# Patient Record
Sex: MALE | Race: OTHER | HISPANIC OR LATINO
[De-identification: names, ages, dates, MRNs, and addresses within clinical notes are randomized per-mention and may not be internally consistent; named-entity substitution may affect disease eponyms.]

---

## 2017-01-12 ENCOUNTER — APPOINTMENT (OUTPATIENT)
Dept: ENDOCRINOLOGY | Facility: CLINIC | Age: 72
End: 2017-01-12

## 2017-03-06 ENCOUNTER — APPOINTMENT (OUTPATIENT)
Dept: ENDOCRINOLOGY | Facility: CLINIC | Age: 72
End: 2017-03-06

## 2017-03-10 ENCOUNTER — RX RENEWAL (OUTPATIENT)
Age: 72
End: 2017-03-10

## 2017-06-29 ENCOUNTER — OTHER (OUTPATIENT)
Age: 72
End: 2017-06-29

## 2017-07-06 ENCOUNTER — APPOINTMENT (OUTPATIENT)
Dept: ENDOCRINOLOGY | Facility: CLINIC | Age: 72
End: 2017-07-06

## 2017-07-06 VITALS
DIASTOLIC BLOOD PRESSURE: 73 MMHG | SYSTOLIC BLOOD PRESSURE: 127 MMHG | WEIGHT: 175.25 LBS | BODY MASS INDEX: 27.18 KG/M2 | HEIGHT: 67.5 IN | HEART RATE: 87 BPM

## 2017-07-06 DIAGNOSIS — Z13.820 ENCOUNTER FOR SCREENING FOR OSTEOPOROSIS: ICD-10-CM

## 2017-07-10 ENCOUNTER — RX RENEWAL (OUTPATIENT)
Age: 72
End: 2017-07-10

## 2017-08-01 ENCOUNTER — FORM ENCOUNTER (OUTPATIENT)
Age: 72
End: 2017-08-01

## 2017-08-02 ENCOUNTER — OUTPATIENT (OUTPATIENT)
Dept: OUTPATIENT SERVICES | Facility: HOSPITAL | Age: 72
LOS: 1 days | End: 2017-08-02
Payer: MEDICARE

## 2017-08-02 ENCOUNTER — APPOINTMENT (OUTPATIENT)
Dept: CARDIOTHORACIC SURGERY | Facility: CLINIC | Age: 72
End: 2017-08-02
Payer: MEDICARE

## 2017-08-02 VITALS
HEIGHT: 68 IN | TEMPERATURE: 97.2 F | OXYGEN SATURATION: 95 % | BODY MASS INDEX: 26.52 KG/M2 | RESPIRATION RATE: 18 BRPM | SYSTOLIC BLOOD PRESSURE: 135 MMHG | WEIGHT: 175 LBS | DIASTOLIC BLOOD PRESSURE: 82 MMHG | HEART RATE: 70 BPM

## 2017-08-02 DIAGNOSIS — Z82.71 FAMILY HISTORY OF POLYCYSTIC KIDNEY: ICD-10-CM

## 2017-08-02 DIAGNOSIS — Z87.39 PERSONAL HISTORY OF OTHER DISEASES OF THE MUSCULOSKELETAL SYSTEM AND CONNECTIVE TISSUE: ICD-10-CM

## 2017-08-02 DIAGNOSIS — I71.2 THORACIC AORTIC ANEURYSM, WITHOUT RUPTURE: ICD-10-CM

## 2017-08-02 DIAGNOSIS — Z87.09 PERSONAL HISTORY OF OTHER DISEASES OF THE RESPIRATORY SYSTEM: ICD-10-CM

## 2017-08-02 DIAGNOSIS — Z86.79 PERSONAL HISTORY OF OTHER DISEASES OF THE CIRCULATORY SYSTEM: ICD-10-CM

## 2017-08-02 DIAGNOSIS — Z83.3 FAMILY HISTORY OF DIABETES MELLITUS: ICD-10-CM

## 2017-08-02 PROCEDURE — 93306 TTE W/DOPPLER COMPLETE: CPT

## 2017-08-02 PROCEDURE — 99205 OFFICE O/P NEW HI 60 MIN: CPT

## 2017-08-02 PROCEDURE — 93306 TTE W/DOPPLER COMPLETE: CPT | Mod: 26

## 2017-08-03 PROBLEM — Z83.3 FAMILY HISTORY OF DIABETES MELLITUS: Status: ACTIVE | Noted: 2017-08-03

## 2017-08-03 PROBLEM — Z86.79 HISTORY OF HYPERTENSION: Status: RESOLVED | Noted: 2017-08-03 | Resolved: 2017-08-03

## 2017-08-03 PROBLEM — Z87.09 HISTORY OF CHRONIC BRONCHITIS: Status: RESOLVED | Noted: 2017-08-03 | Resolved: 2017-08-03

## 2017-08-03 PROBLEM — Z82.71 FAMILY HISTORY OF POLYCYSTIC KIDNEY DISEASE: Status: ACTIVE | Noted: 2017-08-03

## 2017-08-03 PROBLEM — Z87.39 HISTORY OF OSTEOARTHRITIS: Status: RESOLVED | Noted: 2017-08-03 | Resolved: 2017-08-03

## 2017-08-03 PROBLEM — Z87.39 HISTORY OF GOUT: Status: RESOLVED | Noted: 2017-08-03 | Resolved: 2017-08-03

## 2017-08-07 ENCOUNTER — FORM ENCOUNTER (OUTPATIENT)
Age: 72
End: 2017-08-07

## 2017-08-08 ENCOUNTER — OUTPATIENT (OUTPATIENT)
Dept: OUTPATIENT SERVICES | Facility: HOSPITAL | Age: 72
LOS: 1 days | End: 2017-08-08
Payer: MEDICARE

## 2017-08-08 PROCEDURE — 77080 DXA BONE DENSITY AXIAL: CPT

## 2017-08-08 PROCEDURE — 77081 DXA BONE DENSITY APPENDICULR: CPT | Mod: 26

## 2017-08-08 PROCEDURE — 77080 DXA BONE DENSITY AXIAL: CPT | Mod: 26,59

## 2017-08-08 PROCEDURE — 77081 DXA BONE DENSITY APPENDICULR: CPT

## 2017-08-09 ENCOUNTER — RX RENEWAL (OUTPATIENT)
Age: 72
End: 2017-08-09

## 2017-08-10 ENCOUNTER — RX RENEWAL (OUTPATIENT)
Age: 72
End: 2017-08-10

## 2017-10-04 LAB
24R-OH-CALCIDIOL SERPL-MCNC: 31.6 PG/ML
25(OH)D3 SERPL-MCNC: 42.2 NG/ML
HBA1C MFR BLD HPLC: 7.2 %
THYROPEROXIDASE AB SERPL IA-ACNC: 32.6 IU/ML

## 2017-10-12 ENCOUNTER — RX RENEWAL (OUTPATIENT)
Age: 72
End: 2017-10-12

## 2017-10-13 LAB
ALBUMIN SERPL ELPH-MCNC: 4.3 G/DL
ALP BLD-CCNC: 45 U/L
ALP BONE SERPL-MCNC: 5.8 MCG/L
ALT SERPL-CCNC: 13 U/L
AST SERPL-CCNC: 14 U/L
BILIRUB SERPL-MCNC: 0.7 MG/DL
BUN SERPL-MCNC: 29 MG/DL
CALCIUM SERPL-MCNC: 10.5 MG/DL
CALCIUM SERPL-MCNC: 10.5 MG/DL
CHLORIDE SERPL-SCNC: 101 MMOL/L
CHOLEST SERPL-MCNC: 113 MG/DL
CHOLEST/HDLC SERPL: 2.8 RATIO
CO2 SERPL-SCNC: 18 MMOL/L
CREAT SERPL-MCNC: 1.31 MG/DL
FOLATE SERPL-MCNC: >20 NG/ML
GLUCOSE SERPL-MCNC: 127 MG/DL
HDLC SERPL-MCNC: 41 MG/DL
LDLC SERPL CALC-MCNC: 50 MG/DL
MAGNESIUM SERPL-MCNC: 1.4 MG/DL
PARATHYROID HORMONE INTACT: 45 PG/ML
PHOSPHATE SERPL-MCNC: 3.2 MG/DL
POTASSIUM SERPL-SCNC: 4.9 MMOL/L
PROT SERPL-MCNC: 7.2 G/DL
SODIUM SERPL-SCNC: 147 MMOL/L
T3 SERPL-MCNC: 73 NG/DL
T4 FREE SERPL-MCNC: 1.6 NG/DL
TRIGL SERPL-MCNC: 111 MG/DL
TSH SERPL-ACNC: 2.73 UIU/ML
VIT B12 SERPL-MCNC: 471 PG/ML

## 2017-10-19 ENCOUNTER — APPOINTMENT (OUTPATIENT)
Dept: ENDOCRINOLOGY | Facility: CLINIC | Age: 72
End: 2017-10-19
Payer: MEDICARE

## 2017-10-19 VITALS
HEART RATE: 134 BPM | HEIGHT: 68 IN | WEIGHT: 179 LBS | SYSTOLIC BLOOD PRESSURE: 135 MMHG | DIASTOLIC BLOOD PRESSURE: 78 MMHG | BODY MASS INDEX: 27.13 KG/M2

## 2017-10-19 PROCEDURE — 99214 OFFICE O/P EST MOD 30 MIN: CPT

## 2017-11-17 ENCOUNTER — APPOINTMENT (OUTPATIENT)
Dept: ENDOCRINOLOGY | Facility: CLINIC | Age: 72
End: 2017-11-17
Payer: MEDICARE

## 2017-11-17 VITALS
HEIGHT: 69 IN | DIASTOLIC BLOOD PRESSURE: 88 MMHG | BODY MASS INDEX: 26.25 KG/M2 | WEIGHT: 177.25 LBS | SYSTOLIC BLOOD PRESSURE: 136 MMHG | HEART RATE: 132 BPM

## 2017-11-17 PROCEDURE — 90662 IIV NO PRSV INCREASED AG IM: CPT

## 2017-11-17 PROCEDURE — 99215 OFFICE O/P EST HI 40 MIN: CPT | Mod: 25

## 2017-11-17 PROCEDURE — G0008: CPT

## 2017-11-20 ENCOUNTER — MED ADMIN CHARGE (OUTPATIENT)
Age: 72
End: 2017-11-20

## 2018-01-31 ENCOUNTER — OUTPATIENT (OUTPATIENT)
Dept: OUTPATIENT SERVICES | Facility: HOSPITAL | Age: 73
LOS: 1 days | End: 2018-01-31
Payer: MEDICARE

## 2018-01-31 ENCOUNTER — INPATIENT (INPATIENT)
Facility: HOSPITAL | Age: 73
LOS: 5 days | Discharge: HOME | DRG: 273 | End: 2018-02-06
Attending: INTERNAL MEDICINE | Admitting: INTERNAL MEDICINE
Payer: MEDICARE

## 2018-01-31 ENCOUNTER — APPOINTMENT (OUTPATIENT)
Dept: INFUSION THERAPY | Facility: HOSPITAL | Age: 73
End: 2018-01-31

## 2018-01-31 VITALS
WEIGHT: 177.91 LBS | DIASTOLIC BLOOD PRESSURE: 77 MMHG | SYSTOLIC BLOOD PRESSURE: 135 MMHG | RESPIRATION RATE: 18 BRPM | TEMPERATURE: 98 F | OXYGEN SATURATION: 97 % | HEIGHT: 70 IN | HEART RATE: 126 BPM

## 2018-01-31 DIAGNOSIS — I48.91 UNSPECIFIED ATRIAL FIBRILLATION: ICD-10-CM

## 2018-01-31 DIAGNOSIS — I10 ESSENTIAL (PRIMARY) HYPERTENSION: ICD-10-CM

## 2018-01-31 DIAGNOSIS — E78.5 HYPERLIPIDEMIA, UNSPECIFIED: ICD-10-CM

## 2018-01-31 DIAGNOSIS — I82.409 ACUTE EMBOLISM AND THROMBOSIS OF UNSPECIFIED DEEP VEINS OF UNSPECIFIED LOWER EXTREMITY: ICD-10-CM

## 2018-01-31 DIAGNOSIS — E11.9 TYPE 2 DIABETES MELLITUS WITHOUT COMPLICATIONS: ICD-10-CM

## 2018-01-31 DIAGNOSIS — M81.0 AGE-RELATED OSTEOPOROSIS WITHOUT CURRENT PATHOLOGICAL FRACTURE: ICD-10-CM

## 2018-01-31 DIAGNOSIS — Z98.890 OTHER SPECIFIED POSTPROCEDURAL STATES: Chronic | ICD-10-CM

## 2018-01-31 DIAGNOSIS — Z94.0 KIDNEY TRANSPLANT STATUS: ICD-10-CM

## 2018-01-31 DIAGNOSIS — R63.8 OTHER SYMPTOMS AND SIGNS CONCERNING FOOD AND FLUID INTAKE: ICD-10-CM

## 2018-01-31 DIAGNOSIS — Z29.9 ENCOUNTER FOR PROPHYLACTIC MEASURES, UNSPECIFIED: ICD-10-CM

## 2018-01-31 LAB
ALBUMIN SERPL ELPH-MCNC: 4.3 G/DL — SIGNIFICANT CHANGE UP (ref 3.3–5)
ALP SERPL-CCNC: 35 U/L — LOW (ref 40–120)
ALT FLD-CCNC: 16 U/L — SIGNIFICANT CHANGE UP (ref 10–45)
ANION GAP SERPL CALC-SCNC: 16 MMOL/L — SIGNIFICANT CHANGE UP (ref 5–17)
APTT BLD: 131.6 SEC — CRITICAL HIGH (ref 27.5–37.4)
APTT BLD: 29.3 SEC — SIGNIFICANT CHANGE UP (ref 27.5–37.4)
AST SERPL-CCNC: 17 U/L — SIGNIFICANT CHANGE UP (ref 10–40)
BASOPHILS NFR BLD AUTO: 0.2 % — SIGNIFICANT CHANGE UP (ref 0–2)
BILIRUB SERPL-MCNC: 0.8 MG/DL — SIGNIFICANT CHANGE UP (ref 0.2–1.2)
BUN SERPL-MCNC: 25 MG/DL — HIGH (ref 7–23)
CALCIUM SERPL-MCNC: 9.8 MG/DL — SIGNIFICANT CHANGE UP (ref 8.4–10.5)
CHLORIDE SERPL-SCNC: 98 MMOL/L — SIGNIFICANT CHANGE UP (ref 96–108)
CK MB CFR SERPL CALC: 1.5 NG/ML — SIGNIFICANT CHANGE UP (ref 0–6.7)
CK MB CFR SERPL CALC: 1.9 NG/ML — SIGNIFICANT CHANGE UP (ref 0–6.7)
CK SERPL-CCNC: 48 U/L — SIGNIFICANT CHANGE UP (ref 30–200)
CK SERPL-CCNC: 53 U/L — SIGNIFICANT CHANGE UP (ref 30–200)
CO2 SERPL-SCNC: 24 MMOL/L — SIGNIFICANT CHANGE UP (ref 22–31)
CREAT SERPL-MCNC: 1 MG/DL — SIGNIFICANT CHANGE UP (ref 0.5–1.3)
EOSINOPHIL NFR BLD AUTO: 0.9 % — SIGNIFICANT CHANGE UP (ref 0–6)
GLUCOSE BLDC GLUCOMTR-MCNC: 189 MG/DL — HIGH (ref 70–99)
GLUCOSE BLDC GLUCOMTR-MCNC: 258 MG/DL — HIGH (ref 70–99)
GLUCOSE SERPL-MCNC: 266 MG/DL — HIGH (ref 70–99)
HCT VFR BLD CALC: 39.3 % — SIGNIFICANT CHANGE UP (ref 39–50)
HCT VFR BLD CALC: 41.9 % — SIGNIFICANT CHANGE UP (ref 39–50)
HGB BLD-MCNC: 13.3 G/DL — SIGNIFICANT CHANGE UP (ref 13–17)
HGB BLD-MCNC: 14 G/DL — SIGNIFICANT CHANGE UP (ref 13–17)
INR BLD: 1.06 — SIGNIFICANT CHANGE UP (ref 0.88–1.16)
LYMPHOCYTES # BLD AUTO: 15.3 % — SIGNIFICANT CHANGE UP (ref 13–44)
MAGNESIUM SERPL-MCNC: 1.4 MG/DL — LOW (ref 1.6–2.6)
MCHC RBC-ENTMCNC: 30.1 PG — SIGNIFICANT CHANGE UP (ref 27–34)
MCHC RBC-ENTMCNC: 30.3 PG — SIGNIFICANT CHANGE UP (ref 27–34)
MCHC RBC-ENTMCNC: 33.4 G/DL — SIGNIFICANT CHANGE UP (ref 32–36)
MCHC RBC-ENTMCNC: 33.8 G/DL — SIGNIFICANT CHANGE UP (ref 32–36)
MCV RBC AUTO: 89.5 FL — SIGNIFICANT CHANGE UP (ref 80–100)
MCV RBC AUTO: 90.1 FL — SIGNIFICANT CHANGE UP (ref 80–100)
MONOCYTES NFR BLD AUTO: 3.5 % — SIGNIFICANT CHANGE UP (ref 2–14)
NEUTROPHILS NFR BLD AUTO: 80.1 % — HIGH (ref 43–77)
PLATELET # BLD AUTO: 197 K/UL — SIGNIFICANT CHANGE UP (ref 150–400)
PLATELET # BLD AUTO: 217 K/UL — SIGNIFICANT CHANGE UP (ref 150–400)
POTASSIUM SERPL-MCNC: 4.2 MMOL/L — SIGNIFICANT CHANGE UP (ref 3.5–5.3)
POTASSIUM SERPL-SCNC: 4.2 MMOL/L — SIGNIFICANT CHANGE UP (ref 3.5–5.3)
PROT SERPL-MCNC: 7.7 G/DL — SIGNIFICANT CHANGE UP (ref 6–8.3)
PROTHROM AB SERPL-ACNC: 11.8 SEC — SIGNIFICANT CHANGE UP (ref 9.8–12.7)
RBC # BLD: 4.39 M/UL — SIGNIFICANT CHANGE UP (ref 4.2–5.8)
RBC # BLD: 4.65 M/UL — SIGNIFICANT CHANGE UP (ref 4.2–5.8)
RBC # FLD: 15.2 % — SIGNIFICANT CHANGE UP (ref 10.3–16.9)
RBC # FLD: 15.7 % — SIGNIFICANT CHANGE UP (ref 10.3–16.9)
SODIUM SERPL-SCNC: 138 MMOL/L — SIGNIFICANT CHANGE UP (ref 135–145)
TROPONIN T SERPL-MCNC: <0.01 NG/ML — SIGNIFICANT CHANGE UP (ref 0–0.01)
TROPONIN T SERPL-MCNC: <0.01 NG/ML — SIGNIFICANT CHANGE UP (ref 0–0.01)
TSH SERPL-MCNC: 0.11 UIU/ML — LOW (ref 0.35–4.94)
WBC # BLD: 10 K/UL — SIGNIFICANT CHANGE UP (ref 3.8–10.5)
WBC # BLD: 8.5 K/UL — SIGNIFICANT CHANGE UP (ref 3.8–10.5)
WBC # FLD AUTO: 10 K/UL — SIGNIFICANT CHANGE UP (ref 3.8–10.5)
WBC # FLD AUTO: 8.5 K/UL — SIGNIFICANT CHANGE UP (ref 3.8–10.5)

## 2018-01-31 PROCEDURE — 71045 X-RAY EXAM CHEST 1 VIEW: CPT | Mod: 26

## 2018-01-31 PROCEDURE — 99285 EMERGENCY DEPT VISIT HI MDM: CPT | Mod: 25

## 2018-01-31 PROCEDURE — 93010 ELECTROCARDIOGRAM REPORT: CPT

## 2018-01-31 RX ORDER — CARVEDILOL PHOSPHATE 80 MG/1
12.5 CAPSULE, EXTENDED RELEASE ORAL EVERY 12 HOURS
Qty: 0 | Refills: 0 | Status: DISCONTINUED | OUTPATIENT
Start: 2018-01-31 | End: 2018-01-31

## 2018-01-31 RX ORDER — ACETAMINOPHEN 500 MG
650 TABLET ORAL ONCE
Qty: 0 | Refills: 0 | Status: DISCONTINUED | OUTPATIENT
Start: 2018-01-31 | End: 2018-02-15

## 2018-01-31 RX ORDER — DEXTROSE 50 % IN WATER 50 %
25 SYRINGE (ML) INTRAVENOUS ONCE
Qty: 0 | Refills: 0 | Status: DISCONTINUED | OUTPATIENT
Start: 2018-01-31 | End: 2018-02-06

## 2018-01-31 RX ORDER — SODIUM CHLORIDE 9 MG/ML
1000 INJECTION, SOLUTION INTRAVENOUS
Qty: 0 | Refills: 0 | Status: DISCONTINUED | OUTPATIENT
Start: 2018-01-31 | End: 2018-02-06

## 2018-01-31 RX ORDER — TACROLIMUS 5 MG/1
1 CAPSULE ORAL AT BEDTIME
Qty: 0 | Refills: 0 | Status: DISCONTINUED | OUTPATIENT
Start: 2018-01-31 | End: 2018-02-06

## 2018-01-31 RX ORDER — ATORVASTATIN CALCIUM 80 MG/1
20 TABLET, FILM COATED ORAL AT BEDTIME
Qty: 0 | Refills: 0 | Status: DISCONTINUED | OUTPATIENT
Start: 2018-01-31 | End: 2018-02-06

## 2018-01-31 RX ORDER — DEXTROSE 50 % IN WATER 50 %
12.5 SYRINGE (ML) INTRAVENOUS ONCE
Qty: 0 | Refills: 0 | Status: DISCONTINUED | OUTPATIENT
Start: 2018-01-31 | End: 2018-02-06

## 2018-01-31 RX ORDER — SODIUM CHLORIDE 9 MG/ML
500 INJECTION INTRAMUSCULAR; INTRAVENOUS; SUBCUTANEOUS ONCE
Qty: 0 | Refills: 0 | Status: DISCONTINUED | OUTPATIENT
Start: 2018-01-31 | End: 2018-02-15

## 2018-01-31 RX ORDER — DEXTROSE 50 % IN WATER 50 %
1 SYRINGE (ML) INTRAVENOUS ONCE
Qty: 0 | Refills: 0 | Status: DISCONTINUED | OUTPATIENT
Start: 2018-01-31 | End: 2018-02-06

## 2018-01-31 RX ORDER — MAGNESIUM SULFATE 500 MG/ML
2 VIAL (ML) INJECTION ONCE
Qty: 0 | Refills: 0 | Status: COMPLETED | OUTPATIENT
Start: 2018-01-31 | End: 2018-01-31

## 2018-01-31 RX ORDER — CARVEDILOL PHOSPHATE 80 MG/1
12.5 CAPSULE, EXTENDED RELEASE ORAL EVERY 12 HOURS
Qty: 0 | Refills: 0 | Status: DISCONTINUED | OUTPATIENT
Start: 2018-01-31 | End: 2018-02-04

## 2018-01-31 RX ORDER — INSULIN LISPRO 100/ML
VIAL (ML) SUBCUTANEOUS
Qty: 0 | Refills: 0 | Status: DISCONTINUED | OUTPATIENT
Start: 2018-01-31 | End: 2018-02-06

## 2018-01-31 RX ORDER — HEPARIN SODIUM 5000 [USP'U]/ML
INJECTION INTRAVENOUS; SUBCUTANEOUS
Qty: 25000 | Refills: 0 | Status: DISCONTINUED | OUTPATIENT
Start: 2018-01-31 | End: 2018-01-31

## 2018-01-31 RX ORDER — GLUCAGON INJECTION, SOLUTION 0.5 MG/.1ML
1 INJECTION, SOLUTION SUBCUTANEOUS ONCE
Qty: 0 | Refills: 0 | Status: DISCONTINUED | OUTPATIENT
Start: 2018-01-31 | End: 2018-02-06

## 2018-01-31 RX ORDER — TACROLIMUS 5 MG/1
2 CAPSULE ORAL DAILY
Qty: 0 | Refills: 0 | Status: DISCONTINUED | OUTPATIENT
Start: 2018-02-01 | End: 2018-02-06

## 2018-01-31 RX ORDER — HEPARIN SODIUM 5000 [USP'U]/ML
6500 INJECTION INTRAVENOUS; SUBCUTANEOUS ONCE
Qty: 0 | Refills: 0 | Status: COMPLETED | OUTPATIENT
Start: 2018-01-31 | End: 2018-01-31

## 2018-01-31 RX ORDER — HEPARIN SODIUM 5000 [USP'U]/ML
1200 INJECTION INTRAVENOUS; SUBCUTANEOUS
Qty: 25000 | Refills: 0 | Status: DISCONTINUED | OUTPATIENT
Start: 2018-01-31 | End: 2018-02-01

## 2018-01-31 RX ORDER — ZOLEDRONIC ACID 5 MG/100ML
5 INJECTION, SOLUTION INTRAVENOUS ONCE
Qty: 0 | Refills: 0 | Status: DISCONTINUED | OUTPATIENT
Start: 2018-01-31 | End: 2018-02-15

## 2018-01-31 RX ADMIN — Medication 2: at 22:02

## 2018-01-31 RX ADMIN — CARVEDILOL PHOSPHATE 12.5 MILLIGRAM(S): 80 CAPSULE, EXTENDED RELEASE ORAL at 19:41

## 2018-01-31 RX ADMIN — Medication 50 GRAM(S): at 17:24

## 2018-01-31 RX ADMIN — ATORVASTATIN CALCIUM 20 MILLIGRAM(S): 80 TABLET, FILM COATED ORAL at 22:02

## 2018-01-31 RX ADMIN — HEPARIN SODIUM 6500 UNIT(S): 5000 INJECTION INTRAVENOUS; SUBCUTANEOUS at 14:09

## 2018-01-31 RX ADMIN — HEPARIN SODIUM 1500 UNIT(S)/HR: 5000 INJECTION INTRAVENOUS; SUBCUTANEOUS at 14:09

## 2018-01-31 RX ADMIN — Medication 6: at 16:14

## 2018-01-31 NOTE — H&P ADULT - PROBLEM SELECTOR PLAN 4
start on home Atorvastatin 20mg Patient takes Metformin, Januvia, and Glimepiride at home for DM2.  Hold home medications and will start MISS as inpatient  - f/u [facility]1C

## 2018-01-31 NOTE — H&P ADULT - NSHPSOCIALHISTORY_GEN_ALL_CORE
Lives at home with wife in an elevator building.  Never smoker, social alcohol use, no illicit drug use. Retired

## 2018-01-31 NOTE — H&P ADULT - NSHPPHYSICALEXAM_GEN_ALL_CORE
General: WDWN male lying in bed in NAD, pleasant and interactive  HEENT: AT/NC, PEERLA, MMM, no conjunctival pallor, anicteric sclera  Neck; Supple without JVD/LAD  Cardiac: S1/S2, irregularly irregular and tachy, no M/R/G  Respiratory: CTAB, no W/R/R  Abdomen: Soft, NT/ND, obese, +BS  Extremities: WWP, no edema, clubbing or cyanosis. Old fistula present left forearm  Vascular: 2+ radial and DP/PT pulses  Neuro: AOx3, moves all extremities and follows commands

## 2018-01-31 NOTE — PROGRESS NOTE ADULT - SUBJECTIVE AND OBJECTIVE BOX
Pt is admitted for new onset of atrial fibrillation , discovered during  IV RX for Osteoporosis     Pt has no symptoms       PAST MEDICAL & SURGICAL HISTORY:  DVT (deep venous thrombosis)  Kidney transplant recipient  Prostate cancer  HTN (hypertension)  HLD (hyperlipidemia)  DM2 (diabetes mellitus, type 2)  Polycystic kidney disease  H/O radical prostatectomy    Home Medications:  amLODIPine 10 mg oral tablet: 1 tab(s) orally once a day (31 Jan 2018 16:44)  aspirin 81 mg oral tablet: 1 tab(s) orally once a day (31 Jan 2018 16:44)  Coreg 12.5 mg oral tablet: 1 tab(s) orally 2 times a day (31 Jan 2018 16:44)  glimepiride 2 mg oral tablet: 2 tab(s) orally once a day (in the morning) (31 Jan 2018 16:44)  glimepiride 2 mg oral tablet: 1 tab(s) orally once a day (in the evening) (31 Jan 2018 16:44)  Januvia 100 mg oral tablet: 1 tab(s) orally once a day (31 Jan 2018 16:44)  Lipitor 20 mg oral tablet: 1 tab(s) orally once a day (31 Jan 2018 16:44)  lisinopril 10 mg oral tablet: 1 tab(s) orally once a day (31 Jan 2018 16:44)  metFORMIN 500 mg oral tablet, extended release: 1 tab(s) orally once a day (31 Jan 2018 16:44)  tacrolimus 1 mg oral capsule: 2 cap(s) orally once a day (in the morning) (31 Jan 2018 16:44)  tacrolimus 1 mg oral capsule: 1 cap(s) orally once a day (in the evening) (31 Jan 2018 16:44)  Vitamin D3 1000 intl units oral capsule: 1 cap(s) orally 2 times a day (31 Jan 2018 16:44)    MEDICATIONS  (STANDING):  atorvastatin 20 milliGRAM(s) Oral at bedtime  dextrose 5%. 1000 milliLiter(s) (50 mL/Hr) IV Continuous <Continuous>  dextrose 50% Injectable 12.5 Gram(s) IV Push once  dextrose 50% Injectable 25 Gram(s) IV Push once  dextrose 50% Injectable 25 Gram(s) IV Push once  heparin  Infusion.  Unit(s)/Hr (15 mL/Hr) IV Continuous <Continuous>  insulin lispro (HumaLOG) corrective regimen sliding scale   SubCutaneous Before meals and at bedtime  tacrolimus 1 milliGRAM(s) Oral at bedtime    MEDICATIONS  (PRN):  dextrose Gel 1 Dose(s) Oral once PRN Blood Glucose LESS THAN 70 milliGRAM(s)/deciliter  glucagon  Injectable 1 milliGRAM(s) IntraMuscular once PRN Glucose LESS THAN 70 milligrams/deciliter      Lung s clear  CV s1 s2 Prev EKG earlier  Atrial tachycardia  varriable AV Block  Lateral infarct     Abd soft  Ext stable                          14.0   10.0  )-----------( 197      ( 31 Jan 2018 13:12 )             41.9   01-31    138  |  98  |  25<H>  ----------------------------<  266<H>  4.2   |  24  |  1.00    Ca    9.8      31 Jan 2018 13:12  Mg     1.4     01-31    TPro  7.7  /  Alb  4.3  /  TBili  0.8  /  DBili  x   /  AST  17  /  ALT  16  /  AlkPhos  35<L>  01-31    CARDIAC MARKERS ( 31 Jan 2018 13:12 )  x     / <0.01 ng/mL / 53 U/L / x     / 1.9 ng/mL        L Donna

## 2018-01-31 NOTE — H&P ADULT - PROBLEM SELECTOR PLAN 3
start on home Lisinopril 10mg, Coreg 12.5mg BID, and Amlodipine 10mg Currently normotensive, and patient reports taking all of his home medications today (Lisinopril 10mg, Amlodipine 10mg, Carvedilol 12.5mg BID).  Hold home BP medications while adding Diltiazem for rate control at this time.  Can consider adding Lisinopril if needed for BP control. Currently normotensive, and patient reports taking all of his home medications today (Lisinopril 10mg, Amlodipine 10mg, Carvedilol 12.5mg BID).    - start home Carvedilol 12.5mg BID for rate control  - hold other home BP meds at this time in case Diltiazem is required overnight for additional rate control

## 2018-01-31 NOTE — H&P ADULT - PROBLEM SELECTOR PLAN 5
Patient with history of DVT in 2006 in the setting of a pelvic fracture when he was immobilized for several weeks. He was on Warfarin for a period of time after his DVT and has never had clotting issues since.  On exam today, his legs are similar in width and he does not have any calf tenderness  - c/w heparin gtt for AC in Afrib start on home Atorvastatin 20mg

## 2018-01-31 NOTE — ED ADULT NURSE NOTE - CHIEF COMPLAINT QUOTE
[facility] SOLOMON Schwarz in infusion center reports patient has osteoporosis, was scheduled to receive IV "Reclast 50CC's." SOLOMON Schwarz reports patients HR before infusion was 132bpm. Patient received 500mL's of 0.9%NaCl and 50ml's of Reclast IV via 24G placed in right by infusion center prior to arrival. Patient is asymptomatic, patient denies any CP, palpations, fever/chills, N/V/D, SOB, leg swelling.

## 2018-01-31 NOTE — H&P ADULT - HISTORY OF PRESENT ILLNESS
Neto Wooten is a 72 M with PMHx of polycystic kidney disease s/p kidney transplant (2007 at [facility], on [place]/Prednisone), HTN, HLD, DM2, Prostate cancer s/p radical prostatectomy 2015, DVT 2006 (in setting of pelvic fracture), and osteoporosis presenting to [facility] after being found with  when measured at outpatient infusion center where he was receiving an infusion of Reclast.  He was asymptomatic for his tachycardia, and was sent to the ED after completing his transfusion, where he was found to be in AFib with RVR.  He denies HA, changes in vision/hearing, dysphagia, CP, SOB, N/V/D/C, dysuria, myalgias, fevers or chills.  He recalls intermittent periods of palpitations over the past several years that he had attributed to stress, and which were always self limited.      In the ED his VS were Tmax 97.9, , /77, RR 18, SpO2 97% RA. EKG showed atrial fibrillation.  He was given Diltiazem 10mg IV x2, Diltiazem 30mg PO, and was started on Heparin gtt at 15cc/hr with a 6500U bolus.  Baseline PTT 29.3 Neto Wooten is a 72 M with PMHx of polycystic kidney disease s/p kidney transplant (2007 at [facility], on [place]/Prednisone), HTN, HLD, DM2, Prostate cancer s/p radical prostatectomy 2015, DVT 2006 (in setting of pelvic fracture), and osteoporosis presenting to [facility] after being found with  when measured at outpatient infusion center where he was receiving an infusion of Reclast.  He was asymptomatic for his tachycardia, and was sent to the ED after completing his transfusion, where he was found to be in AFib with RVR.  He denies HA, changes in vision/hearing, dysphagia, CP, SOB, N/V/D/C, dysuria, myalgias, fevers or chills.  He recalls intermittent periods of palpitations over the past several years that he had attributed to stress, and which were always self limited.      In the ED his VS were Tmax 97.9, , /77, RR 18, SpO2 97% RA. EKG showed atrial fibrillation.  He was given Diltiazem 10mg IV x2, Diltiazem 30mg PO, and was started on Heparin gtt at 15cc/hr with a 6500U bolus.  Baseline PTT 29.3.  Magnesium 1.4, Anjel <0.01.  He was admitted to [facility] for further management

## 2018-01-31 NOTE — ED PROVIDER NOTE - PMH
DM2 (diabetes mellitus, type 2)    DVT (deep venous thrombosis)    HLD (hyperlipidemia)    HTN (hypertension)    Kidney transplant recipient    Polycystic kidney disease    Prostate cancer

## 2018-01-31 NOTE — ED PROVIDER NOTE - OBJECTIVE STATEMENT
tachycardia  patient when for outpt reclas infusion for osteoporosis, pt was completely assymptomatic, found to have tachy and given NS for presumed dehydration prior to infusion, after infusion still tachy and still assymtomatic, sent to ER and found to have rapid afib, pt denies CP and SOB, no lightheadness. no ho of afib

## 2018-01-31 NOTE — ED PROVIDER NOTE - MEDICAL DECISION MAKING DETAILS
Incidentally found and assymptomatic rapid afib, rate improved w diltiazem, heparin initiated, short 8 beat run of tach, discussed w Dr. Thompson, holding on antidysrthmic as per Dr. Thompson who is coming to see patient, discussed case w Dr. Rios.

## 2018-01-31 NOTE — ED ADULT NURSE REASSESSMENT NOTE - NS ED NURSE REASSESS COMMENT FT1
Endorsed care from SOLOMON ADEN at this time, pt resting comfortably bed 7 monitor. Noted sinus tachycardia. Heparin continues to infuse as per order. Denies cp, sob, n/v. Will continue to monitor. Endorsed care from SOLOMON ADEN at this time, pt resting comfortably bed 7 monitor. Heparin continues to infuse as per order. Denies cp, sob, n/v. Will continue to monitor.

## 2018-01-31 NOTE — ED ADULT NURSE NOTE - OBJECTIVE STATEMENT
pt received in room 7 A & O x 3 pt was at infusion center at 3rd floor was receiving Reclast for Osteoporosis treatement, pt was tachycardic before receiving and during treatment , pt denies any symptoms , no chest pain , no SOB , no palpitations , no dizziness, pt on CM< sinus rhythm noted , MD at bedside run of V-tach noted   , IV was accessed labs sent will continue to monitor

## 2018-01-31 NOTE — ED ADULT TRIAGE NOTE - CHIEF COMPLAINT QUOTE
[facility] SOLOMON Schwarz in infusion center reports patient has osteoporosis, was scheduled to receive IV "Reclast 50CC's." SOLOMON Schwarz reports patients HR before infusion was 132bpm. Patient received 500mL's of 0.9%NaCl and 50ml's of Reclast IV via 24G placed in right by infusion center prior to arrival. Patient is asymptomatic, patient denies any CP, palpations, fever/chills, N/V/D, SOB, leg swelling.

## 2018-01-31 NOTE — H&P ADULT - ASSESSMENT
72 M with PMHx of polycystic kidney disease s/p kidney transplant (2007 at [facility], on [place]/Prednisone), HTN, HLD, DM2, Prostate cancer s/p radical prostatectomy 2015, DVT 2006 (in setting of pelvic fracture), and osteoporosis presenting to [facility] with atrial fibrillation with RVR, asymptomatic

## 2018-01-31 NOTE — H&P ADULT - PROBLEM SELECTOR PLAN 6
F: PO  E: Replete PRN  N: Regular Patient with history of DVT in 2006 in the setting of a pelvic fracture when he was immobilized for several weeks. He was on Warfarin for a period of time after his DVT and has never had clotting issues since.  On exam today, his legs are similar in width and he does not have any calf tenderness  - c/w heparin gtt for AC in Afrib

## 2018-01-31 NOTE — H&P ADULT - NSHPLABSRESULTS_GEN_ALL_CORE
14.0   10.0  )-----------( 197      ( 31 Jan 2018 13:12 )             41.9   01-31    138  |  98  |  25<H>  ----------------------------<  266<H>  4.2   |  24  |  1.00    Ca    9.8      31 Jan 2018 13:12  Mg     1.4     01-31    TPro  7.7  /  Alb  4.3  /  TBili  0.8  /  DBili  x   /  AST  17  /  ALT  16  /  AlkPhos  35<L>  01-31    CARDIAC MARKERS ( 31 Jan 2018 13:12 )  x     / <0.01 ng/mL / 53 U/L / x     / 1.9 ng/mL

## 2018-02-01 LAB
ANION GAP SERPL CALC-SCNC: 11 MMOL/L — SIGNIFICANT CHANGE UP (ref 5–17)
APTT BLD: 63.1 SEC — HIGH (ref 27.5–37.4)
APTT BLD: 71.8 SEC — HIGH (ref 27.5–37.4)
APTT BLD: 74.2 SEC — HIGH (ref 27.5–37.4)
BUN SERPL-MCNC: 19 MG/DL — SIGNIFICANT CHANGE UP (ref 7–23)
CALCIUM SERPL-MCNC: 9.4 MG/DL — SIGNIFICANT CHANGE UP (ref 8.4–10.5)
CHLORIDE SERPL-SCNC: 103 MMOL/L — SIGNIFICANT CHANGE UP (ref 96–108)
CK MB CFR SERPL CALC: 1.3 NG/ML — SIGNIFICANT CHANGE UP (ref 0–6.7)
CK SERPL-CCNC: 40 U/L — SIGNIFICANT CHANGE UP (ref 30–200)
CO2 SERPL-SCNC: 28 MMOL/L — SIGNIFICANT CHANGE UP (ref 22–31)
CREAT SERPL-MCNC: 0.99 MG/DL — SIGNIFICANT CHANGE UP (ref 0.5–1.3)
GLUCOSE BLDC GLUCOMTR-MCNC: 118 MG/DL — HIGH (ref 70–99)
GLUCOSE BLDC GLUCOMTR-MCNC: 132 MG/DL — HIGH (ref 70–99)
GLUCOSE BLDC GLUCOMTR-MCNC: 178 MG/DL — HIGH (ref 70–99)
GLUCOSE BLDC GLUCOMTR-MCNC: 249 MG/DL — HIGH (ref 70–99)
GLUCOSE SERPL-MCNC: 149 MG/DL — HIGH (ref 70–99)
HCT VFR BLD CALC: 38.3 % — LOW (ref 39–50)
HGB BLD-MCNC: 13 G/DL — SIGNIFICANT CHANGE UP (ref 13–17)
INR BLD: 1.11 — SIGNIFICANT CHANGE UP (ref 0.88–1.16)
MAGNESIUM SERPL-MCNC: 1.6 MG/DL — SIGNIFICANT CHANGE UP (ref 1.6–2.6)
MCHC RBC-ENTMCNC: 30.5 PG — SIGNIFICANT CHANGE UP (ref 27–34)
MCHC RBC-ENTMCNC: 33.9 G/DL — SIGNIFICANT CHANGE UP (ref 32–36)
MCV RBC AUTO: 89.9 FL — SIGNIFICANT CHANGE UP (ref 80–100)
PLATELET # BLD AUTO: 186 K/UL — SIGNIFICANT CHANGE UP (ref 150–400)
POTASSIUM SERPL-MCNC: 4 MMOL/L — SIGNIFICANT CHANGE UP (ref 3.5–5.3)
POTASSIUM SERPL-SCNC: 4 MMOL/L — SIGNIFICANT CHANGE UP (ref 3.5–5.3)
PROTHROM AB SERPL-ACNC: 12.3 SEC — SIGNIFICANT CHANGE UP (ref 9.8–12.7)
RBC # BLD: 4.26 M/UL — SIGNIFICANT CHANGE UP (ref 4.2–5.8)
RBC # FLD: 15.4 % — SIGNIFICANT CHANGE UP (ref 10.3–16.9)
SODIUM SERPL-SCNC: 142 MMOL/L — SIGNIFICANT CHANGE UP (ref 135–145)
T3 SERPL-MCNC: 72 NG/DL — LOW (ref 80–200)
T4 AB SER-ACNC: 6.32 UG/DL — SIGNIFICANT CHANGE UP (ref 3.17–11.72)
TACROLIMUS SERPL-MCNC: 6 NG/ML — SIGNIFICANT CHANGE UP
TROPONIN T SERPL-MCNC: <0.01 NG/ML — SIGNIFICANT CHANGE UP (ref 0–0.01)
WBC # BLD: 9.2 K/UL — SIGNIFICANT CHANGE UP (ref 3.8–10.5)
WBC # FLD AUTO: 9.2 K/UL — SIGNIFICANT CHANGE UP (ref 3.8–10.5)

## 2018-02-01 PROCEDURE — 93306 TTE W/DOPPLER COMPLETE: CPT | Mod: 26

## 2018-02-01 PROCEDURE — 99223 1ST HOSP IP/OBS HIGH 75: CPT

## 2018-02-01 PROCEDURE — 93010 ELECTROCARDIOGRAM REPORT: CPT

## 2018-02-01 RX ORDER — ACETAMINOPHEN 500 MG
650 TABLET ORAL ONCE
Qty: 0 | Refills: 0 | Status: COMPLETED | OUTPATIENT
Start: 2018-02-01 | End: 2018-02-01

## 2018-02-01 RX ORDER — MAGNESIUM SULFATE 500 MG/ML
2 VIAL (ML) INJECTION ONCE
Qty: 0 | Refills: 0 | Status: COMPLETED | OUTPATIENT
Start: 2018-02-01 | End: 2018-02-01

## 2018-02-01 RX ORDER — APIXABAN 2.5 MG/1
5 TABLET, FILM COATED ORAL EVERY 12 HOURS
Qty: 0 | Refills: 0 | Status: DISCONTINUED | OUTPATIENT
Start: 2018-02-01 | End: 2018-02-02

## 2018-02-01 RX ORDER — HEPARIN SODIUM 5000 [USP'U]/ML
1200 INJECTION INTRAVENOUS; SUBCUTANEOUS
Qty: 25000 | Refills: 0 | Status: DISCONTINUED | OUTPATIENT
Start: 2018-02-01 | End: 2018-02-01

## 2018-02-01 RX ADMIN — TACROLIMUS 2 MILLIGRAM(S): 5 CAPSULE ORAL at 16:54

## 2018-02-01 RX ADMIN — TACROLIMUS 1 MILLIGRAM(S): 5 CAPSULE ORAL at 01:03

## 2018-02-01 RX ADMIN — ATORVASTATIN CALCIUM 20 MILLIGRAM(S): 80 TABLET, FILM COATED ORAL at 21:42

## 2018-02-01 RX ADMIN — Medication 650 MILLIGRAM(S): at 11:41

## 2018-02-01 RX ADMIN — Medication 4: at 11:31

## 2018-02-01 RX ADMIN — Medication 50 GRAM(S): at 11:41

## 2018-02-01 RX ADMIN — Medication 2: at 16:28

## 2018-02-01 RX ADMIN — HEPARIN SODIUM 12 UNIT(S)/HR: 5000 INJECTION INTRAVENOUS; SUBCUTANEOUS at 11:31

## 2018-02-01 RX ADMIN — TACROLIMUS 1 MILLIGRAM(S): 5 CAPSULE ORAL at 21:42

## 2018-02-01 RX ADMIN — APIXABAN 5 MILLIGRAM(S): 2.5 TABLET, FILM COATED ORAL at 17:28

## 2018-02-01 RX ADMIN — CARVEDILOL PHOSPHATE 12.5 MILLIGRAM(S): 80 CAPSULE, EXTENDED RELEASE ORAL at 06:50

## 2018-02-01 RX ADMIN — HEPARIN SODIUM 12 UNIT(S)/HR: 5000 INJECTION INTRAVENOUS; SUBCUTANEOUS at 09:16

## 2018-02-01 RX ADMIN — Medication 100 GRAM(S): at 08:51

## 2018-02-01 RX ADMIN — Medication 650 MILLIGRAM(S): at 12:20

## 2018-02-01 RX ADMIN — CARVEDILOL PHOSPHATE 12.5 MILLIGRAM(S): 80 CAPSULE, EXTENDED RELEASE ORAL at 17:28

## 2018-02-01 NOTE — CONSULT NOTE ADULT - SUBJECTIVE AND OBJECTIVE BOX
CHIEF COMPLAINT: elevated HR during outpt infusion    HISTORY OF PRESENT ILLNESS: 73 y/o M with PMHx of polycystic kidney disease s/p kidney transplant (2007 at [facility], on [place]/Prednisone), HTN, HLD, DM2, Prostate cancer s/p radical prostatectomy 2015, DVT 2006 (in setting of pelvic fracture), and osteoporosis presenting to [facility] after being found with  when measured at outpatient infusion center where he was receiving an infusion of Reclast.  Sent to ED for further evaluation, EKG significant for Atrial flutter with variable AV block, VR ~ 120-130 bpm.  Started on Diltiazem PO for rate control, heparin gtt for anticoagulation.  EP called for arrhythmia management.    He endorses GAFFNEY over the last 1-2 months, especially with inclines.  States his exertion is also significantly limited by arthritic LE pain.  No awareness of rapid/irregular heart action, SOB at rest, dizziness, presyncope/syncope.     PAST MEDICAL & SURGICAL HISTORY:  DVT (deep venous thrombosis)  Kidney transplant recipient  Prostate cancer  HTN (hypertension)  HLD (hyperlipidemia)  DM2 (diabetes mellitus, type 2)  Polycystic kidney disease  H/O radical prostatectomy        PERTINENT DIAGNOSTIC TESTING:    < from: Echocardiogram (02.01.18 @ 13:35) >  EXAM:  ECHOCARDIOGRAM (CARDIOL)                          PROCEDURE DATE:  02/01/2018                  INTERPRETATION:  Patient Height: 177.8 cm  Patient Weight: 81.6 kg  Heart Rate: 102 bpm  BSA: 2.0 m^2  Interpretation Summary  There is mildconcentric left ventricular hypertrophy.The left   ventricular wall   motion is normal.The left ventricular ejection fraction is estimated to   be   55-60%The left atrial size is normal.Right atrial size is normal.The   right   ventricle is normal insize and function.Structurally normal aortic   valve.No   aortic regurgitation noted.Structurally normal mitral valve.There is   trace to   mild mitral regurgitation.Structurally normal tricuspid valve.There is   mild   tricuspid regurgitation.Structurally normal pulmonic valve.Moderate   aortic   root dilatation.The aortic root measures 4.5 cm at sinuses (normal less   than 4   cm for men, less than 3.6 cm for women).  The aortic root measures 2.3   cm/m2   at sinuses (normal less than 2.1 cm/m2for men, less than 2.2 cm/m2 for   women).  There is no pericardial effusion.    < end of copied text >        Allergies    Naprosyn (Unknown)    Intolerances    MEDICATIONS:  carvedilol 12.5 milliGRAM(s) Oral every 12 hours  diltiazem    Tablet 30 milliGRAM(s) Oral every 6 hours  atorvastatin 20 milliGRAM(s) Oral at bedtime  dextrose 50% Injectable 12.5 Gram(s) IV Push once  dextrose 50% Injectable 25 Gram(s) IV Push once  dextrose 50% Injectable 25 Gram(s) IV Push once  dextrose Gel 1 Dose(s) Oral once PRN  glucagon  Injectable 1 milliGRAM(s) IntraMuscular once PRN  insulin lispro (HumaLOG) corrective regimen sliding scale   SubCutaneous Before meals and at bedtime  predniSONE   Tablet 5 milliGRAM(s) Oral daily  dextrose 5%. 1000 milliLiter(s) IV Continuous <Continuous>  heparin  Infusion 1200 Unit(s)/Hr IV Continuous <Continuous>  tacrolimus 1 milliGRAM(s) Oral at bedtime  tacrolimus 2 milliGRAM(s) Oral daily      FAMILY HISTORY:  No pertinent family history in first degree relatives      SOCIAL HISTORY:    [x ] Non-smoker          REVIEW OF SYSTEMS:    [x ] All others negative	      PHYSICAL EXAM:  T(C): 37.3 (02-01-18 @ 14:05), Max: 37.3 (02-01-18 @ 14:05)  HR: 94 (02-01-18 @ 13:56) (94 - 124)  BP: 114/88 (02-01-18 @ 13:56) (111/86 - 140/103)  RR: 16 (02-01-18 @ 13:56) (16 - 18)  SpO2: 97% (02-01-18 @ 13:56) (94% - 98%)  Wt(kg): --  I&O's Summary    31 Jan 2018 07:01  -  01 Feb 2018 07:00  --------------------------------------------------------  IN: 232 mL / OUT: 1725 mL / NET: -1493 mL    01 Feb 2018 07:01  -  01 Feb 2018 15:52  --------------------------------------------------------  IN: 352 mL / OUT: 650 mL / NET: -298 mL        TELEMETRY: 	  Atrial flutter @  bpm  ECG: Likely typical atrial flutter with variable AV block    A&Ox3 no FD  CVS s1s2 irreg irreg  Pulm CTA b/l  ABD soft + bs  Ext no edema, dp intact + LUE AVF    	  LABS:	 	    CARDIAC MARKERS:                                  13.0   9.2   )-----------( 186      ( 01 Feb 2018 07:35 )             38.3     02-01    142  |  103  |  19  ----------------------------<  149<H>  4.0   |  28  |  0.99    Ca    9.4      01 Feb 2018 07:35  Mg     1.6     02-01    TPro  7.7  /  Alb  4.3  /  TBili  0.8  /  DBili  x   /  AST  17  /  ALT  16  /  AlkPhos  35<L>  01-31    proBNP:   Lipid Profile:   HgA1c:   TSH:     ASSESSMENT/PLAN: 	73 y/o M [employer] with newly diagnosed atrial flutter with RVR  - Likely typical atrial flutter on EKG  - Continue rate control with Diltiazem as you are  - Discussed natural history of AF with patient, options for management include no intervention, medical therapy, KIMMIE & DCCV vs. catheter based ablation  - Given appearance of typical atrial flutter on EKG and >98% success rate of RFA, would proceed with KIMMIE/RFA of atrial flutter on 2/2/18.  Please keep NPO after MN.    - May transition from Heparin gtt to NOAC- would consider Pradaxa given patient's refusal of blood products and availability of reversal agent   - D/W patient in detail

## 2018-02-01 NOTE — PROGRESS NOTE ADULT - PROBLEM SELECTOR PLAN 1
as measured incidentally at outpatient infusion center where he was being treated for osteoporosis.  The patient is asymptomatic at this time, with no CP, palpitations, or SOB.  He recalls intermittent palpitations occurring over the years that he had ascribed to stress or anxiety of normal life. He has had an EKG in the past but does not recall the results. Also had an echocardiogram several years ago when being evaluated for thoracic aortic aneurysm, which demonstrated what based on his description sounds like LVH.  Nuclear stress test performed in 2014 normal as per Dr. Rios  - s/p Cardizem 10mg IV x2 and Cardizem 30mg PO in ED with improvement in his HR to 90s-100s  - continue on heparin gtt @ 12cc/hr with plans to transfer to NOAC  - continue with home Coreg 12.5mg BID for rate control  - continue with Diltiazem 30mg q6h PO for rate control  - EP consult to evaluate for ablation or cardioversion either this admission or as an outpatient  - Echo in AM  - EKG this AM demonstrates AFlutter with variable block

## 2018-02-01 NOTE — PROGRESS NOTE ADULT - SUBJECTIVE AND OBJECTIVE BOX
MEDICAL HISTORY:      Consulted by the cardiology team for kidney transplant management.     Noted to have AFIB at [facility] during an annual "Reclast infusion. Sent to the ER and AFIB confirmed. Patient was asymptomatic and has no h/o cardiac arrhythmia.     ESRD (Polycystic kidney disease)  HD [id number]  Kidney transplant 2007 ([facility])  CKD GFR 60's-70's  Cardiomyopathy, LVH w normal LVEF  HTN  Gout  HLD  DM-2 since KTP ([place])  Aneurysm ascending aorta ([place])  Osteoporosis ([place])  Prostate cancer s/p prostatectomy   Protestant     Negative chemical stress   Echocardiogram LVH with nomal systolic function  Baseline Scr 2017 0.9-1.1 mg/dl.     Meds at home: vit D3 2000 Iu daily, Prograf 2 mcg AM, 1 mcg PM (9 AM, 9PM), Prednisone 5 mg/d, amlodipine 10 mg/d, lisinopril 10 mg/d, carvedilol 12.5 mg bid, lipitor 10 mg/d, januvia 50 mg/d, metformin 1500 mg/d at dinner, glimepiride 4 mg/d with breakfast.     INTERVAL HISTORY:    SUMMARY COMMENTS:           ---------------------------------------------------------------------------------------------------------------------------------------------------------------------------    SUBJECTIVE & OBJECTIVE DATA DOCUMENTATION:     REVIEW OF SYSTEMS SCREENED ORGAN SYSTEMS:  Constitutional: fever, chills, anorexia or fatigue  Pulmonary: difficulty breathing, wheezing, cough  Cardiovascular: exertional dyspnea, chest pain, palpitations, dizziness or leg swelling  Gastrointestinal: abdominal or epigastric pain, nausea, vomiting or hematemesis, diarrhea, melena or bright red blood per rectum.  Genitourinary: dysuria, urgency, frequency, flank pain, difficulty voiding  Skin: No pruritis, rashes or lesions  Neurology: memory loss, dysarthria, extremity weakness, neuropathic pain, headache, visual changes  Dialysis access if present : pain, bleeding, swelling     ROS POSITIVE FINDINGS: asymptomatic    PAST MEDICAL & SURGICAL HISTORY:  DVT (deep venous thrombosis)  Kidney transplant recipient  Prostate cancer  HTN (hypertension)  HLD (hyperlipidemia)  DM2 (diabetes mellitus, type 2)  Polycystic kidney disease  H/O radical prostatectomy      PHYSICAL EXAM:  T(C): 36.7 (18 @ 05:20), Max: 36.9 (18 @ 21:28)  HR: 107 (18 @ 09:14)  BP: 134/88 (18 @ 09:14) (111/86 - 146/91)  RR: 16 (18 @ 09:14)  SpO2: 95% (18 @ 09:14)  Wt(kg): --  I&O's Summary    2018 07:  -  2018 07:00  --------------------------------------------------------  IN: 232 mL / OUT: 1725 mL / NET: -1493 mL    2018 07:  -  2018 10:25  --------------------------------------------------------  IN: 254 mL / OUT: 0 mL / NET: 254 mL      Weight 82.5 ( @ 23:49)    APPEARANCE: in bed, NAD  HEAD & NECK: normocephalic, no stiff neck, no masses, oral mucosa moist, no scleral icteris  RESPIRATORY: no accessory muscle use, no labored breathing, no dullness, no rales, crackles, no rhonchi, no wheeze  CARDIOVASCULAR: no JVD, IRRR, S1S2, + gallop,  no murmur, no rub.  ABDOMEN: soft, no tenderness, no masses, no hepatomegally, no splenomegally  : no bladder distension  LYMPHATIC: no lymphadenopathy (neck, axilla, groin)  EXTREMITIES & MUSCULOSKELETAL: no cyanosis, no clubbing, no tenderness, strength  L=R  EDEMA:   PULSES: upper extremity pulses present, lower extremity pulses present   SKIN: no rash, no stasis, no induration  NEUROLOGIC & PSYCHIATRIC:  alert, interactive, oriented to time and place, responds to stimuli, no asterixis  DIALYSIS ACCESS: LFA AVF ligated  KIDNEY TRANSPLANT: RLQ non-tender      MEDICATIONS  (STANDING):  atorvastatin 20 milliGRAM(s) Oral at bedtime  carvedilol 12.5 milliGRAM(s) Oral every 12 hours  dextrose 5%. 1000 milliLiter(s) (50 mL/Hr) IV Continuous <Continuous>  dextrose 50% Injectable 12.5 Gram(s) IV Push once  dextrose 50% Injectable 25 Gram(s) IV Push once  dextrose 50% Injectable 25 Gram(s) IV Push once  diltiazem    Tablet 30 milliGRAM(s) Oral every 6 hours  heparin  Infusion 1200 Unit(s)/Hr (12 mL/Hr) IV Continuous <Continuous>  insulin lispro (HumaLOG) corrective regimen sliding scale   SubCutaneous Before meals and at bedtime  magnesium sulfate  IVPB 2 Gram(s) IV Intermittent once  predniSONE   Tablet 5 milliGRAM(s) Oral daily  tacrolimus 1 milliGRAM(s) Oral at bedtime  tacrolimus 2 milliGRAM(s) Oral daily    MEDICATIONS  (PRN):  dextrose Gel 1 Dose(s) Oral once PRN Blood Glucose LESS THAN 70 milliGRAM(s)/deciliter  glucagon  Injectable 1 milliGRAM(s) IntraMuscular once PRN Glucose LESS THAN 70 milligrams/deciliter      DATA:  142    |  103    |  19     ----------------------------<  149<H>  Ca:9.4   (2018 07:35)  4.0     |  28     |  0.99       eGFR if Non : 77  eGFR if : 89    TPro  7.7    /  Alb  4.3    /  TBili  0.8    /  DBili  x      /  AST  17     /  ALT  16     /  AlkPhos  35<L>  2018 13:12    SCr 0.99 [2018 07:35]  SCr 1.00 [2018 13:12]                          13.0   9.2   )-----------( 186      ( 2018 07:35 )             38.3<L>    Phos:-- M.6 mg/dL PTH:-- Uric acid:-- Serum Osm:--  Ferritin:-- Iron:-- TIBC:-- Tsat:--  B12:-- TSH:-- (2018 07:35) MEDICAL HISTORY:      Consulted by the cardiology team for kidney transplant management.     Noted to have AFIB at [facility] during an annual "Reclast infusion. Ventricular rate was in 130's. Sent to the ER and AFIB confirmed. Patient was asymptomatic and has no h/o cardiac arrhythmia.     ESRD (Polycystic kidney disease)  HD [id number]  Kidney transplant  ([facility])  CKD GFR 60's-70's  Cardiomyopathy, LVH w normal LVEF  HTN  Gout  HLD  DM-2 since KTP ([place])  Aneurysm ascending aorta ([place])  Osteoporosis ([place])  Prostate cancer s/p prostatectomy   Zoroastrian     Negative chemical stress   Echocardiogram LVH with nomal systolic function  Baseline Scr 2017 0.9-1.1 mg/dl.     Meds at home: vit D3 2000 Iu daily, Prograf 2 mcg AM, 1 mcg PM (9 AM, 9PM), Prednisone 5 mg/d, amlodipine 10 mg/d, lisinopril 10 mg/d, carvedilol 12.5 mg bid, lipitor 10 mg/d, januvia 50 mg/d, metformin 1500 mg/d at dinner, glimepiride 4 mg/d with breakfast.     INTERVAL HISTORY:    's. 's.       SUMMARY COMMENTS:           ---------------------------------------------------------------------------------------------------------------------------------------------------------------------------    SUBJECTIVE & OBJECTIVE DATA DOCUMENTATION:     REVIEW OF SYSTEMS SCREENED ORGAN SYSTEMS:  Constitutional: fever, chills, anorexia or fatigue  Pulmonary: difficulty breathing, wheezing, cough  Cardiovascular: exertional dyspnea, chest pain, palpitations, dizziness or leg swelling  Gastrointestinal: abdominal or epigastric pain, nausea, vomiting or hematemesis, diarrhea, melena or bright red blood per rectum.  Genitourinary: dysuria, urgency, frequency, flank pain, difficulty voiding  Skin: No pruritis, rashes or lesions  Neurology: memory loss, dysarthria, extremity weakness, neuropathic pain, headache, visual changes  Dialysis access if present : pain, bleeding, swelling     ROS POSITIVE FINDINGS: asymptomatic    PAST MEDICAL & SURGICAL HISTORY:  DVT (deep venous thrombosis)  Kidney transplant recipient  Prostate cancer  HTN (hypertension)  HLD (hyperlipidemia)  DM2 (diabetes mellitus, type 2)  Polycystic kidney disease  H/O radical prostatectomy      PHYSICAL EXAM:  T(C): 36.7 (18 @ 05:20), Max: 36.9 (18 @ 21:28)  HR: 107 (18 @ 09:14)  BP: 134/88 (18 @ 09:14) (111/86 - 146/91)  RR: 16 (18 @ 09:14)  SpO2: 95% (18 @ 09:14)  Wt(kg): --  I&O's Summary    2018 07:  -  2018 07:00  --------------------------------------------------------  IN: 232 mL / OUT: 1725 mL / NET: -1493 mL    2018 07:  -  2018 10:25  --------------------------------------------------------  IN: 254 mL / OUT: 0 mL / NET: 254 mL      Weight 82.5 ( @ 23:49)    APPEARANCE: in bed, NAD  HEAD & NECK: normocephalic, no stiff neck, no masses, oral mucosa moist, no scleral icteris  RESPIRATORY: no accessory muscle use, no labored breathing, no dullness, no rales, crackles, no rhonchi, no wheeze  CARDIOVASCULAR: no JVD, IRRR, S1S2, + gallop,  no murmur, no rub.  ABDOMEN: soft, no tenderness, no masses, no hepatomegally, no splenomegally  : no bladder distension  LYMPHATIC: no lymphadenopathy (neck, axilla, groin)  EXTREMITIES & MUSCULOSKELETAL: no cyanosis, no clubbing, no tenderness, strength  L=R  EDEMA:   PULSES: upper extremity pulses present, lower extremity pulses present   SKIN: no rash, no stasis, no induration  NEUROLOGIC & PSYCHIATRIC:  alert, interactive, oriented to time and place, responds to stimuli, no asterixis  DIALYSIS ACCESS: LFA AVF ligated  KIDNEY TRANSPLANT: RLQ non-tender      MEDICATIONS  (STANDING):  atorvastatin 20 milliGRAM(s) Oral at bedtime  carvedilol 12.5 milliGRAM(s) Oral every 12 hours  dextrose 5%. 1000 milliLiter(s) (50 mL/Hr) IV Continuous <Continuous>  dextrose 50% Injectable 12.5 Gram(s) IV Push once  dextrose 50% Injectable 25 Gram(s) IV Push once  dextrose 50% Injectable 25 Gram(s) IV Push once  diltiazem    Tablet 30 milliGRAM(s) Oral every 6 hours  heparin  Infusion 1200 Unit(s)/Hr (12 mL/Hr) IV Continuous <Continuous>  insulin lispro (HumaLOG) corrective regimen sliding scale   SubCutaneous Before meals and at bedtime  magnesium sulfate  IVPB 2 Gram(s) IV Intermittent once  predniSONE   Tablet 5 milliGRAM(s) Oral daily  tacrolimus 1 milliGRAM(s) Oral at bedtime  tacrolimus 2 milliGRAM(s) Oral daily    MEDICATIONS  (PRN):  dextrose Gel 1 Dose(s) Oral once PRN Blood Glucose LESS THAN 70 milliGRAM(s)/deciliter  glucagon  Injectable 1 milliGRAM(s) IntraMuscular once PRN Glucose LESS THAN 70 milligrams/deciliter      DATA:  142    |  103    |  19     ----------------------------<  149<H>  Ca:9.4   (2018 07:35)  4.0     |  28     |  0.99       eGFR if Non : 77  eGFR if : 89    TPro  7.7    /  Alb  4.3    /  TBili  0.8    /  DBili  x      /  AST  17     /  ALT  16     /  AlkPhos  35<L>  2018 13:12    SCr 0.99 [2018 07:35]  SCr 1.00 [2018 13:12]                          13.0   9.2   )-----------( 186      ( 2018 07:35 )             38.3<L>    Phos:-- M.6 mg/dL PTH:-- Uric acid:-- Serum Osm:--  Ferritin:-- Iron:-- TIBC:-- Tsat:--  B12:-- TSH:-- (2018 07:35) MEDICAL HISTORY:      Consulted by the cardiology team for kidney transplant management.     Noted to have AFIB at [facility] during an annual "Reclast infusion. Ventricular rate was in 130's. Sent to the ER and AFIB confirmed. Patient was asymptomatic and has no h/o cardiac arrhythmia.     ESRD (Polycystic kidney disease)  HD [id number]  Kidney transplant  ([facility])  CKD GFR 60's-70's  Cardiomyopathy, LVH w normal LVEF  HTN  Gout  HLD  DM-2 since KTP ([place])  Aneurysm ascending aorta ([place])  Osteoporosis ([place])  Prostate cancer s/p prostatectomy   Sikhism     Negative chemical stress   Echocardiogram LVH with nomal systolic function  Baseline Scr 2017 0.9-1.1 mg/dl.     Meds at home: vit D3 2000 Iu daily, Prograf 2 mcg AM, 1 mcg PM (9 AM, 9PM), Prednisone 5 mg/d, amlodipine 10 mg/d, lisinopril 10 mg/d, carvedilol 12.5 mg bid, lipitor 10 mg/d, januvia 50 mg/d, metformin 1500 mg/d at dinner, glimepiride 4 mg/d with breakfast.     INTERVAL HISTORY:    's. 's.   Heparinized  Scr 0.99 mg/dl      SUMMARY COMMENTS:     AFIB is being worked up. It is not clear how long patient has been in AFIB.           ---------------------------------------------------------------------------------------------------------------------------------------------------------------------------    SUBJECTIVE & OBJECTIVE DATA DOCUMENTATION:     REVIEW OF SYSTEMS SCREENED ORGAN SYSTEMS:  Constitutional: fever, chills, anorexia or fatigue  Pulmonary: difficulty breathing, wheezing, cough  Cardiovascular: exertional dyspnea, chest pain, palpitations, dizziness or leg swelling  Gastrointestinal: abdominal or epigastric pain, nausea, vomiting or hematemesis, diarrhea, melena or bright red blood per rectum.  Genitourinary: dysuria, urgency, frequency, flank pain, difficulty voiding  Skin: No pruritis, rashes or lesions  Neurology: memory loss, dysarthria, extremity weakness, neuropathic pain, headache, visual changes  Dialysis access if present : pain, bleeding, swelling     ROS POSITIVE FINDINGS: asymptomatic    PAST MEDICAL & SURGICAL HISTORY:  DVT (deep venous thrombosis)  Kidney transplant recipient  Prostate cancer  HTN (hypertension)  HLD (hyperlipidemia)  DM2 (diabetes mellitus, type 2)  Polycystic kidney disease  H/O radical prostatectomy      PHYSICAL EXAM:  T(C): 36.7 (18 @ 05:20), Max: 36.9 (18 @ 21:28)  HR: 107 (18 @ 09:14)  BP: 134/88 (18 @ 09:14) (111/86 - 146/91)  RR: 16 (18 @ 09:14)  SpO2: 95% (18 @ 09:14)  Wt(kg): --  I&O's Summary    2018 07:  -  2018 07:00  --------------------------------------------------------  IN: 232 mL / OUT: 1725 mL / NET: -1493 mL    2018 07:  -  2018 10:25  --------------------------------------------------------  IN: 254 mL / OUT: 0 mL / NET: 254 mL      Weight 82.5 ( @ 23:49)    APPEARANCE: in bed, NAD  HEAD & NECK: normocephalic, no stiff neck, no masses, oral mucosa moist, no scleral icteris  RESPIRATORY: no accessory muscle use, no labored breathing, no dullness, no rales, crackles, no rhonchi, no wheeze  CARDIOVASCULAR: no JVD, IRRR, S1S2, + gallop,  no murmur, no rub.  ABDOMEN: soft, no tenderness, no masses, no hepatomegally, no splenomegally  : no bladder distension  LYMPHATIC: no lymphadenopathy (neck, axilla, groin)  EXTREMITIES & MUSCULOSKELETAL: no cyanosis, no clubbing, no tenderness, strength  L=R  EDEMA:   PULSES: upper extremity pulses present, lower extremity pulses present   SKIN: no rash, no stasis, no induration  NEUROLOGIC & PSYCHIATRIC:  alert, interactive, oriented to time and place, responds to stimuli, no asterixis  DIALYSIS ACCESS: LFA AVF ligated  KIDNEY TRANSPLANT: RLQ non-tender      MEDICATIONS  (STANDING):  atorvastatin 20 milliGRAM(s) Oral at bedtime  carvedilol 12.5 milliGRAM(s) Oral every 12 hours  dextrose 5%. 1000 milliLiter(s) (50 mL/Hr) IV Continuous <Continuous>  dextrose 50% Injectable 12.5 Gram(s) IV Push once  dextrose 50% Injectable 25 Gram(s) IV Push once  dextrose 50% Injectable 25 Gram(s) IV Push once  diltiazem    Tablet 30 milliGRAM(s) Oral every 6 hours  heparin  Infusion 1200 Unit(s)/Hr (12 mL/Hr) IV Continuous <Continuous>  insulin lispro (HumaLOG) corrective regimen sliding scale   SubCutaneous Before meals and at bedtime  magnesium sulfate  IVPB 2 Gram(s) IV Intermittent once  predniSONE   Tablet 5 milliGRAM(s) Oral daily  tacrolimus 1 milliGRAM(s) Oral at bedtime  tacrolimus 2 milliGRAM(s) Oral daily    MEDICATIONS  (PRN):  dextrose Gel 1 Dose(s) Oral once PRN Blood Glucose LESS THAN 70 milliGRAM(s)/deciliter  glucagon  Injectable 1 milliGRAM(s) IntraMuscular once PRN Glucose LESS THAN 70 milligrams/deciliter      DATA:  142    |  103    |  19     ----------------------------<  149<H>  Ca:9.4   (2018 07:35)  4.0     |  28     |  0.99       eGFR if Non : 77  eGFR if : 89    TPro  7.7    /  Alb  4.3    /  TBili  0.8    /  DBili  x      /  AST  17     /  ALT  16     /  AlkPhos  35<L>  2018 13:12    SCr 0.99 [2018 07:35]  SCr 1.00 [2018 13:12]                          13.0   9.2   )-----------( 186      ( 2018 07:35 )             38.3<L>    Phos:-- M.6 mg/dL PTH:-- Uric acid:-- Serum Osm:--  Ferritin:-- Iron:-- TIBC:-- Tsat:--  B12:-- TSH:-- (2018 07:35)

## 2018-02-01 NOTE — PROGRESS NOTE ADULT - SUBJECTIVE AND OBJECTIVE BOX
INTERVAL HPI/OVERNIGHT EVENTS: Cardizem 30mg q6h started overnight for HR 130s. Heparin gtt adjusted for PTT o/n. EKG this AM with Afluter w/ variable block    SUBJECTIVE: Patient seen and examined at bedside.  He remains asymptomatic with no CP, SOB, or palpitations    OBJECTIVE:    VITAL SIGNS:  ICU Vital Signs Last 24 Hrs  T(C): 36.7 (01 Feb 2018 05:20), Max: 36.9 (31 Jan 2018 21:28)  T(F): 98.1 (01 Feb 2018 05:20), Max: 98.4 (31 Jan 2018 21:28)  HR: 104 (01 Feb 2018 05:11) (97 - 126)  BP: 111/86 (01 Feb 2018 05:11) (111/86 - 146/91)  BP(mean): 93 (01 Feb 2018 05:11) (93 - 113)  ABP: --  ABP(mean): --  RR: 18 (31 Jan 2018 21:28) (16 - 19)  SpO2: 94% (01 Feb 2018 05:11) (94% - 98%)        01-31 @ 07:01  -  02-01 @ 07:00  --------------------------------------------------------  IN: 232 mL / OUT: 1725 mL / NET: -1493 mL      CAPILLARY BLOOD GLUCOSE      POCT Blood Glucose.: 118 mg/dL (01 Feb 2018 06:21)      PHYSICAL EXAM:    General: WDWN male lying in bed in NAD, pleasant and interactive  HEENT: AT/NC, PEERLA, MMM, no conjunctival pallor, anicteric sclera  Neck; Supple without JVD/LAD  Cardiac: S1/S2, irregularly irregular and tachy, no M/R/G  Respiratory: CTAB, no W/R/R  Abdomen: Soft, NT/ND, obese, +BS  Extremities: WWP, no edema, clubbing or cyanosis. Old fistula present left forearm  Vascular: 2+ radial and DP/PT pulses  Neuro: AOx3, moves all extremities and follows commands    MEDICATIONS:  MEDICATIONS  (STANDING):  atorvastatin 20 milliGRAM(s) Oral at bedtime  carvedilol 12.5 milliGRAM(s) Oral every 12 hours  dextrose 5%. 1000 milliLiter(s) (50 mL/Hr) IV Continuous <Continuous>  dextrose 50% Injectable 12.5 Gram(s) IV Push once  dextrose 50% Injectable 25 Gram(s) IV Push once  dextrose 50% Injectable 25 Gram(s) IV Push once  diltiazem    Tablet 30 milliGRAM(s) Oral every 6 hours  heparin  Infusion. 1200 Unit(s)/Hr (12 mL/Hr) IV Continuous <Continuous>  insulin lispro (HumaLOG) corrective regimen sliding scale   SubCutaneous Before meals and at bedtime  magnesium sulfate  IVPB 2 Gram(s) IV Intermittent once  predniSONE   Tablet 5 milliGRAM(s) Oral daily  tacrolimus 1 milliGRAM(s) Oral at bedtime  tacrolimus 2 milliGRAM(s) Oral daily    MEDICATIONS  (PRN):  dextrose Gel 1 Dose(s) Oral once PRN Blood Glucose LESS THAN 70 milliGRAM(s)/deciliter  glucagon  Injectable 1 milliGRAM(s) IntraMuscular once PRN Glucose LESS THAN 70 milligrams/deciliter      ALLERGIES:  Allergies    Naprosyn (Unknown)    Intolerances        LABS:                        13.0   9.2   )-----------( 186      ( 01 Feb 2018 07:35 )             38.3     02-01    142  |  103  |  19  ----------------------------<  149<H>  4.0   |  28  |  0.99    Ca    9.4      01 Feb 2018 07:35  Mg     1.6     02-01    TPro  7.7  /  Alb  4.3  /  TBili  0.8  /  DBili  x   /  AST  17  /  ALT  16  /  AlkPhos  35<L>  01-31    PT/INR - ( 31 Jan 2018 13:12 )   PT: 11.8 sec;   INR: 1.06          PTT - ( 01 Feb 2018 07:35 )  PTT:74.2 sec      RADIOLOGY & ADDITIONAL TESTS: Reviewed.

## 2018-02-01 NOTE — PROGRESS NOTE ADULT - PROBLEM SELECTOR PLAN 4
Patient takes Metformin, Januvia, and Glimepiride at home for DM2.  Hold home medications and will start MISS as inpatient  - f/u HA1C

## 2018-02-01 NOTE — PROGRESS NOTE ADULT - PROBLEM SELECTOR PLAN 6
Patient with history of DVT in 2006 in the setting of a pelvic fracture when he was immobilized for several weeks. He was on Warfarin for a period of time after his DVT and has never had clotting issues since.  On exam today, his legs are similar in width and he does not have any calf tenderness  - c/w heparin gtt for AC in Afrib

## 2018-02-01 NOTE — PROGRESS NOTE ADULT - PROBLEM SELECTOR PLAN 2
Patient with history of polycystic kidney disease, on dialysis from [id number], when he received a kidney transplant placed on the right side at Counts include 234 beds at the [facility].  He follows as an outpatient with Dr. Rios, and takes Tacrolimus and Prednisone.  - c/w Tacrolimus 2mg in AM and 1mg in evening  - c/w Prednisone 5mg in AM  - f/u [place] level  - Dr. Rios notified, appreciate reccs  - Baseline SCr ~1.0, currently at baseline

## 2018-02-01 NOTE — PROGRESS NOTE ADULT - ASSESSMENT
72 M with PMHx of polycystic kidney disease s/p kidney transplant (2007 at [facility], on [place]/Prednisone), HTN, HLD, DM2, Prostate cancer s/p radical prostatectomy 2015, DVT 2006 (in setting of pelvic fracture), and osteoporosis presenting to [facility] with atrial fibrillation with RVR, asymptomatic

## 2018-02-01 NOTE — PROGRESS NOTE ADULT - SUBJECTIVE AND OBJECTIVE BOX
Pt is still in A  Fib  with rate control    PAST MEDICAL & SURGICAL HISTORY:  DVT (deep venous thrombosis)  Kidney transplant recipient  Prostate cancer  HTN (hypertension)  HLD (hyperlipidemia)  DM2 (diabetes mellitus, type 2)  Polycystic kidney disease  H/O radical prostatectomy    MEDICATIONS  (STANDING):  atorvastatin 20 milliGRAM(s) Oral at bedtime  carvedilol 12.5 milliGRAM(s) Oral every 12 hours  dextrose 5%. 1000 milliLiter(s) (50 mL/Hr) IV Continuous <Continuous>  dextrose 50% Injectable 12.5 Gram(s) IV Push once  dextrose 50% Injectable 25 Gram(s) IV Push once  dextrose 50% Injectable 25 Gram(s) IV Push once  diltiazem    Tablet 30 milliGRAM(s) Oral every 6 hours  heparin  Infusion 1200 Unit(s)/Hr (12 mL/Hr) IV Continuous <Continuous>  insulin lispro (HumaLOG) corrective regimen sliding scale   SubCutaneous Before meals and at bedtime  magnesium sulfate  IVPB 2 Gram(s) IV Intermittent once  predniSONE   Tablet 5 milliGRAM(s) Oral daily  tacrolimus 1 milliGRAM(s) Oral at bedtime  tacrolimus 2 milliGRAM(s) Oral daily    MEDICATIONS  (PRN):  dextrose Gel 1 Dose(s) Oral once PRN Blood Glucose LESS THAN 70 milliGRAM(s)/deciliter  glucagon  Injectable 1 milliGRAM(s) IntraMuscular once PRN Glucose LESS THAN 70 milligrams/deciliter    ICU Vital Signs Last 24 Hrs  T(C): 36.7 (01 Feb 2018 05:20), Max: 36.9 (31 Jan 2018 21:28)  T(F): 98.1 (01 Feb 2018 05:20), Max: 98.4 (31 Jan 2018 21:28)  HR: 107 (01 Feb 2018 09:14) (97 - 126)  BP: 134/88 (01 Feb 2018 09:14) (111/86 - 146/91)  BP(mean): 93 (01 Feb 2018 05:11) (93 - 113)  ABP: --  ABP(mean): --  RR: 16 (01 Feb 2018 09:14) (16 - 19)  SpO2: 95% (01 Feb 2018 09:14) (94% - 98%)      lungs decreased breath sounds at bases  CV s 1s 2  Abd soft  ext stable                          13.0   9.2   )-----------( 186      ( 01 Feb 2018 07:35 )             38.3   02-01    142  |  103  |  19  ----------------------------<  149<H>  4.0   |  28  |  0.99    Ca    9.4      01 Feb 2018 07:35  Mg     1.6     02-01    TPro  7.7  /  Alb  4.3  /  TBili  0.8  /  DBili  x   /  AST  17  /  ALT  16  /  AlkPhos  35<L>  01-31  CARDIAC MARKERS ( 01 Feb 2018 07:35 )  x     / <0.01 ng/mL / 40 U/L / x     / 1.3 ng/mL  CARDIAC MARKERS ( 31 Jan 2018 22:55 )  x     / <0.01 ng/mL / 48 U/L / x     / 1.5 ng/mL  CARDIAC MARKERS ( 31 Jan 2018 13:12 )  x     / <0.01 ng/mL / 53 U/L / x     / 1.9 ng/mL    EKG A Flutter   variable AV block   ST T changes 2/1/18

## 2018-02-02 ENCOUNTER — TRANSCRIPTION ENCOUNTER (OUTPATIENT)
Age: 73
End: 2018-02-02

## 2018-02-02 LAB
ANION GAP SERPL CALC-SCNC: 14 MMOL/L — SIGNIFICANT CHANGE UP (ref 5–17)
BUN SERPL-MCNC: 14 MG/DL — SIGNIFICANT CHANGE UP (ref 7–23)
CALCIUM SERPL-MCNC: 8.8 MG/DL — SIGNIFICANT CHANGE UP (ref 8.4–10.5)
CHLORIDE SERPL-SCNC: 102 MMOL/L — SIGNIFICANT CHANGE UP (ref 96–108)
CO2 SERPL-SCNC: 24 MMOL/L — SIGNIFICANT CHANGE UP (ref 22–31)
CREAT SERPL-MCNC: 1.07 MG/DL — SIGNIFICANT CHANGE UP (ref 0.5–1.3)
GLUCOSE BLDC GLUCOMTR-MCNC: 152 MG/DL — HIGH (ref 70–99)
GLUCOSE BLDC GLUCOMTR-MCNC: 169 MG/DL — HIGH (ref 70–99)
GLUCOSE BLDC GLUCOMTR-MCNC: 191 MG/DL — HIGH (ref 70–99)
GLUCOSE SERPL-MCNC: 179 MG/DL — HIGH (ref 70–99)
HCT VFR BLD CALC: 39.3 % — SIGNIFICANT CHANGE UP (ref 39–50)
HGB BLD-MCNC: 12.9 G/DL — LOW (ref 13–17)
MAGNESIUM SERPL-MCNC: 2 MG/DL — SIGNIFICANT CHANGE UP (ref 1.6–2.6)
MCHC RBC-ENTMCNC: 29.4 PG — SIGNIFICANT CHANGE UP (ref 27–34)
MCHC RBC-ENTMCNC: 32.8 G/DL — SIGNIFICANT CHANGE UP (ref 32–36)
MCV RBC AUTO: 89.5 FL — SIGNIFICANT CHANGE UP (ref 80–100)
PLATELET # BLD AUTO: 185 K/UL — SIGNIFICANT CHANGE UP (ref 150–400)
POTASSIUM SERPL-MCNC: 3.6 MMOL/L — SIGNIFICANT CHANGE UP (ref 3.5–5.3)
POTASSIUM SERPL-SCNC: 3.6 MMOL/L — SIGNIFICANT CHANGE UP (ref 3.5–5.3)
RBC # BLD: 4.39 M/UL — SIGNIFICANT CHANGE UP (ref 4.2–5.8)
RBC # FLD: 15.6 % — SIGNIFICANT CHANGE UP (ref 10.3–16.9)
SODIUM SERPL-SCNC: 140 MMOL/L — SIGNIFICANT CHANGE UP (ref 135–145)
WBC # BLD: 8.2 K/UL — SIGNIFICANT CHANGE UP (ref 3.8–10.5)
WBC # FLD AUTO: 8.2 K/UL — SIGNIFICANT CHANGE UP (ref 3.8–10.5)

## 2018-02-02 PROCEDURE — 93653 COMPRE EP EVAL TX SVT: CPT

## 2018-02-02 PROCEDURE — 93306 TTE W/DOPPLER COMPLETE: CPT | Mod: 26

## 2018-02-02 PROCEDURE — 93613 INTRACARDIAC EPHYS 3D MAPG: CPT

## 2018-02-02 PROCEDURE — 93621 COMP EP EVL L PAC&REC C SINS: CPT | Mod: 26

## 2018-02-02 RX ORDER — APIXABAN 2.5 MG/1
5 TABLET, FILM COATED ORAL EVERY 12 HOURS
Qty: 0 | Refills: 0 | Status: DISCONTINUED | OUTPATIENT
Start: 2018-02-02 | End: 2018-02-06

## 2018-02-02 RX ORDER — POTASSIUM CHLORIDE 20 MEQ
40 PACKET (EA) ORAL ONCE
Qty: 0 | Refills: 0 | Status: COMPLETED | OUTPATIENT
Start: 2018-02-02 | End: 2018-02-02

## 2018-02-02 RX ADMIN — Medication 2: at 22:09

## 2018-02-02 RX ADMIN — Medication 5 MILLIGRAM(S): at 06:05

## 2018-02-02 RX ADMIN — Medication 2: at 06:15

## 2018-02-02 RX ADMIN — APIXABAN 5 MILLIGRAM(S): 2.5 TABLET, FILM COATED ORAL at 06:04

## 2018-02-02 RX ADMIN — Medication 2: at 16:59

## 2018-02-02 RX ADMIN — TACROLIMUS 2 MILLIGRAM(S): 5 CAPSULE ORAL at 12:15

## 2018-02-02 RX ADMIN — APIXABAN 5 MILLIGRAM(S): 2.5 TABLET, FILM COATED ORAL at 22:10

## 2018-02-02 RX ADMIN — ATORVASTATIN CALCIUM 20 MILLIGRAM(S): 80 TABLET, FILM COATED ORAL at 22:10

## 2018-02-02 RX ADMIN — CARVEDILOL PHOSPHATE 12.5 MILLIGRAM(S): 80 CAPSULE, EXTENDED RELEASE ORAL at 06:04

## 2018-02-02 RX ADMIN — TACROLIMUS 1 MILLIGRAM(S): 5 CAPSULE ORAL at 22:10

## 2018-02-02 RX ADMIN — Medication 2: at 12:13

## 2018-02-02 RX ADMIN — CARVEDILOL PHOSPHATE 12.5 MILLIGRAM(S): 80 CAPSULE, EXTENDED RELEASE ORAL at 18:11

## 2018-02-02 RX ADMIN — Medication 40 MILLIEQUIVALENT(S): at 07:26

## 2018-02-02 NOTE — DISCHARGE NOTE ADULT - MEDICATION SUMMARY - MEDICATIONS TO STOP TAKING
I will STOP taking the medications listed below when I get home from the hospital:    amLODIPine 10 mg oral tablet  -- 1 tab(s) by mouth once a day    lisinopril 10 mg oral tablet  -- 1 tab(s) by mouth once a day    aspirin 81 mg oral tablet  -- 1 tab(s) by mouth once a day

## 2018-02-02 NOTE — PROGRESS NOTE ADULT - SUBJECTIVE AND OBJECTIVE BOX
Pt is scheduled for Cardiac Ablation for A Fib    PAST MEDICAL & SURGICAL HISTORY:  DVT (deep venous thrombosis)  Kidney transplant recipient  Prostate cancer  HTN (hypertension)  HLD (hyperlipidemia)  DM2 (diabetes mellitus, type 2)  Polycystic kidney disease  H/O radical prostatectomy    MEDICATIONS  (STANDING):  apixaban 5 milliGRAM(s) Oral every 12 hours  atorvastatin 20 milliGRAM(s) Oral at bedtime  carvedilol 12.5 milliGRAM(s) Oral every 12 hours  dextrose 5%. 1000 milliLiter(s) (50 mL/Hr) IV Continuous <Continuous>  dextrose 50% Injectable 12.5 Gram(s) IV Push once  dextrose 50% Injectable 25 Gram(s) IV Push once  dextrose 50% Injectable 25 Gram(s) IV Push once  diltiazem    Tablet 30 milliGRAM(s) Oral every 6 hours  insulin lispro (HumaLOG) corrective regimen sliding scale   SubCutaneous Before meals and at bedtime  predniSONE   Tablet 5 milliGRAM(s) Oral daily  tacrolimus 1 milliGRAM(s) Oral at bedtime  tacrolimus 2 milliGRAM(s) Oral daily    MEDICATIONS  (PRN):  dextrose Gel 1 Dose(s) Oral once PRN Blood Glucose LESS THAN 70 milliGRAM(s)/deciliter  glucagon  Injectable 1 milliGRAM(s) IntraMuscular once PRN Glucose LESS THAN 70 milligrams/deciliter    ICU Vital Signs Last 24 Hrs  T(C): 36.6 (02 Feb 2018 05:22), Max: 37.3 (01 Feb 2018 14:05)  T(F): 97.9 (02 Feb 2018 05:22), Max: 99.2 (01 Feb 2018 14:05)  HR: 96 (02 Feb 2018 09:06) (82 - 116)  BP: 112/69 (02 Feb 2018 09:06) (93/76 - 134/88)  BP(mean): 79 (02 Feb 2018 09:06) (79 - 100)  ABP: --  ABP(mean): --  RR: 14 (02 Feb 2018 09:06) (14 - 16)  SpO2: 98% (02 Feb 2018 09:06) (95% - 98%)      Lungs clear  CXR   COPD   previous lung nodules not seen    for Chest CT  CV s1 s2   Abd soft  Ext stable    EKG A Flutter  Echo     Normal EF 55 %   Normal RV   no significant valve disease

## 2018-02-02 NOTE — PROGRESS NOTE ADULT - ASSESSMENT
72 M with PMHx of polycystic kidney disease s/p kidney transplant (2007 at [facility], on Tacro/Prednisone), HTN, HLD, DM2, Prostate cancer s/p radical prostatectomy 2015, DVT 2006 (in setting of pelvic fracture), and osteoporosis presenting to [facility] with atrial fibrillation with RVR, asymptomatic

## 2018-02-02 NOTE — DISCHARGE NOTE ADULT - PROVIDER TOKENS
TOKEN:'[id number]',TOKEN:'[id number]' TOKEN:'[id number]:'[id number]:'[id number]'

## 2018-02-02 NOTE — DISCHARGE NOTE ADULT - ADDITIONAL INSTRUCTIONS
Dr. Burton (Electrophysiology) - 3/8/18@ 2:15 PM Dr. Burton (Electrophysiology) - 3/8/18@ 2:15 PM  Dr. Rios (Nephrology and PMD) - His office will contact you to make an appointment.  If you do not hear from them by Tuesday following your discharge, please contact his office yourself to make an appointment Dr. Burton (Electrophysiology) - 3/8/18@ 2:15 PM  Dr. Rios (Nephrology and PMD) - 2/14/18 @ 1:30PM  Dr. Thompson (Cardiology) - 2/20/18 @ 2:30PM

## 2018-02-02 NOTE — DISCHARGE NOTE ADULT - CARE PROVIDER_API CALL
Sergio Burton), Cardiac Electrophysiology; Cardiovascular Disease; Internal Medicine  [address]  [address] [place] [address]  Phone: [phone]  Fax: [phone]    Bryson Rios), Internal Medicine; Nephrology  [address]  [address]  [place] [address]  Phone: [phone]  Fax: [phone] Sergio Burton), Cardiac Electrophysiology; Cardiovascular Disease; Internal Medicine  [address]  [address]nd [place] [address]  Phone: [phone]  Fax: [phone]    Bryson Rios), Internal Medicine; Nephrology  [address]  [address]  [place] [address]  Phone: [phone]  Fax: [phone]    Shaji Thompson), Medicine  [address] [place] [address]  Phone: [phone]  Fax: [phone]

## 2018-02-02 NOTE — PROGRESS NOTE ADULT - PROBLEM SELECTOR PLAN 2
Patient with history of polycystic kidney disease, on dialysis from [id number], when he received a kidney transplant placed on the right side at [facility].  He follows as an outpatient with Dr. Rios, and takes Tacrolimus and Prednisone.  - c/w Tacrolimus 2mg in AM and 1mg in evening  - c/w Prednisone 5mg in AM  - Tacro level wnl  - Dr. Rios notified, appreciate reccs  - Baseline SCr ~1.0, currently at baseline

## 2018-02-02 NOTE — PROGRESS NOTE ADULT - SUBJECTIVE AND OBJECTIVE BOX
INTERVAL HPI/OVERNIGHT EVENTS: BARI, NPO at midnight for ablation today    SUBJECTIVE: Patient seen and examined at bedside.  He feels well, ROS negative, prepared for ablation today with hopeful discharge on Saturday    OBJECTIVE:    VITAL SIGNS:  ICU Vital Signs Last 24 Hrs  T(C): 36.6 (02 Feb 2018 05:22), Max: 37.3 (01 Feb 2018 14:05)  T(F): 97.9 (02 Feb 2018 05:22), Max: 99.2 (01 Feb 2018 14:05)  HR: 104 (02 Feb 2018 05:08) (82 - 116)  BP: 108/66 (02 Feb 2018 05:08) (93/76 - 134/88)  BP(mean): 80 (02 Feb 2018 05:08) (80 - 100)  ABP: --  ABP(mean): --  RR: 16 (01 Feb 2018 23:55) (16 - 16)  SpO2: 98% (01 Feb 2018 23:55) (95% - 98%)        02-01 @ 07:01  -  02-02 @ 07:00  --------------------------------------------------------  IN: 568 mL / OUT: 1675 mL / NET: -1107 mL      CAPILLARY BLOOD GLUCOSE      POCT Blood Glucose.: 169 mg/dL (02 Feb 2018 06:07)      PHYSICAL EXAM:    General: WDWN male lying in bed in NAD, pleasant and interactive  HEENT: AT/NC, PEERLA, MMM, no conjunctival pallor, anicteric sclera  Neck; Supple without JVD/LAD  Cardiac: S1/S2, irregularly irregular and tachy, no M/R/G  Respiratory: CTAB, no W/R/R  Abdomen: Soft, NT/ND, +BS  Extremities: WWP, no edema, clubbing or cyanosis. Old fistula present left forearm  Vascular: 2+ radial and DP/PT pulses  Neuro: AOx3, moves all extremities and follows commands, ambulatory without difficulties    MEDICATIONS:  MEDICATIONS  (STANDING):  apixaban 5 milliGRAM(s) Oral every 12 hours  atorvastatin 20 milliGRAM(s) Oral at bedtime  carvedilol 12.5 milliGRAM(s) Oral every 12 hours  dextrose 5%. 1000 milliLiter(s) (50 mL/Hr) IV Continuous <Continuous>  dextrose 50% Injectable 12.5 Gram(s) IV Push once  dextrose 50% Injectable 25 Gram(s) IV Push once  dextrose 50% Injectable 25 Gram(s) IV Push once  diltiazem    Tablet 30 milliGRAM(s) Oral every 6 hours  insulin lispro (HumaLOG) corrective regimen sliding scale   SubCutaneous Before meals and at bedtime  predniSONE   Tablet 5 milliGRAM(s) Oral daily  tacrolimus 1 milliGRAM(s) Oral at bedtime  tacrolimus 2 milliGRAM(s) Oral daily    MEDICATIONS  (PRN):  dextrose Gel 1 Dose(s) Oral once PRN Blood Glucose LESS THAN 70 milliGRAM(s)/deciliter  glucagon  Injectable 1 milliGRAM(s) IntraMuscular once PRN Glucose LESS THAN 70 milligrams/deciliter      ALLERGIES:  Allergies    Naprosyn (Unknown)    Intolerances        LABS:                        12.9   8.2   )-----------( 185      ( 02 Feb 2018 06:33 )             39.3     02-02    140  |  102  |  14  ----------------------------<  179<H>  3.6   |  24  |  1.07    Ca    8.8      02 Feb 2018 06:33  Mg     2.0     02-02    TPro  7.7  /  Alb  4.3  /  TBili  0.8  /  DBili  x   /  AST  17  /  ALT  16  /  AlkPhos  35<L>  01-31    PT/INR - ( 01 Feb 2018 15:57 )   PT: 12.3 sec;   INR: 1.11          PTT - ( 01 Feb 2018 15:57 )  PTT:63.1 sec      RADIOLOGY & ADDITIONAL TESTS: Reviewed.

## 2018-02-02 NOTE — DISCHARGE NOTE ADULT - PATIENT PORTAL LINK FT
“You can access the Follow[employer] Patient Portal, offered by [facility], by registering with the following website: [email or web address]”

## 2018-02-02 NOTE — DISCHARGE NOTE ADULT - CARE PLAN
Principal Discharge DX:	Atrial fibrillation  Goal:	.  Assessment and plan of treatment:	You came to the hospital with an irregular and rapid heart rate known as atrial fibrillation/atrial flutter.  Your heart rate was initially managed with rate controlling medications, and you then received an ablation with electrophysiology to address the source of your irregular heart rhythm.  You have been prescribed Eliquis, and anticoagulation medication.  Please continue to take Eliquis twice a day as prescribed, and follow-up with your cardiologist.  Please continue to take Carvedilol twice a day as prescribed, which will both treat your blood pressure and your heart rate.  Please take all of your other home medications. Principal Discharge DX:	Atrial fibrillation  Goal:	.  Assessment and plan of treatment:	You came to the hospital with an irregular and rapid heart rate known as atrial fibrillation/atrial flutter.  Your heart rate was initially managed with rate controlling medications, and you then received an ablation with electrophysiology to address the source of your irregular heart rhythm.  You have been prescribed Eliquis, and anticoagulation medication.  Please continue to take Eliquis twice a day as prescribed, and follow-up with your cardiologist.  Please continue to take Carvedilol twice a day as prescribed, which will both treat your blood pressure and your heart rate.  Please take all of your other home medications.  Secondary Diagnosis:	Kidney transplant recipient  Assessment and plan of treatment:	Please continue to take your home Tacrolimus and Prednisone, and follow-up with Dr. Rios as an outpatient.  Secondary Diagnosis:	HTN (hypertension)  Assessment and plan of treatment:	Your blood pressure was managed with your home Carvedilol while you were in inpatient, and you did not require your home Lisinopril or Amlodipine.  Please continue to take your Carvedilol after discharge, please do not take your home Lisinopril or Amlodipine, and please follow-up with your primary care doctor.

## 2018-02-02 NOTE — DISCHARGE NOTE ADULT - HOSPITAL COURSE
Neto Wooten is a 71 yo M with PMHx of polycystic kidney disease s/p kidney transplant (2007 at [facility], on [place]/Prednisone), HTN, HLD, DM2, Prostate cancer s/p radical prostatectomy 2015, DVT 2006 (in setting of pelvic fracture), and osteoporosis presenting to [facility] with atrial fibrillation with RVR.  His heart rate was controlled with Diltiazem and his home Coreg, and he was started on heparin gtt for anticoagulation, which was later transitioned to Eliquis.  He received an ablation with electrophysiology to address the source of his irregular heart rhythm, which was successful.  He was discharged home on Eliquis for anticoagulation, and Carvedilol for rate control, as well as his other home medications.  He was instructed to follow-up with Dr. Burton from Electrophysiology, as well as his primary care doctor. Neto Wooten is a 73 yo M with PMHx of polycystic kidney disease s/p kidney transplant (2007 at [facility], on [place]/Prednisone), HTN, HLD, DM2, Prostate cancer s/p radical prostatectomy 2015, DVT 2006 (in setting of pelvic fracture), and osteoporosis presenting to [facility] with atrial fibrillation with RVR.  His heart rate was controlled with Diltiazem and his home Coreg, and he was started on heparin gtt for anticoagulation, which was later transitioned to Eliquis.  He received an ablation with electrophysiology to address the source of his irregular heart rhythm, which was successful.  He developed pulmonary vascular congestion and was diuresed effectively with Lasix, and his saturations improved.  He was discharged home on Eliquis for anticoagulation, and Carvedilol for rate control, as well as his other home medications.  He was instructed to follow-up with Dr. Burton from Electrophysiology, as well as his primary care doctor. Neto Wooten is a 73 yo M with PMHx of polycystic kidney disease s/p kidney transplant (2007 at [facility], on [place]/Prednisone), HTN, HLD, DM2, Prostate cancer s/p radical prostatectomy 2015, DVT 2006 (in setting of pelvic fracture), and osteoporosis presenting to [facility] with atrial fibrillation with RVR.  His heart rate was controlled with Diltiazem and his home Coreg, and he was started on heparin gtt for anticoagulation, which was later transitioned to Eliquis.  He received an ablation with electrophysiology to address the source of his irregular heart rhythm, which was successful.  He developed pulmonary vascular congestion and was diuresed effectively with Lasix, and his saturations improved.  He also developed a fever with elevated white blood cell count, and CXR showed likely evolving pneumonia.  He was treated with Vancomycin and Zosyn and his clinical status improved.  He was transitioned to Levaquin, and was discharged home with instructions to continue taking Levaquin through   He was discharged home on Eliquis for anticoagulation, and Carvedilol for rate control, as well as his other home medications.  He was instructed to follow-up with Dr. Burton from Electrophysiology, as well as his primary care doctor. Neto Wooten is a 71 yo M with PMHx of polycystic kidney disease s/p kidney transplant (2007 at [facility], on [place]/Prednisone), HTN, HLD, DM2, Prostate cancer s/p radical prostatectomy 2015, DVT 2006 (in setting of pelvic fracture), and osteoporosis presenting to [facility] with atrial fibrillation with RVR.  His heart rate was controlled with Diltiazem and his home Coreg, and he was started on heparin gtt for anticoagulation, which was later transitioned to Eliquis.  He received an ablation with electrophysiology to address the source of his irregular heart rhythm, which was successful.  He developed pulmonary vascular congestion and was diuresed effectively with Lasix, and his saturations improved.  He also developed a fever with elevated white blood cell count, and CXR showed likely evolving pneumonia.  He was treated with Vancomycin and Zosyn and his clinical status improved.  He was transitioned to Levaquin, and was discharged home with instructions to continue taking Levaquin through 2/17/18 to complete at 14 day course.  He was discharged home on Eliquis for anticoagulation, and Carvedilol for rate control, as well as his other home medications.  He was instructed to follow-up with Dr. Burton from Electrophysiology, as well as his primary care doctor. Nteo Wooten is a 73 yo M with PMHx of polycystic kidney disease s/p kidney transplant (2007 at [facility], on [place]/Prednisone), HTN, HLD, DM2, Prostate cancer s/p radical prostatectomy 2015, DVT 2006 (in setting of pelvic fracture), and osteoporosis presenting to [facility] with atrial fibrillation with RVR.  His heart rate was controlled with Diltiazem and his home Coreg, and he was started on heparin gtt for anticoagulation, which was later transitioned to Eliquis.  He received an ablation with electrophysiology to address the source of his irregular heart rhythm, which was successful.  He developed pulmonary vascular congestion and was diuresed effectively with Lasix, and his saturations improved.  He also developed a fever with elevated white blood cell count, and CXR showed likely evolving pneumonia.  He was treated with Vancomycin and Zosyn and his clinical status improved.  He was transitioned to Levaquin, and was discharged home with instructions to continue taking Levaquin through 2/11/18 to complete the antibiotic course.  He was discharged home on Eliquis for anticoagulation, and Carvedilol for rate control, as well as his other home medications.  He was instructed to follow-up with Dr. Burton from Electrophysiology, as well as his primary care doctor.

## 2018-02-02 NOTE — PROGRESS NOTE ADULT - PROBLEM SELECTOR PLAN 3
Currently normotensive, and patient reports taking all of his home medications today (Lisinopril 10mg, Amlodipine 10mg, Carvedilol 12.5mg BID).    - c/w Carvedilol 12.5mg BID for rate control  - continue with Diltiazem 30mg q6h for rate control  - add Lisinopril if need for additional BP support, currently normotensive

## 2018-02-02 NOTE — PROGRESS NOTE ADULT - PROBLEM SELECTOR PLAN 6
Patient with history of DVT in 2006 in the setting of a pelvic fracture when he was immobilized for several weeks. He was on Warfarin for a period of time after his DVT and has never had clotting issues since.  On exam today, his legs are similar in width and he does not have any calf tenderness  - c/w Eliquis 2.5mg BID

## 2018-02-02 NOTE — DISCHARGE NOTE ADULT - PLAN OF CARE
. You came to the hospital with an irregular and rapid heart rate known as atrial fibrillation/atrial flutter.  Your heart rate was initially managed with rate controlling medications, and you then received an ablation with electrophysiology to address the source of your irregular heart rhythm.  You have been prescribed Eliquis, and anticoagulation medication.  Please continue to take Eliquis twice a day as prescribed, and follow-up with your cardiologist.  Please continue to take Carvedilol twice a day as prescribed, which will both treat your blood pressure and your heart rate.  Please take all of your other home medications. Please continue to take your home Tacrolimus and Prednisone, and follow-up with Dr. Rios as an outpatient. Your blood pressure was managed with your home Carvedilol while you were in inpatient, and you did not require your home Lisinopril or Amlodipine.  Please continue to take your Carvedilol after discharge, please do not take your home Lisinopril or Amlodipine, and please follow-up with your primary care doctor.

## 2018-02-02 NOTE — PROGRESS NOTE ADULT - PROBLEM SELECTOR PLAN 1
as measured incidentally at outpatient infusion center where he was being treated for osteoporosis.  The patient is asymptomatic at this time, with no CP, palpitations, or SOB.  He recalls intermittent palpitations occurring over the years that he had ascribed to stress or anxiety of normal life. He has had an EKG in the past but does not recall the results. Also had an echocardiogram several years ago when being evaluated for thoracic aortic aneurysm, which demonstrated what based on his description sounds like LVH.  Nuclear stress test performed in 2014 normal as per Dr. Rios  - s/p Cardizem 10mg IV x2 and Cardizem 30mg PO in ED with improvement in his HR to 90s-100s  - c/w Eliquis 2.5mg BID  - c/w Coreg 12.5mg BID for rate control  - c/w Diltiazem 30mg q6h PO for rate control  - EP plans for Aflutter ablation today  - Echo with LVH and LVEF 55%, mild aortic root dilation 4.5cm

## 2018-02-02 NOTE — DISCHARGE NOTE ADULT - MEDICATION SUMMARY - MEDICATIONS TO TAKE
I will START or STAY ON the medications listed below when I get home from the hospital:    predniSONE 5 mg oral tablet  -- 1 tab(s) by mouth once a day  -- Indication: For Kidney transplant recipient    apixaban 5 mg oral tablet  -- 1 tab(s) by mouth every 12 hours  -- Indication: For Atrial fibrillation    Januvia 100 mg oral tablet  -- 1 tab(s) by mouth once a day  -- Indication: For DM2 (diabetes mellitus, type 2)    glimepiride 2 mg oral tablet  -- 2 tab(s) by mouth once a day (in the morning)  -- Indication: For DM2 (diabetes mellitus, type 2)    glimepiride 2 mg oral tablet  -- 1 tab(s) by mouth once a day (in the evening)  -- Indication: For DM2 (diabetes mellitus, type 2)    metFORMIN 500 mg oral tablet, extended release  -- 1 tab(s) by mouth once a day  -- Indication: For DM2 (diabetes mellitus, type 2)    Lipitor 20 mg oral tablet  -- 1 tab(s) by mouth once a day  -- Indication: For HLD (hyperlipidemia)    Coreg 12.5 mg oral tablet  -- 1 tab(s) by mouth 2 times a day  -- Indication: For Atrial fibrillation    tacrolimus 1 mg oral capsule  -- 2 cap(s) by mouth once a day (in the morning)  -- Indication: For Kidney transplant recipient    tacrolimus 1 mg oral capsule  -- 1 cap(s) by mouth once a day (in the evening)  -- Indication: For Kidney transplant recipient    levoFLOXacin 250 mg oral tablet  -- 1 tab(s) by mouth every 24 hours  -- Indication: For Pneumonia    Vitamin D3 1000 intl units oral capsule  -- 1 cap(s) by mouth 2 times a day  -- Indication: For Vitamin Supplementation

## 2018-02-02 NOTE — DISCHARGE NOTE ADULT - CARE PROVIDERS DIRECT ADDRESSES
[email or web address]

## 2018-02-03 LAB
ANION GAP SERPL CALC-SCNC: 13 MMOL/L — SIGNIFICANT CHANGE UP (ref 5–17)
ANISOCYTOSIS BLD QL: SLIGHT — SIGNIFICANT CHANGE UP
APTT BLD: 30 SEC — SIGNIFICANT CHANGE UP (ref 27.5–37.4)
BASOPHILS NFR BLD AUTO: 0.1 % — SIGNIFICANT CHANGE UP (ref 0–2)
BUN SERPL-MCNC: 14 MG/DL — SIGNIFICANT CHANGE UP (ref 7–23)
BURR CELLS BLD QL SMEAR: SIGNIFICANT CHANGE UP
CALCIUM SERPL-MCNC: 8.8 MG/DL — SIGNIFICANT CHANGE UP (ref 8.4–10.5)
CHLORIDE SERPL-SCNC: 102 MMOL/L — SIGNIFICANT CHANGE UP (ref 96–108)
CO2 SERPL-SCNC: 23 MMOL/L — SIGNIFICANT CHANGE UP (ref 22–31)
CREAT SERPL-MCNC: 1.12 MG/DL — SIGNIFICANT CHANGE UP (ref 0.5–1.3)
ELLIPTOCYTES BLD QL SMEAR: SLIGHT — SIGNIFICANT CHANGE UP
EOSINOPHIL NFR BLD AUTO: 0.1 % — SIGNIFICANT CHANGE UP (ref 0–6)
GLUCOSE BLDC GLUCOMTR-MCNC: 178 MG/DL — HIGH (ref 70–99)
GLUCOSE BLDC GLUCOMTR-MCNC: 189 MG/DL — HIGH (ref 70–99)
GLUCOSE BLDC GLUCOMTR-MCNC: 230 MG/DL — HIGH (ref 70–99)
GLUCOSE BLDC GLUCOMTR-MCNC: 330 MG/DL — HIGH (ref 70–99)
GLUCOSE SERPL-MCNC: 220 MG/DL — HIGH (ref 70–99)
HCT VFR BLD CALC: 39.8 % — SIGNIFICANT CHANGE UP (ref 39–50)
HGB BLD-MCNC: 13 G/DL — SIGNIFICANT CHANGE UP (ref 13–17)
INR BLD: 1.45 — HIGH (ref 0.88–1.16)
LYMPHOCYTES # BLD AUTO: 4 % — LOW (ref 13–44)
LYMPHOCYTES # BLD AUTO: 8 % — LOW (ref 13–44)
MAGNESIUM SERPL-MCNC: 1.8 MG/DL — SIGNIFICANT CHANGE UP (ref 1.6–2.6)
MANUAL DIF COMMENT BLD-IMP: SIGNIFICANT CHANGE UP
MANUAL SMEAR VERIFICATION: SIGNIFICANT CHANGE UP
MCHC RBC-ENTMCNC: 29.6 PG — SIGNIFICANT CHANGE UP (ref 27–34)
MCHC RBC-ENTMCNC: 32.7 G/DL — SIGNIFICANT CHANGE UP (ref 32–36)
MCV RBC AUTO: 90.7 FL — SIGNIFICANT CHANGE UP (ref 80–100)
MICROCYTES BLD QL: SLIGHT — SIGNIFICANT CHANGE UP
MONOCYTES NFR BLD AUTO: 4 % — SIGNIFICANT CHANGE UP (ref 2–14)
MONOCYTES NFR BLD AUTO: 5.3 % — SIGNIFICANT CHANGE UP (ref 2–14)
NEUTROPHILS NFR BLD AUTO: 70 % — SIGNIFICANT CHANGE UP (ref 43–77)
NEUTROPHILS NFR BLD AUTO: 86.5 % — HIGH (ref 43–77)
NEUTS BAND # BLD: 22 % — HIGH
OVALOCYTES BLD QL SMEAR: SLIGHT — SIGNIFICANT CHANGE UP
PLAT MORPH BLD: (no result)
PLATELET # BLD AUTO: 183 K/UL — SIGNIFICANT CHANGE UP (ref 150–400)
POTASSIUM SERPL-MCNC: 4.7 MMOL/L — SIGNIFICANT CHANGE UP (ref 3.5–5.3)
POTASSIUM SERPL-SCNC: 4.7 MMOL/L — SIGNIFICANT CHANGE UP (ref 3.5–5.3)
PROTHROM AB SERPL-ACNC: 16.2 SEC — HIGH (ref 9.8–12.7)
RBC # BLD: 4.39 M/UL — SIGNIFICANT CHANGE UP (ref 4.2–5.8)
RBC # FLD: 15.6 % — SIGNIFICANT CHANGE UP (ref 10.3–16.9)
RBC BLD AUTO: (no result)
SODIUM SERPL-SCNC: 138 MMOL/L — SIGNIFICANT CHANGE UP (ref 135–145)
WBC # BLD: 13.6 K/UL — HIGH (ref 3.8–10.5)
WBC # FLD AUTO: 13.6 K/UL — HIGH (ref 3.8–10.5)

## 2018-02-03 PROCEDURE — 93010 ELECTROCARDIOGRAM REPORT: CPT

## 2018-02-03 PROCEDURE — 71045 X-RAY EXAM CHEST 1 VIEW: CPT | Mod: 26,76

## 2018-02-03 RX ORDER — FUROSEMIDE 40 MG
20 TABLET ORAL ONCE
Qty: 0 | Refills: 0 | Status: COMPLETED | OUTPATIENT
Start: 2018-02-03 | End: 2018-02-03

## 2018-02-03 RX ORDER — MAGNESIUM SULFATE 500 MG/ML
2 VIAL (ML) INJECTION ONCE
Qty: 0 | Refills: 0 | Status: COMPLETED | OUTPATIENT
Start: 2018-02-03 | End: 2018-02-03

## 2018-02-03 RX ORDER — POLYETHYLENE GLYCOL 3350 17 G/17G
17 POWDER, FOR SOLUTION ORAL DAILY
Qty: 0 | Refills: 0 | Status: DISCONTINUED | OUTPATIENT
Start: 2018-02-03 | End: 2018-02-04

## 2018-02-03 RX ORDER — SENNA PLUS 8.6 MG/1
2 TABLET ORAL AT BEDTIME
Qty: 0 | Refills: 0 | Status: DISCONTINUED | OUTPATIENT
Start: 2018-02-03 | End: 2018-02-04

## 2018-02-03 RX ORDER — VANCOMYCIN HCL 1 G
1000 VIAL (EA) INTRAVENOUS EVERY 12 HOURS
Qty: 0 | Refills: 0 | Status: DISCONTINUED | OUTPATIENT
Start: 2018-02-03 | End: 2018-02-05

## 2018-02-03 RX ORDER — ACETAMINOPHEN 500 MG
650 TABLET ORAL EVERY 6 HOURS
Qty: 0 | Refills: 0 | Status: DISCONTINUED | OUTPATIENT
Start: 2018-02-03 | End: 2018-02-06

## 2018-02-03 RX ORDER — PIPERACILLIN AND TAZOBACTAM 4; .5 G/20ML; G/20ML
4.5 INJECTION, POWDER, LYOPHILIZED, FOR SOLUTION INTRAVENOUS EVERY 6 HOURS
Qty: 0 | Refills: 0 | Status: DISCONTINUED | OUTPATIENT
Start: 2018-02-03 | End: 2018-02-05

## 2018-02-03 RX ADMIN — Medication 4: at 16:31

## 2018-02-03 RX ADMIN — CARVEDILOL PHOSPHATE 12.5 MILLIGRAM(S): 80 CAPSULE, EXTENDED RELEASE ORAL at 17:34

## 2018-02-03 RX ADMIN — CARVEDILOL PHOSPHATE 12.5 MILLIGRAM(S): 80 CAPSULE, EXTENDED RELEASE ORAL at 05:49

## 2018-02-03 RX ADMIN — TACROLIMUS 1 MILLIGRAM(S): 5 CAPSULE ORAL at 21:42

## 2018-02-03 RX ADMIN — Medication 8: at 11:44

## 2018-02-03 RX ADMIN — Medication 5 MILLIGRAM(S): at 05:50

## 2018-02-03 RX ADMIN — APIXABAN 5 MILLIGRAM(S): 2.5 TABLET, FILM COATED ORAL at 09:22

## 2018-02-03 RX ADMIN — TACROLIMUS 2 MILLIGRAM(S): 5 CAPSULE ORAL at 11:44

## 2018-02-03 RX ADMIN — POLYETHYLENE GLYCOL 3350 17 GRAM(S): 17 POWDER, FOR SOLUTION ORAL at 23:16

## 2018-02-03 RX ADMIN — Medication 20 MILLIGRAM(S): at 04:00

## 2018-02-03 RX ADMIN — Medication 50 GRAM(S): at 09:55

## 2018-02-03 RX ADMIN — APIXABAN 5 MILLIGRAM(S): 2.5 TABLET, FILM COATED ORAL at 21:42

## 2018-02-03 RX ADMIN — Medication 2: at 21:42

## 2018-02-03 RX ADMIN — Medication 2: at 07:42

## 2018-02-03 RX ADMIN — Medication 20 MILLIGRAM(S): at 14:03

## 2018-02-03 RX ADMIN — SENNA PLUS 2 TABLET(S): 8.6 TABLET ORAL at 23:16

## 2018-02-03 RX ADMIN — ATORVASTATIN CALCIUM 20 MILLIGRAM(S): 80 TABLET, FILM COATED ORAL at 21:42

## 2018-02-03 NOTE — PROGRESS NOTE ADULT - PROBLEM SELECTOR PLAN 3
Currently normotensive, and patient reports taking all of his home medications today (Lisinopril 10mg, Amlodipine 10mg, Carvedilol 12.5mg BID).    - c/w Carvedilol 12.5mg BID for rate control  - add Lisinopril if need for additional BP support, currently normotensive

## 2018-02-03 NOTE — PROGRESS NOTE ADULT - PROBLEM SELECTOR PLAN 2
Patient with history of polycystic kidney disease, on dialysis from [id number], when he received a kidney transplant placed on the right side at [facility].  He follows as an outpatient with Dr. Rios, and takes Tacrolimus and Prednisone.  - c/w Tacrolimus 2mg in AM and 1mg in evening  - c/w Prednisone 5mg in AM  - Tacro level wnl  - Dr. Rios notified, appreciate reccs  - Baseline SCr ~1.0, currently at baseline

## 2018-02-03 NOTE — PROGRESS NOTE ADULT - SUBJECTIVE AND OBJECTIVE BOX
MEDICAL HISTORY:      72 year old  male, noted to have AFIB at [facility] during an annual "Reclast infusion. Ventricular rate was in 130's. Sent to the ER and AFIB confirmed. Patient was asymptomatic and has no h/o cardiac arrhythmia.     ESRD (Polycystic kidney disease)  HD [id number]  Kidney transplant  ([facility])  CKD GFR 60's-70's  Cardiomyopathy, LVH w normal LVEF  HTN  Gout  HLD  DM-2 since KTP ([place])  Aneurysm ascending aorta ([place])  Osteoporosis ([place])  Prostate cancer s/p prostatectomy   Caodaism     Negative chemical stress   Echocardiogram LVH with normal systolic function  18 Echocardiogram ([facility]) LVH with nl wall motion, LVEF 55-60, nl LA, aortic root dilation  Baseline Scr  0.9-1.1 mg/dl.     Meds at home: vit D3 2000 Iu daily, Prograf 2 mcg AM, 1 mcg PM (9 AM, 9PM), Prednisone 5 mg/d, amlodipine 10 mg/d, lisinopril 10 mg/d, carvedilol 12.5 mg bid, lipitor 10 mg/d, januvia 50 mg/d, metformin 1500 mg/d at dinner, glimepiride 4 mg/d with breakfast    INTERVAL HISTORY:    KIMMIE followed by IR ablation procedure, cardiac rhythm converted to SR  Anticoagulated (Eliquis)  Portable CXR early this AM showed left perihilar infiltrate (s)  Cr 1.07, 1.22 mg/dl  WBC 13,600.   Afebrile.     SUMMARY COMMENTS:     Now in SR and anticoagulated.     Of concern is the new left hilar infiltrate and high WBC. The cardiac team has attributed this to post ablation CHF and is prescribing furosemide. I am concerned that this could be an aspiration pneumonia post KIMMIE, etc. in an immunosuppressed patient. I spoke with the cardiac team resident - advised that a PA and Lateral CXR be taken and, if there is any fever or the WBC does not return to normal, of if the infiltrate does not resolve post diuretics,  that appropriate antibiotics be administered for a presumptive aspiration pneumonia. Also advised considering ID or pulmonary consultation.     Kidney transplant function is stable and at the usual baseline. The is no indication to change transplant medications.     Discussed with the cardiac team.         ---------------------------------------------------------------------------------------------------------------------------------------------------------------------------    SUBJECTIVE & OBJECTIVE DATA DOCUMENTATION:     REVIEW OF SYSTEMS SCREENED ORGAN SYSTEMS:  Constitutional: fever, chills, anorexia or fatigue  Pulmonary: difficulty breathing, wheezing, cough  Cardiovascular: exertional dyspnea, chest pain, palpitations, dizziness or leg swelling  Gastrointestinal: abdominal or epigastric pain, nausea, vomiting or hematemesis, diarrhea, melena or bright red blood per rectum.  Genitourinary: dysuria, urgency, frequency, flank pain, difficulty voiding  Skin: No pruritis, rashes or lesions  Neurology: memory loss, dysarthria, extremity weakness, neuropathic pain, headache, visual changes  Dialysis access if present : pain, bleeding, swelling     ROS POSITIVE FINDINGS: a bit SOB last night    PAST MEDICAL & SURGICAL HISTORY:  DVT (deep venous thrombosis)  Kidney transplant recipient  Prostate cancer  HTN (hypertension)  HLD (hyperlipidemia)  DM2 (diabetes mellitus, type 2)  Polycystic kidney disease  H/O radical prostatectomy      PHYSICAL EXAM:  T(C): 37.2 (18 @ 05:30), Max: 37.2 (18 @ 05:30)  HR: 92 (18 @ 11:45)  BP: 114/73 (18 @ 11:45) (114/73 - 149/86)  RR: 18 (18 @ 11:45)  SpO2: 95% (18 @ 11:45)  Wt(kg): --  I&O's Summary    2018 07:01  -  2018 07:00  --------------------------------------------------------  IN: 0 mL / OUT: 900 mL / NET: -900 mL    2018 07:01  -  2018 13:00  --------------------------------------------------------  IN: 300 mL / OUT: 0 mL / NET: 300 mL      Weight 82.5 ( @ 23:49)    APPEARANCE: in bed, NAD  HEAD & NECK: normocephalic, no stiff neck, no masses, oral mucosa moist, no scleral icteris  RESPIRATORY: no accessory muscle use, no labored breathing, no dullness, no wheeze, a few scattered crackles on the mid left lung.  CARDIOVASCULAR: no JVD, RRR, S1S2, + gallop,  no murmur, no rub.  ABDOMEN: soft, no tenderness, no masses, no hepatomegally, no splenomegally  : no bladder distension  LYMPHATIC: no lymphadenopathy (neck, axilla, groin)  EXTREMITIES & MUSCULOSKELETAL: no cyanosis, no clubbing, no tenderness, strength  L=R  EDEMA: none  PULSES: upper extremity pulses present, lower extremity pulses present   SKIN: no rash, no stasis, no induration  NEUROLOGIC & PSYCHIATRIC:  alert, interactive, oriented to time and place, responds to stimuli, no asterixis  DIALYSIS ACCESS: LFA AVF ligated  KIDNEY TRANSPLANT: RLQ non-tender    MEDICATIONS  (STANDING):  apixaban 5 milliGRAM(s) Oral every 12 hours  atorvastatin 20 milliGRAM(s) Oral at bedtime  carvedilol 12.5 milliGRAM(s) Oral every 12 hours  dextrose 5%. 1000 milliLiter(s) (50 mL/Hr) IV Continuous <Continuous>  dextrose 50% Injectable 12.5 Gram(s) IV Push once  dextrose 50% Injectable 25 Gram(s) IV Push once  dextrose 50% Injectable 25 Gram(s) IV Push once  furosemide   Injectable 20 milliGRAM(s) IV Push once  insulin lispro (HumaLOG) corrective regimen sliding scale   SubCutaneous Before meals and at bedtime  predniSONE   Tablet 5 milliGRAM(s) Oral daily  tacrolimus 1 milliGRAM(s) Oral at bedtime  tacrolimus 2 milliGRAM(s) Oral daily    MEDICATIONS  (PRN):  dextrose Gel 1 Dose(s) Oral once PRN Blood Glucose LESS THAN 70 milliGRAM(s)/deciliter  glucagon  Injectable 1 milliGRAM(s) IntraMuscular once PRN Glucose LESS THAN 70 milligrams/deciliter      DATA:  138    |  102    |  14     ----------------------------<  220<H>  Ca:8.8   (2018 07:58)  4.7     |  23     |  1.12       eGFR if Non : 66  eGFR if : 77    TPro  7.7    /  Alb  4.3    /  TBili  0.8    /  DBili  x      /  AST  17     /  ALT  16     /  AlkPhos  35<L>  2018 13:12    SCr 1.12 [2018 07:58]  SCr 1.07 [2018 06:33]  SCr 0.99 [2018 07:35]  SCr 1.00 [2018 13:12]                          13.0   13.6<H> )-----------( 183      ( 2018 07:58 )             39.8     Phos:-- M.8 mg/dL PTH:-- Uric acid:-- Serum Osm:--  Ferritin:-- Iron:-- TIBC:-- Tsat:--  B12:-- TSH:-- (2018 07:58)

## 2018-02-03 NOTE — PROGRESS NOTE ADULT - PROBLEM SELECTOR PLAN 1
as measured incidentally at outpatient infusion center where he was being treated for osteoporosis.  The patient is asymptomatic at this time, with no CP, palpitations, or SOB.  He recalls intermittent palpitations occurring over the years that he had ascribed to stress or anxiety of normal life. He has had an EKG in the past but does not recall the results. Also had an echocardiogram several years ago when being evaluated for thoracic aortic aneurysm, which demonstrated what based on his description sounds like LVH.  Nuclear stress test performed in 2014 normal as per Dr. Rios  - s/p Cardizem 10mg IV x2 and Cardizem 30mg PO in ED with improvement in his HR to 90s-100s  - c/w Eliquis 2.5mg BID  - c/w Coreg 12.5mg BID for rate control  - d/c Diltiazem after albation  - s/p ablation  - Echo with LVH and LVEF 55%, mild aortic root dilation 4.5cm  - EKG overnight shows sinus rhythm, with prolonged QT. Will avoid QT prolonging medications if possible. Tacrolimus is a QT prolonging medication but is necessary in this patient  - CXR overnight with new left basilar congestion. Patient reports no aspiration event. S/p IV Lasix 20mg (Lasix naive) with appropriate diuresis. Still with crackles on AM lung exam, with saturations in mid 90s sleeping on NC. Will obtain CXR midday, c/w NC, add incentive spirometry, and give additional lasix PRN  as measured incidentally at outpatient infusion center where he was being treated for osteoporosis.  The patient is asymptomatic at this time, with no CP, palpitations, or SOB.  He recalls intermittent palpitations occurring over the years that he had ascribed to stress or anxiety of normal life. He has had an EKG in the past but does not recall the results. Also had an echocardiogram several years ago when being evaluated for thoracic aortic aneurysm, which demonstrated what based on his description sounds like LVH.  Nuclear stress test performed in 2014 normal as per Dr. Rios  - s/p Cardizem 10mg IV x2 and Cardizem 30mg PO in ED with improvement in his HR to 90s-100s  - c/w Eliquis 2.5mg BID  - c/w Coreg 12.5mg BID for rate control  - d/c Diltiazem after albation  - s/p ablation  - Echo with LVH and LVEF 55%, mild aortic root dilation 4.5cm  - EKG overnight shows sinus rhythm, with prolonged QT. Will avoid QT prolonging medications if possible. Tacrolimus is a QT prolonging medication but is necessary in this patient  - CXR overnight with new left basilar congestion. Patient reports no aspiration event. S/p IV Lasix 20mg (Lasix naive) with appropriate diuresis. Still with crackles on AM lung exam, with saturations in mid 90s sleeping on NC. WBC elevated 8.2->13.6, patient afebrile and non-toxic. Likely reactive at this point. Plan for Lasix 20mg IVP this afternoon.  as measured incidentally at outpatient infusion center where he was being treated for osteoporosis.  The patient is asymptomatic at this time, with no CP, palpitations, or SOB.  He recalls intermittent palpitations occurring over the years that he had ascribed to stress or anxiety of normal life. He has had an EKG in the past but does not recall the results. Also had an echocardiogram several years ago when being evaluated for thoracic aortic aneurysm, which demonstrated what based on his description sounds like LVH.  Nuclear stress test performed in 2014 normal as per Dr. Rios  - s/p Cardizem 10mg IV x2 and Cardizem 30mg PO in ED with improvement in his HR to 90s-100s  - c/w Eliquis 2.5mg BID  - c/w Coreg 12.5mg BID for rate control. Can increase dose if necessary  - d/c Diltiazem after albation  - s/p ablation  - Echo with LVH and LVEF 55%, mild aortic root dilation 4.5cm  - EKG overnight shows sinus rhythm, with prolonged QT. Will avoid QT prolonging medications if possible. Tacrolimus is a QT prolonging medication but is necessary in this patient  - CXR overnight with new left basilar congestion. Patient reports no aspiration event. S/p IV Lasix 20mg (Lasix naive) with appropriate diuresis. Still with crackles on AM lung exam, with saturations in mid 90s sleeping on NC. WBC elevated 8.2->13.6, patient afebrile and non-toxic. Likely reactive at this point per Dr. Thompson. Plan for Lasix 20mg IVP this afternoon with CXR tomorrow to evaluate for resolution  - strict I/O, AM UOP not recorded, instructed patient to use urinal going forward

## 2018-02-03 NOTE — PROGRESS NOTE ADULT - SUBJECTIVE AND OBJECTIVE BOX
Pt is s/p successful cardiac ablation from A Fib/flutter to NSR       PAST MEDICAL & SURGICAL HISTORY:  DVT (deep venous thrombosis)  Kidney transplant recipient  Prostate cancer  HTN (hypertension)  HLD (hyperlipidemia)  DM2 (diabetes mellitus, type 2)  Polycystic kidney disease  H/O radical prostatectomy    MEDICATIONS  (STANDING):  apixaban 5 milliGRAM(s) Oral every 12 hours  atorvastatin 20 milliGRAM(s) Oral at bedtime  carvedilol 12.5 milliGRAM(s) Oral every 12 hours  dextrose 5%. 1000 milliLiter(s) (50 mL/Hr) IV Continuous <Continuous>  dextrose 50% Injectable 12.5 Gram(s) IV Push once  dextrose 50% Injectable 25 Gram(s) IV Push once  dextrose 50% Injectable 25 Gram(s) IV Push once  insulin lispro (HumaLOG) corrective regimen sliding scale   SubCutaneous Before meals and at bedtime  predniSONE   Tablet 5 milliGRAM(s) Oral daily  tacrolimus 1 milliGRAM(s) Oral at bedtime  tacrolimus 2 milliGRAM(s) Oral daily    MEDICATIONS  (PRN):  dextrose Gel 1 Dose(s) Oral once PRN Blood Glucose LESS THAN 70 milliGRAM(s)/deciliter  glucagon  Injectable 1 milliGRAM(s) IntraMuscular once PRN Glucose LESS THAN 70 milligrams/deciliter    ICU Vital Signs Last 24 Hrs  T(C): 37.2 (03 Feb 2018 05:30), Max: 37.2 (03 Feb 2018 05:30)  T(F): 99 (03 Feb 2018 05:30), Max: 99 (03 Feb 2018 05:30)  HR: 96 (03 Feb 2018 09:10) (88 - 100)  BP: 119/74 (03 Feb 2018 09:10) (113/81 - 149/86)    Lungs rales and congestion at bases  CXR Left perihilar infiltrates     echo NL EF 50-55 %  mild MR    s/p KIMMIE   mild to Mod MR   NO atrial clot was noted   severe atherosclerosis of Aorta   CV Reg now     Abd soft  Ext stable                         13.0   13.6  )-----------( 183      ( 03 Feb 2018 07:58 )             39.8   02-03    138  |  102  |  14  ----------------------------<  220<H>  4.7   |  23  |  1.12    Ca    8.8      03 Feb 2018 07:58  Mg     1.8     02-03      BP(mean): 90 (03 Feb 2018 09:10) (90 - 111)  ABP: --  ABP(mean): --  RR: 18 (03 Feb 2018 09:10) (14 - 18)  SpO2: 95% (03 Feb 2018 09:10) (92% - 99%)

## 2018-02-03 NOTE — PROGRESS NOTE ADULT - ASSESSMENT
72 M with PMHx of polycystic kidney disease s/p kidney transplant (2007 at [facility], on [place]/Prednisone), HTN, HLD, DM2, Prostate cancer s/p radical prostatectomy 2015, DVT 2006 (in setting of pelvic fracture), and osteoporosis presenting to [facility] with atrial fibrillation with RVR, asymptomatic

## 2018-02-03 NOTE — PROGRESS NOTE ADULT - SUBJECTIVE AND OBJECTIVE BOX
INTERVAL HPI/OVERNIGHT EVENTS: Asymptomatic desaturation to 90% on RA, CXR obtained showing new left sided pulmonary congestion. Lasix 20mg IVP given, UOP 500cc afterwards. SpO2 improved to mid 90s while sleeping on NC    SUBJECTIVE: Patient seen and examined at bedside.  No SOB, CP, palpitations. Sleeping comfortably with NC on. No reported episodes of choking, coughing, or aspiration.    OBJECTIVE:    VITAL SIGNS:  ICU Vital Signs Last 24 Hrs  T(C): 37.2 (03 Feb 2018 05:30), Max: 37.2 (03 Feb 2018 05:30)  T(F): 99 (03 Feb 2018 05:30), Max: 99 (03 Feb 2018 05:30)  HR: 100 (03 Feb 2018 04:58) (88 - 100)  BP: 149/86 (03 Feb 2018 04:58) (112/69 - 149/86)  BP(mean): 110 (03 Feb 2018 04:58) (79 - 111)  ABP: --  ABP(mean): --  RR: 16 (03 Feb 2018 04:58) (14 - 17)  SpO2: 92% (03 Feb 2018 04:58) (92% - 99%)        02-02 @ 07:01  -  02-03 @ 07:00  --------------------------------------------------------  IN: 0 mL / OUT: 900 mL / NET: -900 mL      CAPILLARY BLOOD GLUCOSE      POCT Blood Glucose.: 178 mg/dL (03 Feb 2018 06:16)      PHYSICAL EXAM:    General: WDWN male lying in bed in NAD, pleasant and interactive with NC on  HEENT: AT/NC, PEERLA, MMM, no conjunctival pallor, anicteric sclera  Neck; Supple without JVD/LAD  Cardiac: S1/S2, RRR, no M/R/G  Respiratory: Crackles at left lung base, good air movement bilaterally, no wheezes or rales  Abdomen: Soft, NT/ND, +BS  Extremities: WWP, no edema, clubbing or cyanosis. Old fistula present left forearm  Vascular: 2+ radial and DP/PT pulses  Neuro: AOx3, moves all extremities and follows commands, ambulatory without difficulties    MEDICATIONS:  MEDICATIONS  (STANDING):  apixaban 5 milliGRAM(s) Oral every 12 hours  atorvastatin 20 milliGRAM(s) Oral at bedtime  carvedilol 12.5 milliGRAM(s) Oral every 12 hours  dextrose 5%. 1000 milliLiter(s) (50 mL/Hr) IV Continuous <Continuous>  dextrose 50% Injectable 12.5 Gram(s) IV Push once  dextrose 50% Injectable 25 Gram(s) IV Push once  dextrose 50% Injectable 25 Gram(s) IV Push once  insulin lispro (HumaLOG) corrective regimen sliding scale   SubCutaneous Before meals and at bedtime  predniSONE   Tablet 5 milliGRAM(s) Oral daily  tacrolimus 1 milliGRAM(s) Oral at bedtime  tacrolimus 2 milliGRAM(s) Oral daily    MEDICATIONS  (PRN):  dextrose Gel 1 Dose(s) Oral once PRN Blood Glucose LESS THAN 70 milliGRAM(s)/deciliter  glucagon  Injectable 1 milliGRAM(s) IntraMuscular once PRN Glucose LESS THAN 70 milligrams/deciliter      ALLERGIES:  Allergies    Naprosyn (Unknown)    Intolerances        LABS:                        12.9   8.2   )-----------( 185      ( 02 Feb 2018 06:33 )             39.3     02-02    140  |  102  |  14  ----------------------------<  179<H>  3.6   |  24  |  1.07    Ca    8.8      02 Feb 2018 06:33  Mg     2.0     02-02      PT/INR - ( 01 Feb 2018 15:57 )   PT: 12.3 sec;   INR: 1.11          PTT - ( 01 Feb 2018 15:57 )  PTT:63.1 sec      RADIOLOGY & ADDITIONAL TESTS: Reviewed. INTERVAL HPI/OVERNIGHT EVENTS: Asymptomatic desaturation to 90% on RA, CXR obtained showing new left sided pulmonary congestion. Lasix 20mg IVP given, UOP 500cc afterwards. SpO2 improved to mid 90s while sleeping on NC    SUBJECTIVE: Patient seen and examined at bedside.  No SOB, CP, palpitations. Sleeping comfortably with NC on. He notes that he woke up coughing once in the middle of the night, feels much improved this AM    OBJECTIVE:    VITAL SIGNS:  ICU Vital Signs Last 24 Hrs  T(C): 37.2 (03 Feb 2018 05:30), Max: 37.2 (03 Feb 2018 05:30)  T(F): 99 (03 Feb 2018 05:30), Max: 99 (03 Feb 2018 05:30)  HR: 100 (03 Feb 2018 04:58) (88 - 100)  BP: 149/86 (03 Feb 2018 04:58) (112/69 - 149/86)  BP(mean): 110 (03 Feb 2018 04:58) (79 - 111)  ABP: --  ABP(mean): --  RR: 16 (03 Feb 2018 04:58) (14 - 17)  SpO2: 92% (03 Feb 2018 04:58) (92% - 99%)        02-02 @ 07:01  -  02-03 @ 07:00  --------------------------------------------------------  IN: 0 mL / OUT: 900 mL / NET: -900 mL      CAPILLARY BLOOD GLUCOSE      POCT Blood Glucose.: 178 mg/dL (03 Feb 2018 06:16)      PHYSICAL EXAM:    General: WDWN male lying in bed in NAD, pleasant and interactive with NC on  HEENT: AT/NC, PEERLA, MMM, no conjunctival pallor, anicteric sclera  Neck; Supple without JVD/LAD  Cardiac: S1/S2, RRR, no M/R/G  Respiratory: Crackles at left lung base, good air movement bilaterally, no wheezes or rales  Abdomen: Soft, NT/ND, +BS  Extremities: WWP, no edema, clubbing or cyanosis. Old fistula present left forearm  Vascular: 2+ radial and DP/PT pulses  Neuro: AOx3, moves all extremities and follows commands, ambulatory without difficulties    MEDICATIONS:  MEDICATIONS  (STANDING):  apixaban 5 milliGRAM(s) Oral every 12 hours  atorvastatin 20 milliGRAM(s) Oral at bedtime  carvedilol 12.5 milliGRAM(s) Oral every 12 hours  dextrose 5%. 1000 milliLiter(s) (50 mL/Hr) IV Continuous <Continuous>  dextrose 50% Injectable 12.5 Gram(s) IV Push once  dextrose 50% Injectable 25 Gram(s) IV Push once  dextrose 50% Injectable 25 Gram(s) IV Push once  insulin lispro (HumaLOG) corrective regimen sliding scale   SubCutaneous Before meals and at bedtime  predniSONE   Tablet 5 milliGRAM(s) Oral daily  tacrolimus 1 milliGRAM(s) Oral at bedtime  tacrolimus 2 milliGRAM(s) Oral daily    MEDICATIONS  (PRN):  dextrose Gel 1 Dose(s) Oral once PRN Blood Glucose LESS THAN 70 milliGRAM(s)/deciliter  glucagon  Injectable 1 milliGRAM(s) IntraMuscular once PRN Glucose LESS THAN 70 milligrams/deciliter      ALLERGIES:  Allergies    Naprosyn (Unknown)    Intolerances        LABS:                        12.9   8.2   )-----------( 185      ( 02 Feb 2018 06:33 )             39.3     02-02    140  |  102  |  14  ----------------------------<  179<H>  3.6   |  24  |  1.07    Ca    8.8      02 Feb 2018 06:33  Mg     2.0     02-02      PT/INR - ( 01 Feb 2018 15:57 )   PT: 12.3 sec;   INR: 1.11          PTT - ( 01 Feb 2018 15:57 )  PTT:63.1 sec      RADIOLOGY & ADDITIONAL TESTS: Reviewed. INTERVAL HPI/OVERNIGHT EVENTS: Asymptomatic desaturation to 90% on RA, CXR obtained showing new left sided pulmonary congestion. Lasix 20mg IVP given, UOP 500cc afterwards. SpO2 improved to mid 90s while sleeping on NC    SUBJECTIVE: Patient seen and examined at bedside.  No SOB, CP, palpitations. Sleeping comfortably with NC on. No choking or coughing or aspiration episodes overnight    OBJECTIVE:    VITAL SIGNS:  ICU Vital Signs Last 24 Hrs  T(C): 37.2 (03 Feb 2018 05:30), Max: 37.2 (03 Feb 2018 05:30)  T(F): 99 (03 Feb 2018 05:30), Max: 99 (03 Feb 2018 05:30)  HR: 100 (03 Feb 2018 04:58) (88 - 100)  BP: 149/86 (03 Feb 2018 04:58) (112/69 - 149/86)  BP(mean): 110 (03 Feb 2018 04:58) (79 - 111)  ABP: --  ABP(mean): --  RR: 16 (03 Feb 2018 04:58) (14 - 17)  SpO2: 92% (03 Feb 2018 04:58) (92% - 99%)        02-02 @ 07:01  -  02-03 @ 07:00  --------------------------------------------------------  IN: 0 mL / OUT: 900 mL / NET: -900 mL      CAPILLARY BLOOD GLUCOSE      POCT Blood Glucose.: 178 mg/dL (03 Feb 2018 06:16)      PHYSICAL EXAM:    General: WDWN male lying in bed in NAD, pleasant and interactive with NC on  HEENT: AT/NC, PEERLA, MMM, no conjunctival pallor, anicteric sclera  Neck; Supple without JVD/LAD  Cardiac: S1/S2, RRR, no M/R/G  Respiratory: Crackles at left lung base, good air movement bilaterally, no wheezes or rales  Abdomen: Soft, NT/ND, +BS  Extremities: WWP, no edema, clubbing or cyanosis. Old fistula present left forearm  Vascular: 2+ radial and DP/PT pulses  Neuro: AOx3, moves all extremities and follows commands, ambulatory without difficulties    MEDICATIONS:  MEDICATIONS  (STANDING):  apixaban 5 milliGRAM(s) Oral every 12 hours  atorvastatin 20 milliGRAM(s) Oral at bedtime  carvedilol 12.5 milliGRAM(s) Oral every 12 hours  dextrose 5%. 1000 milliLiter(s) (50 mL/Hr) IV Continuous <Continuous>  dextrose 50% Injectable 12.5 Gram(s) IV Push once  dextrose 50% Injectable 25 Gram(s) IV Push once  dextrose 50% Injectable 25 Gram(s) IV Push once  insulin lispro (HumaLOG) corrective regimen sliding scale   SubCutaneous Before meals and at bedtime  predniSONE   Tablet 5 milliGRAM(s) Oral daily  tacrolimus 1 milliGRAM(s) Oral at bedtime  tacrolimus 2 milliGRAM(s) Oral daily    MEDICATIONS  (PRN):  dextrose Gel 1 Dose(s) Oral once PRN Blood Glucose LESS THAN 70 milliGRAM(s)/deciliter  glucagon  Injectable 1 milliGRAM(s) IntraMuscular once PRN Glucose LESS THAN 70 milligrams/deciliter      ALLERGIES:  Allergies    Naprosyn (Unknown)    Intolerances        LABS:                        12.9   8.2   )-----------( 185      ( 02 Feb 2018 06:33 )             39.3     02-02    140  |  102  |  14  ----------------------------<  179<H>  3.6   |  24  |  1.07    Ca    8.8      02 Feb 2018 06:33  Mg     2.0     02-02      PT/INR - ( 01 Feb 2018 15:57 )   PT: 12.3 sec;   INR: 1.11          PTT - ( 01 Feb 2018 15:57 )  PTT:63.1 sec      RADIOLOGY & ADDITIONAL TESTS: Reviewed.

## 2018-02-04 DIAGNOSIS — I50.30 UNSPECIFIED DIASTOLIC (CONGESTIVE) HEART FAILURE: ICD-10-CM

## 2018-02-04 DIAGNOSIS — D64.9 ANEMIA, UNSPECIFIED: ICD-10-CM

## 2018-02-04 DIAGNOSIS — A41.9 SEPSIS, UNSPECIFIED ORGANISM: ICD-10-CM

## 2018-02-04 DIAGNOSIS — N17.0 ACUTE KIDNEY FAILURE WITH TUBULAR NECROSIS: ICD-10-CM

## 2018-02-04 DIAGNOSIS — I48.92 UNSPECIFIED ATRIAL FLUTTER: ICD-10-CM

## 2018-02-04 LAB
ANION GAP SERPL CALC-SCNC: 17 MMOL/L — SIGNIFICANT CHANGE UP (ref 5–17)
ANION GAP SERPL CALC-SCNC: 17 MMOL/L — SIGNIFICANT CHANGE UP (ref 5–17)
APPEARANCE UR: CLEAR — SIGNIFICANT CHANGE UP
BILIRUB UR-MCNC: NEGATIVE — SIGNIFICANT CHANGE UP
BLD GP AB SCN SERPL QL: NEGATIVE — SIGNIFICANT CHANGE UP
BUN SERPL-MCNC: 17 MG/DL — SIGNIFICANT CHANGE UP (ref 7–23)
BUN SERPL-MCNC: 17 MG/DL — SIGNIFICANT CHANGE UP (ref 7–23)
CALCIUM SERPL-MCNC: 8.7 MG/DL — SIGNIFICANT CHANGE UP (ref 8.4–10.5)
CALCIUM SERPL-MCNC: 8.7 MG/DL — SIGNIFICANT CHANGE UP (ref 8.4–10.5)
CHLORIDE SERPL-SCNC: 99 MMOL/L — SIGNIFICANT CHANGE UP (ref 96–108)
CHLORIDE SERPL-SCNC: 99 MMOL/L — SIGNIFICANT CHANGE UP (ref 96–108)
CO2 SERPL-SCNC: 20 MMOL/L — LOW (ref 22–31)
CO2 SERPL-SCNC: 20 MMOL/L — LOW (ref 22–31)
COLOR SPEC: YELLOW — SIGNIFICANT CHANGE UP
CREAT ?TM UR-MCNC: 169 MG/DL — SIGNIFICANT CHANGE UP
CREAT SERPL-MCNC: 1.15 MG/DL — SIGNIFICANT CHANGE UP (ref 0.5–1.3)
CREAT SERPL-MCNC: 1.29 MG/DL — SIGNIFICANT CHANGE UP (ref 0.5–1.3)
DIFF PNL FLD: NEGATIVE — SIGNIFICANT CHANGE UP
GLUCOSE BLDC GLUCOMTR-MCNC: 168 MG/DL — HIGH (ref 70–99)
GLUCOSE BLDC GLUCOMTR-MCNC: 179 MG/DL — HIGH (ref 70–99)
GLUCOSE BLDC GLUCOMTR-MCNC: 191 MG/DL — HIGH (ref 70–99)
GLUCOSE BLDC GLUCOMTR-MCNC: 376 MG/DL — HIGH (ref 70–99)
GLUCOSE BLDC GLUCOMTR-MCNC: 406 MG/DL — HIGH (ref 70–99)
GLUCOSE SERPL-MCNC: 197 MG/DL — HIGH (ref 70–99)
GLUCOSE SERPL-MCNC: 225 MG/DL — HIGH (ref 70–99)
GLUCOSE UR QL: NEGATIVE — SIGNIFICANT CHANGE UP
HAPTOGLOB SERPL-MCNC: 328 MG/DL — HIGH (ref 34–200)
HBA1C BLD-MCNC: 7.6 % — HIGH (ref 4–5.6)
HCT VFR BLD CALC: 35.7 % — LOW (ref 39–50)
HCT VFR BLD CALC: 35.8 % — LOW (ref 39–50)
HGB BLD-MCNC: 11.7 G/DL — LOW (ref 13–17)
HGB BLD-MCNC: 12.1 G/DL — LOW (ref 13–17)
KETONES UR-MCNC: (no result) MG/DL
LACTATE SERPL-SCNC: 1 MMOL/L — SIGNIFICANT CHANGE UP (ref 0.5–2)
LDH SERPL L TO P-CCNC: 273 U/L — HIGH (ref 50–242)
LEUKOCYTE ESTERASE UR-ACNC: NEGATIVE — SIGNIFICANT CHANGE UP
MAGNESIUM SERPL-MCNC: 2 MG/DL — SIGNIFICANT CHANGE UP (ref 1.6–2.6)
MAGNESIUM SERPL-MCNC: 2 MG/DL — SIGNIFICANT CHANGE UP (ref 1.6–2.6)
MCHC RBC-ENTMCNC: 29.6 PG — SIGNIFICANT CHANGE UP (ref 27–34)
MCHC RBC-ENTMCNC: 30.2 PG — SIGNIFICANT CHANGE UP (ref 27–34)
MCHC RBC-ENTMCNC: 32.8 G/DL — SIGNIFICANT CHANGE UP (ref 32–36)
MCHC RBC-ENTMCNC: 33.8 G/DL — SIGNIFICANT CHANGE UP (ref 32–36)
MCV RBC AUTO: 89.3 FL — SIGNIFICANT CHANGE UP (ref 80–100)
MCV RBC AUTO: 90.4 FL — SIGNIFICANT CHANGE UP (ref 80–100)
NITRITE UR-MCNC: NEGATIVE — SIGNIFICANT CHANGE UP
OB PNL STL: NEGATIVE — SIGNIFICANT CHANGE UP
PH UR: 6 — SIGNIFICANT CHANGE UP (ref 5–8)
PHOSPHATE SERPL-MCNC: 1.8 MG/DL — LOW (ref 2.5–4.5)
PLATELET # BLD AUTO: 170 K/UL — SIGNIFICANT CHANGE UP (ref 150–400)
PLATELET # BLD AUTO: 175 K/UL — SIGNIFICANT CHANGE UP (ref 150–400)
POTASSIUM SERPL-MCNC: 3.7 MMOL/L — SIGNIFICANT CHANGE UP (ref 3.5–5.3)
POTASSIUM SERPL-MCNC: 4.4 MMOL/L — SIGNIFICANT CHANGE UP (ref 3.5–5.3)
POTASSIUM SERPL-SCNC: 3.7 MMOL/L — SIGNIFICANT CHANGE UP (ref 3.5–5.3)
POTASSIUM SERPL-SCNC: 4.4 MMOL/L — SIGNIFICANT CHANGE UP (ref 3.5–5.3)
PROT UR-MCNC: 100 MG/DL
RAPID RVP RESULT: SIGNIFICANT CHANGE UP
RBC # BLD: 3.95 M/UL — LOW (ref 4.2–5.8)
RBC # BLD: 4.01 M/UL — LOW (ref 4.2–5.8)
RBC # FLD: 15.5 % — SIGNIFICANT CHANGE UP (ref 10.3–16.9)
RBC # FLD: 15.7 % — SIGNIFICANT CHANGE UP (ref 10.3–16.9)
RH IG SCN BLD-IMP: POSITIVE — SIGNIFICANT CHANGE UP
SODIUM SERPL-SCNC: 136 MMOL/L — SIGNIFICANT CHANGE UP (ref 135–145)
SODIUM SERPL-SCNC: 136 MMOL/L — SIGNIFICANT CHANGE UP (ref 135–145)
SODIUM UR-SCNC: 53 MMOL/L — SIGNIFICANT CHANGE UP
SP GR SPEC: 1.02 — SIGNIFICANT CHANGE UP (ref 1–1.03)
UROBILINOGEN FLD QL: 2 E.U./DL
UUN UR-MCNC: 1021 MG/DL — SIGNIFICANT CHANGE UP
WBC # BLD: 10.9 K/UL — HIGH (ref 3.8–10.5)
WBC # BLD: 11.1 K/UL — HIGH (ref 3.8–10.5)
WBC # FLD AUTO: 10.9 K/UL — HIGH (ref 3.8–10.5)
WBC # FLD AUTO: 11.1 K/UL — HIGH (ref 3.8–10.5)

## 2018-02-04 PROCEDURE — 71045 X-RAY EXAM CHEST 1 VIEW: CPT | Mod: 26

## 2018-02-04 PROCEDURE — 93010 ELECTROCARDIOGRAM REPORT: CPT

## 2018-02-04 RX ORDER — POTASSIUM CHLORIDE 20 MEQ
10 PACKET (EA) ORAL
Qty: 0 | Refills: 0 | Status: COMPLETED | OUTPATIENT
Start: 2018-02-04 | End: 2018-02-04

## 2018-02-04 RX ORDER — INSULIN LISPRO 100/ML
2 VIAL (ML) SUBCUTANEOUS
Qty: 0 | Refills: 0 | Status: DISCONTINUED | OUTPATIENT
Start: 2018-02-04 | End: 2018-02-05

## 2018-02-04 RX ORDER — POTASSIUM CHLORIDE 20 MEQ
10 PACKET (EA) ORAL
Qty: 0 | Refills: 0 | Status: DISCONTINUED | OUTPATIENT
Start: 2018-02-04 | End: 2018-02-04

## 2018-02-04 RX ORDER — POTASSIUM PHOSPHATE, MONOBASIC POTASSIUM PHOSPHATE, DIBASIC 236; 224 MG/ML; MG/ML
30 INJECTION, SOLUTION INTRAVENOUS ONCE
Qty: 0 | Refills: 0 | Status: COMPLETED | OUTPATIENT
Start: 2018-02-04 | End: 2018-02-04

## 2018-02-04 RX ORDER — POTASSIUM CHLORIDE 20 MEQ
40 PACKET (EA) ORAL ONCE
Qty: 0 | Refills: 0 | Status: DISCONTINUED | OUTPATIENT
Start: 2018-02-04 | End: 2018-02-04

## 2018-02-04 RX ORDER — CARVEDILOL PHOSPHATE 80 MG/1
6.25 CAPSULE, EXTENDED RELEASE ORAL EVERY 12 HOURS
Qty: 0 | Refills: 0 | Status: DISCONTINUED | OUTPATIENT
Start: 2018-02-04 | End: 2018-02-06

## 2018-02-04 RX ORDER — POLYETHYLENE GLYCOL 3350 17 G/17G
17 POWDER, FOR SOLUTION ORAL
Qty: 0 | Refills: 0 | Status: DISCONTINUED | OUTPATIENT
Start: 2018-02-04 | End: 2018-02-06

## 2018-02-04 RX ORDER — DOCUSATE SODIUM 100 MG
100 CAPSULE ORAL
Qty: 0 | Refills: 0 | Status: DISCONTINUED | OUTPATIENT
Start: 2018-02-04 | End: 2018-02-06

## 2018-02-04 RX ORDER — SENNA PLUS 8.6 MG/1
2 TABLET ORAL AT BEDTIME
Qty: 0 | Refills: 0 | Status: DISCONTINUED | OUTPATIENT
Start: 2018-02-04 | End: 2018-02-06

## 2018-02-04 RX ORDER — INSULIN GLARGINE 100 [IU]/ML
7 INJECTION, SOLUTION SUBCUTANEOUS AT BEDTIME
Qty: 0 | Refills: 0 | Status: DISCONTINUED | OUTPATIENT
Start: 2018-02-04 | End: 2018-02-05

## 2018-02-04 RX ADMIN — Medication 250 MILLIGRAM(S): at 03:05

## 2018-02-04 RX ADMIN — INSULIN GLARGINE 7 UNIT(S): 100 INJECTION, SOLUTION SUBCUTANEOUS at 22:00

## 2018-02-04 RX ADMIN — APIXABAN 5 MILLIGRAM(S): 2.5 TABLET, FILM COATED ORAL at 09:41

## 2018-02-04 RX ADMIN — APIXABAN 5 MILLIGRAM(S): 2.5 TABLET, FILM COATED ORAL at 22:00

## 2018-02-04 RX ADMIN — POLYETHYLENE GLYCOL 3350 17 GRAM(S): 17 POWDER, FOR SOLUTION ORAL at 09:41

## 2018-02-04 RX ADMIN — Medication 100 MILLIEQUIVALENT(S): at 02:04

## 2018-02-04 RX ADMIN — PIPERACILLIN AND TAZOBACTAM 200 GRAM(S): 4; .5 INJECTION, POWDER, LYOPHILIZED, FOR SOLUTION INTRAVENOUS at 17:39

## 2018-02-04 RX ADMIN — Medication 2: at 07:30

## 2018-02-04 RX ADMIN — Medication 2: at 16:32

## 2018-02-04 RX ADMIN — PIPERACILLIN AND TAZOBACTAM 200 GRAM(S): 4; .5 INJECTION, POWDER, LYOPHILIZED, FOR SOLUTION INTRAVENOUS at 07:14

## 2018-02-04 RX ADMIN — PIPERACILLIN AND TAZOBACTAM 200 GRAM(S): 4; .5 INJECTION, POWDER, LYOPHILIZED, FOR SOLUTION INTRAVENOUS at 02:05

## 2018-02-04 RX ADMIN — CARVEDILOL PHOSPHATE 6.25 MILLIGRAM(S): 80 CAPSULE, EXTENDED RELEASE ORAL at 17:50

## 2018-02-04 RX ADMIN — Medication 2: at 22:01

## 2018-02-04 RX ADMIN — Medication 2 UNIT(S): at 11:52

## 2018-02-04 RX ADMIN — CARVEDILOL PHOSPHATE 12.5 MILLIGRAM(S): 80 CAPSULE, EXTENDED RELEASE ORAL at 07:14

## 2018-02-04 RX ADMIN — Medication 250 MILLIGRAM(S): at 15:34

## 2018-02-04 RX ADMIN — TACROLIMUS 1 MILLIGRAM(S): 5 CAPSULE ORAL at 22:00

## 2018-02-04 RX ADMIN — Medication 5 MILLIGRAM(S): at 07:14

## 2018-02-04 RX ADMIN — Medication 10: at 11:51

## 2018-02-04 RX ADMIN — Medication 2 UNIT(S): at 16:32

## 2018-02-04 RX ADMIN — POTASSIUM PHOSPHATE, MONOBASIC POTASSIUM PHOSPHATE, DIBASIC 85 MILLIMOLE(S): 236; 224 INJECTION, SOLUTION INTRAVENOUS at 06:23

## 2018-02-04 RX ADMIN — PIPERACILLIN AND TAZOBACTAM 200 GRAM(S): 4; .5 INJECTION, POWDER, LYOPHILIZED, FOR SOLUTION INTRAVENOUS at 23:14

## 2018-02-04 RX ADMIN — Medication 100 MILLIEQUIVALENT(S): at 04:24

## 2018-02-04 RX ADMIN — PIPERACILLIN AND TAZOBACTAM 200 GRAM(S): 4; .5 INJECTION, POWDER, LYOPHILIZED, FOR SOLUTION INTRAVENOUS at 11:44

## 2018-02-04 RX ADMIN — TACROLIMUS 2 MILLIGRAM(S): 5 CAPSULE ORAL at 11:51

## 2018-02-04 RX ADMIN — ATORVASTATIN CALCIUM 20 MILLIGRAM(S): 80 TABLET, FILM COATED ORAL at 22:02

## 2018-02-04 NOTE — PROGRESS NOTE ADULT - SUBJECTIVE AND OBJECTIVE BOX
Pt is stable   but developed fever overnight   No current dyspnea or chest  pain   Remains in RSR post  cardiac ablation    PAST MEDICAL & SURGICAL HISTORY:  DVT (deep venous thrombosis)  Kidney transplant recipient  Prostate cancer  HTN (hypertension)  HLD (hyperlipidemia)  DM2 (diabetes mellitus, type 2)  Polycystic kidney disease  H/O radical prostatectomy    MEDICATIONS  (STANDING):  apixaban 5 milliGRAM(s) Oral every 12 hours  atorvastatin 20 milliGRAM(s) Oral at bedtime  carvedilol 12.5 milliGRAM(s) Oral every 12 hours  dextrose 5%. 1000 milliLiter(s) (50 mL/Hr) IV Continuous <Continuous>  dextrose 50% Injectable 12.5 Gram(s) IV Push once  dextrose 50% Injectable 25 Gram(s) IV Push once  dextrose 50% Injectable 25 Gram(s) IV Push once  docusate sodium 100 milliGRAM(s) Oral two times a day  insulin glargine Injectable (LANTUS) 7 Unit(s) SubCutaneous at bedtime  insulin lispro (HumaLOG) corrective regimen sliding scale   SubCutaneous Before meals and at bedtime  insulin lispro Injectable (HumaLOG) 2 Unit(s) SubCutaneous three times a day before meals  piperacillin/tazobactam IVPB. 4.5 Gram(s) IV Intermittent every 6 hours  polyethylene glycol 3350 17 Gram(s) Oral two times a day  predniSONE   Tablet 5 milliGRAM(s) Oral daily  senna 2 Tablet(s) Oral at bedtime  tacrolimus 1 milliGRAM(s) Oral at bedtime  tacrolimus 2 milliGRAM(s) Oral daily  vancomycin  IVPB 1000 milliGRAM(s) IV Intermittent every 12 hours    MEDICATIONS  (PRN):  acetaminophen   Tablet 650 milliGRAM(s) Oral every 6 hours PRN For Temp greater than 38 C (100.4 F)  dextrose Gel 1 Dose(s) Oral once PRN Blood Glucose LESS THAN 70 milliGRAM(s)/deciliter  glucagon  Injectable 1 milliGRAM(s) IntraMuscular once PRN Glucose LESS THAN 70 milligrams/deciliter    ICU Vital Signs Last 24 Hrs  T(C): 37.2 (04 Feb 2018 10:11), Max: 39.2 (03 Feb 2018 22:47)  T(F): 98.9 (04 Feb 2018 10:11), Max: 102.5 (03 Feb 2018 22:47)  HR: 82 (04 Feb 2018 08:35) (78 - 98)  BP: 84/62 (04 Feb 2018 08:35) (84/62 - 142/84)  BP(mean): 78 (04 Feb 2018 08:35) (78 - 104)  ABP: --  ABP(mean): --  RR: 20 (04 Feb 2018 08:35) (14 - 22)  SpO2: 95% (04 Feb 2018 08:35) (90% - 97%)      Lung s decreased breath sounds at bases  CXR L perihilar infiltrate  CV s1 s2  Abd soft  Ext stable                          11.7   11.1  )-----------( 175      ( 04 Feb 2018 07:37 )             35.7   02-04    136  |  99  |  17  ----------------------------<  225<H>  4.4   |  20<L>  |  1.29    Ca    8.7      04 Feb 2018 07:37  Phos  1.8     02-04  Mg     2.0     02-04    Rapid RVP Result: NotDete: The [employer] RVP Rapid uses polymerase chain reaction (PCR) and melt  curve analysis to screen for adenovirus; coronavirus HKU1, NL63, 229E,  OC43; human metapneumovirus (hMPV); human enterovirus/rhinovirus  (Entero/RV); influenza A; influenza A/H1;influenza A/H3; influenza  A/H1-2009; influenza B; parainfluenza viruses 1, 2, 3, 4; respiratory  syncytial virus; Bordetella pertussis; Mycoplasma pneumoniae; and  Chlamydophila pneumoniae. (02.04.18 @ 00:33)    Echo  Nl EF 55-60%

## 2018-02-04 NOTE — PROGRESS NOTE ADULT - PROBLEM SELECTOR PLAN 1
Overnight, pt found to be febrile to 102.5 rectally. All other VS wnl. lactate nml  - pt meets SIRS criteria for WBC 13K, 22% bands, and T 102.5 with most likely source PNA  - CXR showing left perihilar consolidation and in setting of immunocompromise was started on vanc/zosyn  - pt currently not complaining of URI symptoms including productive cough, sore throat or rhinorrhea  - RVP neg  - f/u blood cx  - UA neg  - c/w vanc 1000mg BID (renally dosed); vanc trough 2/5 at 1pm  - c/w zosyn 4.5mg q6  - will consider de-escalating abx if fever downtrends

## 2018-02-04 NOTE — CHART NOTE - NSCHARTNOTEFT_GEN_A_CORE
PGY-2 EVENT NOTE:    Called by nurse as patient was febrile to 102 rectal. Patient complaining of fever, chills, no nausea/vomiting, chest pain, shortness of breath similar to previous, denies runny nose, sore throat, ear pain, diarrhea, but has been constipated since Wednesday, denies dysuria but complains of decreased UOP. On exam, patient comfortable speaking in full sentences on 2L NC, with tachypnea to low 20s, some abdominal breathing. +JVD to mandible, lungs w/ bibasilar crackles to 1/3 up lung LLL with egophony, no LE edema. CXR showing b/l pulmonary edema similar to earlier this morning.    Sepsis 2/2 likely HAP. Given immunocompromised on tacrolimus and prednisone, will dose for Vanc/Zosyn. Full fever workup sent. New O2 requirement since this am, unclear if 2/2 HAP or congestion.  - f/u RVP  - f/u blood cultures  - lactate negative  - added on bands, 22% this am; repeat CBC with slightly decreased lines appearing diluted- still with leukocytosis to 10.9  - continue Vanc/Zosyn  - Tylenol and ice packs    Pulmonary edema of unclear etiology as pt does not have a hx of CHF. Likely component of diastolic dysfunction 2/2 sepsis 2/2 HAP.   - Bipap to improve congestion  - Pt w/ DAYO uptrending from admission 0.99-> 1.17: ordered for urine lytes  - Will consider additional Lasix pending repeat labs in setting of DAYO PGY-2 EVENT NOTE:    Called by nurse as patient was febrile to 102 rectal. Patient complaining of fever, chills, no nausea/vomiting, chest pain, shortness of breath similar to previous, denies runny nose, sore throat, ear pain, diarrhea, but has been constipated since Wednesday, denies dysuria but complains of decreased UOP. On exam, patient comfortable speaking in full sentences on 2L NC, with tachypnea to low 20s, some abdominal breathing. +JVD to mandible, lungs w/ bibasilar crackles to 1/3 up lung LLL with egophony, no LE edema. Left upper arm with small amount of redness from previous IV site, mildly tender, no gross purulence. CXR showing b/l pulmonary edema similar to earlier this morning.    Sepsis 2/2 likely HAP. Given immunocompromised on tacrolimus and prednisone, will dose for Vanc/Zosyn. Also with mild erythema of previous IV site- phlebitis?. Full fever workup sent. New O2 requirement since this am, unclear if 2/2 HAP or congestion.  - f/u RVP  - f/u blood cultures  - lactate negative  - added on bands, 22% this am; repeat CBC with slightly decreased lines appearing diluted- still with leukocytosis to 10.9  - continue Vanc/Zosyn  - Tylenol and ice packs    Pulmonary edema of unclear etiology as pt does not have a hx of CHF. Likely component of diastolic dysfunction 2/2 sepsis 2/2 HAP.   - Bipap to improve congestion  - Pt w/ DAYO uptrending from admission 0.99-> 1.17: ordered for urine lytes  - Will consider additional Lasix pending repeat labs in setting of DAYO

## 2018-02-04 NOTE — PROGRESS NOTE ADULT - PROBLEM SELECTOR PLAN 3
UA positive for granular casts in setting of uptrending Cr with FeUrea 45.8% suggestive of intrinsic renal dz  - continue to hold lasix  - continue to trend BUN/Cr and avoid nephrotoxic medications  - renally dose all medications

## 2018-02-04 NOTE — PROGRESS NOTE ADULT - PROBLEM SELECTOR PLAN 5
Patient with history of polycystic kidney disease, on dialysis from [id number], when he received a kidney transplant placed on the right side at [facility].  He follows as an outpatient with Dr. Rios, and takes Tacrolimus and Prednisone.  - c/w Tacrolimus 2mg in AM and 1mg in evening  - c/w Prednisone 5mg in AM  - Tacro level wnl  - Dr. Rios notified, appreciate reccs  - Baseline SCr ~1.0, currently uptrending Patient takes Metformin, Januvia, and Glimepiride at home for DM2.  Hold home medications  - f/u HA1C  - required 18 units coverage lispro over 24 hrs  - will start 7 units lantus qhs and 2 units lispro TID w/ meals   - will need to adjust insulin depending on required coverage daily

## 2018-02-04 NOTE — PROGRESS NOTE ADULT - PROBLEM SELECTOR PLAN 2
Pt admitted for a.flutter w/ RVR, now s/p a.flutter ablation on 2/2  - c/w Eliquis 2.5mg BID  - c/w Coreg 12.5mg BID for rate control; BP's are low-normal, so will not increase  - d/c Diltiazem after ablation  - Echo with LVH and LVEF 55%, mild aortic root dilation 4.5cm  - EKG today significant for Qtc 495ms. Will avoid QT prolonging medications and replete mag aggressively. Tacrolimus is a QT prolonging medication   - strict I/O, AM UOP not recorded, instructed patient to use urinal going forward

## 2018-02-04 NOTE — PROGRESS NOTE ADULT - SUBJECTIVE AND OBJECTIVE BOX
CHIEF COMPLAINT:  Patient is a 72y old  Male who presents with a chief complaint of Atrial fibrillation with RVR (2018 18:51)      HPI:  JORGE WU is a 72y Male with a Hx of     PAST MEDICAL & SURGICAL HISTORY:  DVT (deep venous thrombosis)  Kidney transplant recipient  Prostate cancer  HTN (hypertension)  HLD (hyperlipidemia)  DM2 (diabetes mellitus, type 2)  Polycystic kidney disease  H/O radical prostatectomy    FAMILY HISTORY:  No pertinent family history in first degree relatives    SOCIAL HISTORY:    REVIEW OF SYSTEMS:  Constitutional: no malaise/fatigue/fevers  HEENT: no headache/sore throat/rhinorrhea  Respiratory: no cough/SOB/chest pain  Cardiac: no palpitations/chest pain  Vascular: no leg swelling  Gastrointestinal: no nausea/vomiting/diarrhea/constipation  Genitourinary: no dysuria/nocturia/polyuria  MSK: no joint or muscle pain  Skin: no rashes  Neuro: no headaches/focal weakness/numbness  Psych: no depression or anxiety    Vital Signs Last 24 Hrs  T(C): 37.1 (2018 04:55), Max: 39.2 (2018 22:47)  T(F): 98.7 (2018 04:55), Max: 102.5 (2018 22:47)  HR: 82 (2018 08:35) (78 - 98)  BP: 84/62 (2018 08:35) (84/62 - 142/84)  BP(mean): 78 (2018 08:35) (78 - 104)  RR: 20 (2018 08:35) (14 - 22)  SpO2: 95% (2018 08:35) (90% - 97%)    Height (cm): 177.8 ( @ 23:49)  Weight (kg): 82.5 ( @ 23:49)  BMI (kg/m2): 26.1 ( @ 23:49)    CAPILLARY BLOOD GLUCOSE      POCT Blood Glucose.: 179 mg/dL (2018 06:27)  POCT Blood Glucose.: 189 mg/dL (2018 21:29)  POCT Blood Glucose.: 230 mg/dL (2018 16:21)  POCT Blood Glucose.: 330 mg/dL (2018 11:07)      PHYSICAL EXAM:  Gen:       Well-appearing, NAD  Skin:       Warm, dry, no rash  HEENT:  MMM, oropharynx clear, nares clear, no septal deviation  Neck:     supple, no JVD  Cardiac: RRR, S1/S2 present, no murmur/gallop/rub  Pulm:     CTABL, no wheezes/crackles  Abd:       S/NT/ND, normoactive BS  Lymph:  no anterior/posterior/supraclavicular nodes appreciated  Vasc:      WWP, 2+ DP and radial pulses, no pedal edema  Ext:         Normal ROM, atraumatic  Neuro:   AAOx3, CN II-XII grossly intact, no focal deficits    MEDICATIONS  (STANDING):  apixaban 5 milliGRAM(s) Oral every 12 hours  atorvastatin 20 milliGRAM(s) Oral at bedtime  carvedilol 12.5 milliGRAM(s) Oral every 12 hours  dextrose 5%. 1000 milliLiter(s) (50 mL/Hr) IV Continuous <Continuous>  dextrose 50% Injectable 12.5 Gram(s) IV Push once  dextrose 50% Injectable 25 Gram(s) IV Push once  dextrose 50% Injectable 25 Gram(s) IV Push once  docusate sodium 100 milliGRAM(s) Oral two times a day  insulin glargine Injectable (LANTUS) 7 Unit(s) SubCutaneous at bedtime  insulin lispro (HumaLOG) corrective regimen sliding scale   SubCutaneous Before meals and at bedtime  insulin lispro Injectable (HumaLOG) 2 Unit(s) SubCutaneous three times a day before meals  piperacillin/tazobactam IVPB. 4.5 Gram(s) IV Intermittent every 6 hours  polyethylene glycol 3350 17 Gram(s) Oral two times a day  predniSONE   Tablet 5 milliGRAM(s) Oral daily  senna 2 Tablet(s) Oral at bedtime  tacrolimus 1 milliGRAM(s) Oral at bedtime  tacrolimus 2 milliGRAM(s) Oral daily  vancomycin  IVPB 1000 milliGRAM(s) IV Intermittent every 12 hours    MEDICATIONS  (PRN):  acetaminophen   Tablet 650 milliGRAM(s) Oral every 6 hours PRN For Temp greater than 38 C (100.4 F)  dextrose Gel 1 Dose(s) Oral once PRN Blood Glucose LESS THAN 70 milliGRAM(s)/deciliter  glucagon  Injectable 1 milliGRAM(s) IntraMuscular once PRN Glucose LESS THAN 70 milligrams/deciliter      Allergies    Naprosyn (Unknown)    Intolerances      LABS:                        11.7   11.1  )-----------( 175      ( 2018 07:37 )             35.7    Mean Cell Volume: 90.4 fL ( @ 07:37)        136  |  99  |  17  ----------------------------<  225<H>  4.4   |  20<L>  |  1.29    Ca    8.7      2018 07:37  Phos  1.8       Mg     2.0           PT/INR - ( 2018 07:58 )   PT: 16.2 sec;   INR: 1.45          PTT - ( 2018 07:58 )  PTT:30.0 sec  Urinalysis Basic - ( 2018 07:12 )    Color: Yellow / Appearance: Clear / S.025 / pH: x  Gluc: x / Ketone: Trace mg/dL  / Bili: Negative / Urobili: 2.0 E.U./dL   Blood: x / Protein: 100 mg/dL / Nitrite: NEGATIVE   Leuk Esterase: NEGATIVE / RBC: < 5 /HPF / WBC < 5 /HPF   Sq Epi: x / Non Sq Epi: 0-5 /HPF / Bacteria: Present /HPF        MICROBIOLOGY:      RADIOLOGY: INTERVAL EVENTS: Pt with new fever to 102.5 rectally. Fever work-up initiated with UA neg, blood cx pending and CXR showing Left perihilar consolidation, started on vanc and zosyn for broad-spec abx coverage in setting of immunocompromised pt    SUBJECTIVE: Pt feels well this morning and denies fever, chills, productive cough, myalgias or malaise. He states that he did feel hot last night and was unable to sleep well because a bipap was placed on him overnight. Had BM this AM (previously no BM since tues). Denies CP, SOB    REVIEW OF SYSTEMS:  Constitutional: no malaise/fatigue/fevers  HEENT: no headache/sore throat/rhinorrhea  Respiratory: no cough/SOB/chest pain  Cardiac: no palpitations/chest pain  Vascular: no leg swelling  Gastrointestinal: no nausea/vomiting/diarrhea/constipation  Genitourinary: no dysuria/nocturia/polyuria  MSK: no joint or muscle pain  Skin: no rashes  Neuro: no headaches/focal weakness/numbness  Psych: no depression or anxiety    Vital Signs Last 24 Hrs  T(C): 37.1 (2018 04:55), Max: 39.2 (2018 22:47)  T(F): 98.7 (2018 04:55), Max: 102.5 (2018 22:47)  HR: 82 (2018 08:35) (78 - 98)  BP: 84/62 (2018 08:35) (84/62 - 142/84)  BP(mean): 78 (2018 08:35) (78 - 104)  RR: 20 (2018 08:35) (14 - 22)  SpO2: 95% (2018 08:35) (90% - 97%)    Height (cm): 177.8 ( @ 23:49)  Weight (kg): 82.5 ( @ 23:49)  BMI (kg/m2): 26.1 ( @ 23:49)    CAPILLARY BLOOD GLUCOSE      POCT Blood Glucose.: 179 mg/dL (2018 06:27)  POCT Blood Glucose.: 189 mg/dL (2018 21:29)  POCT Blood Glucose.: 230 mg/dL (2018 16:21)  POCT Blood Glucose.: 330 mg/dL (2018 11:07)      PHYSICAL EXAM:  Gen:       Well-appearing, NAD  Skin:       Warm, dry, no rash  HEENT:  MMM, oropharynx clear  Neck:     supple, no JVD  Cardiac:  RRR, S1/S2 present, no murmur/gallop/rub  Pulm:     CTABL, no wheezes/crackles  Abd:       S/NT/ND, normoactive BS  Lymph:  no anterior/posterior/supraclavicular nodes appreciated  Vasc:      WWP, 2+ DP and radial pulses, no pedal edema  Ext:         Normal ROM, atraumatic  Neuro:   AAOx3, CN II-XII grossly intact, no focal deficits    MEDICATIONS  (STANDING):  apixaban 5 milliGRAM(s) Oral every 12 hours  atorvastatin 20 milliGRAM(s) Oral at bedtime  carvedilol 12.5 milliGRAM(s) Oral every 12 hours  dextrose 5%. 1000 milliLiter(s) (50 mL/Hr) IV Continuous <Continuous>  dextrose 50% Injectable 12.5 Gram(s) IV Push once  dextrose 50% Injectable 25 Gram(s) IV Push once  dextrose 50% Injectable 25 Gram(s) IV Push once  docusate sodium 100 milliGRAM(s) Oral two times a day  insulin glargine Injectable (LANTUS) 7 Unit(s) SubCutaneous at bedtime  insulin lispro (HumaLOG) corrective regimen sliding scale   SubCutaneous Before meals and at bedtime  insulin lispro Injectable (HumaLOG) 2 Unit(s) SubCutaneous three times a day before meals  piperacillin/tazobactam IVPB. 4.5 Gram(s) IV Intermittent every 6 hours  polyethylene glycol 3350 17 Gram(s) Oral two times a day  predniSONE   Tablet 5 milliGRAM(s) Oral daily  senna 2 Tablet(s) Oral at bedtime  tacrolimus 1 milliGRAM(s) Oral at bedtime  tacrolimus 2 milliGRAM(s) Oral daily  vancomycin  IVPB 1000 milliGRAM(s) IV Intermittent every 12 hours    MEDICATIONS  (PRN):  acetaminophen   Tablet 650 milliGRAM(s) Oral every 6 hours PRN For Temp greater than 38 C (100.4 F)  dextrose Gel 1 Dose(s) Oral once PRN Blood Glucose LESS THAN 70 milliGRAM(s)/deciliter  glucagon  Injectable 1 milliGRAM(s) IntraMuscular once PRN Glucose LESS THAN 70 milligrams/deciliter      Allergies    Naprosyn (Unknown)    Intolerances      LABS:                        11.7   11.1  )-----------( 175      ( 2018 07:37 )             35.7    Mean Cell Volume: 90.4 fL ( @ 07:37)    -    136  |  99  |  17  ----------------------------<  225<H>  4.4   |  20<L>  |  1.29    Ca    8.7      2018 07:37  Phos  1.8       Mg     2.0     -      PT/INR - ( 2018 07:58 )   PT: 16.2 sec;   INR: 1.45          PTT - ( 2018 07:58 )  PTT:30.0 sec  Urinalysis Basic - ( 2018 07:12 )    Color: Yellow / Appearance: Clear / S.025 / pH: x  Gluc: x / Ketone: Trace mg/dL  / Bili: Negative / Urobili: 2.0 E.U./dL   Blood: x / Protein: 100 mg/dL / Nitrite: NEGATIVE   Leuk Esterase: NEGATIVE / RBC: < 5 /HPF / WBC < 5 /HPF   Sq Epi: x / Non Sq Epi: 0-5 /HPF / Bacteria: Present /HPF        MICROBIOLOGY:      RADIOLOGY:

## 2018-02-04 NOTE — PROGRESS NOTE ADULT - PROBLEM SELECTOR PLAN 6
BP's normotensive  - c/w Carvedilol 12.5mg BID for rate control Patient with history of polycystic kidney disease, on dialysis from [id number], when he received a kidney transplant placed on the right side at [facility].  He follows as an outpatient with Dr. Rios, and takes Tacrolimus and Prednisone.  - c/w Tacrolimus 2mg in AM and 1mg in evening  - c/w Prednisone 5mg in AM  - Tacro level wnl  - Dr. Rios notified, appreciate reccs  - Baseline SCr ~1.0, currently uptrending ECHO showing EF 55-60% with mild concentric LVH  - c/w coreg 12.5mg BID  - not ACEI at this time as pt with ATN   - holding additional lasix today given ATN

## 2018-02-04 NOTE — PROGRESS NOTE ADULT - PROBLEM SELECTOR PLAN 9
F: PO  E: Replete PRN  N: DASh/TLC H/O DVT in 2006 in the setting of a pelvic fracture when he was immobilized for several weeks. He was on Warfarin for a period of time after his DVT and has never had clotting issues since.    - c/w Eliquis 2.5mg BID for a flutter

## 2018-02-04 NOTE — PROGRESS NOTE ADULT - PROBLEM SELECTOR PLAN 7
c/w home Atorvastatin 20mg BP's normotensive  - c/w Carvedilol 12.5mg BID for rate control Patient with history of polycystic kidney disease, on dialysis from [id number], when he received a kidney transplant placed on the right side at [facility].  He follows as an outpatient with Dr. Rios, and takes Tacrolimus and Prednisone.  - c/w Tacrolimus 2mg in AM and 1mg in evening  - c/w Prednisone 5mg in AM  - Tacro level wnl  - Dr. Rios notified, appreciate reccs  - Baseline SCr ~1.0, currently uptrending

## 2018-02-04 NOTE — PROGRESS NOTE ADULT - PROBLEM SELECTOR PLAN 10
DVT PPx: Eliquis 2.5mg BID    Dispo: 5 [facility]   FULL CODE F: PO  E: Replete PRN  N: DASh/TLC    # Need for prophylactic measure  -DVT PPx: Eliquis 2.5mg BID  -Dispo: 5 [facility]   - FULL code

## 2018-02-04 NOTE — PROGRESS NOTE ADULT - PROBLEM SELECTOR PLAN 4
Patient takes Metformin, Januvia, and Glimepiride at home for DM2.  Hold home medications  - f/u HA1C  - required 18 units coverage lispro over 24 hrs  - will start 7 units lantus qhs and 2 units lispro TID w/ meals   - will need to adjust insulin depending on required coverage daily H/H this morning downtrending to 11.7/35.7 from 13/39.8 yesterday  - no s/sx of bleeding and pressures stable with no lightheadedness, SOB or dizziness  - had BM this morning that was brown  - sent for FOBT  - will check iron studies, folate, b12  - pt is a J[employer]'s Witness   - will trend CBC

## 2018-02-04 NOTE — PROGRESS NOTE ADULT - ASSESSMENT
72 M w/PMHx of polycystic kidney disease s/p kidney transplant (2007 at [facility], on Tacro/Prednisone), HTN, HLD, DM2, Prostate cancer s/p radical prostatectomy 2015, DVT 2006 (in setting of pelvic fracture), and osteoporosis presented for new-onset a.flutter, now s/p a.flutter ablation. 72 M w/PMHx of polycystic kidney disease s/p kidney transplant (2007 at [facility], on Tacro/Prednisone), HTN, HLD, DM2, Prostate cancer s/p radical prostatectomy 2015, DVT 2006 (in setting of pelvic fracture), and osteoporosis presented for new-onset a.flutter, now s/p a.flutter ablation, found to have sepsis 2/2 left perihilar PNA.

## 2018-02-04 NOTE — PROGRESS NOTE ADULT - PROBLEM SELECTOR PLAN 8
H/O DVT in 2006 in the setting of a pelvic fracture when he was immobilized for several weeks. He was on Warfarin for a period of time after his DVT and has never had clotting issues since.    - c/w Eliquis 2.5mg BID for a flutter BP's normotensive  - c/w Carvedilol 12.5mg BID for rate control

## 2018-02-05 LAB
ANION GAP SERPL CALC-SCNC: 12 MMOL/L — SIGNIFICANT CHANGE UP (ref 5–17)
BASOPHILS NFR BLD AUTO: 0.2 % — SIGNIFICANT CHANGE UP (ref 0–2)
BLD GP AB SCN SERPL QL: NEGATIVE — SIGNIFICANT CHANGE UP
BUN SERPL-MCNC: 23 MG/DL — SIGNIFICANT CHANGE UP (ref 7–23)
CALCIUM SERPL-MCNC: 8.2 MG/DL — LOW (ref 8.4–10.5)
CHLORIDE SERPL-SCNC: 103 MMOL/L — SIGNIFICANT CHANGE UP (ref 96–108)
CO2 SERPL-SCNC: 21 MMOL/L — LOW (ref 22–31)
CORTIS AM PEAK SERPL-MCNC: 11.4 UG/DL — SIGNIFICANT CHANGE UP (ref 3.9–37.5)
CREAT ?TM UR-MCNC: 90 MG/DL — SIGNIFICANT CHANGE UP
CREAT SERPL-MCNC: 1.67 MG/DL — HIGH (ref 0.5–1.3)
EOSINOPHIL NFR BLD AUTO: 1.8 % — SIGNIFICANT CHANGE UP (ref 0–6)
FERRITIN SERPL-MCNC: 290.8 NG/ML — SIGNIFICANT CHANGE UP (ref 30–400)
FOLATE SERPL-MCNC: >20 NG/ML — SIGNIFICANT CHANGE UP (ref 4.8–24.2)
GLUCOSE BLDC GLUCOMTR-MCNC: 136 MG/DL — HIGH (ref 70–99)
GLUCOSE BLDC GLUCOMTR-MCNC: 141 MG/DL — HIGH (ref 70–99)
GLUCOSE BLDC GLUCOMTR-MCNC: 232 MG/DL — HIGH (ref 70–99)
GLUCOSE BLDC GLUCOMTR-MCNC: 267 MG/DL — HIGH (ref 70–99)
GLUCOSE SERPL-MCNC: 159 MG/DL — HIGH (ref 70–99)
HCT VFR BLD CALC: 32.2 % — LOW (ref 39–50)
HGB BLD-MCNC: 10.6 G/DL — LOW (ref 13–17)
INR BLD: 1.51 — HIGH (ref 0.88–1.16)
IRON SATN MFR SERPL: 18 % — SIGNIFICANT CHANGE UP (ref 16–55)
IRON SATN MFR SERPL: 31 UG/DL — LOW (ref 45–165)
LYMPHOCYTES # BLD AUTO: 13.2 % — SIGNIFICANT CHANGE UP (ref 13–44)
MAGNESIUM SERPL-MCNC: 2.2 MG/DL — SIGNIFICANT CHANGE UP (ref 1.6–2.6)
MCHC RBC-ENTMCNC: 29.8 PG — SIGNIFICANT CHANGE UP (ref 27–34)
MCHC RBC-ENTMCNC: 32.9 G/DL — SIGNIFICANT CHANGE UP (ref 32–36)
MCV RBC AUTO: 90.4 FL — SIGNIFICANT CHANGE UP (ref 80–100)
MONOCYTES NFR BLD AUTO: 7.1 % — SIGNIFICANT CHANGE UP (ref 2–14)
NEUTROPHILS NFR BLD AUTO: 77.7 % — HIGH (ref 43–77)
OSMOLALITY UR: 472 MOSMOL/KG — SIGNIFICANT CHANGE UP (ref 100–650)
PLATELET # BLD AUTO: 160 K/UL — SIGNIFICANT CHANGE UP (ref 150–400)
POTASSIUM SERPL-MCNC: 4 MMOL/L — SIGNIFICANT CHANGE UP (ref 3.5–5.3)
POTASSIUM SERPL-SCNC: 4 MMOL/L — SIGNIFICANT CHANGE UP (ref 3.5–5.3)
PROTHROM AB SERPL-ACNC: 16.9 SEC — HIGH (ref 9.8–12.7)
RBC # BLD: 3.56 M/UL — LOW (ref 4.2–5.8)
RBC # FLD: 15.5 % — SIGNIFICANT CHANGE UP (ref 10.3–16.9)
RH IG SCN BLD-IMP: POSITIVE — SIGNIFICANT CHANGE UP
SODIUM SERPL-SCNC: 136 MMOL/L — SIGNIFICANT CHANGE UP (ref 135–145)
SODIUM UR-SCNC: 53 MMOL/L — SIGNIFICANT CHANGE UP
TIBC SERPL-MCNC: 174 UG/DL — LOW (ref 220–430)
TRANSFERRIN SERPL-MCNC: 136 MG/DL — LOW (ref 200–360)
UIBC SERPL-MCNC: 143 UG/DL — SIGNIFICANT CHANGE UP (ref 110–370)
VIT B12 SERPL-MCNC: 506 PG/ML — SIGNIFICANT CHANGE UP (ref 232–1245)
WBC # BLD: 8.3 K/UL — SIGNIFICANT CHANGE UP (ref 3.8–10.5)
WBC # FLD AUTO: 8.3 K/UL — SIGNIFICANT CHANGE UP (ref 3.8–10.5)

## 2018-02-05 PROCEDURE — 71045 X-RAY EXAM CHEST 1 VIEW: CPT | Mod: 26

## 2018-02-05 PROCEDURE — 93010 ELECTROCARDIOGRAM REPORT: CPT

## 2018-02-05 RX ORDER — INSULIN GLARGINE 100 [IU]/ML
14 INJECTION, SOLUTION SUBCUTANEOUS AT BEDTIME
Qty: 0 | Refills: 0 | Status: DISCONTINUED | OUTPATIENT
Start: 2018-02-05 | End: 2018-02-06

## 2018-02-05 RX ORDER — ACETAMINOPHEN 500 MG
650 TABLET ORAL EVERY 6 HOURS
Qty: 0 | Refills: 0 | Status: DISCONTINUED | OUTPATIENT
Start: 2018-02-05 | End: 2018-02-06

## 2018-02-05 RX ORDER — INSULIN LISPRO 100/ML
3 VIAL (ML) SUBCUTANEOUS
Qty: 0 | Refills: 0 | Status: DISCONTINUED | OUTPATIENT
Start: 2018-02-05 | End: 2018-02-06

## 2018-02-05 RX ORDER — PIPERACILLIN AND TAZOBACTAM 4; .5 G/20ML; G/20ML
2.25 INJECTION, POWDER, LYOPHILIZED, FOR SOLUTION INTRAVENOUS EVERY 6 HOURS
Qty: 0 | Refills: 0 | Status: DISCONTINUED | OUTPATIENT
Start: 2018-02-05 | End: 2018-02-06

## 2018-02-05 RX ADMIN — SENNA PLUS 2 TABLET(S): 8.6 TABLET ORAL at 21:20

## 2018-02-05 RX ADMIN — APIXABAN 5 MILLIGRAM(S): 2.5 TABLET, FILM COATED ORAL at 21:20

## 2018-02-05 RX ADMIN — Medication 2 UNIT(S): at 17:01

## 2018-02-05 RX ADMIN — Medication 2 UNIT(S): at 06:44

## 2018-02-05 RX ADMIN — PIPERACILLIN AND TAZOBACTAM 200 GRAM(S): 4; .5 INJECTION, POWDER, LYOPHILIZED, FOR SOLUTION INTRAVENOUS at 18:00

## 2018-02-05 RX ADMIN — PIPERACILLIN AND TAZOBACTAM 200 GRAM(S): 4; .5 INJECTION, POWDER, LYOPHILIZED, FOR SOLUTION INTRAVENOUS at 12:00

## 2018-02-05 RX ADMIN — Medication 650 MILLIGRAM(S): at 14:30

## 2018-02-05 RX ADMIN — ATORVASTATIN CALCIUM 20 MILLIGRAM(S): 80 TABLET, FILM COATED ORAL at 21:20

## 2018-02-05 RX ADMIN — INSULIN GLARGINE 14 UNIT(S): 100 INJECTION, SOLUTION SUBCUTANEOUS at 22:16

## 2018-02-05 RX ADMIN — PIPERACILLIN AND TAZOBACTAM 200 GRAM(S): 4; .5 INJECTION, POWDER, LYOPHILIZED, FOR SOLUTION INTRAVENOUS at 06:08

## 2018-02-05 RX ADMIN — Medication 6: at 11:36

## 2018-02-05 RX ADMIN — CARVEDILOL PHOSPHATE 6.25 MILLIGRAM(S): 80 CAPSULE, EXTENDED RELEASE ORAL at 17:17

## 2018-02-05 RX ADMIN — CARVEDILOL PHOSPHATE 6.25 MILLIGRAM(S): 80 CAPSULE, EXTENDED RELEASE ORAL at 06:07

## 2018-02-05 RX ADMIN — TACROLIMUS 1 MILLIGRAM(S): 5 CAPSULE ORAL at 21:20

## 2018-02-05 RX ADMIN — Medication 650 MILLIGRAM(S): at 13:35

## 2018-02-05 RX ADMIN — Medication 4: at 17:01

## 2018-02-05 RX ADMIN — Medication 5 MILLIGRAM(S): at 06:07

## 2018-02-05 RX ADMIN — Medication 250 MILLIGRAM(S): at 06:07

## 2018-02-05 RX ADMIN — TACROLIMUS 2 MILLIGRAM(S): 5 CAPSULE ORAL at 11:24

## 2018-02-05 RX ADMIN — Medication 2 UNIT(S): at 11:36

## 2018-02-05 RX ADMIN — APIXABAN 5 MILLIGRAM(S): 2.5 TABLET, FILM COATED ORAL at 08:36

## 2018-02-05 NOTE — DIETITIAN INITIAL EVALUATION ADULT. - PROBLEM SELECTOR PLAN 2
Patient with history of polycystic kidney disease, on dialysis from 5425-5151, when he received a kidney transplant placed on the right side at [facility].  He follows as an outpatient with Dr. Rios, and takes Tacrolimus and Prednisone.  - c/w Tacrolimus 2mg in AM and 1mg in evening  - c/w Prednisone 5mg in AM  - f/u Tacro level  - Dr. Rios notified, appreciate reccs  - Baseline SCr ~1.0

## 2018-02-05 NOTE — PROGRESS NOTE ADULT - PROBLEM SELECTOR PLAN 7
Patient with history of polycystic kidney disease, on dialysis from [id number], when [facility]  He follows as an outpatient with Dr. Rios, and takes Tacrolimus and Prednisone.  - c/w Tacrolimus 2mg in AM and 1mg in evening  - c/w Prednisone 5mg in AM  - Tacro level wnl  - Dr. Rios notified, appreciate reccs  - Baseline SCr ~1.0, currently uptrending

## 2018-02-05 NOTE — PROGRESS NOTE ADULT - ASSESSMENT
72 M w/PMHx of polycystic kidney disease s/p kidney transplant (2007 at [facility], on Tacro/Prednisone), HTN, HLD, DM2, Prostate cancer s/p radical prostatectomy 2015, DVT 2006 (in setting of pelvic fracture), and osteoporosis presented for new-onset a.flutter, now s/p a.flutter ablation, found to have sepsis 2/2 left perihilar PNA.

## 2018-02-05 NOTE — DIETITIAN INITIAL EVALUATION ADULT. - PROBLEM SELECTOR PLAN 1
as measured incidentally at outpatient infusion center where he was being treated for osteoporosis.  The patient is asymptomatic at this time, with no CP, palpitations, or SOB.  He recalls intermittent palpitations occurring over the years that he had ascribed to stress or anxiety of normal life. He has had an EKG in the past but does not recall the results. Also had an echocardiogram several years ago when being evaluated for thoracic aortic aneurysm, which demonstrated what based on his description sounds like LVH.  Nuclear stress test performed in 2014 normal as per Dr. Rios  - s/p Cardizem 10mg IV x2 and Cardizem 30mg PO in ED with improvement in his HR to 90s-100s  - s/p Heparin bolus 6500U and placed on Heparin gtt at 15cc/hr with baseline PTT 29.3  - continue on heparin gtt with plans to transfer to [facility]  - continue with home Coreg 12.5mg BID for rate control  - can add Diltiazem 30mg q6h PRN for rate control overnight  - EP consult to evaluate for ablation or cardioversion either this admission or as an outpatient  - Echo in AM

## 2018-02-05 NOTE — PROGRESS NOTE ADULT - PROBLEM SELECTOR PLAN 4
H/H this morning downtrending to 11.7/35.7 from 13/39.8 yesterday  - no s/sx of bleeding and pressures stable with no lightheadedness, SOB or dizziness  - had BM this morning that was brown  - sent for FOBT  - will check iron studies, folate, b12  - pt is a [employer]'s Witness   - will trend CBC H/H this morning downtrending to 11.7/35.7 from 13/39.8 yesterday  - no s/sx of bleeding and pressures stable with no lightheadedness, SOB or dizziness  - had BM yesterday that was brown  - FOBT negative  - iron studies not consistent with iron deficiency, B12 and folate pending  - pt is a Jevohavh's Witness   - will trend CBC

## 2018-02-05 NOTE — PROGRESS NOTE ADULT - ASSESSMENT
S/P A FIB/ Flutter  s.p Ablation to NSR  Pneumonia   Mild CHF   prn diuresis     cont antibiotics   Fluid balance     ATN   Renal follow up       KOBI Thompson MD

## 2018-02-05 NOTE — PROGRESS NOTE ADULT - PROBLEM SELECTOR PLAN 2
Pt admitted for a.flutter w/ RVR, now s/p a.flutter ablation on 2/2  - c/w Eliquis 2.5mg BID  - c/w Coreg 12.5mg BID for rate control; BP's are low-normal, so will not increase  - d/c Diltiazem after ablation  - Echo with LVH and LVEF 55%, mild aortic root dilation 4.5cm  - EKG today significant for Qtc 495ms. Will avoid QT prolonging medications and replete mag aggressively. Tacrolimus is a QT prolonging medication   - strict I/O, AM UOP not recorded, instructed patient to use urinal going forward Pt admitted for a.flutter w/ RVR, now s/p a.flutter ablation on 2/2  - c/w Eliquis 2.5mg BID  - c/w Coreg 6.25mg BID for rate control; dose decreased yesterday for low BPs  - d/c Diltiazem after ablation  - Echo with LVH and LVEF 55%, mild aortic root dilation 4.5cm  - EKG post ablation significant for Qtc 495ms. Will avoid QT prolonging medications and replete mag aggressively. Tacrolimus is a QT prolonging medication   - serial EKGs  - strict I/O, AM UOP not recorded, instructed patient to use urinal going forward

## 2018-02-05 NOTE — PROGRESS NOTE ADULT - PROBLEM SELECTOR PLAN 9
H/O DVT in 2006 in the setting of a pelvic fracture when he was immobilized for several weeks. He was on Warfarin for a period of time after his DVT and has never had clotting issues since.    - c/w Eliquis 2.5mg BID for a flutter

## 2018-02-05 NOTE — CONSULT NOTE ADULT - ASSESSMENT
Full note to follow    Cause of fever appears to be pneumonia  Possibly due to inadvertent aspiration  patient immunocompromised due to immunonosuppressants for renal transplant  DAYO    RECOMMEND  D/C Vancomycin  Check Vancomycin level tomorrow AM  Decrease Pip-Tazo dose to 2.25 gm IV q 6 hours Cause of fever appears to be pneumonia  At risk for aspiration during KIMMIE and/or ablation  Patient immunocompromised due to immunonosuppressive therapy for renal transplant  DAYO    RECOMMEND  D/C Vancomycin  Check Vancomycin level tomorrow AM  Decrease Pip-Tazo dose to 2.25 gm IV q 6 hours, as indicated earlier

## 2018-02-05 NOTE — DIETITIAN INITIAL EVALUATION ADULT. - PROBLEM SELECTOR PLAN 3
Currently normotensive, and patient reports taking all of his home medications today (Lisinopril 10mg, Amlodipine 10mg, Carvedilol 12.5mg BID).    - start home Carvedilol 12.5mg BID for rate control  - hold other home BP meds at this time in case Diltiazem is required overnight for additional rate control

## 2018-02-05 NOTE — PROGRESS NOTE ADULT - PROBLEM SELECTOR PLAN 5
Patient takes Metformin, Januvia, and Glimepiride at home for DM2.  Hold home medications  - f/u HA1C  - required 18 units coverage lispro over 24 hrs  - will start 7 units lantus qhs and 2 units lispro TID w/ meals   - will need to adjust insulin depending on required coverage daily

## 2018-02-05 NOTE — PROGRESS NOTE ADULT - PROBLEM SELECTOR PLAN 6
ECHO showing EF 55-60% with mild concentric LVH  - c/w coreg 12.5mg BID  - not ACEI at this time as pt with ATN   - holding additional lasix today given ATN ECHO showing EF 55-60% with mild concentric LVH  - c/w coreg 6.25mg BID  - not ACEI at this time as pt with ATN   - holding additional lasix today given ATN

## 2018-02-05 NOTE — PROGRESS NOTE ADULT - SUBJECTIVE AND OBJECTIVE BOX
INTERVAL HPI/OVERNIGHT EVENTS:    SUBJECTIVE: Patient seen and examined at bedside.    OBJECTIVE:    VITAL SIGNS:  ICU Vital Signs Last 24 Hrs  T(C): 36.9 (2018 08:57), Max: 37.2 (2018 18:02)  T(F): 98.5 (2018 08:57), Max: 98.9 (2018 18:02)  HR: 74 (2018 08:23) (68 - 84)  BP: 111/73 (2018 08:23) (93/64 - 114/75)  BP(mean): 87 (2018 08:23) (72 - 100)  ABP: --  ABP(mean): --  RR: 16 (2018 08:23) (14 - 18)  SpO2: 93% (2018 08:23) (93% - 97%)         @ 07:01  -  -05 @ 07:00  --------------------------------------------------------  IN: 1300 mL / OUT: 600 mL / NET: 700 mL     @ 07:01  -  02-05 @ 10:30  --------------------------------------------------------  IN: 180 mL / OUT: 400 mL / NET: -220 mL      CAPILLARY BLOOD GLUCOSE      POCT Blood Glucose.: 136 mg/dL (2018 06:19)      PHYSICAL EXAM:    General: NAD  HEENT: NC/AT; PERRL, clear conjunctiva  Neck: supple  Respiratory: CTA b/l  Cardiovascular: +S1/S2; RRR  Abdomen: soft, NT/ND; +BS x4  Extremities: WWP, 2+ peripheral pulses b/l; no LE edema  Skin: normal color and turgor; no rash  Neurological:     MEDICATIONS:  MEDICATIONS  (STANDING):  apixaban 5 milliGRAM(s) Oral every 12 hours  atorvastatin 20 milliGRAM(s) Oral at bedtime  carvedilol 6.25 milliGRAM(s) Oral every 12 hours  dextrose 5%. 1000 milliLiter(s) (50 mL/Hr) IV Continuous <Continuous>  dextrose 50% Injectable 12.5 Gram(s) IV Push once  dextrose 50% Injectable 25 Gram(s) IV Push once  dextrose 50% Injectable 25 Gram(s) IV Push once  docusate sodium 100 milliGRAM(s) Oral two times a day  insulin glargine Injectable (LANTUS) 7 Unit(s) SubCutaneous at bedtime  insulin lispro (HumaLOG) corrective regimen sliding scale   SubCutaneous Before meals and at bedtime  insulin lispro Injectable (HumaLOG) 2 Unit(s) SubCutaneous three times a day before meals  piperacillin/tazobactam IVPB. 4.5 Gram(s) IV Intermittent every 6 hours  polyethylene glycol 3350 17 Gram(s) Oral two times a day  predniSONE   Tablet 5 milliGRAM(s) Oral daily  senna 2 Tablet(s) Oral at bedtime  tacrolimus 1 milliGRAM(s) Oral at bedtime  tacrolimus 2 milliGRAM(s) Oral daily  vancomycin  IVPB 1000 milliGRAM(s) IV Intermittent every 12 hours    MEDICATIONS  (PRN):  acetaminophen   Tablet 650 milliGRAM(s) Oral every 6 hours PRN For Temp greater than 38 C (100.4 F)  dextrose Gel 1 Dose(s) Oral once PRN Blood Glucose LESS THAN 70 milliGRAM(s)/deciliter  glucagon  Injectable 1 milliGRAM(s) IntraMuscular once PRN Glucose LESS THAN 70 milligrams/deciliter      ALLERGIES:  Allergies    Naprosyn (Unknown)    Intolerances        LABS:                        10.6   8.3   )-----------( 160      ( 2018 06:14 )             32.2     02-05    136  |  103  |  23  ----------------------------<  159<H>  4.0   |  21<L>  |  1.67<H>    Ca    8.2<L>      2018 06:14  Phos  1.8     02-04  Mg     2.2     02-05      PT/INR - ( 2018 06:14 )   PT: 16.9 sec;   INR: 1.51            Urinalysis Basic - ( 2018 07:12 )    Color: Yellow / Appearance: Clear / S.025 / pH: x  Gluc: x / Ketone: Trace mg/dL  / Bili: Negative / Urobili: 2.0 E.U./dL   Blood: x / Protein: 100 mg/dL / Nitrite: NEGATIVE   Leuk Esterase: NEGATIVE / RBC: < 5 /HPF / WBC < 5 /HPF   Sq Epi: x / Non Sq Epi: 0-5 /HPF / Bacteria: Present /HPF        RADIOLOGY & ADDITIONAL TESTS: Reviewed. INTERVAL HPI/OVERNIGHT EVENTS: BARI    SUBJECTIVE: Patient seen and examined at bedside.    OBJECTIVE:    VITAL SIGNS:  ICU Vital Signs Last 24 Hrs  T(C): 36.9 (2018 08:57), Max: 37.2 (2018 18:02)  T(F): 98.5 (2018 08:57), Max: 98.9 (2018 18:02)  HR: 74 (2018 08:23) (68 - 84)  BP: 111/73 (2018 08:23) (93/64 - 114/75)  BP(mean): 87 (2018 08:23) (72 - 100)  ABP: --  ABP(mean): --  RR: 16 (2018 08:23) (14 - 18)  SpO2: 93% (2018 08:23) (93% - 97%)         @ 07:01  -  05 @ 07:00  --------------------------------------------------------  IN: 1300 mL / OUT: 600 mL / NET: 700 mL     @ 07:01  -  -05 @ 10:30  --------------------------------------------------------  IN: 180 mL / OUT: 400 mL / NET: -220 mL      CAPILLARY BLOOD GLUCOSE      POCT Blood Glucose.: 136 mg/dL (2018 06:19)      PHYSICAL EXAM:    General: NAD  HEENT: NC/AT; PERRL, clear conjunctiva  Neck: supple  Respiratory: CTA b/l  Cardiovascular: +S1/S2; RRR  Abdomen: soft, NT/ND; +BS x4  Extremities: WWP, 2+ peripheral pulses b/l; no LE edema  Skin: normal color and turgor; no rash  Neurological:     MEDICATIONS:  MEDICATIONS  (STANDING):  apixaban 5 milliGRAM(s) Oral every 12 hours  atorvastatin 20 milliGRAM(s) Oral at bedtime  carvedilol 6.25 milliGRAM(s) Oral every 12 hours  dextrose 5%. 1000 milliLiter(s) (50 mL/Hr) IV Continuous <Continuous>  dextrose 50% Injectable 12.5 Gram(s) IV Push once  dextrose 50% Injectable 25 Gram(s) IV Push once  dextrose 50% Injectable 25 Gram(s) IV Push once  docusate sodium 100 milliGRAM(s) Oral two times a day  insulin glargine Injectable (LANTUS) 7 Unit(s) SubCutaneous at bedtime  insulin lispro (HumaLOG) corrective regimen sliding scale   SubCutaneous Before meals and at bedtime  insulin lispro Injectable (HumaLOG) 2 Unit(s) SubCutaneous three times a day before meals  piperacillin/tazobactam IVPB. 4.5 Gram(s) IV Intermittent every 6 hours  polyethylene glycol 3350 17 Gram(s) Oral two times a day  predniSONE   Tablet 5 milliGRAM(s) Oral daily  senna 2 Tablet(s) Oral at bedtime  tacrolimus 1 milliGRAM(s) Oral at bedtime  tacrolimus 2 milliGRAM(s) Oral daily  vancomycin  IVPB 1000 milliGRAM(s) IV Intermittent every 12 hours    MEDICATIONS  (PRN):  acetaminophen   Tablet 650 milliGRAM(s) Oral every 6 hours PRN For Temp greater than 38 C (100.4 F)  dextrose Gel 1 Dose(s) Oral once PRN Blood Glucose LESS THAN 70 milliGRAM(s)/deciliter  glucagon  Injectable 1 milliGRAM(s) IntraMuscular once PRN Glucose LESS THAN 70 milligrams/deciliter      ALLERGIES:  Allergies    Naprosyn (Unknown)    Intolerances        LABS:                        10.6   8.3   )-----------( 160      ( 2018 06:14 )             32.2     02-05    136  |  103  |  23  ----------------------------<  159<H>  4.0   |  21<L>  |  1.67<H>    Ca    8.2<L>      2018 06:14  Phos  1.8     02-04  Mg     2.2     02-05      PT/INR - ( 2018 06:14 )   PT: 16.9 sec;   INR: 1.51            Urinalysis Basic - ( 2018 07:12 )    Color: Yellow / Appearance: Clear / S.025 / pH: x  Gluc: x / Ketone: Trace mg/dL  / Bili: Negative / Urobili: 2.0 E.U./dL   Blood: x / Protein: 100 mg/dL / Nitrite: NEGATIVE   Leuk Esterase: NEGATIVE / RBC: < 5 /HPF / WBC < 5 /HPF   Sq Epi: x / Non Sq Epi: 0-5 /HPF / Bacteria: Present /HPF        RADIOLOGY & ADDITIONAL TESTS: Reviewed. INTERVAL HPI/OVERNIGHT EVENTS: BARI    SUBJECTIVE: Patient seen and examined at bedside. He feels well this AM, with no CP, SOB, palpitations, fever, chills, or cough.      OBJECTIVE:    VITAL SIGNS:  ICU Vital Signs Last 24 Hrs  T(C): 36.9 (2018 08:57), Max: 37.2 (2018 18:02)  T(F): 98.5 (2018 08:57), Max: 98.9 (2018 18:02)  HR: 74 (2018 08:23) (68 - 84)  BP: 111/73 (2018 08:23) (93/64 - 114/75)  BP(mean): 87 (2018 08:23) (72 - 100)  ABP: --  ABP(mean): --  RR: 16 (2018 08:23) (14 - 18)  SpO2: 93% (2018 08:23) (93% - 97%)         @ 07: @ 07:00  --------------------------------------------------------  IN: 1300 mL / OUT: 600 mL / NET: 700 mL     @ 07:05 @ 10:30  --------------------------------------------------------  IN: 180 mL / OUT: 400 mL / NET: -220 mL      CAPILLARY BLOOD GLUCOSE      POCT Blood Glucose.: 136 mg/dL (2018 06:19)      PHYSICAL EXAM:    General: WDWN male lying in bed in NAD, pleasant and interactive with NC on  HEENT: AT/NC, PEERLA, MMM, no conjunctival pallor, anicteric sclera  Neck; Supple without JVD/LAD  Cardiac: S1/S2, RRR, no M/R/G  Respiratory: Crackles at left lung base, good air movement bilaterally, no wheezes or rales  Abdomen: Soft, NT/ND, +BS  Extremities: WWP, no edema, clubbing or cyanosis. Old fistula present left forearm  Vascular: 2+ radial and DP/PT pulses  Neuro: AOx3, moves all extremities and follows commands, ambulatory without difficulties    MEDICATIONS:  MEDICATIONS  (STANDING):  apixaban 5 milliGRAM(s) Oral every 12 hours  atorvastatin 20 milliGRAM(s) Oral at bedtime  carvedilol 6.25 milliGRAM(s) Oral every 12 hours  dextrose 5%. 1000 milliLiter(s) (50 mL/Hr) IV Continuous <Continuous>  dextrose 50% Injectable 12.5 Gram(s) IV Push once  dextrose 50% Injectable 25 Gram(s) IV Push once  dextrose 50% Injectable 25 Gram(s) IV Push once  docusate sodium 100 milliGRAM(s) Oral two times a day  insulin glargine Injectable (LANTUS) 7 Unit(s) SubCutaneous at bedtime  insulin lispro (HumaLOG) corrective regimen sliding scale   SubCutaneous Before meals and at bedtime  insulin lispro Injectable (HumaLOG) 2 Unit(s) SubCutaneous three times a day before meals  piperacillin/tazobactam IVPB. 4.5 Gram(s) IV Intermittent every 6 hours  polyethylene glycol 3350 17 Gram(s) Oral two times a day  predniSONE   Tablet 5 milliGRAM(s) Oral daily  senna 2 Tablet(s) Oral at bedtime  tacrolimus 1 milliGRAM(s) Oral at bedtime  tacrolimus 2 milliGRAM(s) Oral daily  vancomycin  IVPB 1000 milliGRAM(s) IV Intermittent every 12 hours    MEDICATIONS  (PRN):  acetaminophen   Tablet 650 milliGRAM(s) Oral every 6 hours PRN For Temp greater than 38 C (100.4 F)  dextrose Gel 1 Dose(s) Oral once PRN Blood Glucose LESS THAN 70 milliGRAM(s)/deciliter  glucagon  Injectable 1 milliGRAM(s) IntraMuscular once PRN Glucose LESS THAN 70 milligrams/deciliter      ALLERGIES:  Allergies    Naprosyn (Unknown)    Intolerances        LABS:                        10.6   8.3   )-----------( 160      ( 2018 06:14 )             32.2     02-05    136  |  103  |  23  ----------------------------<  159<H>  4.0   |  21<L>  |  1.67<H>    Ca    8.2<L>      2018 06:14  Phos  1.8     02-04  Mg     2.2     02-05      PT/INR - ( 2018 06:14 )   PT: 16.9 sec;   INR: 1.51            Urinalysis Basic - ( 2018 07:12 )    Color: Yellow / Appearance: Clear / S.025 / pH: x  Gluc: x / Ketone: Trace mg/dL  / Bili: Negative / Urobili: 2.0 E.U./dL   Blood: x / Protein: 100 mg/dL / Nitrite: NEGATIVE   Leuk Esterase: NEGATIVE / RBC: < 5 /HPF / WBC < 5 /HPF   Sq Epi: x / Non Sq Epi: 0-5 /HPF / Bacteria: Present /HPF        RADIOLOGY & ADDITIONAL TESTS: Reviewed.

## 2018-02-05 NOTE — PROGRESS NOTE ADULT - SUBJECTIVE AND OBJECTIVE BOX
Pt looks better this am     PAST MEDICAL & SURGICAL HISTORY:  DVT (deep venous thrombosis)  Kidney transplant recipient  Prostate cancer  HTN (hypertension)  HLD (hyperlipidemia)  DM2 (diabetes mellitus, type 2)  Polycystic kidney disease  H/O radical prostatectomy    MEDICATIONS  (STANDING):  apixaban 5 milliGRAM(s) Oral every 12 hours  atorvastatin 20 milliGRAM(s) Oral at bedtime  carvedilol 6.25 milliGRAM(s) Oral every 12 hours  dextrose 5%. 1000 milliLiter(s) (50 mL/Hr) IV Continuous <Continuous>  dextrose 50% Injectable 12.5 Gram(s) IV Push once  dextrose 50% Injectable 25 Gram(s) IV Push once  dextrose 50% Injectable 25 Gram(s) IV Push once  docusate sodium 100 milliGRAM(s) Oral two times a day  insulin glargine Injectable (LANTUS) 7 Unit(s) SubCutaneous at bedtime  insulin lispro (HumaLOG) corrective regimen sliding scale   SubCutaneous Before meals and at bedtime  insulin lispro Injectable (HumaLOG) 2 Unit(s) SubCutaneous three times a day before meals  piperacillin/tazobactam IVPB. 4.5 Gram(s) IV Intermittent every 6 hours  polyethylene glycol 3350 17 Gram(s) Oral two times a day  predniSONE   Tablet 5 milliGRAM(s) Oral daily  senna 2 Tablet(s) Oral at bedtime  tacrolimus 1 milliGRAM(s) Oral at bedtime  tacrolimus 2 milliGRAM(s) Oral daily  vancomycin  IVPB 1000 milliGRAM(s) IV Intermittent every 12 hours    MEDICATIONS  (PRN):  acetaminophen   Tablet 650 milliGRAM(s) Oral every 6 hours PRN For Temp greater than 38 C (100.4 F)  dextrose Gel 1 Dose(s) Oral once PRN Blood Glucose LESS THAN 70 milliGRAM(s)/deciliter  glucagon  Injectable 1 milliGRAM(s) IntraMuscular once PRN Glucose LESS THAN 70 milligrams/deciliter    ICU Vital Signs Last 24 Hrs  T(C): 36.2 (05 Feb 2018 05:18), Max: 37.2 (04 Feb 2018 10:11)  T(F): 97.1 (05 Feb 2018 05:18), Max: 98.9 (04 Feb 2018 10:11)  HR: 74 (05 Feb 2018 08:23) (68 - 92)  BP: 111/73 (05 Feb 2018 08:23) (93/64 - 118/70)  BP(mean): 87 (05 Feb 2018 08:23) (72 - 100)  ABP: --  ABP(mean): --  RR: 16 (05 Feb 2018 08:23) (14 - 20)  SpO2: 93% (05 Feb 2018 08:23) (93% - 97%)    CXR   congestive change but improvement fo left perihilar infiltrates as compared to 2/3/18    Lungs decreased breath sounds at bases  CV Reg   s/p ablation for A fib to NSR  abd soft  Ext stable                          10.6   8.3   )-----------( 160      ( 05 Feb 2018 06:14 )             32.2   02-05    136  |  103  |  23  ----------------------------<  159<H>  4.0   |  21<L>  |  1.67<H>    Ca    8.2<L>      05 Feb 2018 06:14  Phos  1.8     02-04  Mg     2.2     02-05    rising Creatinine note c/w ATN

## 2018-02-05 NOTE — PROGRESS NOTE ADULT - SUBJECTIVE AND OBJECTIVE BOX
MEDICAL HISTORY:      72 year old  male, noted to have AFIB at [facility] during an annual "Reclast infusion. Ventricular rate was in 130's. Sent to the ER and AFIB confirmed. Patient was asymptomatic and has no h/o cardiac arrhythmia.     Hospital course: After KIMMIE, and EP ablation procedure converted rhythm to RSR.  EP procedure (s) appear to have been complicated by aspiratin pneumonia.     ESRD (Polycystic kidney disease)  HD 2590-6508  Kidney transplant  ([facility])  CKD GFR 60's-70's  Cardiomyopathy, LVH w normal LVEF  HTN  Gout  HLD  DM-2 since KTP ([facility])  Aneurysm ascending aorta ([facility])  Osteoporosis ([facility])  Prostate cancer s/p prostatectomy   [facility]     Negative chemical stress   Echocardiogram LVH with normal systolic function  18 Echocardiogram ([facility]) LVH with nl wall motion, LVEF 55-60, nl LA, aortic root dilation  Baseline Scr  0.9-1.1 mg/dl.     Meds at home: vit D3 2000 Iu daily, Prograf 2 mcg AM, 1 mcg PM (9 AM, 9PM), Prednisone 5 mg/d, amlodipine 10 mg/d, lisinopril 10 mg/d, carvedilol 12.5 mg bid, lipitor 10 mg/d, januvia 50 mg/d, metformin 1500 mg/d at dinner, glimepiride 4 mg/d with breakfast    INTERVAL HISTORY:    Over the weekend, temperature reached 102+F and glucose violet to 400+. IV antibiotics were started - vanco/pip    Scr 1.12, 1.15, 1.29, 1.67  wbc 11.0, 8.0  Weight 79.1, 80.6, 77.9    's, HR 70's RRR    SUMMARY COMMENTS:     Apparent aspiration pneumonia in this immunosuppressed kidney transplant patient. The infection. DM control worsened in this setting. All we have are portable chest x-rays and as previously disscussed a PA/LAT assessment would give better information. More importantly, specialist input is needed - both pulmonary and ID because of the complexity of infection in the setting of immunosuppression. The consultants should be chosen so that the the patient can follow up with them after discharge. I seem him every 6 weeks or so for post transplant management and both of the recommended consultants have offices near [facility] which will faciliate the patient's follow up care. Advise te following consultants: , ID: Dr. Ayala,  Pulmonary: Dr. Toledo    DAYO with Scr 1.67: Initially,cCardiology deemed the patient in CHF and there was a period of IV furosemide diuresis. DM was also uncontrolled for a period of time and may have exacerbated renal fluid loss. Weight decreased several kg.  Suspect that the reduction in GFR is hemodynamic based. Hold diuretics (already done), obtain a better CXR for further volume assessment and permit a diabetic, but otherwise normal diet.  Will check UA; I anticipate a benign sediment.     Discussed with cardiac team.     ---------------------------------------------------------------------------------------------------------------------------------------------------------------------------    SUBJECTIVE & OBJECTIVE DATA DOCUMENTATION:     REVIEW OF SYSTEMS SCREENED ORGAN SYSTEMS:  Constitutional: fever, chills, anorexia or fatigue  Pulmonary: difficulty breathing, wheezing, cough  Cardiovascular: exertional dyspnea, chest pain, palpitations, dizziness or leg swelling  Gastrointestinal: abdominal or epigastric pain, nausea, vomiting or hematemesis, diarrhea, melena or bright red blood per rectum.  Genitourinary: dysuria, urgency, frequency, flank pain, difficulty voiding  Skin: No pruritis, rashes or lesions  Neurology: memory loss, dysarthria, extremity weakness, neuropathic pain, headache, visual changes  Dialysis access if present : pain, bleeding, swelling     ROS POSITIVE FINDINGS: no further fever or chills    PAST MEDICAL & SURGICAL HISTORY:  DVT (deep venous thrombosis)  Kidney transplant recipient  Prostate cancer  HTN (hypertension)  HLD (hyperlipidemia)  DM2 (diabetes mellitus, type 2)  Polycystic kidney disease  H/O radical prostatectomy      PHYSICAL EXAM:  T(C): 36.9 (18 @ 08:57), Max: 37.2 (18 @ 18:02)  HR: 74 (18 @ 08:23)  BP: 111/73 (18 @ 08:23) (93/64 - 114/75)  RR: 16 (18 @ 08:23)  SpO2: 93% (18 @ 08:23)  Wt(kg): --  I&O's Summary    2018 07:  -  2018 07:00  --------------------------------------------------------  IN: 1300 mL / OUT: 600 mL / NET: 700 mL    2018 07:  -  2018 11:10  --------------------------------------------------------  IN: 180 mL / OUT: 400 mL / NET: -220 mL      Weight 82.5 ( @ 23:49)    APPEARANCE: in bed, NAD  HEAD & NECK: normocephalic, no stiff neck, no masses, oral mucosa moist, no scleral icteris  RESPIRATORY: no accessory muscle use, no labored breathing, no dullness, no wheeze,  scattered crackles both bases  CARDIOVASCULAR: no JVD, RRR, S1S2, + gallop,  no murmur, no rub.  ABDOMEN: soft, no tenderness, no masses, no hepatomegally, no splenomegally  : no bladder distension  LYMPHATIC: no lymphadenopathy (neck, axilla, groin)  EXTREMITIES & MUSCULOSKELETAL: no cyanosis, no clubbing, no tenderness, strength  L=R  EDEMA: none  PULSES: upper extremity pulses present, lower extremity pulses present   SKIN: no rash, no stasis, no induration  NEUROLOGIC & PSYCHIATRIC:  alert, interactive, oriented to time and place, responds to stimuli, no asterixis  DIALYSIS ACCESS: LFA AVF ligated  KIDNEY TRANSPLANT: RLQ non-tender    MEDICATIONS  (STANDING):  apixaban 5 milliGRAM(s) Oral every 12 hours  atorvastatin 20 milliGRAM(s) Oral at bedtime  carvedilol 6.25 milliGRAM(s) Oral every 12 hours  dextrose 5%. 1000 milliLiter(s) (50 mL/Hr) IV Continuous <Continuous>  dextrose 50% Injectable 12.5 Gram(s) IV Push once  dextrose 50% Injectable 25 Gram(s) IV Push once  dextrose 50% Injectable 25 Gram(s) IV Push once  docusate sodium 100 milliGRAM(s) Oral two times a day  insulin glargine Injectable (LANTUS) 7 Unit(s) SubCutaneous at bedtime  insulin lispro (HumaLOG) corrective regimen sliding scale   SubCutaneous Before meals and at bedtime  insulin lispro Injectable (HumaLOG) 2 Unit(s) SubCutaneous three times a day before meals  piperacillin/tazobactam IVPB. 2.25 Gram(s) IV Intermittent every 6 hours  polyethylene glycol 3350 17 Gram(s) Oral two times a day  predniSONE   Tablet 5 milliGRAM(s) Oral daily  senna 2 Tablet(s) Oral at bedtime  tacrolimus 1 milliGRAM(s) Oral at bedtime  tacrolimus 2 milliGRAM(s) Oral daily    MEDICATIONS  (PRN):  acetaminophen   Tablet 650 milliGRAM(s) Oral every 6 hours PRN For Temp greater than 38 C (100.4 F)  dextrose Gel 1 Dose(s) Oral once PRN Blood Glucose LESS THAN 70 milliGRAM(s)/deciliter  glucagon  Injectable 1 milliGRAM(s) IntraMuscular once PRN Glucose LESS THAN 70 milligrams/deciliter      DATA:  136    |  103    |  23     ----------------------------<  159<H>  Ca:8.2<L> (2018 06:14)  4.0     |  21<L>  |  1.67<H>      eGFR if Non : 41 <L>  eGFR if : 47 <L>        SCr 1.67 [2018 06:14]  SCr 1.29 [2018 07:37]  SCr 1.15 [2018 00:33]  SCr 1.12 [2018 07:58]  SCr 1.07 [2018 06:33]                          10.6<L>  8.3   )-----------( 160      ( 2018 06:14 )             32.2<L>    Phos:-- M.2 mg/dL PTH:-- Uric acid:-- Serum Osm:--  Ferritin:290.8 ng/mL Iron:31 ug/dL<L> TIBC:174 ug/dL<L> Tsat:18 %  B12:-- TSH:-- (2018 06:14)    Urinalysis Basic - ( 2018 07:12 )  Color: Yellow / Appearance: Clear / S.025 / pH: x  Gluc: x / Ketone: Trace mg/dL<!!>  / Bili: Negative / Urobili: 2.0 E.U./dL<!!>   Blood: x / Protein: 100 mg/dL<!!> / Nitrite: NEGATIVE   Leuk Esterase: NEGATIVE / RBC: < 5 /HPF / WBC < 5 /HPF   Sq Epi: x / Non Sq Epi: 0-5 /HPF / Bacteria: Present /HPF<!!>      UProt:-- UCr:-- P/C Ratio:-- 24 hour Prot:-- UVol:-- CrCl:--  Judi:-- UOsm:472 mosmol/kg UVol:-- UCl:-- [place]:-- (2018 08:44)

## 2018-02-05 NOTE — DIETITIAN INITIAL EVALUATION ADULT. - NS AS NUTRI INTERV ED CONTENT
Purpose of the nutrition education/review of CHO sources and portion sources/Recommended modifications/Nutrition relationship to health/disease

## 2018-02-05 NOTE — PROGRESS NOTE ADULT - PROBLEM SELECTOR PLAN 3
UA positive for granular casts in setting of uptrending Cr with FeUrea 45.8% suggestive of intrinsic renal dz  - continue to hold lasix  - continue to trend BUN/Cr and avoid nephrotoxic medications  - renally dose all medications UA positive for granular casts in setting of uptrending Cr with FeUrea 45.8% suggestive of intrinsic renal dz and ATN  - continue to hold lasix  - continue to trend BUN/Cr and avoid nephrotoxic medications  - renally dose all medications

## 2018-02-05 NOTE — CONSULT NOTE ADULT - SUBJECTIVE AND OBJECTIVE BOX
HPI:  Neto Wooten is a 72 M with PMHx of polycystic kidney disease s/p kidney transplant ( at [facility], on [place]/Prednisone), HTN, HLD, DM2, Prostate cancer s/p radical prostatectomy , DVT  (in setting of pelvic fracture), and osteoporosis presenting to [facility] after being found with  when measured at outpatient infusion center where he was receiving an infusion of Reclast.  He was asymptomatic for his tachycardia, and was sent to the ED after completing his transfusion, where he was found to be in AFib with RVR.  He denies HA, changes in vision/hearing, dysphagia, CP, SOB, N/V/D/C, dysuria, myalgias, fevers or chills.  He recalls intermittent periods of palpitations over the past several years that he had attributed to stress, and which were always self limited.      In the ED his VS were Tmax 97.9, , /77, RR 18, SpO2 97% RA. EKG showed atrial fibrillation.  He was given Diltiazem 10mg IV x2, Diltiazem 30mg PO, and was started on Heparin gtt at 15cc/hr with a 6500U bolus.  Baseline PTT 29.3.  Magnesium 1.4, Anjel <0.01.  He was admitted to [facility] for further management (2018 16:26)      PAST MEDICAL & SURGICAL HISTORY:  DVT (deep venous thrombosis)  Kidney transplant recipient  Prostate cancer  HTN (hypertension)  HLD (hyperlipidemia)  DM2 (diabetes mellitus, type 2)  Polycystic kidney disease  H/O radical prostatectomy    	  MEDICATIONS  (STANDING):  apixaban 5 milliGRAM(s) Oral every 12 hours  atorvastatin 20 milliGRAM(s) Oral at bedtime  carvedilol 6.25 milliGRAM(s) Oral every 12 hours  dextrose 5%. 1000 milliLiter(s) (50 mL/Hr) IV Continuous <Continuous>  dextrose 50% Injectable 12.5 Gram(s) IV Push once  dextrose 50% Injectable 25 Gram(s) IV Push once  dextrose 50% Injectable 25 Gram(s) IV Push once  docusate sodium 100 milliGRAM(s) Oral two times a day  insulin glargine Injectable (LANTUS) 7 Unit(s) SubCutaneous at bedtime  insulin lispro (HumaLOG) corrective regimen sliding scale   SubCutaneous Before meals and at bedtime  insulin lispro Injectable (HumaLOG) 2 Unit(s) SubCutaneous three times a day before meals  piperacillin/tazobactam IVPB. 2.25 Gram(s) IV Intermittent every 6 hours  polyethylene glycol 3350 17 Gram(s) Oral two times a day  predniSONE   Tablet 5 milliGRAM(s) Oral daily  senna 2 Tablet(s) Oral at bedtime  tacrolimus 1 milliGRAM(s) Oral at bedtime  tacrolimus 2 milliGRAM(s) Oral daily    MEDICATIONS  (PRN):  acetaminophen   Tablet 650 milliGRAM(s) Oral every 6 hours PRN For Temp greater than 38 C (100.4 F)  dextrose Gel 1 Dose(s) Oral once PRN Blood Glucose LESS THAN 70 milliGRAM(s)/deciliter  glucagon  Injectable 1 milliGRAM(s) IntraMuscular once PRN Glucose LESS THAN 70 milligrams/deciliter      Allergies    Naprosyn (Unknown)    Intolerances        SOCIAL HISTORY:    FAMILY HISTORY:  No pertinent family history in first degree relatives      Vital Signs Last 24 Hrs  T(C): 36.9 (2018 08:57), Max: 37.2 (2018 18:02)  T(F): 98.5 (2018 08:57), Max: 98.9 (2018 18:02)  HR: 74 (2018 08:23) (68 - 84)  BP: 111/73 (2018 08:23) (93/64 - 114/75)  BP(mean): 87 (2018 08:23) (72 - 100)  RR: 16 (2018 08:23) (14 - 18)  SpO2: 93% (2018 08:23) (93% - 97%)        LABS:                        10.6   8.3   )-----------( 160      ( 2018 06:14 )             32.2     02-05    136  |  103  |  23  ----------------------------<  159<H>  4.0   |  21<L>  |  1.67<H>    Ca    8.2<L>      2018 06:14  Phos  1.8     02-04  Mg     2.2     02-05      PT/INR - ( 2018 06:14 )   PT: 16.9 sec;   INR: 1.51            Urinalysis Basic - ( 2018 07:12 )    Color: Yellow / Appearance: Clear / S.025 / pH: x  Gluc: x / Ketone: Trace mg/dL  / Bili: Negative / Urobili: 2.0 E.U./dL   Blood: x / Protein: 100 mg/dL / Nitrite: NEGATIVE   Leuk Esterase: NEGATIVE / RBC: < 5 /HPF / WBC < 5 /HPF   Sq Epi: x / Non Sq Epi: 0-5 /HPF / Bacteria: Present /HPF        RADIOLOGY & ADDITIONAL STUDIES: HPI:  Neto Wooten is a 72 M with PMHx of polycystic kidney disease s/p kidney transplant ( at [facility], on [place]/Prednisone), HTN, HLD, DM2, Prostate cancer s/p radical prostatectomy , DVT  (in setting of pelvic fracture), and osteoporosis presenting to [facility] after being found with  when measured at outpatient infusion center where he was receiving an infusion of Reclast.  He was asymptomatic for his tachycardia, and was sent to the ED after completing his transfusion, where he was found to be in AFib with RVR.  He denies HA, changes in vision/hearing, dysphagia, CP, SOB, N/V/D/C, dysuria, myalgias, fevers or chills.  He recalls intermittent periods of palpitations over the past several years that he had attributed to stress, and which were always self limited.      In the ED his VS were Tmax 97.9, , /77, RR 18, SpO2 97% RA. EKG showed atrial fibrillation.  He was given Diltiazem 10mg IV x2, Diltiazem 30mg PO, and was started on Heparin gtt at 15cc/hr with a 6500U bolus.  Baseline PTT 29.3.  Magnesium 1.4, Anjel <0.01.  He was admitted to [facility] for further management (2018 16:26)      Interim hx  S/P KIMMIE and ablation of atrial flutter  Fever over the weekend  Improved with empiric abx: Vanco and Pip-Tazo  Still with SOB  No chills or body aches  No diarrhea    Otherwise ROS negative      PAST MEDICAL & SURGICAL HISTORY:  DVT (deep venous thrombosis)  Kidney transplant recipient  Prostate cancer  HTN (hypertension)  HLD (hyperlipidemia)  DM2 (diabetes mellitus, type 2)  Polycystic kidney disease  H/O radical prostatectomy    	  MEDICATIONS  (STANDING):  apixaban 5 milliGRAM(s) Oral every 12 hours  atorvastatin 20 milliGRAM(s) Oral at bedtime  carvedilol 6.25 milliGRAM(s) Oral every 12 hours  dextrose 5%. 1000 milliLiter(s) (50 mL/Hr) IV Continuous <Continuous>  dextrose 50% Injectable 12.5 Gram(s) IV Push once  dextrose 50% Injectable 25 Gram(s) IV Push once  dextrose 50% Injectable 25 Gram(s) IV Push once  docusate sodium 100 milliGRAM(s) Oral two times a day  insulin glargine Injectable (LANTUS) 7 Unit(s) SubCutaneous at bedtime  insulin lispro (HumaLOG) corrective regimen sliding scale   SubCutaneous Before meals and at bedtime  insulin lispro Injectable (HumaLOG) 2 Unit(s) SubCutaneous three times a day before meals  piperacillin/tazobactam IVPB. 2.25 Gram(s) IV Intermittent every 6 hours  polyethylene glycol 3350 17 Gram(s) Oral two times a day  predniSONE   Tablet 5 milliGRAM(s) Oral daily  senna 2 Tablet(s) Oral at bedtime  tacrolimus 1 milliGRAM(s) Oral at bedtime  tacrolimus 2 milliGRAM(s) Oral daily    MEDICATIONS  (PRN):  acetaminophen   Tablet 650 milliGRAM(s) Oral every 6 hours PRN For Temp greater than 38 C (100.4 F)  dextrose Gel 1 Dose(s) Oral once PRN Blood Glucose LESS THAN 70 milliGRAM(s)/deciliter  glucagon  Injectable 1 milliGRAM(s) IntraMuscular once PRN Glucose LESS THAN 70 milligrams/deciliter      Allergies    Naprosyn (Unknown)    SOCIAL HISTORY:  Lives at home  No substance use    FAMILY HISTORY:  No pertinent family history in first degree relatives    EXAM  Vital Signs Last 24 Hrs  T(C): 36.9 (2018 08:57), Max: 37.2 (2018 18:02)  T(F): 98.5 (2018 08:57), Max: 98.9 (2018 18:02)  HR: 74 (2018 08:23) (68 - 84)  BP: 111/73 (2018 08:23) (93/64 - 114/75)  BP(mean): 87 (2018 08:23) (72 - 100)  RR: 16 (2018 08:23) (14 - 18)  SpO2: 93% (2018 08:23) (93% - 97%)  On O2 via NC  Some dyspnea observed while conversing  Awake and alert   Neck supple  No cervical adenopathy  RRR  Chest clear  Abd soft ND NT  LE no edema  No buckley cath  No phlebitis    LABS:                        10.6   8.3   )-----------( 160      ( 2018 06:14 )             32.2     02-05    136  |  103  |  23  ----------------------------<  159<H>  4.0   |  21<L>  |  1.67<H>    Ca    8.2<L>      2018 06:14  Phos  1.8       Mg     2.2           PT/INR - ( 2018 06:14 )   PT: 16.9 sec;   INR: 1.51            Urinalysis Basic - ( 2018 07:12 )    Color: Yellow / Appearance: Clear / S.025 / pH: x  Gluc: x / Ketone: Trace mg/dL  / Bili: Negative / Urobili: 2.0 E.U./dL   Blood: x / Protein: 100 mg/dL / Nitrite: NEGATIVE   Leuk Esterase: NEGATIVE / RBC: < 5 /HPF / WBC < 5 /HPF   Sq Epi: x / Non Sq Epi: 0-5 /HPF / Bacteria: Present /HPF    Rapid Respiratory Viral Panel (18 @ 00:33)    Rapid RVP Result: NotDete: The [employer] RVP Rapid uses polymerase chain reaction (PCR) and melt  curve analysis to screen for adenovirus; coronavirus HKU1, NL63, 229E,  OC43; human metapneumovirus (hMPV); human enterovirus/rhinovirus  (Entero/RV); influenza A; influenza A/H1;influenza A/H3; influenza  A/H1-2009; influenza B; parainfluenza viruses 1, 2, 3, 4; respiratory  syncytial virus; Bordetella pertussis; Mycoplasma pneumoniae; and  Chlamydophila pneumoniae.      Culture - Blood (18 @ 01:25)    Specimen Source: .Blood Blood    Culture Results:   No growth at 1 day.    Culture - Blood (18 @ 01:25)    Specimen Source: .Blood Blood    Culture Results:   No growth at 1 day.        RADIOLOGY & ADDITIONAL STUDIES:    Xray Chest 1 View- PORTABLE-Routine (18 @ 07:05) >    EXAM:  XR CHEST PORTABLE ROUTINE 1V                          PROCEDURE DATE:  2018         INTERPRETATION:  Clinical History: Congestion.    Portable examination chest demonstrates cardiomegaly. Congestive change.   Improvement left perihilar infiltrate.    Impression: Cardiomegaly. Congestive change. Improvement left perihilar   infiltrates in comparison to prior examination of the chest 2/3/2018

## 2018-02-05 NOTE — PROGRESS NOTE ADULT - PROBLEM SELECTOR PLAN 10
F: PO  E: Replete PRN  N: DASh/TLC    # Need for prophylactic measure  -DVT PPx: Eliquis 2.5mg BID  -Dispo: [facility]   - FULL code

## 2018-02-06 VITALS
HEART RATE: 72 BPM | OXYGEN SATURATION: 97 % | SYSTOLIC BLOOD PRESSURE: 152 MMHG | RESPIRATION RATE: 16 BRPM | DIASTOLIC BLOOD PRESSURE: 90 MMHG

## 2018-02-06 LAB
ANION GAP SERPL CALC-SCNC: 13 MMOL/L — SIGNIFICANT CHANGE UP (ref 5–17)
BUN SERPL-MCNC: 19 MG/DL — SIGNIFICANT CHANGE UP (ref 7–23)
CALCIUM SERPL-MCNC: 9 MG/DL — SIGNIFICANT CHANGE UP (ref 8.4–10.5)
CHLORIDE SERPL-SCNC: 106 MMOL/L — SIGNIFICANT CHANGE UP (ref 96–108)
CMV DNA CSF QL NAA+PROBE: SIGNIFICANT CHANGE UP
CMV IGG FLD QL: >10 U/ML — HIGH
CMV IGG SERPL-IMP: POSITIVE
CMV IGM FLD-ACNC: 32.7 AU/ML — HIGH
CMV IGM SERPL QL: (no result)
CO2 SERPL-SCNC: 19 MMOL/L — LOW (ref 22–31)
CREAT SERPL-MCNC: 1.63 MG/DL — HIGH (ref 0.5–1.3)
GLUCOSE BLDC GLUCOMTR-MCNC: 106 MG/DL — HIGH (ref 70–99)
GLUCOSE BLDC GLUCOMTR-MCNC: 156 MG/DL — HIGH (ref 70–99)
GLUCOSE BLDC GLUCOMTR-MCNC: 225 MG/DL — HIGH (ref 70–99)
GLUCOSE SERPL-MCNC: 162 MG/DL — HIGH (ref 70–99)
HCT VFR BLD CALC: 32.7 % — LOW (ref 39–50)
HGB BLD-MCNC: 10.9 G/DL — LOW (ref 13–17)
MAGNESIUM SERPL-MCNC: 2.1 MG/DL — SIGNIFICANT CHANGE UP (ref 1.6–2.6)
MCHC RBC-ENTMCNC: 29.9 PG — SIGNIFICANT CHANGE UP (ref 27–34)
MCHC RBC-ENTMCNC: 33.3 G/DL — SIGNIFICANT CHANGE UP (ref 32–36)
MCV RBC AUTO: 89.6 FL — SIGNIFICANT CHANGE UP (ref 80–100)
PLATELET # BLD AUTO: 196 K/UL — SIGNIFICANT CHANGE UP (ref 150–400)
POTASSIUM SERPL-MCNC: 3.8 MMOL/L — SIGNIFICANT CHANGE UP (ref 3.5–5.3)
POTASSIUM SERPL-SCNC: 3.8 MMOL/L — SIGNIFICANT CHANGE UP (ref 3.5–5.3)
RBC # BLD: 3.65 M/UL — LOW (ref 4.2–5.8)
RBC # FLD: 15.5 % — SIGNIFICANT CHANGE UP (ref 10.3–16.9)
SODIUM SERPL-SCNC: 138 MMOL/L — SIGNIFICANT CHANGE UP (ref 135–145)
TACROLIMUS SERPL-MCNC: 2 NG/ML — SIGNIFICANT CHANGE UP
URATE SERPL-MCNC: 3.5 — SIGNIFICANT CHANGE UP
VANCOMYCIN FLD-MCNC: 13 UG/ML — SIGNIFICANT CHANGE UP
WBC # BLD: 7.2 K/UL — SIGNIFICANT CHANGE UP (ref 3.8–10.5)
WBC # FLD AUTO: 7.2 K/UL — SIGNIFICANT CHANGE UP (ref 3.8–10.5)

## 2018-02-06 PROCEDURE — 71045 X-RAY EXAM CHEST 1 VIEW: CPT | Mod: 26

## 2018-02-06 RX ORDER — LISINOPRIL 2.5 MG/1
1 TABLET ORAL
Qty: 0 | Refills: 0 | COMMUNITY

## 2018-02-06 RX ORDER — VANCOMYCIN HCL 1 G
1000 VIAL (EA) INTRAVENOUS ONCE
Qty: 0 | Refills: 0 | Status: COMPLETED | OUTPATIENT
Start: 2018-02-06 | End: 2018-02-06

## 2018-02-06 RX ORDER — AMLODIPINE BESYLATE 2.5 MG/1
1 TABLET ORAL
Qty: 0 | Refills: 0 | COMMUNITY

## 2018-02-06 RX ORDER — APIXABAN 2.5 MG/1
1 TABLET, FILM COATED ORAL
Qty: 60 | Refills: 0
Start: 2018-02-06 | End: 2018-03-07

## 2018-02-06 RX ORDER — ASPIRIN/CALCIUM CARB/MAGNESIUM 324 MG
1 TABLET ORAL
Qty: 0 | Refills: 0 | COMMUNITY

## 2018-02-06 RX ADMIN — TACROLIMUS 2 MILLIGRAM(S): 5 CAPSULE ORAL at 11:42

## 2018-02-06 RX ADMIN — Medication 5 MILLIGRAM(S): at 06:34

## 2018-02-06 RX ADMIN — Medication 3 UNIT(S): at 07:31

## 2018-02-06 RX ADMIN — Medication 4: at 11:33

## 2018-02-06 RX ADMIN — PIPERACILLIN AND TAZOBACTAM 200 GRAM(S): 4; .5 INJECTION, POWDER, LYOPHILIZED, FOR SOLUTION INTRAVENOUS at 11:24

## 2018-02-06 RX ADMIN — CARVEDILOL PHOSPHATE 6.25 MILLIGRAM(S): 80 CAPSULE, EXTENDED RELEASE ORAL at 16:51

## 2018-02-06 RX ADMIN — Medication 650 MILLIGRAM(S): at 07:39

## 2018-02-06 RX ADMIN — Medication 2: at 07:36

## 2018-02-06 RX ADMIN — PIPERACILLIN AND TAZOBACTAM 200 GRAM(S): 4; .5 INJECTION, POWDER, LYOPHILIZED, FOR SOLUTION INTRAVENOUS at 00:01

## 2018-02-06 RX ADMIN — APIXABAN 5 MILLIGRAM(S): 2.5 TABLET, FILM COATED ORAL at 08:50

## 2018-02-06 RX ADMIN — Medication 3 UNIT(S): at 11:33

## 2018-02-06 RX ADMIN — PIPERACILLIN AND TAZOBACTAM 200 GRAM(S): 4; .5 INJECTION, POWDER, LYOPHILIZED, FOR SOLUTION INTRAVENOUS at 06:34

## 2018-02-06 RX ADMIN — Medication 250 MILLIGRAM(S): at 08:50

## 2018-02-06 RX ADMIN — CARVEDILOL PHOSPHATE 6.25 MILLIGRAM(S): 80 CAPSULE, EXTENDED RELEASE ORAL at 06:34

## 2018-02-06 RX ADMIN — Medication 650 MILLIGRAM(S): at 08:30

## 2018-02-06 NOTE — PROGRESS NOTE ADULT - PROBLEM SELECTOR PLAN 3
UA positive for granular casts in setting of uptrending Cr with FeUrea 45.8% suggestive of intrinsic renal dz and ATN  - continue to hold lasix  - continue to trend BUN/Cr and avoid nephrotoxic medications  - renally dose all medications

## 2018-02-06 NOTE — PROGRESS NOTE ADULT - PROBLEM SELECTOR PROBLEM 2
Atrial flutter
Atrial flutter
Kidney transplant recipient
Atrial flutter
Kidney transplant recipient
Kidney transplant recipient

## 2018-02-06 NOTE — PROGRESS NOTE ADULT - PROBLEM SELECTOR PLAN 1
On Saturday night, pt found to be febrile to 102.5 rectally. All other VS wnl. lactate nml  - pt meets SIRS criteria for WBC 13K, 22% bands, and T 102.5 with most likely source PNA  - CXR showing left perihilar consolidation and in setting of immunocompromise was started on vanc/zosyn  - pt currently not complaining of URI symptoms including productive cough, sore throat or rhinorrhea  - ID consulted (Dr. Yoon), appreciate reccs  - RVP neg  - f/u blood cx, NGTD  - UA neg  - CMV antibodies indicate equivalency  - c/w vanc 1000mg, dosed by level. Has received 3 doses (2/4 - ).  Will check level this AM, then dose accordingly  - c/w zosyn 2.25mg q6h  - WBC has correct to wnl and patient has been afebrile since starting abx  - obtain upright CXR PA/Lateral today  - will consider de-escalating abx if fever downtrends On Saturday night, pt found to be febrile to 102.5 rectally. All other VS wnl. lactate nml  - pt meets SIRS criteria for WBC 13K, 22% bands, and T 102.5 with most likely source PNA  - CXR showing left perihilar consolidation and in setting of immunocompromise was started on vanc/zosyn  - pt currently not complaining of URI symptoms including productive cough, sore throat or rhinorrhea  - ID consulted (Dr. Yoon), appreciate reccs  - RVP neg  - f/u blood cx, NGTD  - UA neg  - CMV antibodies indicate equivalency  - c/w vanc 1000mg, dosed by level. Has received 3 doses (2/4 - ).  Will check level this AM, then dose accordingly  - c/w zosyn 2.25mg q6h  - WBC has correct to wnl and patient has been afebrile since starting abx  - obtain upright CXR AP/PA today  - will consider de-escalating abx if fever downtrends

## 2018-02-06 NOTE — PROGRESS NOTE ADULT - PROVIDER SPECIALTY LIST ADULT
Cardiology
Nephrology
Cardiology

## 2018-02-06 NOTE — PROGRESS NOTE ADULT - PROBLEM SELECTOR PLAN 2
Pt admitted for a.flutter w/ RVR, now s/p a.flutter ablation on 2/2  - c/w Eliquis 2.5mg BID  - c/w Coreg 6.25mg BID for rate control; dose decreased yesterday for low BPs  - d/c Diltiazem after ablation  - Echo with LVH and LVEF 55%, mild aortic root dilation 4.5cm  - EKG post ablation significant for Qtc 495ms. Will avoid QT prolonging medications and replete mag aggressively. Tacrolimus is a QT prolonging medication   - serial EKGs  - strict I/O, AM UOP not recorded, instructed patient to use urinal going forward

## 2018-02-06 NOTE — PROGRESS NOTE ADULT - SUBJECTIVE AND OBJECTIVE BOX
INTERVAL HPI/OVERNIGHT EVENTS:    SUBJECTIVE: Patient seen and examined at bedside.    OBJECTIVE:    VITAL SIGNS:  ICU Vital Signs Last 24 Hrs  T(C): 36.8 (06 Feb 2018 05:56), Max: 36.9 (05 Feb 2018 08:57)  T(F): 98.3 (06 Feb 2018 05:56), Max: 98.5 (05 Feb 2018 08:57)  HR: 72 (06 Feb 2018 06:23) (68 - 87)  BP: 126/80 (06 Feb 2018 06:23) (111/65 - 139/79)  BP(mean): 94 (06 Feb 2018 06:23) (81 - 117)  ABP: --  ABP(mean): --  RR: 16 (06 Feb 2018 06:23) (16 - 18)  SpO2: 97% (06 Feb 2018 06:23) (93% - 98%)        02-05 @ 07:01  -  02-06 @ 07:00  --------------------------------------------------------  IN: 1080 mL / OUT: 2740 mL / NET: -1660 mL      CAPILLARY BLOOD GLUCOSE      POCT Blood Glucose.: 156 mg/dL (06 Feb 2018 06:26)      PHYSICAL EXAM:    General: NAD  HEENT: NC/AT; PERRL, clear conjunctiva  Neck: supple  Respiratory: CTA b/l  Cardiovascular: +S1/S2; RRR  Abdomen: soft, NT/ND; +BS x4  Extremities: WWP, 2+ peripheral pulses b/l; no LE edema  Skin: normal color and turgor; no rash  Neurological:     MEDICATIONS:  MEDICATIONS  (STANDING):  apixaban 5 milliGRAM(s) Oral every 12 hours  atorvastatin 20 milliGRAM(s) Oral at bedtime  carvedilol 6.25 milliGRAM(s) Oral every 12 hours  dextrose 5%. 1000 milliLiter(s) (50 mL/Hr) IV Continuous <Continuous>  dextrose 50% Injectable 12.5 Gram(s) IV Push once  dextrose 50% Injectable 25 Gram(s) IV Push once  dextrose 50% Injectable 25 Gram(s) IV Push once  docusate sodium 100 milliGRAM(s) Oral two times a day  insulin glargine Injectable (LANTUS) 14 Unit(s) SubCutaneous at bedtime  insulin lispro (HumaLOG) corrective regimen sliding scale   SubCutaneous Before meals and at bedtime  insulin lispro Injectable (HumaLOG) 3 Unit(s) SubCutaneous three times a day before meals  piperacillin/tazobactam IVPB. 2.25 Gram(s) IV Intermittent every 6 hours  polyethylene glycol 3350 17 Gram(s) Oral two times a day  predniSONE   Tablet 5 milliGRAM(s) Oral daily  senna 2 Tablet(s) Oral at bedtime  tacrolimus 1 milliGRAM(s) Oral at bedtime  tacrolimus 2 milliGRAM(s) Oral daily    MEDICATIONS  (PRN):  acetaminophen   Tablet 650 milliGRAM(s) Oral every 6 hours PRN For Temp greater than 38 C (100.4 F)  acetaminophen   Tablet. 650 milliGRAM(s) Oral every 6 hours PRN Moderate Pain (4 - 6)  dextrose Gel 1 Dose(s) Oral once PRN Blood Glucose LESS THAN 70 milliGRAM(s)/deciliter  glucagon  Injectable 1 milliGRAM(s) IntraMuscular once PRN Glucose LESS THAN 70 milligrams/deciliter      ALLERGIES:  Allergies    Naprosyn (Unknown)    Intolerances        LABS:                        10.6   8.3   )-----------( 160      ( 05 Feb 2018 06:14 )             32.2     02-05    136  |  103  |  23  ----------------------------<  159<H>  4.0   |  21<L>  |  1.67<H>    Ca    8.2<L>      05 Feb 2018 06:14  Mg     2.2     02-05      PT/INR - ( 05 Feb 2018 06:14 )   PT: 16.9 sec;   INR: 1.51                RADIOLOGY & ADDITIONAL TESTS: Reviewed. HOSPITAL COURSE:  Neto Wooten is a 73 yo M with PMHx of polycystic kidney disease s/p kidney transplant (2007 at [facility], on [place]/Prednisone), HTN, HLD, DM2, Prostate cancer s/p radical prostatectomy 2015, DVT 2006 (in setting of pelvic fracture), and osteoporosis presenting to [facility] with atrial fibrillation with RVR.  His heart rate was controlled with Diltiazem and his home Coreg, and he was started on heparin gtt for anticoagulation, which was later transitioned to Eliquis.  He received an ablation with electrophysiology to address the source of his irregular heart rhythm, which was successful.  He developed pulmonary vascular congestion and was diuresed effectively with Lasix, and his saturations improved.  He then developed sepsis with fever to 102.5 associated with chills, along with WBC 13K, 22% bands.  CXR showed possible evolving consolidation, and he was started on Vanc/Zosyn for suspected aspiration PNA vs CAP.  He has been afebrile since starting abx, and his WBC normalized.  He has been in NSR since his ablation, and continued on his home Coreg.  Once medically stable for his sepsis, he will be discharged home with no PT needs.    INTERVAL HPI/OVERNIGHT EVENTS: BARI    SUBJECTIVE: Patient seen and examined at bedside.  He feels well this AM and is without complaints. No fever, chills, cough. No SOB, CP, palpitations.     OBJECTIVE:    VITAL SIGNS:  ICU Vital Signs Last 24 Hrs  T(C): 36.8 (06 Feb 2018 05:56), Max: 36.9 (05 Feb 2018 08:57)  T(F): 98.3 (06 Feb 2018 05:56), Max: 98.5 (05 Feb 2018 08:57)  HR: 72 (06 Feb 2018 06:23) (68 - 87)  BP: 126/80 (06 Feb 2018 06:23) (111/65 - 139/79)  BP(mean): 94 (06 Feb 2018 06:23) (81 - 117)  ABP: --  ABP(mean): --  RR: 16 (06 Feb 2018 06:23) (16 - 18)  SpO2: 97% (06 Feb 2018 06:23) (93% - 98%)        02-05 @ 07:01  -  02-06 @ 07:00  --------------------------------------------------------  IN: 1080 mL / OUT: 2740 mL / NET: -1660 mL      CAPILLARY BLOOD GLUCOSE      POCT Blood Glucose.: 156 mg/dL (06 Feb 2018 06:26)      PHYSICAL EXAM:    General: NAD  HEENT: NC/AT; PERRL, clear conjunctiva  Neck: supple  Respiratory: CTA b/l  Cardiovascular: +S1/S2; RRR  Abdomen: soft, NT/ND; +BS x4  Extremities: WWP, 2+ peripheral pulses b/l; no LE edema  Skin: normal color and turgor; no rash  Neurological:     MEDICATIONS:  MEDICATIONS  (STANDING):  apixaban 5 milliGRAM(s) Oral every 12 hours  atorvastatin 20 milliGRAM(s) Oral at bedtime  carvedilol 6.25 milliGRAM(s) Oral every 12 hours  dextrose 5%. 1000 milliLiter(s) (50 mL/Hr) IV Continuous <Continuous>  dextrose 50% Injectable 12.5 Gram(s) IV Push once  dextrose 50% Injectable 25 Gram(s) IV Push once  dextrose 50% Injectable 25 Gram(s) IV Push once  docusate sodium 100 milliGRAM(s) Oral two times a day  insulin glargine Injectable (LANTUS) 14 Unit(s) SubCutaneous at bedtime  insulin lispro (HumaLOG) corrective regimen sliding scale   SubCutaneous Before meals and at bedtime  insulin lispro Injectable (HumaLOG) 3 Unit(s) SubCutaneous three times a day before meals  piperacillin/tazobactam IVPB. 2.25 Gram(s) IV Intermittent every 6 hours  polyethylene glycol 3350 17 Gram(s) Oral two times a day  predniSONE   Tablet 5 milliGRAM(s) Oral daily  senna 2 Tablet(s) Oral at bedtime  tacrolimus 1 milliGRAM(s) Oral at bedtime  tacrolimus 2 milliGRAM(s) Oral daily    MEDICATIONS  (PRN):  acetaminophen   Tablet 650 milliGRAM(s) Oral every 6 hours PRN For Temp greater than 38 C (100.4 F)  acetaminophen   Tablet. 650 milliGRAM(s) Oral every 6 hours PRN Moderate Pain (4 - 6)  dextrose Gel 1 Dose(s) Oral once PRN Blood Glucose LESS THAN 70 milliGRAM(s)/deciliter  glucagon  Injectable 1 milliGRAM(s) IntraMuscular once PRN Glucose LESS THAN 70 milligrams/deciliter      ALLERGIES:  Allergies    Naprosyn (Unknown)    Intolerances        LABS:                        10.6   8.3   )-----------( 160      ( 05 Feb 2018 06:14 )             32.2     02-05    136  |  103  |  23  ----------------------------<  159<H>  4.0   |  21<L>  |  1.67<H>    Ca    8.2<L>      05 Feb 2018 06:14  Mg     2.2     02-05      PT/INR - ( 05 Feb 2018 06:14 )   PT: 16.9 sec;   INR: 1.51                RADIOLOGY & ADDITIONAL TESTS: Reviewed.

## 2018-02-06 NOTE — PROGRESS NOTE ADULT - ASSESSMENT
72 M w/PMHx of polycystic kidney disease s/p kidney transplant (2007 at [facility], on [place]/Prednisone), HTN, HLD, DM2, Prostate cancer s/p radical prostatectomy 2015, DVT 2006 (in setting of pelvic fracture), and osteoporosis presented for new-onset a.flutter, now s/p a.flutter ablation, found to have sepsis 2/2 left perihilar PNA.

## 2018-02-06 NOTE — PROGRESS NOTE ADULT - SUBJECTIVE AND OBJECTIVE BOX
Pt feeling better pos t Cardiac ablation and   CHF exacerbation/pulmonary infiltrate    CXR   cardiomegaly   near complete resolution of patchy consolidation  in R lower lobe     small R parapneumonic effusion    PAST MEDICAL & SURGICAL HISTORY:  DVT (deep venous thrombosis)  Kidney transplant recipient  Prostate cancer  HTN (hypertension)  HLD (hyperlipidemia)  DM2 (diabetes mellitus, type 2)  Polycystic kidney disease  H/O radical prostatectomy    MEDICATIONS  (STANDING):  apixaban 5 milliGRAM(s) Oral every 12 hours  atorvastatin 20 milliGRAM(s) Oral at bedtime  carvedilol 6.25 milliGRAM(s) Oral every 12 hours  dextrose 5%. 1000 milliLiter(s) (50 mL/Hr) IV Continuous <Continuous>  dextrose 50% Injectable 12.5 Gram(s) IV Push once  dextrose 50% Injectable 25 Gram(s) IV Push once  dextrose 50% Injectable 25 Gram(s) IV Push once  docusate sodium 100 milliGRAM(s) Oral two times a day  insulin glargine Injectable (LANTUS) 14 Unit(s) SubCutaneous at bedtime  insulin lispro (HumaLOG) corrective regimen sliding scale   SubCutaneous Before meals and at bedtime  insulin lispro Injectable (HumaLOG) 3 Unit(s) SubCutaneous three times a day before meals  piperacillin/tazobactam IVPB. 2.25 Gram(s) IV Intermittent every 6 hours  polyethylene glycol 3350 17 Gram(s) Oral two times a day  predniSONE   Tablet 5 milliGRAM(s) Oral daily  senna 2 Tablet(s) Oral at bedtime  tacrolimus 1 milliGRAM(s) Oral at bedtime    ICU Vital Signs Last 24 Hrs  T(C): 36.8 (06 Feb 2018 05:56), Max: 36.9 (05 Feb 2018 18:29)  T(F): 98.3 (06 Feb 2018 05:56), Max: 98.5 (05 Feb 2018 18:29)  HR: 71 (06 Feb 2018 08:58) (68 - 87)  BP: 128/81 (06 Feb 2018 08:58) (111/65 - 139/79)  BP(mean): 97 (06 Feb 2018 08:58) (81 - 117)  ABP: --  ABP(mean): --  RR: 16 (06 Feb 2018 08:58) (16 - 18)  SpO2: 97% (06 Feb 2018 08:58) (94% - 98%)      lungs decreased breath sounds at bases   clearer elsewhere  CV s1 s2  Abd soft  Ext stable      tacrolimus 2 milliGRAM(s) Oral daily    MEDICATIONS  (PRN):  acetaminophen   Tablet 650 milliGRAM(s) Oral every 6 hours PRN For Temp greater than 38 C (100.4 F)  acetaminophen   Tablet. 650 milliGRAM(s) Oral every 6 hours PRN Moderate Pain (4 - 6)  dextrose Gel 1 Dose(s) Oral once PRN Blood Glucose LESS THAN 70 milliGRAM(s)/deciliter  glucagon  Injectable 1 milliGRAM(s) IntraMuscular once PRN Glucose LESS THAN 70 milligrams/deciliter      02-06    138  |  106  |  19  ----------------------------<  162<H>  3.8   |  19<L>  |  1.63<H>    Ca    9.0      06 Feb 2018 07:29  Mg     2.1     02-06    Creatinine  relatively unchanged at 1.63     1                      10.9   7.2   )-----------( 196      ( 06 Feb 2018 07:29 )             32.7

## 2018-02-06 NOTE — PROGRESS NOTE ADULT - PROBLEM SELECTOR PLAN 6
ECHO showing EF 55-60% with mild concentric LVH  - can increase coreg back to home 12.5mg BID now that BPs have improved  - not ACEI at this time as pt with ATN   - holding additional lasix today given ATN

## 2018-02-06 NOTE — PROGRESS NOTE ADULT - PROBLEM SELECTOR PLAN 4
H/H this morning downtrending to 11.7/35.7 from 13/39.8 yesterday  - no s/sx of bleeding and pressures stable with no lightheadedness, SOB or dizziness  - had BM yesterday that was brown  - FOBT negative  - iron studies not consistent with iron deficiency, B12 and folate pending  - pt is a [employer]   - will trend CBC

## 2018-02-06 NOTE — PROGRESS NOTE ADULT - PROBLEM SELECTOR PROBLEM 3
ATN (acute tubular necrosis)
ATN (acute tubular necrosis)
HTN (hypertension)
ATN (acute tubular necrosis)
HTN (hypertension)
HTN (hypertension)

## 2018-02-06 NOTE — PROGRESS NOTE ADULT - PROBLEM SELECTOR PLAN 7
Patient with history of polycystic kidney disease, on dialysis from 2077-6826, when he received a kidney transplant placed on the right side at [facility].  He follows as an outpatient with Dr. Rios, and takes Tacrolimus and Prednisone.  - c/w Tacrolimus 2mg in AM and 1mg in evening  - c/w Prednisone 5mg in AM  - Tacro level wnl, obtaining repeat Tacro level prior to 11AM dose this morning  - Dr. Rios notified, appreciate reccs  - Baseline SCr ~1.0, currently uptrending

## 2018-02-06 NOTE — PROGRESS NOTE ADULT - ASSESSMENT
CHF  Pulmonary infiltrate   improved  Renal insufficiency   unchanged    cont current antibiotic   fluid balance and off Lasix

## 2018-02-06 NOTE — PROGRESS NOTE ADULT - PROBLEM SELECTOR PLAN 5
Patient takes Metformin, Januvia, and Glimepiride at home for DM2.  Hold home medications  - f/u HA1C  - required 29 units coverage lispro over 24 hrs yesterday  - c/w 14 units lantus qhs and 3 units lispro TID w/ meals   - will need to adjust insulin depending on required coverage daily

## 2018-02-09 LAB
CULTURE RESULTS: SIGNIFICANT CHANGE UP
CULTURE RESULTS: SIGNIFICANT CHANGE UP
SPECIMEN SOURCE: SIGNIFICANT CHANGE UP
SPECIMEN SOURCE: SIGNIFICANT CHANGE UP

## 2018-02-12 DIAGNOSIS — I50.31 ACUTE DIASTOLIC (CONGESTIVE) HEART FAILURE: ICD-10-CM

## 2018-02-12 DIAGNOSIS — Z86.718 PERSONAL HISTORY OF OTHER VENOUS THROMBOSIS AND EMBOLISM: ICD-10-CM

## 2018-02-12 DIAGNOSIS — Z85.46 PERSONAL HISTORY OF MALIGNANT NEOPLASM OF PROSTATE: ICD-10-CM

## 2018-02-12 DIAGNOSIS — E11.9 TYPE 2 DIABETES MELLITUS WITHOUT COMPLICATIONS: ICD-10-CM

## 2018-02-12 DIAGNOSIS — M81.0 AGE-RELATED OSTEOPOROSIS WITHOUT CURRENT PATHOLOGICAL FRACTURE: ICD-10-CM

## 2018-02-12 DIAGNOSIS — J18.9 PNEUMONIA, UNSPECIFIED ORGANISM: ICD-10-CM

## 2018-02-12 DIAGNOSIS — A41.9 SEPSIS, UNSPECIFIED ORGANISM: ICD-10-CM

## 2018-02-12 DIAGNOSIS — I48.91 UNSPECIFIED ATRIAL FIBRILLATION: ICD-10-CM

## 2018-02-12 DIAGNOSIS — I11.0 HYPERTENSIVE HEART DISEASE WITH HEART FAILURE: ICD-10-CM

## 2018-02-12 DIAGNOSIS — N17.0 ACUTE KIDNEY FAILURE WITH TUBULAR NECROSIS: ICD-10-CM

## 2018-02-12 DIAGNOSIS — I48.92 UNSPECIFIED ATRIAL FLUTTER: ICD-10-CM

## 2018-02-12 DIAGNOSIS — Z94.0 KIDNEY TRANSPLANT STATUS: ICD-10-CM

## 2018-02-13 PROCEDURE — 82553 CREATINE MB FRACTION: CPT

## 2018-02-13 PROCEDURE — 96374 THER/PROPH/DIAG INJ IV PUSH: CPT

## 2018-02-13 PROCEDURE — 84466 ASSAY OF TRANSFERRIN: CPT

## 2018-02-13 PROCEDURE — 86901 BLOOD TYPING SEROLOGIC RH(D): CPT

## 2018-02-13 PROCEDURE — 84100 ASSAY OF PHOSPHORUS: CPT

## 2018-02-13 PROCEDURE — 96361 HYDRATE IV INFUSION ADD-ON: CPT

## 2018-02-13 PROCEDURE — 83615 LACTATE (LD) (LDH) ENZYME: CPT

## 2018-02-13 PROCEDURE — C1769: CPT

## 2018-02-13 PROCEDURE — 36415 COLL VENOUS BLD VENIPUNCTURE: CPT

## 2018-02-13 PROCEDURE — 80053 COMPREHEN METABOLIC PANEL: CPT

## 2018-02-13 PROCEDURE — 86644 CMV ANTIBODY: CPT

## 2018-02-13 PROCEDURE — 82533 TOTAL CORTISOL: CPT

## 2018-02-13 PROCEDURE — 84484 ASSAY OF TROPONIN QUANT: CPT

## 2018-02-13 PROCEDURE — 80048 BASIC METABOLIC PNL TOTAL CA: CPT

## 2018-02-13 PROCEDURE — 87486 CHLMYD PNEUM DNA AMP PROBE: CPT

## 2018-02-13 PROCEDURE — C8925: CPT

## 2018-02-13 PROCEDURE — 96365 THER/PROPH/DIAG IV INF INIT: CPT

## 2018-02-13 PROCEDURE — 86645 CMV ANTIBODY IGM: CPT

## 2018-02-13 PROCEDURE — 87040 BLOOD CULTURE FOR BACTERIA: CPT

## 2018-02-13 PROCEDURE — 87581 M.PNEUMON DNA AMP PROBE: CPT

## 2018-02-13 PROCEDURE — 86900 BLOOD TYPING SEROLOGIC ABO: CPT

## 2018-02-13 PROCEDURE — 84443 ASSAY THYROID STIM HORMONE: CPT

## 2018-02-13 PROCEDURE — C1893: CPT

## 2018-02-13 PROCEDURE — 86850 RBC ANTIBODY SCREEN: CPT

## 2018-02-13 PROCEDURE — 84540 ASSAY OF URINE/UREA-N: CPT

## 2018-02-13 PROCEDURE — 96376 TX/PRO/DX INJ SAME DRUG ADON: CPT

## 2018-02-13 PROCEDURE — 85730 THROMBOPLASTIN TIME PARTIAL: CPT

## 2018-02-13 PROCEDURE — 83010 ASSAY OF HAPTOGLOBIN QUANT: CPT

## 2018-02-13 PROCEDURE — 84436 ASSAY OF TOTAL THYROXINE: CPT

## 2018-02-13 PROCEDURE — 84300 ASSAY OF URINE SODIUM: CPT

## 2018-02-13 PROCEDURE — C1731: CPT

## 2018-02-13 PROCEDURE — 81001 URINALYSIS AUTO W/SCOPE: CPT

## 2018-02-13 PROCEDURE — 83935 ASSAY OF URINE OSMOLALITY: CPT

## 2018-02-13 PROCEDURE — C1733: CPT

## 2018-02-13 PROCEDURE — 84480 ASSAY TRIIODOTHYRONINE (T3): CPT

## 2018-02-13 PROCEDURE — 93306 TTE W/DOPPLER COMPLETE: CPT

## 2018-02-13 PROCEDURE — 82570 ASSAY OF URINE CREATININE: CPT

## 2018-02-13 PROCEDURE — 96375 TX/PRO/DX INJ NEW DRUG ADDON: CPT

## 2018-02-13 PROCEDURE — 93005 ELECTROCARDIOGRAM TRACING: CPT

## 2018-02-13 PROCEDURE — 83605 ASSAY OF LACTIC ACID: CPT

## 2018-02-13 PROCEDURE — 82550 ASSAY OF CK (CPK): CPT

## 2018-02-13 PROCEDURE — 82746 ASSAY OF FOLIC ACID SERUM: CPT

## 2018-02-13 PROCEDURE — 83735 ASSAY OF MAGNESIUM: CPT

## 2018-02-13 PROCEDURE — 87633 RESP VIRUS 12-25 TARGETS: CPT

## 2018-02-13 PROCEDURE — 87798 DETECT AGENT NOS DNA AMP: CPT

## 2018-02-13 PROCEDURE — 82962 GLUCOSE BLOOD TEST: CPT

## 2018-02-13 PROCEDURE — 85027 COMPLETE CBC AUTOMATED: CPT

## 2018-02-13 PROCEDURE — 85025 COMPLETE CBC W/AUTO DIFF WBC: CPT

## 2018-02-13 PROCEDURE — 71045 X-RAY EXAM CHEST 1 VIEW: CPT

## 2018-02-13 PROCEDURE — 83550 IRON BINDING TEST: CPT

## 2018-02-13 PROCEDURE — 82607 VITAMIN B-12: CPT

## 2018-02-13 PROCEDURE — 94660 CPAP INITIATION&MGMT: CPT

## 2018-02-13 PROCEDURE — 82728 ASSAY OF FERRITIN: CPT

## 2018-02-13 PROCEDURE — 80197 ASSAY OF TACROLIMUS: CPT

## 2018-02-13 PROCEDURE — C1894: CPT

## 2018-02-13 PROCEDURE — 99285 EMERGENCY DEPT VISIT HI MDM: CPT | Mod: 25

## 2018-02-13 PROCEDURE — 83036 HEMOGLOBIN GLYCOSYLATED A1C: CPT

## 2018-02-13 PROCEDURE — 85610 PROTHROMBIN TIME: CPT

## 2018-02-21 ENCOUNTER — APPOINTMENT (OUTPATIENT)
Dept: ENDOCRINOLOGY | Facility: CLINIC | Age: 73
End: 2018-02-21

## 2018-02-26 ENCOUNTER — APPOINTMENT (OUTPATIENT)
Dept: HEART AND VASCULAR | Facility: CLINIC | Age: 73
End: 2018-02-26
Payer: MEDICARE

## 2018-02-26 VITALS
WEIGHT: 173.99 LBS | DIASTOLIC BLOOD PRESSURE: 102 MMHG | HEIGHT: 67 IN | SYSTOLIC BLOOD PRESSURE: 160 MMHG | TEMPERATURE: 97.9 F | OXYGEN SATURATION: 98 % | HEART RATE: 86 BPM | BODY MASS INDEX: 27.31 KG/M2

## 2018-02-26 PROCEDURE — 99204 OFFICE O/P NEW MOD 45 MIN: CPT | Mod: 25

## 2018-02-26 PROCEDURE — 93000 ELECTROCARDIOGRAM COMPLETE: CPT

## 2018-03-29 ENCOUNTER — APPOINTMENT (OUTPATIENT)
Dept: HEART AND VASCULAR | Facility: CLINIC | Age: 73
End: 2018-03-29
Payer: MEDICARE

## 2018-03-29 VITALS
DIASTOLIC BLOOD PRESSURE: 66 MMHG | BODY MASS INDEX: 27.47 KG/M2 | WEIGHT: 175 LBS | HEIGHT: 67 IN | SYSTOLIC BLOOD PRESSURE: 134 MMHG | HEART RATE: 78 BPM

## 2018-03-29 PROCEDURE — 93000 ELECTROCARDIOGRAM COMPLETE: CPT

## 2018-03-29 PROCEDURE — 99215 OFFICE O/P EST HI 40 MIN: CPT | Mod: 25

## 2018-04-09 ENCOUNTER — APPOINTMENT (OUTPATIENT)
Dept: HEART AND VASCULAR | Facility: CLINIC | Age: 73
End: 2018-04-09
Payer: MEDICARE

## 2018-04-09 VITALS — HEIGHT: 67 IN | BODY MASS INDEX: 27.47 KG/M2 | WEIGHT: 175 LBS

## 2018-04-09 PROCEDURE — A9500: CPT

## 2018-04-09 PROCEDURE — 78452 HT MUSCLE IMAGE SPECT MULT: CPT

## 2018-04-09 PROCEDURE — 93015 CV STRESS TEST SUPVJ I&R: CPT

## 2018-04-11 ENCOUNTER — TRANSCRIPTION ENCOUNTER (OUTPATIENT)
Age: 73
End: 2018-04-11

## 2018-04-19 ENCOUNTER — APPOINTMENT (OUTPATIENT)
Dept: ENDOCRINOLOGY | Facility: CLINIC | Age: 73
End: 2018-04-19
Payer: MEDICARE

## 2018-04-19 VITALS
HEART RATE: 71 BPM | DIASTOLIC BLOOD PRESSURE: 80 MMHG | HEIGHT: 67 IN | BODY MASS INDEX: 27.94 KG/M2 | WEIGHT: 178 LBS | SYSTOLIC BLOOD PRESSURE: 130 MMHG

## 2018-04-19 PROCEDURE — 99214 OFFICE O/P EST MOD 30 MIN: CPT | Mod: 25

## 2018-04-19 PROCEDURE — 82947 ASSAY GLUCOSE BLOOD QUANT: CPT | Mod: QW

## 2018-04-19 RX ORDER — OMEGA-3/DHA/EPA/FISH OIL 300-1000MG
CAPSULE ORAL
Refills: 0 | Status: ACTIVE | COMMUNITY

## 2018-05-04 LAB
ANION GAP SERPL CALC-SCNC: 15 MMOL/L
BUN SERPL-MCNC: 19 MG/DL
CALCIUM SERPL-MCNC: 10 MG/DL
CHLORIDE SERPL-SCNC: 105 MMOL/L
CHOLEST SERPL-MCNC: 134 MG/DL
CHOLEST/HDLC SERPL: 3.2 RATIO
CO2 SERPL-SCNC: 26 MMOL/L
CREAT SERPL-MCNC: 1.04 MG/DL
GLUCOSE SERPL-MCNC: 147 MG/DL
HDLC SERPL-MCNC: 42 MG/DL
LDLC SERPL CALC-MCNC: 66 MG/DL
MAGNESIUM SERPL-MCNC: 1.6 MG/DL
POTASSIUM SERPL-SCNC: 4 MMOL/L
SODIUM SERPL-SCNC: 146 MMOL/L
TRIGL SERPL-MCNC: 130 MG/DL
TSH SERPL-ACNC: 1.06 UIU/ML

## 2018-06-13 ENCOUNTER — INPATIENT (INPATIENT)
Facility: HOSPITAL | Age: 73
LOS: 3 days | Discharge: HOME HEALTH | DRG: 871 | End: 2018-06-17
Attending: STUDENT IN AN ORGANIZED HEALTH CARE EDUCATION/TRAINING PROGRAM | Admitting: STUDENT IN AN ORGANIZED HEALTH CARE EDUCATION/TRAINING PROGRAM
Payer: MEDICARE

## 2018-06-13 VITALS
DIASTOLIC BLOOD PRESSURE: 67 MMHG | WEIGHT: 175.05 LBS | HEART RATE: 103 BPM | HEIGHT: 69 IN | SYSTOLIC BLOOD PRESSURE: 106 MMHG | TEMPERATURE: 99 F | RESPIRATION RATE: 20 BRPM | OXYGEN SATURATION: 94 %

## 2018-06-13 DIAGNOSIS — J96.00 ACUTE RESPIRATORY FAILURE, UNSPECIFIED WHETHER WITH HYPOXIA OR HYPERCAPNIA: ICD-10-CM

## 2018-06-13 DIAGNOSIS — E78.5 HYPERLIPIDEMIA, UNSPECIFIED: ICD-10-CM

## 2018-06-13 DIAGNOSIS — I10 ESSENTIAL (PRIMARY) HYPERTENSION: ICD-10-CM

## 2018-06-13 DIAGNOSIS — N39.0 URINARY TRACT INFECTION, SITE NOT SPECIFIED: ICD-10-CM

## 2018-06-13 DIAGNOSIS — Z29.9 ENCOUNTER FOR PROPHYLACTIC MEASURES, UNSPECIFIED: ICD-10-CM

## 2018-06-13 DIAGNOSIS — A41.9 SEPSIS, UNSPECIFIED ORGANISM: ICD-10-CM

## 2018-06-13 DIAGNOSIS — Z94.0 KIDNEY TRANSPLANT STATUS: ICD-10-CM

## 2018-06-13 DIAGNOSIS — Z98.890 OTHER SPECIFIED POSTPROCEDURAL STATES: Chronic | ICD-10-CM

## 2018-06-13 DIAGNOSIS — E11.9 TYPE 2 DIABETES MELLITUS WITHOUT COMPLICATIONS: ICD-10-CM

## 2018-06-13 LAB
ALBUMIN SERPL ELPH-MCNC: 3.5 G/DL — SIGNIFICANT CHANGE UP (ref 3.3–5)
ALP SERPL-CCNC: 58 U/L — SIGNIFICANT CHANGE UP (ref 40–120)
ALT FLD-CCNC: 15 U/L — SIGNIFICANT CHANGE UP (ref 10–45)
ANION GAP SERPL CALC-SCNC: 15 MMOL/L — SIGNIFICANT CHANGE UP (ref 5–17)
APPEARANCE UR: CLEAR — SIGNIFICANT CHANGE UP
APTT BLD: 33.7 SEC — SIGNIFICANT CHANGE UP (ref 27.5–37.4)
AST SERPL-CCNC: 20 U/L — SIGNIFICANT CHANGE UP (ref 10–40)
BACTERIA # UR AUTO: PRESENT /HPF
BASE EXCESS BLDV CALC-SCNC: -4.6 MMOL/L — SIGNIFICANT CHANGE UP
BILIRUB SERPL-MCNC: 1.4 MG/DL — HIGH (ref 0.2–1.2)
BILIRUB UR-MCNC: NEGATIVE — SIGNIFICANT CHANGE UP
BUN SERPL-MCNC: 41 MG/DL — HIGH (ref 7–23)
CALCIUM SERPL-MCNC: 10.1 MG/DL — SIGNIFICANT CHANGE UP (ref 8.4–10.5)
CHLORIDE SERPL-SCNC: 99 MMOL/L — SIGNIFICANT CHANGE UP (ref 96–108)
CK MB CFR SERPL CALC: <1 NG/ML — SIGNIFICANT CHANGE UP (ref 0–6.7)
CO2 SERPL-SCNC: 23 MMOL/L — SIGNIFICANT CHANGE UP (ref 22–31)
COLOR SPEC: YELLOW — SIGNIFICANT CHANGE UP
CREAT SERPL-MCNC: 1.76 MG/DL — HIGH (ref 0.5–1.3)
DIFF PNL FLD: ABNORMAL
EPI CELLS # UR: SIGNIFICANT CHANGE UP /HPF (ref 0–5)
GAS PNL BLDV: SIGNIFICANT CHANGE UP
GLUCOSE SERPL-MCNC: 195 MG/DL — HIGH (ref 70–99)
GLUCOSE UR QL: NEGATIVE — SIGNIFICANT CHANGE UP
HCO3 BLDV-SCNC: 21 MMOL/L — SIGNIFICANT CHANGE UP (ref 20–27)
HCT VFR BLD CALC: 35.3 % — LOW (ref 39–50)
HGB BLD-MCNC: 11.7 G/DL — LOW (ref 13–17)
INR BLD: 1.48 — HIGH (ref 0.88–1.16)
KETONES UR-MCNC: NEGATIVE — SIGNIFICANT CHANGE UP
LACTATE SERPL-SCNC: 2.3 MMOL/L — HIGH (ref 0.5–2)
LACTATE SERPL-SCNC: 2.4 MMOL/L — HIGH (ref 0.5–2)
LEUKOCYTE ESTERASE UR-ACNC: ABNORMAL
LYMPHOCYTES # BLD AUTO: 6 % — LOW (ref 13–44)
MCHC RBC-ENTMCNC: 29.2 PG — SIGNIFICANT CHANGE UP (ref 27–34)
MCHC RBC-ENTMCNC: 33.1 G/DL — SIGNIFICANT CHANGE UP (ref 32–36)
MCV RBC AUTO: 88 FL — SIGNIFICANT CHANGE UP (ref 80–100)
MONOCYTES NFR BLD AUTO: 2 % — SIGNIFICANT CHANGE UP (ref 2–14)
NEUTROPHILS NFR BLD AUTO: 78 % — HIGH (ref 43–77)
NITRITE UR-MCNC: POSITIVE
PCO2 BLDV: 39 MMHG — LOW (ref 41–51)
PH BLDV: 7.34 — SIGNIFICANT CHANGE UP (ref 7.32–7.43)
PH UR: 5.5 — SIGNIFICANT CHANGE UP (ref 5–8)
PLATELET # BLD AUTO: 130 K/UL — LOW (ref 150–400)
PO2 BLDV: 30 MMHG — SIGNIFICANT CHANGE UP
POTASSIUM SERPL-MCNC: 4 MMOL/L — SIGNIFICANT CHANGE UP (ref 3.5–5.3)
POTASSIUM SERPL-SCNC: 4 MMOL/L — SIGNIFICANT CHANGE UP (ref 3.5–5.3)
PROT SERPL-MCNC: 7.1 G/DL — SIGNIFICANT CHANGE UP (ref 6–8.3)
PROT UR-MCNC: 100 MG/DL
PROTHROM AB SERPL-ACNC: 16.6 SEC — HIGH (ref 9.8–12.7)
RBC # BLD: 4.01 M/UL — LOW (ref 4.2–5.8)
RBC # FLD: 15.2 % — SIGNIFICANT CHANGE UP (ref 10.3–16.9)
RBC CASTS # UR COMP ASSIST: > 10 /HPF
SAO2 % BLDV: 45 % — SIGNIFICANT CHANGE UP
SODIUM SERPL-SCNC: 137 MMOL/L — SIGNIFICANT CHANGE UP (ref 135–145)
SP GR SPEC: 1.01 — SIGNIFICANT CHANGE UP (ref 1–1.03)
TROPONIN T SERPL-MCNC: 0.03 NG/ML — HIGH (ref 0–0.01)
UROBILINOGEN FLD QL: 0.2 E.U./DL — SIGNIFICANT CHANGE UP
WBC # BLD: 11.2 K/UL — HIGH (ref 3.8–10.5)
WBC # FLD AUTO: 11.2 K/UL — HIGH (ref 3.8–10.5)
WBC UR QL: ABNORMAL /HPF

## 2018-06-13 PROCEDURE — 71045 X-RAY EXAM CHEST 1 VIEW: CPT | Mod: 26,76

## 2018-06-13 PROCEDURE — 99222 1ST HOSP IP/OBS MODERATE 55: CPT | Mod: GC

## 2018-06-13 PROCEDURE — 93010 ELECTROCARDIOGRAM REPORT: CPT

## 2018-06-13 PROCEDURE — 99291 CRITICAL CARE FIRST HOUR: CPT

## 2018-06-13 RX ORDER — DEXTROSE 50 % IN WATER 50 %
25 SYRINGE (ML) INTRAVENOUS ONCE
Qty: 0 | Refills: 0 | Status: DISCONTINUED | OUTPATIENT
Start: 2018-06-13 | End: 2018-06-17

## 2018-06-13 RX ORDER — TACROLIMUS 5 MG/1
1 CAPSULE ORAL
Qty: 0 | Refills: 0 | Status: DISCONTINUED | OUTPATIENT
Start: 2018-06-14 | End: 2018-06-17

## 2018-06-13 RX ORDER — AZITHROMYCIN 500 MG/1
500 TABLET, FILM COATED ORAL ONCE
Qty: 0 | Refills: 0 | Status: COMPLETED | OUTPATIENT
Start: 2018-06-13 | End: 2018-06-13

## 2018-06-13 RX ORDER — DEXTROSE 50 % IN WATER 50 %
12.5 SYRINGE (ML) INTRAVENOUS ONCE
Qty: 0 | Refills: 0 | Status: DISCONTINUED | OUTPATIENT
Start: 2018-06-13 | End: 2018-06-17

## 2018-06-13 RX ORDER — SODIUM CHLORIDE 9 MG/ML
500 INJECTION INTRAMUSCULAR; INTRAVENOUS; SUBCUTANEOUS ONCE
Qty: 0 | Refills: 0 | Status: COMPLETED | OUTPATIENT
Start: 2018-06-13 | End: 2018-06-13

## 2018-06-13 RX ORDER — INSULIN LISPRO 100/ML
VIAL (ML) SUBCUTANEOUS
Qty: 0 | Refills: 0 | Status: DISCONTINUED | OUTPATIENT
Start: 2018-06-13 | End: 2018-06-17

## 2018-06-13 RX ORDER — CEFTRIAXONE 500 MG/1
1000 INJECTION, POWDER, FOR SOLUTION INTRAMUSCULAR; INTRAVENOUS EVERY 24 HOURS
Qty: 0 | Refills: 0 | Status: DISCONTINUED | OUTPATIENT
Start: 2018-06-14 | End: 2018-06-14

## 2018-06-13 RX ORDER — ACETAMINOPHEN 500 MG
975 TABLET ORAL ONCE
Qty: 0 | Refills: 0 | Status: COMPLETED | OUTPATIENT
Start: 2018-06-13 | End: 2018-06-13

## 2018-06-13 RX ORDER — CHOLECALCIFEROL (VITAMIN D3) 125 MCG
1000 CAPSULE ORAL
Qty: 0 | Refills: 0 | Status: DISCONTINUED | OUTPATIENT
Start: 2018-06-14 | End: 2018-06-17

## 2018-06-13 RX ORDER — APIXABAN 2.5 MG/1
5 TABLET, FILM COATED ORAL EVERY 12 HOURS
Qty: 0 | Refills: 0 | Status: DISCONTINUED | OUTPATIENT
Start: 2018-06-14 | End: 2018-06-17

## 2018-06-13 RX ORDER — GLUCAGON INJECTION, SOLUTION 0.5 MG/.1ML
1 INJECTION, SOLUTION SUBCUTANEOUS ONCE
Qty: 0 | Refills: 0 | Status: DISCONTINUED | OUTPATIENT
Start: 2018-06-13 | End: 2018-06-17

## 2018-06-13 RX ORDER — CEFTRIAXONE 500 MG/1
1 INJECTION, POWDER, FOR SOLUTION INTRAMUSCULAR; INTRAVENOUS ONCE
Qty: 0 | Refills: 0 | Status: COMPLETED | OUTPATIENT
Start: 2018-06-13 | End: 2018-06-13

## 2018-06-13 RX ORDER — TACROLIMUS 5 MG/1
2 CAPSULE ORAL
Qty: 0 | Refills: 0 | Status: DISCONTINUED | OUTPATIENT
Start: 2018-06-14 | End: 2018-06-17

## 2018-06-13 RX ORDER — SODIUM CHLORIDE 9 MG/ML
1000 INJECTION INTRAMUSCULAR; INTRAVENOUS; SUBCUTANEOUS ONCE
Qty: 0 | Refills: 0 | Status: COMPLETED | OUTPATIENT
Start: 2018-06-13 | End: 2018-06-13

## 2018-06-13 RX ORDER — SODIUM CHLORIDE 9 MG/ML
1000 INJECTION, SOLUTION INTRAVENOUS
Qty: 0 | Refills: 0 | Status: DISCONTINUED | OUTPATIENT
Start: 2018-06-13 | End: 2018-06-17

## 2018-06-13 RX ORDER — ATORVASTATIN CALCIUM 80 MG/1
20 TABLET, FILM COATED ORAL AT BEDTIME
Qty: 0 | Refills: 0 | Status: DISCONTINUED | OUTPATIENT
Start: 2018-06-14 | End: 2018-06-17

## 2018-06-13 RX ORDER — DEXTROSE 50 % IN WATER 50 %
15 SYRINGE (ML) INTRAVENOUS ONCE
Qty: 0 | Refills: 0 | Status: DISCONTINUED | OUTPATIENT
Start: 2018-06-13 | End: 2018-06-17

## 2018-06-13 RX ADMIN — SODIUM CHLORIDE 500 MILLILITER(S): 9 INJECTION INTRAMUSCULAR; INTRAVENOUS; SUBCUTANEOUS at 19:21

## 2018-06-13 RX ADMIN — SODIUM CHLORIDE 1000 MILLILITER(S): 9 INJECTION INTRAMUSCULAR; INTRAVENOUS; SUBCUTANEOUS at 17:59

## 2018-06-13 RX ADMIN — Medication 975 MILLIGRAM(S): at 20:30

## 2018-06-13 RX ADMIN — AZITHROMYCIN 255 MILLIGRAM(S): 500 TABLET, FILM COATED ORAL at 17:59

## 2018-06-13 RX ADMIN — CEFTRIAXONE 100 GRAM(S): 500 INJECTION, POWDER, FOR SOLUTION INTRAMUSCULAR; INTRAVENOUS at 17:59

## 2018-06-13 RX ADMIN — SODIUM CHLORIDE 1000 MILLILITER(S): 9 INJECTION INTRAMUSCULAR; INTRAVENOUS; SUBCUTANEOUS at 19:14

## 2018-06-13 NOTE — H&P ADULT - NSHPLABSRESULTS_GEN_ALL_CORE
.  LABS:                         11.7   11.2  )-----------( 130      ( 2018 16:56 )             35.3         137  |  99  |  41<H>  ----------------------------<  195<H>  4.0   |  23  |  1.76<H>    Ca    10.1      2018 16:56    TPro  7.1  /  Alb  3.5  /  TBili  1.4<H>  /  DBili  x   /  AST  20  /  ALT  15  /  AlkPhos  58  13    PT/INR - ( 2018 16:56 )   PT: 16.6 sec;   INR: 1.48          PTT - ( 2018 16:56 )  PTT:33.7 sec  Urinalysis Basic - ( 2018 19:22 )    Color: Yellow / Appearance: Clear / S.010 / pH: x  Gluc: x / Ketone: NEGATIVE  / Bili: Negative / Urobili: 0.2 E.U./dL   Blood: x / Protein: 100 mg/dL / Nitrite: POSITIVE   Leuk Esterase: Moderate / RBC: > 10 /HPF / WBC Many /HPF   Sq Epi: x / Non Sq Epi: 0-5 /HPF / Bacteria: Present /HPF      CARDIAC MARKERS ( 2018 16:56 )  x     / 0.03 ng/mL / 45 U/L / x     / <1.0 ng/mL        Lactate, Blood: 2.4 mmoL/L ( @ 20:03)  Lactate, Blood: 2.3 mmoL/L ( @ 17:23)      RADIOLOGY, EKG & ADDITIONAL TESTS: Reviewed.

## 2018-06-13 NOTE — ED ADULT TRIAGE NOTE - OTHER COMPLAINTS
pt c.o L sided cp with increased sob since Sunday. hx ablation. denies recent cough/fever. ekg in progress.

## 2018-06-13 NOTE — H&P ADULT - ASSESSMENT
72 year old M with PMH PCKD, s/p renal transplant in 2007 at [facility] (on tacrolimus and prednisone), HTN, HLD, DM2, prostate cancer (s/p radical prostatectomy 2015), DVT (2006, d/t pelvic fracture), osteoporosis presents with severe sepsis due to urinary tract infection, complicated by acute hypoxic respiratory failure secondary to fluid overload requiring BiPAP

## 2018-06-13 NOTE — CONSULT NOTE ADULT - ATTENDING COMMENTS
UTI, acute pulmonary edema with acute resp insufficiency related to IVF. possibly his previous mitral regurgitation may have worsened since last evaluated. plan to admit to medical stepdown to monitor resp status and UO given CKD. wean bipap as tolerated, may require further diuresis. treat with abx for UTI. UTI, acute pulmonary edema with acute resp insufficiency related to IVF. possibly his previous mitral regurgitation may have worsened since last evaluated. plan to admit to medical stepdown to monitor resp status and UO given CKD. wean bipap as tolerated, may require further diuresis. treat with abx for UTI.  TTE in am to eval

## 2018-06-13 NOTE — H&P ADULT - NSHPREVIEWOFSYSTEMS_GEN_ALL_CORE
REVIEW OF SYSTEMS:    CONSTITUTIONAL: Chills as per HPI, + fever  EYES/ENT: No visual changes;  No throat pain   NECK: No pain or stiffness  RESPIRATORY: No cough, wheezing, hemoptysis; Shortness of breath as per HPI  CARDIOVASCULAR: Chest pain as per HPI  GASTROINTESTINAL: 1 episode nausea with vomiting (6/11). No abdominal or epigastric pain. No diarrhea or constipation. No melena or hematochezia.  GENITOURINARY: Urinary hesitance and malodor, denies hematuria  NEUROLOGICAL: No numbness or weakness  SKIN: No itching, burning, rashes, or lesions   All other review of systems is negative unless indicated above.

## 2018-06-13 NOTE — ED PROVIDER NOTE - OBJECTIVE STATEMENT
73 y/o m with PMH of DM, HLD, HTN, s/p kidney tx  12 years prior presents to ED with fever, weakness, cp, shortness of breath x several days.  Also complains of urinary hesitancy and malodor.  Denies nausea, vomiting, diarrhea.

## 2018-06-13 NOTE — H&P ADULT - PROBLEM SELECTOR PLAN 3
- UA positive for UTI  - Trend fever curve, WBC  - Continue with ceftriaxone 1g qd, D1 6/13  - F/u urine culture

## 2018-06-13 NOTE — H&P ADULT - PROBLEM SELECTOR PLAN 1
- Patient s/p 2.5L fluid in ED, experienced acute hypoxic respiratory failure  - F/u echo to assess cardiac function  - Continue BiPAP and monitor respiratory status closely, wean as tolerated  - S/p Lasix 80mg IV, additional Lasix as necessary   - F/u AM CXR  - Strict I/O - Patient s/p 2.5L fluid in ED, experienced acute hypoxic respiratory failure  - F/u echo to assess cardiac function. Obtain collateral information as patient reports previous echo normal with Dr. Gandara several months ago  - Continue BiPAP and monitor respiratory status closely, wean as tolerated from 100% oxygen  - S/p Lasix 80mg IV, additional Lasix as necessary   - F/u AM CXR  - Strict I/O

## 2018-06-13 NOTE — H&P ADULT - PROBLEM SELECTOR PLAN 2
- Patient febrile to 103F in ED. Meets sepsis criteria and source UTI. Lactate 2.3  - CXR without clear source of sepsis  - Trend fever curve, WBC  - C/w ceftriaxone 1g qd D1 6/13  - F/u blood cultures, urine culture  - Trend lactate, caution with fluids in setting of recent overload causing above hypoxic respiratory failure - Patient febrile to 103F in ED. Meets sepsis criteria and source UTI. Lactate 2.3  - CXR without clear source of sepsis  - Trend fever curve, WBC  - C/w ceftriaxone 1g qd D1 6/13  - F/u blood cultures, urine culture  - Trend lactate, caution with fluids in setting of recent overload causing above hypoxic respiratory failure  - Continue with telemetry monitoring

## 2018-06-13 NOTE — H&P ADULT - PROBLEM SELECTOR PLAN 8
Ppx: SCD, Eliquis  No IVF  Monitor K, replete Mg<2  Diet NPO in setting of BiPAP requirement    Code Full Ppx: SCD, Eliquis  No IVF  Monitor K, replete Mg<2  Diet NPO in setting of BiPAP requirement    Code Full  Transition of care: Dr. Rios PCP [phone]

## 2018-06-13 NOTE — H&P ADULT - HISTORY OF PRESENT ILLNESS
72 year old M with PMH PCKD, s/p renal transplant in 2007 at [facility] (on tacrolimus and prednisone), HTN, HLD, DM2, prostate cancer (s/p radical prostatectomy 2015), DVT (2006, d/t pelvic fracture), osteoporosis presents with three days of chest pain, shortness of breath, rigors, urinary hesitancy and malodor.     In ED, T99.4 (Tmax 103.0), P 103, 106/67,  R20, sat 94%. Was placed on BiPAP d/t hypoxic respiratory failure with RR 34 after 2.5L fluids given. CBC notable for WBC 11.2, 14% band PMN, INR 1.48, lactate 2.3, troponin 0.03. VBG with pH 7.34, CO2 39. UA with positive nitrite, leukocyte esterase, WBCs, >10 RBCs. Patient received ceftriaxone and azithromycin, 80mg IV Lasix. 72 year old M, Anglican, with PMH PCKD, s/p renal transplant in 2007 at [facility] (on tacrolimus and prednisone), HTN, HLD, DM2, prostate cancer (s/p radical prostatectomy 2015), DVT (2006, d/t pelvic fracture), osteoporosis presents with three days of chest pain, shortness of breath, rigors, urinary hesitancy and malodor.     In ED, T99.4 (Tmax 103.0), P 103, 106/67,  R20, sat 94%. Was placed on BiPAP d/t hypoxic respiratory failure with RR 34 after 2.5L fluids given. CBC notable for WBC 11.2, 14% band PMN, INR 1.48, lactate 2.3, troponin 0.03. VBG with pH 7.34, CO2 39. UA with positive nitrite, leukocyte esterase, WBCs, >10 RBCs. Patient received ceftriaxone and azithromycin, 80mg IV Lasix.

## 2018-06-13 NOTE — ED PROVIDER NOTE - MEDICAL DECISION MAKING DETAILS
pt meeting sepsis criteria in ED - febrile, tachy, source seems to be UTI - pt received 1.5 L of fluid out of 2.5 L (30 cc/kg) as pt showed no signs of  CHF and pt became acutely short of breath requiring bipap, lasix IV and iv nitro and sublingual - cxr showed increasing congestion - bedside echo with EF at baseline approx 55-60%% - 7 [facility] agrees on admission. pt meeting sepsis criteria in ED - febrile, tachy, source seems to be UTI - pt received 1.5 L of fluid out of 2.5 L (30 cc/kg) as pt showed no signs of  CHF and pt became acutely short of breath requiring bipap, lasix IV and iv nitro and sublingual - cxr showed increasing congestion - bedside echo with EF at baseline approx 55-60%% - 7 [facility] agrees on admission for sepsis with fluid overload, buckley placed to monitor I&O

## 2018-06-13 NOTE — CONSULT NOTE ADULT - SUBJECTIVE AND OBJECTIVE BOX
ICU Consult Medicine Resident Note  72 M with PMHx of polycystic kidney disease s/p kidney transplant (2007 at [facility], on Tacro/Prednisone), HTN, HLD, DM2, Prostate cancer s/p radical prostatectomy , DVT  (in setting of pelvic fracture), and osteoporosis presenting to the ED with c/o Chest pain, SOB, 3 day h/o fevers with rigors with urinary hesitancy with foul smelling urine with some suprapubic pain.    In the ED Urine was found to be +ve for a UTI with a Fever of 103 lactate of 2.3 with 14% bands. Pt was given 2.5lts of fluids with Ceftriaxone and Azithromycin. After this the pt was in  ever more SOB, found to be in pulmonary congestion on repeat CXR. Pt was given 80mg of lasix and placed on Bipap.   Pt since has improved breathing.         Past Medical History:  Past Surgical History:  Medications:  Allergies:  Social History:  Family History:    Physical Examination:   Vital Signs Last 24 Hrs  T(C): 39.4 (2018 17:08), Max: 39.4 (2018 17:08)  T(F): 103 (2018 17:08), Max: 103 (2018 17:08)  HR: 87 (2018 21:31) (87 - 105)  BP: 114/59 (2018 21:31) (106/67 - 136/71)  BP(mean): --  RR: 20 (2018 21:31) (20 - 34)  SpO2: 99% (2018 21:31) (94% - 100%)  I&O's Detail    CAPILLARY BLOOD GLUCOSE          Constitutional: WDWN resting comfortably in bed; NAD  Head: NC/AT  Eyes: PERRLA, EOMI, clear conjunctiva  ENT: unable to access as pt in on Bipap  Neck: supple; no JVD or thyromegaly  Respiratory: Good air entry b/l, bi basilar crackles no wheezes or other sounds heard.  Cardiac: +S1/S2; RRR; Possible MR  Gastrointestinal: obese, soft, Suprapubic tenderness; no rebound or guarding; +BS, no hepatosplenomegaly  Extremities: WWP, no clubbing or cyanosis; pitting edema 2+ at ankles  Vascular: 2+ radial, DP/PT pulses B/L  Lymphatic: no submandibular or cervical LAD  Neurologic: AAOx3; answers questions appropriately, follows commands, moves all extremities, CNII-XII grossly intact; no focal deficits, motor 5/5 in UE and LE, Reflexes 2+ in UE and LE b/l      Labs/Imaging:         CARDIAC MARKERS ( 2018 16:56 )  x     / 0.03 ng/mL / 45 U/L / x     / <1.0 ng/mL      CBC Full  -  ( 2018 16:56 )  WBC Count : 11.2 K/uL  Hemoglobin : 11.7 g/dL  Hematocrit : 35.3 %  Platelet Count - Automated : 130 K/uL  Mean Cell Volume : 88.0 fL  Mean Cell Hemoglobin : 29.2 pg  Mean Cell Hemoglobin Concentration : 33.1 g/dL  Auto Neutrophil # : x  Auto Lymphocyte # : x  Auto Monocyte # : x  Auto Eosinophil # : x  Auto Basophil # : x  Auto Neutrophil % : 78.0 %  Auto Lymphocyte % : 6.0 %  Auto Monocyte % : 2.0 %  Auto Eosinophil % : x  Auto Basophil % : x    -    137  |  99  |  41<H>  ----------------------------<  195<H>  4.0   |  23  |  1.76<H>    Ca    10.1      2018 16:56    TPro  7.1  /  Alb  3.5  /  TBili  1.4<H>  /  DBili  x   /  AST  20  /  ALT  15  /  AlkPhos  58  06-13    LIVER FUNCTIONS - ( 2018 16:56 )  Alb: 3.5 g/dL / Pro: 7.1 g/dL / ALK PHOS: 58 U/L / ALT: 15 U/L / AST: 20 U/L / GGT: x           PT/INR - ( 2018 16:56 )   PT: 16.6 sec;   INR: 1.48          PTT - ( 2018 16:56 )  PTT:33.7 sec  Urinalysis Basic - ( 2018 19:22 )    Color: Yellow / Appearance: Clear / S.010 / pH: x  Gluc: x / Ketone: NEGATIVE  / Bili: Negative / Urobili: 0.2 E.U./dL   Blood: x / Protein: 100 mg/dL / Nitrite: POSITIVE   Leuk Esterase: Moderate / RBC: > 10 /HPF / WBC Many /HPF   Sq Epi: x / Non Sq Epi: 0-5 /HPF / Bacteria: Present /HPF ICU Consult Medicine Resident Note  72 M with PMHx of polycystic kidney disease s/p kidney transplant (2007 at [facility], on Tacro/Prednisone), HTN, HLD, DM2, Prostate cancer s/p radical prostatectomy , DVT  (in setting of pelvic fracture), and osteoporosis presenting to the ED with c/o Chest pain, SOB, 3 day h/o fevers with rigors with urinary hesitancy with foul smelling urine with some suprapubic pain. Pt endorses GAFFNEY, decreased ET since Feb ( 9blocks to 2 blocks), Orthopnea sleeps on 2 pillows, denies PND.   Denies diaphoresis, denies radiating chest pain, cough, rhinorrhea, blurring of  vision, N/V/D, constipation, numbness or tingling or dizziness.    In the ED Urine was found to be +ve for a UTI with a Fever of 103 lactate of 2.3 with 14% bands. Pt was given 2.5lts of fluids with Ceftriaxone and Azithromycin. After this the pt was in  ever more SOB, found to be in pulmonary congestion on repeat CXR. Pt was given 80mg of lasix and placed on Bipap.   Pt since has improved breathing.         Past Medical History:  Past Surgical History:  Medications:  Allergies:  Social History:  Family History:    Physical Examination:   Vital Signs Last 24 Hrs  T(C): 39.4 (2018 17:08), Max: 39.4 (2018 17:08)  T(F): 103 (2018 17:08), Max: 103 (2018 17:08)  HR: 87 (2018 21:31) (87 - 105)  BP: 114/59 (2018 21:31) (106/67 - 136/71)  BP(mean): --  RR: 20 (2018 21:31) (20 - 34)  SpO2: 99% (2018 21:31) (94% - 100%)  I&O's Detail    CAPILLARY BLOOD GLUCOSE          Constitutional: WDWN resting comfortably in bed; NAD  Head: NC/AT  Eyes: PERRLA, EOMI, clear conjunctiva  ENT: unable to access as pt in on Bipap  Neck: supple; no JVD or thyromegaly  Respiratory: Good air entry b/l, bi basilar crackles no wheezes or other sounds heard.  Cardiac: +S1/S2; RRR; Possible MR  Gastrointestinal: obese, soft, Suprapubic tenderness; no rebound or guarding; +BS, no hepatosplenomegaly  Extremities: WWP, no clubbing or cyanosis; pitting edema 2+ at ankles  Vascular: 2+ radial, DP/PT pulses B/L  Lymphatic: no submandibular or cervical LAD  Neurologic: AAOx3; answers questions appropriately, follows commands, moves all extremities, CNII-XII grossly intact; no focal deficits, motor 5/5 in UE and LE, Reflexes 2+ in UE and LE b/l      Labs/Imaging:         CARDIAC MARKERS ( 2018 16:56 )  x     / 0.03 ng/mL / 45 U/L / x     / <1.0 ng/mL      CBC Full  -  ( 2018 16:56 )  WBC Count : 11.2 K/uL  Hemoglobin : 11.7 g/dL  Hematocrit : 35.3 %  Platelet Count - Automated : 130 K/uL  Mean Cell Volume : 88.0 fL  Mean Cell Hemoglobin : 29.2 pg  Mean Cell Hemoglobin Concentration : 33.1 g/dL  Auto Neutrophil # : x  Auto Lymphocyte # : x  Auto Monocyte # : x  Auto Eosinophil # : x  Auto Basophil # : x  Auto Neutrophil % : 78.0 %  Auto Lymphocyte % : 6.0 %  Auto Monocyte % : 2.0 %  Auto Eosinophil % : x  Auto Basophil % : x    06-13    137  |  99  |  41<H>  ----------------------------<  195<H>  4.0   |  23  |  1.76<H>    Ca    10.1      2018 16:56    TPro  7.1  /  Alb  3.5  /  TBili  1.4<H>  /  DBili  x   /  AST  20  /  ALT  15  /  AlkPhos  58  06-13    LIVER FUNCTIONS - ( 2018 16:56 )  Alb: 3.5 g/dL / Pro: 7.1 g/dL / ALK PHOS: 58 U/L / ALT: 15 U/L / AST: 20 U/L / GGT: x           PT/INR - ( 2018 16:56 )   PT: 16.6 sec;   INR: 1.48          PTT - ( 2018 16:56 )  PTT:33.7 sec  Urinalysis Basic - ( 2018 19:22 )    Color: Yellow / Appearance: Clear / S.010 / pH: x  Gluc: x / Ketone: NEGATIVE  / Bili: Negative / Urobili: 0.2 E.U./dL   Blood: x / Protein: 100 mg/dL / Nitrite: POSITIVE   Leuk Esterase: Moderate / RBC: > 10 /HPF / WBC Many /HPF   Sq Epi: x / Non Sq Epi: 0-5 /HPF / Bacteria: Present /HPF ICU Consult Medicine Resident Note  72 M with PMHx of polycystic kidney disease s/p kidney transplant ( at [facility], on Tacro/Prednisone), HTN, HLD, DM2, Prostate cancer s/p radical prostatectomy , DVT  (in setting of pelvic fracture), and osteoporosis with admission in Feb for PNA presenting to the ED with c/o Chest pain, SOB, 3 day h/o fevers with rigors with urinary hesitancy with foul smelling urine with some suprapubic pain. Pt endorses GAFFNEY, decreased ET since Feb ( 9blocks to 2 blocks), Orthopnea sleeps on 2 pillows, denies PND.   Denies diaphoresis, denies radiating chest pain, cough, rhinorrhea, blurring of  vision, N/V/D, constipation, numbness or tingling or dizziness.    In the ED Urine was found to be +ve for a UTI with a Fever of 103 lactate of 2.3 with 14% bands. Pt was given 2.5lts of fluids with Ceftriaxone and Azithromycin. After this the pt was in  ever more SOB, found to be in pulmonary congestion on repeat CXR. Pt was given 80mg of lasix and placed on Bipap.   Pt since has improved breathing.         Past Medical History:  Past Surgical History:  Medications:  Allergies:  Social History:  Family History:    Physical Examination:   Vital Signs Last 24 Hrs  T(C): 39.4 (2018 17:08), Max: 39.4 (2018 17:08)  T(F): 103 (2018 17:08), Max: 103 (2018 17:08)  HR: 87 (2018 21:31) (87 - 105)  BP: 114/59 (2018 21:31) (106/67 - 136/71)  BP(mean): --  RR: 20 (2018 21:31) (20 - 34)  SpO2: 99% (2018 21:31) (94% - 100%)  I&O's Detail    CAPILLARY BLOOD GLUCOSE          Constitutional: WDWN resting comfortably in bed; NAD  Head: NC/AT  Eyes: PERRLA, EOMI, clear conjunctiva  ENT: unable to access as pt in on Bipap  Neck: supple; no JVD or thyromegaly  Respiratory: Good air entry b/l, bi basilar crackles no wheezes or other sounds heard.  Cardiac: +S1/S2; RRR; Possible MR  Gastrointestinal: obese, soft, Suprapubic tenderness; no rebound or guarding; +BS, no hepatosplenomegaly  Extremities: WWP, no clubbing or cyanosis; pitting edema 2+ at ankles  Vascular: 2+ radial, DP/PT pulses B/L  Lymphatic: no submandibular or cervical LAD  Neurologic: AAOx3; answers questions appropriately, follows commands, moves all extremities, CNII-XII grossly intact; no focal deficits, motor 5/5 in UE and LE, Reflexes 2+ in UE and LE b/l      Labs/Imaging:         CARDIAC MARKERS ( 2018 16:56 )  x     / 0.03 ng/mL / 45 U/L / x     / <1.0 ng/mL      CBC Full  -  ( 2018 16:56 )  WBC Count : 11.2 K/uL  Hemoglobin : 11.7 g/dL  Hematocrit : 35.3 %  Platelet Count - Automated : 130 K/uL  Mean Cell Volume : 88.0 fL  Mean Cell Hemoglobin : 29.2 pg  Mean Cell Hemoglobin Concentration : 33.1 g/dL  Auto Neutrophil # : x  Auto Lymphocyte # : x  Auto Monocyte # : x  Auto Eosinophil # : x  Auto Basophil # : x  Auto Neutrophil % : 78.0 %  Auto Lymphocyte % : 6.0 %  Auto Monocyte % : 2.0 %  Auto Eosinophil % : x  Auto Basophil % : x    06-13    137  |  99  |  41<H>  ----------------------------<  195<H>  4.0   |  23  |  1.76<H>    Ca    10.1      2018 16:56    TPro  7.1  /  Alb  3.5  /  TBili  1.4<H>  /  DBili  x   /  AST  20  /  ALT  15  /  AlkPhos  58  06-13    LIVER FUNCTIONS - ( 2018 16:56 )  Alb: 3.5 g/dL / Pro: 7.1 g/dL / ALK PHOS: 58 U/L / ALT: 15 U/L / AST: 20 U/L / GGT: x           PT/INR - ( 2018 16:56 )   PT: 16.6 sec;   INR: 1.48          PTT - ( 2018 16:56 )  PTT:33.7 sec  Urinalysis Basic - ( 2018 19:22 )    Color: Yellow / Appearance: Clear / S.010 / pH: x  Gluc: x / Ketone: NEGATIVE  / Bili: Negative / Urobili: 0.2 E.U./dL   Blood: x / Protein: 100 mg/dL / Nitrite: POSITIVE   Leuk Esterase: Moderate / RBC: > 10 /HPF / WBC Many /HPF   Sq Epi: x / Non Sq Epi: 0-5 /HPF / Bacteria: Present /HPF ICU Consult Medicine Resident Note  72 M with PMHx of polycystic kidney disease s/p kidney transplant ( at [facility], on Tacro/Prednisone), HTN, HLD, DM2, Prostate cancer s/p radical prostatectomy , DVT  (in setting of pelvic fracture), and osteoporosis with admission in Feb for PNA presenting to the ED with c/o Chest pain, SOB, 3 day h/o fevers with rigors with urinary hesitancy with foul smelling urine with some suprapubic pain. Pt endorses decreased ET since Feb ( 9blocks to 2 blocks), Orthopnea sleeps on 2 pillows, denies PND.   Denies diaphoresis, denies radiating chest pain, cough, rhinorrhea, blurring of  vision, N/V/D, constipation, numbness or tingling or dizziness.    In the ED Urine was found to be +ve for a UTI with a Fever of 103 lactate of 2.3 with 14% bands. Pt was given 1.5lts of fluids with Ceftriaxone and Azithromycin. After this the pt was in  ever more SOB, found to be in pulmonary congestion on repeat CXR. Pt was given 80mg of lasix and placed on Bipap.   Pt since has improved breathing.         Past Medical History:  Past Surgical History:  Medications:  Allergies:  Social History:  Family History:    Physical Examination:   Vital Signs Last 24 Hrs  T(C): 39.4 (2018 17:08), Max: 39.4 (2018 17:08)  T(F): 103 (2018 17:08), Max: 103 (2018 17:08)  HR: 87 (2018 21:31) (87 - 105)  BP: 114/59 (2018 21:31) (106/67 - 136/71)  BP(mean): --  RR: 20 (2018 21:31) (20 - 34)  SpO2: 99% (2018 21:31) (94% - 100%)  I&O's Detail    CAPILLARY BLOOD GLUCOSE          Constitutional: WDWN resting comfortably in bed; NAD  Head: NC/AT  Eyes: PERRLA, EOMI, clear conjunctiva  ENT: unable to access as pt in on Bipap  Neck: supple; no JVD or thyromegaly  Respiratory: Good air entry b/l, bi basilar crackles no wheezes or other sounds heard.  Cardiac: +S1/S2; RRR; Possible MR  Gastrointestinal: obese, soft, Suprapubic tenderness; no rebound or guarding; +BS, no hepatosplenomegaly  Extremities: WWP, no clubbing or cyanosis; pitting edema 2+ at ankles  Vascular: 2+ radial, DP/PT pulses B/L  Lymphatic: no submandibular or cervical LAD  Neurologic: AAOx3; answers questions appropriately, follows commands, moves all extremities, CNII-XII grossly intact; no focal deficits, motor 5/5 in UE and LE, Reflexes 2+ in UE and LE b/l      Labs/Imaging:         CARDIAC MARKERS ( 2018 16:56 )  x     / 0.03 ng/mL / 45 U/L / x     / <1.0 ng/mL      CBC Full  -  ( 2018 16:56 )  WBC Count : 11.2 K/uL  Hemoglobin : 11.7 g/dL  Hematocrit : 35.3 %  Platelet Count - Automated : 130 K/uL  Mean Cell Volume : 88.0 fL  Mean Cell Hemoglobin : 29.2 pg  Mean Cell Hemoglobin Concentration : 33.1 g/dL  Auto Neutrophil # : x  Auto Lymphocyte # : x  Auto Monocyte # : x  Auto Eosinophil # : x  Auto Basophil # : x  Auto Neutrophil % : 78.0 %  Auto Lymphocyte % : 6.0 %  Auto Monocyte % : 2.0 %  Auto Eosinophil % : x  Auto Basophil % : x    06-13    137  |  99  |  41<H>  ----------------------------<  195<H>  4.0   |  23  |  1.76<H>    Ca    10.1      2018 16:56    TPro  7.1  /  Alb  3.5  /  TBili  1.4<H>  /  DBili  x   /  AST  20  /  ALT  15  /  AlkPhos  58  06-13    LIVER FUNCTIONS - ( 2018 16:56 )  Alb: 3.5 g/dL / Pro: 7.1 g/dL / ALK PHOS: 58 U/L / ALT: 15 U/L / AST: 20 U/L / GGT: x           PT/INR - ( 2018 16:56 )   PT: 16.6 sec;   INR: 1.48          PTT - ( 2018 16:56 )  PTT:33.7 sec  Urinalysis Basic - ( 2018 19:22 )    Color: Yellow / Appearance: Clear / S.010 / pH: x  Gluc: x / Ketone: NEGATIVE  / Bili: Negative / Urobili: 0.2 E.U./dL   Blood: x / Protein: 100 mg/dL / Nitrite: POSITIVE   Leuk Esterase: Moderate / RBC: > 10 /HPF / WBC Many /HPF   Sq Epi: x / Non Sq Epi: 0-5 /HPF / Bacteria: Present /HPF

## 2018-06-13 NOTE — ED PROVIDER NOTE - CRITICAL CARE PROVIDED
direct patient care (not related to procedure)/additional history taking/consultation with other physicians/interpretation of diagnostic studies/consult w/ pt's family directly relating to pts condition/telephone consultation with the patient's family/documentation/conducted a detailed discussion of DNR status

## 2018-06-13 NOTE — H&P ADULT - PROBLEM SELECTOR PLAN 4
- Normotensive at present  - Hold home antihypertensive Coreg 12.5mg PO BID in setting of severe sepsis  - Monitor BP - Normotensive at present  - Hold home antihypertensive Coreg 12.5mg PO BID in setting of severe sepsis, low threshold to resume upon improvement of sepsis or development of tachycardia  - Monitor BP - Normotensive at present  - Hold home antihypertensives amlodipine 5mg qam and Coreg 12.5mg PO BID in setting of severe sepsis  - low threshold to resume Coreg upon improvement of sepsis or development of tachycardia  - Monitor BP

## 2018-06-13 NOTE — H&P ADULT - NSHPPHYSICALEXAM_GEN_ALL_CORE
.  VITAL SIGNS:  T(C): 36.4 (06-14-18 @ 00:20), Max: 39.4 (06-13-18 @ 17:08)  T(F): 97.5 (06-14-18 @ 00:20), Max: 103 (06-13-18 @ 17:08)  HR: 85 (06-14-18 @ 00:20) (84 - 105)  BP: 118/60 (06-14-18 @ 00:20) (106/67 - 136/71)  BP(mean): 80 (06-14-18 @ 00:20) (80 - 80)  RR: 25 (06-14-18 @ 00:20) (20 - 34)  SpO2: 98% (06-14-18 @ 00:20) (94% - 100%)  Wt(kg): --    PHYSICAL EXAM:    Constitutional: WDWN M resting comfortably in bed; NAD  Head: NC/AT, bipap mask in place  Eyes: EOMI, anicteric sclera  ENT: no nasal discharge, MMM  Neck: supple; no JVD appreciated  Respiratory: CTA B/L; no W/R/R, no increased work of breathing  Cardiac: +S1/S2; regular rate, irregular rhythm (bigeminy on monitor)  Gastrointestinal: soft, NT/ND; no rebound or guarding; +BS  Extremities: LUE with prior site of dialysis fistula (no longer in use) near wrist/forearm, no cyanosis; no peripheral edema  Musculoskeletal: NROM x4; no joint swelling, tenderness or erythema  Vascular: 2+ radial pulses B/L  Dermatologic: skin warm, dry and intact  Neurologic: Alert and oriented, no focal deficits appreciated  Psychiatric: affect and characteristics of appearance, verbalizations, behaviors are appropriate .  VITAL SIGNS:  T(C): 36.4 (06-14-18 @ 00:20), Max: 39.4 (06-13-18 @ 17:08)  T(F): 97.5 (06-14-18 @ 00:20), Max: 103 (06-13-18 @ 17:08)  HR: 85 (06-14-18 @ 00:20) (84 - 105)  BP: 118/60 (06-14-18 @ 00:20) (106/67 - 136/71)  BP(mean): 80 (06-14-18 @ 00:20) (80 - 80)  RR: 25 (06-14-18 @ 00:20) (20 - 34)  SpO2: 98% (06-14-18 @ 00:20) (94% - 100%)  Wt(kg): --    PHYSICAL EXAM:    Constitutional: WDWN M resting comfortably in bed; NAD  Head: NC/AT, bipap mask in place  Eyes: EOMI, anicteric sclera  ENT: no nasal discharge, MMM  Neck: supple; no JVD appreciated  Respiratory: CTA B/L; no W/R/R, no increased work of breathing  Cardiac: +S1/S2; regular rate, irregular rhythm (bigeminy on monitor)  Gastrointestinal: soft, NT/ND; no rebound or guarding; +BS  Extremities: LUE with prior site of dialysis fistula (no longer in use) near wrist/forearm, no cyanosis; 1+ LE edema b/l  Musculoskeletal: NROM x4  Vascular: 2+ radial pulses B/L  Dermatologic: skin warm, dry and intact  Neurologic: Alert and oriented, no focal deficits appreciated  Psychiatric: affect and characteristics of appearance, verbalizations, behaviors are appropriate

## 2018-06-13 NOTE — ED ADULT NURSE NOTE - OBJECTIVE STATEMENT
71 y/o male c/o left sided chest pain that began 3 days ago associated w/ SOB and mild generalized headache. Denies fever, cough, chills, abdominal pain, nausea, vomiting, back pain, leg swelling, and any other associated symptoms.

## 2018-06-14 ENCOUNTER — APPOINTMENT (OUTPATIENT)
Dept: HEART AND VASCULAR | Facility: CLINIC | Age: 73
End: 2018-06-14

## 2018-06-14 LAB
ALBUMIN SERPL ELPH-MCNC: 3.1 G/DL — LOW (ref 3.3–5)
ALP SERPL-CCNC: 61 U/L — SIGNIFICANT CHANGE UP (ref 40–120)
ALT FLD-CCNC: 14 U/L — SIGNIFICANT CHANGE UP (ref 10–45)
ANION GAP SERPL CALC-SCNC: 16 MMOL/L — SIGNIFICANT CHANGE UP (ref 5–17)
AST SERPL-CCNC: 19 U/L — SIGNIFICANT CHANGE UP (ref 10–40)
BILIRUB SERPL-MCNC: 1.1 MG/DL — SIGNIFICANT CHANGE UP (ref 0.2–1.2)
BUN SERPL-MCNC: 32 MG/DL — HIGH (ref 7–23)
CALCIUM SERPL-MCNC: 9.6 MG/DL — SIGNIFICANT CHANGE UP (ref 8.4–10.5)
CHLORIDE SERPL-SCNC: 104 MMOL/L — SIGNIFICANT CHANGE UP (ref 96–108)
CO2 SERPL-SCNC: 23 MMOL/L — SIGNIFICANT CHANGE UP (ref 22–31)
CREAT ?TM UR-MCNC: 36 MG/DL — SIGNIFICANT CHANGE UP
CREAT SERPL-MCNC: 1.68 MG/DL — HIGH (ref 0.5–1.3)
E COLI DNA BLD POS QL NAA+NON-PROBE: SIGNIFICANT CHANGE UP
FERRITIN SERPL-MCNC: 208 NG/ML — SIGNIFICANT CHANGE UP (ref 30–400)
GLUCOSE BLDC GLUCOMTR-MCNC: 167 MG/DL — HIGH (ref 70–99)
GLUCOSE BLDC GLUCOMTR-MCNC: 208 MG/DL — HIGH (ref 70–99)
GLUCOSE BLDC GLUCOMTR-MCNC: 211 MG/DL — HIGH (ref 70–99)
GLUCOSE SERPL-MCNC: 167 MG/DL — HIGH (ref 70–99)
GRAM STN FLD: SIGNIFICANT CHANGE UP
GRAM STN FLD: SIGNIFICANT CHANGE UP
HBA1C BLD-MCNC: 7.3 % — HIGH (ref 4–5.6)
HCT VFR BLD CALC: 34.8 % — LOW (ref 39–50)
HGB BLD-MCNC: 11.3 G/DL — LOW (ref 13–17)
IRON SATN MFR SERPL: 10 UG/DL — LOW (ref 45–165)
IRON SATN MFR SERPL: 6 % — LOW (ref 16–55)
LACTATE SERPL-SCNC: 1.6 MMOL/L — SIGNIFICANT CHANGE UP (ref 0.5–2)
MAGNESIUM SERPL-MCNC: 1.7 MG/DL — SIGNIFICANT CHANGE UP (ref 1.6–2.6)
MCHC RBC-ENTMCNC: 28.9 PG — SIGNIFICANT CHANGE UP (ref 27–34)
MCHC RBC-ENTMCNC: 32.5 G/DL — SIGNIFICANT CHANGE UP (ref 32–36)
MCV RBC AUTO: 89 FL — SIGNIFICANT CHANGE UP (ref 80–100)
METHOD TYPE: SIGNIFICANT CHANGE UP
PLATELET # BLD AUTO: 127 K/UL — LOW (ref 150–400)
POTASSIUM SERPL-MCNC: 3.6 MMOL/L — SIGNIFICANT CHANGE UP (ref 3.5–5.3)
POTASSIUM SERPL-SCNC: 3.6 MMOL/L — SIGNIFICANT CHANGE UP (ref 3.5–5.3)
PROT SERPL-MCNC: 6.6 G/DL — SIGNIFICANT CHANGE UP (ref 6–8.3)
RBC # BLD: 3.91 M/UL — LOW (ref 4.2–5.8)
RBC # FLD: 15.7 % — SIGNIFICANT CHANGE UP (ref 10.3–16.9)
SODIUM SERPL-SCNC: 143 MMOL/L — SIGNIFICANT CHANGE UP (ref 135–145)
SODIUM UR-SCNC: 89 MMOL/L — SIGNIFICANT CHANGE UP
TIBC SERPL-MCNC: 169 UG/DL — LOW (ref 220–430)
TRANSFERRIN SERPL-MCNC: 124 MG/DL — LOW (ref 200–360)
TROPONIN T SERPL-MCNC: 0.02 NG/ML — HIGH (ref 0–0.01)
TROPONIN T SERPL-MCNC: 0.03 NG/ML — HIGH (ref 0–0.01)
UIBC SERPL-MCNC: 159 UG/DL — SIGNIFICANT CHANGE UP (ref 110–370)
UUN UR-MCNC: 355 MG/DL — SIGNIFICANT CHANGE UP
WBC # BLD: 12.8 K/UL — HIGH (ref 3.8–10.5)
WBC # FLD AUTO: 12.8 K/UL — HIGH (ref 3.8–10.5)

## 2018-06-14 PROCEDURE — 93306 TTE W/DOPPLER COMPLETE: CPT | Mod: 26

## 2018-06-14 PROCEDURE — 93975 VASCULAR STUDY: CPT | Mod: 26

## 2018-06-14 PROCEDURE — 99233 SBSQ HOSP IP/OBS HIGH 50: CPT | Mod: GC

## 2018-06-14 PROCEDURE — 71045 X-RAY EXAM CHEST 1 VIEW: CPT | Mod: 26

## 2018-06-14 RX ORDER — ACETAMINOPHEN 500 MG
650 TABLET ORAL EVERY 6 HOURS
Qty: 0 | Refills: 0 | Status: DISCONTINUED | OUTPATIENT
Start: 2018-06-14 | End: 2018-06-17

## 2018-06-14 RX ORDER — PIPERACILLIN AND TAZOBACTAM 4; .5 G/20ML; G/20ML
3.38 INJECTION, POWDER, LYOPHILIZED, FOR SOLUTION INTRAVENOUS EVERY 6 HOURS
Qty: 0 | Refills: 0 | Status: DISCONTINUED | OUTPATIENT
Start: 2018-06-14 | End: 2018-06-15

## 2018-06-14 RX ORDER — FUROSEMIDE 40 MG
20 TABLET ORAL ONCE
Qty: 0 | Refills: 0 | Status: COMPLETED | OUTPATIENT
Start: 2018-06-14 | End: 2018-06-14

## 2018-06-14 RX ADMIN — APIXABAN 5 MILLIGRAM(S): 2.5 TABLET, FILM COATED ORAL at 22:00

## 2018-06-14 RX ADMIN — TACROLIMUS 1 MILLIGRAM(S): 5 CAPSULE ORAL at 18:03

## 2018-06-14 RX ADMIN — APIXABAN 5 MILLIGRAM(S): 2.5 TABLET, FILM COATED ORAL at 09:16

## 2018-06-14 RX ADMIN — TACROLIMUS 2 MILLIGRAM(S): 5 CAPSULE ORAL at 09:17

## 2018-06-14 RX ADMIN — Medication 4: at 22:17

## 2018-06-14 RX ADMIN — Medication 1000 UNIT(S): at 18:02

## 2018-06-14 RX ADMIN — ATORVASTATIN CALCIUM 20 MILLIGRAM(S): 80 TABLET, FILM COATED ORAL at 22:00

## 2018-06-14 RX ADMIN — Medication 650 MILLIGRAM(S): at 09:42

## 2018-06-14 RX ADMIN — PIPERACILLIN AND TAZOBACTAM 200 GRAM(S): 4; .5 INJECTION, POWDER, LYOPHILIZED, FOR SOLUTION INTRAVENOUS at 23:11

## 2018-06-14 RX ADMIN — Medication 4: at 17:32

## 2018-06-14 RX ADMIN — Medication 2: at 08:21

## 2018-06-14 RX ADMIN — Medication 650 MILLIGRAM(S): at 10:30

## 2018-06-14 RX ADMIN — Medication 5 MILLIGRAM(S): at 09:15

## 2018-06-14 RX ADMIN — PIPERACILLIN AND TAZOBACTAM 200 GRAM(S): 4; .5 INJECTION, POWDER, LYOPHILIZED, FOR SOLUTION INTRAVENOUS at 17:36

## 2018-06-14 RX ADMIN — PIPERACILLIN AND TAZOBACTAM 200 GRAM(S): 4; .5 INJECTION, POWDER, LYOPHILIZED, FOR SOLUTION INTRAVENOUS at 10:37

## 2018-06-14 RX ADMIN — Medication 20 MILLIGRAM(S): at 09:42

## 2018-06-14 NOTE — PROGRESS NOTE ADULT - PROBLEM SELECTOR PLAN 2
Patient febrile to 103F in ED. Meeting sepsis criteria with urinary source. Lactate cleared s/p IVF.  - CXR without clear source of sepsis  - Trend fever curve, WBC  - C/w ceftriaxone 1g qd, Day 2 as of 6/14  - F/u blood cultures, urine culture  - Continue with telemetry monitoring

## 2018-06-14 NOTE — PROGRESS NOTE ADULT - ASSESSMENT
72 year old M with PMH PCKD, s/p renal transplant in 2007 at [facility] (on tacrolimus and prednisone), HTN, HLD, DM2, prostate cancer (s/p radical prostatectomy 2015), DVT (2006, d/t pelvic fracture), osteoporosis presents with urosepsis, complicated by acute hypoxic respiratory failure secondary to fluid overload requiring BiPAP, admitted to [facility] for further medical management. 72 year old M with PMH PCKD, s/p renal transplant in 2007 at [facility] (on tacrolimus and prednisone), HTN, HLD, DM2, prostate cancer (s/p radical prostatectomy 2015), DVT (2006, d/t pelvic fracture), osteoporosis presents with urosepsis with gram negative bacteremia, complicated by acute hypoxic respiratory failure secondary to fluid overload.

## 2018-06-14 NOTE — PROGRESS NOTE ADULT - PROBLEM SELECTOR PLAN 3
UA grossly positive on admission, patient endorsed urinary retention and malodorous urine x3 days leading up to admission.  - Continue to trend fever curve, WBC  - Continue with ceftriaxone 1g qd, Day 2 as of 6/14  - F/u urine culture UA grossly positive on admission, patient endorsed urinary retention and malodorous urine x3 days leading up to admission.   - Continue to trend fever curve, WBC  - Ucx + for GNR, pending speciation, f/u  - Broadened abx from Ceftriaxone to Zosyn for pseudomonal coverage today in light of immunosuppressed state (hx of renal transplant)

## 2018-06-14 NOTE — PHYSICAL THERAPY INITIAL EVALUATION ADULT - GENERAL OBSERVATIONS, REHAB EVAL
Patient received semi-supine, No apparent distress, +buckley, +tele, +IV, +4L of O2 ( nasal cannula).

## 2018-06-14 NOTE — PROGRESS NOTE ADULT - PROBLEM SELECTOR PLAN 4
History of HTN, on Amlodipine, Coreg at home, Currently normotensive   - Continue to hold home antihypertensives in setting of severe sepsis, add on PRN  - Continue to closely monitor BPs

## 2018-06-14 NOTE — PROGRESS NOTE ADULT - PROBLEM SELECTOR PLAN 8
F: No IVF indicated  E: Replete electrolytes PRN, Goal: K>4, Mg>2  N: NPO while on BiPAP    Ppx: SCD, Eliquis  Code Full  Dispo: [facility]  Transition of care: Dr. Rios PCP [phone] F: No IVF indicated  E: Replete electrolytes PRN, Goal: K>4, Mg>2  N: transitioned from BiPAP to NFNC, tolerating well    Ppx: SCD, Eliquis  Code Full  Dispo: [facility]  Transition of care: Dr. Rios PCP [phone]

## 2018-06-14 NOTE — PROGRESS NOTE ADULT - PROBLEM SELECTOR PLAN 1
Patient s/p 2.5L fluid in ED for resuscitation in the setting of severe sepsis, experienced acute hypoxic respiratory failure with CXR demonstrating pulmonary edema. s/p Lasix 80 mg IVP x1, placed on BiPAP with improvement in respiratory status.  - No known history of CHF, f/u echo to assess cardiac function. Obtain collateral information as patient reports previous echo normal with Dr. Gandara several months ago  - Continue BiPAP and monitor respiratory status closely, wean as tolerated from 100% oxygen  - S/p Lasix 80mg IV, additional Lasix as necessary   - F/u AM CXR  - Strict I/O Patient s/p 2.5L fluid in ED for resuscitation in the setting of severe sepsis, experienced acute hypoxic respiratory failure with CXR demonstrating pulmonary edema. s/p Lasix 80 mg IVP x1, placed on BiPAP with improvement in respiratory status.  - No known history of CHF, f/u echo to assess cardiac function. Obtain collateral information as patient reports previous echo normal with Dr. Gandara several months ago  - Continue BiPAP and monitor respiratory status closely, wean as tolerated   - S/p Lasix 80mg IV, Lasix PRN  - F/u AM CXR  - UOP -900 cc ON. Maintain strict I/Os Improving. Patient s/p 2.5L fluid in ED for resuscitation in the setting of severe sepsis, experienced acute hypoxic respiratory failure with CXR demonstrating pulmonary edema. s/p Lasix 80 mg IVP x1, placed on BiPAP with improvement in respiratory status, now transitioned to HFNC, saturating well.   - No known history of CHF, f/u echo to assess cardiac function. Obtain collateral information as patient reports previous echo normal with Dr. Gandara/ Dr Dowd several months ago  - Transition from BiPAP to HFNC today, wean off as tolerated  - S/p Lasix 80mg IV in ED, still clinically overloaded on exam with bibasilar rales and  notable vascular congestion on AM CXR, will give an additional Lasix 20 mg IVP x1.  - UOP -900 cc ON. Maintain strict I/Os

## 2018-06-14 NOTE — PHYSICAL THERAPY INITIAL EVALUATION ADULT - PERTINENT HX OF CURRENT PROBLEM, REHAB EVAL
72 year old M, Cheondoism, with PMH PCKD, s/p renal transplant in 2007 at [facility] (on tacrolimus and prednisone), HTN, HLD, DM2, prostate cancer (s/p radical prostatectomy 2015), DVT (2006, d/t pelvic fracture), osteoporosis presents with three days of chest pain, shortness of breath, rigors, urinary hesitancy and malodor.

## 2018-06-14 NOTE — PROGRESS NOTE ADULT - PROBLEM SELECTOR PLAN 6
Patient with known history of DM, on Glimeperide, Metformin and Januvia at home, unknown last HbA1c  - Hold home diabetic medications  - ISS, no basal/bolus regimen indicated at this time, adjust regimen accordingly  -Monitor FS q6h while NPO on BiPAP  - f/u HbA1c Patient with known history of DM, on Glimeperide, Metformin and Januvia at home, unknown last HbA1c  - Hold home diabetic medications  - ISS, no basal/bolus regimen indicated at this time, adjust regimen accordingly  - Monitor FS with meals and bedtime  - f/u HbA1c

## 2018-06-14 NOTE — PHYSICAL THERAPY INITIAL EVALUATION ADULT - ADDITIONAL COMMENTS
Patient lives in elevator apartment, no steps to enter. Patient denies home health assistance or history of falls.

## 2018-06-14 NOTE — PROGRESS NOTE ADULT - PROBLEM SELECTOR PLAN 7
History of kidney transplant (2007) for history of PCKD, on tacrolimus and Prednisone at home  -C/w home medications tacrolimus and prednisone History of kidney transplant (2007) for history of PCKD, on tacrolimus and Prednisone at home  -C/w home medications tacrolimus and prednisone  -f/u tacrolimus level

## 2018-06-14 NOTE — PHYSICAL THERAPY INITIAL EVALUATION ADULT - ACTIVE RANGE OF MOTION EXAMINATION, REHAB EVAL
bilateral upper extremity Active ROM was WFL (within functional limits)/bilateral  lower extremity Active ROM was WFL (within functional limits)/except R hip flexion (limited secondary to fused hip)/deficits as listed below

## 2018-06-14 NOTE — PROGRESS NOTE ADULT - SUBJECTIVE AND OBJECTIVE BOX
OVERNIGHT EVENTS: BiPAP ON. Admitted to [facility].    SUBJECTIVE / INTERVAL HPI: Patient seen and examined at bedside. Patient endorses feeling overall significantly better than last night when he presented to [facility]. Patient no longer endorsing chest pain (previously described as localized to the L chest wall, non-radiating, pressure like in quality) however he endorses this may have been likely 2/2 anxiety in light of his presentation at the time. Patient no longer complaining of shortness of breath, feels much improved on BiPAP. Otherwise, denies any fevers, chills, NS, cough, sore throat, cough, nasuea/vomiting. Remainder of ROS negative.     VITAL SIGNS:  Vital Signs Last 24 Hrs  T(C): 36.8 (2018 05:26), Max: 39.4 (2018 17:08)  T(F): 98.3 (2018 05:26), Max: 103 (2018 17:08)  HR: 83 (2018 05:38) (80 - 105)  BP: 126/70 (2018 05:17) (106/67 - 136/71)  BP(mean): 91 (2018 05:17) (80 - 91)  RR: 20 (2018 05:38) (15 - 34)  SpO2: 100% (2018 05:38) (94% - 100%)    PHYSICAL EXAM:    General: WDWN  HEENT: NC/AT; PERRL, anicteric sclera; MMM  Neck: supple  Cardiovascular: +S1/S2; RRR  Respiratory: CTA B/L; no W/R/R  Gastrointestinal: soft, NT/ND; +BSx4  Extremities: WWP; no edema, clubbing or cyanosis  Vascular: 2+ radial, DP/PT pulses B/L  Neurological: AAOx3; no focal deficits    MEDICATIONS:  MEDICATIONS  (STANDING):  apixaban 5 milliGRAM(s) Oral every 12 hours  atorvastatin 20 milliGRAM(s) Oral at bedtime  cefTRIAXone Injectable. 1000 milliGRAM(s) IV Push every 24 hours  cholecalciferol 1000 Unit(s) Oral two times a day  dextrose 5%. 1000 milliLiter(s) (50 mL/Hr) IV Continuous <Continuous>  dextrose 50% Injectable 12.5 Gram(s) IV Push once  dextrose 50% Injectable 25 Gram(s) IV Push once  dextrose 50% Injectable 25 Gram(s) IV Push once  insulin lispro (HumaLOG) corrective regimen sliding scale   SubCutaneous Before meals and at bedtime  predniSONE   Tablet 5 milliGRAM(s) Oral daily  tacrolimus 1 milliGRAM(s) Oral <User Schedule>  tacrolimus 2 milliGRAM(s) Oral <User Schedule>    MEDICATIONS  (PRN):  dextrose 40% Gel 15 Gram(s) Oral once PRN Blood Glucose LESS THAN 70 milliGRAM(s)/deciliter  glucagon  Injectable 1 milliGRAM(s) IntraMuscular once PRN Glucose LESS THAN 70 milligrams/deciliter      ALLERGIES:  Allergies    Naprosyn (Unknown)    Intolerances        LABS:                        11.7   11.2  )-----------( 130      ( 2018 16:56 )             35.3     06-13    137  |  99  |  41<H>  ----------------------------<  195<H>  4.0   |  23  |  1.76<H>    Ca    10.1      2018 16:56    TPro  7.1  /  Alb  3.5  /  TBili  1.4<H>  /  DBili  x   /  AST  20  /  ALT  15  /  AlkPhos  58  06-13    PT/INR - ( 2018 16:56 )   PT: 16.6 sec;   INR: 1.48          PTT - ( 2018 16:56 )  PTT:33.7 sec  Urinalysis Basic - ( 2018 19:22 )    Color: Yellow / Appearance: Clear / S.010 / pH: x  Gluc: x / Ketone: NEGATIVE  / Bili: Negative / Urobili: 0.2 E.U./dL   Blood: x / Protein: 100 mg/dL / Nitrite: POSITIVE   Leuk Esterase: Moderate / RBC: > 10 /HPF / WBC Many /HPF   Sq Epi: x / Non Sq Epi: 0-5 /HPF / Bacteria: Present /HPF      CAPILLARY BLOOD GLUCOSE          RADIOLOGY & ADDITIONAL TESTS: Reviewed.    ASSESSMENT:    PLAN: OVERNIGHT EVENTS: BiPAP ON. Admitted to [facility].    SUBJECTIVE / INTERVAL HPI: Patient seen and examined at bedside. Patient endorses feeling overall significantly better than last night when he presented to [facility]. Patient no longer endorsing chest pain (previously described as localized to the L chest wall, non-radiating, pressure like in quality) however he endorses this may have been likely 2/2 anxiety in light of his presentation at the time. Patient no longer complaining of shortness of breath, feels much improved on BiPAP. Otherwise, denies any fevers, chills, NS, cough, sore throat, cough, nasuea/vomiting. Remainder of ROS negative.     VITAL SIGNS:  Vital Signs Last 24 Hrs  T(C): 36.8 (2018 05:26), Max: 39.4 (2018 17:08)  T(F): 98.3 (2018 05:26), Max: 103 (2018 17:08)  HR: 83 (2018 05:38) (80 - 105)  BP: 126/70 (2018 05:17) (106/67 - 136/71)  BP(mean): 91 (2018 05:17) (80 - 91)  RR: 20 (2018 05:38) (15 - 34)  SpO2: 100% (2018 05:38) (94% - 100%)    PHYSICAL EXAM:    General: WDWN  male, NAD, on BiPAP, pleasant  HEENT: NC/AT; PERRL, anicteric sclera, unable to assess oropharynx while on BiPAP  Neck: supple  Lymph: no cervical or supraclavicular LAD  Cardiovascular: +S1/S2; RRR, no m/r/g appreciated on auscultation  Respiratory: CTA B/L; no W/R/R, non-labored breathing, speaking in full sentences, on BiPAP  Gastrointestinal: soft, NT/ND; +BSx4  Extremities: WWP; no edema, clubbing or cyanosis, L forearm with AV fistula (+ bruit)  Vascular: 2+ radial, DP/PT pulses B/L  Neurological: AAOx3; no focal deficits, intact sensation throughout    MEDICATIONS:  MEDICATIONS  (STANDING):  apixaban 5 milliGRAM(s) Oral every 12 hours  atorvastatin 20 milliGRAM(s) Oral at bedtime  cefTRIAXone Injectable. 1000 milliGRAM(s) IV Push every 24 hours  cholecalciferol 1000 Unit(s) Oral two times a day  dextrose 5%. 1000 milliLiter(s) (50 mL/Hr) IV Continuous <Continuous>  dextrose 50% Injectable 12.5 Gram(s) IV Push once  dextrose 50% Injectable 25 Gram(s) IV Push once  dextrose 50% Injectable 25 Gram(s) IV Push once  insulin lispro (HumaLOG) corrective regimen sliding scale   SubCutaneous Before meals and at bedtime  predniSONE   Tablet 5 milliGRAM(s) Oral daily  tacrolimus 1 milliGRAM(s) Oral <User Schedule>  tacrolimus 2 milliGRAM(s) Oral <User Schedule>    MEDICATIONS  (PRN):  dextrose 40% Gel 15 Gram(s) Oral once PRN Blood Glucose LESS THAN 70 milliGRAM(s)/deciliter  glucagon  Injectable 1 milliGRAM(s) IntraMuscular once PRN Glucose LESS THAN 70 milligrams/deciliter      ALLERGIES:  Allergies    Naprosyn (Unknown)    Intolerances        LABS:                        11.7   11.2  )-----------( 130      ( 2018 16:56 )             35.3     06-13    137  |  99  |  41<H>  ----------------------------<  195<H>  4.0   |  23  |  1.76<H>    Ca    10.1      2018 16:56    TPro  7.1  /  Alb  3.5  /  TBili  1.4<H>  /  DBili  x   /  AST  20  /  ALT  15  /  AlkPhos  58  06-13    PT/INR - ( 2018 16:56 )   PT: 16.6 sec;   INR: 1.48          PTT - ( 2018 16:56 )  PTT:33.7 sec  Urinalysis Basic - ( 2018 19:22 )    Color: Yellow / Appearance: Clear / S.010 / pH: x  Gluc: x / Ketone: NEGATIVE  / Bili: Negative / Urobili: 0.2 E.U./dL   Blood: x / Protein: 100 mg/dL / Nitrite: POSITIVE   Leuk Esterase: Moderate / RBC: > 10 /HPF / WBC Many /HPF   Sq Epi: x / Non Sq Epi: 0-5 /HPF / Bacteria: Present /HPF      CAPILLARY BLOOD GLUCOSE          RADIOLOGY & ADDITIONAL TESTS: Reviewed.    ASSESSMENT:    PLAN: OVERNIGHT EVENTS: BiPAP ON. Admitted to [facility].    SUBJECTIVE / INTERVAL HPI: Patient seen and examined at bedside. Patient endorses feeling overall significantly better than last night when he presented to [facility]. Patient no longer endorsing chest pain (previously described as localized to the L chest wall, non-radiating, pressure like in quality) however he endorses this may have been likely 2/2 anxiety in light of his presentation at the time. Patient no longer complaining of shortness of breath, feels much improved on BiPAP. Otherwise, denies any fevers, chills, NS, cough, sore throat, cough, nasuea/vomiting. Remainder of ROS negative.     VITAL SIGNS:  Vital Signs Last 24 Hrs  T(C): 36.8 (2018 05:26), Max: 39.4 (2018 17:08)  T(F): 98.3 (2018 05:26), Max: 103 (2018 17:08)  HR: 83 (2018 05:38) (80 - 105)  BP: 126/70 (2018 05:17) (106/67 - 136/71)  BP(mean): 91 (2018 05:17) (80 - 91)  RR: 20 (2018 05:38) (15 - 34)  SpO2: 100% (2018 05:38) (94% - 100%)    PHYSICAL EXAM:    General: WDWN  male, NAD, on BiPAP, pleasant  HEENT: NC/AT; PERRL, anicteric sclera, unable to assess oropharynx while on BiPAP  Neck: supple  Lymph: no cervical or supraclavicular LAD  Cardiovascular: +S1/S2; RRR, no m/r/g appreciated on auscultation  Respiratory: no rhonchi or wheezing on exam, + bibasilar rales, remainder CTA b/l; non-labored breathing, speaking in full sentences, on BiPAP  Gastrointestinal: soft, NT/ND; +BSx4  Extremities: WWP; no edema, clubbing or cyanosis, L forearm with AV fistula (+ bruit)  Vascular: 2+ radial, DP/PT pulses B/L  Neurological: AAOx3; no focal deficits, intact sensation throughout    MEDICATIONS:  MEDICATIONS  (STANDING):  apixaban 5 milliGRAM(s) Oral every 12 hours  atorvastatin 20 milliGRAM(s) Oral at bedtime  cefTRIAXone Injectable. 1000 milliGRAM(s) IV Push every 24 hours  cholecalciferol 1000 Unit(s) Oral two times a day  dextrose 5%. 1000 milliLiter(s) (50 mL/Hr) IV Continuous <Continuous>  dextrose 50% Injectable 12.5 Gram(s) IV Push once  dextrose 50% Injectable 25 Gram(s) IV Push once  dextrose 50% Injectable 25 Gram(s) IV Push once  insulin lispro (HumaLOG) corrective regimen sliding scale   SubCutaneous Before meals and at bedtime  predniSONE   Tablet 5 milliGRAM(s) Oral daily  tacrolimus 1 milliGRAM(s) Oral <User Schedule>  tacrolimus 2 milliGRAM(s) Oral <User Schedule>    MEDICATIONS  (PRN):  dextrose 40% Gel 15 Gram(s) Oral once PRN Blood Glucose LESS THAN 70 milliGRAM(s)/deciliter  glucagon  Injectable 1 milliGRAM(s) IntraMuscular once PRN Glucose LESS THAN 70 milligrams/deciliter      ALLERGIES:  Allergies    Naprosyn (Unknown)    Intolerances        LABS:                        11.7   11.2  )-----------( 130      ( 2018 16:56 )             35.3     06-13    137  |  99  |  41<H>  ----------------------------<  195<H>  4.0   |  23  |  1.76<H>    Ca    10.1      2018 16:56    TPro  7.1  /  Alb  3.5  /  TBili  1.4<H>  /  DBili  x   /  AST  20  /  ALT  15  /  AlkPhos  58  06-13    PT/INR - ( 2018 16:56 )   PT: 16.6 sec;   INR: 1.48          PTT - ( 2018 16:56 )  PTT:33.7 sec  Urinalysis Basic - ( 2018 19:22 )    Color: Yellow / Appearance: Clear / S.010 / pH: x  Gluc: x / Ketone: NEGATIVE  / Bili: Negative / Urobili: 0.2 E.U./dL   Blood: x / Protein: 100 mg/dL / Nitrite: POSITIVE   Leuk Esterase: Moderate / RBC: > 10 /HPF / WBC Many /HPF   Sq Epi: x / Non Sq Epi: 0-5 /HPF / Bacteria: Present /HPF      CAPILLARY BLOOD GLUCOSE          RADIOLOGY & ADDITIONAL TESTS: Reviewed.    ASSESSMENT:    PLAN:

## 2018-06-15 DIAGNOSIS — I48.91 UNSPECIFIED ATRIAL FIBRILLATION: ICD-10-CM

## 2018-06-15 DIAGNOSIS — R78.81 BACTEREMIA: ICD-10-CM

## 2018-06-15 LAB
-  AMOXICILLIN/CLAVULANIC ACID: SIGNIFICANT CHANGE UP
-  AMPICILLIN/SULBACTAM: SIGNIFICANT CHANGE UP
-  AMPICILLIN: SIGNIFICANT CHANGE UP
-  CEFAZOLIN: SIGNIFICANT CHANGE UP
-  CEFTRIAXONE: SIGNIFICANT CHANGE UP
-  CIPROFLOXACIN: SIGNIFICANT CHANGE UP
-  GENTAMICIN: SIGNIFICANT CHANGE UP
-  NITROFURANTOIN: SIGNIFICANT CHANGE UP
-  PIPERACILLIN/TAZOBACTAM: SIGNIFICANT CHANGE UP
-  TOBRAMYCIN: SIGNIFICANT CHANGE UP
-  TRIMETHOPRIM/SULFAMETHOXAZOLE: SIGNIFICANT CHANGE UP
ANION GAP SERPL CALC-SCNC: 14 MMOL/L — SIGNIFICANT CHANGE UP (ref 5–17)
BUN SERPL-MCNC: 30 MG/DL — HIGH (ref 7–23)
CALCIUM SERPL-MCNC: 9 MG/DL — SIGNIFICANT CHANGE UP (ref 8.4–10.5)
CHLORIDE SERPL-SCNC: 104 MMOL/L — SIGNIFICANT CHANGE UP (ref 96–108)
CO2 SERPL-SCNC: 24 MMOL/L — SIGNIFICANT CHANGE UP (ref 22–31)
CREAT SERPL-MCNC: 1.75 MG/DL — HIGH (ref 0.5–1.3)
CULTURE RESULTS: SIGNIFICANT CHANGE UP
GLUCOSE BLDC GLUCOMTR-MCNC: 151 MG/DL — HIGH (ref 70–99)
GLUCOSE BLDC GLUCOMTR-MCNC: 155 MG/DL — HIGH (ref 70–99)
GLUCOSE BLDC GLUCOMTR-MCNC: 197 MG/DL — HIGH (ref 70–99)
GLUCOSE BLDC GLUCOMTR-MCNC: 201 MG/DL — HIGH (ref 70–99)
GLUCOSE BLDC GLUCOMTR-MCNC: 217 MG/DL — HIGH (ref 70–99)
GLUCOSE SERPL-MCNC: 166 MG/DL — HIGH (ref 70–99)
HCT VFR BLD CALC: 31.5 % — LOW (ref 39–50)
HGB BLD-MCNC: 10.5 G/DL — LOW (ref 13–17)
LG PLATELETS BLD QL AUTO: PRESENT — SIGNIFICANT CHANGE UP
LYMPHOCYTES # BLD AUTO: 10 % — LOW (ref 13–44)
MAGNESIUM SERPL-MCNC: 1.8 MG/DL — SIGNIFICANT CHANGE UP (ref 1.6–2.6)
MANUAL DIF COMMENT BLD-IMP: SIGNIFICANT CHANGE UP
MANUAL SMEAR VERIFICATION: SIGNIFICANT CHANGE UP
MCHC RBC-ENTMCNC: 29.4 PG — SIGNIFICANT CHANGE UP (ref 27–34)
MCHC RBC-ENTMCNC: 33.3 G/DL — SIGNIFICANT CHANGE UP (ref 32–36)
MCV RBC AUTO: 88.2 FL — SIGNIFICANT CHANGE UP (ref 80–100)
METHOD TYPE: SIGNIFICANT CHANGE UP
MONOCYTES NFR BLD AUTO: 2 % — SIGNIFICANT CHANGE UP (ref 2–14)
NEUTROPHILS NFR BLD AUTO: 74 % — SIGNIFICANT CHANGE UP (ref 43–77)
NEUTS BAND # BLD: 14 % — HIGH
ORGANISM # SPEC MICROSCOPIC CNT: SIGNIFICANT CHANGE UP
ORGANISM # SPEC MICROSCOPIC CNT: SIGNIFICANT CHANGE UP
PLAT MORPH BLD: ABNORMAL
PLATELET # BLD AUTO: 128 K/UL — LOW (ref 150–400)
POTASSIUM SERPL-MCNC: 3.5 MMOL/L — SIGNIFICANT CHANGE UP (ref 3.5–5.3)
POTASSIUM SERPL-SCNC: 3.5 MMOL/L — SIGNIFICANT CHANGE UP (ref 3.5–5.3)
RBC # BLD: 3.57 M/UL — LOW (ref 4.2–5.8)
RBC # FLD: 15.5 % — SIGNIFICANT CHANGE UP (ref 10.3–16.9)
RBC BLD AUTO: NORMAL — SIGNIFICANT CHANGE UP
SODIUM SERPL-SCNC: 142 MMOL/L — SIGNIFICANT CHANGE UP (ref 135–145)
SPECIMEN SOURCE: SIGNIFICANT CHANGE UP
TOXIC GRANULES BLD QL SMEAR: SLIGHT — SIGNIFICANT CHANGE UP
WBC # BLD: 10.3 K/UL — SIGNIFICANT CHANGE UP (ref 3.8–10.5)
WBC # FLD AUTO: 10.3 K/UL — SIGNIFICANT CHANGE UP (ref 3.8–10.5)

## 2018-06-15 PROCEDURE — 99233 SBSQ HOSP IP/OBS HIGH 50: CPT | Mod: GC

## 2018-06-15 RX ORDER — CEFTRIAXONE 500 MG/1
2 INJECTION, POWDER, FOR SOLUTION INTRAMUSCULAR; INTRAVENOUS EVERY 24 HOURS
Qty: 0 | Refills: 0 | Status: DISCONTINUED | OUTPATIENT
Start: 2018-06-15 | End: 2018-06-17

## 2018-06-15 RX ORDER — CARVEDILOL PHOSPHATE 80 MG/1
12.5 CAPSULE, EXTENDED RELEASE ORAL EVERY 12 HOURS
Qty: 0 | Refills: 0 | Status: DISCONTINUED | OUTPATIENT
Start: 2018-06-15 | End: 2018-06-17

## 2018-06-15 RX ORDER — ACETAMINOPHEN 500 MG
650 TABLET ORAL EVERY 6 HOURS
Qty: 0 | Refills: 0 | Status: DISCONTINUED | OUTPATIENT
Start: 2018-06-15 | End: 2018-06-17

## 2018-06-15 RX ORDER — POTASSIUM CHLORIDE 20 MEQ
40 PACKET (EA) ORAL ONCE
Qty: 0 | Refills: 0 | Status: COMPLETED | OUTPATIENT
Start: 2018-06-15 | End: 2018-06-15

## 2018-06-15 RX ORDER — MAGNESIUM SULFATE 500 MG/ML
1 VIAL (ML) INJECTION ONCE
Qty: 0 | Refills: 0 | Status: COMPLETED | OUTPATIENT
Start: 2018-06-15 | End: 2018-06-15

## 2018-06-15 RX ADMIN — Medication 2: at 22:34

## 2018-06-15 RX ADMIN — Medication 650 MILLIGRAM(S): at 23:38

## 2018-06-15 RX ADMIN — Medication 40 MILLIEQUIVALENT(S): at 08:34

## 2018-06-15 RX ADMIN — CARVEDILOL PHOSPHATE 12.5 MILLIGRAM(S): 80 CAPSULE, EXTENDED RELEASE ORAL at 14:10

## 2018-06-15 RX ADMIN — TACROLIMUS 1 MILLIGRAM(S): 5 CAPSULE ORAL at 18:34

## 2018-06-15 RX ADMIN — PIPERACILLIN AND TAZOBACTAM 200 GRAM(S): 4; .5 INJECTION, POWDER, LYOPHILIZED, FOR SOLUTION INTRAVENOUS at 05:03

## 2018-06-15 RX ADMIN — APIXABAN 5 MILLIGRAM(S): 2.5 TABLET, FILM COATED ORAL at 23:36

## 2018-06-15 RX ADMIN — TACROLIMUS 2 MILLIGRAM(S): 5 CAPSULE ORAL at 06:03

## 2018-06-15 RX ADMIN — CEFTRIAXONE 100 GRAM(S): 500 INJECTION, POWDER, FOR SOLUTION INTRAMUSCULAR; INTRAVENOUS at 16:32

## 2018-06-15 RX ADMIN — Medication 100 GRAM(S): at 08:00

## 2018-06-15 RX ADMIN — Medication 4: at 16:32

## 2018-06-15 RX ADMIN — Medication 4: at 11:47

## 2018-06-15 RX ADMIN — APIXABAN 5 MILLIGRAM(S): 2.5 TABLET, FILM COATED ORAL at 11:48

## 2018-06-15 RX ADMIN — Medication 5 MILLIGRAM(S): at 06:03

## 2018-06-15 RX ADMIN — PIPERACILLIN AND TAZOBACTAM 200 GRAM(S): 4; .5 INJECTION, POWDER, LYOPHILIZED, FOR SOLUTION INTRAVENOUS at 11:48

## 2018-06-15 RX ADMIN — Medication 2: at 07:59

## 2018-06-15 RX ADMIN — ATORVASTATIN CALCIUM 20 MILLIGRAM(S): 80 TABLET, FILM COATED ORAL at 22:34

## 2018-06-15 RX ADMIN — CARVEDILOL PHOSPHATE 12.5 MILLIGRAM(S): 80 CAPSULE, EXTENDED RELEASE ORAL at 18:34

## 2018-06-15 RX ADMIN — Medication 1000 UNIT(S): at 06:03

## 2018-06-15 RX ADMIN — Medication 1000 UNIT(S): at 18:34

## 2018-06-15 RX ADMIN — Medication 650 MILLIGRAM(S): at 01:37

## 2018-06-15 NOTE — CONSULT NOTE ADULT - ASSESSMENT
72 year old M with PMH PCKD, s/p renal transplant in 2007 at [facility] (on tacrolimus and prednisone), HTN, HLD, DM2, prostate cancer (s/p radical prostatectomy 2015), DVT (2006, d/t pelvic fracture), osteoporosis presents with urosepsis with gram negative bacteremia, complicated by acute hypoxic respiratory failure secondary to fluid overload.    Problem/Plan - 1:  ·  Problem: Respiratory failure, acute.  Plan: Improving. Patient s/p 2.5L fluid in ED for resuscitation in the setting of severe sepsis, experienced acute hypoxic respiratory failure with CXR demonstrating pulmonary edema. s/p Lasix 80 mg IVP x1, placed on BiPAP with improvement in respiratory status, transitioned to HFNC, now NC, saturating well.   - ECHO revealing mild concentric LVH, no WMA, EF 61%.   - Currently on NC, saturating well, wean off as tolerated  - still overloaded on exam with bibasilar rales appreciated  - UOP -1.15L ON. Maintain strict I/Os.     Problem/Plan - 2:  ·  Problem: Severe sepsis.  Plan: Patient febrile to 103F in ED. Meeting sepsis criteria with urinary source. Lactate cleared s/p IVF. Fever curve and leukocytosis downtrending.  - C/w Zosyn q6h, Day 3 as of 6/15 of abxs  - Bcx + for E.coli, f/u sensitivities  - Ucx positive for 50,000 colonies of GNR, f/u speciation and sensitivities.     Problem/Plan - 3:  ·  Problem: Urinary tract infection with fever.  Plan: UA grossly positive on admission, patient endorsed urinary retention and malodorous urine x3 days leading up to admission.   - Continue to trend fever curve, WBC  - Ucx + for GNR, pending speciation, f/u  - c/w Zosyn (Day 3 of abxs, as of 6/15/18).
73 yo M with h/o as above presents with c/o SOB, chest pain and fevers and chills for 3days with foul smelling urine and urinary hesitancy found to have a UTI. Pt was resuscitated with fluids and went into Pulmonary edema acutely. Pt was given lasix and placed on Bipap with improvement in breathing status.     **Acute Pulmonary Edema 2/2 Fluid resuscitation  Pt had Echo in Feb which showed EF of 50% with mild to mod MR.   Since then ET has decreased, Orthopnea +ve.  Pt will need repeat ECHO.     **Sepsis 2/2 UTI  UA grossly +ve for Nitrites and LE. Also seen was blood and many RBC. Possible 2/2 Stone(infected)  Get US Kidneys and bladder.   Follow Blood and Urine cultures.   C/w Ceftriaxone IV.   Follow lactate. Be cautious with fluids.     **Troponin Leak.  Pt with c/o CP/heaviness with mild trop leak.  Trend trops and EKG.

## 2018-06-15 NOTE — PROGRESS NOTE ADULT - PROBLEM SELECTOR PLAN 4
UA grossly positive on admission, patient endorsed urinary retention and malodorous urine x3 days leading up to admission.   - Continue to trend fever curve, WBC  - Ucx growing 50,000 colonies of E.coli, sensitive to Ceftriaxone  - Given Ucx sensitivities, will switch from Zosyn  to Ceftriaxone today, Day 3 as of 6/15 of abxs  - discontinue buckley today, plan for TOV today  - Bladder scan to assess PVR, r/o obstruction History of afib, on eliquis  - c/w home eliquis  - c/w home coreg

## 2018-06-15 NOTE — PROGRESS NOTE ADULT - PROBLEM SELECTOR PLAN 5
History of HTN, on Amlodipine, Coreg at home, Currently normotensive   - Continue to hold home antihypertensives in setting of severe sepsis, add on PRN  - Continue to closely monitor BPs UA grossly positive on admission, patient endorsed urinary retention and malodorous urine x3 days leading up to admission.   - Continue to trend fever curve, WBC  - Ucx growing 50,000 colonies of E.coli, sensitive to Ceftriaxone  - Given Ucx sensitivities, will switch from Zosyn  to Ceftriaxone today, Day 3 as of 6/15 of abxs  - discontinue buckley today, plan for TOV today  - Bladder scan to assess PVR, r/o obstruction History of afib, on eliquis  - c/w home eliquis  - c/w home coreg

## 2018-06-15 NOTE — PATIENT PROFILE ADULT. - ABILITY TO HEAR (WITH HEARING AID OR HEARING APPLIANCE IF NORMALLY USED):
right sided hearing diminished/Mildly to Moderately Impaired: difficulty hearing in some environments or speaker may need to increase volume or speak distinctly

## 2018-06-15 NOTE — PROGRESS NOTE ADULT - PROBLEM SELECTOR PLAN 8
History of kidney transplant (2007) for history of PCKD, on tacrolimus and Prednisone at home. Tacrolimus levels followed outpatient with Dr Rios.   -C/w home medications tacrolimus and prednisone Patient with known history of DM, on Glimeperide, Metformin and Januvia at home, unknown last HbA1c, HbA1c 7.3% on admission  - Hold home diabetic medications  - ISS, no basal/bolus regimen indicated at this time, adjust regimen accordingly  - Monitor FS with meals and bedtime monitor renal function; hx of kidney transplant c/w tacrolimus and prednisone

## 2018-06-15 NOTE — PROGRESS NOTE ADULT - PROBLEM SELECTOR PLAN 4
History of HTN, on Amlodipine, Coreg at home, Currently normotensive   - Continue to hold home antihypertensives in setting of severe sepsis, add on PRN  - Continue to closely monitor BPs UA grossly positive on admission, patient endorsed urinary retention and malodorous urine x3 days leading up to admission.   - Continue to trend fever curve, WBC  - Ucx + for GNR, pending speciation, f/u  - c/w Zosyn (Day 3 of abxs, as of 6/15/18) UA grossly positive on admission, patient endorsed urinary retention and malodorous urine x3 days leading up to admission.   - Continue to trend fever curve, WBC  - Ucx + for GNR, pending speciation, f/u  - c/w Zosyn (Day 3 of abxs, as of 6/15/18)  - discontinue buckley today, plan for TOV today  - Bladder scan to assess PVR, r/o obstruction UA grossly positive on admission, patient endorsed urinary retention and malodorous urine x3 days leading up to admission.   - Continue to trend fever curve, WBC  - Ucx growing 50,000 colonies of E.coli, sensitive to Ceftriaxone  - Given Ucx sensitivities, will switch from Zosyn  to Ceftriaxone today, Day 3 as of 6/15 of abxs  - discontinue buckley today, plan for TOV today  - Bladder scan to assess PVR, r/o obstruction

## 2018-06-15 NOTE — PROGRESS NOTE ADULT - PROBLEM SELECTOR PLAN 5
- C/w home lipitor 20mg qd History of HTN, on Amlodipine, Coreg at home, Currently normotensive   - Continue to hold home antihypertensives in setting of severe sepsis, add on PRN  - Continue to closely monitor BPs

## 2018-06-15 NOTE — PROGRESS NOTE ADULT - ASSESSMENT
72 year old M with PMH PCKD, s/p renal transplant in 2007 at [facility] (on tacrolimus and prednisone), HTN, HLD, DM2, prostate cancer (s/p radical prostatectomy 2015), DVT (2006, d/t pelvic fracture), osteoporosis presents with urosepsis with E.coli bacteremia, c/b acute hypoxic respiratory failure secondary to fluid overload.

## 2018-06-15 NOTE — PROGRESS NOTE ADULT - PROBLEM SELECTOR PLAN 9
F: No IVF indicated  E: Replete electrolytes PRN, Goal: K>4, Mg>2  N: Renal/DASH/TLC/CC diet    Ppx: SCD, Eliquis  Code Full  [id number]  Transition of care: Dr. Rios PCP [phone]

## 2018-06-15 NOTE — PROGRESS NOTE ADULT - ASSESSMENT
72 year old M with PMH PCKD, s/p renal transplant in 2007 at [facility] (on tacrolimus and prednisone), HTN, HLD, DM2, prostate cancer (s/p radical prostatectomy 2015), DVT (2006, d/t pelvic fracture), osteoporosis presents with urosepsis with gram negative bacteremia, complicated by acute hypoxic respiratory failure secondary to fluid overload. 72 year old M with PMH PCKD, s/p renal transplant in 2007 at [facility] (on tacrolimus and prednisone), HTN, HLD, DM2, prostate cancer (s/p radical prostatectomy 2015), DVT (2006, d/t pelvic fracture), osteoporosis presents with urosepsis with E.coli bacteremia, c/b acute hypoxic respiratory failure secondary to fluid overload.

## 2018-06-15 NOTE — PROGRESS NOTE ADULT - PROBLEM SELECTOR PLAN 1
Improving. Patient s/p 2.5L fluid in ED for resuscitation in the setting of severe sepsis, experienced acute hypoxic respiratory failure with CXR demonstrating pulmonary edema. s/p Lasix 80 mg IVP x1, placed on BiPAP with improvement in respiratory status, transitioned to HFNC, now NC, saturating well.   - ECHO revealing mild concentric LVH, no WMA, EF 61%.   - Currently on NC, saturating well, wean off as tolerated  - still overloaded on exam with bibasilar rales appreciated however improved  - UOP -1.15L ON. Maintain strict I/Os Improving. Patient s/p 2.5L fluid in ED for resuscitation in the setting of severe sepsis, experienced acute hypoxic respiratory failure with CXR demonstrating pulmonary edema. s/p Lasix 80 mg IVP x1, placed on BiPAP with improvement in respiratory status, transitioned to HFNC, now NC, saturating well.   - ECHO revealing mild concentric LVH, no WMA, EF 61%.   - Currently on NC, saturating well, wean off as tolerated  - f/u AM CXR, consider more lasix If indicated  -  Maintain strict I/Os, buckley D/Nikko

## 2018-06-15 NOTE — PROGRESS NOTE ADULT - SUBJECTIVE AND OBJECTIVE BOX
OVERNIGHT EVENTS: Patient saturating well without NC, slept on RA. Follow up culture (1 set) performed for clearance of E. coli from Blood. Patient spiked fever of 101.4F, gave Tylenol as patient was just cultured.     SUBJECTIVE / INTERVAL HPI: Patient seen and examined at bedside. Patient notes that his buckley was leaking overnight. Patient also notes that he had a fever overnight. Otherwise, patient denies any chills, NS, chest pain, shortness of breath, cough, nausea/vomiting. Remainder of ROS negative.     VITAL SIGNS:  Vital Signs Last 24 Hrs  T(C): 37.4 (15 To 2018 05:46), Max: 38.7 (15 To 2018 01:47)  T(F): 99.4 (15 To 2018 05:46), Max: 101.6 (15 To 2018 01:47)  HR: 63 (15 To 2018 06:09) (63 - 94)  BP: 143/82 (15 To 2018 06:04) (140/80 - 160/90)  BP(mean): 106 (15 To 2018 06:04) (104 - 117)  RR: 23 (15 To 2018 06:09) (14 - 28)  SpO2: 97% (15 To 2018 06:09) (94% - 99%)    PHYSICAL EXAM:    General: WDWN  HEENT: NC/AT; PERRL, anicteric sclera; MMM  Neck: supple  Cardiovascular: +S1/S2; RRR  Respiratory: CTA B/L; no W/R/R  Gastrointestinal: soft, NT/ND; +BSx4  Extremities: WWP; no edema, clubbing or cyanosis  Vascular: 2+ radial, DP/PT pulses B/L  Neurological: AAOx3; no focal deficits    MEDICATIONS:  MEDICATIONS  (STANDING):  apixaban 5 milliGRAM(s) Oral every 12 hours  atorvastatin 20 milliGRAM(s) Oral at bedtime  cholecalciferol 1000 Unit(s) Oral two times a day  dextrose 5%. 1000 milliLiter(s) (50 mL/Hr) IV Continuous <Continuous>  dextrose 50% Injectable 12.5 Gram(s) IV Push once  dextrose 50% Injectable 25 Gram(s) IV Push once  dextrose 50% Injectable 25 Gram(s) IV Push once  insulin lispro (HumaLOG) corrective regimen sliding scale   SubCutaneous Before meals and at bedtime  piperacillin/tazobactam IVPB. 3.375 Gram(s) IV Intermittent every 6 hours  predniSONE   Tablet 5 milliGRAM(s) Oral daily  tacrolimus 1 milliGRAM(s) Oral <User Schedule>  tacrolimus 2 milliGRAM(s) Oral <User Schedule>    MEDICATIONS  (PRN):  acetaminophen   Tablet 650 milliGRAM(s) Oral every 6 hours PRN For Temp greater than 38 C (100.4 F)  acetaminophen   Tablet. 650 milliGRAM(s) Oral every 6 hours PRN Moderate Pain (4 - 6)  dextrose 40% Gel 15 Gram(s) Oral once PRN Blood Glucose LESS THAN 70 milliGRAM(s)/deciliter  glucagon  Injectable 1 milliGRAM(s) IntraMuscular once PRN Glucose LESS THAN 70 milligrams/deciliter      ALLERGIES:  Allergies    Naprosyn (Unknown)    Intolerances        LABS:                        11.3   12.8  )-----------( 127      ( 2018 11:09 )             34.8     06-14    143  |  104  |  32<H>  ----------------------------<  167<H>  3.6   |  23  |  1.68<H>    Ca    9.6      2018 11:09  Mg     1.7     -14    TPro  6.6  /  Alb  3.1<L>  /  TBili  1.1  /  DBili  x   /  AST  19  /  ALT  14  /  AlkPhos  61  06-14    PT/INR - ( 2018 16:56 )   PT: 16.6 sec;   INR: 1.48          PTT - ( 2018 16:56 )  PTT:33.7 sec  Urinalysis Basic - ( 2018 19:22 )    Color: Yellow / Appearance: Clear / S.010 / pH: x  Gluc: x / Ketone: NEGATIVE  / Bili: Negative / Urobili: 0.2 E.U./dL   Blood: x / Protein: 100 mg/dL / Nitrite: POSITIVE   Leuk Esterase: Moderate / RBC: > 10 /HPF / WBC Many /HPF   Sq Epi: x / Non Sq Epi: 0-5 /HPF / Bacteria: Present /HPF      CAPILLARY BLOOD GLUCOSE      POCT Blood Glucose.: 211 mg/dL (2018 22:05)      RADIOLOGY & ADDITIONAL TESTS: Reviewed.    ASSESSMENT:    PLAN: OVERNIGHT EVENTS: Patient saturating well without NC, slept on RA, however placed back on NC overnight. Follow up culture (1 set) performed for clearance of E. coli from Blood. Patient spiked fever of 101.6F, given Tylenol/ice packs as patient was just cultured.     SUBJECTIVE / INTERVAL HPI: Patient seen and examined at bedside. Patient notes that his buckley was leaking overnight. Patient also notes that he had a fever overnight. Otherwise, patient denies any chills, NS, chest pain, shortness of breath, cough, nausea/vomiting. Remainder of ROS negative.     VITAL SIGNS:  Vital Signs Last 24 Hrs  T(C): 37.4 (15 To 2018 05:46), Max: 38.7 (15 To 2018 01:47)  T(F): 99.4 (15 To 2018 05:46), Max: 101.6 (15 To 2018 01:47)  HR: 63 (15 To 2018 06:09) (63 - 94)  BP: 143/82 (15 To 2018 06:04) (140/80 - 160/90)  BP(mean): 106 (15 To 2018 06:04) (104 - 117)  RR: 23 (15 To 2018 06:09) (14 - 28)  SpO2: 97% (15 To 2018 06:09) (94% - 99%)    PHYSICAL EXAM:    General: WDWN  male, NAD, on NC  HEENT: NC/AT; PERRL, anicteric sclera, MMM, no oropharyngeal erythema or exudates  Neck: supple  Lymph: no cervical or supraclavicular LAD  Cardiovascular: +S1/S2; RRR, no m/r/g appreciated on auscultation  Respiratory: no rhonchi or wheezing on exam, + bibasilar rales, remainder CTA b/l; non-labored breathing, speaking in full sentences  : buckley catheter in place, draining yellow urine, suprapubic tenderness  Gastrointestinal: soft, NT/ND; +BSx4  Extremities: WWP; no clubbing or cyanosis, L forearm with AV fistula (+ bruit); trace LE edema b/l  Vascular: 2+ radial, DP/PT pulses B/L  Neurological: AAOx3; no focal deficits, intact sensation throughout    MEDICATIONS:  MEDICATIONS  (STANDING):  apixaban 5 milliGRAM(s) Oral every 12 hours  atorvastatin 20 milliGRAM(s) Oral at bedtime  cholecalciferol 1000 Unit(s) Oral two times a day  dextrose 5%. 1000 milliLiter(s) (50 mL/Hr) IV Continuous <Continuous>  dextrose 50% Injectable 12.5 Gram(s) IV Push once  dextrose 50% Injectable 25 Gram(s) IV Push once  dextrose 50% Injectable 25 Gram(s) IV Push once  insulin lispro (HumaLOG) corrective regimen sliding scale   SubCutaneous Before meals and at bedtime  piperacillin/tazobactam IVPB. 3.375 Gram(s) IV Intermittent every 6 hours  predniSONE   Tablet 5 milliGRAM(s) Oral daily  tacrolimus 1 milliGRAM(s) Oral <User Schedule>  tacrolimus 2 milliGRAM(s) Oral <User Schedule>    MEDICATIONS  (PRN):  acetaminophen   Tablet 650 milliGRAM(s) Oral every 6 hours PRN For Temp greater than 38 C (100.4 F)  acetaminophen   Tablet. 650 milliGRAM(s) Oral every 6 hours PRN Moderate Pain (4 - 6)  dextrose 40% Gel 15 Gram(s) Oral once PRN Blood Glucose LESS THAN 70 milliGRAM(s)/deciliter  glucagon  Injectable 1 milliGRAM(s) IntraMuscular once PRN Glucose LESS THAN 70 milligrams/deciliter      ALLERGIES:  Allergies    Naprosyn (Unknown)    Intolerances        LABS:                        11.3   12.8  )-----------( 127      ( 2018 11:09 )             34.8         143  |  104  |  32<H>  ----------------------------<  167<H>  3.6   |  23  |  1.68<H>    Ca    9.6      2018 11:09  Mg     1.7         TPro  6.6  /  Alb  3.1<L>  /  TBili  1.1  /  DBili  x   /  AST  19  /  ALT  14  /  AlkPhos  61  14    PT/INR - ( 2018 16:56 )   PT: 16.6 sec;   INR: 1.48          PTT - ( 2018 16:56 )  PTT:33.7 sec  Urinalysis Basic - ( 2018 19:22 )    Color: Yellow / Appearance: Clear / S.010 / pH: x  Gluc: x / Ketone: NEGATIVE  / Bili: Negative / Urobili: 0.2 E.U./dL   Blood: x / Protein: 100 mg/dL / Nitrite: POSITIVE   Leuk Esterase: Moderate / RBC: > 10 /HPF / WBC Many /HPF   Sq Epi: x / Non Sq Epi: 0-5 /HPF / Bacteria: Present /HPF      CAPILLARY BLOOD GLUCOSE      POCT Blood Glucose.: 211 mg/dL (2018 22:05)      RADIOLOGY & ADDITIONAL TESTS: Reviewed.    ASSESSMENT:    PLAN: OVERNIGHT EVENTS: Patient saturating well without NC, slept on RA, however placed back on NC overnight. Follow up culture (1 set) performed for clearance of E. coli from Blood. Patient spiked fever of 101.6F, given Tylenol/ice packs as patient was just cultured.     SUBJECTIVE / INTERVAL HPI: Patient seen and examined at bedside. Patient notes that his buckley was leaking overnight. Patient also notes that he had a fever overnight. Otherwise, patient denies any chills, NS, chest pain, shortness of breath, cough, nausea/vomiting. Remainder of ROS negative.     VITAL SIGNS:  Vital Signs Last 24 Hrs  T(C): 37.4 (15 To 2018 05:46), Max: 38.7 (15 To 2018 01:47)  T(F): 99.4 (15 To 2018 05:46), Max: 101.6 (15 To 2018 01:47)  HR: 63 (15 To 2018 06:09) (63 - 94)  BP: 143/82 (15 To 2018 06:04) (140/80 - 160/90)  BP(mean): 106 (15 To 2018 06:04) (104 - 117)  RR: 23 (15 To 2018 06:09) (14 - 28)  SpO2: 97% (15 To 2018 06:09) (94% - 99%)    PHYSICAL EXAM:    General: WDWN  male, NAD, on NC  HEENT: NC/AT; PERRL, anicteric sclera, MMM, no oropharyngeal erythema or exudates  Neck: supple  Lymph: no cervical or supraclavicular LAD  Cardiovascular: +S1/S2; irregular rhythm, no m/r/g appreciated on auscultation  Respiratory: no rhonchi or wheezing on exam, + bibasilar rales, remainder CTA b/l; non-labored breathing, speaking in full sentences  : buckley catheter in place, draining yellow urine, suprapubic tenderness  Gastrointestinal: soft, NT/ND; +BSx4  Extremities: WWP; no clubbing or cyanosis, L forearm with AV fistula (+ bruit); trace LE edema b/l  Vascular: 2+ radial, DP/PT pulses B/L  Neurological: AAOx3; no focal deficits, intact sensation throughout    MEDICATIONS:  MEDICATIONS  (STANDING):  apixaban 5 milliGRAM(s) Oral every 12 hours  atorvastatin 20 milliGRAM(s) Oral at bedtime  cholecalciferol 1000 Unit(s) Oral two times a day  dextrose 5%. 1000 milliLiter(s) (50 mL/Hr) IV Continuous <Continuous>  dextrose 50% Injectable 12.5 Gram(s) IV Push once  dextrose 50% Injectable 25 Gram(s) IV Push once  dextrose 50% Injectable 25 Gram(s) IV Push once  insulin lispro (HumaLOG) corrective regimen sliding scale   SubCutaneous Before meals and at bedtime  piperacillin/tazobactam IVPB. 3.375 Gram(s) IV Intermittent every 6 hours  predniSONE   Tablet 5 milliGRAM(s) Oral daily  tacrolimus 1 milliGRAM(s) Oral <User Schedule>  tacrolimus 2 milliGRAM(s) Oral <User Schedule>    MEDICATIONS  (PRN):  acetaminophen   Tablet 650 milliGRAM(s) Oral every 6 hours PRN For Temp greater than 38 C (100.4 F)  acetaminophen   Tablet. 650 milliGRAM(s) Oral every 6 hours PRN Moderate Pain (4 - 6)  dextrose 40% Gel 15 Gram(s) Oral once PRN Blood Glucose LESS THAN 70 milliGRAM(s)/deciliter  glucagon  Injectable 1 milliGRAM(s) IntraMuscular once PRN Glucose LESS THAN 70 milligrams/deciliter      ALLERGIES:  Allergies    Naprosyn (Unknown)    Intolerances        LABS:                        11.3   12.8  )-----------( 127      ( 2018 11:09 )             34.8         143  |  104  |  32<H>  ----------------------------<  167<H>  3.6   |  23  |  1.68<H>    Ca    9.6      2018 11:09  Mg     1.7         TPro  6.6  /  Alb  3.1<L>  /  TBili  1.1  /  DBili  x   /  AST  19  /  ALT  14  /  AlkPhos  61  14    PT/INR - ( 2018 16:56 )   PT: 16.6 sec;   INR: 1.48          PTT - ( 2018 16:56 )  PTT:33.7 sec  Urinalysis Basic - ( 2018 19:22 )    Color: Yellow / Appearance: Clear / S.010 / pH: x  Gluc: x / Ketone: NEGATIVE  / Bili: Negative / Urobili: 0.2 E.U./dL   Blood: x / Protein: 100 mg/dL / Nitrite: POSITIVE   Leuk Esterase: Moderate / RBC: > 10 /HPF / WBC Many /HPF   Sq Epi: x / Non Sq Epi: 0-5 /HPF / Bacteria: Present /HPF      CAPILLARY BLOOD GLUCOSE      POCT Blood Glucose.: 211 mg/dL (2018 22:05)      RADIOLOGY & ADDITIONAL TESTS: Reviewed.    ASSESSMENT:    PLAN: PGY1 Transfer Note ([facility])  Hospital Course: 72 year old M, Jain, with PMH PCKD, s/p renal transplant in  at [facility] (on tacrolimus and prednisone), HTN, HLD, DM2, prostate cancer (s/p radical prostatectomy ), DVT (, d/t pelvic fracture), hx of Afib (s/p Ablation ), osteoporosis presented to [facility] with three day history of non-exertional chest pain, shortness of breath, rigors, urinary hesitancy and malodor. In ED, T99.4 (Tmax 103.0), P 103, 106/67,  R20, sat 94%. Labs notable for leukocytosis with bandemia and an elevated lactate which cleared s/p 2.5 L IVF.  Patient subsequently went into acute respiratory failure, CXR demonstrating pulmonary vascular congestion, given Lasix 80 mg IVP x1, was placed on BiPAP and admitted to [facility] for close monitoring. Respiratory status improved, transitioned from HFNC, currently on NC and saturating well.  Admission labs also notable for tropinemia (0.03-peaked) with associated ST depressions in leads V3-V5, unchanged on repeat EKG. Cardiologist (Dr Dowd) aware, recommending repeat ECHO to r/o CHF given notable pulmonary edema. ECHO demonstrated no wall motion abnormalities, preserved EF 61% and no HD significant valvulopathies. UA on admission grossly positive as well with Ucx positive for GNRs (pending speciation) and Bcx positive for E.coli (last surveillance Cx drawn 6/15, pending). Patient received ceftriaxone and azithromycin in the ED x1, transitioned to Ceftriaxone, and eventually Zosyn for pseudomonal coverage in light of immunosuppressed state (on Tacrolimus and Prednisone at home, s/p renal transplant). Sensitivities for Bcx pending. Patient otherwise, clinically improving with downtrending fever curve, HD stable and ready for stepdown to the [facility] for further medical management.    OVERNIGHT EVENTS: Patient saturating well without NC, slept on RA, however placed back on NC overnight. Follow up culture (1 set) performed for clearance of E. coli from Blood. Patient spiked fever of 101.6F, given Tylenol/ice packs as patient was just cultured.     SUBJECTIVE / INTERVAL HPI: Patient seen and examined at bedside. Patient notes that his buckley was leaking overnight. Patient also notes that he had a fever overnight. Otherwise, patient denies any chills, NS, chest pain, shortness of breath, cough, nausea/vomiting. Remainder of ROS negative.     VITAL SIGNS:  Vital Signs Last 24 Hrs  T(C): 37.4 (15 To 2018 05:46), Max: 38.7 (15 To 2018 01:47)  T(F): 99.4 (15 To 2018 05:46), Max: 101.6 (15 To 2018 01:47)  HR: 63 (15 To 2018 06:09) (63 - 94)  BP: 143/82 (15 To 2018 06:04) (140/80 - 160/90)  BP(mean): 106 (15 To 2018 06:04) (104 - 117)  RR: 23 (15 To 2018 06:09) (14 - 28)  SpO2: 97% (15 To 2018 06:09) (94% - 99%)    PHYSICAL EXAM:    General: WDWN  male, NAD, on NC  HEENT: NC/AT; PERRL, anicteric sclera, MMM, no oropharyngeal erythema or exudates  Neck: supple  Lymph: no cervical or supraclavicular LAD  Cardiovascular: +S1/S2; irregular rhythm, no m/r/g appreciated on auscultation  Respiratory: no rhonchi or wheezing on exam, + bibasilar rales, remainder CTA b/l; non-labored breathing, speaking in full sentences  : buclkey catheter in place, draining yellow urine, suprapubic tenderness  Gastrointestinal: soft, NT/ND; +BSx4  Extremities: WWP; no clubbing or cyanosis, L forearm with AV fistula (+ bruit); trace LE edema b/l  Vascular: 2+ radial, DP/PT pulses B/L  Neurological: AAOx3; no focal deficits, intact sensation throughout    MEDICATIONS:  MEDICATIONS  (STANDING):  apixaban 5 milliGRAM(s) Oral every 12 hours  atorvastatin 20 milliGRAM(s) Oral at bedtime  cholecalciferol 1000 Unit(s) Oral two times a day  dextrose 5%. 1000 milliLiter(s) (50 mL/Hr) IV Continuous <Continuous>  dextrose 50% Injectable 12.5 Gram(s) IV Push once  dextrose 50% Injectable 25 Gram(s) IV Push once  dextrose 50% Injectable 25 Gram(s) IV Push once  insulin lispro (HumaLOG) corrective regimen sliding scale   SubCutaneous Before meals and at bedtime  piperacillin/tazobactam IVPB. 3.375 Gram(s) IV Intermittent every 6 hours  predniSONE   Tablet 5 milliGRAM(s) Oral daily  tacrolimus 1 milliGRAM(s) Oral <User Schedule>  tacrolimus 2 milliGRAM(s) Oral <User Schedule>    MEDICATIONS  (PRN):  acetaminophen   Tablet 650 milliGRAM(s) Oral every 6 hours PRN For Temp greater than 38 C (100.4 F)  acetaminophen   Tablet. 650 milliGRAM(s) Oral every 6 hours PRN Moderate Pain (4 - 6)  dextrose 40% Gel 15 Gram(s) Oral once PRN Blood Glucose LESS THAN 70 milliGRAM(s)/deciliter  glucagon  Injectable 1 milliGRAM(s) IntraMuscular once PRN Glucose LESS THAN 70 milligrams/deciliter      ALLERGIES:  Allergies    Naprosyn (Unknown)    Intolerances        LABS:                        11.3   12.8  )-----------( 127      ( 2018 11:09 )             34.8     -14    143  |  104  |  32<H>  ----------------------------<  167<H>  3.6   |  23  |  1.68<H>    Ca    9.6      2018 11:09  Mg     1.7         TPro  6.6  /  Alb  3.1<L>  /  TBili  1.1  /  DBili  x   /  AST  19  /  ALT  14  /  AlkPhos  61  14    PT/INR - ( 2018 16:56 )   PT: 16.6 sec;   INR: 1.48          PTT - ( 2018 16:56 )  PTT:33.7 sec  Urinalysis Basic - ( 2018 19:22 )    Color: Yellow / Appearance: Clear / S.010 / pH: x  Gluc: x / Ketone: NEGATIVE  / Bili: Negative / Urobili: 0.2 E.U./dL   Blood: x / Protein: 100 mg/dL / Nitrite: POSITIVE   Leuk Esterase: Moderate / RBC: > 10 /HPF / WBC Many /HPF   Sq Epi: x / Non Sq Epi: 0-5 /HPF / Bacteria: Present /HPF      CAPILLARY BLOOD GLUCOSE      POCT Blood Glucose.: 211 mg/dL (2018 22:05)      RADIOLOGY & ADDITIONAL TESTS: Reviewed.    ASSESSMENT:    PLAN: PGY1 Transfer Note ([facility] -->[facility])  Hospital Course: 72 year old M, Yarsanism, with PMH PCKD, s/p renal transplant in  at [facility] (on tacrolimus and prednisone), HTN, HLD, DM2, prostate cancer (s/p radical prostatectomy ), DVT (, d/t pelvic fracture), hx of Afib (s/p Ablation ), osteoporosis presented to [facility] with three day history of non-exertional chest pain, shortness of breath, rigors, urinary hesitancy and malodor. In ED, T99.4 (Tmax 103.0), P 103, 106/67,  R20, sat 94%. Labs notable for leukocytosis with bandemia and an elevated lactate which cleared s/p 2.5 L IVF.  Patient subsequently went into acute respiratory failure, CXR demonstrating pulmonary vascular congestion, given Lasix 80 mg IVP x1, was placed on BiPAP and admitted to [facility] for close monitoring. Respiratory status improved, transitioned from HFNC, currently on NC and saturating well.  Admission labs also notable for tropinemia (0.03-peaked) with associated ST depressions in leads V3-V5, unchanged on repeat EKG. Cardiologist (Dr Dowd) aware, recommending repeat ECHO to r/o CHF given notable pulmonary edema. ECHO demonstrated no wall motion abnormalities, preserved EF 61% and no HD significant valvulopathies. UA on admission grossly positive as well with Ucx positive for GNRs (pending speciation) and Bcx positive for E.coli (last surveillance Cx drawn , NGTD). Patient received ceftriaxone and azithromycin in the ED x1, transitioned to Ceftriaxone, and eventually Zosyn for pseudomonal coverage in light of immunosuppressed state (on Tacrolimus and Prednisone at home, s/p renal transplant). Sensitivities for Bcx pending. Patient otherwise, clinically improving with downtrending fever curve, HD stable and ready for stepdown to the [facility] for further medical management.    OVERNIGHT EVENTS: Patient saturating well without NC, slept on RA, however placed back on NC overnight. Follow up culture (1 set) performed for clearance of E. coli from Blood. Patient spiked fever of 101.6F, given Tylenol/ice packs as patient was just cultured.     SUBJECTIVE / INTERVAL HPI: Patient seen and examined at bedside. Patient notes that his buckley was leaking overnight. Patient also notes that he had a fever overnight. Otherwise, patient denies any chills, NS, chest pain, shortness of breath, cough, nausea/vomiting. Remainder of ROS negative.     VITAL SIGNS:  Vital Signs Last 24 Hrs  T(C): 37.4 (15 To 2018 05:46), Max: 38.7 (15 To 2018 01:47)  T(F): 99.4 (15 To 2018 05:46), Max: 101.6 (15 To 2018 01:47)  HR: 63 (15 To 2018 06:09) (63 - 94)  BP: 143/82 (15 To 2018 06:04) (140/80 - 160/90)  BP(mean): 106 (15 To 2018 06:04) (104 - 117)  RR: 23 (15 To 2018 06:09) (14 - 28)  SpO2: 97% (15 To 2018 06:09) (94% - 99%)    PHYSICAL EXAM:    General: WDWN  male, NAD, on NC  HEENT: NC/AT; PERRL, anicteric sclera, MMM, no oropharyngeal erythema or exudates  Neck: supple  Lymph: no cervical or supraclavicular LAD  Cardiovascular: +S1/S2; irregular rhythm, no m/r/g appreciated on auscultation  Respiratory: no rhonchi or wheezing on exam, + bibasilar rales, remainder CTA b/l; non-labored breathing, speaking in full sentences  : buckley catheter in place, draining yellow urine, suprapubic tenderness  Gastrointestinal: soft, NT/ND; +BSx4  Extremities: WWP; no clubbing or cyanosis, L forearm with AV fistula (+ bruit); trace LE edema b/l  Vascular: 2+ radial, DP/PT pulses B/L  Neurological: AAOx3; no focal deficits, intact sensation throughout    MEDICATIONS:  MEDICATIONS  (STANDING):  apixaban 5 milliGRAM(s) Oral every 12 hours  atorvastatin 20 milliGRAM(s) Oral at bedtime  cholecalciferol 1000 Unit(s) Oral two times a day  dextrose 5%. 1000 milliLiter(s) (50 mL/Hr) IV Continuous <Continuous>  dextrose 50% Injectable 12.5 Gram(s) IV Push once  dextrose 50% Injectable 25 Gram(s) IV Push once  dextrose 50% Injectable 25 Gram(s) IV Push once  insulin lispro (HumaLOG) corrective regimen sliding scale   SubCutaneous Before meals and at bedtime  piperacillin/tazobactam IVPB. 3.375 Gram(s) IV Intermittent every 6 hours  predniSONE   Tablet 5 milliGRAM(s) Oral daily  tacrolimus 1 milliGRAM(s) Oral <User Schedule>  tacrolimus 2 milliGRAM(s) Oral <User Schedule>    MEDICATIONS  (PRN):  acetaminophen   Tablet 650 milliGRAM(s) Oral every 6 hours PRN For Temp greater than 38 C (100.4 F)  acetaminophen   Tablet. 650 milliGRAM(s) Oral every 6 hours PRN Moderate Pain (4 - 6)  dextrose 40% Gel 15 Gram(s) Oral once PRN Blood Glucose LESS THAN 70 milliGRAM(s)/deciliter  glucagon  Injectable 1 milliGRAM(s) IntraMuscular once PRN Glucose LESS THAN 70 milligrams/deciliter      ALLERGIES:  Allergies    Naprosyn (Unknown)    Intolerances        LABS:                        11.3   12.8  )-----------( 127      ( 2018 11:09 )             34.8     06-14    143  |  104  |  32<H>  ----------------------------<  167<H>  3.6   |  23  |  1.68<H>    Ca    9.6      2018 11:09  Mg     1.7         TPro  6.6  /  Alb  3.1<L>  /  TBili  1.1  /  DBili  x   /  AST  19  /  ALT  14  /  AlkPhos  61  -14    PT/INR - ( 2018 16:56 )   PT: 16.6 sec;   INR: 1.48          PTT - ( 2018 16:56 )  PTT:33.7 sec  Urinalysis Basic - ( 2018 19:22 )    Color: Yellow / Appearance: Clear / S.010 / pH: x  Gluc: x / Ketone: NEGATIVE  / Bili: Negative / Urobili: 0.2 E.U./dL   Blood: x / Protein: 100 mg/dL / Nitrite: POSITIVE   Leuk Esterase: Moderate / RBC: > 10 /HPF / WBC Many /HPF   Sq Epi: x / Non Sq Epi: 0-5 /HPF / Bacteria: Present /HPF      CAPILLARY BLOOD GLUCOSE      POCT Blood Glucose.: 211 mg/dL (2018 22:05)      RADIOLOGY & ADDITIONAL TESTS: Reviewed.    ASSESSMENT:    PLAN: PGY1 Transfer Note ([facility] -->[facility])  Hospital Course: 72 year old M, Mormonism, with PMH PCKD, s/p renal transplant in  at [facility] (on tacrolimus and prednisone), HTN, HLD, DM2, prostate cancer (s/p radical prostatectomy ), DVT (, d/t pelvic fracture), hx of Afib (s/p Ablation ), osteoporosis presented to [facility] with three day history of non-exertional chest pain, shortness of breath, rigors, urinary hesitancy and malodor. In ED, T99.4 (Tmax 103.0), P 103, 106/67,  R20, sat 94%. Labs notable for leukocytosis with bandemia and an elevated lactate which cleared s/p 2.5 L IVF.  Patient subsequently went into acute respiratory failure, CXR demonstrating pulmonary vascular congestion, given Lasix 80 mg IVP x1, was placed on BiPAP and admitted to [facility] for close monitoring. Respiratory status improved, transitioned from HFNC, currently on NC and saturating well.  Admission labs also notable for tropinemia (0.03-peaked) with associated ST depressions in leads V3-V5, unchanged on repeat EKG. Cardiologist (Dr Dowd) aware, recommending repeat ECHO to r/o CHF given notable pulmonary edema. ECHO demonstrated no wall motion abnormalities, preserved EF 61% and no HD significant valvulopathies. UA on admission grossly positive as well with Ucx positive for GNRs (pending speciation) and Bcx positive for E.coli (last surveillance Cx drawn , NGTD). Patient received ceftriaxone and azithromycin in the ED x1, transitioned to Ceftriaxone, and eventually Zosyn for pseudomonal coverage in light of immunosuppressed state (on Tacrolimus and Prednisone at home, s/p renal transplant). Sensitivities for Bcx pending however given Ucx sensitivities, antibiotics de-escalated to Ceftriaxone on 6/15. Patient otherwise, clinically improving with downtrending fever curve, HD stable and ready for stepdown to the [facility] for further medical management.    OVERNIGHT EVENTS: Patient saturating well without NC, slept on RA, however placed back on NC overnight. Follow up culture (1 set) performed for clearance of E. coli from Blood. Patient spiked fever of 101.6F, given Tylenol/ice packs as patient was just cultured.     SUBJECTIVE / INTERVAL HPI: Patient seen and examined at bedside. Patient notes that his buckley was leaking overnight. Patient also notes that he had a fever overnight. Otherwise, patient denies any chills, NS, chest pain, shortness of breath, cough, nausea/vomiting. Remainder of ROS negative.     VITAL SIGNS:  Vital Signs Last 24 Hrs  T(C): 37.4 (15 To 2018 05:46), Max: 38.7 (15 To 2018 01:47)  T(F): 99.4 (15 To 2018 05:46), Max: 101.6 (15 To 2018 01:47)  HR: 63 (15 To 2018 06:09) (63 - 94)  BP: 143/82 (15 To 2018 06:04) (140/80 - 160/90)  BP(mean): 106 (15 To 2018 06:04) (104 - 117)  RR: 23 (15 To 2018 06:09) (14 - 28)  SpO2: 97% (15 To 2018 06:09) (94% - 99%)    PHYSICAL EXAM:    General: WDWN  male, NAD, on NC  HEENT: NC/AT; PERRL, anicteric sclera, MMM, no oropharyngeal erythema or exudates  Neck: supple  Lymph: no cervical or supraclavicular LAD  Cardiovascular: +S1/S2; irregular rhythm, no m/r/g appreciated on auscultation  Respiratory: no rhonchi or wheezing on exam, + bibasilar rales, remainder CTA b/l; non-labored breathing, speaking in full sentences  : buckley catheter in place, draining yellow urine, suprapubic tenderness  Gastrointestinal: soft, NT/ND; +BSx4  Extremities: WWP; no clubbing or cyanosis, L forearm with AV fistula (+ bruit); trace LE edema b/l  Vascular: 2+ radial, DP/PT pulses B/L  Neurological: AAOx3; no focal deficits, intact sensation throughout    MEDICATIONS:  MEDICATIONS  (STANDING):  apixaban 5 milliGRAM(s) Oral every 12 hours  atorvastatin 20 milliGRAM(s) Oral at bedtime  cholecalciferol 1000 Unit(s) Oral two times a day  dextrose 5%. 1000 milliLiter(s) (50 mL/Hr) IV Continuous <Continuous>  dextrose 50% Injectable 12.5 Gram(s) IV Push once  dextrose 50% Injectable 25 Gram(s) IV Push once  dextrose 50% Injectable 25 Gram(s) IV Push once  insulin lispro (HumaLOG) corrective regimen sliding scale   SubCutaneous Before meals and at bedtime  piperacillin/tazobactam IVPB. 3.375 Gram(s) IV Intermittent every 6 hours  predniSONE   Tablet 5 milliGRAM(s) Oral daily  tacrolimus 1 milliGRAM(s) Oral <User Schedule>  tacrolimus 2 milliGRAM(s) Oral <User Schedule>    MEDICATIONS  (PRN):  acetaminophen   Tablet 650 milliGRAM(s) Oral every 6 hours PRN For Temp greater than 38 C (100.4 F)  acetaminophen   Tablet. 650 milliGRAM(s) Oral every 6 hours PRN Moderate Pain (4 - 6)  dextrose 40% Gel 15 Gram(s) Oral once PRN Blood Glucose LESS THAN 70 milliGRAM(s)/deciliter  glucagon  Injectable 1 milliGRAM(s) IntraMuscular once PRN Glucose LESS THAN 70 milligrams/deciliter      ALLERGIES:  Allergies    Naprosyn (Unknown)    Intolerances        LABS:                        11.3   12.8  )-----------( 127      ( 2018 11:09 )             34.8     06-14    143  |  104  |  32<H>  ----------------------------<  167<H>  3.6   |  23  |  1.68<H>    Ca    9.6      2018 11:09  Mg     1.7     -    TPro  6.6  /  Alb  3.1<L>  /  TBili  1.1  /  DBili  x   /  AST  19  /  ALT  14  /  AlkPhos  61  06-14    PT/INR - ( 2018 16:56 )   PT: 16.6 sec;   INR: 1.48          PTT - ( 2018 16:56 )  PTT:33.7 sec  Urinalysis Basic - ( 2018 19:22 )    Color: Yellow / Appearance: Clear / S.010 / pH: x  Gluc: x / Ketone: NEGATIVE  / Bili: Negative / Urobili: 0.2 E.U./dL   Blood: x / Protein: 100 mg/dL / Nitrite: POSITIVE   Leuk Esterase: Moderate / RBC: > 10 /HPF / WBC Many /HPF   Sq Epi: x / Non Sq Epi: 0-5 /HPF / Bacteria: Present /HPF      CAPILLARY BLOOD GLUCOSE      POCT Blood Glucose.: 211 mg/dL (2018 22:05)      RADIOLOGY & ADDITIONAL TESTS: Reviewed.    ASSESSMENT:    PLAN:

## 2018-06-15 NOTE — PROGRESS NOTE ADULT - PROBLEM SELECTOR PLAN 9
F: No IVF indicated  E: Replete electrolytes PRN, Goal: K>4, Mg>2  N: Renal/DASH/TLC/CC diet    Ppx: SCD, Eliquis  Code Full  Dispo: [facility]   Transition of care: Dr. Rios PCP [phone] History of kidney transplant (2007) for history of PCKD, on tacrolimus and Prednisone at home. Tacrolimus levels followed outpatient with Dr Rios.   -C/w home medications tacrolimus and prednisone

## 2018-06-15 NOTE — PROGRESS NOTE ADULT - PROBLEM SELECTOR PLAN 2
Resolving, patient febrile to 103F in ED. Meeting sepsis criteria with urinary source. Lactate cleared s/p IVF. Fever curve and leukocytosis downtrending.  - Given Ucx sensitivities, will switch from Zosyn  to Ceftriaxone today, Day 3 as of 6/15 of abxs  - Bcx + for E.coli, f/u sensitivities  - Ucx growing 50,000 colonies of E.coli, sensitive to Ceftriaxone

## 2018-06-15 NOTE — PROGRESS NOTE ADULT - PROBLEM SELECTOR PLAN 7
History of kidney transplant (2007) for history of PCKD, on tacrolimus and Prednisone at home. Tacrolimus levels followed outpatient with Dr Rios.   -C/w home medications tacrolimus and prednisone Patient with known history of DM, on Glimeperide, Metformin and Januvia at home, unknown last HbA1c, HbA1c 7.3% on admission  - Hold home diabetic medications  - ISS, no basal/bolus regimen indicated at this time, adjust regimen accordingly  - Monitor FS with meals and bedtime

## 2018-06-15 NOTE — PROGRESS NOTE ADULT - PROBLEM SELECTOR PROBLEM 8
Kidney transplant recipient DM2 (diabetes mellitus, type 2) Chronic kidney disease, unspecified CKD stage

## 2018-06-15 NOTE — PROGRESS NOTE ADULT - PROBLEM SELECTOR PLAN 1
Improving. Patient s/p 2.5L fluid in ED for resuscitation in the setting of severe sepsis, experienced acute hypoxic respiratory failure with CXR demonstrating pulmonary edema. s/p Lasix 80 mg IVP x1, placed on BiPAP with improvement in respiratory status, transitioned to HFNC, now NC, saturating well.   - ECHO revealing mild concentric LVH, no WMA, EF 61%.   - Currently on NC, saturating well, wean off as tolerated  - still overloaded on exam with bibasilar rales appreciated  - UOP -1.15L ON. Maintain strict I/Os Improving. Patient s/p 2.5L fluid in ED for resuscitation in the setting of severe sepsis, experienced acute hypoxic respiratory failure with CXR demonstrating pulmonary edema. s/p Lasix 80 mg IVP x1, placed on BiPAP with improvement in respiratory status, transitioned to HFNC, now NC, saturating well.   - ECHO revealing mild concentric LVH, no WMA, EF 61%.   - Currently on NC, saturating well, wean off as tolerated  - still overloaded on exam with bibasilar rales appreciated however improved  - UOP -1.15L ON. Maintain strict I/Os

## 2018-06-15 NOTE — PROGRESS NOTE ADULT - SUBJECTIVE AND OBJECTIVE BOX
MEDICAL HISTORY:      [age] year old man with with a well-functioning kidney transplant. Admitted xxx with urosepsis. Gram negative identified in the urine and blood.  Noted to have AFIB for the first time in 2018.     72 year old  male, noted to have AFIB at [facility] during an annual "Reclast infusion. Ventricular rate was in 130's. Sent to the ER and AFIB confirmed. Patient was asymptomatic and has no h/o cardiac arrhythmia.     ESRD (Polycystic kidney disease)  HD 6398-8825  Kidney transplant  ([facility])  CKD GFR 60's-70's  Cardiomyopathy, LVH w normal LVEF  HTN  Gout  HLD  DM-2 since KTP (Barrera)  Aneurysm ascending aorta ([facility])  Osteoporosis (Barrera)  Prostate cancer s/p prostatectomy   Muslim     Negative chemical stress   Echocardiogram LVH with normal systolic function  18 Echocardiogram ([facility]) LVH with nl wall motion, LVEF 55-60, nl LA, aortic root dilation  Baseline Scr  0.9-1.1 mg/dl.     Meds at home: vit D3 2000 Iu daily, Prograf 2 mcg AM, 1 mcg PM (9 AM, 9PM), Prednisone 5 mg/d, amlodipine 10 mg/d, lisinopril 10 mg/d, carvedilol 12.5 mg bid, lipitor 10 mg/d, januvia 50 mg/d, metformin 1500 mg/d at dinner, glimepiride 4 mg/d with breakfast.     2018. Echocardiogram LVH with normal LVEF and normal LA, Ao root dilated.   2018. Echo this admission  LVH, LVH w normal wall motion, LAE, Ao root dilated 4.1 cm      INTERVAL HISTORY:    SUMMARY COMMENTS:           ---------------------------------------------------------------------------------------------------------------------------------------------------------------------------    SUBJECTIVE & OBJECTIVE DATA DOCUMENTATION:     REVIEW OF SYSTEMS SCREENED ORGAN SYSTEMS:  Constitutional: fever, chills, anorexia or fatigue  Pulmonary: difficulty breathing, wheezing, cough  Cardiovascular: exertional dyspnea, chest pain, palpitations, dizziness or leg swelling  Gastrointestinal: abdominal or epigastric pain, nausea, vomiting or hematemesis, diarrhea, melena or bright red blood per rectum.  Genitourinary: dysuria, urgency, frequency, flank pain, difficulty voiding  Skin: No pruritis, rashes or lesions  Neurology: memory loss, dysarthria, extremity weakness, neuropathic pain, headache, visual changes  Dialysis access if present : pain, bleeding, swelling     ROS POSITIVE FINDINGS:     PAST MEDICAL & SURGICAL HISTORY:  DVT (deep venous thrombosis)  Kidney transplant recipient  Prostate cancer  HTN (hypertension)  HLD (hyperlipidemia)  DM2 (diabetes mellitus, type 2)  Polycystic kidney disease  H/O radical prostatectomy      PHYSICAL EXAM:  T(C): 37.4 (06-15-18 @ 05:46), Max: 38.7 (06-15-18 @ 01:47)  HR: 63 (06-15-18 @ 06:09)  BP: 143/82 (06-15-18 @ 06:04) (140/80 - 160/90)  RR: 23 (06-15-18 @ 06:09)  SpO2: 97% (06-15-18 @ 06:09)  Wt(kg): --  I&O's Summary    2018 07:01  -  15 To 2018 07:00  --------------------------------------------------------  IN: 340 mL / OUT: 2090 mL / NET: -1750 mL      Weight 79.4 ( @ 16:00)    APPEARANCE: in bed, NAD  HEAD & NECK: normocephalic, no stiff neck, no masses, oral mucosa moist, no scleral icteris  RESPIRATORY: no accessory muscle use, no labored breathing, no dullness, no rales, crackles, no rhonchi, no wheeze  CARDIOVASCULAR: no JVD, IRRR, S1S2, + gallop,  no murmur, no rub.  ABDOMEN: soft, no tenderness, no masses, no hepatomegally, no splenomegally  : no bladder distension  LYMPHATIC: no lymphadenopathy (neck, axilla, groin)  EXTREMITIES & MUSCULOSKELETAL: no cyanosis, no clubbing, no tenderness, strength  L=R  EDEMA:   PULSES: upper extremity pulses present, lower extremity pulses present   SKIN: no rash, no stasis, no induration  NEUROLOGIC & PSYCHIATRIC:  alert, interactive, oriented to time and place, responds to stimuli, no asterixis  DIALYSIS ACCESS: LFA AVF ligated  KIDNEY TRANSPLANT: RLQ non-tender                MEDICATIONS  (STANDING):  apixaban 5 milliGRAM(s) Oral every 12 hours  atorvastatin 20 milliGRAM(s) Oral at bedtime  cholecalciferol 1000 Unit(s) Oral two times a day  dextrose 5%. 1000 milliLiter(s) (50 mL/Hr) IV Continuous <Continuous>  dextrose 50% Injectable 12.5 Gram(s) IV Push once  dextrose 50% Injectable 25 Gram(s) IV Push once  dextrose 50% Injectable 25 Gram(s) IV Push once  insulin lispro (HumaLOG) corrective regimen sliding scale   SubCutaneous Before meals and at bedtime  piperacillin/tazobactam IVPB. 3.375 Gram(s) IV Intermittent every 6 hours  potassium chloride   Powder 40 milliEquivalent(s) Oral once  predniSONE   Tablet 5 milliGRAM(s) Oral daily  tacrolimus 1 milliGRAM(s) Oral <User Schedule>  tacrolimus 2 milliGRAM(s) Oral <User Schedule>    MEDICATIONS  (PRN):  acetaminophen   Tablet 650 milliGRAM(s) Oral every 6 hours PRN For Temp greater than 38 C (100.4 F)  acetaminophen   Tablet. 650 milliGRAM(s) Oral every 6 hours PRN Moderate Pain (4 - 6)  dextrose 40% Gel 15 Gram(s) Oral once PRN Blood Glucose LESS THAN 70 milliGRAM(s)/deciliter  glucagon  Injectable 1 milliGRAM(s) IntraMuscular once PRN Glucose LESS THAN 70 milligrams/deciliter      DATA:  142    |  104    |  30<H>  ----------------------------<  166<H>  Ca:9.0   (15 To 2018 06:57)  3.5     |  24     |  1.75<H>      eGFR if Non : 39 <L>  eGFR if : 45 <L>    TPro  6.6    /  Alb  3.1<L>  /  TBili  1.1    /  DBili  x      /  AST  19     /  ALT  14     /  AlkPhos  61     2018 11:09    SCr 1.75 [15 To 2018 06:57]  SCr 1.68 [2018 11:09]  SCr 1.76 [2018 16:56]                          10.5<L>  10.3  )-----------( 128<L>    ( 15 To 2018 06:56 )             31.5<L>    Phos:-- M.8 mg/dL PTH:-- Uric acid:-- Serum Osm:--  Ferritin:-- Iron:-- TIBC:-- Tsat:--  B12:-- TSH:-- (15 To 2018 06:57)    Urinalysis Basic - ( 2018 19:22 )  Color: Yellow / Appearance: Clear / S.010 / pH: x  Gluc: x / Ketone: NEGATIVE  / Bili: Negative / Urobili: 0.2 E.U./dL   Blood: x / Protein: 100 mg/dL<!> / Nitrite: POSITIVE<!>   Leuk Esterase: Moderate<!> / RBC: > 10 /HPF<!> / WBC Many /HPF<!>   Sq Epi: x / Non Sq Epi: 0-5 /HPF / Bacteria: Present /HPF<!>      UProt:-- UCr:36 mg/dL P/C Ratio:-- 24 hour Prot:-- UVol:-- CrCl:--  Judi:89 mmol/L UOsm:-- UVol:-- UCl:-- UK:-- (2018 09:42) MEDICAL HISTORY:      73 year old  man with with a well-functioning kidney transplant. Admitted 18 with urosepsis. Gram negative identified in the urine and blood.  Incidentally noted to have asymptomatic AFIB (Os[place]do).    ESRD (Polycystic kidney disease)  HD [id number]  Kidney transplant  ( [facility])  CKD GFR 60's-70's  Cardiomyopathy, LVH w normal LVEF  AFIB  HTN  Gout  HLD  DM-2 since KTP (Barrera)  Ao root dilation ([place])  Osteoporosis (Barrera)  Prostate cancer s/p prostatectomy   [employer]utheran     Negative chemical stress   Echocardiogram LVH with normal systolic function  18 Echocardiogram ([facility]) LVH with nl wall motion, LVEF 55-60, nl LA, aortic root dilation  Baseline Scr  0.9-1.1 mg/dl.   2018. Echo this admission  LVH, LVH w normal wall motion, LAE, Ao root dilated 4.1 cm    Meds at home: vit D3 2000 Iu daily, Eliquis, Prograf 2 mcg AM, 1 mcg PM (9 AM, 9PM), Prednisone 5 mg/d, amlodipine 10 mg/d, lisinopril 10 mg/d, carvedilol 12.5 mg bid, lipitor 10 mg/d, januvia 50 mg/d, metformin 1500 mg/d at dinner, glimepiride 4 mg/d with breakfast.       INTERVAL HISTORY: WBC 13K, 10.5K  Hgb 12.8, 10.3 g/dl.   Cr 1.68-1.75 mg/dl.   Gram negative urine and blood.   CXR vascular fullness, otherwise clear.  Sonogram: native kidneys PKD, TP kidney intact, no obstruction.   VSS 's, HR AFIB 80's.     SUMMARY COMMENTS:     Currently being Rx with IV antibiotics for gram negative urine and blood infection. Tmax was 101F last night. No chills.      There is no flank tenderness to suggest an infected native kidney cyst (PKD). The kdiney transplant (RLQ) is also not tender. Nothing unexpected was noted with respect to the native kidneys or the transplant kidney. However, GFR is a bit lower than baseline. Typical Scr is ~1.3 mg/dl, here Scr ~1.7 mg/dl but the patient is making urine and hemodynamics are stable. A Tucker catheter placed yesterday is being removed today. Transplant kidney function has been stable for years and this small increase in Scr in the setting of gram negative urosepsis is not necessarily of an major significance. Advise continuation of the patient's usual regimen: tacrilimus     AFIB has been present since 2018 and the patient has been taking Eliquis and carvedilol for HTN and blood pressure control. Amlodipine is also on board for BP control.     The Ao root dilation is not new and has been followed by Dr. Gandara.     There is a h/o prostate cancer s/p prostatectomy, but this is not an active problem.     Discussed with the medical team.               ---------------------------------------------------------------------------------------------------------------------------------------------------------------------------    SUBJECTIVE & OBJECTIVE DATA DOCUMENTATION:     REVIEW OF SYSTEMS SCREENED ORGAN SYSTEMS:  Constitutional: fever, chills, anorexia or fatigue  Pulmonary: difficulty breathing, wheezing, cough  Cardiovascular: exertional dyspnea, chest pain, palpitations, dizziness or leg swelling  Gastrointestinal: abdominal or epigastric pain, nausea, vomiting or hematemesis, diarrhea, melena or bright red blood per rectum.  Genitourinary: dysuria, urgency, frequency, flank pain, difficulty voiding  Skin: No pruritis, rashes or lesions  Neurology: memory loss, dysarthria, extremity weakness, neuropathic pain, headache, visual changes  Dialysis access if present : pain, bleeding, swelling     ROS POSITIVE FINDINGS: s/p fever (no chills), currently negative ROS screen.     PAST MEDICAL & SURGICAL HISTORY:  DVT (deep venous thrombosis)  Kidney transplant recipient  Prostate cancer  HTN (hypertension)  HLD (hyperlipidemia)  DM2 (diabetes mellitus, type 2)  Polycystic kidney disease  H/O radical prostatectomy      PHYSICAL EXAM:  T(C): 37.4 (06-15-18 @ 05:46), Max: 38.7 (06-15-18 @ 01:47)  HR: 63 (06-15-18 @ 06:09)  BP: 143/82 (06-15-18 @ 06:04) (140/80 - 160/90)  RR: 23 (06-15-18 @ 06:09)  SpO2: 97% (06-15-18 @ 06:09)  Wt(kg): --  I&O's Summary    2018 07:01  -  15 To 2018 07:00  --------------------------------------------------------  IN: 340 mL / OUT: 2090 mL / NET: -1750 mL      Weight 79.4 ( @ 16:00)    APPEARANCE: in bed, NAD  HEAD & NECK: normocephalic, no stiff neck, no masses, oral mucosa moist, no scleral icteris  RESPIRATORY: no accessory muscle use, no labored breathing, no dullness, no rales, crackles, no rhonchi, no wheeze  CARDIOVASCULAR: no JVD, IRRR, S1S2, + gallop,  no murmur, no rub.  ABDOMEN: soft, no tenderness, no masses, no hepatomegally, no splenomegally  : no bladder distension, no flank tenderness (PKD)  LYMPHATIC: no lymphadenopathy (neck, axilla, groin)  EXTREMITIES & MUSCULOSKELETAL: no cyanosis, no clubbing, no tenderness, strength  L=R  EDEMA: none  PULSES: upper extremity pulses present, lower extremity pulses present   SKIN: no rash, no stasis, no induration  NEUROLOGIC & PSYCHIATRIC:  alert, interactive, oriented to time and place, responds to stimuli, no asterixis  DIALYSIS ACCESS: LFA AVF ligated, no signs of infection.   KIDNEY TRANSPLANT: RLQ non-tender      MEDICATIONS  (STANDING):  apixaban 5 milliGRAM(s) Oral every 12 hours  atorvastatin 20 milliGRAM(s) Oral at bedtime  cholecalciferol 1000 Unit(s) Oral two times a day  dextrose 5%. 1000 milliLiter(s) (50 mL/Hr) IV Continuous <Continuous>  dextrose 50% Injectable 12.5 Gram(s) IV Push once  dextrose 50% Injectable 25 Gram(s) IV Push once  dextrose 50% Injectable 25 Gram(s) IV Push once  insulin lispro (HumaLOG) corrective regimen sliding scale   SubCutaneous Before meals and at bedtime  piperacillin/tazobactam IVPB. 3.375 Gram(s) IV Intermittent every 6 hours  potassium chloride   Powder 40 milliEquivalent(s) Oral once  predniSONE   Tablet 5 milliGRAM(s) Oral daily  tacrolimus 1 milliGRAM(s) Oral <User Schedule>  tacrolimus 2 milliGRAM(s) Oral <User Schedule>    MEDICATIONS  (PRN):  acetaminophen   Tablet 650 milliGRAM(s) Oral every 6 hours PRN For Temp greater than 38 C (100.4 F)  acetaminophen   Tablet. 650 milliGRAM(s) Oral every 6 hours PRN Moderate Pain (4 - 6)  dextrose 40% Gel 15 Gram(s) Oral once PRN Blood Glucose LESS THAN 70 milliGRAM(s)/deciliter  glucagon  Injectable 1 milliGRAM(s) IntraMuscular once PRN Glucose LESS THAN 70 milligrams/deciliter      DATA:  142    |  104    |  30<H>  ----------------------------<  166<H>  Ca:9.0   (15 Ot 2018 06:57)  3.5     |  24     |  1.75<H>      eGFR if Non : 39 <L>  eGFR if : 45 <L>    TPro  6.6    /  Alb  3.1<L>  /  TBili  1.1    /  DBili  x      /  AST  19     /  ALT  14     /  AlkPhos  61     2018 11:09    SCr 1.75 [15 To 2018 06:57]  SCr 1.68 [2018 11:09]  SCr 1.76 [2018 16:56]                          10.5<L>  10.3  )-----------( 128<L>    ( 15 To 2018 06:56 )             31.5<L>    Phos:-- M.8 mg/dL PTH:-- Uric acid:-- Serum Osm:--  Ferritin:-- Iron:-- TIBC:-- Tsat:--  B12:-- TSH:-- (15 To 2018 06:57)    Urinalysis Basic - ( 2018 19:22 )  Color: Yellow / Appearance: Clear / S.010 / pH: x  Gluc: x / Ketone: NEGATIVE  / Bili: Negative / Urobili: 0.2 E.U./dL   Blood: x / Protein: 100 mg/dL<!> / Nitrite: POSITIVE<!>   Leuk Esterase: Moderate<!> / RBC: > 10 /HPF<!> / WBC Many /HPF<!>   Sq Epi: x / Non Sq Epi: 0-5 /HPF / Bacteria: Present /HPF<!>      UProt:-- UCr:36 mg/dL P/C Ratio:-- 24 hour Prot:-- UVol:-- CrCl:--  Judi:89 mmol/L UOsm:-- UVol:-- UCl:-- UK:-- (2018 09:42) MEDICAL HISTORY:      73 year old  man with with a well-functioning kidney transplant. Admitted late 18 with urosepsis. Gram negative identified in the urine and blood.  Incidentally noted to have asymptomatic AFIB (Osvaldo).    ESRD (Polycystic kidney disease)  HD 2530-1013  Kidney transplant  ([facility])  CKD GFR 60's-70's  Cardiomyopathy, LVH w normal LVEF  AFIB  HTN  Gout  HLD  DM-2 since KTP (Barrera)  Ao root dilation ([place])  Osteoporosis (Barrera)  Prostate cancer s/p prostatectomy   Catholic     Negative chemical stress   Echocardiogram LVH with normal systolic function  18 Echocardiogram ([facility]) LVH with nl wall motion, LVEF 55-60, nl LA, aortic root dilation  Baseline Scr  0.9-1.1 mg/dl.   2018. Echo this admission  LVH, LVH w normal wall motion, LAE, Ao root dilated 4.1 cm    Meds at home: vit D3 2000 Iu daily, Eliquis, Prograf 2 mcg AM, 1 mcg PM (9 AM, 9PM), Prednisone 5 mg/d, amlodipine 10 mg/d, lisinopril 10 mg/d, carvedilol 12.5 mg bid, lipitor 10 mg/d, januvia 50 mg/d, metformin 1500 mg/d at dinner, glimepiride 4 mg/d with breakfast.       INTERVAL HISTORY: WBC 13K, 10.5K  Hgb 12.8, 10.3 g/dl.   Cr 1.68-1.75 mg/dl.   Gram negative urine and blood.   CXR vascular fullness, otherwise clear.  Sonogram: native kidneys PKD, TP kidney intact, no obstruction.   VSS 's, HR AFIB 80's.     SUMMARY COMMENTS:     Currently being Rx with IV antibiotics for gram negative urine and blood infection. Tmax was 101F last night. No chills.      There is no flank tenderness to suggest an infected native kidney cyst (PKD). The kdiney transplant (RLQ) is also not tender. Nothing unexpected was noted with respect to the native kidneys or the transplant kidney. However, GFR is a bit lower than baseline. Typical Scr is ~1.3 mg/dl, here Scr ~1.7 mg/dl but the patient is making urine and hemodynamics are stable. A Tucker catheter placed yesterday is being removed today. Transplant kidney function has been stable for years and this small increase in Scr in the setting of gram negative urosepsis is not necessarily of an major significance.     Advise continuation of the patient's usual regimen: tacrilimus and prednisone. Tacrilimus levels have to be carefully timed to 30 minutes before the scheduled dose. Suggest checking the level Monday AM provided a Q12h regimen of tacrilimus has been precisely maintained.     AFIB has been present since 2018 and the patient has been taking Eliquis and carvedilol for HTN and blood pressure control. Amlodipine is also on board for BP control.     The Ao root dilation is not new and has been followed by Dr. Gandara.     There is a h/o prostate cancer s/p prostatectomy, but this is not an active problem.     Discussed with the medical team.               ---------------------------------------------------------------------------------------------------------------------------------------------------------------------------    SUBJECTIVE & OBJECTIVE DATA DOCUMENTATION:     REVIEW OF SYSTEMS SCREENED ORGAN SYSTEMS:  Constitutional: fever, chills, anorexia or fatigue  Pulmonary: difficulty breathing, wheezing, cough  Cardiovascular: exertional dyspnea, chest pain, palpitations, dizziness or leg swelling  Gastrointestinal: abdominal or epigastric pain, nausea, vomiting or hematemesis, diarrhea, melena or bright red blood per rectum.  Genitourinary: dysuria, urgency, frequency, flank pain, difficulty voiding  Skin: No pruritis, rashes or lesions  Neurology: memory loss, dysarthria, extremity weakness, neuropathic pain, headache, visual changes  Dialysis access if present : pain, bleeding, swelling     ROS POSITIVE FINDINGS: s/p fever (no chills), currently negative ROS screen.     PAST MEDICAL & SURGICAL HISTORY:  DVT (deep venous thrombosis)  Kidney transplant recipient  Prostate cancer  HTN (hypertension)  HLD (hyperlipidemia)  DM2 (diabetes mellitus, type 2)  Polycystic kidney disease  H/O radical prostatectomy      PHYSICAL EXAM:  T(C): 37.4 (06-15-18 @ 05:46), Max: 38.7 (06-15-18 @ 01:47)  HR: 63 (06-15-18 @ 06:09)  BP: 143/82 (06-15-18 @ 06:04) (140/80 - 160/90)  RR: 23 (06-15-18 @ 06:09)  SpO2: 97% (06-15-18 @ 06:09)  Wt(kg): --  I&O's Summary    2018 07:01  -  15 To 2018 07:00  --------------------------------------------------------  IN: 340 mL / OUT: 2090 mL / NET: -1750 mL      Weight 79.4 ( @ 16:00)    APPEARANCE: in bed, NAD  HEAD & NECK: normocephalic, no stiff neck, no masses, oral mucosa moist, no scleral icteris  RESPIRATORY: no accessory muscle use, no labored breathing, no dullness, no rales, crackles, no rhonchi, no wheeze  CARDIOVASCULAR: no JVD, IRRR, S1S2, + gallop,  no murmur, no rub.  ABDOMEN: soft, no tenderness, no masses, no hepatomegally, no splenomegally  : no bladder distension, no flank tenderness (PKD)  LYMPHATIC: no lymphadenopathy (neck, axilla, groin)  EXTREMITIES & MUSCULOSKELETAL: no cyanosis, no clubbing, no tenderness, strength  L=R  EDEMA: none  PULSES: upper extremity pulses present, lower extremity pulses present   SKIN: no rash, no stasis, no induration  NEUROLOGIC & PSYCHIATRIC:  alert, interactive, oriented to time and place, responds to stimuli, no asterixis  DIALYSIS ACCESS: LFA AVF ligated, no signs of infection.   KIDNEY TRANSPLANT: RLQ non-tender      MEDICATIONS  (STANDING):  apixaban 5 milliGRAM(s) Oral every 12 hours  atorvastatin 20 milliGRAM(s) Oral at bedtime  cholecalciferol 1000 Unit(s) Oral two times a day  dextrose 5%. 1000 milliLiter(s) (50 mL/Hr) IV Continuous <Continuous>  dextrose 50% Injectable 12.5 Gram(s) IV Push once  dextrose 50% Injectable 25 Gram(s) IV Push once  dextrose 50% Injectable 25 Gram(s) IV Push once  insulin lispro (HumaLOG) corrective regimen sliding scale   SubCutaneous Before meals and at bedtime  piperacillin/tazobactam IVPB. 3.375 Gram(s) IV Intermittent every 6 hours  potassium chloride   Powder 40 milliEquivalent(s) Oral once  predniSONE   Tablet 5 milliGRAM(s) Oral daily  tacrolimus 1 milliGRAM(s) Oral <User Schedule>  tacrolimus 2 milliGRAM(s) Oral <User Schedule>    MEDICATIONS  (PRN):  acetaminophen   Tablet 650 milliGRAM(s) Oral every 6 hours PRN For Temp greater than 38 C (100.4 F)  acetaminophen   Tablet. 650 milliGRAM(s) Oral every 6 hours PRN Moderate Pain (4 - 6)  dextrose 40% Gel 15 Gram(s) Oral once PRN Blood Glucose LESS THAN 70 milliGRAM(s)/deciliter  glucagon  Injectable 1 milliGRAM(s) IntraMuscular once PRN Glucose LESS THAN 70 milligrams/deciliter      DATA:  142    |  104    |  30<H>  ----------------------------<  166<H>  Ca:9.0   (15 To 2018 06:57)  3.5     |  24     |  1.75<H>      eGFR if Non : 39 <L>  eGFR if : 45 <L>    TPro  6.6    /  Alb  3.1<L>  /  TBili  1.1    /  DBili  x      /  AST  19     /  ALT  14     /  AlkPhos  61     2018 11:09    SCr 1.75 [15 To 2018 06:57]  SCr 1.68 [2018 11:09]  SCr 1.76 [2018 16:56]                          10.5<L>  10.3  )-----------( 128<L>    ( 15 To 2018 06:56 )             31.5<L>    Phos:-- M.8 mg/dL PTH:-- Uric acid:-- Serum Osm:--  Ferritin:-- Iron:-- TIBC:-- Tsat:--  B12:-- TSH:-- (15 To 2018 06:57)    Urinalysis Basic - ( 2018 19:22 )  Color: Yellow / Appearance: Clear / S.010 / pH: x  Gluc: x / Ketone: NEGATIVE  / Bili: Negative / Urobili: 0.2 E.U./dL   Blood: x / Protein: 100 mg/dL<!> / Nitrite: POSITIVE<!>   Leuk Esterase: Moderate<!> / RBC: > 10 /HPF<!> / WBC Many /HPF<!>   Sq Epi: x / Non Sq Epi: 0-5 /HPF / Bacteria: Present /HPF<!>      UProt:-- UCr:36 mg/dL P/C Ratio:-- 24 hour Prot:-- UVol:-- CrCl:--  Judi:89 mmol/L UOsm:-- UVol:-- UCl:-- UK:-- (2018 09:42) MEDICAL HISTORY:      Consulted by the medical team (Dr. Stroud) for kidney transplant management.     73 year old  man with with a well-functioning kidney transplant. Admitted late 18 with urosepsis. Gram negative identified in the urine and blood.  Incidentally noted to have asymptomatic AFIB (Osvaldo).    ESRD (Polycystic kidney disease)  HD 2138-5049  Kidney transplant  ([facility])  CKD GFR 60's-70's  Cardiomyopathy, LVH w normal LVEF  AFIB  HTN  Gout  HLD  DM-2 since KTP (Barrera)  Ao root dilation ([place])  Osteoporosis (Barrera)  Prostate cancer s/p prostatectomy   [employer]ism     Negative chemical stress   Echocardiogram LVH with normal systolic function  18 Echocardiogram ([facility]) LVH with nl wall motion, LVEF 55-60, nl LA, aortic root dilation  Baseline Scr  0.9-1.1 mg/dl.   2018. Echo this admission  LVH, LVH w normal wall motion, LAE, Ao root dilated 4.1 cm    Meds at home: vit D3 2000 Iu daily, Eliquis, Prograf 2 mcg AM, 1 mcg PM (9 AM, 9PM), Prednisone 5 mg/d, amlodipine 10 mg/d, lisinopril 10 mg/d, carvedilol 12.5 mg bid, lipitor 10 mg/d, januvia 50 mg/d, metformin 1500 mg/d at dinner, glimepiride 4 mg/d with breakfast.       INTERVAL HISTORY: WBC 13K, 10.5K  Hgb 12.8, 10.3 g/dl.   Cr 1.68-1.75 mg/dl.   Gram negative urine and blood.   CXR vascular fullness, otherwise clear.  Sonogram: native kidneys PKD, TP kidney intact, no obstruction.   VSS 's, HR AFIB 80's.     SUMMARY COMMENTS:     Currently being Rx with IV antibiotics for gram negative urine and blood infection. Tmax was 101F last night. No chills.      There is no flank tenderness to suggest an infected native kidney cyst (PKD). The kdiney transplant (RLQ) is also not tender. Nothing unexpected was noted with respect to the native kidneys or the transplant kidney. However, GFR is a bit lower than baseline. Typical Scr is ~1.3 mg/dl, here Scr ~1.7 mg/dl but the patient is making urine and hemodynamics are stable. A Tucker catheter placed yesterday is being removed today. Transplant kidney function has been stable for years and this small increase in Scr in the setting of gram negative urosepsis is not necessarily of an major significance.     Advise continuation of the patient's usual regimen: tacrilimus and prednisone. Tacrilimus levels have to be carefully timed to 30 minutes before the scheduled dose. Suggest checking the level  provided a Q12h regimen of tacrilimus has been precisely maintained.     AFIB has been present since 2018 and the patient has been taking Eliquis and carvedilol for HTN and blood pressure control. Amlodipine is also on board for BP control.     The Ao root dilation is not new and has been followed by Dr. Gandara.     There is a h/o prostate cancer s/p prostatectomy, but this is not an active problem.     Discussed with the medical team.               ---------------------------------------------------------------------------------------------------------------------------------------------------------------------------    SUBJECTIVE & OBJECTIVE DATA DOCUMENTATION:     REVIEW OF SYSTEMS SCREENED ORGAN SYSTEMS:  Constitutional: fever, chills, anorexia or fatigue  Pulmonary: difficulty breathing, wheezing, cough  Cardiovascular: exertional dyspnea, chest pain, palpitations, dizziness or leg swelling  Gastrointestinal: abdominal or epigastric pain, nausea, vomiting or hematemesis, diarrhea, melena or bright red blood per rectum.  Genitourinary: dysuria, urgency, frequency, flank pain, difficulty voiding  Skin: No pruritis, rashes or lesions  Neurology: memory loss, dysarthria, extremity weakness, neuropathic pain, headache, visual changes  Dialysis access if present : pain, bleeding, swelling     ROS POSITIVE FINDINGS: s/p fever (no chills), currently negative ROS screen.     PAST MEDICAL & SURGICAL HISTORY:  DVT (deep venous thrombosis)  Kidney transplant recipient  Prostate cancer  HTN (hypertension)  HLD (hyperlipidemia)  DM2 (diabetes mellitus, type 2)  Polycystic kidney disease  H/O radical prostatectomy      PHYSICAL EXAM:  T(C): 37.4 (06-15-18 @ 05:46), Max: 38.7 (06-15-18 @ 01:47)  HR: 63 (06-15-18 @ 06:09)  BP: 143/82 (06-15-18 @ 06:04) (140/80 - 160/90)  RR: 23 (06-15-18 @ 06:09)  SpO2: 97% (06-15-18 @ 06:09)  Wt(kg): --  I&O's Summary    2018 07:01  -  15 To 2018 07:00  --------------------------------------------------------  IN: 340 mL / OUT: 2090 mL / NET: -1750 mL      Weight 79.4 ( @ 16:00)    APPEARANCE: in bed, NAD  HEAD & NECK: normocephalic, no stiff neck, no masses, oral mucosa moist, no scleral icteris  RESPIRATORY: no accessory muscle use, no labored breathing, no dullness, no rales, crackles, no rhonchi, no wheeze  CARDIOVASCULAR: no JVD, IRRR, S1S2, + gallop,  no murmur, no rub.  ABDOMEN: soft, no tenderness, no masses, no hepatomegally, no splenomegally  : no bladder distension, no flank tenderness (PKD)  LYMPHATIC: no lymphadenopathy (neck, axilla, groin)  EXTREMITIES & MUSCULOSKELETAL: no cyanosis, no clubbing, no tenderness, strength  L=R  EDEMA: none  PULSES: upper extremity pulses present, lower extremity pulses present   SKIN: no rash, no stasis, no induration  NEUROLOGIC & PSYCHIATRIC:  alert, interactive, oriented to time and place, responds to stimuli, no asterixis  DIALYSIS ACCESS: LFA AVF ligated, no signs of infection.   KIDNEY TRANSPLANT: RLQ non-tender      MEDICATIONS  (STANDING):  apixaban 5 milliGRAM(s) Oral every 12 hours  atorvastatin 20 milliGRAM(s) Oral at bedtime  cholecalciferol 1000 Unit(s) Oral two times a day  dextrose 5%. 1000 milliLiter(s) (50 mL/Hr) IV Continuous <Continuous>  dextrose 50% Injectable 12.5 Gram(s) IV Push once  dextrose 50% Injectable 25 Gram(s) IV Push once  dextrose 50% Injectable 25 Gram(s) IV Push once  insulin lispro (HumaLOG) corrective regimen sliding scale   SubCutaneous Before meals and at bedtime  piperacillin/tazobactam IVPB. 3.375 Gram(s) IV Intermittent every 6 hours  potassium chloride   Powder 40 milliEquivalent(s) Oral once  predniSONE   Tablet 5 milliGRAM(s) Oral daily  tacrolimus 1 milliGRAM(s) Oral <User Schedule>  tacrolimus 2 milliGRAM(s) Oral <User Schedule>    MEDICATIONS  (PRN):  acetaminophen   Tablet 650 milliGRAM(s) Oral every 6 hours PRN For Temp greater than 38 C (100.4 F)  acetaminophen   Tablet. 650 milliGRAM(s) Oral every 6 hours PRN Moderate Pain (4 - 6)  dextrose 40% Gel 15 Gram(s) Oral once PRN Blood Glucose LESS THAN 70 milliGRAM(s)/deciliter  glucagon  Injectable 1 milliGRAM(s) IntraMuscular once PRN Glucose LESS THAN 70 milligrams/deciliter      DATA:  142    |  104    |  30<H>  ----------------------------<  166<H>  Ca:9.0   (15 To 2018 06:57)  3.5     |  24     |  1.75<H>      eGFR if Non : 39 <L>  eGFR if : 45 <L>    TPro  6.6    /  Alb  3.1<L>  /  TBili  1.1    /  DBili  x      /  AST  19     /  ALT  14     /  AlkPhos  61     2018 11:09    SCr 1.75 [15 To 2018 06:57]  SCr 1.68 [2018 11:09]  SCr 1.76 [2018 16:56]                          10.5<L>  10.3  )-----------( 128<L>    ( 15 To 2018 06:56 )             31.5<L>    Phos:-- M.8 mg/dL PTH:-- Uric acid:-- Serum Osm:--  Ferritin:-- Iron:-- TIBC:-- Tsat:--  B12:-- TSH:-- (15 To 2018 06:57)    Urinalysis Basic - ( 2018 19:22 )  Color: Yellow / Appearance: Clear / S.010 / pH: x  Gluc: x / Ketone: NEGATIVE  / Bili: Negative / Urobili: 0.2 E.U./dL   Blood: x / Protein: 100 mg/dL<!> / Nitrite: POSITIVE<!>   Leuk Esterase: Moderate<!> / RBC: > 10 /HPF<!> / WBC Many /HPF<!>   Sq Epi: x / Non Sq Epi: 0-5 /HPF / Bacteria: Present /HPF<!>      UProt:-- UCr:36 mg/dL P/C Ratio:-- 24 hour Prot:-- UVol:-- CrCl:--  Judi:89 mmol/L UOsm:-- UVol:-- UCl:-- UK:-- (2018 09:42) MEDICAL HISTORY:      Consulted by the medical team (Dr. Stroud) for kidney transplant management.     73 year old  man with with a well-functioning kidney transplant. Admitted late 18 with urosepsis. Gram negative identified in the urine and blood.  Incidentally noted to have asymptomatic AFIB (Osvaldo) in 2018.     ESRD (Polycystic kidney disease)  HD [id number]  Kidney transplant  ([facility])  CKD GFR 60's-70's  Cardiomyopathy, LVH w normal LVEF  AFIB  HTN  Gout  HLD  DM-2 since KTP (Barrera)  Ao root dilation ([facility])  Osteoporosis (Barrera)  Prostate cancer s/p prostatectomy   Denominational     Negative chemical stress   Echocardiogram LVH with normal systolic function  18 Echocardiogram ([facility]) LVH with nl wall motion, LVEF 55-60, nl LA, aortic root dilation  Baseline Scr  0.9-1.1 mg/dl.   2018. Echo this admission  LVH, LVH w normal wall motion, LAE, Ao root dilated 4.1 cm    Meds at home: vit D3 2000 Iu daily, Eliquis, Prograf 2 mcg AM, 1 mcg PM (9 AM, 9PM), Prednisone 5 mg/d, amlodipine 10 mg/d, lisinopril 10 mg/d, carvedilol 12.5 mg bid, lipitor 10 mg/d, januvia 50 mg/d, metformin 1500 mg/d at dinner, glimepiride 4 mg/d with breakfast.       INTERVAL HISTORY: WBC 13K, 10.5K  Hgb 12.8, 10.3 g/dl.   Cr 1.68-1.75 mg/dl.   Gram negative urine and blood.   CXR vascular fullness, otherwise clear.  Sonogram: native kidneys PKD, TP kidney intact, no obstruction.   VSS 's, HR AFIB 80's.     SUMMARY COMMENTS:     Currently being Rx with IV antibiotics for gram negative urine and blood infection. Tmax was 101F last night. No chills.      There is no flank tenderness to suggest an infected native kidney cyst (PKD). The kdiney transplant (RLQ) is also not tender. Nothing unexpected was noted with respect to the native kidneys or the transplant kidney. However, GFR is a bit lower than baseline. Typical Scr is ~1.3 mg/dl, here Scr ~1.7 mg/dl but the patient is making urine and hemodynamics are stable. A Tucker catheter placed yesterday is being removed today. Transplant kidney function has been stable for years and this small increase in Scr in the setting of gram negative urosepsis is not necessarily of an major significance.     Advise continuation of the patient's usual regimen: tacrilimus and prednisone. Tacrilimus levels have to be carefully timed to 30 minutes before the scheduled dose. Suggest checking the level  provided a Q12h regimen of tacrilimus has been precisely maintained.     AFIB has been present since 2018 and the patient has been taking Eliquis and carvedilol for HTN and blood pressure control. Amlodipine is also on board for BP control.     The Ao root dilation is not new and has been followed by Dr. Gandara.     There is a h/o prostate cancer s/p prostatectomy, but this is not an active problem.     Discussed with the medical team.               ---------------------------------------------------------------------------------------------------------------------------------------------------------------------------    SUBJECTIVE & OBJECTIVE DATA DOCUMENTATION:     REVIEW OF SYSTEMS SCREENED ORGAN SYSTEMS:  Constitutional: fever, chills, anorexia or fatigue  Pulmonary: difficulty breathing, wheezing, cough  Cardiovascular: exertional dyspnea, chest pain, palpitations, dizziness or leg swelling  Gastrointestinal: abdominal or epigastric pain, nausea, vomiting or hematemesis, diarrhea, melena or bright red blood per rectum.  Genitourinary: dysuria, urgency, frequency, flank pain, difficulty voiding  Skin: No pruritis, rashes or lesions  Neurology: memory loss, dysarthria, extremity weakness, neuropathic pain, headache, visual changes  Dialysis access if present : pain, bleeding, swelling     ROS POSITIVE FINDINGS: s/p fever (no chills), currently negative ROS screen.     PAST MEDICAL & SURGICAL HISTORY:  DVT (deep venous thrombosis)  Kidney transplant recipient  Prostate cancer  HTN (hypertension)  HLD (hyperlipidemia)  DM2 (diabetes mellitus, type 2)  Polycystic kidney disease  H/O radical prostatectomy      PHYSICAL EXAM:  T(C): 37.4 (06-15-18 @ 05:46), Max: 38.7 (06-15-18 @ 01:47)  HR: 63 (06-15-18 @ 06:09)  BP: 143/82 (06-15-18 @ 06:04) (140/80 - 160/90)  RR: 23 (06-15-18 @ 06:09)  SpO2: 97% (06-15-18 @ 06:09)  Wt(kg): --  I&O's Summary    2018 07:01  -  15 To 2018 07:00  --------------------------------------------------------  IN: 340 mL / OUT: 2090 mL / NET: -1750 mL      Weight 79.4 ( @ 16:00)    APPEARANCE: in bed, NAD  HEAD & NECK: normocephalic, no stiff neck, no masses, oral mucosa moist, no scleral icteris  RESPIRATORY: no accessory muscle use, no labored breathing, no dullness, no rales, crackles, no rhonchi, no wheeze  CARDIOVASCULAR: no JVD, IRRR, S1S2, + gallop,  no murmur, no rub.  ABDOMEN: soft, no tenderness, no masses, no hepatomegally, no splenomegally  : no bladder distension, no flank tenderness (PKD)  LYMPHATIC: no lymphadenopathy (neck, axilla, groin)  EXTREMITIES & MUSCULOSKELETAL: no cyanosis, no clubbing, no tenderness, strength  L=R  EDEMA: none  PULSES: upper extremity pulses present, lower extremity pulses present   SKIN: no rash, no stasis, no induration  NEUROLOGIC & PSYCHIATRIC:  alert, interactive, oriented to time and place, responds to stimuli, no asterixis  DIALYSIS ACCESS: LFA AVF ligated, no signs of infection.   KIDNEY TRANSPLANT: RLQ non-tender      MEDICATIONS  (STANDING):  apixaban 5 milliGRAM(s) Oral every 12 hours  atorvastatin 20 milliGRAM(s) Oral at bedtime  cholecalciferol 1000 Unit(s) Oral two times a day  dextrose 5%. 1000 milliLiter(s) (50 mL/Hr) IV Continuous <Continuous>  dextrose 50% Injectable 12.5 Gram(s) IV Push once  dextrose 50% Injectable 25 Gram(s) IV Push once  dextrose 50% Injectable 25 Gram(s) IV Push once  insulin lispro (HumaLOG) corrective regimen sliding scale   SubCutaneous Before meals and at bedtime  piperacillin/tazobactam IVPB. 3.375 Gram(s) IV Intermittent every 6 hours  potassium chloride   Powder 40 milliEquivalent(s) Oral once  predniSONE   Tablet 5 milliGRAM(s) Oral daily  tacrolimus 1 milliGRAM(s) Oral <User Schedule>  tacrolimus 2 milliGRAM(s) Oral <User Schedule>    MEDICATIONS  (PRN):  acetaminophen   Tablet 650 milliGRAM(s) Oral every 6 hours PRN For Temp greater than 38 C (100.4 F)  acetaminophen   Tablet. 650 milliGRAM(s) Oral every 6 hours PRN Moderate Pain (4 - 6)  dextrose 40% Gel 15 Gram(s) Oral once PRN Blood Glucose LESS THAN 70 milliGRAM(s)/deciliter  glucagon  Injectable 1 milliGRAM(s) IntraMuscular once PRN Glucose LESS THAN 70 milligrams/deciliter      DATA:  142    |  104    |  30<H>  ----------------------------<  166<H>  Ca:9.0   (15 Ot 2018 06:57)  3.5     |  24     |  1.75<H>      eGFR if Non : 39 <L>  eGFR if : 45 <L>    TPro  6.6    /  Alb  3.1<L>  /  TBili  1.1    /  DBili  x      /  AST  19     /  ALT  14     /  AlkPhos  61     2018 11:09    SCr 1.75 [15 To 2018 06:57]  SCr 1.68 [2018 11:09]  SCr 1.76 [2018 16:56]                          10.5<L>  10.3  )-----------( 128<L>    ( 15 To 2018 06:56 )             31.5<L>    Phos:-- M.8 mg/dL PTH:-- Uric acid:-- Serum Osm:--  Ferritin:-- Iron:-- TIBC:-- Tsat:--  B12:-- TSH:-- (15 To 2018 06:57)    Urinalysis Basic - ( 2018 19:22 )  Color: Yellow / Appearance: Clear / S.010 / pH: x  Gluc: x / Ketone: NEGATIVE  / Bili: Negative / Urobili: 0.2 E.U./dL   Blood: x / Protein: 100 mg/dL<!> / Nitrite: POSITIVE<!>   Leuk Esterase: Moderate<!> / RBC: > 10 /HPF<!> / WBC Many /HPF<!>   Sq Epi: x / Non Sq Epi: 0-5 /HPF / Bacteria: Present /HPF<!>      UProt:-- UCr:36 mg/dL P/C Ratio:-- 24 hour Prot:-- UVol:-- CrCl:--  Judi:89 mmol/L UOsm:-- UVol:-- UCl:-- UK:-- (2018 09:42)

## 2018-06-15 NOTE — PROGRESS NOTE ADULT - PROBLEM SELECTOR PLAN 3
UA grossly positive on admission, patient endorsed urinary retention and malodorous urine x3 days leading up to admission.   - Continue to trend fever curve, WBC  - Ucx + for GNR, pending speciation, f/u  - c/w Zosyn (Day 3 of abxs, as of 6/15/18) As per plan above for Problem #2

## 2018-06-15 NOTE — PROGRESS NOTE ADULT - PROBLEM SELECTOR PLAN 8
F: No IVF indicated  E: Replete electrolytes PRN, Goal: K>4, Mg>2  N: Renal/DASH/TLC/CC diet    Ppx: SCD, Eliquis  Code Full  Dispo: [facility]  Transition of care: Dr. Rios PCP [phone] History of kidney transplant (2007) for history of PCKD, on tacrolimus and Prednisone at home. Tacrolimus levels followed outpatient with Dr Rios.   -C/w home medications tacrolimus and prednisone

## 2018-06-15 NOTE — PROGRESS NOTE ADULT - PROBLEM SELECTOR PLAN 6
Patient with known history of DM, on Glimeperide, Metformin and Januvia at home, unknown last HbA1c, HbA1c 7.3% on admission  - Hold home diabetic medications  - ISS, no basal/bolus regimen indicated at this time, adjust regimen accordingly  - Monitor FS with meals and bedtime - C/w home lipitor 20mg qd

## 2018-06-15 NOTE — CONSULT NOTE ADULT - SUBJECTIVE AND OBJECTIVE BOX
Patient is a 72y old  Male who presents with a chief complaint of Severe sepsis due to UTI (2018 22:58)       HPI:  72 year old M, Hindu, with PMH PCKD, s/p renal transplant in  at [facility] (on tacrolimus and prednisone), HTN, HLD, DM2, prostate cancer (s/p radical prostatectomy ), DVT (, d/t pelvic fracture), osteoporosis presents with three days of chest pain, shortness of breath, rigors, urinary hesitancy and malodor.     In ED, T99.4 (Tmax 103.0), P 103, 106/67,  R20, sat 94%. Was placed on BiPAP d/t hypoxic respiratory failure with RR 34 after 2.5L fluids given. CBC notable for WBC 11.2, 14% band PMN, INR 1.48, lactate 2.3, troponin 0.03. VBG with pH 7.34, CO2 39. UA with positive nitrite, leukocyte esterase, WBCs, >10 RBCs. Patient received ceftriaxone and azithromycin, 80mg IV Lasix. (2018 22:58)      PAST MEDICAL & SURGICAL HISTORY:  DVT (deep venous thrombosis)  Kidney transplant recipient  Prostate cancer  HTN (hypertension)  HLD (hyperlipidemia)  DM2 (diabetes mellitus, type 2)  Polycystic kidney disease  H/O radical prostatectomy      MEDICATIONS  (STANDING):  apixaban 5 milliGRAM(s) Oral every 12 hours  atorvastatin 20 milliGRAM(s) Oral at bedtime  cholecalciferol 1000 Unit(s) Oral two times a day  dextrose 5%. 1000 milliLiter(s) (50 mL/Hr) IV Continuous <Continuous>  dextrose 50% Injectable 12.5 Gram(s) IV Push once  dextrose 50% Injectable 25 Gram(s) IV Push once  dextrose 50% Injectable 25 Gram(s) IV Push once  insulin lispro (HumaLOG) corrective regimen sliding scale   SubCutaneous Before meals and at bedtime  piperacillin/tazobactam IVPB. 3.375 Gram(s) IV Intermittent every 6 hours  predniSONE   Tablet 5 milliGRAM(s) Oral daily  tacrolimus 1 milliGRAM(s) Oral <User Schedule>  tacrolimus 2 milliGRAM(s) Oral <User Schedule>    MEDICATIONS  (PRN):  acetaminophen   Tablet 650 milliGRAM(s) Oral every 6 hours PRN For Temp greater than 38 C (100.4 F)  acetaminophen   Tablet. 650 milliGRAM(s) Oral every 6 hours PRN Moderate Pain (4 - 6)  dextrose 40% Gel 15 Gram(s) Oral once PRN Blood Glucose LESS THAN 70 milliGRAM(s)/deciliter  glucagon  Injectable 1 milliGRAM(s) IntraMuscular once PRN Glucose LESS THAN 70 milligrams/deciliter      Social History:    Functional Level Prior to Admission:    FAMILY HISTORY:  No pertinent family history in first degree relatives      CBC Full  -  ( 15 To 2018 06:56 )  WBC Count : 10.3 K/uL  Hemoglobin : 10.5 g/dL  Hematocrit : 31.5 %  Platelet Count - Automated : 128 K/uL  Mean Cell Volume : 88.2 fL  Mean Cell Hemoglobin : 29.4 pg  Mean Cell Hemoglobin Concentration : 33.3 g/dL  Auto Neutrophil # : x  Auto Lymphocyte # : x  Auto Monocyte # : x  Auto Eosinophil # : x  Auto Basophil # : x  Auto Neutrophil % : x  Auto Lymphocyte % : x  Auto Monocyte % : x  Auto Eosinophil % : x  Auto Basophil % : x      06-15    142  |  104  |  30<H>  ----------------------------<  166<H>  3.5   |  24  |  1.75<H>    Ca    9.0      15 To 2018 06:57  Mg     1.8     06-15    TPro  6.6  /  Alb  3.1<L>  /  TBili  1.1  /  DBili  x   /  AST  19  /  ALT  14  /  AlkPhos  61  06-14      Urinalysis Basic - ( 2018 19:22 )    Color: Yellow / Appearance: Clear / S.010 / pH: x  Gluc: x / Ketone: NEGATIVE  / Bili: Negative / Urobili: 0.2 E.U./dL   Blood: x / Protein: 100 mg/dL / Nitrite: POSITIVE   Leuk Esterase: Moderate / RBC: > 10 /HPF / WBC Many /HPF   Sq Epi: x / Non Sq Epi: 0-5 /HPF / Bacteria: Present /HPF          Radiology:      < from: Xray Chest 1 View- PORTABLE-Urgent (18 @ 20:12) >  EXAM:  XR CHEST PORTABLE URGENT 1V                          PROCEDURE DATE:  2018          INTERPRETATION:  HISTORY: Chest pain, dyspnea    Portable AP view of the chest is compared to prior radiograph of   2018.    IMPRESSION: There has been a slight further increase in bilateral   pulmonary vascular and interstitial prominence, without other significant   interval change.                Vital Signs Last 24 Hrs  T(C): 37.4 (15 To 2018 05:46), Max: 38.7 (15 To 2018 01:47)  T(F): 99.4 (15 To 2018 05:46), Max: 101.6 (15 To 2018 01:47)  HR: 63 (15 To 2018 06:09) (63 - 94)  BP: 143/82 (15 To 2018 06:04) (140/80 - 160/90)  BP(mean): 106 (15 To 2018 06:04) (104 - 117)  RR: 23 (15 To 2018 06:09) (14 - 28)  SpO2: 97% (15 To 2018 06:09) (94% - 97%)    REVIEW OF SYSTEMS:    CONSTITUTIONAL:  fatigue  EYES: No eye pain, visual disturbances, or discharge  ENMT:  No difficulty hearing, tinnitus, vertigo; No sinus or throat pain  NECK: No pain or stiffness  BREASTS: No pain, masses, or nipple discharge  RESPIRATORY: No cough, wheezing, chills or hemoptysis; No shortness of breath  CARDIOVASCULAR: No chest pain, palpitations, dizziness, or leg swelling  GASTROINTESTINAL: No abdominal or epigastric pain. No nausea, vomiting, or hematemesis; No diarrhea or constipation. No melena or hematochezia.  GENITOURINARY: No dysuria, frequency, hematuria, or incontinence  NEUROLOGICAL: No headaches, memory loss, loss of strength, numbness, or tremors  SKIN: No itching, burning, rashes, or lesions   LYMPH NODES: No enlarged glands  ENDOCRINE: No heat or cold intolerance; No hair loss  MUSCULOSKELETAL: No joint pain or swelling; No muscle, back, or extremity pain  PSYCHIATRIC: No depression, anxiety, mood swings, or difficulty sleeping  HEME/LYMPH: No easy bruising, or bleeding gums  ALLERGY AND IMMUNOLOGIC: No hives or eczema  VASCULAR: no swelling, erythema    Physical Exam: WDWN  gentleman lying in semi Bhatia's position, c/o feeling tired but better    Head: normocephalic, atraumatic    Eyes: PERRLA, EOMI, no nystagmus, sclera anicteric    ENT: nasal discharge, uvula midline, no oropharyngeal erythema/exudate    Neck: supple, negative JVD, negative carotid bruits, no thyromegaly    Chest: CTA bilaterally, neg wheeze, rhonchi, rales, crackles, egophany    Cardiovascular: irregular rate and rhythm, neg murmurs/rubs/gallops    Abdomen: soft, non distended, non tender, negative rebound/guarding, normal bowel sounds, neg hepatosplenomegaly    Extremities: WWP, 1 + LE edema, negative calf tenderness to palpation, negative Miesha's sign, LUE AVF    :     Neurologic Exam:    Alert and oriented to person, place, date/year, speech fluent w/o dysarthria, repetition intact, comprehension intact,     Cranial Nerves:     II:                       pupils equal, round and reactive to light, visual fields intact   III/ IV/VI:             extraocular movements intact, neg nystagmus, ptosis  V:                       facial sensation intact, V1-3 normal  VII:                     face symmetric, no droop, normal eye closure and smile  VIII:                    hearing intact to finger rub bilaterally  IX/ X:                 soft palate rise symmetrical  XI:                      head turning, shoulder shrug normal  XII:                     tongue midline    Motor Exam:    Upper Extremities:        Right:    5/5 /intrinsics                                                 5/5 deltoid                                                 5/5 biceps                                                 5/5 triceps                                                 5/5 wrist extensors-flexors                                                 negative pronator drift                                        Left:      5/5 /intrinsics                                                 5/5 deltoid                                                 5/5 biceps                                                 5/5 triceps                                                 5/5 wrist extensors/flexors                                                 negative pronator drift      Lower Extremities:         Right      5/5 hip flexors/adductors/abductors                                                   5/5 quadriceps/hamstrings                                                   5/5 dorsiflexors/plantar flexors/invertors-evertors                                       Left:       5/5 hip flexors/adductors/abductors                                                  5/5 quadriceps/hamstrings                                                  5/5 dorsiflexors/plantar flexors/invertors-evertors    Sensory:              intact to LT/PP in all UE/LE dermatomes    DTR:                   = biceps/     triceps/     brachioradialis                            = patella/   medial hamstring/    ankle                            neg clonus                            neg Babinski                            neg Hoffmans    Finger to Nose:  wnl    Heel to Shin:       wnl    Rapid Alternating movements:   wnl    Joint Position Sense:  intact    Romberg:  not tested    Tandem Walking:  not tested    Gait:  not tested        PM&R Impression:    1) deconditioned  2) no focal weakness      Recommendations:    1) Physical therapy focusing on therapeutic exercises, bed mobility/transfer out of bed evaluation, progressive ambulation with assistive devices.    2) Anticipated Disposition Plan/Recommendations: d/c home with no post d/c rehab needs

## 2018-06-15 NOTE — PROGRESS NOTE ADULT - SUBJECTIVE AND OBJECTIVE BOX
INTERN TRANSFER ACCEPT NOTE [facility] -> 7 [facility]    HOSPITAL COURSE   72 year old M, Baptism, with PMH PCKD, s/p renal transplant in  at [facility] (on tacrolimus and prednisone), HTN, HLD, DM2, prostate cancer (s/p radical prostatectomy ), DVT (, d/t pelvic fracture), hx of Afib (s/p Ablation ), osteoporosis presented to [facility] with three day history of non-exertional chest pain, shortness of breath, rigors, urinary hesitancy and malodor. In ED, T99.4 (Tmax 103.0), P 103, 106/67,  R20, sat 94%. Labs notable for leukocytosis with bandemia and an elevated lactate which cleared s/p 2.5 L IVF.  Patient subsequently went into acute respiratory failure, CXR demonstrating pulmonary vascular congestion, given Lasix 80 mg IVP x1, was placed on BiPAP and admitted to [facility] for close monitoring. Respiratory status improved, transitioned from HFNC, currently on NC and saturating well.  Admission labs also notable for tropinemia (0.03-peaked) with associated ST depressions in leads V3-V5, unchanged on repeat EKG. Cardiologist (Dr Dowd) aware, recommending repeat ECHO to r/o CHF given notable pulmonary edema. ECHO demonstrated no wall motion abnormalities, preserved EF 61% and no HD significant valvulopathies. UA on admission grossly positive as well with Ucx positive for GNRs (pending speciation) and Bcx positive for E.coli (last surveillance Cx drawn , NGTD). Patient received ceftriaxone and azithromycin in the ED x1, transitioned to Ceftriaxone, and eventually Zosyn for pseudomonal coverage in light of immunosuppressed state (on Tacrolimus and Prednisone at home, s/p renal transplant). Sensitivities for Bcx pending however given Ucx sensitivities, antibiotics de-escalated to Ceftriaxone on 6/15. Patient otherwise, clinically improving with downtrending fever curve, HD stable and ready for stepdown to the F for further medical management.    O/N Events:  Subjective:    VITALS  Vital Signs Last 24 Hrs  T(C): 37.2 (15 To 2018 16:42), Max: 38.7 (15 To 2018 01:47)  T(F): 98.9 (15 To 2018 16:42), Max: 101.6 (15 To 2018 01:47)  HR: 90 (15 To 2018 16:20) (63 - 94)  BP: 144/71 (15 To 2018 08:28) (140/80 - 148/85)  BP(mean): 102 (15 To 2018 08:28) (102 - 111)  RR: 18 (15 To 2018 16:20) (14 - 23)  SpO2: 94% (15 To 2018 16:20) (94% - 97%)    I&O's Summary    2018 07:01  -  15 Ot 2018 07:00  --------------------------------------------------------  IN: 340 mL / OUT: 2090 mL / NET: -1750 mL    15 To 2018 07:01  -  15 To 2018 18:35  --------------------------------------------------------  IN: 0 mL / OUT: 245 mL / NET: -245 mL        CAPILLARY BLOOD GLUCOSE      POCT Blood Glucose.: 201 mg/dL (15 To 2018 16:20)  POCT Blood Glucose.: 217 mg/dL (15 To 2018 11:21)  POCT Blood Glucose.: 151 mg/dL (15 To 2018 07:53)  POCT Blood Glucose.: 155 mg/dL (15 To 2018 06:55)  POCT Blood Glucose.: 211 mg/dL (2018 22:05)      PHYSICAL EXAM  General: A&Ox 3; NAD  Head: NC/AT;   Eyes: PERRL; EOMI; anicteric sclera  Neck: Supple; no JVD  Respiratory: CTA B/L; no wheezes/crackles/rales auscultated w/ good air movement  Cardiovascular: Regular rhythm/rate; S1/S2; no gallops or murmurs auscultated  Gastrointestinal: Soft; NTND w/out rebound tenderness or guarding; bowel sounds normal  Extremities: WWP; no edema or cyanosis; radial/pedal pulses palpable  Neurological:  CNII-XII grossly intact; no obvious focal deficits    MEDICATIONS  (STANDING):  apixaban 5 milliGRAM(s) Oral every 12 hours  atorvastatin 20 milliGRAM(s) Oral at bedtime  carvedilol 12.5 milliGRAM(s) Oral every 12 hours  cefTRIAXone   IVPB 2 Gram(s) IV Intermittent every 24 hours  cholecalciferol 1000 Unit(s) Oral two times a day  dextrose 5%. 1000 milliLiter(s) (50 mL/Hr) IV Continuous <Continuous>  dextrose 50% Injectable 12.5 Gram(s) IV Push once  dextrose 50% Injectable 25 Gram(s) IV Push once  dextrose 50% Injectable 25 Gram(s) IV Push once  insulin lispro (HumaLOG) corrective regimen sliding scale   SubCutaneous Before meals and at bedtime  predniSONE   Tablet 5 milliGRAM(s) Oral daily  tacrolimus 1 milliGRAM(s) Oral <User Schedule>  tacrolimus 2 milliGRAM(s) Oral <User Schedule>    MEDICATIONS  (PRN):  acetaminophen   Tablet 650 milliGRAM(s) Oral every 6 hours PRN For Temp greater than 38 C (100.4 F)  acetaminophen   Tablet. 650 milliGRAM(s) Oral every 6 hours PRN Moderate Pain (4 - 6)  dextrose 40% Gel 15 Gram(s) Oral once PRN Blood Glucose LESS THAN 70 milliGRAM(s)/deciliter  glucagon  Injectable 1 milliGRAM(s) IntraMuscular once PRN Glucose LESS THAN 70 milligrams/deciliter      LABS                        10.5   10.3  )-----------( 128      ( 15 To 2018 06:56 )             31.5     -15    142  |  104  |  30<H>  ----------------------------<  166<H>  3.5   |  24  |  1.75<H>    Ca    9.0      15 To 2018 06:57  Mg     1.8     -15    TPro  6.6  /  Alb  3.1<L>  /  TBili  1.1  /  DBili  x   /  AST  19  /  ALT  14  /  AlkPhos  61  06-14    LIVER FUNCTIONS - ( 2018 11:09 )  Alb: 3.1 g/dL / Pro: 6.6 g/dL / ALK PHOS: 61 U/L / ALT: 14 U/L / AST: 19 U/L / GGT: x             Urinalysis Basic - ( 2018 19:22 )    Color: Yellow / Appearance: Clear / S.010 / pH: x  Gluc: x / Ketone: NEGATIVE  / Bili: Negative / Urobili: 0.2 E.U./dL   Blood: x / Protein: 100 mg/dL / Nitrite: POSITIVE   Leuk Esterase: Moderate / RBC: > 10 /HPF / WBC Many /HPF   Sq Epi: x / Non Sq Epi: 0-5 /HPF / Bacteria: Present /HPF      CARDIAC MARKERS ( 2018 11:09 )  x     / 0.02 ng/mL / x     / x     / x      CARDIAC MARKERS ( 2018 02:48 )  x     / 0.03 ng/mL / x     / x     / x            IMAGING/EKG/ETC  EKG:   Xray:  CT: INTERN TRANSFER ACCEPT NOTE 7 [facility] -> 7 [facility]    HOSPITAL COURSE   72 year old M, Muslim, with PMH PCKD, s/p renal transplant in  at [facility] (on tacrolimus and prednisone), HTN, HLD, DM2, prostate cancer (s/p radical prostatectomy ), DVT (, d/t pelvic fracture), hx of Afib (s/p Ablation ), osteoporosis presented to [facility] with three day history of non-exertional chest pain, shortness of breath, rigors, urinary hesitancy and malodor. In ED, T99.4 (Tmax 103.0), P 103, 106/67,  R20, sat 94%. Labs notable for leukocytosis with bandemia and an elevated lactate which cleared s/p 2.5 L IVF.  Patient subsequently went into acute respiratory failure, CXR demonstrating pulmonary vascular congestion, given Lasix 80 mg IVP x1, was placed on BiPAP and admitted to [facility] for close monitoring. Respiratory status improved, transitioned from HFNC, currently on NC and saturating well.  Admission labs also notable for tropinemia (0.03-peaked) with associated ST depressions in leads V3-V5, unchanged on repeat EKG. Cardiologist (Dr Dowd) aware, recommending repeat ECHO to r/o CHF given notable pulmonary edema. ECHO demonstrated no wall motion abnormalities, preserved EF 61% and no HD significant valvulopathies. UA on admission grossly positive as well with Ucx positive for GNRs (pending speciation) and Bcx positive for E.coli (last surveillance Cx drawn , NGTD). Patient received ceftriaxone and azithromycin in the ED x1, transitioned to Ceftriaxone, and eventually Zosyn for pseudomonal coverage in light of immunosuppressed state (on Tacrolimus and Prednisone at home, s/p renal transplant). Sensitivities for Bcx pending however given Ucx sensitivities, antibiotics de-escalated to Ceftriaxone on 6/15. Patient otherwise, clinically improving with downtrending fever curve, HD stable and ready for stepdown to the [facility] for further medical management.    Subjective: Patient seen and examined at bedside. Reports significant improvement in SOB. Just returned from ambulating to bathroom and stated felt well. Felt SOB when ambulating with PT on NC earlier in the day.     VITALS  Vital Signs Last 24 Hrs  T(C): 37.2 (15 To 2018 16:42), Max: 38.7 (15 To 2018 01:47)  T(F): 98.9 (15 To 2018 16:42), Max: 101.6 (15 To 2018 01:47)  HR: 90 (15 To 2018 16:20) (63 - 94)  BP: 144/71 (15 To 2018 08:28) (140/80 - 148/85)  BP(mean): 102 (15 To 2018 08:28) (102 - 111)  RR: 18 (15 To 2018 16:20) (14 - 23)  SpO2: 94% (15 To 2018 16:20) (94% - 97%)    I&O's Summary    2018 07:01  -  15 To 2018 07:00  --------------------------------------------------------  IN: 340 mL / OUT: 2090 mL / NET: -1750 mL    15 To 2018 07:01  -  15 To 2018 18:35  --------------------------------------------------------  IN: 0 mL / OUT: 245 mL / NET: -245 mL        CAPILLARY BLOOD GLUCOSE      POCT Blood Glucose.: 201 mg/dL (15 To 2018 16:20)  POCT Blood Glucose.: 217 mg/dL (15 To 2018 11:21)  POCT Blood Glucose.: 151 mg/dL (15 To 2018 07:53)  POCT Blood Glucose.: 155 mg/dL (15 To 2018 06:55)  POCT Blood Glucose.: 211 mg/dL (2018 22:05)      PHYSICAL EXAM  General: A&Ox 3; NAD  Head: NC/AT;   Eyes: PERRL; EOMI; anicteric sclera  Neck: Supple; no JVD  Respiratory: CTA B/L; no wheezes/crackles/rales auscultated w/ good air movement  Cardiovascular: Regular rhythm/rate; S1/S2; no gallops or murmurs auscultated  Gastrointestinal: Soft; NTND w/out rebound tenderness or guarding; bowel sounds normal  Extremities: WWP; no edema or cyanosis; radial/pedal pulses palpable  Neurological:  CNII-XII grossly intact; no obvious focal deficits    MEDICATIONS  (STANDING):  apixaban 5 milliGRAM(s) Oral every 12 hours  atorvastatin 20 milliGRAM(s) Oral at bedtime  carvedilol 12.5 milliGRAM(s) Oral every 12 hours  cefTRIAXone   IVPB 2 Gram(s) IV Intermittent every 24 hours  cholecalciferol 1000 Unit(s) Oral two times a day  dextrose 5%. 1000 milliLiter(s) (50 mL/Hr) IV Continuous <Continuous>  dextrose 50% Injectable 12.5 Gram(s) IV Push once  dextrose 50% Injectable 25 Gram(s) IV Push once  dextrose 50% Injectable 25 Gram(s) IV Push once  insulin lispro (HumaLOG) corrective regimen sliding scale   SubCutaneous Before meals and at bedtime  predniSONE   Tablet 5 milliGRAM(s) Oral daily  tacrolimus 1 milliGRAM(s) Oral <User Schedule>  tacrolimus 2 milliGRAM(s) Oral <User Schedule>    MEDICATIONS  (PRN):  acetaminophen   Tablet 650 milliGRAM(s) Oral every 6 hours PRN For Temp greater than 38 C (100.4 F)  acetaminophen   Tablet. 650 milliGRAM(s) Oral every 6 hours PRN Moderate Pain (4 - 6)  dextrose 40% Gel 15 Gram(s) Oral once PRN Blood Glucose LESS THAN 70 milliGRAM(s)/deciliter  glucagon  Injectable 1 milliGRAM(s) IntraMuscular once PRN Glucose LESS THAN 70 milligrams/deciliter      LABS                        10.5   10.3  )-----------( 128      ( 15 To 2018 06:56 )             31.5     06-15    142  |  104  |  30<H>  ----------------------------<  166<H>  3.5   |  24  |  1.75<H>    Ca    9.0      15 To 2018 06:57  Mg     1.8     -15    TPro  6.6  /  Alb  3.1<L>  /  TBili  1.1  /  DBili  x   /  AST  19  /  ALT  14  /  AlkPhos  61  06-14    LIVER FUNCTIONS - ( 2018 11:09 )  Alb: 3.1 g/dL / Pro: 6.6 g/dL / ALK PHOS: 61 U/L / ALT: 14 U/L / AST: 19 U/L / GGT: x             Urinalysis Basic - ( 2018 19:22 )    Color: Yellow / Appearance: Clear / S.010 / pH: x  Gluc: x / Ketone: NEGATIVE  / Bili: Negative / Urobili: 0.2 E.U./dL   Blood: x / Protein: 100 mg/dL / Nitrite: POSITIVE   Leuk Esterase: Moderate / RBC: > 10 /HPF / WBC Many /HPF   Sq Epi: x / Non Sq Epi: 0-5 /HPF / Bacteria: Present /HPF      CARDIAC MARKERS ( 2018 11:09 )  x     / 0.02 ng/mL / x     / x     / x      CARDIAC MARKERS ( 2018 02:48 )  x     / 0.03 ng/mL / x     / x     / x            IMAGING/EKG/ETC: reviewed

## 2018-06-16 DIAGNOSIS — N18.9 CHRONIC KIDNEY DISEASE, UNSPECIFIED: ICD-10-CM

## 2018-06-16 DIAGNOSIS — N17.9 ACUTE KIDNEY FAILURE, UNSPECIFIED: ICD-10-CM

## 2018-06-16 LAB
-  AMIKACIN: SIGNIFICANT CHANGE UP
-  AMOXICILLIN/CLAVULANIC ACID: SIGNIFICANT CHANGE UP
-  AMPICILLIN/SULBACTAM: SIGNIFICANT CHANGE UP
-  AMPICILLIN/SULBACTAM: SIGNIFICANT CHANGE UP
-  AMPICILLIN: SIGNIFICANT CHANGE UP
-  AMPICILLIN: SIGNIFICANT CHANGE UP
-  AZTREONAM: SIGNIFICANT CHANGE UP
-  CEFAZOLIN: SIGNIFICANT CHANGE UP
-  CEFAZOLIN: SIGNIFICANT CHANGE UP
-  CEFEPIME: SIGNIFICANT CHANGE UP
-  CEFOTAXIME: SIGNIFICANT CHANGE UP
-  CEFOXITIN: SIGNIFICANT CHANGE UP
-  CEFTAZIDIME: SIGNIFICANT CHANGE UP
-  CEFTRIAXONE: SIGNIFICANT CHANGE UP
-  CEFTRIAXONE: SIGNIFICANT CHANGE UP
-  CEFUROXIME: SIGNIFICANT CHANGE UP
-  CIPROFLOXACIN: SIGNIFICANT CHANGE UP
-  CIPROFLOXACIN: SIGNIFICANT CHANGE UP
-  ERTAPENEM: SIGNIFICANT CHANGE UP
-  GENTAMICIN: SIGNIFICANT CHANGE UP
-  GENTAMICIN: SIGNIFICANT CHANGE UP
-  LEVOFLOXACIN: SIGNIFICANT CHANGE UP
-  MEROPENEM: SIGNIFICANT CHANGE UP
-  MOXIFLOXACIN(AEROBIC): SIGNIFICANT CHANGE UP
-  PIPERACILLIN/TAZOBACTAM: SIGNIFICANT CHANGE UP
-  PIPERACILLIN/TAZOBACTAM: SIGNIFICANT CHANGE UP
-  TETRACYCLINE: SIGNIFICANT CHANGE UP
-  TIGECYCLINE: SIGNIFICANT CHANGE UP
-  TOBRAMYCIN: SIGNIFICANT CHANGE UP
-  TOBRAMYCIN: SIGNIFICANT CHANGE UP
-  TRIMETHOPRIM/SULFAMETHOXAZOLE: SIGNIFICANT CHANGE UP
-  TRIMETHOPRIM/SULFAMETHOXAZOLE: SIGNIFICANT CHANGE UP
ANION GAP SERPL CALC-SCNC: 11 MMOL/L — SIGNIFICANT CHANGE UP (ref 5–17)
BUN SERPL-MCNC: 35 MG/DL — HIGH (ref 7–23)
CALCIUM SERPL-MCNC: 9.1 MG/DL — SIGNIFICANT CHANGE UP (ref 8.4–10.5)
CHLORIDE SERPL-SCNC: 107 MMOL/L — SIGNIFICANT CHANGE UP (ref 96–108)
CO2 SERPL-SCNC: 25 MMOL/L — SIGNIFICANT CHANGE UP (ref 22–31)
CREAT SERPL-MCNC: 1.85 MG/DL — HIGH (ref 0.5–1.3)
CULTURE RESULTS: SIGNIFICANT CHANGE UP
CULTURE RESULTS: SIGNIFICANT CHANGE UP
GLUCOSE BLDC GLUCOMTR-MCNC: 159 MG/DL — HIGH (ref 70–99)
GLUCOSE BLDC GLUCOMTR-MCNC: 168 MG/DL — HIGH (ref 70–99)
GLUCOSE BLDC GLUCOMTR-MCNC: 233 MG/DL — HIGH (ref 70–99)
GLUCOSE BLDC GLUCOMTR-MCNC: 298 MG/DL — HIGH (ref 70–99)
GLUCOSE SERPL-MCNC: 149 MG/DL — HIGH (ref 70–99)
HCT VFR BLD CALC: 30.2 % — LOW (ref 39–50)
HGB BLD-MCNC: 9.6 G/DL — LOW (ref 13–17)
MAGNESIUM SERPL-MCNC: 2 MG/DL — SIGNIFICANT CHANGE UP (ref 1.6–2.6)
MCHC RBC-ENTMCNC: 28.5 PG — SIGNIFICANT CHANGE UP (ref 27–34)
MCHC RBC-ENTMCNC: 31.8 G/DL — LOW (ref 32–36)
MCV RBC AUTO: 89.6 FL — SIGNIFICANT CHANGE UP (ref 80–100)
METHOD TYPE: SIGNIFICANT CHANGE UP
METHOD TYPE: SIGNIFICANT CHANGE UP
ORGANISM # SPEC MICROSCOPIC CNT: SIGNIFICANT CHANGE UP
PLATELET # BLD AUTO: 141 K/UL — LOW (ref 150–400)
POTASSIUM SERPL-MCNC: 4.6 MMOL/L — SIGNIFICANT CHANGE UP (ref 3.5–5.3)
POTASSIUM SERPL-SCNC: 4.6 MMOL/L — SIGNIFICANT CHANGE UP (ref 3.5–5.3)
RBC # BLD: 3.37 M/UL — LOW (ref 4.2–5.8)
RBC # FLD: 15.5 % — SIGNIFICANT CHANGE UP (ref 10.3–16.9)
SODIUM SERPL-SCNC: 143 MMOL/L — SIGNIFICANT CHANGE UP (ref 135–145)
SPECIMEN SOURCE: SIGNIFICANT CHANGE UP
SPECIMEN SOURCE: SIGNIFICANT CHANGE UP
WBC # BLD: 8.2 K/UL — SIGNIFICANT CHANGE UP (ref 3.8–10.5)
WBC # FLD AUTO: 8.2 K/UL — SIGNIFICANT CHANGE UP (ref 3.8–10.5)

## 2018-06-16 PROCEDURE — 99233 SBSQ HOSP IP/OBS HIGH 50: CPT | Mod: GC

## 2018-06-16 PROCEDURE — 71045 X-RAY EXAM CHEST 1 VIEW: CPT | Mod: 26

## 2018-06-16 RX ORDER — AMLODIPINE BESYLATE 2.5 MG/1
5 TABLET ORAL DAILY
Qty: 0 | Refills: 0 | Status: DISCONTINUED | OUTPATIENT
Start: 2018-06-16 | End: 2018-06-17

## 2018-06-16 RX ADMIN — APIXABAN 5 MILLIGRAM(S): 2.5 TABLET, FILM COATED ORAL at 12:38

## 2018-06-16 RX ADMIN — Medication 2: at 09:26

## 2018-06-16 RX ADMIN — Medication 6: at 12:38

## 2018-06-16 RX ADMIN — Medication 4: at 17:53

## 2018-06-16 RX ADMIN — Medication 650 MILLIGRAM(S): at 00:38

## 2018-06-16 RX ADMIN — Medication 1000 UNIT(S): at 06:57

## 2018-06-16 RX ADMIN — AMLODIPINE BESYLATE 5 MILLIGRAM(S): 2.5 TABLET ORAL at 12:38

## 2018-06-16 RX ADMIN — CARVEDILOL PHOSPHATE 12.5 MILLIGRAM(S): 80 CAPSULE, EXTENDED RELEASE ORAL at 06:57

## 2018-06-16 RX ADMIN — ATORVASTATIN CALCIUM 20 MILLIGRAM(S): 80 TABLET, FILM COATED ORAL at 21:43

## 2018-06-16 RX ADMIN — TACROLIMUS 2 MILLIGRAM(S): 5 CAPSULE ORAL at 06:57

## 2018-06-16 RX ADMIN — Medication 650 MILLIGRAM(S): at 23:58

## 2018-06-16 RX ADMIN — Medication 1000 UNIT(S): at 18:34

## 2018-06-16 RX ADMIN — CEFTRIAXONE 100 GRAM(S): 500 INJECTION, POWDER, FOR SOLUTION INTRAMUSCULAR; INTRAVENOUS at 18:34

## 2018-06-16 RX ADMIN — APIXABAN 5 MILLIGRAM(S): 2.5 TABLET, FILM COATED ORAL at 22:03

## 2018-06-16 RX ADMIN — Medication 2: at 21:44

## 2018-06-16 RX ADMIN — TACROLIMUS 1 MILLIGRAM(S): 5 CAPSULE ORAL at 18:34

## 2018-06-16 RX ADMIN — Medication 5 MILLIGRAM(S): at 06:57

## 2018-06-16 RX ADMIN — CARVEDILOL PHOSPHATE 12.5 MILLIGRAM(S): 80 CAPSULE, EXTENDED RELEASE ORAL at 18:34

## 2018-06-16 NOTE — PROGRESS NOTE ADULT - PROBLEM SELECTOR PLAN 9
History of kidney transplant (2007) for history of PCKD, on tacrolimus and Prednisone at home. Tacrolimus levels followed outpatient with Dr Rios.   -C/w home medications tacrolimus and prednisone

## 2018-06-16 NOTE — PROGRESS NOTE ADULT - ATTENDING COMMENTS
Patient was seen and examined by me at bedside. I agree with resident's note, subjective, objective physical exam, assessment and plan with following modifications/additions.     Await sensitives from culture data.  Likely DC tomorrow as long as remains stable
Pt with gram neg rods in blood. Abx changed to zosyn. On NC post diuresis. Card to see and Echo reviewed. continue renal plans including Tacrilimus level per Dr. Rios.
Comfortable on NC. Temps down this AM and nontoxic. In neg fluid balance. continue Zosyn pending sens of Ecoli. Repeat BC pending.
patient seen and examined    reviewed vs, labs, available radiological reports/ studies, ekg     agree w/ PE findings as above w/ additions/ exceptions/ pertinent findings:  on supplemental o2 via nc, NAD, awake, alert; MMM, no JVD, s1s2, lungs CTA b/l, abdomen nt/nd, no lower ext edema / tenderness b/l     1. respiratory failure: resolved, on NC, wean off NC, follow up AM CXR  2. severe sepsis/ UTI/ bacteremia: on ceftriaxone for +E.Coli; improving sxs; monitor surveillance ctxs; TOV s/p buckley removal         rest of plan as above

## 2018-06-16 NOTE — PROGRESS NOTE ADULT - PROBLEM SELECTOR PLAN 6
History of HTN, on Amlodipine, Coreg at home, Currently normotensive   - Restarted on coreg   - Add on norvasc today     #HLD  - C/w home lipitor 20mg qd

## 2018-06-16 NOTE — PROGRESS NOTE ADULT - SUBJECTIVE AND OBJECTIVE BOX
MEDICAL HISTORY:      73 year old  man with  a well-functioning kidney transplant, baseline Scr ~1.3 mg/dl. Admitted late 18 with urosepsis. E. Coli identified in the urine and blood.  Incidentally noted to have asymptomatic AFIB (Osvaldo) in 2018.     ESRD (Polycystic kidney disease)  HD 2516-7354  Kidney transplant  ([facility])  CKD GFR 60's-70's  Cardiomyopathy, LVH w normal LVEF  AFIB  HTN  Gout  HLD  DM-2 since KTP (Barrera)  Ao root dilation (Juliocesar)  Osteoporosis (Barrera)  Prostate cancer s/p prostatectomy   [employer]     Negative chemical stress   Echocardiogram LVH with normal systolic function  18 Echocardiogram ([facility]) LVH with nl wall motion, LVEF 55-60, nl LA, aortic root dilation  Baseline Scr 2017 0.9-1.1 mg/dl.   2018. Echo this admission  LVH, LVH w normal wall motion, LAE, Ao root dilated 4.1 cm                                      INTERVAL HISTORY:    SUMMARY COMMENTS:           ---------------------------------------------------------------------------------------------------------------------------------------------------------------------------    SUBJECTIVE & OBJECTIVE DATA DOCUMENTATION:     REVIEW OF SYSTEMS SCREENED ORGAN SYSTEMS:  Constitutional: fever, chills, anorexia or fatigue  Pulmonary: difficulty breathing, wheezing, cough  Cardiovascular: exertional dyspnea, chest pain, palpitations, dizziness or leg swelling  Gastrointestinal: abdominal or epigastric pain, nausea, vomiting or hematemesis, diarrhea, melena or bright red blood per rectum.  Genitourinary: dysuria, urgency, frequency, flank pain, difficulty voiding  Skin: No pruritis, rashes or lesions  Neurology: memory loss, dysarthria, extremity weakness, neuropathic pain, headache, visual changes  Dialysis access if present : pain, bleeding, swelling     ROS POSITIVE FINDINGS:     PAST MEDICAL & SURGICAL HISTORY:  DVT (deep venous thrombosis)  Kidney transplant recipient  Prostate cancer  HTN (hypertension)  HLD (hyperlipidemia)  DM2 (diabetes mellitus, type 2)  Polycystic kidney disease  H/O radical prostatectomy      PHYSICAL EXAM:  T(C): 37.7 (18 @ 09:30), Max: 37.7 (18 @ 09:30)  HR: 89 (18 @ 09:30)  BP: 161/76 (18 @ 09:30) (120/65 - 161/76)  RR: 17 (18 @ 09:30)  SpO2: 95% (18 @ 09:30)  Wt(kg): --  I&O's Summary    15 To 2018 07:01  -  2018 07:00  --------------------------------------------------------  IN: 0 mL / OUT: 810 mL / NET: -810 mL      Weight 79.4 ( @ 16:00)    APPEARANCE: in bed, NAD  HEAD & NECK: normocephalic, no stiff neck, no masses, oral mucosa moist, no scleral icteris  RESPIRATORY: no accessory muscle use, no labored breathing, no dullness, no rales, crackles, no rhonchi, no wheeze  CARDIOVASCULAR: no JVD, IRRR, S1S2, + gallop,  no murmur, no rub.  ABDOMEN: soft, no tenderness, no masses, no hepatomegally, no splenomegally  : no bladder distension, no flank tenderness (PKD)  LYMPHATIC: no lymphadenopathy (neck, axilla, groin)  EXTREMITIES & MUSCULOSKELETAL: no cyanosis, no clubbing, no tenderness, strength  L=R  EDEMA: none  PULSES: upper extremity pulses present, lower extremity pulses present   SKIN: no rash, no stasis, no induration  NEUROLOGIC & PSYCHIATRIC:  alert, interactive, oriented to time and place, responds to stimuli, no asterixis  DIALYSIS ACCESS: LFA AVF ligated, no signs of infection.   KIDNEY TRANSPLANT: RLQ non-tender        MEDICATIONS  (STANDING):  amLODIPine   Tablet 5 milliGRAM(s) Oral daily  apixaban 5 milliGRAM(s) Oral every 12 hours  atorvastatin 20 milliGRAM(s) Oral at bedtime  carvedilol 12.5 milliGRAM(s) Oral every 12 hours  cefTRIAXone   IVPB 2 Gram(s) IV Intermittent every 24 hours  cholecalciferol 1000 Unit(s) Oral two times a day  dextrose 5%. 1000 milliLiter(s) (50 mL/Hr) IV Continuous <Continuous>  dextrose 50% Injectable 12.5 Gram(s) IV Push once  dextrose 50% Injectable 25 Gram(s) IV Push once  dextrose 50% Injectable 25 Gram(s) IV Push once  insulin lispro (HumaLOG) corrective regimen sliding scale   SubCutaneous Before meals and at bedtime  predniSONE   Tablet 5 milliGRAM(s) Oral daily  tacrolimus 1 milliGRAM(s) Oral <User Schedule>  tacrolimus 2 milliGRAM(s) Oral <User Schedule>    MEDICATIONS  (PRN):  acetaminophen   Tablet 650 milliGRAM(s) Oral every 6 hours PRN For Temp greater than 38 C (100.4 F)  acetaminophen   Tablet. 650 milliGRAM(s) Oral every 6 hours PRN Moderate Pain (4 - 6)  dextrose 40% Gel 15 Gram(s) Oral once PRN Blood Glucose LESS THAN 70 milliGRAM(s)/deciliter  glucagon  Injectable 1 milliGRAM(s) IntraMuscular once PRN Glucose LESS THAN 70 milligrams/deciliter      DATA:  143    |  107    |  35<H>  ----------------------------<  149<H>  Ca:9.1   (2018 06:38)  4.6     |  25     |  1.85<H>      eGFR if Non : 36 <L>  eGFR if : 42 <L>    TPro  6.6    /  Alb  3.1<L>  /  TBili  1.1    /  DBili  x      /  AST  19     /  ALT  14     /  AlkPhos  61     2018 11:09    SCr 1.85 [2018 06:38]  SCr 1.75 [15 To 2018 06:57]  SCr 1.68 [2018 11:09]  SCr 1.76 [2018 16:56]                          9.6<L>  8.2   )-----------( 141<L>    ( 2018 06:38 )             30.2<L>    Phos:-- M.0 mg/dL PTH:-- Uric acid:-- Serum Osm:--  Ferritin:-- Iron:-- TIBC:-- Tsat:--  B12:-- TSH:-- (2018 06:38)    Urinalysis Basic - ( 2018 19:22 )  Color: Yellow / Appearance: Clear / S.010 / pH: x  Gluc: x / Ketone: NEGATIVE  / Bili: Negative / Urobili: 0.2 E.U./dL   Blood: x / Protein: 100 mg/dL<!> / Nitrite: POSITIVE<!>   Leuk Esterase: Moderate<!> / RBC: > 10 /HPF<!> / WBC Many /HPF<!>   Sq Epi: x / Non Sq Epi: 0-5 /HPF / Bacteria: Present /HPF<!>      UProt:-- UCr:36 mg/dL P/C Ratio:-- 24 hour Prot:-- UVol:-- CrCl:--  Judi:89 mmol/L UOsm:-- UVol:-- UCl:-- UK:-- (2018 09:42) MEDICAL HISTORY:      73 year old  man with  a well-functioning kidney transplant, baseline Scr ~1.3 mg/dl. Admitted late 18 with urosepsis. E. Coli identified in the urine and blood.  Incidentally noted to have asymptomatic AFIB (Osvaldo) in 2018.     ESRD (Polycystic kidney disease)  HD [id number]  Kidney transplant  ([facility])  CKD GFR 60's-70's  Cardiomyopathy, LVH w normal LVEF  AFIB  HTN  Gout  HLD  DM-2 since KTP (Barrera)  Ao root dilation (Juliocesar)  Osteoporosis (Barrera)  Prostate cancer s/p prostatectomy   Islam     Negative chemical stress   Echocardiogram LVH with normal systolic function  18 Echocardiogram ([facility]) LVH with nl wall motion, LVEF 55-60, nl LA, aortic root dilation  Baseline Scr  0.9-1.1 mg/dl.   2018. Echo this admission  LVH, LVH w normal wall motion, LAE, Ao root dilated 4.1 c    INTERVAL HISTORY:    Tansferred from monitored bed to regional bed.   Gram negative blood and urine identified as E.coli, R ampicillin and bactrim, otherwise pan sensitive  Now on IV ceftriaxone  Follow up blood cultures pending  WEB 8K  Scr 1.75, 1.85    SUMMARY COMMENTS:     Patient is up and about, and feels much better. Tmax is 99.9 with IV antibiotics.  s/p KTP w baseline Scr 1.3 mg/dl. Patient was seen 2 weeks ago with this baseline Scr. The current hospitalization is notable for DAYO with Scr ~1.8 mg/dl in the setting of gram negative sepsis. Current hemodyanmics are stable with 's, HR 80's, AFIB.   No change in the TP regimen is needed.     Check tacrolimus Monday AM. Careful timing is needed. To be drawn 30 min prior to scheduled AM dose. Target is ~4.5 and this has been the patient's baseline.     Advise a urology consult since the bladder was presumably the source of the sepsis. s/o TURP but the patient has not been seeing any urologist for years. Key question is to determine if the bladder empties well, etc. Residual urine could be a problem for ultimate treatments of the UTI and could lead to recurrence of sepsis.     Patient has PKD and a ligated AVF, both potential seeding sites for gram negative bacteremia. And, of course, patient is immunosuppressed as part of his KTP Rx.     Follow up blood cultures pending. Please clarify with ID how long IV antibiotics should be given in light of the above issues, potential seedsing sites and immunosuppression.     Discussed with medical team.             ---------------------------------------------------------------------------------------------------------------------------------------------------------------------------    SUBJECTIVE & OBJECTIVE DATA DOCUMENTATION:     REVIEW OF SYSTEMS SCREENED ORGAN SYSTEMS:  Constitutional: fever, chills, anorexia or fatigue  Pulmonary: difficulty breathing, wheezing, cough  Cardiovascular: exertional dyspnea, chest pain, palpitations, dizziness or leg swelling  Gastrointestinal: abdominal or epigastric pain, nausea, vomiting or hematemesis, diarrhea, melena or bright red blood per rectum.  Genitourinary: dysuria, urgency, frequency, flank pain, difficulty voiding  Skin: No pruritis, rashes or lesions  Neurology: memory loss, dysarthria, extremity weakness, neuropathic pain, headache, visual changes  Dialysis access if present : pain, bleeding, swelling     ROS POSITIVE FINDINGS: currently negative    PAST MEDICAL & SURGICAL HISTORY:  DVT (deep venous thrombosis)  Kidney transplant recipient  Prostate cancer  HTN (hypertension)  HLD (hyperlipidemia)  DM2 (diabetes mellitus, type 2)  Polycystic kidney disease  H/O radical prostatectomy        PHYSICAL EXAM:  T(C): 37.7 (18 @ 09:30), Max: 37.7 (18 @ 09:30)  HR: 89 (18 @ 09:30)  BP: 161/76 (18 @ 09:30) (120/65 - 161/76)  RR: 17 (18 @ 09:30)  SpO2: 95% (18 @ 09:30)  Wt(kg): --  I&O's Summary    15 To 2018 07:01  -  2018 07:00  --------------------------------------------------------  IN: 0 mL / OUT: 810 mL / NET: -810 mL      Weight 79.4 ( @ 16:00)    APPEARANCE: in bed, NAD (but up an about to bathroom, etc).   HEAD & NECK: normocephalic, no stiff neck, no masses, oral mucosa moist, no scleral icteris  RESPIRATORY: no accessory muscle use, no labored breathing, no dullness, no rales, crackles, no rhonchi, no wheeze  CARDIOVASCULAR: no JVD, IRRR, S1S2, + gallop,  no murmur, no rub.  ABDOMEN: soft, no tenderness, no masses, no hepatomegally, no splenomegally  : no bladder distension, no flank tenderness (PKD)  LYMPHATIC: no lymphadenopathy (neck, axilla, groin)  EXTREMITIES & MUSCULOSKELETAL: no cyanosis, no clubbing, no tenderness, strength  L=R  EDEMA: none  PULSES: upper extremity pulses present, lower extremity pulses present   SKIN: no rash, no stasis, no induration  NEUROLOGIC & PSYCHIATRIC:  alert, interactive, oriented to time and place, responds to stimuli, no asterixis  DIALYSIS ACCESS: LFA AVF ligated, no signs of infection.   KIDNEY TRANSPLANT: RLQ non-tender        MEDICATIONS  (STANDING):  amLODIPine   Tablet 5 milliGRAM(s) Oral daily  apixaban 5 milliGRAM(s) Oral every 12 hours  atorvastatin 20 milliGRAM(s) Oral at bedtime  carvedilol 12.5 milliGRAM(s) Oral every 12 hours  cefTRIAXone   IVPB 2 Gram(s) IV Intermittent every 24 hours  cholecalciferol 1000 Unit(s) Oral two times a day  dextrose 5%. 1000 milliLiter(s) (50 mL/Hr) IV Continuous <Continuous>  dextrose 50% Injectable 12.5 Gram(s) IV Push once  dextrose 50% Injectable 25 Gram(s) IV Push once  dextrose 50% Injectable 25 Gram(s) IV Push once  insulin lispro (HumaLOG) corrective regimen sliding scale   SubCutaneous Before meals and at bedtime  predniSONE   Tablet 5 milliGRAM(s) Oral daily  tacrolimus 1 milliGRAM(s) Oral <User Schedule>  tacrolimus 2 milliGRAM(s) Oral <User Schedule>    MEDICATIONS  (PRN):  acetaminophen   Tablet 650 milliGRAM(s) Oral every 6 hours PRN For Temp greater than 38 C (100.4 F)  acetaminophen   Tablet. 650 milliGRAM(s) Oral every 6 hours PRN Moderate Pain (4 - 6)  dextrose 40% Gel 15 Gram(s) Oral once PRN Blood Glucose LESS THAN 70 milliGRAM(s)/deciliter  glucagon  Injectable 1 milliGRAM(s) IntraMuscular once PRN Glucose LESS THAN 70 milligrams/deciliter      DATA:  143    |  107    |  35<H>  ----------------------------<  149<H>  Ca:9.1   (2018 06:38)  4.6     |  25     |  1.85<H>      eGFR if Non : 36 <L>  eGFR if : 42 <L>    TPro  6.6    /  Alb  3.1<L>  /  TBili  1.1    /  DBili  x      /  AST  19     /  ALT  14     /  AlkPhos  61     2018 11:09    SCr 1.85 [2018 06:38]  SCr 1.75 [15 To 2018 06:57]  SCr 1.68 [2018 11:09]  SCr 1.76 [2018 16:56]                          9.6<L>  8.2   )-----------( 141<L>    ( 2018 06:38 )             30.2<L>    Phos:-- M.0 mg/dL PTH:-- Uric acid:-- Serum Osm:--  Ferritin:-- Iron:-- TIBC:-- Tsat:--  B12:-- TSH:-- (2018 06:38)    Urinalysis Basic - ( 2018 19:22 )  Color: Yellow / Appearance: Clear / S.010 / pH: x  Gluc: x / Ketone: NEGATIVE  / Bili: Negative / Urobili: 0.2 E.U./dL   Blood: x / Protein: 100 mg/dL<!> / Nitrite: POSITIVE<!>   Leuk Esterase: Moderate<!> / RBC: > 10 /HPF<!> / WBC Many /HPF<!>   Sq Epi: x / Non Sq Epi: 0-5 /HPF / Bacteria: Present /HPF<!>      UProt:-- UCr:36 mg/dL P/C Ratio:-- 24 hour Prot:-- UVol:-- CrCl:--  Judi:89 mmol/L UOsm:-- UVol:-- UCl:-- UK:-- (2018 09:42)

## 2018-06-16 NOTE — PROGRESS NOTE ADULT - SUBJECTIVE AND OBJECTIVE BOX
OVERNIGHT EVENTS: BARI     SUBJECTIVE / INTERVAL HPI: Patient seen and examined at bedside. He reports he is breathing well and has not been having shortness of breath. He denies pain/discomfort. All other ROS negative.     VITAL SIGNS:  Vital Signs Last 24 Hrs  T(C): 37.7 (16 Jun 2018 09:30), Max: 37.7 (16 Jun 2018 09:30)  T(F): 99.8 (16 Jun 2018 09:30), Max: 99.8 (16 Jun 2018 09:30)  HR: 89 (16 Jun 2018 09:30) (67 - 90)  BP: 161/76 (16 Jun 2018 09:30) (120/65 - 161/76)  BP(mean): --  RR: 17 (16 Jun 2018 09:30) (17 - 20)  SpO2: 95% (16 Jun 2018 09:30) (94% - 99%)    PHYSICAL EXAM:    General: WDWN male resting in bed   HEENT: NC/AT; PERRL, anicteric sclera; MMM  Neck: supple  Cardiovascular: +S1/S2; regular rate - irregular rhythm  Respiratory: CTA B/L; right basilar crackles  Gastrointestinal: soft, NT/ND; +BSx4  Extremities: WWP; no edema, clubbing or cyanosis  Vascular: 2+ radial, DP/PT pulses B/L  Neurological: AAOx3; no focal deficits    MEDICATIONS:  MEDICATIONS  (STANDING):  apixaban 5 milliGRAM(s) Oral every 12 hours  atorvastatin 20 milliGRAM(s) Oral at bedtime  carvedilol 12.5 milliGRAM(s) Oral every 12 hours  cefTRIAXone   IVPB 2 Gram(s) IV Intermittent every 24 hours  cholecalciferol 1000 Unit(s) Oral two times a day  dextrose 5%. 1000 milliLiter(s) (50 mL/Hr) IV Continuous <Continuous>  dextrose 50% Injectable 12.5 Gram(s) IV Push once  dextrose 50% Injectable 25 Gram(s) IV Push once  dextrose 50% Injectable 25 Gram(s) IV Push once  insulin lispro (HumaLOG) corrective regimen sliding scale   SubCutaneous Before meals and at bedtime  predniSONE   Tablet 5 milliGRAM(s) Oral daily  tacrolimus 1 milliGRAM(s) Oral <User Schedule>  tacrolimus 2 milliGRAM(s) Oral <User Schedule>    MEDICATIONS  (PRN):  acetaminophen   Tablet 650 milliGRAM(s) Oral every 6 hours PRN For Temp greater than 38 C (100.4 F)  acetaminophen   Tablet. 650 milliGRAM(s) Oral every 6 hours PRN Moderate Pain (4 - 6)  dextrose 40% Gel 15 Gram(s) Oral once PRN Blood Glucose LESS THAN 70 milliGRAM(s)/deciliter  glucagon  Injectable 1 milliGRAM(s) IntraMuscular once PRN Glucose LESS THAN 70 milligrams/deciliter      ALLERGIES:  Allergies    Naprosyn (Unknown)    Intolerances        LABS:                        9.6    8.2   )-----------( 141      ( 16 Jun 2018 06:38 )             30.2     06-16    143  |  107  |  35<H>  ----------------------------<  149<H>  4.6   |  25  |  1.85<H>    Ca    9.1      16 Jun 2018 06:38  Mg     2.0     06-16    TPro  6.6  /  Alb  3.1<L>  /  TBili  1.1  /  DBili  x   /  AST  19  /  ALT  14  /  AlkPhos  61  06-14        CAPILLARY BLOOD GLUCOSE      POCT Blood Glucose.: 159 mg/dL (16 Jun 2018 08:53)      RADIOLOGY & ADDITIONAL TESTS: Reviewed.    ASSESSMENT:    PLAN:

## 2018-06-16 NOTE — PROGRESS NOTE ADULT - PROBLEM SELECTOR PLAN 10
F: No IVF indicated  E: Replete electrolytes PRN, Goal: K>4, Mg>2  N: Renal/DASH/TLC/CC diet    Ppx: SCD, Eliquis  Code Full  Dispo: RMF   Transition of care: Dr. Rios PCP [phone]
F: No IVF indicated  E: Replete electrolytes PRN, Goal: K>4, Mg>2  N: Renal/DASH/TLC/CC diet    Ppx: SCD, Eliquis  Code Full  Dispo: RMF   Transition of care: Dr. Rios PCP [phone]

## 2018-06-16 NOTE — PROGRESS NOTE ADULT - ASSESSMENT
72 year old M with PMH PCKD, s/p renal transplant in 2007 at [facility] (on tacrolimus and prednisone), HTN, HLD, DM2, prostate cancer (s/p radical prostatectomy 2015), DVT (2006, d/t pelvic fracture), osteoporosis presents with urosepsis with E.coli bacteremia, c/b acute hypoxic respiratory failure secondary to fluid overload.

## 2018-06-16 NOTE — PROGRESS NOTE ADULT - PROBLEM SELECTOR PLAN 8
Patient with known history of DM, on Glimeperide, Metformin and Januvia at home, unknown last HbA1c, HbA1c 7.3% on admission  - Hold home diabetic medications  - ISS, no basal/bolus regimen indicated at this time, adjust regimen accordingly  - Monitor FS with meals and bedtime

## 2018-06-16 NOTE — PROGRESS NOTE ADULT - PROBLEM SELECTOR PLAN 1
Improving. Patient s/p 2.5L fluid in ED for resuscitation in the setting of severe sepsis, experienced acute hypoxic respiratory failure with CXR demonstrating pulmonary edema. s/p Lasix 80 mg IVP x1, placed on BiPAP with improvement in respiratory status, transitioned to HFNC, now NC, saturating well.   - ECHO revealing mild concentric LVH, no WMA, EF 61%.   - Currently on NC, saturating well, wean off as tolerated  - f/u AM CXR, consider more lasix If indicated  -  Maintain strict I/Os, buckley D/Nikko

## 2018-06-17 ENCOUNTER — TRANSCRIPTION ENCOUNTER (OUTPATIENT)
Age: 73
End: 2018-06-17

## 2018-06-17 VITALS — WEIGHT: 175.05 LBS

## 2018-06-17 LAB
ANION GAP SERPL CALC-SCNC: 15 MMOL/L — SIGNIFICANT CHANGE UP (ref 5–17)
BUN SERPL-MCNC: 32 MG/DL — HIGH (ref 7–23)
CALCIUM SERPL-MCNC: 9.3 MG/DL — SIGNIFICANT CHANGE UP (ref 8.4–10.5)
CHLORIDE SERPL-SCNC: 103 MMOL/L — SIGNIFICANT CHANGE UP (ref 96–108)
CO2 SERPL-SCNC: 21 MMOL/L — LOW (ref 22–31)
CREAT SERPL-MCNC: 1.6 MG/DL — HIGH (ref 0.5–1.3)
GLUCOSE BLDC GLUCOMTR-MCNC: 205 MG/DL — HIGH (ref 70–99)
GLUCOSE BLDC GLUCOMTR-MCNC: 311 MG/DL — HIGH (ref 70–99)
GLUCOSE SERPL-MCNC: 169 MG/DL — HIGH (ref 70–99)
HCT VFR BLD CALC: 30.2 % — LOW (ref 39–50)
HGB BLD-MCNC: 9.7 G/DL — LOW (ref 13–17)
MAGNESIUM SERPL-MCNC: 1.9 MG/DL — SIGNIFICANT CHANGE UP (ref 1.6–2.6)
MCHC RBC-ENTMCNC: 28.2 PG — SIGNIFICANT CHANGE UP (ref 27–34)
MCHC RBC-ENTMCNC: 32.1 G/DL — SIGNIFICANT CHANGE UP (ref 32–36)
MCV RBC AUTO: 87.8 FL — SIGNIFICANT CHANGE UP (ref 80–100)
PLATELET # BLD AUTO: 189 K/UL — SIGNIFICANT CHANGE UP (ref 150–400)
POTASSIUM SERPL-MCNC: 3.8 MMOL/L — SIGNIFICANT CHANGE UP (ref 3.5–5.3)
POTASSIUM SERPL-SCNC: 3.8 MMOL/L — SIGNIFICANT CHANGE UP (ref 3.5–5.3)
RBC # BLD: 3.44 M/UL — LOW (ref 4.2–5.8)
RBC # FLD: 15.7 % — SIGNIFICANT CHANGE UP (ref 10.3–16.9)
SODIUM SERPL-SCNC: 139 MMOL/L — SIGNIFICANT CHANGE UP (ref 135–145)
WBC # BLD: 7.8 K/UL — SIGNIFICANT CHANGE UP (ref 3.8–10.5)
WBC # FLD AUTO: 7.8 K/UL — SIGNIFICANT CHANGE UP (ref 3.8–10.5)

## 2018-06-17 PROCEDURE — 99291 CRITICAL CARE FIRST HOUR: CPT | Mod: 25

## 2018-06-17 PROCEDURE — 82553 CREATINE MB FRACTION: CPT

## 2018-06-17 PROCEDURE — 93005 ELECTROCARDIOGRAM TRACING: CPT | Mod: XU

## 2018-06-17 PROCEDURE — 81001 URINALYSIS AUTO W/SCOPE: CPT

## 2018-06-17 PROCEDURE — 36415 COLL VENOUS BLD VENIPUNCTURE: CPT

## 2018-06-17 PROCEDURE — 93306 TTE W/DOPPLER COMPLETE: CPT

## 2018-06-17 PROCEDURE — 87150 DNA/RNA AMPLIFIED PROBE: CPT

## 2018-06-17 PROCEDURE — 51702 INSERT TEMP BLADDER CATH: CPT

## 2018-06-17 PROCEDURE — 80048 BASIC METABOLIC PNL TOTAL CA: CPT

## 2018-06-17 PROCEDURE — 82570 ASSAY OF URINE CREATININE: CPT

## 2018-06-17 PROCEDURE — 84300 ASSAY OF URINE SODIUM: CPT

## 2018-06-17 PROCEDURE — 83735 ASSAY OF MAGNESIUM: CPT

## 2018-06-17 PROCEDURE — 87086 URINE CULTURE/COLONY COUNT: CPT

## 2018-06-17 PROCEDURE — 87186 SC STD MICRODIL/AGAR DIL: CPT

## 2018-06-17 PROCEDURE — 83605 ASSAY OF LACTIC ACID: CPT

## 2018-06-17 PROCEDURE — 84540 ASSAY OF URINE/UREA-N: CPT

## 2018-06-17 PROCEDURE — 84466 ASSAY OF TRANSFERRIN: CPT

## 2018-06-17 PROCEDURE — 83036 HEMOGLOBIN GLYCOSYLATED A1C: CPT

## 2018-06-17 PROCEDURE — 82803 BLOOD GASES ANY COMBINATION: CPT

## 2018-06-17 PROCEDURE — 71045 X-RAY EXAM CHEST 1 VIEW: CPT

## 2018-06-17 PROCEDURE — 93975 VASCULAR STUDY: CPT

## 2018-06-17 PROCEDURE — 96374 THER/PROPH/DIAG INJ IV PUSH: CPT | Mod: XU

## 2018-06-17 PROCEDURE — 85025 COMPLETE CBC W/AUTO DIFF WBC: CPT

## 2018-06-17 PROCEDURE — 97161 PT EVAL LOW COMPLEX 20 MIN: CPT

## 2018-06-17 PROCEDURE — 82550 ASSAY OF CK (CPK): CPT

## 2018-06-17 PROCEDURE — 96375 TX/PRO/DX INJ NEW DRUG ADDON: CPT | Mod: XU

## 2018-06-17 PROCEDURE — 84484 ASSAY OF TROPONIN QUANT: CPT

## 2018-06-17 PROCEDURE — 94660 CPAP INITIATION&MGMT: CPT

## 2018-06-17 PROCEDURE — 85027 COMPLETE CBC AUTOMATED: CPT

## 2018-06-17 PROCEDURE — 85610 PROTHROMBIN TIME: CPT

## 2018-06-17 PROCEDURE — 82728 ASSAY OF FERRITIN: CPT

## 2018-06-17 PROCEDURE — 83550 IRON BINDING TEST: CPT

## 2018-06-17 PROCEDURE — 80053 COMPREHEN METABOLIC PANEL: CPT

## 2018-06-17 PROCEDURE — 82962 GLUCOSE BLOOD TEST: CPT

## 2018-06-17 PROCEDURE — 85730 THROMBOPLASTIN TIME PARTIAL: CPT

## 2018-06-17 PROCEDURE — 99239 HOSP IP/OBS DSCHRG MGMT >30: CPT

## 2018-06-17 PROCEDURE — 87040 BLOOD CULTURE FOR BACTERIA: CPT

## 2018-06-17 RX ORDER — CIPROFLOXACIN LACTATE 400MG/40ML
250 VIAL (ML) INTRAVENOUS
Qty: 0 | Refills: 0 | Status: DISCONTINUED | OUTPATIENT
Start: 2018-06-17 | End: 2018-06-17

## 2018-06-17 RX ORDER — CIPROFLOXACIN LACTATE 400MG/40ML
1 VIAL (ML) INTRAVENOUS
Qty: 20 | Refills: 0
Start: 2018-06-17 | End: 2018-06-26

## 2018-06-17 RX ORDER — ACETAMINOPHEN 500 MG
2 TABLET ORAL
Qty: 0 | Refills: 0 | DISCHARGE
Start: 2018-06-17

## 2018-06-17 RX ADMIN — Medication 650 MILLIGRAM(S): at 00:58

## 2018-06-17 RX ADMIN — Medication 8: at 12:33

## 2018-06-17 RX ADMIN — APIXABAN 5 MILLIGRAM(S): 2.5 TABLET, FILM COATED ORAL at 12:07

## 2018-06-17 RX ADMIN — CARVEDILOL PHOSPHATE 12.5 MILLIGRAM(S): 80 CAPSULE, EXTENDED RELEASE ORAL at 05:35

## 2018-06-17 RX ADMIN — Medication 250 MILLIGRAM(S): at 15:46

## 2018-06-17 RX ADMIN — TACROLIMUS 2 MILLIGRAM(S): 5 CAPSULE ORAL at 05:35

## 2018-06-17 RX ADMIN — Medication 1000 UNIT(S): at 05:35

## 2018-06-17 RX ADMIN — Medication 4: at 09:00

## 2018-06-17 RX ADMIN — AMLODIPINE BESYLATE 5 MILLIGRAM(S): 2.5 TABLET ORAL at 05:35

## 2018-06-17 RX ADMIN — Medication 250 MILLIGRAM(S): at 12:33

## 2018-06-17 RX ADMIN — Medication 5 MILLIGRAM(S): at 05:35

## 2018-06-17 NOTE — DISCHARGE NOTE ADULT - CARE PROVIDER_API CALL
Bryson Rios), Internal Medicine; Nephrology  [address] [place] [address]  Phone: [phone]  Fax: [phone] Bryson Rios), Internal Medicine; Nephrology  [address]  [place] [address]  Phone: [phone]  Fax: [phone]    [facility],   [address] [place] [address]  Phone: [phone]  Fax: (

## 2018-06-17 NOTE — DISCHARGE NOTE ADULT - PROVIDER TOKENS
TOKEN:'[id number]' TOKEN:'[id number]',FREE:[LAST:[[facility]],PHONE:[phone]],FAX:[(   )    -],ADDRESS:[[address] [place] [address], [address] [place] [address]]]

## 2018-06-17 NOTE — DISCHARGE NOTE ADULT - MEDICATION SUMMARY - MEDICATIONS TO STOP TAKING
I will STOP taking the medications listed below when I get home from the hospital:    levoFLOXacin 250 mg oral tablet  -- 1 tab(s) by mouth every 24 hours

## 2018-06-17 NOTE — PROGRESS NOTE ADULT - SUBJECTIVE AND OBJECTIVE BOX
MEDICAL HISTORY:      73 year old  man with with a well-functioning kidney transplant. Admitted late 18 with urosepsis. Gram negative identified in the urine and blood.  Incidentally noted to have asymptomatic AFIB (Osvaldo) in 2018.     ESRD (Polycystic kidney disease)  HD [id number]  Kidney transplant  ([facility])  CKD GFR 60's-70's  Cardiomyopathy, LVH w normal LVEF  AFIB  HTN  Gout  HLD  DM-2 since KTP (Barrera)  Ao root dilation (Juliocesar)  Osteoporosis (Barrera)  Prostate cancer s/p prostatectomy   Orthodoxy     Negative chemical stress   Echocardiogram LVH with normal systolic function  18 Echocardiogram ([facility]) LVH with nl wall motion, LVEF 55-60, nl LA, aortic root dilation  Baseline Scr  0.9-1.1 mg/dl.   2018. Echo this admission  LVH, LVH w normal wall motion, LAE, Ao root dilated 4.1 c      INTERVAL HISTORY:    Most recent blood culture 2 days no growth.  Scr 1.75, 1.83, 1.60    SUMMARY COMMENTS:     Appears to have been switch to oral Rx (cipro)  I discussed and strongly recommended both urology and ID consultation to answer the following questions:  Given that the patient is immunosuppressed, how long should the IV antibiotics be continued?  This is relevant because of the immunosuppression and the ]ligated] AVF in the LUE, a potential site for seeding.   Current follow up blood culture is only no growth x 2 days, but not yetfinalized no growth  Given that the gram negative [id number] originated from the bladder and given that there is a kidney transplant with a short ureteral connection to the blader, is there urodynamics problem that would pre-dispose to recurrent UTI and urosepsis?    The DAYO associated with the urosepsis appears to be resolving with Scr moving back toward baseline. Hemodynamics remain stable.     Finally, the patient is severely iron deficient and is on full anticoagulation. Typically this prompts a GI evaluation. Advise as a minimum and out patient appointment for a GI evaluation.     The concerns i paragraph one were discussed, in detail, yesterday and again today with the medical team residents and my strong recommendations were emphasized. Today's resident affirmed she will be discussing these issues with the attending of record before discharge is finalized.     Please contact me at any time, if needed.              ---------------------------------------------------------------------------------------------------------------------------------------------------------------------------    SUBJECTIVE & OBJECTIVE DATA DOCUMENTATION:     REVIEW OF SYSTEMS SCREENED ORGAN SYSTEMS:  Constitutional: fever, chills, anorexia or fatigue  Pulmonary: difficulty breathing, wheezing, cough  Cardiovascular: exertional dyspnea, chest pain, palpitations, dizziness or leg swelling  Gastrointestinal: abdominal or epigastric pain, nausea, vomiting or hematemesis, diarrhea, melena or bright red blood per rectum.  Genitourinary: dysuria, urgency, frequency, flank pain, difficulty voiding  Skin: No pruritis, rashes or lesions  Neurology: memory loss, dysarthria, extremity weakness, neuropathic pain, headache, visual changes  Dialysis access if present : pain, bleeding, swelling     ROS POSITIVE FINDINGS: none    PAST MEDICAL & SURGICAL HISTORY:  DVT (deep venous thrombosis)  Kidney transplant recipient  Prostate cancer  HTN (hypertension)  HLD (hyperlipidemia)  DM2 (diabetes mellitus, type 2)  Polycystic kidney disease  H/O radical prostatectomy      PHYSICAL EXAM:  T(C): 37.1 (18 @ 09:25), Max: 37.1 (18 @ 09:25)  HR: 69 (18 @ 09:25)  BP: 144/77 (18 @ 09:25) (144/77 - 161/72)  RR: 17 (18 @ 09:25)  SpO2: 96% (18 @ 09:25)  Wt(kg): --  I&O's Summary    Weight 79.4 ( @ 16:00)    APPEARANCE: in besdise chair, NAD  HEAD & NECK: normocephalic, no stiff neck, no masses, oral mucosa moist, no scleral icteris  RESPIRATORY: no accessory muscle use, no labored breathing, no dullness, no rales, crackles, no rhonchi, no wheeze  CARDIOVASCULAR: no JVD, IRRR, S1S2, + gallop,  no murmur, no rub.  ABDOMEN: soft, no tenderness, no masses, no hepatomegally, no splenomegally  : no bladder distension, no flank tenderness (PKD)  LYMPHATIC: no lymphadenopathy (neck, axilla, groin)  EXTREMITIES & MUSCULOSKELETAL: no cyanosis, no clubbing, no tenderness, strength  L=R  EDEMA: none  PULSES: upper extremity pulses present, lower extremity pulses present   SKIN: no rash, no stasis, no induration  NEUROLOGIC & PSYCHIATRIC:  alert, interactive, oriented to time and place, responds to stimuli, no asterixis  DIALYSIS ACCESS: LFA AVF ligated, no signs of infection.   KIDNEY TRANSPLANT: RLQ non-tender    MEDICATIONS  (STANDING):  amLODIPine   Tablet 5 milliGRAM(s) Oral daily  apixaban 5 milliGRAM(s) Oral every 12 hours  atorvastatin 20 milliGRAM(s) Oral at bedtime  carvedilol 12.5 milliGRAM(s) Oral every 12 hours  cholecalciferol 1000 Unit(s) Oral two times a day  ciprofloxacin     Tablet 250 milliGRAM(s) Oral two times a day  dextrose 5%. 1000 milliLiter(s) (50 mL/Hr) IV Continuous <Continuous>  dextrose 50% Injectable 12.5 Gram(s) IV Push once  dextrose 50% Injectable 25 Gram(s) IV Push once  dextrose 50% Injectable 25 Gram(s) IV Push once  insulin lispro (HumaLOG) corrective regimen sliding scale   SubCutaneous Before meals and at bedtime  predniSONE   Tablet 5 milliGRAM(s) Oral daily  tacrolimus 1 milliGRAM(s) Oral <User Schedule>  tacrolimus 2 milliGRAM(s) Oral <User Schedule>    MEDICATIONS  (PRN):  acetaminophen   Tablet 650 milliGRAM(s) Oral every 6 hours PRN For Temp greater than 38 C (100.4 F)  acetaminophen   Tablet. 650 milliGRAM(s) Oral every 6 hours PRN Moderate Pain (4 - 6)  dextrose 40% Gel 15 Gram(s) Oral once PRN Blood Glucose LESS THAN 70 milliGRAM(s)/deciliter  glucagon  Injectable 1 milliGRAM(s) IntraMuscular once PRN Glucose LESS THAN 70 milligrams/deciliter      DATA:  139    |  103    |  32<H>  ----------------------------<  169<H>  Ca:9.3   (2018 07:17)  3.8     |  21<L>  |  1.60<H>      eGFR if Non : 43 <L>  eGFR if : 50 <L>        SCr 1.60 [2018 07:17]  SCr 1.85 [2018 06:38]  SCr 1.75 [15 To 2018 06:57]  SCr 1.68 [2018 11:09]  SCr 1.76 [2018 16:56]                          9.7<L>  7.8   )-----------( 189      ( 2018 07:17 )             30.2<L>    Phos:-- M.9 mg/dL PTH:-- Uric acid:-- Serum Osm:--  Ferritin:-- Iron:-- TIBC:-- Tsat:--  B12:-- TSH:-- (2018 07:17)    Urinalysis Basic - ( 2018 19:22 )  Color: Yellow / Appearance: Clear / S.010 / pH: x  Gluc: x / Ketone: NEGATIVE  / Bili: Negative / Urobili: 0.2 E.U./dL   Blood: x / Protein: 100 mg/dL<!> / Nitrite: POSITIVE<!>   Leuk Esterase: Moderate<!> / RBC: > 10 /HPF<!> / WBC Many /HPF<!>   Sq Epi: x / Non Sq Epi: 0-5 /HPF / Bacteria: Present /HPF<!>      UProt:-- UCr:36 mg/dL P/C Ratio:-- 24 hour Prot:-- UVol:-- CrCl:--  Judi:89 mmol/L UOsm:-- UVol:-- UCl:-- UK:-- (2018 09:42)

## 2018-06-17 NOTE — DISCHARGE NOTE ADULT - HOSPITAL COURSE
72M, Taoism, with PMH CKD, s/p renal transplant in 2007 at [facility] (on tacrolimus and prednisone), HTN, HLD, DM2, prostate ca (s/p radical prostatectomy 2015), DVT (2006, d/t pelvic fracture), hx of Afib (s/p Ablation 2018), osteoporosis admitted for severe sepsis 2/2 E. coli UTI, c/b E. Coli bacteremia, lactic acidosis. Pt treated   Pt initially admitted to [facility] for hypoxic respiratory failure 2/2 flash pulmonary edema in setting of IVF resuscitation. Pt was diuresed with IV Lasix and required Bipap, which was de-escalated to HFNC, NC and then room air. Admission labs also notable for tropinemia (0.03-peaked) with associated ST depressions in leads V3-V5, unchanged on repeat EKG. Pt seen by Cardiologist (Dr Dowd) and had ECHO which had no wall motion abnormalities, preserved EF 61% and no HD significant valvulopathies. UA on admission grossly positive as well with Ucx positive for GNRs (pending speciation) and Bcx positive for E.coli (last surveillance Cx drawn 6/14, NGTD). Patient received ceftriaxone and azithromycin in the ED x1, transitioned to Ceftriaxone, and eventually Zosyn for pseudomonal coverage in light of immunosuppressed state (on Tacrolimus and Prednisone at home, s/p renal transplant). Sensitivities for Bcx pending however given Ucx sensitivities, antibiotics de-escalated to Ceftriaxone on 6/15. On 6/17, pt stable for discharge, has been afebrile for 24 hours and cultures are still negative. Pt go complete antibiotic treatment with Ciprofloxacin 250mg BID (renally dosed) x 10 more days for total 2 week course.   He is to followup with Dr. Rios outpatient. 72M, Hinduism, with PMH CKD, s/p renal transplant in 2007 at [facility] (on tacrolimus and prednisone), HTN, HLD, DM2, prostate ca (s/p radical prostatectomy 2015), DVT (2006, d/t pelvic fracture), hx of Afib (s/p Ablation 2018), osteoporosis admitted for severe sepsis 2/2 E. coli UTI, c/b E. Coli bacteremia, lactic acidosis. Pt treated with Ceftriaxone--> Zosyn for pseudomonal coverage in light of immunosuppressed state (on Tacrolimus and Prednisone at home, s/p renal transplant). Blood cx and UCx grew E. Coli and antibiotics de-escalated to Ceftriaxone on 6/15. On 6/17, pt stable for discharge, has been afebrile for 24 hours and cultures are still negative. Pt go complete antibiotic treatment with Ciprofloxacin 250mg BID (renally dosed) x 10 more days for total 2 week course.   Hospital course c/b hypoxic respiratory failure 2/2 flash pulmonary edema in setting of IVF resuscitation for which pt was on [facility]. Pt was diuresed with IV Lasix and required Bipap, which was de-escalated to HFNC, NC and then room air. Admission labs also notable for tropinemia (0.03-peaked) with associated ST depressions in leads V3-V5, unchanged on repeat EKG. Pt seen by Cardiologist (Dr Dowd) and had ECHO which had no wall motion abnormalities, preserved EF 61% and no HD significant valvulopathies. UA on admission grossly positive as well with Ucx positive for GNRs (pending speciation) and Bcx positive for E.coli (last surveillance Cx drawn 6/14, NGTD).   He is to followup with Dr. Rios outpatient. 72M, Mormon, with PMH CKD, s/p renal transplant in 2007 at [facility] (on tacrolimus and prednisone), HTN, HLD, DM2, prostate ca (s/p radical prostatectomy 2015), DVT (2006, d/t pelvic fracture), hx of Afib (s/p Ablation 2018), osteoporosis admitted for severe sepsis 2/2 E. coli UTI, c/b E. Coli bacteremia, lactic acidosis. Pt treated with Ceftriaxone--> Zosyn for pseudomonal coverage in light of immunosuppressed state (on Tacrolimus and Prednisone at home, s/p renal transplant). Blood cx and UCx grew E. Coli and antibiotics de-escalated to Ceftriaxone on 6/15. On 6/17, pt stable for discharge, has been afebrile for 24 hours and cultures are still negative. Pt to complete antibiotic treatment with Ciprofloxacin 250mg BID (renally dosed) x 10 more days for total 2 week course.   Hospital course c/b hypoxic respiratory failure 2/2 flash pulmonary edema in setting of IVF resuscitation for which pt was on [facility]. Pt was diuresed with IV Lasix and required Bipap, which was de-escalated to HFNC, NC and then room air. Admission labs also notable for tropinemia (0.03-peaked) with associated ST depressions in leads V3-V5, unchanged on repeat EKG. Pt seen by Cardiologist (Dr Dowd) and had ECHO which had no wall motion abnormalities, preserved EF 61% and no HD significant valvulopathies.  He is to followup with Dr. Rios outpatient. 72M, Taoist, with PMH CKD, s/p renal transplant in 2007 at [facility] (on tacrolimus and prednisone), HTN, HLD, DM2, prostate ca (s/p radical prostatectomy 2015), DVT (2006, d/t pelvic fracture), hx of Afib (s/p Ablation 2018), osteoporosis admitted for severe sepsis 2/2 E. coli UTI, c/b E. Coli bacteremia, lactic acidosis, DAYO. Pt treated with Ceftriaxone--> Zosyn for pseudomonal coverage in light of immunosuppressed state (on Tacrolimus and Prednisone at home, s/p renal transplant). Blood cx and UCx grew E. Coli and antibiotics de-escalated to Ceftriaxone on 6/15. On 6/17, pt stable for discharge, has been afebrile for 24 hours and cultures are still negative. Pt to complete antibiotic treatment with Ciprofloxacin 250mg BID (renally dosed) x 10 more days for total 2 week course. Renal function improved.   Hospital course c/b hypoxic respiratory failure 2/2 flash pulmonary edema in setting of IVF resuscitation for which pt was on [facility]. Pt was diuresed with IV Lasix and required Bipap, which was de-escalated to HFNC, NC and then room air. Admission labs also notable for tropinemia (0.03-peaked) with associated ST depressions in leads V3-V5, unchanged on repeat EKG. Pt seen by Cardiologist (Dr Dowd) and had ECHO which had no wall motion abnormalities, preserved EF 61% and no HD significant valvulopathies.  He is to followup with [facility].

## 2018-06-17 NOTE — DISCHARGE NOTE ADULT - PATIENT PORTAL LINK FT
You can access the [facility] Patient Portal, offered by [facility], by registering with the following website: [email or web address]

## 2018-06-17 NOTE — DISCHARGE NOTE ADULT - PLAN OF CARE
Resolution of symptoms You were admitted for a urinary tract infection that was severe and led to bacteria in your blood stream. You were treated with IV antibiotics during your hospitalization. Once your blood cultures were cleared for more than 2 days, we transitioned you to oral antibiotics, Ciprofloxacin TWO times a day. It is important you finish the entire prescribed course, so you do not have recurrence of the infection.   You must followup with your PMD within 1 week. Plan as above. Continue home meds. You kidneys may have been slightly injured because of the infection and your creatinine went up. On the day of discharge your creatinine was 1.6. Please followup with your PMD to repeat blood work within 1 week, to make sure your kidneys are ok. You were admitted for a urinary tract infection that was severe and it led to E. Coli bacteria in your blood stream. You were treated with IV antibiotics during your hospitalization. Once your blood cultures were clear for more than 2 days, we transitioned you to oral antibiotics, Ciprofloxacin TWO times a day. It is important you finish the entire prescribed course, so you do not have recurrence of the infection.   You must followup with your PMD within 1 week.

## 2018-06-17 NOTE — DISCHARGE NOTE ADULT - ABILITY TO HEAR (WITH HEARING AID OR HEARING APPLIANCE IF NORMALLY USED):
Mildly to Moderately Impaired: difficulty hearing in some environments or speaker may need to increase volume or speak distinctly/right sided hearing diminished

## 2018-06-17 NOTE — DISCHARGE NOTE ADULT - CARE PLAN
Principal Discharge DX:	E. coli bacteremia  Goal:	Resolution of symptoms  Assessment and plan of treatment:	You were admitted for a urinary tract infection that was severe and led to bacteria in your blood stream. You were treated with IV antibiotics during your hospitalization. Once your blood cultures were cleared for more than 2 days, we transitioned you to oral antibiotics, Ciprofloxacin TWO times a day. It is important you finish the entire prescribed course, so you do not have recurrence of the infection.   You must followup with your PMD within 1 week.  Secondary Diagnosis:	Severe sepsis  Assessment and plan of treatment:	Plan as above.  Secondary Diagnosis:	Urinary tract infection with fever  Assessment and plan of treatment:	Plan as above.  Secondary Diagnosis:	Kidney transplant recipient  Assessment and plan of treatment:	Continue home meds.  Secondary Diagnosis:	DM2 (diabetes mellitus, type 2)  Assessment and plan of treatment:	Continue home meds.  Secondary Diagnosis:	Acute kidney injury superimposed on CKD  Assessment and plan of treatment:	You kidneys may have been slightly injured because of the infection and your creatinine went up. On the day of discharge your creatinine was 1.6. Please followup with your PMD to repeat blood work within 1 week, to make sure your kidneys are ok. Principal Discharge DX:	E. coli bacteremia  Goal:	Resolution of symptoms  Assessment and plan of treatment:	You were admitted for a urinary tract infection that was severe and it led to E. Coli bacteria in your blood stream. You were treated with IV antibiotics during your hospitalization. Once your blood cultures were clear for more than 2 days, we transitioned you to oral antibiotics, Ciprofloxacin TWO times a day. It is important you finish the entire prescribed course, so you do not have recurrence of the infection.   You must followup with your PMD within 1 week.  Secondary Diagnosis:	Severe sepsis  Assessment and plan of treatment:	Plan as above.  Secondary Diagnosis:	Urinary tract infection with fever  Assessment and plan of treatment:	Plan as above.  Secondary Diagnosis:	Kidney transplant recipient  Assessment and plan of treatment:	Continue home meds.  Secondary Diagnosis:	DM2 (diabetes mellitus, type 2)  Assessment and plan of treatment:	Continue home meds.  Secondary Diagnosis:	Acute kidney injury superimposed on CKD  Assessment and plan of treatment:	You kidneys may have been slightly injured because of the infection and your creatinine went up. On the day of discharge your creatinine was 1.6. Please followup with your PMD to repeat blood work within 1 week, to make sure your kidneys are ok.

## 2018-06-17 NOTE — DISCHARGE NOTE ADULT - MEDICATION SUMMARY - MEDICATIONS TO TAKE
I will START or STAY ON the medications listed below when I get home from the hospital:    predniSONE 5 mg oral tablet  -- 1 tab(s) by mouth once a day  -- Indication: For Kidney transplant recipient    Actamin 325 mg oral tablet  -- 2 tab(s) by mouth every 6 hours, As needed, For Temp greater than 38 C (100.4 F)  -- Indication: For As needed    apixaban 5 mg oral tablet  -- 1 tab(s) by mouth every 12 hours  -- Indication: For Atrial fibrillation, unspecified type    Januvia 100 mg oral tablet  -- 1 tab(s) by mouth once a day  -- Indication: For DM2 (diabetes mellitus, type 2)    glimepiride 2 mg oral tablet  -- 2 tab(s) by mouth once a day (in the morning)  -- Indication: For DM2 (diabetes mellitus, type 2)    glimepiride 2 mg oral tablet  -- 1 tab(s) by mouth once a day (in the evening)  -- Indication: For DM2 (diabetes mellitus, type 2)    metFORMIN 500 mg oral tablet, extended release  -- 1 tab(s) by mouth once a day  -- Indication: For DM2 (diabetes mellitus, type 2)    Lipitor 20 mg oral tablet  -- 1 tab(s) by mouth once a day  -- Indication: For HLD (hyperlipidemia)    Coreg 12.5 mg oral tablet  -- 1 tab(s) by mouth 2 times a day  -- Indication: For HTN (hypertension)    amLODIPine 5 mg oral tablet  -- 1 tab(s) by mouth once a day  -- Indication: For HTN (hypertension)    tacrolimus 1 mg oral capsule  -- 2 cap(s) by mouth once a day (in the morning)  -- Indication: For Kidney transplant recipient    tacrolimus 1 mg oral capsule  -- 1 cap(s) by mouth once a day (in the evening)  -- Indication: For Kidney transplant recipient    ciprofloxacin 250 mg oral tablet  -- 1 tab(s) by mouth 2 times a day  -- Indication: For E. coli bacteremia    Vitamin D3 1000 intl units oral capsule  -- 1 cap(s) by mouth 2 times a day  -- Indication: For Prophylactic measure

## 2018-06-17 NOTE — DISCHARGE NOTE ADULT - SECONDARY DIAGNOSIS.
Severe sepsis Urinary tract infection with fever Kidney transplant recipient DM2 (diabetes mellitus, type 2) Acute kidney injury superimposed on CKD

## 2018-06-17 NOTE — DISCHARGE NOTE ADULT - ADDITIONAL INSTRUCTIONS
Please followup with Dr. Rios in 1 week. Please followup at [facility] within 1 week. The staff will contact you for an appointment.

## 2018-06-19 LAB
CULTURE RESULTS: SIGNIFICANT CHANGE UP
SPECIMEN SOURCE: SIGNIFICANT CHANGE UP

## 2018-06-21 DIAGNOSIS — I42.9 CARDIOMYOPATHY, UNSPECIFIED: ICD-10-CM

## 2018-06-21 DIAGNOSIS — I12.9 HYPERTENSIVE CHRONIC KIDNEY DISEASE WITH STAGE 1 THROUGH STAGE 4 CHRONIC KIDNEY DISEASE, OR UNSPECIFIED CHRONIC KIDNEY DISEASE: ICD-10-CM

## 2018-06-21 DIAGNOSIS — Q61.3 POLYCYSTIC KIDNEY, UNSPECIFIED: ICD-10-CM

## 2018-06-21 DIAGNOSIS — J81.0 ACUTE PULMONARY EDEMA: ICD-10-CM

## 2018-06-21 DIAGNOSIS — R65.20 SEVERE SEPSIS WITHOUT SEPTIC SHOCK: ICD-10-CM

## 2018-06-21 DIAGNOSIS — N39.0 URINARY TRACT INFECTION, SITE NOT SPECIFIED: ICD-10-CM

## 2018-06-21 DIAGNOSIS — N18.9 CHRONIC KIDNEY DISEASE, UNSPECIFIED: ICD-10-CM

## 2018-06-21 DIAGNOSIS — A41.50 GRAM-NEGATIVE SEPSIS, UNSPECIFIED: ICD-10-CM

## 2018-06-21 DIAGNOSIS — Z85.46 PERSONAL HISTORY OF MALIGNANT NEOPLASM OF PROSTATE: ICD-10-CM

## 2018-06-21 DIAGNOSIS — A41.9 SEPSIS, UNSPECIFIED ORGANISM: ICD-10-CM

## 2018-06-21 DIAGNOSIS — E11.9 TYPE 2 DIABETES MELLITUS WITHOUT COMPLICATIONS: ICD-10-CM

## 2018-06-21 DIAGNOSIS — Z79.4 LONG TERM (CURRENT) USE OF INSULIN: ICD-10-CM

## 2018-06-21 DIAGNOSIS — I34.0 NONRHEUMATIC MITRAL (VALVE) INSUFFICIENCY: ICD-10-CM

## 2018-06-21 DIAGNOSIS — N17.9 ACUTE KIDNEY FAILURE, UNSPECIFIED: ICD-10-CM

## 2018-06-21 DIAGNOSIS — Z94.0 KIDNEY TRANSPLANT STATUS: ICD-10-CM

## 2018-06-21 DIAGNOSIS — M81.0 AGE-RELATED OSTEOPOROSIS WITHOUT CURRENT PATHOLOGICAL FRACTURE: ICD-10-CM

## 2018-06-21 DIAGNOSIS — I48.91 UNSPECIFIED ATRIAL FIBRILLATION: ICD-10-CM

## 2018-06-21 DIAGNOSIS — E78.5 HYPERLIPIDEMIA, UNSPECIFIED: ICD-10-CM

## 2018-06-21 DIAGNOSIS — E87.2 ACIDOSIS: ICD-10-CM

## 2018-06-21 DIAGNOSIS — J96.01 ACUTE RESPIRATORY FAILURE WITH HYPOXIA: ICD-10-CM

## 2018-06-25 ENCOUNTER — APPOINTMENT (OUTPATIENT)
Dept: INTERNAL MEDICINE | Facility: CLINIC | Age: 73
End: 2018-06-25
Payer: MEDICARE

## 2018-06-25 VITALS
HEIGHT: 67 IN | BODY MASS INDEX: 27.47 KG/M2 | WEIGHT: 175 LBS | SYSTOLIC BLOOD PRESSURE: 144 MMHG | HEART RATE: 81 BPM | OXYGEN SATURATION: 97 % | DIASTOLIC BLOOD PRESSURE: 81 MMHG | TEMPERATURE: 98.3 F

## 2018-06-25 DIAGNOSIS — N17.9 ACUTE KIDNEY FAILURE, UNSPECIFIED: ICD-10-CM

## 2018-06-25 DIAGNOSIS — Z87.891 PERSONAL HISTORY OF NICOTINE DEPENDENCE: ICD-10-CM

## 2018-06-25 PROCEDURE — 99495 TRANSJ CARE MGMT MOD F2F 14D: CPT | Mod: 25,GC

## 2018-06-25 PROCEDURE — 36415 COLL VENOUS BLD VENIPUNCTURE: CPT

## 2018-06-25 RX ORDER — TACROLIMUS 1 MG/1
1 CAPSULE ORAL
Refills: 0 | Status: ACTIVE | COMMUNITY

## 2018-06-26 LAB
ANION GAP SERPL CALC-SCNC: 18 MMOL/L
BUN SERPL-MCNC: 28 MG/DL
CALCIUM SERPL-MCNC: 10 MG/DL
CHLORIDE SERPL-SCNC: 102 MMOL/L
CO2 SERPL-SCNC: 23 MMOL/L
CREAT SERPL-MCNC: 1.39 MG/DL
GLUCOSE SERPL-MCNC: 248 MG/DL
HCV AB SER QL: NONREACTIVE
HCV S/CO RATIO: 0.31 S/CO
MAGNESIUM SERPL-MCNC: 1.6 MG/DL
PHOSPHATE SERPL-MCNC: 3.1 MG/DL
POTASSIUM SERPL-SCNC: 4.5 MMOL/L
SODIUM SERPL-SCNC: 143 MMOL/L

## 2018-07-10 ENCOUNTER — RX RENEWAL (OUTPATIENT)
Age: 73
End: 2018-07-10

## 2018-07-19 ENCOUNTER — APPOINTMENT (OUTPATIENT)
Dept: UROLOGY | Facility: CLINIC | Age: 73
End: 2018-07-19
Payer: MEDICARE

## 2018-07-19 VITALS — DIASTOLIC BLOOD PRESSURE: 71 MMHG | HEART RATE: 63 BPM | TEMPERATURE: 97.7 F | SYSTOLIC BLOOD PRESSURE: 146 MMHG

## 2018-07-19 DIAGNOSIS — R78.81 BACTEREMIA: ICD-10-CM

## 2018-07-19 DIAGNOSIS — N30.90 CYSTITIS, UNSPECIFIED W/OUT HEMATURIA: ICD-10-CM

## 2018-07-19 PROBLEM — E78.5 HYPERLIPIDEMIA, UNSPECIFIED: Chronic | Status: ACTIVE | Noted: 2018-01-31

## 2018-07-19 PROBLEM — Z94.0 KIDNEY TRANSPLANT STATUS: Chronic | Status: ACTIVE | Noted: 2018-01-31

## 2018-07-19 PROBLEM — Q61.3 POLYCYSTIC KIDNEY, UNSPECIFIED: Chronic | Status: ACTIVE | Noted: 2018-01-31

## 2018-07-19 PROBLEM — I10 ESSENTIAL (PRIMARY) HYPERTENSION: Chronic | Status: ACTIVE | Noted: 2018-01-31

## 2018-07-19 PROBLEM — C61 MALIGNANT NEOPLASM OF PROSTATE: Chronic | Status: ACTIVE | Noted: 2018-01-31

## 2018-07-19 PROBLEM — I82.409 ACUTE EMBOLISM AND THROMBOSIS OF UNSPECIFIED DEEP VEINS OF UNSPECIFIED LOWER EXTREMITY: Chronic | Status: ACTIVE | Noted: 2018-01-31

## 2018-07-19 PROBLEM — E11.9 TYPE 2 DIABETES MELLITUS WITHOUT COMPLICATIONS: Chronic | Status: ACTIVE | Noted: 2018-01-31

## 2018-07-19 PROCEDURE — 99204 OFFICE O/P NEW MOD 45 MIN: CPT

## 2018-07-20 LAB
APPEARANCE: CLEAR
BACTERIA: NEGATIVE
BILIRUBIN URINE: NEGATIVE
BLOOD URINE: NEGATIVE
COLOR: YELLOW
GLUCOSE QUALITATIVE U: NEGATIVE MG/DL
KETONES URINE: NEGATIVE
LEUKOCYTE ESTERASE URINE: NEGATIVE
MICROSCOPIC-UA: NORMAL
NITRITE URINE: NEGATIVE
PH URINE: 6.5
PROTEIN URINE: ABNORMAL MG/DL
RED BLOOD CELLS URINE: 0 /HPF
SPECIFIC GRAVITY URINE: 1.02
SQUAMOUS EPITHELIAL CELLS: 0 /HPF
UROBILINOGEN URINE: NEGATIVE MG/DL
WHITE BLOOD CELLS URINE: 0 /HPF

## 2018-07-23 LAB — BACTERIA UR CULT: NORMAL

## 2018-07-31 ENCOUNTER — FORM ENCOUNTER (OUTPATIENT)
Age: 73
End: 2018-07-31

## 2018-08-01 ENCOUNTER — APPOINTMENT (OUTPATIENT)
Dept: CT IMAGING | Facility: HOSPITAL | Age: 73
End: 2018-08-01
Payer: MEDICARE

## 2018-08-01 ENCOUNTER — OUTPATIENT (OUTPATIENT)
Dept: OUTPATIENT SERVICES | Facility: HOSPITAL | Age: 73
LOS: 1 days | End: 2018-08-01
Payer: MEDICARE

## 2018-08-01 DIAGNOSIS — Z98.890 OTHER SPECIFIED POSTPROCEDURAL STATES: Chronic | ICD-10-CM

## 2018-08-01 PROCEDURE — 74176 CT ABD & PELVIS W/O CONTRAST: CPT | Mod: 26

## 2018-08-01 PROCEDURE — 74176 CT ABD & PELVIS W/O CONTRAST: CPT

## 2018-08-16 ENCOUNTER — APPOINTMENT (OUTPATIENT)
Dept: ENDOCRINOLOGY | Facility: CLINIC | Age: 73
End: 2018-08-16
Payer: MEDICARE

## 2018-08-16 VITALS
HEART RATE: 75 BPM | SYSTOLIC BLOOD PRESSURE: 147 MMHG | HEIGHT: 67 IN | DIASTOLIC BLOOD PRESSURE: 82 MMHG | WEIGHT: 175 LBS | BODY MASS INDEX: 27.47 KG/M2

## 2018-08-16 LAB
GLUCOSE BLDC GLUCOMTR-MCNC: 264
HBA1C MFR BLD HPLC: 8.1

## 2018-08-16 PROCEDURE — 99214 OFFICE O/P EST MOD 30 MIN: CPT | Mod: 25

## 2018-08-16 PROCEDURE — 82962 GLUCOSE BLOOD TEST: CPT

## 2018-08-16 PROCEDURE — 83036 HEMOGLOBIN GLYCOSYLATED A1C: CPT | Mod: QW

## 2018-08-16 RX ORDER — DULAGLUTIDE 0.75 MG/.5ML
0.75 INJECTION, SOLUTION SUBCUTANEOUS
Qty: 1 | Refills: 11 | Status: DISCONTINUED | COMMUNITY
Start: 2018-08-16 | End: 2018-08-16

## 2018-08-23 ENCOUNTER — APPOINTMENT (OUTPATIENT)
Dept: ENDOCRINOLOGY | Facility: CLINIC | Age: 73
End: 2018-08-23
Payer: MEDICARE

## 2018-08-23 PROCEDURE — 99213 OFFICE O/P EST LOW 20 MIN: CPT

## 2018-09-01 NOTE — PATIENT PROFILE ADULT. - PROVIDER NOTIFICATION
pt. a&ox3, appears in NAD at this time, states pain is tolerable at this time.  antibiotics started. awaits Surgical consult. will continue to monitor Yes

## 2018-09-20 ENCOUNTER — APPOINTMENT (OUTPATIENT)
Dept: ENDOCRINOLOGY | Facility: CLINIC | Age: 73
End: 2018-09-20
Payer: MEDICARE

## 2018-09-20 VITALS
BODY MASS INDEX: 25.58 KG/M2 | HEART RATE: 82 BPM | DIASTOLIC BLOOD PRESSURE: 64 MMHG | WEIGHT: 163 LBS | HEIGHT: 67 IN | SYSTOLIC BLOOD PRESSURE: 130 MMHG

## 2018-09-20 PROCEDURE — G0008: CPT

## 2018-09-20 PROCEDURE — 99214 OFFICE O/P EST MOD 30 MIN: CPT | Mod: 25

## 2018-09-20 PROCEDURE — 90662 IIV NO PRSV INCREASED AG IM: CPT

## 2018-09-20 RX ORDER — SITAGLIPTIN 100 MG/1
100 TABLET, FILM COATED ORAL DAILY
Qty: 90 | Refills: 1 | Status: DISCONTINUED | COMMUNITY
Start: 2017-11-17 | End: 2018-09-20

## 2018-09-27 ENCOUNTER — RX CHANGE (OUTPATIENT)
Age: 73
End: 2018-09-27

## 2018-09-27 RX ORDER — SEMAGLUTIDE 1.34 MG/ML
2 INJECTION, SOLUTION SUBCUTANEOUS
Qty: 5 | Refills: 1 | Status: DISCONTINUED | COMMUNITY
Start: 2018-08-16 | End: 2018-09-27

## 2018-09-28 ENCOUNTER — APPOINTMENT (OUTPATIENT)
Dept: UROLOGY | Facility: CLINIC | Age: 73
End: 2018-09-28
Payer: MEDICARE

## 2018-09-28 VITALS — HEART RATE: 86 BPM | SYSTOLIC BLOOD PRESSURE: 156 MMHG | TEMPERATURE: 97.4 F | DIASTOLIC BLOOD PRESSURE: 87 MMHG

## 2018-09-28 PROCEDURE — 99213 OFFICE O/P EST LOW 20 MIN: CPT

## 2018-10-05 ENCOUNTER — RX RENEWAL (OUTPATIENT)
Age: 73
End: 2018-10-05

## 2018-10-24 ENCOUNTER — APPOINTMENT (OUTPATIENT)
Dept: ENDOCRINOLOGY | Facility: CLINIC | Age: 73
End: 2018-10-24
Payer: MEDICARE

## 2018-10-24 VITALS
WEIGHT: 160 LBS | DIASTOLIC BLOOD PRESSURE: 79 MMHG | HEIGHT: 67 IN | SYSTOLIC BLOOD PRESSURE: 143 MMHG | BODY MASS INDEX: 25.11 KG/M2 | HEART RATE: 74 BPM

## 2018-10-24 LAB — GLUCOSE BLDC GLUCOMTR-MCNC: 367

## 2018-10-24 PROCEDURE — 99214 OFFICE O/P EST MOD 30 MIN: CPT | Mod: 25

## 2018-10-24 PROCEDURE — 82962 GLUCOSE BLOOD TEST: CPT

## 2018-10-24 RX ORDER — INSULIN GLARGINE 100 [IU]/ML
100 INJECTION, SOLUTION SUBCUTANEOUS
Qty: 3 | Refills: 3 | Status: DISCONTINUED | COMMUNITY
Start: 2018-10-24 | End: 2018-10-24

## 2019-01-23 ENCOUNTER — APPOINTMENT (OUTPATIENT)
Dept: ENDOCRINOLOGY | Facility: CLINIC | Age: 74
End: 2019-01-23
Payer: MEDICARE

## 2019-01-23 VITALS
DIASTOLIC BLOOD PRESSURE: 70 MMHG | SYSTOLIC BLOOD PRESSURE: 136 MMHG | HEIGHT: 67 IN | WEIGHT: 175 LBS | BODY MASS INDEX: 27.47 KG/M2 | HEART RATE: 75 BPM

## 2019-01-23 LAB
GLUCOSE BLDC GLUCOMTR-MCNC: 105
HBA1C MFR BLD HPLC: 7.7

## 2019-01-23 PROCEDURE — 82962 GLUCOSE BLOOD TEST: CPT

## 2019-01-23 PROCEDURE — 83036 HEMOGLOBIN GLYCOSYLATED A1C: CPT | Mod: QW

## 2019-01-23 PROCEDURE — 99214 OFFICE O/P EST MOD 30 MIN: CPT | Mod: 25

## 2019-01-24 LAB
25(OH)D3 SERPL-MCNC: 40.6 NG/ML
ALBUMIN SERPL ELPH-MCNC: 4.2 G/DL
ALP BLD-CCNC: 43 U/L
ALT SERPL-CCNC: 13 U/L
ANION GAP SERPL CALC-SCNC: 13 MMOL/L
AST SERPL-CCNC: 13 U/L
BASOPHILS # BLD AUTO: 0.02 K/UL
BASOPHILS NFR BLD AUTO: 0.2 %
BILIRUB SERPL-MCNC: 0.6 MG/DL
BUN SERPL-MCNC: 21 MG/DL
CALCIUM SERPL-MCNC: 10.2 MG/DL
CALCIUM SERPL-MCNC: 10.2 MG/DL
CHLORIDE SERPL-SCNC: 103 MMOL/L
CHOLEST SERPL-MCNC: 122 MG/DL
CHOLEST/HDLC SERPL: 2.5 RATIO
CO2 SERPL-SCNC: 28 MMOL/L
CREAT SERPL-MCNC: 1.11 MG/DL
EOSINOPHIL # BLD AUTO: 0.26 K/UL
EOSINOPHIL NFR BLD AUTO: 3 %
GLUCOSE SERPL-MCNC: 158 MG/DL
HCT VFR BLD CALC: 37.8 %
HDLC SERPL-MCNC: 49 MG/DL
HGB BLD-MCNC: 12 G/DL
IMM GRANULOCYTES NFR BLD AUTO: 0.1 %
LDLC SERPL CALC-MCNC: 50 MG/DL
LYMPHOCYTES # BLD AUTO: 1.84 K/UL
LYMPHOCYTES NFR BLD AUTO: 21.4 %
MAN DIFF?: NORMAL
MCHC RBC-ENTMCNC: 28.2 PG
MCHC RBC-ENTMCNC: 31.7 GM/DL
MCV RBC AUTO: 88.9 FL
MONOCYTES # BLD AUTO: 0.74 K/UL
MONOCYTES NFR BLD AUTO: 8.6 %
NEUTROPHILS # BLD AUTO: 5.74 K/UL
NEUTROPHILS NFR BLD AUTO: 66.7 %
PARATHYROID HORMONE INTACT: 51 PG/ML
PHOSPHATE SERPL-MCNC: 3.4 MG/DL
PLATELET # BLD AUTO: 255 K/UL
POTASSIUM SERPL-SCNC: 4.9 MMOL/L
PROT SERPL-MCNC: 6.7 G/DL
RBC # BLD: 4.25 M/UL
RBC # FLD: 16.6 %
SODIUM SERPL-SCNC: 144 MMOL/L
TRIGL SERPL-MCNC: 116 MG/DL
TSH SERPL-ACNC: 3.51 UIU/ML
VIT B12 SERPL-MCNC: 415 PG/ML
WBC # FLD AUTO: 8.61 K/UL

## 2019-01-25 LAB — ALP BONE SERPL-MCNC: 5.2 MCG/L

## 2019-01-25 NOTE — DATA REVIEWED
[FreeTextEntry1] : Laboratories (February 6, 2018) reviewed and significant for:\par BUN/creatinine 19/1.63 mg/dL (eGFR 42 mL/min)\par \par Laboratories (January 31, 2018) reviewed and significant for:\par TSH 0.108 mIU/mL\par \par Laboratories (October 4, 2017) reviewed and significant for:\par Calcium 10.5 mg/dL\par BUN/creatinine 29/1.31 mg/dL (eGFR 55 cc/min)\par Hg A1c 7.2%\par TSH 2.73 mIU/mL\par 25-hydroxyvitamin D 42.2 ng/mL\par LDL 50 mg/dL

## 2019-01-25 NOTE — RESULTS/DATA
[L1 - L4] : L1 - L4 [BMD ___ g/cm2] : BMD: [unfilled] g/cm2 [T-Score ___] : T-score: [unfilled] [FreeTextEntry2] : August 8, 2017 [de-identified] : 1.3 radius value; total radius T-score -2.9

## 2019-01-25 NOTE — HISTORY OF PRESENT ILLNESS
[FreeTextEntry1] : Mr. Wooten is a 73 year-old man with a history of multiple medical problems including polycystic kidney disease status post renal transplant in 2007, type 2 diabetes mellitus, osteopenia,atrial flutter presenting for follow-up. I saw him for an initial visit in November 2017 and last in October 2018; he is a former patient of Dr. Barrera.\par \par Type 2 diabetes mellitus. Point-of-care Hg A1c 7.7% and blood glucose 105 mg/dL today. Complicated by mild neuropathy.\par He had borderline diabetes prior to renal transplant, subsequently type 2 diabetes mellitus.\par Prior to August 2018 he was taking metformin 500/1500 mg daily, glimepiride 4 mg/2 mg daily, sitagliptin 100 mg daily.\par In August 2018 we adjusted his regimen to metformin 500/1500 mg daily and started semaglutide 0.25 mg weekly; we discontinued glimepiride and sitagliptin. We then increased semaglutide to 0.50 mg weekly without good glycemic effect. See telephone notes for interim history.\par In September 2018 we changed semaglutide to Trulicity 0.75 mg weekly, increased to 1.5 mg weekly.\par In October 2018 we started Levemir 20 units daily and restarted glimepiride 4 mg daily.\par He is checking blood sugars twice daily as below\par He is on aspirin and apixaban\par He is on a blood pressure regimen\par He is on a statin for cholesterol\par Last ophthalmology appointment: Patient to reschedule\par Last podiatry appointment: December 2018\par Last dental appointment: around December 2018\par He is up-to-date with influenza and pneumonia vaccines\par \par Osteopenia with elevated fracture risk\par Osteopenia diagnosed in 2017 by bone density with femoral neck T-score -2.4\par FRAX: His 10-year predicted fracture risk was 21% for major osteoporotic fracture and 11% for hip fracture (risk factors of parental history of hip fracture, rheumatoid arthritis as a proxy for type 2 diabetes mellitus)\par Fracture history: Pelvis and right rib fractures in 2006 when bus shelter fell on him; history of right hip fusion at age 6 in the setting of infection, he believes tuberculosis.\par Family history: Father with history of hip fracture in his 80s\par Treatment: Zoledronic acid 5 mg IV January 31, 2018\par \par Falls: No\par Height loss: 1/2 inch height loss\par Kidney stones: No\par Dairy intake: 0-1 serving daily (cheese 3-4 times per week)\par Calcium supplements: 500 mg daily\par Multivitamin: Centrum Silver with 210 mg calcium and 1000 intl units vitamin D \par Vitamin D supplements: 2000 intl units daily\par \par Osteoporosis risk factors include: Postmenopausal status,  race, prior fracture, falls, height loss, small thin bones, tobacco use, excessive alcohol, anorexia, family history, vitamin D deficiency, corticosteroid use, seizure medications, malabsorption, hyperparathyroidism, hyperthyroidism.\par NEGATIVE EXCEPT: Renal transplant, prior fracture (although traumatic), parental history of hip fracture\par \par Interim History \par He feels much better than previous. Last visit we started Levemir 20 units daily. He has been off glimepiride 4 mg daily for the past two weeks due to lapsed prescription.\par We adjusted his regimen as above. Fasting blood sugars 70-100s mg/dL. Postprandial blood sugars 160-200s mg/dL.\par He has gained back the weight he lost. No chest pain, shortness of breath, polyuria/polydipsia, lower extremity numbness/tingling.\par Medical and surgical history, medications, allergies, social and family history reviewed and updated as needed.

## 2019-01-25 NOTE — PHYSICAL EXAM
[Alert] : alert [No Acute Distress] : no acute distress [Healthy Appearance] : healthy appearance [Normal Sclera/Conjunctiva] : normal sclera/conjunctiva [No Neck Mass] : no neck mass was observed [Supple] : the neck was supple [No LAD] : no lymphadenopathy [Thyroid Not Enlarged] : the thyroid was not enlarged [Well Healed Scar] : well healed scar [Normal Rate and Effort] : normal respiratory rhythm and effort [Clear to Auscultation] : lungs were clear to auscultation bilaterally [Normal Rate] : heart rate was normal  [Normal S1, S2] : normal S1 and S2 [Regular Rhythm] : with a regular rhythm [No Stigmata of Cushings Syndrome] : no stigmata of cushings syndrome [Normal Gait] : normal gait [Normal Insight/Judgement] : insight and judgment were intact [Kyphosis] : no kyphosis present

## 2019-01-25 NOTE — ASSESSMENT
[FreeTextEntry1] : Type 2 diabetes mellitus. Point-of-care Hg A1c 7.7% and blood glucose 105 mg/dL today. Complicated by mild neuropathy. His basal insulin dose is too high, and he requires additional prandial coverage. The SGLT-2 inhibitor class may be a good option for him due to renal protection, pending input from his nephrologist. We discussed the risks and benefits of the SGLT-2 inhibitor class, including but not limited to urinary tract infection, fungal infection, Mary's gangrene. \par Check complete blood count, comprehensive metabolic panel, lipid panel, TSH, vitamin B12, 25-hydroxyvitamin D\par Decrease Levemir to 15 units at night\par Continue metformin 500/1500 mg daily for now; will need to decrease if renal function continues to decline\par Continue Trulicity 1.5 mg weekly\par Consider either starting an SGLT-2 inhibitor or restarting glimepiride at 6 mg daily\par Continue to check blood sugars twice daily\par He is on aspirin \par He is on a blood pressure regimen \par He is on a statin for cholesterol\par Last podiatry appointment: December 2018\par Last dental appointment: around December 2018\par He is up-to-date with influenza and pneumonia vaccines\par \par Osteopenia with elevated fracture risk. He has a history of prior traumatic fractures. His 10-year predicted fracture risk prior to therapy was 21% for major osteoporotic fracture and 11% for hip fracture (risk factors of parental history of hip fracture, rheumatoid arthritis as a proxy for type 2 diabetes mellitus), above the treatment thresholds. He received zoledronic acid 5 mg IV January 31, 2018. He is aware of the potential benefits and risks of pharmacologic osteoporosis therapy at length, including but not limited to osteonecrosis of the jaw and atypical fracture. \par Consider second dose of zoledronic acid 5 mg IV now if renal function remains stable\par Calcium 1000 mg daily from diet and supplements (to be taken in divided doses as no more than 500-600 mg can be absorbed at one time); continue current regimen\par Continue current vitamin D regimen\par Diet, exercise and fall prevention discussed\par \par I reviewed the DXA performed on August 8, 2017 with the patient today.\par I reviewed the laboratories performed in 2018 with the patient today. \par \par CC:\par Dr. Bryson Rios, Fax [phone]

## 2019-01-25 NOTE — ADDENDUM
[FreeTextEntry1] : Recent test results as below; discussed with Mr. Wooten. Mild anemia and encouraged discussion with primary care doctor. Renal function normal. Vitamin D replete. Other test results unremarkable. Dr. Rios is away until February. We will restart glimepiride at 6 mg daily for now. 1/24/19

## 2019-01-29 ENCOUNTER — APPOINTMENT (OUTPATIENT)
Dept: INFUSION THERAPY | Facility: HOSPITAL | Age: 74
End: 2019-01-29

## 2019-01-29 ENCOUNTER — OUTPATIENT (OUTPATIENT)
Dept: OUTPATIENT SERVICES | Facility: HOSPITAL | Age: 74
LOS: 1 days | End: 2019-01-29
Payer: MEDICARE

## 2019-01-29 VITALS
HEART RATE: 18 BPM | RESPIRATION RATE: 18 BRPM | TEMPERATURE: 98 F | WEIGHT: 173.06 LBS | DIASTOLIC BLOOD PRESSURE: 80 MMHG | OXYGEN SATURATION: 100 % | HEIGHT: 68 IN | SYSTOLIC BLOOD PRESSURE: 145 MMHG

## 2019-01-29 DIAGNOSIS — M81.0 AGE-RELATED OSTEOPOROSIS WITHOUT CURRENT PATHOLOGICAL FRACTURE: ICD-10-CM

## 2019-01-29 DIAGNOSIS — Z98.890 OTHER SPECIFIED POSTPROCEDURAL STATES: Chronic | ICD-10-CM

## 2019-01-29 PROCEDURE — 96361 HYDRATE IV INFUSION ADD-ON: CPT

## 2019-01-29 PROCEDURE — 96365 THER/PROPH/DIAG IV INF INIT: CPT

## 2019-01-29 RX ORDER — SODIUM CHLORIDE 9 MG/ML
250 INJECTION INTRAMUSCULAR; INTRAVENOUS; SUBCUTANEOUS ONCE
Qty: 0 | Refills: 0 | Status: COMPLETED | OUTPATIENT
Start: 2019-01-29 | End: 2019-01-29

## 2019-01-29 RX ORDER — ZOLEDRONIC ACID 5 MG/100ML
5 INJECTION, SOLUTION INTRAVENOUS ONCE
Qty: 0 | Refills: 0 | Status: COMPLETED | OUTPATIENT
Start: 2019-01-29 | End: 2019-01-29

## 2019-01-29 RX ADMIN — SODIUM CHLORIDE 500 MILLILITER(S): 9 INJECTION INTRAMUSCULAR; INTRAVENOUS; SUBCUTANEOUS at 15:23

## 2019-01-29 RX ADMIN — ZOLEDRONIC ACID 200 MILLIGRAM(S): 5 INJECTION, SOLUTION INTRAVENOUS at 15:02

## 2019-03-25 ENCOUNTER — TRANSCRIPTION ENCOUNTER (OUTPATIENT)
Age: 74
End: 2019-03-25

## 2019-03-28 ENCOUNTER — NON-APPOINTMENT (OUTPATIENT)
Age: 74
End: 2019-03-28

## 2019-03-28 ENCOUNTER — APPOINTMENT (OUTPATIENT)
Dept: HEART AND VASCULAR | Facility: CLINIC | Age: 74
End: 2019-03-28
Payer: MEDICARE

## 2019-03-28 VITALS
HEART RATE: 79 BPM | SYSTOLIC BLOOD PRESSURE: 133 MMHG | WEIGHT: 175 LBS | HEIGHT: 67 IN | DIASTOLIC BLOOD PRESSURE: 68 MMHG | BODY MASS INDEX: 27.47 KG/M2

## 2019-03-28 PROCEDURE — 93000 ELECTROCARDIOGRAM COMPLETE: CPT

## 2019-03-28 PROCEDURE — 99214 OFFICE O/P EST MOD 30 MIN: CPT | Mod: 25

## 2019-03-28 NOTE — PHYSICAL EXAM
[General Appearance - Well Developed] : well developed [Normal Appearance] : normal appearance [Well Groomed] : well groomed [General Appearance - Well Nourished] : well nourished [No Deformities] : no deformities [General Appearance - In No Acute Distress] : no acute distress [Normal Conjunctiva] : the conjunctiva exhibited no abnormalities [Normal Oral Mucosa] : normal oral mucosa [Normal Jugular Venous V Waves Present] : normal jugular venous V waves present [Respiration, Rhythm And Depth] : normal respiratory rhythm and effort [Exaggerated Use Of Accessory Muscles For Inspiration] : no accessory muscle use [Auscultation Breath Sounds / Voice Sounds] : lungs were clear to auscultation bilaterally [Heart Rate And Rhythm] : heart rate and rhythm were normal [Heart Sounds] : normal S1 and S2 [Edema] : no peripheral edema present [Abnormal Walk] : normal gait [Gait - Sufficient For Exercise Testing] : the gait was sufficient for exercise testing [Cyanosis, Localized] : no localized cyanosis [Skin Color & Pigmentation] : normal skin color and pigmentation [] : no rash [Oriented To Time, Place, And Person] : oriented to person, place, and time [Affect] : the affect was normal [Mood] : the mood was normal [No Anxiety] : not feeling anxious

## 2019-03-28 NOTE — REASON FOR VISIT
[Follow-Up - From Hospitalization] : follow-up of a recent hospitalization for [FreeTextEntry1] : aflutter

## 2019-03-28 NOTE — HISTORY OF PRESENT ILLNESS
[FreeTextEntry1] : Mr. Wooten is a pleasant 74 y/o man who presents to Dr. Burton's arrhythmia clinic on 3/28/19 for follow up. He has a PMH of polycystic kidney disease s/p kidney transplant in 2007, HTN, HLD, DM2, Prostate cancer s/p radical prostatectomy 2015, DVT 2006 (in setting of pelvic fracture), and atrial flutter s/p ablation in 1/2018.  He is in normal sinus rhythm today and has no symptoms concerning for recurrent arrhythmia.  He was relatively asymptomatic while in atrial flutter with a rapid ventricular rate.  We discussed that 30-50% of people with atrial flutter develop atrial fibrillation.  I have therefore recommended he continues Eliquis.  We spoke about monitoring options including routine EKGs, event monitors and a loop recorder implant.  \par \par We discussed wearing an event monitor and instructed him to call when ready but he never did. He continues with his eliquis and hasn't had any bleeding issues. \par

## 2019-03-28 NOTE — DISCUSSION/SUMMARY
[FreeTextEntry1] : Mr. Wooten is a pleasant 74 y/o man who presents to Dr. Burton's arrhythmia clinic on 3/28/19 for follow up. He has a PMH of polycystic kidney disease s/p kidney transplant in 2007, HTN, HLD, DM2, Prostate cancer s/p radical prostatectomy 2015, DVT 2006 (in setting of pelvic \par fracture), and atrial flutter s/p ablation in 1/2018.  He is in normal sinus rhythm today and has no symptoms concerning for recurrent arrhythmia.  He was relatively asymptomatic while in atrial flutter with a rapid ventricular rate.  We discussed that 30-50% of people with atrial flutter develop atrial fibrillation.  I have therefore recommended he continues Eliquis.  We spoke about monitoring options including routine EKGs, event monitors and a loop recorder implant.  \par \par Given that he hasn't had any long term monitoring and that he had mild symptoms when he was in AFL, it stands to reason that he may not be symptoms if he has AF.  We discussed the risks, benefits, and expectations of continued eliquis use as well as the alternatives and Mr. Wooten has decided to continue.\par \par We will see him back in 6 months for routine follow up.\par Thank you for the opportunity to contribute to the care of Mr. Wooten,\par \par Gael Calvillo MD\par EP Fellow - [id number]

## 2019-03-28 NOTE — CARDIOLOGY SUMMARY
[___] : [unfilled] [LVEF ___%] : LVEF [unfilled]% [None] : normal LV function [Mild] : mild mitral regurgitation [Normal] : normal

## 2019-04-24 ENCOUNTER — APPOINTMENT (OUTPATIENT)
Dept: ENDOCRINOLOGY | Facility: CLINIC | Age: 74
End: 2019-04-24
Payer: MEDICARE

## 2019-04-24 VITALS
SYSTOLIC BLOOD PRESSURE: 125 MMHG | DIASTOLIC BLOOD PRESSURE: 62 MMHG | BODY MASS INDEX: 27.57 KG/M2 | WEIGHT: 176 LBS | HEART RATE: 80 BPM

## 2019-04-24 LAB
GLUCOSE BLDC GLUCOMTR-MCNC: 141
HBA1C MFR BLD HPLC: 7.6

## 2019-04-24 PROCEDURE — 99214 OFFICE O/P EST MOD 30 MIN: CPT | Mod: 25

## 2019-04-24 PROCEDURE — 83036 HEMOGLOBIN GLYCOSYLATED A1C: CPT | Mod: QW

## 2019-04-24 PROCEDURE — 82962 GLUCOSE BLOOD TEST: CPT

## 2019-04-24 RX ORDER — GLIMEPIRIDE 2 MG/1
2 TABLET ORAL
Qty: 90 | Refills: 1 | Status: DISCONTINUED | COMMUNITY
Start: 2019-01-24 | End: 2019-04-24

## 2019-04-24 NOTE — ASSESSMENT
[FreeTextEntry1] : Type 2 diabetes mellitus. Point-of-care HbA1c 7.6% and blood glucose 141 mg/dL today; HbA1c 7.7% in January 2019. Complicated by mild neuropathy. I am concerned for hypoglycemia and we will stop his sulfonylurea. The SGLT-2 inhibitor class may be a good option for him due to renal protection, pending input from his nephrologist. For now, we will start meglitinide therapy with lunch and dinner; patient states has very small breakfast. We discussed the risks and benefits of the SGLT-2 inhibitor class, including but not limited to urinary tract infection, fungal infection, Mary's gangrene. \par Referral to nutrition\par Continue Levemir to 15 units at night\par Continue Metformin 500/1500 mg daily for now; will need to decrease if renal function continues to decline\par Continue Trulicity 1.5 mg weekly\par Stop Glimepiride and start Repaglinide 1 mg with lunch and dinner\par Continue to check blood sugars twice daily\par He is on aspirin \par He is on a blood pressure regimen; blood pressure around goal\par He is on a statin for cholesterol; last lipid panel around goal\par Last ophthalmology appointment: Patient to reschedule\par Last podiatry appointment: March 2019; scheduled in May 2019\par Last dental appointment: around December 2018\par He is up-to-date with influenza and pneumonia vaccines\par \par Osteopenia with elevated fracture risk. He has a history of prior traumatic fractures. His 10-year predicted fracture risk prior to therapy was 21% for major osteoporotic fracture and 11% for hip fracture (risk factors of parental history of hip fracture, rheumatoid arthritis as a proxy for type 2 diabetes mellitus), above the treatment thresholds. He received zoledronic acid 5 mg IV in January 2018 and January 2019. He is aware of the potential benefits and risks of pharmacologic osteoporosis therapy at length, including but not limited to osteonecrosis of the jaw and atypical fracture. \par Received second dose of zoledronic acid 5 mg IV in January 2019\par Calcium 1000 mg daily from diet and supplements (to be taken in divided doses as no more than 500-600 mg can be absorbed at one time); continue current regimen\par Continue current vitamin D regimen\par Diet, exercise and fall prevention discussed\par \par Return to see me in 3 months. Patient advised to call earlier with significant hypo- or hyperglycemia.

## 2019-04-24 NOTE — PHYSICAL EXAM
[Alert] : alert [Healthy Appearance] : healthy appearance [No Acute Distress] : no acute distress [No Neck Mass] : no neck mass was observed [Normal Sclera/Conjunctiva] : normal sclera/conjunctiva [Supple] : the neck was supple [No LAD] : no lymphadenopathy [Well Healed Scar] : well healed scar [Thyroid Not Enlarged] : the thyroid was not enlarged [Normal Rate and Effort] : normal respiratory rhythm and effort [Clear to Auscultation] : lungs were clear to auscultation bilaterally [Normal Rate] : heart rate was normal  [Normal S1, S2] : normal S1 and S2 [Regular Rhythm] : with a regular rhythm [No Stigmata of Cushings Syndrome] : no stigmata of cushings syndrome [Normal Insight/Judgement] : insight and judgment were intact [Kyphosis] : no kyphosis present [de-identified] : narrow-based with cane [de-identified] : no moon facies, no supraclavicular fat pads [Acanthosis Nigricans] : no acanthosis nigricans

## 2019-04-24 NOTE — RESULTS/DATA
[L1 - L4] : L1 - L4 [T-Score ___] : T-score: [unfilled] [BMD ___ g/cm2] : BMD: [unfilled] g/cm2 [FreeTextEntry2] : August 8, 2017 [de-identified] : 1.3 radius value; total radius T-score -2.9

## 2019-04-24 NOTE — HISTORY OF PRESENT ILLNESS
[FreeTextEntry1] : Mr. Wooten is a 73 year-old man with a history of multiple medical problems including polycystic kidney disease status post renal transplant in 2007, type 2 diabetes mellitus, osteopenia,atrial flutter presenting for follow-up. I saw him for an initial visit in November 2017 and last in January 2019; he is a former patient of Dr. Barrera.\par \par Type 2 diabetes mellitus. Point-of-care HbA1c 7.6% and blood glucose 141 mg/dL today; HbA1c 7.7% in January 2019. Complicated by mild neuropathy.\par He had borderline diabetes prior to renal transplant, subsequently type 2 diabetes mellitus.\par Prior to August 2018 he was taking metformin 500/1500 mg daily, glimepiride 4 mg/2 mg daily, sitagliptin 100 mg daily.\par In August 2018 we adjusted his regimen to metformin 500/1500 mg daily and started semaglutide 0.25 mg weekly; we discontinued glimepiride and sitagliptin. We then increased semaglutide to 0.50 mg weekly without good glycemic effect. See telephone notes for interim history.\par In September 2018 we changed semaglutide to Trulicity 0.75 mg weekly, increased to 1.5 mg weekly.\par In October 2018 we started Levemir 20 units daily and restarted glimepiride 4 mg daily.\par In January 2019 we decreased Levemir to 15 units and increased glimepiride to 6 mg daily.\par He is checking blood sugars twice daily as below\par He is on aspirin and apixaban\par He is on a blood pressure regimen; status post renal transplant and followed by nephrology\par He is on a statin for cholesterol\par Last ophthalmology appointment: Patient to reschedule\par Last podiatry appointment: March 2019; scheduled in May 2019\par Last dental appointment: around December 2018\par He is up-to-date with influenza and pneumonia vaccines\par \par Osteopenia with elevated fracture risk\par Osteopenia diagnosed in 2017 by bone density with femoral neck T-score -2.4\par FRAX: His 10-year predicted fracture risk was 21% for major osteoporotic fracture and 11% for hip fracture (risk factors of parental history of hip fracture, rheumatoid arthritis as a proxy for type 2 diabetes mellitus)\par Fracture history: Pelvis and right rib fractures in 2006 when bus shelter fell on him; history of right hip fusion at age 6 in the setting of infection, he believes tuberculosis.\par Family history: Father with history of hip fracture in his 80s\par Treatment: Zoledronic acid 5 mg IV in January 2018, January 2019\par \par Falls: No\par Height loss: 1/2 inch height loss\par Kidney stones: No\par Dairy intake: 0-1 serving daily (cheese 3-4 times per week)\par Calcium supplements: 500 mg daily\par Multivitamin: Centrum Silver with 210 mg calcium and 1000 intl units vitamin D \par Vitamin D supplements: 2000 intl units daily\par \par Osteoporosis risk factors include: Postmenopausal status,  race, prior fracture, falls, height loss, small thin bones, tobacco use, excessive alcohol, anorexia, family history, vitamin D deficiency, corticosteroid use, seizure medications, malabsorption, hyperparathyroidism, hyperthyroidism.\par NEGATIVE EXCEPT: Renal transplant, prior fracture (although traumatic), parental history of hip fracture\par \par Interim History \par Last visit we adjusted his diabetes regimen as above. Fasting blood sugars 70-110s mg/dL; this morning 54 mg/dL. Postprandial blood sugars 160-200s mg/dL.\par He received a second dose of zoledronic acid 5 mg IV in January 2019.\par His previous nephrologist left for another position.\par He has gained back the weight he lost. Mild lower extremity numbness/tingling; states not painful. No chest pain, shortness of breath, polyuria/polydipsia. He and his wife will be going to [place] for a wedding in May.\par Medical and surgical history, medications, allergies, social and family history reviewed and updated as needed.

## 2019-05-28 ENCOUNTER — INBOUND DOCUMENT (OUTPATIENT)
Age: 74
End: 2019-05-28

## 2019-06-04 NOTE — PROGRESS NOTE ADULT - PROBLEM SELECTOR PLAN 4
Left message for pt to call back.    UA grossly positive on admission, patient endorsed urinary retention and malodorous urine x3 days leading up to admission.   - Continue to trend fever curve, WBC  - Ucx growing 50,000 colonies of E.coli, sensitive to Ceftriaxone  - Given Ucx sensitivities, will switch from Zosyn  to Ceftriaxone today, Day 3 as of 6/15 of abxs  - discontinue buckley today, plan for TOV today  - Bladder scan to assess PVR, r/o obstruction

## 2019-07-24 ENCOUNTER — APPOINTMENT (OUTPATIENT)
Dept: ENDOCRINOLOGY | Facility: CLINIC | Age: 74
End: 2019-07-24
Payer: MEDICARE

## 2019-07-24 VITALS
WEIGHT: 177 LBS | SYSTOLIC BLOOD PRESSURE: 145 MMHG | BODY MASS INDEX: 27.72 KG/M2 | HEART RATE: 79 BPM | DIASTOLIC BLOOD PRESSURE: 74 MMHG

## 2019-07-24 LAB
GLUCOSE BLDC GLUCOMTR-MCNC: 94
HBA1C MFR BLD HPLC: 7.8

## 2019-07-24 PROCEDURE — 83036 HEMOGLOBIN GLYCOSYLATED A1C: CPT | Mod: QW

## 2019-07-24 PROCEDURE — 97802 MEDICAL NUTRITION INDIV IN: CPT

## 2019-07-24 PROCEDURE — 99214 OFFICE O/P EST MOD 30 MIN: CPT | Mod: 25

## 2019-07-24 PROCEDURE — 82962 GLUCOSE BLOOD TEST: CPT

## 2019-07-24 NOTE — HISTORY OF PRESENT ILLNESS
[FreeTextEntry1] : Mr. Wooten is a 73 year-old man with a history of multiple medical problems including polycystic kidney disease status post renal transplant in 2007, type 2 diabetes mellitus, osteopenia,atrial flutter presenting for follow-up of his endocrines issues. I saw him for an initial visit in November 2017 and last in April 2019; he is a former patient of Dr. Barrera.\par \par Type 2 diabetes mellitus. Point-of-care HbA1c 7.8% and blood glucose 94 mg/dL today; HbA1c 7.6% in April 2019 and 7.7% in January 2019. Complicated by mild neuropathy.\par He had borderline diabetes prior to renal transplant, subsequently type 2 diabetes mellitus.\par Prior to August 2018 he was taking metformin 500/1500 mg daily, glimepiride 4 mg/2 mg daily, sitagliptin 100 mg daily.\par In August 2018 we adjusted his regimen to metformin 500/1500 mg daily and started semaglutide 0.25 mg weekly; we discontinued glimepiride and sitagliptin. We then increased semaglutide to 0.50 mg weekly without good glycemic effect. \par In September 2018 we changed semaglutide to Trulicity 0.75 mg weekly, increased to 1.5 mg weekly.\par In October 2018 we started Levemir 20 units daily and restarted glimepiride 4 mg daily.\par In January 2019 we decreased Levemir to 15 units and increased glimepiride to 6 mg daily.\par In April 2019 we discontinued glimepiride and started repaglinide 1 mg with meals.\par He is checking blood sugars twice daily as below\par He is on aspirin and apixaban\par He is on a blood pressure regimen\par He is on a statin for cholesterol\par Nephropathy screening: Status post renal transplant and followed by nephrology\par Last ophthalmology appointment: Over a year; patient to reschedule with new provider\par Last podiatry appointment: May 2019; scheduled in July 2019\par Last dental appointment: July 2019\par He is up-to-date with influenza and pneumonia vaccines\par \par Osteopenia with elevated fracture risk\par Osteopenia diagnosed in 2017 by bone density significant for femoral neck T-score -2.4; his 10-year predicted fracture risk was 21% for major osteoporotic fracture and 11% for hip fracture (risk factors of parental history of hip fracture, rheumatoid arthritis as a proxy for type 2 diabetes mellitus)\par Fracture history: Pelvis and right rib fractures in 2006 when bus shelter fell on him; history of right hip fusion at age 6 in the setting of infection, he believes tuberculosis.\par Family history: Father with history of hip fracture in his 80s\par Treatment: Zoledronic acid 5 mg IV in January 2018, January 2019\par \par Falls: No\par Height loss: 1/2 inch height loss\par Kidney stones: No\par Dairy intake: 0-1 serving daily (cheese 3-4 times per week)\par Calcium supplements: 500 mg daily\par Multivitamin: Centrum Silver with 210 mg calcium and 1000 intl units vitamin D \par Vitamin D supplements: 2000 intl units daily\par \par Osteoporosis risk factors include: Postmenopausal status,  race, prior fracture, falls, height loss, small thin bones, tobacco use, excessive alcohol, anorexia, family history, vitamin D deficiency, corticosteroid use, seizure medications, malabsorption, hyperparathyroidism, hyperthyroidism.\par NEGATIVE EXCEPT: Renal transplant, prior fracture (although traumatic), parental history of hip fracture\par \par Interim History \par Last visit we adjusted his diabetes regimen as above; we discontinued glimepiride due to hypoglycemia and started repaglinide. Fasting blood sugars 90-130s mg/dL; drop in overnight blood sugars  mg/dL. Postprandial blood sugars 160-200s mg/dL.\par He is scheduled to see nutrition today. His diet is still rich in carbohydrates. He rarely has breakfast. \par Weight overall stable from previous. Mild lower extremity numbness/tingling; states not painful. No chest pain, shortness of breath, polyuria/polydipsia. He and his wife went to [place] for a wedding in May.\par Medical and surgical history, medications, allergies, social and family history reviewed and updated as needed.

## 2019-07-24 NOTE — PHYSICAL EXAM
[Alert] : alert [No Acute Distress] : no acute distress [Normal Sclera/Conjunctiva] : normal sclera/conjunctiva [Healthy Appearance] : healthy appearance [No Neck Mass] : no neck mass was observed [Supple] : the neck was supple [Thyroid Not Enlarged] : the thyroid was not enlarged [No LAD] : no lymphadenopathy [Well Healed Scar] : well healed scar [Normal Rate and Effort] : normal respiratory rhythm and effort [Clear to Auscultation] : lungs were clear to auscultation bilaterally [Normal Rate] : heart rate was normal  [Normal S1, S2] : normal S1 and S2 [Regular Rhythm] : with a regular rhythm [No Stigmata of Cushings Syndrome] : no stigmata of cushings syndrome [Normal Insight/Judgement] : insight and judgment were intact [de-identified] : no moon facies, no supraclavicular fat pads [Acanthosis Nigricans] : no acanthosis nigricans [Kyphosis] : no kyphosis present [de-identified] : narrow-based with cane

## 2019-07-24 NOTE — ASSESSMENT
[FreeTextEntry1] : Type 2 diabetes mellitus. Point-of-care HbA1c 7.8% and blood glucose 94 mg/dL today; HbA1c 7.6% in April 2019 and 7.7% in January 2019. Complicated by mild neuropathy. He is having significant drops in blood sugar overnight and his basal insulin dose is too high. He requires additional prandial coverage. \par Decrease Levemir to 12 units at night\par Continue Metformin 500/1500 mg daily for now; will need to decrease if renal function continues to decline\par Continue Trulicity 1.5 mg weekly\par Increase Repaglinide to 2 mg with lunch and dinner/1 mg if smaller meal\par Continue to check blood sugars twice daily\par He is on aspirin \par He is on a blood pressure regimen; blood pressure around goal\par He is on a statin for cholesterol; last lipid panel around goal\par Nephropathy screening: Status post renal transplant and followed by nephrology\par Last ophthalmology appointment: Over a year; patient to reschedule with new provider\par Last podiatry appointment: May 2019; scheduled in July 2019\par Last dental appointment: July 2019\par He is up-to-date with influenza and pneumonia vaccines\par \par Osteopenia with elevated fracture risk. He has a history of prior traumatic fractures. His 10-year predicted fracture risk prior to therapy was 21% for major osteoporotic fracture and 11% for hip fracture (risk factors of parental history of hip fracture, rheumatoid arthritis as a proxy for type 2 diabetes mellitus), above the treatment thresholds. He received zoledronic acid 5 mg IV in January 2018 and January 2019. We discussed the potential benefits and risks of pharmacologic osteoporosis therapy at length, including but not limited to osteonecrosis of the jaw and atypical fracture. \par Received second dose of zoledronic acid 5 mg IV in January 2019\par Calcium 1000 mg daily from diet and supplements (to be taken in divided doses as no more than 500-600 mg can be absorbed at one time); continue current regimen\par Continue current vitamin D regimen\par Diet, exercise and fall prevention discussed\par \par Return to see me in 3 months. Patient advised to call earlier with significant hypo- or hyperglycemia.\par \par CC:\par Dr. Sulaiman Modi, Fax [phone]

## 2019-07-24 NOTE — RESULTS/DATA
[L1 - L4] : L1 - L4 [BMD ___ g/cm2] : BMD: [unfilled] g/cm2 [T-Score ___] : T-score: [unfilled] [FreeTextEntry2] : August 8, 2017 [de-identified] : 1.3 radius value; total radius T-score -2.9

## 2019-10-23 ENCOUNTER — RESULT CHARGE (OUTPATIENT)
Age: 74
End: 2019-10-23

## 2019-10-23 ENCOUNTER — APPOINTMENT (OUTPATIENT)
Dept: ENDOCRINOLOGY | Facility: CLINIC | Age: 74
End: 2019-10-23
Payer: MEDICARE

## 2019-10-23 VITALS
SYSTOLIC BLOOD PRESSURE: 135 MMHG | BODY MASS INDEX: 27.57 KG/M2 | HEART RATE: 80 BPM | WEIGHT: 176 LBS | DIASTOLIC BLOOD PRESSURE: 68 MMHG

## 2019-10-23 LAB
GLUCOSE BLDC GLUCOMTR-MCNC: 186
HBA1C MFR BLD HPLC: 7.1

## 2019-10-23 PROCEDURE — 82947 ASSAY GLUCOSE BLOOD QUANT: CPT | Mod: QW

## 2019-10-23 PROCEDURE — 97803 MED NUTRITION INDIV SUBSEQ: CPT

## 2019-10-23 PROCEDURE — 83036 HEMOGLOBIN GLYCOSYLATED A1C: CPT | Mod: QW

## 2019-10-23 PROCEDURE — 99214 OFFICE O/P EST MOD 30 MIN: CPT | Mod: 25

## 2019-10-23 NOTE — PHYSICAL EXAM
[Alert] : alert [No Acute Distress] : no acute distress [Healthy Appearance] : healthy appearance [Normal Sclera/Conjunctiva] : normal sclera/conjunctiva [No Neck Mass] : no neck mass was observed [Supple] : the neck was supple [No LAD] : no lymphadenopathy [Thyroid Not Enlarged] : the thyroid was not enlarged [Well Healed Scar] : well healed scar [Normal Rate and Effort] : normal respiratory rhythm and effort [Clear to Auscultation] : lungs were clear to auscultation bilaterally [Normal Rate] : heart rate was normal  [Normal S1, S2] : normal S1 and S2 [Regular Rhythm] : with a regular rhythm [No Stigmata of Cushings Syndrome] : no stigmata of cushings syndrome [Normal Insight/Judgement] : insight and judgment were intact [Kyphosis] : no kyphosis present [de-identified] : no moon facies, no supraclavicular fat pads [Acanthosis Nigricans] : no acanthosis nigricans [de-identified] : narrow-based with cane

## 2019-10-23 NOTE — ASSESSMENT
[FreeTextEntry1] : Type 2 diabetes mellitus. Point-of-care HbA1c 7.1% and blood glucose 186 mg/dL today; HbA1c 7.8% in July 2019, 7.6% in April 2019 and 7.7% in January 2019. Complicated by mild neuropathy. I congratulated him on his improvement in glycemic control. His goal HbA1c due to age and comorbidities is less than 7.5%. We discussed the importance of diet and exercise and lifestyle modification for glycemic control and weight loss. We will continue his current regimen as below.\par Continue Levemir 12 units at night\par Continue Metformin 500/1500 mg daily for now; will need to decrease if renal function continues to decline\par Continue Trulicity 1.5 mg weekly\par Continue Repaglinide 2 mg with lunch and dinner/1 mg if smaller meal\par Continue to check blood sugars twice daily\par He is on aspirin \par He is on a blood pressure regimen; blood pressure around goal\par He is on a statin for cholesterol; last lipid panel around goal\par Nephropathy screening: Status post renal transplant and followed by nephrology\par Last ophthalmology appointment: Over a year; upcoming appointment scheduled in November 2019\par Last podiatry appointment: July 2019; upcoming appointment scheduled in November 2019\par Last dental appointment: July 2019\par He is up-to-date with influenza and pneumonia vaccines\par \par Osteopenia with elevated fracture risk. He has a history of prior traumatic fractures. His 10-year predicted fracture risk prior to therapy was 21% for major osteoporotic fracture and 11% for hip fracture (risk factors of parental history of hip fracture, rheumatoid arthritis as a proxy for type 2 diabetes mellitus), above the treatment thresholds. He received zoledronic acid 5 mg IV in January 2018 and January 2019. We discussed the potential benefits and risks of pharmacologic osteoporosis therapy at length, including but not limited to osteonecrosis of the jaw and atypical fracture. \par Plan for third dose of zoledronic acid 5 mg IV prior to "drug holiday" from antiresorptive therapy around January 2020\par Calcium 1000 mg daily from diet and supplements (to be taken in divided doses as no more than 500-600 mg can be absorbed at one time); continue current regimen\par Continue current vitamin D regimen\par Diet, exercise and fall prevention discussed\par \par Return to see me in 3 months. Patient advised to call earlier with significant hypo- or hyperglycemia.\par \par CC:\par Dr. Sulaiman Modi, Fax [phone]

## 2019-10-23 NOTE — RESULTS/DATA
[L1 - L4] : L1 - L4 [BMD ___ g/cm2] : BMD: [unfilled] g/cm2 [T-Score ___] : T-score: [unfilled] [FreeTextEntry2] : August 8, 2017 [de-identified] : 1.3 radius value; total radius T-score -2.9

## 2019-10-23 NOTE — HISTORY OF PRESENT ILLNESS
[FreeTextEntry1] : Mr. Wooten is a 74 year-old man with a history of multiple medical problems including polycystic kidney disease status post renal transplant in 2007, type 2 diabetes mellitus, osteopenia,atrial flutter presenting for follow-up of his endocrines issues. I saw him for an initial visit in November 2017 and last in July 2019; he is a former patient of Dr. Barrera.\par \par Type 2 diabetes mellitus. Point-of-care HbA1c 7.1% and blood glucose 186 mg/dL today; HbA1c 7.8% in July 2019, 7.6% in April 2019 and 7.7% in January 2019. Complicated by mild neuropathy.\par He had borderline diabetes prior to renal transplant, subsequently type 2 diabetes mellitus.\par Prior to August 2018 he was taking metformin 500/1500 mg daily, glimepiride 4 mg/2 mg daily, sitagliptin 100 mg daily.\par In August 2018 we adjusted his regimen to metformin 500/1500 mg daily and started semaglutide 0.25 mg weekly; we discontinued glimepiride and sitagliptin. We then increased Ozempic to 0.5 mg weekly without good glycemic effect. \par In September 2018 we changed Ozempic to Trulicity 0.75 mg weekly, increased to 1.5 mg weekly.\par In October 2018 we started Levemir 20 units daily and restarted glimepiride 4 mg daily; continued metformin 500/1500 mg daily and Trulicity 1.5 mg weekly.\par In January 2019 we decreased Levemir to 15 units and increased glimepiride to 6 mg daily; continued metformin 500/1500 mg daily and Trulicity 1.5 mg weekly.\par In April 2019 we discontinued glimepiride and started repaglinide 1 mg with meals.\par In July 2019 we adjusted Levemir to 12 units and adjusted repaglinide to 2 mg with meals; continued metformin 500/1500 mg daily and Trulicity 1.5 mg weekly.\par He is checking blood sugars twice daily as below\par He is on aspirin and apixaban\par He is on a blood pressure regimen\par He is on a statin for cholesterol\par Nephropathy screening: Status post renal transplant and followed by nephrology\par Last ophthalmology appointment: Over a year; upcoming appointment scheduled in November 2019\par Last podiatry appointment: July 2019; upcoming appointment scheduled in November 2019\par Last dental appointment: July 2019\par He is up-to-date with influenza and pneumonia vaccines\par \par Osteopenia with elevated fracture risk\par Osteopenia diagnosed in 2017 by bone density significant for femoral neck T-score -2.4; his 10-year predicted fracture risk was 21% for major osteoporotic fracture and 11% for hip fracture (risk factors of parental history of hip fracture, rheumatoid arthritis as a proxy for type 2 diabetes mellitus)\par Fracture history: Pelvis and right rib fractures in 2006 when bus shelter fell on him; history of right hip fusion at age 6 in the setting of infection, he believes tuberculosis.\par Family history: Father with history of hip fracture in his 80s\par Treatment: Zoledronic acid 5 mg IV in January 2018, January 2019\par \par Falls: No\par Height loss: 1/2 inch height loss\par Kidney stones: No\par Dairy intake: 0-1 serving daily (cheese 3-4 times per week)\par Calcium supplements: 500 mg daily\par Multivitamin: Centrum Silver with 210 mg calcium and 1000 intl units vitamin D \par Vitamin D supplements: 2000 intl units daily\par \par Osteoporosis risk factors include: Postmenopausal status,  race, prior fracture, falls, height loss, small thin bones, tobacco use, excessive alcohol, anorexia, family history, vitamin D deficiency, corticosteroid use, seizure medications, malabsorption, hyperparathyroidism, hyperthyroidism.\par NEGATIVE EXCEPT: Renal transplant, prior fracture (although traumatic), parental history of hip fracture\par \par Interim History \par In July 2019 we adjusted Levemir to 12 units and adjusted repaglinide to 2 mg with meals; continued metformin 500/1500 mg daily and Trulicity 1.5 mg weekly.\par Fasting blood sugars 90-130s mg/dL; no drop in blood sugars overnight. Bedtime blood sugars 160-200s mg/dL.\par He is scheduled to see Lashon Rosa/nutrition today. He has decreased carbohydrate intake with lunch. \par Weight overall stable from previous. Mild lower extremity numbness/tingling; states not painful. No chest pain, shortness of breath, polyuria/polydipsia. \par Medical and surgical history, medications, allergies, social and family history reviewed and updated as needed.

## 2019-10-30 ENCOUNTER — APPOINTMENT (OUTPATIENT)
Dept: CARDIOTHORACIC SURGERY | Facility: CLINIC | Age: 74
End: 2019-10-30
Payer: MEDICARE

## 2019-10-30 VITALS
TEMPERATURE: 96.1 F | OXYGEN SATURATION: 97 % | BODY MASS INDEX: 27.94 KG/M2 | HEART RATE: 80 BPM | DIASTOLIC BLOOD PRESSURE: 76 MMHG | HEIGHT: 67 IN | WEIGHT: 178 LBS | RESPIRATION RATE: 19 BRPM | SYSTOLIC BLOOD PRESSURE: 147 MMHG

## 2019-10-30 PROCEDURE — 99214 OFFICE O/P EST MOD 30 MIN: CPT

## 2019-12-19 ENCOUNTER — NON-APPOINTMENT (OUTPATIENT)
Age: 74
End: 2019-12-19

## 2019-12-19 ENCOUNTER — APPOINTMENT (OUTPATIENT)
Dept: HEART AND VASCULAR | Facility: CLINIC | Age: 74
End: 2019-12-19
Payer: MEDICARE

## 2019-12-19 VITALS
BODY MASS INDEX: 27.78 KG/M2 | WEIGHT: 177 LBS | HEIGHT: 67 IN | DIASTOLIC BLOOD PRESSURE: 73 MMHG | SYSTOLIC BLOOD PRESSURE: 147 MMHG | HEART RATE: 77 BPM

## 2019-12-19 PROCEDURE — 99214 OFFICE O/P EST MOD 30 MIN: CPT | Mod: 25

## 2019-12-19 PROCEDURE — 93000 ELECTROCARDIOGRAM COMPLETE: CPT

## 2019-12-19 NOTE — PHYSICAL EXAM
[General Appearance - Well Developed] : well developed [Normal Appearance] : normal appearance [Well Groomed] : well groomed [General Appearance - Well Nourished] : well nourished [No Deformities] : no deformities [General Appearance - In No Acute Distress] : no acute distress [Normal Conjunctiva] : the conjunctiva exhibited no abnormalities [Normal Oral Mucosa] : normal oral mucosa [Normal Jugular Venous V Waves Present] : normal jugular venous V waves present [] : no respiratory distress [Respiration, Rhythm And Depth] : normal respiratory rhythm and effort [Exaggerated Use Of Accessory Muscles For Inspiration] : no accessory muscle use [Heart Sounds] : normal S1 and S2 [Heart Rate And Rhythm] : heart rate and rhythm were normal [Abnormal Walk] : normal gait [Edema] : no peripheral edema present [Cyanosis, Localized] : no localized cyanosis [Gait - Sufficient For Exercise Testing] : the gait was sufficient for exercise testing [Skin Color & Pigmentation] : normal skin color and pigmentation [Affect] : the affect was normal [Mood] : the mood was normal [Oriented To Time, Place, And Person] : oriented to person, place, and time [No Anxiety] : not feeling anxious

## 2019-12-30 NOTE — DISCUSSION/SUMMARY
[FreeTextEntry1] : Mr. Wooten is a pleasant 89 y/o male with polycystic kidney disease s/p kidney transplant in 2007, HTN, HLD, DM2, Prostate cancer s/p radical prostatectomy 2015, DVT 2006 (in setting of pelvic fracture), and atrial flutter s/p ablation in 1/2018, who presents for routine follow up.  He is in normal sinus rhythm today and has no symptoms concerning for recurrent arrhythmia.  He was relatively asymptomatic while in atrial flutter with a rapid ventricular rate.  We discussed that 30-50% of people with atrial flutter develop atrial fibrillation.  I have therefore recommended he continues Eliquis.  We spoke about monitoring options including routine EKGs, event monitors and a loop recorder implant.  He would like to continue with routine EKGs when he sees a doctor.  He will follow up in 6 months or sooner if needed and knows to call with any questions or concerns.

## 2019-12-30 NOTE — ADDENDUM
[FreeTextEntry1] : I, Sergio Burton, hereby attest that the medical record entry for today accurately signatures/notations that I made in my capacity as Attending Physician when I treated/diagnosed the above patient.  I do hereby attest that this information is true, accurate and complete to the best of my knowledge and I understand that any falsification, omission, or concealment of material fact may subject me to administrative, civil, or criminal liability. \par I was present for the entire visit with the patient and I was involved in all the critical and key components of the visit. \par

## 2020-02-19 ENCOUNTER — APPOINTMENT (OUTPATIENT)
Dept: ENDOCRINOLOGY | Facility: CLINIC | Age: 75
End: 2020-02-19
Payer: MEDICARE

## 2020-02-19 VITALS
HEART RATE: 72 BPM | WEIGHT: 172 LBS | DIASTOLIC BLOOD PRESSURE: 72 MMHG | BODY MASS INDEX: 27 KG/M2 | SYSTOLIC BLOOD PRESSURE: 151 MMHG | HEIGHT: 67 IN

## 2020-02-19 LAB
GLUCOSE BLDC GLUCOMTR-MCNC: 126
HBA1C MFR BLD HPLC: 7.3

## 2020-02-19 PROCEDURE — 99214 OFFICE O/P EST MOD 30 MIN: CPT | Mod: 25

## 2020-02-19 PROCEDURE — 83036 HEMOGLOBIN GLYCOSYLATED A1C: CPT | Mod: QW

## 2020-02-19 PROCEDURE — 82962 GLUCOSE BLOOD TEST: CPT

## 2020-02-19 PROCEDURE — 97803 MED NUTRITION INDIV SUBSEQ: CPT

## 2020-02-20 LAB
25(OH)D3 SERPL-MCNC: 58.8 NG/ML
ALBUMIN SERPL ELPH-MCNC: 4.4 G/DL
ALP BLD-CCNC: 44 U/L
ALT SERPL-CCNC: 23 U/L
ANION GAP SERPL CALC-SCNC: 15 MMOL/L
AST SERPL-CCNC: 21 U/L
BILIRUB SERPL-MCNC: 0.6 MG/DL
BUN SERPL-MCNC: 31 MG/DL
CALCIUM SERPL-MCNC: 10.5 MG/DL
CHLORIDE SERPL-SCNC: 100 MMOL/L
CHOLEST SERPL-MCNC: 128 MG/DL
CHOLEST/HDLC SERPL: 4 RATIO
CO2 SERPL-SCNC: 26 MMOL/L
CREAT SERPL-MCNC: 1.26 MG/DL
ESTIMATED AVERAGE GLUCOSE: 166 MG/DL
GLUCOSE SERPL-MCNC: 118 MG/DL
HBA1C MFR BLD HPLC: 7.4 %
HDLC SERPL-MCNC: 32 MG/DL
LDLC SERPL CALC-MCNC: 66 MG/DL
POTASSIUM SERPL-SCNC: 4.3 MMOL/L
PROT SERPL-MCNC: 7.1 G/DL
SODIUM SERPL-SCNC: 141 MMOL/L
TRIGL SERPL-MCNC: 150 MG/DL
TSH SERPL-ACNC: 2.74 UIU/ML
VIT B12 SERPL-MCNC: 666 PG/ML

## 2020-03-02 NOTE — ASSESSMENT
[FreeTextEntry1] : Type 2 diabetes mellitus. Point-of-care HbA1c 7.3% and blood glucose 126 mg/dL today; HbA1c 7.1% in October 2019, 7.8% in July 2019, 7.6% in April 2019 and 7.7% in January 2019. Complicated by mild neuropathy. I congratulated him on his good glycemic control. His goal HbA1c due to age and comorbidities is less than 7.5%. We discussed the importance of diet and exercise and lifestyle modification for glycemic control. We will continue his current regimen as below and continue to focus on lifestyle modifications.\par Continue Levemir 12 units at night\par Continue Metformin 500/1500 mg daily for now\par Continue Trulicity 1.5 mg weekly\par Continue Repaglinide 2 mg with lunch and dinner/1 mg if smaller meal\par Consider Invokana for renal protection pending input from nephrology\par Continue to check blood sugars twice daily\par He is on aspirin \par He is on a blood pressure regimen; blood pressure around goal\par He is on a statin for cholesterol; last lipid panel around goal\par Nephropathy screening: Status post renal transplant and followed by nephrology\par Last ophthalmology appointment: November 2019\par Last podiatry appointment: February 2020\par Last dental appointment: February 2020\par He is up-to-date with influenza and pneumonia vaccines\par \par Osteopenia with elevated fracture risk. He has a history of prior traumatic fractures. His 10-year predicted fracture risk prior to therapy was 21% for major osteoporotic fracture and 11% for hip fracture (risk factors of parental history of hip fracture, rheumatoid arthritis as a proxy for type 2 diabetes mellitus), above the treatment thresholds. He received zoledronic acid 5 mg IV in January 2018 and January 2019. We discussed the potential benefits and risks of pharmacologic osteoporosis therapy at length, including but not limited to osteonecrosis of the jaw and atypical fracture. \par Plan for third dose of zoledronic acid 5 mg IV prior to "drug holiday" from antiresorptive therapy\par Calcium 1000 mg daily from diet and supplements (to be taken in divided doses as no more than 500-600 mg can be absorbed at one time); continue current regimen\par Continue current vitamin D regimen pending level\par Diet, exercise and fall prevention discussed\par \par Return to see me in 3 months. Patient advised to call earlier with significant hypo- or hyperglycemia.\par \par CC:\par Dr. Sulaiman Modi, Fax [phone]

## 2020-03-02 NOTE — RESULTS/DATA
Patient [L1 - L4] : L1 - L4 [BMD ___ g/cm2] : BMD: [unfilled] g/cm2 [T-Score ___] : T-score: [unfilled] [FreeTextEntry2] : August 8, 2017 [de-identified] : 1.3 radius value; total radius T-score -2.9

## 2020-03-02 NOTE — PHYSICAL EXAM
[Alert] : alert [No Acute Distress] : no acute distress [Healthy Appearance] : healthy appearance [Normal Sclera/Conjunctiva] : normal sclera/conjunctiva [No Neck Mass] : no neck mass was observed [Supple] : the neck was supple [No LAD] : no lymphadenopathy [Thyroid Not Enlarged] : the thyroid was not enlarged [Well Healed Scar] : well healed scar [Normal Rate and Effort] : normal respiratory rhythm and effort [Clear to Auscultation] : lungs were clear to auscultation bilaterally [Normal Rate] : heart rate was normal  [Normal S1, S2] : normal S1 and S2 [Regular Rhythm] : with a regular rhythm [No Stigmata of Cushings Syndrome] : no stigmata of cushings syndrome [Normal Insight/Judgement] : insight and judgment were intact [Kyphosis] : no kyphosis present [de-identified] : narrow-based with cane [de-identified] : no moon facies, no supraclavicular fat pads [Acanthosis Nigricans] : no acanthosis nigricans

## 2020-03-02 NOTE — ADDENDUM
[FreeTextEntry1] : Recent test results as below. Lipid panel around goal. eGFR 57 mL/min. Other test results within range. I left a telephone message for Mr. Wooten and will send a letter with results. We will proceed with zoledronic acid. 2/20/20\par \par 3/2/2020 AL\par Pt has been taking metformin ER 500mg since June 2015. A1C has improved from 8.0% in Sept 2016 to 7.4% in Feb 2020.\par \par I spoke with Mr. Wooten about his results and plan above. He discussed Invokana with Dr. Modi and we may consider in the future pending his Prograf level. 3/02/20\par 
No

## 2020-03-02 NOTE — HISTORY OF PRESENT ILLNESS
[FreeTextEntry1] : Mr. Wooten is a 74 year-old man with a history of multiple medical problems including polycystic kidney disease status post renal transplant in 2007, type 2 diabetes mellitus, osteopenia, atrial flutter presenting for follow-up of his endocrines issues. I saw him for an initial visit in November 2017 and last in October 2019; he is a former patient of Dr. Barrera.\par \par Type 2 diabetes mellitus. Point-of-care HbA1c 7.3% and blood glucose 126 mg/dL today; HbA1c 7.1% in October 2019, 7.8% in July 2019, 7.6% in April 2019 and 7.7% in January 2019. Complicated by mild neuropathy.\par He had borderline diabetes prior to renal transplant, subsequently type 2 diabetes mellitus.\par Prior to August 2018 he was taking metformin 500/1500 mg daily, glimepiride 4 mg/2 mg daily, sitagliptin 100 mg daily.\par In August 2018 we adjusted his regimen to metformin 500/1500 mg daily and started semaglutide 0.25 mg weekly; we discontinued glimepiride and sitagliptin. We then increased Ozempic to 0.5 mg weekly without good glycemic effect. \par In September 2018 we changed Ozempic to Trulicity 0.75 mg weekly, increased to 1.5 mg weekly.\par In October 2018 we started Levemir 20 units daily and restarted glimepiride 4 mg daily; continued metformin 500/1500 mg daily and Trulicity 1.5 mg weekly.\par In January 2019 we decreased Levemir to 15 units and increased glimepiride to 6 mg daily; continued metformin 500/1500 mg daily and Trulicity 1.5 mg weekly.\par In April 2019 we discontinued glimepiride and started repaglinide 1 mg with meals.\par In July 2019 we adjusted Levemir to 12 units and adjusted repaglinide to 2 mg with meals; continued metformin 500/1500 mg daily and Trulicity 1.5 mg weekly.\par In November 2019 we continued his regimen of Levemir 12 units at night, metformin 500/1500 mg daily, Trulicity 1.5 mg weekly, repaglinide 2 mg with meals. \par He is checking blood sugars twice daily as below\par He is on aspirin and apixaban\par He is on a blood pressure regimen\par He is on a statin for cholesterol\par Nephropathy screening: Status post renal transplant and followed by nephrology\par Last ophthalmology appointment: November 2019\par Last podiatry appointment: February 2020\par Last dental appointment: February 2020\par He is up-to-date with influenza and pneumonia vaccines\par \par Osteopenia with elevated fracture risk\par Osteopenia diagnosed in 2017 by bone density significant for femoral neck T-score -2.4; his 10-year predicted fracture risk was 21% for major osteoporotic fracture and 11% for hip fracture (risk factors of parental history of hip fracture, rheumatoid arthritis as a proxy for type 2 diabetes mellitus)\par Fracture history: Pelvis and right rib fractures in 2006 when bus shelter fell on him; history of right hip fusion at age 6 in the setting of infection, he believes tuberculosis.\par Family history: Father with history of hip fracture in his 80s\par Treatment: Zoledronic acid 5 mg IV in January 2018, January 2019\par \par Falls: No\par Height loss: 1/2 inch height loss\par Kidney stones: No\par Dairy intake: 0-1 serving daily (cheese 3-4 times per week)\par Calcium supplements: 500 mg daily\par Multivitamin: Centrum Silver with 210 mg calcium and 1000 intl units vitamin D \par Vitamin D supplements: 2000 intl units daily\par \par Osteoporosis risk factors include: Postmenopausal status,  race, prior fracture, falls, height loss, small thin bones, tobacco use, excessive alcohol, anorexia, family history, vitamin D deficiency, corticosteroid use, seizure medications, malabsorption, hyperparathyroidism, hyperthyroidism.\par NEGATIVE EXCEPT: Renal transplant, prior fracture (although traumatic), parental history of hip fracture\par \par Interim History \par In November 2019 we continued his regimen of Levemir 12 units at night, metformin 500/1500 mg daily, Trulicity 1.5 mg weekly, repaglinide 2 mg with meals. \par Fasting blood sugars 90-130s mg/dL; no drop in blood sugars overnight. Bedtime blood sugars 160-200s mg/dL.\par He is scheduled to see Lashon Rosa/nutrition today. He has decreased carbohydrate intake with lunch. Dinner remains carbohydrate rich. \par He had blood tests this morning; results not yet available. \par Weight is down 6 pounds from previous. Mild lower extremity numbness/tingling; states not painful. No chest pain, shortness of breath, polyuria/polydipsia. \par Medical and surgical history, medications, allergies, social and family history reviewed and updated as needed.

## 2020-03-04 ENCOUNTER — OUTPATIENT (OUTPATIENT)
Dept: OUTPATIENT SERVICES | Facility: HOSPITAL | Age: 75
LOS: 1 days | End: 2020-03-04
Payer: MEDICARE

## 2020-03-04 ENCOUNTER — APPOINTMENT (OUTPATIENT)
Age: 75
End: 2020-03-04

## 2020-03-04 VITALS
SYSTOLIC BLOOD PRESSURE: 162 MMHG | DIASTOLIC BLOOD PRESSURE: 82 MMHG | OXYGEN SATURATION: 97 % | RESPIRATION RATE: 18 BRPM | HEART RATE: 68 BPM | TEMPERATURE: 97 F

## 2020-03-04 VITALS
HEART RATE: 70 BPM | TEMPERATURE: 97 F | SYSTOLIC BLOOD PRESSURE: 148 MMHG | DIASTOLIC BLOOD PRESSURE: 80 MMHG | OXYGEN SATURATION: 97 % | RESPIRATION RATE: 18 BRPM

## 2020-03-04 DIAGNOSIS — M81.0 AGE-RELATED OSTEOPOROSIS WITHOUT CURRENT PATHOLOGICAL FRACTURE: ICD-10-CM

## 2020-03-04 DIAGNOSIS — Z98.890 OTHER SPECIFIED POSTPROCEDURAL STATES: Chronic | ICD-10-CM

## 2020-03-04 PROCEDURE — 96361 HYDRATE IV INFUSION ADD-ON: CPT

## 2020-03-04 PROCEDURE — 96365 THER/PROPH/DIAG IV INF INIT: CPT

## 2020-03-04 RX ORDER — SODIUM CHLORIDE 9 MG/ML
250 INJECTION INTRAMUSCULAR; INTRAVENOUS; SUBCUTANEOUS ONCE
Refills: 0 | Status: COMPLETED | OUTPATIENT
Start: 2020-03-04 | End: 2020-03-04

## 2020-03-04 RX ORDER — ZOLEDRONIC ACID 5 MG/100ML
5 INJECTION, SOLUTION INTRAVENOUS ONCE
Refills: 0 | Status: COMPLETED | OUTPATIENT
Start: 2020-03-04 | End: 2020-03-04

## 2020-03-04 RX ADMIN — SODIUM CHLORIDE 250 MILLILITER(S): 9 INJECTION INTRAMUSCULAR; INTRAVENOUS; SUBCUTANEOUS at 11:00

## 2020-03-04 RX ADMIN — SODIUM CHLORIDE 500 MILLILITER(S): 9 INJECTION INTRAMUSCULAR; INTRAVENOUS; SUBCUTANEOUS at 10:30

## 2020-03-04 RX ADMIN — ZOLEDRONIC ACID 5 MILLIGRAM(S): 5 INJECTION, SOLUTION INTRAVENOUS at 11:30

## 2020-03-04 RX ADMIN — ZOLEDRONIC ACID 200 MILLIGRAM(S): 5 INJECTION, SOLUTION INTRAVENOUS at 11:00

## 2020-03-07 NOTE — PROGRESS NOTE ADULT - PROBLEM SELECTOR PROBLEM 1
Respiratory failure, acute
cane

## 2020-05-11 RX ORDER — BLOOD-GLUCOSE METER
W/DEVICE EACH MISCELLANEOUS
Qty: 1 | Refills: 0 | Status: DISCONTINUED | COMMUNITY
Start: 2020-02-19 | End: 2020-05-11

## 2020-06-29 ENCOUNTER — APPOINTMENT (OUTPATIENT)
Dept: ENDOCRINOLOGY | Facility: CLINIC | Age: 75
End: 2020-06-29
Payer: MEDICARE

## 2020-06-29 VITALS
HEART RATE: 76 BPM | HEIGHT: 67 IN | SYSTOLIC BLOOD PRESSURE: 141 MMHG | BODY MASS INDEX: 27.31 KG/M2 | DIASTOLIC BLOOD PRESSURE: 75 MMHG | WEIGHT: 174 LBS

## 2020-06-29 LAB
GLUCOSE BLDC GLUCOMTR-MCNC: 119
HBA1C MFR BLD HPLC: 7.4

## 2020-06-29 PROCEDURE — 99214 OFFICE O/P EST MOD 30 MIN: CPT | Mod: 25

## 2020-06-29 PROCEDURE — 97803 MED NUTRITION INDIV SUBSEQ: CPT

## 2020-06-29 PROCEDURE — 82962 GLUCOSE BLOOD TEST: CPT

## 2020-06-29 PROCEDURE — 83036 HEMOGLOBIN GLYCOSYLATED A1C: CPT | Mod: QW

## 2020-06-29 NOTE — ASSESSMENT
[FreeTextEntry1] : Type 2 diabetes mellitus. Point-of-care HbA1c 7.4% and blood glucose 119 mg/dL today; HbA1c 7.4% in February 2020, 7.1% in October 2019, 7.8% in July 2019, 7.6% in April 2019 and 7.7% in January 2019. Complicated by mild neuropathy. I congratulated him on his good glycemic control. His goal HbA1c due to age and comorbidities is less than 7.5%. We discussed the importance of diet and exercise and lifestyle modification for glycemic control. We will continue his current regimen as below and continue to focus on lifestyle modifications.\par Continue Levemir 12 units at night\par Continue Metformin 500/1500 mg daily; would need decrease if renal funtion declines\par Continue Trulicity 1.5 mg weekly\par Continue Repaglinide 2 mg with lunch and dinner/1 mg if smaller meal\par Consider Invokana for renal protection pending input from nephrology\par Continue to check blood sugars twice daily; reviewed glycemic goals\par He is on aspirin \par He is on a blood pressure regimen; blood pressure around goal\par He is on a statin for cholesterol; last lipid panel around goal\par Nephropathy screening: Status post renal transplant and followed by nephrology\par Last ophthalmology appointment: November 2019\par Last podiatry appointment: February 2020\par Last dental appointment: February 2020\par He is up-to-date with influenza and pneumonia vaccines\par \par Osteopenia with elevated fracture risk. He has a history of prior traumatic fractures. His 10-year predicted fracture risk prior to therapy was 21% for major osteoporotic fracture and 11% for hip fracture (risk factors of parental history of hip fracture, rheumatoid arthritis as a proxy for type 2 diabetes mellitus), above the treatment thresholds. He received zoledronic acid 5 mg IV in January 2018, January 2019, and March 2020. We discussed the potential benefits and risks of pharmacologic osteoporosis therapy at length, including but not limited to osteonecrosis of the jaw and atypical fracture. We will consider a "drug holiday" from antiresorptive therapy next year. \par Calcium 1000 mg daily from diet and supplements (to be taken in divided doses as no more than 500-600 mg can be absorbed at one time); continue current regimen\par Continue current vitamin D regimen\par Diet, exercise and fall prevention discussed\par \par Return to see me in 3 months. Patient advised to call earlier with significant hypo- or hyperglycemia.\par \par CC:\par Dr. Sulaiman Modi, Fax [phone]

## 2020-06-29 NOTE — PHYSICAL EXAM
[Healthy Appearance] : healthy appearance [Alert] : alert [No Acute Distress] : no acute distress [Normal Sclera/Conjunctiva] : normal sclera/conjunctiva [Normal Gait] : normal gait [Normal Insight/Judgement] : insight and judgment were intact [Kyphosis] : no kyphosis present [Acanthosis Nigricans] : no acanthosis nigricans [de-identified] : narrow-based gait with cane

## 2020-06-29 NOTE — HISTORY OF PRESENT ILLNESS
[FreeTextEntry1] : Mr. Wooten is a 74 year-old man with a history of multiple medical problems including polycystic kidney disease status post renal transplant in 2007, type 2 diabetes mellitus, osteopenia, atrial flutter presenting for follow-up of his endocrines issues. I saw him for an initial visit in November 2017 and last in February 2020; he is a former patient of Dr. Barrera.\par \par Type 2 diabetes mellitus. Point-of-care HbA1c 7.4% and blood glucose 119 mg/dL today; HbA1c 7.4% in February 2020, 7.1% in October 2019, 7.8% in July 2019, 7.6% in April 2019 and 7.7% in January 2019. Complicated by mild neuropathy.\par He had borderline diabetes prior to renal transplant, subsequently type 2 diabetes mellitus.\par Prior to August 2018 he was taking metformin 500/1500 mg daily, glimepiride 4 mg/2 mg daily, sitagliptin 100 mg daily.\par In August 2018 we adjusted his regimen to metformin 500/1500 mg daily and started semaglutide 0.25 mg weekly; we discontinued glimepiride and sitagliptin. We then increased Ozempic to 0.5 mg weekly without good glycemic effect. \par In September 2018 we changed Ozempic to Trulicity 0.75 mg weekly, increased to 1.5 mg weekly.\par In October 2018 we started Levemir 20 units daily and restarted glimepiride 4 mg daily; continued metformin 500/1500 mg daily and Trulicity 1.5 mg weekly.\par In January 2019 we decreased Levemir to 15 units and increased glimepiride to 6 mg daily; continued metformin 500/1500 mg daily and Trulicity 1.5 mg weekly.\par In April 2019 we discontinued glimepiride and started repaglinide 1 mg with meals.\par In July 2019 we adjusted Levemir to 12 units and adjusted repaglinide to 2 mg with meals; continued metformin 500/1500 mg daily and Trulicity 1.5 mg weekly.\par He is currently taking Levemir 12 units at night, metformin 500/1500 mg daily, Trulicity 1.5 mg weekly, repaglinide 2 mg with meals. \par He is checking blood sugars twice daily as below\par He is on aspirin and apixaban\par He is on a blood pressure regimen\par He is on a statin for cholesterol\par Nephropathy screening: Status post renal transplant and followed by nephrology\par Last ophthalmology appointment: November 2019\par Last podiatry appointment: February 2020\par Last dental appointment: February 2020\par He is up-to-date with influenza and pneumonia vaccines\par \par Osteopenia with elevated fracture risk\par Osteopenia diagnosed in 2017 by bone density significant for femoral neck T-score -2.4; his 10-year predicted fracture risk was 21% for major osteoporotic fracture and 11% for hip fracture (risk factors of parental history of hip fracture, rheumatoid arthritis as a proxy for type 2 diabetes mellitus)\par Fracture history: Pelvis and right rib fractures in 2006 when bus shelter fell on him; history of right hip fusion at age 6 in the setting of infection, he believes tuberculosis.\par Family history: Father with history of hip fracture in his 80s\par Treatment: Zoledronic acid 5 mg IV in January 2018, January 2019, March 2020\par \par Falls: No\par Height loss: 1/2 inch height loss\par Kidney stones: No\par Dairy intake: 0-1 serving daily (cheese 3-4 times per week)\par Calcium supplements: 500 mg daily\par Multivitamin: Centrum Silver with 210 mg calcium and 1000 intl units vitamin D \par Vitamin D supplements: 2000 intl units daily\par \par Osteoporosis risk factors include: Postmenopausal status,  race, prior fracture, falls, height loss, small thin bones, tobacco use, excessive alcohol, anorexia, family history, vitamin D deficiency, corticosteroid use, seizure medications, malabsorption, hyperparathyroidism, hyperthyroidism.\par NEGATIVE EXCEPT: Renal transplant, prior fracture (although traumatic), parental history of hip fracture\par \par Interim History \par In February 2020 we continued his regimen of Levemir 12 units at night, metformin 500/1500 mg daily, Trulicity 1.5 mg weekly, repaglinide 2 mg with meals. \par Blood sugars were higher the first month of quarantine, subsequently improved once less dietary indiscretions. Fasting blood sugars 90-130s mg/dL; no drop in blood sugars overnight. Bedtime blood sugars 160-200s mg/dL.\par He saw Lashon Rosa/nutrition today. \par Weight is overall stable from previous. Mild lower extremity numbness/tingling; states not painful. No chest pain, shortness of breath, polyuria/polydipsia. \par Medical and surgical history, medications, allergies, social and family history reviewed and updated as needed.

## 2020-09-03 ENCOUNTER — APPOINTMENT (OUTPATIENT)
Dept: HEART AND VASCULAR | Facility: CLINIC | Age: 75
End: 2020-09-03
Payer: MEDICARE

## 2020-09-03 ENCOUNTER — NON-APPOINTMENT (OUTPATIENT)
Age: 75
End: 2020-09-03

## 2020-09-03 VITALS
OXYGEN SATURATION: 94 % | HEART RATE: 72 BPM | TEMPERATURE: 98.9 F | DIASTOLIC BLOOD PRESSURE: 82 MMHG | SYSTOLIC BLOOD PRESSURE: 170 MMHG | BODY MASS INDEX: 27.94 KG/M2 | WEIGHT: 178 LBS | HEIGHT: 67 IN

## 2020-09-03 PROCEDURE — 99214 OFFICE O/P EST MOD 30 MIN: CPT | Mod: 25

## 2020-09-03 PROCEDURE — 93000 ELECTROCARDIOGRAM COMPLETE: CPT

## 2020-09-03 NOTE — PHYSICAL EXAM
[General Appearance - Well Developed] : well developed [Normal Appearance] : normal appearance [Well Groomed] : well groomed [General Appearance - Well Nourished] : well nourished [No Deformities] : no deformities [General Appearance - In No Acute Distress] : no acute distress [Normal Conjunctiva] : the conjunctiva exhibited no abnormalities [Normal Oral Mucosa] : normal oral mucosa [Normal Jugular Venous V Waves Present] : normal jugular venous V waves present [Respiration, Rhythm And Depth] : normal respiratory rhythm and effort [Exaggerated Use Of Accessory Muscles For Inspiration] : no accessory muscle use [Heart Rate And Rhythm] : heart rate and rhythm were normal [Heart Sounds] : normal S1 and S2 [Edema] : no peripheral edema present [Gait - Sufficient For Exercise Testing] : the gait was sufficient for exercise testing [Abnormal Walk] : normal gait [] : no ischemic changes [Impaired Insight] : insight and judgment were intact [Skin Color & Pigmentation] : normal skin color and pigmentation [Oriented To Time, Place, And Person] : oriented to person, place, and time [Affect] : the affect was normal [No Anxiety] : not feeling anxious [Mood] : the mood was normal

## 2020-09-09 NOTE — DISCUSSION/SUMMARY
[FreeTextEntry1] : Mr. Wooten is a pleasant 73 y/o male with polycystic kidney disease s/p kidney transplant in 2007, HTN, HLD, DM2, Prostate cancer s/p radical prostatectomy 2015, DVT 2006 (in setting of pelvic fracture), and atrial flutter s/p ablation in 1/2018, who presents for routine follow up.  He is in normal sinus rhythm today and has no symptoms concerning for recurrent arrhythmia.  He was relatively asymptomatic while in atrial flutter with a rapid ventricular rate.  We again discussed that 30-50% of people with atrial flutter develop atrial fibrillation. e spoke about monitoring options including routine EKGs, event monitors and a loop recorder implant.  He would like to continue with routine EKGs when he sees a doctor.  He will continue his current medications including Eliquis.  He will follow up in 6 months or sooner if needed and knows to call with any questions or concerns.

## 2020-09-09 NOTE — HISTORY OF PRESENT ILLNESS
[FreeTextEntry1] : Mr. Wooten is a pleasant 73 y/o male with polycystic kidney disease s/p kidney transplant in 2007, HTN, HLD, DM2, Prostate cancer s/p radical prostatectomy 2015, DVT 2006 (in setting of pelvic fracture), and atrial flutter s/p ablation in 1/2018, who presents for routine follow up.\par \par Overall he is doing well.  No symptoms concerning for recurrent arrhythmia.  He denies any chest pain, SOB, palpitations, syncope or near syncope.  He is maintained on Eliquis without interruptions. \par \par

## 2020-09-13 ENCOUNTER — INPATIENT (INPATIENT)
Facility: HOSPITAL | Age: 75
LOS: 3 days | Discharge: HOME HEALTH | DRG: 291 | End: 2020-09-17
Attending: INTERNAL MEDICINE | Admitting: INTERNAL MEDICINE
Payer: MEDICARE

## 2020-09-13 VITALS
OXYGEN SATURATION: 99 % | RESPIRATION RATE: 20 BRPM | HEIGHT: 67 IN | WEIGHT: 179.02 LBS | SYSTOLIC BLOOD PRESSURE: 168 MMHG | DIASTOLIC BLOOD PRESSURE: 84 MMHG | HEART RATE: 67 BPM | TEMPERATURE: 100 F

## 2020-09-13 DIAGNOSIS — I10 ESSENTIAL (PRIMARY) HYPERTENSION: ICD-10-CM

## 2020-09-13 DIAGNOSIS — I50.9 HEART FAILURE, UNSPECIFIED: ICD-10-CM

## 2020-09-13 DIAGNOSIS — Z94.0 KIDNEY TRANSPLANT STATUS: ICD-10-CM

## 2020-09-13 DIAGNOSIS — I48.91 UNSPECIFIED ATRIAL FIBRILLATION: ICD-10-CM

## 2020-09-13 DIAGNOSIS — E11.9 TYPE 2 DIABETES MELLITUS WITHOUT COMPLICATIONS: ICD-10-CM

## 2020-09-13 DIAGNOSIS — E78.5 HYPERLIPIDEMIA, UNSPECIFIED: ICD-10-CM

## 2020-09-13 DIAGNOSIS — Z98.890 OTHER SPECIFIED POSTPROCEDURAL STATES: Chronic | ICD-10-CM

## 2020-09-13 LAB
A1C WITH ESTIMATED AVERAGE GLUCOSE RESULT: 7 % — HIGH (ref 4–5.6)
ALBUMIN SERPL ELPH-MCNC: 3.6 G/DL — SIGNIFICANT CHANGE UP (ref 3.3–5)
ALP SERPL-CCNC: 44 U/L — SIGNIFICANT CHANGE UP (ref 40–120)
ALT FLD-CCNC: 22 U/L — SIGNIFICANT CHANGE UP (ref 10–45)
ANION GAP SERPL CALC-SCNC: 13 MMOL/L — SIGNIFICANT CHANGE UP (ref 5–17)
APPEARANCE UR: CLEAR — SIGNIFICANT CHANGE UP
APTT BLD: 30.8 SEC — SIGNIFICANT CHANGE UP (ref 27.5–35.5)
AST SERPL-CCNC: 22 U/L — SIGNIFICANT CHANGE UP (ref 10–40)
BASOPHILS # BLD AUTO: 0.01 K/UL — SIGNIFICANT CHANGE UP (ref 0–0.2)
BASOPHILS NFR BLD AUTO: 0.1 % — SIGNIFICANT CHANGE UP (ref 0–2)
BILIRUB SERPL-MCNC: 1.4 MG/DL — HIGH (ref 0.2–1.2)
BILIRUB UR-MCNC: NEGATIVE — SIGNIFICANT CHANGE UP
BUN SERPL-MCNC: 22 MG/DL — SIGNIFICANT CHANGE UP (ref 7–23)
CALCIUM SERPL-MCNC: 9.8 MG/DL — SIGNIFICANT CHANGE UP (ref 8.4–10.5)
CHLORIDE SERPL-SCNC: 106 MMOL/L — SIGNIFICANT CHANGE UP (ref 96–108)
CO2 SERPL-SCNC: 23 MMOL/L — SIGNIFICANT CHANGE UP (ref 22–31)
COLOR SPEC: YELLOW — SIGNIFICANT CHANGE UP
CREAT SERPL-MCNC: 1.22 MG/DL — SIGNIFICANT CHANGE UP (ref 0.5–1.3)
DIFF PNL FLD: ABNORMAL
EOSINOPHIL # BLD AUTO: 0.14 K/UL — SIGNIFICANT CHANGE UP (ref 0–0.5)
EOSINOPHIL NFR BLD AUTO: 1.1 % — SIGNIFICANT CHANGE UP (ref 0–6)
ESTIMATED AVERAGE GLUCOSE: 154 MG/DL — HIGH (ref 68–114)
GLUCOSE BLDC GLUCOMTR-MCNC: 281 MG/DL — HIGH (ref 70–99)
GLUCOSE SERPL-MCNC: 147 MG/DL — HIGH (ref 70–99)
GLUCOSE UR QL: NEGATIVE — SIGNIFICANT CHANGE UP
HCT VFR BLD CALC: 32.4 % — LOW (ref 39–50)
HGB BLD-MCNC: 10.4 G/DL — LOW (ref 13–17)
IMM GRANULOCYTES NFR BLD AUTO: 0.5 % — SIGNIFICANT CHANGE UP (ref 0–1.5)
INR BLD: 1.48 — HIGH (ref 0.88–1.16)
KETONES UR-MCNC: NEGATIVE — SIGNIFICANT CHANGE UP
LACTATE SERPL-SCNC: 1.5 MMOL/L — SIGNIFICANT CHANGE UP (ref 0.5–2)
LEUKOCYTE ESTERASE UR-ACNC: NEGATIVE — SIGNIFICANT CHANGE UP
LYMPHOCYTES # BLD AUTO: 1.22 K/UL — SIGNIFICANT CHANGE UP (ref 1–3.3)
LYMPHOCYTES # BLD AUTO: 10 % — LOW (ref 13–44)
MCHC RBC-ENTMCNC: 27.9 PG — SIGNIFICANT CHANGE UP (ref 27–34)
MCHC RBC-ENTMCNC: 32.1 GM/DL — SIGNIFICANT CHANGE UP (ref 32–36)
MCV RBC AUTO: 86.9 FL — SIGNIFICANT CHANGE UP (ref 80–100)
MONOCYTES # BLD AUTO: 0.78 K/UL — SIGNIFICANT CHANGE UP (ref 0–0.9)
MONOCYTES NFR BLD AUTO: 6.4 % — SIGNIFICANT CHANGE UP (ref 2–14)
NEUTROPHILS # BLD AUTO: 10.03 K/UL — HIGH (ref 1.8–7.4)
NEUTROPHILS NFR BLD AUTO: 81.9 % — HIGH (ref 43–77)
NITRITE UR-MCNC: NEGATIVE — SIGNIFICANT CHANGE UP
NRBC # BLD: 0 /100 WBCS — SIGNIFICANT CHANGE UP (ref 0–0)
NT-PROBNP SERPL-SCNC: HIGH PG/ML (ref 0–300)
PH UR: 6 — SIGNIFICANT CHANGE UP (ref 5–8)
PLATELET # BLD AUTO: 225 K/UL — SIGNIFICANT CHANGE UP (ref 150–400)
POTASSIUM SERPL-MCNC: 4 MMOL/L — SIGNIFICANT CHANGE UP (ref 3.5–5.3)
POTASSIUM SERPL-SCNC: 4 MMOL/L — SIGNIFICANT CHANGE UP (ref 3.5–5.3)
PROT SERPL-MCNC: 6.9 G/DL — SIGNIFICANT CHANGE UP (ref 6–8.3)
PROT UR-MCNC: 30 MG/DL
PROTHROM AB SERPL-ACNC: 17.4 SEC — HIGH (ref 10.6–13.6)
RBC # BLD: 3.73 M/UL — LOW (ref 4.2–5.8)
RBC # FLD: 17.2 % — HIGH (ref 10.3–14.5)
SARS-COV-2 RNA SPEC QL NAA+PROBE: SIGNIFICANT CHANGE UP
SODIUM SERPL-SCNC: 142 MMOL/L — SIGNIFICANT CHANGE UP (ref 135–145)
SP GR SPEC: 1.01 — SIGNIFICANT CHANGE UP (ref 1–1.03)
TROPONIN T SERPL-MCNC: 0.02 NG/ML — HIGH (ref 0–0.01)
TROPONIN T SERPL-MCNC: 0.03 NG/ML — HIGH (ref 0–0.01)
TSH SERPL-MCNC: 1.41 UIU/ML — SIGNIFICANT CHANGE UP (ref 0.35–4.94)
UROBILINOGEN FLD QL: 0.2 E.U./DL — SIGNIFICANT CHANGE UP
WBC # BLD: 12.24 K/UL — HIGH (ref 3.8–10.5)
WBC # FLD AUTO: 12.24 K/UL — HIGH (ref 3.8–10.5)

## 2020-09-13 PROCEDURE — 99285 EMERGENCY DEPT VISIT HI MDM: CPT | Mod: CS

## 2020-09-13 PROCEDURE — 71045 X-RAY EXAM CHEST 1 VIEW: CPT | Mod: 26

## 2020-09-13 PROCEDURE — 93010 ELECTROCARDIOGRAM REPORT: CPT

## 2020-09-13 PROCEDURE — 71250 CT THORAX DX C-: CPT | Mod: 26

## 2020-09-13 RX ORDER — NITROGLYCERIN 6.5 MG
2 CAPSULE, EXTENDED RELEASE ORAL ONCE
Refills: 0 | Status: COMPLETED | OUTPATIENT
Start: 2020-09-13 | End: 2020-09-13

## 2020-09-13 RX ORDER — SITAGLIPTIN 50 MG/1
1 TABLET, FILM COATED ORAL
Qty: 0 | Refills: 0 | DISCHARGE

## 2020-09-13 RX ORDER — INFLUENZA VIRUS VACCINE 15; 15; 15; 15 UG/.5ML; UG/.5ML; UG/.5ML; UG/.5ML
0.5 SUSPENSION INTRAMUSCULAR ONCE
Refills: 0 | Status: DISCONTINUED | OUTPATIENT
Start: 2020-09-13 | End: 2020-09-13

## 2020-09-13 RX ORDER — GLIMEPIRIDE 1 MG
1 TABLET ORAL
Qty: 0 | Refills: 0 | DISCHARGE

## 2020-09-13 RX ORDER — ACETAMINOPHEN 500 MG
975 TABLET ORAL ONCE
Refills: 0 | Status: COMPLETED | OUTPATIENT
Start: 2020-09-13 | End: 2020-09-13

## 2020-09-13 RX ORDER — TACROLIMUS 5 MG/1
1 CAPSULE ORAL
Qty: 0 | Refills: 0 | DISCHARGE

## 2020-09-13 RX ORDER — IPRATROPIUM/ALBUTEROL SULFATE 18-103MCG
3 AEROSOL WITH ADAPTER (GRAM) INHALATION ONCE
Refills: 0 | Status: COMPLETED | OUTPATIENT
Start: 2020-09-13 | End: 2020-09-13

## 2020-09-13 RX ORDER — AMLODIPINE BESYLATE 2.5 MG/1
5 TABLET ORAL DAILY
Refills: 0 | Status: DISCONTINUED | OUTPATIENT
Start: 2020-09-13 | End: 2020-09-14

## 2020-09-13 RX ORDER — GLIMEPIRIDE 1 MG
2 TABLET ORAL
Qty: 0 | Refills: 0 | DISCHARGE

## 2020-09-13 RX ORDER — ENOXAPARIN SODIUM 100 MG/ML
40 INJECTION SUBCUTANEOUS
Refills: 0 | Status: DISCONTINUED | OUTPATIENT
Start: 2020-09-13 | End: 2020-09-13

## 2020-09-13 RX ORDER — FUROSEMIDE 40 MG
40 TABLET ORAL ONCE
Refills: 0 | Status: COMPLETED | OUTPATIENT
Start: 2020-09-13 | End: 2020-09-13

## 2020-09-13 RX ADMIN — AMLODIPINE BESYLATE 5 MILLIGRAM(S): 2.5 TABLET ORAL at 19:06

## 2020-09-13 RX ADMIN — Medication 975 MILLIGRAM(S): at 16:00

## 2020-09-13 RX ADMIN — Medication 40 MILLIGRAM(S): at 16:00

## 2020-09-13 RX ADMIN — Medication 975 MILLIGRAM(S): at 18:11

## 2020-09-13 NOTE — H&P ADULT - NSHPPOADEEPVENOUSTHROMB_GEN_A_CORE
After receiving pt's permission, this RN gave an update to his daughter, Angelika Suárez, via phone regarding his test and scan results.      Janey's phone number: [phone] no

## 2020-09-13 NOTE — H&P ADULT - PROBLEM SELECTOR PLAN 1
- Overloaded on exam, + JVD, 3+ pitting LE edema  - BNP: 7076, 95% on 2L NC, continue to wean O2   - EF unknown, ECHO ordered, f/u   - S/P Lasix 40 mg IVP x 1   - Diuresis with Lasix 40 mg IV BID  - Continue home Carvedilol 12.5 mg BID, Lisinopril 40 mg once daily  - EKG: NSR @ 87 BPM, frequent PVCs   - CXR 09/13/2020: Frontal examination of the chest demonstrates the heart to be within normal limits in transverse diameter. No acute infiltrates. Mild levoscoliosis thoracic spine with degenerative changes. Calcification aortic knob. Mild chronic interstitial changes. No interval change noted in comparison to prior examination of the chest 6/16/2018  - CT chest: Small bilateral pleural effusions. The lung findings suggest pulmonary edema. No pericardial effusion. Pulmonary arterial hypertension. 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 No acute infiltrates. Mild levoscoliosis thoracic spine with degenerative changes. Calcification aortic knob. Mild chronic interstitial changes. No interval change noted in comparison to prior examination of the chest 6/16/2018  - CT chest: Small bilateral pleural effusions. The lung findings suggest pulmonary edema. No pericardial effusion. Pulmonary arterial hypertension. 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  - Consider ischemic workup   - F/u Hemoglobin a1c and Fasting lipids in AM   - Core measures  - Strict I&O's, daily weight, fluid restriction - Overloaded on exam, + JVD, 3+ pitting LE edema  - BNP: 7076, 95% on 2L NC, continue to wean O2   - EF unknown, ECHO ordered, f/u   - S/P Lasix 40 mg IVP x 1   - Diuresis with Lasix 40 mg IV BID  - Continue home Carvedilol 12.5 mg BID, Lisinopril 40 mg once daily  - EKG: NSR @ 87 BPM, frequent PVCs   - Troponin 0.01 x 1, continue to trend  - CXR 09/13/2020: Frontal examination of the chest demonstrates the heart to be within normal limits in transverse diameter. No acute infiltrates. Mild levoscoliosis thoracic spine with degenerative changes. Calcification aortic knob. Mild chronic interstitial changes. No interval change noted in comparison to prior examination of the chest 6/16/2018  - CT chest: Small bilateral pleural effusions. The lung findings suggest pulmonary edema. No pericardial effusion. Pulmonary arterial hypertension. 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

## 2020-09-13 NOTE — ED ADULT TRIAGE NOTE - CHIEF COMPLAINT QUOTE
BIBA from home c/o sob since Tuesday. Denies fever, chills, chest pain or dizziness. Note BLE swelling

## 2020-09-13 NOTE — H&P ADULT - NSHPSOCIALHISTORY_GEN_ALL_CORE
Former smoker (quit 45 years ago)  Rare EtOH  No illicit drug use Former smoker (quit 46 years ago)  Rare EtOH  No illicit drug use

## 2020-09-13 NOTE — ED PROVIDER NOTE - CLINICAL SUMMARY MEDICAL DECISION MAKING FREE TEXT BOX
73 y/o M presents w/ SOB, new BNP elevation. CT demonstrates pulmonary edema. Concern for new onset heart failure. Will give diuretics and admit. 75 y/o M presents w/ SOB, new BNP elevation. acs/PE/new onset heart failure vs other. PT not wanting IV contrast, also age adjusted d-dimer wnl, so less likely PE. CT non con obtained given clear cxr to eval other cause- demonstrates pulmonary edema. Concern for new onset heart failure. Will give diuretics and admit, disc w Dr. Vee, cards.

## 2020-09-13 NOTE — H&P ADULT - HISTORY OF PRESENT ILLNESS
Pt is a 73 y/o M with PMHx of HTN, HLD, DM type II, hx of kidney transplant x 13 years ago, prostate CA with prostatectomy, atrial fibrillation (on Eliquis 5 mg BID ), fusion of right hip in      , presents to [facility] ED on 09/13/2020 with complaints of SOB that began 5 days ago. Patient first noticed the SOB      . Patient states it is worse with       and gets better with            . Patient denies fever, chills, sore throat, CP, back pain, LE edema, NVD, cough, recent travel, or COVID contacts.     In the ED, VS: T: 99.5F, RR: 20 breaths/min, Hr: 67 BPM, spO2: 99% on RA. Labs significant for: leukocytosis with WBC – 12.24, hgb/hct: 10.4/32.4, BNP: 7076, Troponin 0.01. COVID-PCR: negative. CXR 09/13/2020: No acute infiltrates. CT Chest non-contrast 09/13/2020: Small bilateral pleural effusions. The lung findings suggest pulmonary edema. No pericardial effusion. Pulmonary arterial hypertension. 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 Patient received Lasix 40 mg IVP x  1.     Patient currently stable, admitted to cardiology/telemetry for acute on chronic heart failure.    Pt is a 75 y/o M with PMHx of HTN, HLD, DM type II, hx of kidney transplant x 13 years ago (on tacrolimus and Prednisone), prostate CA with prostatectomy, atrial fibrillation (on Eliquis 5 mg BID ), fusion of right hip in      , presents to [facility] ED on 09/13/2020 with complaints of SOB that began 5 days ago. Patient first noticed the SOB      . Patient states it is worse with       and gets better with            . Patient denies fever, chills, sore throat, CP, back pain, LE edema, NVD, cough, recent travel, or COVID contacts.     In the ED, VS: T: 99.5F, RR: 20 breaths/min, Hr: 67 BPM, spO2: 99% on RA. Labs significant for: leukocytosis with WBC – 12.24, hgb/hct: 10.4/32.4, BNP: 7076, Troponin 0.01. COVID-PCR: negative. CXR 09/13/2020: No acute infiltrates. CT Chest non-contrast 09/13/2020: Small bilateral pleural effusions. The lung findings suggest pulmonary edema. No pericardial effusion. Pulmonary arterial hypertension. 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 Patient received Lasix 40 mg IVP x  1.     Patient currently stable, admitted to cardiology/telemetry for acute on chronic heart failure.    Pt is a 75 y/o M with PMHx of HTN, HLD, DM type II, PCKD of kidney transplant x 13 years ago (on tacrolimus and Prednisone), prostate CA with prostatectomy, and atrial fibrillation (on Eliquis 5 mg BID ), presents to [facility] ED on 09/13/2020 with complaints of SOB that began 5 days ago. Patient first noticed the SOB after dong household chores. States he "felt more winded than usual." Patient also endorsing chronic LE edema, notices sock marks. Patient denies chest pain,  fever, chills, sore throat, CP, back pain, LE edema, NVD, cough, recent travel, or COVID contacts.     In the ED, VS: T: 99.5F, RR: 20 breaths/min, Hr: 67 BPM, spO2: 99% on RA. Labs significant for: leukocytosis with WBC – 12.24, hgb/hct: 10.4/32.4, BNP: 7076, Troponin 0.01. COVID-PCR: negative. EKG: NSR @ 87 BPM, with frequent PVCs.  CXR 09/13/2020: No acute infiltrates. CT Chest non-contrast 09/13/2020: Small bilateral pleural effusions. The lung findings suggest pulmonary edema. No pericardial effusion. Pulmonary arterial hypertension. 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 Patient received Lasix 40 mg IVP x  1.     Patient currently stable, admitted to cardiology/telemetry for acute on chronic heart failure.

## 2020-09-13 NOTE — H&P ADULT - NSICDXPASTMEDICALHX_GEN_ALL_CORE_FT
PAST MEDICAL HISTORY:  DM2 (diabetes mellitus, type 2)     DVT (deep venous thrombosis)     HLD (hyperlipidemia)     HTN (hypertension)     Kidney transplant recipient     Polycystic kidney disease     Prostate cancer

## 2020-09-13 NOTE — H&P ADULT - PROBLEM SELECTOR PLAN 3
- F/u prograft level   - Continue Tacrolimus 1 mg - 2 tabs in the AM   - Continue home Prednisone 5 mg QD - F/u prograft level   - Continue Tacrolimus 1 mg - 2 tabs in the AM   - Continue home Prednisone 5 mg QD  - Continue home Calcium, Magnesium, and Multi-vitamins

## 2020-09-13 NOTE — ED PROVIDER NOTE - PROGRESS NOTE DETAILS
Pt noted to be febrile. Will give Tylenol and reassess. Pt noted to be febrile. Will give Tylenol and reassess. lactate, bld cx obtained, UA neg. no other localizing sx, covid neg, will continue to monitor.

## 2020-09-13 NOTE — H&P ADULT - PROBLEM SELECTOR PLAN 6
- f/u lipid panel   - Continue home Atorvastatin 20 mg once daily     F: none  E: K > 4.0, Mg > 2.0  N: DASH/TLD, fluid restricted   GI: Pantoprazole 20 mg once daily   Dvt: Lovenox 40 mg SC   Dispo: pending diuresis    Case discussed with Dr. Vee - f/u lipid panel   - Continue home Atorvastatin 20 mg once daily     F: none  E: K > 4.0, Mg > 2.0  N: DASH/TLD, fluid restricted   GI: Pantoprazole 20 mg once daily   Dvt: Lovenox 40 mg SC BID  Dispo: pending diuresis    Case discussed with Dr. Vee - f/u lipid panel   - Continue home Atorvastatin 20 mg once daily     F: none  E: K > 4.0, Mg > 2.0  N: DASH/TLD, fluid restricted   GI: Pantoprazole 20 mg once daily   Dvt: Eliquis 5 mg BID  Dispo: pending diuresis    Case discussed with Dr. Vee

## 2020-09-13 NOTE — ED ADULT NURSE NOTE - OBJECTIVE STATEMENT
75 y/o male w/ hx of kidney transplant x 13 years ago, prostate CA w/ prostatectomy,  HTN, DM, HLD, fusion of R hip, a-fib presents to the ED c/o SOB that began 5 days ago w/o associated sx. Pt reports GAFFNEY. Denies fever, chills, sore throat, CP, back pain, cough, NVD, and any other associated sx.

## 2020-09-13 NOTE — H&P ADULT - PROBLEM SELECTOR PLAN 2
- Patient currently in NSR   - Continue Carvedilol 12.5 mg BID   - Continue Eliquis 5 mg twice daily  - F/u TSH

## 2020-09-13 NOTE — H&P ADULT - PROBLEM SELECTOR PLAN 4
- BP on admission 168/84 mmHg, patient states he did not take any of his home medications today   - Continue home Lisinopril 40 mg once daily, Carvedilol 12.5 mg BID, and Amlodipine 5 mg once daily

## 2020-09-13 NOTE — H&P ADULT - PROBLEM SELECTOR PLAN 5
- F/u hemoglobin a1c   - Hold home Trulicity once weekly, Metformin 500 mg once daily, and Repaglinide 2 mg  - Continue ISS

## 2020-09-13 NOTE — ED PROVIDER NOTE - OBJECTIVE STATEMENT
73 y/o M w/ PMHx PMH PCKD, s/p renal transplant in 2007 at [facility] (on tacrolimus and prednisone), HTN, HLD, DM2, prostate cancer (s/p radical prostatectomy 2015), DVT (2006, d/t pelvic fracture), osteoporosis, presents to the ED c/o SOB, worsened w/ exertion, for past x1 wk. Additionally notes recent b/l lower extremity swelling. Denies cough, fever, chills, CP, or any other symptoms. No diuretics.

## 2020-09-13 NOTE — H&P ADULT - ASSESSMENT
Pt is a 73 y/o M with PMHx of HTN, HLD, DM type II, PCKD of kidney transplant x 13 years ago (on tacrolimus and Prednisone), prostate CA with prostatectomy, and atrial fibrillation (on Eliquis 5 mg BID ), presents to [facility] ED on 09/13/2020 with complaints of SOB that began 5 days ago. Patient appears overloaded on exam, + JVD,  3+ pitting edema.

## 2020-09-13 NOTE — ED PROVIDER NOTE - PHYSICAL EXAMINATION
CONSTITUTIONAL: Awake, alert and in no apparent distress.  HEENT: Head is atraumatic. Eyes clear bilaterally, normal EOMI. Airway patent.  CARDIAC: Normal rate, regular rhythm.  Heart sounds S1, S2.   RESPIRATORY: Breath sounds clear and equal bilaterally. no tachypnea, respiratory distress.   GASTROINTESTINAL: Abdomen soft, non-tender, no guarding, distension.  MUSCULOSKELETAL: Spine appears normal, no midline spinal tenderness, range of motion is not limited, no muscle or joint tenderness. no bony tenderness.   NEUROLOGICAL: Alert, no focal deficits, no motor or sensory deficits.  SKIN: Skin normal color for race, warm, dry and intact. No evidence of rash.  PSYCHIATRIC: Normal mood and affect.

## 2020-09-14 DIAGNOSIS — D64.9 ANEMIA, UNSPECIFIED: ICD-10-CM

## 2020-09-14 LAB
A1C WITH ESTIMATED AVERAGE GLUCOSE RESULT: 7 % — HIGH (ref 4–5.6)
ALBUMIN SERPL ELPH-MCNC: 3.1 G/DL — LOW (ref 3.3–5)
ALP SERPL-CCNC: 48 U/L — SIGNIFICANT CHANGE UP (ref 40–120)
ALT FLD-CCNC: 28 U/L — SIGNIFICANT CHANGE UP (ref 10–45)
ANION GAP SERPL CALC-SCNC: 14 MMOL/L — SIGNIFICANT CHANGE UP (ref 5–17)
AST SERPL-CCNC: 20 U/L — SIGNIFICANT CHANGE UP (ref 10–40)
BASOPHILS # BLD AUTO: 0.02 K/UL — SIGNIFICANT CHANGE UP (ref 0–0.2)
BASOPHILS NFR BLD AUTO: 0.2 % — SIGNIFICANT CHANGE UP (ref 0–2)
BILIRUB SERPL-MCNC: 1.6 MG/DL — HIGH (ref 0.2–1.2)
BUN SERPL-MCNC: 23 MG/DL — SIGNIFICANT CHANGE UP (ref 7–23)
CALCIUM SERPL-MCNC: 9.2 MG/DL — SIGNIFICANT CHANGE UP (ref 8.4–10.5)
CHLORIDE SERPL-SCNC: 103 MMOL/L — SIGNIFICANT CHANGE UP (ref 96–108)
CHOLEST SERPL-MCNC: 99 MG/DL — SIGNIFICANT CHANGE UP (ref 10–199)
CK MB CFR SERPL CALC: 1 NG/ML — SIGNIFICANT CHANGE UP (ref 0–6.7)
CK SERPL-CCNC: 47 U/L — SIGNIFICANT CHANGE UP (ref 30–200)
CO2 SERPL-SCNC: 26 MMOL/L — SIGNIFICANT CHANGE UP (ref 22–31)
CREAT SERPL-MCNC: 1.37 MG/DL — HIGH (ref 0.5–1.3)
EOSINOPHIL # BLD AUTO: 0.07 K/UL — SIGNIFICANT CHANGE UP (ref 0–0.5)
EOSINOPHIL NFR BLD AUTO: 0.7 % — SIGNIFICANT CHANGE UP (ref 0–6)
ESTIMATED AVERAGE GLUCOSE: 154 MG/DL — HIGH (ref 68–114)
FERRITIN SERPL-MCNC: 167 NG/ML — SIGNIFICANT CHANGE UP (ref 30–400)
FOLATE SERPL-MCNC: >20 NG/ML — SIGNIFICANT CHANGE UP
GAS PNL BLDA: SIGNIFICANT CHANGE UP
GLUCOSE BLDC GLUCOMTR-MCNC: 247 MG/DL — HIGH (ref 70–99)
GLUCOSE BLDC GLUCOMTR-MCNC: 282 MG/DL — HIGH (ref 70–99)
GLUCOSE BLDC GLUCOMTR-MCNC: 287 MG/DL — HIGH (ref 70–99)
GLUCOSE BLDC GLUCOMTR-MCNC: 302 MG/DL — HIGH (ref 70–99)
GLUCOSE BLDC GLUCOMTR-MCNC: 334 MG/DL — HIGH (ref 70–99)
GLUCOSE SERPL-MCNC: 249 MG/DL — HIGH (ref 70–99)
HCT VFR BLD CALC: 29 % — LOW (ref 39–50)
HCV AB S/CO SERPL IA: 0.34 S/CO — SIGNIFICANT CHANGE UP
HCV AB SERPL-IMP: SIGNIFICANT CHANGE UP
HDLC SERPL-MCNC: 37 MG/DL — LOW
HGB BLD-MCNC: 9.4 G/DL — LOW (ref 13–17)
IMM GRANULOCYTES NFR BLD AUTO: 0.7 % — SIGNIFICANT CHANGE UP (ref 0–1.5)
IRON SATN MFR SERPL: 18 UG/DL — LOW (ref 45–165)
IRON SATN MFR SERPL: 8 % — LOW (ref 16–55)
LIPID PNL WITH DIRECT LDL SERPL: 47 MG/DL — SIGNIFICANT CHANGE UP
LYMPHOCYTES # BLD AUTO: 1.16 K/UL — SIGNIFICANT CHANGE UP (ref 1–3.3)
LYMPHOCYTES # BLD AUTO: 11.5 % — LOW (ref 13–44)
MAGNESIUM SERPL-MCNC: 1.7 MG/DL — SIGNIFICANT CHANGE UP (ref 1.6–2.6)
MCHC RBC-ENTMCNC: 27.5 PG — SIGNIFICANT CHANGE UP (ref 27–34)
MCHC RBC-ENTMCNC: 32.4 GM/DL — SIGNIFICANT CHANGE UP (ref 32–36)
MCV RBC AUTO: 84.8 FL — SIGNIFICANT CHANGE UP (ref 80–100)
MONOCYTES # BLD AUTO: 0.72 K/UL — SIGNIFICANT CHANGE UP (ref 0–0.9)
MONOCYTES NFR BLD AUTO: 7.2 % — SIGNIFICANT CHANGE UP (ref 2–14)
NEUTROPHILS # BLD AUTO: 8.02 K/UL — HIGH (ref 1.8–7.4)
NEUTROPHILS NFR BLD AUTO: 79.7 % — HIGH (ref 43–77)
NRBC # BLD: 0 /100 WBCS — SIGNIFICANT CHANGE UP (ref 0–0)
PLATELET # BLD AUTO: 211 K/UL — SIGNIFICANT CHANGE UP (ref 150–400)
POTASSIUM SERPL-MCNC: 3.6 MMOL/L — SIGNIFICANT CHANGE UP (ref 3.5–5.3)
POTASSIUM SERPL-SCNC: 3.6 MMOL/L — SIGNIFICANT CHANGE UP (ref 3.5–5.3)
PROT SERPL-MCNC: 6.5 G/DL — SIGNIFICANT CHANGE UP (ref 6–8.3)
RBC # BLD: 3.42 M/UL — LOW (ref 4.2–5.8)
RBC # FLD: 16.7 % — HIGH (ref 10.3–14.5)
SODIUM SERPL-SCNC: 143 MMOL/L — SIGNIFICANT CHANGE UP (ref 135–145)
TACROLIMUS SERPL-MCNC: <2 NG/ML — SIGNIFICANT CHANGE UP
TIBC SERPL-MCNC: 227 UG/DL — SIGNIFICANT CHANGE UP (ref 220–430)
TOTAL CHOLESTEROL/HDL RATIO MEASUREMENT: 2.7 RATIO — LOW (ref 3.4–9.6)
TRIGL SERPL-MCNC: 76 MG/DL — SIGNIFICANT CHANGE UP (ref 10–149)
TROPONIN T SERPL-MCNC: 0.02 NG/ML — HIGH (ref 0–0.01)
UIBC SERPL-MCNC: 209 UG/DL — SIGNIFICANT CHANGE UP (ref 110–370)
VIT B12 SERPL-MCNC: 1032 PG/ML — SIGNIFICANT CHANGE UP (ref 232–1245)
WBC # BLD: 10.06 K/UL — SIGNIFICANT CHANGE UP (ref 3.8–10.5)
WBC # FLD AUTO: 10.06 K/UL — SIGNIFICANT CHANGE UP (ref 3.8–10.5)

## 2020-09-14 PROCEDURE — 71045 X-RAY EXAM CHEST 1 VIEW: CPT | Mod: 26,76

## 2020-09-14 PROCEDURE — 99223 1ST HOSP IP/OBS HIGH 75: CPT

## 2020-09-14 PROCEDURE — 93010 ELECTROCARDIOGRAM REPORT: CPT

## 2020-09-14 RX ORDER — AMLODIPINE BESYLATE 2.5 MG/1
5 TABLET ORAL ONCE
Refills: 0 | Status: COMPLETED | OUTPATIENT
Start: 2020-09-14 | End: 2020-09-14

## 2020-09-14 RX ORDER — AMLODIPINE BESYLATE 2.5 MG/1
5 TABLET ORAL ONCE
Refills: 0 | Status: DISCONTINUED | OUTPATIENT
Start: 2020-09-14 | End: 2020-09-14

## 2020-09-14 RX ORDER — FUROSEMIDE 40 MG
40 TABLET ORAL ONCE
Refills: 0 | Status: COMPLETED | OUTPATIENT
Start: 2020-09-14 | End: 2020-09-14

## 2020-09-14 RX ORDER — POTASSIUM CHLORIDE 20 MEQ
40 PACKET (EA) ORAL ONCE
Refills: 0 | Status: COMPLETED | OUTPATIENT
Start: 2020-09-14 | End: 2020-09-14

## 2020-09-14 RX ORDER — MAGNESIUM SULFATE 500 MG/ML
2 VIAL (ML) INJECTION ONCE
Refills: 0 | Status: COMPLETED | OUTPATIENT
Start: 2020-09-14 | End: 2020-09-14

## 2020-09-14 RX ADMIN — Medication 2 INCH(S): at 11:54

## 2020-09-14 RX ADMIN — Medication 2 INCH(S): at 00:00

## 2020-09-14 RX ADMIN — Medication 3 MILLILITER(S): at 00:00

## 2020-09-14 RX ADMIN — AMLODIPINE BESYLATE 5 MILLIGRAM(S): 2.5 TABLET ORAL at 11:22

## 2020-09-14 RX ADMIN — Medication 40 MILLIEQUIVALENT(S): at 08:50

## 2020-09-14 RX ADMIN — Medication 40 MILLIGRAM(S): at 00:00

## 2020-09-14 RX ADMIN — Medication 50 GRAM(S): at 11:22

## 2020-09-14 RX ADMIN — AMLODIPINE BESYLATE 5 MILLIGRAM(S): 2.5 TABLET ORAL at 07:36

## 2020-09-14 NOTE — PROGRESS NOTE ADULT - PROBLEM SELECTOR PLAN 3
- F/u prograft level   - Continue Tacrolimus 1 mg - 2 tabs in the AM   - Continue home Prednisone 5 mg QD  - Continue home Calcium, Magnesium, and Multi-vitamins - Prograft level: <2.0  - Continue Tacrolimus 1 mg - 2 tabs in the AM   - Continue home Prednisone 5 mg QD  - Continue home Calcium, Magnesium, and Multi-vitamins

## 2020-09-14 NOTE — DIETITIAN INITIAL EVALUATION ADULT. - OTHER INFO
74M with PMHx of HTN, HLD, DM type II (A1C 7.0), PCKD of kidney transplant x 13 years ago (on tacrolimus and Prednisone), prostate CA with prostatectomy, and atrial fibrillation (on Eliquis 5 mg BID ), presents to [facility] ED on 09/13/2020 with complaints of SOB that began 5 days ago. Patient appears overloaded on exam, + JVD,  3+ pitting edema. W/u showing acute heart failure, plan for diuresis + FR. Discussed fluid restriction with pt and importance of maintaining fluid restriction to decrease fluid being retained/ edema. Additionally discussed DASH/TLC diet with pt, reports tolerated breakfast well 100% consumed, also noted multiple cups of fluid at bedside, again reinforced fluid restriction with pt. Denies n/v/d/c, abd distended/ soft/NT, no chewing/ swallowing issues or pain impacting intake, skin is intact, kathie score 20, continues with 3+ edema to b/l ankles, 2+ edema to b/l legs. NKFA or changes in wt noted, reports gain d/t edema but unsure of amount. Will continue to follow per protocol.

## 2020-09-14 NOTE — CHART NOTE - NSCHARTNOTEFT_GEN_A_CORE
PA called to bedside as pt noted to have increased WOB and BP 200s/90s. On arrival to pt's bedside, pt tachypneic, increased WOB, using accessory muscles. PA ordered Lasix 80mg IV x 1 (administered as Lasix 40mg IV x 2), Duobneb x 1, Nitropaste 2 inches x 1, Tucker placed, BiPAP placed. CXR ordered will f/u results. CCU Fellow Dr. Joseph made aware who assess pt at bedside and agrees with plan. Pt notably improved after treatments administered with improvement of BP to 170s/80s. Will continue to monitor pt and will perform ABG in 30 minutes as per Dr. Joseph's recs. PA called to bedside as pt noted to have increased WOB and BP 200s/90s. On arrival to pt's bedside, pt tachypneic, increased WOB, using accessory muscles. Exam notable for diffuse rales b/l, +JVD, 2 pitting edema b/l LE concerning for flash pulmonary edema. PA ordered Lasix 80mg IV x 1 (administered as Lasix 40mg IV x 2), Duobneb x 1, Nitropaste 2 inches x 1, Tucker placed, BiPAP placed. CXR ordered will f/u results. CCU Fellow Dr. Joseph made aware who assess pt at bedside and agrees with plan. Pt notably improved after treatments administered with improvement of BP to 170s/80s. Will continue to monitor pt and will perform ABG in 30 minutes as per Dr. Joseph's recs.

## 2020-09-14 NOTE — PROGRESS NOTE ADULT - ASSESSMENT
Pt is a 73 y/o M with PMHx of HTN, HLD, DM type II, PCKD of kidney transplant x 13 years ago (on tacrolimus and Prednisone), prostate CA with prostatectomy, and atrial fibrillation (on Eliquis 5 mg BID ), presents to [facility] ED on 09/13/2020 with complaints of SOB that began 5 days ago. On admission, patient overloaded on exam, + JVD,  3+ pitting edema. Given Lasix 40 mg IVP in ED and started on Lasix 40 mg IV BID. Overnight on 09/14/2020, patient with increased work of breathing and HTN to 200s/90s. Patient given Lasix 80 mg IV x 1, Duoneb x1, nitropaste, bipap placed. Pt notable improved after treatments Patient transitioned to 6L NC, satting at 95-95%. Patient continuing with diuresis and BP control, awaiting echo.

## 2020-09-14 NOTE — PROGRESS NOTE ADULT - PROBLEM SELECTOR PLAN 2
- Patient currently in NSR   - Continue Carvedilol 12.5 mg BID   - Continue Eliquis 5 mg twice daily  - F/u TSH - Patient currently in NSR   - Continue Carvedilol 12.5 mg BID   - Continue Eliquis 5 mg twice daily  - TSH: 1.411

## 2020-09-14 NOTE — PHYSICAL THERAPY INITIAL EVALUATION ADULT - GAIT DEVIATIONS NOTED, PT EVAL
fairly steady gait, no lose of balance, decreased heel strike and decreased hip flexion bilaterally./decreased step length/decreased jerry/increased time in double stance

## 2020-09-14 NOTE — PROGRESS NOTE ADULT - PROBLEM SELECTOR PLAN 4
- BP on admission 168/84 mmHg, patient states he did not take any of his home medications today   - Continue home Lisinopril 40 mg once daily, Carvedilol 12.5 mg BID, and Amlodipine 5 mg once daily - BP on admission 168/84 mmHg, patient states he did not take any of his home medications today   - Hold home Lisinopril 40 mg once daily  - Increase home Amlodipine 5 mg to Amlodipine 10 mg once daily  - Continue home Coreg 12.5, consider increasing Coreg to 25 mg twice daily

## 2020-09-14 NOTE — PROVIDER CONTACT NOTE (CHANGE IN STATUS NOTIFICATION) - ACTION/TREATMENT ORDERED:
ORLANDO Wang at bedside, lasix 80mg, nitro paste 2%, Duoneb 3ml, Tucker catheter, Bipap, ORLANDO Wang at bedside, lasix 80mg, nitro paste 2%, Duoneb 3ml, Tucker catheter, Bipap, chest Xray

## 2020-09-14 NOTE — CHART NOTE - NSCHARTNOTEFT_GEN_A_CORE
CCU Fellow Event Note    Cardiology fellow on-call paged at 23:50 given patient's respiratory distress.  Patient explained that he attempted to stand up and urinate at bedside and experienced intense SOB; but denies CP.    PHYSICAL EXAM:  T(C): 37.7 (09-13-20 @ 20:25), Max: 38.3 (09-13-20 @ 15:48)  HR: 78 (09-13-20 @ 20:25) (67 - 88)  BP: initially SBP ~200, but improved to 150s s/p Lasix 80mg IVx1 and BiPAP  RR: 20 (09-13-20 @ 20:25) (18 - 20)  SpO2: initially 95% on NRB, but improved to 99% on BiPAP and s/p Lasix 80mg IVx1    GEN: Awake, alert, tachypneic but respiratory status improved on BiPAP.   HEENT: +JVD.   RESP: Diffuse rales b/l.  CV: Tachycardic, irregular, normal s1/s2. No m/r/g.  EXT: b/l 2+ LE edema   NEURO: No focal deficits.    I&O's Summary    13 Sep 2020 07:01  -  14 Sep 2020 00:19  --------------------------------------------------------  IN: 1000 mL / OUT: 1000 mL / NET: 0 mL    Height (cm): 170.2 (09-13 @ 16:37)  Weight (kg): 81.2 (09-13 @ 16:37)  BMI (kg/m2): 28 (09-13 @ 16:37)  BSA (m2): 1.93 (09-13 @ 16:37)  	  LABS:	 	                      10.4   12.24 )-----------( 225      ( 13 Sep 2020 10:38 )             32.4     09-13    142  |  106  |  22  ----------------------------<  147<H>  4.0   |  23  |  1.22    Ca    9.8      13 Sep 2020 10:38    TPro  6.9  /  Alb  3.6  /  TBili  1.4<H>  /  DBili  x   /  AST  22  /  ALT  22  /  AlkPhos  44  09-13    proBNP: Serum Pro-Brain Natriuretic Peptide: 43675 pg/mL (09-13 @ 18:28)  Serum Pro-Brain Natriuretic Peptide: 7076 pg/mL (09-13 @ 10:38)    TSH: Thyroid Stimulating Hormone, Serum: 1.411 uIU/mL (09-13 @ 20:43)    ASSESSMENT and PLAN:  74 year old male, w/ a PMHx of HTN, HLD, DM type II, PCKD of kidney transplant x 13 years ago (on tacrolimus and Prednisone), prostate CA with prostatectomy, and atrial fibrillation (on Eliquis 5 mg BID ), presenting to [facility] ED on 09/13/2020 with complaints of SOB that began 5 days ago; currently hypervolemic w/ concern for flash pulmonary edema. Patient symptomatically and clinically improved s/p Lasix 80mg IVx1 and BiPAP.  - monitor UOP (I/O)  - f/u CXR  - ABG 30min s/p initiation of BiPAP  - Nitro paste ordered  - Patient to remain on Cardiac Telemetry with low threshold for upgrade to CCU if clinically worsens    Case and plan d/w primary providers at bedside.  Cardiology fellow on-call to continue to follow. CCU Fellow Event Note    Cardiology fellow on-call paged at 23:50 given patient's respiratory distress.  Patient explained that he attempted to stand up and urinate at bedside and experienced intense SOB; but denies CP.    PHYSICAL EXAM:  T(C): 37.7 (09-13-20 @ 20:25), Max: 38.3 (09-13-20 @ 15:48)  HR: 78 (09-13-20 @ 20:25) (67 - 88)  BP: initially SBP ~200, but improved to 150s s/p Lasix 80mg IVx1 and BiPAP  RR: 20 (09-13-20 @ 20:25) (18 - 20)  SpO2: initially 95% on NRB, but improved to 99% on BiPAP and s/p Lasix 80mg IVx1    GEN: Awake, alert, tachypneic but respiratory status improved on BiPAP.   HEENT: +JVD.   RESP: Diffuse rales b/l.  CV: Tachycardic, irregular, normal s1/s2. No m/r/g.  EXT: b/l 2+ LE edema   NEURO: No focal deficits.    I&O's Summary    13 Sep 2020 07:01  -  14 Sep 2020 00:19  --------------------------------------------------------  IN: 1000 mL / OUT: 1000 mL / NET: 0 mL    Height (cm): 170.2 (09-13 @ 16:37)  Weight (kg): 81.2 (09-13 @ 16:37)  BMI (kg/m2): 28 (09-13 @ 16:37)  BSA (m2): 1.93 (09-13 @ 16:37)  	  LABS:	 	                      10.4   12.24 )-----------( 225      ( 13 Sep 2020 10:38 )             32.4     09-13    142  |  106  |  22  ----------------------------<  147<H>  4.0   |  23  |  1.22    Ca    9.8      13 Sep 2020 10:38    TPro  6.9  /  Alb  3.6  /  TBili  1.4<H>  /  DBili  x   /  AST  22  /  ALT  22  /  AlkPhos  44  09-13    proBNP: Serum Pro-Brain Natriuretic Peptide: 88822 pg/mL (09-13 @ 18:28)  Serum Pro-Brain Natriuretic Peptide: 7076 pg/mL (09-13 @ 10:38)    TSH: Thyroid Stimulating Hormone, Serum: 1.411 uIU/mL (09-13 @ 20:43)    ASSESSMENT and PLAN:  74 year old male, w/ a PMHx of HTN, HLD, DM type II, PCKD of kidney transplant x 13 years ago (on tacrolimus and Prednisone), prostate CA with prostatectomy, and atrial fibrillation (on Eliquis 5 mg BID ), presenting to [facility] ED on 09/13/2020 with complaints of SOB that began 5 days ago; currently hypervolemic w/ concern for flash pulmonary edema. Patient symptomatically and clinically improved s/p Lasix 80mg IVx1 and BiPAP.  - monitor UOP (I/O)  - f/u CXR  - repeat EKG  - ABG 30min s/p initiation of BiPAP  - Nitro paste ordered  - Patient to remain on Cardiac Telemetry with low threshold for upgrade to CCU if clinically worsens    Case and plan d/w primary providers at bedside.  Cardiology fellow on-call to continue to follow.

## 2020-09-14 NOTE — DIETITIAN INITIAL EVALUATION ADULT. - PROBLEM SELECTOR PLAN 6
- f/u lipid panel   - Continue home Atorvastatin 20 mg once daily     F: none  E: K > 4.0, Mg > 2.0  N: DASH/TLD, fluid restricted   GI: Pantoprazole 20 mg once daily   Dvt: Eliquis 5 mg BID  Dispo: pending diuresis    Case discussed with Dr. Vee

## 2020-09-14 NOTE — DIETITIAN INITIAL EVALUATION ADULT. - ENERGY NEEDS
Ideal body weight used for calculations as pt >120% of IBW.   ABW 81.2kg, IBW 67kg, 121% IBW, ht 67", BMI 28   Nutrient needs based on [facility] standards of care for maintenance in adults, adjusted for CHF demand, fluid per team

## 2020-09-14 NOTE — PROGRESS NOTE ADULT - SUBJECTIVE AND OBJECTIVE BOX
Interventional Cardiology PA Adult Progress Note    C.C.: SOB    Subjective Assessment: Patient seen and examined at bedside. Patient states he is feeling better vs last night, feels as if his breathing improved. Denies CP, palpitations, n/v/d, fever, chills, or cough.     ROS Negative except as per Subjective and HPI  	  MEDICATIONS:  amLODIPine   Tablet 5 milliGRAM(s) Oral once  carvedilol 12.5 milliGRAM(s) Oral every 12 hours  furosemide   Injectable 40 milliGRAM(s) IV Push two times a day  acetaminophen   Tablet .. 650 milliGRAM(s) Oral every 6 hours PRN  atorvastatin 20 milliGRAM(s) Oral at bedtime  dextrose 40% Gel 15 Gram(s) Oral once PRN  dextrose 50% Injectable 12.5 Gram(s) IV Push once  dextrose 50% Injectable 25 Gram(s) IV Push once  dextrose 50% Injectable 25 Gram(s) IV Push once  glucagon  Injectable 1 milliGRAM(s) IntraMuscular once PRN  insulin lispro (HumaLOG) corrective regimen sliding scale   SubCutaneous Before meals and at bedtime  apixaban 5 milliGRAM(s) Oral every 12 hours  dextrose 5%. 1000 milliLiter(s) IV Continuous <Continuous>  influenza  Vaccine (HIGH DOSE) 0.7 milliLiter(s) IntraMuscular once  tacrolimus 2 milliGRAM(s) Oral daily      [PHYSICAL EXAM:  TELEMETRY:  T(C): 36.9 (09-14-20 @ 08:53), Max: 38.3 (09-13-20 @ 15:48)  HR: 68 (09-14-20 @ 10:05) (67 - 96)  BP: 130/71 (09-14-20 @ 08:35) (124/75 - 214/97)  RR: 18 (09-14-20 @ 10:05) (18 - 30)  SpO2: 96% (09-14-20 @ 10:05) (91% - 99%)  Wt(kg): --  I&O's Summary    13 Sep 2020 07:01  -  14 Sep 2020 07:00  --------------------------------------------------------  IN: 1000 mL / OUT: 2450 mL / NET: -1450 mL    14 Sep 2020 07:01  -  14 Sep 2020 11:05  --------------------------------------------------------  IN: 180 mL / OUT: 0 mL / NET: 180 mL      Height (cm): 170.2 (09-13 @ 16:37)  Weight (kg): 81.2 (09-13 @ 16:37)  BMI (kg/m2): 28 (09-13 @ 16:37)  BSA (m2): 1.93 (09-13 @ 16:37)  Tucker:  Central/PICC/Mid Line:                                         Appearance: Normal	  HEENT:   Normal oral mucosa, PERRL, EOMI	  Neck: Supple, + JVD; - Carotid Bruit   Cardiovascular: Normal S1 S2, No JVD, No murmurs,   Respiratory: Decreased Breath Sounds in B/L Bases; + Rales   Gastrointestinal:  Soft, Non-tender, + BS	  Skin: No rashes, No ecchymoses, No cyanosis  Extremities: Normal range of motion, 2+ pitting edema   Vascular: Peripheral pulses palpable 2+ bilaterally  Neurologic: Non-focal  Psychiatry: A & O x 3, Mood & affect appropriate        ECG:  NSR @ 71 BPM, LVH  	  RADIOLOGY:   DIAGNOSTIC TESTING:  [ x ] Echocardiogram: awaiting  [x] CXR 09/14/2020: Stable heart size, thoracic aortic calcification. Pulmonary vascular congestion, increased. Bilateral pleural effusions, new. Stable bony structures. Levoscoliosis.    LABS:	 	  CARDIAC MARKERS:               9.4    10.06 )-----------( 211      ( 14 Sep 2020 06:24 )             29.0     09-14    143  |  103  |  23  ----------------------------<  249<H>  3.6   |  26  |  1.37<H>    Ca    9.2      14 Sep 2020 06:24  Mg     1.7     09-14    TPro  6.5  /  Alb  3.1<L>  /  TBili  1.6<H>  /  DBili  x   /  AST  20  /  ALT  28  /  AlkPhos  48  09-14    proBNP: Serum Pro-Brain Natriuretic Peptide: 10951 pg/mL (09-13 @ 18:28)    Lipid Profile:  Total Chol: 99, LDL: 47, HDL: 37; Triglycerides: 76  HgA1c: 7.0  TSH: Thyroid Stimulating Hormone, Serum: 1.411 uIU/mL (09-13 @ 20:43)    PT/INR - ( 13 Sep 2020 10:38 )   PT: 17.4 sec;   INR: 1.48     PTT - ( 13 Sep 2020 10:38 )  PTT:30.8 sec

## 2020-09-14 NOTE — PROGRESS NOTE ADULT - PROBLEM SELECTOR PLAN 1
- Overloaded on exam, + JVD, 3+ pitting LE edema on admission, continuing to improve   - BNP: 7076, Originally on NC 4L, Overnight on 09/14/2020, transitioned back to 6L, satting 94-95%. Resp status improving, continue to wean.     - EF unknown, ECHO ordered, f/u   - S/P Lasix 40 mg IVP x 1. Continue diuresis with Lasix 40 mg IV BID  - Continue home Carvedilol 12.5 mg BID, Lisinopril 40 mg once daily  - EKG: NSR @ 87 BPM, frequent PVCs   - Troponin 0.01 x 1, continue to trend  - CXR 09/13/2020: Frontal examination of the chest demonstrates the heart to be within normal limits in transverse diameter. No acute infiltrates. Mild levoscoliosis thoracic spine with degenerative changes. Calcification aortic knob. Mild chronic interstitial changes. No interval change noted in comparison to prior examination of the chest 6/16/2018  - CT chest: Small bilateral pleural effusions. The lung findings suggest pulmonary edema. No pericardial effusion. Pulmonary arterial hypertension. 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 Resp status improving, continue to wean.     - EF unknown, ECHO ordered, f/u   - S/P Lasix 40 mg IVP x 1. Continue diuresis with Lasix 40 mg IV BID  - Continue home Carvedilol 12.5 mg BID, Lisinopril 40 mg once daily  - EKG: NSR @ 87 BPM, frequent PVCs   - Troponin peaked at 0.02   - CXR 09/13/2020: Frontal examination of the chest demonstrates the heart to be within normal limits in transverse diameter. No acute infiltrates. Mild levoscoliosis thoracic spine with degenerative changes. Calcification aortic knob. Mild chronic interstitial changes. No interval change noted in comparison to prior examination of the chest 6/16/2018; Repeat CXR 09/14/2020 with increased pulmonary congestion   - CT chest: Small bilateral pleural effusions. The lung findings suggest pulmonary edema. No pericardial effusion. Pulmonary arterial hypertension. 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   - Core measures  - Strict I&O's, daily weight, fluid restriction - Overloaded on exam, + JVD, 3+ pitting LE edema on admission, continuing to improve   - BNP: 7076, Originally on NC 4L, Overnight on 09/14/2020, transitioned back to 6L, satting 94-95%. Resp status improving, continue to wean.     - EF unknown, ECHO ordered, f/u   - S/P Lasix 40 mg IVP x 1. Continue diuresis with Lasix 40 mg IV BID  - Continue home Carvedilol 12.5 mg BID, Lisinopril 40 mg once daily  - EKG: NSR @ 87 BPM, frequent PVCs   - Troponin peaked at 0.02   - CXR 09/13/2020: Frontal examination of the chest demonstrates the heart to be within normal limits in transverse diameter. No acute infiltrates. Mild levoscoliosis thoracic spine with degenerative changes. Calcification aortic knob. Mild chronic interstitial changes. No interval change noted in comparison to prior examination of the chest 6/16/2018; Repeat CXR 09/14/2020 with increased pulmonary congestion   - CT chest: Small bilateral pleural effusions. The lung findings suggest pulmonary edema. No pericardial effusion. Pulmonary arterial hypertension. 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

## 2020-09-14 NOTE — DIETITIAN INITIAL EVALUATION ADULT. - PROBLEM SELECTOR PLAN 5
brand renewal - F/u hemoglobin a1c   - Hold home Trulicity once weekly, Metformin 500 mg once daily, and Repaglinide 2 mg  - Continue ISS

## 2020-09-14 NOTE — PHYSICAL THERAPY INITIAL EVALUATION ADULT - ADDITIONAL COMMENTS
Pt lives with family in an apartment with elevator. Prior to admission, pt ambulated independently with cane. Pt also has a rollator at home.

## 2020-09-14 NOTE — PROGRESS NOTE ADULT - PROBLEM SELECTOR PLAN 6
- f/u lipid panel   - Continue home Atorvastatin 20 mg once daily     F: none  E: K > 4.0, Mg > 2.0  N: DASH/TLD, fluid restricted   GI: Pantoprazole 20 mg once daily   Dvt: Eliquis 5 mg BID  Dispo: pending diuresis    Case discussed with Dr. Vee - Total cholesterol: 99, HDL: 37, LDL 47   - Continue home Atorvastatin 20 mg once daily     F: none  E: K > 4.0, Mg > 2.0  N: DASH/TLD, fluid restricted   GI: Pantoprazole 20 mg once daily   Dvt: Eliquis 5 mg BID  Dispo: pending diuresis    Case discussed with Dr. Vee - Hemoglobin 9.4  - f/u iron and b12 studies

## 2020-09-14 NOTE — DIETITIAN INITIAL EVALUATION ADULT. - PROBLEM SELECTOR PLAN 1
- Overloaded on exam, + JVD, 3+ pitting LE edema  - BNP: 7076, 95% on 2L NC, continue to wean O2   - EF unknown, ECHO ordered, f/u   - S/P Lasix 40 mg IVP x 1   - Diuresis with Lasix 40 mg IV BID  - Continue home Carvedilol 12.5 mg BID, Lisinopril 40 mg once daily  - EKG: NSR @ 87 BPM, frequent PVCs   - Troponin 0.01 x 1, continue to trend  - CXR 09/13/2020: Frontal examination of the chest demonstrates the heart to be within normal limits in transverse diameter. No acute infiltrates. Mild levoscoliosis thoracic spine with degenerative changes. Calcification aortic knob. Mild chronic interstitial changes. No interval change noted in comparison to prior examination of the chest 6/16/2018  - CT chest: Small bilateral pleural effusions. The lung findings suggest pulmonary edema. No pericardial effusion. Pulmonary arterial hypertension. 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

## 2020-09-14 NOTE — PHYSICAL THERAPY INITIAL EVALUATION ADULT - DISCHARGE DISPOSITION, PT EVAL
Home PT with family assist, Pt stated that he needs time to think about if he wants HPT,  Micheline is notified about DC Plan

## 2020-09-14 NOTE — DIETITIAN INITIAL EVALUATION ADULT. - PROBLEM SELECTOR PLAN 3
- F/u prograft level   - Continue Tacrolimus 1 mg - 2 tabs in the AM   - Continue home Prednisone 5 mg QD  - Continue home Calcium, Magnesium, and Multi-vitamins

## 2020-09-14 NOTE — PROGRESS NOTE ADULT - PROBLEM SELECTOR PLAN 7
- Total cholesterol: 99, HDL: 37, LDL 47   - Continue home Atorvastatin 20 mg once daily     F: none  E: K > 4.0, Mg > 2.0  N: DASH/TLD, fluid restricted   GI: Pantoprazole 20 mg once daily   Dvt: Eliquis 5 mg BID  Dispo: pending diuresis    Case discussed with Dr. Vee

## 2020-09-14 NOTE — PHYSICAL THERAPY INITIAL EVALUATION ADULT - GENERAL OBSERVATIONS, REHAB EVAL
Pt received supine in bed with +NC~6L, +hep-lock, +EKG, +Tucker, NAD. Pt left OOB sitting in the chair, call bell in reach, UBALDO, SOLOMON andino.

## 2020-09-15 DIAGNOSIS — I50.31 ACUTE DIASTOLIC (CONGESTIVE) HEART FAILURE: ICD-10-CM

## 2020-09-15 LAB
ALBUMIN SERPL ELPH-MCNC: 2.7 G/DL — LOW (ref 3.3–5)
ALP SERPL-CCNC: 58 U/L — SIGNIFICANT CHANGE UP (ref 40–120)
ALT FLD-CCNC: 39 U/L — SIGNIFICANT CHANGE UP (ref 10–45)
ANION GAP SERPL CALC-SCNC: 13 MMOL/L — SIGNIFICANT CHANGE UP (ref 5–17)
AST SERPL-CCNC: 36 U/L — SIGNIFICANT CHANGE UP (ref 10–40)
BASOPHILS # BLD AUTO: 0.02 K/UL — SIGNIFICANT CHANGE UP (ref 0–0.2)
BASOPHILS NFR BLD AUTO: 0.3 % — SIGNIFICANT CHANGE UP (ref 0–2)
BILIRUB SERPL-MCNC: 1.1 MG/DL — SIGNIFICANT CHANGE UP (ref 0.2–1.2)
BUN SERPL-MCNC: 22 MG/DL — SIGNIFICANT CHANGE UP (ref 7–23)
CALCIUM SERPL-MCNC: 9.4 MG/DL — SIGNIFICANT CHANGE UP (ref 8.4–10.5)
CHLORIDE SERPL-SCNC: 104 MMOL/L — SIGNIFICANT CHANGE UP (ref 96–108)
CO2 SERPL-SCNC: 25 MMOL/L — SIGNIFICANT CHANGE UP (ref 22–31)
CREAT SERPL-MCNC: 1.3 MG/DL — SIGNIFICANT CHANGE UP (ref 0.5–1.3)
EOSINOPHIL # BLD AUTO: 0.18 K/UL — SIGNIFICANT CHANGE UP (ref 0–0.5)
EOSINOPHIL NFR BLD AUTO: 2.4 % — SIGNIFICANT CHANGE UP (ref 0–6)
GLUCOSE BLDC GLUCOMTR-MCNC: 201 MG/DL — HIGH (ref 70–99)
GLUCOSE BLDC GLUCOMTR-MCNC: 248 MG/DL — HIGH (ref 70–99)
GLUCOSE BLDC GLUCOMTR-MCNC: 335 MG/DL — HIGH (ref 70–99)
GLUCOSE BLDC GLUCOMTR-MCNC: 338 MG/DL — HIGH (ref 70–99)
GLUCOSE SERPL-MCNC: 201 MG/DL — HIGH (ref 70–99)
HCT VFR BLD CALC: 29.5 % — LOW (ref 39–50)
HGB BLD-MCNC: 9.4 G/DL — LOW (ref 13–17)
IMM GRANULOCYTES NFR BLD AUTO: 0.4 % — SIGNIFICANT CHANGE UP (ref 0–1.5)
LYMPHOCYTES # BLD AUTO: 1.1 K/UL — SIGNIFICANT CHANGE UP (ref 1–3.3)
LYMPHOCYTES # BLD AUTO: 14.7 % — SIGNIFICANT CHANGE UP (ref 13–44)
MAGNESIUM SERPL-MCNC: 2 MG/DL — SIGNIFICANT CHANGE UP (ref 1.6–2.6)
MCHC RBC-ENTMCNC: 27.3 PG — SIGNIFICANT CHANGE UP (ref 27–34)
MCHC RBC-ENTMCNC: 31.9 GM/DL — LOW (ref 32–36)
MCV RBC AUTO: 85.8 FL — SIGNIFICANT CHANGE UP (ref 80–100)
MONOCYTES # BLD AUTO: 0.52 K/UL — SIGNIFICANT CHANGE UP (ref 0–0.9)
MONOCYTES NFR BLD AUTO: 7 % — SIGNIFICANT CHANGE UP (ref 2–14)
NEUTROPHILS # BLD AUTO: 5.63 K/UL — SIGNIFICANT CHANGE UP (ref 1.8–7.4)
NEUTROPHILS NFR BLD AUTO: 75.2 % — SIGNIFICANT CHANGE UP (ref 43–77)
NRBC # BLD: 0 /100 WBCS — SIGNIFICANT CHANGE UP (ref 0–0)
PHOSPHATE SERPL-MCNC: 3.4 MG/DL — SIGNIFICANT CHANGE UP (ref 2.5–4.5)
PLATELET # BLD AUTO: 185 K/UL — SIGNIFICANT CHANGE UP (ref 150–400)
POTASSIUM SERPL-MCNC: 3.4 MMOL/L — LOW (ref 3.5–5.3)
POTASSIUM SERPL-SCNC: 3.4 MMOL/L — LOW (ref 3.5–5.3)
PROT SERPL-MCNC: 6.6 G/DL — SIGNIFICANT CHANGE UP (ref 6–8.3)
RBC # BLD: 3.44 M/UL — LOW (ref 4.2–5.8)
RBC # FLD: 16.8 % — HIGH (ref 10.3–14.5)
SODIUM SERPL-SCNC: 142 MMOL/L — SIGNIFICANT CHANGE UP (ref 135–145)
WBC # BLD: 7.48 K/UL — SIGNIFICANT CHANGE UP (ref 3.8–10.5)
WBC # FLD AUTO: 7.48 K/UL — SIGNIFICANT CHANGE UP (ref 3.8–10.5)

## 2020-09-15 PROCEDURE — 93306 TTE W/DOPPLER COMPLETE: CPT | Mod: 26

## 2020-09-15 PROCEDURE — 99233 SBSQ HOSP IP/OBS HIGH 50: CPT

## 2020-09-15 PROCEDURE — 71045 X-RAY EXAM CHEST 1 VIEW: CPT | Mod: 26

## 2020-09-15 RX ORDER — POTASSIUM CHLORIDE 20 MEQ
40 PACKET (EA) ORAL EVERY 4 HOURS
Refills: 0 | Status: COMPLETED | OUTPATIENT
Start: 2020-09-15 | End: 2020-09-15

## 2020-09-15 RX ADMIN — Medication 40 MILLIEQUIVALENT(S): at 08:32

## 2020-09-15 RX ADMIN — Medication 40 MILLIEQUIVALENT(S): at 12:06

## 2020-09-16 PROCEDURE — 99233 SBSQ HOSP IP/OBS HIGH 50: CPT

## 2020-09-17 ENCOUNTER — TRANSCRIPTION ENCOUNTER (OUTPATIENT)
Age: 75
End: 2020-09-17

## 2020-09-17 VITALS — TEMPERATURE: 98 F

## 2020-09-17 LAB
CULTURE RESULTS: SIGNIFICANT CHANGE UP
GLUCOSE BLDC GLUCOMTR-MCNC: 347 MG/DL — HIGH (ref 70–99)

## 2020-09-17 PROCEDURE — 71045 X-RAY EXAM CHEST 1 VIEW: CPT

## 2020-09-17 PROCEDURE — 83735 ASSAY OF MAGNESIUM: CPT

## 2020-09-17 PROCEDURE — 82607 VITAMIN B-12: CPT

## 2020-09-17 PROCEDURE — 85730 THROMBOPLASTIN TIME PARTIAL: CPT

## 2020-09-17 PROCEDURE — 82553 CREATINE MB FRACTION: CPT

## 2020-09-17 PROCEDURE — 78452 HT MUSCLE IMAGE SPECT MULT: CPT

## 2020-09-17 PROCEDURE — 36415 COLL VENOUS BLD VENIPUNCTURE: CPT

## 2020-09-17 PROCEDURE — 82728 ASSAY OF FERRITIN: CPT

## 2020-09-17 PROCEDURE — 93017 CV STRESS TEST TRACING ONLY: CPT

## 2020-09-17 PROCEDURE — 93018 CV STRESS TEST I&R ONLY: CPT

## 2020-09-17 PROCEDURE — 99285 EMERGENCY DEPT VISIT HI MDM: CPT | Mod: 25

## 2020-09-17 PROCEDURE — 82962 GLUCOSE BLOOD TEST: CPT

## 2020-09-17 PROCEDURE — 85025 COMPLETE CBC W/AUTO DIFF WBC: CPT

## 2020-09-17 PROCEDURE — 93016 CV STRESS TEST SUPVJ ONLY: CPT

## 2020-09-17 PROCEDURE — U0003: CPT

## 2020-09-17 PROCEDURE — 84443 ASSAY THYROID STIM HORMONE: CPT

## 2020-09-17 PROCEDURE — 83540 ASSAY OF IRON: CPT

## 2020-09-17 PROCEDURE — 85027 COMPLETE CBC AUTOMATED: CPT

## 2020-09-17 PROCEDURE — 80053 COMPREHEN METABOLIC PANEL: CPT

## 2020-09-17 PROCEDURE — 84295 ASSAY OF SERUM SODIUM: CPT

## 2020-09-17 PROCEDURE — 80048 BASIC METABOLIC PNL TOTAL CA: CPT

## 2020-09-17 PROCEDURE — 84100 ASSAY OF PHOSPHORUS: CPT

## 2020-09-17 PROCEDURE — 96374 THER/PROPH/DIAG INJ IV PUSH: CPT

## 2020-09-17 PROCEDURE — 83880 ASSAY OF NATRIURETIC PEPTIDE: CPT

## 2020-09-17 PROCEDURE — 93005 ELECTROCARDIOGRAM TRACING: CPT

## 2020-09-17 PROCEDURE — 85610 PROTHROMBIN TIME: CPT

## 2020-09-17 PROCEDURE — 78452 HT MUSCLE IMAGE SPECT MULT: CPT | Mod: 26

## 2020-09-17 PROCEDURE — 80197 ASSAY OF TACROLIMUS: CPT

## 2020-09-17 PROCEDURE — 87086 URINE CULTURE/COLONY COUNT: CPT

## 2020-09-17 PROCEDURE — 94660 CPAP INITIATION&MGMT: CPT

## 2020-09-17 PROCEDURE — 84484 ASSAY OF TROPONIN QUANT: CPT

## 2020-09-17 PROCEDURE — 85379 FIBRIN DEGRADATION QUANT: CPT

## 2020-09-17 PROCEDURE — 83550 IRON BINDING TEST: CPT

## 2020-09-17 PROCEDURE — 80061 LIPID PANEL: CPT

## 2020-09-17 PROCEDURE — 87186 SC STD MICRODIL/AGAR DIL: CPT

## 2020-09-17 PROCEDURE — 97162 PT EVAL MOD COMPLEX 30 MIN: CPT

## 2020-09-17 PROCEDURE — 82550 ASSAY OF CK (CPK): CPT

## 2020-09-17 PROCEDURE — 71250 CT THORAX DX C-: CPT

## 2020-09-17 PROCEDURE — 83605 ASSAY OF LACTIC ACID: CPT

## 2020-09-17 PROCEDURE — 84132 ASSAY OF SERUM POTASSIUM: CPT

## 2020-09-17 PROCEDURE — 82803 BLOOD GASES ANY COMBINATION: CPT

## 2020-09-17 PROCEDURE — A9505: CPT

## 2020-09-17 PROCEDURE — 99239 HOSP IP/OBS DSCHRG MGMT >30: CPT

## 2020-09-17 PROCEDURE — A9500: CPT

## 2020-09-17 PROCEDURE — 82330 ASSAY OF CALCIUM: CPT

## 2020-09-17 PROCEDURE — 97116 GAIT TRAINING THERAPY: CPT

## 2020-09-17 PROCEDURE — 81001 URINALYSIS AUTO W/SCOPE: CPT

## 2020-09-17 PROCEDURE — 94640 AIRWAY INHALATION TREATMENT: CPT

## 2020-09-17 PROCEDURE — 83036 HEMOGLOBIN GLYCOSYLATED A1C: CPT

## 2020-09-17 PROCEDURE — 87040 BLOOD CULTURE FOR BACTERIA: CPT

## 2020-09-17 PROCEDURE — 82746 ASSAY OF FOLIC ACID SERUM: CPT

## 2020-09-17 PROCEDURE — 93306 TTE W/DOPPLER COMPLETE: CPT

## 2020-09-17 PROCEDURE — 86803 HEPATITIS C AB TEST: CPT

## 2020-09-17 RX ORDER — INSULIN DETEMIR 100/ML (3)
0 INSULIN PEN (ML) SUBCUTANEOUS
Qty: 0 | Refills: 0 | DISCHARGE

## 2020-09-17 RX ORDER — AMLODIPINE BESYLATE 2.5 MG/1
1 TABLET ORAL
Qty: 0 | Refills: 0 | DISCHARGE

## 2020-09-17 RX ORDER — AMLODIPINE BESYLATE 2.5 MG/1
1 TABLET ORAL
Qty: 30 | Refills: 3
Start: 2020-09-17 | End: 2021-01-14

## 2020-09-17 RX ORDER — LISINOPRIL 2.5 MG/1
1 TABLET ORAL
Qty: 30 | Refills: 2
Start: 2020-09-17 | End: 2020-12-15

## 2020-09-17 NOTE — DISCHARGE NOTE PROVIDER - NSDCCPCAREPLAN_GEN_ALL_CORE_FT
PRINCIPAL DISCHARGE DIAGNOSIS  Diagnosis: Acute diastolic congestive heart failure  Assessment and Plan of Treatment: You have a weak heart, also known as Congestive Heart Failure (CHF). Heart failure is a condition in which the heart does not pump or fill with blood well. As a result, the heart lags behind in its job of moving blood throughout the body. This can lead to symptoms such as swelling, trouble breathing, and feeling tired. Please continue ___________ exactly as listed. Avoid drinking more than 1.5L of fluid daily. Maintain a low salt diet (2grams daily) and weigh yourself daily. For any significant increases in daily weight of 2lbs/day with associated swelling in the legs or abdomen with shortness of breath, please call your doctor or go to the emergency room. Follow up with Dr. Dowd in 1 week.      SECONDARY DISCHARGE DIAGNOSES  Diagnosis: HTN (hypertension)  Assessment and Plan of Treatment: Please continue Carvedilol 6.25mg twice a day, Amlodipine 10mg daily and Lisinopril 40mg daily as listed to keep your blood pressure controlled. For blood pressure that is too high or too low please see your doctor or go to the emergency room as necessary.    Diagnosis: HLD (hyperlipidemia)  Assessment and Plan of Treatment: Please continue Atorvastatin 20mg at bedtime to keep your cholesterol low. High cholesterol contributes to heart disease.    Diagnosis: Atrial fibrillation  Assessment and Plan of Treatment: You have been diagnosed with an abnormal heart rhythm (arrhythmia) called atrial fibrillation. With this condition, your heart’s 2 upper chambers (the atria) quiver rather than squeeze the blood out in a normal pattern. This leads to an irregular and sometimes rapid heartbeat. Atrial fibrillation is serious condition as it affects the heart’s ability to fill with blood as it should and blood clots may form, which increases the risk for stroke. PLEASE CONTINUE TO TAKE YOUR ELIQUIS 5MG TWICE A DAY TO PREVENT A STROKE. PLEASE CONTINUE YOUR CARVEDILOL 12.5MG TWICE A DAY TO KEEP YOUR HEART RATE NORMAL. Please follow up with Dr. Burton routinely as scheduled.    Diagnosis: DM2 (diabetes mellitus, type 2)  Assessment and Plan of Treatment: Your Hemoglobin A1c is 7% and your goal A1c is less than 7.0%. This number measures your average blood sugar level over the last three months. Please continue your medications as listed for diabetes. Maintain a low carbohydrate, low sugar diet, exercise, monitor your fingerstick blood sugars regarly and follow up with your Endocrinologist/Primary Care Doctor.    Diagnosis: Kidney transplant recipient  Assessment and Plan of Treatment: Please continue to take your Tacrolimus and Prednisone daily.     PRINCIPAL DISCHARGE DIAGNOSIS  Diagnosis: Acute diastolic congestive heart failure  Assessment and Plan of Treatment: You have a weak heart, also known as Congestive Heart Failure (CHF). Heart failure is a condition in which the heart does not pump or fill with blood well. As a result, the heart lags behind in its job of moving blood throughout the body. This can lead to symptoms such as swelling, trouble breathing, and feeling tired. Please CONTINUE TORSEMIDE 20MG DAILY exactly as listed. Avoid drinking more than 1.5L of fluid daily. Maintainow salt diet (2grams daily) and weigh yourself daily. For any significant increases in daily weight of 2lbs/day with associated swelling in the legs or abdomen with shortness of breath, please call your doctor or go to the emergency room. Follow up with Dr. Dowd in 1 week.      SECONDARY DISCHARGE DIAGNOSES  Diagnosis: HTN (hypertension)  Assessment and Plan of Treatment: Please continue Carvedilol 6.25mg twice a day, Amlodipine 10mg daily and Lisinopril 40mg daily as listed to keep your blood pressure controlled. For blood pressure that is too high or too low please see your doctor or go to the emergency room as necessary.    Diagnosis: HLD (hyperlipidemia)  Assessment and Plan of Treatment: Please continue Atorvastatin 20mg at bedtime to keep your cholesterol low. High cholesterol contributes to heart disease.    Diagnosis: Atrial fibrillation  Assessment and Plan of Treatment: You have been diagnosed with an abnormal heart rhythm (arrhythmia) called atrial fibrillation. With this condition, your heart’s 2 upper chambers (the atria) quiver rather than squeeze the blood out in a normal pattern. This leads to an irregular and sometimes rapid heartbeat. Atrial fibrillation is serious condition as it affects the heart’s ability to fill with blood as it should and blood clots may form, which increases the risk for stroke. PLEASE CONTINUE TO TAKE YOUR ELIQUIS 5MG TWICE A DAY TO PREVENT A STROKE. PLEASE CONTINUE YOUR CARVEDILOL 12.5MG TWICE A DAY TO KEEP YOUR HEART RATE NORMAL. Please follow up with Dr. Burton routinely as scheduled.    Diagnosis: DM2 (diabetes mellitus, type 2)  Assessment and Plan of Treatment: Your Hemoglobin A1c is 7% and your goal A1c is less than 7.0%. This number measures your average blood sugar level over the last three months. Please continue your medications as listed for diabetes. Maintain a low carbohydrate, low sugar diet, exercise, monitor your fingerstick blood sugars regarly and follow up with your Endocrinologist/Primary Care Doctor.    Diagnosis: Kidney transplant recipient  Assessment and Plan of Treatment: Please continue to take your Tacrolimus and Prednisone daily.

## 2020-09-17 NOTE — DISCHARGE NOTE PROVIDER - NSDCMRMEDTOKEN_GEN_ALL_CORE_FT
amLODIPine 5 mg oral tablet: 1 tab(s) orally once a day  apixaban 5 mg oral tablet: 1 tab(s) orally every 12 hours  Coreg 12.5 mg oral tablet: 1 tab(s) orally 2 times a day  Levemir FlexTouch 100 units/mL subcutaneous solution:   Levemir FlexTouch 100 units/mL subcutaneous solution: 12 milligram(s) subcutaneous once a day  Lipitor 20 mg oral tablet: 1 tab(s) orally once a day  metFORMIN 500 mg oral tablet, extended release: 1 tab(s) orally once a day  PREDNISONE   TAB 5MG:   repaglinide 2 mg oral tablet: 1 tab(s) orally 2 times a day (before lunch and dinner )  tacrolimus 1 mg oral capsule: 2 cap(s) orally once a day (in the morning)  Trulicity Pen 1.5 mg/0.5 mL subcutaneous solution:   Vitamin D3 1000 intl units oral capsule: 1 cap(s) orally 2 times a day   amLODIPine 10 mg oral tablet: 1 tab(s) orally once a day   apixaban 5 mg oral tablet: 1 tab(s) orally every 12 hours  Coreg 12.5 mg oral tablet: 1 tab(s) orally 2 times a day  Levemir FlexTouch 100 units/mL subcutaneous solution: 12 milligram(s) subcutaneous once a day  Lipitor 20 mg oral tablet: 1 tab(s) orally once a day  lisinopril 40 mg oral tablet: 1 tab(s) orally once a day  metFORMIN 500 mg oral tablet, extended release: 1 tab(s) orally once a day  PREDNISONE   TAB 5MG:   repaglinide 2 mg oral tablet: 1 tab(s) orally 2 times a day (before lunch and dinner )  tacrolimus 1 mg oral capsule: 2 cap(s) orally once a day (in the morning)  Trulicity Pen 1.5 mg/0.5 mL subcutaneous solution:   Vitamin D3 1000 intl units oral capsule: 1 cap(s) orally 2 times a day

## 2020-09-17 NOTE — DISCHARGE NOTE PROVIDER - CARE PROVIDER_API CALL
SORAIDA SANCHEZ  Cardiovascular Diseases  [address] [place] [address]  Phone: [phone]  Fax: [phone]  Follow Up Time: 2 weeks

## 2020-09-17 NOTE — DISCHARGE NOTE PROVIDER - HOSPITAL COURSE
73yo Male with PMHx of HTN, HLD, DM type II, PCKD of kidney transplant x 13 years ago (on tacrolimus and Prednisone), prostate CA with prostatectomy, and atrial fibrillation (on Eliquis), who presented to [facility] ED 09/13/20 c/o worsening SOB and LE edema x 5 days. Pt noted to be volume overloaded with small B/L pleural effusions and admitted to [facility] for further management of acute CHF exacerbation. Hospital course c/b acute flash pulm edema overnight on 9/14/20 with increased work of breathing, hypertensive to 200/90, started on Nitropaste, given an additional Lasix 80mg IVP and pt placed on BIPAP with improvement to NC 3-4L. Pt continued to diurese with Lasix 40mg IVP Q8 and transitioned to Torsemide 20mg BID after discharge. Pt underwent an Echo revealing mild symmetric LVH, normal LV/RV size and function, Grade II diastolic dysfunction, severely dilated LA, mild MR, mild dilated aortic root (4.1cm), mild dilated ascending aorta (4cm), trace AR. He also had a NST revealing _____________. Pt successfully weaned of NC and satting well on RA and euvolemic on exam. Pt seen and examined at bedside this AM without any complaints or events overnight. VSS, labs and telemetry reviewed and pt stable for discharge as discussed with Dr. Vee. Pt has received appropriate discharge instructions, including medication regimen and follow up with Dr. Dowd in 1-2 weeks.  Discharge medication regimen has been reviewed with attending with plan for patient to take Torsemide 20mg BID, Carvedilol 12.5mg BID, Amlodipine 10mg QD, Lisinopril 40mg QD and ________.   75yo Male with PMHx of HTN, HLD, DM type II, PCKD of kidney transplant x 13 years ago (on tacrolimus and Prednisone), prostate CA with prostatectomy, and atrial fibrillation (on Eliquis), who presented to [facility] ED 09/13/20 c/o worsening SOB and LE edema x 5 days. Pt noted to be volume overloaded with small B/L pleural effusions and admitted to [facility] for further management of acute CHF exacerbation. Hospital course c/b acute flash pulm edema overnight on 9/14/20 with increased work of breathing, hypertensive to 200/90, started on Nitropaste, given an additional Lasix 80mg IVP and pt placed on BIPAP with improvement to NC 3-4L. Pt continued to diurese with Lasix 40mg IVP Q8 and transitioned to Torsemide 20mg BID after discharge. Pt underwent an Echo revealing mild symmetric LVH, normal LV/RV size and function, Grade II diastolic dysfunction, severely dilated LA, mild MR, mild dilated aortic root (4.1cm), mild dilated ascending aorta (4cm), trace AR. He also had a NST revealing _____________. Pt successfully weaned of NC and satting well on RA and euvolemic on exam. PT consulted, recommending home PT. Pt seen and examined at bedside this AM without any complaints or events overnight. VSS, labs and telemetry reviewed and pt stable for discharge as discussed with Dr. Vee. Pt has received appropriate discharge instructions, including medication regimen and follow up with Dr. Dowd in 1-2 weeks.  Discharge medication regimen has been reviewed with attending with plan for patient to take Torsemide 20mg BID, Carvedilol 12.5mg BID, Amlodipine 10mg QD, Lisinopril 40mg QD and ________.   73yo Male with PMHx of HTN, HLD, DM type II, PCKD of kidney transplant x 13 years ago (on tacrolimus and Prednisone), prostate CA with prostatectomy, and atrial fibrillation (on Eliquis), who presented to [facility] ED 09/13/20 c/o worsening SOB and LE edema x 5 days. Pt noted to be volume overloaded with small B/L pleural effusions and admitted to [facility] for further management of acute CHF exacerbation. Hospital course c/b acute flash pulm edema overnight on 9/14/20 with increased work of breathing, hypertensive to 200/90, started on Nitropaste, given an additional Lasix 80mg IVP and pt placed on BIPAP with improvement to NC 3-4L. Pt continued to diurese with Lasix 40mg IVP Q8 and transitioned to Torsemide 20mg BID after discharge. Pt underwent an Echo revealing mild symmetric LVH, normal LV/RV size and function, Grade II diastolic dysfunction, severely dilated LA, mild MR, mild dilated aortic root (4.1cm), mild dilated ascending aorta (4cm), trace AR. He also had a NST revealing normal myocardial perfusion and normal EKG stress. Pt successfully weaned of NC and satting well on RA and euvolemic on exam. PT consulted, recommending home PT. Pt seen and examined at bedside this AM without any complaints or events overnight. VSS, labs and telemetry reviewed and pt stable for discharge as discussed with Dr. Vee. Pt has received appropriate discharge instructions, including medication regimen and follow up with Dr. Dowd in 1-2 weeks.  Discharge medication regimen has been reviewed with attending with plan for patient to take Torsemide 20mg QD, Carvedilol 12.5mg BID, Amlodipine 10mg QD, Lisinopril 40mg QD, Atorvastatin 20mg HS, Tacrolimus, Prednisone 5mg QD and Eliquis 5mg BID.   75yo Male with PMHx of HTN, HLD, DM type II, PCKD of kidney transplant x 13 years ago (on tacrolimus and Prednisone), prostate CA with prostatectomy, and atrial fibrillation (on Eliquis), who presented to [facility] ED 09/13/20 c/o worsening SOB and LE edema x 5 days. Pt noted to be volume overloaded with small B/L pleural effusions and admitted to [facility] for further management of acute CHF exacerbation. Hospital course c/b acute flash pulm edema overnight on 9/14/20 with increased work of breathing, hypertensive to 200/90, started on Nitropaste, given an additional Lasix 80mg IVP and pt placed on BIPAP with improvement to NC 3-4L. Pt continued to diurese with Lasix 40mg IVP Q8 and transitioned to Torsemide 20mg BID after discharge. Pt underwent an Echo revealing mild symmetric LVH, normal LV/RV size and function, Grade II diastolic dysfunction, severely dilated LA, mild MR, mild dilated aortic root (4.1cm), mild dilated ascending aorta (4cm), trace AR. He also had a NST revealing normal myocardial perfusion and normal EKG stress. Pt successfully weaned of NC and satting well on RA and euvolemic on exam. PT consulted, recommending home PT. Pt seen and examined at bedside this AM without any complaints or events overnight. VSS, labs and telemetry reviewed and pt stable for discharge as discussed with Dr. Vee. Pt has received appropriate discharge instructions, including medication regimen and follow up with Dr. Dowd in 1-2 weeks.  Discharge medication regimen has been reviewed with attending with plan for patient to take Torsemide 20mg QD, Carvedilol 12.5mg BID, Amlodipine 10mg QD, Lisinopril 40mg QD, Atorvastatin 20mg HS, Tacrolimus, Prednisone 5mg QD and Eliquis 5mg BID.    I was physically present for the key portions of the evaluation and management (E/M) service provided.  I have personally seen and examined this patient.  I have reviewed vitals, labs, medications, cardiac studies and remaining additional imaging.  I agree with the above discharge documentation which I have reviewed and edited where appropriate. Patient is hemodynamically stable and appropriate for discharge home on the above prescribed medications.  Close outpatient cardiology follow up is recommended within 1-2 weeks.    Karla Vee MD   Cardiology Attending    35 minutes spent on total encounter; more than 50% of the visit was spent counseling and/or coordinating care by the attending physician, with plan of care discussed with the patient and cardiac team.

## 2020-09-17 NOTE — DISCHARGE NOTE PROVIDER - NSDCFUADDAPPT_GEN_ALL_CORE_FT
Medical Progress Note      NAME: Sydney Garcia   :  1934  MRM:  [id number]    Date/Time: 2017  10:07 AM         Subjective:     Chief Complaint:  \"I am okay\"     No complaints this AM. Sat's still transiently drop but today when in the room, sat's low because pt's O2 was off his nose      ROS:  (bold if positive, if negative)      Cough        Objective:       Vitals:          Last 24hrs VS reviewed since prior progress note. Most recent are:    Visit Vitals    /48 (BP 1 Location: Right arm, BP Patient Position: At rest)    Pulse 60    Temp 98.6 °F (37 °C)    Resp 16    Ht 5' 9\" (1.753 m)    Wt 72.6 kg (160 lb)    SpO2 99%    BMI 23.63 kg/m2     SpO2 Readings from Last 6 Encounters:   17 99%   17 91%   02/15/17 98%   10/05/16 92%   16 95%   16 95%    O2 Flow Rate (L/min): 2 l/min       Intake/Output Summary (Last 24 hours) at 17 1133  Last data filed at 17 6738   Gross per 24 hour   Intake              120 ml   Output              300 ml   Net             -180 ml          Exam:     Physical Exam:    Gen:  Well-developed, well-nourished, in no acute distress  HEENT:  Pink conjunctivae, hearing intact to voice, moist mucous membranes  Resp:  Scattered wheezes and crackles   Card:  2/6 SCOTT LLSB, normal S1, S2 without thrills or peripheral edema  Abd:  Soft, non-tender, non-distended, normoactive bowel sounds are present  Skin:  No rashes  Neuro: Face symmetric, tongue midline, speech fluent,  strength is 5/5 bilaterally, follows commands appropriately  Psych:  Alert.  Moderate insight   R hip dressing C/D/I     Medications Reviewed: (see below)    Lab Data Reviewed: (see below)    ______________________________________________________________________    Medications:     Current Facility-Administered Medications   Medication Dose Route Frequency    furosemide (LASIX) injection 40 mg  40 mg IntraVENous BID    [START ON 2017] glipiZIDE (GLUCOTROL) tablet 10 mg  10 mg Oral ACB    fluticasone (FLONASE) 50 mcg/actuation nasal spray 2 Spray  2 Spray Both Nostrils DAILY    diphenoxylate-atropine (LOMOTIL) tablet 1 Tab  1 Tab Oral QID PRN    cholecalciferol (VITAMIN D3) tablet 3,000 Units  3,000 Units Oral DAILY    magnesium gluconate 500 mg (27 mg  elemental) tablet 500 mg  500 mg Oral DAILY    sodium chloride (NS) flush 5-10 mL  5-10 mL IntraVENous Q8H    sodium chloride (NS) flush 5-10 mL  5-10 mL IntraVENous PRN    acetaminophen (TYLENOL) tablet 650 mg  650 mg Oral Q6H    traMADol (ULTRAM) tablet 50 mg  50 mg Oral Q6H PRN    hydrOXYzine HCl (ATARAX) tablet 10 mg  10 mg Oral Q8H PRN    naloxone (NARCAN) injection 0.4 mg  0.4 mg IntraVENous PRN    ondansetron (ZOFRAN ODT) tablet 4 mg  4 mg Oral Q4H PRN    calcium-vitamin D (OS-VERONICA) 500 mg-200 unit tablet  1 Tab Oral TID WITH MEALS    senna-docusate (PERICOLACE) 8.6-50 mg per tablet 1 Tab  1 Tab Oral BID    polyethylene glycol (MIRALAX) packet 17 g  17 g Oral DAILY    bisacodyl (DULCOLAX) suppository 10 mg  10 mg Rectal DAILY PRN    enoxaparin (LOVENOX) injection 40 mg  40 mg SubCUTAneous DAILY    acetaminophen (TYLENOL) tablet 650 mg  650 mg Oral Q4H PRN    morphine injection 2 mg  2 mg IntraVENous Q4H PRN    escitalopram oxalate (LEXAPRO) tablet 20 mg  20 mg Oral DAILY    pantoprazole (PROTONIX) tablet 40 mg  40 mg Oral ACB    donepezil (ARICEPT) tablet 10 mg  10 mg Oral DAILY    ferrous sulfate tablet 325 mg  325 mg Oral DAILY    finasteride (PROSCAR) tablet 5 mg  5 mg Oral QPM    tamsulosin (FLOMAX) capsule 0.4 mg  0.4 mg Oral QHS    levothyroxine (SYNTHROID) tablet 125 mcg  125 mcg Oral ACB    QUEtiapine (SEROquel) tablet 50 mg  50 mg Oral DAILY    insulin lispro (HUMALOG) injection   SubCUTAneous AC&HS    glucose chewable tablet 16 g  4 Tab Oral PRN    dextrose (D50W) injection syrg 12.5-25 g  12.5-25 g IntraVENous PRN    glucagon (GLUCAGEN) injection 1 mg  1 mg IntraMUSCular PRN    albuterol-ipratropium (DUO-NEB) 2.5 MG-0.5 MG/3 ML  3 mL Nebulization Q6H RT    guaiFENesin ER (MUCINEX) tablet 600 mg  600 mg Oral Q12H            Lab Review:     Recent Labs      12/25/17 0923 12/24/17   0545  12/23/17   0446   WBC  7.1  9.5  8.8   HGB  8.7*  8.8*  9.0*   HCT  26.8*  26.7*  27.2*   PLT  140*  130*  120*     Recent Labs      12/25/17 0923 12/24/17   0545  12/23/17   0446   NA  137  141  139   K  4.5  4.9  4.3   CL  101  105  103   CO2  28  29  27   GLU  317*  194*  196*   BUN  29*  22*  22*   CREA  1.22  1.10  1.17   CA  9.0  8.8  8.2*   MG  1.8   --   2.0     Lab Results   Component Value Date/Time    Glucose (POC) 296 12/25/2017 11:11 AM    Glucose (POC) 211 12/25/2017 07:19 AM    Glucose (POC) 153 12/24/2017 09:13 PM    Glucose (POC) 135 12/24/2017 04:01 PM    Glucose (POC) 217 12/24/2017 11:42 AM        Assessment / Plan:   Acute renal insufficiency / CKD 3: resolved   -start IV lasix as mildly volume overloaded     Hip fracture, right ([facility] (12/21/2017):  Post mechanical fall. Non-displaced by imaging. S/P IM nail. -PT/OT on board  -ortho on board  -Lovenox for DVT prophylaxis at discharge  -[facility] at discharge      COPD / Pulmonary hypertension (6/15/2015) / Chronic respiratory failure: CXR without acute process   -continue duonebs  -LE dopplers negative for DVT   -IV diuresis as above as elevated proBNP and crackles on exam      HTN / Mild aortic stenosis (6/15/2015) / CAD (coronary artery disease) (6/26/2015) / Chronic systolic CHF:  Hx of CABG.    -holding coreg and BP and HR will not allow   -start IV diuresis      Hypothyroidism (6/28/2015):  -continue levothyroxine      Diabetes mellitus ([facility] (6/28/2015) type 2 with renal complications: K4C 7.2  -ISS   -BG checks AC TID and qHS   -on glipizide 5mg BID; can consider dose reduction if hypoglycemia noted       Dementia (11/20/2015):  at baseline   -continue home meds     Third degree AV block ([facility] (6/11/2015): Has pacemaker.     PVD (peripheral vascular disease) ([facility] ():  Overview: STENT RIGHT GROIN, PAD    Total time spent with patient: [address] discussed with: Patient, RN     Discussed:  Care Plan    Prophylaxis:  Lovenox    Disposition:  [address]            ___________________________________________________    Attending Physician: Lis Parra MD Please make an appointment to follow up with Dr. Dowd within 1 week.

## 2020-09-17 NOTE — DISCHARGE NOTE PROVIDER - INSTRUCTIONS
Please continue to follow a heart healthy diet, low in sodium, cholesterol and fats. Please limit to only 1.5L of fluids daily.

## 2020-09-18 LAB
-  CEFAZOLIN: SIGNIFICANT CHANGE UP
-  LINEZOLID: SIGNIFICANT CHANGE UP
-  OXACILLIN: SIGNIFICANT CHANGE UP
-  RIFAMPIN: SIGNIFICANT CHANGE UP
-  TRIMETHOPRIM/SULFAMETHOXAZOLE: SIGNIFICANT CHANGE UP
-  VANCOMYCIN: SIGNIFICANT CHANGE UP
CULTURE RESULTS: SIGNIFICANT CHANGE UP
METHOD TYPE: SIGNIFICANT CHANGE UP
ORGANISM # SPEC MICROSCOPIC CNT: SIGNIFICANT CHANGE UP
ORGANISM # SPEC MICROSCOPIC CNT: SIGNIFICANT CHANGE UP
SPECIMEN SOURCE: SIGNIFICANT CHANGE UP
SPECIMEN SOURCE: SIGNIFICANT CHANGE UP

## 2020-09-19 LAB
-  CEFTRIAXONE: SIGNIFICANT CHANGE UP
-  ERYTHROMYCIN: SIGNIFICANT CHANGE UP
-  GENTAMICIN: SIGNIFICANT CHANGE UP
-  VANCOMYCIN: SIGNIFICANT CHANGE UP
-  VANCOMYCIN: SIGNIFICANT CHANGE UP
CULTURE RESULTS: SIGNIFICANT CHANGE UP
METHOD TYPE: SIGNIFICANT CHANGE UP
METHOD TYPE: SIGNIFICANT CHANGE UP
ORGANISM # SPEC MICROSCOPIC CNT: SIGNIFICANT CHANGE UP

## 2020-09-23 ENCOUNTER — APPOINTMENT (OUTPATIENT)
Dept: CARE COORDINATION | Facility: HOME HEALTH | Age: 75
End: 2020-09-23
Payer: MEDICARE

## 2020-09-23 DIAGNOSIS — Z79.84 LONG TERM (CURRENT) USE OF ORAL HYPOGLYCEMIC DRUGS: ICD-10-CM

## 2020-09-23 DIAGNOSIS — M81.0 AGE-RELATED OSTEOPOROSIS WITHOUT CURRENT PATHOLOGICAL FRACTURE: ICD-10-CM

## 2020-09-23 DIAGNOSIS — Z86.718 PERSONAL HISTORY OF OTHER VENOUS THROMBOSIS AND EMBOLISM: ICD-10-CM

## 2020-09-23 DIAGNOSIS — Z94.0 KIDNEY TRANSPLANT STATUS: ICD-10-CM

## 2020-09-23 DIAGNOSIS — I50.31 ACUTE DIASTOLIC (CONGESTIVE) HEART FAILURE: ICD-10-CM

## 2020-09-23 DIAGNOSIS — I49.3 VENTRICULAR PREMATURE DEPOLARIZATION: ICD-10-CM

## 2020-09-23 DIAGNOSIS — E11.9 TYPE 2 DIABETES MELLITUS WITHOUT COMPLICATIONS: ICD-10-CM

## 2020-09-23 DIAGNOSIS — Z79.01 LONG TERM (CURRENT) USE OF ANTICOAGULANTS: ICD-10-CM

## 2020-09-23 DIAGNOSIS — Z79.899 OTHER LONG TERM (CURRENT) DRUG THERAPY: ICD-10-CM

## 2020-09-23 DIAGNOSIS — D64.9 ANEMIA, UNSPECIFIED: ICD-10-CM

## 2020-09-23 DIAGNOSIS — R06.02 SHORTNESS OF BREATH: ICD-10-CM

## 2020-09-23 DIAGNOSIS — Z87.891 PERSONAL HISTORY OF NICOTINE DEPENDENCE: ICD-10-CM

## 2020-09-23 DIAGNOSIS — E78.5 HYPERLIPIDEMIA, UNSPECIFIED: ICD-10-CM

## 2020-09-23 DIAGNOSIS — I11.0 HYPERTENSIVE HEART DISEASE WITH HEART FAILURE: ICD-10-CM

## 2020-09-23 PROCEDURE — 99348 HOME/RES VST EST LOW MDM 30: CPT | Mod: 95

## 2020-10-05 NOTE — PHYSICAL EXAM
[Normal] : affect was normal and insight and judgment were intact [de-identified] : Limited Visual  Physical Exam

## 2020-10-05 NOTE — PLAN
[FreeTextEntry1] : CV and pulmonary status stable\par Enforced with patient need for daily weights. \par Advised to call for an increase of greater than 2 lbs in a day or 5 pounds in a week. \par Adhere to low salt diet and educated patient on foods that should be avoided such as processed or fast food. Limit fluids to 1 liter a day which is 4-5 glasses. \par Continue medications as ordered.  \par Follow up with Cardiologist., PCP and medical providers\par Instructed the patient to call TCM Team or CCC with any questions or concerns.\par \par \par

## 2020-10-05 NOTE — HISTORY OF PRESENT ILLNESS
[Home] : at home, [unfilled] , at the time of the visit. [Other Location: e.g. Home (Enter Location, City,State)___] : at [unfilled] [Verbal consent obtained from patient] : the patient, [unfilled] [de-identified] : This patient is Enrolled in the [employer] Program through [employer]\par Copied As per [facility] Discharge Summary\par \par "74 year old male with past medical history to include HTN, HLD, DM Type II, CKD, s/p kidney transplant x 13 years ago (On Tacrolimus and Prednisone), prostate cancer, s/p prostatectomy, atrial fibrillation (On Eliquis) who presented to [facility] ED on 9/13/2020 with c/o worsening SOB and LE edema x 5 days.  Pt was noted to be in volume overload and admitted with acute on chronic CHF with flash pulmonary edema treated with IV Lasix.".  The patient was discharged home in stable condition.   \par \par During the TeleHealth visit the patient was in no acute distress. The patient denies chest pain, shortness of breath, cough, hemoptysis, fever, palpitations, syncope.  He states he is taking his medications without difficulty..\par

## 2020-10-05 NOTE — ASSESSMENT
[FreeTextEntry1] : "74 year old male with past medical history to include HTN, HLD, DM Type II, CKD, s/p kidney transplant x 13 years ago (On Tacrolimus and Prednisone), prostate cancer, s/p prostatectomy, atrial fibrillation (On Eliquis) who presented to [facility] ED on 9/13/2020 with c/o worsening SOB and LE edema x 5 days.  Pt was noted to be in volume overload and admitted with acute on chronic CHF with flash pulmonary edema treated with IV Lasix.".  The patient was discharged home in stable condition.   \par \par

## 2020-10-23 ENCOUNTER — NON-APPOINTMENT (OUTPATIENT)
Age: 75
End: 2020-10-23

## 2020-10-28 ENCOUNTER — APPOINTMENT (OUTPATIENT)
Dept: CARDIOTHORACIC SURGERY | Facility: CLINIC | Age: 75
End: 2020-10-28
Payer: MEDICARE

## 2020-10-28 VITALS
BODY MASS INDEX: 27.94 KG/M2 | SYSTOLIC BLOOD PRESSURE: 151 MMHG | HEIGHT: 67 IN | DIASTOLIC BLOOD PRESSURE: 75 MMHG | WEIGHT: 178 LBS | HEART RATE: 64 BPM | RESPIRATION RATE: 19 BRPM | OXYGEN SATURATION: 98 %

## 2020-10-28 DIAGNOSIS — I71.2 THORACIC AORTIC ANEURYSM, W/OUT RUPTURE: ICD-10-CM

## 2020-10-28 PROCEDURE — 99214 OFFICE O/P EST MOD 30 MIN: CPT

## 2020-10-29 PROBLEM — I71.2 THORACIC AORTIC ANEURYSM WITHOUT RUPTURE: Status: ACTIVE | Noted: 2017-07-24

## 2020-11-12 ENCOUNTER — APPOINTMENT (OUTPATIENT)
Dept: ENDOCRINOLOGY | Facility: CLINIC | Age: 75
End: 2020-11-12
Payer: MEDICARE

## 2020-11-12 VITALS
HEART RATE: 73 BPM | BODY MASS INDEX: 25.9 KG/M2 | WEIGHT: 165 LBS | HEIGHT: 67 IN | SYSTOLIC BLOOD PRESSURE: 137 MMHG | DIASTOLIC BLOOD PRESSURE: 76 MMHG

## 2020-11-12 DIAGNOSIS — Z23 ENCOUNTER FOR IMMUNIZATION: ICD-10-CM

## 2020-11-12 LAB
GLUCOSE BLDC GLUCOMTR-MCNC: 127
HBA1C MFR BLD HPLC: 7.4

## 2020-11-12 PROCEDURE — 99214 OFFICE O/P EST MOD 30 MIN: CPT | Mod: 25

## 2020-11-12 PROCEDURE — G0008: CPT

## 2020-11-12 PROCEDURE — 97803 MED NUTRITION INDIV SUBSEQ: CPT

## 2020-11-12 PROCEDURE — 82962 GLUCOSE BLOOD TEST: CPT

## 2020-11-12 PROCEDURE — 90662 IIV NO PRSV INCREASED AG IM: CPT

## 2020-11-12 PROCEDURE — 83036 HEMOGLOBIN GLYCOSYLATED A1C: CPT | Mod: QW

## 2020-11-12 NOTE — RESULTS/DATA
[L1 - L4] : L1 - L4 [BMD ___ g/cm2] : BMD: [unfilled] g/cm2 [T-Score ___] : T-score: [unfilled] [FreeTextEntry2] : August 8, 2017 [de-identified] : 1.3 radius value; total radius T-score -2.9

## 2020-11-12 NOTE — ASSESSMENT
[FreeTextEntry1] : Type 2 diabetes mellitus. Point-of-care HbA1c 7.4% and blood glucose 127 mg/dL today; HbA1c 7.4% in June 2020, 7.4% in February 2020, 7.1% in October 2019, 7.8% in July 2019, 7.6% in April 2019 and 7.7% in January 2019. Mild neuropathy. I congratulated him on his good glycemic control. His goal HbA1c due to age and comorbidities is less than 7.5%. We discussed the importance of diet and exercise and lifestyle modification for glycemic control. We discussed Invokana due to the indication for: risk reduction of end-stage kidney disease, doubling of serum creatinine, cardiovascular death, and hospitalization for heart failure in adults with type 2 diabetes mellitus and diabetic nephropathy with urinary albumin excretion >300 mg/day. He is amenable to a trial of Invokana. We discussed the risks and benefits of the SGLT-2 inhibitor class, including but not limited to urinary tract infection, fungal infection, Mary's gangrene. \par Continue Levemir 12 units at night\par Continue Metformin 500/1500 mg daily; would need decrease if renal function declines\par Continue Trulicity 1.5 mg weekly\par Continue Repaglinide 2 mg with lunch and dinner/1 mg if smaller meal\par Start Invokana 100 mg daily pending insurance authorization\par Continue to check blood sugars twice daily; reviewed glycemic goals\par He is on aspirin \par He is on a blood pressure regimen; blood pressure around goal\par He is on a statin for cholesterol; last lipid panel around goal\par Nephropathy screening: Status post renal transplant and followed by nephrology\par Last ophthalmology appointment: November 2019\par Last podiatry appointment: February 2020\par Last dental appointment: February 2020\par Influenza vaccine administered\par He is up-to-date with pneumonia vaccine\par \par Osteopenia with elevated fracture risk. He has a history of prior traumatic fractures. His 10-year predicted fracture risk prior to therapy was 21% for major osteoporotic fracture and 11% for hip fracture (risk factors of parental history of hip fracture, rheumatoid arthritis as a proxy for type 2 diabetes mellitus), above the treatment thresholds. He received zoledronic acid 5 mg IV in January 2018, January 2019, and March 2020. We discussed the potential benefits and risks of pharmacologic osteoporosis therapy at length, including but not limited to osteonecrosis of the jaw and atypical fracture. We will consider a "drug holiday" from antiresorptive therapy next year. \par Address interval bone density testing next visit\par Calcium 1000 mg daily from diet and supplements (to be taken in divided doses as no more than 500-600 mg can be absorbed at one time); continue current regimen\par Continue current vitamin D regimen\par Diet, exercise and fall prevention discussed\par \par Return to see me in 3 months. Patient advised to call earlier with significant hypo- or hyperglycemia.\par \par CC:\par Dr. Sulaiman Modi, Fax [phone]

## 2020-11-12 NOTE — PHYSICAL EXAM
[Alert] : alert [Healthy Appearance] : healthy appearance [No Acute Distress] : no acute distress [Normal Sclera/Conjunctiva] : normal sclera/conjunctiva [Normal Gait] : normal gait [Normal Insight/Judgement] : insight and judgment were intact [Kyphosis] : no kyphosis present [Acanthosis Nigricans] : no acanthosis nigricans [de-identified] : narrow-based gait with cane

## 2020-11-12 NOTE — HISTORY OF PRESENT ILLNESS
[FreeTextEntry1] : Mr. Wooten is a 75 year-old man with a history of multiple medical problems including polycystic kidney disease status post renal transplant in 2007, type 2 diabetes mellitus, osteopenia, atrial flutter presenting for follow-up of his endocrines issues. I saw him for an initial visit in November 2017 and last in June 2020; he is a former patient of Dr. Danielle Barrera.\par \par Type 2 diabetes mellitus. Point-of-care HbA1c 7.4% and blood glucose 127 mg/dL today; HbA1c 7.4% in June 2020, 7.4% in February 2020, 7.1% in October 2019, 7.8% in July 2019, 7.6% in April 2019 and 7.7% in January 2019. Mild neuropathy.\par He had borderline diabetes prior to renal transplant, subsequently type 2 diabetes mellitus.\par Prior to August 2018 he was taking metformin 500/1500 mg daily, glimepiride 4 mg/2 mg daily, sitagliptin 100 mg daily.\par In August 2018 we adjusted his regimen to metformin 500/1500 mg daily and started semaglutide 0.25 mg weekly; we discontinued glimepiride and sitagliptin. We then increased Ozempic to 0.5 mg weekly without good glycemic effect. \par In September 2018 we changed Ozempic to Trulicity 0.75 mg weekly, increased to 1.5 mg weekly.\par In October 2018 we started Levemir 20 units daily and restarted glimepiride 4 mg daily; continued metformin 500/1500 mg daily and Trulicity 1.5 mg weekly.\par In January 2019 we decreased Levemir to 15 units and increased glimepiride to 6 mg daily; continued metformin 500/1500 mg daily and Trulicity 1.5 mg weekly.\par In April 2019 we discontinued glimepiride and started repaglinide 1 mg with meals.\par In July 2019 we adjusted Levemir to 12 units and adjusted repaglinide to 2 mg with meals; continued metformin 500/1500 mg daily and Trulicity 1.5 mg weekly.\par He is currently taking Levemir 12 units at night, metformin 500/1500 mg daily, Trulicity 1.5 mg weekly, repaglinide 2 mg with meals. \par He is checking blood sugars twice daily as below\par He is on aspirin and apixaban\par He is on a blood pressure regimen\par He is on a statin for cholesterol\par Nephropathy screening: Status post renal transplant and followed by nephrology\par Last ophthalmology appointment: November 2020\par Last podiatry appointment: August 2020; upcoming appointment next week\par Last dental appointment: February 2020; upcoming appointment in December\par He is up-to-date with pneumonia vaccine\par \par Osteopenia with elevated fracture risk\par Osteopenia diagnosed in 2017 by bone density significant for femoral neck T-score -2.4; his 10-year predicted fracture risk was 21% for major osteoporotic fracture and 11% for hip fracture (risk factors of parental history of hip fracture, rheumatoid arthritis as a proxy for type 2 diabetes mellitus)\par Fracture history: Pelvis and right rib fractures in 2006 when bus shelter fell on him; history of right hip fusion at age 6 in the setting of infection, he believes tuberculosis.\par Family history: Father with history of hip fracture in his 80s\par Treatment: Zoledronic acid 5 mg IV in January 2018, January 2019, March 2020\par \par Falls: No\par Height loss: 1/2 inch height loss\par Kidney stones: No\par Dairy intake: 0-1 serving daily (cup of milk ever other day)\par Calcium supplements: None - discontinued due to hypercalcemia on blood tests\par Multivitamin: Centrum Silver with 210 mg calcium and 1000 intl units vitamin D \par Vitamin D supplements: 2000 intl units daily\par \par Osteoporosis risk factors include: Postmenopausal status,  race, prior fracture, falls, height loss, small thin bones, tobacco use, excessive alcohol, anorexia, family history, vitamin D deficiency, corticosteroid use, seizure medications, malabsorption, hyperparathyroidism, hyperthyroidism.\par NEGATIVE EXCEPT: Renal transplant, prior fracture (although traumatic), parental history of hip fracture\par \par Interim History \par He was admitted to [facility] in September 2020 for fluid overload and acute flash pulmonary edema. He was diuresed and symptoms improved. He has been restricted to 1.5 liters of water daily. \par We discussed Invokana last visit as an adjunct to decrease risk of renal disease. Dr. Modi also recommended Invokana. His office was going to prescribe but prescription received for Jardiance. \par Fasting blood sugars 90-130s mg/dL; no drop in blood sugars overnight. Bedtime blood sugars 160-200s mg/dL.\par Weight is down 9 pounds since last visit. Mild lower extremity numbness/tingling; states not painful. No chest pain, shortness of breath, polyuria/polydipsia. \par Medical and surgical history, medications, allergies, social and family history reviewed and updated as needed.

## 2020-11-12 NOTE — ADDENDUM
[FreeTextEntry1] : Procedure Note: Fluzone High-Dose 0.7 mL administered into right deltoid. My Tinoco MD

## 2020-11-18 ENCOUNTER — APPOINTMENT (OUTPATIENT)
Dept: OTOLARYNGOLOGY | Facility: CLINIC | Age: 75
End: 2020-11-18

## 2021-02-19 NOTE — ED ADULT NURSE NOTE - CAS EDN DISCHARGE INTERVENTIONS
----- Message from Winnie Jones sent at 2/9/2021  3:29 PM EST -----  Jyoti Bansal can you give me some insight before calling him? Marina Henson ... Did he say why he was disappointed? ... Erick Roach   ----- Message -----  From: Miranda Lentz LPN  Sent: 6/0/8701   1:49 PM EST  To: Miriam Llanos, #    Irina Hudson,    This patient is very disappointed with [employer]. He has worked in [employer] for the majority of his career. He was a patient of Dr. Radha Crews and then went to [facility] but now back in [place] (sold his [place] home). L/S by Dr. Emili Medley 8/2019. L/S by Dr. Eve Irvin 2/3/21. He wanted to know if this could be presented to management. Thanks,  Barbie  ----- Message -----  From: Brennon Pichardo RN  Sent: 2/3/2021   4:22 PM EST  To: Ana Luisa Guzman LPN    If coming back to our clinic/remote, he will need to be seen by Dr. Emili Medley first and we can check his device that day and request transfer of his remote. Please have him call and schedule visit with both Dr. Emili Medley and device check. Osman Perea  ----- Message -----  From: Miranda Lentz LPN  Sent: 2/4/4388   4:12 PM EST  To: Edwige Steiner RN    Hello,    This pt last saw Dr. Emili Medley 8/2019. Chose to have [place] EP follow device due to logistics. But now due to his health decline, he has sold his [place] home and would like for Dr. Emili Medley to  his care if able. He is unable to stand for long periods and mostly uses his wheelchair. 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 His wife has parkinson's terribly. :(    Getting to [place] [facility] is really going to be a bare for him but he is willing. I can get him in on 2/26 for a pacemaker check at [facility] and then maybe schedule his follow up thereafter with Dr. Emili Medley?   Or would he rather have pt be checked now to transfer the care and remote? [employer] device.     Thanks  Barbie
At this time, I have not been informed of any converstation between the pt and management.
Tucker catheter secured and draining/Arm band on/IV intact

## 2021-04-22 NOTE — PROGRESS NOTE ADULT - PROBLEM SELECTOR PLAN 2
Yes   Patient febrile to 103F in ED. Meeting sepsis criteria with urinary source. Lactate cleared s/p IVF. Fever curve and leukocytosis downtrending.  - C/w Zosyn q6h, Day 3 as of 6/15 of abxs  - Bcx + for E.coli, f/u sensitivities  - Ucx positive for 50,000 colonies of GNR, f/u speciation and sensitivities Resolving, patient febrile to 103F in ED. Meeting sepsis criteria with urinary source. Lactate cleared s/p IVF. Fever curve and leukocytosis downtrending.  - C/w Zosyn q6h, Day 3 as of 6/15 of abxs  - Bcx + for E.coli, f/u sensitivities  - Ucx positive for 50,000 colonies of GNR, f/u speciation and sensitivities Resolving, patient febrile to 103F in ED. Meeting sepsis criteria with urinary source. Lactate cleared s/p IVF. Fever curve and leukocytosis downtrending.  - Given Ucx sensitivities, will switch from Zosyn  to Ceftriaxone today, Day 3 as of 6/15 of abxs  - Bcx + for E.coli, f/u sensitivities  - Ucx growing 50,000 colonies of E.coli, sensitive to Ceftriaxone

## 2021-06-07 ENCOUNTER — APPOINTMENT (OUTPATIENT)
Dept: ENDOCRINOLOGY | Facility: CLINIC | Age: 76
End: 2021-06-07
Payer: MEDICARE

## 2021-06-07 VITALS
WEIGHT: 164 LBS | SYSTOLIC BLOOD PRESSURE: 139 MMHG | HEART RATE: 77 BPM | BODY MASS INDEX: 25.69 KG/M2 | DIASTOLIC BLOOD PRESSURE: 73 MMHG

## 2021-06-07 LAB
GLUCOSE BLDC GLUCOMTR-MCNC: 142
HBA1C MFR BLD HPLC: 7.6

## 2021-06-07 PROCEDURE — 99214 OFFICE O/P EST MOD 30 MIN: CPT | Mod: 25

## 2021-06-07 PROCEDURE — 83036 HEMOGLOBIN GLYCOSYLATED A1C: CPT | Mod: QW

## 2021-06-07 PROCEDURE — 82962 GLUCOSE BLOOD TEST: CPT

## 2021-06-07 PROCEDURE — 97803 MED NUTRITION INDIV SUBSEQ: CPT

## 2021-06-07 RX ORDER — LISINOPRIL 40 MG/1
40 TABLET ORAL
Qty: 30 | Refills: 0 | Status: ACTIVE | COMMUNITY
Start: 2021-05-17

## 2021-06-07 RX ORDER — DULAGLUTIDE 3 MG/.5ML
3 INJECTION, SOLUTION SUBCUTANEOUS
Qty: 1 | Refills: 0 | Status: COMPLETED | COMMUNITY
Start: 2021-06-07 | End: 2021-07-07

## 2021-06-07 NOTE — ASSESSMENT
[FreeTextEntry1] : Type 2 diabetes mellitus. Point-of-care HbA1c 7.6% and glucose 142 mg/dL today; HbA1c 7.4% in November 2020. Mild neuropathy. I congratulated him on his good glycemic control. His goal HbA1c due to age and comorbidities is less than 7.5%. We discussed the importance of diet and exercise and lifestyle modification for glycemic control. We discussed follow-up with nutrition. His fasting blood sugars are relatively low. He is tolerating his current regimen and we will adjust as below to try to simplify.\par Continue metformin 500/1500 mg daily; would need decrease if renal function declines\par Adjust Trulicity to 3 mg weekly for four weeks, then 4.5 mg weekly\par Adjust Invokana to 300 mg daily\par Continue Repaglinide 2 mg with lunch and dinner for one month, then discontinue\par Stop Levemir 12 units at night\par Continue to check blood sugars twice daily; reviewed glycemic goals\par He is on aspirin \par He is on a blood pressure regimen; blood pressure around goal\par He is on a statin for cholesterol; last lipid panel around goal\par Nephropathy screening: Status post renal transplant and followed by nephrology\par Last ophthalmology appointment: November 2020\par Last podiatry appointment: May 2021; every three months\par Last dental appointment: December 2020; every six months\par He is up-to-date with influenza, pneumonia, and COVID-19 (Pfizer) vaccines\par \par Osteopenia with elevated fracture risk. He has a history of prior traumatic fractures. His 10-year predicted fracture risk prior to therapy was 21% for major osteoporotic fracture and 11% for hip fracture (risk factors of parental history of hip fracture, rheumatoid arthritis as a proxy for type 2 diabetes mellitus), above the treatment thresholds. He received zoledronic acid 5 mg IV in January 2018, January 2019, and March 2020. We discussed the potential benefits and risks of pharmacologic osteoporosis therapy at length, including but not limited to osteonecrosis of the jaw and atypical fracture. We will start a "drug holiday" from antiresorptive therapy.\par Interval bone density testing\par Calcium 1000 mg daily from diet and supplements (to be taken in divided doses as no more than 500-600 mg can be absorbed at one time); continue current regimen\par Continue current vitamin D regimen\par Diet, exercise and fall prevention discussed\par \par Return to see me in 3 months. Patient advised to call earlier with significant hypo- or hyperglycemia.\par \par CC:\par Dr. Sulaiman Modi, Fax [phone]

## 2021-06-07 NOTE — RESULTS/DATA
[L1 - L4] : L1 - L4 [BMD ___ g/cm2] : BMD: [unfilled] g/cm2 [T-Score ___] : T-score: [unfilled] [de-identified] : 1/3 radius value; total radius T-score -2.9 [FreeTextEntry2] : August 8, 2017

## 2021-06-07 NOTE — DATA REVIEWED
[FreeTextEntry1] : Laboratories (April 14, 2021) reviewed and significant for: \par Hemoglobin 11.2 g/dL (normal: 13.0-17.7)\par BUN/creatinine 25/1.22 mg/dL (eGFR 58 mL/min)\par HbA1c 7.5%\par LDL 67 mg/dL\par HDL 44 mg/dL\par Total cholesterol 130 mg/dL\par Triglycerides 105 mg/dL

## 2021-06-07 NOTE — PHYSICAL EXAM
[Alert] : alert [Healthy Appearance] : healthy appearance [No Acute Distress] : no acute distress [Normal Sclera/Conjunctiva] : normal sclera/conjunctiva [Normal Insight/Judgement] : insight and judgment were intact [Normal Hearing] : hearing was normal [No Respiratory Distress] : no respiratory distress [No Stigmata of Cushings Syndrome] : no stigmata of Cushings Syndrome [Kyphosis] : no kyphosis present [Acanthosis Nigricans] : no acanthosis nigricans [de-identified] : no moon facies, no supraclavicular fat pads, no hyperpigmented striae  [de-identified] : narrow-based gait with cane

## 2021-06-07 NOTE — HISTORY OF PRESENT ILLNESS
[FreeTextEntry1] : Mr. Wooten is a 75 year-old man with a history of multiple medical problems including polycystic kidney disease status post renal transplant in 2007, type 2 diabetes mellitus, osteopenia, atrial flutter presenting for follow-up of his endocrines issues. I saw him for an initial visit in November 2017 and last in November 2020; he is a former patient of Dr. Danielle Barrera.\par \par Type 2 diabetes mellitus. Point-of-care HbA1c 7.6% and glucose 142 mg/dL today; HbA1c 7.4% in November 2020, 7.4% in June 2020, 7.4% in February 2020, 7.1% in October 2019, 7.8% in July 2019, 7.6% in April 2019 and 7.7% in January 2019. Mild neuropathy.\par He had borderline diabetes prior to renal transplant, subsequently type 2 diabetes mellitus.\par Prior to August 2018 he was taking metformin 500/1500 mg daily, glimepiride 4 mg/2 mg daily, sitagliptin 100 mg daily.\par In August 2018 we adjusted his regimen to metformin 500/1500 mg daily and started semaglutide 0.25 mg weekly; we discontinued glimepiride and sitagliptin. We then increased Ozempic to 0.5 mg weekly without good glycemic effect. \par In September 2018 we changed Ozempic to Trulicity 0.75 mg weekly, increased to 1.5 mg weekly.\par In October 2018 we started Levemir 20 units daily and restarted glimepiride 4 mg daily; continued metformin 500/1500 mg daily and Trulicity 1.5 mg weekly.\par In January 2019 we decreased Levemir to 15 units and increased glimepiride to 6 mg daily; continued metformin 500/1500 mg daily and Trulicity 1.5 mg weekly.\par In April 2019 we discontinued glimepiride and started repaglinide 1 mg with meals.\par In July 2019 we adjusted Levemir to 12 units and adjusted repaglinide to 2 mg with meals; continued metformin 500/1500 mg daily and Trulicity 1.5 mg weekly.\par He is currently taking Levemir 12 units at night, metformin 500/1500 mg daily, Trulicity 1.5 mg weekly, repaglinide 2 mg with meals. \par He is checking blood sugars twice daily as below\par He is on aspirin and apixaban\par He is on a blood pressure regimen\par He is on a statin for cholesterol\par Nephropathy screening: Status post renal transplant and followed by nephrology\par Last ophthalmology appointment: November 2020\par Last podiatry appointment: May 2021; every three months\par Last dental appointment: December 2020; every six months\par He is up-to-date with influenza, pneumonia, and COVID-19 (Pfizer) vaccines\par \par Osteopenia with elevated fracture risk\par Osteopenia diagnosed in 2017 by bone density significant for femoral neck T-score -2.4; his 10-year predicted fracture risk was 21% for major osteoporotic fracture and 11% for hip fracture (risk factors of parental history of hip fracture, rheumatoid arthritis as a proxy for type 2 diabetes mellitus)\par Fracture history: Pelvis and right rib fractures in 2006 when bus shelter fell on him; history of right hip fusion at age 6 in the setting of infection, he believes tuberculosis.\par Family history: Father with history of hip fracture in his 80s\par Treatment: Zoledronic acid 5 mg IV in January 2018, January 2019, March 2020\par \par Falls: No\par Height loss: 1/2 inch height loss\par Kidney stones: No\par Dairy intake: 0-1 serving daily (cup of milk ever other day)\par Calcium supplements: None - discontinued due to hypercalcemia on blood tests\par Multivitamin: Centrum Silver with 210 mg calcium and 1000 intl units vitamin D \par Vitamin D supplements: 2000 intl units daily\par \par Osteoporosis risk factors include: Postmenopausal status,  race, prior fracture, falls, height loss, small thin bones, tobacco use, excessive alcohol, anorexia, family history, vitamin D deficiency, corticosteroid use, seizure medications, malabsorption, hyperparathyroidism, hyperthyroidism.\par NEGATIVE EXCEPT: Renal transplant, prior fracture (although traumatic), parental history of hip fracture\par \par Interim History \par In November 2020 we continued Levemir 12 units at night, continued metformin 500/1500 mg daily, continued Trulicity 1.5 mg weekly, continued repaglinide 2 mg with meals, and started Invokana 100 mg daily. \par Fasting blood sugars 70-90s mg/dL. Bedtime blood sugars 130-150s mg/dL.\par He has seen Dr. Sulaiman Modi. Recent blood test results as below. \par Weight is down 1 pound since last visit. No chest pain, shortness of breath, polyuria/polydipsia, lower extremity numbness/tingling. \par Medical and surgical history, medications, allergies, social and family history reviewed and updated as needed.

## 2021-06-14 NOTE — PROGRESS NOTE ADULT - PROBLEM SELECTOR PLAN 1
MA contacted patient to schedule appointment based on referral by Dr Carol Bird for diarrhea consult. Patient scheduled for appointment 6/18/2021 @ 9:00am with Dr Sung Stacy in [facility]. Patient informed of date, time, location, and what to bring to appointment.      Electronically signed by Patricio Ferro on 6/14/21 at 4:04 PM EDT Overnight, pt found to be febrile to 102.5 rectally. All other VS wnl. lactate nml  - pt meets SIRS criteria for WBC 13K, 22% bands, and T 102.5 with most likely source PNA  - CXR showing left perihilar consolidation and in setting of immunocompromise was started on vanc/zosyn  - pt currently not complaining of URI symptoms including productive cough, sore throat or rhinorrhea  - RVP neg  - f/u blood cx  - UA neg  - c/w vanc 1000mg BID (renally dosed); vanc trough 2/5 at 1pm  - c/w zosyn 4.5mg q6  - will consider de-escalating abx if fever downtrends Overnight, pt found to be febrile to 102.5 rectally. All other VS wnl. lactate nml  - pt meets SIRS criteria for WBC 13K, 22% bands, and T 102.5 with most likely source PNA  - CXR showing left perihilar consolidation and in setting of immunocompromise was started on vanc/zosyn  - pt currently not complaining of URI symptoms including productive cough, sore throat or rhinorrhea  - RVP neg  - f/u blood cx, NGTD  - UA neg  - f/u CMV antibodies  - c/w vanc 1000mg BID (renally dosed); vanc trough 2/5 at 4pm  - c/w zosyn 4.5mg q6h  - will consider de-escalating abx if fever downtrends Overnight, pt found to be febrile to 102.5 rectally. All other VS wnl. lactate nml  - pt meets SIRS criteria for WBC 13K, 22% bands, and T 102.5 with most likely source PNA  - CXR showing left perihilar consolidation and in setting of immunocompromise was started on vanc/zosyn  - pt currently not complaining of URI symptoms including productive cough, sore throat or rhinorrhea  - RVP neg  - f/u blood cx, NGTD  - UA neg  - f/u CMV antibodies  - c/w vanc 1000mg, dosed by level. Has received 3 doses (2/4 - )  - c/w zosyn 2.25mg q6h  - will consider de-escalating abx if fever downtrends Overnight, pt found to be febrile to 102.5 rectally. All other VS wnl. lactate nml  - pt meets SIRS criteria for WBC 13K, 22% bands, and T 102.5 with most likely source PNA  - CXR showing left perihilar consolidation and in setting of immunocompromise was started on vanc/zosyn  - pt currently not complaining of URI symptoms including productive cough, sore throat or rhinorrhea  - ID consulted (Dr. Yoon), appreciate reccs  - RVP neg  - f/u blood cx, NGTD  - UA neg  - f/u CMV antibodies  - c/w vanc 1000mg, dosed by level. Has received 3 doses (2/4 - ).  Will check level tomorrow AM, then dose accordingly  - c/w zosyn 2.25mg q6h  - will consider de-escalating abx if fever downtrends

## 2021-06-24 ENCOUNTER — NON-APPOINTMENT (OUTPATIENT)
Age: 76
End: 2021-06-24

## 2021-06-24 ENCOUNTER — APPOINTMENT (OUTPATIENT)
Dept: HEART AND VASCULAR | Facility: CLINIC | Age: 76
End: 2021-06-24
Payer: MEDICARE

## 2021-06-24 VITALS
HEART RATE: 77 BPM | WEIGHT: 155 LBS | SYSTOLIC BLOOD PRESSURE: 130 MMHG | HEIGHT: 67 IN | TEMPERATURE: 97.8 F | DIASTOLIC BLOOD PRESSURE: 67 MMHG | BODY MASS INDEX: 24.33 KG/M2

## 2021-06-24 PROCEDURE — 93000 ELECTROCARDIOGRAM COMPLETE: CPT

## 2021-06-24 PROCEDURE — 99213 OFFICE O/P EST LOW 20 MIN: CPT

## 2021-06-29 NOTE — REVIEW OF SYSTEMS
[Negative] : Heme/Lymph [Fever] : no fever [Chills] : no chills [Feeling Fatigued] : not feeling fatigued [SOB] : no shortness of breath [Palpitations] : no palpitations [Syncope] : no syncope [Cough] : no cough [Rash] : no rash [Dizziness] : no dizziness

## 2021-06-29 NOTE — CARDIOLOGY SUMMARY
[___] : [unfilled] [LVEF ___%] : LVEF [unfilled]% [None] : normal LV function [Normal] : normal LA size [Mild] : mild mitral regurgitation [de-identified] : 6/24/21 sinus at 60bpm

## 2021-06-29 NOTE — PHYSICAL EXAM
[General Appearance - Well Developed] : well developed [Normal Appearance] : normal appearance [Well Groomed] : well groomed [General Appearance - Well Nourished] : well nourished [No Deformities] : no deformities [General Appearance - In No Acute Distress] : no acute distress [Normal Conjunctiva] : the conjunctiva exhibited no abnormalities [Normal Oral Mucosa] : normal oral mucosa [Normal Jugular Venous V Waves Present] : normal jugular venous V waves present [Respiration, Rhythm And Depth] : normal respiratory rhythm and effort [Exaggerated Use Of Accessory Muscles For Inspiration] : no accessory muscle use [Heart Rate And Rhythm] : heart rate and rhythm were normal [Heart Sounds] : normal S1 and S2 [Edema] : no peripheral edema present [Abnormal Walk] : normal gait [Gait - Sufficient For Exercise Testing] : the gait was sufficient for exercise testing [Skin Color & Pigmentation] : normal skin color and pigmentation [Oriented To Time, Place, And Person] : oriented to person, place, and time [Impaired Insight] : insight and judgment were intact [Affect] : the affect was normal [Mood] : the mood was normal [No Anxiety] : not feeling anxious [Auscultation Breath Sounds / Voice Sounds] : lungs were clear to auscultation bilaterally [] : no rash

## 2021-06-29 NOTE — HISTORY OF PRESENT ILLNESS
[FreeTextEntry1] : Mr. Wooten is a pleasant 74 y/o male with polycystic kidney disease s/p kidney transplant in 2007, HTN, HLD, DM2, Prostate cancer s/p radical prostatectomy 2015, DVT 2006 (in setting of pelvic fracture), and atrial flutter s/p ablation in 1/2018, who presents for routine follow up.\par \par Overall he is doing well.  No symptoms concerning for recurrent arrhythmia.  He denies any chest pain, SOB, palpitations, syncope or near syncope.  He is maintained on Eliquis without issues\par \par

## 2021-06-29 NOTE — DISCUSSION/SUMMARY
[FreeTextEntry1] : Mr. Wooten is a pleasant 74 y/o male with polycystic kidney disease s/p kidney transplant in 2007, HTN, HLD, DM2, Prostate cancer s/p radical prostatectomy 2015, DVT 2006 (in setting of pelvic fracture), and atrial flutter s/p ablation in 1/2018, who presents for routine follow up.  He is in normal sinus rhythm today and has no symptoms concerning for recurrent arrhythmia.  He was relatively asymptomatic while in atrial flutter with a rapid ventricular rate.  WE discussed that he is at a higher risk for develop atrial fibrillation and will remain on Eliquis.  He will follow up in 6 months or sooner if needed and knows to call with any questions or concerns.

## 2021-09-09 ENCOUNTER — NON-APPOINTMENT (OUTPATIENT)
Age: 76
End: 2021-09-09

## 2021-09-09 ENCOUNTER — APPOINTMENT (OUTPATIENT)
Dept: ENDOCRINOLOGY | Facility: CLINIC | Age: 76
End: 2021-09-09
Payer: MEDICARE

## 2021-09-09 VITALS
HEART RATE: 84 BPM | BODY MASS INDEX: 25.37 KG/M2 | DIASTOLIC BLOOD PRESSURE: 57 MMHG | WEIGHT: 162 LBS | SYSTOLIC BLOOD PRESSURE: 120 MMHG

## 2021-09-09 LAB
GLUCOSE BLDC GLUCOMTR-MCNC: 202
HBA1C MFR BLD HPLC: 7.6

## 2021-09-09 PROCEDURE — 82962 GLUCOSE BLOOD TEST: CPT

## 2021-09-09 PROCEDURE — 99214 OFFICE O/P EST MOD 30 MIN: CPT | Mod: 25

## 2021-09-09 PROCEDURE — 83036 HEMOGLOBIN GLYCOSYLATED A1C: CPT | Mod: QW

## 2021-11-10 ENCOUNTER — EMERGENCY (EMERGENCY)
Facility: HOSPITAL | Age: 76
LOS: 1 days | Discharge: HOME | End: 2021-11-10
Attending: EMERGENCY MEDICINE | Admitting: EMERGENCY MEDICINE
Payer: MEDICARE

## 2021-11-10 VITALS
DIASTOLIC BLOOD PRESSURE: 63 MMHG | RESPIRATION RATE: 18 BRPM | HEIGHT: 67 IN | TEMPERATURE: 98 F | HEART RATE: 74 BPM | OXYGEN SATURATION: 98 % | WEIGHT: 154.98 LBS | SYSTOLIC BLOOD PRESSURE: 139 MMHG

## 2021-11-10 VITALS
DIASTOLIC BLOOD PRESSURE: 67 MMHG | HEART RATE: 74 BPM | SYSTOLIC BLOOD PRESSURE: 149 MMHG | RESPIRATION RATE: 18 BRPM | OXYGEN SATURATION: 97 % | TEMPERATURE: 98 F

## 2021-11-10 DIAGNOSIS — Z94.0 KIDNEY TRANSPLANT STATUS: ICD-10-CM

## 2021-11-10 DIAGNOSIS — Z98.890 OTHER SPECIFIED POSTPROCEDURAL STATES: Chronic | ICD-10-CM

## 2021-11-10 DIAGNOSIS — E78.5 HYPERLIPIDEMIA, UNSPECIFIED: ICD-10-CM

## 2021-11-10 DIAGNOSIS — Z79.01 LONG TERM (CURRENT) USE OF ANTICOAGULANTS: ICD-10-CM

## 2021-11-10 DIAGNOSIS — R06.02 SHORTNESS OF BREATH: ICD-10-CM

## 2021-11-10 DIAGNOSIS — I48.91 UNSPECIFIED ATRIAL FIBRILLATION: ICD-10-CM

## 2021-11-10 DIAGNOSIS — Z85.46 PERSONAL HISTORY OF MALIGNANT NEOPLASM OF PROSTATE: ICD-10-CM

## 2021-11-10 DIAGNOSIS — Z20.822 CONTACT WITH AND (SUSPECTED) EXPOSURE TO COVID-19: ICD-10-CM

## 2021-11-10 DIAGNOSIS — I50.9 HEART FAILURE, UNSPECIFIED: ICD-10-CM

## 2021-11-10 DIAGNOSIS — E11.9 TYPE 2 DIABETES MELLITUS WITHOUT COMPLICATIONS: ICD-10-CM

## 2021-11-10 DIAGNOSIS — Z88.6 ALLERGY STATUS TO ANALGESIC AGENT: ICD-10-CM

## 2021-11-10 DIAGNOSIS — Z86.718 PERSONAL HISTORY OF OTHER VENOUS THROMBOSIS AND EMBOLISM: ICD-10-CM

## 2021-11-10 DIAGNOSIS — Z87.448 PERSONAL HISTORY OF OTHER DISEASES OF URINARY SYSTEM: ICD-10-CM

## 2021-11-10 DIAGNOSIS — I11.0 HYPERTENSIVE HEART DISEASE WITH HEART FAILURE: ICD-10-CM

## 2021-11-10 DIAGNOSIS — Z79.84 LONG TERM (CURRENT) USE OF ORAL HYPOGLYCEMIC DRUGS: ICD-10-CM

## 2021-11-10 LAB
ALBUMIN SERPL ELPH-MCNC: 4.2 G/DL — SIGNIFICANT CHANGE UP (ref 3.3–5)
ALP SERPL-CCNC: 53 U/L — SIGNIFICANT CHANGE UP (ref 40–120)
ALT FLD-CCNC: 18 U/L — SIGNIFICANT CHANGE UP (ref 10–45)
ANION GAP SERPL CALC-SCNC: 12 MMOL/L — SIGNIFICANT CHANGE UP (ref 5–17)
APTT BLD: 38.9 SEC — HIGH (ref 27.5–35.5)
AST SERPL-CCNC: 15 U/L — SIGNIFICANT CHANGE UP (ref 10–40)
BASOPHILS # BLD AUTO: 0.03 K/UL — SIGNIFICANT CHANGE UP (ref 0–0.2)
BASOPHILS NFR BLD AUTO: 0.3 % — SIGNIFICANT CHANGE UP (ref 0–2)
BILIRUB SERPL-MCNC: 1.1 MG/DL — SIGNIFICANT CHANGE UP (ref 0.2–1.2)
BUN SERPL-MCNC: 22 MG/DL — SIGNIFICANT CHANGE UP (ref 7–23)
CALCIUM SERPL-MCNC: 10.2 MG/DL — SIGNIFICANT CHANGE UP (ref 8.4–10.5)
CHLORIDE SERPL-SCNC: 104 MMOL/L — SIGNIFICANT CHANGE UP (ref 96–108)
CO2 SERPL-SCNC: 27 MMOL/L — SIGNIFICANT CHANGE UP (ref 22–31)
CREAT SERPL-MCNC: 1.65 MG/DL — HIGH (ref 0.5–1.3)
EOSINOPHIL # BLD AUTO: 0.24 K/UL — SIGNIFICANT CHANGE UP (ref 0–0.5)
EOSINOPHIL NFR BLD AUTO: 2.4 % — SIGNIFICANT CHANGE UP (ref 0–6)
GLUCOSE SERPL-MCNC: 133 MG/DL — HIGH (ref 70–99)
HCT VFR BLD CALC: 32.3 % — LOW (ref 39–50)
HGB BLD-MCNC: 10.2 G/DL — LOW (ref 13–17)
IMM GRANULOCYTES NFR BLD AUTO: 0.4 % — SIGNIFICANT CHANGE UP (ref 0–1.5)
INR BLD: 1.67 — HIGH (ref 0.88–1.16)
LYMPHOCYTES # BLD AUTO: 1.72 K/UL — SIGNIFICANT CHANGE UP (ref 1–3.3)
LYMPHOCYTES # BLD AUTO: 17 % — SIGNIFICANT CHANGE UP (ref 13–44)
MCHC RBC-ENTMCNC: 26.2 PG — LOW (ref 27–34)
MCHC RBC-ENTMCNC: 31.6 GM/DL — LOW (ref 32–36)
MCV RBC AUTO: 83 FL — SIGNIFICANT CHANGE UP (ref 80–100)
MONOCYTES # BLD AUTO: 0.87 K/UL — SIGNIFICANT CHANGE UP (ref 0–0.9)
MONOCYTES NFR BLD AUTO: 8.6 % — SIGNIFICANT CHANGE UP (ref 2–14)
NEUTROPHILS # BLD AUTO: 7.2 K/UL — SIGNIFICANT CHANGE UP (ref 1.8–7.4)
NEUTROPHILS NFR BLD AUTO: 71.3 % — SIGNIFICANT CHANGE UP (ref 43–77)
NRBC # BLD: 0 /100 WBCS — SIGNIFICANT CHANGE UP (ref 0–0)
PLATELET # BLD AUTO: 264 K/UL — SIGNIFICANT CHANGE UP (ref 150–400)
POTASSIUM SERPL-MCNC: 4.2 MMOL/L — SIGNIFICANT CHANGE UP (ref 3.5–5.3)
POTASSIUM SERPL-SCNC: 4.2 MMOL/L — SIGNIFICANT CHANGE UP (ref 3.5–5.3)
PROT SERPL-MCNC: 7.8 G/DL — SIGNIFICANT CHANGE UP (ref 6–8.3)
PROTHROM AB SERPL-ACNC: 19.6 SEC — HIGH (ref 10.6–13.6)
RBC # BLD: 3.89 M/UL — LOW (ref 4.2–5.8)
RBC # FLD: 17.4 % — HIGH (ref 10.3–14.5)
SARS-COV-2 RNA SPEC QL NAA+PROBE: NEGATIVE — SIGNIFICANT CHANGE UP
SODIUM SERPL-SCNC: 143 MMOL/L — SIGNIFICANT CHANGE UP (ref 135–145)
WBC # BLD: 10.1 K/UL — SIGNIFICANT CHANGE UP (ref 3.8–10.5)
WBC # FLD AUTO: 10.1 K/UL — SIGNIFICANT CHANGE UP (ref 3.8–10.5)

## 2021-11-10 PROCEDURE — 85025 COMPLETE CBC W/AUTO DIFF WBC: CPT

## 2021-11-10 PROCEDURE — 84100 ASSAY OF PHOSPHORUS: CPT

## 2021-11-10 PROCEDURE — 85730 THROMBOPLASTIN TIME PARTIAL: CPT

## 2021-11-10 PROCEDURE — 71045 X-RAY EXAM CHEST 1 VIEW: CPT | Mod: 26

## 2021-11-10 PROCEDURE — 99284 EMERGENCY DEPT VISIT MOD MDM: CPT | Mod: 25

## 2021-11-10 PROCEDURE — 36415 COLL VENOUS BLD VENIPUNCTURE: CPT

## 2021-11-10 PROCEDURE — 83690 ASSAY OF LIPASE: CPT

## 2021-11-10 PROCEDURE — 83735 ASSAY OF MAGNESIUM: CPT

## 2021-11-10 PROCEDURE — 93010 ELECTROCARDIOGRAM REPORT: CPT

## 2021-11-10 PROCEDURE — 84484 ASSAY OF TROPONIN QUANT: CPT

## 2021-11-10 PROCEDURE — 71045 X-RAY EXAM CHEST 1 VIEW: CPT

## 2021-11-10 PROCEDURE — 93005 ELECTROCARDIOGRAM TRACING: CPT

## 2021-11-10 PROCEDURE — 83880 ASSAY OF NATRIURETIC PEPTIDE: CPT

## 2021-11-10 PROCEDURE — 99285 EMERGENCY DEPT VISIT HI MDM: CPT | Mod: CS,25

## 2021-11-10 PROCEDURE — 87635 SARS-COV-2 COVID-19 AMP PRB: CPT

## 2021-11-10 PROCEDURE — 85610 PROTHROMBIN TIME: CPT

## 2021-11-10 PROCEDURE — 96374 THER/PROPH/DIAG INJ IV PUSH: CPT

## 2021-11-10 PROCEDURE — 80053 COMPREHEN METABOLIC PANEL: CPT

## 2021-11-10 RX ORDER — FUROSEMIDE 40 MG
60 TABLET ORAL ONCE
Refills: 0 | Status: COMPLETED | OUTPATIENT
Start: 2021-11-10 | End: 2021-11-10

## 2021-11-10 RX ADMIN — Medication 60 MILLIGRAM(S): at 14:28

## 2021-11-10 NOTE — ED PROVIDER NOTE - PATIENT PORTAL LINK FT
You can access the FollowMyHealth Patient Portal offered by [facility] by registering at the following website: [email or web address]. By joining [employer]’s FollowMyHealth portal, you will also be able to view your health information using other applications (apps) compatible with our system.

## 2021-11-10 NOTE — ED PROVIDER NOTE - OBJECTIVE STATEMENT
75 y/o M w/ Hx of polycystic kidney disease (had kidney transplant in 2007, prior to that in dialysis for 4.5 yrs, transplanted kidney functionally normally since transplant (on tacrolimus and prednisone chronically for transplant)), HTN, HLD, CHF, DM, AFib (s/p ablation 1x and on Eliquis), and CHF (torsemide; reports he is compliant and has not missed doses), presents today w/ SOB worsening over the past 4-5 days and 4 lb weight gain. States that he can normally walk a block w/o SOB but having hard time walking the block now; improves after stopping walking. Endorses chronic right hip pain secondary to "fusion" and right leg arthritis of right ankle that limits his mobility as well. Denies CP, palpitations. Endorses bilateral lower extremity edema that has increased. Reports right lower extremity chronically more swollen than the left but both extremities more swollen than normal for the past 3-4 days. Pt sees Dr. Fannie Dowd(cardiology). 75 y/o M w/ PMH of polycystic kidney disease (had kidney transplant in 2007, prior to that on dialysis for 4.5 yrs, reports transplanted kidney functioning normally since transplant (on tacrolimus and prednisone chronically for transplant)), HTN, HLD, CHF, DM, AFib (s/p ablation 1x and on Eliquis), and CHF (torsemide; reports he is compliant and has not missed doses), presents today w/ SOB worsening over the past 4-5 days and 4 lb weight gain. States that he can normally walk a block w/o SOB but having hard time walking the block now; improves after stopping walking. 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 Denies CP, palpitations. Endorses bilateral lower extremity edema that has increased. Reports right lower extremity chronically more swollen than the left but both extremities more swollen than normal for the past 3-4 days. Pt sees Dr. Fannie Dowd(cardiology).

## 2021-11-10 NOTE — ED ADULT NURSE NOTE - OBJECTIVE STATEMENT
Pt reports SOB starting yesterday 11/09/21 in the AM and a 5lb weight gain over the past 3 days. +3 pitting edema in bilateral LE, lung sounds clear diminished. Pt is compliant with all medications. Denies difficulty urinating or change in urination pattern. Pt speaking in full sentences. Repaired fistula in L arm, Do Not Use armband placed upon arrival to ED.

## 2021-11-10 NOTE — ED ADULT NURSE NOTE - CHIEF COMPLAINT QUOTE
Pt c/o shortness of breath and weight gain over past 5 days. Hx of CHF, reports compliance with diuretic. Reports intermittent chest pain. Denies fevers, chills. Speaking clearly in full sentences.

## 2021-11-10 NOTE — ED PROVIDER NOTE - CLINICAL SUMMARY MEDICAL DECISION MAKING FREE TEXT BOX
75 y/o M w/ Hx of kidney transplant, HTN, HLD, CHF, DM, AFib, and CHF, presents for SOB worsening over the past 4-5 days and 4lb weight gain. Endorses chronic bilateral lower extremity swelling, right greater than left, but both more swollen than normal for the past 3-4 days. Plan for cardiac enzymes, labs, will give Lasix 60mg, and re-eval. 77 y/o M w/ Hx of kidney transplant, HTN, HLD, CHF, DM, AFib, and CHF, presents for SOB worsening over the past 4-5 days and 4lb weight gain. Endorses chronic bilateral lower extremity swelling, right greater than left, but both more swollen than normal for the past 3-4 days. Plan for cardiac enzymes, labs, will give Lasix 60mg, and re-eval.  ED course: VS noted. Pt afebrile and VS WNL with normal BP and pulse ox. Non-toxic appearing and ambulating around ED with no SOB. Given lasix with >1 liter uop. Feeling much better. Labs noted and at baseline. Creatinine elevated and trop also elevated, likely sec to Creatinine (pt with no CP)- similar findings in prior labs. ECG with NAD. Case discussed with pt's cardiologist and pt to f/up outpt with her in a couple of days. Pt is comfortable with this plan and is feeling better. Return precautions given. 75 y/o M w/ Hx of kidney transplant, HTN, HLD, CHF, DM, AFib, and CHF, presents for SOB worsening over the past 4-5 days and 4lb weight gain. Endorses chronic bilateral lower extremity swelling, right greater than left, but both more swollen than normal for the past 3-4 days. Plan for cardiac enzymes, labs, will give Lasix 60mg, and re-eval.  ED course: VS noted. Pt afebrile and VS WNL with normal BP and pulse ox. Non-toxic appearing and ambulating around ED with no SOB. Given lasix with >1 liter uop. Feeling much better. Labs noted and at baseline. Creatinine elevated and trop also elevated, likely sec to Creatinine (pt with no CP)- similar findings in prior labs. ECG with NAD. CXR with Cardiomegaly, but no pulm edema or effusions Case discussed with pt's cardiologist and pt to f/up outpt with her in a couple of days. Pt is comfortable with this plan and is feeling better. Return precautions given.

## 2021-11-10 NOTE — ED ADULT NURSE NOTE - NSIMPLEMENTINTERV_GEN_ALL_ED
Implemented All Fall with Harm Risk Interventions:  [place] to call system. Call bell, personal items and telephone within reach. Instruct patient to call for assistance. Room bathroom lighting operational. Non-slip footwear when patient is off stretcher. Physically safe environment: no spills, clutter or unnecessary equipment. Stretcher in lowest position, wheels locked, appropriate side rails in place. Provide visual cue, wrist band, yellow gown, etc. Monitor gait and stability. Monitor for mental status changes and reorient to person, place, and time. Review medications for side effects contributing to fall risk. Reinforce activity limits and safety measures with patient and family. Provide visual clues: red socks.

## 2021-11-10 NOTE — ED PROVIDER NOTE - ST/T WAVE
T wave inversion in inferior lateral and anterolateral leads. T wave inversion in inferior, lateral and anterolateral leads.

## 2021-11-10 NOTE — ED PROVIDER NOTE - CARE PROVIDER_API CALL
SORAIDA SANCHEZ  Cardiovascular Diseases  [address] [place] [address]  Phone: [phone]  Fax: [phone]  Follow Up Time:

## 2021-11-10 NOTE — ED PROVIDER NOTE - NSFOLLOWUPINSTRUCTIONS_ED_ALL_ED_FT
Please follow up with your Cardiologist Dr. Dowd in 2-3 days. Return to the ER if you develop any concerning symptoms.     Heart Failure    WHAT YOU NEED TO KNOW:    Heart failure is a condition that does not allow your heart to fill or pump properly. Not enough oxygen in your blood gets to your organs and tissues. Fluid may not move through your body properly. Fluid builds up and causes swelling and trouble breathing. This is known as congestive heart failure. Heart failure may start in the left or right ventricle. Heart failure is often caused by damage or injury to your heart. The damage may be caused by other heart problems, diabetes, or high blood pressure. The damage may have also been caused by an infection. Heart failure is a long-term condition that tends to get worse over time. It is important to manage your health to improve your quality of life.    Heart Failure         DISCHARGE INSTRUCTIONS:    Call your local emergency number (911 in the ) if:   •You have any of the following signs of a heart attack: ?Squeezing, pressure, or pain in your chest      ?You may also have any of the following: ?Discomfort or pain in your back, neck, jaw, stomach, or arm      ?Shortness of breath      ?Nausea or vomiting      ?Lightheadedness or a sudden cold sweat            Call your doctor if:   •Your heartbeat is fast, slow, or uneven all the time.      •You have symptoms of worsening heart failure: ?Shortness of breath at rest, at night, or that is getting worse in any way      ?Weight gain of 3 or more pounds (1.4 kg) in a day, or more than your healthcare provider says is okay      ?More swelling in your legs or ankles      ?Abdominal pain or swelling      ?More coughing      ?Loss of appetite      ?Feeling tired all the time      •You feel hopeless or depressed, or you have lost interest in things you used to enjoy.      •You often feel worried or afraid.      •You have questions or concerns about your condition or care.      Medicines:   •Medicines may be needed to help regulate your heart rhythm. You may also need medicine to lower your blood pressure, and to decrease extra fluids.      •Take your medicine as directed. Contact your healthcare provider if you think your medicine is not helping or if you have side effects. Tell him of her if you are allergic to any medicine. Keep a list of the medicines, vitamins, and herbs you take. Include the amounts, and when and why you take them. Bring the list or the pill bottles to follow-up visits. Carry your medicine list with you in case of an emergency.      Go to cardiac rehab if directed: Cardiac rehab is a program run by specialists who will help you safely strengthen your heart. The program includes exercise, relaxation, stress management, and heart-healthy nutrition.    Manage swelling from extra fluid:   •Elevate (raise) your legs above the level of your heart. This will help with fluid that builds up in your legs or ankles. Elevate your legs as often as possible during the day. Prop your legs on pillows or blankets to keep them elevated comfortably. Try not to stand for long periods of time during the day. Move around to keep your blood circulating.  Elevate Leg           •Limit sodium (salt). Ask how much sodium you can have each day. Your healthcare provider may give you a limit, such as 2,300 milligrams (mg) a day. Your provider or a dietitian can teach you how to read food labels for the number of mg in a food. He or she can also help you find ways to have less salt. For example, if you add salt to food as you cook, do not add more at the table.             •Drink liquids as directed. You may need to limit the amount of liquid you drink within 24 hours. Your healthcare provider will tell you how much liquid to have and which liquids are best for you. He or she may tell you to limit liquid to 1.5 to 2 liters in a day. He or she will also tell you how often to drink liquid throughout the day.      •Weigh yourself every morning. Use the same scale, in the same spot. Do this after you use the bathroom, but before you eat or drink. Wear the same type of clothing each time. Write down your weight and call your healthcare provider if you have a sudden weight gain. Swelling and weight gain are signs of fluid buildup.  Weight Checks LILI           Manage heart failure: Your quality of life may improve with treatment and the following:  •Do not smoke. Nicotine and other chemicals in cigarettes and cigars can cause lung and heart damage. Ask your healthcare provider for information if you currently smoke and need help to quit. E-cigarettes or smokeless tobacco still contain nicotine. Talk to your healthcare provider before you use these products.      •Do not drink alcohol or use illegal drugs. Alcohol and drugs can increase your risk for high blood pressure, diabetes, and coronary artery disease.      •Eat heart-healthy foods. Heart-healthy foods include fruits, vegetables, lean meat (such as beef, chicken, or pork), and low-fat dairy products. Fatty fish such as salmon and tuna are also heart healthy. Other heart-healthy foods include walnuts, whole-grain breads, beans, and cooked beans. Replace butter and margarine with heart-healthy oils such as olive oil or canola oil. Your provider or a dietitian can help you create heart-healthy meal plans.             •Manage any chronic health conditions you have. These include high blood pressure, diabetes, obesity, high cholesterol, metabolic syndrome, and COPD. You will have fewer symptoms if you manage these health conditions. Follow your healthcare provider's recommendations and follow up with him or her regularly.      •Maintain a healthy weight. Being overweight can increase your risk for high blood pressure, diabetes, and coronary artery disease. These conditions can make your symptoms worse. Ask your healthcare provider how much you should weigh. Ask him or her to help you create a weight loss plan if you are overweight.      •Stay active. Activity can help keep your symptoms from getting worse. Walking is a type of physical activity that helps maintain your strength and improve your mood. Physical activity also helps you manage your weight. Work with your healthcare provider to create an exercise plan that is right for you.    Family Walking for Exercise           •Get vaccines as directed. The flu and pneumonia can be severe for a person who has heart failure. Vaccines protect you from these infections. Get a flu shot every year as soon as it is recommended, usually in September or October. You may also need the pneumonia vaccine. Your healthcare provider can tell you if you need other vaccines, and when to get them.      Follow up with your doctor within 7 days or as directed: You may need to return for other tests. You may need home health care. A healthcare provider will monitor your vital signs, weight, and make sure your medicines are working. Write down your questions so you remember to ask them during your visits.    Join a support group: Heart failure can be difficult to manage. It may be helpful to talk with others who have heart failure. You may learn how to better manage your condition or get emotional support. For more information:   •American [employer] Association  [address] [place] [id number]   Phone: 1-387.783.7770  Web Address: http://www.heart.org            © Copyright [employer] 2021         Shortness of breath    Shortness of breath (dyspnea) means you have trouble breathing and could indicate a medical problem. Causes include lung disease, heart disease, low amount of red blood cells (anemia), poor physical fitness, being overweight, smoking, etc. Your health care provider today may not be able to find a cause for your shortness of breath after your exam. In this case, it is important to have a follow-up exam with your primary care physician as instructed. If medicines were prescribed, take them as directed for the full length of time directed. Refrain from tobacco products.    SEEK IMMEDIATE MEDICAL CARE IF YOU HAVE ANY OF THE FOLLOWING SYMPTOMS: worsening shortness of breath, chest pain, back pain, abdominal pain, fever, coughing up blood, lightheadedness/dizziness.

## 2021-12-01 ENCOUNTER — NON-APPOINTMENT (OUTPATIENT)
Age: 76
End: 2021-12-01

## 2021-12-02 ENCOUNTER — APPOINTMENT (OUTPATIENT)
Dept: RADIOLOGY | Facility: HOSPITAL | Age: 76
End: 2021-12-02
Payer: MEDICARE

## 2021-12-02 ENCOUNTER — OUTPATIENT (OUTPATIENT)
Dept: OUTPATIENT SERVICES | Facility: HOSPITAL | Age: 76
LOS: 1 days | End: 2021-12-02
Payer: MEDICARE

## 2021-12-02 DIAGNOSIS — Z98.890 OTHER SPECIFIED POSTPROCEDURAL STATES: Chronic | ICD-10-CM

## 2021-12-02 PROCEDURE — 77085 DXA BONE DENSITY AXL VRT FX: CPT

## 2021-12-02 PROCEDURE — 77085 DXA BONE DENSITY AXL VRT FX: CPT | Mod: 26

## 2021-12-03 NOTE — RESULTS/DATA
[L1 - L4] : L1 - L4 [BMD ___ g/cm2] : BMD: [unfilled] g/cm2 [T-Score ___] : T-score: [unfilled] [FreeTextEntry2] : August 8, 2017 [de-identified] : 1/3 radius value; total radius T-score -2.9

## 2021-12-03 NOTE — ADDENDUM
[FreeTextEntry1] : Invokana 100 mg daily covered by insurance; discussed with Mr. Wooten. We will restart Invokana 100 mg daily; he can hold Levemir and repaglinide for now and restart if fasting or postprandial values are elevated, respectively. 9/13/21\par \par Recent bone density as below. Bone density demonstrated a significant improvement at the spine and hip sites from previous in 2017. I will discuss with Mr. Wooten at his upcoming appointment. 12/03/21

## 2021-12-03 NOTE — HISTORY OF PRESENT ILLNESS
[FreeTextEntry1] : Mr. Wooten is a 75 year-old man with a history of multiple medical problems including polycystic kidney disease status post renal transplant in 2007, type 2 diabetes mellitus, osteopenia, atrial flutter presenting for follow-up of his endocrines issues. I saw him for an initial visit in November 2017 and last in June 2021; he is a former patient of Dr. Danielle Barrera.\par \par Type 2 diabetes mellitus. Point-of-care HbA1c 7.6% and glucose 142 mg/dL today; HbA1c 7.6% in June 2021, 7.4% in November 2020, 7.4% in June 2020, 7.4% in February 2020, 7.1% in October 2019, 7.8% in July 2019, 7.6% in April 2019 and 7.7% in January 2019. Mild neuropathy.\par He had borderline diabetes prior to renal transplant, subsequently type 2 diabetes mellitus.\par In June 2021 we discussed continuing metformin 500/1500 mg daily, adjusted Trulicity to 4.5 mg weekly, adjusted Invokana to 300 mg daily, and discontinued Levemir and repaglinide. Insurance would not cover Invokana 300 mg daily, even though 100 mg daily was covered. He is currently taking Levemir 12 units at night, metformin 500/1500 mg daily, Trulicity to 4.5 mg weekly, repaglinide 2 mg with lunch and dinner. \par He is checking blood sugars twice daily as below\par He is on aspirin and apixaban\par He is on a blood pressure regimen\par He is on a statin for cholesterol\par Nephropathy screening: Status post renal transplant and followed by nephrology\par Last ophthalmology appointment: November 2020; annual and upcoming appointment scheduled\par Last podiatry appointment: May 2021; every three months and upcoming appointment\par Last dental appointment: July 2021; every six months\par He is up-to-date with pneumonia and COVID-19 (Pfizer) vaccines\par \par Osteopenia with elevated fracture risk\par Osteopenia diagnosed in 2017 by bone density significant for femoral neck T-score -2.4; his 10-year predicted fracture risk was 21% for major osteoporotic fracture and 11% for hip fracture (risk factors of parental history of hip fracture, rheumatoid arthritis as a proxy for type 2 diabetes mellitus)\par Fracture history: Pelvis and right rib fractures in 2006 when bus shelter fell on him; history of right hip fusion at age 6 in the setting of infection, he believes tuberculosis.\par Family history: Father with history of hip fracture in his 80s\par Treatment: Zoledronic acid 5 mg IV in January 2018, January 2019, March 2020\par \par Falls: None recent\par Height loss: 1/2 inch height loss\par Kidney stones: No\par Dairy intake: 0-1 serving daily (cup of milk ever other day)\par Calcium supplements: None - discontinued due to hypercalcemia on blood tests\par Multivitamin: Centrum Silver with 210 mg calcium and 1000 intl units vitamin D \par Vitamin D supplements: 2000 intl units daily\par \par Osteoporosis risk factors include: Postmenopausal status,  race, prior fracture, falls, height loss, small thin bones, tobacco use, excessive alcohol, anorexia, family history, vitamin D deficiency, corticosteroid use, seizure medications, malabsorption, hyperparathyroidism, hyperthyroidism.\par NEGATIVE EXCEPT: Renal transplant, prior fracture (although traumatic), parental history of hip fracture\par \par Interim History \par In June 2021 we discussed continuing metformin 500/1500 mg daily, adjusted Trulicity to 4.5 mg weekly, adjusted Invokana to 300 mg daily, and discontinued Levemir and repaglinide. Insurance would not cover Invokana 300 mg daily, even though 100 mg daily was covered. He is currently taking Levemir 12 units at night, metformin 500/1500 mg daily, Trulicity to 4.5 mg weekly, repaglinide 2 mg with lunch and dinner. \par He has seen Dr. Sulaiman Modi. Recent blood test results as below. \par Weight is up 7 pounds since last visit. No chest pain, shortness of breath, polyuria/polydipsia, lower extremity numbness/tingling. \par Medical and surgical history, medications, allergies, social and family history reviewed and updated as needed.

## 2021-12-03 NOTE — DATA REVIEWED
[FreeTextEntry1] : Laboratories (July 15, 2021) reviewed and significant for: \par Hemoglobin 0.9 g/dL (normal: 13.0-17.7)\par BUN/creatinine 28/1.40 mg/dL (eGFR 49 mL/min)\par HbA1c 7.7%\par LDL 89 mg/dL\par HDL 44 mg/dL\par Total cholesterol 149 mg/dL\par Triglycerides 82 mg/dL\par 25-hydroxyvitamin D 44.8 ng/mL\par Urine microalbumin 26 mg/g creatinine\par \par Laboratories (April 14, 2021) reviewed and significant for: \par Hemoglobin 11.2 g/dL (normal: 13.0-17.7)\par BUN/creatinine 25/1.22 mg/dL (eGFR 58 mL/min)\par HbA1c 7.5%\par LDL 67 mg/dL\par HDL 44 mg/dL\par Total cholesterol 130 mg/dL\par Triglycerides 105 mg/dL

## 2021-12-03 NOTE — PHYSICAL EXAM
[Alert] : alert [Healthy Appearance] : healthy appearance [No Acute Distress] : no acute distress [Normal Sclera/Conjunctiva] : normal sclera/conjunctiva [Normal Hearing] : hearing was normal [No Respiratory Distress] : no respiratory distress [No Stigmata of Cushings Syndrome] : no stigmata of Cushings Syndrome [Normal Insight/Judgement] : insight and judgment were intact [Kyphosis] : no kyphosis present [Acanthosis Nigricans] : no acanthosis nigricans [de-identified] : no moon facies, no supraclavicular fat pads, no hyperpigmented striae  [de-identified] : narrow-based gait with cane

## 2021-12-03 NOTE — ASSESSMENT
[FreeTextEntry1] : Type 2 diabetes mellitus. Point-of-care HbA1c 7.6% and glucose 142 mg/dL today; HbA1c 7.6% in June 2021. I congratulated him on his good glycemic control. His goal HbA1c due to age and comorbidities is less than 7.5%. We discussed the importance of diet and exercise and lifestyle modification for glycemic control. He is tolerating his current regimen and we will adjust as below to try to simplify; we will obtain prior authorization for Invokana.\par Continue metformin 500/1500 mg daily; would need decrease if renal function declines\par Continue Trulicity to 4.5 mg weekly\par Restart Invokana at 300 mg daily pending insurance authorization\par Discontinue Levemir and repaglinide once restarting Invokana\par Continue to check blood sugars twice daily; reviewed glycemic goals\par He is on aspirin \par He is on a blood pressure regimen; blood pressure around goal\par He is on a statin for cholesterol; last lipid panel around goal\par Nephropathy screening: Status post renal transplant and followed by nephrology\par Last ophthalmology appointment: November 2020; annual and upcoming appointment scheduled\par Last podiatry appointment: May 2021; every three months and upcoming appointment\par Last dental appointment: July 2021; every six months\par He is up-to-date with pneumonia and COVID-19 (Pfizer) vaccines\par \par Osteopenia with elevated fracture risk. He has a history of prior traumatic fractures. His 10-year predicted fracture risk prior to therapy was 21% for major osteoporotic fracture and 11% for hip fracture (risk factors of parental history of hip fracture, rheumatoid arthritis as a proxy for type 2 diabetes mellitus), above the treatment thresholds. He received zoledronic acid 5 mg IV in January 2018, January 2019, and March 2020. We discussed the potential benefits and risks of pharmacologic osteoporosis therapy at length, including but not limited to osteonecrosis of the jaw and atypical fracture. We have started a "drug holiday" from antiresorptive therapy.\par Interval bone density testing\par Calcium 1000 mg daily from diet and supplements (to be taken in divided doses as no more than 500-600 mg can be absorbed at one time); continue current regimen\par Continue current vitamin D regimen\par Diet, exercise and fall prevention discussed\par \par Return to see me in 3 months. Patient advised to call earlier with significant hypo- or hyperglycemia.\par \par CC:\par Dr. Sulaiman Modi, Fax [phone]

## 2021-12-06 ENCOUNTER — APPOINTMENT (OUTPATIENT)
Dept: ENDOCRINOLOGY | Facility: CLINIC | Age: 76
End: 2021-12-06
Payer: MEDICARE

## 2021-12-06 VITALS
WEIGHT: 157 LBS | HEIGHT: 67 IN | HEART RATE: 80 BPM | BODY MASS INDEX: 24.64 KG/M2 | SYSTOLIC BLOOD PRESSURE: 132 MMHG | DIASTOLIC BLOOD PRESSURE: 55 MMHG

## 2021-12-06 DIAGNOSIS — I10 ESSENTIAL (PRIMARY) HYPERTENSION: ICD-10-CM

## 2021-12-06 DIAGNOSIS — E11.65 TYPE 2 DIABETES MELLITUS WITH HYPERGLYCEMIA: ICD-10-CM

## 2021-12-06 LAB
GLUCOSE BLDC GLUCOMTR-MCNC: 128
HBA1C MFR BLD HPLC: 7.6

## 2021-12-06 PROCEDURE — 83036 HEMOGLOBIN GLYCOSYLATED A1C: CPT | Mod: QW

## 2021-12-06 PROCEDURE — 99214 OFFICE O/P EST MOD 30 MIN: CPT | Mod: 25

## 2021-12-06 PROCEDURE — 82962 GLUCOSE BLOOD TEST: CPT

## 2021-12-06 NOTE — DATA REVIEWED
[FreeTextEntry1] : Laboratories (November 17, 2021) reviewed and significant for: \par Hemoglobin 9.2 g/dL (normal: 13.0-17.7)\par BUN/creatinine 33/1.50 mg/dL (eGFR 45 mL/min)\par HbA1c 7.9%\par LDL 63 mg/dL\par HDL 34 mg/dL\par Total cholesterol 117 mg/dL\par Triglycerides 109 mg/dL\par 25-hydroxyvitamin D 44.8 ng/mL\par Urine microalbumin 38 mg/g creatinine\par \par Laboratories (July 15, 2021) reviewed and significant for: \par Hemoglobin 10.9 g/dL (normal: 13.0-17.7)\par BUN/creatinine 28/1.40 mg/dL (eGFR 49 mL/min)\par HbA1c 7.7%\par LDL 89 mg/dL\par HDL 44 mg/dL\par Total cholesterol 149 mg/dL\par Triglycerides 82 mg/dL\par 25-hydroxyvitamin D 44.8 ng/mL\par Urine microalbumin 26 mg/g creatinine\par \par Laboratories (April 14, 2021) reviewed and significant for: \par Hemoglobin 11.2 g/dL (normal: 13.0-17.7)\par BUN/creatinine 25/1.22 mg/dL (eGFR 58 mL/min)\par HbA1c 7.5%\par LDL 67 mg/dL\par HDL 44 mg/dL\par Total cholesterol 130 mg/dL\par Triglycerides 105 mg/dL

## 2021-12-06 NOTE — HISTORY OF PRESENT ILLNESS
[FreeTextEntry1] : Mr. Wooten is a 76 year-old man with a history of multiple medical problems including polycystic kidney disease status post renal transplant in 2007, type 2 diabetes mellitus, osteopenia, atrial flutter presenting for follow-up of his endocrines issues. I saw him for an initial visit in November 2017 and last in September 2021; he is a former patient of Dr. Danielle Barrera.\par \par Type 2 diabetes mellitus. Point-of-care HbA1c 7.6% and glucose 202 mg/dL today; HbA1c 7.6% in September 2021, 7.6% in June 2021, 7.4% in November 2020, 7.4% in June 2020, 7.4% in February 2020, 7.1% in October 2019, 7.8% in July 2019, 7.6% in April 2019 and 7.7% in January 2019. Mild neuropathy.\par He had borderline diabetes prior to renal transplant, subsequently type 2 diabetes mellitus.\par In June 2021 we discussed continuing metformin 500/1500 mg daily, adjusted Trulicity to 4.5 mg weekly, adjusted Invokana to 300 mg daily, and discontinued Levemir and repaglinide. Insurance would not cover Invokana 300 mg daily. He was taking Levemir 12 units at night, metformin 500/1500 mg daily, Trulicity 4.5 mg weekly, repaglinide 2 mg with lunch and dinner. We discussed restarting SGLT-2 inhibitor therapy; Invokana was cost-prohibitive and he was started on Jardiance 10 mg daily by his nephrologist a few weeks ago. \par He is currently taking Levemir 12 units at night, metformin 1500/500 mg daily, Trulicity 4.5 mg weekly, repaglinide 2 mg with lunch and dinner, and Jardiance 10 mg daily. \par He is checking blood sugars twice daily as below\par He is on aspirin and apixaban\par He is on a blood pressure regimen\par He is on a statin for cholesterol\par Nephropathy screening: Status post renal transplant and followed by nephrology\par Last ophthalmology appointment: November 2021; six months\par Last podiatry appointment: August 2021; every three months and upcoming appointment\par Last dental appointment: July 2021; every six months\par He is up-to-date with influenza, pneumonia, and COVID-19 (Pfizer) vaccines\par \par Osteopenia with elevated fracture risk\par Osteopenia diagnosed in 2017 by bone density significant for femoral neck T-score -2.4; his 10-year predicted fracture risk was 21% for major osteoporotic fracture and 11% for hip fracture (risk factors of parental history of hip fracture, rheumatoid arthritis as a proxy for type 2 diabetes mellitus)\par Fracture history: Pelvis and right rib fractures in 2006 when bus shelter fell on him; history of right hip fusion at age 6 in the setting of infection, he believes tuberculosis.\par Family history: Father with history of hip fracture in his 80s\par Treatment: Zoledronic acid 5 mg IV in January 2018, January 2019, March 2020\par \par Falls: None recent\par Height loss: 1/2 inch height loss\par Kidney stones: No\par Dairy intake: 0-1 serving daily (cup of milk ever other day)\par Calcium supplements: 600 mg daily\par Multivitamin: Centrum Silver with 210 mg calcium and 1000 intl units vitamin D \par Vitamin D supplements: 2000 intl units daily\par \par Osteoporosis risk factors include: Postmenopausal status,  race, prior fracture, falls, height loss, small thin bones, tobacco use, excessive alcohol, anorexia, family history, vitamin D deficiency, corticosteroid use, seizure medications, malabsorption, hyperparathyroidism, hyperthyroidism.\par NEGATIVE EXCEPT: Renal transplant, prior fracture (although traumatic), parental history of hip fracture\par \par Interim History \par We discussed restarting SGLT-2 inhibitor therapy last visit; Invokana was cost-prohibitive and he was started on Jardiance 10 mg daily by his nephrologist a few weeks ago. He is currently taking Levemir 12 units at night, metformin 1500/500 mg daily, Trulicity 4.5 mg weekly, repaglinide 2 mg with lunch and dinner, and Jardiance 10 mg daily. \par Morning blood sugars 90-120s mg/dL. Bedtime blood sugars 170s mg/dL. No hypoglycemia.\par He was in the emergency department for heart failure. His dose of torsemide was adjusted.\par Recent bone density as below. Bone density demonstrated significant improvement at the spine and hip sites from previous in 2017. \par He has seen Dr. Sulaiman Modi; recent laboratory results reviewed.\par Weight is down 5 pounds since last visit. No chest pain, shortness of breath, polyuria/polydipsia, lower extremity numbness/tingling. \par Medical and surgical history, medications, allergies, social and family history reviewed and updated as needed.

## 2021-12-06 NOTE — RESULTS/DATA
[L1 - L4] : L1 - L4 [BMD ___ g/cm2] : BMD: [unfilled] g/cm2 [T-Score ___] : T-score: [unfilled] [FreeTextEntry2] : August 8, 2017 [de-identified] : 1/3 radius value; total radius T-score -2.9

## 2021-12-06 NOTE — ASSESSMENT
[FreeTextEntry1] : Type 2 diabetes mellitus. Point-of-care HbA1c 7.6% and glucose 202 mg/dL today. I congratulated him on his good glycemic control. His goal HbA1c due to age and comorbidities is less than 7.5%. We discussed the importance of diet and exercise and lifestyle modification for glycemic control. We will continue his regimen as below; we will see the full effect of restarting SGLT-2 inhibitor therapy next visit.\par Continue Levemir 12 units at night\par Continue metformin 1500/500 mg daily; would need decrease if renal function declines\par Continue Trulicity 4.5 mg weekly\par Continue repaglinide 2 mg with lunch and dinner\par Continue Jardiance 10 mg daily; can consider increase next visit as needed\par Continue to check blood sugars twice daily; reviewed glycemic goals\par He is on a blood pressure regimen; blood pressure around goal\par He is on a statin for cholesterol; last lipid panel around goal\par Nephropathy screening: Status post renal transplant and followed by nephrology\par Last ophthalmology appointment: November 2021; six months\par Last podiatry appointment: August 2021; every three months and upcoming appointment\par Last dental appointment: July 2021; every six months\par He is up-to-date with influenza, pneumonia, and COVID-19 (Pfizer) vaccines\par \par Osteopenia with elevated fracture risk. He has a history of prior traumatic fractures. His 10-year predicted fracture risk prior to therapy was 21% for major osteoporotic fracture and 11% for hip fracture (risk factors of parental history of hip fracture, rheumatoid arthritis as a proxy for type 2 diabetes mellitus), above the treatment thresholds. He received zoledronic acid 5 mg IV in January 2018, January 2019, and March 2020. His most recent bone density demonstrated significant improvement at the spine and hip sites from previous in 2017. We discussed the potential benefits and risks of pharmacologic osteoporosis therapy at length, including but not limited to osteonecrosis of the jaw and atypical fracture. We have started a "drug holiday" from antiresorptive therapy.\par Calcium 1000 mg daily from diet and supplements (to be taken in divided doses as no more than 500-600 mg can be absorbed at one time); continue current regimen\par Continue current vitamin D regimen\par Diet, exercise and fall prevention discussed\par \par Return to see me in 3 months. Patient advised to call earlier with significant hypo- or hyperglycemia.\par \par CC:\par Dr. Sulaiman Modi, Fax [phone]

## 2021-12-06 NOTE — PHYSICAL EXAM
[Alert] : alert [Healthy Appearance] : healthy appearance [No Acute Distress] : no acute distress [Normal Sclera/Conjunctiva] : normal sclera/conjunctiva [Normal Hearing] : hearing was normal [No Respiratory Distress] : no respiratory distress [No Stigmata of Cushings Syndrome] : no stigmata of Cushings Syndrome [Normal Insight/Judgement] : insight and judgment were intact [Kyphosis] : no kyphosis present [Acanthosis Nigricans] : no acanthosis nigricans [de-identified] : no moon facies, no supraclavicular fat pads, no hyperpigmented striae  [de-identified] : narrow-based gait with cane

## 2021-12-30 NOTE — PROGRESS NOTE ADULT - PROBLEM SELECTOR PLAN 3
[facility]  Discharge Summary    Sindhu Maldonado Patient Status:  Inpatient    1946 MRN [id number]   [facility] 8NE-A Attending No att. providers found   Hosp Day # 8 PCP Chery Muhammad     Admit date: 2021    Discharge date and Currently normotensive, and patient reports taking all of his home medications today (Lisinopril 10mg, Amlodipine 10mg, Carvedilol 12.5mg BID).    - c/w Carvedilol 12.5mg BID for rate control  - continue with Diltiazem 30mg q6h for rate control  - add Lisinopril if need for additional BP support, currently normotensive

## 2022-03-03 ENCOUNTER — APPOINTMENT (OUTPATIENT)
Dept: HEART AND VASCULAR | Facility: CLINIC | Age: 77
End: 2022-03-03

## 2022-04-11 ENCOUNTER — APPOINTMENT (OUTPATIENT)
Dept: ENDOCRINOLOGY | Facility: CLINIC | Age: 77
End: 2022-04-11

## 2022-05-26 ENCOUNTER — APPOINTMENT (OUTPATIENT)
Dept: ENDOCRINOLOGY | Facility: CLINIC | Age: 77
End: 2022-05-26
Payer: MEDICARE

## 2022-05-26 VITALS
SYSTOLIC BLOOD PRESSURE: 118 MMHG | BODY MASS INDEX: 22.76 KG/M2 | HEIGHT: 67 IN | DIASTOLIC BLOOD PRESSURE: 56 MMHG | WEIGHT: 145 LBS | HEART RATE: 76 BPM

## 2022-05-26 LAB
GLUCOSE BLDC GLUCOMTR-MCNC: 136
HBA1C MFR BLD HPLC: 8.1

## 2022-05-26 PROCEDURE — 99214 OFFICE O/P EST MOD 30 MIN: CPT | Mod: 25

## 2022-05-26 PROCEDURE — 83036 HEMOGLOBIN GLYCOSYLATED A1C: CPT | Mod: QW

## 2022-05-26 PROCEDURE — 82962 GLUCOSE BLOOD TEST: CPT

## 2022-05-26 RX ORDER — CANAGLIFLOZIN 100 MG/1
100 TABLET, FILM COATED ORAL DAILY
Qty: 90 | Refills: 1 | Status: DISCONTINUED | COMMUNITY
Start: 2020-11-12 | End: 2022-05-26

## 2022-05-26 NOTE — ASSESSMENT
[FreeTextEntry1] : Type 2 diabetes mellitus. Point-of-care HbA1c 8.1% and glucose 136 mg/dL today. We have discussed the cardiovascular and microvascular complications of uncontrolled diabetes. We discussed the importance of diet and exercise and lifestyle modification for glycemic control; he is motivated to make changes. We discussed referral to nutrition. We discussed pharmacologic options for glycemic control and weight loss. We will continue this current regimen with a focus on lifestyle modifications for now. I recommend he continue Levemir and Trulicity during preparation for colonoscopy; he can hold metformin, repaglinide, and Jardiance and restart after his procedure.\par Continue Levemir 12 units at night\par Continue metformin 1500/500 mg daily; would need decrease if renal function declines\par Continue Trulicity 4.5 mg weekly\par Continue repaglinide 2 mg with lunch and dinner\par Continue Jardiance to 25 mg daily\par Continue to check blood sugars twice daily; reviewed glycemic goals\par He is on a blood pressure regimen; blood pressure around goal\par He is on a statin for cholesterol; last lipid panel around goal\par Nephropathy screening: Status post renal transplant and followed by nephrology\par Last ophthalmology appointment: November 2021; annual\par Last podiatry appointment: January 2022; every four months and upcoming appointment\par Last dental appointment: January 2022; every six months\par He is up-to-date with influenza, pneumonia, and COVID-19 (Pfizer) vaccines up to the first booster\par \par Osteopenia with elevated fracture risk. He has a history of prior traumatic fractures. His 10-year predicted fracture risk prior to therapy was 21% for major osteoporotic fracture and 11% for hip fracture (risk factors of parental history of hip fracture, rheumatoid arthritis as a proxy for type 2 diabetes mellitus), above the treatment thresholds. He received zoledronic acid 5 mg IV in January 2018, January 2019, and March 2020. His most recent bone density demonstrated significant improvement at the spine and hip sites from previous in 2017. We discussed the potential benefits and risks of pharmacologic osteoporosis therapy at length, including but not limited to osteonecrosis of the jaw and atypical fracture. He is on a "drug holiday" from antiresorptive therapy.\par Calcium 1000 mg daily from diet and supplements (to be taken in divided doses as no more than 500-600 mg can be absorbed at one time); continue current regimen\par Continue current vitamin D regimen\par Diet, exercise and fall prevention discussed\par \par Return to see me in 3 months. Patient advised to call earlier with significant hypo- or hyperglycemia.\par \par CC:\par Dr. Sulaiman Modi, Fax [phone]\par Dr. Fannie Dowd, Fax [phone]

## 2022-05-26 NOTE — HISTORY OF PRESENT ILLNESS
[FreeTextEntry1] : Mr. Wooten is a 76 year-old man with a history of multiple medical problems including polycystic kidney disease status post renal transplant in 2007, type 2 diabetes mellitus, osteopenia, atrial flutter presenting for follow-up of his endocrines issues. I saw him for an initial visit in November 2017 and last in December 2021; he is a former patient of Dr. Danielle Barrera.\par \par Type 2 diabetes mellitus. Point-of-care HbA1c 8.1% and glucose 136 mg/dL today; HbA1c 7.6% in December 2021, 7.6% in September 2021, 7.6% in June 2021, 7.4% in November 2020, 7.4% in June 2020, 7.4% in February 2020, 7.1% in October 2019, 7.8% in July 2019, 7.6% in April 2019 and 7.7% in January 2019. Mild neuropathy.\par He had borderline diabetes prior to renal transplant, subsequently type 2 diabetes mellitus.\par In June 2021 we discussed continuing metformin 500/1500 mg daily, adjusted Trulicity to 4.5 mg weekly, adjusted Invokana to 300 mg daily, and discontinued Levemir and repaglinide. Insurance would not cover Invokana 300 mg daily. He was taking Levemir 12 units at night, metformin 500/1500 mg daily, Trulicity 4.5 mg weekly, repaglinide 2 mg with lunch and dinner. We discussed restarting SGLT-2 inhibitor therapy; Invokana was cost-prohibitive and he was started on Jardiance 10 mg daily by his nephrologist a few weeks prior to his last visit and adjusted to 25 mg daily.\par He is currently taking Levemir 12 units at night, metformin 1500/500 mg daily, Trulicity 4.5 mg weekly, repaglinide 2 mg with lunch and dinner, and Jardiance 25 mg daily. \par He is checking blood sugars twice daily as below\par He is on aspirin and apixaban\par He is on a blood pressure regimen\par He is on a statin for cholesterol\par Nephropathy screening: Status post renal transplant and followed by nephrology\par Last ophthalmology appointment: November 2021; annual\par Last podiatry appointment: January 2022; every four months and upcoming appointment\par Last dental appointment: January 2022; every six months\par He is up-to-date with influenza, pneumonia, and COVID-19 (Pfizer) vaccines up to the first booster\par \par Osteopenia with elevated fracture risk\par Osteopenia diagnosed in 2017 by bone density significant for femoral neck T-score -2.4; his 10-year predicted fracture risk was 21% for major osteoporotic fracture and 11% for hip fracture (risk factors of parental history of hip fracture, rheumatoid arthritis as a proxy for type 2 diabetes mellitus)\par Fracture history: Pelvis and right rib fractures in 2006 when bus shelter fell on him; history of right hip fusion at age 6 in the setting of infection, he believes tuberculosis.\par Family history: Father with history of hip fracture in his 80s\par Treatment: Zoledronic acid 5 mg IV in January 2018, January 2019, March 2020\par \par Falls: None recent\par Height loss: 1/2 inch height loss\par Kidney stones: No\par Dairy intake: 0-1 serving daily (cup of milk ever other day)\par Calcium supplements: 600 mg daily\par Multivitamin: Centrum Silver with 210 mg calcium and 1000 intl units vitamin D \par Vitamin D supplements: 2000 intl units daily\par \par Osteoporosis risk factors include: Postmenopausal status,  race, prior fracture, falls, height loss, small thin bones, tobacco use, excessive alcohol, anorexia, family history, vitamin D deficiency, corticosteroid use, seizure medications, malabsorption, hyperparathyroidism, hyperthyroidism.\par NEGATIVE EXCEPT: Renal transplant, prior fracture (although traumatic), parental history of hip fracture\par \par Interim History \par He is currently taking Levemir 12 units at night, metformin 1500/500 mg daily, Trulicity 4.5 mg weekly, repaglinide 2 mg with lunch and dinner, and Jardiance 25 mg daily; he had previously been on Invokana and started Jardiance last month.\par He had COVID-19 infection at the beginning of April. He had not been hungry for his usual diet and he had been having donuts and other dietary indiscretions. He states he has been trying to get back on track recently.\par He has seen Dr. Sulaiman Modi; recent laboratory results reviewed.\par He has had fatigue, weight loss, and worsening anemia. He is scheduled for colonoscopy.\par He has had dyspnea on exertion; he has an upcoming appointment with cardiology. \par Weight is down 12 pounds since last visit. No chest pain, shortness of breath, polyuria/polydipsia, lower extremity numbness/tingling. \par Medical and surgical history, medications, allergies, social and family history reviewed and updated as needed.

## 2022-05-26 NOTE — PHYSICAL EXAM
[Alert] : alert [Healthy Appearance] : healthy appearance [No Acute Distress] : no acute distress [Normal Sclera/Conjunctiva] : normal sclera/conjunctiva [Normal Hearing] : hearing was normal [No Respiratory Distress] : no respiratory distress [No Stigmata of Cushings Syndrome] : no stigmata of Cushings Syndrome [Normal Insight/Judgement] : insight and judgment were intact [Kyphosis] : no kyphosis present [Acanthosis Nigricans] : no acanthosis nigricans [de-identified] : no moon facies, no supraclavicular fat pads, no hyperpigmented striae  [de-identified] : narrow-based gait with cane

## 2022-05-26 NOTE — DATA REVIEWED
[FreeTextEntry1] : Laboratories (May 19, 2022) reviewed and significant for: \par Hemoglobin 8.6 g/dL (normal: 13.0-17.7)\par BUN/creatinine 57/1.32 mg/dL (eGFR 57 mL/min)\par HbA1c 8.9%\par LDL 52 mg/dL\par HDL 54 mg/dL\par Total cholesterol 123 mg/dL\par Triglycerides 87 mg/dL\par Urine microalbumin 13 mg/g creatinine\par \par Laboratories (November 17, 2021) reviewed and significant for: \par Hemoglobin 9.2 g/dL (normal: 13.0-17.7)\par BUN/creatinine 33/1.50 mg/dL (eGFR 45 mL/min)\par HbA1c 7.9%\par LDL 63 mg/dL\par HDL 34 mg/dL\par Total cholesterol 117 mg/dL\par Triglycerides 109 mg/dL\par 25-hydroxyvitamin D 44.8 ng/mL\par Urine microalbumin 38 mg/g creatinine\par \par Laboratories (July 15, 2021) reviewed and significant for: \par Hemoglobin 10.9 g/dL (normal: 13.0-17.7)\par BUN/creatinine 28/1.40 mg/dL (eGFR 49 mL/min)\par HbA1c 7.7%\par LDL 89 mg/dL\par HDL 44 mg/dL\par Total cholesterol 149 mg/dL\par Triglycerides 82 mg/dL\par 25-hydroxyvitamin D 44.8 ng/mL\par Urine microalbumin 26 mg/g creatinine\par \par Laboratories (April 14, 2021) reviewed and significant for: \par Hemoglobin 11.2 g/dL (normal: 13.0-17.7)\par BUN/creatinine 25/1.22 mg/dL (eGFR 58 mL/min)\par HbA1c 7.5%\par LDL 67 mg/dL\par HDL 44 mg/dL\par Total cholesterol 130 mg/dL\par Triglycerides 105 mg/dL

## 2022-05-26 NOTE — RESULTS/DATA
[L1 - L4] : L1 - L4 [BMD ___ g/cm2] : BMD: [unfilled] g/cm2 [T-Score ___] : T-score: [unfilled] [FreeTextEntry2] : August 8, 2017 [de-identified] : 1/3 radius value; total radius T-score -2.9

## 2022-06-16 ENCOUNTER — APPOINTMENT (OUTPATIENT)
Dept: OTOLARYNGOLOGY | Facility: CLINIC | Age: 77
End: 2022-06-16

## 2022-06-16 VITALS — HEIGHT: 67 IN | WEIGHT: 140 LBS | BODY MASS INDEX: 21.97 KG/M2 | TEMPERATURE: 96.1 F

## 2022-06-16 PROCEDURE — 69210 REMOVE IMPACTED EAR WAX UNI: CPT

## 2022-06-16 PROCEDURE — 99204 OFFICE O/P NEW MOD 45 MIN: CPT | Mod: 25

## 2022-06-16 RX ORDER — HYDROCORTISONE AND ACETIC ACID OTIC 20.75; 10.375 MG/ML; MG/ML
1-2 SOLUTION AURICULAR (OTIC) 4 TIMES DAILY
Qty: 3 | Refills: 0 | Status: ACTIVE | COMMUNITY
Start: 2022-06-16 | End: 1900-01-01

## 2022-06-16 NOTE — HISTORY OF PRESENT ILLNESS
[de-identified] : Patient reports longstanding difficulties with his right ear.  He manipulated it with his finger 3 or 4 days ago and had some bloody discharge.  Also thinks he might have cerumen.  He is on Eliquis.  Does note some otalgia on the right side.   Reports longstanding tinnitus, unchanged denies vertigo.  Patient has no previous otologic history or acoustic trauma.\par \par

## 2022-06-16 NOTE — ASSESSMENT
[FreeTextEntry1] : Left otitis externa consistent with infected external canal cholesteatoma.  Recommended dry ear precautions and wrote him a prescription for VoSol.  He will follow-up in 1 week for repeat debridement.  We will do an audiogram at that time

## 2022-07-06 ENCOUNTER — APPOINTMENT (OUTPATIENT)
Dept: OTOLARYNGOLOGY | Facility: CLINIC | Age: 77
End: 2022-07-06

## 2022-07-06 VITALS — HEIGHT: 67 IN | TEMPERATURE: 98.3 F | BODY MASS INDEX: 21.97 KG/M2 | WEIGHT: 140 LBS

## 2022-07-06 DIAGNOSIS — B36.9 SUPERFICIAL MYCOSIS, UNSPECIFIED: ICD-10-CM

## 2022-07-06 DIAGNOSIS — H61.21 IMPACTED CERUMEN, RIGHT EAR: ICD-10-CM

## 2022-07-06 DIAGNOSIS — H62.40 SUPERFICIAL MYCOSIS, UNSPECIFIED: ICD-10-CM

## 2022-07-06 DIAGNOSIS — H60.41 CHOLESTEATOMA OF RIGHT EXTERNAL EAR: ICD-10-CM

## 2022-07-06 PROCEDURE — 69210 REMOVE IMPACTED EAR WAX UNI: CPT

## 2022-07-06 PROCEDURE — 99212 OFFICE O/P EST SF 10 MIN: CPT | Mod: 25

## 2022-07-06 NOTE — ASSESSMENT
[FreeTextEntry1] : Right otitis externa consistent with infected external canal cholesteatoma, resolved after VoSol.  He will stop the drops and follow-up in 1 year, sooner if he has any issues.\par \par I ordered an audiogram, but unfortunately the patient left without doing the study\par \par

## 2022-07-06 NOTE — HISTORY OF PRESENT ILLNESS
[de-identified] : Patient last seen June 16, reports longstanding difficulties with his right ear.  He manipulated it with his finger 3 or 4 days ago and had some bloody discharge.  Also thinks he might have cerumen.  He is on Eliquis.  Does note some otalgia on the right side.   Reports longstanding tinnitus, unchanged denies vertigo.  Patient has no previous otologic history or acoustic trauma.\par \par Exam consistent with right external canal cholesteatoma and fungal otitis externa.  I debrided the canal and put him on VoSol drops.  He has had no further otorrhea, itching, or discomfort and is back to baseline\par \par

## 2022-07-25 ENCOUNTER — INPATIENT (INPATIENT)
Facility: HOSPITAL | Age: 77
LOS: 1 days | Discharge: HOME | DRG: 378 | End: 2022-07-27
Attending: STUDENT IN AN ORGANIZED HEALTH CARE EDUCATION/TRAINING PROGRAM | Admitting: STUDENT IN AN ORGANIZED HEALTH CARE EDUCATION/TRAINING PROGRAM
Payer: MEDICARE

## 2022-07-25 VITALS
HEART RATE: 74 BPM | HEIGHT: 67 IN | WEIGHT: 139.99 LBS | RESPIRATION RATE: 18 BRPM | TEMPERATURE: 98 F | OXYGEN SATURATION: 95 % | SYSTOLIC BLOOD PRESSURE: 141 MMHG | DIASTOLIC BLOOD PRESSURE: 72 MMHG

## 2022-07-25 DIAGNOSIS — Z98.890 OTHER SPECIFIED POSTPROCEDURAL STATES: Chronic | ICD-10-CM

## 2022-07-25 DIAGNOSIS — H11.439 CONJUNCTIVAL HYPEREMIA, UNSPECIFIED EYE: ICD-10-CM

## 2022-07-25 DIAGNOSIS — K92.2 GASTROINTESTINAL HEMORRHAGE, UNSPECIFIED: ICD-10-CM

## 2022-07-25 DIAGNOSIS — I10 ESSENTIAL (PRIMARY) HYPERTENSION: ICD-10-CM

## 2022-07-25 DIAGNOSIS — E11.9 TYPE 2 DIABETES MELLITUS WITHOUT COMPLICATIONS: ICD-10-CM

## 2022-07-25 DIAGNOSIS — Z29.9 ENCOUNTER FOR PROPHYLACTIC MEASURES, UNSPECIFIED: ICD-10-CM

## 2022-07-25 DIAGNOSIS — E78.5 HYPERLIPIDEMIA, UNSPECIFIED: ICD-10-CM

## 2022-07-25 DIAGNOSIS — I48.91 UNSPECIFIED ATRIAL FIBRILLATION: ICD-10-CM

## 2022-07-25 DIAGNOSIS — D64.9 ANEMIA, UNSPECIFIED: ICD-10-CM

## 2022-07-25 DIAGNOSIS — Z94.0 KIDNEY TRANSPLANT STATUS: ICD-10-CM

## 2022-07-25 DIAGNOSIS — I50.9 HEART FAILURE, UNSPECIFIED: ICD-10-CM

## 2022-07-25 DIAGNOSIS — R53.81 OTHER MALAISE: ICD-10-CM

## 2022-07-25 DIAGNOSIS — C61 MALIGNANT NEOPLASM OF PROSTATE: ICD-10-CM

## 2022-07-25 LAB
ACANTHOCYTES BLD QL SMEAR: SLIGHT — SIGNIFICANT CHANGE UP
ALBUMIN SERPL ELPH-MCNC: 3.8 G/DL — SIGNIFICANT CHANGE UP (ref 3.3–5)
ALP SERPL-CCNC: 63 U/L — SIGNIFICANT CHANGE UP (ref 40–120)
ALT FLD-CCNC: 28 U/L — SIGNIFICANT CHANGE UP (ref 10–45)
ANION GAP SERPL CALC-SCNC: 15 MMOL/L — SIGNIFICANT CHANGE UP (ref 5–17)
ANISOCYTOSIS BLD QL: SIGNIFICANT CHANGE UP
AST SERPL-CCNC: 21 U/L — SIGNIFICANT CHANGE UP (ref 10–40)
BASOPHILS # BLD AUTO: 0 K/UL — SIGNIFICANT CHANGE UP (ref 0–0.2)
BASOPHILS NFR BLD AUTO: 0 % — SIGNIFICANT CHANGE UP (ref 0–2)
BILIRUB SERPL-MCNC: 0.9 MG/DL — SIGNIFICANT CHANGE UP (ref 0.2–1.2)
BUN SERPL-MCNC: 39 MG/DL — HIGH (ref 7–23)
CALCIUM SERPL-MCNC: 9.7 MG/DL — SIGNIFICANT CHANGE UP (ref 8.4–10.5)
CHLORIDE SERPL-SCNC: 103 MMOL/L — SIGNIFICANT CHANGE UP (ref 96–108)
CO2 SERPL-SCNC: 23 MMOL/L — SIGNIFICANT CHANGE UP (ref 22–31)
CREAT SERPL-MCNC: 1.52 MG/DL — HIGH (ref 0.5–1.3)
EGFR: 48 ML/MIN/1.73M2 — LOW
ELLIPTOCYTES BLD QL SMEAR: SLIGHT — SIGNIFICANT CHANGE UP
EOSINOPHIL # BLD AUTO: 0.15 K/UL — SIGNIFICANT CHANGE UP (ref 0–0.5)
EOSINOPHIL NFR BLD AUTO: 1.8 % — SIGNIFICANT CHANGE UP (ref 0–6)
FERRITIN SERPL-MCNC: 26 NG/ML — LOW (ref 30–400)
GIANT PLATELETS BLD QL SMEAR: PRESENT — SIGNIFICANT CHANGE UP
GLUCOSE BLDC GLUCOMTR-MCNC: 241 MG/DL — HIGH (ref 70–99)
GLUCOSE SERPL-MCNC: 164 MG/DL — HIGH (ref 70–99)
HAPTOGLOB SERPL-MCNC: 275 MG/DL — HIGH (ref 34–200)
HCT VFR BLD CALC: 21.9 % — LOW (ref 39–50)
HGB BLD-MCNC: 6.1 G/DL — CRITICAL LOW (ref 13–17)
HYPOCHROMIA BLD QL: SIGNIFICANT CHANGE UP
IRON SATN MFR SERPL: 17 UG/DL — LOW (ref 45–165)
IRON SATN MFR SERPL: 7 % — LOW (ref 16–55)
LDH SERPL L TO P-CCNC: 216 U/L — SIGNIFICANT CHANGE UP (ref 50–242)
LYMPHOCYTES # BLD AUTO: 1.39 K/UL — SIGNIFICANT CHANGE UP (ref 1–3.3)
LYMPHOCYTES # BLD AUTO: 16.2 % — SIGNIFICANT CHANGE UP (ref 13–44)
MACROCYTES BLD QL: SLIGHT — SIGNIFICANT CHANGE UP
MANUAL SMEAR VERIFICATION: SIGNIFICANT CHANGE UP
MCHC RBC-ENTMCNC: 19.6 PG — LOW (ref 27–34)
MCHC RBC-ENTMCNC: 27.9 GM/DL — LOW (ref 32–36)
MCV RBC AUTO: 70.4 FL — LOW (ref 80–100)
MICROCYTES BLD QL: SIGNIFICANT CHANGE UP
MONOCYTES # BLD AUTO: 0.39 K/UL — SIGNIFICANT CHANGE UP (ref 0–0.9)
MONOCYTES NFR BLD AUTO: 4.5 % — SIGNIFICANT CHANGE UP (ref 2–14)
NEUTROPHILS # BLD AUTO: 6.63 K/UL — SIGNIFICANT CHANGE UP (ref 1.8–7.4)
NEUTROPHILS NFR BLD AUTO: 77.5 % — HIGH (ref 43–77)
NT-PROBNP SERPL-SCNC: HIGH PG/ML (ref 0–300)
OB PNL STL: POSITIVE
OVALOCYTES BLD QL SMEAR: SIGNIFICANT CHANGE UP
PLAT MORPH BLD: ABNORMAL
PLATELET # BLD AUTO: 353 K/UL — SIGNIFICANT CHANGE UP (ref 150–400)
POIKILOCYTOSIS BLD QL AUTO: SIGNIFICANT CHANGE UP
POLYCHROMASIA BLD QL SMEAR: SLIGHT — SIGNIFICANT CHANGE UP
POTASSIUM SERPL-MCNC: 3.8 MMOL/L — SIGNIFICANT CHANGE UP (ref 3.5–5.3)
POTASSIUM SERPL-SCNC: 3.8 MMOL/L — SIGNIFICANT CHANGE UP (ref 3.5–5.3)
PROT SERPL-MCNC: 7.1 G/DL — SIGNIFICANT CHANGE UP (ref 6–8.3)
RBC # BLD: 3.11 M/UL — LOW (ref 4.2–5.8)
RBC # FLD: 18.4 % — HIGH (ref 10.3–14.5)
RBC BLD AUTO: ABNORMAL
RETICS #: 60.8 K/UL — SIGNIFICANT CHANGE UP (ref 25–125)
RETICS/RBC NFR: 2 % — SIGNIFICANT CHANGE UP (ref 0.5–2.5)
SARS-COV-2 RNA SPEC QL NAA+PROBE: SIGNIFICANT CHANGE UP
SCHISTOCYTES BLD QL AUTO: SLIGHT — SIGNIFICANT CHANGE UP
SODIUM SERPL-SCNC: 141 MMOL/L — SIGNIFICANT CHANGE UP (ref 135–145)
SPHEROCYTES BLD QL SMEAR: SLIGHT — SIGNIFICANT CHANGE UP
TARGETS BLD QL SMEAR: SLIGHT — SIGNIFICANT CHANGE UP
TIBC SERPL-MCNC: 261 UG/DL — SIGNIFICANT CHANGE UP (ref 220–430)
TROPONIN T SERPL-MCNC: 0.03 NG/ML — HIGH (ref 0–0.01)
UIBC SERPL-MCNC: 244 UG/DL — SIGNIFICANT CHANGE UP (ref 110–370)
WBC # BLD: 8.56 K/UL — SIGNIFICANT CHANGE UP (ref 3.8–10.5)
WBC # FLD AUTO: 8.56 K/UL — SIGNIFICANT CHANGE UP (ref 3.8–10.5)

## 2022-07-25 PROCEDURE — 71045 X-RAY EXAM CHEST 1 VIEW: CPT | Mod: 26

## 2022-07-25 PROCEDURE — 99285 EMERGENCY DEPT VISIT HI MDM: CPT

## 2022-07-25 RX ORDER — SODIUM CHLORIDE 9 MG/ML
1000 INJECTION, SOLUTION INTRAVENOUS
Refills: 0 | Status: DISCONTINUED | OUTPATIENT
Start: 2022-07-25 | End: 2022-07-27

## 2022-07-25 RX ORDER — TACROLIMUS 5 MG/1
1 CAPSULE ORAL EVERY 24 HOURS
Refills: 0 | Status: DISCONTINUED | OUTPATIENT
Start: 2022-07-25 | End: 2022-07-27

## 2022-07-25 RX ORDER — PANTOPRAZOLE SODIUM 20 MG/1
40 TABLET, DELAYED RELEASE ORAL EVERY 12 HOURS
Refills: 0 | Status: DISCONTINUED | OUTPATIENT
Start: 2022-07-25 | End: 2022-07-27

## 2022-07-25 RX ORDER — DEXTROSE 50 % IN WATER 50 %
25 SYRINGE (ML) INTRAVENOUS ONCE
Refills: 0 | Status: DISCONTINUED | OUTPATIENT
Start: 2022-07-25 | End: 2022-07-27

## 2022-07-25 RX ORDER — INSULIN LISPRO 100/ML
VIAL (ML) SUBCUTANEOUS
Refills: 0 | Status: DISCONTINUED | OUTPATIENT
Start: 2022-07-25 | End: 2022-07-27

## 2022-07-25 RX ORDER — GLUCAGON INJECTION, SOLUTION 0.5 MG/.1ML
1 INJECTION, SOLUTION SUBCUTANEOUS ONCE
Refills: 0 | Status: DISCONTINUED | OUTPATIENT
Start: 2022-07-25 | End: 2022-07-27

## 2022-07-25 RX ORDER — INSULIN GLARGINE 100 [IU]/ML
8 INJECTION, SOLUTION SUBCUTANEOUS AT BEDTIME
Refills: 0 | Status: DISCONTINUED | OUTPATIENT
Start: 2022-07-25 | End: 2022-07-27

## 2022-07-25 RX ORDER — DEXTROSE 50 % IN WATER 50 %
12.5 SYRINGE (ML) INTRAVENOUS ONCE
Refills: 0 | Status: DISCONTINUED | OUTPATIENT
Start: 2022-07-25 | End: 2022-07-27

## 2022-07-25 RX ORDER — PANTOPRAZOLE SODIUM 20 MG/1
80 TABLET, DELAYED RELEASE ORAL ONCE
Refills: 0 | Status: COMPLETED | OUTPATIENT
Start: 2022-07-25 | End: 2022-07-25

## 2022-07-25 RX ORDER — ATORVASTATIN CALCIUM 80 MG/1
20 TABLET, FILM COATED ORAL AT BEDTIME
Refills: 0 | Status: DISCONTINUED | OUTPATIENT
Start: 2022-07-25 | End: 2022-07-27

## 2022-07-25 RX ORDER — ACETAMINOPHEN 500 MG
650 TABLET ORAL EVERY 6 HOURS
Refills: 0 | Status: DISCONTINUED | OUTPATIENT
Start: 2022-07-25 | End: 2022-07-27

## 2022-07-25 RX ORDER — TACROLIMUS 5 MG/1
2 CAPSULE ORAL EVERY 24 HOURS
Refills: 0 | Status: DISCONTINUED | OUTPATIENT
Start: 2022-07-26 | End: 2022-07-27

## 2022-07-25 RX ORDER — DEXTROSE 50 % IN WATER 50 %
15 SYRINGE (ML) INTRAVENOUS ONCE
Refills: 0 | Status: DISCONTINUED | OUTPATIENT
Start: 2022-07-25 | End: 2022-07-27

## 2022-07-25 RX ORDER — DULAGLUTIDE 4.5 MG/.5ML
0 INJECTION, SOLUTION SUBCUTANEOUS
Qty: 0 | Refills: 0 | DISCHARGE

## 2022-07-25 RX ORDER — INSULIN DETEMIR 100/ML (3)
12 INSULIN PEN (ML) SUBCUTANEOUS
Qty: 0 | Refills: 0 | DISCHARGE

## 2022-07-25 RX ADMIN — Medication 4: at 21:43

## 2022-07-25 RX ADMIN — PANTOPRAZOLE SODIUM 80 MILLIGRAM(S): 20 TABLET, DELAYED RELEASE ORAL at 18:13

## 2022-07-25 NOTE — H&P ADULT - PROBLEM SELECTOR PLAN 6
Pt on Eliquis 5mg BID and coreg 12.5 BID    Plan:  -HOLD the above given GIB and restart when appropriate
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
## 2022-07-25 NOTE — H&P ADULT - PROBLEM SELECTOR PLAN 12
F: None currently  E: Replete with caution with possible DAYO  N: CC diet  DVT: None given GIB  GI: PPI  Code: FULL

## 2022-07-25 NOTE — H&P ADULT - PROBLEM SELECTOR PLAN 2
Pt presented with 3 days of rectal bleeding (bright red AND dark black), but no bleeding since evening of 7/24  Workup for anemia ongoing outpatient:   -Colonoscopy 7/13/22 -> sessile polyps, s/p polypectomy. +diverticula, +internal hemorrhoids  -EGD 7/13/22 -> mild erythema in gastric antrum & body -> biopsy pending    Per GI -> Low suspicion for upper GI source, given unremarkable EGD findings as well as recent colonoscopy with removal of a couple of >10 mm polyps.   Most likely cause: post-polypectomy bleeding vs diverticular bleed     Plan:  -HOLD eliquis  -PPI 40mg IV BID for at least 24 more hours  -f/u GI recs -> no intervention at this time  -Obtain pathology from GI physician's office ([facility], [address] [place], [address], Ph: [phone])  -Consider CT A/P (as part of planned ongoing outpatient workup)  -Monitor for further bleeding and trend H/H

## 2022-07-25 NOTE — H&P ADULT - PROBLEM SELECTOR PLAN 10
H/o of prostate CA, s/p prostatectomy in 2010  PSA stable per pt, but he has had 30 lb weight loss in recent months    Plan:  -f/u AM PSA  -Obtain most recent PSA from outpatient records

## 2022-07-25 NOTE — H&P ADULT - PROBLEM SELECTOR PLAN 1
Pt presented with 3 days of bleeding per rectum and found to have Hb 6.1, MCV 70.4.  Pt has recent h/o anemia with outpatient workup ongoing (Colonoscopy/EGD as below, CT A/P was planned).  Note pt had anemia 2/2 ESRD prior to his 2007 renal transplant when he was on HD for 4.5 yrs and rec'd EPO at the time.  Most recent Hb in [facility] - 10.2 in 11/2021.  FOBT+ in ED  Suspected acute on chronic iron deficiency anemia 2/2 GI bleeding  Note pt is Confucianism and does not want blood transfusions    Plan:  -f/u ferritin, iron studies, B12, folate  -IV iron vs EPO  -f/u GI recs as below  -Maintain active T&S (although pt does not want transfusions, will send just in case he changes mind) Pt presented with 3 days of bleeding per rectum and found to have Hb 6.1, MCV 70.4.  Pt has recent h/o anemia with outpatient workup ongoing (Colonoscopy/EGD as below, CT A/P was planned).  Note pt had anemia 2/2 ESRD prior to his 2007 renal transplant when he was on HD for 4.5 yrs and rec'd EPO at the time.  Most recent Hb in [facility] - 10.2 in 11/2021.  FOBT+ in ED  Suspected acute on chronic iron deficiency anemia 2/2 GI bleeding  Note pt is Bahai and does not want blood transfusions  Given IV iron 300mg after admission     Plan:  -f/u ferritin, iron studies, B12, folate  -Consider further IV iron or EPO  -f/u GI recs as below  -Maintain active T&S (although pt does not want transfusions, will send just in case he changes mind) Pt presented with 3 days of bleeding per rectum and found to have Hb 6.1, MCV 70.4.  Pt has recent h/o anemia with outpatient workup ongoing (Colonoscopy/EGD as below, CT A/P was planned).  Note pt had anemia 2/2 ESRD prior to his 2007 renal transplant when he was on HD for 4.5 yrs and rec'd EPO at the time.  Most recent Hb in [facility] - 10.2 in 11/2021.  FOBT+ in ED  Suspected acute on chronic iron deficiency anemia 2/2 GI bleeding  Note pt is Gnosticist and does not want blood transfusions  Given IV iron 300mg after admission   Iron studies consistent with severe iron-deficiency    Plan:  -Consider further IV iron or EPO  -f/u GI recs as below  -Maintain active T&S (although pt does not want transfusions, will send just in case he changes mind)

## 2022-07-25 NOTE — ED ADULT NURSE NOTE - OBJECTIVE STATEMENT
Pt arrived to the ER for reporting having sob today, w/ "a little" right leg pain. Also reports dark stools since Friday, "there was bright red when I wiped at first." Pt denies abd pain, n/v, chest pain and denies any other complaints at this moment. Pt is noted to be aox4, in no acute distress, able to maintain airway, having non labored breathing, non diaphoretic, and able to talk in clear full sentences. Pt pending MD evaluation. Pt arrived to the ER for reporting having sob today, w/ "a little" right leg pain. Also reports dark stools since Friday, "there was bright red when I wiped at first." Pt denies abd pain, n/v, chest pain and denies any other complaints at this moment. Pt is noted to be aox4, in no acute distress, able to maintain airway, having non labored breathing, non diaphoretic, and able to talk in clear full sentences. Pt pending MD evaluation. Pt states being jehovah witness.

## 2022-07-25 NOTE — H&P ADULT - NSICDXPASTSURGICALHX_GEN_ALL_CORE_FT
PAST SURGICAL HISTORY:  H/O radical prostatectomy      PAST SURGICAL HISTORY:  H/O radical prostatectomy 2010    S/P hernia repair Abdominal and inguinal around 2005

## 2022-07-25 NOTE — ED PROVIDER NOTE - PROGRESS NOTE DETAILS
rectal dark brown stool patient says he came from opthalmology office- showed me paper that says vision stable, r/o impending central retinal vein occlusion, I called opthalmology consult who said they will see pt and he will need retina specialist outpatient. GI aware of chika, will admit to medicine

## 2022-07-25 NOTE — H&P ADULT - PROBLEM SELECTOR PLAN 9
h/o polycystic kidney disease   On HD 2003 to 2007 (left sided AVF)  Transplant 2007  Baseline Cr (per pt) is 1.3. Last in our system from Nov 21 is 1.6.  Admitted at 1.52    -c/w home tacrolimus 2mg in AM, 1mg in PM  -c/w home prednisone 5mg daily    #Possible DAYO  -If baseline is truly 1.3, then patient likely has pre-renal DAYO 2/2 blood loss  -Continue to monitor

## 2022-07-25 NOTE — H&P ADULT - NSICDXPASTMEDICALHX_GEN_ALL_CORE_FT
PAST MEDICAL HISTORY:  DM2 (diabetes mellitus, type 2)     DVT (deep venous thrombosis)     HLD (hyperlipidemia)     HTN (hypertension)     Kidney transplant recipient     Polycystic kidney disease     Prostate cancer      PAST MEDICAL HISTORY:  Chronic CHF     DM2 (diabetes mellitus, type 2)     DVT (deep venous thrombosis)     HLD (hyperlipidemia)     HTN (hypertension)     Kidney transplant recipient     Polycystic kidney disease     Prostate cancer

## 2022-07-25 NOTE — ED PROVIDER NOTE - OBJECTIVE STATEMENT
76yoM w. CHF, Afib on Eliquis (but stopped it 2 days ago), here for lower GI bleed  states had 3 days of dark stool and Bright red blood  also is jehovas witness  says he has hx of anemia the last few months and was getting outpt workup, doesn't know his baseline hemoglobin  past few days had some fatigue and dyspnea

## 2022-07-25 NOTE — H&P ADULT - PROBLEM SELECTOR PLAN 4
Home regimen: Levemir 12u qHS, Repaglinide 2mg before meals BID, Jardiance 25mg daily, Trulicity 1.5 weekly, Metformin 500mg in AM and 1500mg in PM    Plan:  -Lantus 8u and mISS and adjust as needed  -f/u AM A1C

## 2022-07-25 NOTE — ED ADULT NURSE REASSESSMENT NOTE - NS ED NURSE REASSESS COMMENT FT1
No new changes noted. Pt is noted to be aox4, in no acute distress, able to maintain airway, having non labored breathing, non diaphoretic, and able to talk in clear full sentences. Pt endorsed to [facility] for nursing care.

## 2022-07-25 NOTE — H&P ADULT - HISTORY OF PRESENT ILLNESS
76 year old male with PMH of polycystic kidney disease (had kidney transplant in 2007, prior to that on dialysis for 4.5 yrs), HTN, HLD, CHF (EF 65% in 2020), DM, AFib (s/p ablation 1x, on Eliquis) presenting with three days of bleeding per rectum. He saw his ophthalmologist today for redness in his left eye and was told to go to the ED immediately for "questionable impending vein occlusion vs inflammatory process." While in the ED, labs were drawn and showed a hemoglobin of 6.1. Patient then endorsed the three days of bleeding per rectum. He reports this has never happened to him before, but he has a recent history of anemia and has been undergoing outpatient GI workup (colonoscopy/EGD done 7/13/22 showed polpys, hemorrhoids, diverticula, and erythema in the antrum). He reports the bowel movements were about 4-5 per day and were dark with some areas of bright red blood. He says they stopped since last night (no BMs for the past 24 hrs). He also reports fatigue and feeling more "winded" than normal. However, he is familiar with feeling fluid overloaded since he has had multiple admissions for CHF exacerbations and he reports he does NOT currently feel overloaded (no severe SOB, no severe LE edema). He has chronic LE edema, currently at baseline. He also reports 30 lb weight loss over the past few months. He says his appetite is poor and he hasn't been going out as much since the pandemic began. Of note, he stopped taking his eliquis three days ago due to the bleeding.  Covid vaccinated x3.    Note: Pt is Congregational and does not want blood transfusions    ED COURSE:   Vitals: T 98.5, HR 74, /72, RR18, SpO2 95% on room air    Significant Labs: Hb 6.1, MCV 70.4, BUN 39, Cr 1.52, Trop 0.03, BNP 99524  Covid negative  Fecal occult blood positive    Imaging:   -CXR: Possible right lower opacity and increased lung markings. Cardiomegaly.    EKG: NSR with PVCs    Interventions: 80mg IVP Pantoprazole   76 year old male with PMH of polycystic kidney disease (had kidney transplant in 2007, prior to that on dialysis for 4.5 yrs), HTN, HLD, CHF (EF 65% in 2020), DM, AFib (s/p ablation 1x, on Eliquis), prostate cancer s/p radical prostatectomy 2010, presenting with three days of bleeding per rectum. He saw his ophthalmologist today for redness in his left eye and was told to go to the ED immediately for "questionable impending vein occlusion vs inflammatory process." While in the ED, labs were drawn and showed a hemoglobin of 6.1. Patient then endorsed the three days of bleeding per rectum. He reports this has never happened to him before, but he has a recent history of anemia and has been undergoing outpatient GI workup (colonoscopy/EGD done 7/13/22 showed polpys, hemorrhoids, diverticula, and erythema in the antrum). He reports the bowel movements were about 4-5 per day and were dark with some areas of bright red blood. He says they stopped since last night (no BMs for the past 24 hrs). He also reports fatigue and feeling more "winded" than normal. However, he is familiar with feeling fluid overloaded since he has had multiple admissions for CHF exacerbations and he reports he does NOT currently feel overloaded (no severe SOB, no severe LE edema). He has chronic LE edema, currently at baseline. He also reports 30 lb weight loss over the past few months. He says his appetite is poor and he hasn't been going out as much since the pandemic began. Of note, he stopped taking his eliquis three days ago due to the bleeding.  Covid vaccinated x3.    Note: Pt is [employer] and does not want blood transfusions    ED COURSE:   Vitals: T 98.5, HR 74, /72, RR18, SpO2 95% on room air    Significant Labs: Hb 6.1, MCV 70.4, BUN 39, Cr 1.52, Trop 0.03, BNP 85005  Covid negative  Fecal occult blood positive    Imaging:   -CXR: Possible right lower opacity and increased lung markings. Cardiomegaly.    EKG: NSR with PVCs    Interventions: 80mg IVP Pantoprazole

## 2022-07-25 NOTE — CONSULT NOTE ADULT - ASSESSMENT
76yoM, Hindu, PMHx CHF, Afib on Eliquis (but stopped it 2 days ago), History of right renal transplant (on Tacrolimus & Prednisone 5 mg daily), History of Prostate Cancer s/p prostatectomy, DM here for lower GI bleed. States that starting Friday, he noticed dark stools into Sunday which eventually turned into BRBPR, Hgb 6.1 (MCV 70.4), GI consulted for concern for GI bleed.     #Anemia  #Melena   #BRBPR   Patient notes 3 days of rectal bleeding, initially noting as melena, then turning into BRBPR mixed in with clots. No associated pain with bleeding, abdominal exam benign. Per ED attending, ADRIANA with brown stool. Last noted episode of rectal bleeding last night. With recent EGD/Colonoscopy (as noted below) on 7/13/22 for approximately 30 lb weight loss (no overt bleeding), colonoscopy notable for removal of several ascending colon polyps. EGD otherwise unremarkable. Low suspicion for upper GI source, given unremarkable EGD findings as well as recent colonoscopy with removal of a couple of >10 mm polyps. ddx includes post-polypectomy bleeding vs diverticular bleed   -Colonoscopy (7/13/22, GI physician: Isidoro Vasques DO): 12 mm sessile polyp on ICV (s/p hot snare), 15 mm sessile ascending colon polyp (s/p hot snare), 3 mm sessile ascending colon polyp (s/p cold forceps), many small and large mouthed diverticula in the entire colon, internal hemorrhoids (Grade I)  -EGD (7/13/22, GI physician: Isidoro Vasques DO): Zline @ 38 cm from the incisors, mild inflammation characterized by erythema in gastric antrum & body (s/p bx), no gross lesions in entire examined duodenum   -Hold AC for now   -Please ADD ON Fe studies (Serum Fe, TIBC, transferrin, ferritin) & Reticulocyte count   -Please ADD ON hemolysis labs: LDH, Haptoglobin  -Consider IV infusion as patient refusing blood products   -For now, would hold off on endoscopic evaluation at this time to further optimize H/H and continue to closely monitor for further bleeding. With no further bleeding since evening of 7/14.  -Closely monitor H/H  -Maintain Active T&S  -Transfusion goal as per primary team    Case discussed with svc attending and primary team.     Sophia Mueller DO  Gastroenterology Fellow  Pager: [phone] 76yoM, Jainism, PMHx CHF, Afib on Eliquis (but stopped it 2 days ago), History of right renal transplant (on Tacrolimus & Prednisone 5 mg daily), History of Prostate Cancer s/p prostatectomy, DM here for lower GI bleed. States that starting Friday, he noticed dark stools into Sunday which eventually turned into BRBPR, Hgb 6.1 (MCV 70.4), GI consulted for concern for GI bleed.     #Anemia  #Melena   #BRBPR   Patient notes 3 days of rectal bleeding, initially noting as melena, then turning into BRBPR mixed in with clots. No associated pain with bleeding, abdominal exam benign. Per ED attending, ADRIANA with brown stool. Last noted episode of rectal bleeding last night. With recent EGD/Colonoscopy (as noted below) on 7/13/22 for approximately 30 lb weight loss (no overt bleeding), colonoscopy notable for removal of several ascending colon polyps. EGD otherwise unremarkable. Low suspicion for upper GI source, given unremarkable EGD findings as well as recent colonoscopy with removal of a couple of >10 mm polyps. ddx includes post-polypectomy bleeding vs diverticular bleed   -Colonoscopy (7/13/22, GI physician: Edgar Vasques DO): 12 mm sessile polyp on ICV (s/p hot snare), 15 mm sessile ascending colon polyp (s/p hot snare), 3 mm sessile ascending colon polyp (s/p cold forceps), many small and large mouthed diverticula in the entire colon, internal hemorrhoids (Grade I)  -EGD (7/13/22, GI physician: Edgar Vasques DO): Zline @ 38 cm from the incisors, mild inflammation characterized by erythema in gastric antrum & body (s/p bx), no gross lesions in entire examined duodenum   -Obtain pathology from GI physician's office ([facility], [address] [place], [address], Ph: [phone])  -Hold AC for now   -Please ADD ON Fe studies (Serum Fe, TIBC, transferrin, ferritin) & Reticulocyte count   -Please ADD ON hemolysis labs: LDH, Haptoglobin  -Consider IV infusion as patient refusing blood products   -For now, would hold off on endoscopic evaluation at this time to further optimize H/H and continue to closely monitor for further bleeding. With no further bleeding since evening of 7/14.  -Closely monitor H/H  -Maintain Active T&S  -Transfusion goal as per primary team    Case discussed with [facility] attending and primary team.     Sophia Mueller DO  Gastroenterology Fellow  Pager: [phone] 76yoM, Anabaptism, PMHx CHF, Afib on Eliquis (but stopped it 2 days ago), History of right renal transplant (on Tacrolimus & Prednisone 5 mg daily), History of Prostate Cancer s/p prostatectomy, DM here for lower GI bleed. States that starting Friday, he noticed dark stools into Sunday which eventually turned into BRBPR, Hgb 6.1 (MCV 70.4), GI consulted for concern for GI bleed.     #Anemia  #Melena   #BRBPR   Patient notes 3 days of rectal bleeding, initially noting as melena, then turning into BRBPR mixed in with clots. No associated pain with bleeding, abdominal exam benign. Per ED attending, ADRIANA with brown stool. Last noted episode of rectal bleeding last night. With recent EGD/Colonoscopy (as noted below) on 7/13/22 for approximately 30 lb weight loss (no overt bleeding), colonoscopy notable for removal of several ascending colon polyps. EGD otherwise unremarkable. Low suspicion for upper GI source, given unremarkable EGD findings as well as recent colonoscopy with removal of a couple of >10 mm polyps. ddx includes post-polypectomy bleeding vs diverticular bleed   -Colonoscopy (7/13/22, GI physician: Edgar Vasques DO): 12 mm sessile polyp on ICV (s/p hot snare), 15 mm sessile ascending colon polyp (s/p hot snare), 3 mm sessile ascending colon polyp (s/p cold forceps), many small and large mouthed diverticula in the entire colon, internal hemorrhoids (Grade I)  -EGD (7/13/22, GI physician: Edgar Vasques DO): Zline @ 38 cm from the incisors, mild inflammation characterized by erythema in gastric antrum & body (s/p bx), no gross lesions in entire examined duodenum   -Obtain pathology from GI physician's office ([facility], [address] [place], [address], Ph: [phone])  -Hold AC for now   -Please ADD ON Fe studies (Serum Fe, TIBC, transferrin, ferritin) & Reticulocyte count   -Please ADD ON hemolysis labs: LDH, Haptoglobin  -Consider IV infusion as patient refusing blood products   -For now, would hold off on endoscopic evaluation at this time to further optimize H/H and continue to closely monitor for further bleeding. With no further bleeding since evening of 7/24.  -Closely monitor H/H  -Maintain Active T&S  -Transfusion goal as per primary team    Case discussed with [facility] attending and primary team.     Sophia Mueller DO  Gastroenterology Fellow  Pager: [phone]

## 2022-07-25 NOTE — H&P ADULT - PROBLEM SELECTOR PLAN 5
Home regimen: Coreg 12.5 BID, Lisinopril 40 daily, Torsemide 20mg BID, Lipitor 20 daily  Last echo here: EF 65% on 9/15/20  Pt had recent hospitalization in April 2022 at [facility] for CHF exacerbation and has had multiple others in the past  Pt reports he does not feel fluid overloaded - he usually gets severely short of breath, which he is not, and gets severe LE edema (which he does not have).  He does have lower right side crackles and possible opacity on CXR as well as +1 LE edema (R more than L but this is his baseline)  BNP 27226 (last was 6435 in Nov 2021)   Trop 0.03 on admission and was 0.03 in November 2021  No chest pain, EKG NSR with PVCs  Pt may be in mild CHF exacerbation    Plan:  -f/u TTE  -f/u AM trops and repeat EKG  -c/w Torsemide 20mg BID, Lipitor 20 daily  -HOLD coreg and lisinopril and restart when appropriate   -Consider further IV diuresis depending on fluid status and GIB status in AM

## 2022-07-25 NOTE — H&P ADULT - PROBLEM SELECTOR PLAN 3
Pt presented from ophthalmology office with left sided conjunctival injection. He has no vision changes and no pain. The optometrist in the office told patient to go to the ED emergently for possible impending central retinal vein occlusion. Our ophthalmology consult service was called and reported this is more likely to be diabetic retinopathy and not CRVO, but even if it was CRVO, there is NO urgent intervention and this is not a critical emergency.    Plan:  -f/u Ophthalmology recs (they will see patient tomorrow and likely recommend he see a retinal specialist as an outpatient for possible injections)

## 2022-07-25 NOTE — H&P ADULT - NSHPPHYSICALEXAM_GEN_ALL_CORE
GENERAL: NAD, lying in bed comfortably. +Pale.  HEAD:  Atraumatic, Normocephalic  EYES: EOMI, PERRLA. +Conjunctival injection on left eye  ENT: Moist mucous membranes  NECK: Supple, No JVD  CHEST/LUNG: Unlabored breathing. +Crackles right lower/right lateral side.  HEART: Regular rate and rhythm; No murmurs, rubs, or gallops  ABDOMEN: BSx4; Soft, nontender. +Mildly distended (at baseline per pt).  EXTREMITIES:  2+ Peripheral Pulses, brisk capillary refill. No clubbing, cyanosis. +1 edema in b/l LEs (R slightly larger than left - this is baseline per patient).  NERVOUS SYSTEM:  A&Ox3, no focal deficits   SKIN: No rashes or lesions

## 2022-07-25 NOTE — H&P ADULT - NSHPREVIEWOFSYSTEMS_GEN_ALL_CORE
REVIEW OF SYSTEMS:  CONSTITUTIONAL: No weakness, fevers or chills. +Fatigue  EYES/ENT: No visual changes;  No vertigo or throat pain. +Left eye redness   NECK: No pain or stiffness  RESPIRATORY: No cough, wheezing, hemoptysis; No shortness of breath. +Mild dyspnea on exertion  CARDIOVASCULAR: No chest pain or palpitations  GASTROINTESTINAL: No abdominal or epigastric pain. No nausea, vomiting, or hematemesis; No diarrhea or constipation. +Melena/hematochezia  GENITOURINARY: No dysuria, frequency or hematuria  NEUROLOGICAL: No numbness or weakness  SKIN: No itching, rashes

## 2022-07-25 NOTE — ED PROVIDER NOTE - CLINICAL SUMMARY MEDICAL DECISION MAKING FREE TEXT BOX
Lower GI bleed in jehovas witness  plan for labs, occult blood, GI consult  also optho consult   admission

## 2022-07-25 NOTE — H&P ADULT - ASSESSMENT
76 year old male with PMH of polycystic kidney disease (had kidney transplant in 2007, prior to that on dialysis for 4.5 yrs), HTN, HLD, CHF (EF 65% in 2020), DM, AFib (s/p ablation 1x, on Eliquis), prostate cancer s/p radical prostatectomy 2010, presenting with three days of bleeding per rectum, admitted for GIB.

## 2022-07-25 NOTE — H&P ADULT - NSHPSOCIALHISTORY_GEN_ALL_CORE
Lives with wife and mother in law. Uses cane for walking. Mostly homebound for past two years. Drinks alcohol about once per month. Former heavy smoker (quit 50 years ago). No other drugs.

## 2022-07-25 NOTE — ED ADULT TRIAGE NOTE - CHIEF COMPLAINT QUOTE
Pt has hx of CHF, reports sob today, "a little" LE edema. Also reports dark stools since Friday, "there was bright red when I wiped at first." Denies abd pain, n/v, chest pain.

## 2022-07-26 ENCOUNTER — TRANSCRIPTION ENCOUNTER (OUTPATIENT)
Age: 77
End: 2022-07-26

## 2022-07-26 DIAGNOSIS — D62 ACUTE POSTHEMORRHAGIC ANEMIA: ICD-10-CM

## 2022-07-26 LAB
A1C WITH ESTIMATED AVERAGE GLUCOSE RESULT: 6.3 % — HIGH (ref 4–5.6)
ANION GAP SERPL CALC-SCNC: 11 MMOL/L — SIGNIFICANT CHANGE UP (ref 5–17)
BLD GP AB SCN SERPL QL: NEGATIVE — SIGNIFICANT CHANGE UP
BLD GP AB SCN SERPL QL: NEGATIVE — SIGNIFICANT CHANGE UP
BUN SERPL-MCNC: 34 MG/DL — HIGH (ref 7–23)
CALCIUM SERPL-MCNC: 8.8 MG/DL — SIGNIFICANT CHANGE UP (ref 8.4–10.5)
CHLORIDE SERPL-SCNC: 106 MMOL/L — SIGNIFICANT CHANGE UP (ref 96–108)
CHOLEST SERPL-MCNC: 104 MG/DL — SIGNIFICANT CHANGE UP
CO2 SERPL-SCNC: 24 MMOL/L — SIGNIFICANT CHANGE UP (ref 22–31)
CREAT SERPL-MCNC: 1.35 MG/DL — HIGH (ref 0.5–1.3)
EGFR: 55 ML/MIN/1.73M2 — LOW
ESTIMATED AVERAGE GLUCOSE: 134 MG/DL — HIGH (ref 68–114)
FOLATE SERPL-MCNC: >20 NG/ML — SIGNIFICANT CHANGE UP
GLUCOSE BLDC GLUCOMTR-MCNC: 112 MG/DL — HIGH (ref 70–99)
GLUCOSE BLDC GLUCOMTR-MCNC: 121 MG/DL — HIGH (ref 70–99)
GLUCOSE BLDC GLUCOMTR-MCNC: 178 MG/DL — HIGH (ref 70–99)
GLUCOSE BLDC GLUCOMTR-MCNC: 182 MG/DL — HIGH (ref 70–99)
GLUCOSE BLDC GLUCOMTR-MCNC: 94 MG/DL — SIGNIFICANT CHANGE UP (ref 70–99)
GLUCOSE SERPL-MCNC: 135 MG/DL — HIGH (ref 70–99)
HCT VFR BLD CALC: 19.5 % — CRITICAL LOW (ref 39–50)
HDLC SERPL-MCNC: 35 MG/DL — LOW
HGB BLD-MCNC: 5.7 G/DL — CRITICAL LOW (ref 13–17)
HIV 1+2 AB+HIV1 P24 AG SERPL QL IA: SIGNIFICANT CHANGE UP
LIPID PNL WITH DIRECT LDL SERPL: 53 MG/DL — SIGNIFICANT CHANGE UP
MAGNESIUM SERPL-MCNC: 1.8 MG/DL — SIGNIFICANT CHANGE UP (ref 1.6–2.6)
MCHC RBC-ENTMCNC: 20.6 PG — LOW (ref 27–34)
MCHC RBC-ENTMCNC: 29.2 GM/DL — LOW (ref 32–36)
MCV RBC AUTO: 70.4 FL — LOW (ref 80–100)
NON HDL CHOLESTEROL: 69 MG/DL — SIGNIFICANT CHANGE UP
NRBC # BLD: 0 /100 WBCS — SIGNIFICANT CHANGE UP (ref 0–0)
PHOSPHATE SERPL-MCNC: 3.5 MG/DL — SIGNIFICANT CHANGE UP (ref 2.5–4.5)
PLATELET # BLD AUTO: 322 K/UL — SIGNIFICANT CHANGE UP (ref 150–400)
POTASSIUM SERPL-MCNC: 3.6 MMOL/L — SIGNIFICANT CHANGE UP (ref 3.5–5.3)
POTASSIUM SERPL-SCNC: 3.6 MMOL/L — SIGNIFICANT CHANGE UP (ref 3.5–5.3)
PSA FLD-MCNC: <0.01 NG/ML — SIGNIFICANT CHANGE UP (ref 0–4)
RBC # BLD: 2.77 M/UL — LOW (ref 4.2–5.8)
RBC # FLD: 18.5 % — HIGH (ref 10.3–14.5)
RH IG SCN BLD-IMP: POSITIVE — SIGNIFICANT CHANGE UP
RH IG SCN BLD-IMP: POSITIVE — SIGNIFICANT CHANGE UP
SODIUM SERPL-SCNC: 141 MMOL/L — SIGNIFICANT CHANGE UP (ref 135–145)
TACROLIMUS SERPL-MCNC: 2.8 NG/ML — SIGNIFICANT CHANGE UP
TRIGL SERPL-MCNC: 78 MG/DL — SIGNIFICANT CHANGE UP
TROPONIN T SERPL-MCNC: 0.02 NG/ML — HIGH (ref 0–0.01)
VIT B12 SERPL-MCNC: 246 PG/ML — SIGNIFICANT CHANGE UP (ref 232–1245)
WBC # BLD: 8.83 K/UL — SIGNIFICANT CHANGE UP (ref 3.8–10.5)
WBC # FLD AUTO: 8.83 K/UL — SIGNIFICANT CHANGE UP (ref 3.8–10.5)

## 2022-07-26 PROCEDURE — 93010 ELECTROCARDIOGRAM REPORT: CPT

## 2022-07-26 PROCEDURE — 71045 X-RAY EXAM CHEST 1 VIEW: CPT | Mod: 26

## 2022-07-26 PROCEDURE — 99223 1ST HOSP IP/OBS HIGH 75: CPT

## 2022-07-26 PROCEDURE — 99233 SBSQ HOSP IP/OBS HIGH 50: CPT | Mod: GC

## 2022-07-26 RX ORDER — IRON SUCROSE 20 MG/ML
300 INJECTION, SOLUTION INTRAVENOUS ONCE
Refills: 0 | Status: COMPLETED | OUTPATIENT
Start: 2022-07-26 | End: 2022-07-26

## 2022-07-26 RX ORDER — MAGNESIUM SULFATE 500 MG/ML
2 VIAL (ML) INJECTION ONCE
Refills: 0 | Status: COMPLETED | OUTPATIENT
Start: 2022-07-26 | End: 2022-07-26

## 2022-07-26 RX ORDER — POTASSIUM CHLORIDE 20 MEQ
40 PACKET (EA) ORAL ONCE
Refills: 0 | Status: COMPLETED | OUTPATIENT
Start: 2022-07-26 | End: 2022-07-26

## 2022-07-26 RX ORDER — IPRATROPIUM/ALBUTEROL SULFATE 18-103MCG
3 AEROSOL WITH ADAPTER (GRAM) INHALATION ONCE
Refills: 0 | Status: COMPLETED | OUTPATIENT
Start: 2022-07-26 | End: 2022-07-26

## 2022-07-26 RX ADMIN — Medication 3 MILLILITER(S): at 18:04

## 2022-07-26 RX ADMIN — Medication 40 MILLIEQUIVALENT(S): at 11:53

## 2022-07-26 RX ADMIN — Medication 25 GRAM(S): at 11:54

## 2022-07-26 RX ADMIN — TACROLIMUS 1 MILLIGRAM(S): 5 CAPSULE ORAL at 00:22

## 2022-07-26 RX ADMIN — ATORVASTATIN CALCIUM 20 MILLIGRAM(S): 80 TABLET, FILM COATED ORAL at 22:28

## 2022-07-26 RX ADMIN — Medication 2: at 11:37

## 2022-07-26 RX ADMIN — IRON SUCROSE 176.67 MILLIGRAM(S): 20 INJECTION, SOLUTION INTRAVENOUS at 09:55

## 2022-07-26 RX ADMIN — Medication 5 MILLIGRAM(S): at 23:45

## 2022-07-26 RX ADMIN — Medication 2: at 08:58

## 2022-07-26 RX ADMIN — PANTOPRAZOLE SODIUM 40 MILLIGRAM(S): 20 TABLET, DELAYED RELEASE ORAL at 18:04

## 2022-07-26 RX ADMIN — TACROLIMUS 2 MILLIGRAM(S): 5 CAPSULE ORAL at 09:09

## 2022-07-26 RX ADMIN — INSULIN GLARGINE 8 UNIT(S): 100 INJECTION, SOLUTION SUBCUTANEOUS at 00:21

## 2022-07-26 RX ADMIN — TACROLIMUS 1 MILLIGRAM(S): 5 CAPSULE ORAL at 23:54

## 2022-07-26 RX ADMIN — Medication 650 MILLIGRAM(S): at 23:55

## 2022-07-26 RX ADMIN — PANTOPRAZOLE SODIUM 40 MILLIGRAM(S): 20 TABLET, DELAYED RELEASE ORAL at 06:52

## 2022-07-26 RX ADMIN — Medication 5 MILLIGRAM(S): at 00:20

## 2022-07-26 RX ADMIN — Medication 20 MILLIGRAM(S): at 18:04

## 2022-07-26 RX ADMIN — Medication 20 MILLIGRAM(S): at 07:00

## 2022-07-26 RX ADMIN — INSULIN GLARGINE 8 UNIT(S): 100 INJECTION, SOLUTION SUBCUTANEOUS at 22:40

## 2022-07-26 RX ADMIN — IRON SUCROSE 176.67 MILLIGRAM(S): 20 INJECTION, SOLUTION INTRAVENOUS at 04:20

## 2022-07-26 NOTE — PROGRESS NOTE ADULT - SUBJECTIVE AND OBJECTIVE BOX
INTERVAL HPI/OVERNIGHT EVENTS:  Patient was seen and examined at bedside. As per patient, no o/n events, patient resting comfortably in bed. Pt complains of mild fatigue. Pt states no bright red blood per stool, only endorses melanotic stool. States last BM was on 7/24.  Patient denies: fever, chills, lightheadedness, CP, palpitations, SOB, cough, N/V. ROS otherwise negative.    VITAL SIGNS:  T(F): 98.2 (07-26-22 @ 05:49)  HR: 74 (07-26-22 @ 05:49)  BP: 143/71 (07-26-22 @ 05:49)  RR: 19 (07-26-22 @ 05:49)  SpO2: 93% (07-26-22 @ 05:49)  Wt(kg): --      07-25-22 @ 07:01  -  07-26-22 @ 07:00  --------------------------------------------------------  IN: 0 mL / OUT: 400 mL / NET: -400 mL        PHYSICAL EXAM:    Constitutional: resting comfortably in bed; NAD  HEENT: NC/AT, PER, anicteric sclera, no nasal discharge; dry mucous membranes, pale in color  Neck: supple; no JVD or thyromegaly, no lymphadenopathy  Respiratory: CTA B/L; no W/R/R, no retractions  Cardiac: +S1/S2; RRR; 2/6 systolic murmur LLSB  Gastrointestinal: soft, NT/ND; no rebound or guarding  Extremities: WWP, no clubbing or cyanosis; 1+ pitting peripheral edema in the LE  Musculoskeletal: NROM x4; no joint swelling, tenderness or erythema  Vascular: 2+ radial, DP/PT pulses B/L  Dermatologic: skin warm, dry and intact; no rashes, wounds, or scars  Neurologic: AAOx3; CNII-XII grossly intact; no focal deficits  Psychiatric: affect and characteristics of appearance, verbalizations, behaviors are appropriate    MEDICATIONS  (STANDING):  atorvastatin 20 milliGRAM(s) Oral at bedtime  dextrose 5%. 1000 milliLiter(s) (50 mL/Hr) IV Continuous <Continuous>  dextrose 5%. 1000 milliLiter(s) (100 mL/Hr) IV Continuous <Continuous>  dextrose 50% Injectable 25 Gram(s) IV Push once  dextrose 50% Injectable 12.5 Gram(s) IV Push once  dextrose 50% Injectable 25 Gram(s) IV Push once  glucagon  Injectable 1 milliGRAM(s) IntraMuscular once  insulin glargine Injectable (LANTUS) 8 Unit(s) SubCutaneous at bedtime  insulin lispro (ADMELOG) corrective regimen sliding scale   SubCutaneous Before meals and at bedtime  magnesium sulfate  IVPB 2 Gram(s) IV Intermittent once  pantoprazole  Injectable 40 milliGRAM(s) IV Push every 12 hours  potassium chloride    Tablet ER 40 milliEquivalent(s) Oral once  predniSONE   Tablet 5 milliGRAM(s) Oral every 24 hours  tacrolimus 2 milliGRAM(s) Oral every 24 hours  tacrolimus 1 milliGRAM(s) Oral every 24 hours  torsemide 20 milliGRAM(s) Oral every 12 hours    MEDICATIONS  (PRN):  acetaminophen     Tablet .. 650 milliGRAM(s) Oral every 6 hours PRN Temp greater or equal to 38C (100.4F), Mild Pain (1 - 3)  dextrose Oral Gel 15 Gram(s) Oral once PRN Blood Glucose LESS THAN 70 milliGRAM(s)/deciliter      Allergies    Naprosyn (Unknown)    Intolerances        LABS:                        5.7    8.83  )-----------( 322      ( 26 Jul 2022 08:31 )             19.5     07-26    141  |  106  |  34<H>  ----------------------------<  135<H>  3.6   |  24  |  1.35<H>    Ca    8.8      26 Jul 2022 08:31  Phos  3.5     07-26  Mg     1.8     07-26    TPro  7.1  /  Alb  3.8  /  TBili  0.9  /  DBili  x   /  AST  21  /  ALT  28  /  AlkPhos  63  07-25          RADIOLOGY & ADDITIONAL TESTS:  Reviewed

## 2022-07-26 NOTE — PROGRESS NOTE ADULT - ASSESSMENT
76yoM, [employer], PMHx CHF, Afib on Eliquis (but stopped it 2 days ago), History of right renal transplant (on Tacrolimus & Prednisone 5 mg daily), History of Prostate Cancer s/p prostatectomy, DM   Admitted for lower GI bleeding - dark stools into BRBPR, HgB on admission 6.1 (MCV 70.4),     GI consulted for concern for GI bleed.     #Anemia, mixed Fe Deficient and AOCD  #Melena into BRBPR      Recent EGD/Colonoscopy (as noted below) on 7/13/22 for approximately 30 lb weight loss (no overt bleeding), colonoscopy notable for removal of several ascending colon polyps.   EGD otherwise unremarkable. Low suspicion for upper GI source, given unremarkable EGD findings as well as recent colonoscopy with removal of a couple of >10 mm polyps.   DDx includes post-polypectomy bleeding vs diverticular bleed     -Colonoscopy (7/13/22, GI physician: Edgar Vasques DO): 12 mm sessile polyp on ICV (s/p hot snare), 15 mm sessile ascending colon polyp (s/p hot snare), 3 mm sessile ascending colon polyp (s/p cold forceps), many small and large mouthed diverticula in the entire colon, internal hemorrhoids (Grade I)  -EGD (7/13/22, GI physician: Edgar Vasques DO): Zline @ 38 cm from the incisors, mild inflammation characterized by erythema in gastric antrum & body (s/p bx), no gross lesions in entire examined duodenum   No further bleeding since admission    Recommendations:  - Obtain pathology from GI physician's office ([facility], [address] [place], [address], Ph: [phone])  - Hold AC for now   - Please send hemolysis labs  - Consider IV infusion as patient does not accept blood products   - Clear Liquid Diet today  - Maintain Active T&S  - Minimize blood draws, utilize pediatric tubes    Thank you for the courtesy of this consult. We will follow along with you.    Enrike White M.D.  Gastroenterology Fellow  Pager: [phone] 76yoM, [employer], PMHx CHF, Afib on Eliquis (but stopped it 2 days ago), History of right renal transplant (on Tacrolimus & Prednisone 5 mg daily), History of Prostate Cancer s/p prostatectomy, DM   Admitted for lower GI bleeding - dark stools into BRBPR, HgB on admission 6.1 (MCV 70.4),     GI consulted for concern for GI bleed.     #Anemia, mixed Fe Deficient and AOCD  #Melena into BRBPR      Recent EGD/Colonoscopy (as noted below) on 7/13/22 for approximately 30 lb weight loss (no overt bleeding), colonoscopy notable for removal of several ascending colon polyps.   EGD otherwise unremarkable. Low suspicion for upper GI source, given unremarkable EGD findings as well as recent colonoscopy with removal of a couple of >10 mm polyps.   DDx includes post-polypectomy bleeding vs diverticular bleed     -Colonoscopy (7/13/22, GI physician: Edgar Vasques DO): 12 mm sessile polyp on ICV (s/p hot snare), 15 mm sessile ascending colon polyp (s/p hot snare), 3 mm sessile ascending colon polyp (s/p cold forceps), many small and large mouthed diverticula in the entire colon, internal hemorrhoids (Grade I)  -EGD (7/13/22, GI physician: Edgar Vasques DO): Zline @ 38 cm from the incisors, mild inflammation characterized by erythema in gastric antrum & body (s/p bx), no gross lesions in entire examined duodenum   No further bleeding since admission    Recommendations:  - Obtain pathology from GI physician's office ([facility], [address] [place], [address], Ph: [phone])  - Hold AC for now   - Please send hemolysis labs  - Consider IV infusion as patient does not accept blood products   - Clear Liquid Diet today  - Maintain Active T&S  - Minimize blood draws, utilize pediatric tubes  - Cardiology consult for consideration of watchman    Thank you for the courtesy of this consult. We will follow along with you.    Enrike White M.D.  Gastroenterology Fellow  Pager: [phone]

## 2022-07-26 NOTE — CONSULT NOTE ADULT - SUBJECTIVE AND OBJECTIVE BOX
Patient is a 76y old  Male who presents with a chief complaint of GI Bleed (26 Jul 2022 07:53)        HPI:  76 year old male with PMH of polycystic kidney disease (had kidney transplant in 2007, prior to that on dialysis for 4.5 yrs), HTN, HLD, CHF (EF 65% in 2020), DM, AFib (s/p ablation 1x, on Eliquis), prostate cancer s/p radical prostatectomy 2010, presenting with three days of bleeding per rectum. He saw his ophthalmologist today for redness in his left eye and was told to go to the ED immediately for "questionable impending vein occlusion vs inflammatory process." While in the ED, labs were drawn and showed a hemoglobin of 6.1. Patient then endorsed the three days of bleeding per rectum. He reports this has never happened to him before, but he has a recent history of anemia and has been undergoing outpatient GI workup (colonoscopy/EGD done 7/13/22 showed polpys, hemorrhoids, diverticula, and erythema in the antrum). He reports the bowel movements were about 4-5 per day and were dark with some areas of bright red blood. He says they stopped since last night (no BMs for the past 24 hrs). He also reports fatigue and feeling more "winded" than normal. However, he is familiar with feeling fluid overloaded since he has had multiple admissions for CHF exacerbations and he reports he does NOT currently feel overloaded (no severe SOB, no severe LE edema). He has chronic LE edema, currently at baseline. He also reports 30 lb weight loss over the past few months. He says his appetite is poor and he hasn't been going out as much since the pandemic began. Of note, he stopped taking his eliquis three days ago due to the bleeding.  Covid vaccinated x3.    Note: Pt is [facility] and does not want blood transfusions    ED COURSE:   Vitals: T 98.5, HR 74, /72, RR18, SpO2 95% on room air    Significant Labs: Hb 6.1, MCV 70.4, BUN 39, Cr 1.52, Trop 0.03, BNP 79065  Covid negative  Fecal occult blood positive    Imaging:   -CXR: Possible right lower opacity and increased lung markings. Cardiomegaly.    EKG: NSR with PVCs    Interventions: 80mg IVP Pantoprazole   (25 Jul 2022 21:59)      PAST MEDICAL & SURGICAL HISTORY:  Polycystic kidney disease      DM2 (diabetes mellitus, type 2)      HLD (hyperlipidemia)      HTN (hypertension)      Prostate cancer      Kidney transplant recipient      DVT (deep venous thrombosis)      Chronic CHF      H/O radical prostatectomy  2010      S/P hernia repair  Abdominal and inguinal around 2005          MEDICATIONS  (STANDING):  atorvastatin 20 milliGRAM(s) Oral at bedtime  dextrose 5%. 1000 milliLiter(s) (50 mL/Hr) IV Continuous <Continuous>  dextrose 5%. 1000 milliLiter(s) (100 mL/Hr) IV Continuous <Continuous>  dextrose 50% Injectable 25 Gram(s) IV Push once  dextrose 50% Injectable 12.5 Gram(s) IV Push once  dextrose 50% Injectable 25 Gram(s) IV Push once  glucagon  Injectable 1 milliGRAM(s) IntraMuscular once  insulin glargine Injectable (LANTUS) 8 Unit(s) SubCutaneous at bedtime  insulin lispro (ADMELOG) corrective regimen sliding scale   SubCutaneous three times a day before meals  iron sucrose IVPB 300 milliGRAM(s) IV Intermittent once  pantoprazole  Injectable 40 milliGRAM(s) IV Push every 12 hours  predniSONE   Tablet 5 milliGRAM(s) Oral every 24 hours  tacrolimus 2 milliGRAM(s) Oral every 24 hours  tacrolimus 1 milliGRAM(s) Oral every 24 hours  torsemide 20 milliGRAM(s) Oral every 12 hours    MEDICATIONS  (PRN):  acetaminophen     Tablet .. 650 milliGRAM(s) Oral every 6 hours PRN Temp greater or equal to 38C (100.4F), Mild Pain (1 - 3)  dextrose Oral Gel 15 Gram(s) Oral once PRN Blood Glucose LESS THAN 70 milliGRAM(s)/deciliter            FAMILY HISTORY:    CBC Full  -  ( 26 Jul 2022 08:31 )  WBC Count : 8.83 K/uL  RBC Count : 2.77 M/uL  Hemoglobin : 5.7 g/dL  Hematocrit : 19.5 %  Platelet Count - Automated : 322 K/uL  Mean Cell Volume : 70.4 fl  Mean Cell Hemoglobin : 20.6 pg  Mean Cell Hemoglobin Concentration : 29.2 gm/dL  Auto Neutrophil # : x  Auto Lymphocyte # : x  Auto Monocyte # : x  Auto Eosinophil # : x  Auto Basophil # : x  Auto Neutrophil % : x  Auto Lymphocyte % : x  Auto Monocyte % : x  Auto Eosinophil % : x  Auto Basophil % : x      07-26    141  |  106  |  34<H>  ----------------------------<  135<H>  3.6   |  24  |  1.35<H>    Ca    8.8      26 Jul 2022 08:31  Phos  3.5     07-26  Mg     1.8     07-26    TPro  7.1  /  Alb  3.8  /  TBili  0.9  /  DBili  x   /  AST  21  /  ALT  28  /  AlkPhos  63  07-25            Radiology :    < from: Xray Chest 1 View AP/PA (07.25.22 @ 16:34) >  ACC: [id number] EXAM:  XR CHEST AP OR PA 1V                          PROCEDURE DATE:  07/25/2022          INTERPRETATION:  Clinical history/reason for exam: Shortness of breath.    Frontal chest.    COMPARISON: November 10, 2021.    Findings/  impression: Left basilar focal atelectasis Stable cardiomegaly, thoracic   aortic calcification. Stable bony structures          Vital Signs Last 24 Hrs  T(C): 36.8 (26 Jul 2022 05:49), Max: 37.2 (25 Jul 2022 23:20)  T(F): 98.2 (26 Jul 2022 05:49), Max: 98.9 (25 Jul 2022 23:20)  HR: 74 (26 Jul 2022 05:49) (69 - 78)  BP: 143/71 (26 Jul 2022 05:49) (141/72 - 168/85)  BP(mean): --  RR: 19 (26 Jul 2022 05:49) (18 - 20)  SpO2: 93% (26 Jul 2022 05:49) (93% - 96%)    Parameters below as of 26 Jul 2022 05:49  Patient On (Oxygen Delivery Method): room air            REVIEW OF SYSTEMS:      CONSTITUTIONAL: No fever, weight loss, or fatigue  EYES: No eye pain, visual disturbances, or discharge  ENMT:  No difficulty hearing, tinnitus, vertigo; No sinus or throat pain  NECK: No pain or stiffness  BREASTS: No pain, masses, or nipple discharge  RESPIRATORY: No cough, wheezing, chills or hemoptysis; No shortness of breath  CARDIOVASCULAR: No chest pain, palpitations, dizziness, or leg swelling  GASTROINTESTINAL: per hpi   GENITOURINARY: No dysuria, frequency, hematuria, or incontinence  NEUROLOGICAL: No headaches, memory loss, loss of strength, numbness, or tremors  SKIN: No itching, burning, rashes, or lesions   LYMPH NODES: No enlarged glands  ENDOCRINE: No heat or cold intolerance; No hair loss  MUSCULOSKELETAL: No joint pain or swelling; No muscle, back, or extremity pain  PSYCHIATRIC: No depression, anxiety, mood swings, or difficulty sleeping  HEME/LYMPH: No easy bruising, or bleeding gums  ALLERGY AND IMMUNOLOGIC: No hives or eczema  VASCULAR: no swelling , erythema          Physical Exam:  76 y o  man lying in semi Bhatia's position , awake , alert , no acute complaints      Neurologic Exam:    Alert and oriented x 3       Motor Exam:    Right UE:               no focal weakness ,  > 3+/5 throughout                                 Left UE:                 no focal weakness,  > 3+/5 throughout        Right LE:                no focal weakness,  > 3+/5 throughout , limited r hip flexion - h/o surgery as a child      Left LE:                  no focal weakness,  > 3+/5 throughout            Sensation:         intact to light touch x 4 extremities                         DTR :                     biceps/brachioradialis : equal                                              patella/ankle : equal    Gait :  not tested              PM&R Impression : admitted for LGIB    1) deconditioned    2) no focal weakness      Recommendations / Plan :     1) Physical / Occupational therapy focusing on therapeutic exercises , bed mobility/transfer out of bed evaluation , progressive ambulation with assistive       devices prn .    2) Anticipated Disposition Plan / Recs  :    pending functional progress            
HPI: 76yoM, Shinto, PMHx CHF, Afib on Eliquis (but stopped it 2 days ago), here for lower GI bleed. States that starting Friday, he noticed dark stools into Sunday which eventually turned into BRBPR starting yesterday afternoon (approximately 3-4 episodes). Stopped last night. Says he has hx of anemia the last few months and was getting outpt workup, doesn't know his baseline hemoglobin. Past few days had some fatigue and dyspnea and weakness. He was told to come in to the ED for an evaluation of his eye actually and concern for retinal occlusion, not for the bleeding.    When he first noted the bleeding, he stopped his Eliquis 2 days ago.     Endoscopic History:   -Colonoscopy (7/13/22, GI physician: Isidoro Vasques DO): 12 mm sessile polyp on ICV (s/p hot snare), 15 mm sessile ascending colon polyp (s/p hot snare), 3 mm sessile ascending colon polyp (s/p cold forceps), many small and large mouthed diverticula in the entire colon, internal hemorrhoids (Grade I)  -EGD (7/13/22, GI physician: Isidoro Vasques DO): Zline @ 38 cm from the incisors, mild inflammation characterized by erythema in gastric antrum & body (s/p bx), no gross lesions in entire examined duodenum     PMHx: Former smoker, Afib (on Eliquis 5 mg BID), CHF, History of right renal transplant (on Tacrolimus & Prednisone 5 mg daily), History of Prostate Cancer s/p prostatectomy, DM  PSHx: Right renal transplant, prostatectomy, umbilical and inguinal hernia repairs  Allergies: Naproxen  Meds: Tacrolimus 1 mg capsule (2 in AM, 1 in PM), Prednisone 5 mg daily, Carvedilol BID, Lisinopril 40 mg daily, Metformin 500 mg 1 in AM, 3 after dinner, Repaglinide 2 mg Invokana, Levemir, Trulicity, Torsemide 20 mg daily, Eliquis 5 mg BID, Lipitor 20 mg qHS, Vitamin D3, Slow Mg Tab, Centrum silver  SH: social etoh, former smoker  FMHx: no familial history of CRC or IBD    Allergies    Naprosyn (Unknown)    Intolerances      MEDICATIONS:  MEDICATIONS  (STANDING):    MEDICATIONS  (PRN):    PAST MEDICAL & SURGICAL HISTORY:  Polycystic kidney disease      DM2 (diabetes mellitus, type 2)      HLD (hyperlipidemia)      HTN (hypertension)      Prostate cancer      Kidney transplant recipient      DVT (deep venous thrombosis)      H/O radical prostatectomy        FAMILY HISTORY:    SOCIAL HISTORY:  [place]: [ ] Current, [ ] Former, [ ] Never; Pack Years:  Alcohol:  Illicit Drugs:    REVIEW OF SYSTEMS:  Constitutional: No fever, chills, weight loss, or fatigue  ENMT:  No visual changes; No difficulty hearing, tinnitus, vertigo; No sinus or throat pain  Neck: No pain or stiffness  Respiratory: No cough, wheezing, or hemoptysis; No shortness of breath  Cardiovascular: No chest pain, palpitations, dizziness, orthopnea, PND, or leg swelling  Gastrointestinal: No abdominal or epigastric pain. No  nausea, vomiting, or hematemesis. No diarrhea, constipation, or steatorrhea. No melena or hematochezia  Genitourinary: No dysuria, urinary frequency/hesitancy, or hematuria  Skin: No itching, burning, rashes or lesions   Musculoskeletal: No joint pain or swelling; No muscle, back or extremity pain    Vital Signs Last 24 Hrs  T(C): 36.6 (25 Jul 2022 19:17), Max: 36.9 (25 Jul 2022 14:52)  T(F): 97.8 (25 Jul 2022 19:17), Max: 98.5 (25 Jul 2022 14:52)  HR: 74 (25 Jul 2022 19:17) (74 - 74)  BP: 168/85 (25 Jul 2022 19:17) (141/72 - 168/85)  BP(mean): --  RR: 18 (25 Jul 2022 19:17) (18 - 18)  SpO2: 95% (25 Jul 2022 19:17) (95% - 95%)    Parameters below as of 25 Jul 2022 19:17  Patient On (Oxygen Delivery Method): room air          PHYSICAL EXAM:    General: male, lying in bed; in no acute distress  HEENT: MMM, conjunctiva and sclera clear  Gastrointestinal: Soft, non-tender non-distended; Normal bowel sounds; No rebound or guarding  Rectal: Patient deferred  Extremities: Normal range of motion, No clubbing, cyanosis or edema  Neurological: Alert and oriented x3  Skin: Warm and dry. No obvious rash    LABS:                        6.1    8.56  )-----------( 353      ( 25 Jul 2022 15:47 )             21.9     07-25    141  |  103  |  39<H>  ----------------------------<  164<H>  3.8   |  23  |  1.52<H>    Ca    9.7      25 Jul 2022 15:47    TPro  7.1  /  Alb  3.8  /  TBili  0.9  /  DBili  x   /  AST  21  /  ALT  28  /  AlkPhos  63  07-25        RADIOLOGY & ADDITIONAL STUDIES:   
This is a 76 year old man with history of DM for 15 years, HTN, s/p renal transplant and prostate cancer.  Patient was seen by Zenia Jeter OD at Dr Melgoza's office and referred to ER.  Vision is stable and no eye pain.  No history of eye injury or surgeries.    Vision at near is 20/30 ou  P- R/R ou  EOM- full    LLL- flat  c/s- W/Q  K - clear  A/C deep  iris- round    lens- 2+NS ou    retina-  macula flat ou  OS- disc heme ? old reena-vein occlusion     imp/plan  patient needs retina follow up as outpatient, can schedule at [facility] when discharged,  note also placed in chart.  recommend also checking ESR and CRP , although no symptoms of GCA.     Evelyn Salazar MD

## 2022-07-26 NOTE — DISCHARGE NOTE PROVIDER - CARE PROVIDER_API CALL
CHIN LOZA  Internal Medicine  [address]  [place] [address]  Phone: [phone]  Fax: [phone]  Established Patient  Scheduled Appointment: 08/03/2022 01:15 PM    Joseph Horn  [address] [place] [address]  Phone: [phone]  Fax: (   )    -  Established Patient  Scheduled Appointment: 07/28/2022 09:00 AM

## 2022-07-26 NOTE — PROGRESS NOTE ADULT - ATTENDING COMMENTS
Suspect gib source maybe related to post polypctomy bleeding (proc at OSH).   if the patient could be transfused we would follow through with cscope to evaluate but given refusal to accept blood products, active chf, and rsolution of active bleeding, the safest course would be supportive mgmt and attempt to improve iron/prbc and the pt is in agreement. However, a procedure in this setting would be high risk of cardiovasc decompensation with unclear benefit.  While there is risk to holding eliquis, further bleeding would be immediately life threatening.  hence holding eliquis I suggested. typically 14days post polyectomy is considered highest risk of rebleeding (he bled at 12d) and given this sceario, maybe reasonable to hold a week to allow for complete healing of post polypectomy sites.
pt seen and examined by me case d/w housestaff agree with VS, Pe, assessment and plan as outlined above

## 2022-07-26 NOTE — PROGRESS NOTE ADULT - PROBLEM SELECTOR PLAN 5
Home regimen: Coreg 12.5 BID, Lisinopril 40 daily, Torsemide 20mg BID, Lipitor 20 daily  Last echo here: EF 65% on 9/15/20  Pt had recent hospitalization in April 2022 at [facility] for CHF exacerbation and has had multiple others in the past. Pt denies any symptoms consistent with previous admissions for CHF exacerbation. BNP 98826 (last was 6435 in Nov 2021). Trop 0.03 on admission and was 0.03 in November 2021. No chest pain, EKG NSR with PVCs. Pt may be in mild CHF exacerbation.     Plan:  -f/u TTE  -f/u AM trops (trending down)  -c/w Torsemide 20mg BID, Lipitor 20 daily  -HOLD coreg and lisinopril and restart when appropriate Home regimen: Coreg 12.5 BID, Lisinopril 40 daily, Torsemide 20mg BID, Lipitor 20 daily  Last echo here: EF 65% on 9/15/20  Pt had recent hospitalization in April 2022 at [facility] for CHF exacerbation and has had multiple others in the past. Pt denies any symptoms consistent with previous admissions for CHF exacerbation. BNP 68572 (last was 6435 in Nov 2021). Trop 0.03 on admission and was 0.03 in November 2021. No chest pain, EKG NSR with PVCs. Pt may be in mild CHF exacerbation.     -f/u TTE  -f/u AM trops (trending down)  -c/w Torsemide 20mg BID, Lipitor 20 daily  -HOLD coreg and lisinopril and restart when appropriate

## 2022-07-26 NOTE — DISCHARGE NOTE PROVIDER - NSDCMRMEDTOKEN_GEN_ALL_CORE_FT
apixaban 5 mg oral tablet: 1 tab(s) orally every 12 hours  Centrum Adults oral tablet: 1 tab(s) orally once a day  Coreg 12.5 mg oral tablet: 1 tab(s) orally 2 times a day  Jardiance 25 mg oral tablet: 1 tab(s) orally once a day (in the morning)  Levemir FlexTouch 100 units/mL subcutaneous solution: 12 unit(s) subcutaneous once a day (at bedtime)  Lipitor 20 mg oral tablet: 1 tab(s) orally once a day  lisinopril 40 mg oral tablet: 1 tab(s) orally once a day  metFORMIN 500 mg oral tablet, extended release: 1 tab(s) orally once a day in AM and 3 tabs in PM  PREDNISONE   TAB 5MG:   repaglinide 2 mg oral tablet: 1 tab(s) orally 2 times a day (before lunch and dinner )  Slow Magnesium Chloride with Calcium 71 mg-118 mg oral delayed release tablet: 2 tab(s) orally once a day  tacrolimus 1 mg oral capsule: 2 cap(s) orally once a day (in the morning) and 1 cap in the PM  torsemide 20 mg oral tablet: 1 tab(s) orally 2 times a day  Trulicity Pen 1.5 mg/0.5 mL subcutaneous solution: subcutaneous once a week  Vitamin D3 1000 intl units oral capsule: 1 cap(s) orally 2 times a day   Centrum Adults oral tablet: 1 tab(s) orally once a day  Coreg 12.5 mg oral tablet: 1 tab(s) orally 2 times a day  Jardiance 25 mg oral tablet: 1 tab(s) orally once a day (in the morning)  Levemir FlexTouch 100 units/mL subcutaneous solution: 12 unit(s) subcutaneous once a day (at bedtime)  Lipitor 20 mg oral tablet: 1 tab(s) orally once a day  lisinopril 40 mg oral tablet: 1 tab(s) orally once a day  metFORMIN 500 mg oral tablet, extended release: 1 tab(s) orally once a day in AM and 3 tabs in PM  PREDNISONE   TAB 5M tab(s) orally once a day  repaglinide 2 mg oral tablet: 1 tab(s) orally 2 times a day (before lunch and dinner )  Slow Magnesium Chloride with Calcium 71 mg-118 mg oral delayed release tablet: 2 tab(s) orally once a day  tacrolimus 1 mg oral capsule: 2 cap(s) orally once a day (in the morning) and 1 cap in the PM  torsemide 20 mg oral tablet: 1 tab(s) orally 2 times a day  Trulicity Pen 1.5 mg/0.5 mL subcutaneous solution: subcutaneous once a week  Vitamin D3 1000 intl units oral capsule: 1 cap(s) orally 2 times a day

## 2022-07-26 NOTE — PROGRESS NOTE ADULT - PROBLEM SELECTOR PLAN 6
Pt on Eliquis 5mg BID and coreg 12.5 BID    Plan:  -HOLD AC given GIB and restart when appropriate Pt on Eliquis 5mg BID and coreg 12.5 BID    -HOLD AC given GIB and restart when appropriate

## 2022-07-26 NOTE — PATIENT PROFILE ADULT - FALL HARM RISK - HARM RISK INTERVENTIONS

## 2022-07-26 NOTE — PATIENT PROFILE ADULT - NSTRANSFEREYEGLASSESPAIRS_GEN_A_NUR
Patient feels well, denies cramping or contractions. She reports good fetal movement. She will continue with 17 hydroxyprogesterone injections until 36 weeks. We discussed obtaining the group B strep screen at next visit.  
Patient presents for her routine OB visit.    Patient would like communication of their results via:      Brianda  Patient's current [facility] status: Active.       
1 pair

## 2022-07-26 NOTE — DISCHARGE NOTE PROVIDER - PROVIDER TOKENS
PROVIDER:[TOKEN:[[id number]],SCHEDULEDAPPT:[08/03/2022],SCHEDULEDAPPTTIME:[01:15 PM],ESTABLISHEDPATIENT:[T]],FREE:[LAST:[Justina],FIRST:[Joseph],PHONE:[phone]],FAX:[(   )    -],ADDRESS:[[address] [place] [address]],SCHEDULEDAPPT:[07/28/2022],SCHEDULEDAPPTTIME:[09:00 AM],ESTABLISHEDPATIENT:[T]]

## 2022-07-26 NOTE — PROGRESS NOTE ADULT - PROBLEM SELECTOR PLAN 2
Pt presented with 3 days of rectal bleeding, per patient only dark melanotic stools, but no bleeding since evening of 7/24. Outpatient work-up for chronic anemia:   -Colonoscopy 7/13/22 -> sessile polyps, s/p polypectomy. +diverticula, +internal hemorrhoids  -EGD 7/13/22 -> mild erythema in gastric antrum & body -> biopsy pending    Per GI -> Low suspicion for upper GI source, given unremarkable EGD findings as well as recent colonoscopy with removal of a couple of >10 mm polyps.   Most likely cause: post-polypectomy bleeding vs diverticular bleed     Plan:  - continue to hold eliquis  - PPI 40mg IV BID   - f/u GI recs -> no intervention at this time  - Obtain pathology from GI physician's office ([facility], [address] [place], [address], Ph: [phone])  - Consider CT A/P (as part of planned ongoing outpatient workup)  - trend H/H Pt presented with 3 days of rectal bleeding, per patient only dark melanotic stools, but no bleeding since evening of 7/24. Outpatient work-up for chronic anemia:   -Colonoscopy 7/13/22 -> sessile polyps, s/p polypectomy. +diverticula, +internal hemorrhoids  -EGD 7/13/22 -> mild erythema in gastric antrum & body -> biopsy pending    Per GI -> Low suspicion for upper GI source, given unremarkable EGD findings as well as recent colonoscopy with removal of a couple of >10 mm polyps.   Most likely cause: post-polypectomy bleeding vs diverticular bleed     - continue to hold eliquis  - PPI 40mg IV BID   - f/u GI recs -> no intervention at this time  - Obtain pathology from GI physician's office ([facility], [address] [place], [address], Ph: [phone])  - Consider CT A/P (as part of planned ongoing outpatient workup)  - trend H/H

## 2022-07-26 NOTE — DISCHARGE NOTE PROVIDER - HOSPITAL COURSE
#Discharge: do not delete  76 year old male with PMH of polycystic kidney disease (had kidney transplant in 2007, prior to that on dialysis for 4.5 yrs), HTN, HLD, CHF (EF 65% in 2020), DM, AFib (s/p ablation 1x, on Eliquis), prostate cancer s/p radical prostatectomy 2010, presenting with three days of bleeding per rectum, and melanotic stools. Pt w/ recent colonoscopy and multiple large polyps removed. Admitted for severe anemia (hgb 5.7) 2/2 to     Anemia due to acute blood loss.   ·  Plan: Pt presented with 3 days of melena w/ bleeding per rectum and found to have Hb 6.1, MCV 70.4.  Pt has recent h/o anemia with outpatient workup ongoing (Colonoscopy/EGD as below, CT A/P was planned).  Note pt had anemia 2/2 ESRD prior to his 2007 renal transplant when he was on HD for 4.5 yrs and rec'd EPO at the time. Baseline Hgb 10.2 per previous admissions. FOBT+ in ED. Suspected acute on chronic iron deficiency anemia 2/2 GI bleed. 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     Problem/Plan - 2:  ·  Problem: GIB (gastrointestinal bleeding).   ·  Plan: Pt presented with 3 days of rectal bleeding, per patient only dark melanotic stools, but no bleeding since evening of 7/24. Outpatient work-up for chronic anemia:   -Colonoscopy 7/13/22 -> sessile polyps, s/p polypectomy. +diverticula, +internal hemorrhoids  -EGD 7/13/22 -> mild erythema in gastric antrum & body -> biopsy pending    Per GI -> Low suspicion for upper GI source, given unremarkable EGD findings as well as recent colonoscopy with removal of a couple of >10 mm polyps.   Most likely cause: post-polypectomy bleeding vs diverticular bleed     - continue to hold eliquis  - PPI 40mg IV BID   - f/u GI recs -> no intervention at this time  - Obtain pathology from GI physician's office ([facility], [address] [place], [address], Ph: [phone])  - Consider CT A/P (as part of planned ongoing outpatient workup)  - trend H/H.       Problem/Plan - 3:  ·  Problem: Conjunctival injection.   ·  Plan: Pt presented from ophthalmology office with left sided conjunctival injection. He has no vision changes and no pain. The optometrist in the office told patient to go to the ED emergently for possible impending central retinal vein occlusion. Our ophthalmology consult service was called and reported this is more likely to be diabetic retinopathy and not CRVO, but even if it was CRVO, there is NO urgent intervention and this is not a critical emergency.    -f/u Ophthalmology recs (they will see patient tomorrow and likely recommend he see a retinal specialist as an outpatient for possible injections).       Problem/Plan - 4:  ·  Problem: DM2 (diabetes mellitus, type 2).   ·  Plan: Home regimen: Levemir 12u qHS, Repaglinide 2mg before meals BID, Jardiance 25mg daily, Trulicity 1.5 weekly, Metformin 500mg in AM and 1500mg in PM    -Lantus 8u and mISS and adjust as needed  -f/u AM A1C.       Problem/Plan - 5:  ·  Problem: Chronic CHF.   ·  Plan: Home regimen: Coreg 12.5 BID, Lisinopril 40 daily, Torsemide 20mg BID, Lipitor 20 daily  Last echo here: EF 65% on 9/15/20  Pt had recent hospitalization in April 2022 at [facility] for CHF exacerbation and has had multiple others in the past. Pt denies any symptoms consistent with previous admissions for CHF exacerbation. BNP 68024 (last was 6435 in Nov 2021). Trop 0.03 on admission and was 0.03 in November 2021. No chest pain, EKG NSR with PVCs. Pt may be in mild CHF exacerbation.     -f/u TTE  -f/u AM trops (trending down)  -c/w Torsemide 20mg BID, Lipitor 20 daily  -HOLD coreg and lisinopril and restart when appropriate.       Problem/Plan - 6:  ·  Problem: Afib.   ·  Plan: Pt on Eliquis 5mg BID and coreg 12.5 BID    -HOLD AC given GIB and restart when appropriate.          Patient was discharged to: (home/[facility]/acute rehab/hospice, etc, and with what services – home health PT/RN? Home O2?)  New medications:  Changes to old medications:  Medications that were stopped:   Items to follow up as outpatient:  Physical exam at the time of discharge: #Discharge: do not delete  76 year old male with PMH of polycystic kidney disease (had kidney transplant in 2007, prior to that on dialysis for 4.5 yrs), HTN, HLD, CHF (EF 65% in 2020), DM, AFib (s/p ablation 1x, on Eliquis), prostate cancer s/p radical prostatectomy 2010, presenting with three days of bleeding per rectum, and melanotic stools. Pt w/ recent colonoscopy and multiple large polyps removed. Admitted for severe anemia (hgb 5.7) 2/2 to     Anemia due to acute blood loss.   ·  Plan: Pt presented with 3 days of melena w/ bleeding per rectum and found to have Hb 6.1, MCV 70.4.  Pt has recent h/o anemia with outpatient workup ongoing (Colonoscopy/EGD as below, CT A/P was planned).  Note pt had anemia 2/2 ESRD prior to his 2007 renal transplant when he was on HD for 4.5 yrs and rec'd EPO at the time. Baseline Hgb 10.2 per previous admissions. FOBT+ in ED. Suspected acute on chronic iron deficiency anemia 2/2 GI bleed. Note pt is Sabianist and does not want blood transfusions    Hgb now 5.7, Hct 19.5    - 300 mg Iron IV  - diet, clear liquids  - active t&s  - minimize blood draws, use pediatric tubes  - hold eliquis  - f/u GI recs, possible scope tomorrow  - if hemodynamically unstable or AMS, put on monitor and ICU consult vs rr.    #GIB (gastrointestinal bleeding).   ·  Plan: Pt presented with 3 days of rectal bleeding, per patient only dark melanotic stools, but no bleeding since evening of 7/24. Outpatient work-up for chronic anemia:   -Colonoscopy 7/13/22 -> sessile polyps, s/p polypectomy. +diverticula, +internal hemorrhoids  -EGD 7/13/22 -> mild erythema in gastric antrum & body -> biopsy pending    Per GI -> Low suspicion for upper GI source, given unremarkable EGD findings as well as recent colonoscopy with removal of a couple of >10 mm polyps.   Most likely cause: post-polypectomy bleeding vs diverticular bleed     - continue to hold eliquis  - PPI 40mg IV BID   - f/u GI recs -> no intervention at this time  - Obtain pathology from GI physician's office ([facility] , [address] [place], [address], Ph: [phone])  - Consider CT A/P (as part of planned ongoing outpatient workup)  - trend H/H.       Problem/Plan - 3:  ·  Problem: Conjunctival injection.   ·  Plan: Pt presented from ophthalmology office with left sided conjunctival injection. He has no vision changes and no pain. The optometrist in the office told patient to go to the ED emergently for possible impending central retinal vein occlusion. Our ophthalmology consult service was called and reported this is more likely to be diabetic retinopathy and not CRVO, but even if it was CRVO, there is NO urgent intervention and this is not a critical emergency.    -f/u Ophthalmology recs (they will see patient tomorrow and likely recommend he see a retinal specialist as an outpatient for possible injections).       Problem/Plan - 4:  ·  Problem: DM2 (diabetes mellitus, type 2).   ·  Plan: Home regimen: Levemir 12u qHS, Repaglinide 2mg before meals BID, Jardiance 25mg daily, Trulicity 1.5 weekly, Metformin 500mg in AM and 1500mg in PM    -Lantus 8u and mISS and adjust as needed  -f/u AM A1C.       Problem/Plan - 5:  ·  Problem: Chronic CHF.   ·  Plan: Home regimen: Coreg 12.5 BID, Lisinopril 40 daily, Torsemide 20mg BID, Lipitor 20 daily  Last echo here: EF 65% on 9/15/20  Pt had recent hospitalization in April 2022 at [facility] for CHF exacerbation and has had multiple others in the past. Pt denies any symptoms consistent with previous admissions for CHF exacerbation. BNP 23025 (last was 6435 in Nov 2021). Trop 0.03 on admission and was 0.03 in November 2021. No chest pain, EKG NSR with PVCs. Pt may be in mild CHF exacerbation.     -f/u TTE  -f/u AM trops (trending down)  -c/w Torsemide 20mg BID, Lipitor 20 daily  -HOLD coreg and lisinopril and restart when appropriate.       Problem/Plan - 6:  ·  Problem: Afib.   ·  Plan: Pt on Eliquis 5mg BID and coreg 12.5 BID    -HOLD AC given GIB and restart when appropriate.          Patient was discharged to: (home/[facility]/acute rehab/hospice, etc, and with what services – home health PT/RN? Home O2?)  New medications:  Changes to old medications:  Medications that were stopped:   Items to follow up as outpatient:  Physical exam at the time of discharge: #Discharge: do not delete    76 year old male with PMH of polycystic kidney disease (had kidney transplant in 2007, prior to that on dialysis for 4.5 yrs), HTN, HLD, CHF (EF 65% in 2020), DM, AFib (s/p ablation 1x, on Eliquis), prostate cancer s/p radical prostatectomy 2010, presenting with three days of bleeding per rectum, and melanotic stools. Pt w/ recent colonoscopy and multiple large polyps removed. Admitted for severe anemia (hgb 5.7) 2/2 to postpolypectomy bleed vs diverticular bleed.    #Anemia due to acute blood loss.   Pt presented with 3 days of melena w/ bleeding per rectum and found to have Hb 6.1, MCV 70.4. Pt has recent h/o anemia with outpatient workup ongoing (Colonoscopy/EGD as below, CT A/P was planned). Pt previously had anemia 2/2 ESRD prior to his 2007 renal transplant when he was on HD for 4.5 yrs and rec'd EPO at the time. Baseline Hgb 10.2 per previous admissions. FOBT+ in ED. Suspected acute on chronic iron deficiency anemia 2/2 GI bleed. Note pt is Adventism and does not want blood transfusions. Hgb now 5.7, Hct 19.5. Given 300 mg Iron IV. Minimize blood draws, use pediatric tubes. Holding eliquis, possible cardiology consult for watchman procedure.      #GIB (gastrointestinal bleeding).   Pt presented with 3 days of rectal bleeding, per patient only dark melanotic stools, but no bleeding since evening of 7/24. Outpatient work-up for chronic anemia:   -Colonoscopy 7/13/22 -> sessile polyps, s/p polypectomy. +diverticula, +internal hemorrhoids  -EGD 7/13/22 -> mild erythema in gastric antrum & body -> biopsy pending.   Per GI -> Low suspicion for upper GI source, given unremarkable EGD findings as well as recent colonoscopy with removal of a couple of >10 mm polyps.   Most likely cause: post-polypectomy bleeding vs diverticular bleed     - continue to hold eliquis  - PPI 40mg IV BID   - f/u GI recs -> no intervention at this time  - trend H/H.       Problem/Plan - 3:  ·  Problem: Conjunctival injection.   ·  Plan: Pt presented from ophthalmology office with left sided conjunctival injection. He has no vision changes and no pain. The optometrist in the office told patient to go to the ED emergently for possible impending central retinal vein occlusion. Our ophthalmology consult service was called and reported this is more likely to be diabetic retinopathy and not CRVO, but even if it was CRVO, there is NO urgent intervention and this is not a critical emergency.    -f/u Ophthalmology recs (they will see patient tomorrow and likely recommend he see a retinal specialist as an outpatient for possible injections).       Problem/Plan - 4:  ·  Problem: DM2 (diabetes mellitus, type 2).   ·  Plan: Home regimen: Levemir 12u qHS, Repaglinide 2mg before meals BID, Jardiance 25mg daily, Trulicity 1.5 weekly, Metformin 500mg in AM and 1500mg in PM    -Lantus 8u and mISS and adjust as needed  -f/u AM A1C.       Problem/Plan - 5:  ·  Problem: Chronic CHF.   ·  Plan: Home regimen: Coreg 12.5 BID, Lisinopril 40 daily, Torsemide 20mg BID, Lipitor 20 daily  Last echo here: EF 65% on 9/15/20  Pt had recent hospitalization in April 2022 at [facility] for CHF exacerbation and has had multiple others in the past. Pt denies any symptoms consistent with previous admissions for CHF exacerbation. BNP 59116 (last was 6435 in Nov 2021). Trop 0.03 on admission and was 0.03 in November 2021. No chest pain, EKG NSR with PVCs. Pt may be in mild CHF exacerbation.     -f/u TTE  -f/u AM trops (trending down)  -c/w Torsemide 20mg BID, Lipitor 20 daily  -HOLD coreg and lisinopril and restart when appropriate.       Problem/Plan - 6:  ·  Problem: Afib.   ·  Plan: Pt on Eliquis 5mg BID and coreg 12.5 BID    -HOLD AC given GIB and restart when appropriate.          Patient was discharged to: (home/[facility]/acute rehab/hospice, etc, and with what services – home health PT/RN? Home O2?)  New medications:  Changes to old medications:  Medications that were stopped:   Items to follow up as outpatient:  Physical exam at the time of discharge: #Discharge: do not delete    76 year old male with PMH of polycystic kidney disease (had kidney transplant in 2007, prior to that on dialysis for 4.5 yrs), HTN, HLD, CHF (EF 65% in 2020), DM, AFib (s/p ablation 1x, on Eliquis), prostate cancer s/p radical prostatectomy 2010, presenting with three days of bleeding per rectum, and melanotic stools. Pt w/ recent colonoscopy and multiple large polyps removed. Admitted for severe anemia (hgb 5.7) 2/2 to postpolypectomy bleed vs diverticular bleed.    #Anemia due to acute blood loss.   Pt presented with 3 days of melena w/ bleeding per rectum and found to have Hb 6.1, MCV 70.4. Pt has recent h/o anemia with outpatient workup ongoing (Colonoscopy/EGD as below, CT A/P was planned). Pt previously had anemia 2/2 ESRD prior to his 2007 renal transplant when he was on HD for 4.5 yrs and rec'd EPO at the time. Baseline Hgb 10.2 per previous admissions. FOBT+ in ED. Suspected acute on chronic iron deficiency anemia 2/2 GI bleed. Note pt is Sikhism and does not want blood transfusions. Hgb now 5.7, Hct 19.5. Given 300 mg Iron IV. Minimize blood draws, use pediatric tubes. Holding eliquis, possible cardiology consult for watchman procedure.      #GIB (gastrointestinal bleeding).   Pt presented with 3 days of rectal bleeding, per patient only dark melanotic stools, but no bleeding since evening of 7/24. Outpatient work-up for chronic anemia:   -Colonoscopy 7/13/22 -> sessile polyps, s/p polypectomy. +diverticula, +internal hemorrhoids  -EGD 7/13/22 -> mild erythema in gastric antrum & body -> biopsy pending.   Per GI -> Low suspicion for upper GI source, given unremarkable EGD findings as well as recent colonoscopy with removal of a couple of >10 mm polyps.   Most likely cause: post-polypectomy bleeding vs diverticular bleed. Continue to hold eliquis, cardiology consult for watchman.    #Conjunctival injection.   Pt presented from ophthalmology office with left sided conjunctival injection. He has no vision changes and no pain. The optometrist in the office told patient to go to the ED emergently for possible impending central retinal vein occlusion. Our ophthalmology consult service was called and reported this is more likely to be diabetic retinopathy and not CRVO, but even if it was CRVO, there is NO urgent intervention and this is not a critical emergency. F/u with retinal specialist outpatient.    #DM2 (diabetes mellitus, type 2).   Home regimen: Levemir 12u qHS, Repaglinide 2mg before meals BID, Jardiance 25mg daily, Trulicity 1.5 weekly, Metformin 500mg in AM and 1500mg in PM    #Chronic CHF.   Home regimen: Coreg 12.5 BID, Lisinopril 40 daily, Torsemide 20mg BID, Lipitor 20 daily  Last echo here: EF 65% on 9/15/20  Pt had recent hospitalization in April 2022 at [facility] for CHF exacerbation and has had multiple others in the past. Pt denies any symptoms consistent with previous admissions for CHF exacerbation. BNP 35778 (last was 6435 in Nov 2021). Trop 0.03 on admission and was 0.03 in November 2021. No chest pain, EKG NSR with PVCs. Pt may be in mild CHF exacerbation. Continue home torsemide, hold coreg and lisinopril and restart when appropriate.      #Afib.   Pt on Eliquis 5mg BID and coreg 12.5 BID. HOLD AC given GIB and restart when appropriate.    Patient was discharged to:   New medications:  Changes to old medications:  Medications that were stopped:   Items to follow up as outpatient:  Physical exam at the time of discharge: #Discharge: do not delete    76 year old male with PMH of polycystic kidney disease (had kidney transplant in 2007, prior to that on dialysis for 4.5 yrs), HTN, HLD, CHF (EF 65% in 2020), DM, AFib (s/p ablation 1x, on Eliquis), prostate cancer s/p radical prostatectomy 2010, presenting with three days of bleeding per rectum, and melanotic stools. Pt w/ recent colonoscopy and multiple large polyps removed. Admitted for severe anemia (hgb 5.7) 2/2 to postpolypectomy bleed vs diverticular bleed.    #Anemia due to acute blood loss.   Pt presented with 3 days of melena w/ bleeding per rectum and found to have Hb 6.1, MCV 70.4. Pt has recent h/o anemia with outpatient workup ongoing (Colonoscopy/EGD as below, CT A/P was planned). Pt previously had anemia 2/2 ESRD prior to his 2007 renal transplant when he was on HD for 4.5 yrs and rec'd EPO at the time. Baseline Hgb 10.2 per previous admissions. FOBT+ in ED. Suspected acute on chronic iron deficiency anemia 2/2 GI bleed. 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 Hgb now 5.9  -s/p 300 mg Iron IV  -Hgb 5.9 with no more bleeding x 5 days  -c/w PO iron  -PCP appt tomorrow with Dr. Justina MARISCAL.   -GI next wednseday with private GI.       #GIB (gastrointestinal bleeding).   Pt presented with 3 days of rectal bleeding, per patient only dark melanotic stools, but no bleeding since evening of 7/24. Outpatient work-up for chronic anemia:   -Colonoscopy 7/13/22 -> sessile polyps, s/p polypectomy. +diverticula, +internal hemorrhoids  -EGD 7/13/22 -> mild erythema in gastric antrum & body -> biopsy pending.   Per GI -> Low suspicion for upper GI source, given unremarkable EGD findings as well as recent colonoscopy with removal of a couple of >10 mm polyps.   Most likely cause: post-polypectomy bleeding vs diverticular bleed. Continue to hold eliquis, will see CT surgery outpatient to evaluate for watchlesvia Sidhu.      #DM2 (diabetes mellitus, type 2).   Home regimen: Levemir 12u qHS, Repaglinide 2mg before meals BID, Jardiance 25mg daily, Trulicity 1.5 weekly, Metformin 500mg in AM and 1500mg in PM    #Chronic CHF.   Home regimen: Coreg 12.5 BID, Lisinopril 40 daily, Torsemide 20mg BID, Lipitor 20 daily  Last echo here: EF 65% on 9/15/20  Pt had recent hospitalization in April 2022 at [facility] for CHF exacerbation and has had multiple others in the past. Pt denies any symptoms consistent with previous admissions for CHF exacerbation. BNP 55946 (last was 6435 in Nov 2021). Trop 0.03 on admission and was 0.03 in November 2021. No chest pain, EKG NSR with PVCs. Pt may be in mild CHF exacerbation. Continue home torsemide, hold coreg and lisinopril and restart when appropriate.      #Afib.   Pt on Eliquis 5mg BID and coreg 12.5 BID. HOLD AC given GIB and restart when appropriate.    Patient was discharged to:   New medications:  Changes to old medications:  Medications that were stopped:   Items to follow up as outpatient:  Physical exam at the time of discharge: #Discharge: do not delete    76 year old male with PMH of polycystic kidney disease (had kidney transplant in 2007, prior to that on dialysis for 4.5 yrs), HTN, HLD, CHF (EF 65% in 2020), DM, AFib (s/p ablation 1x, on Eliquis), prostate cancer s/p radical prostatectomy 2010, presenting with three days of bleeding per rectum, and melanotic stools. Pt w/ recent colonoscopy and multiple large polyps removed. Admitted for severe anemia (hgb 5.7) 2/2 to postpolypectomy bleed vs diverticular bleed.    #Anemia due to acute blood loss.   Pt presented with 3 days of melena w/ bleeding per rectum and found to have Hb 6.1, MCV 70.4. Pt has recent h/o anemia with outpatient workup ongoing (Colonoscopy/EGD as below, CT A/P was planned). Pt previously had anemia 2/2 ESRD prior to his 2007 renal transplant when he was on HD for 4.5 yrs and rec'd EPO at the time. Baseline Hgb 10.2 per previous admissions. FOBT+ in ED. Suspected acute on chronic iron deficiency anemia 2/2 GI bleed. Note pt is Mu-ism and does not want blood transfusions. Hgb now 5.9  -s/p 300 mg Iron IV  -Hgb 5.9 with no more bleeding x 5 days  -c/w PO iron  -PCP appt tomorrow with Dr. Justina MARISCAL.   -GI next wednseday with private GI.       #GIB (gastrointestinal bleeding).   Pt presented with 3 days of rectal bleeding, per patient only dark melanotic stools, but no bleeding since evening of 7/24. Outpatient work-up for chronic anemia:   -Colonoscopy 7/13/22 -> sessile polyps, s/p polypectomy. +diverticula, +internal hemorrhoids  -EGD 7/13/22 -> mild erythema in gastric antrum & body -> biopsy pending.   Per GI -> Low suspicion for upper GI source, given unremarkable EGD findings as well as recent colonoscopy with removal of a couple of >10 mm polyps.   Most likely cause: post-polypectomy bleeding vs diverticular bleed. Continue to hold eliquis, will see CT surgery outpatient to evaluate for watchlesvia Sidhu.      #DM2 (diabetes mellitus, type 2).   Home regimen: Levemir 12u qHS, Repaglinide 2mg before meals BID, Jardiance 25mg daily, Trulicity 1.5 weekly, Metformin 500mg in AM and 1500mg in PM    #Chronic CHF.   Home regimen: Coreg 12.5 BID, Lisinopril 40 daily, Torsemide 20mg BID, Lipitor 20 daily  Last echo here: EF 65% on 9/15/20  Pt had recent hospitalization in April 2022 at [facility] for CHF exacerbation and has had multiple others in the past. Pt denies any symptoms consistent with previous admissions for CHF exacerbation. BNP 74657 (last was 6435 in Nov 2021). Trop 0.03 on admission and was 0.03 in November 2021. No chest pain, EKG NSR with PVCs. Pt may be in mild CHF exacerbation. Continue home torsemide, hold coreg and lisinopril and restart when appropriate.      #Afib.   Pt on Eliquis 5mg BID and coreg 12.5 BID. HOLD AC given GIB and restart when appropriate.    Patient was discharged to: home    Medications that were stopped:  Eliquis    Physical exam at the time of discharge: Pallor but heart was RRR, lungs CTAB/l, Abd SNT. A/o x3

## 2022-07-26 NOTE — PROGRESS NOTE ADULT - SUBJECTIVE AND OBJECTIVE BOX
GASTROENTEROLOGY PROGRESS NOTE  Patient seen and examined at bedside. Has not seen further bleeding, feels better in regards to his fatigue. Does endorse some dyspnea when lying flat.     PERTINENT REVIEW OF SYSTEMS:  CONSTITUTIONAL: No weakness, fevers or chills  HEENT: No visual changes; No vertigo or throat pain   GASTROINTESTINAL: As above.  NEUROLOGICAL: No numbness or weakness  SKIN: No itching, burning, rashes, or lesions     Allergies    Naprosyn (Unknown)    Intolerances      MEDICATIONS:  MEDICATIONS  (STANDING):  atorvastatin 20 milliGRAM(s) Oral at bedtime  dextrose 5%. 1000 milliLiter(s) (50 mL/Hr) IV Continuous <Continuous>  dextrose 5%. 1000 milliLiter(s) (100 mL/Hr) IV Continuous <Continuous>  dextrose 50% Injectable 25 Gram(s) IV Push once  dextrose 50% Injectable 12.5 Gram(s) IV Push once  dextrose 50% Injectable 25 Gram(s) IV Push once  glucagon  Injectable 1 milliGRAM(s) IntraMuscular once  insulin glargine Injectable (LANTUS) 8 Unit(s) SubCutaneous at bedtime  insulin lispro (ADMELOG) corrective regimen sliding scale   SubCutaneous Before meals and at bedtime  pantoprazole  Injectable 40 milliGRAM(s) IV Push every 12 hours  predniSONE   Tablet 5 milliGRAM(s) Oral every 24 hours  tacrolimus 2 milliGRAM(s) Oral every 24 hours  tacrolimus 1 milliGRAM(s) Oral every 24 hours  torsemide 20 milliGRAM(s) Oral every 12 hours    MEDICATIONS  (PRN):  acetaminophen     Tablet .. 650 milliGRAM(s) Oral every 6 hours PRN Temp greater or equal to 38C (100.4F), Mild Pain (1 - 3)  dextrose Oral Gel 15 Gram(s) Oral once PRN Blood Glucose LESS THAN 70 milliGRAM(s)/deciliter    Vital Signs Last 24 Hrs  T(C): 36.8 (26 Jul 2022 05:49), Max: 37.2 (25 Jul 2022 23:20)  T(F): 98.2 (26 Jul 2022 05:49), Max: 98.9 (25 Jul 2022 23:20)  HR: 74 (26 Jul 2022 05:49) (69 - 78)  BP: 143/71 (26 Jul 2022 05:49) (141/72 - 168/85)  BP(mean): --  RR: 19 (26 Jul 2022 05:49) (18 - 20)  SpO2: 93% (26 Jul 2022 05:49) (93% - 96%)    Parameters below as of 26 Jul 2022 05:49  Patient On (Oxygen Delivery Method): room air        07-25 @ 07:01  -  07-26 @ 07:00  --------------------------------------------------------  IN: 0 mL / OUT: 400 mL / NET: -400 mL      PHYSICAL EXAM:    General: lying in bed, in no acute distress, pale appearing male  HEENT: MMM, conjunctiva and sclera clear  Gastrointestinal: Soft non-tender non-distended; No rebound or guarding, right lower quadrant fullness in area of transplanted kidney  Skin: Warm and dry. No obvious rash    LABS:                        5.7    8.83  )-----------( 322      ( 26 Jul 2022 08:31 )             19.5     07-26    141  |  106  |  34<H>  ----------------------------<  135<H>  3.6   |  24  |  1.35<H>    Ca    8.8      26 Jul 2022 08:31  Phos  3.5     07-26  Mg     1.8     07-26    TPro  7.1  /  Alb  3.8  /  TBili  0.9  /  DBili  x   /  AST  21  /  ALT  28  /  AlkPhos  63  07-25    RADIOLOGY & ADDITIONAL STUDIES:  Reviewed

## 2022-07-26 NOTE — PATIENT PROFILE ADULT - FUNCTIONAL ASSESSMENT - DAILY ACTIVITY ASSESSMENT TYPE
PROCEDURE:  Cervical Epidural Injection Prone    PREOPERATIVE AND POSTOPERATIVE DIAGNOSIS: Cervical radicular pain    OPERATORS: Yelena Rai MD    ANESTHESIA: Local  POSITION: Prone  LEVEL(S):  C7-T1  LATERALITY: Right    EBL: minimal    DESCRIPTION:    The patient was seen and examined and there were no contraindications to cervical epidural steroid injection.  There were no issues related to coagulation disorders, and there was no local skin infection at the intended puncture site.  A detailed and informed consent was obtained in writing for the procedure after first describing all potential risks, benefits and alternatives.  An intravenous cannula was secured in the upper extremity for the purposes of administering resuscitative medications as needed.    The patient was placed in the prone position after first doing a \"time out\" including the patient's name and date of birth and comprehension of the intended procedure.  The site of injection was based upon imaging studies, history, and physical examination of the patient.    Pillows were placed under the chest to elevate it and allow the patient to gently flex the neck, to widen the lower cervical epidural space.  The head was gently placed in the neutral position, and the forehead was resting on the fluoroscopy table, with the patient giving verbal admission that this position was comfortable and not associated with neurological symptoms.  The shoulders were depressed by asking the patient to comfortably reach down with the hands towards the knees to minimize interference with the lateral fluoroscopic imaging technique.    A full complement of noninvasive hemodynamic monitors was applied, and the patient was monitored throughout the procedure.  A careful sterile skin prep and drape was performed using chlorhexidine solution.     The fluoroscopy unit was oriented to the Anterior-Posterior direction to obtain a  film and to identify the appropriate  bony anatomical landmarks.  Next, the radiolucency corresponding to the interlaminar epidural space was optimized with cephalo-caudal tilt as needed.  The boundaries of the interlaminar space including the spinous processes from the levels above and below were identified.  A site was marked over the lamina just off of the midline on the most chronically painful side at the chosen interspace.  A skin wheal was raised and subsequently the subcutaneous tissues were anesthetized using 2 mL of 1% lidocaine solution via a 27-gauge, 1.5 inch needle.      Next, an 18-gauge, 3.5 inch styletted Tuohy type epidural needle was advanced towards the intended site onto the lamina, under frequent fluoroscopic guidance.  Once os was contacted, a contra-lateral oblique picture, taken at 50 degrees, was taken.  At this point, the needle was advanced very slowly and carefully with loss of resistance to saline technique, until the epidural space was encountered just beyond the ventral border of the lamina.  At this point, the needle tip was noted to be in the epidural space.  It was noted that there were no paresthesias.    After carefully (in order to ensure absence of any needle movement) attaching sterile and clear extension tubing to the needle, there was no blood or cerebrospinal fluid with aspiration.  One half mL of contrast media was now injected under frequent fluoroscopic imaging.  This demonstrated obvious epidural spread, without evidence of any subarachnoid, intravascular, or subdural spread of contrast.  At this point, an AP radiograph was taken, which again demonstrated only epidural spread of contrast as one additional ml was slowly administered.    With strict adherence to absolute absence of needle movement, the syringe containing the contrast media was detached from the extension tubing, and a syringe containing the injectate was attached to the extension tubing.  After another negative aspiration for blood or  cerebrospinal fluid, the injectate below was now incrementally and uneventfully injected, frequently querying the patient regarding any discomfort or intense parasthesias. :    1 ml Decadron 10 mg  4 ml Preservative-free Normal Saline  5 ml total volume    The needle and tubing were flushed with saline and withdrawn.  Hemostasis was achieved.  A Band-Aid was applied.    The images were recorded and stored in the PACS system.    The patient tolerated the procedure well and there were no immediate adverse effects associated with it. Vital signs were stable throughout the procedure.    After a period of observation, and with stable vital signs, the patient was discharged home in satisfactory condition with instructions given to go to the nearest emergency department immediately should any untoward effects develop following the procedure.    Patient has been reassessed and is ready for discharge.    Yelena Rai MD  Interventional Pain Management  [facility]       Admission

## 2022-07-26 NOTE — DISCHARGE NOTE PROVIDER - NSDCFUSCHEDAPPT_GEN_ALL_CORE_FT
[facility] [place]  [facility]  [address]CR[address]  Scheduled Appointment: 09/07/2022    [facility]  ENDOCRIN [address]  Scheduled Appointment: 09/07/2022

## 2022-07-26 NOTE — PROGRESS NOTE ADULT - PROBLEM SELECTOR PLAN 10
H/o of prostate CA, s/p prostatectomy in 2010  PSA stable per pt, but he has had 30 lb weight loss in recent months    Plan:  -f/u AM PSA  -Obtain most recent PSA from outpatient records H/o of prostate CA, s/p prostatectomy in 2010  PSA stable per pt, but he has had 30 lb weight loss in recent months    -f/u AM PSA  -Obtain most recent PSA from outpatient records

## 2022-07-26 NOTE — PROGRESS NOTE ADULT - PROBLEM SELECTOR PLAN 3
Pt presented from ophthalmology office with left sided conjunctival injection. He has no vision changes and no pain. The optometrist in the office told patient to go to the ED emergently for possible impending central retinal vein occlusion. Our ophthalmology consult service was called and reported this is more likely to be diabetic retinopathy and not CRVO, but even if it was CRVO, there is NO urgent intervention and this is not a critical emergency.    Plan:  -f/u Ophthalmology recs (they will see patient tomorrow and likely recommend he see a retinal specialist as an outpatient for possible injections) Pt presented from ophthalmology office with left sided conjunctival injection. He has no vision changes and no pain. The optometrist in the office told patient to go to the ED emergently for possible impending central retinal vein occlusion. Our ophthalmology consult service was called and reported this is more likely to be diabetic retinopathy and not CRVO, but even if it was CRVO, there is NO urgent intervention and this is not a critical emergency.    -f/u Ophthalmology recs (they will see patient tomorrow and likely recommend he see a retinal specialist as an outpatient for possible injections)

## 2022-07-26 NOTE — PROGRESS NOTE ADULT - ASSESSMENT
76 year old male with PMH of polycystic kidney disease (had kidney transplant in 2007, prior to that on dialysis for 4.5 yrs), HTN, HLD, CHF (EF 65% in 2020), DM, AFib (s/p ablation 1x, on Eliquis), prostate cancer s/p radical prostatectomy 2010, presenting with three days of bleeding per rectum, admitted for GI bleed.

## 2022-07-26 NOTE — CONSULT NOTE ADULT - ASSESSMENT
per Internal Medicine    76 year old male with PMH of polycystic kidney disease (had kidney transplant in 2007, prior to that on dialysis for 4.5 yrs), HTN, HLD, CHF (EF 65% in 2020), DM, AFib (s/p ablation 1x, on Eliquis), prostate cancer s/p radical prostatectomy 2010, presenting with three days of bleeding per rectum, admitted for GIB.    Problem/Plan - 1:  ·  Problem: Symptomatic anemia.   ·  Plan: Pt presented with 3 days of bleeding per rectum and found to have Hb 6.1, MCV 70.4.  Pt has recent h/o anemia with outpatient workup ongoing (Colonoscopy/EGD as below, CT A/P was planned).  Note pt had anemia 2/2 ESRD prior to his 2007 renal transplant when he was on HD for 4.5 yrs and rec'd EPO at the time.  Most recent Hb in [facility] - 10.2 in 11/2021.  FOBT+ in ED  Suspected acute on chronic iron deficiency anemia 2/2 GI bleeding  Note pt is [employer] and does not want blood transfusions  Given IV iron 300mg after admission   Iron studies consistent with severe iron-deficiency    Plan:  -Consider further IV iron or EPO  -f/u GI recs as below  -Maintain active T&S (although pt does not want transfusions, will send just in case he changes mind).    Problem/Plan - 2:  ·  Problem: GIB (gastrointestinal bleeding).   ·  Plan: Pt presented with 3 days of rectal bleeding (bright red AND dark black), but no bleeding since evening of 7/24  Workup for anemia ongoing outpatient:   -Colonoscopy 7/13/22 -> sessile polyps, s/p polypectomy. +diverticula, +internal hemorrhoids  -EGD 7/13/22 -> mild erythema in gastric antrum & body -> biopsy pending    Per GI -> Low suspicion for upper GI source, given unremarkable EGD findings as well as recent colonoscopy with removal of a couple of >10 mm polyps.   Most likely cause: post-polypectomy bleeding vs diverticular bleed     Plan:  -HOLD eliquis  -PPI 40mg IV BID for at least 24 more hours  -f/u GI recs -> no intervention at this time  -Obtain pathology from GI physician's office ([facility], [address] [place], [address], Ph: [phone])  -Consider CT A/P (as part of planned ongoing outpatient workup)  -Monitor for further bleeding and trend H/H.    Problem/Plan - 3:  ·  Problem: Conjunctival injection.   ·  Plan: Pt presented from ophthalmology office with left sided conjunctival injection. He has no vision changes and no pain. The optometrist in the office told patient to go to the ED emergently for possible impending central retinal vein occlusion. Our ophthalmology consult service was called and reported this is more likely to be diabetic retinopathy and not CRVO, but even if it was CRVO, there is NO urgent intervention and this is not a critical emergency.    Plan:  -f/u Ophthalmology recs (they will see patient tomorrow and likely recommend he see a retinal specialist as an outpatient for possible injections).    Problem/Plan - 4:  ·  Problem: DM2 (diabetes mellitus, type 2).   ·  Plan: Home regimen: Levemir 12u qHS, Repaglinide 2mg before meals BID, Jardiance 25mg daily, Trulicity 1.5 weekly, Metformin 500mg in AM and 1500mg in PM    Plan:  -Lantus 8u and mISS and adjust as needed  -f/u AM A1C.    Problem/Plan - 5:  ·  Problem: Chronic CHF.   ·  Plan: Home regimen: Coreg 12.5 BID, Lisinopril 40 daily, Torsemide 20mg BID, Lipitor 20 daily  Last echo here: EF 65% on 9/15/20  Pt had recent hospitalization in April 2022 at [facility] for CHF exacerbation and has had multiple others in the past  Pt reports he does not feel fluid overloaded - he usually gets severely short of breath, which he is not, and gets severe LE edema (which he does not have).  He does have lower right side crackles and possible opacity on CXR as well as +1 LE edema (R more than L but this is his baseline)  BNP 49711 (last was 6435 in Nov 2021)   Trop 0.03 on admission and was 0.03 in November 2021  No chest pain, EKG NSR with PVCs  Pt may be in mild CHF exacerbation    Plan:  -f/u TTE  -f/u AM trops and repeat EKG  -c/w Torsemide 20mg BID, Lipitor 20 daily  -HOLD coreg and lisinopril and restart when appropriate   -Consider further IV diuresis depending on fluid status and GIB status in AM.    Problem/Plan - 6:  ·  Problem: Afib.   ·  Plan: Pt on Eliquis 5mg BID and coreg 12.5 BID    Plan:  -HOLD the above given GIB and restart when appropriate.    Problem/Plan - 7:  ·  Problem: HLD (hyperlipidemia).   ·  Plan: -c/w home lipitor 20.    Problem/Plan - 8:  ·  Problem: HTN (hypertension).   ·  Plan: HOLD home coreg 12.5 BID and lisinopril 40 daily and restart when appropriate.    Problem/Plan - 9:  ·  Problem: Kidney transplant recipient.   ·  Plan: h/o polycystic kidney disease   On HD 2003 to 2007 (left sided AVF)  Transplant 2007  Baseline Cr (per pt) is 1.3. Last in our system from Nov 21 is 1.6.  Admitted at 1.52    -c/w home tacrolimus 2mg in AM, 1mg in PM  -c/w home prednisone 5mg daily    #Possible DAYO  -If baseline is truly 1.3, then patient likely has pre-renal DAYO 2/2 blood loss  -Continue to monitor.    Problem/Plan - 10:  ·  Problem: Prostate cancer.   ·  Plan; H/o of prostate CA, s/p prostatectomy in 2010  PSA stable per pt, but he has had 30 lb weight loss in recent months    Plan:  -f/u AM PSA  -Obtain most recent PSA from outpatient records.    Problem/Plan - 11:  ·  Problem: Debility.   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    Problem/Plan - 12:  ·  Problem: Prophylactic measure.   ·  Plan: F: None currently  E: Replete with caution with possible DAYO  N: CC diet  DVT: None given GIB  GI: PPI  Code: FULL.

## 2022-07-26 NOTE — PROGRESS NOTE ADULT - PROBLEM SELECTOR PLAN 4
Home regimen: Levemir 12u qHS, Repaglinide 2mg before meals BID, Jardiance 25mg daily, Trulicity 1.5 weekly, Metformin 500mg in AM and 1500mg in PM    Plan:  -Lantus 8u and mISS and adjust as needed  -f/u AM A1C Home regimen: Levemir 12u qHS, Repaglinide 2mg before meals BID, Jardiance 25mg daily, Trulicity 1.5 weekly, Metformin 500mg in AM and 1500mg in PM    -Lantus 8u and mISS and adjust as needed  -f/u AM A1C

## 2022-07-27 ENCOUNTER — TRANSCRIPTION ENCOUNTER (OUTPATIENT)
Age: 77
End: 2022-07-27

## 2022-07-27 VITALS
SYSTOLIC BLOOD PRESSURE: 159 MMHG | RESPIRATION RATE: 18 BRPM | HEART RATE: 72 BPM | OXYGEN SATURATION: 91 % | DIASTOLIC BLOOD PRESSURE: 81 MMHG

## 2022-07-27 LAB
ALBUMIN SERPL ELPH-MCNC: 3.3 G/DL — SIGNIFICANT CHANGE UP (ref 3.3–5)
ALP SERPL-CCNC: 54 U/L — SIGNIFICANT CHANGE UP (ref 40–120)
ALT FLD-CCNC: 20 U/L — SIGNIFICANT CHANGE UP (ref 10–45)
ANION GAP SERPL CALC-SCNC: 14 MMOL/L — SIGNIFICANT CHANGE UP (ref 5–17)
AST SERPL-CCNC: 14 U/L — SIGNIFICANT CHANGE UP (ref 10–40)
BILIRUB SERPL-MCNC: 1.2 MG/DL — SIGNIFICANT CHANGE UP (ref 0.2–1.2)
BUN SERPL-MCNC: 24 MG/DL — HIGH (ref 7–23)
CALCIUM SERPL-MCNC: 8.8 MG/DL — SIGNIFICANT CHANGE UP (ref 8.4–10.5)
CHLORIDE SERPL-SCNC: 106 MMOL/L — SIGNIFICANT CHANGE UP (ref 96–108)
CO2 SERPL-SCNC: 23 MMOL/L — SIGNIFICANT CHANGE UP (ref 22–31)
CREAT SERPL-MCNC: 1.27 MG/DL — SIGNIFICANT CHANGE UP (ref 0.5–1.3)
EGFR: 59 ML/MIN/1.73M2 — LOW
GLUCOSE BLDC GLUCOMTR-MCNC: 118 MG/DL — HIGH (ref 70–99)
GLUCOSE BLDC GLUCOMTR-MCNC: 121 MG/DL — HIGH (ref 70–99)
GLUCOSE BLDC GLUCOMTR-MCNC: 85 MG/DL — SIGNIFICANT CHANGE UP (ref 70–99)
GLUCOSE SERPL-MCNC: 112 MG/DL — HIGH (ref 70–99)
HCT VFR BLD CALC: 21 % — CRITICAL LOW (ref 39–50)
HGB BLD-MCNC: 5.9 G/DL — CRITICAL LOW (ref 13–17)
MAGNESIUM SERPL-MCNC: 2.1 MG/DL — SIGNIFICANT CHANGE UP (ref 1.6–2.6)
MCHC RBC-ENTMCNC: 19.7 PG — LOW (ref 27–34)
MCHC RBC-ENTMCNC: 28.1 GM/DL — LOW (ref 32–36)
MCV RBC AUTO: 70 FL — LOW (ref 80–100)
NRBC # BLD: 0 /100 WBCS — SIGNIFICANT CHANGE UP (ref 0–0)
PHOSPHATE SERPL-MCNC: 3.6 MG/DL — SIGNIFICANT CHANGE UP (ref 2.5–4.5)
PLATELET # BLD AUTO: 360 K/UL — SIGNIFICANT CHANGE UP (ref 150–400)
POTASSIUM SERPL-MCNC: 3.6 MMOL/L — SIGNIFICANT CHANGE UP (ref 3.5–5.3)
POTASSIUM SERPL-SCNC: 3.6 MMOL/L — SIGNIFICANT CHANGE UP (ref 3.5–5.3)
PROT SERPL-MCNC: 6.3 G/DL — SIGNIFICANT CHANGE UP (ref 6–8.3)
RBC # BLD: 3 M/UL — LOW (ref 4.2–5.8)
RBC # FLD: 18.6 % — HIGH (ref 10.3–14.5)
SODIUM SERPL-SCNC: 143 MMOL/L — SIGNIFICANT CHANGE UP (ref 135–145)
WBC # BLD: 9.16 K/UL — SIGNIFICANT CHANGE UP (ref 3.8–10.5)
WBC # FLD AUTO: 9.16 K/UL — SIGNIFICANT CHANGE UP (ref 3.8–10.5)

## 2022-07-27 PROCEDURE — 85027 COMPLETE CBC AUTOMATED: CPT

## 2022-07-27 PROCEDURE — 71045 X-RAY EXAM CHEST 1 VIEW: CPT

## 2022-07-27 PROCEDURE — 83036 HEMOGLOBIN GLYCOSYLATED A1C: CPT

## 2022-07-27 PROCEDURE — 83550 IRON BINDING TEST: CPT

## 2022-07-27 PROCEDURE — 83540 ASSAY OF IRON: CPT

## 2022-07-27 PROCEDURE — U0005: CPT

## 2022-07-27 PROCEDURE — 86850 RBC ANTIBODY SCREEN: CPT

## 2022-07-27 PROCEDURE — 96374 THER/PROPH/DIAG INJ IV PUSH: CPT

## 2022-07-27 PROCEDURE — 82728 ASSAY OF FERRITIN: CPT

## 2022-07-27 PROCEDURE — 94640 AIRWAY INHALATION TREATMENT: CPT

## 2022-07-27 PROCEDURE — 80053 COMPREHEN METABOLIC PANEL: CPT

## 2022-07-27 PROCEDURE — 82746 ASSAY OF FOLIC ACID SERUM: CPT

## 2022-07-27 PROCEDURE — 84484 ASSAY OF TROPONIN QUANT: CPT

## 2022-07-27 PROCEDURE — 85025 COMPLETE CBC W/AUTO DIFF WBC: CPT

## 2022-07-27 PROCEDURE — 80197 ASSAY OF TACROLIMUS: CPT

## 2022-07-27 PROCEDURE — 82962 GLUCOSE BLOOD TEST: CPT

## 2022-07-27 PROCEDURE — 80061 LIPID PANEL: CPT

## 2022-07-27 PROCEDURE — 83010 ASSAY OF HAPTOGLOBIN QUANT: CPT

## 2022-07-27 PROCEDURE — 82607 VITAMIN B-12: CPT

## 2022-07-27 PROCEDURE — 80048 BASIC METABOLIC PNL TOTAL CA: CPT

## 2022-07-27 PROCEDURE — 83735 ASSAY OF MAGNESIUM: CPT

## 2022-07-27 PROCEDURE — 83880 ASSAY OF NATRIURETIC PEPTIDE: CPT

## 2022-07-27 PROCEDURE — 36415 COLL VENOUS BLD VENIPUNCTURE: CPT

## 2022-07-27 PROCEDURE — 97161 PT EVAL LOW COMPLEX 20 MIN: CPT

## 2022-07-27 PROCEDURE — 99285 EMERGENCY DEPT VISIT HI MDM: CPT

## 2022-07-27 PROCEDURE — 87389 HIV-1 AG W/HIV-1&-2 AB AG IA: CPT

## 2022-07-27 PROCEDURE — 85045 AUTOMATED RETICULOCYTE COUNT: CPT

## 2022-07-27 PROCEDURE — 86901 BLOOD TYPING SEROLOGIC RH(D): CPT

## 2022-07-27 PROCEDURE — U0003: CPT

## 2022-07-27 PROCEDURE — G0103: CPT

## 2022-07-27 PROCEDURE — 86900 BLOOD TYPING SEROLOGIC ABO: CPT

## 2022-07-27 PROCEDURE — 84100 ASSAY OF PHOSPHORUS: CPT

## 2022-07-27 PROCEDURE — 93005 ELECTROCARDIOGRAM TRACING: CPT

## 2022-07-27 PROCEDURE — 99239 HOSP IP/OBS DSCHRG MGMT >30: CPT

## 2022-07-27 PROCEDURE — 99233 SBSQ HOSP IP/OBS HIGH 50: CPT

## 2022-07-27 PROCEDURE — 83615 LACTATE (LD) (LDH) ENZYME: CPT

## 2022-07-27 PROCEDURE — 82272 OCCULT BLD FECES 1-3 TESTS: CPT

## 2022-07-27 RX ORDER — POTASSIUM CHLORIDE 20 MEQ
40 PACKET (EA) ORAL ONCE
Refills: 0 | Status: DISCONTINUED | OUTPATIENT
Start: 2022-07-27 | End: 2022-07-27

## 2022-07-27 RX ORDER — IRON SUCROSE 20 MG/ML
300 INJECTION, SOLUTION INTRAVENOUS ONCE
Refills: 0 | Status: COMPLETED | OUTPATIENT
Start: 2022-07-27 | End: 2022-07-27

## 2022-07-27 RX ADMIN — Medication 650 MILLIGRAM(S): at 00:30

## 2022-07-27 RX ADMIN — PANTOPRAZOLE SODIUM 40 MILLIGRAM(S): 20 TABLET, DELAYED RELEASE ORAL at 06:21

## 2022-07-27 RX ADMIN — IRON SUCROSE 176.67 MILLIGRAM(S): 20 INJECTION, SOLUTION INTRAVENOUS at 13:11

## 2022-07-27 RX ADMIN — Medication 20 MILLIGRAM(S): at 07:48

## 2022-07-27 RX ADMIN — Medication 20 MILLIGRAM(S): at 18:19

## 2022-07-27 RX ADMIN — PANTOPRAZOLE SODIUM 40 MILLIGRAM(S): 20 TABLET, DELAYED RELEASE ORAL at 18:27

## 2022-07-27 RX ADMIN — TACROLIMUS 2 MILLIGRAM(S): 5 CAPSULE ORAL at 10:21

## 2022-07-27 RX ADMIN — Medication 650 MILLIGRAM(S): at 07:48

## 2022-07-27 RX ADMIN — Medication 650 MILLIGRAM(S): at 08:50

## 2022-07-27 NOTE — PROGRESS NOTE ADULT - SUBJECTIVE AND OBJECTIVE BOX
GASTROENTEROLOGY PROGRESS NOTE  Patient seen and examined at bedside. His dyspnea has mildly worsened, was placed on O2 NC yesterday,     PERTINENT REVIEW OF SYSTEMS:  CONSTITUTIONAL: No weakness, fevers or chills  HEENT: No visual changes; No vertigo or throat pain   GASTROINTESTINAL: As above.  NEUROLOGICAL: No numbness or weakness  SKIN: No itching, burning, rashes, or lesions     Allergies    Naprosyn (Unknown)    Intolerances      MEDICATIONS:  MEDICATIONS  (STANDING):  atorvastatin 20 milliGRAM(s) Oral at bedtime  dextrose 5%. 1000 milliLiter(s) (50 mL/Hr) IV Continuous <Continuous>  dextrose 5%. 1000 milliLiter(s) (100 mL/Hr) IV Continuous <Continuous>  dextrose 50% Injectable 25 Gram(s) IV Push once  dextrose 50% Injectable 12.5 Gram(s) IV Push once  dextrose 50% Injectable 25 Gram(s) IV Push once  glucagon  Injectable 1 milliGRAM(s) IntraMuscular once  insulin glargine Injectable (LANTUS) 8 Unit(s) SubCutaneous at bedtime  insulin lispro (ADMELOG) corrective regimen sliding scale   SubCutaneous Before meals and at bedtime  pantoprazole  Injectable 40 milliGRAM(s) IV Push every 12 hours  predniSONE   Tablet 5 milliGRAM(s) Oral every 24 hours  tacrolimus 2 milliGRAM(s) Oral every 24 hours  tacrolimus 1 milliGRAM(s) Oral every 24 hours  torsemide 20 milliGRAM(s) Oral every 12 hours    MEDICATIONS  (PRN):  acetaminophen     Tablet .. 650 milliGRAM(s) Oral every 6 hours PRN Temp greater or equal to 38C (100.4F), Mild Pain (1 - 3)  dextrose Oral Gel 15 Gram(s) Oral once PRN Blood Glucose LESS THAN 70 milliGRAM(s)/deciliter    Vital Signs Last 24 Hrs  T(C): 36.7 (27 Jul 2022 05:07), Max: 36.7 (26 Jul 2022 13:36)  T(F): 98 (27 Jul 2022 05:07), Max: 98 (26 Jul 2022 13:36)  HR: 74 (27 Jul 2022 05:07) (69 - 74)  BP: 156/78 (27 Jul 2022 05:07) (139/85 - 165/79)  BP(mean): --  RR: 17 (27 Jul 2022 05:07) (17 - 19)  SpO2: 93% (27 Jul 2022 05:07) (92% - 95%)    Parameters below as of 27 Jul 2022 05:07  Patient On (Oxygen Delivery Method): nasal cannula  O2 Flow (L/min): 2      07-26 @ 07:01  -  07-27 @ 07:00  --------------------------------------------------------  IN: 0 mL / OUT: 1000 mL / NET: -1000 mL      PHYSICAL EXAM:    General: lying in bed, in no acute distress  HEENT: MMM, conjunctiva and sclera clear, NC on  Gastrointestinal: Soft non-tender non-distended; No rebound or guarding, rlq fullness in area of renal transplant  Skin: Warm and dry. No obvious rash    LABS:                        5.9    9.16  )-----------( 360      ( 27 Jul 2022 05:30 )             21.0     07-27    143  |  106  |  24<H>  ----------------------------<  112<H>  3.6   |  23  |  1.27    Ca    8.8      27 Jul 2022 05:30  Phos  3.6     07-27  Mg     2.1     07-27    TPro  6.3  /  Alb  3.3  /  TBili  1.2  /  DBili  x   /  AST  14  /  ALT  20  /  AlkPhos  54  07-27      RADIOLOGY & ADDITIONAL STUDIES:  Reviewed

## 2022-07-27 NOTE — PROGRESS NOTE ADULT - SUBJECTIVE AND OBJECTIVE BOX
Hospital course:    INTERVAL HPI/OVERNIGHT EVENTS:  As per night team, no overnight events. Patient seen and examined at bedside. Patient denies fever, chills, dizziness, weakness, HA, CP, SOB, N/V/D/C    VITALS  Vital Signs Last 24 Hrs  T(C): 36.5 (27 Jul 2022 12:12), Max: 36.7 (27 Jul 2022 05:07)  T(F): 97.7 (27 Jul 2022 12:12), Max: 98 (27 Jul 2022 05:07)  HR: 71 (27 Jul 2022 12:12) (69 - 74)  BP: 145/96 (27 Jul 2022 12:12) (145/96 - 165/79)  BP(mean): --  RR: 18 (27 Jul 2022 12:12) (17 - 19)  SpO2: 94% (27 Jul 2022 12:12) (93% - 95%)    Parameters below as of 27 Jul 2022 12:12  Patient On (Oxygen Delivery Method): room air        CAPILLARY BLOOD GLUCOSE      POCT Blood Glucose.: 121 mg/dL (27 Jul 2022 11:52)  POCT Blood Glucose.: 118 mg/dL (27 Jul 2022 08:37)  POCT Blood Glucose.: 121 mg/dL (26 Jul 2022 22:39)  POCT Blood Glucose.: 112 mg/dL (26 Jul 2022 21:26)  POCT Blood Glucose.: 94 mg/dL (26 Jul 2022 17:25)      PHYSICAL EXAM  General: NAD, sitting comfortably in bed   HEENT: PERRL/ EOMI, no scleral icterus, MMM  Neck: Supple, no JVD  Respiratory: lungs CTA b/l, no wheezes/crackles, no accessory muscle use  Cardiovascular: Regular rhythm/rate; +S1 +S2, no murmurs  Gastrointestinal: Soft, NTND, normoactive BS, no rebound, no guarding  Genitourinary: no suprapubic tenderness  Extremities: WWP, no cyanosis, no clubbing, no edema, pulses equal  Neurological: A&Ox3, no gross focal deficits, follows commands  Skin: Normal temperature, warm, dry    MEDICATIONS  (STANDING):  atorvastatin 20 milliGRAM(s) Oral at bedtime  dextrose 5%. 1000 milliLiter(s) (50 mL/Hr) IV Continuous <Continuous>  dextrose 5%. 1000 milliLiter(s) (100 mL/Hr) IV Continuous <Continuous>  dextrose 50% Injectable 25 Gram(s) IV Push once  dextrose 50% Injectable 12.5 Gram(s) IV Push once  dextrose 50% Injectable 25 Gram(s) IV Push once  glucagon  Injectable 1 milliGRAM(s) IntraMuscular once  insulin glargine Injectable (LANTUS) 8 Unit(s) SubCutaneous at bedtime  insulin lispro (ADMELOG) corrective regimen sliding scale   SubCutaneous Before meals and at bedtime  iron sucrose IVPB 300 milliGRAM(s) IV Intermittent once  pantoprazole  Injectable 40 milliGRAM(s) IV Push every 12 hours  predniSONE   Tablet 5 milliGRAM(s) Oral every 24 hours  tacrolimus 2 milliGRAM(s) Oral every 24 hours  tacrolimus 1 milliGRAM(s) Oral every 24 hours  torsemide 20 milliGRAM(s) Oral every 12 hours    MEDICATIONS  (PRN):  acetaminophen     Tablet .. 650 milliGRAM(s) Oral every 6 hours PRN Temp greater or equal to 38C (100.4F), Mild Pain (1 - 3)  dextrose Oral Gel 15 Gram(s) Oral once PRN Blood Glucose LESS THAN 70 milliGRAM(s)/deciliter      Naprosyn (Unknown)      LABS                        5.9    9.16  )-----------( 360      ( 27 Jul 2022 05:30 )             21.0     07-27    143  |  106  |  24<H>  ----------------------------<  112<H>  3.6   |  23  |  1.27    Ca    8.8      27 Jul 2022 05:30  Phos  3.6     07-27  Mg     2.1     07-27    TPro  6.3  /  Alb  3.3  /  TBili  1.2  /  DBili  x   /  AST  14  /  ALT  20  /  AlkPhos  54  07-27        CARDIAC MARKERS ( 26 Jul 2022 08:31 )  x     / 0.02 ng/mL / x     / x     / x      CARDIAC MARKERS ( 25 Jul 2022 15:47 )  x     / 0.03 ng/mL / x     / x     / x            RADIOLOGY & ADDITIONAL TESTS: Reviewed   Hospital course:    INTERVAL HPI/OVERNIGHT EVENTS:  As per night team, no overnight events. Patient seen and examined at bedside. Patient denies fever, chills, dizziness, weakness, HA, CP,  N/V/D/C. He reports his last episode of GI bleeding was Saturday and last BM was Sunday and normal. Has some SOB on walking to the bathroom but otherwise no complaints.     VITALS  Vital Signs Last 24 Hrs  T(C): 36.5 (27 Jul 2022 12:12), Max: 36.7 (27 Jul 2022 05:07)  T(F): 97.7 (27 Jul 2022 12:12), Max: 98 (27 Jul 2022 05:07)  HR: 71 (27 Jul 2022 12:12) (69 - 74)  BP: 145/96 (27 Jul 2022 12:12) (145/96 - 165/79)  BP(mean): --  RR: 18 (27 Jul 2022 12:12) (17 - 19)  SpO2: 94% (27 Jul 2022 12:12) (93% - 95%)    Parameters below as of 27 Jul 2022 12:12  Patient On (Oxygen Delivery Method): room air        CAPILLARY BLOOD GLUCOSE      POCT Blood Glucose.: 121 mg/dL (27 Jul 2022 11:52)  POCT Blood Glucose.: 118 mg/dL (27 Jul 2022 08:37)  POCT Blood Glucose.: 121 mg/dL (26 Jul 2022 22:39)  POCT Blood Glucose.: 112 mg/dL (26 Jul 2022 21:26)  POCT Blood Glucose.: 94 mg/dL (26 Jul 2022 17:25)      PHYSICAL EXAM  General: NAD, sitting comfortably in bed   HEENT: EOMI, no scleral icterus, MMM +pallor  Neck: Supple, no JVD  Respiratory: lungs CTA b/l, no wheezes/crackles, no accessory muscle use  Cardiovascular: Regular rhythm/rate; +S1 +S2, no murmurs  Gastrointestinal: Soft, NTND, normoactive BS, no rebound, no guarding  Genitourinary: no suprapubic tenderness  Extremities: WWP, no cyanosis, no clubbing, no edema, pulses equal  Neurological: A&Ox3, no gross focal deficits, follows commands  Skin: Normal temperature, warm, dry    MEDICATIONS  (STANDING):  atorvastatin 20 milliGRAM(s) Oral at bedtime  dextrose 5%. 1000 milliLiter(s) (50 mL/Hr) IV Continuous <Continuous>  dextrose 5%. 1000 milliLiter(s) (100 mL/Hr) IV Continuous <Continuous>  dextrose 50% Injectable 25 Gram(s) IV Push once  dextrose 50% Injectable 12.5 Gram(s) IV Push once  dextrose 50% Injectable 25 Gram(s) IV Push once  glucagon  Injectable 1 milliGRAM(s) IntraMuscular once  insulin glargine Injectable (LANTUS) 8 Unit(s) SubCutaneous at bedtime  insulin lispro (ADMELOG) corrective regimen sliding scale   SubCutaneous Before meals and at bedtime  iron sucrose IVPB 300 milliGRAM(s) IV Intermittent once  pantoprazole  Injectable 40 milliGRAM(s) IV Push every 12 hours  predniSONE   Tablet 5 milliGRAM(s) Oral every 24 hours  tacrolimus 2 milliGRAM(s) Oral every 24 hours  tacrolimus 1 milliGRAM(s) Oral every 24 hours  torsemide 20 milliGRAM(s) Oral every 12 hours    MEDICATIONS  (PRN):  acetaminophen     Tablet .. 650 milliGRAM(s) Oral every 6 hours PRN Temp greater or equal to 38C (100.4F), Mild Pain (1 - 3)  dextrose Oral Gel 15 Gram(s) Oral once PRN Blood Glucose LESS THAN 70 milliGRAM(s)/deciliter      Naprosyn (Unknown)      LABS                        5.9    9.16  )-----------( 360      ( 27 Jul 2022 05:30 )             21.0     07-27    143  |  106  |  24<H>  ----------------------------<  112<H>  3.6   |  23  |  1.27    Ca    8.8      27 Jul 2022 05:30  Phos  3.6     07-27  Mg     2.1     07-27    TPro  6.3  /  Alb  3.3  /  TBili  1.2  /  DBili  x   /  AST  14  /  ALT  20  /  AlkPhos  54  07-27        CARDIAC MARKERS ( 26 Jul 2022 08:31 )  x     / 0.02 ng/mL / x     / x     / x      CARDIAC MARKERS ( 25 Jul 2022 15:47 )  x     / 0.03 ng/mL / x     / x     / x            RADIOLOGY & ADDITIONAL TESTS: Reviewed

## 2022-07-27 NOTE — PROGRESS NOTE ADULT - PROBLEM SELECTOR PLAN 6
Pt on Eliquis 5mg BID and coreg 12.5 BID    - Hold AC given GIB and restart when appropriate  - Consider Watchmen device

## 2022-07-27 NOTE — PROGRESS NOTE ADULT - ASSESSMENT
76yoM, Islam, PMHx CHF, Afib on Eliquis (held prior to admission), History of right renal transplant (on Tacrolimus & Prednisone 5 mg daily), History of Prostate Cancer s/p prostatectomy, DM   Admitted for lower GI bleeding - dark stools into BRB[facility], HgB on admission 6.1 (MCV 70.4)    GI consulted for concern for GI bleed.     #Anemia, mixed Fe Deficient and AOCD  #Melena into [facility]      Recent EGD/Colonoscopy (as noted below) on 7/13/22 for approximately 30 lb weight loss (no overt bleeding), colonoscopy notable for removal of several ascending colon polyps.   EGD otherwise unremarkable. Low suspicion for upper GI source, given unremarkable EGD findings as well as recent colonoscopy with removal of a couple of >10 mm polyps.   DDx includes post-polypectomy bleeding vs diverticular bleed     -Colonoscopy (7/13/22, GI physician: Edgar Vasques DO): 12 mm sessile polyp on ICV (s/p hot snare), 15 mm sessile ascending colon polyp (s/p hot snare), 3 mm sessile ascending colon polyp (s/p cold forceps), many small and large mouthed diverticula in the entire colon, internal hemorrhoids (Grade I)  -EGD (7/13/22, GI physician: Edgar Vasques DO): Zline @ 38 cm from the incisors, mild inflammation characterized by erythema in gastric antrum & body (s/p bx), no gross lesions in entire examined duodenum   No further bleeding since admission    His scenario is particularly challenging, given risks of a procedure while HgB is <6, with ongoing dyspnea concerning for CHF exacerbation, especially considering his HgB has stabilized, without further evidence of bleeding, and the low possibility that there would be something to intervene on. Low utility in prepping for colonoscopy to simply visualize site of prior polypectomy.   His history of AFib, with high [id number] score, indicating need for systemic AC, presents an additional challenge. Would he be a good candidate for Watchman, to avoid further AC in future. Additionally, consideration can be given to a trial of AC as inpatient to monitor for re-bleeding, but heparin infusion requires q6 hr blood draws in a patient who is already profoundly anemic and can not accept blood transfusions.     Recommendations:  - Obtain pathology from GI physician's office ( [facility], [address], [address], Ph: [phone])  - IV Iron per Primarmy  - Clear Liquid Diet   - Maintain Active T&S  - Minimize blood draws, utilize pediatric tubes  - Cardiology consult for consideration of watchman    Thank you for the courtesy of this interesting consult.    Enrike White M.D.  Gastroenterology Fellow  Pager: [phone] 76yoM, Hindu, PMHx CHF, ?Afib vs. AFlutter, s/p ablation, on Eliquis held prior to admission (question over arrythmia, based on chart review), History of right renal transplant (on Tacrolimus & Prednisone 5 mg daily), History of Prostate Cancer s/p prostatectomy, DM   Admitted for lower GI bleeding - dark stools into [facility], HgB on admission 6.1 (MCV 70.4)    GI consulted for concern for GI bleed.     #Anemia, mixed Fe Deficient and AOCD  #Melena into [facility]      Recent EGD/Colonoscopy (as noted below) on 7/13/22 for approximately 30 lb weight loss (no overt bleeding), colonoscopy notable for removal of several ascending colon polyps.   EGD otherwise unremarkable. Low suspicion for upper GI source, given unremarkable EGD findings as well as recent colonoscopy with removal of a couple of >10 mm polyps.   DDx includes post-polypectomy bleeding vs diverticular bleed     -Colonoscopy (7/13/22, GI physician: Edgar Vasques DO): 12 mm sessile polyp on ICV (s/p hot snare), 15 mm sessile ascending colon polyp (s/p hot snare), 3 mm sessile ascending colon polyp (s/p cold forceps), many small and large mouthed diverticula in the entire colon, internal hemorrhoids (Grade I)  -EGD (7/13/22, GI physician: Edgar Vasques DO): Zline @ 38 cm from the incisors, mild inflammation characterized by erythema in gastric antrum & body (s/p bx), no gross lesions in entire examined duodenum   No further bleeding since admission    His scenario is particularly challenging, given risks of a procedure while HgB is <6, with ongoing dyspnea concerning for CHF exacerbation, especially considering his HgB has stabilized, without further evidence of bleeding, and the low possibility that there would be something to intervene on. Low utility in prepping for colonoscopy to simply visualize site of prior polypectomy.   His history of AFib, with high [id number] score, indicating need for systemic AC, presents an additional challenge. Would he be a good candidate for Watchman, to avoid further AC in future. Additionally, consideration can be given to a trial of AC as inpatient to monitor for re-bleeding, but heparin infusion requires q6 hr blood draws in a patient who is already profoundly anemic and can not accept blood transfusions.     Recommendations:  - Obtain pathology from GI physician's office ( [facility], [address] [place] [place], [place], [address], Ph: [phone])  - IV Iron per Primarmy  - Clear Liquid Diet   - Maintain Active T&S  - Minimize blood draws, utilize pediatric tubes  - Please consult Cardiology over history of AFib vs. AFlutter - chart documents both AFib and AFlutter in 2018, with notes mentioning s/p Ablation; additional questions is for consideration of watchman, to avoid long term AC    Thank you for the courtesy of this interesting consult.    Enrike White M.D.  Gastroenterology Fellow  Pager: [phone] 76yoM, Adventist, PMHx CHF, ?Afib vs. AFlutter, s/p ablation, on Eliquis held prior to admission (question over arrythmia, based on chart review), History of right renal transplant (on Tacrolimus & Prednisone 5 mg daily), History of Prostate Cancer s/p prostatectomy, DM   Admitted for lower GI bleeding - dark stools into [facility], HgB on admission 6.1 (MCV 70.4)    GI consulted for concern for GI bleed.     #Anemia, mixed Fe Deficient and AOCD  #Melena into [facility]      Recent EGD/Colonoscopy (as noted below) on 7/13/22 for approximately 30 lb weight loss (no overt bleeding), colonoscopy notable for removal of several ascending colon polyps.   EGD otherwise unremarkable. Low suspicion for upper GI source, given unremarkable EGD findings as well as recent colonoscopy with removal of a couple of >10 mm polyps.   DDx includes post-polypectomy bleeding vs diverticular bleed     -Colonoscopy (7/13/22, GI physician: Edgar Vasques DO): 12 mm sessile polyp on ICV (s/p hot snare), 15 mm sessile ascending colon polyp (s/p hot snare), 3 mm sessile ascending colon polyp (s/p cold forceps), many small and large mouthed diverticula in the entire colon, internal hemorrhoids (Grade I)  -EGD (7/13/22, GI physician: Edgar Vasques DO): Zline @ 38 cm from the incisors, mild inflammation characterized by erythema in gastric antrum & body (s/p bx), no gross lesions in entire examined duodenum   No further bleeding since admission    His scenario is particularly challenging, given risks of a procedure while HgB is <6, with ongoing dyspnea concerning for CHF exacerbation, especially considering his HgB has stabilized, without further evidence of bleeding, and the low possibility that there would be something to intervene on. Low utility in prepping for colonoscopy to simply visualize site of prior polypectomy.   His history of AFib, with high [id number] score, indicating need for systemic AC, presents an additional challenge. Would he be a good candidate for Watchman, to avoid further AC in future. Additionally, consideration can be given to a trial of AC as inpatient to monitor for re-bleeding, but heparin infusion requires q6 hr blood draws in a patient who is already profoundly anemic and can not accept blood transfusions.     Recommendations:  - Obtain pathology from GI physician's office ( [facility], [address], [address], Ph: [phone])  - IV Iron per Primarmy  - Advance Diet as Tolerated  - Maintain Active T&S  - Minimize blood draws, utilize pediatric tubes  - Please consult Cardiology over history of AFib vs. AFlutter - chart documents both AFib and AFlutter in 2018, with notes mentioning s/p Ablation; additional questions is for consideration of watchman, to avoid long term AC  - No plan for inpatient endoscopic eval    Thank you for allowing us to participate in the care of this patient. GI will sign off the case.  Please call us if you have any further questions or concerns    Enrike White M.D.  Gastroenterology Fellow  Pager: [phone]

## 2022-07-27 NOTE — DISCHARGE NOTE NURSING/CASE MANAGEMENT/SOCIAL WORK - PATIENT PORTAL LINK FT
You can access the FollowMyHealth Patient Portal offered by [facility] by registering at the following website: [email or web address]. By joining [employer]’s FollowMyHealth portal, you will also be able to view your health information using other applications (apps) compatible with our system.

## 2022-07-27 NOTE — PHYSICAL THERAPY INITIAL EVALUATION ADULT - RANGE OF MOTION EXAMINATION, REHAB EVAL
R hip fusion hip/knee flexion ~20 degrees in supine/bilateral upper extremity ROM was WFL (within functional limits)/Left LE ROM was WFL (within functional limits)/deficits as listed below

## 2022-07-27 NOTE — PROGRESS NOTE ADULT - PROBLEM SELECTOR PROBLEM 1
Anesthesia Pre-Procedure Evaluation    Patient: Angelica Toro   MRN: [id number] : 10/2/1927        Preoperative Diagnosis: Pelvic mass [R19.00]   Procedure : Procedure(s):  Laparoscopy, removal of both ovaries and both tubes, possible cancer operation.     Past Medical History:   Diagnosis Date     Breast cancer (H)     left, s/p lumpectomy     CAD (coronary artery disease)     MI  s/p stent     Diastolic dysfunction      Diverticulosis      HLD (hyperlipidemia)      HTN (hypertension)      Hx SBO      Right ovarian cyst      STEMI (ST elevation myocardial infarction) (H) 10/28/2010    inferior     Urinary incontinence       Past Surgical History:   Procedure Laterality Date     APPENDECTOMY       AS CORNEAL TRANSPLANT,LAMELLAR       CHOLECYSTECTOMY       FRACTURE TX, ANKLE RT/LT       LIGATE VEIN       LUMPECTOMY BREAST Left      LA PATIENT HAS A CORONARY ARTERY STENT       US BREAST BIOPSY LT        Allergies   Allergen Reactions     Amlodipine Cough, Swelling and Other (See Comments)     PN: swollen tongue, irritated throat     Anastrozole Palpitations     High b/p, headache     Lisinopril Anaphylaxis, Shortness Of Breath and Other (See Comments)     Losartan Swelling     PN: severe leg swelling     Sulfa Drugs Swelling and Anaphylaxis     Throat swelling     Thiazide-Type Diuretics Other (See Comments)     Shakes and chills.  Shakes and chills.     Coffee Bean Extract  [Coffea Arabica]      Other reaction(s): Other (See Comments)  PN: decaff coffee     Furosemide Swelling     Hydrochlorothiazide Swelling     Metolazone Other (See Comments)     PN: Trouble breathing and tremors     Morphine GI Disturbance     No Clinical Screening - See Comments Other (See Comments)     PN: decaff coffee      Social History     Tobacco Use     Smoking status: Never Smoker     Smokeless tobacco: Never Used   Substance Use Topics     Alcohol use: Yes     Alcohol/week: 1.0 standard drinks     Types: 1 Cans of beer per 
week     Comment: 3 times a year      Wt Readings from Last 1 Encounters:   02/01/21 62.3 kg (137 lb 4.8 oz)        Anesthesia Evaluation            ROS/MED HX  ENT/Pulmonary:  - neg pulmonary ROS     Neurologic:  - neg neurologic ROS     Cardiovascular:     (+) hypertension--CAD --stent-    METS/Exercise Tolerance:     Hematologic:     (+) anemia,     Musculoskeletal:  - neg musculoskeletal ROS     GI/Hepatic: Comment: diverticulosis      Renal/Genitourinary:  - neg Renal ROS     Endo:  - neg endo ROS     Psychiatric/Substance Use:  - neg psychiatric ROS     Infectious Disease:       Malignancy:   (+) Malignancy, History of Breast.    Other:            Physical Exam    Airway        Mallampati: I   TM distance: > 3 FB   Neck ROM: limited   Mouth opening: > 3 cm    Respiratory Devices and Support         Dental           Cardiovascular          Rhythm and rate: regular and normal     Pulmonary   pulmonary exam normal                OUTSIDE LABS:  CBC: No results found for: WBC, HGB, HCT, PLT  BMP: No results found for: NA, POTASSIUM, CHLORIDE, CO2, BUN, CR, GLC  COAGS: No results found for: PTT, INR, FIBR  POC: No results found for: BGM, HCG, HCGS  HEPATIC: No results found for: ALBUMIN, PROTTOTAL, ALT, AST, GGT, ALKPHOS, BILITOTAL, BILIDIRECT, DOMONIQUE  OTHER: No results found for: PH, LACT, A1C, ABIGAIL, PHOS, MAG, LIPASE, AMYLASE, TSH, T4, T3, CRP, SED    Anesthesia Plan    ASA Status:  3   NPO Status:  NPO Appropriate    Anesthesia Type: General.     - Airway: ETT   Induction: Intravenous.   Maintenance: Balanced.        Consents    Anesthesia Plan(s) and associated risks, benefits, and realistic alternatives discussed. Questions answered and patient/representative(s) expressed understanding.     - Discussed with:  Patient         Postoperative Care    Pain management: Multi-modal analgesia.   PONV prophylaxis: Ondansetron (or other 5HT-3), Dexamethasone or Solumedrol     Comments:                Darvin Nova, 
MD  
Anemia due to acute blood loss
Anemia due to acute blood loss

## 2022-07-27 NOTE — PROGRESS NOTE ADULT - PROBLEM SELECTOR PLAN 2
Pt presented with 3 days of rectal bleeding, per patient only dark melanotic stools, but no bleeding since evening of 7/24. Outpatient work-up for chronic anemia:   -Colonoscopy 7/13/22 -> sessile polyps, s/p polypectomy. +diverticula, +internal hemorrhoids  -EGD 7/13/22 -> mild erythema in gastric antrum & body -> biopsy pending    Per GI -> Low suspicion for upper GI source, given unremarkable EGD findings as well as recent colonoscopy with removal of a couple of >10 mm polyps.   Most likely cause: post-polypectomy bleeding vs diverticular bleed   - - Per GI recs: Low utility in prepping for colonoscopy to simply visualize site of prior polypectomy.     - continue to hold eliquis  - PPI 40mg IV BID   - f/u GI recs -> no intervention at this time  - Obtain pathology from GI physician's office ([facility], [address] [place], [address], Ph: [phone])  - Consider CT A/P (as part of planned ongoing outpatient workup)  - trend H/H

## 2022-07-27 NOTE — PROGRESS NOTE ADULT - PROBLEM SELECTOR PLAN 4
Home regiment: Levemir 12u qHS, Repaglinide 2mg before meals BID, Jardiance 25mg daily, Trulicity 1.5 weekly, Metformin 500mg in AM and 1500mg in PM    -Lantus 8u and mISS and adjust as needed  -A1c 6.3

## 2022-07-27 NOTE — PROGRESS NOTE ADULT - PROBLEM SELECTOR PLAN 5
Home regimen: Coreg 12.5 BID, Lisinopril 40 daily, Torsemide 20mg BID, Lipitor 20 daily  Last echo here: EF 65% on 9/15/20  Denies any symptoms consistent with previous admissions for CHF exacerbation. BNP 44358 (last was 6435 in Nov 2021). Trop 0.03 on admission and was 0.03 in November 2021. No chest pain, EKG NSR with PVCs. Pt may be in mild CHF exacerbation.     -f/u TTE  -f/u AM trops (trending down)  -c/w Torsemide 20mg BID, Lipitor 20 daily  -HOLD coreg and lisinopril and restart when appropriate

## 2022-07-27 NOTE — PROGRESS NOTE ADULT - PROBLEM SELECTOR PLAN 9
h/o polycystic kidney disease, on HD from  2003 to 2007 (left sided AVF). S/p transplant 2007  Baseline Cr (per pt) is 1.3. Last in our system from Nov 21 is 1.6. Admitted at 1.52    -c/w home tacrolimus 2mg in AM, 1mg in PM  -c/w home prednisone 5mg daily
h/o polycystic kidney disease, on HD from  2003 to 2007 (left sided AVF). S/p transplant 2007  Baseline Cr (per pt) is 1.3. Last in our system from Nov 21 is 1.6. Admitted at 1.52    -c/w home tacrolimus 2mg in AM, 1mg in PM  -c/w home prednisone 5mg daily

## 2022-07-27 NOTE — PROGRESS NOTE ADULT - PROBLEM SELECTOR PLAN 10
F: none at present  E: K>4 Mg>2  Diet, clear liquids  - active t&s  - minimize blood draws, use pediatric tubes  - hold eliquis 2/2 anemia

## 2022-07-27 NOTE — DISCHARGE NOTE NURSING/CASE MANAGEMENT/SOCIAL WORK - NSDCPEFALRISK_GEN_ALL_CORE
R sided Thoracentesis For information on Fall & Injury Prevention, visit: [email or web address] OR  [email or web address] OR  https://www.cdc.gov/steadi/patient.html

## 2022-07-27 NOTE — PROGRESS NOTE ADULT - PROBLEM SELECTOR PLAN 3
Opthalmologist in the office told patient to go to the ED emergently for possible  central retinal vein occlusion. Ophthalmology consult service reported this is more likely to be diabetic retinopathy and not CRVO, but even if it was CRVO, there is no urgent intervention.    - Opthalmology will follow-up as an outpatient

## 2022-07-27 NOTE — PROGRESS NOTE ADULT - PROBLEM SELECTOR PLAN 8
HOLD home coreg 12.5 BID and lisinopril 40 daily and restart when appropriate
HOLD home coreg 12.5 BID and lisinopril 40 daily and restart when appropriate

## 2022-07-27 NOTE — PHYSICAL THERAPY INITIAL EVALUATION ADULT - ADDITIONAL COMMENTS
Pt with right hip fusion since childhood - limited hip/knee flexion RLE. Pt. resides in elevator building, no steps, uses RW/rollator for long distances and cane for short. No O2 use.

## 2022-07-27 NOTE — PROGRESS NOTE ADULT - PROBLEM SELECTOR PLAN 1
3 days of melena w/ bleeding per rectum and found to have Hb 6.1, MCV 70.4. Recent outpatient workup ongoing (Colonoscopy/EGD as below, CT A/P was planned).  Baseline Hgb 10.2 per previous admissions. Suspected acute on chronic iron deficiency anemia 2/2 GI bleed with AOCD. Note pt is Shinto and does not want blood transfusions    Hgb now 5.9, Hct 21    - 300 mg Iron IV repeated  - if hemodynamically unstable or AMS, put on monitor and ICU consult vs rr
Pt presented with 3 days of melena w/ bleeding per rectum and found to have Hb 6.1, MCV 70.4.  Pt has recent h/o anemia with outpatient workup ongoing (Colonoscopy/EGD as below, CT A/P was planned).  Note pt had anemia 2/2 ESRD prior to his 2007 renal transplant when he was on HD for 4.5 yrs and rec'd EPO at the time. Baseline Hgb 10.2 per previous admissions. FOBT+ in ED. Suspected acute on chronic iron deficiency anemia 2/2 GI bleed. Note pt is Yazidism and does not want blood transfusions    Hgb now 5.7, Hct 19.5    - 300 mg Iron IV  - diet, clear liquids  - active t&s  - minimize blood draws, use pediatric tubes  - hold eliquis  - f/u GI recs, possible scope tomorrow  - if hemodynamically unstable or AMS, put on monitor and ICU consult vs rr

## 2022-08-05 DIAGNOSIS — E11.9 TYPE 2 DIABETES MELLITUS WITHOUT COMPLICATIONS: ICD-10-CM

## 2022-08-05 DIAGNOSIS — I50.32 CHRONIC DIASTOLIC (CONGESTIVE) HEART FAILURE: ICD-10-CM

## 2022-08-05 DIAGNOSIS — D62 ACUTE POSTHEMORRHAGIC ANEMIA: ICD-10-CM

## 2022-08-05 DIAGNOSIS — I48.21 PERMANENT ATRIAL FIBRILLATION: ICD-10-CM

## 2022-08-05 DIAGNOSIS — K92.1 MELENA: ICD-10-CM

## 2022-08-05 DIAGNOSIS — I11.0 HYPERTENSIVE HEART DISEASE WITH HEART FAILURE: ICD-10-CM

## 2022-08-05 DIAGNOSIS — K92.2 GASTROINTESTINAL HEMORRHAGE, UNSPECIFIED: ICD-10-CM

## 2022-08-05 DIAGNOSIS — Z94.0 KIDNEY TRANSPLANT STATUS: ICD-10-CM

## 2022-08-05 DIAGNOSIS — E78.5 HYPERLIPIDEMIA, UNSPECIFIED: ICD-10-CM

## 2022-08-10 NOTE — PATIENT PROFILE ADULT. - NS PRO OT REFERRAL QUES 1 YN
SOCIAL WORK PROGRESS NOTE      DATA:     Masood Gonzales is a 14 year old male admitted on 8/1/2022. 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    INTERVENTION:      1. Provided ongoing assessment of patient and family's level of coping.   2. Provided psychosocial supportive counseling and crisis intervention as needed.   3. Facilitate service linkage with hospital and community resources as needed.   4. Collaborate with healthcare team and professional in community to meet patient and family's needs as needed.     ASSESSMENT:     ORTIZ received a request for consult regarding community resources that motherConnie was inquiring for.     ORTIZ consulted with [facility] Tammy ALCANTAR regarding needs; Social Security questions, [place] Assessment questions and financial resources.     ORTIZ met with Connie, while Masood was in the room. SW introduced SW role and support. Connie inquired for the following:     Social Security contact, [place] Assessment contact, [employer] [facility] SW, pt diagnosis list, Emergency Assistance/MFIP, therapy options for individual and family, description of specific activity restrictions.     ORTIZ consulted with pediatric resident who identified there was no specific long-term need for care requiring PCA or specific supervision.     ORTIZ called Bedside RN and requested they consult with PT/OT regarding activity restrictions and length of time.     PLAN:   *This hand out was delivered to family.     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     : Madeleine Mcguire ([email or web address]) Phone: [phone]  [facility] Assistance   [facility]    [place] Family Investment Program (MFIP) and the Diversionary Work Program (DWP) are the [place] versions of the federal Temporary Aid for Needy Families (TANF). These are cash programs for families with children or pregnant women. Both programs include an employment services component. Income, asset, and other eligibility criteria, as set by the [facility].     Call to schedule an intake for assistance specifically for MFIP, Sasha Byrne; [phone]  Emergency Assistance    Emergency assistance is a cash program that helps resolve personal financial crises related to housing, such as evictions, utility shutoffs, and deposit assistance. Income, asset, and other criteria as set by the [facility] and [facility], apply.     Call your financial worker or [phone] to see if you would qualify.   Therapy Resources; All accepting new patients and take [facility]  o [address] [address]  Phone: [phone]    [facility]  o [address] [place] [address]  Phone: [phone]    [facility]  o [address] [place] [address]  Phone: [phone]  Outpatient :     Yudy Jefferson  Phone: [phone]  *RN will follow up with PT/OT regarding restrictions and ability to complete activities of daily living prior to discharge.     Felicia Rodas [facility]   Pediatric Casual   Email: [email or web address]  After Hours/Weekend Pager: [phone]  *NO LETTER*       no

## 2022-09-07 ENCOUNTER — APPOINTMENT (OUTPATIENT)
Dept: ENDOCRINOLOGY | Facility: CLINIC | Age: 77
End: 2022-09-07

## 2022-09-07 VITALS
DIASTOLIC BLOOD PRESSURE: 83 MMHG | SYSTOLIC BLOOD PRESSURE: 178 MMHG | HEART RATE: 82 BPM | BODY MASS INDEX: 23.02 KG/M2 | WEIGHT: 147 LBS

## 2022-09-07 LAB
GLUCOSE BLDC GLUCOMTR-MCNC: 88
HBA1C MFR BLD HPLC: 5.8

## 2022-09-07 PROCEDURE — 97802 MEDICAL NUTRITION INDIV IN: CPT

## 2022-09-07 PROCEDURE — 99214 OFFICE O/P EST MOD 30 MIN: CPT | Mod: 25

## 2022-09-07 PROCEDURE — 82947 ASSAY GLUCOSE BLOOD QUANT: CPT | Mod: QW

## 2022-09-07 PROCEDURE — 83036 HEMOGLOBIN GLYCOSYLATED A1C: CPT | Mod: QW

## 2022-09-07 RX ORDER — APIXABAN 5 MG/1
5 TABLET, FILM COATED ORAL
Qty: 180 | Refills: 3 | Status: DISCONTINUED | COMMUNITY
End: 2022-09-07

## 2022-09-07 RX ORDER — EMPAGLIFLOZIN 10 MG/1
10 TABLET, FILM COATED ORAL
Qty: 30 | Refills: 0 | Status: DISCONTINUED | COMMUNITY
Start: 2022-01-18 | End: 2022-09-07

## 2022-09-07 RX ORDER — AMLODIPINE BESYLATE 10 MG/1
10 TABLET ORAL
Qty: 90 | Refills: 0 | Status: DISCONTINUED | COMMUNITY
Start: 2022-05-25 | End: 2022-09-07

## 2022-09-07 RX ORDER — SODIUM SULFATE, POTASSIUM SULFATE, MAGNESIUM SULFATE 17.5; 3.13; 1.6 G/ML; G/ML; G/ML
17.5-3.13-1.6 SOLUTION, CONCENTRATE ORAL
Qty: 354 | Refills: 0 | Status: DISCONTINUED | COMMUNITY
Start: 2022-06-30 | End: 2022-09-07

## 2022-09-07 RX ORDER — CEPHALEXIN 500 MG/1
500 TABLET ORAL
Qty: 21 | Refills: 0 | Status: DISCONTINUED | COMMUNITY
Start: 2022-03-18 | End: 2022-09-07

## 2022-09-07 NOTE — ASSESSMENT
[FreeTextEntry1] : Type 2 diabetes mellitus. Point-of-care HbA1c 5.8% and glucose 88 mg/dL today; HbA1c 8.1% in May 2022. His hemoglobin A1c today may be falsely low in the setting of iron deficiency. We have discussed the cardiovascular and microvascular complications of uncontrolled diabetes. We discussed the importance of diet and exercise and lifestyle modification for glycemic control; he is motivated to make changes. We discussed referral to nutrition. We discussed pharmacologic options for glycemic control and weight loss. We will continue this current regimen with a focus on lifestyle modifications for now. I recommend he continue Levemir and Trulicity during preparation for colonoscopy; he can hold metformin, repaglinide, and Jardiance and restart after his procedure.\par Continue Levemir 12 units at night\par Continue metformin 1500/500 mg daily; would need decrease if renal function declines\par Continue Trulicity 4.5 mg weekly\par Continue repaglinide 2 mg with lunch and dinner\par Continue Jardiance 25 mg daily\par Continue to check blood sugars twice daily; reviewed glycemic goals\par He is on a blood pressure regimen; blood pressure around goal\par He is on a statin for cholesterol; last lipid panel around goal\par Nephropathy screening: Status post renal transplant and followed by nephrology\par Last ophthalmology appointment: November 2021; annual\par Last podiatry appointment: August 2022; every four months\par Last dental appointment: Over six months\par He is up-to-date with pneumonia, and COVID-19 (Pfizer) vaccines up to the second booster\par \par Osteopenia with elevated fracture risk. He has a history of prior traumatic fractures. His 10-year predicted fracture risk prior to therapy was 21% for major osteoporotic fracture and 11% for hip fracture (risk factors of parental history of hip fracture, rheumatoid arthritis as a proxy for type 2 diabetes mellitus), above the treatment thresholds. He received zoledronic acid 5 mg IV in January 2018, January 2019, and March 2020. His most recent bone density from December 2021 demonstrated significant improvement at the spine and hip sites from previous in 2017. His 10 year fracture risk calculated by FRAX was 5.6% for major osteoporotic fracture and 2.6% for hip fracture, below the treatment thresholds (risk factor is ). We discussed the potential benefits and risks of pharmacologic osteoporosis therapy, including but not limited to osteonecrosis of the jaw and atypical fracture. He is on a "drug holiday" from antiresorptive therapy and we will continue.\par Calcium 1000 mg daily from diet and supplements (to be taken in divided doses as no more than 500-600 mg can be absorbed at one time); continue current regimen\par Continue current vitamin D regimen\par Diet, exercise and fall prevention discussed\par \par Return to see me in 3 months. Patient advised to call earlier with significant hypo- or hyperglycemia.\par \par CC:\par Dr. Sulaiman Modi, Fax [phone]\par Dr. Fannie Dowd, Fax [phone]

## 2022-09-07 NOTE — PHYSICAL EXAM
[Kyphosis] : no kyphosis present [Acanthosis Nigricans] : no acanthosis nigricans [de-identified] : no moon facies, no supraclavicular fat pads, no hyperpigmented striae  [de-identified] : narrow-based gait with cane

## 2022-09-07 NOTE — HISTORY OF PRESENT ILLNESS
[FreeTextEntry1] : Mr. Wooten is a 76 year-old man with a history of multiple medical problems including polycystic kidney disease status post renal transplant in 2007, type 2 diabetes mellitus, osteopenia, atrial flutter presenting for follow-up of his endocrines issues. I saw him for an initial visit in November 2017 and last in May 2022; he is a former patient of Dr. Danielle Barrera.\par \par Type 2 diabetes mellitus. Point-of-care HbA1c 5.8% and glucose 88 mg/dL today; HbA1c 8.1% in May 2022, 7.6% in December 2021, 7.6% in September 2021, 7.6% in June 2021, 7.4% in November 2020, 7.4% in June 2020, 7.4% in February 2020, 7.1% in October 2019, 7.8% in July 2019, 7.6% in April 2019 and 7.7% in January 2019. Mild neuropathy.\par He had borderline diabetes prior to renal transplant, subsequently type 2 diabetes mellitus.\par In June 2021 we discussed continuing metformin 500/1500 mg daily, adjusted Trulicity to 4.5 mg weekly, adjusted Invokana to 300 mg daily, and discontinued Levemir and repaglinide. Insurance would not cover Invokana 300 mg daily. He was taking Levemir 12 units at night, metformin 500/1500 mg daily, Trulicity 4.5 mg weekly, repaglinide 2 mg with lunch and dinner. We discussed restarting SGLT-2 inhibitor therapy; Invokana was cost-prohibitive and he was started on Jardiance 10 mg daily by his nephrologist a few weeks prior to his last visit and adjusted to 25 mg daily.\par He is currently taking Levemir 12 units at night, metformin 1500/500 mg daily, Trulicity 4.5 mg weekly, repaglinide 2 mg with lunch and dinner, and Jardiance 25 mg daily. \par He is checking blood sugars twice daily as below\par He is on aspirin and apixaban\par He is on a blood pressure regimen\par He is on a statin for cholesterol\par Nephropathy screening: Status post renal transplant and followed by nephrology\par Last ophthalmology appointment: November 2021; annual\par Last podiatry appointment: August 2022; every four months\par Last dental appointment: Over six months\par He is up-to-date with pneumonia, and COVID-19 (Pfizer) vaccines up to the second booster\par \par Osteopenia with elevated fracture risk\par Osteopenia diagnosed in 2017 by bone density significant for femoral neck T-score -2.4; his 10-year predicted fracture risk was 21% for major osteoporotic fracture and 11% for hip fracture (risk factors of parental history of hip fracture, rheumatoid arthritis as a proxy for type 2 diabetes mellitus)\par Fracture history: Pelvis and right rib fractures in 2006 when bus shelter fell on him; history of right hip fusion at age 6 in the setting of infection, he believes tuberculosis.\par Family history: Father with history of hip fracture in his 80s\par Treatment: Zoledronic acid 5 mg IV in January 2018, January 2019, March 2020\par \par Falls: None recent\par Height loss: 1/2 inch height loss\par Kidney stones: No\par Dairy intake: 0-1 serving daily (cup of milk ever other day)\par Calcium supplements: 600 mg daily\par Multivitamin: Centrum Silver with 210 mg calcium and 1000 intl units vitamin D \par Vitamin D supplements: 2000 intl units daily\par \par Osteoporosis risk factors include: Postmenopausal status,  race, prior fracture, falls, height loss, small thin bones, tobacco use, excessive alcohol, anorexia, family history, vitamin D deficiency, corticosteroid use, seizure medications, malabsorption, hyperparathyroidism, hyperthyroidism.\par NEGATIVE EXCEPT: Renal transplant, prior fracture (although traumatic), parental history of hip fracture\par \par Interim History \par He is currently taking Levemir 12 units at night, metformin 1500/500 mg daily, Trulicity 4.5 mg weekly, repaglinide 2 mg with lunch and dinner, and Jardiance 25 mg daily.\par Fasting glucose values 110s mg/dL. No hypoglycemia.\par He has had weight loss and iron deficiency anemia. He will be having a second iron infusion tomorrow. He is undergoing malignancy evaluation. \par He had low appetite for some time but has had recent increased appetite.\par He has seen Dr. Sulaiman Modi. He is scheduled for upcoming laboratory testing. \par He has had fatigue and weakness. No chest pain, shortness of breath, polyuria/polydipsia, lower extremity numbness/tingling. \par Medical and surgical history, medications, allergies, social and family history reviewed and updated as needed.

## 2022-09-07 NOTE — DATA REVIEWED
[FreeTextEntry1] : Laboratories (May 19, 2022) reviewed and significant for: \par Hemoglobin 8.6 g/dL (normal: 13.0-17.7)\par BUN/creatinine 57/1.32 mg/dL (eGFR 57 mL/min)\par HbA1c 8.9%\par LDL 52 mg/dL\par HDL 54 mg/dL\par Total cholesterol 123 mg/dL\par Triglycerides 87 mg/dL\par Urine microalbumin 13 mg/g creatinine

## 2022-09-27 PROBLEM — I50.9 HEART FAILURE, UNSPECIFIED: Chronic | Status: ACTIVE | Noted: 2022-07-25

## 2022-10-20 ENCOUNTER — APPOINTMENT (OUTPATIENT)
Dept: HEART AND VASCULAR | Facility: CLINIC | Age: 77
End: 2022-10-20

## 2022-10-20 ENCOUNTER — NON-APPOINTMENT (OUTPATIENT)
Age: 77
End: 2022-10-20

## 2022-10-20 VITALS
SYSTOLIC BLOOD PRESSURE: 133 MMHG | WEIGHT: 147 LBS | DIASTOLIC BLOOD PRESSURE: 63 MMHG | TEMPERATURE: 94.1 F | HEART RATE: 57 BPM | BODY MASS INDEX: 23.07 KG/M2 | HEIGHT: 67 IN

## 2022-10-20 PROCEDURE — 93000 ELECTROCARDIOGRAM COMPLETE: CPT

## 2022-10-20 PROCEDURE — 99213 OFFICE O/P EST LOW 20 MIN: CPT

## 2022-10-24 ENCOUNTER — NON-APPOINTMENT (OUTPATIENT)
Age: 77
End: 2022-10-24

## 2022-10-25 NOTE — HISTORY OF PRESENT ILLNESS
[FreeTextEntry1] : Mr. Wooten is a pleasant 78 y/o male with polycystic kidney disease s/p kidney transplant in 2007, HTN, HLD, DM2, Prostate cancer s/p radical prostatectomy 2015, DVT 2006 (in setting of pelvic fracture), and atrial flutter s/p ablation in 1/2018, who is now off oral anticoagulation secondary to GI bleed and presents for follow up.\par \par Overall he is doing well.  No symptoms concerning for recurrent arrhythmia.  He denies any chest pain, SOB, palpitations, syncope or near syncope.  He was admitted to [facility] 7/2022 with a GI bleed.  He is a Jainism and refuses blood transfusions.  Eliquis was stopped and he was offered a Watchman device but has not followed up about this and presents to discuss it.  \par  \par He states he is also being worked up for a possible malignancy - but thus far the work up has been unremarkable.

## 2022-10-25 NOTE — DISCUSSION/SUMMARY
[EKG obtained to assist in diagnosis and management of assessed problem(s)] : EKG obtained to assist in diagnosis and management of assessed problem(s) [FreeTextEntry1] : Mr. Wooten is a pleasant 76 y/o male with polycystic kidney disease s/p kidney transplant in 2007, HTN, HLD, DM2, Prostate cancer s/p radical prostatectomy 2015, DVT 2006 (in setting of pelvic fracture), and atrial flutter s/p ablation in 1/2018, who is now off oral anticoagulation secondary to GI bleed and presents for follow up.   He is in normal sinus rhythm today and has no symptoms concerning for recurrent arrhythmia.  He was relatively asymptomatic while in atrial flutter with a rapid ventricular rate.  He is intolerable to oral anticoagulation and I have encouraged him to follow up with the structural team and proceed with a WATCHMAN device.  We discussed ideally he should be on oral anticoagulation but given GI bleed this is the next best alternative.   He will follow up in 6 months or sooner if needed and knows to call with any questions or concerns.

## 2022-10-25 NOTE — CARDIOLOGY SUMMARY
[___] : [unfilled] [LVEF ___%] : LVEF [unfilled]% [None] : normal LV function [Normal] : normal LA size [Mild] : mild mitral regurgitation [de-identified] : 6/24/21 sinus at 60bpm\par 10/20/2022 NSR at 60bpm, APC

## 2022-10-25 NOTE — REVIEW OF SYSTEMS
[Negative] : Psychiatric [Weight Loss (___ Lbs)] : [unfilled] ~Ulb weight loss [Fever] : no fever [Chills] : no chills [Feeling Fatigued] : not feeling fatigued [SOB] : no shortness of breath [Dyspnea on exertion] : not dyspnea during exertion [Chest Discomfort] : no chest discomfort [Palpitations] : no palpitations [Syncope] : no syncope [Cough] : no cough [Wheezing] : no wheezing [Rash] : no rash [Dizziness] : no dizziness

## 2022-10-27 ENCOUNTER — APPOINTMENT (OUTPATIENT)
Dept: CT IMAGING | Facility: HOSPITAL | Age: 77
End: 2022-10-27

## 2022-11-02 ENCOUNTER — APPOINTMENT (OUTPATIENT)
Dept: CARDIOTHORACIC SURGERY | Facility: CLINIC | Age: 77
End: 2022-11-02

## 2022-11-15 ENCOUNTER — APPOINTMENT (OUTPATIENT)
Dept: CT IMAGING | Facility: CLINIC | Age: 77
End: 2022-11-15

## 2022-11-15 PROCEDURE — 71250 CT THORAX DX C-: CPT

## 2022-11-29 ENCOUNTER — FORM ENCOUNTER (OUTPATIENT)
Age: 77
End: 2022-11-29

## 2022-11-30 ENCOUNTER — APPOINTMENT (OUTPATIENT)
Dept: CARDIOTHORACIC SURGERY | Facility: CLINIC | Age: 77
End: 2022-11-30

## 2022-11-30 ENCOUNTER — OUTPATIENT (OUTPATIENT)
Dept: OUTPATIENT SERVICES | Facility: HOSPITAL | Age: 77
LOS: 1 days | End: 2022-11-30
Payer: MEDICARE

## 2022-11-30 ENCOUNTER — NON-APPOINTMENT (OUTPATIENT)
Age: 77
End: 2022-11-30

## 2022-11-30 VITALS
SYSTOLIC BLOOD PRESSURE: 131 MMHG | HEIGHT: 67 IN | HEART RATE: 72 BPM | WEIGHT: 140 LBS | BODY MASS INDEX: 21.97 KG/M2 | TEMPERATURE: 97.6 F | DIASTOLIC BLOOD PRESSURE: 62 MMHG | RESPIRATION RATE: 18 BRPM | OXYGEN SATURATION: 95 %

## 2022-11-30 DIAGNOSIS — Z98.890 OTHER SPECIFIED POSTPROCEDURAL STATES: Chronic | ICD-10-CM

## 2022-11-30 DIAGNOSIS — I71.21 ANEURYSM OF THE ASCENDING AORTA, WITHOUT RUPTURE: ICD-10-CM

## 2022-11-30 PROCEDURE — 93306 TTE W/DOPPLER COMPLETE: CPT | Mod: 26

## 2022-11-30 PROCEDURE — 93306 TTE W/DOPPLER COMPLETE: CPT

## 2022-11-30 PROCEDURE — 99214 OFFICE O/P EST MOD 30 MIN: CPT

## 2022-12-04 NOTE — ASSESSMENT
[FreeTextEntry1] : 77 year old male, Jainism, with a past medical history of hypertension, hyperlipidemia, DM, DVT (resolved in 2006), chronic bronchitis, CKD stage 3, polycystic kidney s/p right kidney transplant, A fib on eliquis  prostate cancer s/p prostatectomy, recent admission for heart failure, presents for a follow up visit for evaluation and management of thoracic aortic aneurysm. \par \par I have reviewed the patient's medical records, diagnostic images during the time of this office consultation and have made the following recommendation. Review of the imaging shows his  aortic pathology has remained stable and does not require surgical intervention.\par \par Plan\par - [facility] up in [facility] years with a CT chest and echocardiogram \par - Continue medication regimen.\par - Follow up with cardiologist and PCP.\par - Blood pressure management.\par

## 2022-12-04 NOTE — PROCEDURE
[FreeTextEntry1] : Dr. Gandara reviewed the indications for surgery, and used our webpage [email or web address] <[email or web address]> to illustrate the aorta and anatomy of the heart. Those indications are the following: size greater than 5.0 cm, symptomatic aneurysms, family history of aortic dissection or aneurysm death with a size greater than 4.5 cm, other necessary cardiac procedures such as coronary artery bypass grafting or valvular disorders with an aneurysm greater than 4.5 cm, or connective tissue disorders with an aneurysm size greater than 4.5 cm. The patient does not meet size criteria for surgical intervention at the time.\par \par Dr. Gandara discussed activity restrictions with the patient, and would advise exercise at a moderate amount with no heavy lifting over one third of body weight, and avoiding heart rates that exceed 140 beats per minute. In addition, every patient should abstain from tobacco abuse and to avoid all illicit drug use, especially stimulants such as cocaine or methamphetamine. Dr. Gandara also counseled regarding maintaining a healthy heart diet, and losing any excessive weight as this also put undue stress on both the aorta and entire cardiovascular system. First degree family members should be screened for bicuspid valve disease, and ascending aortic aneurysms. \par \par Patient was advised to view the educational video prior to this visit regarding aortic pathology, risk factors, surgical procedures, and lifestyle modifications. Video can be retrieved at [email or web address].\par

## 2022-12-04 NOTE — HISTORY OF PRESENT ILLNESS
[FreeTextEntry1] : 77 year old male, Rastafarian, with a past medical history of hypertension, hyperlipidemia, DM, DVT (resolved in 2006), chronic bronchitis, CKD stage 3, polycystic kidney s/p right kidney transplant, A fib on eliquis  prostate cancer s/p prostatectomy, recent admission for heart failure, presents for a follow up visit for evaluation and management of thoracic aortic aneurysm. The patient was a former patient of Dr. Prince and has a known aortic root aneurysm since 2011. Patient is currently under the care of Dr. Gracy Lovelace \par \par \par Patient is doing well. He  denies fever, chills, fatigue, headache, blurred vision, dizziness, syncope, chest pain, palpitations, shortness of breath, orthopnea, paroxysmal nocturnal dyspnea, nausea, vomiting, abdominal pain, back pain, BRBPR. He was last hospitalized in July with GI bleed and has not had any additional episodes since. \par

## 2022-12-04 NOTE — DATA REVIEWED
[FreeTextEntry1] : MRA chest without contrast July 31st, 2017\par 1. Moderate enlargement of the aortic root 4.6cm and ascending aorta 4.4cm.\par 2. A 13mm lesion in the upper pole of the left kidney as described above. It may represent a hemorrhagic cyst, a proteinaceous cyst, or a neoplasm. Suggest correlation with prior imaging studies. If this is a new finding, advise further evaluation with renal ultrasound. \par 3. Numerous probable cysts in the visualized portions of the left kidney and liver. \par \par Echocardiogram 8/2/17\par There is mild concentric left ventricular hypertrophy. The left ventricular wall motion is normal.\par The left ventricular ejection fraction is normal. The left ventricular ejection fraction is 65%.\par The left atrium is moderately dilated. The right atrium is dilated.\par The right ventricle is normal in size and function.\par There is mild mitral regurgitation.\par There is no echocardiographic evidence for pulmonary hypertension. The pulmonary artery systolic pressure is estimated to be 30 mmHg.\par The inferior vena cava is normal in size (<2.1 cm) with normal inspiratory collapse (>50%) consistent with normal right atrial pressure.\par The aortic root is dilated. The ascending aorta is dilated.\par There is no pericardial effusion.\par \par MRA chest 10/24/19: aortic root 4.6cm, ascending aorta 4.6cm. unchanged. \par \par \par CT chest without 9/13/20: pulmonary edema. Aortic root 3.7cm. ascending aorta 4.3cm. \par \par Echo 9/15/20: trace AI. EF 66%. \par \par \par CT chest without contrast 11/15/22\par Ascending aorta  and aortic root measure up to 4.3 cm, unchanged.\par \par \par Echo 11/30/22\par  1. Mild to moderate symmetric left ventricular hypertrophy.\par  2. Normal left ventricular systolic function.\par  3. Grade II left ventricular diastolic dysfunction.\par  4. Normal right ventricular size and systolic function.\par  5. Severely dilated left atrium.\par  6. Mildly dilated right atrium.\par  7. Trace aortic regurgitation.\par  8. Mild mitral regurgitation.\par  9. Mild tricuspid regurgitation.\par 10. Pulmonary hypertension present, pulmonary artery systolic pressure is 36 mmHg.\par 11. No pericardial effusion.\par 12. Dilated aortic root.\par 13. Dilated ascending aorta.\par 14. The aortic root measures 4.15 cm at level of the sinuses of Valsalva (normal 3.1-3.7 cm for men, 2.7-3.3 cm for women). The proximal ascending aorta measures 4.10 cm (normal 2.6-3.4 cm for men, 2.3-3.1 cm for women). There is mild to moderate non-mobile plaque seen in the visualized portion of the descending aorta.\par 15. Compared to the previous TTE performed on 9/15/2020, there have been no significant interval changes.

## 2022-12-04 NOTE — CONSULT LETTER
[Dear  ___] : Dear  [unfilled], [Please see my note below.] : Please see my note below. [Sincerely,] : Sincerely, [FreeTextEntry2] : Dr. Gracy Lovelace  [FreeTextEntry1] : I had the pleasure of seeing your patient, JORGE WU, in my office today. We take a multidisciplinary team approach to patient care and consider you, the referring physician, an extension of our team. We will maintain an open line of communication with you throughout your patient's treatment course.  \par \par Mr. WU presents for a 2 year follow up visit for evaluation and management of thoracic aortic aneurysm. I have reviewed all of his medical records and diagnostic images at the time of his office consultation. I have enclosed a copy for your records. \par \par I have reviewed the indications for surgery,and used our webpage [email or web address] <[email or web address]> <<[email or web address]>> to illustrate the aorta and anatomy of the heart. The patient does not meet size criteria for surgical intervention at the time. Review of the imaging shows his  aortic pathology has remained stable. Therefore, I have recommended that the patient will require a follow up appointment in 2 years with a CT chest w/o contrast and echocardiogram  to monitor aortic pathology. My office will assist the patient with his upcoming appointment and I will update you on his progress at that time.\par \par I have discussed with the patient that we will continue to monitor his aortic pathology closely at the [facility] at [facility] that encompasses the entire health care system and is one of the largest in the nation at this point.\par \par It is our commitment to provide your patient with the highest quality of advanced therapeutic options. On behalf of the Cardiothoracic Surgery Team, thank you for sending your patient to the [facility] based at [facility].  If there are any questions or concerns, please call me directly at [phone].  \par  [FreeTextEntry3] : Yadiel Gandara M.D.\par Professor of Cardiovascular and Thoracic Surgery\par Minimally Invasive Valve Surgeon\par Director of Aortic Surgery, [facility]\par Cell: [phone]\par Email: [email or web address] \par \par [facility]:\par [address], [address] Floor, [place] [address]\par Office: [phone]\par Fax: [phone]\par \par [facility]:\par Department of Cardiovascular and Thoracic Surgery\par [address] [place] [address], [address]\par Office: [phone]\par Fax: [phone]\par \par \par Practice Manager: Ms. Darya Zhu\par Email: [email or web address] \par Phone: [phone]

## 2022-12-04 NOTE — END OF VISIT
[Time Spent: ___ minutes] : I have spent [unfilled] minutes of time on the encounter. [FreeTextEntry3] : I, ESTUARDO ESCALERA , am scribing for and in the presence of JOSE ASIF the following sections: History of present illness, past Medical/family/surgical/family/social history, review of systems, vital signs, physical exam and disposition.\par  \par I personally performed the services described in the documentation, reviewed the documentation recorded by the scribe in my presence and it accurately and completely records my words and actions.\par

## 2022-12-04 NOTE — REVIEW OF SYSTEMS
[FreeTextEntry8] : old fistula to left forearm, history of dialysis from [id number] [FreeTextEntry9] : ambulates with cane

## 2022-12-04 NOTE — PHYSICAL EXAM
[Sclera] : the sclera and conjunctiva were normal [PERRL With Normal Accommodation] : pupils were equal in size, round, and reactive to light [Extraocular Movements] : extraocular movements were intact [Neck Appearance] : the appearance of the neck was normal [Neck Cervical Mass (___cm)] : no neck mass was observed [Jugular Venous Distention Increased] : there was no jugular-venous distention [Thyroid Diffuse Enlargement] : the thyroid was not enlarged [Thyroid Nodule] : there were no palpable thyroid nodules [Auscultation Breath Sounds / Voice Sounds] : lungs were clear to auscultation bilaterally [Heart Rate And Rhythm] : heart rate was normal and rhythm regular [Heart Sounds] : normal S1 and S2 [Heart Sounds Gallop] : no gallops [Examination Of The Chest] : the chest was normal in appearance [Chest Visual Inspection Thoracic Asymmetry] : no chest asymmetry [2+] : right 2+ [No Abnormalities] : the abdominal aorta was not enlarged and no bruit was heard [Bowel Sounds] : normal bowel sounds [Abdomen Soft] : soft [Abdomen Tenderness] : non-tender [No CVA Tenderness] : no ~M costovertebral angle tenderness [Abnormal Walk] : normal gait [Nail Clubbing] : no clubbing  or cyanosis of the fingernails [Musculoskeletal - Swelling] : no joint swelling seen [Skin Color & Pigmentation] : normal skin color and pigmentation [Skin Turgor] : normal skin turgor [] : no rash [Cranial Nerves] : cranial nerves 2-12 were intact [Deep Tendon Reflexes (DTR)] : deep tendon reflexes were 2+ and symmetric [Sensation] : the sensory exam was normal to light touch and pinprick [Oriented To Time, Place, And Person] : oriented to person, place, and time [Impaired Insight] : insight and judgment were intact [Affect] : the affect was normal [Right Carotid Bruit] : no bruit heard over the right carotid [Left Carotid Bruit] : no bruit heard over the left carotid [Right Femoral Bruit] : no bruit heard over the right femoral artery [Left Femoral Bruit] : no bruit heard over the left femoral artery [Varicose Veins Of The Right Leg] : the patient has no varicose veins of the right leg [Varicose Veins Of The Left Leg] : the patient has no varicose veins of the left leg

## 2022-12-21 ENCOUNTER — APPOINTMENT (OUTPATIENT)
Dept: ENDOCRINOLOGY | Facility: CLINIC | Age: 77
End: 2022-12-21

## 2022-12-21 VITALS
BODY MASS INDEX: 22.55 KG/M2 | HEART RATE: 69 BPM | SYSTOLIC BLOOD PRESSURE: 161 MMHG | WEIGHT: 144 LBS | DIASTOLIC BLOOD PRESSURE: 79 MMHG

## 2022-12-21 DIAGNOSIS — E78.5 TYPE 2 DIABETES MELLITUS WITH OTHER SPECIFIED COMPLICATION: ICD-10-CM

## 2022-12-21 DIAGNOSIS — E11.69 TYPE 2 DIABETES MELLITUS WITH OTHER SPECIFIED COMPLICATION: ICD-10-CM

## 2022-12-21 LAB
GLUCOSE BLDC GLUCOMTR-MCNC: 128
HBA1C MFR BLD HPLC: 7.5

## 2022-12-21 PROCEDURE — 83036 HEMOGLOBIN GLYCOSYLATED A1C: CPT | Mod: QW

## 2022-12-21 PROCEDURE — 82962 GLUCOSE BLOOD TEST: CPT

## 2022-12-21 PROCEDURE — 99214 OFFICE O/P EST MOD 30 MIN: CPT | Mod: 25

## 2022-12-21 RX ORDER — BLOOD-GLUCOSE METER
70 EACH MISCELLANEOUS
Qty: 3 | Refills: 3 | Status: ACTIVE | COMMUNITY
Start: 2022-12-21 | End: 1900-01-01

## 2022-12-21 RX ORDER — ATORVASTATIN CALCIUM 20 MG/1
20 TABLET, FILM COATED ORAL
Refills: 0 | Status: ACTIVE | COMMUNITY

## 2022-12-21 RX ORDER — BLOOD SUGAR DIAGNOSTIC
STRIP MISCELLANEOUS
Qty: 200 | Refills: 3 | Status: ACTIVE | COMMUNITY
Start: 2020-05-11 | End: 1900-01-01

## 2022-12-21 NOTE — H&P ADULT - PROBLEM SELECTOR PLAN 1
[FreeTextEntry1] : Ms. CORTES is a 24 year old female with a history of asthma, allergies, s/p COVID-19 8/2021, 12/2021 presenting to the office today for initial pulmonary evaluation for SOB, asthma, allergy, GERD, discolored nasal mucus\par \par Her shortness of breath is multifactorial due to:\par -poor mechanics of breathing \par -mildly out of shape \par -pulmonary disease\par   -asthma\par -cardiac disease \par   -doubtful\par \par problem 1: asthma\par -add Xopenex via inhaler pre exercise PRN\par -add Breo Ellipta 200 at 1 inhalation QD or equivalent\par -Asthma is believed to be caused by inherited (genetic) and environmental factor, but its exact cause is unknown. Asthma may be triggered by allergens, lung infections, or irritants in the air. Asthma triggers are different for each person. \par -Inhaler technique reviewed as well as oral hygiene techniques reviewed with patient. Avoidance of cold air, extremes of temperature; rescue inhaler should be used before exercise. Order of medication reviewed with patient. Recommended use of a cool mist humidifier in the bedroom. \par \par problem 2: allergy/sinus\par -add Qnasl 1 sniff BID \par -complete blood work to include: asthma panel, food IgE panel, IgE level, eosinophil level, vitamin D level, alpha 1 antitrypsin level\par -Environmental measures for allergies were encouraged including mattress and pillow covers, air purifier, and environmental controls. \par \par Problem 3: GERD\par \par -Rule of 2s: avoid eating too much, eating too late, eating too spicy, eating two hours before bed.\par -Things to avoid including overeating, spicy foods, tight clothing, eating within three hours of bed, this list is not all inclusive.\par -For treatment of reflux, possible options discussed including diet control, H2 blockers, PPIs, as well as coating motility agents discussed as treatment options. Timing of meals and proximity of last meal to sleep were discussed. If symptoms persist, a formal gastrointestinal evaluation is needed. \par \par Problem 4: abnormal nasal discharge\par -recommended Navage nasal cleanse\par -complete blood work to inlclude: ANCA test and hypersensitivity panel\par \par Problem 5: s/p COVID-19 x2 (8/2021, 12/2021)\par -s/p COVID-19 vaccine x4\par -recommended SaNOtize nasal spray \par \par problem 6: cardiac disease\par -recommended to continue to follow up with Cardiologist \par \par problem 7: poor breathing mechanics\par -Proper breathing techniques were reviewed with an emphasis of exhalation. Patient instructed to breath in for 1 second and out for four seconds. Patient was encouraged to not talk while walking. \par \par problem 8: mildly out of shape\par -recommended "10-Day Detox Diet" by Dr. Harman Paul \par -Weight loss, exercise, and diet control were discussed and are highly encouraged. Treatment options are given such as, aqua therapy, and contacting a nutritionist. Recommended to use the elliptical, stationary bike, less use of treadmill. \par \par problem 9: health maintenance \par -recommended yearly flu shot after October 15\par -recommended strep pneumonia vaccines: Prevnar-20 vaccine, followed by Pneumo vaccine 23 one year following after 65 years old. \par -recommended early intervention for Upper Respiratory Infections (URIs)\par -recommended regular osteoporosis evaluations\par -recommended early dermatological evaluations\par -recommended after the age of 50 to the age of 70, colonoscopy every 5 years\par \par F/U in 6-8 weeks.\par She is encouraged to call with any changes, concerns, or questions   as measured incidentally at outpatient infusion center where he was being treated for osteoporosis.  The patient is asymptomatic at this time, with no CP, palpitations, or SOB.  He recalls intermittent palpitations occurring over the years that he had ascribed to stress or anxiety of normal life. He has had an EKG in the past but does not recall the results. Also had an echocardiogram several years ago when being evaluated for thoracic aortic aneurysm, which demonstrated what based on his description sounds like LVH.  - s/p Cardizem 10mg IV x2 and Cardizem 30mg PO in ED with improvement in his HR to 90s-100s  - s/p Heparin bolus 6500U and placed on Heparin gtt at 15cc/hr with baseline PTT 29.3  - continue on heparin gtt with plans to transfer to [facility]  - start Diltiazem 30mg q6h for rate control, monitor BPs  - EP consult to evaluate for ablation or cardioversion either this admission or as an outpatient  as measured incidentally at outpatient infusion center where he was being treated for osteoporosis.  The patient is asymptomatic at this time, with no CP, palpitations, or SOB.  He recalls intermittent palpitations occurring over the years that he had ascribed to stress or anxiety of normal life. He has had an EKG in the past but does not recall the results. Also had an echocardiogram several years ago when being evaluated for thoracic aortic aneurysm, which demonstrated what based on his description sounds like LVH.  Nuclear stress test performed in 2014 normal as per Dr. Rios  - s/p Cardizem 10mg IV x2 and Cardizem 30mg PO in ED with improvement in his HR to 90s-100s  - s/p Heparin bolus 6500U and placed on Heparin gtt at 15cc/hr with baseline PTT 29.3  - continue on heparin gtt with plans to transfer to [facility]  - start Diltiazem 30mg q6h for rate control, monitor BPs  - EP consult to evaluate for ablation or cardioversion either this admission or as an outpatient  as measured incidentally at outpatient infusion center where he was being treated for osteoporosis.  The patient is asymptomatic at this time, with no CP, palpitations, or SOB.  He recalls intermittent palpitations occurring over the years that he had ascribed to stress or anxiety of normal life. He has had an EKG in the past but does not recall the results. Also had an echocardiogram several years ago when being evaluated for thoracic aortic aneurysm, which demonstrated what based on his description sounds like LVH.  Nuclear stress test performed in 2014 normal as per Dr. Rios  - s/p Cardizem 10mg IV x2 and Cardizem 30mg PO in ED with improvement in his HR to 90s-100s  - s/p Heparin bolus 6500U and placed on Heparin gtt at 15cc/hr with baseline PTT 29.3  - continue on heparin gtt with plans to transfer to [facility]  - continue with home Coreg 12.5mg BID for rate control  - can add Diltiazem 30mg q6h PRN for rate control overnight  - EP consult to evaluate for ablation or cardioversion either this admission or as an outpatient  - Echo in AM

## 2022-12-21 NOTE — DATA REVIEWED
[FreeTextEntry1] : Laboratories (November 9, 2022) reviewed and significant for: \par Hemoglobin 12.5 g/dL (normal: 13.0-17.7)\par Sodium 148 mmol/L (normal: 134-144)\par BUN/creatinine 26/1.15 mg/dL (eGFR 66 mL/min)\par HbA1c 6.9%\par LDL 88 mg/dL\par HDL 43 mg/dL\par Total cholesterol 150 mg/dL\par Triglycerides 106 mg/dL\par Urine microalbumin 86 mg/g creatinine (normal: <30)\par \par Laboratories (May 19, 2022) reviewed and significant for: \par Hemoglobin 8.6 g/dL (normal: 13.0-17.7)\par BUN/creatinine 57/1.32 mg/dL (eGFR 57 mL/min)\par HbA1c 8.9%\par LDL 52 mg/dL\par HDL 54 mg/dL\par Total cholesterol 123 mg/dL\par Triglycerides 87 mg/dL\par Urine microalbumin 13 mg/g creatinine

## 2022-12-21 NOTE — HISTORY OF PRESENT ILLNESS
[FreeTextEntry1] : Mr. Wooten is a 76 year-old man with a history of multiple medical problems including polycystic kidney disease status post renal transplant in 2007, type 2 diabetes mellitus, osteopenia, atrial flutter presenting for follow-up of his endocrines issues. I saw him for an initial visit in November 2017 and last in September 2022; he is a former patient of Dr. Danielle Barrera.\par \par Type 2 diabetes mellitus. Point-of-care HbA1c 7.5% and glucose 128 mg/dL today; HbA1c 5.8% in September 2022 (falsely low in the setting of iron deficiency), 8.1% in May 2022, 7.6% in December 2021, 7.6% in September 2021, 7.6% in June 2021, 7.4% in November 2020, 7.4% in June 2020, 7.4% in February 2020, 7.1% in October 2019, 7.8% in July 2019, 7.6% in April 2019 and 7.7% in January 2019. Mild neuropathy.\par He had borderline diabetes prior to renal transplant, subsequently type 2 diabetes mellitus.\par In June 2021 we discussed continuing metformin 500/1500 mg daily, adjusted Trulicity to 4.5 mg weekly, adjusted Invokana to 300 mg daily, and discontinued Levemir and repaglinide. Insurance would not cover Invokana 300 mg daily. He was taking Levemir 12 units at night, metformin 500/1500 mg daily, Trulicity 4.5 mg weekly, repaglinide 2 mg with lunch and dinner. We discussed restarting SGLT-2 inhibitor therapy; Invokana was cost-prohibitive and he was started on Jardiance 10 mg daily by his nephrologist a few weeks prior to his last visit and adjusted to 25 mg daily.\par He is currently taking Levemir 12 units at night, metformin 1500/500 mg daily, Trulicity 4.5 mg weekly, repaglinide 2 mg with lunch and dinner, and Jardiance 25 mg daily. \par He is checking blood sugars twice daily as below\par He is on aspirin and apixaban\par He is on a blood pressure regimen\par He is on a statin for cholesterol\par Nephropathy screening: Status post renal transplant and followed by nephrology\par Last ophthalmology appointment: July 2022; plan for upcoming appointment\par Last podiatry appointment: November 2022; every four months\par Last dental appointment: Over six months\par He is up-to-date with influenza, pneumonia, and COVID-19 (Pfizer) vaccines up to the bivalent booster\par \par Osteopenia with elevated fracture risk\par Osteopenia diagnosed in 2017 by bone density significant for femoral neck T-score -2.4; his 10-year predicted fracture risk was 21% for major osteoporotic fracture and 11% for hip fracture (risk factors of parental history of hip fracture, rheumatoid arthritis as a proxy for type 2 diabetes mellitus)\par Fracture history: Pelvis and right rib fractures in 2006 when bus shelter fell on him; history of right hip fusion at age 6 in the setting of infection, he believes tuberculosis.\par Family history: Father with history of hip fracture in his 80s\par Treatment: Zoledronic acid 5 mg IV in January 2018, January 2019, March 2020\par \par Falls: None recent\par Height loss: 1/2 inch height loss\par Kidney stones: No\par Dairy intake: 0-1 serving daily (cup of milk ever other day)\par Calcium supplements: 600 mg daily\par Multivitamin: Centrum Silver with 210 mg calcium and 1000 intl units vitamin D \par Vitamin D supplements: 2000 intl units daily\par \par Osteoporosis risk factors include: Postmenopausal status,  race, prior fracture, falls, height loss, small thin bones, tobacco use, excessive alcohol, anorexia, family history, vitamin D deficiency, corticosteroid use, seizure medications, malabsorption, hyperparathyroidism, hyperthyroidism.\par NEGATIVE EXCEPT: Renal transplant, prior fracture (although traumatic), parental history of hip fracture\par \par Interim History \par He is currently taking Levemir 12 units at night, metformin 1500/500 mg daily, Trulicity 4.5 mg weekly, repaglinide 2 mg with lunch and dinner, and Jardiance 25 mg daily. He sometimes does not take repaglinide if he is eating outside his home. \par Fasting glucose values 95-120s mg/dL. Evening values are 140-150s mg/dL. No hypoglycemia.\par No etiology was noted for his previous weight loss; he had a malignancy evaluation. Appetite is starting to improve. \par He has been having dietary indiscretions in the setting of decreased appetite. \par He has seen multiple providers; notes reviewed. Last laboratory results ordered by his nephrologist reviewed and significant for the below; see scanned results. \par He has had fatigue and weakness. No chest pain, shortness of breath, polyuria/polydipsia, lower extremity numbness/tingling. \par Medical and surgical history, medications, allergies, social and family history reviewed and updated as needed.

## 2022-12-21 NOTE — ASSESSMENT
[FreeTextEntry1] : Type 2 diabetes mellitus. Point-of-care HbA1c 7.5% and glucose 128 mg/dL today. His goal HbA1c is less than 7.5% in the setting of his age and comorbidities. We have discussed the cardiovascular and microvascular complications of uncontrolled diabetes. We discussed the importance of diet and exercise and lifestyle modification for glycemic control; he is motivated to make changes. He declined follow-up with nutrition. We discussed pharmacologic options for glycemic control. We will continue this current regimen with a focus on lifestyle modifications for now. \par Continue Levemir 12 units at night\par Continue metformin 1500/500 mg daily; would need decrease if renal function declines\par Continue Trulicity 4.5 mg weekly\par Continue repaglinide 2 mg with lunch and dinner\par Continue Jardiance 25 mg daily\par Continue to check blood sugars twice daily; reviewed glycemic goals\par He is on a blood pressure regimen; blood pressure elevated today but has been around goal and will monitor\par He is on a statin for cholesterol; last lipid panel around goal\par Nephropathy screening: Status post renal transplant and followed by nephrology\par Last ophthalmology appointment: July 2022; plan for upcoming appointment\par Last podiatry appointment: November 2022; every four months\par Last dental appointment: Over six months; advised appointment when appropriate\par He is up-to-date with pneumonia, and COVID-19 (Pfizer) vaccines up to the second booster\par \par Osteopenia with elevated fracture risk. He has a history of prior traumatic fractures. His 10-year predicted fracture risk prior to therapy was 21% for major osteoporotic fracture and 11% for hip fracture (risk factors of parental history of hip fracture, rheumatoid arthritis as a proxy for type 2 diabetes mellitus), above the treatment thresholds. He received zoledronic acid 5 mg IV in January 2018, January 2019, and March 2020. His most recent bone density from December 2021 demonstrated significant improvement at the spine and hip sites from previous in 2017. His 10 year fracture risk calculated by FRAX was 5.6% for major osteoporotic fracture and 2.6% for hip fracture, below the treatment thresholds (risk factor is ). We discussed the potential benefits and risks of pharmacologic osteoporosis therapy, including but not limited to osteonecrosis of the jaw and atypical fracture. He is on a "drug holiday" from antiresorptive therapy and we will continue.\par Monitor bone density testing in December 2023\par Calcium 1000 mg daily from diet and supplements (to be taken in divided doses as no more than 500-600 mg can be absorbed at one time); continue current regimen\par Continue current vitamin D regimen\par Diet, exercise and fall prevention discussed\par \par Return to see me in 3 months. Patient advised to call earlier with significant hypo- or hyperglycemia.\par \par CC:\par Dr. Sulaiman Modi, Fax [phone]\par Dr. Fannie Dowd, Fax [phone]

## 2022-12-21 NOTE — PHYSICAL EXAM
[Alert] : alert [Healthy Appearance] : healthy appearance [No Acute Distress] : no acute distress [Normal Sclera/Conjunctiva] : normal sclera/conjunctiva [Normal Hearing] : hearing was normal [No Respiratory Distress] : no respiratory distress [No Stigmata of Cushings Syndrome] : no stigmata of Cushings Syndrome [Normal Insight/Judgement] : insight and judgment were intact [Kyphosis] : no kyphosis present [Acanthosis Nigricans] : no acanthosis nigricans [de-identified] : no moon facies, no supraclavicular fat pads, no hyperpigmented striae  [de-identified] : narrow-based gait with cane

## 2023-03-01 ENCOUNTER — APPOINTMENT (OUTPATIENT)
Dept: HEART AND VASCULAR | Facility: CLINIC | Age: 78
End: 2023-03-01
Payer: MEDICARE

## 2023-03-01 VITALS
HEART RATE: 97 BPM | WEIGHT: 145 LBS | HEIGHT: 67 IN | OXYGEN SATURATION: 98 % | DIASTOLIC BLOOD PRESSURE: 66 MMHG | BODY MASS INDEX: 22.76 KG/M2 | SYSTOLIC BLOOD PRESSURE: 124 MMHG

## 2023-03-01 PROCEDURE — 99203 OFFICE O/P NEW LOW 30 MIN: CPT

## 2023-03-01 NOTE — ASSESSMENT
[FreeTextEntry1] : We discussed the benefits/risks of percutaneous left atrial appendage occlusion at length, including procedural risks (such as pericardial effusion/stroke among others). He will need to be on DAPT or anticoagulation for 45 days post-procedure, and will then be able to discontinue if no sissy-device leak and continue on aspirin indefinitely. We spoke about alternative options (including NOAC or ASA alone). After a thorough discussion the patient would like to proceed with DENNY occlusion. He has good anatomy for percutaneous DENNY occlusion with the Amulet or Watchman device and will be scheduled in the near future.\par asa/plavix x1 wk trial to ensure no further GIB\par plan for watchman if no issues\par Repeat CBC/labs\par Discussed at length with pt and referring doctors

## 2023-03-01 NOTE — REASON FOR VISIT
[FreeTextEntry1] : 76 y/o male with polycystic kidney disease s/p kidney transplant in 2007, HTN, HLD, DM2, Prostate cancer s/p radical prostatectomy 2015, DVT 2006 (in setting of pelvic fracture), and afib/atrial flutter s/p ablation in 1/2018, who is now off oral anticoagulation secondary to GI bleed and presents for evaluation for DENNY occlusoin.\par \par Pt as no symptomsHe was [facility] with a [facility] bleed. He is a Yarsanism and refuses blood transfusions. Eliquis was stopped and he was offered a Watchman device but has not followed up since\par \par \par KIMMIE reviewed from prior ablation- appears to have good anatomy for LAAO.

## 2023-03-20 NOTE — ED ADULT NURSE NOTE - NS ED NOTE ABUSE RESPONSE YN
Refill approved as requested. Last visit note stated: Return in about 1 year (around 8/2/2023).  
Yes

## 2023-03-20 NOTE — PATIENT PROFILE ADULT. - AS SC BRADEN MOISTURE
Addended bySimone Ledesma on: 3/20/2023 12:24 PM     Modules accepted: Orders (3) occasionally moist

## 2023-03-24 ENCOUNTER — NON-APPOINTMENT (OUTPATIENT)
Age: 78
End: 2023-03-24

## 2023-04-24 ENCOUNTER — OUTPATIENT (OUTPATIENT)
Dept: OUTPATIENT SERVICES | Facility: HOSPITAL | Age: 78
LOS: 1 days | End: 2023-04-24

## 2023-04-24 ENCOUNTER — NON-APPOINTMENT (OUTPATIENT)
Age: 78
End: 2023-04-24

## 2023-04-24 VITALS
WEIGHT: 145.06 LBS | SYSTOLIC BLOOD PRESSURE: 132 MMHG | HEIGHT: 67 IN | RESPIRATION RATE: 17 BRPM | HEART RATE: 65 BPM | TEMPERATURE: 98 F | DIASTOLIC BLOOD PRESSURE: 65 MMHG

## 2023-04-24 DIAGNOSIS — Z98.890 OTHER SPECIFIED POSTPROCEDURAL STATES: Chronic | ICD-10-CM

## 2023-04-24 DIAGNOSIS — Z01.818 ENCOUNTER FOR OTHER PREPROCEDURAL EXAMINATION: ICD-10-CM

## 2023-04-24 LAB
ANION GAP SERPL CALC-SCNC: 12 MMOL/L — SIGNIFICANT CHANGE UP (ref 5–17)
APTT BLD: 31.4 SEC — SIGNIFICANT CHANGE UP (ref 27.5–35.5)
BASOPHILS # BLD AUTO: 0.06 K/UL — SIGNIFICANT CHANGE UP (ref 0–0.2)
BASOPHILS NFR BLD AUTO: 0.7 % — SIGNIFICANT CHANGE UP (ref 0–2)
BLD GP AB SCN SERPL QL: NEGATIVE — SIGNIFICANT CHANGE UP
BUN SERPL-MCNC: 36 MG/DL — HIGH (ref 7–23)
CALCIUM SERPL-MCNC: 10.2 MG/DL — SIGNIFICANT CHANGE UP (ref 8.4–10.5)
CHLORIDE SERPL-SCNC: 102 MMOL/L — SIGNIFICANT CHANGE UP (ref 96–108)
CO2 SERPL-SCNC: 30 MMOL/L — SIGNIFICANT CHANGE UP (ref 22–31)
CREAT SERPL-MCNC: 1.55 MG/DL — HIGH (ref 0.5–1.3)
EGFR: 46 ML/MIN/1.73M2 — LOW
EOSINOPHIL # BLD AUTO: 0.54 K/UL — HIGH (ref 0–0.5)
EOSINOPHIL NFR BLD AUTO: 6.3 % — HIGH (ref 0–6)
GLUCOSE SERPL-MCNC: 139 MG/DL — HIGH (ref 70–99)
HCT VFR BLD CALC: 44.2 % — SIGNIFICANT CHANGE UP (ref 39–50)
HGB BLD-MCNC: 14.3 G/DL — SIGNIFICANT CHANGE UP (ref 13–17)
IMM GRANULOCYTES NFR BLD AUTO: 0.5 % — SIGNIFICANT CHANGE UP (ref 0–0.9)
INR BLD: 0.98 — SIGNIFICANT CHANGE UP (ref 0.88–1.16)
LYMPHOCYTES # BLD AUTO: 1.67 K/UL — SIGNIFICANT CHANGE UP (ref 1–3.3)
LYMPHOCYTES # BLD AUTO: 19.4 % — SIGNIFICANT CHANGE UP (ref 13–44)
MCHC RBC-ENTMCNC: 31.5 PG — SIGNIFICANT CHANGE UP (ref 27–34)
MCHC RBC-ENTMCNC: 32.4 GM/DL — SIGNIFICANT CHANGE UP (ref 32–36)
MCV RBC AUTO: 97.4 FL — SIGNIFICANT CHANGE UP (ref 80–100)
MONOCYTES # BLD AUTO: 0.51 K/UL — SIGNIFICANT CHANGE UP (ref 0–0.9)
MONOCYTES NFR BLD AUTO: 5.9 % — SIGNIFICANT CHANGE UP (ref 2–14)
NEUTROPHILS # BLD AUTO: 5.81 K/UL — SIGNIFICANT CHANGE UP (ref 1.8–7.4)
NEUTROPHILS NFR BLD AUTO: 67.2 % — SIGNIFICANT CHANGE UP (ref 43–77)
NRBC # BLD: 0 /100 WBCS — SIGNIFICANT CHANGE UP (ref 0–0)
PLATELET # BLD AUTO: 255 K/UL — SIGNIFICANT CHANGE UP (ref 150–400)
POTASSIUM SERPL-MCNC: 4 MMOL/L — SIGNIFICANT CHANGE UP (ref 3.5–5.3)
POTASSIUM SERPL-SCNC: 4 MMOL/L — SIGNIFICANT CHANGE UP (ref 3.5–5.3)
PROTHROM AB SERPL-ACNC: 11.7 SEC — SIGNIFICANT CHANGE UP (ref 10.5–13.4)
RBC # BLD: 4.54 M/UL — SIGNIFICANT CHANGE UP (ref 4.2–5.8)
RBC # FLD: 15 % — HIGH (ref 10.3–14.5)
RH IG SCN BLD-IMP: POSITIVE — SIGNIFICANT CHANGE UP
SODIUM SERPL-SCNC: 144 MMOL/L — SIGNIFICANT CHANGE UP (ref 135–145)
WBC # BLD: 8.63 K/UL — SIGNIFICANT CHANGE UP (ref 3.8–10.5)
WBC # FLD AUTO: 8.63 K/UL — SIGNIFICANT CHANGE UP (ref 3.8–10.5)

## 2023-04-24 NOTE — PATIENT PROFILE ADULT - HAVE YOU HAD COVID IN THE LAST 60 DAYS?
Please call the patient regarding her result  Most of her colon polyps were adenomas    Would repeat colonoscopy in 1 year No

## 2023-04-24 NOTE — PATIENT PROFILE ADULT - FALL HARM RISK - HARM RISK INTERVENTIONS

## 2023-04-25 ENCOUNTER — INPATIENT (INPATIENT)
Facility: HOSPITAL | Age: 78
LOS: 25 days | Discharge: NURSING, REHAB OR LONG-TERM CARE | DRG: 270 | End: 2023-05-21
Attending: INTERNAL MEDICINE | Admitting: INTERNAL MEDICINE
Payer: MEDICARE

## 2023-04-25 ENCOUNTER — TRANSCRIPTION ENCOUNTER (OUTPATIENT)
Age: 78
End: 2023-04-25

## 2023-04-25 DIAGNOSIS — Z79.69 LONG TERM (CURRENT) USE OF OTHER IMMUNOMODULATORS AND IMMUNOSUPPRESSANTS: ICD-10-CM

## 2023-04-25 DIAGNOSIS — I46.9 CARDIAC ARREST, CAUSE UNSPECIFIED: ICD-10-CM

## 2023-04-25 DIAGNOSIS — G92.8 OTHER TOXIC ENCEPHALOPATHY: ICD-10-CM

## 2023-04-25 DIAGNOSIS — I82.621 ACUTE EMBOLISM AND THROMBOSIS OF DEEP VEINS OF RIGHT UPPER EXTREMITY: ICD-10-CM

## 2023-04-25 DIAGNOSIS — R74.01 ELEVATION OF LEVELS OF LIVER TRANSAMINASE LEVELS: ICD-10-CM

## 2023-04-25 DIAGNOSIS — Z85.46 PERSONAL HISTORY OF MALIGNANT NEOPLASM OF PROSTATE: ICD-10-CM

## 2023-04-25 DIAGNOSIS — E87.0 HYPEROSMOLALITY AND HYPERNATREMIA: ICD-10-CM

## 2023-04-25 DIAGNOSIS — T81.11XA POSTPROCEDURAL CARDIOGENIC SHOCK, INITIAL ENCOUNTER: ICD-10-CM

## 2023-04-25 DIAGNOSIS — D69.6 THROMBOCYTOPENIA, UNSPECIFIED: ICD-10-CM
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
## 2023-04-25 DIAGNOSIS — E87.20 ACIDOSIS, UNSPECIFIED: ICD-10-CM

## 2023-04-25 DIAGNOSIS — D62 ACUTE POSTHEMORRHAGIC ANEMIA: ICD-10-CM

## 2023-04-25 DIAGNOSIS — J96.01 ACUTE RESPIRATORY FAILURE WITH HYPOXIA: ICD-10-CM

## 2023-04-25 DIAGNOSIS — Z87.891 PERSONAL HISTORY OF NICOTINE DEPENDENCE: ICD-10-CM

## 2023-04-25 DIAGNOSIS — I50.43 ACUTE ON CHRONIC COMBINED SYSTOLIC (CONGESTIVE) AND DIASTOLIC (CONGESTIVE) HEART FAILURE: ICD-10-CM

## 2023-04-25 DIAGNOSIS — Q61.3 POLYCYSTIC KIDNEY, UNSPECIFIED: ICD-10-CM

## 2023-04-25 DIAGNOSIS — Z53.1 PROCEDURE AND TREATMENT NOT CARRIED OUT BECAUSE OF PATIENT'S DECISION FOR REASONS OF BELIEF AND GROUP PRESSURE: ICD-10-CM

## 2023-04-25 DIAGNOSIS — I48.20 CHRONIC ATRIAL FIBRILLATION, UNSPECIFIED: ICD-10-CM

## 2023-04-25 DIAGNOSIS — Z94.0 KIDNEY TRANSPLANT STATUS: ICD-10-CM

## 2023-04-25 DIAGNOSIS — Z79.4 LONG TERM (CURRENT) USE OF INSULIN: ICD-10-CM

## 2023-04-25 DIAGNOSIS — E87.6 HYPOKALEMIA: ICD-10-CM

## 2023-04-25 DIAGNOSIS — J94.2 HEMOTHORAX: ICD-10-CM

## 2023-04-25 DIAGNOSIS — E11.649 TYPE 2 DIABETES MELLITUS WITH HYPOGLYCEMIA WITHOUT COMA: ICD-10-CM

## 2023-04-25 DIAGNOSIS — I31.4 CARDIAC TAMPONADE: ICD-10-CM

## 2023-04-25 DIAGNOSIS — E83.39 OTHER DISORDERS OF PHOSPHORUS METABOLISM: ICD-10-CM

## 2023-04-25 DIAGNOSIS — Z98.890 OTHER SPECIFIED POSTPROCEDURAL STATES: Chronic | ICD-10-CM

## 2023-04-25 DIAGNOSIS — T41.205A ADVERSE EFFECT OF UNSPECIFIED GENERAL ANESTHETICS, INITIAL ENCOUNTER: ICD-10-CM

## 2023-04-25 DIAGNOSIS — E11.22 TYPE 2 DIABETES MELLITUS WITH DIABETIC CHRONIC KIDNEY DISEASE: ICD-10-CM

## 2023-04-25 DIAGNOSIS — E44.0 MODERATE PROTEIN-CALORIE MALNUTRITION: ICD-10-CM

## 2023-04-25 DIAGNOSIS — N18.30 CHRONIC KIDNEY DISEASE, STAGE 3 UNSPECIFIED: ICD-10-CM

## 2023-04-25 DIAGNOSIS — I31.39 OTHER PERICARDIAL EFFUSION (NONINFLAMMATORY): ICD-10-CM

## 2023-04-25 DIAGNOSIS — J98.11 ATELECTASIS: ICD-10-CM

## 2023-04-25 DIAGNOSIS — N17.0 ACUTE KIDNEY FAILURE WITH TUBULAR NECROSIS: ICD-10-CM

## 2023-04-25 DIAGNOSIS — J90 PLEURAL EFFUSION, NOT ELSEWHERE CLASSIFIED: ICD-10-CM

## 2023-04-25 DIAGNOSIS — Z79.52 LONG TERM (CURRENT) USE OF SYSTEMIC STEROIDS: ICD-10-CM

## 2023-04-25 DIAGNOSIS — Z79.84 LONG TERM (CURRENT) USE OF ORAL HYPOGLYCEMIC DRUGS: ICD-10-CM
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
## 2023-04-25 DIAGNOSIS — B95.61 METHICILLIN SUSCEPTIBLE STAPHYLOCOCCUS AUREUS INFECTION AS THE CAUSE OF DISEASES CLASSIFIED ELSEWHERE: ICD-10-CM

## 2023-04-25 DIAGNOSIS — Z90.79 ACQUIRED ABSENCE OF OTHER GENITAL ORGAN(S): ICD-10-CM

## 2023-04-25 LAB
ALBUMIN SERPL ELPH-MCNC: 3.4 G/DL — SIGNIFICANT CHANGE UP (ref 3.3–5)
ALP SERPL-CCNC: 56 U/L — SIGNIFICANT CHANGE UP (ref 40–120)
ALT FLD-CCNC: 16 U/L — SIGNIFICANT CHANGE UP (ref 10–45)
ANION GAP SERPL CALC-SCNC: 10 MMOL/L — SIGNIFICANT CHANGE UP (ref 5–17)
ANION GAP SERPL CALC-SCNC: 11 MMOL/L — SIGNIFICANT CHANGE UP (ref 5–17)
APTT BLD: 31.3 SEC — SIGNIFICANT CHANGE UP (ref 27.5–35.5)
AST SERPL-CCNC: 14 U/L — SIGNIFICANT CHANGE UP (ref 10–40)
BASOPHILS # BLD AUTO: 0.03 K/UL — SIGNIFICANT CHANGE UP (ref 0–0.2)
BASOPHILS NFR BLD AUTO: 0.4 % — SIGNIFICANT CHANGE UP (ref 0–2)
BILIRUB SERPL-MCNC: 0.8 MG/DL — SIGNIFICANT CHANGE UP (ref 0.2–1.2)
BLD GP AB SCN SERPL QL: NEGATIVE — SIGNIFICANT CHANGE UP
BUN SERPL-MCNC: 32 MG/DL — HIGH (ref 7–23)
BUN SERPL-MCNC: 43 MG/DL — HIGH (ref 7–23)
CALCIUM SERPL-MCNC: 9 MG/DL — SIGNIFICANT CHANGE UP (ref 8.4–10.5)
CALCIUM SERPL-MCNC: 9.8 MG/DL — SIGNIFICANT CHANGE UP (ref 8.4–10.5)
CHLORIDE SERPL-SCNC: 102 MMOL/L — SIGNIFICANT CHANGE UP (ref 96–108)
CHLORIDE SERPL-SCNC: 105 MMOL/L — SIGNIFICANT CHANGE UP (ref 96–108)
CO2 SERPL-SCNC: 25 MMOL/L — SIGNIFICANT CHANGE UP (ref 22–31)
CO2 SERPL-SCNC: 26 MMOL/L — SIGNIFICANT CHANGE UP (ref 22–31)
CREAT SERPL-MCNC: 1.34 MG/DL — HIGH (ref 0.5–1.3)
CREAT SERPL-MCNC: 1.7 MG/DL — HIGH (ref 0.5–1.3)
EGFR: 42 ML/MIN/1.73M2 — LOW
EGFR: 55 ML/MIN/1.73M2 — LOW
EOSINOPHIL # BLD AUTO: 0.38 K/UL — SIGNIFICANT CHANGE UP (ref 0–0.5)
EOSINOPHIL NFR BLD AUTO: 5.2 % — SIGNIFICANT CHANGE UP (ref 0–6)
GAS PNL BLDA: SIGNIFICANT CHANGE UP
GLUCOSE BLDC GLUCOMTR-MCNC: 135 MG/DL — HIGH (ref 70–99)
GLUCOSE BLDC GLUCOMTR-MCNC: 175 MG/DL — HIGH (ref 70–99)
GLUCOSE SERPL-MCNC: 121 MG/DL — HIGH (ref 70–99)
GLUCOSE SERPL-MCNC: 209 MG/DL — HIGH (ref 70–99)
HCT VFR BLD CALC: 37.6 % — LOW (ref 39–50)
HCT VFR BLD CALC: 43.2 % — SIGNIFICANT CHANGE UP (ref 39–50)
HGB BLD-MCNC: 12.4 G/DL — LOW (ref 13–17)
HGB BLD-MCNC: 14.1 G/DL — SIGNIFICANT CHANGE UP (ref 13–17)
IMM GRANULOCYTES NFR BLD AUTO: 0.3 % — SIGNIFICANT CHANGE UP (ref 0–0.9)
INR BLD: 0.98 — SIGNIFICANT CHANGE UP (ref 0.88–1.16)
INR BLD: 1.25 — HIGH (ref 0.88–1.16)
LYMPHOCYTES # BLD AUTO: 1.5 K/UL — SIGNIFICANT CHANGE UP (ref 1–3.3)
LYMPHOCYTES # BLD AUTO: 20.7 % — SIGNIFICANT CHANGE UP (ref 13–44)
MCHC RBC-ENTMCNC: 31.3 PG — SIGNIFICANT CHANGE UP (ref 27–34)
MCHC RBC-ENTMCNC: 31.8 PG — SIGNIFICANT CHANGE UP (ref 27–34)
MCHC RBC-ENTMCNC: 32.6 GM/DL — SIGNIFICANT CHANGE UP (ref 32–36)
MCHC RBC-ENTMCNC: 33 GM/DL — SIGNIFICANT CHANGE UP (ref 32–36)
MCV RBC AUTO: 96 FL — SIGNIFICANT CHANGE UP (ref 80–100)
MCV RBC AUTO: 96.4 FL — SIGNIFICANT CHANGE UP (ref 80–100)
MONOCYTES # BLD AUTO: 0.38 K/UL — SIGNIFICANT CHANGE UP (ref 0–0.9)
MONOCYTES NFR BLD AUTO: 5.2 % — SIGNIFICANT CHANGE UP (ref 2–14)
NEUTROPHILS # BLD AUTO: 4.95 K/UL — SIGNIFICANT CHANGE UP (ref 1.8–7.4)
NEUTROPHILS NFR BLD AUTO: 68.2 % — SIGNIFICANT CHANGE UP (ref 43–77)
NRBC # BLD: 0 /100 WBCS — SIGNIFICANT CHANGE UP (ref 0–0)
NRBC # BLD: 0 /100 WBCS — SIGNIFICANT CHANGE UP (ref 0–0)
NT-PROBNP SERPL-SCNC: 2059 PG/ML — HIGH (ref 0–300)
PLATELET # BLD AUTO: 218 K/UL — SIGNIFICANT CHANGE UP (ref 150–400)
PLATELET # BLD AUTO: 240 K/UL — SIGNIFICANT CHANGE UP (ref 150–400)
POTASSIUM SERPL-MCNC: 3.6 MMOL/L — SIGNIFICANT CHANGE UP (ref 3.5–5.3)
POTASSIUM SERPL-MCNC: 3.7 MMOL/L — SIGNIFICANT CHANGE UP (ref 3.5–5.3)
POTASSIUM SERPL-SCNC: 3.6 MMOL/L — SIGNIFICANT CHANGE UP (ref 3.5–5.3)
POTASSIUM SERPL-SCNC: 3.7 MMOL/L — SIGNIFICANT CHANGE UP (ref 3.5–5.3)
PROT SERPL-MCNC: 5.8 G/DL — LOW (ref 6–8.3)
PROTHROM AB SERPL-ACNC: 11.7 SEC — SIGNIFICANT CHANGE UP (ref 10.5–13.4)
PROTHROM AB SERPL-ACNC: 14.9 SEC — HIGH (ref 10.5–13.4)
RBC # BLD: 3.9 M/UL — LOW (ref 4.2–5.8)
RBC # BLD: 4.5 M/UL — SIGNIFICANT CHANGE UP (ref 4.2–5.8)
RBC # FLD: 14.7 % — HIGH (ref 10.3–14.5)
RBC # FLD: 14.9 % — HIGH (ref 10.3–14.5)
RH IG SCN BLD-IMP: POSITIVE — SIGNIFICANT CHANGE UP
SODIUM SERPL-SCNC: 139 MMOL/L — SIGNIFICANT CHANGE UP (ref 135–145)
SODIUM SERPL-SCNC: 140 MMOL/L — SIGNIFICANT CHANGE UP (ref 135–145)
WBC # BLD: 7.26 K/UL — SIGNIFICANT CHANGE UP (ref 3.8–10.5)
WBC # BLD: 9.99 K/UL — SIGNIFICANT CHANGE UP (ref 3.8–10.5)
WBC # FLD AUTO: 7.26 K/UL — SIGNIFICANT CHANGE UP (ref 3.8–10.5)
WBC # FLD AUTO: 9.99 K/UL — SIGNIFICANT CHANGE UP (ref 3.8–10.5)

## 2023-04-25 PROCEDURE — 93010 ELECTROCARDIOGRAM REPORT: CPT

## 2023-04-25 PROCEDURE — 33340 PERQ CLSR TCAT L ATR APNDGE: CPT | Mod: Q0

## 2023-04-25 PROCEDURE — 71045 X-RAY EXAM CHEST 1 VIEW: CPT | Mod: 26

## 2023-04-25 DEVICE — KIT VERSACROSS PIGTAIL .035IN 45 DEG D1 230CM: Type: IMPLANTABLE DEVICE | Status: FUNCTIONAL

## 2023-04-25 DEVICE — SHEATH INTRODUCER TERUMO PINNACLE CORONARY 8FR X 10CM X 0.038" MINI WIRE: Type: IMPLANTABLE DEVICE | Status: FUNCTIONAL

## 2023-04-25 DEVICE — IMPLANTABLE DEVICE: Type: IMPLANTABLE DEVICE | Status: FUNCTIONAL

## 2023-04-25 DEVICE — INTRO MICROPUNC 4FRX10CM SS: Type: IMPLANTABLE DEVICE | Status: FUNCTIONAL

## 2023-04-25 DEVICE — GWIRE GUID  0.035INX150CM: Type: IMPLANTABLE DEVICE | Status: FUNCTIONAL

## 2023-04-25 DEVICE — CATH DX PIG 145 INFIN 5FRX110CM: Type: IMPLANTABLE DEVICE | Status: FUNCTIONAL

## 2023-04-25 RX ORDER — CEFAZOLIN SODIUM 1 G
2000 VIAL (EA) INJECTION EVERY 8 HOURS
Refills: 0 | Status: DISCONTINUED | OUTPATIENT
Start: 2023-04-25 | End: 2023-04-26

## 2023-04-25 RX ORDER — SODIUM CHLORIDE 9 MG/ML
1000 INJECTION INTRAMUSCULAR; INTRAVENOUS; SUBCUTANEOUS
Refills: 0 | Status: DISCONTINUED | OUTPATIENT
Start: 2023-04-25 | End: 2023-05-15

## 2023-04-25 RX ORDER — ASPIRIN/CALCIUM CARB/MAGNESIUM 324 MG
81 TABLET ORAL DAILY
Refills: 0 | Status: DISCONTINUED | OUTPATIENT
Start: 2023-04-26 | End: 2023-04-26

## 2023-04-25 RX ORDER — DEXTROSE 50 % IN WATER 50 %
25 SYRINGE (ML) INTRAVENOUS ONCE
Refills: 0 | Status: DISCONTINUED | OUTPATIENT
Start: 2023-04-25 | End: 2023-05-07

## 2023-04-25 RX ORDER — TACROLIMUS 5 MG/1
2 CAPSULE ORAL
Refills: 0 | Status: DISCONTINUED | OUTPATIENT
Start: 2023-04-25 | End: 2023-04-28

## 2023-04-25 RX ORDER — ATORVASTATIN CALCIUM 80 MG/1
20 TABLET, FILM COATED ORAL AT BEDTIME
Refills: 0 | Status: DISCONTINUED | OUTPATIENT
Start: 2023-04-25 | End: 2023-05-03

## 2023-04-25 RX ORDER — ACETAMINOPHEN 500 MG
1000 TABLET ORAL ONCE
Refills: 0 | Status: COMPLETED | OUTPATIENT
Start: 2023-04-25 | End: 2023-04-26

## 2023-04-25 RX ORDER — SODIUM CHLORIDE 9 MG/ML
1000 INJECTION, SOLUTION INTRAVENOUS
Refills: 0 | Status: DISCONTINUED | OUTPATIENT
Start: 2023-04-25 | End: 2023-05-07

## 2023-04-25 RX ORDER — ASPIRIN/CALCIUM CARB/MAGNESIUM 324 MG
325 TABLET ORAL AT BEDTIME
Refills: 0 | Status: COMPLETED | OUTPATIENT
Start: 2023-04-25 | End: 2023-04-25

## 2023-04-25 RX ORDER — INSULIN LISPRO 100/ML
VIAL (ML) SUBCUTANEOUS EVERY 6 HOURS
Refills: 0 | Status: DISCONTINUED | OUTPATIENT
Start: 2023-04-25 | End: 2023-04-26

## 2023-04-25 RX ORDER — TACROLIMUS 5 MG/1
1 CAPSULE ORAL AT BEDTIME
Refills: 0 | Status: DISCONTINUED | OUTPATIENT
Start: 2023-04-25 | End: 2023-04-28

## 2023-04-25 RX ORDER — HEPARIN SODIUM 5000 [USP'U]/ML
5000 INJECTION INTRAVENOUS; SUBCUTANEOUS EVERY 8 HOURS
Refills: 0 | Status: DISCONTINUED | OUTPATIENT
Start: 2023-04-25 | End: 2023-04-26

## 2023-04-25 RX ORDER — CLOPIDOGREL BISULFATE 75 MG/1
75 TABLET, FILM COATED ORAL DAILY
Refills: 0 | Status: DISCONTINUED | OUTPATIENT
Start: 2023-04-25 | End: 2023-04-26

## 2023-04-25 RX ORDER — POTASSIUM CHLORIDE 20 MEQ
20 PACKET (EA) ORAL ONCE
Refills: 0 | Status: COMPLETED | OUTPATIENT
Start: 2023-04-25 | End: 2023-04-25

## 2023-04-25 RX ORDER — LANOLIN ALCOHOL/MO/W.PET/CERES
2 CREAM (GRAM) TOPICAL
Qty: 0 | Refills: 0 | DISCHARGE

## 2023-04-25 RX ORDER — DEXTROSE 50 % IN WATER 50 %
15 SYRINGE (ML) INTRAVENOUS ONCE
Refills: 0 | Status: DISCONTINUED | OUTPATIENT
Start: 2023-04-25 | End: 2023-05-07

## 2023-04-25 RX ORDER — GLUCAGON INJECTION, SOLUTION 0.5 MG/.1ML
1 INJECTION, SOLUTION SUBCUTANEOUS ONCE
Refills: 0 | Status: DISCONTINUED | OUTPATIENT
Start: 2023-04-25 | End: 2023-05-21

## 2023-04-25 RX ORDER — DEXTROSE 50 % IN WATER 50 %
12.5 SYRINGE (ML) INTRAVENOUS ONCE
Refills: 0 | Status: DISCONTINUED | OUTPATIENT
Start: 2023-04-25 | End: 2023-05-07

## 2023-04-25 RX ORDER — MULTIVIT-MIN/FERROUS GLUCONATE 9 MG/15 ML
1 LIQUID (ML) ORAL
Qty: 0 | Refills: 0 | DISCHARGE

## 2023-04-25 RX ORDER — PANTOPRAZOLE SODIUM 20 MG/1
40 TABLET, DELAYED RELEASE ORAL DAILY
Refills: 0 | Status: DISCONTINUED | OUTPATIENT
Start: 2023-04-25 | End: 2023-04-27

## 2023-04-25 RX ORDER — CHOLECALCIFEROL (VITAMIN D3) 125 MCG
1 CAPSULE ORAL
Qty: 0 | Refills: 0 | DISCHARGE

## 2023-04-25 RX ADMIN — Medication 100 MILLIGRAM(S): at 21:33

## 2023-04-25 RX ADMIN — HEPARIN SODIUM 5000 UNIT(S): 5000 INJECTION INTRAVENOUS; SUBCUTANEOUS at 21:32

## 2023-04-25 RX ADMIN — Medication 20 MILLIEQUIVALENT(S): at 22:10

## 2023-04-25 RX ADMIN — ATORVASTATIN CALCIUM 20 MILLIGRAM(S): 80 TABLET, FILM COATED ORAL at 21:32

## 2023-04-25 RX ADMIN — Medication 325 MILLIGRAM(S): at 21:33

## 2023-04-25 NOTE — H&P ADULT - NSHPREVIEWOFSYSTEMS_GEN_ALL_CORE
Review of Systems  CONSTITUTIONAL:  Denies Fevers / chills, sweats, fatigue, weight loss, weight gain                                      NEURO:  Denies changes in sensation, seizures, syncope, confusion                                                                            EYES:  Denies Blurry vision, discharge, pain, loss of vision                                                                                    ENMT:  Denies Difficulty hearing, vertigo, dysphagia, epistaxis, recent dental work                                       CV:  Denies Chest pain, palpitations, GAFFNEY, orthopnea                                                                                          RESPIRATORY:  Denies Wheezing, SOB, cough / sputum, hemoptysis                                                                GI:  Denies Nausea, vomiting, diarrhea, constipation, melena, difficulty swallowing                                               : Denies Hematuria, dysuria, urgency, incontinence                                                                                         MUSKULOSKELETAL:  Denies arthritis, joint swelling, muscle weakness                                                             SKIN/BREAST:  Denies rash, itching, hair loss, masses                                                                                            PSYCH:  Denies depression, anxiety, suicidal ideation                                                                                               HEME/LYMPH:  Denies bruises easily, enlarged lymph nodes, tender lymph nodes                                        ENDOCRINE:  Denies cold intolerance, heat intolerance, polydipsia

## 2023-04-25 NOTE — PRE-ANESTHESIA EVALUATION ADULT - NSANTHPMHFT_GEN_ALL_CORE
76 y/o male with polycystic kidney disease s/p kidney transplant in 2007, HTN, HLD, DM2, Prostate cancer s/p radical prostatectomy 2015, DVT 2006 (in setting of pelvic fracture), and afib/atrial flutter s/p ablation in 1/2018, who is now off oral anticoagulation secondary to GI bleed and presents for DENNY occlusoin. Pt as no symptomsHe was admitted to [facility] 7/20[facility] with a GI bleed. He is a Scientology and refuses blood transfusions. Eliquis was stopped at that time.   KIMMIE reviewed by cardiology teamfrom prior ablation- appears to have good anatomy for LAAO.    Denies acute or current SOB, chest pain, palpitation, N/V/D, fever/chills, recent illness, or any other concerning symptoms.

## 2023-04-25 NOTE — H&P ADULT - ASSESSMENT
78 y/o male with polycystic kidney disease s/p kidney transplant in 2007, HTN, HLD, DM2, Prostate cancer s/p radical prostatectomy 2015, DVT 2006 (in setting of pelvic fracture), and afib/atrial flutter s/p ablation in 1/2018, who is now off oral anticoagulation secondary to GI bleed and presents for DENNY occlusoin. Pt as no symptomsHe was admitted to [facility] 7/2022 with a GI bleed. He is a Restoration and refuses blood transfusions. Eliquis was stopped at that time.       KIMMIE reviewed from prior ablation- appears to have good anatomy for LAAO.     PLAN:  -Percutaneous DENNY occlusion today with Dr. Sidhu 76 y/o male with polycystic kidney disease s/p kidney transplant in 2007, HTN, HLD, DM2, Prostate cancer s/p radical prostatectomy 2015, DVT 2006 (in setting of pelvic fracture), and afib/atrial flutter s/p ablation in 1/2018, who is now off oral anticoagulation secondary to GI bleed and presents for DENNY occlusoin. Pt as no symptomsHe was admitted to [facility] 7/2022 with a GI bleed. He is a Anabaptist and refuses blood transfusions. Eliquis was stopped at that time.       KIMMIE reviewed from prior ablation- appears to have good anatomy for LAAO.     PLAN:  -Percutaneous DENNY occlusion today with Dr. Sidhu    Admit under Dr. Sidhu  via same day surgery. Consent signed, placed on chart.  Risks/benefits reviewed, patient understands and agrees. T&S ordered and blood products placed on hold for OR.  To 9  post-op.

## 2023-04-25 NOTE — PRE-ANESTHESIA EVALUATION ADULT - NSRADCARDRESULTSFT_GEN_ALL_CORE
Echo: Normal BiV function with mod LVH, PASP 44, mild MR/TR, trace AI, no regional wall motion changes

## 2023-04-25 NOTE — H&P ADULT - NSHPPHYSICALEXAM_GEN_ALL_CORE
PHYSICAL EXAM:  General:   Neurological: AOx3. Motor skills grossly intact  Cardiovascular: Normal S1/S2. Regular rate/rhythm. No murmurs  Respiratory: Lungs CTA bilaterally. No wheezing or rales  Gastrointestinal: +BS in all 4 quadrants. Non-distended. Soft. Non-tender  Extremities: Strength 5/5 b/l upper/lower extremities. Sensation grossly intact upper/lower extremities. No edema. No calf tenderness.  Vascular: Radial 2+bilaterally, DP 2+ b/l  Incision Sites:

## 2023-04-25 NOTE — BRIEF OPERATIVE NOTE - NSICDXBRIEFPROCEDURE_GEN_ALL_CORE_FT
PROCEDURES:  Intraoperative transesophageal echocardiography (KIMMIE) 25-Apr-2023 17:27:57  Jazz Salazar   PROCEDURES:  Intraoperative transesophageal echocardiography (KIMMIE) 25-Apr-2023 17:27:57  Jazz Salazar  Occlusion, left atrial appendage 25-Apr-2023 19:37:37 Percutaneous using a 25 mm Amulet device Jazz Salazar

## 2023-04-25 NOTE — H&P ADULT - NS PANP COMMENT GEN_ALL_CORE FT
76 y/o M with pAfib (CHADSVASC 4), hx of severe GI bleed, jehova's witness, PCKD s/p renal transplant and other comorbidities who presents for DENNY occlusion with the Amulet device.  Benefits/risks of the procedure were discussed at length and pt brought for KIMMIE/LAAO.  Plan for ASA/Plavix post-Amulet device (pt was trialed as an outpatient with ASA/Plavix)

## 2023-04-25 NOTE — H&P ADULT - HISTORY OF PRESENT ILLNESS
78 y/o male with polycystic kidney disease s/p kidney transplant in 2007, HTN, HLD, DM2, Prostate cancer s/p radical prostatectomy 2015, DVT 2006 (in setting of pelvic fracture), and afib/atrial flutter s/p ablation in 1/2018, who is now off oral anticoagulation secondary to GI bleed and presents for DENNY occlusoin. Pt as no symptomsHe was admitted to [facility] 7[facility] with a GI bleed. He is a Religious and refuses blood transfusions. Eliquis was stopped at that time.       KIMMIE reviewed from prior ablation- appears to have good anatomy for LAAO.  76 y/o male with polycystic kidney disease s/p kidney transplant in 2007, HTN, HLD, DM2, Prostate cancer s/p radical prostatectomy 2015, DVT 2006 (in setting of pelvic fracture), and afib/atrial flutter s/p ablation in 1/2018, who is now off oral anticoagulation secondary to GI bleed and presents for DENNY occlusoin. Pt as no symptomsHe was admitted to [facility] with a GI bleed. He is a Adventist and refuses blood transfusions. Eliquis was stopped at that time.       KIMMIE reviewed from prior ablation- appears to have good anatomy for LAAO.     Patient seen in same day holding area; Reports no changes to PMHx or medications since last seen by our team. Denies acute or current SOB, chest pain, palpitation, N/V/D, fever/chills, recent illness, or any other concerning symptoms.   76 y/o male with polycystic kidney disease s/p kidney transplant in 2007, HTN, HLD, DM2, Prostate cancer s/p radical prostatectomy 2015, DVT 2006 (in setting of pelvic fracture), and afib/atrial flutter s/p ablation in 1/2018, who is now off oral anticoagulation secondary to GI bleed and presents for DENNY occlusoin. Pt as no symptomsHe was admitted to [facility] 7/[facility] with a GI bleed. He is a Mosque and refuses blood transfusions. Eliquis was stopped at that time.       KIMMIE reviewed from prior ablation- appears to have good anatomy for LAAO.     Patient seen in same day holding area; Reports no changes to PMHx or medications since last seen by our team. Denies acute or current SOB, chest pain, palpitation, N/V/D, fever/chills, recent illness, or any other concerning symptoms.  NYHA 1 Class

## 2023-04-26 DIAGNOSIS — I31.39 OTHER PERICARDIAL EFFUSION (NONINFLAMMATORY): ICD-10-CM

## 2023-04-26 LAB
ALBUMIN SERPL ELPH-MCNC: 2.3 G/DL — LOW (ref 3.3–5)
ALBUMIN SERPL ELPH-MCNC: 3.2 G/DL — LOW (ref 3.3–5)
ALBUMIN SERPL ELPH-MCNC: 3.3 G/DL — SIGNIFICANT CHANGE UP (ref 3.3–5)
ALBUMIN SERPL ELPH-MCNC: 3.3 G/DL — SIGNIFICANT CHANGE UP (ref 3.3–5)
ALBUMIN SERPL ELPH-MCNC: 3.4 G/DL — SIGNIFICANT CHANGE UP (ref 3.3–5)
ALBUMIN SERPL ELPH-MCNC: 3.4 G/DL — SIGNIFICANT CHANGE UP (ref 3.3–5)
ALBUMIN SERPL ELPH-MCNC: 3.7 G/DL — SIGNIFICANT CHANGE UP (ref 3.3–5)
ALP SERPL-CCNC: 20 U/L — LOW (ref 40–120)
ALP SERPL-CCNC: 20 U/L — LOW (ref 40–120)
ALP SERPL-CCNC: 25 U/L — LOW (ref 40–120)
ALP SERPL-CCNC: 25 U/L — LOW (ref 40–120)
ALP SERPL-CCNC: 27 U/L — LOW (ref 40–120)
ALP SERPL-CCNC: 36 U/L — LOW (ref 40–120)
ALP SERPL-CCNC: 57 U/L — SIGNIFICANT CHANGE UP (ref 40–120)
ALT FLD-CCNC: 144 U/L — HIGH (ref 10–45)
ALT FLD-CCNC: 18 U/L — SIGNIFICANT CHANGE UP (ref 10–45)
ALT FLD-CCNC: 49 U/L — HIGH (ref 10–45)
ALT FLD-CCNC: 62 U/L — HIGH (ref 10–45)
ALT FLD-CCNC: 65 U/L — HIGH (ref 10–45)
ALT FLD-CCNC: SIGNIFICANT CHANGE UP (ref 10–45)
ALT FLD-CCNC: SIGNIFICANT CHANGE UP U/L (ref 10–45)
ANION GAP SERPL CALC-SCNC: 12 MMOL/L — SIGNIFICANT CHANGE UP (ref 5–17)
ANION GAP SERPL CALC-SCNC: 12 MMOL/L — SIGNIFICANT CHANGE UP (ref 5–17)
ANION GAP SERPL CALC-SCNC: 13 MMOL/L — SIGNIFICANT CHANGE UP (ref 5–17)
ANION GAP SERPL CALC-SCNC: 15 MMOL/L — SIGNIFICANT CHANGE UP (ref 5–17)
ANION GAP SERPL CALC-SCNC: 15 MMOL/L — SIGNIFICANT CHANGE UP (ref 5–17)
ANION GAP SERPL CALC-SCNC: 17 MMOL/L — SIGNIFICANT CHANGE UP (ref 5–17)
ANION GAP SERPL CALC-SCNC: 20 MMOL/L — HIGH (ref 5–17)
ANION GAP SERPL CALC-SCNC: 9 MMOL/L — SIGNIFICANT CHANGE UP (ref 5–17)
ANISOCYTOSIS BLD QL: SIGNIFICANT CHANGE UP
APTT BLD: 102.4 SEC — HIGH (ref 27.5–35.5)
APTT BLD: 38.9 SEC — HIGH (ref 27.5–35.5)
APTT BLD: 48.6 SEC — HIGH (ref 27.5–35.5)
APTT BLD: 48.6 SEC — HIGH (ref 27.5–35.5)
APTT BLD: 54.5 SEC — HIGH (ref 27.5–35.5)
APTT BLD: 56.1 SEC — HIGH (ref 27.5–35.5)
AST SERPL-CCNC: 101 U/L — HIGH (ref 10–40)
AST SERPL-CCNC: 15 U/L — SIGNIFICANT CHANGE UP (ref 10–40)
AST SERPL-CCNC: 229 U/L — HIGH (ref 10–40)
AST SERPL-CCNC: 69 U/L — HIGH (ref 10–40)
AST SERPL-CCNC: 91 U/L — HIGH (ref 10–40)
AST SERPL-CCNC: SIGNIFICANT CHANGE UP (ref 10–40)
AST SERPL-CCNC: SIGNIFICANT CHANGE UP U/L (ref 10–40)
BASE EXCESS BLDA CALC-SCNC: -3.6 MMOL/L — LOW (ref -2–3)
BASE EXCESS BLDA CALC-SCNC: -4 MMOL/L — LOW (ref -2–3)
BASE EXCESS BLDA CALC-SCNC: -6.9 MMOL/L — LOW (ref -2–3)
BASE EXCESS BLDA CALC-SCNC: -7.2 MMOL/L — LOW (ref -2–3)
BASE EXCESS BLDV CALC-SCNC: -0.5 MMOL/L — SIGNIFICANT CHANGE UP (ref -2–3)
BASE EXCESS BLDV CALC-SCNC: -1.6 MMOL/L — SIGNIFICANT CHANGE UP (ref -2–3)
BASE EXCESS BLDV CALC-SCNC: -2.6 MMOL/L — LOW (ref -2–3)
BASE EXCESS BLDV CALC-SCNC: 1.5 MMOL/L — SIGNIFICANT CHANGE UP (ref -2–3)
BASE EXCESS BLDV CALC-SCNC: 1.5 MMOL/L — SIGNIFICANT CHANGE UP (ref -2–3)
BASOPHILS # BLD AUTO: 0.02 K/UL — SIGNIFICANT CHANGE UP (ref 0–0.2)
BASOPHILS # BLD AUTO: 0.03 K/UL — SIGNIFICANT CHANGE UP (ref 0–0.2)
BASOPHILS # BLD AUTO: 0.05 K/UL — SIGNIFICANT CHANGE UP (ref 0–0.2)
BASOPHILS # BLD AUTO: 0.06 K/UL — SIGNIFICANT CHANGE UP (ref 0–0.2)
BASOPHILS # BLD AUTO: 0.09 K/UL — SIGNIFICANT CHANGE UP (ref 0–0.2)
BASOPHILS NFR BLD AUTO: 0.2 % — SIGNIFICANT CHANGE UP (ref 0–2)
BASOPHILS NFR BLD AUTO: 0.2 % — SIGNIFICANT CHANGE UP (ref 0–2)
BASOPHILS NFR BLD AUTO: 0.3 % — SIGNIFICANT CHANGE UP (ref 0–2)
BASOPHILS NFR BLD AUTO: 0.4 % — SIGNIFICANT CHANGE UP (ref 0–2)
BASOPHILS NFR BLD AUTO: 0.9 % — SIGNIFICANT CHANGE UP (ref 0–2)
BILIRUB SERPL-MCNC: 0.6 MG/DL — SIGNIFICANT CHANGE UP (ref 0.2–1.2)
BILIRUB SERPL-MCNC: 0.8 MG/DL — SIGNIFICANT CHANGE UP (ref 0.2–1.2)
BILIRUB SERPL-MCNC: 1.7 MG/DL — HIGH (ref 0.2–1.2)
BILIRUB SERPL-MCNC: 1.8 MG/DL — HIGH (ref 0.2–1.2)
BILIRUB SERPL-MCNC: 1.8 MG/DL — HIGH (ref 0.2–1.2)
BILIRUB SERPL-MCNC: 1.9 MG/DL — HIGH (ref 0.2–1.2)
BILIRUB SERPL-MCNC: 2 MG/DL — HIGH (ref 0.2–1.2)
BUN SERPL-MCNC: 25 MG/DL — HIGH (ref 7–23)
BUN SERPL-MCNC: 26 MG/DL — HIGH (ref 7–23)
BUN SERPL-MCNC: 28 MG/DL — HIGH (ref 7–23)
BUN SERPL-MCNC: 30 MG/DL — HIGH (ref 7–23)
BUN SERPL-MCNC: 30 MG/DL — HIGH (ref 7–23)
BUN SERPL-MCNC: 32 MG/DL — HIGH (ref 7–23)
BURR CELLS BLD QL SMEAR: PRESENT — SIGNIFICANT CHANGE UP
CA-I BLDA-SCNC: 1.12 MMOL/L — LOW (ref 1.15–1.33)
CA-I BLDA-SCNC: 1.18 MMOL/L — SIGNIFICANT CHANGE UP (ref 1.15–1.33)
CA-I BLDA-SCNC: 1.23 MMOL/L — SIGNIFICANT CHANGE UP (ref 1.15–1.33)
CA-I BLDA-SCNC: 1.45 MMOL/L — HIGH (ref 1.15–1.33)
CA-I SERPL-SCNC: 1.14 MMOL/L — LOW (ref 1.15–1.33)
CA-I SERPL-SCNC: 1.23 MMOL/L — SIGNIFICANT CHANGE UP (ref 1.15–1.33)
CA-I SERPL-SCNC: 1.28 MMOL/L — SIGNIFICANT CHANGE UP (ref 1.15–1.33)
CA-I SERPL-SCNC: 1.3 MMOL/L — SIGNIFICANT CHANGE UP (ref 1.15–1.33)
CA-I SERPL-SCNC: 1.32 MMOL/L — SIGNIFICANT CHANGE UP (ref 1.15–1.33)
CALCIUM SERPL-MCNC: 8 MG/DL — LOW (ref 8.4–10.5)
CALCIUM SERPL-MCNC: 8.5 MG/DL — SIGNIFICANT CHANGE UP (ref 8.4–10.5)
CALCIUM SERPL-MCNC: 8.5 MG/DL — SIGNIFICANT CHANGE UP (ref 8.4–10.5)
CALCIUM SERPL-MCNC: 8.8 MG/DL — SIGNIFICANT CHANGE UP (ref 8.4–10.5)
CALCIUM SERPL-MCNC: 9.1 MG/DL — SIGNIFICANT CHANGE UP (ref 8.4–10.5)
CALCIUM SERPL-MCNC: 9.4 MG/DL — SIGNIFICANT CHANGE UP (ref 8.4–10.5)
CHLORIDE SERPL-SCNC: 108 MMOL/L — SIGNIFICANT CHANGE UP (ref 96–108)
CHLORIDE SERPL-SCNC: 111 MMOL/L — HIGH (ref 96–108)
CHLORIDE SERPL-SCNC: 111 MMOL/L — HIGH (ref 96–108)
CHLORIDE SERPL-SCNC: 113 MMOL/L — HIGH (ref 96–108)
CHLORIDE SERPL-SCNC: 114 MMOL/L — HIGH (ref 96–108)
CHLORIDE SERPL-SCNC: 115 MMOL/L — HIGH (ref 96–108)
CO2 BLDA-SCNC: 21 MMOL/L — SIGNIFICANT CHANGE UP (ref 19–24)
CO2 BLDA-SCNC: 21 MMOL/L — SIGNIFICANT CHANGE UP (ref 19–24)
CO2 BLDA-SCNC: 23 MMOL/L — SIGNIFICANT CHANGE UP (ref 19–24)
CO2 BLDA-SCNC: 23 MMOL/L — SIGNIFICANT CHANGE UP (ref 19–24)
CO2 BLDV-SCNC: 24.6 MMOL/L — SIGNIFICANT CHANGE UP (ref 22–26)
CO2 BLDV-SCNC: 25.4 MMOL/L — SIGNIFICANT CHANGE UP (ref 22–26)
CO2 BLDV-SCNC: 25.7 MMOL/L — SIGNIFICANT CHANGE UP (ref 22–26)
CO2 BLDV-SCNC: 27.1 MMOL/L — HIGH (ref 22–26)
CO2 BLDV-SCNC: 27.9 MMOL/L — HIGH (ref 22–26)
CO2 SERPL-SCNC: 17 MMOL/L — LOW (ref 22–31)
CO2 SERPL-SCNC: 20 MMOL/L — LOW (ref 22–31)
CO2 SERPL-SCNC: 23 MMOL/L — SIGNIFICANT CHANGE UP (ref 22–31)
CO2 SERPL-SCNC: 26 MMOL/L — SIGNIFICANT CHANGE UP (ref 22–31)
CO2 SERPL-SCNC: 27 MMOL/L — SIGNIFICANT CHANGE UP (ref 22–31)
CO2 SERPL-SCNC: 27 MMOL/L — SIGNIFICANT CHANGE UP (ref 22–31)
CO2 SERPL-SCNC: 28 MMOL/L — SIGNIFICANT CHANGE UP (ref 22–31)
CO2 SERPL-SCNC: 29 MMOL/L — SIGNIFICANT CHANGE UP (ref 22–31)
COHGB MFR BLDA: 0.8 % — SIGNIFICANT CHANGE UP
COHGB MFR BLDA: 1.1 % — SIGNIFICANT CHANGE UP
COHGB MFR BLDA: 1.2 % — SIGNIFICANT CHANGE UP
COHGB MFR BLDA: 1.2 % — SIGNIFICANT CHANGE UP
COHGB MFR BLDV: 1 % — SIGNIFICANT CHANGE UP
COHGB MFR BLDV: 1.2 % — SIGNIFICANT CHANGE UP
CORTIS F PM SERPL-MCNC: 17.98 UG/DL — HIGH (ref 2.68–10.5)
CREAT SERPL-MCNC: 1.13 MG/DL — SIGNIFICANT CHANGE UP (ref 0.5–1.3)
CREAT SERPL-MCNC: 1.25 MG/DL — SIGNIFICANT CHANGE UP (ref 0.5–1.3)
CREAT SERPL-MCNC: 1.29 MG/DL — SIGNIFICANT CHANGE UP (ref 0.5–1.3)
CREAT SERPL-MCNC: 1.3 MG/DL — SIGNIFICANT CHANGE UP (ref 0.5–1.3)
CREAT SERPL-MCNC: 1.35 MG/DL — HIGH (ref 0.5–1.3)
CREAT SERPL-MCNC: 1.39 MG/DL — HIGH (ref 0.5–1.3)
CREAT SERPL-MCNC: 1.4 MG/DL — HIGH (ref 0.5–1.3)
CREAT SERPL-MCNC: 1.42 MG/DL — HIGH (ref 0.5–1.3)
D DIMER BLD IA.RAPID-MCNC: HIGH NG/ML DDU
EGFR: 52 ML/MIN/1.73M2 — LOW
EGFR: 52 ML/MIN/1.73M2 — LOW
EGFR: 53 ML/MIN/1.73M2 — LOW
EGFR: 55 ML/MIN/1.73M2 — LOW
EGFR: 57 ML/MIN/1.73M2 — LOW
EGFR: 58 ML/MIN/1.73M2 — LOW
EGFR: 60 ML/MIN/1.73M2 — SIGNIFICANT CHANGE UP
EGFR: 68 ML/MIN/1.73M2 — SIGNIFICANT CHANGE UP
ELLIPTOCYTES BLD QL SMEAR: SLIGHT — SIGNIFICANT CHANGE UP
EOSINOPHIL # BLD AUTO: 0 K/UL — SIGNIFICANT CHANGE UP (ref 0–0.5)
EOSINOPHIL # BLD AUTO: 0 K/UL — SIGNIFICANT CHANGE UP (ref 0–0.5)
EOSINOPHIL # BLD AUTO: 0.06 K/UL — SIGNIFICANT CHANGE UP (ref 0–0.5)
EOSINOPHIL # BLD AUTO: 0.36 K/UL — SIGNIFICANT CHANGE UP (ref 0–0.5)
EOSINOPHIL # BLD AUTO: 0.39 K/UL — SIGNIFICANT CHANGE UP (ref 0–0.5)
EOSINOPHIL NFR BLD AUTO: 0 % — SIGNIFICANT CHANGE UP (ref 0–6)
EOSINOPHIL NFR BLD AUTO: 0 % — SIGNIFICANT CHANGE UP (ref 0–6)
EOSINOPHIL NFR BLD AUTO: 0.3 % — SIGNIFICANT CHANGE UP (ref 0–6)
EOSINOPHIL NFR BLD AUTO: 2.1 % — SIGNIFICANT CHANGE UP (ref 0–6)
EOSINOPHIL NFR BLD AUTO: 2.9 % — SIGNIFICANT CHANGE UP (ref 0–6)
FIBRINOGEN PPP-MCNC: 106 MG/DL — LOW (ref 200–445)
GAS PNL BLDA: SIGNIFICANT CHANGE UP
GAS PNL BLDV: 147 MMOL/L — HIGH (ref 136–145)
GAS PNL BLDV: 149 MMOL/L — HIGH (ref 136–145)
GAS PNL BLDV: 150 MMOL/L — HIGH (ref 136–145)
GAS PNL BLDV: 151 MMOL/L — HIGH (ref 136–145)
GAS PNL BLDV: 153 MMOL/L — HIGH (ref 136–145)
GAS PNL BLDV: SIGNIFICANT CHANGE UP
GIANT PLATELETS BLD QL SMEAR: PRESENT — SIGNIFICANT CHANGE UP
GLUCOSE BLDA-MCNC: 186 MG/DL — HIGH (ref 70–99)
GLUCOSE BLDA-MCNC: 204 MG/DL — HIGH (ref 70–99)
GLUCOSE BLDA-MCNC: 223 MG/DL — HIGH (ref 70–99)
GLUCOSE BLDA-MCNC: 243 MG/DL — HIGH (ref 70–99)
GLUCOSE BLDC GLUCOMTR-MCNC: 107 MG/DL — HIGH (ref 70–99)
GLUCOSE BLDC GLUCOMTR-MCNC: 125 MG/DL — HIGH (ref 70–99)
GLUCOSE BLDC GLUCOMTR-MCNC: 129 MG/DL — HIGH (ref 70–99)
GLUCOSE BLDC GLUCOMTR-MCNC: 136 MG/DL — HIGH (ref 70–99)
GLUCOSE BLDC GLUCOMTR-MCNC: 168 MG/DL — HIGH (ref 70–99)
GLUCOSE BLDC GLUCOMTR-MCNC: 177 MG/DL — HIGH (ref 70–99)
GLUCOSE BLDC GLUCOMTR-MCNC: 195 MG/DL — HIGH (ref 70–99)
GLUCOSE BLDC GLUCOMTR-MCNC: 200 MG/DL — HIGH (ref 70–99)
GLUCOSE BLDV-MCNC: 162 MG/DL — HIGH (ref 70–99)
GLUCOSE BLDV-MCNC: 165 MG/DL — HIGH (ref 70–99)
GLUCOSE SERPL-MCNC: 127 MG/DL — HIGH (ref 70–99)
GLUCOSE SERPL-MCNC: 141 MG/DL — HIGH (ref 70–99)
GLUCOSE SERPL-MCNC: 199 MG/DL — HIGH (ref 70–99)
GLUCOSE SERPL-MCNC: 201 MG/DL — HIGH (ref 70–99)
GLUCOSE SERPL-MCNC: 210 MG/DL — HIGH (ref 70–99)
GLUCOSE SERPL-MCNC: 228 MG/DL — HIGH (ref 70–99)
GLUCOSE SERPL-MCNC: 233 MG/DL — HIGH (ref 70–99)
GLUCOSE SERPL-MCNC: 241 MG/DL — HIGH (ref 70–99)
HCO3 BLDA-SCNC: 19 MMOL/L — LOW (ref 21–28)
HCO3 BLDA-SCNC: 20 MMOL/L — LOW (ref 21–28)
HCO3 BLDA-SCNC: 22 MMOL/L — SIGNIFICANT CHANGE UP (ref 21–28)
HCO3 BLDA-SCNC: 22 MMOL/L — SIGNIFICANT CHANGE UP (ref 21–28)
HCO3 BLDV-SCNC: 23 MMOL/L — SIGNIFICANT CHANGE UP (ref 22–29)
HCO3 BLDV-SCNC: 24 MMOL/L — SIGNIFICANT CHANGE UP (ref 22–29)
HCO3 BLDV-SCNC: 24 MMOL/L — SIGNIFICANT CHANGE UP (ref 22–29)
HCO3 BLDV-SCNC: 26 MMOL/L — SIGNIFICANT CHANGE UP (ref 22–29)
HCO3 BLDV-SCNC: 27 MMOL/L — SIGNIFICANT CHANGE UP (ref 22–29)
HCT VFR BLD CALC: 21.7 % — LOW (ref 39–50)
HCT VFR BLD CALC: 22.2 % — LOW (ref 39–50)
HCT VFR BLD CALC: 23.2 % — LOW (ref 39–50)
HCT VFR BLD CALC: 23.9 % — LOW (ref 39–50)
HCT VFR BLD CALC: 24.6 % — LOW (ref 39–50)
HCT VFR BLD CALC: 32.4 % — LOW (ref 39–50)
HCT VFR BLD CALC: 37.7 % — LOW (ref 39–50)
HCT VFR BLDA CALC: 24 % — SIGNIFICANT CHANGE UP
HCT VFR BLDA CALC: 24 % — SIGNIFICANT CHANGE UP
HGB BLD CALC-MCNC: 7.9 G/DL — LOW (ref 12.6–17.4)
HGB BLD CALC-MCNC: 8 G/DL — LOW (ref 12.6–17.4)
HGB BLD-MCNC: 10.4 G/DL — LOW (ref 13–17)
HGB BLD-MCNC: 12.4 G/DL — LOW (ref 13–17)
HGB BLD-MCNC: 6.8 G/DL — CRITICAL LOW (ref 13–17)
HGB BLD-MCNC: 7.5 G/DL — LOW (ref 13–17)
HGB BLD-MCNC: 7.9 G/DL — LOW (ref 13–17)
HGB BLD-MCNC: 7.9 G/DL — LOW (ref 13–17)
HGB BLD-MCNC: 8.4 G/DL — LOW (ref 13–17)
HGB BLDA-MCNC: 10.2 G/DL — LOW (ref 12.6–17.4)
HGB BLDA-MCNC: 7.3 G/DL — LOW (ref 12.6–17.4)
HGB BLDA-MCNC: 7.7 G/DL — LOW (ref 12.6–17.4)
HGB BLDA-MCNC: 7.9 G/DL — LOW (ref 12.6–17.4)
HYPOCHROMIA BLD QL: SLIGHT — SIGNIFICANT CHANGE UP
IMM GRANULOCYTES NFR BLD AUTO: 0.8 % — SIGNIFICANT CHANGE UP (ref 0–0.9)
IMM GRANULOCYTES NFR BLD AUTO: 0.8 % — SIGNIFICANT CHANGE UP (ref 0–0.9)
IMM GRANULOCYTES NFR BLD AUTO: 0.9 % — SIGNIFICANT CHANGE UP (ref 0–0.9)
IMM GRANULOCYTES NFR BLD AUTO: 1.6 % — HIGH (ref 0–0.9)
INR BLD: 1.14 — SIGNIFICANT CHANGE UP (ref 0.88–1.16)
INR BLD: 1.56 — HIGH (ref 0.88–1.16)
INR BLD: 1.61 — HIGH (ref 0.88–1.16)
INR BLD: 1.61 — HIGH (ref 0.88–1.16)
INR BLD: 1.7 — HIGH (ref 0.88–1.16)
INR BLD: 2.16 — HIGH (ref 0.88–1.16)
INR BLD: 2.22 — HIGH (ref 0.88–1.16)
ISTAT ARTERIAL BE: -6 MMOL/L — LOW (ref -2–3)
ISTAT ARTERIAL BE: 19 MMOL/L — HIGH (ref -2–3)
ISTAT ARTERIAL BE: 3 MMOL/L — SIGNIFICANT CHANGE UP (ref -2–3)
ISTAT ARTERIAL BE: 4 MMOL/L — HIGH (ref -2–3)
ISTAT ARTERIAL BE: 7 MMOL/L — HIGH (ref -2–3)
ISTAT ARTERIAL GLUCOSE: 191 MG/DL — HIGH (ref 70–99)
ISTAT ARTERIAL GLUCOSE: 192 MG/DL — HIGH (ref 70–99)
ISTAT ARTERIAL GLUCOSE: 211 MG/DL — HIGH (ref 70–99)
ISTAT ARTERIAL GLUCOSE: 265 MG/DL — HIGH (ref 70–99)
ISTAT ARTERIAL GLUCOSE: 296 MG/DL — HIGH (ref 70–99)
ISTAT ARTERIAL HCO3: 18 MMOL/L — LOW (ref 22–26)
ISTAT ARTERIAL HCO3: 28 MMOL/L — HIGH (ref 22–26)
ISTAT ARTERIAL HCO3: 30 MMOL/L — HIGH (ref 22–26)
ISTAT ARTERIAL HCO3: 32 MMOL/L — HIGH (ref 22–26)
ISTAT ARTERIAL HCO3: 44 MMOL/L — HIGH (ref 22–26)
ISTAT ARTERIAL HEMATOCRIT: 17 % — CRITICAL LOW (ref 39–50)
ISTAT ARTERIAL HEMATOCRIT: 22 % — LOW (ref 39–50)
ISTAT ARTERIAL HEMATOCRIT: 28 % — LOW (ref 39–50)
ISTAT ARTERIAL HEMATOCRIT: 31 % — LOW (ref 39–50)
ISTAT ARTERIAL HEMATOCRIT: 35 % — LOW (ref 39–50)
ISTAT ARTERIAL HEMOGLOBIN: 10.5 G/DL — LOW (ref 13–17)
ISTAT ARTERIAL HEMOGLOBIN: 11.9 G/DL — LOW (ref 13–17)
ISTAT ARTERIAL HEMOGLOBIN: 5.8 G/DL — CRITICAL LOW (ref 13–17)
ISTAT ARTERIAL HEMOGLOBIN: 7.5 G/DL — LOW (ref 13–17)
ISTAT ARTERIAL HEMOGLOBIN: 9.5 G/DL — LOW (ref 13–17)
ISTAT ARTERIAL IONIZED CALCIUM: 1.14 MMOL/L — SIGNIFICANT CHANGE UP (ref 1.12–1.3)
ISTAT ARTERIAL IONIZED CALCIUM: 1.16 MMOL/L — SIGNIFICANT CHANGE UP (ref 1.12–1.3)
ISTAT ARTERIAL IONIZED CALCIUM: 1.24 MMOL/L — SIGNIFICANT CHANGE UP (ref 1.12–1.3)
ISTAT ARTERIAL IONIZED CALCIUM: 1.24 MMOL/L — SIGNIFICANT CHANGE UP (ref 1.12–1.3)
ISTAT ARTERIAL IONIZED CALCIUM: 1.44 MMOL/L — HIGH (ref 1.12–1.3)
ISTAT ARTERIAL PCO2: 32 MMHG — LOW (ref 35–45)
ISTAT ARTERIAL PCO2: 38 MMHG — SIGNIFICANT CHANGE UP (ref 35–45)
ISTAT ARTERIAL PCO2: 47 MMHG — HIGH (ref 35–45)
ISTAT ARTERIAL PCO2: 51 MMHG — HIGH (ref 35–45)
ISTAT ARTERIAL PCO2: 61 MMHG — HIGH (ref 35–45)
ISTAT ARTERIAL PH: 7.3 — LOW (ref 7.35–7.45)
ISTAT ARTERIAL PH: 7.36 — SIGNIFICANT CHANGE UP (ref 7.35–7.45)
ISTAT ARTERIAL PH: 7.44 — SIGNIFICANT CHANGE UP (ref 7.35–7.45)
ISTAT ARTERIAL PH: 7.47 — HIGH (ref 7.35–7.45)
ISTAT ARTERIAL PH: 7.54 — HIGH (ref 7.35–7.45)
ISTAT ARTERIAL PO2: 104 MMHG — SIGNIFICANT CHANGE UP (ref 80–105)
ISTAT ARTERIAL PO2: 374 MMHG — HIGH (ref 80–105)
ISTAT ARTERIAL PO2: 404 MMHG — HIGH (ref 80–105)
ISTAT ARTERIAL PO2: 414 MMHG — HIGH (ref 80–105)
ISTAT ARTERIAL PO2: 486 MMHG — HIGH (ref 80–105)
ISTAT ARTERIAL POTASSIUM: 2.6 MMOL/L — CRITICAL LOW (ref 3.5–5.3)
ISTAT ARTERIAL POTASSIUM: 3 MMOL/L — LOW (ref 3.5–5.3)
ISTAT ARTERIAL POTASSIUM: 3.2 MMOL/L — LOW (ref 3.5–5.3)
ISTAT ARTERIAL POTASSIUM: 3.7 MMOL/L — SIGNIFICANT CHANGE UP (ref 3.5–5.3)
ISTAT ARTERIAL POTASSIUM: 4.9 MMOL/L — SIGNIFICANT CHANGE UP (ref 3.5–5.3)
ISTAT ARTERIAL SO2: 100 % — HIGH (ref 95–98)
ISTAT ARTERIAL SO2: 98 % — SIGNIFICANT CHANGE UP (ref 95–98)
ISTAT ARTERIAL SODIUM: 142 MMOL/L — SIGNIFICANT CHANGE UP (ref 135–145)
ISTAT ARTERIAL SODIUM: 147 MMOL/L — HIGH (ref 135–145)
ISTAT ARTERIAL SODIUM: 153 MMOL/L — HIGH (ref 135–145)
ISTAT ARTERIAL SODIUM: 156 MMOL/L — HIGH (ref 135–145)
ISTAT ARTERIAL SODIUM: 156 MMOL/L — HIGH (ref 135–145)
ISTAT ARTERIAL TCO2: 19 MMOL/L — LOW (ref 22–31)
ISTAT ARTERIAL TCO2: 29 MMOL/L — SIGNIFICANT CHANGE UP (ref 22–31)
ISTAT ARTERIAL TCO2: 32 MMOL/L — HIGH (ref 22–31)
ISTAT ARTERIAL TCO2: 33 MMOL/L — HIGH (ref 22–31)
ISTAT ARTERIAL TCO2: 45 MMOL/L — HIGH (ref 22–31)
LACTATE SERPL-SCNC: 10.6 MMOL/L — CRITICAL HIGH (ref 0.5–2)
LACTATE SERPL-SCNC: 2 MMOL/L — SIGNIFICANT CHANGE UP (ref 0.5–2)
LACTATE SERPL-SCNC: 2.2 MMOL/L — HIGH (ref 0.5–2)
LACTATE SERPL-SCNC: 4.5 MMOL/L — CRITICAL HIGH (ref 0.5–2)
LACTATE SERPL-SCNC: 6.5 MMOL/L — CRITICAL HIGH (ref 0.5–2)
LACTATE SERPL-SCNC: 7.1 MMOL/L — CRITICAL HIGH (ref 0.5–2)
LACTATE SERPL-SCNC: 9.9 MMOL/L — CRITICAL HIGH (ref 0.5–2)
LYMPHOCYTES # BLD AUTO: 0.17 K/UL — LOW (ref 1–3.3)
LYMPHOCYTES # BLD AUTO: 0.46 K/UL — LOW (ref 1–3.3)
LYMPHOCYTES # BLD AUTO: 1.2 K/UL — SIGNIFICANT CHANGE UP (ref 1–3.3)
LYMPHOCYTES # BLD AUTO: 1.7 % — LOW (ref 13–44)
LYMPHOCYTES # BLD AUTO: 14.1 % — SIGNIFICANT CHANGE UP (ref 13–44)
LYMPHOCYTES # BLD AUTO: 2.43 K/UL — SIGNIFICANT CHANGE UP (ref 1–3.3)
LYMPHOCYTES # BLD AUTO: 36.5 % — SIGNIFICANT CHANGE UP (ref 13–44)
LYMPHOCYTES # BLD AUTO: 4.98 K/UL — HIGH (ref 1–3.3)
LYMPHOCYTES # BLD AUTO: 5.1 % — LOW (ref 13–44)
LYMPHOCYTES # BLD AUTO: 5.5 % — LOW (ref 13–44)
MACROCYTES BLD QL: SLIGHT — SIGNIFICANT CHANGE UP
MAGNESIUM SERPL-MCNC: 1.5 MG/DL — LOW (ref 1.6–2.6)
MAGNESIUM SERPL-MCNC: 1.5 MG/DL — LOW (ref 1.6–2.6)
MAGNESIUM SERPL-MCNC: 1.6 MG/DL — SIGNIFICANT CHANGE UP (ref 1.6–2.6)
MAGNESIUM SERPL-MCNC: 1.7 MG/DL — SIGNIFICANT CHANGE UP (ref 1.6–2.6)
MAGNESIUM SERPL-MCNC: 2.1 MG/DL — SIGNIFICANT CHANGE UP (ref 1.6–2.6)
MANUAL SMEAR VERIFICATION: SIGNIFICANT CHANGE UP
MCHC RBC-ENTMCNC: 31.3 GM/DL — LOW (ref 32–36)
MCHC RBC-ENTMCNC: 31.4 PG — SIGNIFICANT CHANGE UP (ref 27–34)
MCHC RBC-ENTMCNC: 31.8 PG — SIGNIFICANT CHANGE UP (ref 27–34)
MCHC RBC-ENTMCNC: 32 PG — SIGNIFICANT CHANGE UP (ref 27–34)
MCHC RBC-ENTMCNC: 32.1 GM/DL — SIGNIFICANT CHANGE UP (ref 32–36)
MCHC RBC-ENTMCNC: 32.2 PG — SIGNIFICANT CHANGE UP (ref 27–34)
MCHC RBC-ENTMCNC: 32.5 PG — SIGNIFICANT CHANGE UP (ref 27–34)
MCHC RBC-ENTMCNC: 32.7 PG — SIGNIFICANT CHANGE UP (ref 27–34)
MCHC RBC-ENTMCNC: 32.8 PG — SIGNIFICANT CHANGE UP (ref 27–34)
MCHC RBC-ENTMCNC: 32.9 GM/DL — SIGNIFICANT CHANGE UP (ref 32–36)
MCHC RBC-ENTMCNC: 33.1 GM/DL — SIGNIFICANT CHANGE UP (ref 32–36)
MCHC RBC-ENTMCNC: 33.8 GM/DL — SIGNIFICANT CHANGE UP (ref 32–36)
MCHC RBC-ENTMCNC: 34.1 GM/DL — SIGNIFICANT CHANGE UP (ref 32–36)
MCHC RBC-ENTMCNC: 34.1 GM/DL — SIGNIFICANT CHANGE UP (ref 32–36)
MCV RBC AUTO: 102.8 FL — HIGH (ref 80–100)
MCV RBC AUTO: 93.9 FL — SIGNIFICANT CHANGE UP (ref 80–100)
MCV RBC AUTO: 95.7 FL — SIGNIFICANT CHANGE UP (ref 80–100)
MCV RBC AUTO: 96.1 FL — SIGNIFICANT CHANGE UP (ref 80–100)
MCV RBC AUTO: 96.7 FL — SIGNIFICANT CHANGE UP (ref 80–100)
MCV RBC AUTO: 97.9 FL — SIGNIFICANT CHANGE UP (ref 80–100)
MCV RBC AUTO: 99.2 FL — SIGNIFICANT CHANGE UP (ref 80–100)
METHGB MFR BLDA: 0.5 % — SIGNIFICANT CHANGE UP
METHGB MFR BLDA: 0.8 % — SIGNIFICANT CHANGE UP
METHGB MFR BLDA: 0.8 % — SIGNIFICANT CHANGE UP
METHGB MFR BLDA: 0.9 % — SIGNIFICANT CHANGE UP
METHGB MFR BLDV: 1 % — SIGNIFICANT CHANGE UP
METHGB MFR BLDV: 1.3 % — SIGNIFICANT CHANGE UP
MICROCYTES BLD QL: SIGNIFICANT CHANGE UP
MONOCYTES # BLD AUTO: 0.19 K/UL — SIGNIFICANT CHANGE UP (ref 0–0.9)
MONOCYTES # BLD AUTO: 0.27 K/UL — SIGNIFICANT CHANGE UP (ref 0–0.9)
MONOCYTES # BLD AUTO: 0.42 K/UL — SIGNIFICANT CHANGE UP (ref 0–0.9)
MONOCYTES # BLD AUTO: 0.7 K/UL — SIGNIFICANT CHANGE UP (ref 0–0.9)
MONOCYTES # BLD AUTO: 1.08 K/UL — HIGH (ref 0–0.9)
MONOCYTES NFR BLD AUTO: 1.1 % — LOW (ref 2–14)
MONOCYTES NFR BLD AUTO: 2.6 % — SIGNIFICANT CHANGE UP (ref 2–14)
MONOCYTES NFR BLD AUTO: 3.1 % — SIGNIFICANT CHANGE UP (ref 2–14)
MONOCYTES NFR BLD AUTO: 5 % — SIGNIFICANT CHANGE UP (ref 2–14)
MONOCYTES NFR BLD AUTO: 7.7 % — SIGNIFICANT CHANGE UP (ref 2–14)
NEUTROPHILS # BLD AUTO: 14 K/UL — HIGH (ref 1.8–7.4)
NEUTROPHILS # BLD AUTO: 19.11 K/UL — HIGH (ref 1.8–7.4)
NEUTROPHILS # BLD AUTO: 7.69 K/UL — HIGH (ref 1.8–7.4)
NEUTROPHILS # BLD AUTO: 7.84 K/UL — HIGH (ref 1.8–7.4)
NEUTROPHILS # BLD AUTO: 9.74 K/UL — HIGH (ref 1.8–7.4)
NEUTROPHILS NFR BLD AUTO: 56.3 % — SIGNIFICANT CHANGE UP (ref 43–77)
NEUTROPHILS NFR BLD AUTO: 80.9 % — HIGH (ref 43–77)
NEUTROPHILS NFR BLD AUTO: 86.2 % — HIGH (ref 43–77)
NEUTROPHILS NFR BLD AUTO: 86.2 % — HIGH (ref 43–77)
NEUTROPHILS NFR BLD AUTO: 88 % — HIGH (ref 43–77)
NEUTS BAND # BLD: 8.6 % — HIGH (ref 0–8)
NRBC # BLD: 0 /100 WBCS — SIGNIFICANT CHANGE UP (ref 0–0)
OVALOCYTES BLD QL SMEAR: SLIGHT — SIGNIFICANT CHANGE UP
OXYHGB MFR BLDA: 96.5 % — HIGH (ref 90–95)
OXYHGB MFR BLDA: 96.5 % — HIGH (ref 90–95)
OXYHGB MFR BLDA: 96.6 % — HIGH (ref 90–95)
OXYHGB MFR BLDA: 97.2 % — HIGH (ref 90–95)
PCO2 BLDA: 38 MMHG — SIGNIFICANT CHANGE UP (ref 35–48)
PCO2 BLDA: 41 MMHG — SIGNIFICANT CHANGE UP (ref 35–48)
PCO2 BLDA: 42 MMHG — SIGNIFICANT CHANGE UP (ref 35–48)
PCO2 BLDA: 44 MMHG — SIGNIFICANT CHANGE UP (ref 35–48)
PCO2 BLDV: 39 MMHG — LOW (ref 42–55)
PCO2 BLDV: 39 MMHG — LOW (ref 42–55)
PCO2 BLDV: 43 MMHG — SIGNIFICANT CHANGE UP (ref 42–55)
PCO2 BLDV: 44 MMHG — SIGNIFICANT CHANGE UP (ref 42–55)
PCO2 BLDV: 46 MMHG — SIGNIFICANT CHANGE UP (ref 42–55)
PH BLDA: 7.26 — LOW (ref 7.35–7.45)
PH BLDA: 7.27 — LOW (ref 7.35–7.45)
PH BLDA: 7.33 — LOW (ref 7.35–7.45)
PH BLDA: 7.36 — SIGNIFICANT CHANGE UP (ref 7.35–7.45)
PH BLDV: 7.33 — SIGNIFICANT CHANGE UP (ref 7.32–7.43)
PH BLDV: 7.33 — SIGNIFICANT CHANGE UP (ref 7.32–7.43)
PH BLDV: 7.4 — SIGNIFICANT CHANGE UP (ref 7.32–7.43)
PH BLDV: 7.4 — SIGNIFICANT CHANGE UP (ref 7.32–7.43)
PH BLDV: 7.43 — SIGNIFICANT CHANGE UP (ref 7.32–7.43)
PHOSPHATE SERPL-MCNC: 2.3 MG/DL — LOW (ref 2.5–4.5)
PHOSPHATE SERPL-MCNC: 2.8 MG/DL — SIGNIFICANT CHANGE UP (ref 2.5–4.5)
PHOSPHATE SERPL-MCNC: 3.9 MG/DL — SIGNIFICANT CHANGE UP (ref 2.5–4.5)
PHOSPHATE SERPL-MCNC: 3.9 MG/DL — SIGNIFICANT CHANGE UP (ref 2.5–4.5)
PHOSPHATE SERPL-MCNC: 5 MG/DL — HIGH (ref 2.5–4.5)
PLAT MORPH BLD: ABNORMAL
PLATELET # BLD AUTO: 105 K/UL — LOW (ref 150–400)
PLATELET # BLD AUTO: 136 K/UL — LOW (ref 150–400)
PLATELET # BLD AUTO: 223 K/UL — SIGNIFICANT CHANGE UP (ref 150–400)
PLATELET # BLD AUTO: 237 K/UL — SIGNIFICANT CHANGE UP (ref 150–400)
PLATELET # BLD AUTO: 62 K/UL — LOW (ref 150–400)
PLATELET # BLD AUTO: 63 K/UL — LOW (ref 150–400)
PLATELET # BLD AUTO: 70 K/UL — LOW (ref 150–400)
PO2 BLDA: 121 MMHG — HIGH (ref 83–108)
PO2 BLDA: 125 MMHG — HIGH (ref 83–108)
PO2 BLDA: 89 MMHG — SIGNIFICANT CHANGE UP (ref 83–108)
PO2 BLDA: 95 MMHG — SIGNIFICANT CHANGE UP (ref 83–108)
PO2 BLDV: 34 MMHG — SIGNIFICANT CHANGE UP (ref 25–45)
PO2 BLDV: 39 MMHG — SIGNIFICANT CHANGE UP (ref 25–45)
PO2 BLDV: 40 MMHG — SIGNIFICANT CHANGE UP (ref 25–45)
PO2 BLDV: 42 MMHG — SIGNIFICANT CHANGE UP (ref 25–45)
PO2 BLDV: 43 MMHG — SIGNIFICANT CHANGE UP (ref 25–45)
POIKILOCYTOSIS BLD QL AUTO: SIGNIFICANT CHANGE UP
POTASSIUM BLDA-SCNC: 3 MMOL/L — LOW (ref 3.5–5.1)
POTASSIUM BLDA-SCNC: 3.2 MMOL/L — LOW (ref 3.5–5.1)
POTASSIUM BLDA-SCNC: 3.3 MMOL/L — LOW (ref 3.5–5.1)
POTASSIUM BLDA-SCNC: 4.4 MMOL/L — SIGNIFICANT CHANGE UP (ref 3.5–5.1)
POTASSIUM BLDV-SCNC: 3 MMOL/L — LOW (ref 3.5–5.1)
POTASSIUM BLDV-SCNC: 3.2 MMOL/L — LOW (ref 3.5–5.1)
POTASSIUM BLDV-SCNC: 3.3 MMOL/L — LOW (ref 3.5–5.1)
POTASSIUM SERPL-MCNC: 2.6 MMOL/L — CRITICAL LOW (ref 3.5–5.3)
POTASSIUM SERPL-MCNC: 3.7 MMOL/L — SIGNIFICANT CHANGE UP (ref 3.5–5.3)
POTASSIUM SERPL-MCNC: 3.9 MMOL/L — SIGNIFICANT CHANGE UP (ref 3.5–5.3)
POTASSIUM SERPL-MCNC: 4 MMOL/L — SIGNIFICANT CHANGE UP (ref 3.5–5.3)
POTASSIUM SERPL-MCNC: 4 MMOL/L — SIGNIFICANT CHANGE UP (ref 3.5–5.3)
POTASSIUM SERPL-MCNC: 5.1 MMOL/L — SIGNIFICANT CHANGE UP (ref 3.5–5.3)
POTASSIUM SERPL-MCNC: SIGNIFICANT CHANGE UP (ref 3.5–5.3)
POTASSIUM SERPL-MCNC: SIGNIFICANT CHANGE UP MMOL/L (ref 3.5–5.3)
POTASSIUM SERPL-SCNC: 2.6 MMOL/L — CRITICAL LOW (ref 3.5–5.3)
POTASSIUM SERPL-SCNC: 3.7 MMOL/L — SIGNIFICANT CHANGE UP (ref 3.5–5.3)
POTASSIUM SERPL-SCNC: 3.9 MMOL/L — SIGNIFICANT CHANGE UP (ref 3.5–5.3)
POTASSIUM SERPL-SCNC: 4 MMOL/L — SIGNIFICANT CHANGE UP (ref 3.5–5.3)
POTASSIUM SERPL-SCNC: 4 MMOL/L — SIGNIFICANT CHANGE UP (ref 3.5–5.3)
POTASSIUM SERPL-SCNC: 5.1 MMOL/L — SIGNIFICANT CHANGE UP (ref 3.5–5.3)
POTASSIUM SERPL-SCNC: SIGNIFICANT CHANGE UP (ref 3.5–5.3)
POTASSIUM SERPL-SCNC: SIGNIFICANT CHANGE UP MMOL/L (ref 3.5–5.3)
PROT SERPL-MCNC: 3.3 G/DL — LOW (ref 6–8.3)
PROT SERPL-MCNC: 3.7 G/DL — LOW (ref 6–8.3)
PROT SERPL-MCNC: 3.9 G/DL — LOW (ref 6–8.3)
PROT SERPL-MCNC: 4 G/DL — LOW (ref 6–8.3)
PROT SERPL-MCNC: 4.1 G/DL — LOW (ref 6–8.3)
PROT SERPL-MCNC: 4.4 G/DL — LOW (ref 6–8.3)
PROT SERPL-MCNC: 5.7 G/DL — LOW (ref 6–8.3)
PROTHROM AB SERPL-ACNC: 13.6 SEC — HIGH (ref 10.5–13.4)
PROTHROM AB SERPL-ACNC: 18.7 SEC — HIGH (ref 10.5–13.4)
PROTHROM AB SERPL-ACNC: 19.2 SEC — HIGH (ref 10.5–13.4)
PROTHROM AB SERPL-ACNC: 19.3 SEC — HIGH (ref 10.5–13.4)
PROTHROM AB SERPL-ACNC: 20.3 SEC — HIGH (ref 10.5–13.4)
PROTHROM AB SERPL-ACNC: 25.9 SEC — HIGH (ref 10.5–13.4)
PROTHROM AB SERPL-ACNC: 26.6 SEC — HIGH (ref 10.5–13.4)
RBC # BLD: 2.11 M/UL — LOW (ref 4.2–5.8)
RBC # BLD: 2.31 M/UL — LOW (ref 4.2–5.8)
RBC # BLD: 2.41 M/UL — LOW (ref 4.2–5.8)
RBC # BLD: 2.47 M/UL — LOW (ref 4.2–5.8)
RBC # BLD: 2.57 M/UL — LOW (ref 4.2–5.8)
RBC # BLD: 3.31 M/UL — LOW (ref 4.2–5.8)
RBC # BLD: 3.9 M/UL — LOW (ref 4.2–5.8)
RBC # FLD: 14.7 % — HIGH (ref 10.3–14.5)
RBC # FLD: 14.8 % — HIGH (ref 10.3–14.5)
RBC # FLD: 15 % — HIGH (ref 10.3–14.5)
RBC # FLD: 15.7 % — HIGH (ref 10.3–14.5)
RBC # FLD: 15.9 % — HIGH (ref 10.3–14.5)
RBC # FLD: 15.9 % — HIGH (ref 10.3–14.5)
RBC # FLD: 22.2 % — HIGH (ref 10.3–14.5)
RBC BLD AUTO: ABNORMAL
SAO2 % BLDA: 98.4 % — HIGH (ref 94–98)
SAO2 % BLDA: 98.5 % — HIGH (ref 94–98)
SAO2 % BLDA: 98.6 % — HIGH (ref 94–98)
SAO2 % BLDA: 98.6 % — HIGH (ref 94–98)
SAO2 % BLDV: 73 % — SIGNIFICANT CHANGE UP (ref 67–88)
SAO2 % BLDV: 76 % — SIGNIFICANT CHANGE UP (ref 67–88)
SAO2 % BLDV: 76 % — SIGNIFICANT CHANGE UP (ref 67–88)
SAO2 % BLDV: 76.7 % — SIGNIFICANT CHANGE UP (ref 67–88)
SAO2 % BLDV: 77.6 % — SIGNIFICANT CHANGE UP (ref 67–88)
SCHISTOCYTES BLD QL AUTO: SLIGHT — SIGNIFICANT CHANGE UP
SODIUM BLDA-SCNC: 147 MMOL/L — HIGH (ref 136–145)
SODIUM BLDA-SCNC: 151 MMOL/L — HIGH (ref 136–145)
SODIUM BLDA-SCNC: 152 MMOL/L — HIGH (ref 136–145)
SODIUM BLDA-SCNC: 153 MMOL/L — HIGH (ref 136–145)
SODIUM SERPL-SCNC: 142 MMOL/L — SIGNIFICANT CHANGE UP (ref 135–145)
SODIUM SERPL-SCNC: 150 MMOL/L — HIGH (ref 135–145)
SODIUM SERPL-SCNC: 151 MMOL/L — HIGH (ref 135–145)
SODIUM SERPL-SCNC: 151 MMOL/L — HIGH (ref 135–145)
SODIUM SERPL-SCNC: 152 MMOL/L — HIGH (ref 135–145)
SODIUM SERPL-SCNC: 154 MMOL/L — HIGH (ref 135–145)
SODIUM SERPL-SCNC: 155 MMOL/L — HIGH (ref 135–145)
SODIUM SERPL-SCNC: 155 MMOL/L — HIGH (ref 135–145)
WBC # BLD: 10.27 K/UL — SIGNIFICANT CHANGE UP (ref 3.8–10.5)
WBC # BLD: 13.64 K/UL — HIGH (ref 3.8–10.5)
WBC # BLD: 15.2 K/UL — HIGH (ref 3.8–10.5)
WBC # BLD: 17.29 K/UL — HIGH (ref 3.8–10.5)
WBC # BLD: 21.71 K/UL — HIGH (ref 3.8–10.5)
WBC # BLD: 8.18 K/UL — SIGNIFICANT CHANGE UP (ref 3.8–10.5)
WBC # BLD: 9.09 K/UL — SIGNIFICANT CHANGE UP (ref 3.8–10.5)
WBC # FLD AUTO: 10.27 K/UL — SIGNIFICANT CHANGE UP (ref 3.8–10.5)
WBC # FLD AUTO: 13.64 K/UL — HIGH (ref 3.8–10.5)
WBC # FLD AUTO: 15.2 K/UL — HIGH (ref 3.8–10.5)
WBC # FLD AUTO: 17.29 K/UL — HIGH (ref 3.8–10.5)
WBC # FLD AUTO: 21.71 K/UL — HIGH (ref 3.8–10.5)
WBC # FLD AUTO: 8.18 K/UL — SIGNIFICANT CHANGE UP (ref 3.8–10.5)
WBC # FLD AUTO: 9.09 K/UL — SIGNIFICANT CHANGE UP (ref 3.8–10.5)

## 2023-04-26 PROCEDURE — 93010 ELECTROCARDIOGRAM REPORT: CPT

## 2023-04-26 PROCEDURE — 99291 CRITICAL CARE FIRST HOUR: CPT

## 2023-04-26 PROCEDURE — 99292 CRITICAL CARE ADDL 30 MIN: CPT

## 2023-04-26 PROCEDURE — 32100 EXPLORATION OF CHEST: CPT | Mod: 80

## 2023-04-26 PROCEDURE — 39010 EXPLORATION OF CHEST: CPT

## 2023-04-26 PROCEDURE — 71045 X-RAY EXAM CHEST 1 VIEW: CPT | Mod: 26,76

## 2023-04-26 PROCEDURE — 32100 EXPLORATION OF CHEST: CPT | Mod: LT

## 2023-04-26 DEVICE — SURGICEL 4 X 8": Type: IMPLANTABLE DEVICE | Status: FUNCTIONAL

## 2023-04-26 DEVICE — TACHOSIL 9.5 X 4.8CM: Type: IMPLANTABLE DEVICE | Status: FUNCTIONAL

## 2023-04-26 DEVICE — SURGIFLO HEMOSTATIC MATRIX KIT: Type: IMPLANTABLE DEVICE | Status: FUNCTIONAL

## 2023-04-26 DEVICE — SURGICEL FIBRILLAR 4 X 4": Type: IMPLANTABLE DEVICE | Status: FUNCTIONAL

## 2023-04-26 RX ORDER — SODIUM CHLORIDE 9 MG/ML
1000 INJECTION, SOLUTION INTRAVENOUS ONCE
Refills: 0 | Status: COMPLETED | OUTPATIENT
Start: 2023-04-26 | End: 2023-04-26

## 2023-04-26 RX ORDER — IRON SUCROSE 20 MG/ML
300 INJECTION, SOLUTION INTRAVENOUS EVERY 24 HOURS
Refills: 0 | Status: COMPLETED | OUTPATIENT
Start: 2023-04-26 | End: 2023-04-28

## 2023-04-26 RX ORDER — MAGNESIUM SULFATE 500 MG/ML
2 VIAL (ML) INJECTION ONCE
Refills: 0 | Status: COMPLETED | OUTPATIENT
Start: 2023-04-26 | End: 2023-04-26

## 2023-04-26 RX ORDER — PROPOFOL 10 MG/ML
30 INJECTION, EMULSION INTRAVENOUS
Qty: 1000 | Refills: 0 | Status: DISCONTINUED | OUTPATIENT
Start: 2023-04-26 | End: 2023-04-26

## 2023-04-26 RX ORDER — MIDAZOLAM HYDROCHLORIDE 1 MG/ML
5 INJECTION, SOLUTION INTRAMUSCULAR; INTRAVENOUS ONCE
Refills: 0 | Status: DISCONTINUED | OUTPATIENT
Start: 2023-04-26 | End: 2023-04-26

## 2023-04-26 RX ORDER — NOREPINEPHRINE BITARTRATE/D5W 8 MG/250ML
0.1 PLASTIC BAG, INJECTION (ML) INTRAVENOUS
Qty: 8 | Refills: 0 | Status: DISCONTINUED | OUTPATIENT
Start: 2023-04-26 | End: 2023-04-26

## 2023-04-26 RX ORDER — ALBUMIN HUMAN 25 %
250 VIAL (ML) INTRAVENOUS
Refills: 0 | Status: DISCONTINUED | OUTPATIENT
Start: 2023-04-26 | End: 2023-04-26

## 2023-04-26 RX ORDER — VASOPRESSIN 20 [USP'U]/ML
0.06 INJECTION INTRAVENOUS
Qty: 40 | Refills: 0 | Status: DISCONTINUED | OUTPATIENT
Start: 2023-04-26 | End: 2023-04-27

## 2023-04-26 RX ORDER — PHENYLEPHRINE HYDROCHLORIDE 10 MG/ML
0.5 INJECTION INTRAVENOUS
Qty: 40 | Refills: 0 | Status: DISCONTINUED | OUTPATIENT
Start: 2023-04-26 | End: 2023-04-26

## 2023-04-26 RX ORDER — NOREPINEPHRINE BITARTRATE/D5W 8 MG/250ML
0.04 PLASTIC BAG, INJECTION (ML) INTRAVENOUS
Qty: 8 | Refills: 0 | Status: DISCONTINUED | OUTPATIENT
Start: 2023-04-26 | End: 2023-04-26

## 2023-04-26 RX ORDER — POTASSIUM CHLORIDE 20 MEQ
20 PACKET (EA) ORAL ONCE
Refills: 0 | Status: COMPLETED | OUTPATIENT
Start: 2023-04-26 | End: 2023-04-26

## 2023-04-26 RX ORDER — CHLORHEXIDINE GLUCONATE 213 G/1000ML
1 SOLUTION TOPICAL
Refills: 0 | Status: DISCONTINUED | OUTPATIENT
Start: 2023-04-26 | End: 2023-05-15

## 2023-04-26 RX ORDER — EPINEPHRINE 0.3 MG/.3ML
0.08 INJECTION INTRAMUSCULAR; SUBCUTANEOUS
Qty: 4 | Refills: 0 | Status: DISCONTINUED | OUTPATIENT
Start: 2023-04-26 | End: 2023-04-26

## 2023-04-26 RX ORDER — PHENYLEPHRINE HYDROCHLORIDE 10 MG/ML
1.14 INJECTION INTRAVENOUS
Qty: 40 | Refills: 0 | Status: DISCONTINUED | OUTPATIENT
Start: 2023-04-26 | End: 2023-04-26

## 2023-04-26 RX ORDER — VASOPRESSIN 20 [USP'U]/ML
0.06 INJECTION INTRAVENOUS
Qty: 40 | Refills: 0 | Status: DISCONTINUED | OUTPATIENT
Start: 2023-04-26 | End: 2023-04-26

## 2023-04-26 RX ORDER — DEXMEDETOMIDINE HYDROCHLORIDE IN 0.9% SODIUM CHLORIDE 4 UG/ML
0.3 INJECTION INTRAVENOUS
Qty: 400 | Refills: 0 | Status: DISCONTINUED | OUTPATIENT
Start: 2023-04-26 | End: 2023-04-27

## 2023-04-26 RX ORDER — CEFAZOLIN SODIUM 1 G
2000 VIAL (EA) INJECTION EVERY 8 HOURS
Refills: 0 | Status: COMPLETED | OUTPATIENT
Start: 2023-04-26 | End: 2023-04-27

## 2023-04-26 RX ORDER — DOBUTAMINE HCL 250MG/20ML
3 VIAL (ML) INTRAVENOUS
Qty: 500 | Refills: 0 | Status: DISCONTINUED | OUTPATIENT
Start: 2023-04-26 | End: 2023-04-27

## 2023-04-26 RX ORDER — SODIUM CHLORIDE 9 MG/ML
1000 INJECTION, SOLUTION INTRAVENOUS
Refills: 0 | Status: DISCONTINUED | OUTPATIENT
Start: 2023-04-26 | End: 2023-04-26

## 2023-04-26 RX ORDER — MIDAZOLAM HYDROCHLORIDE 1 MG/ML
6 INJECTION, SOLUTION INTRAMUSCULAR; INTRAVENOUS ONCE
Refills: 0 | Status: DISCONTINUED | OUTPATIENT
Start: 2023-04-26 | End: 2023-04-26

## 2023-04-26 RX ORDER — POTASSIUM PHOSPHATE, MONOBASIC POTASSIUM PHOSPHATE, DIBASIC 236; 224 MG/ML; MG/ML
15 INJECTION, SOLUTION INTRAVENOUS ONCE
Refills: 0 | Status: COMPLETED | OUTPATIENT
Start: 2023-04-26 | End: 2023-04-26

## 2023-04-26 RX ORDER — FENTANYL CITRATE 50 UG/ML
25 INJECTION INTRAVENOUS ONCE
Refills: 0 | Status: DISCONTINUED | OUTPATIENT
Start: 2023-04-26 | End: 2023-04-26

## 2023-04-26 RX ORDER — INSULIN HUMAN 100 [IU]/ML
1 INJECTION, SOLUTION SUBCUTANEOUS
Qty: 100 | Refills: 0 | Status: DISCONTINUED | OUTPATIENT
Start: 2023-04-26 | End: 2023-04-27

## 2023-04-26 RX ORDER — ERYTHROPOIETIN 10000 [IU]/ML
13000 INJECTION, SOLUTION INTRAVENOUS; SUBCUTANEOUS ONCE
Refills: 0 | Status: COMPLETED | OUTPATIENT
Start: 2023-04-26 | End: 2023-04-26

## 2023-04-26 RX ORDER — VANCOMYCIN HCL 500 MG
5 VIAL (EA) INTRAVENOUS ONCE
Refills: 0 | Status: DISCONTINUED | OUTPATIENT
Start: 2023-04-26 | End: 2023-04-27

## 2023-04-26 RX ORDER — PROTAMINE SULFATE 10 MG/ML
50 AMPUL (ML) INTRAVENOUS ONCE
Refills: 0 | Status: COMPLETED | OUTPATIENT
Start: 2023-04-26 | End: 2023-04-26

## 2023-04-26 RX ORDER — POTASSIUM CHLORIDE 20 MEQ
20 PACKET (EA) ORAL
Refills: 0 | Status: COMPLETED | OUTPATIENT
Start: 2023-04-26 | End: 2023-04-27

## 2023-04-26 RX ORDER — EPINEPHRINE 0.3 MG/.3ML
0.04 INJECTION INTRAMUSCULAR; SUBCUTANEOUS
Qty: 4 | Refills: 0 | Status: DISCONTINUED | OUTPATIENT
Start: 2023-04-26 | End: 2023-04-26

## 2023-04-26 RX ADMIN — Medication 5.92 MICROGRAM(S)/KG/MIN: at 15:42

## 2023-04-26 RX ADMIN — DEXMEDETOMIDINE HYDROCHLORIDE IN 0.9% SODIUM CHLORIDE 4.94 MICROGRAM(S)/KG/HR: 4 INJECTION INTRAVENOUS at 23:42

## 2023-04-26 RX ADMIN — Medication 100 MILLIEQUIVALENT(S): at 23:27

## 2023-04-26 RX ADMIN — Medication 100 MILLIEQUIVALENT(S): at 13:58

## 2023-04-26 RX ADMIN — Medication 100 MILLIEQUIVALENT(S): at 15:41

## 2023-04-26 RX ADMIN — FENTANYL CITRATE 25 MICROGRAM(S): 50 INJECTION INTRAVENOUS at 23:00

## 2023-04-26 RX ADMIN — SODIUM CHLORIDE 1000 MILLILITER(S): 9 INJECTION, SOLUTION INTRAVENOUS at 15:42

## 2023-04-26 RX ADMIN — Medication 100 MILLIGRAM(S): at 21:49

## 2023-04-26 RX ADMIN — SODIUM CHLORIDE 2000 MILLILITER(S): 9 INJECTION, SOLUTION INTRAVENOUS at 12:19

## 2023-04-26 RX ADMIN — Medication 1000 MILLIGRAM(S): at 15:54

## 2023-04-26 RX ADMIN — Medication 100 MILLIGRAM(S): at 14:55

## 2023-04-26 RX ADMIN — SODIUM CHLORIDE 1000 MILLILITER(S): 9 INJECTION, SOLUTION INTRAVENOUS at 07:33

## 2023-04-26 RX ADMIN — Medication 330 MILLIGRAM(S): at 06:26

## 2023-04-26 RX ADMIN — FENTANYL CITRATE 25 MICROGRAM(S): 50 INJECTION INTRAVENOUS at 18:09

## 2023-04-26 RX ADMIN — VASOPRESSIN 9 UNIT(S)/MIN: 20 INJECTION INTRAVENOUS at 15:42

## 2023-04-26 RX ADMIN — FENTANYL CITRATE 25 MICROGRAM(S): 50 INJECTION INTRAVENOUS at 15:45

## 2023-04-26 RX ADMIN — ERYTHROPOIETIN 13000 UNIT(S): 10000 INJECTION, SOLUTION INTRAVENOUS; SUBCUTANEOUS at 20:09

## 2023-04-26 RX ADMIN — Medication 400 MILLIGRAM(S): at 15:05

## 2023-04-26 RX ADMIN — Medication 25 GRAM(S): at 23:28

## 2023-04-26 RX ADMIN — IRON SUCROSE 176.67 MILLIGRAM(S): 20 INJECTION, SOLUTION INTRAVENOUS at 19:07

## 2023-04-26 RX ADMIN — FENTANYL CITRATE 25 MICROGRAM(S): 50 INJECTION INTRAVENOUS at 23:15

## 2023-04-26 RX ADMIN — POTASSIUM PHOSPHATE, MONOBASIC POTASSIUM PHOSPHATE, DIBASIC 62.5 MILLIMOLE(S): 236; 224 INJECTION, SOLUTION INTRAVENOUS at 15:41

## 2023-04-26 NOTE — PROGRESS NOTE ADULT - SUBJECTIVE AND OBJECTIVE BOX
Subjective:  -Events last night seen-> Pt went into PEA arrest and required emergent pericardial drain for pericardial effusion. Resuscitated by the ICU team overnight and regained pulse and excellent critical care kept patient alive.  However, overnight pt still hypotensive and requiring blood autotransfusions with high doses of pressors.  Multi-disciplinary discussion with family and heart-team and decision was made for sternotomy and exploration with Dr. Gandara due to ongoing bleeding throughout the night.  -Pt taken today for sternotomy due to ongoign pericardial drainage and inability to take blood products. Sternotomy with no active bleed (full details in Dr. Fields's OP report). KIMMIE performed during OR with normal LV/RV function. RHC performed after showing CO >5.5L/min and CI 2.8  -the pt is intubated and sedated  -the family is at bedside and aware of critical condition.  -holding antiplatelets given surgery/bleeding      PAST MEDICAL & SURGICAL HISTORY:  Polycystic kidney disease      DM2 (diabetes mellitus, type 2)      HLD (hyperlipidemia)      HTN (hypertension)      Prostate cancer      Kidney transplant recipient      DVT (deep venous thrombosis)      Chronic CHF      H/O radical prostatectomy  2010      S/P hernia repair  Abdominal and inguinal around 2005          Vital Signs Last 24 Hrs  T(C): 35.4 (26 Apr 2023 14:23), Max: 36.8 (25 Apr 2023 17:15)  T(F): 95.7 (26 Apr 2023 14:23), Max: 96.6 (26 Apr 2023 05:01)  HR: 72 (26 Apr 2023 14:30) (62 - 133)  BP: 259/149 (26 Apr 2023 13:32) (144/69 - 259/149)  BP(mean): 184 (26 Apr 2023 13:32) (135 - 184)  RR: 12 (26 Apr 2023 14:30) (12 - 16)  SpO2: 100% (26 Apr 2023 14:30) (72% - 100%)    Parameters below as of 26 Apr 2023 13:32    O2 Flow (L/min): 3  O2 Concentration (%): 60   I&O's Detail    25 Apr 2023 07:01  -  26 Apr 2023 07:00  --------------------------------------------------------  IN:    Albumin 5%  - 250 mL: 750 mL    Cryoprecipitate: 127 mL    EPINEPHrine: 592.2 mL    FEIBA VH: 20 mL    IV PiggyBack: 400 mL    Lactated Ringers Bolus: 4000 mL    Norepinephrine: 29.4 mL    Oral Fluid: 100 mL    Phenylephrine: 222 mL    PRBCs (Packed Red Blood Cells): 600 mL    Propofol: 70.8 mL    sodium chloride 0.9%: 100 mL    Vasopressin: 54 mL  Total IN: 7065.4 mL    OUT:    Blood Loss (mL): 260 mL    Voided (mL): 1200 mL  Total OUT: 1460 mL    Total NET: 5605.4 mL      26 Apr 2023 07:01  -  26 Apr 2023 15:13  --------------------------------------------------------  IN:    DOBUTamine: 15 mL    EPINEPHrine: 19.7 mL    EPINEPHrine: 49.4 mL    IV PiggyBack: 250 mL    Lactated Ringers Bolus: 2000 mL    sodium chloride 0.9%: 175 mL    Vasopressin: 3 mL  Total IN: 2512.1 mL    OUT:    Bulb (mL): 260 mL    Chest Tube (mL): 255 mL    Intermittent Catheterization - Urethral (mL): 2800 mL  Total OUT: 3315 mL    Total NET: -802.9 mL        Daily Height in cm: 170.18 (26 Apr 2023 13:32)    Daily     Physical Exam:   GEN: intubated/sedated  HEENT: MMM, no icterus  CV: S1 S2 RRR, no MRG  +sternotomy (dressing)  Lung: CTAB  Abd: soft NT ND +BS  Ext: no c/c/e, no groin hematoma  Neuro: no focal neuro deficit    MEDICATIONS  (STANDING):  acetaminophen   IVPB .. 1000 milliGRAM(s) IV Intermittent once  albumin human  5% IVPB 250 milliLiter(s) IV Intermittent every 10 minutes  atorvastatin 20 milliGRAM(s) Oral at bedtime  ceFAZolin   IVPB 2000 milliGRAM(s) IV Intermittent every 8 hours  dextrose 5%. 1000 milliLiter(s) (50 mL/Hr) IV Continuous <Continuous>  dextrose 5%. 1000 milliLiter(s) (100 mL/Hr) IV Continuous <Continuous>  dextrose 50% Injectable 25 Gram(s) IV Push once  dextrose 50% Injectable 12.5 Gram(s) IV Push once  dextrose 50% Injectable 25 Gram(s) IV Push once  DOBUTamine Infusion 3 MICROgram(s)/kG/Min (5.92 mL/Hr) IV Continuous <Continuous>  EPINEPHrine    Infusion 0.08 MICROgram(s)/kG/Min (19.7 mL/Hr) IV Continuous <Continuous>  glucagon  Injectable 1 milliGRAM(s) IntraMuscular once  hydroxocobalamin IVPB 5 Gram(s) IV Intermittent once  insulin lispro (ADMELOG) corrective regimen sliding scale   SubCutaneous every 6 hours  lactated ringers Bolus 1000 milliLiter(s) IV Bolus once  midazolam Injectable 6 milliGRAM(s) IV Push once  norepinephrine Infusion 0.1 MICROgram(s)/kG/Min (12.3 mL/Hr) IV Continuous <Continuous>  pantoprazole  Injectable 40 milliGRAM(s) IV Push daily  phenylephrine    Infusion 0.5 MICROgram(s)/kG/Min (12.3 mL/Hr) IV Continuous <Continuous>  potassium chloride  20 mEq/100 mL IVPB 20 milliEquivalent(s) IV Intermittent once  potassium phosphate IVPB 15 milliMole(s) IV Intermittent once  propofol Infusion 30 MICROgram(s)/kG/Min (11.8 mL/Hr) IV Continuous <Continuous>  sodium chloride 0.9%. 1000 milliLiter(s) (10 mL/Hr) IV Continuous <Continuous>  tacrolimus 1 milliGRAM(s) Oral at bedtime  tacrolimus 2 milliGRAM(s) Oral <User Schedule>  vasopressin Infusion 0.06 Unit(s)/Min (9 mL/Hr) IV Continuous <Continuous>      LABS:                        7.5    10.27 )-----------( 62       ( 26 Apr 2023 13:57 )             22.2     04-26    155<H>  |  115<H>  |  30<H>  ----------------------------<  228<H>  3.7   |  27  |  1.30    Ca    8.5      26 Apr 2023 13:57  Phos  2.8     04-26  Mg     1.5     04-26    TPro  3.7<L>  /  Alb  3.3  /  TBili  1.9<H>  /  DBili  x   /  AST  101<H>  /  ALT  65<H>  /  AlkPhos  20<L>  04-26        PT/INR - ( 26 Apr 2023 13:57 )   PT: 25.9 sec;   INR: 2.16          PTT - ( 26 Apr 2023 13:57 )  PTT:48.6 sec

## 2023-04-26 NOTE — PROGRESS NOTE ADULT - SUBJECTIVE AND OBJECTIVE BOX
CTICU  CRITICAL  CARE  attending     Hand off received 					   Pertinent clinical, laboratory, radiographic, hemodynamic, echocardiographic, respiratory data, microbiologic data and chart were reviewed and analyzed frequently throughout the course of the day and night  Patient seen and examined with CTS/ SH attending at bedside  Pt is a 77y , Male, HEALTH ISSUES - PROBLEM Dx:  Pericardial effusion with cardiac tamponade        , FAMILY HISTORY:  PAST MEDICAL & SURGICAL HISTORY:  Polycystic kidney disease      DM2 (diabetes mellitus, type 2)      HLD (hyperlipidemia)      HTN (hypertension)      Prostate cancer      Kidney transplant recipient      DVT (deep venous thrombosis)      Chronic CHF      H/O radical prostatectomy  2010      S/P hernia repair  Abdominal and inguinal around 2005        Patient is a 77y old  Male who presents with a chief complaint of percutaneous DENNY closure (26 Apr 2023 15:12)      14 system review was unremarkable    Vital signs, hemodynamic and respiratory parameters were reviewed from the bedside nursing flowsheet.  ICU Vital Signs Last 24 Hrs  T(C): 37.1 (26 Apr 2023 17:12), Max: 37.1 (26 Apr 2023 17:12)  T(F): 98.8 (26 Apr 2023 17:12), Max: 98.8 (26 Apr 2023 17:12)  HR: 88 (26 Apr 2023 20:00) (62 - 133)  BP: 259/149 (26 Apr 2023 13:32) (161/119 - 259/149)  BP(mean): 184 (26 Apr 2023 13:32) (135 - 184)  ABP: 139/52 (26 Apr 2023 20:00) (9/4 - 269/135)  ABP(mean): 70 (26 Apr 2023 20:00) (7 - 196)  RR: 16 (26 Apr 2023 20:00) (10 - 16)  SpO2: 100% (26 Apr 2023 20:00) (72% - 100%)    O2 Parameters below as of 26 Apr 2023 20:00  Patient On (Oxygen Delivery Method): ventilator    O2 Concentration (%): 40      Adult Advanced Hemodynamics Last 24 Hrs  CVP(mm Hg): 9 (26 Apr 2023 20:00) (4 - 9)  CVP(cm H2O): --  CO: 6.7 (26 Apr 2023 20:00) (4.6 - 6.7)  CI: 3.7 (26 Apr 2023 20:00) (2.6 - 3.7)  PA: 35/20 (26 Apr 2023 20:00) (33/14 - 45/14)  PA(mean): 26 (26 Apr 2023 20:00) (22 - 28)  PCWP: --  SVR: 727 (26 Apr 2023 20:00) (727 - 1163)  SVRI: 1317 (26 Apr 2023 20:00) (1316 - 2058)  PVR: --  PVRI: --, ABG - ( 26 Apr 2023 17:00 )  pH, Arterial: 7.46  pH, Blood: x     /  pCO2: 34    /  pO2: 156   / HCO3: 24    / Base Excess: 0.8   /  SaO2: 97.7              Mode: CPAP with PS  FiO2: 40  PEEP: 8  PS: 15  MAP: 11  PIP: 23    Intake and output was reviewed and the fluid balance was calculated  Daily Height in cm: 170.18 (26 Apr 2023 13:32)    Daily   I&O's Summary    25 Apr 2023 07:01  -  26 Apr 2023 07:00  --------------------------------------------------------  IN: 7065.4 mL / OUT: 1460 mL / NET: 5605.4 mL    26 Apr 2023 07:01  -  26 Apr 2023 20:46  --------------------------------------------------------  IN: 3212.8 mL / OUT: 5705 mL / NET: -2492.2 mL        All lines and drain sites were assessed  Glycemic trend was reviewedCAPILLARY BLOOD GLUCOSE      POCT Blood Glucose.: 177 mg/dL (26 Apr 2023 19:52)    No acute change in mental status  Auscultation of the chest reveals equal bs  Abdomen is soft  Extremities are warm and well perfused  Wounds appear clean and unremarkable  Antibiotics are periop    labs  CBC Full  -  ( 26 Apr 2023 17:05 )  WBC Count : 9.09 K/uL  RBC Count : 2.47 M/uL  Hemoglobin : 7.9 g/dL  Hematocrit : 23.2 %  Platelet Count - Automated : 63 K/uL  Mean Cell Volume : 93.9 fl  Mean Cell Hemoglobin : 32.0 pg  Mean Cell Hemoglobin Concentration : 34.1 gm/dL  Auto Neutrophil # : 7.84 K/uL  Auto Lymphocyte # : 0.46 K/uL  Auto Monocyte # : 0.70 K/uL  Auto Eosinophil # : 0.00 K/uL  Auto Basophil # : 0.02 K/uL  Auto Neutrophil % : 86.2 %  Auto Lymphocyte % : 5.1 %  Auto Monocyte % : 7.7 %  Auto Eosinophil % : 0.0 %  Auto Basophil % : 0.2 %    04-26    154<H>  |  114<H>  |  26<H>  ----------------------------<  199<H>  4.0   |  28  |  1.29    Ca    8.8      26 Apr 2023 17:05  Phos  3.9     04-26  Mg     1.5     04-26    TPro  3.9<L>  /  Alb  3.4  /  TBili  2.0<H>  /  DBili  x   /  AST  91<H>  /  ALT  62<H>  /  AlkPhos  25<L>  04-26    PT/INR - ( 26 Apr 2023 18:05 )   PT: 20.3 sec;   INR: 1.70          PTT - ( 26 Apr 2023 18:05 )  PTT:38.9 sec  The current medications were reviewed   MEDICATIONS  (STANDING):  atorvastatin 20 milliGRAM(s) Oral at bedtime  ceFAZolin   IVPB 2000 milliGRAM(s) IV Intermittent every 8 hours  chlorhexidine 2% Cloths 1 Application(s) Topical <User Schedule>  dextrose 5%. 1000 milliLiter(s) (50 mL/Hr) IV Continuous <Continuous>  dextrose 5%. 1000 milliLiter(s) (100 mL/Hr) IV Continuous <Continuous>  dextrose 50% Injectable 25 Gram(s) IV Push once  dextrose 50% Injectable 12.5 Gram(s) IV Push once  dextrose 50% Injectable 25 Gram(s) IV Push once  DOBUTamine Infusion 3 MICROgram(s)/kG/Min (5.92 mL/Hr) IV Continuous <Continuous>  glucagon  Injectable 1 milliGRAM(s) IntraMuscular once  hydroxocobalamin IVPB 5 Gram(s) IV Intermittent once  insulin regular Infusion 1 Unit(s)/Hr (1 mL/Hr) IV Continuous <Continuous>  iron sucrose IVPB 300 milliGRAM(s) IV Intermittent every 24 hours  pantoprazole  Injectable 40 milliGRAM(s) IV Push daily  sodium chloride 0.9%. 1000 milliLiter(s) (10 mL/Hr) IV Continuous <Continuous>  tacrolimus 1 milliGRAM(s) Oral at bedtime  tacrolimus 2 milliGRAM(s) Oral <User Schedule>  vasopressin Infusion 0.06 Unit(s)/Min (9 mL/Hr) IV Continuous <Continuous>    MEDICATIONS  (PRN):  dextrose Oral Gel 15 Gram(s) Oral once PRN Blood Glucose LESS THAN 70 milliGRAM(s)/deciliter       PROBLEM LIST/ ASSESSMENT:  HEALTH ISSUES - PROBLEM Dx:  Pericardial effusion with cardiac tamponade        ,   Patient is a 77y old  Male who presents with a chief complaint of percutaneous DENNY closure (26 Apr 2023 15:12)     s/p cardiac surgery    Acute systolic and diastolic heart failure evidenced by SOB and parenchymal infiltrates; will treat with diuresis    Cardiogenic shock on ionotropy    Vasogenic shock due to hypotension in the cticu , will keep on pressors    Hypovolemic shock - > 20% intravascular depletion will replete volume      Acute respiratory failure ruled in due to prolonged mechanical ventilation > 24 hrs on the vent due to failure to wean due to weak respiratory mechanics    Toxic metabolic encephalopathy ; sundowning due to anesthesia pain medications    Acidosis evidenced by anion gap and negative base excess    Acute kidney injury - creatinine > 0.3 due to combined prerenal and intrarenal factors can presume ATN          My plan includes :    s//p pea arrets  s/p sternotomy    start dobutamine    wean off presors    sbt sat    woke up non focal    wean vent as tolerated  close hemodynamic, ventilatory and drain monitoring and management per post op routine    Monitor for arrhythmias and monitor parameters for organ perfusion  beta blockade not administered due to hemodynamic instability and bradycardia  monitor neurologic status  Head of the bed should remain elevated to 45 deg .   chest PT and IS will be encouraged  monitor adequacy of oxygenation and ventilation and attempt to wean oxygen  antibiotic regimen will be tailored to the clinical, laboratory and microbiologic data  Nutritional goals will be met using po eventually , ensure adequate caloric intake and montior the same  Stress ulcer and VTE prophylaxis will be achieved    Glycemic control is satisfactory  Electrolytes have been repleted as necessary and wound care has been carried out. Pain control has been achieved.   agressive physical therapy and early mobility and ambulation goals will be met   The family was updated about the course and plan  CRITICAL CARE TIME Upon my evaluation, this patient had a high probability of imminent or life-threatening deterioration due to shock acidosis resp failure and multiorgan failure which required my direct attention, intervention, and personal management.  I have personally provided 110 minutes of critical care time exclusive of time spent on separately billable procedures. Time included review of laboratory data, radiology results, discussion with consultants, and monitoring for potential decompensation. Interventions were performed as documented abovepersonally provided by me  in evaluation and management, reassessments, review and interpretation of labs and x-rays, ventilator and hemodynamic management, formulating a plan and coordinating care: ___110___ MIN.  Time does not include procedural time.    CTICU ATTENDING     					    Satya Lazar MD

## 2023-04-26 NOTE — PRE-ANESTHESIA EVALUATION ADULT - NSANTHPEFT_GEN_ALL_CORE
General: Appearance is consistent with chronological age. No abnormal facies.  Airway:  See Mallampati score  EENT: Anicteric sclera; oropharynx clear, moist mucus membranes  Cardiovascular:  Regular rate and rhythm  Respiratory: Unlabored breathing  Neurological: Awake and alert, moves all extremities  LUE with ligated fistula
pt extremely critical intubated for life saving surgery

## 2023-04-26 NOTE — PROGRESS NOTE ADULT - ASSESSMENT
Discussed the case with family at length, pt still in critical condition... but BP is improving  Hypotension-> weaning off drips   -volume resuscitation as needed  -keep CVP >8 and MAP >65mmHg  -Tsaile essie catheter in place  Thrombocytopenia- monitor, unable to take blood products  ABLA- iron supplements  -JEHOVAs witness, no blood products  continue critical care with ICU team  holding antiplatelets  Repeat TTE today

## 2023-04-26 NOTE — PRE-ANESTHESIA EVALUATION ADULT - NSANTHOSAYNRD_GEN_A_CORE
No. NATASHA screening performed.  STOP BANG Legend: 0-2 = LOW Risk; 3-4 = INTERMEDIATE Risk; 5-8 = HIGH Risk
No. NATASHA screening performed.  STOP BANG Legend: 0-2 = LOW Risk; 3-4 = INTERMEDIATE Risk; 5-8 = HIGH Risk

## 2023-04-26 NOTE — BRIEF OPERATIVE NOTE - OPERATION/FINDINGS
sternotomy and chest exploration  blood and clot in pericardium  no source of bleeding identified  defect in pericardium posterior to DENNY that communicated with left pleural space  1.5L blood drained from left pleural space    Intraop KIMMIE with dynamic MR
Access: RCFV 12 Fr, removed with manual pressure and mattress suture in place

## 2023-04-26 NOTE — PRE-ANESTHESIA EVALUATION ADULT - NSANTHRISKNONERD_GEN_ALL_CORE
----- Message from RAND Mobley CNP sent at 1/28/2023 12:09 PM EST -----  Regarding: OP referral  This pt needs OP GI referral for dysphasia. We were unable to pass the FROYLAN probe and he needs for MR evaluation. I wasn't sure who to send this to.       Thanks
No risk alerts present
Risk Alerts:

## 2023-04-26 NOTE — PROGRESS NOTE ADULT - SUBJECTIVE AND OBJECTIVE BOX
INTERVAL COURSE  S/p DENNY occlusion  c/b cardiac tamponade and PEA arrest requiring bedside pericardiocentesis/ OR for sternotomy and local exploration   pressors and inotrops weaned shortly postop/ currently on low dose   pericardial bulb with 25-50 cc/hr drainage hourly   Alert, awake and communicative- CPAPed all day   S/p Epo and IV iron for postop Anemia   Polyuric all day with hyperNa-- UOP slowing down started on free water     ICU Vital Signs Last 24 Hrs  T(C): 37.5 (2023 21:08), Max: 37.5 (2023 21:08)  T(F): 99.5 (2023 21:08), Max: 99.5 (2023 21:08)  HR: 86 (2023 00:00) (64 - 122)  BP: 259/149 (2023 13:32) (259/149 - 259/149)  BP(mean): 184 (2023 13:32) (184 - 184)  ABP: 167/56 (2023 00:00) (67/38 - 246/117)  ABP(mean): 79 (2023 00:00) (46 - 159)  RR: 13 (2023 00:00) (10 - 17)  SpO2: 100% (2023 00:00) (72% - 100%)  O2 Parameters below as of 2023 01:00  Patient On (Oxygen Delivery Method): ventilator    O2 Concentration (%): 40  Adult Advanced Hemodynamics Last 24 Hrs  CVP(mm Hg): 6 (2023 00:00) (4 - 9)  CO: 5.2 (2023 00:00) (4.6 - 6.7)  CI: 2.9 (2023 00:00) (2.6 - 3.7)  PA: 34/18 (2023 00:00) (33/14 - 45/14)  PA(mean): 25 (2023 00:00) (22 - 28)  SVR: 1121 (2023 00:00) (727 - 1163)  SVRI:  (2023 00:00) (1316 - )  ABG - ( 2023 22:02 )  pH, Arterial: 7.51  pH, Blood: x     /  pCO2: 38    /  pO2: 144   / HCO3: 30    / Base Excess: 6.9   /  SaO2: 97.7      Mode: AC/ CMV (Assist Control/ Continuous Mandatory Ventilation)  RR (machine): 12  TV (machine): 600  FiO2: 40  PEEP: 5  ITime: 1  MAP: 12  PIP: 22     Daily   I&O's Summary    2023 07:01  -  2023 07:00  --------------------------------------------------------  IN: 7065.4 mL / OUT: 1460 mL / NET: 5605.4 mL  2023 07:01  -  2023 01:22  --------------------------------------------------------  IN: 4107.3 mL / OUT: 6870 mL / NET: -2762.7 mL    All lines and drain sites were assessed  Glycemic trend was reviewedCAPILLARY BLOOD GLUCOSE    PHYSICAL EXAM  General: A&Ox 3; NAD, good vent synchrony   Respiratory: CTA B/L  Cardiovascular: Regular rhythm/rate  Gastrointestinal: Soft; NTND  Extremities: WWP; no edema  Neurological:  CNII-XII grossly intact; no obvious focal deficits      IMAGING/EKG/ETC  Reviewed.

## 2023-04-26 NOTE — BRIEF OPERATIVE NOTE - NSICDXBRIEFPOSTOP_GEN_ALL_CORE_FT
POST-OP DIAGNOSIS:  Chronic atrial fibrillation 25-Apr-2023 17:28:23  Jazz Salazar  
POST-OP DIAGNOSIS:  Pericardial effusion 28-Apr-2023 18:37:49  Sharla Patton

## 2023-04-26 NOTE — PROGRESS NOTE ADULT - SUBJECTIVE AND OBJECTIVE BOX
INTERVAL HPI/OVERNIGHT EVENTS:    Patient emergently transferred to ICU in setting of arrest    called emergently yo bedside - chest compressions already in progress    4/25: DENNY Occlusion - Percutaneous using a 25 mm Amulet device    76yo Male Hx Polycystic kidney disease - transplant '07, HTN, HLD, DM, Prostate Ca - radical prostatectomy '15, DVT '06/pelvic fracture, afib/atrial flutter - ablation '18 - off systemic oral anticoagulation (GIB) presenting for planned DENNY occlusion     (+) Congregation and refuses blood transfusions.       KIMMIE reviewed from prior ablation- appears to have good anatomy for LAAO.     Patient seen in same day holding area; Reports no changes to PMHx or medications since last seen by our team. Denies acute or current SOB, chest pain, palpitation, N/V/D, fever/chills, recent illness, or any other concerning symptoms.   (25 Apr 2023 07:57)        PAST MEDICAL & SURGICAL HISTORY:  Polycystic kidney disease      DM2 (diabetes mellitus, type 2)      HLD (hyperlipidemia)      HTN (hypertension)      Prostate cancer      Kidney transplant recipient      DVT (deep venous thrombosis)      Chronic CHF      H/O radical prostatectomy  2010      S/P hernia repair  Abdominal and inguinal around 2005            ICU Vital Signs Last 24 Hrs  T(C): 35.8 (25 Apr 2023 21:04), Max: 36.8 (25 Apr 2023 17:15)  T(F): 96.4 (25 Apr 2023 21:04), Max: 96.4 (25 Apr 2023 21:04)  HR: 116 (26 Apr 2023 02:00) (63 - 124)  BP: 144/69 (25 Apr 2023 17:15) (144/69 - 144/69)  BP(mean): --  ABP: 149/90 (26 Apr 2023 02:00) (122/67 - 173/63)  ABP(mean): 105 (26 Apr 2023 02:00) (83 - 105)  RR: 14 (26 Apr 2023 02:00) (12 - 16)  SpO2: 98% (26 Apr 2023 02:00) (97% - 100%)    O2 Parameters below as of 26 Apr 2023 02:00  Patient On (Oxygen Delivery Method): ventilator    O2 Concentration (%): 100      Qtts:     I&O's Summary    25 Apr 2023 07:01  -  26 Apr 2023 02:58  --------------------------------------------------------  IN: 200 mL / OUT: 600 mL / NET: -400 mL        Mode: AC/ CMV (Assist Control/ Continuous Mandatory Ventilation)  RR (machine): 12  TV (machine): 500  FiO2: 100  PEEP: 5  ITime: 1.7  MAP: 8  PIP: 13      Physical Exam    Heart  Lungs  Abd  Ext  Chest  Neuro  Skin    LABS:                        6.8    17.29 )-----------( 105      ( 26 Apr 2023 02:38 )             21.7     04-26    142  |  108  |  32<H>  ----------------------------<  241<H>  5.1   |  17<L>  |  1.40<H>    Ca    9.1      26 Apr 2023 00:53    TPro  5.7<L>  /  Alb  3.2<L>  /  TBili  0.6  /  DBili  x   /  AST  15  /  ALT  18  /  AlkPhos  57  04-26    PT/INR - ( 26 Apr 2023 00:53 )   PT: 13.6 sec;   INR: 1.14          PTT - ( 25 Apr 2023 10:15 )  PTT:31.3 sec    ABG - ( 26 Apr 2023 02:38 )  pH, Arterial: 7.27  pH, Blood: x     /  pCO2: 46    /  pO2: 118   / HCO3: 21    / Base Excess: -5.9  /  SaO2: 99.6                RADIOLOGY & ADDITIONAL STUDIES:    I have spent/provided stated minutes of critical care time to this patient:  INTERVAL HPI/OVERNIGHT EVENTS:    Patient emergently transferred to ICU in setting of arrest    called emergently yo bedside - chest compressions already in progress    : DENNY Occlusion - Percutaneous using a 25 mm Amulet device    patient lethargic and intermittently responsive - ACLS protocol initiated - transient loss of pulse several times  profound acidosis - several amps bicarb IVP/calcium given - Epi - multiple    intubation performed by anesthesia - multiple pressors and labile BP    bedside POCUS - (+)lung sliding noted bilaterally and pericardial effusion   Cardiology fellow consulted and ECHO demonstrating large pericardial effusion     patient cold/mottled - unable to obtain Sa02 but ABG >400    emergent volume given including albumin and pRBC (2)   on multiple pressors with persistent labile BP  structural heart attending now at bedside - ECHO assisted bedside pericardiocentesis - 600 cc blood aspirated and returned to patient     little improvement or stabilization post drainage - some effusion still noted  decision being made regarding possible OR for stasis - OR team on standby     Wife contacted to inform of deterioration and possible OR - indicates patient is a Jehovahs witness and does not want blood products under any circumstances.     Wife informed that without potential OR and ability to use blood products patient may   restated patients refusal under any circumstances       76yo Male Hx Polycystic kidney disease - transplant '07, HTN, HLD, DM, Prostate Ca - radical prostatectomy '15, DVT '06/pelvic fracture, afib/atrial flutter - ablation '18 - off systemic oral anticoagulation (GIB) presenting for planned DENNY occlusion     To OR  - uneventful procedure and to floor post procedure     per staff - just prior to code/arrest - patient reported chest pain then canelo        PMHx includes but is not limited to:  Polycystic kidney disease  DM2 (diabetes mellitus, type 2)  HLD (hyperlipidemia)  HTN (hypertension)  Prostate cancer      Kidney transplant recipient      DVT (deep venous thrombosis)      Chronic CHF      H/O radical prostatectomy        S/P hernia repair  Abdominal and inguinal around             ICU Vital Signs Last 24 Hrs  T(C): 35.8 (2023 21:04), Max: 36.8 (2023 17:15)  T(F): 96.4 (2023 21:04), Max: 96.4 (2023 21:04)  HR: 116 (2023 02:00) (63 - 124)  BP: 144/69 (2023 17:15) (144/69 - 144/69)  BP(mean): --  ABP: 149/90 (2023 02:00) (122/67 - 173/63)  ABP(mean): 105 (2023 02:00) (83 - 105)  RR: 14 (2023 02:00) (12 - 16)  SpO2: 98% (2023 02:00) (97% - 100%)    O2 Parameters below as of 2023 02:00  Patient On (Oxygen Delivery Method): ventilator    O2 Concentration (%): 100      Qtts:     I&O's Summary    2023 07:01  -  2023 02:58  --------------------------------------------------------  IN: 200 mL / OUT: 600 mL / NET: -400 mL        Mode: AC/ CMV (Assist Control/ Continuous Mandatory Ventilation)  RR (machine): 12  TV (machine): 500  FiO2: 100  PEEP: 5  ITime: 1.7  MAP: 8  PIP: 13      Physical Exam    Heart  Lungs  Abd  Ext  Chest  Neuro  Skin    LABS:                        6.8    17.29 )-----------( 105      ( 2023 02:38 )             21.7     04-26    142  |  108  |  32<H>  ----------------------------<  241<H>  5.1   |  17<L>  |  1.40<H>    Ca    9.1      2023 00:53    TPro  5.7<L>  /  Alb  3.2<L>  /  TBili  0.6  /  DBili  x   /  AST  15  /  ALT  18  /  AlkPhos  57  04-26    PT/INR - ( 2023 00:53 )   PT: 13.6 sec;   INR: 1.14          PTT - ( 2023 10:15 )  PTT:31.3 sec    ABG - ( 2023 02:38 )  pH, Arterial: 7.27  pH, Blood: x     /  pCO2: 46    /  pO2: 118   / HCO3: 21    / Base Excess: -5.9  /  SaO2: 99.6                RADIOLOGY & ADDITIONAL STUDIES:    I have spent/provided stated minutes of critical care time to this patient:  INTERVAL HPI/OVERNIGHT EVENTS:    Patient emergently transferred to ICU in setting of arrest (PEA)    called emergently to bedside - chest compressions already in progress - PEA    : DENNY Occlusion - Percutaneous using a 25 mm Amulet device    patient lethargic and intermittently responsive - ACLS protocol initiated - transient loss of pulse several times  profound acidosis - several amps bicarb IVP/calcium given - Epi - multiple    intubation performed by anesthesia - multiple pressors and labile BP    bedside POCUS - (+)lung sliding noted bilaterally and pericardial effusion   Cardiology fellow consulted and ECHO demonstrating large pericardial effusion     patient cold/mottled - unable to obtain Sa02 but ABG p02 >400    emergent volume given including albumin and pRBC (2)   on multiple pressors with persistent labile BP  structural heart attending now at bedside - ECHO assisted bedside pericardiocentesis - 600 cc blood aspirated and returned to patient     little improvement or stabilization post drainage - some effusion still noted  decision being made regarding possible OR for stasis - OR team on standby     Wife contacted to inform of deterioration and possible OR - indicates patient is a Hindu and does not want blood products under any circumstances.     Wife informed that without potential OR and ability to use blood products patient may   restated patients refusal under any circumstances       78yo Male Hx Polycystic kidney disease - transplant ', HTN, HLD, DM, Prostate Ca - radical prostatectomy '15, DVT '/pelvic fracture, afib/atrial flutter - ablation '18 - off systemic oral anticoagulation (GIB) presenting for planned DENNY occlusion     To OR  - uneventful procedure and to floor post procedure     per staff - just prior to code/arrest - patient reported chest pain - PEA    prolonged arrest - short periods loss of pulse - patient awake and following simple commands at time of pericardiocentesis     cont to require IVP medications to sustain MAP   current infusions    Levo 0.04  Epi 0.1  Vaso 0.02    PMHx includes but is not limited to:  Polycystic kidney disease  DM2 (diabetes mellitus, type 2)  HLD (hyperlipidemia)  HTN (hypertension)  Prostate cancer  Kidney transplant recipient  DVT (deep venous thrombosis)  Chronic CHF  H/O radical prostatectomy '10  S/P hernia repair  Abdominal and inguinal around     ICU Vital Signs Last 24 Hrs  T(C): 35.8 (2023 21:04), Max: 36.8 (2023 17:15)  T(F): 96.4 (2023 21:04), Max: 96.4 (2023 21:04)  HR: 116 (2023 02:00) (63 - 124) Fib   BP: 144/69 (2023 17:15) (144/69 - 144/69)  ABP: 149/90 (2023 02:00) (122/67 - 173/63)  ABP(mean): 105 (2023 02:00) (83 - 105)  RR: 14 (2023 02:00) (12 - 16)  SpO2: 98% (2023 02:00) (97% - 100%) Fi02 60%    I&O's Summary    2023 07:01  -  2023 02:58  --------------------------------------------------------  IN: 200 mL / OUT: 600 mL / NET: -400 mL    Mode: AC/ CMV (Assist Control/ Continuous Mandatory Ventilation)  RR (machine): 12  TV (machine): 500  FiO2: 100  PEEP: 5  ITime: 1.7  MAP: 8  PIP: 13      Physical Exam    Heart - irreglar - irregular  Lungs - BS appreciated bilaterally - no rhonchi/wheeze/ lung sliding noted on   Abd- distended; tympanic to touch  Ext - cool to touch/mottled   Neuro - unable at this time   Skin - no rash     LABS:                        6.8    17.29 )-----------( 105      ( 2023 02:38 )             21.7     04-26    142  |  108  |  32<H>  ----------------------------<  241<H>  5.1   |  17<L>  |  1.40<H>    Ca    9.1      2023 00:53    TPro  5.7<L>  /  Alb  3.2<L>  /  TBili  0.6  /  DBili  x   /  AST  15  /  ALT  18  /  AlkPhos  57  04-26    PT/INR - ( 2023 00:53 )   PT: 13.6 sec;   INR: 1.14     PTT - ( 2023 10:15 )  PTT:31.3 sec    ABG - ( 2023 02:38 )  pH, Arterial: 7.27  pH, Blood: x     /  pCO2: 46    /  pO2: 118   / HCO3: 21    / Base Excess: -5.9  /  SaO2: 99.6      RADIOLOGY & ADDITIONAL STUDIES: reviewed     Patient with acute decompensation - PEA arrest found to have pericardial effusion/tamponade post DENNY occlusion Amulet device - known complication of elective procedure - patient expressly made aware in process of informed consent - now with PEA arrest - pericardial effusion/tamponade s/p emergent pericardiocentesis with ongoing bleeding and wife refusal at this time for blood products -     with ongoing bleeding patient would be taken to OR to explore and achieve stasis   unable to take to OR without ability to give necessary and supportive blood products per standing of care   wife en route to hospital at this time - medical team to be respectful of patient wishes  plan review current clinical condition again on arrival     without intervention - patient likely to   prognosis poor without life saving intervention       d/w wife/staff/cardiology fellow/structural attending   I have spent/provided stated minutes of critical care time to this patient: 4 hours  INTERVAL HPI/OVERNIGHT EVENTS:    Patient emergently transferred to ICU in setting of arrest (PEA)    called emergently to bedside - chest compressions already in progress - PEA    : DENNY Occlusion - Percutaneous using a 25 mm Amulet device    patient lethargic and intermittently responsive - ACLS protocol initiated - transient loss of pulse several times  profound acidosis - several amps bicarb IVP/calcium given - Epi - multiple    intubation performed by anesthesia - multiple pressors and labile BP    bedside POCUS - (+)lung sliding noted bilaterally and pericardial effusion   Cardiology fellow consulted and ECHO demonstrating large pericardial effusion     patient cold/mottled - unable to obtain Sa02 but ABG p02 >400    emergent volume given including albumin and pRBC (2)   on multiple pressors with persistent labile BP  structural heart attending now at bedside - ECHO assisted bedside pericardiocentesis - 600 cc blood aspirated and returned to patient     little improvement or stabilization post drainage - some effusion still noted  decision being made regarding possible OR for stasis - OR team on standby     Wife contacted to inform of deterioration and possible OR - indicates patient is a Mandaen and does not want blood products under any circumstances.     Wife informed that without potential OR and ability to use blood products patient may   restated patients refusal under any circumstances       76yo Male Hx Polycystic kidney disease - transplant ', HTN, HLD, DM, Prostate Ca - radical prostatectomy '15, DVT '/pelvic fracture, afib/atrial flutter - ablation '18 - off systemic oral anticoagulation (GIB) presenting for planned DENNY occlusion     To OR  - uneventful procedure and to floor post procedure     per staff - just prior to code/arrest - patient reported chest pain - PEA    prolonged arrest - short periods loss of pulse - patient awake and following simple commands at time of pericardiocentesis     cont to require IVP medications to sustain MAP   current infusions    Levo 0.04  Epi 0.1  Vaso 0.02    PMHx includes but is not limited to:  Polycystic kidney disease  DM2 (diabetes mellitus, type 2)  HLD (hyperlipidemia)  HTN (hypertension)  Prostate cancer  Kidney transplant recipient  DVT (deep venous thrombosis)  Chronic CHF  H/O radical prostatectomy '10  S/P hernia repair  Abdominal and inguinal around     ICU Vital Signs Last 24 Hrs  T(C): 35.8 (2023 21:04), Max: 36.8 (2023 17:15)  T(F): 96.4 (2023 21:04), Max: 96.4 (2023 21:04)  HR: 116 (2023 02:00) (63 - 124) Fib   BP: 144/69 (2023 17:15) (144/69 - 144/69)  ABP: 149/90 (2023 02:00) (122/67 - 173/63)  ABP(mean): 105 (2023 02:00) (83 - 105)  RR: 14 (2023 02:00) (12 - 16)  SpO2: 98% (2023 02:00) (97% - 100%) Fi02 60%    I&O's Summary    2023 07:01  -  2023 02:58  --------------------------------------------------------  IN: 200 mL / OUT: 600 mL / NET: -400 mL    Mode: AC/ CMV (Assist Control/ Continuous Mandatory Ventilation)  RR (machine): 12  TV (machine): 500  FiO2: 100  PEEP: 5  ITime: 1.7  MAP: 8  PIP: 13      Physical Exam    Heart - irreglar - irregular  Lungs - BS appreciated bilaterally - no rhonchi/wheeze/ lung sliding noted on   Abd- distended; tympanic to touch  Ext - cool to touch/mottled   Neuro - unable at this time   Skin - no rash     LABS:                        6.8    17.29 )-----------( 105      ( 2023 02:38 )             21.7     04-26    142  |  108  |  32<H>  ----------------------------<  241<H>  5.1   |  17<L>  |  1.40<H>    Ca    9.1      2023 00:53    TPro  5.7<L>  /  Alb  3.2<L>  /  TBili  0.6  /  DBili  x   /  AST  15  /  ALT  18  /  AlkPhos  57  04-26    PT/INR - ( 2023 00:53 )   PT: 13.6 sec;   INR: 1.14     PTT - ( 2023 10:15 )  PTT:31.3 sec    ABG - ( 2023 02:38 )  pH, Arterial: 7.27  pH, Blood: x     /  pCO2: 46    /  pO2: 118   / HCO3: 21    / Base Excess: -5.9  /  SaO2: 99.6      RADIOLOGY & ADDITIONAL STUDIES: reviewed     Patient with acute decompensation - PEA arrest found to have pericardial effusion/tamponade post DENNY occlusion Amulet device - known complication of elective procedure - patient expressly made aware in process of informed consent - now with PEA arrest - pericardial effusion/tamponade s/p emergent pericardiocentesis with ongoing bleeding and wife refusal at this time for blood products -     with ongoing bleeding patient would be taken to OR to explore and achieve stasis   unable to take to OR without ability to give necessary and supportive blood products per standing of care   wife en route to hospital at this time - medical team to be respectful of patient wishes  plan review current clinical condition again on arrival     without intervention - patient likely to   prognosis poor without life saving intervention       d/w wife; multiple members of patient Sikh at wife request/staff/cardiology fellow/structural attending   I have spent/provided stated minutes of critical care time to this patient: 4 hours

## 2023-04-26 NOTE — AIRWAY PLACEMENT NOTE ADULT - AIRWAY COMMENTS:
Anes stat to bedside obtunded patient airway reflexes intact but otherwise nonresponsive, low BP/chest compressions and Epinephrine IVP by code team pre intubation. Atraumatic DL X 1 Glidescope MAC 3 7.5 OETT secured at 22 cm lip +BBS +ETCO2, Postintubation VS unchanged chest compressions continued per code team protocol. Chest xray, postintubation ABG, labs requested. Prior to leaving bedside ROSC  BPM, /100 O2SATS 100% TRANSFER OF CARE TO RT FOR TRANSPORT TO ICU

## 2023-04-26 NOTE — PROGRESS NOTE ADULT - ASSESSMENT
77 M with PCKD s/p kidney tx DDKT , HTN, T2DM, Prostate cancer s/p radical prostatectomy , DVT  (in setting of pelvic fracture), and afib/atrial flutter s/p ablation in 2018, who is now off oral anticoagulation secondary to GI bleed and presents for DENNY occlusoin.  S/p S/p DENNY occlusion  c/b cardiac tamponade and PEA arrest requiring bedside pericardiocentesis/ OR for sternotomy and local exploration   S/p > 1 L left hemothorax evacuated in OR   A/p  Hemodynamics improving- off pressors, remained on low dose  for cardiac unloading and recovery  continue to monitor cardiac indices, mVO2 and perfusion parameters  Lactate is clearing and transaminitis is improving  Polyuric all day with hyperNa ddx: transient DI vs volume load and natriuretic peptide related ( patient also received multiple pushes of bicarb when decompensating which can contribute to hypernatremia- UOP still high but slowing down- will check urine Osm   as normotensive will hold off on volume repletion despite evidences of Met Alk as it can further increase UOP/ vent settings adjusted-- follow gas   Lytes supplemented and optimized   Post op Anemia HCT 23%- Anglican/ continue Iron repletion with IV iron/ s/p Epo per protocol   S/p DDKT on tacrolimus/ kidney fx at baseline, cranberry color urine s/p hydroxocobalamin in OR for intractable  Resume PO tac in am after extubation   Pain well controlled with low dose fentanyl/ lightly sedated and calm with precedex   ICU ppx: holding Sq hep/ SCDs/ PPI/ Asp precaution   Good respiratory status- minimal vent setting requirement, SAT/SBT in am for extubation     ATTENDING: I have personally and independently provided 45 min of critical care services.    77 M with PCKD s/p kidney tx DDKT , HTN, T2DM, Prostate cancer s/p radical prostatectomy , DVT  (in setting of pelvic fracture), and afib/atrial flutter s/p ablation in 2018, who is now off oral anticoagulation secondary to GI bleed and presents for DENNY occlusoin.  S/p S/p DENNY occlusion  c/b cardiac tamponade and PEA arrest requiring bedside pericardiocentesis/ OR for sternotomy and local exploration   S/p > 1 L left hemothorax evacuated in OR   A/p  Hemodynamics improving- off pressors, remained on low dose  for cardiac unloading and recovery  continue to monitor cardiac indices, mVO2 and perfusion parameters  Lactate is clearing and transaminitis is improving  Polyuric all day with hyperNa ddx: transient DI vs volume load and natriuretic peptide related ( patient also received multiple pushes of bicarb when decompensating which can contribute to hypernatremia- UOP still high but slowing down- will check urine Osm   On free water 250cc q 4   as normotensive will hold off on volume repletion despite evidences of Met Alk as it can further increase UOP/ vent settings adjusted-- follow gas   Lytes supplemented and optimized   Post op Anemia HCT 23%- Amish/ continue Iron repletion with IV iron/ s/p Epo per protocol   S/p DDKT on tacrolimus/ kidney fx at baseline, cranberry color urine s/p hydroxocobalamin in OR for intractable  Resume PO tac in am after extubation   Pain well controlled with low dose fentanyl/ lightly sedated and calm with precedex   ICU ppx: holding Sq hep/ SCDs/ PPI/ Asp precaution   Good respiratory status- minimal vent setting requirement, SAT/SBT in am for extubation     ATTENDING: I have personally and independently provided 45 min of critical care services.    77 M with PCKD s/p kidney tx DDKT , HTN, T2DM, Prostate cancer s/p radical prostatectomy , DVT  (in setting of pelvic fracture), and afib/atrial flutter s/p ablation in 2018, who is now off oral anticoagulation secondary to GI bleed and presents for DENNY occlusoin.  S/p S/p DENNY occlusion  c/b cardiac tamponade and PEA arrest requiring bedside pericardiocentesis/ OR for sternotomy and local exploration   S/p > 1 L left hemothorax evacuated in OR   A/p  Hemodynamics improving- off pressors, remained on low dose  for cardiac unloading and recovery  continue to monitor cardiac indices, mVO2 and perfusion parameters  Lactate is clearing and transaminitis is improving  Polyuric all day with hyperNa ddx: transient DI vs volume load and natriuretic peptide related ( patient also received multiple pushes of bicarb when decompensating which can contribute to hypernatremia- UOP still high but slowing down- will check urine Osm   On free water 250cc q 4   as normotensive will hold off on volume repletion despite evidences of Met Alk as it can further increase UOP/ vent settings adjusted-- follow gas   Lytes supplemented and optimized   Post op Anemia HCT 23%- Congregational/ continue Iron repletion with IV iron/ s/p Epo per protocol   S/p DDKT on tacrolimus/ kidney fx at baseline ( cranberry color urine s/p hydroxocobalamin in OR for refractory vasodilatory shock)  Resume PO tac in am after extubation   Pain well controlled with low dose fentanyl/ lightly sedated and calm with precedex   ICU ppx: holding Sq hep/ SCDs/ PPI/ Asp precaution   Good respiratory status- minimal vent setting requirement, SAT/SBT in am for extubation     ATTENDING: I have personally and independently provided 45 min of critical care services.    77 M with PCKD s/p kidney tx DDKT , HTN, T2DM, Prostate cancer s/p radical prostatectomy , DVT  (in setting of pelvic fracture), and afib/atrial flutter s/p ablation in 2018, who is now off oral anticoagulation secondary to GI bleed and presents for DENNY occlusoin.  S/p S/p DENNY occlusion  c/b cardiac tamponade and PEA arrest requiring bedside pericardiocentesis/ OR for sternotomy and local exploration   S/p > 1 L left hemothorax evacuated in OR   A/p  Hemodynamics improving- off pressors, remained on low dose  and Bijan at 20 ppm for cardiac unloading and recovery  continue to monitor cardiac indices, mVO2 and perfusion parameters  Lactate is clearing and transaminitis is improving-- slowly titrate down  and Bijan  Polyuric all day with hyperNa ddx: transient DI vs volume load and natriuretic peptide related ( patient also received multiple pushes of bicarb when decompensating which can contribute to hypernatremia- UOP still high but slowing down- will check urine Osm   On free water 250cc q 4   as normotensive will hold off on volume repletion despite evidences of Met Alk as it can further increase UOP/ vent settings adjusted-- follow gas   Lytes supplemented and optimized   Post op Anemia HCT 23%- Buddhism/ continue Iron repletion with IV iron/ s/p Epo per protocol   S/p DDKT on tacrolimus/ kidney fx at baseline ( cranberry color urine s/p hydroxocobalamin in OR for refractory vasodilatory shock)  Resume PO tac in am after extubation   Pain well controlled with low dose fentanyl/ lightly sedated and calm with precedex   ICU ppx: holding Sq hep/ SCDs/ PPI/ Asp precaution   Good respiratory status- minimal vent setting requirement, SAT/SBT in am for extubation     ATTENDING: I have personally and independently provided 45 min of critical care services.

## 2023-04-26 NOTE — BRIEF OPERATIVE NOTE - NSICDXBRIEFPROCEDURE_GEN_ALL_CORE_FT
[facility]   Patient Discharge Instructions    Yeny Ramirez / [id number] : 1954    Admitted 2017 Discharged: 2017     IF YOU HAVE ANY PROBLEMS ONCE YOU ARE AT HOME CALL THE FOLLOWING NUMBERS:   Main office number: [phone]      Medications    · The medications you are to continue on are listed on the medication reconciliation sheet. · Narcotic pain medications as well as supplemental iron can cause constipation. If this occurs try stopping the narcotic pain medication and/or the iron. · It is important that you take the medication exactly as they are prescribed. · Medications which increase your risk of blood clots are listed to stop for 5 weeks after surgery as well as medications or supplements which increase your risk of bleeding complications. · Keep your medication in the bottles provided by the pharmacist and keep a list of the medication names, dosages, and times to be taken in your wallet. · Do not take other medications without consulting your doctor. Important Information    Do NOT smoke as this will greatly increase your risk of infection! Resume your prehospital diet. If you have excessive nausea or vomitting call your doctor's office     Leg swelling and warmth is normal for 6 months after surgery. If you experience swelling in your leg elevate you leg while laying down with your toes above your heart. If you have sudden onset severe swelling with leg pain call our office. Use [place] Hose stockings until we see you in the office for your follow up appointment. The stitches deep inside take approximately 6 months to dissolve. There will be sharp shooting, stinging and burning pain. This is normal and will resolve between 3-6 months after surgery. Difficulty sleeping is normal following total Knee and Hip replacement. You may try melatonin, an over-the-counter sleep aid or benadryl to help with sleep.  Most patients will resume sleeping through the night 8 weeks after surgery. Home Physical Therapy is arranged. Home Health will contact you within 48 hrs of discharge that you have chosen. If you have not received a call within this time frame please contact that provider you chose. You should be given this information before you leave the hospital.     You are at a risk for falls. Use the rolling walker when walking. Patients who have had a joint replacement should not drive if they are still taking narcotic pain mediation during the daytime hours. Most patients wean themselves off of pain medication within 2-5 weeks after surgery. When to Call the office    - If you have a temperature greater then 101  - Uncontrolled vomiting   - Loose control of your bladder or bowel function  - Are unable to bear any weight   - Need a pain medication refill       DISCHARGE SUMMARY from Nurse    The following personal items collected during your admission are returned to you:   Dental Appliance: Dental Appliances: None  Vision: Visual Aid: Glasses  Hearing Aid:   na  Jewelry: Jewelry: None  Clothing: Clothing: At bedside  Other Valuables: Other Valuables: Cell Phone (wife, Lourdes Curling has)  Valuables sent to safe:   na    PATIENT INSTRUCTIONS:    After general anesthesia or intravenous sedation, for 24 hours or while taking prescription Narcotics:  · Limit your activities  · Do not drive and operate hazardous machinery  · Do not make important personal or business decisions  · Do  not drink alcoholic beverages  · If you have not urinated within 8 hours after discharge, please contact your surgeon on call.     Report the following to your surgeon:  · Excessive pain, swelling, redness or odor of or around the surgical area  · Temperature over 101  · Nausea and vomiting lasting longer than 4 hours or if unable to take medications  · Any signs of decreased circulation or nerve impairment to extremity: change in color, persistent  numbness, tingling, coldness or increase pain  · Any questions, call office @ [phone]      Keep scheduled follow up appointment. If need to change, call office @ [phone]. *  Please give a list of your current medications to your Primary Care Provider. *  Please update this list whenever your medications are discontinued, doses are      changed, or new medications (including over-the-counter products) are added. *  Please carry medication information at all times in case of emergency situations. Total Knee Replacement: What to Expect at [address] [place] [address]    When you leave the hospital, you should be able to move around with a walker or crutches. But you will need someone to help you at home for the next few weeks or until you have more energy and can move around better. If there is no one to help you at home, you may go to a rehabilitation center. You will go home with a bandage and stitches or staples. Change the bandage as your doctor tells you to. Your doctor will remove your stitches or staples 10 to 21 days after your surgery. You may still have some mild pain, and the area may be swollen for 3 to 6 months after surgery. Your knee will continue to improve for 6 to 12 months. You will probably use a walker for 1 to 3 weeks and then use crutches. When you are ready, you can use a cane. You will probably be able to walk on your own in 4 to 8 weeks. You will need to do months of physical rehabilitation (rehab) after a knee replacement. Rehab will help you strengthen the muscles of the knee and help you regain movement. After you recover, your artificial knee will allow you to do normal daily activities with less pain or no pain at all. You may be able to hike, dance, ride a bike, and play golf. Talk to your doctor about whether you can do more strenuous activities. Always tell your caregivers that you have an artificial knee.   How long it will take to walk on your own, return to normal activities, and go back to work depends on your health and how well your rehabilitation (rehab) program goes. The better you do with your rehab exercises, the quicker you will get your strength and movement back. This care sheet gives you a general idea about how long it will take for you to recover. But each person recovers at a different pace. Follow the steps below to get better as quickly as possible. How can you care for yourself at home? Activity  · Rest when you feel tired. You may take a nap, but do not stay in bed all day. When you sit, use a chair with arms. You can use the arms to help you stand up. · Work with your physical therapist to find the best way to exercise. You may be able to take frequent, short walks using crutches or a walker. What you can do as your knee heals will depend on whether your new knee is cemented or uncemented. You may not be able to do certain things for a while if your new knee is uncemented. · After your knee has healed enough, you can do more strenuous activities with caution. ¨ You can golf, but use a golf cart, and do not wear shoes with spikes. ¨ You can bike on a flat road or on a stationary bike. Avoid biking up hills. ¨ Your doctor may suggest that you stay away from activities that put stress on your knee. These include tennis or badminton, squash or racquetball, contact sports like football, jumping (such as in basketball), jogging, or running. ¨ Avoid activities where you might fall. These include horseback riding, skiing, and mountain biking. · Do not sit for more than 1 hour at a time. Get up and walk around for a while before you sit again. If you must sit for a long time, prop up your leg with a chair or footstool. This will help you avoid swelling. · Ask your doctor when you can shower. You may need to take sponge baths until your stitches or staples have been removed. · Ask your doctor when you can drive again.  It may take up to 8 weeks after knee replacement surgery before it is safe for you to drive. · When you get into a car, sit on the edge of the seat. Then pull in your legs, and turn to face the front. · You should be able to do many everyday activities 3 to 6 weeks after your surgery. You will probably need to take 4 to 16 weeks off from work. When you can go back to work depends on the type of work you do and how you feel. · Ask your doctor when it is okay for you to have sex. · Do not lift anything heavier than 10 pounds and do not lift weights for 12 weeks. Diet  · By the time you leave the hospital, you should be eating your normal diet. If your stomach is upset, try bland, low-fat foods like plain rice, broiled chicken, toast, and yogurt. Your doctor may suggest that you take iron and vitamin supplements. · Drink plenty of fluids (unless your doctor tells you not to). · Eat healthy foods, and watch your portion sizes. Try to stay at your ideal weight. Too much weight puts more stress on your new knee. · You may notice that your bowel movements are not regular right after your surgery. This is common. Try to avoid constipation and straining with bowel movements. You may want to take a fiber supplement every day. If you have not had a bowel movement after a couple of days, ask your doctor about taking a mild laxative. Medicines  · Your doctor will tell you if and when you can restart your medicines. He or she will also give you instructions about taking any new medicines. · If you take blood thinners, such as warfarin (Coumadin), clopidogrel (Plavix), or aspirin, be sure to talk to your doctor. He or she will tell you if and when to start taking those medicines again. Make sure that you understand exactly what your doctor wants you to do. · Your doctor may give you a blood-thinning medicine to prevent blood clots. If you take a blood thinner, be sure you get instructions about how to take your medicine safely. Blood thinners can cause serious bleeding problems.  This medicine could be in pill form or as a shot (injection). If a shot is necessary, your doctor will tell you how to do this. · Be safe with medicines. Take pain medicines exactly as directed. ¨ If the doctor gave you a prescription medicine for pain, take it as prescribed. ¨ If you are not taking a prescription pain medicine, ask your doctor if you can take an over-the-counter medicine. ¨ Plan to take your pain medicine 30 minutes before exercises. It is easier to prevent pain before it starts than to stop it once it has started. · If you think your pain medicine is making you sick to your stomach:  ¨ Take your medicine after meals (unless your doctor has told you not to). ¨ Ask your doctor for a different pain medicine. · If your doctor prescribed antibiotics, take them as directed. Do not stop taking them just because you feel better. You need to take the full course of antibiotics. Incision care  · You will have a bandage over the cut (incision) and staples or stitches. Take the bandage off when your doctor says it is okay. · Your doctor will remove the staples or stitches 10 days to 3 weeks after the surgery and replace them with strips of tape. Leave the tape on for a week or until it falls off. Exercise  · Your rehab program will give you a number of exercises to do to help you get back your knee's range of motion and strength. Always do them as your therapist tells you. Ice and elevation  · For pain and swelling, put ice or a cold pack on the area for 10 to 20 minutes at a time. Put a thin cloth between the ice and your skin. Other instructions  · Continue to wear your support stockings as your doctor says. These help to prevent blood clots. The length of time that you will have to wear them depends on your activity level and the amount of swelling. · Wear medical alert jewelry that says you may need antibiotics before any procedure, including dental work.  You can buy this at most drugstores. · You have metal pieces in your knee. These may set off some airport metal detectors. Carry a medical alert card that says you have an artificial joint, just in case. Follow-up care is a key part of your treatment and safety. Be sure to make and go to all appointments, and call your doctor if you are having problems. It's also a good idea to know your test results and keep a list of the medicines you take. When should you call for help? Call 911 anytime you think you may need emergency care. For example, call if:  · You passed out (lost consciousness). · You have severe trouble breathing. · You have sudden chest pain and shortness of breath, or you cough up blood. Call your doctor now or seek immediate medical care if:  · You have signs of infection, such as:  ¨ Increased pain, swelling, warmth, or redness. ¨ Red streaks leading from the incision. ¨ Pus draining from the incision. ¨ A fever. · You have signs of a blood clot, such as:  ¨ Pain in your calf, back of the knee, thigh, or groin. ¨ Redness and swelling in your leg or groin. · Your incision comes open and begins to bleed, or the bleeding increases. · You have pain that does not get better after you take pain medicine. Watch closely for changes in your health, and be sure to contact your doctor if:  · You do not have a bowel movement after taking a laxative. Where can you learn more? Go to http://maría-jennifer.info/. Enter R188 in the search box to learn more about \"Total Knee Replacement: What to Expect at Home. \"  Current as of: March 21, 2017  Content Version: 11.3  © 3244-2704 [employer]. Care instructions adapted under license by [employer] (which disclaims liability or warranty for this information).  If you have questions about a medical condition or this instruction, always ask your healthcare professional. [place] [address] any warranty or liability for your use of this information. These are general instructions for a healthy lifestyle:    No smoking/ No tobacco products/ Avoid exposure to second hand smoke    Surgeon General's Warning:  Quitting smoking now greatly reduces serious risk to your health. Obesity, smoking, and sedentary lifestyle greatly increases your risk for illness    A healthy diet, regular physical exercise & weight monitoring are important for maintaining a healthy lifestyle    You may be retaining fluid if you have a history of heart failure or if you experience any of the following symptoms:  Weight gain of 3 pounds or more overnight or 5 pounds in a week, increased swelling in our hands or feet or shortness of breath while lying flat in bed. Please call your doctor as soon as you notice any of these symptoms; do not wait until your next office visit. Recognize signs and symptoms of STROKE:    F-face looks uneven    A-arms unable to move or move even    S-speech slurred or non-existent    T-time-call 911 as soon as signs and symptoms begin-DO NOT go       Back to bed or wait to see if you get better-TIME IS BRAIN. The discharge information has been reviewed with the patient. The patient verbalized understanding. Information obtained by :  I understand that if any problems occur once I am at home I am to contact my physician. I understand and acknowledge receipt of the instructions indicated above.                                                                                                                                            Physician's or R.N.'s Signature                                                                  Date/Time                                                                                                                                              Patient or Representative Signature                                                          Date/Time PROCEDURES:  Sternotomy for exploration 28-Apr-2023 18:37:25  Sharla Patton

## 2023-04-27 ENCOUNTER — APPOINTMENT (OUTPATIENT)
Dept: HEART AND VASCULAR | Facility: CLINIC | Age: 78
End: 2023-04-27

## 2023-04-27 LAB
ALBUMIN SERPL ELPH-MCNC: 3.2 G/DL — LOW (ref 3.3–5)
ALBUMIN SERPL ELPH-MCNC: 3.9 G/DL — SIGNIFICANT CHANGE UP (ref 3.3–5)
ALP SERPL-CCNC: 28 U/L — LOW (ref 40–120)
ALP SERPL-CCNC: 32 U/L — LOW (ref 40–120)
ALT FLD-CCNC: 22 U/L — SIGNIFICANT CHANGE UP (ref 10–45)
ALT FLD-CCNC: 39 U/L — SIGNIFICANT CHANGE UP (ref 10–45)
ANION GAP SERPL CALC-SCNC: 6 MMOL/L — SIGNIFICANT CHANGE UP (ref 5–17)
ANION GAP SERPL CALC-SCNC: 8 MMOL/L — SIGNIFICANT CHANGE UP (ref 5–17)
APTT BLD: 43.5 SEC — HIGH (ref 27.5–35.5)
APTT BLD: 70.4 SEC — HIGH (ref 27.5–35.5)
AST SERPL-CCNC: 45 U/L — HIGH (ref 10–40)
AST SERPL-CCNC: 60 U/L — HIGH (ref 10–40)
BASE EXCESS BLDV CALC-SCNC: 0 MMOL/L — SIGNIFICANT CHANGE UP (ref -2–3)
BASE EXCESS BLDV CALC-SCNC: 1.7 MMOL/L — SIGNIFICANT CHANGE UP (ref -2–3)
BASE EXCESS BLDV CALC-SCNC: 6 MMOL/L — HIGH (ref -2–3)
BASOPHILS # BLD AUTO: 0.02 K/UL — SIGNIFICANT CHANGE UP (ref 0–0.2)
BASOPHILS # BLD AUTO: 0.02 K/UL — SIGNIFICANT CHANGE UP (ref 0–0.2)
BASOPHILS NFR BLD AUTO: 0.2 % — SIGNIFICANT CHANGE UP (ref 0–2)
BASOPHILS NFR BLD AUTO: 0.2 % — SIGNIFICANT CHANGE UP (ref 0–2)
BILIRUB SERPL-MCNC: 1.4 MG/DL — HIGH (ref 0.2–1.2)
BILIRUB SERPL-MCNC: 1.4 MG/DL — HIGH (ref 0.2–1.2)
BUN SERPL-MCNC: 23 MG/DL — SIGNIFICANT CHANGE UP (ref 7–23)
BUN SERPL-MCNC: 27 MG/DL — HIGH (ref 7–23)
CA-I SERPL-SCNC: 1.21 MMOL/L — SIGNIFICANT CHANGE UP (ref 1.15–1.33)
CA-I SERPL-SCNC: 1.22 MMOL/L — SIGNIFICANT CHANGE UP (ref 1.15–1.33)
CALCIUM SERPL-MCNC: 8.8 MG/DL — SIGNIFICANT CHANGE UP (ref 8.4–10.5)
CALCIUM SERPL-MCNC: 8.8 MG/DL — SIGNIFICANT CHANGE UP (ref 8.4–10.5)
CHLORIDE SERPL-SCNC: 112 MMOL/L — HIGH (ref 96–108)
CHLORIDE SERPL-SCNC: 113 MMOL/L — HIGH (ref 96–108)
CO2 BLDV-SCNC: 27.9 MMOL/L — HIGH (ref 22–26)
CO2 BLDV-SCNC: 27.9 MMOL/L — HIGH (ref 22–26)
CO2 BLDV-SCNC: 31.9 MMOL/L — HIGH (ref 22–26)
CO2 SERPL-SCNC: 29 MMOL/L — SIGNIFICANT CHANGE UP (ref 22–31)
CO2 SERPL-SCNC: 30 MMOL/L — SIGNIFICANT CHANGE UP (ref 22–31)
CREAT SERPL-MCNC: 1.49 MG/DL — HIGH (ref 0.5–1.3)
CREAT SERPL-MCNC: 1.5 MG/DL — HIGH (ref 0.5–1.3)
EGFR: 48 ML/MIN/1.73M2 — LOW
EGFR: 49 ML/MIN/1.73M2 — LOW
EOSINOPHIL # BLD AUTO: 0.03 K/UL — SIGNIFICANT CHANGE UP (ref 0–0.5)
EOSINOPHIL # BLD AUTO: 0.03 K/UL — SIGNIFICANT CHANGE UP (ref 0–0.5)
EOSINOPHIL NFR BLD AUTO: 0.3 % — SIGNIFICANT CHANGE UP (ref 0–6)
EOSINOPHIL NFR BLD AUTO: 0.3 % — SIGNIFICANT CHANGE UP (ref 0–6)
GAS PNL BLDA: SIGNIFICANT CHANGE UP
GAS PNL BLDV: 144 MMOL/L — SIGNIFICANT CHANGE UP (ref 136–145)
GAS PNL BLDV: 149 MMOL/L — HIGH (ref 136–145)
GAS PNL BLDV: SIGNIFICANT CHANGE UP
GLUCOSE BLDC GLUCOMTR-MCNC: 100 MG/DL — HIGH (ref 70–99)
GLUCOSE BLDC GLUCOMTR-MCNC: 101 MG/DL — HIGH (ref 70–99)
GLUCOSE BLDC GLUCOMTR-MCNC: 102 MG/DL — HIGH (ref 70–99)
GLUCOSE BLDC GLUCOMTR-MCNC: 102 MG/DL — HIGH (ref 70–99)
GLUCOSE BLDC GLUCOMTR-MCNC: 109 MG/DL — HIGH (ref 70–99)
GLUCOSE BLDC GLUCOMTR-MCNC: 133 MG/DL — HIGH (ref 70–99)
GLUCOSE BLDC GLUCOMTR-MCNC: 145 MG/DL — HIGH (ref 70–99)
GLUCOSE BLDC GLUCOMTR-MCNC: 96 MG/DL — SIGNIFICANT CHANGE UP (ref 70–99)
GLUCOSE BLDC GLUCOMTR-MCNC: 97 MG/DL — SIGNIFICANT CHANGE UP (ref 70–99)
GLUCOSE BLDC GLUCOMTR-MCNC: 98 MG/DL — SIGNIFICANT CHANGE UP (ref 70–99)
GLUCOSE SERPL-MCNC: 104 MG/DL — HIGH (ref 70–99)
GLUCOSE SERPL-MCNC: 105 MG/DL — HIGH (ref 70–99)
HCO3 BLDV-SCNC: 26 MMOL/L — SIGNIFICANT CHANGE UP (ref 22–29)
HCO3 BLDV-SCNC: 27 MMOL/L — SIGNIFICANT CHANGE UP (ref 22–29)
HCO3 BLDV-SCNC: 31 MMOL/L — HIGH (ref 22–29)
HCT VFR BLD CALC: 21.4 % — LOW (ref 39–50)
HCT VFR BLD CALC: 22.2 % — LOW (ref 39–50)
HGB BLD CALC-MCNC: 7 G/DL — CRITICAL LOW (ref 12.6–17.4)
HGB BLD-MCNC: 7 G/DL — CRITICAL LOW (ref 13–17)
HGB BLD-MCNC: 7.5 G/DL — LOW (ref 13–17)
IMM GRANULOCYTES NFR BLD AUTO: 0.2 % — SIGNIFICANT CHANGE UP (ref 0–0.9)
IMM GRANULOCYTES NFR BLD AUTO: 0.4 % — SIGNIFICANT CHANGE UP (ref 0–0.9)
INR BLD: 1.51 — HIGH (ref 0.88–1.16)
INR BLD: 1.52 — HIGH (ref 0.88–1.16)
LACTATE SERPL-SCNC: 0.8 MMOL/L — SIGNIFICANT CHANGE UP (ref 0.5–2)
LACTATE SERPL-SCNC: 0.9 MMOL/L — SIGNIFICANT CHANGE UP (ref 0.5–2)
LACTATE SERPL-SCNC: 1.1 MMOL/L — SIGNIFICANT CHANGE UP (ref 0.5–2)
LYMPHOCYTES # BLD AUTO: 0.55 K/UL — LOW (ref 1–3.3)
LYMPHOCYTES # BLD AUTO: 0.7 K/UL — LOW (ref 1–3.3)
LYMPHOCYTES # BLD AUTO: 6 % — LOW (ref 13–44)
LYMPHOCYTES # BLD AUTO: 7.1 % — LOW (ref 13–44)
MAGNESIUM SERPL-MCNC: 2 MG/DL — SIGNIFICANT CHANGE UP (ref 1.6–2.6)
MAGNESIUM SERPL-MCNC: 2.2 MG/DL — SIGNIFICANT CHANGE UP (ref 1.6–2.6)
MCHC RBC-ENTMCNC: 32.1 PG — SIGNIFICANT CHANGE UP (ref 27–34)
MCHC RBC-ENTMCNC: 32.2 PG — SIGNIFICANT CHANGE UP (ref 27–34)
MCHC RBC-ENTMCNC: 32.7 GM/DL — SIGNIFICANT CHANGE UP (ref 32–36)
MCHC RBC-ENTMCNC: 33.8 GM/DL — SIGNIFICANT CHANGE UP (ref 32–36)
MCV RBC AUTO: 95.3 FL — SIGNIFICANT CHANGE UP (ref 80–100)
MCV RBC AUTO: 98.2 FL — SIGNIFICANT CHANGE UP (ref 80–100)
METHGB MFR BLDV: 1.5 % — SIGNIFICANT CHANGE UP
MONOCYTES # BLD AUTO: 0.69 K/UL — SIGNIFICANT CHANGE UP (ref 0–0.9)
MONOCYTES # BLD AUTO: 0.76 K/UL — SIGNIFICANT CHANGE UP (ref 0–0.9)
MONOCYTES NFR BLD AUTO: 7.6 % — SIGNIFICANT CHANGE UP (ref 2–14)
MONOCYTES NFR BLD AUTO: 7.7 % — SIGNIFICANT CHANGE UP (ref 2–14)
NEUTROPHILS # BLD AUTO: 7.81 K/UL — HIGH (ref 1.8–7.4)
NEUTROPHILS # BLD AUTO: 8.33 K/UL — HIGH (ref 1.8–7.4)
NEUTROPHILS NFR BLD AUTO: 84.3 % — HIGH (ref 43–77)
NEUTROPHILS NFR BLD AUTO: 85.7 % — HIGH (ref 43–77)
NRBC # BLD: 0 /100 WBCS — SIGNIFICANT CHANGE UP (ref 0–0)
NRBC # BLD: 0 /100 WBCS — SIGNIFICANT CHANGE UP (ref 0–0)
PCO2 BLDV: 42 MMHG — SIGNIFICANT CHANGE UP (ref 42–55)
PCO2 BLDV: 43 MMHG — SIGNIFICANT CHANGE UP (ref 42–55)
PCO2 BLDV: 49 MMHG — SIGNIFICANT CHANGE UP (ref 42–55)
PH BLDV: 7.34 — SIGNIFICANT CHANGE UP (ref 7.32–7.43)
PH BLDV: 7.41 — SIGNIFICANT CHANGE UP (ref 7.32–7.43)
PH BLDV: 7.46 — HIGH (ref 7.32–7.43)
PHOSPHATE SERPL-MCNC: 3.6 MG/DL — SIGNIFICANT CHANGE UP (ref 2.5–4.5)
PHOSPHATE SERPL-MCNC: 3.8 MG/DL — SIGNIFICANT CHANGE UP (ref 2.5–4.5)
PLATELET # BLD AUTO: 63 K/UL — LOW (ref 150–400)
PLATELET # BLD AUTO: 67 K/UL — LOW (ref 150–400)
PO2 BLDV: 34 MMHG — SIGNIFICANT CHANGE UP (ref 25–45)
PO2 BLDV: 37 MMHG — SIGNIFICANT CHANGE UP (ref 25–45)
PO2 BLDV: 37 MMHG — SIGNIFICANT CHANGE UP (ref 25–45)
POTASSIUM BLDV-SCNC: 3.6 MMOL/L — SIGNIFICANT CHANGE UP (ref 3.5–5.1)
POTASSIUM BLDV-SCNC: 4 MMOL/L — SIGNIFICANT CHANGE UP (ref 3.5–5.1)
POTASSIUM SERPL-MCNC: 4 MMOL/L — SIGNIFICANT CHANGE UP (ref 3.5–5.3)
POTASSIUM SERPL-MCNC: 4.3 MMOL/L — SIGNIFICANT CHANGE UP (ref 3.5–5.3)
POTASSIUM SERPL-SCNC: 4 MMOL/L — SIGNIFICANT CHANGE UP (ref 3.5–5.3)
POTASSIUM SERPL-SCNC: 4.3 MMOL/L — SIGNIFICANT CHANGE UP (ref 3.5–5.3)
PROT SERPL-MCNC: 4 G/DL — LOW (ref 6–8.3)
PROT SERPL-MCNC: 4.8 G/DL — LOW (ref 6–8.3)
PROTHROM AB SERPL-ACNC: 18 SEC — HIGH (ref 10.5–13.4)
PROTHROM AB SERPL-ACNC: 18.2 SEC — HIGH (ref 10.5–13.4)
RBC # BLD: 2.18 M/UL — LOW (ref 4.2–5.8)
RBC # BLD: 2.33 M/UL — LOW (ref 4.2–5.8)
RBC # FLD: 16.3 % — HIGH (ref 10.3–14.5)
RBC # FLD: 16.5 % — HIGH (ref 10.3–14.5)
SAO2 % BLDV: 63.8 % — LOW (ref 67–88)
SAO2 % BLDV: 66 % — LOW (ref 67–88)
SAO2 % BLDV: 71.2 % — SIGNIFICANT CHANGE UP (ref 67–88)
SODIUM SERPL-SCNC: 148 MMOL/L — HIGH (ref 135–145)
SODIUM SERPL-SCNC: 150 MMOL/L — HIGH (ref 135–145)
WBC # BLD: 9.12 K/UL — SIGNIFICANT CHANGE UP (ref 3.8–10.5)
WBC # BLD: 9.88 K/UL — SIGNIFICANT CHANGE UP (ref 3.8–10.5)
WBC # FLD AUTO: 9.12 K/UL — SIGNIFICANT CHANGE UP (ref 3.8–10.5)
WBC # FLD AUTO: 9.88 K/UL — SIGNIFICANT CHANGE UP (ref 3.8–10.5)

## 2023-04-27 PROCEDURE — 93321 DOPPLER ECHO F-UP/LMTD STD: CPT | Mod: 26

## 2023-04-27 PROCEDURE — 76604 US EXAM CHEST: CPT | Mod: 26

## 2023-04-27 PROCEDURE — 99291 CRITICAL CARE FIRST HOUR: CPT

## 2023-04-27 PROCEDURE — 93308 TTE F-UP OR LMTD: CPT | Mod: 26

## 2023-04-27 PROCEDURE — 99292 CRITICAL CARE ADDL 30 MIN: CPT

## 2023-04-27 PROCEDURE — 71045 X-RAY EXAM CHEST 1 VIEW: CPT | Mod: 26

## 2023-04-27 RX ORDER — DEXTROSE 50 % IN WATER 50 %
12.5 SYRINGE (ML) INTRAVENOUS ONCE
Refills: 0 | Status: DISCONTINUED | OUTPATIENT
Start: 2023-04-27 | End: 2023-05-02

## 2023-04-27 RX ORDER — OXYCODONE HYDROCHLORIDE 5 MG/1
5 TABLET ORAL EVERY 4 HOURS
Refills: 0 | Status: DISCONTINUED | OUTPATIENT
Start: 2023-04-27 | End: 2023-05-03

## 2023-04-27 RX ORDER — PANTOPRAZOLE SODIUM 20 MG/1
40 TABLET, DELAYED RELEASE ORAL
Refills: 0 | Status: DISCONTINUED | OUTPATIENT
Start: 2023-04-27 | End: 2023-05-03

## 2023-04-27 RX ORDER — GLUCAGON INJECTION, SOLUTION 0.5 MG/.1ML
1 INJECTION, SOLUTION SUBCUTANEOUS ONCE
Refills: 0 | Status: DISCONTINUED | OUTPATIENT
Start: 2023-04-27 | End: 2023-05-02

## 2023-04-27 RX ORDER — ACETAMINOPHEN 500 MG
1000 TABLET ORAL ONCE
Refills: 0 | Status: COMPLETED | OUTPATIENT
Start: 2023-04-27 | End: 2023-04-27

## 2023-04-27 RX ORDER — DEXTROSE 50 % IN WATER 50 %
25 SYRINGE (ML) INTRAVENOUS ONCE
Refills: 0 | Status: DISCONTINUED | OUTPATIENT
Start: 2023-04-27 | End: 2023-05-02

## 2023-04-27 RX ORDER — SODIUM CHLORIDE 9 MG/ML
1000 INJECTION, SOLUTION INTRAVENOUS
Refills: 0 | Status: DISCONTINUED | OUTPATIENT
Start: 2023-04-27 | End: 2023-05-02

## 2023-04-27 RX ORDER — INSULIN LISPRO 100/ML
VIAL (ML) SUBCUTANEOUS
Refills: 0 | Status: DISCONTINUED | OUTPATIENT
Start: 2023-04-27 | End: 2023-05-02

## 2023-04-27 RX ORDER — ACETAMINOPHEN 500 MG
650 TABLET ORAL EVERY 6 HOURS
Refills: 0 | Status: DISCONTINUED | OUTPATIENT
Start: 2023-04-27 | End: 2023-05-02

## 2023-04-27 RX ORDER — OXYCODONE HYDROCHLORIDE 5 MG/1
10 TABLET ORAL EVERY 6 HOURS
Refills: 0 | Status: DISCONTINUED | OUTPATIENT
Start: 2023-04-27 | End: 2023-05-03

## 2023-04-27 RX ORDER — SODIUM CHLORIDE 9 MG/ML
1000 INJECTION, SOLUTION INTRAVENOUS
Refills: 0 | Status: DISCONTINUED | OUTPATIENT
Start: 2023-04-27 | End: 2023-04-30

## 2023-04-27 RX ORDER — INSULIN GLARGINE 100 [IU]/ML
12 INJECTION, SOLUTION SUBCUTANEOUS AT BEDTIME
Refills: 0 | Status: DISCONTINUED | OUTPATIENT
Start: 2023-04-27 | End: 2023-05-02

## 2023-04-27 RX ORDER — INSULIN DETEMIR 100/ML (3)
12 INSULIN PEN (ML) SUBCUTANEOUS AT BEDTIME
Refills: 0 | Status: DISCONTINUED | OUTPATIENT
Start: 2023-04-27 | End: 2023-04-27

## 2023-04-27 RX ORDER — DEXTROSE 50 % IN WATER 50 %
15 SYRINGE (ML) INTRAVENOUS ONCE
Refills: 0 | Status: DISCONTINUED | OUTPATIENT
Start: 2023-04-27 | End: 2023-05-02

## 2023-04-27 RX ORDER — ALBUMIN HUMAN 25 %
500 VIAL (ML) INTRAVENOUS
Refills: 0 | Status: DISCONTINUED | OUTPATIENT
Start: 2023-04-27 | End: 2023-04-27

## 2023-04-27 RX ADMIN — OXYCODONE HYDROCHLORIDE 10 MILLIGRAM(S): 5 TABLET ORAL at 16:42

## 2023-04-27 RX ADMIN — Medication 100 MILLIGRAM(S): at 14:02

## 2023-04-27 RX ADMIN — Medication 100 MILLIGRAM(S): at 05:26

## 2023-04-27 RX ADMIN — IRON SUCROSE 176.67 MILLIGRAM(S): 20 INJECTION, SOLUTION INTRAVENOUS at 17:35

## 2023-04-27 RX ADMIN — INSULIN GLARGINE 12 UNIT(S): 100 INJECTION, SOLUTION SUBCUTANEOUS at 22:17

## 2023-04-27 RX ADMIN — CHLORHEXIDINE GLUCONATE 1 APPLICATION(S): 213 SOLUTION TOPICAL at 05:27

## 2023-04-27 RX ADMIN — TACROLIMUS 1 MILLIGRAM(S): 5 CAPSULE ORAL at 22:16

## 2023-04-27 RX ADMIN — Medication 100 MILLIGRAM(S): at 22:17

## 2023-04-27 RX ADMIN — Medication 250 MILLILITER(S): at 09:00

## 2023-04-27 RX ADMIN — OXYCODONE HYDROCHLORIDE 10 MILLIGRAM(S): 5 TABLET ORAL at 16:12

## 2023-04-27 RX ADMIN — Medication 1000 MILLIGRAM(S): at 07:45

## 2023-04-27 RX ADMIN — Medication 400 MILLIGRAM(S): at 07:30

## 2023-04-27 RX ADMIN — TACROLIMUS 2 MILLIGRAM(S): 5 CAPSULE ORAL at 11:05

## 2023-04-27 RX ADMIN — SODIUM CHLORIDE 150 MILLILITER(S): 9 INJECTION, SOLUTION INTRAVENOUS at 22:31

## 2023-04-27 RX ADMIN — Medication 250 MILLILITER(S): at 06:26

## 2023-04-27 RX ADMIN — SODIUM CHLORIDE 10 MILLILITER(S): 9 INJECTION INTRAMUSCULAR; INTRAVENOUS; SUBCUTANEOUS at 22:31

## 2023-04-27 RX ADMIN — Medication 100 MILLIEQUIVALENT(S): at 00:08

## 2023-04-27 RX ADMIN — Medication 650 MILLIGRAM(S): at 13:33

## 2023-04-27 RX ADMIN — Medication 650 MILLIGRAM(S): at 14:03

## 2023-04-27 RX ADMIN — Medication 650 MILLIGRAM(S): at 21:19

## 2023-04-27 RX ADMIN — ATORVASTATIN CALCIUM 20 MILLIGRAM(S): 80 TABLET, FILM COATED ORAL at 22:16

## 2023-04-27 RX ADMIN — Medication 650 MILLIGRAM(S): at 20:17

## 2023-04-27 NOTE — PROGRESS NOTE ADULT - SUBJECTIVE AND OBJECTIVE BOX
CTICU  CRITICAL  CARE  attending     Hand off received 					   Pertinent clinical, laboratory, radiographic, hemodynamic, echocardiographic, respiratory data, microbiologic data and chart were reviewed and analyzed frequently throughout the course of the day  Patient seen and examined with CTS/ SH attending at bedside  Pt is a 77yr old male with PMH PKD sp kidney transplant (2007), HTN, HLD, DM, prostate ca sp radical prostatectomy (2015), DVT in setting of pelvic fx (2006), afib/flutter sp ablation (2018, off AC), admitted for surgical mgmt. Patient underwent left atrial appendage occlusion percutaneously with Dr. Sidhu 4/25/23. Arrived to the stepdown extubated on no drips. Overnight had PEA arrest, requiring ACLS and intubation. Tx to ICU, bedside TTE showing pericardial effusion with tamponade. Bedside procedure with 600cc blood aspirated. Given 2 units pRBC. Taken to OR early AM for sternotomy with 1.5L L hemothorax evacuated. Arrived intubated on epi, levo, vaso, rose mary. Switched to dobutamine and weaned off some pressors. Initially had lactic acidosis, now resolved. CPAP'd overnight.       FAMILY HISTORY:  PAST MEDICAL & SURGICAL HISTORY:  Polycystic kidney disease  DM2 (diabetes mellitus, type 2)  HLD (hyperlipidemia)  HTN (hypertension)  Prostate cancer  Kidney transplant recipient  DVT (deep venous thrombosis)  hronic CHF  H/O radical prostatectomy  2010  S/P hernia repair  Abdominal and inguinal around 2005        Patient is a 77y old  Male who presents with a chief complaint of percutaneous DENNY closure.    14 system review was unremarkable    Vital signs, hemodynamic and respiratory parameters were reviewed from the bedside nursing flowsheet.  ICU Vital Signs Last 24 Hrs  T(C): 36.4 (27 Apr 2023 08:53), Max: 37.5 (26 Apr 2023 21:08)  T(F): 97.5 (27 Apr 2023 08:53), Max: 99.5 (26 Apr 2023 21:08)  HR: 68 (27 Apr 2023 10:00) (64 - 91)  BP: 259/149 (26 Apr 2023 13:32) (259/149 - 259/149)  BP(mean): 184 (26 Apr 2023 13:32) (184 - 184)  ABP: 145/58 (27 Apr 2023 10:00) (116/44 - 179/58)  ABP(mean): 79 (27 Apr 2023 10:00) (61 - 96)  RR: 18 (27 Apr 2023 10:00) (10 - 18)  SpO2: 100% (27 Apr 2023 10:00) (99% - 100%)    O2 Parameters below as of 27 Apr 2023 10:00  Patient On (Oxygen Delivery Method): nasal cannula, high flow    O2 Concentration (%): 40      Adult Advanced Hemodynamics Last 24 Hrs  CVP(mm Hg): 22 (27 Apr 2023 10:00) (4 - 22)  CVP(cm H2O): --  CO: 4.8 (27 Apr 2023 09:00) (4.1 - 6.7)  CI: 2.7 (27 Apr 2023 09:00) (2.3 - 3.7)  PA: 64/31 (27 Apr 2023 10:00) (31/15 - 64/31)  PA(mean): 45 (27 Apr 2023 10:00) (21 - 45)  PCWP: --  SVR: 998 (27 Apr 2023 09:00) (727 - 1305)  SVRI: 1775 (27 Apr 2023 09:00) (1316 - 2327)  PVR: --  PVRI: --, ABG - ( 27 Apr 2023 09:21 )  pH, Arterial: 7.41  pH, Blood: x     /  pCO2: 44    /  pO2: 89    / HCO3: 28    / Base Excess: 2.7   /  SaO2: 97.9              Mode: AC/ CMV (Assist Control/ Continuous Mandatory Ventilation)  RR (machine): 12  TV (machine): 520  FiO2: 40  PEEP: 5  ITime: 1  MAP: 12  PIP: 22    Intake and output was reviewed and the fluid balance was calculated  Daily Height in cm: 170.18 (26 Apr 2023 13:32)    Daily   I&O's Summary    26 Apr 2023 07:01  -  27 Apr 2023 07:00  --------------------------------------------------------  IN: 4767.2 mL / OUT: 7560 mL / NET: -2792.8 mL    27 Apr 2023 07:01  -  27 Apr 2023 10:47  --------------------------------------------------------  IN: 875.9 mL / OUT: 470 mL / NET: 405.9 mL        All lines and drain sites were assessed  Glycemic trend was reviewed    POCT Blood Glucose.: 101 mg/dL (27 Apr 2023 10:00)      Neuro: awake, moving all extremities, following commands  HEENT: ett  Heart: s1 s2  Lungs: clear bl  Abdomen: soft nt nd  Extremities: 2+dp    Lines:  RIJ Cordis with [place] 4/26  R radial arterial line 4/25    Tubes:  CT  Luis Miguel      labs  CBC Full  -  ( 27 Apr 2023 02:32 )  WBC Count : 9.12 K/uL  RBC Count : 2.33 M/uL  Hemoglobin : 7.5 g/dL  Hematocrit : 22.2 %  Platelet Count - Automated : 67 K/uL  Mean Cell Volume : 95.3 fl  Mean Cell Hemoglobin : 32.2 pg  Mean Cell Hemoglobin Concentration : 33.8 gm/dL  Auto Neutrophil # : 7.81 K/uL  Auto Lymphocyte # : 0.55 K/uL  Auto Monocyte # : 0.69 K/uL  Auto Eosinophil # : 0.03 K/uL  Auto Basophil # : 0.02 K/uL  Auto Neutrophil % : 85.7 %  Auto Lymphocyte % : 6.0 %  Auto Monocyte % : 7.6 %  Auto Eosinophil % : 0.3 %  Auto Basophil % : 0.2 %    04-27    148<H>  |  112<H>  |  23  ----------------------------<  104<H>  4.3   |  30  |  1.49<H>    Ca    8.8      27 Apr 2023 02:32  Phos  3.6     04-27  Mg     2.2     04-27    TPro  4.0<L>  /  Alb  3.2<L>  /  TBili  1.4<H>  /  DBili  x   /  AST  60<H>  /  ALT  39  /  AlkPhos  28<L>  04-27    PT/INR - ( 27 Apr 2023 02:32 )   PT: 18.2 sec;   INR: 1.52          PTT - ( 27 Apr 2023 02:32 )  PTT:43.5 sec  The current medications were reviewed   MEDICATIONS  (STANDING):  albumin human  5% IVPB 500 milliLiter(s) IV Intermittent every 3 hours  atorvastatin 20 milliGRAM(s) Oral at bedtime  ceFAZolin   IVPB 2000 milliGRAM(s) IV Intermittent every 8 hours  chlorhexidine 2% Cloths 1 Application(s) Topical <User Schedule>  dexMEDEtomidine Infusion 0.3 MICROgram(s)/kG/Hr (4.94 mL/Hr) IV Continuous <Continuous>  dextrose 5%. 1000 milliLiter(s) (100 mL/Hr) IV Continuous <Continuous>  dextrose 5%. 1000 milliLiter(s) (50 mL/Hr) IV Continuous <Continuous>  dextrose 50% Injectable 12.5 Gram(s) IV Push once  dextrose 50% Injectable 25 Gram(s) IV Push once  dextrose 50% Injectable 25 Gram(s) IV Push once  DOBUTamine Infusion 3 MICROgram(s)/kG/Min (5.92 mL/Hr) IV Continuous <Continuous>  glucagon  Injectable 1 milliGRAM(s) IntraMuscular once  hydroxocobalamin IVPB 5 Gram(s) IV Intermittent once  insulin regular Infusion 1 Unit(s)/Hr (1 mL/Hr) IV Continuous <Continuous>  iron sucrose IVPB 300 milliGRAM(s) IV Intermittent every 24 hours  pantoprazole  Injectable 40 milliGRAM(s) IV Push daily  sodium chloride 0.9%. 1000 milliLiter(s) (10 mL/Hr) IV Continuous <Continuous>  tacrolimus 2 milliGRAM(s) Oral <User Schedule>  tacrolimus 1 milliGRAM(s) Oral at bedtime    MEDICATIONS  (PRN):  dextrose Oral Gel 15 Gram(s) Oral once PRN Blood Glucose LESS THAN 70 milliGRAM(s)/deciliter      Assessment/Plan:  77yr old male with PMH PKD sp kidney transplant (2007), HTN, HLD, DM, prostate ca sp radical prostatectomy (2015), DVT in setting of pelvic fx (2006), afib/flutter sp ablation (2018, off AC), admitted for surgical mgmt. Patient is a [employer].    POD 2 left atrial appendage occlusion percutaneously (N[place]um, 4/25/23)  RTOR for L hemothorax evacuation (4/26/23)  Respiratory failure requiring mechanical ventilation-wean to extubate  Cardiogenic shock on dobutamine-wean to off  MAP goal 65  Monitor end organ perfusion markers  Acute post operative anemia-trend H/H  Thrombocytopenia-monitor  Continue IS  Diet as tolerated  Replete lytes prn  Monitor CT output  GI/DVT PPX  Bowel Regimen  Pain control  OOB with PT  Close hemodynamic, ventilatory and drain monitoring and management per post op routine  Monitor for arrhythmias and monitor parameters for organ perfusion  Beta blockade not administered due to hemodynamic instability and bradycardia  Monitor neurologic status  Head of the bed should remain elevated to 45 deg   Chest PT and IS will be encouraged  Monitor adequacy of oxygenation and ventilation and attempt to wean oxygen  Antibiotic regimen will be tailored to the clinical, laboratory and microbiologic data  Nutritional goals will be met using po eventually, ensure adequate caloric intake and monitor the same  Stress ulcer and VTE prophylaxis will be achieved    Glycemic control is satisfactory  Electrolytes have been repleted as necessary and wound care has been carried out   Pain control has been achieved.   Aggressive physical therapy and early mobility and ambulation goals will be met   The family was updated about the course and plan.    CRITICAL CARE TIME personally provided by me  in evaluation and management, reassessments, review and interpretation of labs and x-rays, ventilator and hemodynamic management, formulating a plan and coordinating care: ___105____ MIN.  Time does not include procedural time.    CTICU ATTENDING     					  Carmen Ortega MD

## 2023-04-27 NOTE — PROGRESS NOTE ADULT - ASSESSMENT
77 M with PCKD s/p kidney tx DDKT 2007, HTN, T2DM, Prostate cancer s/p radical prostatectomy 2015, DVT 2006 (in setting of pelvic fracture), and afib/atrial flutter s/p ablation in 2018, who is now off oral anticoagulation secondary to GI bleed and presents for DENNY occlusoin.  S/p S/p DENNY occlusion 4/25 c/b cardiac tamponade and PEA arrest requiring bedside pericardiocentesis/ OR for sternotomy and local exploration 4/26  S/p > 1 L left hemothorax evacuated in OR   A/p  Hemodynamics improved/ Off inotrops- perfusion parameters remain normal  Cardiac indices in good range   Shortly hypoxic with higher PA pressures Bijan titrated up- MetHg normal   Continue HFNC overnight will transition to NC if PA pressures better and titrate off Bijan   S/p DDKT on tacrolimus/ kidney fx at baseline ( cranberry color urine s/p hydroxocobalamin in OR for refractory vasodilatory shock)  Polyuria improved/ Lytes supplemented and optimized FB neutral   Tacrolimus resumed/ trough pending   Hypernatremia worsened since NGT dc'd with extubation/ will start free water repletion with half saline and encourage PO hydration   Post op Anemia HCT 23% stable- Gnostic[employer]/ continue Iron repletion with IV iron for a total of 1 gram/ Epo given per protocol   Hx of T2DM, no recent A1c --> started on basal bolus and SSC for hyperglycemia/ follow A1C and adjust regimen   Coagulopathic, bleeding related aPTT~ 70 though CTs and drains output slowing  ICU ppx: holding Sq hep/ continue SCDs/ PPI/ Asp precaution   Dc BRITTANY martin and mobilizing in am       ATTENDING: I have personally and independently provided 30 min of critical care services.    77 M with PCKD s/p kidney tx DDKT 2007, HTN, T2DM, Prostate cancer s/p radical prostatectomy 2015, DVT 2006 (in setting of pelvic fracture), and afib/atrial flutter s/p ablation in 2018, who is now off oral anticoagulation secondary to GI bleed and presents for DENNY occlusoin.  S/p S/p DENNY occlusion 4/25 c/b cardiac tamponade and PEA arrest requiring bedside pericardiocentesis/ OR for sternotomy and local exploration 4/26  S/p > 1 L left hemothorax evacuated in OR   A/p  Hemodynamics improved/ Off inotrops- perfusion parameters remain normal  Cardiac indices in good range   Shortly hypoxic with higher PA pressures Bijan titrated up- MetHg normal   Continue HFNC overnight will transition to NC if PA pressures better and titrate off Bijan   S/p DDKT on tacrolimus/ kidney fx at baseline ( cranberry color urine s/p hydroxocobalamin in OR for refractory vasodilatory shock)  Polyuria improved/ Lytes supplemented and optimized FB neutral   Tacrolimus resumed/ trough pending   Hypernatremia worsened since NGT dc'd with extubation/ will start free water repletion with half saline and encourage PO hydration   Post op Anemia HCT 23% stable- L[employer]-day/ continue Iron repletion with IV iron for a total of 1 gram/ Epo given per protocol   Hx of T2DM, no recent A1c --> started on basal bolus and SSC for hyperglycemia/ follow A1C and adjust regimen   Coagulopathic, bleeding related aPTT~ 70 though CTs and drains output slowing  ICU ppx: holding Sq hep/ continue SCDs/ PPI/ Asp precaution   Dc swan, OOB and mobilizing in am   Repeat chest sono to assess right sided pleural effusion       ATTENDING: I have personally and independently provided 30 min of critical care services.    77 M with PCKD s/p kidney tx DDKT 2007, HTN, T2DM, Prostate cancer s/p radical prostatectomy 2015, DVT 2006 (in setting of pelvic fracture), and afib/atrial flutter s/p ablation in 2018, who is now off oral anticoagulation secondary to GI bleed and presents for DENNY occlusoin.  S/p DENNY occlusion 4/25 c/b cardiac tamponade and PEA arrest requiring bedside pericardiocentesis/ OR for sternotomy and local exploration 4/26  S/p > 1 L left hemothorax evacuated in OR   A/p  Hemodynamics improved/ Off inotrops- perfusion parameters remain normal  Cardiac indices in good range   Shortly hypoxic with higher PA pressures Bijan titrated up- MetHg normal   Continue HFNC overnight will transition to NC if PA pressures better and titrate off Bijan   S/p DDKT on tacrolimus/ kidney fx at baseline ( cranberry color urine s/p hydroxocobalamin in OR for refractory vasodilatory shock)  Polyuria improved/ Lytes supplemented and optimized FB neutral   Tacrolimus resumed/ trough pending   Hypernatremia worsened since NGT dc'd with extubation/ will start free water repletion with half saline and encourage PO hydration   Post op Anemia HCT 23% stable- [employer]/ continue Iron repletion with IV iron for a total of 1 gram/ Epo given per protocol   Hx of T2DM, no recent A1c --> started on basal bolus and SSC for hyperglycemia/ follow A1C and adjust regimen   Coagulopathic, bleeding related aPTT~ 70 though CTs and drains output slowing  ICU ppx: holding Sq hep/ continue SCDs/ PPI/ Asp precaution   Dc swan, OOB and mobilizing in am   Repeat chest sono to assess right sided pleural effusion       ATTENDING: I have personally and independently provided 30 min of critical care services.

## 2023-04-27 NOTE — PROGRESS NOTE ADULT - SUBJECTIVE AND OBJECTIVE BOX
INTERVAL COURSE  POD#2 DENNY occlusion c/b cardiogenic shock and tamponade s/p pericardiocentesis and exploration in OR   Improving shock- came off inotrops/ extubated in am   Doing well/ Labs stable/ kidney fx at baseline     ICU Vital Signs Last 24 Hrs  T(C): 36.6 (27 Apr 2023 22:48), Max: 36.9 (27 Apr 2023 06:00)  T(F): 97.8 (27 Apr 2023 22:48), Max: 98.4 (27 Apr 2023 06:00)  HR: 75 (28 Apr 2023 02:00) (68 - 83)  ABP: 149/66 (28 Apr 2023 02:00) (119/48 - 176/71)  ABP(mean): 91 (28 Apr 2023 02:00) (65 - 101)  RR: 18 (28 Apr 2023 02:00) (12 - 18)  SpO2: 97% (28 Apr 2023 02:00) (95% - 100%)  O2 Parameters below as of 28 Apr 2023 02:00  Patient On (Oxygen Delivery Method): nasal cannula, high flow  O2 Flow (L/min): 40  O2 Concentration (%): 40    Adult Advanced Hemodynamics Last 24 Hrs  CVP(mm Hg): 13 (28 Apr 2023 02:00) (9 - 22)  CO: 5.2 (27 Apr 2023 18:30) (4.1 - 6)  CI: 2.9 (27 Apr 2023 18:30) (2.3 - 3.4)  PA: 63/25 (28 Apr 2023 02:00) (37/20 - 66/27)  PA(mean): 42 (28 Apr 2023 02:00) (27 - 45)  SVR: 998 (27 Apr 2023 18:30) (878 - 1305)  SVRI: 1790 (27 Apr 2023 18:30) (1551 - 2327)  ABG - ( 27 Apr 2023 11:54 )  pH, Arterial: 7.41  pH, Blood: x     /  pCO2: 42    /  pO2: 91    / HCO3: 27    / Base Excess: 1.7   /  SaO2: 98.1      Daily   I&O's Summary  27 Apr 2023 07:01  -  28 Apr 2023 02:08  --------------------------------------------------------  IN: 2947.9 mL / OUT: 2750 mL / NET: 197.9 mL      PHYSICAL EXAM  General: A&Ox 3; NAD  Respiratory: CTA B/L; no wheezes/crackles  Cardiovascular: Regular rhythm/rate  Gastrointestinal: Soft; mildly distended/ NT- palpable tx kidney RLQ  Extremities: WWP; no edema   Neurological:  CNII-XII grossly intact; no obvious focal deficits    IMAGING/EKG/ETC  Reviewed.

## 2023-04-27 NOTE — CHART NOTE - NSCHARTNOTEFT_GEN_A_CORE
Exam:  US Chest    Procedure Date: 4/27/23    History: 77y Male whose CXR today shows a possible right sided pleural effusion.  Pt is POD # 2 from DENNY occlusion with amulet device & POD 1 from RTOR for chest washout, evacuation of pericardial clot.   Findings:                    Evaluation of the right side of the thoracic cavity demonstrates                   medium pleural effusion                  Lung is freely movable with in thoracic cavity    Impression:  Medium right pleural effusion

## 2023-04-28 LAB
ALBUMIN SERPL ELPH-MCNC: 3.2 G/DL — LOW (ref 3.3–5)
ALP SERPL-CCNC: 35 U/L — LOW (ref 40–120)
ALT FLD-CCNC: 9 U/L — LOW (ref 10–45)
ANION GAP SERPL CALC-SCNC: 9 MMOL/L — SIGNIFICANT CHANGE UP (ref 5–17)
APTT BLD: 43.8 SEC — HIGH (ref 27.5–35.5)
AST SERPL-CCNC: 33 U/L — SIGNIFICANT CHANGE UP (ref 10–40)
BASOPHILS # BLD AUTO: 0.02 K/UL — SIGNIFICANT CHANGE UP (ref 0–0.2)
BASOPHILS NFR BLD AUTO: 0.2 % — SIGNIFICANT CHANGE UP (ref 0–2)
BILIRUB SERPL-MCNC: 1.2 MG/DL — SIGNIFICANT CHANGE UP (ref 0.2–1.2)
BUN SERPL-MCNC: 25 MG/DL — HIGH (ref 7–23)
CALCIUM SERPL-MCNC: 8.2 MG/DL — LOW (ref 8.4–10.5)
CHLORIDE SERPL-SCNC: 110 MMOL/L — HIGH (ref 96–108)
CO2 SERPL-SCNC: 25 MMOL/L — SIGNIFICANT CHANGE UP (ref 22–31)
CREAT SERPL-MCNC: 1.4 MG/DL — HIGH (ref 0.5–1.3)
EGFR: 52 ML/MIN/1.73M2 — LOW
EOSINOPHIL # BLD AUTO: 0.05 K/UL — SIGNIFICANT CHANGE UP (ref 0–0.5)
EOSINOPHIL NFR BLD AUTO: 0.5 % — SIGNIFICANT CHANGE UP (ref 0–6)
GAS PNL BLDA: SIGNIFICANT CHANGE UP
GLUCOSE BLDC GLUCOMTR-MCNC: 112 MG/DL — HIGH (ref 70–99)
GLUCOSE BLDC GLUCOMTR-MCNC: 120 MG/DL — HIGH (ref 70–99)
GLUCOSE BLDC GLUCOMTR-MCNC: 129 MG/DL — HIGH (ref 70–99)
GLUCOSE BLDC GLUCOMTR-MCNC: 93 MG/DL — SIGNIFICANT CHANGE UP (ref 70–99)
GLUCOSE SERPL-MCNC: 99 MG/DL — SIGNIFICANT CHANGE UP (ref 70–99)
HCT VFR BLD CALC: 21 % — CRITICAL LOW (ref 39–50)
HGB BLD-MCNC: 6.8 G/DL — CRITICAL LOW (ref 13–17)
IMM GRANULOCYTES NFR BLD AUTO: 0.7 % — SIGNIFICANT CHANGE UP (ref 0–0.9)
INR BLD: 1.85 — HIGH (ref 0.88–1.16)
LACTATE SERPL-SCNC: 0.8 MMOL/L — SIGNIFICANT CHANGE UP (ref 0.5–2)
LYMPHOCYTES # BLD AUTO: 0.73 K/UL — LOW (ref 1–3.3)
LYMPHOCYTES # BLD AUTO: 7.1 % — LOW (ref 13–44)
MAGNESIUM SERPL-MCNC: 1.8 MG/DL — SIGNIFICANT CHANGE UP (ref 1.6–2.6)
MCHC RBC-ENTMCNC: 31.9 PG — SIGNIFICANT CHANGE UP (ref 27–34)
MCHC RBC-ENTMCNC: 32.4 GM/DL — SIGNIFICANT CHANGE UP (ref 32–36)
MCV RBC AUTO: 98.6 FL — SIGNIFICANT CHANGE UP (ref 80–100)
MONOCYTES # BLD AUTO: 0.64 K/UL — SIGNIFICANT CHANGE UP (ref 0–0.9)
MONOCYTES NFR BLD AUTO: 6.3 % — SIGNIFICANT CHANGE UP (ref 2–14)
NEUTROPHILS # BLD AUTO: 8.71 K/UL — HIGH (ref 1.8–7.4)
NEUTROPHILS NFR BLD AUTO: 85.2 % — HIGH (ref 43–77)
NRBC # BLD: 0 /100 WBCS — SIGNIFICANT CHANGE UP (ref 0–0)
PLATELET # BLD AUTO: 76 K/UL — LOW (ref 150–400)
POTASSIUM SERPL-MCNC: 3.8 MMOL/L — SIGNIFICANT CHANGE UP (ref 3.5–5.3)
POTASSIUM SERPL-SCNC: 3.8 MMOL/L — SIGNIFICANT CHANGE UP (ref 3.5–5.3)
PROT SERPL-MCNC: 4.4 G/DL — LOW (ref 6–8.3)
PROTHROM AB SERPL-ACNC: 22.1 SEC — HIGH (ref 10.5–13.4)
RBC # BLD: 2.13 M/UL — LOW (ref 4.2–5.8)
RBC # FLD: 16.3 % — HIGH (ref 10.3–14.5)
SODIUM SERPL-SCNC: 144 MMOL/L — SIGNIFICANT CHANGE UP (ref 135–145)
TACROLIMUS SERPL-MCNC: <2 NG/ML — SIGNIFICANT CHANGE UP
TSH SERPL-MCNC: 0.54 UIU/ML — SIGNIFICANT CHANGE UP (ref 0.27–4.2)
WBC # BLD: 10.22 K/UL — SIGNIFICANT CHANGE UP (ref 3.8–10.5)
WBC # FLD AUTO: 10.22 K/UL — SIGNIFICANT CHANGE UP (ref 3.8–10.5)

## 2023-04-28 PROCEDURE — 71045 X-RAY EXAM CHEST 1 VIEW: CPT | Mod: 26

## 2023-04-28 PROCEDURE — 99291 CRITICAL CARE FIRST HOUR: CPT

## 2023-04-28 PROCEDURE — 99223 1ST HOSP IP/OBS HIGH 75: CPT

## 2023-04-28 PROCEDURE — 99292 CRITICAL CARE ADDL 30 MIN: CPT

## 2023-04-28 PROCEDURE — 93355 ECHO TRANSESOPHAGEAL (TEE): CPT | Mod: 26

## 2023-04-28 RX ORDER — FUROSEMIDE 40 MG
10 TABLET ORAL ONCE
Refills: 0 | Status: COMPLETED | OUTPATIENT
Start: 2023-04-28 | End: 2023-04-28

## 2023-04-28 RX ORDER — MAGNESIUM SULFATE 500 MG/ML
2 VIAL (ML) INJECTION ONCE
Refills: 0 | Status: COMPLETED | OUTPATIENT
Start: 2023-04-28 | End: 2023-04-28

## 2023-04-28 RX ORDER — POLYETHYLENE GLYCOL 3350 17 G/17G
17 POWDER, FOR SOLUTION ORAL DAILY
Refills: 0 | Status: DISCONTINUED | OUTPATIENT
Start: 2023-04-28 | End: 2023-05-01

## 2023-04-28 RX ORDER — TACROLIMUS 5 MG/1
2 CAPSULE ORAL EVERY 12 HOURS
Refills: 0 | Status: DISCONTINUED | OUTPATIENT
Start: 2023-04-28 | End: 2023-05-01

## 2023-04-28 RX ORDER — FOLIC ACID 0.8 MG
1 TABLET ORAL DAILY
Refills: 0 | Status: DISCONTINUED | OUTPATIENT
Start: 2023-04-28 | End: 2023-05-01

## 2023-04-28 RX ORDER — PREGABALIN 225 MG/1
1000 CAPSULE ORAL DAILY
Refills: 0 | Status: DISCONTINUED | OUTPATIENT
Start: 2023-04-28 | End: 2023-05-01

## 2023-04-28 RX ORDER — ERYTHROPOIETIN 10000 [IU]/ML
20000 INJECTION, SOLUTION INTRAVENOUS; SUBCUTANEOUS
Refills: 0 | Status: COMPLETED | OUTPATIENT
Start: 2023-04-28 | End: 2023-05-02

## 2023-04-28 RX ORDER — POTASSIUM CHLORIDE 20 MEQ
20 PACKET (EA) ORAL ONCE
Refills: 0 | Status: COMPLETED | OUTPATIENT
Start: 2023-04-28 | End: 2023-04-28

## 2023-04-28 RX ADMIN — OXYCODONE HYDROCHLORIDE 5 MILLIGRAM(S): 5 TABLET ORAL at 10:30

## 2023-04-28 RX ADMIN — PANTOPRAZOLE SODIUM 40 MILLIGRAM(S): 20 TABLET, DELAYED RELEASE ORAL at 06:48

## 2023-04-28 RX ADMIN — ERYTHROPOIETIN 20000 UNIT(S): 10000 INJECTION, SOLUTION INTRAVENOUS; SUBCUTANEOUS at 19:48

## 2023-04-28 RX ADMIN — POLYETHYLENE GLYCOL 3350 17 GRAM(S): 17 POWDER, FOR SOLUTION ORAL at 12:43

## 2023-04-28 RX ADMIN — OXYCODONE HYDROCHLORIDE 5 MILLIGRAM(S): 5 TABLET ORAL at 02:00

## 2023-04-28 RX ADMIN — OXYCODONE HYDROCHLORIDE 10 MILLIGRAM(S): 5 TABLET ORAL at 02:17

## 2023-04-28 RX ADMIN — IRON SUCROSE 176.67 MILLIGRAM(S): 20 INJECTION, SOLUTION INTRAVENOUS at 18:16

## 2023-04-28 RX ADMIN — Medication 10 MILLIGRAM(S): at 21:33

## 2023-04-28 RX ADMIN — INSULIN GLARGINE 12 UNIT(S): 100 INJECTION, SOLUTION SUBCUTANEOUS at 22:03

## 2023-04-28 RX ADMIN — Medication 650 MILLIGRAM(S): at 13:45

## 2023-04-28 RX ADMIN — Medication 100 MILLIEQUIVALENT(S): at 06:48

## 2023-04-28 RX ADMIN — ATORVASTATIN CALCIUM 20 MILLIGRAM(S): 80 TABLET, FILM COATED ORAL at 21:33

## 2023-04-28 RX ADMIN — OXYCODONE HYDROCHLORIDE 5 MILLIGRAM(S): 5 TABLET ORAL at 14:30

## 2023-04-28 RX ADMIN — OXYCODONE HYDROCHLORIDE 10 MILLIGRAM(S): 5 TABLET ORAL at 20:00

## 2023-04-28 RX ADMIN — Medication 5 MILLIGRAM(S): at 21:33

## 2023-04-28 RX ADMIN — Medication 1 MILLIGRAM(S): at 18:34

## 2023-04-28 RX ADMIN — PREGABALIN 1000 MICROGRAM(S): 225 CAPSULE ORAL at 18:34

## 2023-04-28 RX ADMIN — OXYCODONE HYDROCHLORIDE 5 MILLIGRAM(S): 5 TABLET ORAL at 13:49

## 2023-04-28 RX ADMIN — TACROLIMUS 2 MILLIGRAM(S): 5 CAPSULE ORAL at 07:37

## 2023-04-28 RX ADMIN — CHLORHEXIDINE GLUCONATE 1 APPLICATION(S): 213 SOLUTION TOPICAL at 05:16

## 2023-04-28 RX ADMIN — OXYCODONE HYDROCHLORIDE 10 MILLIGRAM(S): 5 TABLET ORAL at 19:20

## 2023-04-28 RX ADMIN — OXYCODONE HYDROCHLORIDE 5 MILLIGRAM(S): 5 TABLET ORAL at 00:56

## 2023-04-28 RX ADMIN — OXYCODONE HYDROCHLORIDE 10 MILLIGRAM(S): 5 TABLET ORAL at 03:15

## 2023-04-28 RX ADMIN — OXYCODONE HYDROCHLORIDE 5 MILLIGRAM(S): 5 TABLET ORAL at 09:52

## 2023-04-28 RX ADMIN — Medication 650 MILLIGRAM(S): at 12:46

## 2023-04-28 RX ADMIN — Medication 25 GRAM(S): at 07:40

## 2023-04-28 NOTE — DIETITIAN INITIAL EVALUATION ADULT - OTHER INFO
76 y/o male with polycystic kidney disease s/p kidney transplant in 2007, HTN, HLD, DM2, Prostate cancer s/p radical prostatectomy 2015, DVT 2006 (in setting of pelvic fracture), and afib/atrial flutter s/p ablation in 1/2018, who is now off oral anticoagulation secondary to GI bleed and presents for DENNY occlusoin. Pt as no symptoms. He was admitted to [facility] 7/2022 with a GI bleed. He is a [employer] and refuses blood transfusions. Eliquis was stopped at that time. Now s/p occlusion and left atrial appendage 4/25. Extubated 4/27.    Pt seen at bedside for initial assessment-on NC/HF. No n/v/d/c documented at this time. Confirms NKFA. Denies change in appetite PTA; endorses 3 meals/day at home. Reports usual body weight 140 pounds (relatively consistent with dosing weight 145 pounds). Reports weight has been stable. Generalized edema 1+. B/L arm, wrist, hand, foot, ankle edema 2+. No pressure ulcers documented at this time. Surgical incision per chart. Madhav score=15. Labs reviewed 4/28; noted w/ elevated BUN/Cr and hypocalcemia. Observed pt with no overt signs of muscle or fat wasting. Based on ASPEN guidelines, pt does not meet criteria for malnutrition at this time. Provided pt with diet education regarding importance of meeting nutritional needs post operatively ; amenable to education. Discussed current diet order CTSCHO DASH/TLC; provided diet education, discussed sources of high versus low fat, types of CHO, and low sodium foods. Pt reports feeling hungry during hospital stay, able to complete ~50% of meals; notes his appetite has gone down since surgery (however, likely in setting of pain post op). No cultural, ethnic, Druze food preferences noted. Made aware RD remains available. RD to follow up. See nutrition recommendations below.

## 2023-04-28 NOTE — CONSULT NOTE ADULT - ASSESSMENT
77 M with past medical history of PCKD s/p kidney tx DDKT 2007, HTN, T2DM, Prostate cancer s/p radical prostatectomy 2015, DVT 2006 (in setting of pelvic fracture), and afib/atrial flutter s/p ablation in 2018, who is now off oral anticoagulation secondary to GI bleed (7/2022) and presents for DENNY occlusion. S/p DENNY occlusion 4/25 c/b cardiac tamponade and PEA arrest requiring bedside pericardiocentesis/ OR for sternotomy and local exploration 4/26. S/p > 1 L left hemothorax evacuated in OR. Emergent stabilization in CTICU. Hematology consulted now for anemia and thrombocytopenia in [place].    # Acute blood loss anemia and thrombocytopenia  Pre-operatively patient with hemoglobin 14 and platelet 240's. Hospital course complicated by cardiac tamponade and PEA arrest requiring emergent stabilization in CTICU. Acute drop in hemoglobin and platelets corresponds to episode of acute blood loss around time of tamponade and arrest. Received FEIBA 1000 units and protamine 50 mg which may explain mild coagulopathy. Patient reports receiving EPO and iron infusions at other hospitalizations without complication. He tolerated EPO dose and iron infusion on 4/26  - Hemoglobin trend: post-operatively has been decreasing slightly with serosanguinous drain output, though overall output appears to be improving today  - Platelets trend: appear to have hit azucena in 60s and are slightly uptrending.    Plan:  - s/p iron 300 mg daily x3 days 4/26, would hold off on further iron at this time  - s/p EPO 13,000 units on 4/26  - Would give EPO 20,000 units per day for 5 days (hold for hemoglobin >11)  - Would supplement with folic acid and vitamin B12 PO  - Limit blood draws if possible, consider pediatric tubes  - Will trend coagulopathy in AM, if worsening or emergent bleeding is found, patient's accepted product list includes Kcentra, which would be used.    Discussed with hematology oncology attending Dr. Danny Guadalupe. Recommendations are considered final after attending attestation.  77 M with past medical history of PCKD s/p kidney tx DDKT 2007, HTN, T2DM, Prostate cancer s/p radical prostatectomy 2015, DVT 2006 (in setting of pelvic fracture), and afib/atrial flutter s/p ablation in 2018, who is now off oral anticoagulation secondary to GI bleed (7/2022) and presents for DENNY occlusion. S/p DENNY occlusion 4/25 c/b cardiac tamponade and PEA arrest requiring bedside pericardiocentesis/ OR for sternotomy and local exploration 4/26. S/p > 1 L left hemothorax evacuated in OR and additional blood loss via chest tubes. Emergent stabilization in CTICU. Hematology consulted now for anemia and thrombocytopenia in [place].    # Acute blood loss anemia and thrombocytopenia  Pre-operatively patient with hemoglobin 14 and platelet 240's. Hospital course complicated by cardiac tamponade and PEA arrest requiring emergent stabilization in CTICU. Acute drop in hemoglobin and platelets corresponds to episode of acute blood loss around time of tamponade and arrest. Received FEIBA 1000 units and protamine 50 mg which may explain mild coagulopathy. Patient reports receiving EPO and iron infusions at other hospitalizations without complication. He tolerated EPO dose and iron infusion on 4/26  - Hemoglobin trend: post-operatively has been decreasing slightly with serosanguinous drain output, though overall output appears to be improving today  - Platelets trend: appear to have hit azucena in 60s and are slightly uptrending.    Plan:  - s/p iron 300 mg daily x3 days 4/26, would hold off on further iron at this time  - s/p EPO 13,000 units on 4/26  - Would give EPO 20,000 units per day for 5 days (hold for hemoglobin >11)  - Would supplement with folic acid ( 1 mg daily) and vitamin B12 ( 100 mcg daily) po  - Limit blood draws if possible, consider pediatric tubes  - Will trend coagulopathy in AM, if worsening or emergent bleeding is found, patient's accepted product list includes Kcentra, which would be used.  Please send fibrinogen with AM labs since he also agreed to cryoprecipitate if needed    Discussed with hematology oncology attending Dr. Danny Guadalupe. Recommendations are considered final after attending attestation.

## 2023-04-28 NOTE — CONSULT NOTE ADULT - ATTENDING COMMENTS
78 yo man with CKD s/p renal transplant 2007. h/o PKD, now admitted for DENNY occlusion repair and cardiac tamponade. Now s/p chest washout and evacuation of pericardial clot.   awake and alert- family around at bedside  on HF NC O2.   maintained on tacrolimus and prednisone for transplant immunosuppression- no MMF  as above, please repeat tacrolimus level- 12H trough level target 4.5-5  creat 1.4 within known baseline range, but a bit higher than earlier- okay to trend  needs u/a   strict I/O   to follow closely
Above history and PE reviewed and modifications mad in the body of the note  Immediate plan is for daily rEPO (20k/d sc) and folate (1 mg/d) and B12 (100 mcg/d)  iv iron already given  please follow CBC with retic count (please use pediatric tubes)  as noted above, will follow clinically and if oozing/bleeding is an issue, coagulopathy will be addressed with K Centra and cryoprecipitate    Above discussed with Dr. Casas, Dr. Hart and PA with Dr. Rolando Yoder  Above discussed with family    Dr. Gunnar King will follow with you over the weekend.  Thank you for allowing me to participate in his care.

## 2023-04-28 NOTE — PROGRESS NOTE ADULT - SUBJECTIVE AND OBJECTIVE BOX
INTERVAL HPI/OVERNIGHT EVENTS:    : OR - sternotomy for exploration - no tamponade; hemothorax evacuated (1.5L)  : Emergent Bedside pericardiocentesis  : DENNY Occlusion - Percutaneous using a 25 mm Amulet device      78yo Male Jehovahs witness Hx Polycystic kidney disease - transplant ', HTN, HLD, DM, Prostate Ca - radical prostatectomy '15, DVT '/pelvic fracture, afib/atrial flutter - ablation  - off systemic oral anticoagulation (GIB) presenting for planned DENNY occlusion     To OR  - uneventful procedure and to floor post procedure     per staff - just prior to code/arrest ([place])  - patient reported chest pain - initial rhythm PEA  chest compressions in progress at time of my arrival     bedside POCUS - (+)lung sliding noted bilaterally and pericardial effusion   Cardiology fellow consulted and ECHO demonstrating large pericardial effusion     patient cold/mottled - unable to obtain Sa02 but ABG p02 >400  emergent volume given including albumin and pRBC (2) /pressors started   on multiple high dose pressors with persistent labile BP; unable to transport or await operative team coming to hospital   structural heart attending - emergent bedside - ECHO assisted bedside pericardiocentesis - 600 cc blood aspirated initially and returned to patient - ongoing aspiration required as noted ongoing accumulation and persistent labile BP    prolonged arrest - short periods loss of pulse - patient awake and following simple commands at time of emergent pericardiocentesis     cont to require IVP medications to sustain MAP   multiple discussions with outside physicians at family request and d/w Heme/Blood bank and review of patients expressed wishes per preOp blood bank/transfusion sheet - given:     NovoFactor VII  FEIBA  Protamine   Cryo    taken to OR for exploration - no tamponade; hemothorax - 1.5 L evacuated     swan removed  extubated to [facility] with Bijan (20 PPM)  IV FeS04 started; conservative lab strategy    ECHO : Left ventricular endocardium is not well visualized. Grossly, left ventricular function appears normal.  Dilated right ventricular size. Mildly reduced right ventricular systolic function. Biatrial enlargement. Pulmonary hypertension present, pulmonary artery systolic pressure is   51 mmHg.   6. Small pericardial effusion without echocardiographic evidence of   cardiac tamponade physiology.          little improvement or stabilization post drainage - some effusion still noted  decision being made regarding possible OR for stasis - OR team on standby     Wife contacted to inform of deterioration and possible OR - indicates patient is a [employer] and does not want blood products under any circumstances.     Wife informed that without potential OR and ability to use blood products patient may   restated patients refusal under any circumstances     Levo 0.04  Epi 0.1  Vaso 0.02    PMHx includes but is not limited to:  Polycystic kidney disease  DM2 (diabetes mellitus, type 2)  HLD (hyperlipidemia)  HTN (hypertension)  Prostate cancer  Kidney transplant recipient  DVT (deep venous thrombosis)  Chronic CHF  H/O radical prostatectomy '10  S/P hernia repair  Abdominal and inguinal around         ICU Vital Signs Last 24 Hrs  T(C): 36.6 (2023 22:48), Max: 36.8 (2023 17:06)  T(F): 97.8 (2023 22:48), Max: 98.3 (2023 17:06)  HR: 75 (2023 09:00) (68 - 78) sinus   ABP: 145/53 (2023 09:00) (119/48 - 176/71)  ABP(mean): 80 (2023 09:00) (65 - 101)  RR: 18 (2023 08:00) (16 - 20)  SpO2: 96% (2023 09:00) (92% - 100%) HFNC 50/50 ; Bijan     Qtts:     I&O's Summary    2023 07:01  -  2023 07:00  --------------------------------------------------------  IN: 3567.9 mL / OUT: 3410 mL / NET: 157.9 mL    2023 07:01  -  2023 09:01  --------------------------------------------------------  IN: 120 mL / OUT: 110 mL / NET: 10 mL    Physical Exam    Heart  Lungs  Abd  Ext  Chest  Neuro  Skin    LABS:                        6.8    10.22 )-----------( 76       ( 2023 03:41 )             21.0         144  |  110<H>  |  25<H>  ----------------------------<  99  3.8   |  25  |  1.40<H>    Ca    8.2<L>      2023 04:25  Phos  3.8     04-  Mg     1.8         TPro  4.4<L>  /  Alb  3.2<L>  /  TBili  1.2  /  DBili  x   /  AST  33  /  ALT  9<L>  /  AlkPhos  35<L>      PT/INR - ( 2023 03:41 )   PT: 22.1 sec;   INR: 1.85          PTT - ( 2023 03:41 )  PTT:43.8 sec    ABG - ( 2023 03:47 )  pH, Arterial: 7.34  pH, Blood: x     /  pCO2: 47    /  pO2: 83    / HCO3: 25    / Base Excess: -0.8  /  SaO2: 97.9                RADIOLOGY & ADDITIONAL STUDIES:    I have spent/provided stated minutes of critical care time to this patient:  INTERVAL HPI/OVERNIGHT EVENTS:    : OR - sternotomy for exploration - no tamponade; hemothorax evacuated (1.5L)  : Emergent Bedside pericardiocentesis  : DENNY Occlusion - Percutaneous using a 25 mm Amulet device      78yo Male Jehovahs witness Hx Polycystic kidney disease - transplant ', HTN, HLD, DM, Prostate Ca - radical prostatectomy '15, DVT '/pelvic fracture, afib/atrial flutter - ablation  - off systemic oral anticoagulation (GIB) presenting for planned DENNY occlusion     To OR  - uneventful procedure and to floor post procedure     per staff - just prior to code/arrest ([place])  - patient reported chest pain - initial rhythm PEA  chest compressions in progress at time of my arrival     bedside POCUS - (+)lung sliding noted bilaterally and pericardial effusion   Cardiology fellow consulted and ECHO demonstrating large pericardial effusion     patient cold/mottled - unable to obtain Sa02 but ABG p02 >400  emergent volume given including albumin and pRBC (2) /pressors started   on multiple high dose pressors with persistent labile BP; unable to transport or await operative team coming to hospital   structural heart attending present - emergent bedside ECHO assisted pericardiocentesis - 600 cc blood aspirated initially and returned to patient - ongoing aspiration required as noted ongoing accumulation and persistent labile BP - also returned to patient     prolonged arrest - short periods loss of pulse - patient awake and following simple commands at time of emergent pericardiocentesis     cont to require IVP medications to sustain MAP   multiple discussions with outside physicians at family request and d/w Heme/Blood bank and review of patients expressed wishes per preOp blood bank/transfusion sheet - given:     NovoFactor VII  FEIBA  Protamine   Cryo    Wife contacted to inform of deterioration and possible OR - indicates patient is a [employer] and does not want blood products under any circumstances.     Wife informed that without potential OR and ability to use blood products patient may   restated patients refusal under any circumstances - discussed with CTS    taken to OR for exploration - no tamponade; hemothorax - 1.5 L evacuated   arrived to ICU - Epi/Levo/Vaso/Fuad -  added post-exploration with significant dec in pressor requirements    :   [place] removed  Extubated to HFNC with Bijan (10 PPM)  Epogen/IV FeS04 started; conservative lab strategy    ECHO : Left ventricular endocardium is not well visualized. Grossly, left ventricular function appears normal.  Dilated right ventricular size. Mildly reduced right ventricular systolic function. Biatrial enlargement. Pulmonary hypertension present, pulmonary artery systolic pressure is 51 mmHg. Small pericardial effusion without echocardiographic evidence of   cardiac tamponade physiology.    No acute events reported overnight - patient OOB in chair at this time     PMHx includes but is not limited to:  Polycystic kidney disease  DM2 (diabetes mellitus, type 2)  HLD (hyperlipidemia)  HTN (hypertension)  Prostate cancer  Kidney transplant recipient  DVT (deep venous thrombosis)  Chronic CHF  H/O radical prostatectomy 10  S/P hernia repair  Abdominal and inguinal around     ICU Vital Signs Last 24 Hrs  T(C): 36.6 (2023 22:48), Max: 36.8 (2023 17:06)  T(F): 97.8 (2023 22:48), Max: 98.3 (2023 17:06)  HR: 75 (2023 09:00) (68 - 78) sinus   ABP: 145/53 (2023 09:00) (119/48 - 176/71)  ABP(mean): 80 (2023 09:00) (65 - 101)  RR: 18 (2023 08:00) (16 - 20)  SpO2: 96% (2023 09:00) (92% - 100%) HFNC 50/50 ; Bijan 9 PPM    Qtts: None    I&O's Summary    2023 07:  -  2023 07:00  --------------------------------------------------------  IN: 3567.9 mL / OUT: 3410 mL / NET: 157.9 mL    2023 07:  -  2023 09:01  --------------------------------------------------------  IN: 120 mL / OUT: 110 mL / NET: 10 mL    Physical Exam    Heart  Lungs  Abd  Ext  Chest  Neuro  Skin    LABS:                        6.8    10.22 )-----------( 76       ( 2023 03:41 )             21.0         144  |  110<H>  |  25<H>  ----------------------------<  99  3.8   |  25  |  1.40<H>    Ca    8.2<L>      2023 04:25  Phos  3.8       Mg     1.8         TPro  4.4<L>  /  Alb  3.2<L>  /  TBili  1.2  /  DBili  x   /  AST  33  /  ALT  9<L>  /  AlkPhos  35<L>      PT/INR - ( 2023 03:41 )   PT: 22.1 sec;   INR: 1.85          PTT - ( 2023 03:41 )  PTT:43.8 sec    ABG - ( 2023 03:47 )  pH, Arterial: 7.34  pH, Blood: x     /  pCO2: 47    /  pO2: 83    / HCO3: 25    / Base Excess: -0.8  /  SaO2: 97.9                RADIOLOGY & ADDITIONAL STUDIES:    I have spent/provided stated minutes of critical care time to this patient:  INTERVAL HPI/OVERNIGHT EVENTS:    : OR - sternotomy for exploration - no tamponade; hemothorax evacuated (1.5L)  : Emergent Bedside pericardiocentesis  : DENNY Occlusion - Percutaneous using a 25 mm Amulet device      76yo Male Jehovahs witness Hx Polycystic kidney disease - transplant ', HTN, HLD, DM, Prostate Ca - radical prostatectomy '15, DVT '/pelvic fracture, afib/atrial flutter - ablation  - off systemic oral anticoagulation (GIB) presenting for planned DENNY occlusion     To OR  - uneventful procedure and to floor post procedure     per staff - just prior to code/arrest ([place])  - patient reported chest pain - initial rhythm PEA  chest compressions in progress at time of my arrival     bedside POCUS - (+)lung sliding noted bilaterally and pericardial effusion   Cardiology fellow consulted and ECHO demonstrating large pericardial effusion     patient cold/mottled - unable to obtain Sa02 but ABG p02 >400  emergent volume given including albumin and pRBC (2) /pressors started   on multiple high dose pressors with persistent labile BP; unable to transport or await operative team coming to hospital   structural heart attending present - emergent bedside ECHO assisted pericardiocentesis - 600 cc blood aspirated initially and returned to patient - ongoing aspiration required as noted ongoing accumulation and persistent labile BP - also returned to patient     prolonged arrest - short periods loss of pulse - patient awake and following simple commands at time of emergent pericardiocentesis     cont to require IVP medications to sustain MAP   multiple discussions with outside physicians at family request and d/w Heme/Blood bank and review of patients expressed wishes per preOp blood bank/transfusion sheet - given:     NovoFactor VII  FEIBA  Protamine   Cryo    Wife contacted to inform of deterioration and possible OR - indicates patient is a [employer] and does not want blood products under any circumstances.     Wife informed that without potential OR and ability to use blood products patient may   restated patients refusal under any circumstances - discussed with CTS    taken to OR for exploration - no tamponade; hemothorax - 1.5 L evacuated   arrived to ICU - Epi/Levo/Vaso/Fuad -  added post-exploration with significant dec in pressor requirements    :   [place] removed  Extubated to HFNC with Diamond (10 PPM)  Epogen/IV FeS04 started; conservative lab strategy    ECHO : Left ventricular endocardium is not well visualized. Grossly, left ventricular function appears normal.  Dilated right ventricular size. Mildly reduced right ventricular systolic function. Biatrial enlargement. Pulmonary hypertension present, pulmonary artery systolic pressure is 51 mmHg. Small pericardial effusion without echocardiographic evidence of   cardiac tamponade physiology.    No acute events reported overnight - patient OOB in chair at this time - reports incisional pain    PMHx includes but is not limited to:  Polycystic kidney disease  DM2 (diabetes mellitus, type 2)  HLD (hyperlipidemia)  HTN (hypertension)  Prostate cancer  Kidney transplant recipient  DVT (deep venous thrombosis)  Chronic CHF  H/O radical prostatectomy '10  S/P hernia repair  Abdominal and inguinal around     ICU Vital Signs Last 24 Hrs  T(C): 36.6 (2023 22:48), Max: 36.8 (2023 17:06)  T(F): 97.8 (2023 22:48), Max: 98.3 (2023 17:06)  HR: 75 (2023 09:00) (68 - 78) sinus   ABP: 145/53 (2023 09:00) (119/48 - 176/71)  ABP(mean): 80 (2023 09:00) (65 - 101)  RR: 18 (2023 08:00) (16 - 20)  SpO2: 96% (2023 09:00) (92% - 100%) HFNC 50/50 ; Diamond 9 PPM    Qtts: None    I&O's Summary    2023 07:  -  2023 07:00  --------------------------------------------------------  IN: 3567.9 mL / OUT: 3410 mL / NET: 157.9 mL    2023 07:  -  2023 09:01  --------------------------------------------------------  IN: 120 mL / OUT: 110 mL / NET: 10 mL    Physical Exam    Heart - regular (-)rub/gallop  Lungs - dec BS right base - no rhonchi/wheeze  Abd - (+)BS soft NTND (-)r/r/g  Ext - warm to touch - no cyanosis/clubbing  Chest - op bandage in place  Neuro - alert/oriented and interactive - nonfocal and moving all extremities  Skin - no rash     LABS:                        6.8    10.22 )-----------( 76       ( 2023 03:41 )             21.0         144  |  110<H>  |  25<H>  ----------------------------<  99  3.8   |  25  |  1.40<H>    Ca    8.2<L>      2023 04:25  Phos  3.8     04-27  Mg     1.8         TPro  4.4<L>  /  Alb  3.2<L>  /  TBili  1.2  /  DBili  x   /  AST  33  /  ALT  9<L>  /  AlkPhos  35<L>  28    PT/INR - ( 2023 03:41 )   PT: 22.1 sec;   INR: 1.85     PTT - ( 2023 03:41 )  PTT:43.8 sec    ABG - ( 2023 03:47 )  pH, Arterial: 7.34  pH, Blood: x     /  pCO2: 47    /  pO2: 83    / HCO3: 25    / Base Excess: -0.8  /  SaO2: 97.9      RADIOLOGY & ADDITIONAL STUDIES: reviewed     Patient with atrial Fib - unable to take systemic AC - s/p DENNY Occlusion - Percutaneous using a 25 mm Amulet device  - acute decompensation op night - PEA arrest found to have pericardial effusion/tamponade post DENNY occlusion Amulet device - known complication of elective procedure - patient expressly made aware in process of informed consent - now with PEA arrest - pericardial effusion/tamponade s/p emergent pericardiocentesis with ongoing bleeding - given multiple meds to stop bleeding - Melo 7/Cryo/protamine - taken to OR for exploration - bleeding had stopped - removal of some clot and evaculation of hemothorax    1. CV  stable hemodynamics  sinus rhythm   statin  on DIAMOND via HFNC for ventricular support - plan titrate down     2. Pulm   on HFNC/Diamond  plan slow wean to day  incentive spirometry/ambulation with staff assist   monitor chest tube output  bedside sono  for effusion on right   PT to consult    3. Heme  heme consult to assist in dosing of Epo/IV FeS04  pediatric tubes to be used for any blood draws and conservative strategy to be used in lab ordering     4. Renal   renal consult in setting of transplant     SCD and GI prophylaxis     d/w patient/family present at bedside; staff and structural heart attending    I have spent/provided stated minutes of critical care time to this patient: 90

## 2023-04-28 NOTE — DIETITIAN INITIAL EVALUATION ADULT - PERTINENT MEDS FT
MEDICATIONS  (STANDING):  atorvastatin 20 milliGRAM(s) Oral at bedtime  chlorhexidine 2% Cloths 1 Application(s) Topical <User Schedule>  dextrose 5%. 1000 milliLiter(s) (50 mL/Hr) IV Continuous <Continuous>  dextrose 5%. 1000 milliLiter(s) (50 mL/Hr) IV Continuous <Continuous>  dextrose 5%. 1000 milliLiter(s) (100 mL/Hr) IV Continuous <Continuous>  dextrose 5%. 1000 milliLiter(s) (100 mL/Hr) IV Continuous <Continuous>  dextrose 50% Injectable 25 Gram(s) IV Push once  dextrose 50% Injectable 12.5 Gram(s) IV Push once  dextrose 50% Injectable 12.5 Gram(s) IV Push once  dextrose 50% Injectable 25 Gram(s) IV Push once  dextrose 50% Injectable 25 Gram(s) IV Push once  dextrose 50% Injectable 25 Gram(s) IV Push once  glucagon  Injectable 1 milliGRAM(s) IntraMuscular once  glucagon  Injectable 1 milliGRAM(s) IntraMuscular once  insulin glargine Injectable (LANTUS) 12 Unit(s) SubCutaneous at bedtime  insulin lispro (ADMELOG) corrective regimen sliding scale   SubCutaneous Before meals and at bedtime  iron sucrose IVPB 300 milliGRAM(s) IV Intermittent every 24 hours  pantoprazole    Tablet 40 milliGRAM(s) Oral before breakfast  polyethylene glycol 3350 17 Gram(s) Oral daily  sodium chloride 0.45%. 1000 milliLiter(s) (150 mL/Hr) IV Continuous <Continuous>  sodium chloride 0.9%. 1000 milliLiter(s) (10 mL/Hr) IV Continuous <Continuous>  tacrolimus 1 milliGRAM(s) Oral at bedtime  tacrolimus 2 milliGRAM(s) Oral <User Schedule>    MEDICATIONS  (PRN):  acetaminophen     Tablet .. 650 milliGRAM(s) Oral every 6 hours PRN Mild Pain (1 - 3)  dextrose Oral Gel 15 Gram(s) Oral once PRN Blood Glucose LESS THAN 70 milliGRAM(s)/deciliter  dextrose Oral Gel 15 Gram(s) Oral once PRN Blood Glucose LESS THAN 70 milliGRAM(s)/deciliter  oxyCODONE    IR 5 milliGRAM(s) Oral every 4 hours PRN Moderate Pain (4 - 6)  oxyCODONE    IR 10 milliGRAM(s) Oral every 6 hours PRN Severe Pain (7 - 10)

## 2023-04-28 NOTE — CONSULT NOTE ADULT - SUBJECTIVE AND OBJECTIVE BOX
*** NOTE IN PROGRESS ***    Hematology Oncology Progress / Consult Note      HPI:        Interval History:    SUBJECTIVE: Patient seen and examined at bedside.    OBJECTIVE:    VITAL SIGNS:  ICU Vital Signs Last 24 Hrs  T(C): 36.7 (28 Apr 2023 09:02), Max: 36.8 (27 Apr 2023 17:06)  T(F): 98 (28 Apr 2023 09:02), Max: 98.3 (27 Apr 2023 17:06)  HR: 84 (28 Apr 2023 13:00) (71 - 88)  BP: 128/64 (28 Apr 2023 13:00) (126/70 - 131/69)  BP(mean): 90 (28 Apr 2023 13:00) (90 - 94)  ABP: 140/53 (28 Apr 2023 13:00) (119/48 - 176/71)  ABP(mean): 77 (28 Apr 2023 13:00) (65 - 101)  RR: 20 (28 Apr 2023 13:00) (16 - 20)  SpO2: 98% (28 Apr 2023 13:00) (92% - 100%)    O2 Parameters below as of 28 Apr 2023 13:00  Patient On (Oxygen Delivery Method): nasal cannula, high flow  O2 Flow (L/min): 50  O2 Concentration (%): 50          04-27 @ 07:01 - 04-28 @ 07:00  --------------------------------------------------------  IN: 3567.9 mL / OUT: 3410 mL / NET: 157.9 mL    04-28 @ 07:01 - 04-28 @ 13:46  --------------------------------------------------------  IN: 280 mL / OUT: 520 mL / NET: -240 mL      CAPILLARY BLOOD GLUCOSE      POCT Blood Glucose.: 120 mg/dL (28 Apr 2023 11:28)      PHYSICAL EXAM:  General: NAD, answering questions, pleasantly conversant  HEENT: NC/AT; PERRL, clear conjunctiva  Neck: supple  Respiratory: CTA b/l  Cardiovascular: +S1/S2; RRR  Abdomen: soft, NT/ND; +BS x4  Extremities: WWP, 2+ peripheral pulses b/l; no LE edema  Skin: normal color and turgor; no rash  Neurological:     MEDICATIONS:  MEDICATIONS  (STANDING):  atorvastatin 20 milliGRAM(s) Oral at bedtime  chlorhexidine 2% Cloths 1 Application(s) Topical <User Schedule>  dextrose 5%. 1000 milliLiter(s) (50 mL/Hr) IV Continuous <Continuous>  dextrose 5%. 1000 milliLiter(s) (50 mL/Hr) IV Continuous <Continuous>  dextrose 5%. 1000 milliLiter(s) (100 mL/Hr) IV Continuous <Continuous>  dextrose 5%. 1000 milliLiter(s) (100 mL/Hr) IV Continuous <Continuous>  dextrose 50% Injectable 25 Gram(s) IV Push once  dextrose 50% Injectable 25 Gram(s) IV Push once  dextrose 50% Injectable 12.5 Gram(s) IV Push once  dextrose 50% Injectable 12.5 Gram(s) IV Push once  dextrose 50% Injectable 25 Gram(s) IV Push once  dextrose 50% Injectable 25 Gram(s) IV Push once  glucagon  Injectable 1 milliGRAM(s) IntraMuscular once  glucagon  Injectable 1 milliGRAM(s) IntraMuscular once  insulin glargine Injectable (LANTUS) 12 Unit(s) SubCutaneous at bedtime  insulin lispro (ADMELOG) corrective regimen sliding scale   SubCutaneous Before meals and at bedtime  iron sucrose IVPB 300 milliGRAM(s) IV Intermittent every 24 hours  pantoprazole    Tablet 40 milliGRAM(s) Oral before breakfast  polyethylene glycol 3350 17 Gram(s) Oral daily  sodium chloride 0.45%. 1000 milliLiter(s) (150 mL/Hr) IV Continuous <Continuous>  sodium chloride 0.9%. 1000 milliLiter(s) (10 mL/Hr) IV Continuous <Continuous>  tacrolimus 2 milliGRAM(s) Oral <User Schedule>  tacrolimus 1 milliGRAM(s) Oral at bedtime    MEDICATIONS  (PRN):  acetaminophen     Tablet .. 650 milliGRAM(s) Oral every 6 hours PRN Mild Pain (1 - 3)  dextrose Oral Gel 15 Gram(s) Oral once PRN Blood Glucose LESS THAN 70 milliGRAM(s)/deciliter  dextrose Oral Gel 15 Gram(s) Oral once PRN Blood Glucose LESS THAN 70 milliGRAM(s)/deciliter  oxyCODONE    IR 5 milliGRAM(s) Oral every 4 hours PRN Moderate Pain (4 - 6)  oxyCODONE    IR 10 milliGRAM(s) Oral every 6 hours PRN Severe Pain (7 - 10)      ALLERGIES:  Allergies    naproxen (Hives)  Bairon&#x27;s Witness - No blood products (Unknown)    Intolerances        LABS:                        6.8    10.22 )-----------( 76       ( 28 Apr 2023 03:41 )             21.0     04-28    144  |  110<H>  |  25<H>  ----------------------------<  99  3.8   |  25  |  1.40<H>    Ca    8.2<L>      28 Apr 2023 04:25  Phos  3.8     04-27  Mg     1.8     04-28    TPro  4.4<L>  /  Alb  3.2<L>  /  TBili  1.2  /  DBili  x   /  AST  33  /  ALT  9<L>  /  AlkPhos  35<L>  04-28    PT/INR - ( 28 Apr 2023 03:41 )   PT: 22.1 sec;   INR: 1.85          PTT - ( 28 Apr 2023 03:41 )  PTT:43.8 sec                RADIOLOGY & ADDITIONAL TESTS: Reviewed.   Hematology Oncology Consult Note      HPI:  78 y/o male with polycystic kidney disease s/p kidney transplant in 2007, HTN, HLD, DM2, Prostate cancer s/p radical prostatectomy 2015, DVT 2006 (in setting of pelvic fracture), and afib/atrial flutter s/p ablation in 1/2018, who is now off oral anticoagulation secondary to GI bleed and presents for DENNY occlusoin. Pt as no symptomsHe was admitted [facility] with a [facility] bleed. He is a Restorationism and refuses blood transfusions. Eliquis was stopped at that time.     KIMMIE reviewed from prior ablation- appears to have good anatomy for LAAO.     Patient seen in same day holding area; Reports no changes to PMHx or medications since last seen by our team. Denies acute or current SOB, chest pain, palpitation, N/V/D, fever/chills, recent illness, or any other concerning symptoms.  NYHA 1 Class (25 Apr 2023 07:57)    Interval History: S/p DENNY occlusion 4/25 c/b cardiac tamponade and PEA arrest requiring bedside pericardiocentesis/ OR for sternotomy and local exploration 4/26.    SUBJECTIVE: Patient seen and examined at bedside. Tired, but feeling overall better today. Poor appetite, but trying. Some constipation, but taking miralax now. Denies fever, headache, nausea, vomiting, abdominal pain.    OBJECTIVE:    VITAL SIGNS:  ICU Vital Signs Last 24 Hrs  T(C): 36.2 (28 Apr 2023 14:12), Max: 36.8 (27 Apr 2023 17:06)  T(F): 97.2 (28 Apr 2023 14:12), Max: 98.3 (27 Apr 2023 17:06)  HR: 83 (28 Apr 2023 15:00) (71 - 88)  BP: 112/67 (28 Apr 2023 15:00) (112/67 - 131/69)  BP(mean): 85 (28 Apr 2023 15:00) (82 - 94)  ABP: 116/47 (28 Apr 2023 15:00) (116/47 - 176/71)  ABP(mean): 66 (28 Apr 2023 15:00) (65 - 101)  RR: 18 (28 Apr 2023 15:00) (16 - 20)  SpO2: 95% (28 Apr 2023 15:00) (92% - 100%)    O2 Parameters below as of 28 Apr 2023 15:00  Patient On (Oxygen Delivery Method): nasal cannula, high flow  O2 Flow (L/min): 50  O2 Concentration (%): 50          04-27 @ 07:01 - 04-28 @ 07:00  --------------------------------------------------------  IN: 3567.9 mL / OUT: 3410 mL / NET: 157.9 mL    04-28 @ 07:01 - 04-28 @ 15:44  --------------------------------------------------------  IN: 280 mL / OUT: 695 mL / NET: -415 mL      CAPILLARY BLOOD GLUCOSE      POCT Blood Glucose.: 120 mg/dL (28 Apr 2023 11:28)      PHYSICAL EXAM:  General: tired, NAD, answering questions  HEENT: NC/AT; PERRL, clear conjunctiva  Neck: supple  Respiratory: CTA b/l, HFNC  Cardiovascular: +S1/S2; RRR, midline sternotomy bandaged, 2 drains with serosanguinous drainage  Abdomen: soft, NT/ND; +BS x4  Extremities: WWP, 2+ peripheral pulses b/l; no LE edema  Skin: normal color and turgor; no rash  Neurological: AAOx3    MEDICATIONS:  MEDICATIONS  (STANDING):  atorvastatin 20 milliGRAM(s) Oral at bedtime  chlorhexidine 2% Cloths 1 Application(s) Topical <User Schedule>  dextrose 5%. 1000 milliLiter(s) (100 mL/Hr) IV Continuous <Continuous>  dextrose 5%. 1000 milliLiter(s) (100 mL/Hr) IV Continuous <Continuous>  dextrose 5%. 1000 milliLiter(s) (50 mL/Hr) IV Continuous <Continuous>  dextrose 5%. 1000 milliLiter(s) (50 mL/Hr) IV Continuous <Continuous>  dextrose 50% Injectable 12.5 Gram(s) IV Push once  dextrose 50% Injectable 12.5 Gram(s) IV Push once  dextrose 50% Injectable 25 Gram(s) IV Push once  dextrose 50% Injectable 25 Gram(s) IV Push once  dextrose 50% Injectable 25 Gram(s) IV Push once  dextrose 50% Injectable 25 Gram(s) IV Push once  glucagon  Injectable 1 milliGRAM(s) IntraMuscular once  glucagon  Injectable 1 milliGRAM(s) IntraMuscular once  insulin glargine Injectable (LANTUS) 12 Unit(s) SubCutaneous at bedtime  insulin lispro (ADMELOG) corrective regimen sliding scale   SubCutaneous Before meals and at bedtime  iron sucrose IVPB 300 milliGRAM(s) IV Intermittent every 24 hours  pantoprazole    Tablet 40 milliGRAM(s) Oral before breakfast  polyethylene glycol 3350 17 Gram(s) Oral daily  sodium chloride 0.45%. 1000 milliLiter(s) (150 mL/Hr) IV Continuous <Continuous>  sodium chloride 0.9%. 1000 milliLiter(s) (10 mL/Hr) IV Continuous <Continuous>  tacrolimus 1 milliGRAM(s) Oral at bedtime  tacrolimus 2 milliGRAM(s) Oral <User Schedule>    MEDICATIONS  (PRN):  acetaminophen     Tablet .. 650 milliGRAM(s) Oral every 6 hours PRN Mild Pain (1 - 3)  dextrose Oral Gel 15 Gram(s) Oral once PRN Blood Glucose LESS THAN 70 milliGRAM(s)/deciliter  dextrose Oral Gel 15 Gram(s) Oral once PRN Blood Glucose LESS THAN 70 milliGRAM(s)/deciliter  oxyCODONE    IR 10 milliGRAM(s) Oral every 6 hours PRN Severe Pain (7 - 10)  oxyCODONE    IR 5 milliGRAM(s) Oral every 4 hours PRN Moderate Pain (4 - 6)      ALLERGIES:  Allergies    naproxen (Hives)  Bairon&#x27;s Witness - No blood products (Unknown)    Intolerances        LABS:                        6.8    10.22 )-----------( 76       ( 28 Apr 2023 03:41 )             21.0     04-28    144  |  110<H>  |  25<H>  ----------------------------<  99  3.8   |  25  |  1.40<H>    Ca    8.2<L>      28 Apr 2023 04:25  Phos  3.8     04-27  Mg     1.8     04-28    TPro  4.4<L>  /  Alb  3.2<L>  /  TBili  1.2  /  DBili  x   /  AST  33  /  ALT  9<L>  /  AlkPhos  35<L>  04-28    PT/INR - ( 28 Apr 2023 03:41 )   PT: 22.1 sec;   INR: 1.85          PTT - ( 28 Apr 2023 03:41 )  PTT:43.8 sec                RADIOLOGY & ADDITIONAL TESTS: Reviewed.   Hematology Oncology Consult Note      The following initial history was obtained form the prior notes:   HPI:  78 y/o male with polycystic kidney disease s/p kidney transplant in 2007, HTN, HLD, DM2, Prostate cancer s/p radical prostatectomy 2015, DVT 2006 (in setting of pelvic fracture), and afib/atrial flutter s/p ablation in 1/2018, who is now off oral anticoagulation secondary to GI bleed and presents for DENNY occlusoin. Pt as no symptomsHe was [facility] with [facility]. He is a Denominational and refuses blood transfusions. Eliquis was stopped at that time.     "KIMMIE reviewed from prior ablation- appears to have good anatomy for LAAO.   Patient seen in same day holding area; Reports no changes to PMHx or medications since last seen by our team. Denies acute or current SOB, chest pain, palpitation, N/V/D, fever/chills, recent illness, or any other concerning symptoms.  NYHA 1 Class (25 Apr 2023 07:57)  Interval History: S/p DENNY occlusion 4/25 c/b cardiac tamponade and PEA arrest requiring bedside pericardiocentesis/ OR for sternotomy and local exploration 4/26."    SUBJECTIVE:   Patient seen today seated at bedside and examined at bedside. Tired, but feeling overall better today. Poor appetite, but trying. Some constipation, but taking miralax now. Denies fever, headache, nausea, vomiting, abdominal pain.  Family (including wife and sister) at bedside as well.    OBJECTIVE:    VITAL SIGNS:  ICU Vital Signs Last 24 Hrs  T(C): 36.2 (28 Apr 2023 14:12), Max: 36.8 (27 Apr 2023 17:06)  T(F): 97.2 (28 Apr 2023 14:12), Max: 98.3 (27 Apr 2023 17:06)  HR: 83 (28 Apr 2023 15:00) (71 - 88)  BP: 112/67 (28 Apr 2023 15:00) (112/67 - 131/69)  BP(mean): 85 (28 Apr 2023 15:00) (82 - 94)  ABP: 116/47 (28 Apr 2023 15:00) (116/47 - 176/71)  ABP(mean): 66 (28 Apr 2023 15:00) (65 - 101)  RR: 18 (28 Apr 2023 15:00) (16 - 20)  SpO2: 95% (28 Apr 2023 15:00) (92% - 100%)    O2 Parameters below as of 28 Apr 2023 15:00  Patient On (Oxygen Delivery Method): nasal cannula, high flow  O2 Flow (L/min): 50  O2 Concentration (%): 50          04-27 @ 07:01 - 04-28 @ 07:00  --------------------------------------------------------  IN: 3567.9 mL / OUT: 3410 mL / NET: 157.9 mL    04-28 @ 07:01 - 04-28 @ 15:44  --------------------------------------------------------  IN: 280 mL / OUT: 695 mL / NET: -415 mL      CAPILLARY BLOOD GLUCOSE      POCT Blood Glucose.: 120 mg/dL (28 Apr 2023 11:28)      PHYSICAL EXAM:  General: tired, sleepy, NAD, answering questions  HEENT: NC/AT; PERRL, clear conjunctiva  Neck: supple  Respiratory: CTA b/l, HFNC  Cardiovascular: +S1/S2; RRR, midline sternotomy bandaged, 2 drains with serosanguinous drainage  Abdomen: soft, NT/ND; +BS x4  Extremities: WWP, 2+ peripheral pulses b/l; no LE edema  Skin: normal color and turgor; no rash  Neurological: AAOx3    MEDICATIONS:  MEDICATIONS  (STANDING):  atorvastatin 20 milliGRAM(s) Oral at bedtime  chlorhexidine 2% Cloths 1 Application(s) Topical <User Schedule>  dextrose 5%. 1000 milliLiter(s) (100 mL/Hr) IV Continuous <Continuous>  dextrose 5%. 1000 milliLiter(s) (100 mL/Hr) IV Continuous <Continuous>  dextrose 5%. 1000 milliLiter(s) (50 mL/Hr) IV Continuous <Continuous>  dextrose 5%. 1000 milliLiter(s) (50 mL/Hr) IV Continuous <Continuous>  dextrose 50% Injectable 12.5 Gram(s) IV Push once  dextrose 50% Injectable 12.5 Gram(s) IV Push once  dextrose 50% Injectable 25 Gram(s) IV Push once  dextrose 50% Injectable 25 Gram(s) IV Push once  dextrose 50% Injectable 25 Gram(s) IV Push once  dextrose 50% Injectable 25 Gram(s) IV Push once  glucagon  Injectable 1 milliGRAM(s) IntraMuscular once  glucagon  Injectable 1 milliGRAM(s) IntraMuscular once  insulin glargine Injectable (LANTUS) 12 Unit(s) SubCutaneous at bedtime  insulin lispro (ADMELOG) corrective regimen sliding scale   SubCutaneous Before meals and at bedtime  iron sucrose IVPB 300 milliGRAM(s) IV Intermittent every 24 hours  pantoprazole    Tablet 40 milliGRAM(s) Oral before breakfast  polyethylene glycol 3350 17 Gram(s) Oral daily  sodium chloride 0.45%. 1000 milliLiter(s) (150 mL/Hr) IV Continuous <Continuous>  sodium chloride 0.9%. 1000 milliLiter(s) (10 mL/Hr) IV Continuous <Continuous>  tacrolimus 1 milliGRAM(s) Oral at bedtime  tacrolimus 2 milliGRAM(s) Oral <User Schedule>    MEDICATIONS  (PRN):  acetaminophen     Tablet .. 650 milliGRAM(s) Oral every 6 hours PRN Mild Pain (1 - 3)  dextrose Oral Gel 15 Gram(s) Oral once PRN Blood Glucose LESS THAN 70 milliGRAM(s)/deciliter  dextrose Oral Gel 15 Gram(s) Oral once PRN Blood Glucose LESS THAN 70 milliGRAM(s)/deciliter  oxyCODONE    IR 10 milliGRAM(s) Oral every 6 hours PRN Severe Pain (7 - 10)  oxyCODONE    IR 5 milliGRAM(s) Oral every 4 hours PRN Moderate Pain (4 - 6)      ALLERGIES:  Allergies    naproxen (Hives)  Bairon&#x27;s Witness - No blood products (Unknown)    Intolerances        LABS:                        6.8    10.22 )-----------( 76       ( 28 Apr 2023 03:41 )             21.0     04-28    144  |  110<H>  |  25<H>  ----------------------------<  99  3.8   |  25  |  1.40<H>    Ca    8.2<L>      28 Apr 2023 04:25  Phos  3.8     04-27  Mg     1.8     04-28    TPro  4.4<L>  /  Alb  3.2<L>  /  TBili  1.2  /  DBili  x   /  AST  33  /  ALT  9<L>  /  AlkPhos  35<L>  04-28    PT/INR - ( 28 Apr 2023 03:41 )   PT: 22.1 sec;   INR: 1.85          PTT - ( 28 Apr 2023 03:41 )  PTT:43.8 sec                RADIOLOGY & ADDITIONAL TESTS: Reviewed.

## 2023-04-28 NOTE — DIETITIAN INITIAL EVALUATION ADULT - WEIGHT FOR BMI (LBS)
Supportive Oncology Services    Pt presents to clinic for continuation and completion of dignity therapy, as well as medication mgmt alongside metastatic breast cancer dx. Pt reports positive mood and continued independence. Sleep is sufficient but could be better. Pt endorses caffeine use throughout day, and we have discussed limiting this to before noon only. Continues to take zyprexa, which is helpful. Additionally feels energy is somewhat improved with steroid mgmt through [facility]. However, continues to feel armodafinil was helpful and desires to have this available, despite cost. Pt goals continue to include being as present as possible in lives of grandchildren. At this point, pt continues to drive but is hindered by limited energy. Case discussed with Dr. Rudolph who has authorized refill of armodafinil, 150 mg qhs, #30, refill 1. Pt aware to take only half to one third tablet. Additionally, counseling provided regarding importance of rest and ability to take this medication on PRN basis. Plan to follow pt in 4 week.    Total face to face time spent 60 minutes with 50 minutes spent in dignity therapy. Counseling provided regarding topics of grief and loss, life review, and goals of care. Dignity therapy completed, and copies provided to patient.  
145
yes/by Rosina Hoffman RN

## 2023-04-28 NOTE — DIETITIAN INITIAL EVALUATION ADULT - OTHER CALCULATIONS
Based on Standards of Care pt within % IBW thus actual body weight used for all calculations. Needs adjusted for advanced age and post op. Meet lower end of protein needs pending renal function improvement.

## 2023-04-28 NOTE — DIETITIAN INITIAL EVALUATION ADULT - NSFNSGIIOFT_GEN_A_CORE
04-27-23 @ 07:01 - 04-28-23 @ 07:00  --------------------------------------------------------  OUT:    Chest Tube (mL): 730 mL  Total OUT: 730 mL    Total NET: -730 mL      04-28-23 @ 07:01 - 04-28-23 @ 15:10  --------------------------------------------------------  OUT:    Chest Tube (mL): 200 mL  Total OUT: 200 mL    Total NET: -200 mL

## 2023-04-28 NOTE — DIETITIAN INITIAL EVALUATION ADULT - PERTINENT LABORATORY DATA
04-28    144  |  110<H>  |  25<H>  ----------------------------<  99  3.8   |  25  |  1.40<H>    Ca    8.2<L>      28 Apr 2023 04:25  Phos  3.8     04-27  Mg     1.8     04-28    TPro  4.4<L>  /  Alb  3.2<L>  /  TBili  1.2  /  DBili  x   /  AST  33  /  ALT  9<L>  /  AlkPhos  35<L>  04-28  POCT Blood Glucose.: 120 mg/dL (04-28-23 @ 11:28)  A1C with Estimated Average Glucose Result: 6.3 % (07-26-22 @ 08:31)

## 2023-04-28 NOTE — CONSULT NOTE ADULT - SUBJECTIVE AND OBJECTIVE BOX
HPI:  76 y/o male with polycystic kidney disease s/p kidney transplant in 2007, presents for DENNY occlusion s/p repair 4/25 c/b pericardial effusion and tamponade s/p chest washout and evacuation of pericardial clot. Pt currently on HF NC O2. States feeling better, SOB improved. Pt on tacrolimus and prednisone 5mg at home.      PAST MEDICAL & SURGICAL HISTORY:  HTN, DM2, Prostate cancer s/p radical prostatectomy 2015, DVT 2006 (in setting of pelvic fracture), and afib/atrial flutter s/p ablation in 1/2018, who is now off oral anticoagulation secondary to GI bleed and   Polycystic kidney disease      DM2 (diabetes mellitus, type 2)      HLD (hyperlipidemia)      HTN (hypertension)      Prostate cancer      Kidney transplant recipient      DVT (deep venous thrombosis)      Chronic CHF      H/O radical prostatectomy  2010      S/P hernia repair  Abdominal and inguinal around 2005            Allergies:  naproxen (Hives)  Jehovah&#x27;s Witness - No blood products (Unknown)      Home Medications:   Coreg 12.5 mg oral tablet: 1 tab(s) orally 2 times a day  Jardiance 25 mg oral tablet: 1 tab(s) orally once a day (in the morning)  Levemir FlexTouch 100 units/mL subcutaneous solution: 12 unit(s) subcutaneous once a day (at bedtime)  Lipitor 20 mg oral tablet: 1 tab(s) orally once a day  lisinopril 40 mg oral tablet: 1 tab(s) orally once a day  metFORMIN 500 mg oral tablet, extended release: 1 tab(s) orally once a day in AM and 3 tabs in PM  Norvasc 10 mg oral tablet: 1 orally once a day  repaglinide 2 mg oral tablet: 1 tab(s) orally 2 times a day (before lunch and dinner )  tacrolimus 1 mg oral capsule: 2 cap(s) orally once a day (in the morning) and 1 cap in the PM  torsemide 20 mg oral tablet: 1 tab(s) orally 2 times a day  Trulicity Pen 1.5 mg/0.5 mL subcutaneous solution: subcutaneous once a week  Vitamin D3 1000 intl units oral capsule: 1 cap(s) orally 2 times a day      Hospital Medications:   MEDICATIONS  (STANDING):  atorvastatin 20 milliGRAM(s) Oral at bedtime  chlorhexidine 2% Cloths 1 Application(s) Topical <User Schedule>  dextrose 5%. 1000 milliLiter(s) (50 mL/Hr) IV Continuous <Continuous>  dextrose 5%. 1000 milliLiter(s) (100 mL/Hr) IV Continuous <Continuous>  dextrose 5%. 1000 milliLiter(s) (100 mL/Hr) IV Continuous <Continuous>  dextrose 5%. 1000 milliLiter(s) (50 mL/Hr) IV Continuous <Continuous>  dextrose 50% Injectable 12.5 Gram(s) IV Push once  dextrose 50% Injectable 25 Gram(s) IV Push once  dextrose 50% Injectable 12.5 Gram(s) IV Push once  dextrose 50% Injectable 25 Gram(s) IV Push once  dextrose 50% Injectable 25 Gram(s) IV Push once  dextrose 50% Injectable 25 Gram(s) IV Push once  glucagon  Injectable 1 milliGRAM(s) IntraMuscular once  glucagon  Injectable 1 milliGRAM(s) IntraMuscular once  insulin glargine Injectable (LANTUS) 12 Unit(s) SubCutaneous at bedtime  insulin lispro (ADMELOG) corrective regimen sliding scale   SubCutaneous Before meals and at bedtime  iron sucrose IVPB 300 milliGRAM(s) IV Intermittent every 24 hours  pantoprazole    Tablet 40 milliGRAM(s) Oral before breakfast  polyethylene glycol 3350 17 Gram(s) Oral daily  sodium chloride 0.45%. 1000 milliLiter(s) (150 mL/Hr) IV Continuous <Continuous>  sodium chloride 0.9%. 1000 milliLiter(s) (10 mL/Hr) IV Continuous <Continuous>  tacrolimus 2 milliGRAM(s) Oral <User Schedule>  tacrolimus 1 milliGRAM(s) Oral at bedtime      SOCIAL HISTORY:  Denies ETOh, Smoking    Family History:  FAMILY HISTORY:        VITALS:  T(F): 97.2 (04-28-23 @ 14:12), Max: 98.3 (04-27-23 @ 17:06)  HR: 92 (04-28-23 @ 16:00)  BP: 120/67 (04-28-23 @ 16:00)  RR: 20 (04-28-23 @ 16:00)  SpO2: 98% (04-28-23 @ 16:00)  Wt(kg): --    04-27 @ 07:01  -  04-28 @ 07:00  --------------------------------------------------------  IN: 3567.9 mL / OUT: 3410 mL / NET: 157.9 mL    04-28 @ 07:01  -  04-28 @ 16:30  --------------------------------------------------------  IN: 280 mL / OUT: 775 mL / NET: -495 mL        CAPILLARY BLOOD GLUCOSE      POCT Blood Glucose.: 120 mg/dL (28 Apr 2023 11:28)  POCT Blood Glucose.: 93 mg/dL (28 Apr 2023 07:29)  POCT Blood Glucose.: 145 mg/dL (27 Apr 2023 21:35)  POCT Blood Glucose.: 133 mg/dL (27 Apr 2023 17:33)      Review of Systems:  Negative except as mentioned in HPI    PHYSICAL EXAM:  GENERAL: Alert, awake, NAD  CHEST/LUNG: Decreased BS bases B/L, on HF NC O2, symmetric rise  HEART: S1, S2, RRR  ABDOMEN: Soft, nontender  : Tucker in place  EXTREMITIES: trace LE edema  Neurology: AAOx3, no focal neurological deficit      LABS:  04-28    144  |  110<H>  |  25<H>  ----------------------------<  99  3.8   |  25  |  1.40<H>    Ca    8.2<L>      28 Apr 2023 04:25  Phos  3.8     04-27  Mg     1.8     04-28    TPro  4.4<L>  /  Alb  3.2<L>  /  TBili  1.2  /  DBili      /  AST  33  /  ALT  9<L>  /  AlkPhos  35<L>  04-28    Creatinine Trend: 1.40 <--, 1.50 <--, 1.49 <--, 1.42 <--, 1.39 <--, 1.29 <--, 1.30 <--, 1.35 <--, 1.25 <--, 1.13 <--, 1.40 <--, 1.34 <--, 1.70 <--, 1.55 <--                        6.8    10.22 )-----------( 76       ( 28 Apr 2023 03:41 )             21.0     Urine Studies:

## 2023-04-28 NOTE — PROGRESS NOTE ADULT - SUBJECTIVE AND OBJECTIVE BOX
CTICU  CRITICAL  CARE  attending     Hand off received 					   Pertinent clinical, laboratory, radiographic, hemodynamic, echocardiographic, respiratory data, microbiologic data and chart were reviewed and analyzed frequently throughout the course of the day  Patient seen and examined with CTS/ SH attending at bedside  Pt is a 77yr old male with PMH PKD sp kidney transplant (), HTN, HLD, DM, prostate ca sp radical prostatectomy (), DVT in setting of pelvic fx (), afib/flutter sp ablation (, off AC), admitted for surgical mgmt. Patient underwent left atrial appendage occlusion percutaneously with Dr. Sidhu 23. Arrived to the stepdown extubated on no drips. Overnight had PEA arrest, requiring ACLS and intubation. Tx to ICU, bedside TTE showing pericardial effusion with tamponade. Bedside procedure with 600cc blood aspirated. Given 2 units pRBC. Taken to OR early AM for sternotomy with 1.5L L hemothorax evacuated. Arrived intubated on epi, levo, vaso, rose mary. Switched to dobutamine and weaned off some pressors. Initially had lactic acidosis, now resolved. CPAP'd overnight. : extubated,  off, Bijan weaned.  swan dc, Bijan weaned.       FAMILY HISTORY:  PAST MEDICAL & SURGICAL HISTORY:  Polycystic kidney disease  DM2 (diabetes mellitus, type 2)  HLD (hyperlipidemia)  HTN (hypertension  Prostate cancer  Kidney transplant recipient  DVT (deep venous thrombosis)  Chronic CHF  H/O radical prostatectomy    S/P hernia repair  Abdominal and inguinal around         Patient is a 77y old  Male who presents with a chief complaint of percutaneous DENNY closure.      14 system review was unremarkable    Vital signs, hemodynamic and respiratory parameters were reviewed from the bedside nursing flowsheet.  ICU Vital Signs Last 24 Hrs  T(C): 36.4 (2023 18:03), Max: 36.7 (2023 09:02)  T(F): 97.6 (2023 18:03), Max: 98 (2023 09:02)  HR: 86 (2023 21:17) (71 - 92)  BP: 103/54 (2023 21:00) (100/57 - 131/72)  BP(mean): 74 (2023 21:00) (74 - 96)  ABP: 112/47 (2023 21:00) (112/47 - 176/71)  ABP(mean): 64 (2023 21:00) (64 - 101)  RR: 18 (2023 21:17) (16 - 22)  SpO2: 100% (2023 21:17) (92% - 100%)    O2 Parameters below as of 2023 21:17  Patient On (Oxygen Delivery Method): nasal cannula, high flow  O2 Flow (L/min): 50  O2 Concentration (%): 50      Adult Advanced Hemodynamics Last 24 Hrs  CVP(mm Hg): 10 (2023 21:00) (6 - 29)  CVP(cm H2O): --  CO: --  CI: --  PA: 11/ (2023 05:05) ( - 65/30)  PA(mean): 10 (2023 05:05) (10 - 45)  PCWP: --  SVR: --  SVRI: --  PVR: --  PVRI: --, ABG - ( 2023 03:47 )  pH, Arterial: 7.34  pH, Blood: x     /  pCO2: 47    /  pO2: 83    / HCO3: 25    / Base Excess: -0.8  /  SaO2: 97.9                Intake and output was reviewed and the fluid balance was calculated  Daily     Daily   I&O's Summary    2023 07:01  -  2023 07:00  --------------------------------------------------------  IN: 3567.9 mL / OUT: 3410 mL / NET: 157.9 mL    2023 07:01  -  2023 21:28  --------------------------------------------------------  IN: 790 mL / OUT: 1130 mL / NET: -340 mL        All lines and drain sites were assessed  Glycemic trend was reviewedCAPILLARY BLOOD GLUCOSE      POCT Blood Glucose.: 112 mg/dL (2023 16:38)      Neuro: awake, moving all extremities, following commands  HEENT: ett  Heart: s1 s2  Lungs: clear bl  Abdomen: soft nt nd  Extremities: 2+dp    Lines:  RIJ Cordis with [place]   R radial arterial line     Tubes:  CT  Luis Miguel      labs  CBC Full  -  ( 2023 03:41 )  WBC Count : 10.22 K/uL  RBC Count : 2.13 M/uL  Hemoglobin : 6.8 g/dL  Hematocrit : 21.0 %  Platelet Count - Automated : 76 K/uL  Mean Cell Volume : 98.6 fl  Mean Cell Hemoglobin : 31.9 pg  Mean Cell Hemoglobin Concentration : 32.4 gm/dL  Auto Neutrophil # : 8.71 K/uL  Auto Lymphocyte # : 0.73 K/uL  Auto Monocyte # : 0.64 K/uL  Auto Eosinophil # : 0.05 K/uL  Auto Basophil # : 0.02 K/uL  Auto Neutrophil % : 85.2 %  Auto Lymphocyte % : 7.1 %  Auto Monocyte % : 6.3 %  Auto Eosinophil % : 0.5 %  Auto Basophil % : 0.2 %        144  |  110<H>  |  25<H>  ----------------------------<  99  3.8   |  25  |  1.40<H>    Ca    8.2<L>      2023 04:25  Phos  3.8     -27  Mg     1.8         TPro  4.4<L>  /  Alb  3.2<L>  /  TBili  1.2  /  DBili  x   /  AST  33  /  ALT  9<L>  /  AlkPhos  35<L>  28    PT/INR - ( 2023 03:41 )   PT: 22.1 sec;   INR: 1.85          PTT - ( 2023 03:41 )  PTT:43.8 sec  The current medications were reviewed   MEDICATIONS  (STANDING):  atorvastatin 20 milliGRAM(s) Oral at bedtime  chlorhexidine 2% Cloths 1 Application(s) Topical <User Schedule>  cyanocobalamin 1000 MICROGram(s) Oral daily  dextrose 5%. 1000 milliLiter(s) (50 mL/Hr) IV Continuous <Continuous>  dextrose 5%. 1000 milliLiter(s) (100 mL/Hr) IV Continuous <Continuous>  dextrose 5%. 1000 milliLiter(s) (50 mL/Hr) IV Continuous <Continuous>  dextrose 5%. 1000 milliLiter(s) (100 mL/Hr) IV Continuous <Continuous>  dextrose 50% Injectable 12.5 Gram(s) IV Push once  dextrose 50% Injectable 25 Gram(s) IV Push once  dextrose 50% Injectable 25 Gram(s) IV Push once  dextrose 50% Injectable 25 Gram(s) IV Push once  dextrose 50% Injectable 25 Gram(s) IV Push once  dextrose 50% Injectable 12.5 Gram(s) IV Push once  epoetin annalee-epbx (RETACRIT) Injectable 78035 Unit(s) IV Push <User Schedule>  folic acid 1 milliGRAM(s) Oral daily  furosemide   Injectable 10 milliGRAM(s) IV Push once  glucagon  Injectable 1 milliGRAM(s) IntraMuscular once  glucagon  Injectable 1 milliGRAM(s) IntraMuscular once  insulin glargine Injectable (LANTUS) 12 Unit(s) SubCutaneous at bedtime  insulin lispro (ADMELOG) corrective regimen sliding scale   SubCutaneous Before meals and at bedtime  pantoprazole    Tablet 40 milliGRAM(s) Oral before breakfast  polyethylene glycol 3350 17 Gram(s) Oral daily  predniSONE   Tablet 5 milliGRAM(s) Oral daily  sodium chloride 0.45%. 1000 milliLiter(s) (150 mL/Hr) IV Continuous <Continuous>  sodium chloride 0.9%. 1000 milliLiter(s) (10 mL/Hr) IV Continuous <Continuous>  tacrolimus 2 milliGRAM(s) Oral every 12 hours    MEDICATIONS  (PRN):  acetaminophen     Tablet .. 650 milliGRAM(s) Oral every 6 hours PRN Mild Pain (1 - 3)  dextrose Oral Gel 15 Gram(s) Oral once PRN Blood Glucose LESS THAN 70 milliGRAM(s)/deciliter  dextrose Oral Gel 15 Gram(s) Oral once PRN Blood Glucose LESS THAN 70 milliGRAM(s)/deciliter  oxyCODONE    IR 10 milliGRAM(s) Oral every 6 hours PRN Severe Pain (7 - 10)  oxyCODONE    IR 5 milliGRAM(s) Oral every 4 hours PRN Moderate Pain (4 - 6)      Assessment/Plan:  77yr old male with PMH PKD sp kidney transplant (), HTN, HLD, DM, prostate ca sp radical prostatectomy (), DVT in setting of pelvic fx (2006), afib/flutter sp ablation (2018, off AC), admitted for surgical mgmt. Patient is a [employer].    POD3 left atrial appendage occlusion percutaneously (Nahum, 23)  POD2 RTOR for L hemothorax evacuation (23)  MAP goal 65  On Bijan-wean as tolerated  Acute post operative anemia-trend H/H  Thrombocytopenia-monitor  Continue IS for renal transplant  Appreciate heme/onc recs  Diet as tolerated  Replete lytes prn  Monitor CT output  GI/DVT PPX  Bowel Regimen  Pain control  OOB with PT  Close hemodynamic, ventilatory and drain monitoring and management per post op routine  Monitor for arrhythmias and monitor parameters for organ perfusion  Monitor neurologic status  Head of the bed should remain elevated to 45 deg   Chest PT and IS will be encouraged  Monitor adequacy of oxygenation and ventilation and attempt to wean oxygen  Antibiotic regimen will be tailored to the clinical, laboratory and microbiologic data  Nutritional goals will be met using po eventually, ensure adequate caloric intake and monitor the same  Stress ulcer and VTE prophylaxis will be achieved    Glycemic control is satisfactory  Electrolytes have been repleted as necessary and wound care has been carried out   Pain control has been achieved.   Aggressive physical therapy and early mobility and ambulation goals will be met   The family was updated about the course and plan.    CRITICAL CARE TIME personally provided by me  in evaluation and management, reassessments, review and interpretation of labs and x-rays, ventilator and hemodynamic management, formulating a plan and coordinating care: ___30___ MIN.  Time does not include procedural time.    CTICU ATTENDING     					  Carmen Ortega MD

## 2023-04-28 NOTE — CONSULT NOTE ADULT - ASSESSMENT
Assessment/Plan:   78 yo M w/ hx of renal transplant (2007, initially kidney failure due to polycystic kidney disease) now admitted for DENNY occlusion repair c/b cardiac tamponade    #CKD 3 w/ Renal transplant  On prednisone 5mg and tacrolimus at home (2mg AM and 1mg PM)  BCr 1.2-1.4 eGFR 55-60  Now this admission w/ Cr 1.2-1.5, currently at 1.4  Tacro level <2 (4/27) which can be considered an accurate trough level (11 hours after previous dose)    Recommend:   Given subtherapeutic level, will increase tacro to 2mg AM and 2mg PM. So, can given 2mg tonight and recheck a trough level in AM. Please ensure that trough level should be drawn 30 minutes before the next dose in AM  Goal tacro level 4-5  Can resume home prednisone 5mg  strict I/Os       #Acute anemia  due to L hemothorax 4/26  Pt Jehova's witness  Agree w/ epo administration per heme/onc recs; 20K units/day for 5 days  s/p iron 300 mg daily x3 days 4/26      Thank you for the opportunity to participate in the care of your patient. The nephrology service remains available to assist with any questions or concerns. Please feel free to reach us by paging the on-call nephrology fellow for urgent issues or as below.     Jose Cleary M.D.  PGY 4 - Nephrology Fellow  [phone] Assessment/Plan:   76 yo M w/ hx of renal transplant (2007, initially kidney failure due to polycystic kidney disease) now admitted for DENNY occlusion repair c/b cardiac tamponade    #CKD 3 w/ Renal transplant  On prednisone 5mg and tacrolimus at home (2mg AM and 1mg PM)  BCr 1.2-1.4 eGFR 55-60  Now this admission w/ Cr 1.2-1.5, currently at 1.4  Tacro level <2 (4/27) which can be considered an accurate trough level - needs to be just before dosing and interval should be Q12H eg 11.5-12 hour trough    Recommend:   Given subtherapeutic level, will increase tacro to 2mg AM and 2mg PM. So, can given 2mg tonight and recheck a trough level in AM. Please ensure that trough level should be drawn 30 minutes before the next dose in AM  Goal tacro level 4-5  Can resume home prednisone 5mg  strict I/Os       #Acute anemia  due to L hemothorax 4/26  Pt Jehova's witness  Agree w/ epo administration per heme/onc recs; 20K units/day for 5 days  s/p iron 300 mg daily x3 days 4/26      Thank you for the opportunity to participate in the care of your patient. The nephrology service remains available to assist with any questions or concerns. Please feel free to reach us by paging the on-call nephrology fellow for urgent issues or as below.     Jose Cleary M.D.  PGY 4 - Nephrology Fellow  [phone]

## 2023-04-29 LAB
ALBUMIN SERPL ELPH-MCNC: 3.2 G/DL — LOW (ref 3.3–5)
ALP SERPL-CCNC: 54 U/L — SIGNIFICANT CHANGE UP (ref 40–120)
ALT FLD-CCNC: <5 U/L — LOW (ref 10–45)
ANION GAP SERPL CALC-SCNC: 9 MMOL/L — SIGNIFICANT CHANGE UP (ref 5–17)
APTT BLD: 44.1 SEC — HIGH (ref 27.5–35.5)
AST SERPL-CCNC: 24 U/L — SIGNIFICANT CHANGE UP (ref 10–40)
BASOPHILS # BLD AUTO: 0.02 K/UL — SIGNIFICANT CHANGE UP (ref 0–0.2)
BASOPHILS NFR BLD AUTO: 0.2 % — SIGNIFICANT CHANGE UP (ref 0–2)
BILIRUB SERPL-MCNC: 2.4 MG/DL — HIGH (ref 0.2–1.2)
BUN SERPL-MCNC: 31 MG/DL — HIGH (ref 7–23)
CALCIUM SERPL-MCNC: 8.8 MG/DL — SIGNIFICANT CHANGE UP (ref 8.4–10.5)
CHLORIDE SERPL-SCNC: 106 MMOL/L — SIGNIFICANT CHANGE UP (ref 96–108)
CO2 SERPL-SCNC: 25 MMOL/L — SIGNIFICANT CHANGE UP (ref 22–31)
CREAT SERPL-MCNC: 1.62 MG/DL — HIGH (ref 0.5–1.3)
EGFR: 44 ML/MIN/1.73M2 — LOW
EOSINOPHIL # BLD AUTO: 0.01 K/UL — SIGNIFICANT CHANGE UP (ref 0–0.5)
EOSINOPHIL NFR BLD AUTO: 0.1 % — SIGNIFICANT CHANGE UP (ref 0–6)
FIBRINOGEN PPP-MCNC: 411 MG/DL — SIGNIFICANT CHANGE UP (ref 200–445)
GAS PNL BLDA: SIGNIFICANT CHANGE UP
GLUCOSE BLDC GLUCOMTR-MCNC: 127 MG/DL — HIGH (ref 70–99)
GLUCOSE BLDC GLUCOMTR-MCNC: 135 MG/DL — HIGH (ref 70–99)
GLUCOSE BLDC GLUCOMTR-MCNC: 81 MG/DL — SIGNIFICANT CHANGE UP (ref 70–99)
GLUCOSE BLDC GLUCOMTR-MCNC: 94 MG/DL — SIGNIFICANT CHANGE UP (ref 70–99)
GLUCOSE SERPL-MCNC: 93 MG/DL — SIGNIFICANT CHANGE UP (ref 70–99)
HCT VFR BLD CALC: 23.4 % — LOW (ref 39–50)
HGB BLD-MCNC: 7.7 G/DL — LOW (ref 13–17)
IMM GRANULOCYTES NFR BLD AUTO: 1.4 % — HIGH (ref 0–0.9)
INR BLD: 1.94 — HIGH (ref 0.88–1.16)
LYMPHOCYTES # BLD AUTO: 0.56 K/UL — LOW (ref 1–3.3)
LYMPHOCYTES # BLD AUTO: 4.7 % — LOW (ref 13–44)
MAGNESIUM SERPL-MCNC: 2.4 MG/DL — SIGNIFICANT CHANGE UP (ref 1.6–2.6)
MCHC RBC-ENTMCNC: 32.4 PG — SIGNIFICANT CHANGE UP (ref 27–34)
MCHC RBC-ENTMCNC: 32.9 GM/DL — SIGNIFICANT CHANGE UP (ref 32–36)
MCV RBC AUTO: 98.3 FL — SIGNIFICANT CHANGE UP (ref 80–100)
MONOCYTES # BLD AUTO: 0.73 K/UL — SIGNIFICANT CHANGE UP (ref 0–0.9)
MONOCYTES NFR BLD AUTO: 6.1 % — SIGNIFICANT CHANGE UP (ref 2–14)
NEUTROPHILS # BLD AUTO: 10.55 K/UL — HIGH (ref 1.8–7.4)
NEUTROPHILS NFR BLD AUTO: 87.5 % — HIGH (ref 43–77)
NRBC # BLD: 2 /100 WBCS — HIGH (ref 0–0)
PHOSPHATE SERPL-MCNC: 3.6 MG/DL — SIGNIFICANT CHANGE UP (ref 2.5–4.5)
PLATELET # BLD AUTO: 94 K/UL — LOW (ref 150–400)
POTASSIUM SERPL-MCNC: 4.1 MMOL/L — SIGNIFICANT CHANGE UP (ref 3.5–5.3)
POTASSIUM SERPL-SCNC: 4.1 MMOL/L — SIGNIFICANT CHANGE UP (ref 3.5–5.3)
PROT SERPL-MCNC: 4.7 G/DL — LOW (ref 6–8.3)
PROTHROM AB SERPL-ACNC: 23.3 SEC — HIGH (ref 10.5–13.4)
RBC # BLD: 2.38 M/UL — LOW (ref 4.2–5.8)
RBC # BLD: 2.38 M/UL — LOW (ref 4.2–5.8)
RBC # FLD: 16.1 % — HIGH (ref 10.3–14.5)
RETICS #: 35 K/UL — SIGNIFICANT CHANGE UP (ref 25–125)
RETICS/RBC NFR: 1.5 % — SIGNIFICANT CHANGE UP (ref 0.5–2.5)
SODIUM SERPL-SCNC: 140 MMOL/L — SIGNIFICANT CHANGE UP (ref 135–145)
TACROLIMUS SERPL-MCNC: 2.4 NG/ML — SIGNIFICANT CHANGE UP
WBC # BLD: 12.04 K/UL — HIGH (ref 3.8–10.5)
WBC # FLD AUTO: 12.04 K/UL — HIGH (ref 3.8–10.5)

## 2023-04-29 PROCEDURE — 99291 CRITICAL CARE FIRST HOUR: CPT

## 2023-04-29 PROCEDURE — 71045 X-RAY EXAM CHEST 1 VIEW: CPT | Mod: 26,76

## 2023-04-29 PROCEDURE — 32557 INSERT CATH PLEURA W/ IMAGE: CPT

## 2023-04-29 PROCEDURE — 99292 CRITICAL CARE ADDL 30 MIN: CPT

## 2023-04-29 PROCEDURE — 76604 US EXAM CHEST: CPT | Mod: 26

## 2023-04-29 RX ORDER — FENTANYL CITRATE 50 UG/ML
25 INJECTION INTRAVENOUS ONCE
Refills: 0 | Status: DISCONTINUED | OUTPATIENT
Start: 2023-04-29 | End: 2023-04-29

## 2023-04-29 RX ORDER — DEXMEDETOMIDINE HYDROCHLORIDE IN 0.9% SODIUM CHLORIDE 4 UG/ML
0.4 INJECTION INTRAVENOUS
Qty: 400 | Refills: 0 | Status: DISCONTINUED | OUTPATIENT
Start: 2023-04-29 | End: 2023-04-30

## 2023-04-29 RX ORDER — ACETAMINOPHEN 500 MG
1000 TABLET ORAL ONCE
Refills: 0 | Status: COMPLETED | OUTPATIENT
Start: 2023-04-29 | End: 2023-04-29

## 2023-04-29 RX ORDER — FUROSEMIDE 40 MG
20 TABLET ORAL ONCE
Refills: 0 | Status: COMPLETED | OUTPATIENT
Start: 2023-04-29 | End: 2023-04-29

## 2023-04-29 RX ADMIN — CHLORHEXIDINE GLUCONATE 1 APPLICATION(S): 213 SOLUTION TOPICAL at 06:16

## 2023-04-29 RX ADMIN — Medication 20 MILLIGRAM(S): at 00:13

## 2023-04-29 RX ADMIN — PREGABALIN 1000 MICROGRAM(S): 225 CAPSULE ORAL at 14:23

## 2023-04-29 RX ADMIN — ERYTHROPOIETIN 20000 UNIT(S): 10000 INJECTION, SOLUTION INTRAVENOUS; SUBCUTANEOUS at 18:01

## 2023-04-29 RX ADMIN — Medication 1000 MILLIGRAM(S): at 21:00

## 2023-04-29 RX ADMIN — Medication 1 MILLIGRAM(S): at 14:23

## 2023-04-29 RX ADMIN — TACROLIMUS 2 MILLIGRAM(S): 5 CAPSULE ORAL at 18:02

## 2023-04-29 RX ADMIN — Medication 5 MILLIGRAM(S): at 05:15

## 2023-04-29 RX ADMIN — Medication 400 MILLIGRAM(S): at 19:49

## 2023-04-29 RX ADMIN — PANTOPRAZOLE SODIUM 40 MILLIGRAM(S): 20 TABLET, DELAYED RELEASE ORAL at 06:16

## 2023-04-29 RX ADMIN — POLYETHYLENE GLYCOL 3350 17 GRAM(S): 17 POWDER, FOR SOLUTION ORAL at 14:23

## 2023-04-29 RX ADMIN — ATORVASTATIN CALCIUM 20 MILLIGRAM(S): 80 TABLET, FILM COATED ORAL at 22:50

## 2023-04-29 RX ADMIN — FENTANYL CITRATE 25 MICROGRAM(S): 50 INJECTION INTRAVENOUS at 19:25

## 2023-04-29 RX ADMIN — FENTANYL CITRATE 25 MICROGRAM(S): 50 INJECTION INTRAVENOUS at 19:41

## 2023-04-29 NOTE — PHYSICAL THERAPY INITIAL EVALUATION ADULT - PERTINENT HX OF CURRENT PROBLEM, REHAB EVAL
Patient is a 77 year old male who presents for DENNY occlusoin. Patient underwent left atrial appendage occlusion percutaneously with Dr. Sidhu 4/25/23.Overnight had PEA arrest, requiring ACLS and intubation. Tx to ICU, bedside TTE showing pericardial effusion with tamponade. Bedside procedure with 600cc blood aspirated. Given 2 units pRBC. Taken to OR early AM for sternotomy with 1.5L L hemothorax evacuated.

## 2023-04-29 NOTE — PHYSICAL THERAPY INITIAL EVALUATION ADULT - GAIT DEVIATIONS NOTED, PT EVAL
patient with unsteady gait, no LOB, required verbal cues for weightshifting and stepping/decreased jerry/decreased step length patient with unsteady gait, no LOB, required verbal cues for weightshifting and stepping, patient reported 9/10 dyspnea after mobility/decreased jerry/decreased step length

## 2023-04-29 NOTE — PROGRESS NOTE ADULT - SUBJECTIVE AND OBJECTIVE BOX
INTERVAL HPI/OVERNIGHT EVENTS:    : OR - sternotomy for exploration - no tamponade; hemothorax evacuated (1.5L)  : Emergent Bedside pericardiocentesis  : DENNY Occlusion - Percutaneous using a 25 mm Amulet device      76yo Male Jehovahs witness Hx Polycystic kidney disease - transplant ', HTN, HLD, DM, Prostate Ca - radical prostatectomy '15, DVT '/pelvic fracture, afib/atrial flutter - ablation  - off systemic oral anticoagulation (GIB) presenting for planned DENNY occlusion     To OR  - uneventful procedure and to floor post procedure     per staff - just prior to code/arrest ([place])  - patient reported chest pain - initial rhythm PEA  chest compressions in progress at time of my arrival     bedside POCUS - (+)lung sliding noted bilaterally and pericardial effusion   Cardiology fellow consulted and ECHO demonstrating large pericardial effusion     patient cold/mottled - unable to obtain Sa02 but ABG p02 >400  emergent volume given including albumin and pRBC (2) /pressors started   on multiple high dose pressors with persistent labile BP; unable to transport or await operative team coming to hospital   structural heart attending present - emergent bedside ECHO assisted pericardiocentesis - 600 cc blood aspirated initially and returned to patient - ongoing aspiration required as noted ongoing accumulation and persistent labile BP - also returned to patient     prolonged arrest - short periods loss of pulse - patient awake and following simple commands at time of emergent pericardiocentesis     cont to require IVP medications to sustain MAP   multiple discussions with outside physicians at family request and d/w Heme/Blood bank and review of patients expressed wishes per preOp blood bank/transfusion sheet - given:     NovoFactor VII  FEIBA  Protamine   Cryo    Wife contacted to inform of deterioration and possible OR - indicates patient is a [employer] and does not want blood products under any circumstances.     Wife informed that without potential OR and ability to use blood products patient may   restated patients refusal under any circumstances - discussed with CTS    taken to OR for exploration - no tamponade; hemothorax - 1.5 L evacuated   arrived to ICU - Epi/Levo/Vaso/Fuad -  added post-exploration with significant dec in pressor requirements    :   [place] removed  Extubated to HFNC with Diamond (10 PPM)  Epogen/IV FeS04 started; conservative lab strategy    ECHO : Left ventricular endocardium is not well visualized. Grossly, left ventricular function appears normal.  Dilated right ventricular size. Mildly reduced right ventricular systolic function. Biatrial enlargement. Pulmonary hypertension present, pulmonary artery systolic pressure is 51 mmHg. Small pericardial effusion without echocardiographic evidence of   cardiac tamponade physiology.    remains on Diamond via HFNC     No acute events reported overnight - patient OOB in chair at this time       PMHx includes but is not limited to:  Polycystic kidney disease  DM2 (diabetes mellitus, type 2)  HLD (hyperlipidemia)  HTN (hypertension)  Prostate cancer  Kidney transplant recipient  DVT (deep venous thrombosis)  Chronic CHF  H/O radical prostatectomy '10  S/P hernia repair  Abdominal and inguinal around     ICU Vital Signs Last 24 Hrs  T(C): 36.8 (2023 08:41), Max: 37 (2023 01:18)  T(F): 98.2 (2023 08:41), Max: 98.6 (2023 01:18)  HR: 92 (2023 12:00) (80 - 99) fib   BP: 116/62 (2023 12:00) (100/57 - 132/82)  BP(mean): 82 (2023 12:00) (74 - 100)  ABP: 128/57 (2023 12:00) (112/46 - 168/65)  ABP(mean): 76 (2023 12:00) (63 - 95)  RR: 20 (2023 12:00) (17 - 22)  SpO2: 89% (2023 12:00) (87% - 100%) 50/60 Diamond 3PPM     Qtts: None     I&O's Summary    2023 07:  -  2023 07:00  --------------------------------------------------------  IN: 1110 mL / OUT: 2160 mL / NET: -1050 mL    2023 07:01  -  2023 12:48  --------------------------------------------------------  IN: 420 mL / OUT: 670 mL / NET: -250 mL    Physical Exam    Heart - regular (-)rub/gallop  Lungs - dec BS based - no rhonchi/wheeze  Abd - (+)BS soft NTND (-)r/r/g  Ext - warm to touch; no cyanosis/clubbing  Neuro - alert/oriented and interactive - moving all extremities  Skin - no rash     LABS:                        7.7    12.04 )-----------( 94       ( 2023 04:33 )             23.4     0429    140  |  106  |  31<H>  ----------------------------<  93  4.1   |  25  |  1.62<H>    Ca    8.8      2023 04:33  Phos  3.6       Mg     2.4     29    TPro  4.7<L>  /  Alb  3.2<L>  /  TBili  2.4<H>  /  DBili  x   /  AST  24  /  ALT  <5<L>  /  AlkPhos  54  04-29    PT/INR - ( 2023 04:33 )   PT: 23.3 sec;   INR: 1.94     PTT - ( 2023 04:33 )  PTT:44.1 sec    ABG - ( 2023 04:50 )  pH, Arterial: 7.39  pH, Blood: x     /  pCO2: 42    /  pO2: 82    / HCO3: 25    / Base Excess: 0.3   /  SaO2: 97.7      RADIOLOGY & ADDITIONAL STUDIES: reviewed     Patient with atrial Fib - unable to take systemic AC - s/p DENNY Occlusion - Percutaneous using a 25 mm Amulet device  - acute decompensation op night - PEA arrest found to have pericardial effusion/tamponade post DENNY occlusion Amulet device - known complication of elective procedure - patient expressly made aware in process of informed consent - now with PEA arrest - pericardial effusion/tamponade s/p emergent pericardiocentesis with ongoing bleeding - given multiple meds to stop bleeding - Melo 7/Cryo/protamine - taken to OR for exploration - no active bleeding encountered - removal of some clot and evacuation of hemothorax. Respiratory failure requiring HFNC and DIAMOND with large effusions noted on xray     1. CV  stable hemodynamics  sinus rhythm   statin  on DIAMOND via HFNC for ventricular support - plan titrate down slowly    2. Pulm   on HFNC/Diamond - may be limited in ability to titrate   plan slow wean; suspect will still need HFNC and may need IR guided thoracentesis on right   incentive spirometry/ambulation with staff assist   monitor chest tube output  bedside sono for effusion on right   PT to consult    3. Heme  heme consult to assist in dosing of Epo/IV FeS04  pediatric tubes to be used for any blood draws and conservative strategy to be used in lab ordering   plan per Heme :   s/p iron 300 mg daily x3 days , would hold off on further iron at this time  - s/p EPO 13,000 units on   - On EPO 20,000 units per day for 5 days (-) (hold for hemoglobin >11)  folic acid (1 mg daily) and vitamin B12 ( 1000 mcg daily) po  - Trend CBC and coagulation panel using pediatric tubes  - Continue to trend PT/PTT/Fibrinogen once daily.  If worsening or emergent bleeding, patient's accepted product list includes Kcentra and cryoprecipitate    4. Renal   renal consult in setting of transplant   tacrolimus level obtained and medication adjusted accordingly   Prednisone    SCD and GI prophylaxis     d/w patient/family present at bedside; staff and structural heart attending    I have spent/provided stated minutes of critical care time to this patient: 60 INTERVAL HPI/OVERNIGHT EVENTS:    : OR - sternotomy for exploration - no tamponade; hemothorax evacuated (1.5L)  : Emergent Bedside pericardiocentesis  : DENNY Occlusion - Percutaneous using a 25 mm Amulet device      76yo Male Jehovahs witness Hx Polycystic kidney disease - transplant ', HTN, HLD, DM, Prostate Ca - radical prostatectomy '15, DVT '/pelvic fracture, afib/atrial flutter - ablation  - off systemic oral anticoagulation (GIB) presenting for planned DENNY occlusion     To OR  - uneventful procedure and to floor post procedure     per staff - just prior to code/arrest ([place])  - patient reported chest pain - initial rhythm PEA  chest compressions in progress at time of my arrival     bedside POCUS - (+)lung sliding noted bilaterally and pericardial effusion   Cardiology fellow consulted and ECHO demonstrating large pericardial effusion     patient cold/mottled - unable to obtain Sa02 but ABG p02 >400  emergent volume given including albumin and pRBC (2) /pressors started   on multiple high dose pressors with persistent labile BP; unable to transport or await operative team coming to hospital   structural heart attending present - emergent bedside ECHO assisted pericardiocentesis - 600 cc blood aspirated initially and returned to patient - ongoing aspiration required as noted ongoing accumulation and persistent labile BP - also returned to patient     prolonged arrest - short periods loss of pulse - patient awake and following simple commands at time of emergent pericardiocentesis     cont to require IVP medications to sustain MAP   multiple discussions with outside physicians at family request and d/w Heme/Blood bank and review of patients expressed wishes per preOp blood bank/transfusion sheet - given:     NovoFactor VII  FEIBA  Protamine   Cryo    Wife contacted to inform of deterioration and possible OR - indicates patient is a [employer] and does not want blood products under any circumstances.     Wife informed that without potential OR and ability to use blood products patient may   restated patients refusal under any circumstances - discussed with CTS    taken to OR for exploration - no tamponade; hemothorax - 1.5 L evacuated   arrived to ICU - Epi/Levo/Vaso/Fuad -  added post-exploration with significant dec in pressor requirements    :   [place] removed  Extubated to HFNC with Diamond (10 PPM)  Epogen/IV FeS04 started; conservative lab strategy    ECHO : Left ventricular endocardium is not well visualized. Grossly, left ventricular function appears normal.  Dilated right ventricular size. Mildly reduced right ventricular systolic function. Biatrial enlargement. Pulmonary hypertension present, pulmonary artery systolic pressure is 51 mmHg. Small pericardial effusion without echocardiographic evidence of   cardiac tamponade physiology.    remains on Diamond via HFNC     No acute events reported overnight - patient OOB in chair at this time       PMHx includes but is not limited to:  Polycystic kidney disease  DM2 (diabetes mellitus, type 2)  HLD (hyperlipidemia)  HTN (hypertension)  Prostate cancer  Kidney transplant recipient  DVT (deep venous thrombosis)  Chronic CHF  H/O radical prostatectomy '10  S/P hernia repair  Abdominal and inguinal around     ICU Vital Signs Last 24 Hrs  T(C): 36.8 (2023 08:41), Max: 37 (2023 01:18)  T(F): 98.2 (2023 08:41), Max: 98.6 (2023 01:18)  HR: 92 (2023 12:00) (80 - 99) fib   BP: 116/62 (2023 12:00) (100/57 - 132/82)  BP(mean): 82 (2023 12:00) (74 - 100)  ABP: 128/57 (2023 12:00) (112/46 - 168/65)  ABP(mean): 76 (2023 12:00) (63 - 95)  RR: 20 (2023 12:00) (17 - 22)  SpO2: 89% (2023 12:00) (87% - 100%) 50/60 Diamond 3PPM     Qtts: None     I&O's Summary    2023 07:  -  2023 07:00  --------------------------------------------------------  IN: 1110 mL / OUT: 2160 mL / NET: -1050 mL    2023 07:01  -  2023 12:48  --------------------------------------------------------  IN: 420 mL / OUT: 670 mL / NET: -250 mL    Physical Exam    Heart - regular (-)rub/gallop  Lungs - dec BS based - no rhonchi/wheeze  Abd - (+)BS soft NTND (-)r/r/g  Ext - warm to touch; no cyanosis/clubbing  Neuro - alert/oriented and interactive - moving all extremities  Skin - no rash     LABS:                        7.7    12.04 )-----------( 94       ( 2023 04:33 )             23.4     0429    140  |  106  |  31<H>  ----------------------------<  93  4.1   |  25  |  1.62<H>    Ca    8.8      2023 04:33  Phos  3.6       Mg     2.4     29    TPro  4.7<L>  /  Alb  3.2<L>  /  TBili  2.4<H>  /  DBili  x   /  AST  24  /  ALT  <5<L>  /  AlkPhos  54  04-29    PT/INR - ( 2023 04:33 )   PT: 23.3 sec;   INR: 1.94     PTT - ( 2023 04:33 )  PTT:44.1 sec    ABG - ( 2023 04:50 )  pH, Arterial: 7.39  pH, Blood: x     /  pCO2: 42    /  pO2: 82    / HCO3: 25    / Base Excess: 0.3   /  SaO2: 97.7      RADIOLOGY & ADDITIONAL STUDIES: reviewed     Patient with atrial Fib - unable to take systemic AC - s/p DENNY Occlusion - Percutaneous using a 25 mm Amulet device  - acute decompensation op night - PEA arrest found to have pericardial effusion/tamponade post DENNY occlusion Amulet device - known complication of elective procedure - patient expressly made aware in process of informed consent - now with PEA arrest - pericardial effusion/tamponade s/p emergent pericardiocentesis with ongoing bleeding - given multiple meds to stop bleeding - Melo 7/Cryo/protamine - taken to OR for exploration - no active bleeding encountered - removal of some clot and evacuation of hemothorax. Respiratory failure requiring HFNC and DIAMOND with large effusions noted on xray     1. CV  stable hemodynamics  sinus rhythm   statin  on DIAMOND via HFNC for ventricular support - plan titrate down slowly    2. Pulm   on HFNC/Diamond - may be limited in ability to titrate   plan slow wean; suspect will still need HFNC and may need IR guided thoracentesis on right   incentive spirometry/ambulation with staff assist   monitor chest tube output  bedside sono for effusion on right   Plan BIPAP qhs  PT to consult    3. Heme  heme consult to assist in dosing of Epo/IV FeS04  pediatric tubes to be used for any blood draws and conservative strategy to be used in lab ordering   plan per Heme :   s/p iron 300 mg daily x3 days , would hold off on further iron at this time  - s/p EPO 13,000 units on   - On EPO 20,000 units per day for 5 days (-) (hold for hemoglobin >11)  folic acid (1 mg daily) and vitamin B12 ( 1000 mcg daily) po  - Trend CBC and coagulation panel using pediatric tubes  - Continue to trend PT/PTT/Fibrinogen once daily.  If worsening or emergent bleeding, patient's accepted product list includes Kcentra and cryoprecipitate    4. Renal   renal consult in setting of transplant   tacrolimus level obtained and medication adjusted accordingly   Prednisone    SCD and GI prophylaxis     d/w patient/family present at bedside; staff and structural heart attending    I have spent/provided stated minutes of critical care time to this patient: 60

## 2023-04-29 NOTE — CHART NOTE - NSCHARTNOTEFT_GEN_A_CORE
Exam:  US Chest    Procedure Date:    History: 77y Male whose CXR today shows a possible right sided pleural effusion.  Pt is POD # 4 from DENNY occlusion with amulet device & POD 3 from RTOR for chest washout, evacuation of pericardial clot.   Findings:                    Evaluation of the right side of the thoracic cavity demonstrates                   a large pleural effusion                  Lung is freely movable with in thoracic cavity    Impression:  large right pleural effusion    Findings communicated with Dr. Sidhu who recommends insertion of a pigtail catheter for drainage of pleural effusion.

## 2023-04-29 NOTE — PHYSICAL THERAPY INITIAL EVALUATION ADULT - GENERAL OBSERVATIONS, REHAB EVAL
RN Jodi present throughout. PT Becki present. Patient received sitting in chair in NAD with +ECG +central line +power x 3 +CT x 1 +HFNC (50L, 60% Fio2) +leonid +SCD x 2

## 2023-04-29 NOTE — PROCEDURE NOTE - NSICDXPROCEDURE_GEN_ALL_CORE_FT
PROCEDURES:  Chest tube insertion 02-May-2023 06:40:46  Sharla Patton  
PROCEDURES:  Pericardiocentesis 26-Apr-2023 14:39:48  Indra Almendarez

## 2023-04-29 NOTE — PHYSICAL THERAPY INITIAL EVALUATION ADULT - ADDITIONAL COMMENTS
Patient lives with wife in apartment with elevator to enter. PTA patient ambulated with SC and was independent with ADLs. Patient has raised toilet seat

## 2023-04-29 NOTE — PROGRESS NOTE ADULT - ASSESSMENT
78 yo M w/ hx of CKD 3 with renal transplant (, initially kidney failure due to polycystic kidney disease) treated with prednisone 5mg and tacrolimus at home (2mg AM and 1mg PM)  The patient underwent left atrial appendage occlusion percutaneously with Dr. Sidhu 23. Arrived to the stepdown extubated on no drips. Overnight had PEA arrest, requiring ACLS and intubation. Tx to ICU, bedside TTE showing pericardial effusion with tamponade. Bedside procedure with 600cc blood aspirated. Given 2 units pRBC. Taken to OR early AM for sternotomy with 1.5L L hemothorax evacuated. Arrived intubated on epi, levo, vaso, rose mary. Switched to dobutamine and weaned off some pressors. Initially had lactic acidosis, now resolved. CPAP'd overnight. : extubated,  off, Bijan weaned.  swan dc, Bijan weaned.   called to help manage renal transplant medications

## 2023-04-29 NOTE — PHYSICAL THERAPY INITIAL EVALUATION ADULT - IMPAIRMENTS CONTRIBUTING TO GAIT DEVIATIONS, PT EVAL
impaired balance/pain/impaired postural control/decreased strength aerobic capacity/impaired balance/pain/impaired postural control/decreased strength

## 2023-04-29 NOTE — PROGRESS NOTE ADULT - SUBJECTIVE AND OBJECTIVE BOX
LENGTH OF HOSPITAL STAY: 4d    SUBJECTIVE: No acute overnight events    HISTORY OF PRESENTING ILLNESS:   76 y/o male with polycystic kidney disease s/p kidney transplant in 2007, HTN, HLD, DM2, Prostate cancer s/p radical prostatectomy 2015, DVT 2006 (in setting of pelvic fracture), and afib/atrial flutter s/p ablation in 1/2018, who is now off oral anticoagulation secondary to GI bleed and presents for DENNY occlusoin. Pt as no symptomsHe was [facility] with [facility]. He is a [employer] and refuses blood transfusions. Eliquis was stopped at that time.     KIMMIE reviewed from prior ablation- appears to have good anatomy for LAAO.     Patient seen in same day holding area; Reports no changes to PMHx or medications since last seen by our team. Denies acute or current SOB, chest pain, palpitation, N/V/D, fever/chills, recent illness, or any other concerning symptoms.  NYHA 1 Class (25 Apr 2023 07:57)    PAST MEDICAL & SURGICAL HISTORY:  Polycystic kidney disease  DM2 (diabetes mellitus, type 2)  HLD (hyperlipidemia)  HTN (hypertension)  Prostate cancer  Kidney transplant recipient  DVT (deep venous thrombosis)  Chronic CHF  H/O radical prostatectomy  2010  S/P hernia repair  Abdominal and inguinal around 2005    ALLERGIES:  naproxen (Hives)  Bairon&#x27;s Witness - No blood products (Unknown)    MEDICATIONS:  STANDING MEDICATIONS  atorvastatin 20 milliGRAM(s) Oral at bedtime  chlorhexidine 2% Cloths 1 Application(s) Topical <User Schedule>  cyanocobalamin 1000 MICROGram(s) Oral daily  dextrose 5%. 1000 milliLiter(s) IV Continuous <Continuous>  dextrose 5%. 1000 milliLiter(s) IV Continuous <Continuous>  dextrose 5%. 1000 milliLiter(s) IV Continuous <Continuous>  dextrose 5%. 1000 milliLiter(s) IV Continuous <Continuous>  dextrose 50% Injectable 12.5 Gram(s) IV Push once  dextrose 50% Injectable 12.5 Gram(s) IV Push once  dextrose 50% Injectable 25 Gram(s) IV Push once  dextrose 50% Injectable 25 Gram(s) IV Push once  dextrose 50% Injectable 25 Gram(s) IV Push once  dextrose 50% Injectable 25 Gram(s) IV Push once  epoetin annalee-epbx (RETACRIT) Injectable 69957 Unit(s) IV Push <User Schedule>  folic acid 1 milliGRAM(s) Oral daily  glucagon  Injectable 1 milliGRAM(s) IntraMuscular once  glucagon  Injectable 1 milliGRAM(s) IntraMuscular once  insulin glargine Injectable (LANTUS) 12 Unit(s) SubCutaneous at bedtime  insulin lispro (ADMELOG) corrective regimen sliding scale   SubCutaneous Before meals and at bedtime  pantoprazole    Tablet 40 milliGRAM(s) Oral before breakfast  polyethylene glycol 3350 17 Gram(s) Oral daily  predniSONE   Tablet 5 milliGRAM(s) Oral daily  sodium chloride 0.45%. 1000 milliLiter(s) IV Continuous <Continuous>  sodium chloride 0.9%. 1000 milliLiter(s) IV Continuous <Continuous>  tacrolimus 2 milliGRAM(s) Oral every 12 hours    PRN MEDICATIONS  acetaminophen     Tablet .. 650 milliGRAM(s) Oral every 6 hours PRN  dextrose Oral Gel 15 Gram(s) Oral once PRN  dextrose Oral Gel 15 Gram(s) Oral once PRN  oxyCODONE    IR 10 milliGRAM(s) Oral every 6 hours PRN  oxyCODONE    IR 5 milliGRAM(s) Oral every 4 hours PRN    VITALS:   T(F): 98.2  HR: 88  BP: 124/63  RR: 19  SpO2: 96%    LABS:                        7.7    12.04 )-----------( 94       ( 29 Apr 2023 04:33 )             23.4     04-29    140  |  106  |  31<H>  ----------------------------<  93  4.1   |  25  |  1.62<H>    Ca    8.8      29 Apr 2023 04:33  Phos  3.6     04-29  Mg     2.4     04-29    TPro  4.7<L>  /  Alb  3.2<L>  /  TBili  2.4<H>  /  DBili  x   /  AST  24  /  ALT  <5<L>  /  AlkPhos  54  04-29    PT/INR - ( 29 Apr 2023 04:33 )   PT: 23.3 sec;   INR: 1.94        PTT - ( 29 Apr 2023 04:33 )  PTT:44.1 sec    ABG - ( 29 Apr 2023 04:50 )  pH, Arterial: 7.39  pH, Blood: x     /  pCO2: 42    /  pO2: 82    / HCO3: 25    / Base Excess: 0.3   /  SaO2: 97.7      RADIOLOGY:  Reviewed    PHYSICAL EXAM:  General: tired, sleepy, NAD, answering questions  HEENT: NC/AT; PERRL, clear conjunctiva  Neck: supple  Respiratory: CTA b/l, HFNC  Cardiovascular: +S1/S2; RRR, midline sternotomy bandaged, 2 drains with serosanguinous drainage  Abdomen: soft, NT/ND; +BS x4  Extremities: WWP, 2+ peripheral pulses b/l; no LE edema  Skin: normal color and turgor; no rash  Neurological: AAOx3

## 2023-04-29 NOTE — PROGRESS NOTE ADULT - SUBJECTIVE AND OBJECTIVE BOX
Subjective:  - No complaints this morning  - pt unable to tolerate much PT due to SOB.  - Events/Chart from overnight reviewed    PAST MEDICAL & SURGICAL HISTORY:  Polycystic kidney disease      DM2 (diabetes mellitus, type 2)      HLD (hyperlipidemia)      HTN (hypertension)      Prostate cancer      Kidney transplant recipient      DVT (deep venous thrombosis)      Chronic CHF      H/O radical prostatectomy  2010      S/P hernia repair  Abdominal and inguinal around 2005          Vital Signs Last 24 Hrs  T(C): 36.6 (29 Apr 2023 13:44), Max: 37 (29 Apr 2023 01:18)  T(F): 97.9 (29 Apr 2023 13:44), Max: 98.6 (29 Apr 2023 01:18)  HR: 88 (29 Apr 2023 15:18) (80 - 109)  BP: 114/68 (29 Apr 2023 15:00) (100/57 - 132/82)  BP(mean): 86 (29 Apr 2023 15:00) (74 - 100)  RR: 18 (29 Apr 2023 15:18) (17 - 24)  SpO2: 96% (29 Apr 2023 15:18) (87% - 100%)    Parameters below as of 29 Apr 2023 16:00  Patient On (Oxygen Delivery Method): nasal cannula, high flow  O2 Flow (L/min): 50  O2 Concentration (%): 60   I&O's Detail    28 Apr 2023 07:01  -  29 Apr 2023 07:00  --------------------------------------------------------  IN:    IV PiggyBack: 250 mL    Oral Fluid: 640 mL    sodium chloride 0.9%: 220 mL  Total IN: 1110 mL    OUT:    Bulb (mL): 275 mL    Chest Tube (mL): 410 mL    Indwelling Catheter - Urethral (mL): 1430 mL    Intermittent Catheterization - Urethral (mL): 45 mL  Total OUT: 2160 mL    Total NET: -1050 mL      29 Apr 2023 07:01  -  29 Apr 2023 15:46  --------------------------------------------------------  IN:    Oral Fluid: 360 mL    sodium chloride 0.9%: 80 mL  Total IN: 440 mL    OUT:    Bulb (mL): 100 mL    Chest Tube (mL): 260 mL    Indwelling Catheter - Urethral (mL): 425 mL  Total OUT: 785 mL    Total NET: -345 mL        Daily     Daily     Physical Exam:   GEN: NAD, AAOx3  HEENT: MMM, no icterus  CV: S1 S2 RRR, no MRG  Lung: +tachpnea with +rales and dullness to percussion on right side  Abd: soft NT ND +BS  Ext: no c/c/e, no groin hematoma  Neuro: no focal neuro deficit    MEDICATIONS  (STANDING):  atorvastatin 20 milliGRAM(s) Oral at bedtime  chlorhexidine 2% Cloths 1 Application(s) Topical <User Schedule>  cyanocobalamin 1000 MICROGram(s) Oral daily  dextrose 5%. 1000 milliLiter(s) (50 mL/Hr) IV Continuous <Continuous>  dextrose 5%. 1000 milliLiter(s) (50 mL/Hr) IV Continuous <Continuous>  dextrose 5%. 1000 milliLiter(s) (100 mL/Hr) IV Continuous <Continuous>  dextrose 5%. 1000 milliLiter(s) (100 mL/Hr) IV Continuous <Continuous>  dextrose 50% Injectable 25 Gram(s) IV Push once  dextrose 50% Injectable 12.5 Gram(s) IV Push once  dextrose 50% Injectable 25 Gram(s) IV Push once  dextrose 50% Injectable 12.5 Gram(s) IV Push once  dextrose 50% Injectable 25 Gram(s) IV Push once  dextrose 50% Injectable 25 Gram(s) IV Push once  epoetin annalee-epbx (RETACRIT) Injectable 44323 Unit(s) IV Push <User Schedule>  folic acid 1 milliGRAM(s) Oral daily  glucagon  Injectable 1 milliGRAM(s) IntraMuscular once  glucagon  Injectable 1 milliGRAM(s) IntraMuscular once  insulin glargine Injectable (LANTUS) 12 Unit(s) SubCutaneous at bedtime  insulin lispro (ADMELOG) corrective regimen sliding scale   SubCutaneous Before meals and at bedtime  pantoprazole    Tablet 40 milliGRAM(s) Oral before breakfast  polyethylene glycol 3350 17 Gram(s) Oral daily  predniSONE   Tablet 5 milliGRAM(s) Oral daily  sodium chloride 0.45%. 1000 milliLiter(s) (150 mL/Hr) IV Continuous <Continuous>  sodium chloride 0.9%. 1000 milliLiter(s) (10 mL/Hr) IV Continuous <Continuous>  tacrolimus 2 milliGRAM(s) Oral every 12 hours      LABS:                        7.7    12.04 )-----------( 94       ( 29 Apr 2023 04:33 )             23.4     04-29    140  |  106  |  31<H>  ----------------------------<  93  4.1   |  25  |  1.62<H>    Ca    8.8      29 Apr 2023 04:33  Phos  3.6     04-29  Mg     2.4     04-29    TPro  4.7<L>  /  Alb  3.2<L>  /  TBili  2.4<H>  /  DBili  x   /  AST  24  /  ALT  <5<L>  /  AlkPhos  54  04-29        PT/INR - ( 29 Apr 2023 04:33 )   PT: 23.3 sec;   INR: 1.94          PTT - ( 29 Apr 2023 04:33 )  PTT:44.1 sec      CXR today reviewed-> b/l infiltrates, RLL opacity  US of chest performed showing large R. pleural effusion

## 2023-04-29 NOTE — PROGRESS NOTE ADULT - SUBJECTIVE AND OBJECTIVE BOX
CTICU  CRITICAL  CARE  attending     Hand off received 					   Pertinent clinical, laboratory, radiographic, hemodynamic, echocardiographic, respiratory data, microbiologic data and chart were reviewed and analyzed frequently throughout the course of the day  Patient seen and examined with CTS/ SH attending at bedside  Pt is a 77yr old male with PMH PKD sp kidney transplant (), HTN, HLD, DM, prostate ca sp radical prostatectomy (), DVT in setting of pelvic fx (), afib/flutter sp ablation (, off AC), admitted for surgical mgmt. Patient underwent left atrial appendage occlusion percutaneously with Dr. Sidhu 23. Arrived to the stepdown extubated on no drips. Overnight had PEA arrest, requiring ACLS and intubation. Tx to ICU, bedside TTE showing pericardial effusion with tamponade. Bedside procedure with 600cc blood aspirated. Given 2 units pRBC. Taken to OR early AM for sternotomy with 1.5L L hemothorax evacuated. Arrived intubated on epi, levo, vaso, rose mary. Switched to dobutamine and weaned off some pressors. Initially had lactic acidosis, now resolved. CPAP'd overnight. : extubated,  off, Bijan weaned.  swan dc, Bijan weaned.    R pigtail placed for effusion, 850cc out. Placed on BIPAP for increased wob and atelectasis.     FAMILY HISTORY:  PAST MEDICAL & SURGICAL HISTORY:  Polycystic kidney disease  DM2 (diabetes mellitus, type 2)  HLD (hyperlipidemia)  HTN (hypertension)  Prostate cancer  Kidney transplant recipient  DVT (deep venous thrombosis)  Chronic CHF  H/O radical prostatectomy    S/P hernia repair  Abdominal and inguinal around         Patient is a 77y old  Male who presents with a chief complaint of percutaneous DENNY closure.      14 system review was unremarkable    Vital signs, hemodynamic and respiratory parameters were reviewed from the bedside nursing flowsheet.  ICU Vital Signs Last 24 Hrs  T(C): 36.7 (2023 21:05), Max: 37.2 (2023 17:08)  T(F): 98.1 (2023 21:05), Max: 99 (2023 17:08)  HR: 88 (2023 21:04) (80 - 109)  BP: 123/76 (2023 18:00) (102/67 - 132/82)  BP(mean): 94 (2023 18:00) (76 - 100)  ABP: 143/57 (2023 20:00) (112/46 - 172/70)  ABP(mean): 76 (2023 20:00) (63 - 95)  RR: 29 (2023 21:04) (17 - 29)  SpO2: 92% (2023 21:04) (87% - 100%)    O2 Parameters below as of 2023 21:04  Patient On (Oxygen Delivery Method): BiPAP/CPAP    O2 Concentration (%): 80      Adult Advanced Hemodynamics Last 24 Hrs  CVP(mm Hg): 19 (2023 21:04) (6 - 211)  CVP(cm H2O): --  CO: --  CI: --  PA: --  PA(mean): --  PCWP: --  SVR: --  SVRI: --  PVR: --  PVRI: --, ABG - ( 2023 04:50 )  pH, Arterial: 7.39  pH, Blood: x     /  pCO2: 42    /  pO2: 82    / HCO3: 25    / Base Excess: 0.3   /  SaO2: 97.7                Intake and output was reviewed and the fluid balance was calculated  Daily     Daily   I&O's Summary    2023 07:  -  2023 07:00  --------------------------------------------------------  IN: 1110 mL / OUT: 2160 mL / NET: -1050 mL    2023 07:01  -  2023 21:09  --------------------------------------------------------  IN: 760 mL / OUT: 1845 mL / NET: -1085 mL        All lines and drain sites were assessed  Glycemic trend was reviewedCAPILLARY BLOOD GLUCOSE      POCT Blood Glucose.: 135 mg/dL (2023 16:03)    Neuro: awake, moving all extremities, following commands  HEENT: ett  Heart: s1 s2  Lungs: clear bl  Abdomen: soft nt nd  Extremities: 2+dp    Lines:  RIJ Cordis   R radial arterial line     Tubes:  L CT  Luis Miguel  R pigtail      labs  CBC Full  -  ( 2023 04:33 )  WBC Count : 12.04 K/uL  RBC Count : 2.38 M/uL  Hemoglobin : 7.7 g/dL  Hematocrit : 23.4 %  Platelet Count - Automated : 94 K/uL  Mean Cell Volume : 98.3 fl  Mean Cell Hemoglobin : 32.4 pg  Mean Cell Hemoglobin Concentration : 32.9 gm/dL  Auto Neutrophil # : 10.55 K/uL  Auto Lymphocyte # : 0.56 K/uL  Auto Monocyte # : 0.73 K/uL  Auto Eosinophil # : 0.01 K/uL  Auto Basophil # : 0.02 K/uL  Auto Neutrophil % : 87.5 %  Auto Lymphocyte % : 4.7 %  Auto Monocyte % : 6.1 %  Auto Eosinophil % : 0.1 %  Auto Basophil % : 0.2 %        140  |  106  |  31<H>  ----------------------------<  93  4.1   |  25  |  1.62<H>    Ca    8.8      2023 04:33  Phos  3.6       Mg     2.4         TPro  4.7<L>  /  Alb  3.2<L>  /  TBili  2.4<H>  /  DBili  x   /  AST  24  /  ALT  <5<L>  /  AlkPhos  54  29    PT/INR - ( 2023 04:33 )   PT: 23.3 sec;   INR: 1.94          PTT - ( 2023 04:33 )  PTT:44.1 sec  The current medications were reviewed   MEDICATIONS  (STANDING):  atorvastatin 20 milliGRAM(s) Oral at bedtime  chlorhexidine 2% Cloths 1 Application(s) Topical <User Schedule>  cyanocobalamin 1000 MICROGram(s) Oral daily  dextrose 5%. 1000 milliLiter(s) (100 mL/Hr) IV Continuous <Continuous>  dextrose 5%. 1000 milliLiter(s) (50 mL/Hr) IV Continuous <Continuous>  dextrose 5%. 1000 milliLiter(s) (50 mL/Hr) IV Continuous <Continuous>  dextrose 5%. 1000 milliLiter(s) (100 mL/Hr) IV Continuous <Continuous>  dextrose 50% Injectable 12.5 Gram(s) IV Push once  dextrose 50% Injectable 25 Gram(s) IV Push once  dextrose 50% Injectable 12.5 Gram(s) IV Push once  dextrose 50% Injectable 25 Gram(s) IV Push once  dextrose 50% Injectable 25 Gram(s) IV Push once  dextrose 50% Injectable 25 Gram(s) IV Push once  epoetin annalee-epbx (RETACRIT) Injectable 07678 Unit(s) IV Push <User Schedule>  folic acid 1 milliGRAM(s) Oral daily  glucagon  Injectable 1 milliGRAM(s) IntraMuscular once  glucagon  Injectable 1 milliGRAM(s) IntraMuscular once  insulin glargine Injectable (LANTUS) 12 Unit(s) SubCutaneous at bedtime  insulin lispro (ADMELOG) corrective regimen sliding scale   SubCutaneous Before meals and at bedtime  pantoprazole    Tablet 40 milliGRAM(s) Oral before breakfast  polyethylene glycol 3350 17 Gram(s) Oral daily  predniSONE   Tablet 5 milliGRAM(s) Oral daily  sodium chloride 0.45%. 1000 milliLiter(s) (150 mL/Hr) IV Continuous <Continuous>  sodium chloride 0.9%. 1000 milliLiter(s) (10 mL/Hr) IV Continuous <Continuous>  tacrolimus 2 milliGRAM(s) Oral every 12 hours    MEDICATIONS  (PRN):  acetaminophen     Tablet .. 650 milliGRAM(s) Oral every 6 hours PRN Mild Pain (1 - 3)  dextrose Oral Gel 15 Gram(s) Oral once PRN Blood Glucose LESS THAN 70 milliGRAM(s)/deciliter  dextrose Oral Gel 15 Gram(s) Oral once PRN Blood Glucose LESS THAN 70 milliGRAM(s)/deciliter  oxyCODONE    IR 10 milliGRAM(s) Oral every 6 hours PRN Severe Pain (7 - 10)  oxyCODONE    IR 5 milliGRAM(s) Oral every 4 hours PRN Moderate Pain (4 - 6)      Assessment/Plan:  77yr old male with PMH PKD sp kidney transplant (), HTN, HLD, DM, prostate ca sp radical prostatectomy (), DVT in setting of pelvic fx (), afib/flutter sp ablation (, off AC), admitted for surgical mgmt. Patient is a [employer].    POD4 left atrial appendage occlusion percutaneously (Nahum, 23)  POD3 RTOR for L hemothorax evacuation (23)  MAP goal 65  On Bijan-wean as tolerated  Acute post operative anemia-trend H/H  Thrombocytopenia-monitor  Continue IS for renal transplant  Appreciate heme/onc recs  Diet as tolerated  Replete lytes prn  Monitor CT output  GI/DVT PPX  Bowel Regimen  Pain control  OOB with PT  Close hemodynamic, ventilatory and drain monitoring and management per post op routine  Monitor for arrhythmias and monitor parameters for organ perfusion  Monitor neurologic status  Head of the bed should remain elevated to 45 deg   Chest PT and IS will be encouraged  Monitor adequacy of oxygenation and ventilation and attempt to wean oxygen  Antibiotic regimen will be tailored to the clinical, laboratory and microbiologic data  Nutritional goals will be met using po eventually, ensure adequate caloric intake and monitor the same  Stress ulcer and VTE prophylaxis will be achieved    Glycemic control is satisfactory  Electrolytes have been repleted as necessary and wound care has been carried out   Pain control has been achieved.   Aggressive physical therapy and early mobility and ambulation goals will be met   The family was updated about the course and plan.    CRITICAL CARE TIME personally provided by me  in evaluation and management, reassessments, review and interpretation of labs and x-rays, ventilator and hemodynamic management, formulating a plan and coordinating care: ___30____ MIN.  Time does not include procedural time.    CTICU ATTENDING     					  Carmen Ortega MD

## 2023-04-29 NOTE — PROGRESS NOTE ADULT - ASSESSMENT
Neuro-> no issues, AAOx3    Cardio->  -Amulet in place with no sissy-device leak  -unable to start ASA (or DAPT) due to low hgb counts and bleeding  -normotensive  -in NSR (pAfib)    Respiratory- labored breathing, unable to wean off HFNC. Tachypneic and unable to tolerate PT  -there is a large R. pleural effusion  -we discussed management with ICU team/Intensivist/Thoracic Team and myself  -due to high oxygen requirements and large pleural effusion we have offered the patient pleural effusion drainage  -we had a family discussion (given that he will not accept blood products if any bleeding with the drain)  -the patient and family would like drainage    GI-> nutrition/calorie evaluation  -ensure  -pt with poor appetite, monitor    Heme-> hgb 7.7 today  -f/u Hematology recs  -plt count improving  -INR stable  -Heme recs to hold off on further iron at this time, On EPO 20,000 units per day for 5 days (04/28-05/02) (hold for hemoglobin >11), supplement with folic acid (1 mg daily) and vitamin B12 ( 1000 mcg daily) po  -emergent bleeding, patient's accepted product list includes Kcentra and cryoprecipitate    Renal-> creatinine stable,  -renal team following  -on tacrolimus (hx of renal transplant)    Dispo- ICU level of care  NO BLOOD TRANSFUSIONS due to Jehova's witness  will continue to follow daily

## 2023-04-29 NOTE — PROGRESS NOTE ADULT - NSPROGADDITIONALINFOA_GEN_ALL_CORE
Kidney Transplant (CKD 3)  Tacrolimus dose increased to 2mg qAM and qPM because of low trough level  needs trough level checked this AM - to be drawn one-half hour prior to dosing  continue Prednisone 5mg PO qd

## 2023-04-29 NOTE — PROGRESS NOTE ADULT - ASSESSMENT
77 M with past medical history of PCKD s/p kidney tx DDKT 2007, HTN, T2DM, Prostate cancer s/p radical prostatectomy 2015, DVT 2006 (in setting of pelvic fracture), and afib/atrial flutter s/p ablation in 2018, who is now off oral anticoagulation secondary to GI bleed (7/2022) and presents for DENNY occlusion. S/p DENNY occlusion 4/25 c/b cardiac tamponade and PEA arrest requiring bedside pericardiocentesis/ OR for sternotomy and local exploration 4/26. S/p > 1 L left hemothorax evacuated in OR and additional blood loss via chest tubes. Emergent stabilization in CTICU. Hematology consulted now for anemia and thrombocytopenia in [employer].    # Acute blood loss anemia and thrombocytopenia  Pre-operatively patient with hemoglobin 14 and platelet 240's. Hospital course complicated by cardiac tamponade and PEA arrest requiring emergent stabilization in CTICU. Acute drop in hemoglobin and platelets corresponds to episode of acute blood loss around time of tamponade and arrest. Received FEIBA 1000 units and protamine 50 mg which may explain mild coagulopathy. Patient reports receiving EPO and iron infusions at other hospitalizations without complication. He tolerated EPO dose and iron infusion on 4/26  - Hemoglobin trend: post-operatively has been decreasing slightly with serosanguinous drain output. However, has now improved to Hb 7.7 (stable)  - Platelets trend: appear to have hit azucena in 60s and are uptrending.    Plan:  - s/p iron 300 mg daily x3 days 4/26, would hold off on further iron at this time  - s/p EPO 13,000 units on 4/26  - On EPO 20,000 units per day for 5 days (04/28-05/02) (hold for hemoglobin >11)  - Would supplement with folic acid (1 mg daily) and vitamin B12 ( 1000 mcg daily) po  - Trend CBC and coagulation panel using pediatric tubes  - Continue to trend PT/PTT/Fibrinogen once daily. If worsening or emergent bleeding, patient's accepted product list includes Kcentra and cryoprecipitate    Discussed with Dr. Guadalupe

## 2023-04-29 NOTE — PROGRESS NOTE ADULT - SUBJECTIVE AND OBJECTIVE BOX
CC: I48.91        INTERVAL HISTORY: sitting up in chair   no complaints      ROS: No chest pain, no sob, no abd pain. No n/v/d    PAST MEDICAL & SURGICAL HISTORY:  Polycystic kidney disease    DM2 (diabetes mellitus, type 2)    HLD (hyperlipidemia)    HTN (hypertension)    Prostate cancer    Kidney transplant recipient    DVT (deep venous thrombosis)    Chronic CHF    H/O radical prostatectomy  2010    S/P hernia repair  Abdominal and inguinal around 2005        PHYSICAL EXAM:  T(C): 36.9 (04-29-23 @ 05:20), Max: 37 (04-29-23 @ 01:18)  HR: 88 (04-29-23 @ 07:00)  BP: 121/67 (04-29-23 @ 07:00) (100/57 - 131/72)  RR: 18 (04-29-23 @ 08:00)  SpO2: 97% (04-29-23 @ 07:00)  Wt(kg): --  I&O's Summary    28 Apr 2023 07:01  -  29 Apr 2023 07:00  --------------------------------------------------------  IN: 990 mL / OUT: 2160 mL / NET: -1170 mL    29 Apr 2023 07:01  -  29 Apr 2023 07:13  --------------------------------------------------------  IN: 10 mL / OUT: 0 mL / NET: 10 mL      Weight 65.8 (04-26 @ 13:32)  General: AAO x 3,  NAD.  HEENT: moist mucous membranes, no pallor/cyanosis.  Neck: no JVD visible.  Cardiac: S1, S2. RRR. No murmurs   Respratory: CTA b/l, no access muscle use.   Abdomen: soft. nontender. nondistended  Skin: no rashes.  Extremities: +UE edema b/l  Access:       DATA:                        7.7<L>  12.04<H> )-----------( 94<L>    ( 29 Apr 2023 04:33 )             23.4<L>        140    |  106    |  31<H>  ----------------------------<  93     Ca:8.8   (29 Apr 2023 04:33)  4.1     |  25     |  1.62<H>        TPro  4.7<L>  /  Alb  3.2<L>  /  TBili  2.4<H>  /  DBili  x      /  AST  24     /  ALT  <5<L>  /  AlkPhos  54     29 Apr 2023 04:33                    MEDICATIONS  (STANDING):  atorvastatin 20 milliGRAM(s) Oral at bedtime  chlorhexidine 2% Cloths 1 Application(s) Topical <User Schedule>  cyanocobalamin 1000 MICROGram(s) Oral daily  dextrose 5%. 1000 milliLiter(s) (100 mL/Hr) IV Continuous <Continuous>  dextrose 5%. 1000 milliLiter(s) (50 mL/Hr) IV Continuous <Continuous>  dextrose 5%. 1000 milliLiter(s) (50 mL/Hr) IV Continuous <Continuous>  dextrose 5%. 1000 milliLiter(s) (100 mL/Hr) IV Continuous <Continuous>  dextrose 50% Injectable 25 Gram(s) IV Push once  dextrose 50% Injectable 12.5 Gram(s) IV Push once  dextrose 50% Injectable 25 Gram(s) IV Push once  dextrose 50% Injectable 12.5 Gram(s) IV Push once  dextrose 50% Injectable 25 Gram(s) IV Push once  dextrose 50% Injectable 25 Gram(s) IV Push once  epoetin annalee-epbx (RETACRIT) Injectable 47785 Unit(s) IV Push <User Schedule>  folic acid 1 milliGRAM(s) Oral daily  glucagon  Injectable 1 milliGRAM(s) IntraMuscular once  glucagon  Injectable 1 milliGRAM(s) IntraMuscular once  insulin glargine Injectable (LANTUS) 12 Unit(s) SubCutaneous at bedtime  insulin lispro (ADMELOG) corrective regimen sliding scale   SubCutaneous Before meals and at bedtime  pantoprazole    Tablet 40 milliGRAM(s) Oral before breakfast  polyethylene glycol 3350 17 Gram(s) Oral daily  predniSONE   Tablet 5 milliGRAM(s) Oral daily  sodium chloride 0.45%. 1000 milliLiter(s) (150 mL/Hr) IV Continuous <Continuous>  sodium chloride 0.9%. 1000 milliLiter(s) (10 mL/Hr) IV Continuous <Continuous>  tacrolimus 2 milliGRAM(s) Oral every 12 hours    MEDICATIONS  (PRN):  acetaminophen     Tablet .. 650 milliGRAM(s) Oral every 6 hours PRN Mild Pain (1 - 3)  dextrose Oral Gel 15 Gram(s) Oral once PRN Blood Glucose LESS THAN 70 milliGRAM(s)/deciliter  dextrose Oral Gel 15 Gram(s) Oral once PRN Blood Glucose LESS THAN 70 milliGRAM(s)/deciliter  oxyCODONE    IR 10 milliGRAM(s) Oral every 6 hours PRN Severe Pain (7 - 10)  oxyCODONE    IR 5 milliGRAM(s) Oral every 4 hours PRN Moderate Pain (4 - 6)

## 2023-04-30 LAB
ALBUMIN SERPL ELPH-MCNC: 2.3 G/DL — LOW (ref 3.3–5)
ALP SERPL-CCNC: 74 U/L — SIGNIFICANT CHANGE UP (ref 40–120)
ALT FLD-CCNC: 8 U/L — LOW (ref 10–45)
ANION GAP SERPL CALC-SCNC: 12 MMOL/L — SIGNIFICANT CHANGE UP (ref 5–17)
ANISOCYTOSIS BLD QL: SLIGHT — SIGNIFICANT CHANGE UP
APTT BLD: 39.8 SEC — HIGH (ref 27.5–35.5)
AST SERPL-CCNC: 33 U/L — SIGNIFICANT CHANGE UP (ref 10–40)
BASOPHILS # BLD AUTO: 0 K/UL — SIGNIFICANT CHANGE UP (ref 0–0.2)
BASOPHILS NFR BLD AUTO: 0 % — SIGNIFICANT CHANGE UP (ref 0–2)
BILIRUB SERPL-MCNC: 3.2 MG/DL — HIGH (ref 0.2–1.2)
BUN SERPL-MCNC: 35 MG/DL — HIGH (ref 7–23)
CALCIUM SERPL-MCNC: 7.1 MG/DL — LOW (ref 8.4–10.5)
CHLORIDE SERPL-SCNC: 114 MMOL/L — HIGH (ref 96–108)
CO2 SERPL-SCNC: 18 MMOL/L — LOW (ref 22–31)
CREAT SERPL-MCNC: 1.56 MG/DL — HIGH (ref 0.5–1.3)
EGFR: 46 ML/MIN/1.73M2 — LOW
EOSINOPHIL # BLD AUTO: 0 K/UL — SIGNIFICANT CHANGE UP (ref 0–0.5)
EOSINOPHIL NFR BLD AUTO: 0 % — SIGNIFICANT CHANGE UP (ref 0–6)
FIBRINOGEN PPP-MCNC: 470 MG/DL — HIGH (ref 200–445)
GAS PNL BLDA: SIGNIFICANT CHANGE UP
GIANT PLATELETS BLD QL SMEAR: PRESENT — SIGNIFICANT CHANGE UP
GLUCOSE BLDC GLUCOMTR-MCNC: 157 MG/DL — HIGH (ref 70–99)
GLUCOSE BLDC GLUCOMTR-MCNC: 198 MG/DL — HIGH (ref 70–99)
GLUCOSE BLDC GLUCOMTR-MCNC: 257 MG/DL — HIGH (ref 70–99)
GLUCOSE BLDC GLUCOMTR-MCNC: 281 MG/DL — HIGH (ref 70–99)
GLUCOSE BLDC GLUCOMTR-MCNC: 332 MG/DL — HIGH (ref 70–99)
GLUCOSE SERPL-MCNC: 72 MG/DL — SIGNIFICANT CHANGE UP (ref 70–99)
HCT VFR BLD CALC: 18.1 % — CRITICAL LOW (ref 39–50)
HCT VFR BLD CALC: 21.3 % — LOW (ref 39–50)
HGB BLD-MCNC: 5.9 G/DL — CRITICAL LOW (ref 13–17)
HGB BLD-MCNC: 7 G/DL — CRITICAL LOW (ref 13–17)
INR BLD: 1.97 — HIGH (ref 0.88–1.16)
LYMPHOCYTES # BLD AUTO: 0.42 K/UL — LOW (ref 1–3.3)
LYMPHOCYTES # BLD AUTO: 4.5 % — LOW (ref 13–44)
MAGNESIUM SERPL-MCNC: 1.9 MG/DL — SIGNIFICANT CHANGE UP (ref 1.6–2.6)
MANUAL SMEAR VERIFICATION: SIGNIFICANT CHANGE UP
MCHC RBC-ENTMCNC: 32.1 PG — SIGNIFICANT CHANGE UP (ref 27–34)
MCHC RBC-ENTMCNC: 32.6 GM/DL — SIGNIFICANT CHANGE UP (ref 32–36)
MCHC RBC-ENTMCNC: 32.7 PG — SIGNIFICANT CHANGE UP (ref 27–34)
MCHC RBC-ENTMCNC: 32.9 GM/DL — SIGNIFICANT CHANGE UP (ref 32–36)
MCV RBC AUTO: 98.4 FL — SIGNIFICANT CHANGE UP (ref 80–100)
MCV RBC AUTO: 99.5 FL — SIGNIFICANT CHANGE UP (ref 80–100)
MONOCYTES # BLD AUTO: 0.08 K/UL — SIGNIFICANT CHANGE UP (ref 0–0.9)
MONOCYTES NFR BLD AUTO: 0.9 % — LOW (ref 2–14)
NEUTROPHILS # BLD AUTO: 8.9 K/UL — HIGH (ref 1.8–7.4)
NEUTROPHILS NFR BLD AUTO: 94.6 % — HIGH (ref 43–77)
NRBC # BLD: 4 /100 WBCS — HIGH (ref 0–0)
NRBC # BLD: 8 /100 — HIGH (ref 0–0)
NRBC # BLD: SIGNIFICANT CHANGE UP /100 WBCS (ref 0–0)
OVALOCYTES BLD QL SMEAR: SLIGHT — SIGNIFICANT CHANGE UP
PHOSPHATE SERPL-MCNC: 2.5 MG/DL — SIGNIFICANT CHANGE UP (ref 2.5–4.5)
PLAT MORPH BLD: NORMAL — SIGNIFICANT CHANGE UP
PLATELET # BLD AUTO: 107 K/UL — LOW (ref 150–400)
PLATELET # BLD AUTO: 112 K/UL — LOW (ref 150–400)
POTASSIUM SERPL-MCNC: 3.5 MMOL/L — SIGNIFICANT CHANGE UP (ref 3.5–5.3)
POTASSIUM SERPL-SCNC: 3.5 MMOL/L — SIGNIFICANT CHANGE UP (ref 3.5–5.3)
PROT SERPL-MCNC: 4 G/DL — LOW (ref 6–8.3)
PROTHROM AB SERPL-ACNC: 23.6 SEC — HIGH (ref 10.5–13.4)
RBC # BLD: 1.84 M/UL — LOW (ref 4.2–5.8)
RBC # BLD: 1.84 M/UL — LOW (ref 4.2–5.8)
RBC # BLD: 2.14 M/UL — LOW (ref 4.2–5.8)
RBC # BLD: 2.14 M/UL — LOW (ref 4.2–5.8)
RBC # FLD: 15.9 % — HIGH (ref 10.3–14.5)
RBC # FLD: 15.9 % — HIGH (ref 10.3–14.5)
RBC BLD AUTO: ABNORMAL
RETICS #: 40.8 K/UL — SIGNIFICANT CHANGE UP (ref 25–125)
RETICS #: 49 K/UL — SIGNIFICANT CHANGE UP (ref 25–125)
RETICS/RBC NFR: 2.2 % — SIGNIFICANT CHANGE UP (ref 0.5–2.5)
RETICS/RBC NFR: 2.3 % — SIGNIFICANT CHANGE UP (ref 0.5–2.5)
SODIUM SERPL-SCNC: 144 MMOL/L — SIGNIFICANT CHANGE UP (ref 135–145)
TACROLIMUS SERPL-MCNC: 5.2 NG/ML — SIGNIFICANT CHANGE UP
WBC # BLD: 7.78 K/UL — SIGNIFICANT CHANGE UP (ref 3.8–10.5)
WBC # BLD: 9.41 K/UL — SIGNIFICANT CHANGE UP (ref 3.8–10.5)
WBC # FLD AUTO: 7.78 K/UL — SIGNIFICANT CHANGE UP (ref 3.8–10.5)
WBC # FLD AUTO: 9.41 K/UL — SIGNIFICANT CHANGE UP (ref 3.8–10.5)

## 2023-04-30 PROCEDURE — 99292 CRITICAL CARE ADDL 30 MIN: CPT

## 2023-04-30 PROCEDURE — 99292 CRITICAL CARE ADDL 30 MIN: CPT | Mod: 25

## 2023-04-30 PROCEDURE — 36556 INSERT NON-TUNNEL CV CATH: CPT

## 2023-04-30 PROCEDURE — 99291 CRITICAL CARE FIRST HOUR: CPT | Mod: 25

## 2023-04-30 PROCEDURE — 71045 X-RAY EXAM CHEST 1 VIEW: CPT | Mod: 26,77

## 2023-04-30 PROCEDURE — 71045 X-RAY EXAM CHEST 1 VIEW: CPT | Mod: 26

## 2023-04-30 RX ORDER — ACETAMINOPHEN 500 MG
1000 TABLET ORAL ONCE
Refills: 0 | Status: DISCONTINUED | OUTPATIENT
Start: 2023-04-30 | End: 2023-04-30

## 2023-04-30 RX ORDER — LEVALBUTEROL 1.25 MG/.5ML
0.63 SOLUTION, CONCENTRATE RESPIRATORY (INHALATION) EVERY 6 HOURS
Refills: 0 | Status: DISCONTINUED | OUTPATIENT
Start: 2023-04-30 | End: 2023-05-13

## 2023-04-30 RX ORDER — FUROSEMIDE 40 MG
20 TABLET ORAL ONCE
Refills: 0 | Status: COMPLETED | OUTPATIENT
Start: 2023-04-30 | End: 2023-04-30

## 2023-04-30 RX ORDER — PHENYLEPHRINE HYDROCHLORIDE 10 MG/ML
0.3 INJECTION INTRAVENOUS
Qty: 40 | Refills: 0 | Status: DISCONTINUED | OUTPATIENT
Start: 2023-04-30 | End: 2023-05-01

## 2023-04-30 RX ORDER — MAGNESIUM OXIDE 400 MG ORAL TABLET 241.3 MG
800 TABLET ORAL ONCE
Refills: 0 | Status: COMPLETED | OUTPATIENT
Start: 2023-04-30 | End: 2023-04-30

## 2023-04-30 RX ORDER — POTASSIUM CHLORIDE 20 MEQ
20 PACKET (EA) ORAL
Refills: 0 | Status: COMPLETED | OUTPATIENT
Start: 2023-04-30 | End: 2023-04-30

## 2023-04-30 RX ORDER — BUMETANIDE 0.25 MG/ML
2 INJECTION INTRAMUSCULAR; INTRAVENOUS ONCE
Refills: 0 | Status: COMPLETED | OUTPATIENT
Start: 2023-04-30 | End: 2023-04-30

## 2023-04-30 RX ORDER — ACETYLCYSTEINE 200 MG/ML
4 VIAL (ML) MISCELLANEOUS EVERY 6 HOURS
Refills: 0 | Status: DISCONTINUED | OUTPATIENT
Start: 2023-04-30 | End: 2023-05-17

## 2023-04-30 RX ORDER — INSULIN LISPRO 100/ML
3 VIAL (ML) SUBCUTANEOUS
Refills: 0 | Status: DISCONTINUED | OUTPATIENT
Start: 2023-04-30 | End: 2023-05-02

## 2023-04-30 RX ORDER — INSULIN LISPRO 100/ML
2 VIAL (ML) SUBCUTANEOUS ONCE
Refills: 0 | Status: COMPLETED | OUTPATIENT
Start: 2023-04-30 | End: 2023-04-30

## 2023-04-30 RX ORDER — MAGNESIUM SULFATE 500 MG/ML
2 VIAL (ML) INJECTION ONCE
Refills: 0 | Status: COMPLETED | OUTPATIENT
Start: 2023-04-30 | End: 2023-04-30

## 2023-04-30 RX ORDER — HEPARIN SODIUM 5000 [USP'U]/ML
5000 INJECTION INTRAVENOUS; SUBCUTANEOUS EVERY 8 HOURS
Refills: 0 | Status: DISCONTINUED | OUTPATIENT
Start: 2023-04-30 | End: 2023-05-01

## 2023-04-30 RX ADMIN — ATORVASTATIN CALCIUM 20 MILLIGRAM(S): 80 TABLET, FILM COATED ORAL at 22:25

## 2023-04-30 RX ADMIN — OXYCODONE HYDROCHLORIDE 5 MILLIGRAM(S): 5 TABLET ORAL at 12:52

## 2023-04-30 RX ADMIN — Medication 650 MILLIGRAM(S): at 14:57

## 2023-04-30 RX ADMIN — TACROLIMUS 2 MILLIGRAM(S): 5 CAPSULE ORAL at 06:07

## 2023-04-30 RX ADMIN — Medication 2: at 08:21

## 2023-04-30 RX ADMIN — INSULIN GLARGINE 12 UNIT(S): 100 INJECTION, SOLUTION SUBCUTANEOUS at 22:27

## 2023-04-30 RX ADMIN — Medication 2 UNIT(S): at 19:32

## 2023-04-30 RX ADMIN — ERYTHROPOIETIN 20000 UNIT(S): 10000 INJECTION, SOLUTION INTRAVENOUS; SUBCUTANEOUS at 18:43

## 2023-04-30 RX ADMIN — CHLORHEXIDINE GLUCONATE 1 APPLICATION(S): 213 SOLUTION TOPICAL at 06:07

## 2023-04-30 RX ADMIN — OXYCODONE HYDROCHLORIDE 5 MILLIGRAM(S): 5 TABLET ORAL at 13:32

## 2023-04-30 RX ADMIN — TACROLIMUS 2 MILLIGRAM(S): 5 CAPSULE ORAL at 18:43

## 2023-04-30 RX ADMIN — Medication 4 MILLILITER(S): at 18:37

## 2023-04-30 RX ADMIN — LEVALBUTEROL 0.63 MILLIGRAM(S): 1.25 SOLUTION, CONCENTRATE RESPIRATORY (INHALATION) at 18:37

## 2023-04-30 RX ADMIN — OXYCODONE HYDROCHLORIDE 5 MILLIGRAM(S): 5 TABLET ORAL at 08:00

## 2023-04-30 RX ADMIN — HEPARIN SODIUM 5000 UNIT(S): 5000 INJECTION INTRAVENOUS; SUBCUTANEOUS at 22:25

## 2023-04-30 RX ADMIN — DEXMEDETOMIDINE HYDROCHLORIDE IN 0.9% SODIUM CHLORIDE 6.58 MICROGRAM(S)/KG/HR: 4 INJECTION INTRAVENOUS at 06:06

## 2023-04-30 RX ADMIN — BUMETANIDE 2 MILLIGRAM(S): 0.25 INJECTION INTRAMUSCULAR; INTRAVENOUS at 10:15

## 2023-04-30 RX ADMIN — Medication 100 MILLIEQUIVALENT(S): at 06:06

## 2023-04-30 RX ADMIN — Medication 20 MILLIGRAM(S): at 06:06

## 2023-04-30 RX ADMIN — HEPARIN SODIUM 5000 UNIT(S): 5000 INJECTION INTRAVENOUS; SUBCUTANEOUS at 14:58

## 2023-04-30 RX ADMIN — Medication 1 MILLIGRAM(S): at 12:43

## 2023-04-30 RX ADMIN — Medication 5 MILLIGRAM(S): at 06:06

## 2023-04-30 RX ADMIN — Medication 6: at 22:28

## 2023-04-30 RX ADMIN — OXYCODONE HYDROCHLORIDE 5 MILLIGRAM(S): 5 TABLET ORAL at 07:04

## 2023-04-30 RX ADMIN — PHENYLEPHRINE HYDROCHLORIDE 7.4 MICROGRAM(S)/KG/MIN: 10 INJECTION INTRAVENOUS at 11:12

## 2023-04-30 RX ADMIN — Medication 100 MILLIEQUIVALENT(S): at 07:04

## 2023-04-30 RX ADMIN — Medication 2: at 12:43

## 2023-04-30 RX ADMIN — PANTOPRAZOLE SODIUM 40 MILLIGRAM(S): 20 TABLET, DELAYED RELEASE ORAL at 06:07

## 2023-04-30 RX ADMIN — Medication 25 GRAM(S): at 06:06

## 2023-04-30 RX ADMIN — PREGABALIN 1000 MICROGRAM(S): 225 CAPSULE ORAL at 12:43

## 2023-04-30 RX ADMIN — Medication 650 MILLIGRAM(S): at 15:40

## 2023-04-30 RX ADMIN — POLYETHYLENE GLYCOL 3350 17 GRAM(S): 17 POWDER, FOR SOLUTION ORAL at 12:43

## 2023-04-30 NOTE — PROGRESS NOTE ADULT - NSPROGADDITIONALINFOA_GEN_ALL_CORE
ESRD s/p kidney transplant  tacrolimus level drawn yesterday evening - continue Tacrolimus 2mg PO q12 until next level received  continue Prednisone    stable CKD  received Lasix 20mg IV this AM because of low UO (~450cc/24 hours); however patient has poor PO intake  may need maintenance IVF if PO intake does not improve (~1L negative fluid balance)  ~1L output from R chest tube    monitor Strict Is and Os  calorie count to be started today ESRD s/p kidney transplant  tacrolimus level drawn yesterday evening - continue Tacrolimus 2mg PO q12 until next level received  continue Prednisone    stable CKD  received Lasix 20mg IV this AM because of low UO (~450cc/24 hours); however patient has poor PO intake   (~1L negative fluid balance)  ~1L output from R chest tube    monitor Strict Is and Os  calorie count to be started today

## 2023-04-30 NOTE — PROGRESS NOTE ADULT - SUBJECTIVE AND OBJECTIVE BOX
CTICU  CRITICAL  CARE  attending     Hand off received 					   Pertinent clinical, laboratory, radiographic, hemodynamic, echocardiographic, respiratory data, microbiologic data and chart were reviewed and analyzed frequently throughout the course of the day and night  Patient seen and examined with CTS/ SH attending at bedside    Pt is a 77y , Male, post op day # 5 s/p percutaneous DENNY closure with Watchman device;     post procedure c/b    Pericardial effusion/tamponade  PEA arrest; s/p CPR  s/p emergent bedside pericardiocentesis    POD # 4 s/p sternotomy; exploration for bleeding  evac of L Hemothorax    s/p R Pigtail thoracentesis for pleural effusion    Currently:    Fluid overloaded  episodes of severe V/Q mis matching  Being diuresed for negative fluid balance  Ventilatory support with Bipap alternating with HFNC    76 y/o male with polycystic kidney disease s/p kidney transplant in 2007, HTN, HLD, DM2, Prostate cancer s/p radical prostatectomy 2015, DVT 2006 (in setting of pelvic fracture), and afib/atrial flutter s/p ablation in 1/2018, who is now off oral anticoagulation secondary to GI bleed and presents for DENNY occlusoin. Pt as no symptomsHe was admitted [facility] with a [facility] bleed. He is a [employer] and refuses blood transfusions. Eliquis was stopped at that time.       Pericardial effusion with cardiac tamponade        , FAMILY HISTORY:  PAST MEDICAL & SURGICAL HISTORY:  Polycystic kidney disease      DM2 (diabetes mellitus, type 2)      HLD (hyperlipidemia)      HTN (hypertension)      Prostate cancer      Kidney transplant recipient      DVT (deep venous thrombosis)      Chronic CHF      H/O radical prostatectomy  2010      S/P hernia repair  Abdominal and inguinal around 2005        Patient is a 77y old  Male who presents for percutaneous DENNY closure (30 Apr 2023 06:46)      14 system review limited 2/2 post op morbidity  acute changes include acute respiratory failure  Vital signs, hemodynamic and respiratory parameters were reviewed from the bedside nursing flowsheet.  ICU Vital Signs Last 24 Hrs  T(C): 36.5 (30 Apr 2023 14:18), Max: 39.6 (30 Apr 2023 09:12)  T(F): 97.7 (30 Apr 2023 14:18), Max: 103.3 (30 Apr 2023 09:12)  HR: 83 (30 Apr 2023 16:00) (76 - 97)  BP: 93/57 (30 Apr 2023 16:00) (89/54 - 136/74)  BP(mean): 70 (30 Apr 2023 16:00) (67 - 99)  ABP: 137/55 (30 Apr 2023 09:25) (100/49 - 172/70)  ABP(mean): 75 (30 Apr 2023 09:25) (59 - 95)  RR: 28 (30 Apr 2023 16:00) (19 - 33)  SpO2: 93% (30 Apr 2023 16:00) (77% - 100%)    O2 Parameters below as of 30 Apr 2023 16:00  Patient On (Oxygen Delivery Method): BiPAP/CPAP    O2 Concentration (%): 60      Adult Advanced Hemodynamics Last 24 Hrs  CVP(mm Hg): 12 (30 Apr 2023 11:00) (10 - 185)  CVP(cm H2O): --  CO: 5.8 (30 Apr 2023 06:00) (5.8 - 5.9)  CI: 3.2 (30 Apr 2023 06:00) (3.2 - 3.3)  PA: --  PA(mean): --  PCWP: --  SVR: 675 (30 Apr 2023 06:00) (675 - 867)  SVRI: 1223 (30 Apr 2023 06:00) (1223 - 1573)  PVR: --  PVRI: --, ABG - ( 30 Apr 2023 02:17 )  pH, Arterial: 7.48  pH, Blood: x     /  pCO2: 30    /  pO2: 69    / HCO3: 22    / Base Excess: -0.3  /  SaO2: 95.3                Intake and output was reviewed and the fluid balance was calculated  Daily     Daily   I&O's Summary    29 Apr 2023 07:01  -  30 Apr 2023 07:00  --------------------------------------------------------  IN: 1227.8 mL / OUT: 2175 mL / NET: -947.2 mL    30 Apr 2023 07:01  -  30 Apr 2023 16:56  --------------------------------------------------------  IN: 24.7 mL / OUT: 630 mL / NET: -605.3 mL        All lines and drain sites were assessed  [facility]      POCT Blood Glucose.: 198 mg/dL (30 Apr 2023 12:34)    No acute change in mental status  Auscultation of the chest reveals equal bs  Abdomen is soft  Extremities are warm and well perfused  Wounds appear clean and unremarkable  Antibiotics are periop    labs  CBC Full  -  ( 30 Apr 2023 02:55 )  WBC Count : 9.41 K/uL  RBC Count : 2.14 M/uL  Hemoglobin : 7.0 g/dL  Hematocrit : 21.3 %  Platelet Count - Automated : 112 K/uL  Mean Cell Volume : 99.5 fl  Mean Cell Hemoglobin : 32.7 pg  Mean Cell Hemoglobin Concentration : 32.9 gm/dL  Auto Neutrophil # : 8.90 K/uL  Auto Lymphocyte # : 0.42 K/uL  Auto Monocyte # : 0.08 K/uL  Auto Eosinophil # : 0.00 K/uL  Auto Basophil # : 0.00 K/uL  Auto Neutrophil % : 94.6 %  Auto Lymphocyte % : 4.5 %  Auto Monocyte % : 0.9 %  Auto Eosinophil % : 0.0 %  Auto Basophil % : 0.0 %    04-30    144  |  114<H>  |  35<H>  ----------------------------<  72  3.5   |  18<L>  |  1.56<H>    Ca    7.1<L>      30 Apr 2023 02:21  Phos  2.5     04-30  Mg     1.9     04-30    TPro  4.0<L>  /  Alb  2.3<L>  /  TBili  3.2<H>  /  DBili  x   /  AST  33  /  ALT  8<L>  /  AlkPhos  74  04-30    PT/INR - ( 30 Apr 2023 02:21 )   PT: 23.6 sec;   INR: 1.97          PTT - ( 30 Apr 2023 02:21 )  PTT:39.8 sec  The current medications were reviewed   MEDICATIONS  (STANDING):  acetylcysteine 10%  Inhalation 4 milliLiter(s) Inhalation every 6 hours  atorvastatin 20 milliGRAM(s) Oral at bedtime  chlorhexidine 2% Cloths 1 Application(s) Topical <User Schedule>  cyanocobalamin 1000 MICROGram(s) Oral daily  dextrose 5%. 1000 milliLiter(s) (50 mL/Hr) IV Continuous <Continuous>  dextrose 5%. 1000 milliLiter(s) (100 mL/Hr) IV Continuous <Continuous>  dextrose 5%. 1000 milliLiter(s) (100 mL/Hr) IV Continuous <Continuous>  dextrose 5%. 1000 milliLiter(s) (50 mL/Hr) IV Continuous <Continuous>  dextrose 50% Injectable 12.5 Gram(s) IV Push once  dextrose 50% Injectable 25 Gram(s) IV Push once  dextrose 50% Injectable 25 Gram(s) IV Push once  dextrose 50% Injectable 25 Gram(s) IV Push once  dextrose 50% Injectable 12.5 Gram(s) IV Push once  dextrose 50% Injectable 25 Gram(s) IV Push once  epoetin annalee-epbx (RETACRIT) Injectable 28611 Unit(s) IV Push <User Schedule>  folic acid 1 milliGRAM(s) Oral daily  glucagon  Injectable 1 milliGRAM(s) IntraMuscular once  glucagon  Injectable 1 milliGRAM(s) IntraMuscular once  heparin   Injectable 5000 Unit(s) SubCutaneous every 8 hours  insulin glargine Injectable (LANTUS) 12 Unit(s) SubCutaneous at bedtime  insulin lispro (ADMELOG) corrective regimen sliding scale   SubCutaneous Before meals and at bedtime  levalbuterol Inhalation 0.63 milliGRAM(s) Inhalation every 6 hours  pantoprazole    Tablet 40 milliGRAM(s) Oral before breakfast  phenylephrine    Infusion 0.3 MICROgram(s)/kG/Min (7.4 mL/Hr) IV Continuous <Continuous>  polyethylene glycol 3350 17 Gram(s) Oral daily  predniSONE   Tablet 5 milliGRAM(s) Oral daily  sodium chloride 0.9%. 1000 milliLiter(s) (10 mL/Hr) IV Continuous <Continuous>  tacrolimus 2 milliGRAM(s) Oral every 12 hours    MEDICATIONS  (PRN):  acetaminophen     Tablet .. 650 milliGRAM(s) Oral every 6 hours PRN Mild Pain (1 - 3)  dextrose Oral Gel 15 Gram(s) Oral once PRN Blood Glucose LESS THAN 70 milliGRAM(s)/deciliter  dextrose Oral Gel 15 Gram(s) Oral once PRN Blood Glucose LESS THAN 70 milliGRAM(s)/deciliter  oxyCODONE    IR 10 milliGRAM(s) Oral every 6 hours PRN Severe Pain (7 - 10)  oxyCODONE    IR 5 milliGRAM(s) Oral every 4 hours PRN Moderate Pain (4 - 6)       PROBLEM LIST/ ASSESSMENT:  HEALTH ISSUES - PROBLEM Dx:  Pericardial effusion with cardiac tamponade        ,   Patient is a 77y old  Male who presents for  percutaneous DENNY closure (30 Apr 2023 06:46)     s/p same; followed by sternotomy and exploration for bleeding  acute changes include acute respiratory failure    My plan includes :    Maintain MAP >70  Supplemental O2 as needed  Titrate Fio2 to maintain Sao2 >95%  Serial ABGs  Diurese to maintain negative fluid balance  Bipap alternating with HFNC    close hemodynamic, ventilatory and drain monitoring and management per post op routine    Monitor for arrhythmias and monitor parameters for organ perfusion  monitor neurologic status  Head of the bed should remain elevated to 45 deg .   chest PT and IS will be encouraged  monitor adequacy of oxygenation and ventilation and attempt to wean oxygen  Nutritional goals will be met using po eventually , ensure adequate caloric intake and montior the same  Stress ulcer and VTE prophylaxis will be achieved    Glycemic control is satisfactory  Electrolytes have been repleted as necessary and wound care has been carried out. Pain control has been achieved.   agressive physical therapy and early mobility and ambulation goals will be met   The family was updated about the course and plan  CRITICAL CARE TIME SPENT in evaluation and management, reassessments, review and interpretation of labs and x-rays, ventilator and hemodynamic management, formulating a plan and coordinating care: ___90____ MIN.  Time does not include procedural time.  CTICU ATTENDING     					    Modesto Ny MD

## 2023-04-30 NOTE — PROGRESS NOTE ADULT - SUBJECTIVE AND OBJECTIVE BOX
CTICU  CRITICAL  CARE  attending     Hand off received 					   Pertinent clinical, laboratory, radiographic, hemodynamic, echocardiographic, respiratory data, microbiologic data and chart were reviewed and analyzed frequently throughout the course of the day  Patient seen and examined with CTS/ SH attending at bedside  Pt is a 77yr old male with PMH PKD sp kidney transplant (), HTN, HLD, DM, prostate ca sp radical prostatectomy (), DVT in setting of pelvic fx (), afib/flutter sp ablation (, off AC), admitted for surgical mgmt. Patient underwent left atrial appendage occlusion percutaneously with Dr. Sidhu 23. Arrived to the stepdown extubated on no drips. Overnight had PEA arrest, requiring ACLS and intubation. Tx to ICU, bedside TTE showing pericardial effusion with tamponade. Bedside procedure with 600cc blood aspirated. Given 2 units pRBC. Taken to OR early AM for sternotomy with 1.5L L hemothorax evacuated. Arrived intubated on epi, levo, vaso, rose mary. Switched to dobutamine and weaned off some pressors. Initially had lactic acidosis, now resolved. CPAP'd overnight. : extubated,  off, Bijan weaned.  swan dc, Bijan weaned.  R pigtail placed for effusion, 850cc out. Placed on BIPAP for increased wob and atelectasis.  new R subclavian central line placed. Given bumex for diuresis, buckley placed.       FAMILY HISTORY:  PAST MEDICAL & SURGICAL HISTORY:  Polycystic kidney disease  DM2 (diabetes mellitus, type 2)  HLD (hyperlipidemia)  HTN (hypertension)  Prostate cancer  Kidney transplant recipient  DVT (deep venous thrombosis)  Chronic CHF  H/O radical prostatectomy    S/P hernia repair  Abdominal and inguinal around         Patient is a 77y old  Male who presents with a chief complaint of percutaneous DENNY closure.      14 system review was unremarkable    Vital signs, hemodynamic and respiratory parameters were reviewed from the bedside nursing flowsheet.  ICU Vital Signs Last 24 Hrs  T(C): 37.5 (2023 17:13), Max: 39.6 (2023 09:12)  T(F): 99.5 (2023 17:13), Max: 103.3 (2023 09:12)  HR: 84 (2023 20:00) (76 - 97)  BP: 128/71 (2023 20:00) (89/54 - 136/74)  BP(mean): 95 (2023 20:00) (67 - 99)  ABP: 137/55 (2023 09:25) (100/49 - 169/67)  ABP(mean): 75 (2023 09:25) (59 - 95)  RR: 22 (2023 20:00) (20 - 33)  SpO2: 98% (2023 20:00) (77% - 100%)    O2 Parameters below as of 2023 21:00  Patient On (Oxygen Delivery Method): BiPAP/CPAP    O2 Concentration (%): 60      Adult Advanced Hemodynamics Last 24 Hrs  CVP(mm Hg): 12 (2023 11:00) (10 - 43)  CVP(cm H2O): --  CO: 5.8 (2023 06:00) (5.8 - 5.9)  CI: 3.2 (2023 06:00) (3.2 - 3.3)  PA: --  PA(mean): --  PCWP: --  SVR: 675 (2023 06:00) (675 - 867)  SVRI: 1223 (2023 06:00) (1223 - 1573)  PVR: --  PVRI: --, ABG - ( 2023 02:17 )  pH, Arterial: 7.48  pH, Blood: x     /  pCO2: 30    /  pO2: 69    / HCO3: 22    / Base Excess: -0.3  /  SaO2: 95.3                Intake and output was reviewed and the fluid balance was calculated  Daily     Daily   I&O's Summary    2023 07:01  -  2023 07:00  --------------------------------------------------------  IN: 1227.8 mL / OUT: 2175 mL / NET: -947.2 mL    2023 07:01  -  2023 20:16  --------------------------------------------------------  IN: 27.2 mL / OUT: 880 mL / NET: -852.8 mL        All lines and drain sites were assessed  Glycemic trend was reviewedCAPILLARY BLOOD GLUCOSE      POCT Blood Glucose.: 281 mg/dL (2023 18:42)    Neuro: awake nad  HEENT: bipap  Heart: s1 s2  Lungs: clear bl  Abdomen: soft nt nd  Extremities: 2+dp    Lines:  R subclavian TLC     Tubes:  L CT  R pigtail    labs  CBC Full  -  ( 2023 02:55 )  WBC Count : 9.41 K/uL  RBC Count : 2.14 M/uL  Hemoglobin : 7.0 g/dL  Hematocrit : 21.3 %  Platelet Count - Automated : 112 K/uL  Mean Cell Volume : 99.5 fl  Mean Cell Hemoglobin : 32.7 pg  Mean Cell Hemoglobin Concentration : 32.9 gm/dL  Auto Neutrophil # : 8.90 K/uL  Auto Lymphocyte # : 0.42 K/uL  Auto Monocyte # : 0.08 K/uL  Auto Eosinophil # : 0.00 K/uL  Auto Basophil # : 0.00 K/uL  Auto Neutrophil % : 94.6 %  Auto Lymphocyte % : 4.5 %  Auto Monocyte % : 0.9 %  Auto Eosinophil % : 0.0 %  Auto Basophil % : 0.0 %        144  |  114<H>  |  35<H>  ----------------------------<  72  3.5   |  18<L>  |  1.56<H>    Ca    7.1<L>      2023 02:21  Phos  2.5     04-30  Mg     1.9     04-30    TPro  4.0<L>  /  Alb  2.3<L>  /  TBili  3.2<H>  /  DBili  x   /  AST  33  /  ALT  8<L>  /  AlkPhos  74  04-30    PT/INR - ( 2023 02:21 )   PT: 23.6 sec;   INR: 1.97          PTT - ( 2023 02:21 )  PTT:39.8 sec  The current medications were reviewed   MEDICATIONS  (STANDING):  acetylcysteine 10%  Inhalation 4 milliLiter(s) Inhalation every 6 hours  atorvastatin 20 milliGRAM(s) Oral at bedtime  chlorhexidine 2% Cloths 1 Application(s) Topical <User Schedule>  cyanocobalamin 1000 MICROGram(s) Oral daily  dextrose 5%. 1000 milliLiter(s) (100 mL/Hr) IV Continuous <Continuous>  dextrose 5%. 1000 milliLiter(s) (50 mL/Hr) IV Continuous <Continuous>  dextrose 5%. 1000 milliLiter(s) (100 mL/Hr) IV Continuous <Continuous>  dextrose 5%. 1000 milliLiter(s) (50 mL/Hr) IV Continuous <Continuous>  dextrose 50% Injectable 12.5 Gram(s) IV Push once  dextrose 50% Injectable 25 Gram(s) IV Push once  dextrose 50% Injectable 12.5 Gram(s) IV Push once  dextrose 50% Injectable 25 Gram(s) IV Push once  dextrose 50% Injectable 25 Gram(s) IV Push once  dextrose 50% Injectable 25 Gram(s) IV Push once  epoetin annalee-epbx (RETACRIT) Injectable 77261 Unit(s) IV Push <User Schedule>  folic acid 1 milliGRAM(s) Oral daily  glucagon  Injectable 1 milliGRAM(s) IntraMuscular once  glucagon  Injectable 1 milliGRAM(s) IntraMuscular once  heparin   Injectable 5000 Unit(s) SubCutaneous every 8 hours  insulin glargine Injectable (LANTUS) 12 Unit(s) SubCutaneous at bedtime  insulin lispro (ADMELOG) corrective regimen sliding scale   SubCutaneous Before meals and at bedtime  levalbuterol Inhalation 0.63 milliGRAM(s) Inhalation every 6 hours  pantoprazole    Tablet 40 milliGRAM(s) Oral before breakfast  phenylephrine    Infusion 0.3 MICROgram(s)/kG/Min (7.4 mL/Hr) IV Continuous <Continuous>  polyethylene glycol 3350 17 Gram(s) Oral daily  predniSONE   Tablet 5 milliGRAM(s) Oral daily  sodium chloride 0.9%. 1000 milliLiter(s) (10 mL/Hr) IV Continuous <Continuous>  tacrolimus 2 milliGRAM(s) Oral every 12 hours    MEDICATIONS  (PRN):  acetaminophen     Tablet .. 650 milliGRAM(s) Oral every 6 hours PRN Mild Pain (1 - 3)  dextrose Oral Gel 15 Gram(s) Oral once PRN Blood Glucose LESS THAN 70 milliGRAM(s)/deciliter  dextrose Oral Gel 15 Gram(s) Oral once PRN Blood Glucose LESS THAN 70 milliGRAM(s)/deciliter  oxyCODONE    IR 10 milliGRAM(s) Oral every 6 hours PRN Severe Pain (7 - 10)  oxyCODONE    IR 5 milliGRAM(s) Oral every 4 hours PRN Moderate Pain (4 - 6)      Assessment/Plan:  77yr old male with PMH PKD sp kidney transplant (), HTN, HLD, DM, prostate ca sp radical prostatectomy (), DVT in setting of pelvic fx (), afib/flutter sp ablation (, off AC), admitted for surgical mgmt. Patient is a [employer].    POD5 left atrial appendage occlusion percutaneously (Nahum, 23)  POD4 RTOR for L hemothorax evacuation (23)  MAP goal 65  BIPAP as tolerated  Bumex pushes for diuresis  Acute post operative anemia-trend H/H  Thrombocytopenia-monitor  Continue IS for renal transplant  Appreciate heme/onc recs  Diet as tolerated  Replete lytes prn  Monitor CT output  GI/DVT PPX  Bowel Regimen  Pain control  OOB with PT  Close hemodynamic, ventilatory and drain monitoring and management per post op routine  Monitor for arrhythmias and monitor parameters for organ perfusion  Beta blockade not administered due to hemodynamic instability and bradycardia  Monitor neurologic status  Head of the bed should remain elevated to 45 deg   Chest PT and IS will be encouraged  Monitor adequacy of oxygenation and ventilation and attempt to wean oxygen  Antibiotic regimen will be tailored to the clinical, laboratory and microbiologic data  Nutritional goals will be met using po eventually, ensure adequate caloric intake and monitor the same  Stress ulcer and VTE prophylaxis will be achieved    Glycemic control is satisfactory  Electrolytes have been repleted as necessary and wound care has been carried out   Pain control has been achieved.   Aggressive physical therapy and early mobility and ambulation goals will be met   The family was updated about the course and plan.    CRITICAL CARE TIME personally provided by me  in evaluation and management, reassessments, review and interpretation of labs and x-rays, ventilator and hemodynamic management, formulating a plan and coordinating care: ___30___ MIN.  Time does not include procedural time.    CTICU ATTENDING     					  Carmen Ortega MD

## 2023-04-30 NOTE — PROGRESS NOTE ADULT - SUBJECTIVE AND OBJECTIVE BOX
CC: I48.91        INTERVAL HISTORY: lying in bed in NAD  no major events overnight  draining 1L from chest tube      ROS: No chest pain, no sob, no abd pain. No n/v/d    PAST MEDICAL & SURGICAL HISTORY:  Polycystic kidney disease    DM2 (diabetes mellitus, type 2)    HLD (hyperlipidemia)    HTN (hypertension)    Prostate cancer    Kidney transplant recipient    DVT (deep venous thrombosis)    Chronic CHF    H/O radical prostatectomy  2010    S/P hernia repair  Abdominal and inguinal around 2005        PHYSICAL EXAM:  T(C): 36.4 (04-30-23 @ 01:15), Max: 37.2 (04-29-23 @ 17:08)  HR: 76 (04-30-23 @ 06:05)  BP: 123/76 (04-29-23 @ 18:00) (102/67 - 132/82)  RR: 22 (04-30-23 @ 05:00)  SpO2: 97% (04-30-23 @ 06:05)  Wt(kg): --  I&O's Summary    28 Apr 2023 07:01  -  29 Apr 2023 07:00  --------------------------------------------------------  IN: 1110 mL / OUT: 2160 mL / NET: -1050 mL    29 Apr 2023 07:01  -  30 Apr 2023 06:46  --------------------------------------------------------  IN: 987.8 mL / OUT: 2160 mL / NET: -1172.2 mL      Weight 65.8 (04-26 @ 13:32)  General: AAO x 3,  NAD.  HEENT: moist mucous membranes, no pallor/cyanosis.  Neck: no JVD visible.  Cardiac: S1, S2. RRR. No murmurs   Respratory: rhonchi; + chest tubes  Abdomen: soft. nontender. nondistended  Skin: no rashes.  Extremities: + LE edema b/l  Access:       DATA:                        7.0<LL>  9.41  )-----------( 112<L>    ( 30 Apr 2023 02:55 )             21.3<L>        144    |  114<H>  |  35<H>  ----------------------------<  72     Ca:7.1<L> (30 Apr 2023 02:21)  3.5     |  18<L>  |  1.56<H>        TPro  4.0<L>  /  Alb  2.3<L>  /  TBili  3.2<H>  /  DBili  x      /  AST  33     /  ALT  8<L>   /  AlkPhos  74     30 Apr 2023 02:21                    MEDICATIONS  (STANDING):  atorvastatin 20 milliGRAM(s) Oral at bedtime  chlorhexidine 2% Cloths 1 Application(s) Topical <User Schedule>  cyanocobalamin 1000 MICROGram(s) Oral daily  dexMEDEtomidine Infusion 0.4 MICROgram(s)/kG/Hr (6.58 mL/Hr) IV Continuous <Continuous>  dextrose 5%. 1000 milliLiter(s) (100 mL/Hr) IV Continuous <Continuous>  dextrose 5%. 1000 milliLiter(s) (50 mL/Hr) IV Continuous <Continuous>  dextrose 5%. 1000 milliLiter(s) (50 mL/Hr) IV Continuous <Continuous>  dextrose 5%. 1000 milliLiter(s) (100 mL/Hr) IV Continuous <Continuous>  dextrose 50% Injectable 12.5 Gram(s) IV Push once  dextrose 50% Injectable 12.5 Gram(s) IV Push once  dextrose 50% Injectable 25 Gram(s) IV Push once  dextrose 50% Injectable 25 Gram(s) IV Push once  dextrose 50% Injectable 25 Gram(s) IV Push once  dextrose 50% Injectable 25 Gram(s) IV Push once  epoetin annalee-epbx (RETACRIT) Injectable 09688 Unit(s) IV Push <User Schedule>  folic acid 1 milliGRAM(s) Oral daily  glucagon  Injectable 1 milliGRAM(s) IntraMuscular once  glucagon  Injectable 1 milliGRAM(s) IntraMuscular once  insulin glargine Injectable (LANTUS) 12 Unit(s) SubCutaneous at bedtime  insulin lispro (ADMELOG) corrective regimen sliding scale   SubCutaneous Before meals and at bedtime  magnesium oxide 800 milliGRAM(s) Oral once  pantoprazole    Tablet 40 milliGRAM(s) Oral before breakfast  polyethylene glycol 3350 17 Gram(s) Oral daily  potassium chloride  20 mEq/100 mL IVPB 20 milliEquivalent(s) IV Intermittent every 1 hour  predniSONE   Tablet 5 milliGRAM(s) Oral daily  sodium chloride 0.45%. 1000 milliLiter(s) (150 mL/Hr) IV Continuous <Continuous>  sodium chloride 0.9%. 1000 milliLiter(s) (10 mL/Hr) IV Continuous <Continuous>  tacrolimus 2 milliGRAM(s) Oral every 12 hours    MEDICATIONS  (PRN):  acetaminophen     Tablet .. 650 milliGRAM(s) Oral every 6 hours PRN Mild Pain (1 - 3)  dextrose Oral Gel 15 Gram(s) Oral once PRN Blood Glucose LESS THAN 70 milliGRAM(s)/deciliter  dextrose Oral Gel 15 Gram(s) Oral once PRN Blood Glucose LESS THAN 70 milliGRAM(s)/deciliter  oxyCODONE    IR 5 milliGRAM(s) Oral every 4 hours PRN Moderate Pain (4 - 6)  oxyCODONE    IR 10 milliGRAM(s) Oral every 6 hours PRN Severe Pain (7 - 10)

## 2023-05-01 DIAGNOSIS — J98.11 ATELECTASIS: ICD-10-CM

## 2023-05-01 DIAGNOSIS — J96.01 ACUTE RESPIRATORY FAILURE WITH HYPOXIA: ICD-10-CM

## 2023-05-01 DIAGNOSIS — R91.8 OTHER NONSPECIFIC ABNORMAL FINDING OF LUNG FIELD: ICD-10-CM

## 2023-05-01 LAB
A1C WITH ESTIMATED AVERAGE GLUCOSE RESULT: 6 % — HIGH (ref 4–5.6)
ALBUMIN SERPL ELPH-MCNC: 3 G/DL — LOW (ref 3.3–5)
ALBUMIN SERPL ELPH-MCNC: 3.1 G/DL — LOW (ref 3.3–5)
ALP SERPL-CCNC: 96 U/L — SIGNIFICANT CHANGE UP (ref 40–120)
ALP SERPL-CCNC: 99 U/L — SIGNIFICANT CHANGE UP (ref 40–120)
ALT FLD-CCNC: 13 U/L — SIGNIFICANT CHANGE UP (ref 10–45)
ALT FLD-CCNC: 14 U/L — SIGNIFICANT CHANGE UP (ref 10–45)
ANION GAP SERPL CALC-SCNC: 10 MMOL/L — SIGNIFICANT CHANGE UP (ref 5–17)
ANION GAP SERPL CALC-SCNC: 11 MMOL/L — SIGNIFICANT CHANGE UP (ref 5–17)
ANION GAP SERPL CALC-SCNC: 9 MMOL/L — SIGNIFICANT CHANGE UP (ref 5–17)
ANION GAP SERPL CALC-SCNC: 9 MMOL/L — SIGNIFICANT CHANGE UP (ref 5–17)
ANISOCYTOSIS BLD QL: SLIGHT — SIGNIFICANT CHANGE UP
APTT BLD: 36 SEC — HIGH (ref 27.5–35.5)
APTT BLD: 36.1 SEC — HIGH (ref 27.5–35.5)
APTT BLD: 60.3 SEC — HIGH (ref 27.5–35.5)
AST SERPL-CCNC: 37 U/L — SIGNIFICANT CHANGE UP (ref 10–40)
AST SERPL-CCNC: 44 U/L — HIGH (ref 10–40)
BASE EXCESS BLDV CALC-SCNC: -1.8 MMOL/L — SIGNIFICANT CHANGE UP (ref -2–3)
BASE EXCESS BLDV CALC-SCNC: 0.3 MMOL/L — SIGNIFICANT CHANGE UP (ref -2–3)
BASOPHILS # BLD AUTO: 0 K/UL — SIGNIFICANT CHANGE UP (ref 0–0.2)
BASOPHILS NFR BLD AUTO: 0 % — SIGNIFICANT CHANGE UP (ref 0–2)
BILIRUB SERPL-MCNC: 3.2 MG/DL — HIGH (ref 0.2–1.2)
BILIRUB SERPL-MCNC: 3.3 MG/DL — HIGH (ref 0.2–1.2)
BUN SERPL-MCNC: 55 MG/DL — HIGH (ref 7–23)
BUN SERPL-MCNC: 56 MG/DL — HIGH (ref 7–23)
BUN SERPL-MCNC: 57 MG/DL — HIGH (ref 7–23)
BUN SERPL-MCNC: 58 MG/DL — HIGH (ref 7–23)
BURR CELLS BLD QL SMEAR: PRESENT — SIGNIFICANT CHANGE UP
BURR CELLS BLD QL SMEAR: PRESENT — SIGNIFICANT CHANGE UP
CA-I SERPL-SCNC: 1.19 MMOL/L — SIGNIFICANT CHANGE UP (ref 1.15–1.33)
CA-I SERPL-SCNC: 1.22 MMOL/L — SIGNIFICANT CHANGE UP (ref 1.15–1.33)
CALCIUM SERPL-MCNC: 8.1 MG/DL — LOW (ref 8.4–10.5)
CALCIUM SERPL-MCNC: 8.2 MG/DL — LOW (ref 8.4–10.5)
CALCIUM SERPL-MCNC: 8.3 MG/DL — LOW (ref 8.4–10.5)
CALCIUM SERPL-MCNC: 8.4 MG/DL — SIGNIFICANT CHANGE UP (ref 8.4–10.5)
CHLORIDE SERPL-SCNC: 104 MMOL/L — SIGNIFICANT CHANGE UP (ref 96–108)
CHLORIDE SERPL-SCNC: 105 MMOL/L — SIGNIFICANT CHANGE UP (ref 96–108)
CHLORIDE SERPL-SCNC: 105 MMOL/L — SIGNIFICANT CHANGE UP (ref 96–108)
CHLORIDE SERPL-SCNC: 106 MMOL/L — SIGNIFICANT CHANGE UP (ref 96–108)
CO2 BLDV-SCNC: 25 MMOL/L — SIGNIFICANT CHANGE UP (ref 22–26)
CO2 BLDV-SCNC: 26.7 MMOL/L — HIGH (ref 22–26)
CO2 SERPL-SCNC: 23 MMOL/L — SIGNIFICANT CHANGE UP (ref 22–31)
CO2 SERPL-SCNC: 24 MMOL/L — SIGNIFICANT CHANGE UP (ref 22–31)
CO2 SERPL-SCNC: 25 MMOL/L — SIGNIFICANT CHANGE UP (ref 22–31)
CO2 SERPL-SCNC: 25 MMOL/L — SIGNIFICANT CHANGE UP (ref 22–31)
CREAT ?TM UR-MCNC: 80 MG/DL — SIGNIFICANT CHANGE UP
CREAT SERPL-MCNC: 2.19 MG/DL — HIGH (ref 0.5–1.3)
CREAT SERPL-MCNC: 2.19 MG/DL — HIGH (ref 0.5–1.3)
CREAT SERPL-MCNC: 2.2 MG/DL — HIGH (ref 0.5–1.3)
CREAT SERPL-MCNC: 2.29 MG/DL — HIGH (ref 0.5–1.3)
EGFR: 29 ML/MIN/1.73M2 — LOW
EGFR: 30 ML/MIN/1.73M2 — LOW
EGFR: 31 ML/MIN/1.73M2 — LOW
EGFR: 31 ML/MIN/1.73M2 — LOW
EOSINOPHIL # BLD AUTO: 0 K/UL — SIGNIFICANT CHANGE UP (ref 0–0.5)
EOSINOPHIL NFR BLD AUTO: 0 % — SIGNIFICANT CHANGE UP (ref 0–6)
ESTIMATED AVERAGE GLUCOSE: 126 MG/DL — HIGH (ref 68–114)
FIBRINOGEN PPP-MCNC: 290 MG/DL — SIGNIFICANT CHANGE UP (ref 200–445)
FIBRINOGEN PPP-MCNC: 469 MG/DL — HIGH (ref 200–445)
GAS PNL BLDA: SIGNIFICANT CHANGE UP
GAS PNL BLDA: SIGNIFICANT CHANGE UP
GAS PNL BLDV: 136 MMOL/L — SIGNIFICANT CHANGE UP (ref 136–145)
GAS PNL BLDV: 137 MMOL/L — SIGNIFICANT CHANGE UP (ref 136–145)
GAS PNL BLDV: SIGNIFICANT CHANGE UP
GAS PNL BLDV: SIGNIFICANT CHANGE UP
GIANT PLATELETS BLD QL SMEAR: PRESENT — SIGNIFICANT CHANGE UP
GIANT PLATELETS BLD QL SMEAR: PRESENT — SIGNIFICANT CHANGE UP
GLUCOSE BLDC GLUCOMTR-MCNC: 155 MG/DL — HIGH (ref 70–99)
GLUCOSE BLDC GLUCOMTR-MCNC: 168 MG/DL — HIGH (ref 70–99)
GLUCOSE BLDC GLUCOMTR-MCNC: 192 MG/DL — HIGH (ref 70–99)
GLUCOSE BLDC GLUCOMTR-MCNC: 278 MG/DL — HIGH (ref 70–99)
GLUCOSE SERPL-MCNC: 157 MG/DL — HIGH (ref 70–99)
GLUCOSE SERPL-MCNC: 174 MG/DL — HIGH (ref 70–99)
GLUCOSE SERPL-MCNC: 188 MG/DL — HIGH (ref 70–99)
GLUCOSE SERPL-MCNC: 257 MG/DL — HIGH (ref 70–99)
GRAM STN FLD: SIGNIFICANT CHANGE UP
HCO3 BLDV-SCNC: 24 MMOL/L — SIGNIFICANT CHANGE UP (ref 22–29)
HCO3 BLDV-SCNC: 25 MMOL/L — SIGNIFICANT CHANGE UP (ref 22–29)
HCT VFR BLD CALC: 19.4 % — CRITICAL LOW (ref 39–50)
HCT VFR BLD CALC: 20.5 % — CRITICAL LOW (ref 39–50)
HCT VFR BLD CALC: 23.3 % — LOW (ref 39–50)
HGB BLD-MCNC: 6.4 G/DL — CRITICAL LOW (ref 13–17)
HGB BLD-MCNC: 6.7 G/DL — CRITICAL LOW (ref 13–17)
HGB BLD-MCNC: 7.5 G/DL — LOW (ref 13–17)
HYPOCHROMIA BLD QL: SLIGHT — SIGNIFICANT CHANGE UP
INR BLD: 1.45 — HIGH (ref 0.88–1.16)
INR BLD: 1.45 — HIGH (ref 0.88–1.16)
INR BLD: 1.72 — HIGH (ref 0.88–1.16)
LACTATE SERPL-SCNC: 0.9 MMOL/L — SIGNIFICANT CHANGE UP (ref 0.5–2)
LACTATE SERPL-SCNC: 1 MMOL/L — SIGNIFICANT CHANGE UP (ref 0.5–2)
LYMPHOCYTES # BLD AUTO: 0.35 K/UL — LOW (ref 1–3.3)
LYMPHOCYTES # BLD AUTO: 0.52 K/UL — LOW (ref 1–3.3)
LYMPHOCYTES # BLD AUTO: 0.6 K/UL — LOW (ref 1–3.3)
LYMPHOCYTES # BLD AUTO: 3.6 % — LOW (ref 13–44)
LYMPHOCYTES # BLD AUTO: 5.2 % — LOW (ref 13–44)
LYMPHOCYTES # BLD AUTO: 6 % — LOW (ref 13–44)
MACROCYTES BLD QL: SIGNIFICANT CHANGE UP
MACROCYTES BLD QL: SLIGHT — SIGNIFICANT CHANGE UP
MACROCYTES BLD QL: SLIGHT — SIGNIFICANT CHANGE UP
MAGNESIUM SERPL-MCNC: 2.4 MG/DL — SIGNIFICANT CHANGE UP (ref 1.6–2.6)
MAGNESIUM SERPL-MCNC: 2.5 MG/DL — SIGNIFICANT CHANGE UP (ref 1.6–2.6)
MAGNESIUM SERPL-MCNC: 2.7 MG/DL — HIGH (ref 1.6–2.6)
MANUAL SMEAR VERIFICATION: SIGNIFICANT CHANGE UP
MCHC RBC-ENTMCNC: 32.2 GM/DL — SIGNIFICANT CHANGE UP (ref 32–36)
MCHC RBC-ENTMCNC: 32.3 PG — SIGNIFICANT CHANGE UP (ref 27–34)
MCHC RBC-ENTMCNC: 32.5 PG — SIGNIFICANT CHANGE UP (ref 27–34)
MCHC RBC-ENTMCNC: 32.7 GM/DL — SIGNIFICANT CHANGE UP (ref 32–36)
MCHC RBC-ENTMCNC: 32.8 PG — SIGNIFICANT CHANGE UP (ref 27–34)
MCHC RBC-ENTMCNC: 33 GM/DL — SIGNIFICANT CHANGE UP (ref 32–36)
MCV RBC AUTO: 100.4 FL — HIGH (ref 80–100)
MCV RBC AUTO: 99.5 FL — SIGNIFICANT CHANGE UP (ref 80–100)
MCV RBC AUTO: 99.5 FL — SIGNIFICANT CHANGE UP (ref 80–100)
METAMYELOCYTES # FLD: 0.9 % — HIGH (ref 0–0)
MICROCYTES BLD QL: SLIGHT — SIGNIFICANT CHANGE UP
MICROCYTES BLD QL: SLIGHT — SIGNIFICANT CHANGE UP
MONOCYTES # BLD AUTO: 0.17 K/UL — SIGNIFICANT CHANGE UP (ref 0–0.9)
MONOCYTES # BLD AUTO: 0.26 K/UL — SIGNIFICANT CHANGE UP (ref 0–0.9)
MONOCYTES # BLD AUTO: 0.35 K/UL — SIGNIFICANT CHANGE UP (ref 0–0.9)
MONOCYTES NFR BLD AUTO: 1.7 % — LOW (ref 2–14)
MONOCYTES NFR BLD AUTO: 2.6 % — SIGNIFICANT CHANGE UP (ref 2–14)
MONOCYTES NFR BLD AUTO: 3.6 % — SIGNIFICANT CHANGE UP (ref 2–14)
MYELOCYTES NFR BLD: 0.9 % — HIGH (ref 0–0)
MYELOCYTES NFR BLD: 1.7 % — HIGH (ref 0–0)
MYELOCYTES NFR BLD: 3.6 % — HIGH (ref 0–0)
NEUTROPHILS # BLD AUTO: 8.65 K/UL — HIGH (ref 1.8–7.4)
NEUTROPHILS # BLD AUTO: 8.85 K/UL — HIGH (ref 1.8–7.4)
NEUTROPHILS # BLD AUTO: 9.08 K/UL — HIGH (ref 1.8–7.4)
NEUTROPHILS NFR BLD AUTO: 83.9 % — HIGH (ref 43–77)
NEUTROPHILS NFR BLD AUTO: 86.1 % — HIGH (ref 43–77)
NEUTROPHILS NFR BLD AUTO: 87.9 % — HIGH (ref 43–77)
NEUTS BAND # BLD: 4.4 % — SIGNIFICANT CHANGE UP (ref 0–8)
NEUTS BAND # BLD: 5.2 % — SIGNIFICANT CHANGE UP (ref 0–8)
NRBC # BLD: 12 /100 — HIGH (ref 0–0)
NRBC # BLD: 22 /100 — HIGH (ref 0–0)
NRBC # BLD: 9 /100 — HIGH (ref 0–0)
NRBC # BLD: SIGNIFICANT CHANGE UP /100 WBCS (ref 0–0)
OSMOLALITY UR: 343 MOSM/KG — SIGNIFICANT CHANGE UP (ref 300–900)
OVALOCYTES BLD QL SMEAR: SLIGHT — SIGNIFICANT CHANGE UP
OVALOCYTES BLD QL SMEAR: SLIGHT — SIGNIFICANT CHANGE UP
PCO2 BLDV: 42 MMHG — SIGNIFICANT CHANGE UP (ref 42–55)
PCO2 BLDV: 42 MMHG — SIGNIFICANT CHANGE UP (ref 42–55)
PH BLDV: 7.36 — SIGNIFICANT CHANGE UP (ref 7.32–7.43)
PH BLDV: 7.39 — SIGNIFICANT CHANGE UP (ref 7.32–7.43)
PHOSPHATE SERPL-MCNC: 2.8 MG/DL — SIGNIFICANT CHANGE UP (ref 2.5–4.5)
PHOSPHATE SERPL-MCNC: 2.9 MG/DL — SIGNIFICANT CHANGE UP (ref 2.5–4.5)
PHOSPHATE SERPL-MCNC: 3.1 MG/DL — SIGNIFICANT CHANGE UP (ref 2.5–4.5)
PLAT MORPH BLD: NORMAL — SIGNIFICANT CHANGE UP
PLATELET # BLD AUTO: 121 K/UL — LOW (ref 150–400)
PLATELET # BLD AUTO: 127 K/UL — LOW (ref 150–400)
PLATELET # BLD AUTO: 144 K/UL — LOW (ref 150–400)
PO2 BLDV: 33 MMHG — SIGNIFICANT CHANGE UP (ref 25–45)
PO2 BLDV: 36 MMHG — SIGNIFICANT CHANGE UP (ref 25–45)
POIKILOCYTOSIS BLD QL AUTO: SIGNIFICANT CHANGE UP
POIKILOCYTOSIS BLD QL AUTO: SIGNIFICANT CHANGE UP
POLYCHROMASIA BLD QL SMEAR: SLIGHT — SIGNIFICANT CHANGE UP
POTASSIUM BLDV-SCNC: 3.7 MMOL/L — SIGNIFICANT CHANGE UP (ref 3.5–5.1)
POTASSIUM BLDV-SCNC: 3.7 MMOL/L — SIGNIFICANT CHANGE UP (ref 3.5–5.1)
POTASSIUM SERPL-MCNC: 3.8 MMOL/L — SIGNIFICANT CHANGE UP (ref 3.5–5.3)
POTASSIUM SERPL-MCNC: 4 MMOL/L — SIGNIFICANT CHANGE UP (ref 3.5–5.3)
POTASSIUM SERPL-MCNC: 4 MMOL/L — SIGNIFICANT CHANGE UP (ref 3.5–5.3)
POTASSIUM SERPL-MCNC: 4.2 MMOL/L — SIGNIFICANT CHANGE UP (ref 3.5–5.3)
POTASSIUM SERPL-SCNC: 3.8 MMOL/L — SIGNIFICANT CHANGE UP (ref 3.5–5.3)
POTASSIUM SERPL-SCNC: 4 MMOL/L — SIGNIFICANT CHANGE UP (ref 3.5–5.3)
POTASSIUM SERPL-SCNC: 4 MMOL/L — SIGNIFICANT CHANGE UP (ref 3.5–5.3)
POTASSIUM SERPL-SCNC: 4.2 MMOL/L — SIGNIFICANT CHANGE UP (ref 3.5–5.3)
PROMYELOCYTES # FLD: 0.9 % — HIGH (ref 0–0)
PROMYELOCYTES # FLD: 0.9 % — HIGH (ref 0–0)
PROT SERPL-MCNC: 4.6 G/DL — LOW (ref 6–8.3)
PROT SERPL-MCNC: 4.8 G/DL — LOW (ref 6–8.3)
PROTHROM AB SERPL-ACNC: 17.3 SEC — HIGH (ref 10.5–13.4)
PROTHROM AB SERPL-ACNC: 17.3 SEC — HIGH (ref 10.5–13.4)
PROTHROM AB SERPL-ACNC: 20.6 SEC — HIGH (ref 10.5–13.4)
RBC # BLD: 1.95 M/UL — LOW (ref 4.2–5.8)
RBC # BLD: 1.95 M/UL — LOW (ref 4.2–5.8)
RBC # BLD: 2.06 M/UL — LOW (ref 4.2–5.8)
RBC # BLD: 2.32 M/UL — LOW (ref 4.2–5.8)
RBC # BLD: 2.32 M/UL — LOW (ref 4.2–5.8)
RBC # FLD: 15.9 % — HIGH (ref 10.3–14.5)
RBC BLD AUTO: ABNORMAL
RETICS #: 59.7 K/UL — SIGNIFICANT CHANGE UP (ref 25–125)
RETICS #: 64 K/UL — SIGNIFICANT CHANGE UP (ref 25–125)
RETICS #: 68.3 K/UL — SIGNIFICANT CHANGE UP (ref 25–125)
RETICS/RBC NFR: 2.8 % — HIGH (ref 0.5–2.5)
RETICS/RBC NFR: 2.9 % — HIGH (ref 0.5–2.5)
RETICS/RBC NFR: 3.5 % — HIGH (ref 0.5–2.5)
SAO2 % BLDV: 60.1 % — LOW (ref 67–88)
SAO2 % BLDV: 67 % — SIGNIFICANT CHANGE UP (ref 67–88)
SCHISTOCYTES BLD QL AUTO: SLIGHT — SIGNIFICANT CHANGE UP
SCHISTOCYTES BLD QL AUTO: SLIGHT — SIGNIFICANT CHANGE UP
SODIUM SERPL-SCNC: 139 MMOL/L — SIGNIFICANT CHANGE UP (ref 135–145)
SODIUM UR-SCNC: 36 MMOL/L — SIGNIFICANT CHANGE UP
SPECIMEN SOURCE: SIGNIFICANT CHANGE UP
SPHEROCYTES BLD QL SMEAR: SLIGHT — SIGNIFICANT CHANGE UP
SPHEROCYTES BLD QL SMEAR: SLIGHT — SIGNIFICANT CHANGE UP
TACROLIMUS SERPL-MCNC: 7.5 NG/ML — SIGNIFICANT CHANGE UP
VARIANT LYMPHS # BLD: 0.9 % — SIGNIFICANT CHANGE UP (ref 0–6)
WBC # BLD: 10.07 K/UL — SIGNIFICANT CHANGE UP (ref 3.8–10.5)
WBC # BLD: 9.8 K/UL — SIGNIFICANT CHANGE UP (ref 3.8–10.5)
WBC # BLD: 9.95 K/UL — SIGNIFICANT CHANGE UP (ref 3.8–10.5)
WBC # FLD AUTO: 10.07 K/UL — SIGNIFICANT CHANGE UP (ref 3.8–10.5)
WBC # FLD AUTO: 9.8 K/UL — SIGNIFICANT CHANGE UP (ref 3.8–10.5)
WBC # FLD AUTO: 9.95 K/UL — SIGNIFICANT CHANGE UP (ref 3.8–10.5)

## 2023-05-01 PROCEDURE — 99223 1ST HOSP IP/OBS HIGH 75: CPT

## 2023-05-01 PROCEDURE — 99291 CRITICAL CARE FIRST HOUR: CPT | Mod: 25

## 2023-05-01 PROCEDURE — 93306 TTE W/DOPPLER COMPLETE: CPT | Mod: 26

## 2023-05-01 PROCEDURE — 31645 BRNCHSC W/THER ASPIR 1ST: CPT

## 2023-05-01 PROCEDURE — 36620 INSERTION CATHETER ARTERY: CPT

## 2023-05-01 PROCEDURE — 71045 X-RAY EXAM CHEST 1 VIEW: CPT | Mod: 26,76

## 2023-05-01 PROCEDURE — 36556 INSERT NON-TUNNEL CV CATH: CPT | Mod: RT

## 2023-05-01 PROCEDURE — 99292 CRITICAL CARE ADDL 30 MIN: CPT | Mod: 25

## 2023-05-01 PROCEDURE — 71045 X-RAY EXAM CHEST 1 VIEW: CPT | Mod: 26,77

## 2023-05-01 PROCEDURE — 99292 CRITICAL CARE ADDL 30 MIN: CPT

## 2023-05-01 RX ORDER — POLYETHYLENE GLYCOL 3350 17 G/17G
17 POWDER, FOR SOLUTION ORAL DAILY
Refills: 0 | Status: DISCONTINUED | OUTPATIENT
Start: 2023-05-01 | End: 2023-05-01

## 2023-05-01 RX ORDER — FOLIC ACID 0.8 MG
1 TABLET ORAL DAILY
Refills: 0 | Status: DISCONTINUED | OUTPATIENT
Start: 2023-05-01 | End: 2023-05-03

## 2023-05-01 RX ORDER — CEFTRIAXONE 500 MG/1
INJECTION, POWDER, FOR SOLUTION INTRAMUSCULAR; INTRAVENOUS
Refills: 0 | Status: DISCONTINUED | OUTPATIENT
Start: 2023-05-01 | End: 2023-05-01

## 2023-05-01 RX ORDER — CEFTRIAXONE 500 MG/1
1000 INJECTION, POWDER, FOR SOLUTION INTRAMUSCULAR; INTRAVENOUS ONCE
Refills: 0 | Status: DISCONTINUED | OUTPATIENT
Start: 2023-05-01 | End: 2023-05-01

## 2023-05-01 RX ORDER — ACETAMINOPHEN 500 MG
1000 TABLET ORAL ONCE
Refills: 0 | Status: COMPLETED | OUTPATIENT
Start: 2023-05-01 | End: 2023-05-01

## 2023-05-01 RX ORDER — PIPERACILLIN AND TAZOBACTAM 4; .5 G/20ML; G/20ML
3.38 INJECTION, POWDER, LYOPHILIZED, FOR SOLUTION INTRAVENOUS ONCE
Refills: 0 | Status: COMPLETED | OUTPATIENT
Start: 2023-05-01 | End: 2023-05-01

## 2023-05-01 RX ORDER — PIPERACILLIN AND TAZOBACTAM 4; .5 G/20ML; G/20ML
3.38 INJECTION, POWDER, LYOPHILIZED, FOR SOLUTION INTRAVENOUS ONCE
Refills: 0 | Status: DISCONTINUED | OUTPATIENT
Start: 2023-05-01 | End: 2023-05-01

## 2023-05-01 RX ORDER — VASOPRESSIN 20 [USP'U]/ML
0.01 INJECTION INTRAVENOUS
Qty: 40 | Refills: 0 | Status: DISCONTINUED | OUTPATIENT
Start: 2023-05-01 | End: 2023-05-08

## 2023-05-01 RX ORDER — BUMETANIDE 0.25 MG/ML
1 INJECTION INTRAMUSCULAR; INTRAVENOUS ONCE
Refills: 0 | Status: COMPLETED | OUTPATIENT
Start: 2023-05-01 | End: 2023-05-01

## 2023-05-01 RX ORDER — PHENYLEPHRINE HYDROCHLORIDE 10 MG/ML
0.3 INJECTION INTRAVENOUS
Qty: 40 | Refills: 0 | Status: DISCONTINUED | OUTPATIENT
Start: 2023-05-01 | End: 2023-05-07

## 2023-05-01 RX ORDER — BUMETANIDE 0.25 MG/ML
1 INJECTION INTRAMUSCULAR; INTRAVENOUS ONCE
Refills: 0 | Status: DISCONTINUED | OUTPATIENT
Start: 2023-05-01 | End: 2023-05-01

## 2023-05-01 RX ORDER — IRON SUCROSE 20 MG/ML
300 INJECTION, SOLUTION INTRAVENOUS ONCE
Refills: 0 | Status: COMPLETED | OUTPATIENT
Start: 2023-05-01 | End: 2023-05-01

## 2023-05-01 RX ORDER — DEXMEDETOMIDINE HYDROCHLORIDE IN 0.9% SODIUM CHLORIDE 4 UG/ML
0.2 INJECTION INTRAVENOUS
Qty: 400 | Refills: 0 | Status: DISCONTINUED | OUTPATIENT
Start: 2023-05-01 | End: 2023-05-02

## 2023-05-01 RX ORDER — DOBUTAMINE HCL 250MG/20ML
2 VIAL (ML) INTRAVENOUS
Qty: 500 | Refills: 0 | Status: DISCONTINUED | OUTPATIENT
Start: 2023-05-01 | End: 2023-05-04

## 2023-05-01 RX ORDER — PIPERACILLIN AND TAZOBACTAM 4; .5 G/20ML; G/20ML
3.38 INJECTION, POWDER, LYOPHILIZED, FOR SOLUTION INTRAVENOUS EVERY 8 HOURS
Refills: 0 | Status: DISCONTINUED | OUTPATIENT
Start: 2023-05-01 | End: 2023-05-02

## 2023-05-01 RX ORDER — PREGABALIN 225 MG/1
1000 CAPSULE ORAL DAILY
Refills: 0 | Status: DISCONTINUED | OUTPATIENT
Start: 2023-05-01 | End: 2023-05-21

## 2023-05-01 RX ORDER — VANCOMYCIN HCL 1 G
1000 VIAL (EA) INTRAVENOUS ONCE
Refills: 0 | Status: COMPLETED | OUTPATIENT
Start: 2023-05-01 | End: 2023-05-01

## 2023-05-01 RX ORDER — DOBUTAMINE HCL 250MG/20ML
3 VIAL (ML) INTRAVENOUS
Qty: 500 | Refills: 0 | Status: DISCONTINUED | OUTPATIENT
Start: 2023-05-01 | End: 2023-05-01

## 2023-05-01 RX ORDER — CALCIUM GLUCONATE 100 MG/ML
2 VIAL (ML) INTRAVENOUS ONCE
Refills: 0 | Status: COMPLETED | OUTPATIENT
Start: 2023-05-01 | End: 2023-05-01

## 2023-05-01 RX ORDER — ALBUMIN HUMAN 25 %
250 VIAL (ML) INTRAVENOUS ONCE
Refills: 0 | Status: COMPLETED | OUTPATIENT
Start: 2023-05-01 | End: 2023-05-02

## 2023-05-01 RX ORDER — MILRINONE LACTATE 1 MG/ML
0.12 INJECTION, SOLUTION INTRAVENOUS
Qty: 20 | Refills: 0 | Status: DISCONTINUED | OUTPATIENT
Start: 2023-05-01 | End: 2023-05-01

## 2023-05-01 RX ORDER — POLYETHYLENE GLYCOL 3350 17 G/17G
17 POWDER, FOR SOLUTION ORAL DAILY
Refills: 0 | Status: DISCONTINUED | OUTPATIENT
Start: 2023-05-01 | End: 2023-05-03

## 2023-05-01 RX ORDER — MILRINONE LACTATE 1 MG/ML
0.25 INJECTION, SOLUTION INTRAVENOUS
Qty: 20 | Refills: 0 | Status: DISCONTINUED | OUTPATIENT
Start: 2023-05-01 | End: 2023-05-05

## 2023-05-01 RX ORDER — ALBUMIN HUMAN 25 %
250 VIAL (ML) INTRAVENOUS ONCE
Refills: 0 | Status: COMPLETED | OUTPATIENT
Start: 2023-05-01 | End: 2023-05-01

## 2023-05-01 RX ORDER — ALBUMIN HUMAN 25 %
50 VIAL (ML) INTRAVENOUS
Refills: 0 | Status: COMPLETED | OUTPATIENT
Start: 2023-05-01 | End: 2023-05-01

## 2023-05-01 RX ADMIN — Medication 2: at 07:33

## 2023-05-01 RX ADMIN — Medication 2: at 18:30

## 2023-05-01 RX ADMIN — VASOPRESSIN 1.5 UNIT(S)/MIN: 20 INJECTION INTRAVENOUS at 17:01

## 2023-05-01 RX ADMIN — Medication 50 MILLILITER(S): at 14:30

## 2023-05-01 RX ADMIN — Medication 9.87 MICROGRAM(S)/KG/MIN: at 17:01

## 2023-05-01 RX ADMIN — Medication 2: at 13:35

## 2023-05-01 RX ADMIN — HEPARIN SODIUM 5000 UNIT(S): 5000 INJECTION INTRAVENOUS; SUBCUTANEOUS at 14:22

## 2023-05-01 RX ADMIN — LEVALBUTEROL 0.63 MILLIGRAM(S): 1.25 SOLUTION, CONCENTRATE RESPIRATORY (INHALATION) at 15:10

## 2023-05-01 RX ADMIN — BUMETANIDE 1 MILLIGRAM(S): 0.25 INJECTION INTRAMUSCULAR; INTRAVENOUS at 11:20

## 2023-05-01 RX ADMIN — ERYTHROPOIETIN 20000 UNIT(S): 10000 INJECTION, SOLUTION INTRAVENOUS; SUBCUTANEOUS at 19:23

## 2023-05-01 RX ADMIN — LEVALBUTEROL 0.63 MILLIGRAM(S): 1.25 SOLUTION, CONCENTRATE RESPIRATORY (INHALATION) at 06:46

## 2023-05-01 RX ADMIN — Medication 9.87 MICROGRAM(S)/KG/MIN: at 17:03

## 2023-05-01 RX ADMIN — Medication 125 MILLILITER(S): at 05:18

## 2023-05-01 RX ADMIN — Medication 200 GRAM(S): at 10:45

## 2023-05-01 RX ADMIN — Medication 1 MILLIGRAM(S): at 19:15

## 2023-05-01 RX ADMIN — Medication 5.92 MICROGRAM(S)/KG/MIN: at 11:30

## 2023-05-01 RX ADMIN — Medication 400 MILLIGRAM(S): at 09:16

## 2023-05-01 RX ADMIN — Medication 50 MILLILITER(S): at 14:05

## 2023-05-01 RX ADMIN — VASOPRESSIN 1.5 UNIT(S)/MIN: 20 INJECTION INTRAVENOUS at 14:53

## 2023-05-01 RX ADMIN — LEVALBUTEROL 0.63 MILLIGRAM(S): 1.25 SOLUTION, CONCENTRATE RESPIRATORY (INHALATION) at 21:56

## 2023-05-01 RX ADMIN — Medication 4 MILLILITER(S): at 21:57

## 2023-05-01 RX ADMIN — MILRINONE LACTATE 4.94 MICROGRAM(S)/KG/MIN: 1 INJECTION, SOLUTION INTRAVENOUS at 17:48

## 2023-05-01 RX ADMIN — LEVALBUTEROL 0.63 MILLIGRAM(S): 1.25 SOLUTION, CONCENTRATE RESPIRATORY (INHALATION) at 00:18

## 2023-05-01 RX ADMIN — Medication 4 MILLILITER(S): at 05:30

## 2023-05-01 RX ADMIN — MILRINONE LACTATE 2.47 MICROGRAM(S)/KG/MIN: 1 INJECTION, SOLUTION INTRAVENOUS at 14:56

## 2023-05-01 RX ADMIN — IRON SUCROSE 176.67 MILLIGRAM(S): 20 INJECTION, SOLUTION INTRAVENOUS at 16:00

## 2023-05-01 RX ADMIN — Medication 4 MILLILITER(S): at 00:17

## 2023-05-01 RX ADMIN — Medication 125 MILLILITER(S): at 10:00

## 2023-05-01 RX ADMIN — PIPERACILLIN AND TAZOBACTAM 200 GRAM(S): 4; .5 INJECTION, POWDER, LYOPHILIZED, FOR SOLUTION INTRAVENOUS at 12:56

## 2023-05-01 RX ADMIN — PANTOPRAZOLE SODIUM 40 MILLIGRAM(S): 20 TABLET, DELAYED RELEASE ORAL at 06:46

## 2023-05-01 RX ADMIN — INSULIN GLARGINE 12 UNIT(S): 100 INJECTION, SOLUTION SUBCUTANEOUS at 22:59

## 2023-05-01 RX ADMIN — Medication 5 MILLIGRAM(S): at 06:45

## 2023-05-01 RX ADMIN — Medication 8 MILLIGRAM(S): at 19:31

## 2023-05-01 RX ADMIN — ATORVASTATIN CALCIUM 20 MILLIGRAM(S): 80 TABLET, FILM COATED ORAL at 22:52

## 2023-05-01 RX ADMIN — Medication 6: at 23:00

## 2023-05-01 RX ADMIN — BUMETANIDE 1 MILLIGRAM(S): 0.25 INJECTION INTRAMUSCULAR; INTRAVENOUS at 11:30

## 2023-05-01 RX ADMIN — MILRINONE LACTATE 2.47 MICROGRAM(S)/KG/MIN: 1 INJECTION, SOLUTION INTRAVENOUS at 17:01

## 2023-05-01 RX ADMIN — PIPERACILLIN AND TAZOBACTAM 25 GRAM(S): 4; .5 INJECTION, POWDER, LYOPHILIZED, FOR SOLUTION INTRAVENOUS at 18:15

## 2023-05-01 RX ADMIN — Medication 250 MILLIGRAM(S): at 13:34

## 2023-05-01 RX ADMIN — CHLORHEXIDINE GLUCONATE 1 APPLICATION(S): 213 SOLUTION TOPICAL at 06:46

## 2023-05-01 RX ADMIN — HEPARIN SODIUM 5000 UNIT(S): 5000 INJECTION INTRAVENOUS; SUBCUTANEOUS at 06:46

## 2023-05-01 RX ADMIN — Medication 1000 MILLIGRAM(S): at 09:45

## 2023-05-01 RX ADMIN — TACROLIMUS 2 MILLIGRAM(S): 5 CAPSULE ORAL at 06:46

## 2023-05-01 RX ADMIN — Medication 4 MILLILITER(S): at 15:59

## 2023-05-01 RX ADMIN — DEXMEDETOMIDINE HYDROCHLORIDE IN 0.9% SODIUM CHLORIDE 3.29 MICROGRAM(S)/KG/HR: 4 INJECTION INTRAVENOUS at 14:55

## 2023-05-01 RX ADMIN — Medication 8 MILLIGRAM(S): at 14:23

## 2023-05-01 NOTE — PROGRESS NOTE ADULT - SUBJECTIVE AND OBJECTIVE BOX
Patient is a 77y Male seen and evaluated at bedside. Oliguric in last 24hrs, not hematuria noted in [place]ley this AM, on Bipap and started on Phenylephrine, Scr tody at 2.2 today.        Meds:    acetaminophen     Tablet .. 650 every 6 hours PRN  acetaminophen   IVPB .. 1000 once  acetylcysteine 10%  Inhalation 4 every 6 hours  atorvastatin 20 at bedtime  chlorhexidine 2% Cloths 1 <User Schedule>  cyanocobalamin 1000 daily  dextrose 5%. 1000 <Continuous>  dextrose 5%. 1000 <Continuous>  dextrose 5%. 1000 <Continuous>  dextrose 5%. 1000 <Continuous>  dextrose 50% Injectable 25 once  dextrose 50% Injectable 12.5 once  dextrose 50% Injectable 25 once  dextrose 50% Injectable 25 once  dextrose 50% Injectable 12.5 once  dextrose 50% Injectable 25 once  dextrose Oral Gel 15 once PRN  dextrose Oral Gel 15 once PRN  epoetin annalee-epbx (RETACRIT) Injectable 64197 <User Schedule>  folic acid 1 daily  glucagon  Injectable 1 once  glucagon  Injectable 1 once  heparin   Injectable 5000 every 8 hours  insulin glargine Injectable (LANTUS) 12 at bedtime  insulin lispro (ADMELOG) corrective regimen sliding scale  Before meals and at bedtime  insulin lispro Injectable (ADMELOG) 3 three times a day before meals  levalbuterol Inhalation 0.63 every 6 hours  oxyCODONE    IR 5 every 4 hours PRN  oxyCODONE    IR 10 every 6 hours PRN  pantoprazole    Tablet 40 before breakfast  phenylephrine    Infusion 0.3 <Continuous>  polyethylene glycol 3350 17 daily  predniSONE   Tablet 5 daily  sodium chloride 0.9%. 1000 <Continuous>  tacrolimus 2 every 12 hours      T(C): , Max: 39.6 (04-30-23 @ 09:12)  T(F): , Max: 103.3 (04-30-23 @ 09:12)  HR: 78 (05-01-23 @ 08:20)  BP: 116/59 (05-01-23 @ 07:00)  BP(mean): 85 (05-01-23 @ 07:00)  RR: 18 (05-01-23 @ 07:00)  SpO2: 100% (05-01-23 @ 08:20)  Wt(kg): --    04-30 @ 07:01  -  05-01 @ 07:00  --------------------------------------------------------  IN: 579.7 mL / OUT: 1757 mL / NET: -1177.3 mL    05-01 @ 07:01  -  05-01 @ 09:01  --------------------------------------------------------  IN: 2.5 mL / OUT: 65 mL / NET: -62.5 mL          Review of Systems:  ROS negative except as per HPI      PHYSICAL EXAM:  GENERAL: Mild distress, sitting up in bed on BIPAP   NECK: supple, No JVD  CHEST/LUNG: Decreased breath sounds, sternotomy scar c/d/i, left side chest tube   HEART: normal S1S2, RRR  ABDOMEN: Soft, Nontender, +BS, No flank tenderness bilateral  EXTREMITIES: No clubbing, cyanosis, or edema   NEUROLOGY: AAO x3, no focal neurological deficit        LABS:                        7.5    9.95  )-----------( 144      ( 01 May 2023 00:18 )             23.3     05-01    139  |  105  |  55<H>  ----------------------------<  157<H>  4.0   |  25  |  2.20<H>    Ca    8.2<L>      01 May 2023 06:13  Phos  3.1     05-01  Mg     2.7     05-01    TPro  4.0<L>  /  Alb  2.3<L>  /  TBili  3.2<H>  /  DBili  x   /  AST  33  /  ALT  8<L>  /  AlkPhos  74  04-30      PT/INR - ( 01 May 2023 00:18 )   PT: 20.6 sec;   INR: 1.72          PTT - ( 01 May 2023 00:18 )  PTT:36.1 sec    Creatinine, Random Urine: 80 mg/dL (05-01 @ 06:43)  Sodium, Random Urine: 36 mmol/L (05-01 @ 06:43)  Osmolality, Random Urine: 343 mosm/kg (05-01 @ 06:43)        RADIOLOGY & ADDITIONAL STUDIES:

## 2023-05-01 NOTE — PROGRESS NOTE ADULT - SUBJECTIVE AND OBJECTIVE BOX
CTICU  CRITICAL  CARE  attending     Hand off received 					   Pertinent clinical, laboratory, radiographic, hemodynamic, echocardiographic, respiratory data, microbiologic data and chart were reviewed and analyzed frequently throughout the course of the day and night  Patient seen and examined with CTS/ SH attending at bedside  Pt is a 77y , Male, HEALTH ISSUES - PROBLEM Dx:  Pericardial effusion with cardiac tamponade    Acute respiratory failure with hypoxia    Atelectasis    Ground glass opacity present on imaging of lung        , FAMILY HISTORY:  PAST MEDICAL & SURGICAL HISTORY:  Polycystic kidney disease      DM2 (diabetes mellitus, type 2)      HLD (hyperlipidemia)      HTN (hypertension)      Prostate cancer      Kidney transplant recipient      DVT (deep venous thrombosis)      Chronic CHF      H/O radical prostatectomy  2010      S/P hernia repair  Abdominal and inguinal around 2005        Patient is a 77y old  Male who presents with a chief complaint of percutaneous DENNY closure (01 May 2023 20:03)      14 system review was unremarkable    Vital signs, hemodynamic and respiratory parameters were reviewed from the bedside nursing flowsheet.  ICU Vital Signs Last 24 Hrs  T(C): 36.4 (01 May 2023 22:31), Max: 36.6 (01 May 2023 11:00)  T(F): 97.5 (01 May 2023 22:31), Max: 97.9 (01 May 2023 11:00)  HR: 94 (01 May 2023 22:00) (71 - 96)  BP: 133/60 (01 May 2023 22:00) (95/61 - 161/68)  BP(mean): 87 (01 May 2023 22:00) (72 - 101)  ABP: 152/52 (01 May 2023 22:00) (118/53 - 152/69)  ABP(mean): 76 (01 May 2023 22:00) (66 - 90)  RR: 20 (01 May 2023 22:00) (15 - 24)  SpO2: 100% (01 May 2023 22:00) (93% - 100%)    O2 Parameters below as of 01 May 2023 22:00  Patient On (Oxygen Delivery Method): nasal cannula, high flow  O2 Flow (L/min): 50  O2 Concentration (%): 50      Adult Advanced Hemodynamics Last 24 Hrs  CVP(mm Hg): 9 (01 May 2023 22:00) (6 - 246)  CVP(cm H2O): --  CO: 5.9 (01 May 2023 22:00) (3.9 - 5.9)  CI: 3.3 (01 May 2023 22:00) (2.2 - 3.3)  PA: 49/18 (01 May 2023 22:00) (44/22 - 59/27)  PA(mean): 30 (01 May 2023 22:00) (30 - 40)  PCWP: --  SVR: 907 (01 May 2023 22:00) (907 - 1536)  SVRI: 1622 (01 May 2023 22:00) (1622 - 6593)  PVR: --  PVRI: --, ABG - ( 01 May 2023 17:49 )  pH, Arterial: 7.40  pH, Blood: x     /  pCO2: 38    /  pO2: 142   / HCO3: 24    / Base Excess: -1.1  /  SaO2: 99.1                Intake and output was reviewed and the fluid balance was calculated  Daily     Daily   I&O's Summary    30 Apr 2023 07:01  -  01 May 2023 07:00  --------------------------------------------------------  IN: 579.7 mL / OUT: 1757 mL / NET: -1177.3 mL    01 May 2023 07:01  -  01 May 2023 22:38  --------------------------------------------------------  IN: 2119.3 mL / OUT: 1180 mL / NET: 939.3 mL        All lines and drain sites were assessed  Glycemic trend was reviewedCAPILLARY BLOOD GLUCOSE      POCT Blood Glucose.: 192 mg/dL (01 May 2023 17:39)    No acute change in mental status  Auscultation of the chest reveals equal bs  Abdomen is soft  Extremities are warm and well perfused  Wounds appear clean and unremarkable  Antibiotics are periop    labs  CBC Full  -  ( 01 May 2023 17:46 )  WBC Count : 10.07 K/uL  RBC Count : 1.95 M/uL  Hemoglobin : 6.4 g/dL  Hematocrit : 19.4 %  Platelet Count - Automated : 121 K/uL  Mean Cell Volume : 99.5 fl  Mean Cell Hemoglobin : 32.8 pg  Mean Cell Hemoglobin Concentration : 33.0 gm/dL  Auto Neutrophil # : 8.85 K/uL  Auto Lymphocyte # : 0.60 K/uL  Auto Monocyte # : 0.17 K/uL  Auto Eosinophil # : 0.00 K/uL  Auto Basophil # : 0.00 K/uL  Auto Neutrophil % : 87.9 %  Auto Lymphocyte % : 6.0 %  Auto Monocyte % : 1.7 %  Auto Eosinophil % : 0.0 %  Auto Basophil % : 0.0 %    05-01    139  |  106  |  57<H>  ----------------------------<  188<H>  3.8   |  24  |  2.19<H>    Ca    8.1<L>      01 May 2023 17:46  Phos  2.9     05-01  Mg     2.4     05-01    TPro  4.8<L>  /  Alb  3.1<L>  /  TBili  3.2<H>  /  DBili  x   /  AST  37  /  ALT  13  /  AlkPhos  96  05-01    PT/INR - ( 01 May 2023 17:46 )   PT: 17.3 sec;   INR: 1.45          PTT - ( 01 May 2023 17:46 )  PTT:60.3 sec  The current medications were reviewed   MEDICATIONS  (STANDING):  acetylcysteine 10%  Inhalation 4 milliLiter(s) Inhalation every 6 hours  albumin human  5% IVPB 250 milliLiter(s) IV Intermittent once  atorvastatin 20 milliGRAM(s) Oral at bedtime  chlorhexidine 2% Cloths 1 Application(s) Topical <User Schedule>  cyanocobalamin 1000 MICROGram(s) Oral daily  dexMEDEtomidine Infusion 0.2 MICROgram(s)/kG/Hr (3.29 mL/Hr) IV Continuous <Continuous>  dextrose 5%. 1000 milliLiter(s) (50 mL/Hr) IV Continuous <Continuous>  dextrose 5%. 1000 milliLiter(s) (100 mL/Hr) IV Continuous <Continuous>  dextrose 5%. 1000 milliLiter(s) (100 mL/Hr) IV Continuous <Continuous>  dextrose 5%. 1000 milliLiter(s) (50 mL/Hr) IV Continuous <Continuous>  dextrose 50% Injectable 25 Gram(s) IV Push once  dextrose 50% Injectable 12.5 Gram(s) IV Push once  dextrose 50% Injectable 25 Gram(s) IV Push once  dextrose 50% Injectable 25 Gram(s) IV Push once  dextrose 50% Injectable 12.5 Gram(s) IV Push once  dextrose 50% Injectable 25 Gram(s) IV Push once  DOBUTamine Infusion 5 MICROgram(s)/kG/Min (9.87 mL/Hr) IV Continuous <Continuous>  epoetin annalee-epbx (RETACRIT) Injectable 95790 Unit(s) IV Push <User Schedule>  folic acid 1 milliGRAM(s) Oral daily  glucagon  Injectable 1 milliGRAM(s) IntraMuscular once  glucagon  Injectable 1 milliGRAM(s) IntraMuscular once  insulin glargine Injectable (LANTUS) 12 Unit(s) SubCutaneous at bedtime  insulin lispro (ADMELOG) corrective regimen sliding scale   SubCutaneous Before meals and at bedtime  insulin lispro Injectable (ADMELOG) 3 Unit(s) SubCutaneous three times a day before meals  levalbuterol Inhalation 0.63 milliGRAM(s) Inhalation every 6 hours  milrinone Infusion 0.25 MICROgram(s)/kG/Min (4.94 mL/Hr) IV Continuous <Continuous>  pantoprazole    Tablet 40 milliGRAM(s) Oral before breakfast  phenylephrine    Infusion 0.3 MICROgram(s)/kG/Min (7.4 mL/Hr) IV Continuous <Continuous>  piperacillin/tazobactam IVPB.. 3.375 Gram(s) IV Intermittent every 8 hours  polyethylene glycol 3350 17 Gram(s) Oral daily  predniSONE   Tablet 5 milliGRAM(s) Oral daily  sodium chloride 0.9%. 1000 milliLiter(s) (10 mL/Hr) IV Continuous <Continuous>  testosterone patch 4 mG/24 Hr(s) 8 milliGRAM(s) Transdermal daily  vasopressin Infusion 0.01 Unit(s)/Min (1.5 mL/Hr) IV Continuous <Continuous>    MEDICATIONS  (PRN):  acetaminophen     Tablet .. 650 milliGRAM(s) Oral every 6 hours PRN Mild Pain (1 - 3)  dextrose Oral Gel 15 Gram(s) Oral once PRN Blood Glucose LESS THAN 70 milliGRAM(s)/deciliter  dextrose Oral Gel 15 Gram(s) Oral once PRN Blood Glucose LESS THAN 70 milliGRAM(s)/deciliter  oxyCODONE    IR 5 milliGRAM(s) Oral every 4 hours PRN Moderate Pain (4 - 6)  oxyCODONE    IR 10 milliGRAM(s) Oral every 6 hours PRN Severe Pain (7 - 10)       PROBLEM LIST/ ASSESSMENT:  HEALTH ISSUES - PROBLEM Dx:  Pericardial effusion with cardiac tamponade    Acute respiratory failure with hypoxia    Atelectasis    Ground glass opacity present on imaging of lung        ,   Patient is a 77y old  Male who presents with a chief complaint of percutaneous DENNY closure (01 May 2023 20:03)     s/p cardiac surgery    Acute systolic and diastolic heart failure evidenced by SOB and parenchymal infiltrates; will treat with diuresis    Cardiogenic shock on ionotropy    Vasogenic shock due to hypotension in the cticu , will keep on pressors    Hypovolemic shock - > 20% intravascular depletion will replete volume        Acidosis evidenced by anion gap and negative base excess    Acute kidney injury - creatinine > 0.3 due to combined prerenal and intrarenal factors can presume ATN      Moderate protein calorie malutrition        My plan includes :    place swan essie for hemodyn    flotrac for svv    dobhoff for nutrition    bronch done for toilet    abx broad spectrum   close hemodynamic, ventilatory and drain monitoring and management per post op routine    Monitor for arrhythmias and monitor parameters for organ perfusion  beta blockade not administered due to hemodynamic instability and bradycardia  monitor neurologic status  Head of the bed should remain elevated to 45 deg .   chest PT and IS will be encouraged  monitor adequacy of oxygenation and ventilation and attempt to wean oxygen  antibiotic regimen will be tailored to the clinical, laboratory and microbiologic data  Nutritional goals will be met using po eventually , ensure adequate caloric intake and montior the same  Stress ulcer and VTE prophylaxis will be achieved    Glycemic control is satisfactory  Electrolytes have been repleted as necessary and wound care has been carried out. Pain control has been achieved.   agressive physical therapy and early mobility and ambulation goals will be met   The family was updated about the course and plan  CRITICAL CARE TIME Upon my evaluation, this patient had a high probability of imminent or life-threatening deterioration due to acute resp failurewhich required my direct attention, intervention, and personal management.  I have personally provided 110 minutes of critical care time exclusive of time spent on separately billable procedures. Time included review of laboratory data, radiology results, discussion with consultants, and monitoring for potential decompensation. Interventions were performed as documented abovepersonally provided by me  in evaluation and management, reassessments, review and interpretation of labs and x-rays, ventilator and hemodynamic management, formulating a plan and coordinating care: ___110___ MIN.  Time does not include procedural time.    CTICU ATTENDING     					    Satya Lazar MD

## 2023-05-01 NOTE — PROGRESS NOTE ADULT - SUBJECTIVE AND OBJECTIVE BOX
LENGTH OF HOSPITAL STAY: 6d    SUBJECTIVE: No acute overnight events    HISTORY OF PRESENTING ILLNESS:   76 y/o male with polycystic kidney disease s/p kidney transplant in 2007, HTN, HLD, DM2, Prostate cancer s/p radical prostatectomy 2015, DVT 2006 (in setting of pelvic fracture), and afib/atrial flutter s/p ablation in 1/2018, who is now off oral anticoagulation secondary to GI bleed and presents for DENNY occlusoin. Pt as no symptomsHe was admitted [facility] with a [facility] bleed. He is a [employer] and refuses blood transfusions. Eliquis was stopped at that time.     KIMMIE reviewed from prior ablation- appears to have good anatomy for LAAO.     Patient seen in same day holding area; Reports no changes to PMHx or medications since last seen by our team. Denies acute or current SOB, chest pain, palpitation, N/V/D, fever/chills, recent illness, or any other concerning symptoms.  NYHA 1 Class (25 Apr 2023 07:57)    PAST MEDICAL & SURGICAL HISTORY:  Polycystic kidney disease  DM2 (diabetes mellitus, type 2)  HLD (hyperlipidemia)  HTN (hypertension)  Prostate cancer  Kidney transplant recipient  DVT (deep venous thrombosis)  Chronic CHF  H/O radical prostatectomy  2010  S/P hernia repair  Abdominal and inguinal around 2005    ALLERGIES:  naproxen (Hives)  Bairon&#x27;s Witness - No blood products (Unknown)    MEDICATIONS:  STANDING MEDICATIONS  acetylcysteine 10%  Inhalation 4 milliLiter(s) Inhalation every 6 hours  albumin human 25% IVPB 50 milliLiter(s) IV Intermittent every 1 hour  atorvastatin 20 milliGRAM(s) Oral at bedtime  buMETAnide 1 milliGRAM(s) Oral once  chlorhexidine 2% Cloths 1 Application(s) Topical <User Schedule>  cyanocobalamin 1000 MICROGram(s) Oral daily  dexMEDEtomidine Infusion 0.2 MICROgram(s)/kG/Hr IV Continuous <Continuous>  dextrose 5%. 1000 milliLiter(s) IV Continuous <Continuous>  dextrose 5%. 1000 milliLiter(s) IV Continuous <Continuous>  dextrose 5%. 1000 milliLiter(s) IV Continuous <Continuous>  dextrose 5%. 1000 milliLiter(s) IV Continuous <Continuous>  dextrose 50% Injectable 25 Gram(s) IV Push once  dextrose 50% Injectable 12.5 Gram(s) IV Push once  dextrose 50% Injectable 25 Gram(s) IV Push once  dextrose 50% Injectable 25 Gram(s) IV Push once  dextrose 50% Injectable 12.5 Gram(s) IV Push once  dextrose 50% Injectable 25 Gram(s) IV Push once  DOBUTamine Infusion 3 MICROgram(s)/kG/Min IV Continuous <Continuous>  epoetin annalee-epbx (RETACRIT) Injectable 10011 Unit(s) IV Push <User Schedule>  folic acid 1 milliGRAM(s) Oral daily  glucagon  Injectable 1 milliGRAM(s) IntraMuscular once  glucagon  Injectable 1 milliGRAM(s) IntraMuscular once  heparin   Injectable 5000 Unit(s) SubCutaneous every 8 hours  insulin glargine Injectable (LANTUS) 12 Unit(s) SubCutaneous at bedtime  insulin lispro (ADMELOG) corrective regimen sliding scale   SubCutaneous Before meals and at bedtime  insulin lispro Injectable (ADMELOG) 3 Unit(s) SubCutaneous three times a day before meals  iron sucrose IVPB 300 milliGRAM(s) IV Intermittent once  levalbuterol Inhalation 0.63 milliGRAM(s) Inhalation every 6 hours  milrinone Infusion 0.125 MICROgram(s)/kG/Min IV Continuous <Continuous>  pantoprazole    Tablet 40 milliGRAM(s) Oral before breakfast  phenylephrine    Infusion 0.3 MICROgram(s)/kG/Min IV Continuous <Continuous>  piperacillin/tazobactam IVPB.- 3.375 Gram(s) IV Intermittent once  piperacillin/tazobactam IVPB.. 3.375 Gram(s) IV Intermittent every 8 hours  polyethylene glycol 3350 17 Gram(s) Oral daily  predniSONE   Tablet 5 milliGRAM(s) Oral daily  sodium chloride 0.9%. 1000 milliLiter(s) IV Continuous <Continuous>  testosterone patch 4 mG/24 Hr(s) 8 milliGRAM(s) Transdermal daily  vasopressin Infusion 0.01 Unit(s)/Min IV Continuous <Continuous>    PRN MEDICATIONS  acetaminophen     Tablet .. 650 milliGRAM(s) Oral every 6 hours PRN  dextrose Oral Gel 15 Gram(s) Oral once PRN  dextrose Oral Gel 15 Gram(s) Oral once PRN  oxyCODONE    IR 5 milliGRAM(s) Oral every 4 hours PRN  oxyCODONE    IR 10 milliGRAM(s) Oral every 6 hours PRN    VITALS:   T(F): 97.3  HR: 79  BP: 129/61  RR: 18  SpO2: 98%    LABS:                        6.7    9.80  )-----------( 127      ( 01 May 2023 12:41 )             20.5     05-01    139  |  105  |  56<H>  ----------------------------<  174<H>  4.0   |  23  |  2.29<H>    Ca    8.4      01 May 2023 12:41  Phos  2.8     05-01  Mg     2.5     05-01    TPro  4.6<L>  /  Alb  3.0<L>  /  TBili  3.3<H>  /  DBili  x   /  AST  44<H>  /  ALT  14  /  AlkPhos  99  05-01    PT/INR - ( 01 May 2023 12:41 )   PT: 17.3 sec;   INR: 1.45       PTT - ( 01 May 2023 12:41 )  PTT:36.0 sec    ABG - ( 01 May 2023 12:55 )  pH, Arterial: 7.40  pH, Blood: x     /  pCO2: 36    /  pO2: 100   / HCO3: 22    / Base Excess: -2.0  /  SaO2: 98.8      Lactate, Blood: 0.9 mmol/L (05-01-23 @ 12:41)    RADIOLOGY:  Reviewed    PHYSICAL EXAM:  General: NAD  HEENT: NC/AT; PERRL, clear conjunctiva  Neck: supple  Respiratory: CTA b/l, HFNC  Cardiovascular: +S1/S2; RRR, midline sternotomy bandaged, 2 drains with serosanguinous drainage  Abdomen: soft, NT/ND; +BS x4  Extremities: WWP, 2+ peripheral pulses b/l; no LE edema  Skin: normal color and turgor; no rash  Neurological: AAOx3

## 2023-05-01 NOTE — CONSULT NOTE ADULT - PROBLEM SELECTOR RECOMMENDATION 9
The patient had drainage of the pericardial effusion.  The patient was extubated but he is on noninvasive ventilation.  The oxygen saturation 100% on 60% FiO2.  There might be some air trapping on the grafts on the noninvasive ventilation.  Consider change to high flow.  The exam revealed decreased breath sounds on the left side.  Repeat chest x-ray to avoid any plugging.  The condition could be related to atelectasis postoperative changes.  The patient was a smoker in the past stopped over 50 years and he does not have a diagnosis of chronic obstructive lung disease.  Wean the FiO2.  Incentive spirometer.  May change to high flow and observe.

## 2023-05-01 NOTE — CONSULT NOTE ADULT - CONSULT REQUESTED DATE/TIME
Returned call to Vasquez regarding concerns of no cell service near his campsite. He is currently wearing a Cardiac monitor for symptoms of palpitations and fluttering, ordered for 14 days. He is set to removed this on 10/22/19. He reports that he will be camping all weekend and is currently set up to leave today and again will be without cell service. He does report that he feels as though he recorded around 8-10 symptoms during the monitoring period.     At this time given no cellular service this evening through Sunday and patient is set to return the monitor on 10/22/19, okay to discontinue monitoring at this time? Patient with a history of AF on Sotalol 120 mg BID and anticoagulated on Warfarin.     Thank you!   
01-May-2023 09:46
28-Apr-2023 13:45
28-Apr-2023 16:30

## 2023-05-01 NOTE — PROGRESS NOTE ADULT - ASSESSMENT
77 M with past medical history of PCKD s/p kidney tx DDKT 2007, HTN, T2DM, Prostate cancer s/p radical prostatectomy 2015, DVT 2006 (in setting of pelvic fracture), and afib/atrial flutter s/p ablation in 2018, who is now off oral anticoagulation secondary to GI bleed (7/2022) and presents for DENNY occlusion. S/p DENNY occlusion 4/25 c/b cardiac tamponade and PEA arrest requiring bedside pericardiocentesis/ OR for sternotomy and local exploration 4/26. S/p > 1 L left hemothorax evacuated in OR and additional blood loss via chest tubes. Emergent stabilization in CTICU. Hematology consulted now for anemia and thrombocytopenia in [employer].    # Acute blood loss anemia and thrombocytopenia  Pre-operatively patient with hemoglobin 14 and platelet 240's. Hospital course complicated by cardiac tamponade and PEA arrest requiring emergent stabilization in CTICU. Acute drop in hemoglobin and platelets corresponds to episode of acute blood loss around time of tamponade and arrest. Received FEIBA 1000 units and protamine 50 mg which may explain mild coagulopathy. Patient reports receiving EPO and iron infusions at other hospitalizations without complication. He tolerated EPO dose and iron infusion on 4/26  - Hemoglobin trend: post-operatively has been decreasing slightly with serosanguinous drain output. However, has now improved to Hb 7.7  - Platelets trend: appear to have hit azucena in 60s and are uptrending.    Plan:  - s/p iron 300 mg daily x3 days 4/26, would hold off on further iron at this time  - s/p EPO 13,000 units on 4/26  - On EPO 20,000 units per day for 5 days (04/28-05/02) (hold for Hb >11)  - Continue with PO Folate 1 mg QD and Vitamin B12 1000 mcg QD  - Trend CBC and coagulation panel using pediatric tubes per CT Surgery team  - If worsening or emergent bleeding, patient's accepted product list includes Kcentra and cryoprecipitate    Discussed with Dr. Danny Guadalupe 77 M with past medical history of PCKD s/p kidney tx DDKT 2007, HTN, T2DM, Prostate cancer s/p radical prostatectomy 2015, DVT 2006 (in setting of pelvic fracture), and afib/atrial flutter s/p ablation in 2018, who is now off oral anticoagulation secondary to GI bleed (7/2022) and presents for DENNY occlusion. S/p DENNY occlusion 4/25 c/b cardiac tamponade and PEA arrest requiring bedside pericardiocentesis/ OR for sternotomy and local exploration 4/26. S/p > 1 L left hemothorax evacuated in OR and additional blood loss via chest tubes. Emergent stabilization in CTICU. Hematology consulted now for anemia and thrombocytopenia in [employer]ast[employer].    # Acute blood loss anemia and thrombocytopenia  Pre-operatively patient with hemoglobin 14 and platelet 240's. Hospital course complicated by cardiac tamponade and PEA arrest requiring emergent stabilization in CTICU. Acute drop in hemoglobin and platelets corresponds to episode of acute blood loss around time of tamponade and arrest. Received FEIBA 1000 units and protamine 50 mg which may explain mild coagulopathy. Patient reports receiving EPO and iron infusions at other hospitalizations without complication. He tolerated EPO dose and iron infusion on 4/26  - Hemoglobin trend: post-operatively has been decreasing with serosanguinous drain output.     Plan:  - s/p iron 300 mg daily x3 days 4/26, would hold off on further iron at this time  - s/p EPO 13,000 units on 4/26  - On EPO 20,000 units per day for 5 days (04/28-05/02) (hold for Hb >11)  - Continue with PO Folate 1 mg QD and Vitamin B12 1000 mcg QD  - Trend CBC and coagulation panel using pediatric tubes per CT Surgery team  - If worsening or emergent bleeding, patient's accepted product list includes Kcentra and cryoprecipitate    Discussed with Dr. Danny Guadalupe

## 2023-05-01 NOTE — PROCEDURAL SAFETY CHECKLIST WITH OR WITHOUT SEDATION - NSPOSTCOMMENTFT_GEN_ALL_CORE
Bronchoscopy completed without complication, sputum specimen collected.
Reform introducer inserted into RIJ, no complications.

## 2023-05-01 NOTE — PROGRESS NOTE ADULT - ASSESSMENT
77Y M now with oliguric DAYO on underlying CKD 3, sCr today at 2.2, stable HDS on pressors     Oliguric DAYO on underlying CKD 3 (baseline sCr at 1.2-1.4)   sCr now at 2.2  Urine Na at 36, no UA   Hematuria noted in buckley   Last 24hr UOP ~700 net negative 1.1L   Liley etiology iATN given hypotensive episodes, also with drop in Hgb and drained effusion       Plan:  Please send UA   Hold further diuretics   Agree with albumin and can bolus IVF to help with intravascular repletion   Strict in and outs   Hold next Tacro, and repeat tacro level when patient due for next dose as levels can become elevated in DAYO   If Urine output decreases, and worsening in clearance will start CVVHD , as of now will continue to monitor   Continue with epo as per heme/onc for anemia   Maintain MAP >70 for renal perfusion               #CKD 3 w/ Renal transplant  On prednisone 5mg and tacrolimus at home (2mg AM and 1mg PM)  BCr 1.2-1.4 eGFR 55-60  Now this admission w/ Cr 1.2-1.5, currently at 1.4  Tacro level <2 (4/27) which can be considered an accurate trough level - needs to be just before dosing and interval should be Q12H eg 11.5-12 hour trough    Recommend:   Given subtherapeutic level, will increase tacro to 2mg AM and 2mg PM. So, can given 2mg tonight and recheck a trough level in AM. Please ensure that trough level should be drawn 30 minutes before the next dose in AM  Goal tacro level 4-5  Can resume home prednisone 5mg  strict I/Os       #Acute anemia  due to L hemothorax 4/26  Pt Jehova's witness  Agree w/ epo administration per heme/onc recs; 20K units/day for 5 days  s/p iron 300 mg daily x3 days 4/26   77Y M now with oliguric DAYO on underlying CKD 3, sCr today at 2.2, stable HDS on pressors     Oliguric DAYO on underlying CKD 3 (baseline sCr at 1.2-1.4)   sCr now at 2.2  Urine Na at 36, no UA   Hematuria noted in buckley   Last 24hr UOP ~700 net negative 1.1L   Liley etiology iATN given hypotensive episodes, also with drop in Hgb and drained effusion       Plan:  Please send UA   Hold further diuretics   Agree with albumin and can bolus IVF to help with intravascular repletion   Strict in and outs   Hold next Tacro, and repeat tacro level when patient due for next dose as levels can become elevated in DAYO   Will need daily Tacro levels, once tacro level between 3-5 can resume next tacro dose, but given ATN will adjust tacro dose based on levels   If Urine output decreases, and worsening in clearance will start CVVHD , as of now will continue to monitor   Continue with epo as per heme/onc for anemia   Maintain MAP >70 for renal perfusion               #CKD 3 w/ Renal transplant  On prednisone 5mg and tacrolimus at home (2mg AM and 1mg PM)  BCr 1.2-1.4 eGFR 55-60  Now this admission w/ Cr 1.2-1.5, currently at 1.4  Tacro level <2 (4/27) which can be considered an accurate trough level - needs to be just before dosing and interval should be Q12H eg 11.5-12 hour trough    Recommend:   Given subtherapeutic level, will increase tacro to 2mg AM and 2mg PM. So, can given 2mg tonight and recheck a trough level in AM. Please ensure that trough level should be drawn 30 minutes before the next dose in AM  Goal tacro level 4-5  Can resume home prednisone 5mg  strict I/Os       #Acute anemia  due to L hemothorax 4/26  Pt Jehova's witness  Agree w/ epo administration per heme/onc recs; 20K units/day for 5 days  s/p iron 300 mg daily x3 days 4/26

## 2023-05-01 NOTE — PROGRESS NOTE ADULT - SUBJECTIVE AND OBJECTIVE BOX
CTICU  CRITICAL  CARE  attending     Hand off received 					   Pertinent clinical, laboratory, radiographic, hemodynamic, echocardiographic, respiratory data, microbiologic data and chart were reviewed and analyzed frequently throughout the course of the day  Patient seen and examined with CTS/ SH attending at bedside  Pt is a 77yr old male with PMH PKD sp kidney transplant (), HTN, HLD, DM, prostate ca sp radical prostatectomy (), DVT in setting of pelvic fx (), afib/flutter sp ablation (, off AC), admitted for surgical mgmt. Patient underwent left atrial appendage occlusion percutaneously with Dr. Sidhu 23. Arrived to the stepdown extubated on no drips. Overnight had PEA arrest, requiring ACLS and intubation. Tx to ICU, bedside TTE showing pericardial effusion with tamponade. Bedside procedure with 600cc blood aspirated. Given 2 units pRBC. Taken to OR early AM for sternotomy with 1.5L L hemothorax evacuated. Arrived intubated on epi, levo, vaso, rose mary. Switched to dobutamine and weaned off some pressors. Initially had lactic acidosis, now resolved. CPAP'd overnight. : extubated,  off, Bijan weaned.  swan dc, Bijan weaned.  R pigtail placed for effusion, 850cc out. Placed on BIPAP for increased wob and atelectasis.  new R subclavian central line placed. Given bumex for diuresis, buckley placed.  worsening Cr, RIJ [place] placed,  and primacor started, bumex given, underwent awake bronch, broad spectrum abx started. Dobhoff placed for nutrition. Vasopressin added.       FAMILY HISTORY:  PAST MEDICAL & SURGICAL HISTORY:  Polycystic kidney disease  DM2 (diabetes mellitus, type 2)  HLD (hyperlipidemia)  HTN (hypertension)  Prostate cancer  Kidney transplant recipient  DVT (deep venous thrombosis)  Chronic CHF  H/O radical prostatectomy    S/P hernia repair  Abdominal and inguinal around         Patient is a 77y old  Male who presents with a chief complaint of percutaneous DENNY closure.    14 system review was unremarkable    Vital signs, hemodynamic and respiratory parameters were reviewed from the bedside nursing flowsheet.  ICU Vital Signs Last 24 Hrs  T(C): 36.4 (01 May 2023 17:00), Max: 36.6 (01 May 2023 11:00)  T(F): 97.5 (01 May 2023 17:00), Max: 97.9 (01 May 2023 11:00)  HR: 84 (01 May 2023 19:00) (71 - 96)  BP: 140/69 (01 May 2023 19:00) (95/61 - 161/68)  BP(mean): 99 (01 May 2023 19:00) (72 - 112)  ABP: 144/55 (01 May 2023 19:00) (118/53 - 152/69)  ABP(mean): 77 (01 May 2023 19:00) (66 - 90)  RR: 15 (01 May 2023 19:00) (15 - 24)  SpO2: 99% (01 May 2023 19:00) (93% - 100%)    O2 Parameters below as of 01 May 2023 20:00  Patient On (Oxygen Delivery Method): BiPAP/CPAP    O2 Concentration (%): 60      Adult Advanced Hemodynamics Last 24 Hrs  CVP(mm Hg): 9 (01 May 2023 19:00) (6 - 246)  CVP(cm H2O): --  CO: 5.8 (01 May 2023 19:00) (3.9 - 5.8)  CI: 3.3 (01 May 2023 19:00) (2.2 - 3.3)  PA: 50/21 (01 May 2023 19:00) (44/22 - 59/27)  PA(mean): 32 (01 May 2023 19:00) (30 - 40)  PCWP: --  SVR: 936 (01 May 2023 19:00) (936 - 1536)  SVRI: 1646 (01 May 2023 19:00) (1646 - 2723)  PVR: --  PVRI: --, ABG - ( 01 May 2023 17:49 )  pH, Arterial: 7.40  pH, Blood: x     /  pCO2: 38    /  pO2: 142   / HCO3: 24    / Base Excess: -1.1  /  SaO2: 99.1                Intake and output was reviewed and the fluid balance was calculated  Daily     Daily   I&O's Summary    2023 07:01  -  01 May 2023 07:00  --------------------------------------------------------  IN: 579.7 mL / OUT: 1757 mL / NET: -1177.3 mL    01 May 2023 07:01  -  01 May 2023 20:03  --------------------------------------------------------  IN: 1890 mL / OUT: 1120 mL / NET: 770 mL        All lines and drain sites were assessed  Glycemic trend was reviewed    POCT Blood Glucose.: 192 mg/dL (01 May 2023 17:39)      Neuro: awake nad  HEENT: bipap  Heart: s1 s2  Lungs: clear bl  Abdomen: soft nt nd  Extremities: 2+dp    Lines:  R subclavian TLC 4/30  r radial arterial line 5/1  RIJ Cordis with [place] 5/1    Tubes:  L CT  R pigtail    labs  CBC Full  -  ( 01 May 2023 17:46 )  WBC Count : 10.07 K/uL  RBC Count : 1.95 M/uL  Hemoglobin : 6.4 g/dL  Hematocrit : 19.4 %  Platelet Count - Automated : 121 K/uL  Mean Cell Volume : 99.5 fl  Mean Cell Hemoglobin : 32.8 pg  Mean Cell Hemoglobin Concentration : 33.0 gm/dL  Auto Neutrophil # : 8.85 K/uL  Auto Lymphocyte # : 0.60 K/uL  Auto Monocyte # : 0.17 K/uL  Auto Eosinophil # : 0.00 K/uL  Auto Basophil # : 0.00 K/uL  Auto Neutrophil % : 87.9 %  Auto Lymphocyte % : 6.0 %  Auto Monocyte % : 1.7 %  Auto Eosinophil % : 0.0 %  Auto Basophil % : 0.0 %    05-    139  |  106  |  57<H>  ----------------------------<  188<H>  3.8   |  24  |  2.19<H>    Ca    8.1<L>      01 May 2023 17:46  Phos  2.9     05-  Mg     2.4     05-    TPro  4.8<L>  /  Alb  3.1<L>  /  TBili  3.2<H>  /  DBili  x   /  AST  37  /  ALT  13  /  AlkPhos  96  -    PT/INR - ( 01 May 2023 17:46 )   PT: 17.3 sec;   INR: 1.45          PTT - ( 01 May 2023 17:46 )  PTT:60.3 sec  The current medications were reviewed   MEDICATIONS  (STANDING):  acetylcysteine 10%  Inhalation 4 milliLiter(s) Inhalation every 6 hours  albumin human  5% IVPB 250 milliLiter(s) IV Intermittent once  atorvastatin 20 milliGRAM(s) Oral at bedtime  buMETAnide 1 milliGRAM(s) Oral once  chlorhexidine 2% Cloths 1 Application(s) Topical <User Schedule>  cyanocobalamin 1000 MICROGram(s) Oral daily  dexMEDEtomidine Infusion 0.2 MICROgram(s)/kG/Hr (3.29 mL/Hr) IV Continuous <Continuous>  dextrose 5%. 1000 milliLiter(s) (50 mL/Hr) IV Continuous <Continuous>  dextrose 5%. 1000 milliLiter(s) (100 mL/Hr) IV Continuous <Continuous>  dextrose 5%. 1000 milliLiter(s) (50 mL/Hr) IV Continuous <Continuous>  dextrose 5%. 1000 milliLiter(s) (100 mL/Hr) IV Continuous <Continuous>  dextrose 50% Injectable 25 Gram(s) IV Push once  dextrose 50% Injectable 12.5 Gram(s) IV Push once  dextrose 50% Injectable 25 Gram(s) IV Push once  dextrose 50% Injectable 25 Gram(s) IV Push once  dextrose 50% Injectable 12.5 Gram(s) IV Push once  dextrose 50% Injectable 25 Gram(s) IV Push once  DOBUTamine Infusion 5 MICROgram(s)/kG/Min (9.87 mL/Hr) IV Continuous <Continuous>  epoetin annalee-epbx (RETACRIT) Injectable 21704 Unit(s) IV Push <User Schedule>  folic acid 1 milliGRAM(s) Oral daily  glucagon  Injectable 1 milliGRAM(s) IntraMuscular once  glucagon  Injectable 1 milliGRAM(s) IntraMuscular once  insulin glargine Injectable (LANTUS) 12 Unit(s) SubCutaneous at bedtime  insulin lispro (ADMELOG) corrective regimen sliding scale   SubCutaneous Before meals and at bedtime  insulin lispro Injectable (ADMELOG) 3 Unit(s) SubCutaneous three times a day before meals  levalbuterol Inhalation 0.63 milliGRAM(s) Inhalation every 6 hours  milrinone Infusion 0.25 MICROgram(s)/kG/Min (4.94 mL/Hr) IV Continuous <Continuous>  pantoprazole    Tablet 40 milliGRAM(s) Oral before breakfast  phenylephrine    Infusion 0.3 MICROgram(s)/kG/Min (7.4 mL/Hr) IV Continuous <Continuous>  piperacillin/tazobactam IVPB.. 3.375 Gram(s) IV Intermittent every 8 hours  polyethylene glycol 3350 17 Gram(s) Oral daily  predniSONE   Tablet 5 milliGRAM(s) Oral daily  sodium chloride 0.9%. 1000 milliLiter(s) (10 mL/Hr) IV Continuous <Continuous>  testosterone patch 4 mG/24 Hr(s) 8 milliGRAM(s) Transdermal daily  vasopressin Infusion 0.01 Unit(s)/Min (1.5 mL/Hr) IV Continuous <Continuous>    MEDICATIONS  (PRN):  acetaminophen     Tablet .. 650 milliGRAM(s) Oral every 6 hours PRN Mild Pain (1 - 3)  dextrose Oral Gel 15 Gram(s) Oral once PRN Blood Glucose LESS THAN 70 milliGRAM(s)/deciliter  dextrose Oral Gel 15 Gram(s) Oral once PRN Blood Glucose LESS THAN 70 milliGRAM(s)/deciliter  oxyCODONE    IR 5 milliGRAM(s) Oral every 4 hours PRN Moderate Pain (4 - 6)  oxyCODONE    IR 10 milliGRAM(s) Oral every 6 hours PRN Severe Pain (7 - 10)      Assessment/Plan:  77yr old male with PMH PKD sp kidney transplant (), HTN, HLD, DM, prostate ca sp radical prostatectomy (), DVT in setting of pelvic fx (), afib/flutter sp ablation (, off AC), admitted for surgical mgmt. Patient is a Jewish.    POD6 left atrial appendage occlusion percutaneously (Nahum, 23)  POD5 RTOR for L hemothorax evacuation (23)  MAP goal 65 with vaso, rose mary on hold  BIPAP as tolerated  Bumex pushes for diuresis  Cardiogenic shock on  and primacor-wean as tolerated  Serial CI/CO  Trend end organ perfusion markers  Monitor urine output  Acute post operative anemia-trend H/H  Thrombocytopenia-monitor  Continue IS for renal transplant  Appreciate heme/onc recs  Diet as tolerated  Replete lytes prn  Monitor CT output  GI/DVT PPX  Bowel Regimen  Pain control  OOB with PT  Close hemodynamic, ventilatory and drain monitoring and management per post op routine  Monitor for arrhythmias and monitor parameters for organ perfusion  Beta blockade not administered due to hemodynamic instability and bradycardia  Monitor neurologic status  Head of the bed should remain elevated to 45 deg   Chest PT and IS will be encouraged  Monitor adequacy of oxygenation and ventilation and attempt to wean oxygen  Antibiotic regimen will be tailored to the clinical, laboratory and microbiologic data  Nutritional goals will be met using po eventually, ensure adequate caloric intake and monitor the same  Stress ulcer and VTE prophylaxis will be achieved    Glycemic control is satisfactory  Electrolytes have been repleted as necessary and wound care has been carried out   Pain control has been achieved.   Aggressive physical therapy and early mobility and ambulation goals will be met   The family was updated about the course and plan.    CRITICAL CARE TIME personally provided by me  in evaluation and management, reassessments, review and interpretation of labs and x-rays, ventilator and hemodynamic management, formulating a plan and coordinating care: ___60____ MIN.  Time does not include procedural time.    CTICU ATTENDING     					  Carmen Ortega MD

## 2023-05-01 NOTE — PROGRESS NOTE ADULT - SUBJECTIVE AND OBJECTIVE BOX
Name of procedure – Flexible fiberoptic bronchoscopy        Flexible fiberoptic bronchoscopy was performed under precede anxiolyis    Pre-procedural assessment reveals no risk of Tb    The scope was advanced orotracheally   VC were sharp and moving well…    The khoa was sharp  Right LL and RML air way were patent , copious freedman secrns  LLL and SELENA air way were patent , copious tan  secretions    CXR confirmed no pneumothorax post bronch  There were no complications  The patient tolerated the procedure well

## 2023-05-01 NOTE — CONSULT NOTE ADULT - SUBJECTIVE AND OBJECTIVE BOX
Patient is a 77y old  Male who presents with a chief complaint of percutaneous DENNY closure (01 May 2023 09:01)      HPI:  76 y/o male with polycystic kidney disease s/p kidney transplant in 2007, HTN, HLD, DM2, Prostate cancer s/p radical prostatectomy 2015, DVT 2006 (in setting of pelvic fracture), and afib/atrial flutter s/p ablation in 1/2018, who is now off oral anticoagulation secondary to GI bleed and presents for DENNY occlusoin. Pt as no symptomsHe was admitted to [facility] 7/2022 with a GI bleed. 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 Eliquis was stopped at that time.       KIMMIE reviewed from prior ablation- appears to have good anatomy for LAAO.     Patient seen in same day holding area; Reports no changes to PMHx or medications since last seen by our team. Denies acute or current SOB, chest pain, palpitation, N/V/D, fever/chills, recent illness, or any other concerning symptoms.  NYHA 1 Class (25 Apr 2023 07:57)      PAST MEDICAL & SURGICAL HISTORY:  Polycystic kidney disease      DM2 (diabetes mellitus, type 2)      HLD (hyperlipidemia)      HTN (hypertension)      Prostate cancer      Kidney transplant recipient      DVT (deep venous thrombosis)      Chronic CHF      H/O radical prostatectomy  2010      S/P hernia repair  Abdominal and inguinal around 2005          FAMILY HISTORY:      SOCIAL HISTORY:  Smoking Status: [ ] Current, [ ] Former, [ ] Never  Pack Years:    MEDICATIONS:  Pulmonary:  acetylcysteine 10%  Inhalation 4 milliLiter(s) Inhalation every 6 hours  levalbuterol Inhalation 0.63 milliGRAM(s) Inhalation every 6 hours    Antimicrobials:    Anticoagulants:  heparin   Injectable 5000 Unit(s) SubCutaneous every 8 hours    Onc:    GI/:  pantoprazole    Tablet 40 milliGRAM(s) Oral before breakfast  polyethylene glycol 3350 17 Gram(s) Oral daily    Endocrine:  atorvastatin 20 milliGRAM(s) Oral at bedtime  dextrose 50% Injectable 25 Gram(s) IV Push once  dextrose 50% Injectable 12.5 Gram(s) IV Push once  dextrose 50% Injectable 25 Gram(s) IV Push once  dextrose 50% Injectable 25 Gram(s) IV Push once  dextrose 50% Injectable 12.5 Gram(s) IV Push once  dextrose 50% Injectable 25 Gram(s) IV Push once  dextrose Oral Gel 15 Gram(s) Oral once PRN  dextrose Oral Gel 15 Gram(s) Oral once PRN  glucagon  Injectable 1 milliGRAM(s) IntraMuscular once  glucagon  Injectable 1 milliGRAM(s) IntraMuscular once  insulin glargine Injectable (LANTUS) 12 Unit(s) SubCutaneous at bedtime  insulin lispro (ADMELOG) corrective regimen sliding scale   SubCutaneous Before meals and at bedtime  insulin lispro Injectable (ADMELOG) 3 Unit(s) SubCutaneous three times a day before meals  predniSONE   Tablet 5 milliGRAM(s) Oral daily    Cardiac:  phenylephrine    Infusion 0.3 MICROgram(s)/kG/Min IV Continuous <Continuous>    Other Medications:  acetaminophen     Tablet .. 650 milliGRAM(s) Oral every 6 hours PRN  chlorhexidine 2% Cloths 1 Application(s) Topical <User Schedule>  cyanocobalamin 1000 MICROGram(s) Oral daily  dextrose 5%. 1000 milliLiter(s) IV Continuous <Continuous>  dextrose 5%. 1000 milliLiter(s) IV Continuous <Continuous>  dextrose 5%. 1000 milliLiter(s) IV Continuous <Continuous>  dextrose 5%. 1000 milliLiter(s) IV Continuous <Continuous>  epoetin annalee-epbx (RETACRIT) Injectable 93158 Unit(s) IV Push <User Schedule>  folic acid 1 milliGRAM(s) Oral daily  oxyCODONE    IR 5 milliGRAM(s) Oral every 4 hours PRN  oxyCODONE    IR 10 milliGRAM(s) Oral every 6 hours PRN  sodium chloride 0.9%. 1000 milliLiter(s) IV Continuous <Continuous>  tacrolimus 2 milliGRAM(s) Oral every 12 hours      Allergies    naproxen (Hives)  Bairon&#x27;s Witness - No blood products (Unknown)    Intolerances        Review of Systems:   •	General: negative  •	Skin/Breast: negative  •	Ophthalmologic: negative  •	ENMT: negative  •	Respiratory and Thorax: negative  •	Cardiovascular: negative  •	Gastrointestinal: negative  •	Genitourinary: negative  •	Musculoskeletal: negative  •	Neurological: negative  •	Psychiatric: negative  •	Hematology/Lymphatics: negative  •	Endocrine: negative  •	Allergic/Immunologic: negative    Physical Exam:   • Constitutional:	refer to dietitian/nutritionist note  • Eyes:	EOMI; PERRL; no drainage or redness  • ENMT:	No oral lesions; no gross abnormalities  • Neck	No bruits; no thyromegaly or nodules  • Breasts:	not examined  • Back:	No deformity or limitation of movement  • Respiratory:	Breath Sounds equal & clear to percussion & auscultation, no accessory muscle use  • Cardiovascular:	Regular rate & rhythm, normal S1, S2; no murmurs, gallops or rubs; no S3, S4  • Gastrointestinal:	Soft, non-tender, no hepatosplenomegaly, normal bowel sounds  • Genitourinary:	not examined  • Rectal: not examined  • Extremities:	No cyanosis, clubbing or edema  • Vascular:	Equal and normal pulses (carotid, femoral, dorsalis pedis)  • Neurologica:l	not examined  • Skin:	No lesions; no rash  • Lymph Nodes:	No lymphadedenopathy  • Musculoskeletal:	No joint pain, swelling or deformity; no limitation of movement      Vital Signs Last 24 Hrs  T(C): 36.3 (01 May 2023 08:49), Max: 37.5 (30 Apr 2023 17:13)  T(F): 97.3 (01 May 2023 08:49), Max: 99.5 (30 Apr 2023 17:13)  HR: 78 (01 May 2023 09:00) (74 - 97)  BP: 119/57 (01 May 2023 09:00) (89/54 - 157/83)  BP(mean): 82 (01 May 2023 09:00) (67 - 112)  RR: 19 (01 May 2023 09:00) (16 - 28)  SpO2: 100% (01 May 2023 09:00) (77% - 100%)    Parameters below as of 01 May 2023 09:00  Patient On (Oxygen Delivery Method): BiPAP/CPAP    O2 Concentration (%): 60    04-30 @ 07:01 - 05-01 @ 07:00  --------------------------------------------------------  IN: 579.7 mL / OUT: 1757 mL / NET: -1177.3 mL    05-01 @ 07:01 - 05-01 @ 09:46  --------------------------------------------------------  IN: 2.5 mL / OUT: 65 mL / NET: -62.5 mL          LABS:  ABG - ( 30 Apr 2023 02:17 )  pH, Arterial: 7.48  pH, Blood: x     /  pCO2: 30    /  pO2: 69    / HCO3: 22    / Base Excess: -0.3  /  SaO2: 95.3                CBC Full  -  ( 01 May 2023 00:18 )  WBC Count : 9.95 K/uL  RBC Count : 2.32 M/uL  Hemoglobin : 7.5 g/dL  Hematocrit : 23.3 %  Platelet Count - Automated : 144 K/uL  Mean Cell Volume : 100.4 fl  Mean Cell Hemoglobin : 32.3 pg  Mean Cell Hemoglobin Concentration : 32.2 gm/dL  Auto Neutrophil # : 9.08 K/uL  Auto Lymphocyte # : 0.52 K/uL  Auto Monocyte # : 0.26 K/uL  Auto Eosinophil # : 0.00 K/uL  Auto Basophil # : 0.00 K/uL  Auto Neutrophil % : 86.1 %  Auto Lymphocyte % : 5.2 %  Auto Monocyte % : 2.6 %  Auto Eosinophil % : 0.0 %  Auto Basophil % : 0.0 %    05-01    139  |  105  |  55<H>  ----------------------------<  157<H>  4.0   |  25  |  2.20<H>    Ca    8.2<L>      01 May 2023 06:13  Phos  3.1     05-01  Mg     2.7     05-01    TPro  4.0<L>  /  Alb  2.3<L>  /  TBili  3.2<H>  /  DBili  x   /  AST  33  /  ALT  8<L>  /  AlkPhos  74  04-30    PT/INR - ( 01 May 2023 00:18 )   PT: 20.6 sec;   INR: 1.72          PTT - ( 01 May 2023 00:18 )  PTT:36.1 sec                  RADIOLOGY & ADDITIONAL STUDIES (The following images were personally reviewed):        Patient is a 77y old  Male who presents with a chief complaint of percutaneous DENNY closure (01 May 2023 09:01)      HPI:  76 y/o male with polycystic kidney disease s/p kidney transplant in 2007, HTN, HLD, DM2, Prostate cancer s/p radical prostatectomy 2015, DVT 2006 (in setting of pelvic fracture), and afib/atrial flutter s/p ablation in 1/2018, who is now off oral anticoagulation secondary to GI bleed and presents for DENNY occlusoin. Pt as no symptomsHe was admitted to [facility] 7/2022 with a GI bleed. 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 Eliquis was stopped at that time.       KIMMIE reviewed from prior ablation- appears to have good anatomy for LAAO.     Patient seen in same day holding area; Reports no changes to PMHx or medications since last seen by our team. Denies acute or current SOB, chest pain, palpitation, N/V/D, fever/chills, recent illness, or any other concerning symptoms.  NYHA 1 Class (25 Apr 2023 07:57)      PAST MEDICAL & SURGICAL HISTORY:  Polycystic kidney disease      DM2 (diabetes mellitus, type 2)      HLD (hyperlipidemia)      HTN (hypertension)      Prostate cancer      Kidney transplant recipient      DVT (deep venous thrombosis)      Chronic CHF      H/O radical prostatectomy  2010      S/P hernia repair  Abdominal and inguinal around 2005          FAMILY HISTORY:      SOCIAL HISTORY:  Smoking Status: [ ] Current, [ ] Former, [ ] Never  Pack Years:    MEDICATIONS:  Pulmonary:  acetylcysteine 10%  Inhalation 4 milliLiter(s) Inhalation every 6 hours  levalbuterol Inhalation 0.63 milliGRAM(s) Inhalation every 6 hours    Antimicrobials:    Anticoagulants:  heparin   Injectable 5000 Unit(s) SubCutaneous every 8 hours    Onc:    GI/:  pantoprazole    Tablet 40 milliGRAM(s) Oral before breakfast  polyethylene glycol 3350 17 Gram(s) Oral daily    Endocrine:  atorvastatin 20 milliGRAM(s) Oral at bedtime  dextrose 50% Injectable 25 Gram(s) IV Push once  dextrose 50% Injectable 12.5 Gram(s) IV Push once  dextrose 50% Injectable 25 Gram(s) IV Push once  dextrose 50% Injectable 25 Gram(s) IV Push once  dextrose 50% Injectable 12.5 Gram(s) IV Push once  dextrose 50% Injectable 25 Gram(s) IV Push once  dextrose Oral Gel 15 Gram(s) Oral once PRN  dextrose Oral Gel 15 Gram(s) Oral once PRN  glucagon  Injectable 1 milliGRAM(s) IntraMuscular once  glucagon  Injectable 1 milliGRAM(s) IntraMuscular once  insulin glargine Injectable (LANTUS) 12 Unit(s) SubCutaneous at bedtime  insulin lispro (ADMELOG) corrective regimen sliding scale   SubCutaneous Before meals and at bedtime  insulin lispro Injectable (ADMELOG) 3 Unit(s) SubCutaneous three times a day before meals  predniSONE   Tablet 5 milliGRAM(s) Oral daily    Cardiac:  phenylephrine    Infusion 0.3 MICROgram(s)/kG/Min IV Continuous <Continuous>    Other Medications:  acetaminophen     Tablet .. 650 milliGRAM(s) Oral every 6 hours PRN  chlorhexidine 2% Cloths 1 Application(s) Topical <User Schedule>  cyanocobalamin 1000 MICROGram(s) Oral daily  dextrose 5%. 1000 milliLiter(s) IV Continuous <Continuous>  dextrose 5%. 1000 milliLiter(s) IV Continuous <Continuous>  dextrose 5%. 1000 milliLiter(s) IV Continuous <Continuous>  dextrose 5%. 1000 milliLiter(s) IV Continuous <Continuous>  epoetin annalee-epbx (RETACRIT) Injectable 36301 Unit(s) IV Push <User Schedule>  folic acid 1 milliGRAM(s) Oral daily  oxyCODONE    IR 5 milliGRAM(s) Oral every 4 hours PRN  oxyCODONE    IR 10 milliGRAM(s) Oral every 6 hours PRN  sodium chloride 0.9%. 1000 milliLiter(s) IV Continuous <Continuous>  tacrolimus 2 milliGRAM(s) Oral every 12 hours      Allergies    naproxen (Hives)  Bairon&#x27;s Witness - No blood products (Unknown)    Intolerances        Review of Systems:   •	General: negative  •	Skin/Breast: negative  •	Ophthalmologic: negative  •	ENMT: negative  •	Respiratory and Thorax: sob  •	Cardiovascular: negative  •	Gastrointestinal: negative  •	Genitourinary: negative  •	Musculoskeletal: negative  •	Neurological: negative  •	Psychiatric: negative  •	Hematology/Lymphatics: negative  •	Endocrine: negative  •	Allergic/Immunologic: negative    Physical Exam:   • Constitutional:	refer to dietitian/nutritionist note  • Eyes:	EOMI; PERRL; no drainage or redness  • ENMT:	No oral lesions; no gross abnormalities  • Neck	No bruits; no thyromegaly or nodules  • Breasts:	not examined  • Back:	No deformity or limitation of movement  • Respiratory:	Breath Sounds on the left & clear to percussion & auscultation, no accessory muscle use  • Cardiovascular:	Regular rate & rhythm, normal S1, S2; no murmurs, gallops or rubs; no S3, S4  • Gastrointestinal:	Soft, non-tender, no hepatosplenomegaly, normal bowel sounds  • Genitourinary:	not examined  • Rectal: not examined  • Extremities:	No cyanosis, clubbing or edema  • Vascular:	Equal and normal pulses (carotid, femoral, dorsalis pedis)  • Neurologica:l	not examined  • Skin:	No lesions; no rash  • Lymph Nodes:	No lymphadedenopathy  • Musculoskeletal:	No joint pain, swelling or deformity; no limitation of movement      Vital Signs Last 24 Hrs  T(C): 36.3 (01 May 2023 08:49), Max: 37.5 (30 Apr 2023 17:13)  T(F): 97.3 (01 May 2023 08:49), Max: 99.5 (30 Apr 2023 17:13)  HR: 78 (01 May 2023 09:00) (74 - 97)  BP: 119/57 (01 May 2023 09:00) (89/54 - 157/83)  BP(mean): 82 (01 May 2023 09:00) (67 - 112)  RR: 19 (01 May 2023 09:00) (16 - 28)  SpO2: 100% (01 May 2023 09:00) (77% - 100%)    Parameters below as of 01 May 2023 09:00  Patient On (Oxygen Delivery Method): BiPAP/CPAP    O2 Concentration (%): 60    04-30 @ 07:01 - 05-01 @ 07:00  --------------------------------------------------------  IN: 579.7 mL / OUT: 1757 mL / NET: -1177.3 mL    05-01 @ 07:01  -  05-01 @ 09:46  --------------------------------------------------------  IN: 2.5 mL / OUT: 65 mL / NET: -62.5 mL          LABS:  ABG - ( 30 Apr 2023 02:17 )  pH, Arterial: 7.48  pH, Blood: x     /  pCO2: 30    /  pO2: 69    / HCO3: 22    / Base Excess: -0.3  /  SaO2: 95.3                CBC Full  -  ( 01 May 2023 00:18 )  WBC Count : 9.95 K/uL  RBC Count : 2.32 M/uL  Hemoglobin : 7.5 g/dL  Hematocrit : 23.3 %  Platelet Count - Automated : 144 K/uL  Mean Cell Volume : 100.4 fl  Mean Cell Hemoglobin : 32.3 pg  Mean Cell Hemoglobin Concentration : 32.2 gm/dL  Auto Neutrophil # : 9.08 K/uL  Auto Lymphocyte # : 0.52 K/uL  Auto Monocyte # : 0.26 K/uL  Auto Eosinophil # : 0.00 K/uL  Auto Basophil # : 0.00 K/uL  Auto Neutrophil % : 86.1 %  Auto Lymphocyte % : 5.2 %  Auto Monocyte % : 2.6 %  Auto Eosinophil % : 0.0 %  Auto Basophil % : 0.0 %    05-01    139  |  105  |  55<H>  ----------------------------<  157<H>  4.0   |  25  |  2.20<H>    Ca    8.2<L>      01 May 2023 06:13  Phos  3.1     05-01  Mg     2.7     05-01    TPro  4.0<L>  /  Alb  2.3<L>  /  TBili  3.2<H>  /  DBili  x   /  AST  33  /  ALT  8<L>  /  AlkPhos  74  04-30    PT/INR - ( 01 May 2023 00:18 )   PT: 20.6 sec;   INR: 1.72          PTT - ( 01 May 2023 00:18 )  PTT:36.1 sec    < from: Xray Chest 1 View-PORTABLE IMMEDIATE (Xray Chest 1 View-PORTABLE IMMEDIATE .) (04.30.23 @ 18:23) >    IMPRESSION:  Right subclavian approach central venous catheter in good position.    < end of copied text >                RADIOLOGY & ADDITIONAL STUDIES (The following images were personally reviewed):

## 2023-05-02 LAB
ALBUMIN SERPL ELPH-MCNC: 2.8 G/DL — LOW (ref 3.3–5)
ALBUMIN SERPL ELPH-MCNC: 3 G/DL — LOW (ref 3.3–5)
ALBUMIN SERPL ELPH-MCNC: 3.4 G/DL — SIGNIFICANT CHANGE UP (ref 3.3–5)
ALP SERPL-CCNC: 140 U/L — HIGH (ref 40–120)
ALP SERPL-CCNC: 84 U/L — SIGNIFICANT CHANGE UP (ref 40–120)
ALP SERPL-CCNC: 99 U/L — SIGNIFICANT CHANGE UP (ref 40–120)
ALT FLD-CCNC: 11 U/L — SIGNIFICANT CHANGE UP (ref 10–45)
ALT FLD-CCNC: 12 U/L — SIGNIFICANT CHANGE UP (ref 10–45)
ALT FLD-CCNC: 9 U/L — LOW (ref 10–45)
ANION GAP SERPL CALC-SCNC: 12 MMOL/L — SIGNIFICANT CHANGE UP (ref 5–17)
ANION GAP SERPL CALC-SCNC: 9 MMOL/L — SIGNIFICANT CHANGE UP (ref 5–17)
ANION GAP SERPL CALC-SCNC: 9 MMOL/L — SIGNIFICANT CHANGE UP (ref 5–17)
ANISOCYTOSIS BLD QL: SLIGHT — SIGNIFICANT CHANGE UP
ANISOCYTOSIS BLD QL: SLIGHT — SIGNIFICANT CHANGE UP
APTT BLD: 31.1 SEC — SIGNIFICANT CHANGE UP (ref 27.5–35.5)
APTT BLD: 32.3 SEC — SIGNIFICANT CHANGE UP (ref 27.5–35.5)
APTT BLD: 39.6 SEC — HIGH (ref 27.5–35.5)
AST SERPL-CCNC: 23 U/L — SIGNIFICANT CHANGE UP (ref 10–40)
AST SERPL-CCNC: 25 U/L — SIGNIFICANT CHANGE UP (ref 10–40)
AST SERPL-CCNC: 36 U/L — SIGNIFICANT CHANGE UP (ref 10–40)
BASE EXCESS BLDV CALC-SCNC: -1.7 MMOL/L — SIGNIFICANT CHANGE UP (ref -2–3)
BASE EXCESS BLDV CALC-SCNC: -2.4 MMOL/L — LOW (ref -2–3)
BASE EXCESS BLDV CALC-SCNC: -2.6 MMOL/L — LOW (ref -2–3)
BASOPHILS # BLD AUTO: 0 K/UL — SIGNIFICANT CHANGE UP (ref 0–0.2)
BASOPHILS # BLD AUTO: 0.02 K/UL — SIGNIFICANT CHANGE UP (ref 0–0.2)
BASOPHILS # BLD AUTO: 0.09 K/UL — SIGNIFICANT CHANGE UP (ref 0–0.2)
BASOPHILS NFR BLD AUTO: 0 % — SIGNIFICANT CHANGE UP (ref 0–2)
BASOPHILS NFR BLD AUTO: 0.2 % — SIGNIFICANT CHANGE UP (ref 0–2)
BASOPHILS NFR BLD AUTO: 0.9 % — SIGNIFICANT CHANGE UP (ref 0–2)
BILIRUB SERPL-MCNC: 2.3 MG/DL — HIGH (ref 0.2–1.2)
BILIRUB SERPL-MCNC: 2.5 MG/DL — HIGH (ref 0.2–1.2)
BILIRUB SERPL-MCNC: 3 MG/DL — HIGH (ref 0.2–1.2)
BUN SERPL-MCNC: 58 MG/DL — HIGH (ref 7–23)
BUN SERPL-MCNC: 60 MG/DL — HIGH (ref 7–23)
BUN SERPL-MCNC: 62 MG/DL — HIGH (ref 7–23)
BURR CELLS BLD QL SMEAR: PRESENT — SIGNIFICANT CHANGE UP
BURR CELLS BLD QL SMEAR: PRESENT — SIGNIFICANT CHANGE UP
CA-I SERPL-SCNC: 1.14 MMOL/L — LOW (ref 1.15–1.33)
CA-I SERPL-SCNC: 1.21 MMOL/L — SIGNIFICANT CHANGE UP (ref 1.15–1.33)
CA-I SERPL-SCNC: 1.21 MMOL/L — SIGNIFICANT CHANGE UP (ref 1.15–1.33)
CALCIUM SERPL-MCNC: 7.9 MG/DL — LOW (ref 8.4–10.5)
CALCIUM SERPL-MCNC: 8.2 MG/DL — LOW (ref 8.4–10.5)
CALCIUM SERPL-MCNC: 8.3 MG/DL — LOW (ref 8.4–10.5)
CHLORIDE SERPL-SCNC: 105 MMOL/L — SIGNIFICANT CHANGE UP (ref 96–108)
CHLORIDE SERPL-SCNC: 106 MMOL/L — SIGNIFICANT CHANGE UP (ref 96–108)
CHLORIDE SERPL-SCNC: 106 MMOL/L — SIGNIFICANT CHANGE UP (ref 96–108)
CO2 BLDV-SCNC: 24.2 MMOL/L — SIGNIFICANT CHANGE UP (ref 22–26)
CO2 BLDV-SCNC: 24.5 MMOL/L — SIGNIFICANT CHANGE UP (ref 22–26)
CO2 BLDV-SCNC: 25.1 MMOL/L — SIGNIFICANT CHANGE UP (ref 22–26)
CO2 SERPL-SCNC: 21 MMOL/L — LOW (ref 22–31)
CO2 SERPL-SCNC: 23 MMOL/L — SIGNIFICANT CHANGE UP (ref 22–31)
CO2 SERPL-SCNC: 24 MMOL/L — SIGNIFICANT CHANGE UP (ref 22–31)
CREAT SERPL-MCNC: 2.33 MG/DL — HIGH (ref 0.5–1.3)
CREAT SERPL-MCNC: 2.45 MG/DL — HIGH (ref 0.5–1.3)
CREAT SERPL-MCNC: 2.53 MG/DL — HIGH (ref 0.5–1.3)
EGFR: 26 ML/MIN/1.73M2 — LOW
EGFR: 27 ML/MIN/1.73M2 — LOW
EGFR: 28 ML/MIN/1.73M2 — LOW
EOSINOPHIL # BLD AUTO: 0.02 K/UL — SIGNIFICANT CHANGE UP (ref 0–0.5)
EOSINOPHIL # BLD AUTO: 0.09 K/UL — SIGNIFICANT CHANGE UP (ref 0–0.5)
EOSINOPHIL # BLD AUTO: 0.09 K/UL — SIGNIFICANT CHANGE UP (ref 0–0.5)
EOSINOPHIL NFR BLD AUTO: 0.2 % — SIGNIFICANT CHANGE UP (ref 0–6)
EOSINOPHIL NFR BLD AUTO: 0.9 % — SIGNIFICANT CHANGE UP (ref 0–6)
EOSINOPHIL NFR BLD AUTO: 0.9 % — SIGNIFICANT CHANGE UP (ref 0–6)
FIBRINOGEN PPP-MCNC: 259 MG/DL — SIGNIFICANT CHANGE UP (ref 200–445)
GAS PNL BLDA: SIGNIFICANT CHANGE UP
GAS PNL BLDV: 137 MMOL/L — SIGNIFICANT CHANGE UP (ref 136–145)
GAS PNL BLDV: 138 MMOL/L — SIGNIFICANT CHANGE UP (ref 136–145)
GAS PNL BLDV: 138 MMOL/L — SIGNIFICANT CHANGE UP (ref 136–145)
GAS PNL BLDV: SIGNIFICANT CHANGE UP
GIANT PLATELETS BLD QL SMEAR: PRESENT — SIGNIFICANT CHANGE UP
GIANT PLATELETS BLD QL SMEAR: PRESENT — SIGNIFICANT CHANGE UP
GLUCOSE BLDC GLUCOMTR-MCNC: 217 MG/DL — HIGH (ref 70–99)
GLUCOSE BLDC GLUCOMTR-MCNC: 221 MG/DL — HIGH (ref 70–99)
GLUCOSE BLDC GLUCOMTR-MCNC: 248 MG/DL — HIGH (ref 70–99)
GLUCOSE BLDC GLUCOMTR-MCNC: 276 MG/DL — HIGH (ref 70–99)
GLUCOSE BLDC GLUCOMTR-MCNC: 289 MG/DL — HIGH (ref 70–99)
GLUCOSE BLDC GLUCOMTR-MCNC: 293 MG/DL — HIGH (ref 70–99)
GLUCOSE BLDC GLUCOMTR-MCNC: 323 MG/DL — HIGH (ref 70–99)
GLUCOSE BLDC GLUCOMTR-MCNC: 371 MG/DL — HIGH (ref 70–99)
GLUCOSE SERPL-MCNC: 270 MG/DL — HIGH (ref 70–99)
GLUCOSE SERPL-MCNC: 300 MG/DL — HIGH (ref 70–99)
GLUCOSE SERPL-MCNC: 367 MG/DL — HIGH (ref 70–99)
HCO3 BLDV-SCNC: 23 MMOL/L — SIGNIFICANT CHANGE UP (ref 22–29)
HCO3 BLDV-SCNC: 23 MMOL/L — SIGNIFICANT CHANGE UP (ref 22–29)
HCO3 BLDV-SCNC: 24 MMOL/L — SIGNIFICANT CHANGE UP (ref 22–29)
HCT VFR BLD CALC: 19 % — CRITICAL LOW (ref 39–50)
HCT VFR BLD CALC: 19 % — CRITICAL LOW (ref 39–50)
HCT VFR BLD CALC: 19.2 % — CRITICAL LOW (ref 39–50)
HGB BLD-MCNC: 6 G/DL — CRITICAL LOW (ref 13–17)
HGB BLD-MCNC: 6.1 G/DL — CRITICAL LOW (ref 13–17)
HGB BLD-MCNC: 6.2 G/DL — CRITICAL LOW (ref 13–17)
HYPOCHROMIA BLD QL: SIGNIFICANT CHANGE UP
IMM GRANULOCYTES NFR BLD AUTO: 10.3 % — HIGH (ref 0–0.9)
INR BLD: 1.28 — HIGH (ref 0.88–1.16)
INR BLD: 1.31 — HIGH (ref 0.88–1.16)
INR BLD: 1.42 — HIGH (ref 0.88–1.16)
LACTATE SERPL-SCNC: 1.2 MMOL/L — SIGNIFICANT CHANGE UP (ref 0.5–2)
LACTATE SERPL-SCNC: 1.5 MMOL/L — SIGNIFICANT CHANGE UP (ref 0.5–2)
LYMPHOCYTES # BLD AUTO: 0.26 K/UL — LOW (ref 1–3.3)
LYMPHOCYTES # BLD AUTO: 0.27 K/UL — LOW (ref 1–3.3)
LYMPHOCYTES # BLD AUTO: 0.68 K/UL — LOW (ref 1–3.3)
LYMPHOCYTES # BLD AUTO: 2.6 % — LOW (ref 13–44)
LYMPHOCYTES # BLD AUTO: 2.6 % — LOW (ref 13–44)
LYMPHOCYTES # BLD AUTO: 6.3 % — LOW (ref 13–44)
MACROCYTES BLD QL: SLIGHT — SIGNIFICANT CHANGE UP
MACROCYTES BLD QL: SLIGHT — SIGNIFICANT CHANGE UP
MAGNESIUM SERPL-MCNC: 2.4 MG/DL — SIGNIFICANT CHANGE UP (ref 1.6–2.6)
MANUAL SMEAR VERIFICATION: SIGNIFICANT CHANGE UP
MANUAL SMEAR VERIFICATION: SIGNIFICANT CHANGE UP
MCHC RBC-ENTMCNC: 31.6 GM/DL — LOW (ref 32–36)
MCHC RBC-ENTMCNC: 32.1 GM/DL — SIGNIFICANT CHANGE UP (ref 32–36)
MCHC RBC-ENTMCNC: 32.1 PG — SIGNIFICANT CHANGE UP (ref 27–34)
MCHC RBC-ENTMCNC: 32.3 GM/DL — SIGNIFICANT CHANGE UP (ref 32–36)
MCHC RBC-ENTMCNC: 32.3 PG — SIGNIFICANT CHANGE UP (ref 27–34)
MCHC RBC-ENTMCNC: 32.5 PG — SIGNIFICANT CHANGE UP (ref 27–34)
MCV RBC AUTO: 100 FL — SIGNIFICANT CHANGE UP (ref 80–100)
MCV RBC AUTO: 100.5 FL — HIGH (ref 80–100)
MCV RBC AUTO: 102.2 FL — HIGH (ref 80–100)
METAMYELOCYTES # FLD: 1.8 % — HIGH (ref 0–0)
METAMYELOCYTES # FLD: 1.8 % — HIGH (ref 0–0)
MICROCYTES BLD QL: SLIGHT — SIGNIFICANT CHANGE UP
MONOCYTES # BLD AUTO: 0.09 K/UL — SIGNIFICANT CHANGE UP (ref 0–0.9)
MONOCYTES # BLD AUTO: 0.73 K/UL — SIGNIFICANT CHANGE UP (ref 0–0.9)
MONOCYTES # BLD AUTO: 0.83 K/UL — SIGNIFICANT CHANGE UP (ref 0–0.9)
MONOCYTES NFR BLD AUTO: 0.9 % — LOW (ref 2–14)
MONOCYTES NFR BLD AUTO: 7.1 % — SIGNIFICANT CHANGE UP (ref 2–14)
MONOCYTES NFR BLD AUTO: 7.7 % — SIGNIFICANT CHANGE UP (ref 2–14)
MYELOCYTES NFR BLD: 1.8 % — HIGH (ref 0–0)
MYELOCYTES NFR BLD: 3.5 % — HIGH (ref 0–0)
NEUTROPHILS # BLD AUTO: 8.08 K/UL — HIGH (ref 1.8–7.4)
NEUTROPHILS # BLD AUTO: 8.5 K/UL — HIGH (ref 1.8–7.4)
NEUTROPHILS # BLD AUTO: 9.12 K/UL — HIGH (ref 1.8–7.4)
NEUTROPHILS NFR BLD AUTO: 75.3 % — SIGNIFICANT CHANGE UP (ref 43–77)
NEUTROPHILS NFR BLD AUTO: 78.8 % — HIGH (ref 43–77)
NEUTROPHILS NFR BLD AUTO: 90.2 % — HIGH (ref 43–77)
NEUTS BAND # BLD: 0.9 % — SIGNIFICANT CHANGE UP (ref 0–8)
NEUTS BAND # BLD: 3.5 % — SIGNIFICANT CHANGE UP (ref 0–8)
NRBC # BLD: 19 /100 — HIGH (ref 0–0)
NRBC # BLD: 20 /100 — HIGH (ref 0–0)
NRBC # BLD: 28 /100 WBCS — HIGH (ref 0–0)
NRBC # BLD: SIGNIFICANT CHANGE UP /100 WBCS (ref 0–0)
NRBC # BLD: SIGNIFICANT CHANGE UP /100 WBCS (ref 0–0)
OVALOCYTES BLD QL SMEAR: SLIGHT — SIGNIFICANT CHANGE UP
OVALOCYTES BLD QL SMEAR: SLIGHT — SIGNIFICANT CHANGE UP
PCO2 BLDV: 38 MMHG — LOW (ref 42–55)
PCO2 BLDV: 42 MMHG — SIGNIFICANT CHANGE UP (ref 42–55)
PCO2 BLDV: 46 MMHG — SIGNIFICANT CHANGE UP (ref 42–55)
PH BLDV: 7.32 — SIGNIFICANT CHANGE UP (ref 7.32–7.43)
PH BLDV: 7.35 — SIGNIFICANT CHANGE UP (ref 7.32–7.43)
PH BLDV: 7.39 — SIGNIFICANT CHANGE UP (ref 7.32–7.43)
PHOSPHATE SERPL-MCNC: 2.1 MG/DL — LOW (ref 2.5–4.5)
PHOSPHATE SERPL-MCNC: 2.3 MG/DL — LOW (ref 2.5–4.5)
PHOSPHATE SERPL-MCNC: 2.5 MG/DL — SIGNIFICANT CHANGE UP (ref 2.5–4.5)
PLAT MORPH BLD: ABNORMAL
PLAT MORPH BLD: NORMAL — SIGNIFICANT CHANGE UP
PLATELET # BLD AUTO: 123 K/UL — LOW (ref 150–400)
PLATELET # BLD AUTO: 137 K/UL — LOW (ref 150–400)
PLATELET # BLD AUTO: 139 K/UL — LOW (ref 150–400)
PO2 BLDV: 34 MMHG — SIGNIFICANT CHANGE UP (ref 25–45)
PO2 BLDV: 36 MMHG — SIGNIFICANT CHANGE UP (ref 25–45)
PO2 BLDV: <33 MMHG — SIGNIFICANT CHANGE UP (ref 25–45)
POIKILOCYTOSIS BLD QL AUTO: SLIGHT — SIGNIFICANT CHANGE UP
POIKILOCYTOSIS BLD QL AUTO: SLIGHT — SIGNIFICANT CHANGE UP
POLYCHROMASIA BLD QL SMEAR: SLIGHT — SIGNIFICANT CHANGE UP
POLYCHROMASIA BLD QL SMEAR: SLIGHT — SIGNIFICANT CHANGE UP
POTASSIUM BLDV-SCNC: 3.1 MMOL/L — LOW (ref 3.5–5.1)
POTASSIUM BLDV-SCNC: 4 MMOL/L — SIGNIFICANT CHANGE UP (ref 3.5–5.1)
POTASSIUM BLDV-SCNC: 4.1 MMOL/L — SIGNIFICANT CHANGE UP (ref 3.5–5.1)
POTASSIUM SERPL-MCNC: 3.7 MMOL/L — SIGNIFICANT CHANGE UP (ref 3.5–5.3)
POTASSIUM SERPL-MCNC: 4 MMOL/L — SIGNIFICANT CHANGE UP (ref 3.5–5.3)
POTASSIUM SERPL-MCNC: 4.1 MMOL/L — SIGNIFICANT CHANGE UP (ref 3.5–5.3)
POTASSIUM SERPL-SCNC: 3.7 MMOL/L — SIGNIFICANT CHANGE UP (ref 3.5–5.3)
POTASSIUM SERPL-SCNC: 4 MMOL/L — SIGNIFICANT CHANGE UP (ref 3.5–5.3)
POTASSIUM SERPL-SCNC: 4.1 MMOL/L — SIGNIFICANT CHANGE UP (ref 3.5–5.3)
PROCALCITONIN SERPL-MCNC: 4.02 NG/ML — HIGH (ref 0.02–0.1)
PROT SERPL-MCNC: 4.6 G/DL — LOW (ref 6–8.3)
PROT SERPL-MCNC: 4.7 G/DL — LOW (ref 6–8.3)
PROT SERPL-MCNC: 5.2 G/DL — LOW (ref 6–8.3)
PROTHROM AB SERPL-ACNC: 15.3 SEC — HIGH (ref 10.5–13.4)
PROTHROM AB SERPL-ACNC: 15.6 SEC — HIGH (ref 10.5–13.4)
PROTHROM AB SERPL-ACNC: 16.9 SEC — HIGH (ref 10.5–13.4)
RBC # BLD: 1.86 M/UL — LOW (ref 4.2–5.8)
RBC # BLD: 1.9 M/UL — LOW (ref 4.2–5.8)
RBC # BLD: 1.91 M/UL — LOW (ref 4.2–5.8)
RBC # BLD: 1.91 M/UL — LOW (ref 4.2–5.8)
RBC # FLD: 16.1 % — HIGH (ref 10.3–14.5)
RBC # FLD: 16.2 % — HIGH (ref 10.3–14.5)
RBC # FLD: 17.3 % — HIGH (ref 10.3–14.5)
RBC BLD AUTO: ABNORMAL
RBC BLD AUTO: ABNORMAL
RETICS #: 71.8 K/UL — SIGNIFICANT CHANGE UP (ref 25–125)
RETICS/RBC NFR: 3.8 % — HIGH (ref 0.5–2.5)
SAO2 % BLDV: 60.4 % — LOW (ref 67–88)
SAO2 % BLDV: 62.8 % — LOW (ref 67–88)
SAO2 % BLDV: 64.4 % — LOW (ref 67–88)
SCHISTOCYTES BLD QL AUTO: SLIGHT — SIGNIFICANT CHANGE UP
SMUDGE CELLS # BLD: PRESENT — SIGNIFICANT CHANGE UP
SODIUM SERPL-SCNC: 138 MMOL/L — SIGNIFICANT CHANGE UP (ref 135–145)
SODIUM SERPL-SCNC: 138 MMOL/L — SIGNIFICANT CHANGE UP (ref 135–145)
SODIUM SERPL-SCNC: 139 MMOL/L — SIGNIFICANT CHANGE UP (ref 135–145)
SPHEROCYTES BLD QL SMEAR: SLIGHT — SIGNIFICANT CHANGE UP
TACROLIMUS SERPL-MCNC: 3.1 NG/ML — SIGNIFICANT CHANGE UP
TACROLIMUS SERPL-MCNC: 5.8 NG/ML — SIGNIFICANT CHANGE UP
TRANSFERRIN SERPL-MCNC: 64 MG/DL — LOW (ref 200–360)
VANCOMYCIN TROUGH SERPL-MCNC: 12.9 UG/ML — SIGNIFICANT CHANGE UP (ref 10–20)
VARIANT LYMPHS # BLD: 1.8 % — SIGNIFICANT CHANGE UP (ref 0–6)
WBC # BLD: 10.01 K/UL — SIGNIFICANT CHANGE UP (ref 3.8–10.5)
WBC # BLD: 10.33 K/UL — SIGNIFICANT CHANGE UP (ref 3.8–10.5)
WBC # BLD: 10.74 K/UL — HIGH (ref 3.8–10.5)
WBC # FLD AUTO: 10.01 K/UL — SIGNIFICANT CHANGE UP (ref 3.8–10.5)
WBC # FLD AUTO: 10.33 K/UL — SIGNIFICANT CHANGE UP (ref 3.8–10.5)
WBC # FLD AUTO: 10.74 K/UL — HIGH (ref 3.8–10.5)

## 2023-05-02 PROCEDURE — 99292 CRITICAL CARE ADDL 30 MIN: CPT

## 2023-05-02 PROCEDURE — 71045 X-RAY EXAM CHEST 1 VIEW: CPT | Mod: 26

## 2023-05-02 PROCEDURE — 99232 SBSQ HOSP IP/OBS MODERATE 35: CPT

## 2023-05-02 PROCEDURE — 71045 X-RAY EXAM CHEST 1 VIEW: CPT | Mod: 26,77

## 2023-05-02 PROCEDURE — 99291 CRITICAL CARE FIRST HOUR: CPT

## 2023-05-02 RX ORDER — ALBUMIN HUMAN 25 %
50 VIAL (ML) INTRAVENOUS ONCE
Refills: 0 | Status: COMPLETED | OUTPATIENT
Start: 2023-05-02 | End: 2023-05-02

## 2023-05-02 RX ORDER — CEFTRIAXONE 500 MG/1
1000 INJECTION, POWDER, FOR SOLUTION INTRAMUSCULAR; INTRAVENOUS EVERY 24 HOURS
Refills: 0 | Status: DISCONTINUED | OUTPATIENT
Start: 2023-05-03 | End: 2023-05-04

## 2023-05-02 RX ORDER — ACETAMINOPHEN 500 MG
1000 TABLET ORAL ONCE
Refills: 0 | Status: COMPLETED | OUTPATIENT
Start: 2023-05-02 | End: 2023-05-02

## 2023-05-02 RX ORDER — TACROLIMUS 5 MG/1
2 CAPSULE ORAL EVERY 12 HOURS
Refills: 0 | Status: DISCONTINUED | OUTPATIENT
Start: 2023-05-02 | End: 2023-05-02

## 2023-05-02 RX ORDER — CEFTRIAXONE 500 MG/1
INJECTION, POWDER, FOR SOLUTION INTRAMUSCULAR; INTRAVENOUS
Refills: 0 | Status: DISCONTINUED | OUTPATIENT
Start: 2023-05-02 | End: 2023-05-04

## 2023-05-02 RX ORDER — POTASSIUM CHLORIDE 20 MEQ
20 PACKET (EA) ORAL ONCE
Refills: 0 | Status: COMPLETED | OUTPATIENT
Start: 2023-05-02 | End: 2023-05-02

## 2023-05-02 RX ORDER — INSULIN GLARGINE 100 [IU]/ML
15 INJECTION, SOLUTION SUBCUTANEOUS AT BEDTIME
Refills: 0 | Status: DISCONTINUED | OUTPATIENT
Start: 2023-05-02 | End: 2023-05-02

## 2023-05-02 RX ORDER — MIDODRINE HYDROCHLORIDE 2.5 MG/1
10 TABLET ORAL EVERY 8 HOURS
Refills: 0 | Status: DISCONTINUED | OUTPATIENT
Start: 2023-05-02 | End: 2023-05-03

## 2023-05-02 RX ORDER — CEFTRIAXONE 500 MG/1
1000 INJECTION, POWDER, FOR SOLUTION INTRAMUSCULAR; INTRAVENOUS ONCE
Refills: 0 | Status: COMPLETED | OUTPATIENT
Start: 2023-05-02 | End: 2023-05-02

## 2023-05-02 RX ORDER — CALCIUM GLUCONATE 100 MG/ML
2 VIAL (ML) INTRAVENOUS ONCE
Refills: 0 | Status: COMPLETED | OUTPATIENT
Start: 2023-05-02 | End: 2023-05-02

## 2023-05-02 RX ORDER — BUMETANIDE 0.25 MG/ML
1 INJECTION INTRAMUSCULAR; INTRAVENOUS ONCE
Refills: 0 | Status: COMPLETED | OUTPATIENT
Start: 2023-05-02 | End: 2023-05-02

## 2023-05-02 RX ORDER — POTASSIUM CHLORIDE 20 MEQ
40 PACKET (EA) ORAL ONCE
Refills: 0 | Status: DISCONTINUED | OUTPATIENT
Start: 2023-05-02 | End: 2023-05-02

## 2023-05-02 RX ORDER — ALBUMIN HUMAN 25 %
250 VIAL (ML) INTRAVENOUS ONCE
Refills: 0 | Status: COMPLETED | OUTPATIENT
Start: 2023-05-02 | End: 2023-05-02

## 2023-05-02 RX ORDER — INSULIN LISPRO 100/ML
5 VIAL (ML) SUBCUTANEOUS
Refills: 0 | Status: DISCONTINUED | OUTPATIENT
Start: 2023-05-02 | End: 2023-05-02

## 2023-05-02 RX ORDER — INSULIN HUMAN 100 [IU]/ML
1 INJECTION, SOLUTION SUBCUTANEOUS
Qty: 50 | Refills: 0 | Status: DISCONTINUED | OUTPATIENT
Start: 2023-05-02 | End: 2023-05-04

## 2023-05-02 RX ADMIN — OXYCODONE HYDROCHLORIDE 5 MILLIGRAM(S): 5 TABLET ORAL at 06:12

## 2023-05-02 RX ADMIN — Medication 50 MILLILITER(S): at 11:46

## 2023-05-02 RX ADMIN — Medication 1 MILLIGRAM(S): at 11:03

## 2023-05-02 RX ADMIN — Medication 8 MILLIGRAM(S): at 18:32

## 2023-05-02 RX ADMIN — Medication 250 MILLILITER(S): at 20:01

## 2023-05-02 RX ADMIN — LEVALBUTEROL 0.63 MILLIGRAM(S): 1.25 SOLUTION, CONCENTRATE RESPIRATORY (INHALATION) at 21:12

## 2023-05-02 RX ADMIN — Medication 20 MILLIEQUIVALENT(S): at 07:01

## 2023-05-02 RX ADMIN — Medication 4 MILLILITER(S): at 21:11

## 2023-05-02 RX ADMIN — PIPERACILLIN AND TAZOBACTAM 25 GRAM(S): 4; .5 INJECTION, POWDER, LYOPHILIZED, FOR SOLUTION INTRAVENOUS at 10:32

## 2023-05-02 RX ADMIN — OXYCODONE HYDROCHLORIDE 5 MILLIGRAM(S): 5 TABLET ORAL at 12:26

## 2023-05-02 RX ADMIN — OXYCODONE HYDROCHLORIDE 5 MILLIGRAM(S): 5 TABLET ORAL at 16:19

## 2023-05-02 RX ADMIN — Medication 50 MILLILITER(S): at 16:46

## 2023-05-02 RX ADMIN — Medication 400 MILLIGRAM(S): at 00:44

## 2023-05-02 RX ADMIN — Medication 6: at 07:33

## 2023-05-02 RX ADMIN — Medication 6: at 11:30

## 2023-05-02 RX ADMIN — MIDODRINE HYDROCHLORIDE 10 MILLIGRAM(S): 2.5 TABLET ORAL at 17:30

## 2023-05-02 RX ADMIN — VASOPRESSIN 1.5 UNIT(S)/MIN: 20 INJECTION INTRAVENOUS at 22:34

## 2023-05-02 RX ADMIN — Medication 200 GRAM(S): at 17:45

## 2023-05-02 RX ADMIN — LEVALBUTEROL 0.63 MILLIGRAM(S): 1.25 SOLUTION, CONCENTRATE RESPIRATORY (INHALATION) at 03:23

## 2023-05-02 RX ADMIN — Medication 5 MILLIGRAM(S): at 06:12

## 2023-05-02 RX ADMIN — BUMETANIDE 1 MILLIGRAM(S): 0.25 INJECTION INTRAMUSCULAR; INTRAVENOUS at 19:43

## 2023-05-02 RX ADMIN — PIPERACILLIN AND TAZOBACTAM 25 GRAM(S): 4; .5 INJECTION, POWDER, LYOPHILIZED, FOR SOLUTION INTRAVENOUS at 01:12

## 2023-05-02 RX ADMIN — BUMETANIDE 1 MILLIGRAM(S): 0.25 INJECTION INTRAMUSCULAR; INTRAVENOUS at 18:00

## 2023-05-02 RX ADMIN — ATORVASTATIN CALCIUM 20 MILLIGRAM(S): 80 TABLET, FILM COATED ORAL at 22:24

## 2023-05-02 RX ADMIN — OXYCODONE HYDROCHLORIDE 5 MILLIGRAM(S): 5 TABLET ORAL at 07:00

## 2023-05-02 RX ADMIN — CEFTRIAXONE 1000 MILLIGRAM(S): 500 INJECTION, POWDER, FOR SOLUTION INTRAMUSCULAR; INTRAVENOUS at 11:45

## 2023-05-02 RX ADMIN — Medication 50 MILLILITER(S): at 15:28

## 2023-05-02 RX ADMIN — Medication 8 MILLIGRAM(S): at 16:18

## 2023-05-02 RX ADMIN — LEVALBUTEROL 0.63 MILLIGRAM(S): 1.25 SOLUTION, CONCENTRATE RESPIRATORY (INHALATION) at 11:04

## 2023-05-02 RX ADMIN — Medication 4 MILLILITER(S): at 14:43

## 2023-05-02 RX ADMIN — ERYTHROPOIETIN 20000 UNIT(S): 10000 INJECTION, SOLUTION INTRAVENOUS; SUBCUTANEOUS at 19:30

## 2023-05-02 RX ADMIN — OXYCODONE HYDROCHLORIDE 5 MILLIGRAM(S): 5 TABLET ORAL at 01:00

## 2023-05-02 RX ADMIN — Medication 50 MILLILITER(S): at 11:31

## 2023-05-02 RX ADMIN — CHLORHEXIDINE GLUCONATE 1 APPLICATION(S): 213 SOLUTION TOPICAL at 07:01

## 2023-05-02 RX ADMIN — OXYCODONE HYDROCHLORIDE 5 MILLIGRAM(S): 5 TABLET ORAL at 01:37

## 2023-05-02 RX ADMIN — Medication 5 UNIT(S): at 11:30

## 2023-05-02 RX ADMIN — Medication 4 MILLILITER(S): at 11:04

## 2023-05-02 RX ADMIN — Medication 8 MILLIGRAM(S): at 06:00

## 2023-05-02 RX ADMIN — LEVALBUTEROL 0.63 MILLIGRAM(S): 1.25 SOLUTION, CONCENTRATE RESPIRATORY (INHALATION) at 14:42

## 2023-05-02 RX ADMIN — PREGABALIN 1000 MICROGRAM(S): 225 CAPSULE ORAL at 11:03

## 2023-05-02 RX ADMIN — Medication 9.87 MICROGRAM(S)/KG/MIN: at 17:48

## 2023-05-02 RX ADMIN — Medication 200 GRAM(S): at 20:01

## 2023-05-02 RX ADMIN — Medication 8 MILLIGRAM(S): at 11:31

## 2023-05-02 RX ADMIN — PHENYLEPHRINE HYDROCHLORIDE 7.4 MICROGRAM(S)/KG/MIN: 10 INJECTION INTRAVENOUS at 16:59

## 2023-05-02 RX ADMIN — Medication 50 MILLILITER(S): at 17:45

## 2023-05-02 RX ADMIN — Medication 4 MILLILITER(S): at 03:27

## 2023-05-02 RX ADMIN — TACROLIMUS 2 MILLIGRAM(S): 5 CAPSULE ORAL at 11:29

## 2023-05-02 RX ADMIN — Medication 1000 MILLIGRAM(S): at 00:49

## 2023-05-02 RX ADMIN — Medication 3 UNIT(S): at 07:33

## 2023-05-02 NOTE — ADVANCED PRACTICE NURSE CONSULT - ASSESSMENT
Per chart review, 78 y/o male with polycystic kidney disease s/p kidney transplant in 2007, HTN, HLD, DM2, Prostate cancer s/p radical prostatectomy 2015, DVT 2006 (in setting of pelvic fracture), and afib/atrial flutter s/p ablation in 1/2018, who is now off oral anticoagulation secondary to GI bleed and presents for DENNY occlusoin. Pt as no symptoms. He was admitted to [facility] 7/2022 with a GI bleed. He is a [employer] and refuses blood transfusions. Eliquis was stopped at that time.    Patient seen in ICU, awake, alert. On BiPAP (padded with Mepilex lite); vasopressin.  Seen in the chair, able to stand for assessment with 2 person assist.     Sacrum, Stage 2 pressure injury: 1x1cm, clean, red, no measurable depth. Just to the left of this open area is a small (0.3x0.3cm) round area of red discoloration, sluggish blanching.  Right buttocks is intact.   Back intact.

## 2023-05-02 NOTE — PROGRESS NOTE ADULT - SUBJECTIVE AND OBJECTIVE BOX
CTICU  CRITICAL  CARE  attending     Hand off received 					   Pertinent clinical, laboratory, radiographic, hemodynamic, echocardiographic, respiratory data, microbiologic data and chart were reviewed and analyzed frequently throughout the course of the day  Patient seen and examined with CTS/ SH attending at bedside  Pt is a 77yr old male with PMH PKD sp kidney transplant (), HTN, HLD, DM, prostate ca sp radical prostatectomy (), DVT in setting of pelvic fx (), afib/flutter sp ablation (, off AC), admitted for surgical mgmt. Patient underwent left atrial appendage occlusion percutaneously with Dr. Sidhu 23. Arrived to the stepdown extubated on no drips. Overnight had PEA arrest, requiring ACLS and intubation. Tx to ICU, bedside TTE showing pericardial effusion with tamponade. Bedside procedure with 600cc blood aspirated. Given 2 units pRBC. Taken to OR early AM for sternotomy with 1.5L L hemothorax evacuated. Arrived intubated on epi, levo, vaso, fuad. Switched to dobutamine and weaned off some pressors. Initially had lactic acidosis, now resolved. CPAP'd overnight. : extubated,  off, Bijan weaned.  swan dc, Bijan weaned.  R pigtail placed for effusion, 850cc out. Placed on BIPAP for increased wob and atelectasis.  new R subclavian central line placed. Given bumex for diuresis, buckley placed.  worsening Cr, RIJ [place] placed,  and primacor started, bumex given, underwent awake bronch, broad spectrum abx started. Dobhoff placed for nutrition. Vasopressin added. 5/2Given albumins throughout day, low urine output, given bumex towards end of day. Abx deescalated to ceftriaxone. Tacro on hold. Fuad started to achieve elevated MAPs for renal perfusion.     FAMILY HISTORY:  PAST MEDICAL & SURGICAL HISTORY:  Polycystic kidney disease  DM2 (iabetes mellitus, type 2)  HLD (hyperlipidemia)  HTN (hypertension)  Prostate cancer  Kidney transplant recipient  DVT (deep venous thrombosis)  Chronic CHF  H/O radical prostatectomy    S/P hernia repair  Abdominal and inguinal around         Patient is a 77y old  Male who presents with a chief complaint of percutaneous DENNY closure.      14 system review was unremarkable    Vital signs, hemodynamic and respiratory parameters were reviewed from the bedside nursing flowsheet.  ICU Vital Signs Last 24 Hrs  T(C): 36.4 (02 May 2023 16:47), Max: 37.2 (02 May 2023 01:09)  T(F): 97.5 (02 May 2023 16:47), Max: 99 (02 May 2023 01:09)  HR: 90 (02 May 2023 20:00) (81 - 104)  BP: 201/81 (02 May 2023 20:00) (133/60 - 201/81)  BP(mean): 116 (02 May 2023 20:00) (85 - 116)  ABP: 154/56 (02 May 2023 20:00) (108/41 - 154/57)  ABP(mean): 86 (02 May 2023 20:00) (59 - 87)  RR: 18 (02 May 2023 20:00) (12 - 29)  SpO2: 100% (02 May 2023 20:00) (89% - 100%)    O2 Parameters below as of 02 May 2023 20:00  Patient On (Oxygen Delivery Method): BiPAP/CPAP    O2 Concentration (%): 60      Adult Advanced Hemodynamics Last 24 Hrs  CVP(mm Hg): 10 (02 May 2023 20:00) (8 - 304)  CVP(cm H2O): --  CO: 5.4 (02 May 2023 09:00) (5.4 - 5.9)  CI: 3 (02 May 2023 09:00) (3 - 3.3)  PA: 59/19 (02 May 2023 20:00) (45/22 - 66/16)  PA(mean): 36 (02 May 2023 20:00) (30 - 38)  PCWP: --  SVR: 872 (02 May 2023 09:00) (872 - 907)  SVRI: 1571 (02 May 2023 09:00) (1569 - 1622)  PVR: --  PVRI: --, ABG - ( 02 May 2023 16:22 )  pH, Arterial: 7.39  pH, Blood: x     /  pCO2: 37    /  pO2: 106   / HCO3: 22    / Base Excess: -2.2  /  SaO2: 98.7                Intake and output was reviewed and the fluid balance was calculated  Daily     Daily   I&O's Summary    01 May 2023 07:01  -  02 May 2023 07:00  --------------------------------------------------------  IN: 3397.8 mL / OUT: 2180 mL / NET: 1217.8 mL    02 May 2023 07:01  -  02 May 2023 20:30  --------------------------------------------------------  IN: 2212.5 mL / OUT: 640 mL / NET: 1572.5 mL        All lines and drain sites were assessed  Glycemic trend was reviewed      POCT Blood Glucose.: 289 mg/dL (02 May 2023 20:06)        Neuro: awake nad  HEENT: bipap  Heart: s1 s2  Lungs: clear bl  Abdomen: soft nt nd  Extremities: 2+dp    Lines:  R subclavian TLC 4/30  r radial arterial line 5/1  RIJ Cordis with [place] 5/1    Tubes:  L CT  R pigtail      labs  CBC Full  -  ( 02 May 2023 16:24 )  WBC Count : 10.74 K/uL  RBC Count : 1.86 M/uL  Hemoglobin : 6.0 g/dL  Hematocrit : 19.0 %  Platelet Count - Automated : 139 K/uL  Mean Cell Volume : 102.2 fl  Mean Cell Hemoglobin : 32.3 pg  Mean Cell Hemoglobin Concentration : 31.6 gm/dL  Auto Neutrophil # : 8.08 K/uL  Auto Lymphocyte # : 0.68 K/uL  Auto Monocyte # : 0.83 K/uL  Auto Eosinophil # : 0.02 K/uL  Auto Basophil # : 0.02 K/uL  Auto Neutrophil % : 75.3 %  Auto Lymphocyte % : 6.3 %  Auto Monocyte % : 7.7 %  Auto Eosinophil % : 0.2 %  Auto Basophil % : 0.2 %        138  |  106  |  60<H>  ----------------------------<  367<H>  4.0   |  23  |  2.53<H>    Ca    8.3<L>      02 May 2023 16:24  Phos  2.1     05-  Mg     2.4     05-    TPro  5.2<L>  /  Alb  3.4  /  TBili  2.3<H>  /  DBili  x   /  AST  23  /  ALT  9<L>  /  AlkPhos  84  05-02    PT/INR - ( 02 May 2023 16:24 )   PT: 15.3 sec;   INR: 1.28          PTT - ( 02 May 2023 16:24 )  PTT:31.1 sec  The current medications were reviewed   MEDICATIONS  (STANDING):  acetylcysteine 10%  Inhalation 4 milliLiter(s) Inhalation every 6 hours  atorvastatin 20 milliGRAM(s) Oral at bedtime  cefTRIAXone   IVPB      chlorhexidine 2% Cloths 1 Application(s) Topical <User Schedule>  cyanocobalamin 1000 MICROGram(s) Oral daily  dextrose 5%. 1000 milliLiter(s) (100 mL/Hr) IV Continuous <Continuous>  dextrose 5%. 1000 milliLiter(s) (50 mL/Hr) IV Continuous <Continuous>  dextrose 50% Injectable 25 Gram(s) IV Push once  dextrose 50% Injectable 12.5 Gram(s) IV Push once  dextrose 50% Injectable 25 Gram(s) IV Push once  DOBUTamine Infusion 5 MICROgram(s)/kG/Min (9.87 mL/Hr) IV Continuous <Continuous>  folic acid 1 milliGRAM(s) Oral daily  glucagon  Injectable 1 milliGRAM(s) IntraMuscular once  insulin regular Infusion 1 Unit(s)/Hr (1 mL/Hr) IV Continuous <Continuous>  levalbuterol Inhalation 0.63 milliGRAM(s) Inhalation every 6 hours  midodrine 10 milliGRAM(s) Oral every 8 hours  milrinone Infusion 0.25 MICROgram(s)/kG/Min (4.94 mL/Hr) IV Continuous <Continuous>  pantoprazole    Tablet 40 milliGRAM(s) Oral before breakfast  phenylephrine    Infusion 0.3 MICROgram(s)/kG/Min (7.4 mL/Hr) IV Continuous <Continuous>  polyethylene glycol 3350 17 Gram(s) Oral daily  predniSONE   Tablet 5 milliGRAM(s) Oral daily  sodium chloride 0.9%. 1000 milliLiter(s) (10 mL/Hr) IV Continuous <Continuous>  testosterone patch 4 mG/24 Hr(s) 8 milliGRAM(s) Transdermal daily  vasopressin Infusion 0.01 Unit(s)/Min (1.5 mL/Hr) IV Continuous <Continuous>    MEDICATIONS  (PRN):  dextrose Oral Gel 15 Gram(s) Oral once PRN Blood Glucose LESS THAN 70 milliGRAM(s)/deciliter  oxyCODONE    IR 5 milliGRAM(s) Oral every 4 hours PRN Moderate Pain (4 - 6)  oxyCODONE    IR 10 milliGRAM(s) Oral every 6 hours PRN Severe Pain (7 - 10)      Assessment/Plan:  77yr old male with PMH PKD sp kidney transplant (), HTN, HLD, DM, prostate ca sp radical prostatectomy (), DVT in setting of pelvic fx (), afib/flutter sp ablation (2018, off AC), admitted for surgical mgmt. Patient is a Oriental orthodox.    POD7 left atrial appendage occlusion percutaneously (Nahum, 23)  POD6 RTOR for L hemothorax evacuation (23)  MAP goal 80 with vaso, fuad  BIPAP as tolerated  Bumex pushes for diuresis  Cardiogenic shock on  and primacor-wean as tolerated  Serial CI/CO  Trend end organ perfusion markers  Monitor urine output  Acute post operative anemia-trend H/H  Thrombocytopenia-monitor  Continue IS for renal transplant  Appreciate heme/onc recs  Diet as tolerated  Replete lytes prn  Monitor CT output  GI/DVT PPX  Bowel Regimen  Pain control  OOB with PT  Close hemodynamic, ventilatory and drain monitoring and management per post op routine  Monitor for arrhythmias and monitor parameters for organ perfusion  Beta blockade not administered due to hemodynamic instability and bradycardia  Monitor neurologic status  Head of the bed should remain elevated to 45 deg   Chest PT and IS will be encouraged  Monitor adequacy of oxygenation and ventilation and attempt to wean oxygen  Antibiotic regimen will be tailored to the clinical, laboratory and microbiologic data  Nutritional goals will be met using po eventually, ensure adequate caloric intake and monitor the same  Stress ulcer and VTE prophylaxis will be achieved    Glycemic control is satisfactory  Electrolytes have been repleted as necessary and wound care has been carried out   Pain control has been achieved.   Aggressive physical therapy and early mobility and ambulation goals will be met   The family was updated about the course and plan.    CRITICAL CARE TIME personally provided by me  in evaluation and management, reassessments, review and interpretation of labs and x-rays, ventilator and hemodynamic management, formulating a plan and coordinating care: ___60___ MIN.  Time does not include procedural time.    CTICU ATTENDING     					  Carmen Ortega MD

## 2023-05-02 NOTE — PROGRESS NOTE ADULT - SUBJECTIVE AND OBJECTIVE BOX
CTICU  CRITICAL  CARE  attending     Hand off received 					   Pertinent clinical, laboratory, radiographic, hemodynamic, echocardiographic, respiratory data, microbiologic data and chart were reviewed and analyzed frequently throughout the course of the day and night  Patient seen and examined with CTS/ SH attending at bedside  Pt is a 77y , Male, HEALTH ISSUES - PROBLEM Dx:  Pericardial effusion with cardiac tamponade    Acute respiratory failure with hypoxia    Atelectasis    Ground glass opacity present on imaging of lung        , FAMILY HISTORY:  PAST MEDICAL & SURGICAL HISTORY:  Polycystic kidney disease      DM2 (diabetes mellitus, type 2)      HLD (hyperlipidemia)      HTN (hypertension)      Prostate cancer      Kidney transplant recipient      DVT (deep venous thrombosis)      Chronic CHF      H/O radical prostatectomy  2010      S/P hernia repair  Abdominal and inguinal around 2005        Patient is a 77y old  Male who presents with a chief complaint of percutaneous DENNY closure (02 May 2023 20:29)      14 system review was unremarkable    Vital signs, hemodynamic and respiratory parameters were reviewed from the bedside nursing flowsheet.  ICU Vital Signs Last 24 Hrs  T(C): 36.4 (02 May 2023 21:01), Max: 37.2 (02 May 2023 01:09)  T(F): 97.5 (02 May 2023 21:01), Max: 99 (02 May 2023 01:09)  HR: 94 (02 May 2023 21:16) (81 - 104)  BP: 201/81 (02 May 2023 20:00) (133/60 - 201/81)  BP(mean): 116 (02 May 2023 20:00) (85 - 116)  ABP: 154/59 (02 May 2023 21:00) (108/41 - 154/59)  ABP(mean): 88 (02 May 2023 21:00) (59 - 88)  RR: 22 (02 May 2023 21:00) (12 - 29)  SpO2: 100% (02 May 2023 21:16) (89% - 100%)    O2 Parameters below as of 02 May 2023 21:16  Patient On (Oxygen Delivery Method): BiPAP/CPAP          Adult Advanced Hemodynamics Last 24 Hrs  CVP(mm Hg): 12 (02 May 2023 21:00) (9 - 304)  CVP(cm H2O): --  CO: 5.4 (02 May 2023 09:00) (5.4 - 5.9)  CI: 3 (02 May 2023 09:00) (3 - 3.3)  PA: 58/18 (02 May 2023 21:00) (45/22 - 66/16)  PA(mean): 33 (02 May 2023 21:00) (30 - 38)  PCWP: --  SVR: 872 (02 May 2023 09:00) (872 - 907)  SVRI: 1571 (02 May 2023 09:00) (1569 - 1622)  PVR: --  PVRI: --, ABG - ( 02 May 2023 16:22 )  pH, Arterial: 7.39  pH, Blood: x     /  pCO2: 37    /  pO2: 106   / HCO3: 22    / Base Excess: -2.2  /  SaO2: 98.7                Intake and output was reviewed and the fluid balance was calculated  Daily     Daily   I&O's Summary    01 May 2023 07:01  -  02 May 2023 07:00  --------------------------------------------------------  IN: 3397.8 mL / OUT: 2180 mL / NET: 1217.8 mL    02 May 2023 07:01  -  02 May 2023 21:38  --------------------------------------------------------  IN: 2317.7 mL / OUT: 715 mL / NET: 1602.7 mL        All lines and drain sites were assessed  [facility]      POCT Blood Glucose.: 289 mg/dL (02 May 2023 20:06)    No acute change in mental status  Auscultation of the chest reveals equal bs  Abdomen is soft  Extremities are warm and well perfused  Wounds appear clean and unremarkable  Antibiotics are periop    labs  CBC Full  -  ( 02 May 2023 16:24 )  WBC Count : 10.74 K/uL  RBC Count : 1.86 M/uL  Hemoglobin : 6.0 g/dL  Hematocrit : 19.0 %  Platelet Count - Automated : 139 K/uL  Mean Cell Volume : 102.2 fl  Mean Cell Hemoglobin : 32.3 pg  Mean Cell Hemoglobin Concentration : 31.6 gm/dL  Auto Neutrophil # : 8.08 K/uL  Auto Lymphocyte # : 0.68 K/uL  Auto Monocyte # : 0.83 K/uL  Auto Eosinophil # : 0.02 K/uL  Auto Basophil # : 0.02 K/uL  Auto Neutrophil % : 75.3 %  Auto Lymphocyte % : 6.3 %  Auto Monocyte % : 7.7 %  Auto Eosinophil % : 0.2 %  Auto Basophil % : 0.2 %    05-02    138  |  106  |  60<H>  ----------------------------<  367<H>  4.0   |  23  |  2.53<H>    Ca    8.3<L>      02 May 2023 16:24  Phos  2.1     05-02  Mg     2.4     05-02    TPro  5.2<L>  /  Alb  3.4  /  TBili  2.3<H>  /  DBili  x   /  AST  23  /  ALT  9<L>  /  AlkPhos  84  05-02    PT/INR - ( 02 May 2023 16:24 )   PT: 15.3 sec;   INR: 1.28          PTT - ( 02 May 2023 16:24 )  PTT:31.1 sec  The current medications were reviewed   MEDICATIONS  (STANDING):  acetylcysteine 10%  Inhalation 4 milliLiter(s) Inhalation every 6 hours  atorvastatin 20 milliGRAM(s) Oral at bedtime  cefTRIAXone   IVPB      chlorhexidine 2% Cloths 1 Application(s) Topical <User Schedule>  cyanocobalamin 1000 MICROGram(s) Oral daily  dextrose 5%. 1000 milliLiter(s) (100 mL/Hr) IV Continuous <Continuous>  dextrose 5%. 1000 milliLiter(s) (50 mL/Hr) IV Continuous <Continuous>  dextrose 50% Injectable 25 Gram(s) IV Push once  dextrose 50% Injectable 12.5 Gram(s) IV Push once  dextrose 50% Injectable 25 Gram(s) IV Push once  DOBUTamine Infusion 5 MICROgram(s)/kG/Min (9.87 mL/Hr) IV Continuous <Continuous>  folic acid 1 milliGRAM(s) Oral daily  glucagon  Injectable 1 milliGRAM(s) IntraMuscular once  insulin regular Infusion 1 Unit(s)/Hr (1 mL/Hr) IV Continuous <Continuous>  levalbuterol Inhalation 0.63 milliGRAM(s) Inhalation every 6 hours  midodrine 10 milliGRAM(s) Oral every 8 hours  milrinone Infusion 0.25 MICROgram(s)/kG/Min (4.94 mL/Hr) IV Continuous <Continuous>  pantoprazole    Tablet 40 milliGRAM(s) Oral before breakfast  phenylephrine    Infusion 0.3 MICROgram(s)/kG/Min (7.4 mL/Hr) IV Continuous <Continuous>  polyethylene glycol 3350 17 Gram(s) Oral daily  predniSONE   Tablet 5 milliGRAM(s) Oral daily  sodium chloride 0.9%. 1000 milliLiter(s) (10 mL/Hr) IV Continuous <Continuous>  testosterone patch 4 mG/24 Hr(s) 8 milliGRAM(s) Transdermal daily  vasopressin Infusion 0.01 Unit(s)/Min (1.5 mL/Hr) IV Continuous <Continuous>    MEDICATIONS  (PRN):  dextrose Oral Gel 15 Gram(s) Oral once PRN Blood Glucose LESS THAN 70 milliGRAM(s)/deciliter  oxyCODONE    IR 5 milliGRAM(s) Oral every 4 hours PRN Moderate Pain (4 - 6)  oxyCODONE    IR 10 milliGRAM(s) Oral every 6 hours PRN Severe Pain (7 - 10)       PROBLEM LIST/ ASSESSMENT:  HEALTH ISSUES - PROBLEM Dx:  Pericardial effusion with cardiac tamponade    Acute respiratory failure with hypoxia    Atelectasis    Ground glass opacity present on imaging of lung        ,   Patient is a 77y old  Male who presents with a chief complaint of percutaneous DENNY closure (02 May 2023 20:29)     s/p cardiac surgery    Acute systolic and diastolic heart failure evidenced by SOB and parenchymal infiltrates; will treat with diuresis    Cardiogenic shock on ionotropy    Vasogenic shock due to hypotension in the cticu , will keep on pressors    Hypovolemic shock - > 20% intravascular depletion will replete volume      Acute respiratory failure ruled in due to hypoxemia, O2 sats < 91% on RA treated with HFNC    Toxic metabolic encephalopathy ; sundowning due to anesthesia pain medications    Acidosis evidenced by anion gap and negative base excess    Acute kidney injury - creatinine > 0.3 due to combined prerenal and intrarenal factors can presume ATN    Moderate protein calorie malutrition        My plan includes :    volumize    caution for hemodilution    target higher maps -     diuretic challenge    will likely come to needing cvvh     will need to discuss goals of care   close hemodynamic, ventilatory and drain monitoring and management per post op routine    Monitor for arrhythmias and monitor parameters for organ perfusion  beta blockade not administered due to hemodynamic instability and bradycardia  monitor neurologic status  Head of the bed should remain elevated to 45 deg .   chest PT and IS will be encouraged  monitor adequacy of oxygenation and ventilation and attempt to wean oxygen  antibiotic regimen will be tailored to the clinical, laboratory and microbiologic data  Nutritional goals will be met using po eventually , ensure adequate caloric intake and montior the same  Stress ulcer and VTE prophylaxis will be achieved    Glycemic control is satisfactory  Electrolytes have been repleted as necessary and wound care has been carried out. Pain control has been achieved.   agressive physical therapy and early mobility and ambulation goals will be met   The family was updated about the course and plan  CRITICAL CARE TIME Upon my evaluation, this patient had a high probability of imminent or life-threatening deterioration due to the above problems which required my direct attention, intervention, and personal management.  I have personally provided 110 minutes of critical care time exclusive of time spent on separately billable procedures. Time included review of laboratory data, radiology results, discussion with consultants, and monitoring for potential decompensation. Interventions were performed as documented abovepersonally provided by me  in evaluation and management, reassessments, review and interpretation of labs and x-rays, ventilator and hemodynamic management, formulating a plan and coordinating care: ___110___ MIN.  Time does not include procedural time.    CTICU ATTENDING     					    Satya Lazar MD

## 2023-05-02 NOTE — CHART NOTE - NSCHARTNOTEFT_GEN_A_CORE
CT Removal:    Pt seen and examined at bedside.  Case discussed with Dr. Almendarez and is recommending removal of CT and power.  Minimal output.  No air leak appreciated.  Removed without incident.  Occlusive dressing placed. Follow up CXR with no obvious PTX noted.  Pt tolerated procedure well and remained hemodynamically stable.

## 2023-05-02 NOTE — PROGRESS NOTE ADULT - SUBJECTIVE AND OBJECTIVE BOX
Patient is a 77y Male seen and evaluated at bedside. Mild distress on Bipap, HDS stable on pressors, sCr continues to rise, [place] level 3.2 today, Hgb 6.1.       Meds:    acetylcysteine 10%  Inhalation 4 every 6 hours  albumin human  5% IVPB 250 once  atorvastatin 20 at bedtime  cefTRIAXone   IVPB    chlorhexidine 2% Cloths 1 <User Schedule>  cyanocobalamin 1000 daily  dextrose 5%. 1000 <Continuous>  dextrose 5%. 1000 <Continuous>  dextrose 5%. 1000 <Continuous>  dextrose 5%. 1000 <Continuous>  dextrose 50% Injectable 25 once  dextrose 50% Injectable 12.5 once  dextrose 50% Injectable 25 once  dextrose 50% Injectable 25 once  dextrose 50% Injectable 12.5 once  dextrose 50% Injectable 25 once  dextrose Oral Gel 15 once PRN  dextrose Oral Gel 15 once PRN  DOBUTamine Infusion 5 <Continuous>  epoetin annalee-epbx (RETACRIT) Injectable 46729 <User Schedule>  folic acid 1 daily  glucagon  Injectable 1 once  glucagon  Injectable 1 once  insulin glargine Injectable (LANTUS) 15 at bedtime  insulin lispro (ADMELOG) corrective regimen sliding scale  Before meals and at bedtime  insulin lispro Injectable (ADMELOG) 5 three times a day before meals  levalbuterol Inhalation 0.63 every 6 hours  midodrine 10 every 8 hours  milrinone Infusion 0.25 <Continuous>  oxyCODONE    IR 5 every 4 hours PRN  oxyCODONE    IR 10 every 6 hours PRN  pantoprazole    Tablet 40 before breakfast  phenylephrine    Infusion 0.3 <Continuous>  polyethylene glycol 3350 17 daily  predniSONE   Tablet 5 daily  sodium chloride 0.9%. 1000 <Continuous>  testosterone patch 4 mG/24 Hr(s) 8 daily  vasopressin Infusion 0.01 <Continuous>      T(C): , Max: 37.2 (05-02-23 @ 01:09)  T(F): , Max: 99 (05-02-23 @ 01:09)  HR: 93 (05-02-23 @ 16:00)  BP: 148/65 (05-02-23 @ 15:00)  BP(mean): 93 (05-02-23 @ 15:00)  RR: 15 (05-02-23 @ 16:00)  SpO2: 100% (05-02-23 @ 16:00)  Wt(kg): --    05-01 @ 07:01 - 05-02 @ 07:00  --------------------------------------------------------  IN: 3397.8 mL / OUT: 2180 mL / NET: 1217.8 mL    05-02 @ 07:01 - 05-02 @ 16:18  --------------------------------------------------------  IN: 1074.1 mL / OUT: 350 mL / NET: 724.1 mL          Review of Systems:  ROS negative except as per HPI      PHYSICAL EXAM:  GENERAL: Mild distress, sitting up in bed on BIPAP NGT in place   NECK: supple, No JVD  CHEST/LUNG: Decreased breath sounds, sternotomy scar c/d/i, left side chest tube   HEART: normal S1S2, RRR  ABDOMEN: Soft, Nontender, +BS, No flank tenderness bilateral  : Tucker with wine colored urine   EXTREMITIES: No clubbing, cyanosis, or edema   NEUROLOGY: AAO x3, no focal neurological deficit      LABS:                        6.1    10.33 )-----------( 137      ( 02 May 2023 11:29 )             19.0     05-02    139  |  106  |  62<H>  ----------------------------<  300<H>  4.1   |  24  |  2.45<H>    Ca    7.9<L>      02 May 2023 11:29  Phos  2.5     05-02  Mg     2.4     05-02    TPro  4.6<L>  /  Alb  3.0<L>  /  TBili  2.5<H>  /  DBili  x   /  AST  25  /  ALT  11  /  AlkPhos  99  05-02      PT/INR - ( 02 May 2023 11:29 )   PT: 15.6 sec;   INR: 1.31          PTT - ( 02 May 2023 11:29 )  PTT:32.3 sec    Creatinine, Random Urine: 80 mg/dL (05-01 @ 06:43)  Sodium, Random Urine: 36 mmol/L (05-01 @ 06:43)  Osmolality, Random Urine: 343 mosm/kg (05-01 @ 06:43)        RADIOLOGY & ADDITIONAL STUDIES:

## 2023-05-02 NOTE — OCCUPATIONAL THERAPY INITIAL EVALUATION ADULT - GENERAL OBSERVATIONS, REHAB EVAL
Pt's RN Yamile aware of intent to eval/tx; cleared Pt. PT MD Phillip's Cady, RNs x4, respiratory therapist present for assessment. Pt and family agreeable. Pt received in recliner -+telemetry, NGT, heplock, BiPAP to Hi Shadi 02, power to suction, sternotomy intact, chest tube off suction by RNmarcio, a-line, IVs, b/l scds. Pt's SOLOMON Ovalle aware of intent to eval/tx; cleared Pt. PT Phillip, Skyler Lazar, RNs x4, respiratory therapist present for assessment. Pt and family agreeable. Pt received in recliner -+SG catheter cervical (ok to be seen for early mobility per MD Lazar - +order received) telemetry, NGT, heplock, BiPAP to Hi Shadi 02, power to suction, sternotomy intact, chest tube off suction by RN, marcio, a-line, IVs, b/l scds.

## 2023-05-02 NOTE — CHART NOTE - NSCHARTNOTEFT_GEN_A_CORE
Admitting Diagnosis:   Patient is a 77y old  Male who presents with a chief complaint of percutaneous DENNY closure (01 May 2023 23:28)      PAST MEDICAL & SURGICAL HISTORY:  Polycystic kidney disease      DM2 (diabetes mellitus, type 2)      HLD (hyperlipidemia)      HTN (hypertension)      Prostate cancer      Kidney transplant recipient      DVT (deep venous thrombosis)      Chronic CHF      H/O radical prostatectomy        S/P hernia repair  Abdominal and inguinal around           Current Nutrition Order: DASH/TLC + Nepro TID; Nepro running at 50ml/hr x24hrs via NGT (1200ml, 2126kcal, 96g protein, 871ml free water)    PO Intake: Good (%) [   ]  Fair (50-75%) [   ] Poor (<25%) [ X ]  -- EN meeting 100% EER    GI Issues: None noted, tolerating EN well    Pain: Intermittent pain noted, team aware    Skin Integrity: Madhav score 11, 3+ dependent edema R hand, b/l legs; L/R chest tubes    Labs:       139  |  106  |  62<H>  ----------------------------<  300<H>  4.1   |  24  |  2.45<H>    Ca    7.9<L>      02 May 2023 11:29  Phos  2.5     05-  Mg     2.4     -    TPro  4.6<L>  /  Alb  3.0<L>  /  TBili  2.5<H>  /  DBili  x   /  AST  25  /  ALT  11  /  AlkPhos  99  05-02    CAPILLARY BLOOD GLUCOSE      POCT Blood Glucose.: 293 mg/dL (02 May 2023 11:10)  POCT Blood Glucose.: 276 mg/dL (02 May 2023 07:31)  POCT Blood Glucose.: 278 mg/dL (01 May 2023 22:59)  POCT Blood Glucose.: 192 mg/dL (01 May 2023 17:39)      Medications:  MEDICATIONS  (STANDING):  acetylcysteine 10%  Inhalation 4 milliLiter(s) Inhalation every 6 hours  albumin human  5% IVPB 250 milliLiter(s) IV Intermittent once  atorvastatin 20 milliGRAM(s) Oral at bedtime  cefTRIAXone   IVPB      chlorhexidine 2% Cloths 1 Application(s) Topical <User Schedule>  cyanocobalamin 1000 MICROGram(s) Oral daily  dextrose 5%. 1000 milliLiter(s) (50 mL/Hr) IV Continuous <Continuous>  dextrose 5%. 1000 milliLiter(s) (100 mL/Hr) IV Continuous <Continuous>  dextrose 5%. 1000 milliLiter(s) (100 mL/Hr) IV Continuous <Continuous>  dextrose 5%. 1000 milliLiter(s) (50 mL/Hr) IV Continuous <Continuous>  dextrose 50% Injectable 25 Gram(s) IV Push once  dextrose 50% Injectable 12.5 Gram(s) IV Push once  dextrose 50% Injectable 25 Gram(s) IV Push once  dextrose 50% Injectable 25 Gram(s) IV Push once  dextrose 50% Injectable 12.5 Gram(s) IV Push once  dextrose 50% Injectable 25 Gram(s) IV Push once  DOBUTamine Infusion 5 MICROgram(s)/kG/Min (9.87 mL/Hr) IV Continuous <Continuous>  epoetin annalee-epbx (RETACRIT) Injectable 54592 Unit(s) IV Push <User Schedule>  folic acid 1 milliGRAM(s) Oral daily  glucagon  Injectable 1 milliGRAM(s) IntraMuscular once  glucagon  Injectable 1 milliGRAM(s) IntraMuscular once  insulin glargine Injectable (LANTUS) 15 Unit(s) SubCutaneous at bedtime  insulin lispro (ADMELOG) corrective regimen sliding scale   SubCutaneous Before meals and at bedtime  insulin lispro Injectable (ADMELOG) 5 Unit(s) SubCutaneous three times a day before meals  levalbuterol Inhalation 0.63 milliGRAM(s) Inhalation every 6 hours  milrinone Infusion 0.25 MICROgram(s)/kG/Min (4.94 mL/Hr) IV Continuous <Continuous>  pantoprazole    Tablet 40 milliGRAM(s) Oral before breakfast  phenylephrine    Infusion 0.3 MICROgram(s)/kG/Min (7.4 mL/Hr) IV Continuous <Continuous>  polyethylene glycol 3350 17 Gram(s) Oral daily  predniSONE   Tablet 5 milliGRAM(s) Oral daily  sodium chloride 0.9%. 1000 milliLiter(s) (10 mL/Hr) IV Continuous <Continuous>  tacrolimus 2 milliGRAM(s) Oral every 12 hours  testosterone patch 4 mG/24 Hr(s) 8 milliGRAM(s) Transdermal daily  vasopressin Infusion 0.01 Unit(s)/Min (1.5 mL/Hr) IV Continuous <Continuous>    MEDICATIONS  (PRN):  dextrose Oral Gel 15 Gram(s) Oral once PRN Blood Glucose LESS THAN 70 milliGRAM(s)/deciliter  dextrose Oral Gel 15 Gram(s) Oral once PRN Blood Glucose LESS THAN 70 milliGRAM(s)/deciliter  oxyCODONE    IR 5 milliGRAM(s) Oral every 4 hours PRN Moderate Pain (4 - 6)  oxyCODONE    IR 10 milliGRAM(s) Oral every 6 hours PRN Severe Pain (7 - 10)    Admission Anthropometrics:  Height for BMI (FEET)	5 Feet  Height for BMI (INCHES)	7 Inch(s)  Height for BMI (CENTIMETERS)	170.18 Centimeter(s)  Weight for BMI (lbs)	145 lb  Weight for BMI (kg)	65.8 kg  Body Mass Index	22.7      Estimated energy needs:   Estimated Energy Needs From (denton/kg)	25  Estimated Energy Needs To (denton/kg)	30  Estimated Energy Needs Calculated From (denton/kg)	1642  Estimated Energy Needs Calculated To (denton/kg)	1971    Estimated Protein Needs From (g/kg)	1.2  Estimated Protein Needs To (g/kg)	1.4  Estimated Protein Needs Calculated From (g/kg)	78.84  Estimated Protein Needs Calculated To (g/kg)	91.98    Estimated Fluid Needs From (ml/kg)	30  Estimated Fluid Needs To (ml/kg)	35  Estimated Fluid Needs Calculated From (ml/kg)	1971  Estimated Fluid Needs Calculated To (ml/kg)	2299    Based on Standards of Care pt within % IBW thus actual body weight used for all calculations. Needs adjusted for advanced age and post op. Meet lower end of protein needs pending renal function improvement.    Subjective:   77yr old male with PMH PKD sp kidney transplant (), HTN, HLD, DM, prostate ca sp radical prostatectomy (), DVT in setting of pelvic fx (), afib/flutter sp ablation (2018, off AC), admitted for surgical mgmt. Patient underwent left atrial appendage occlusion percutaneously with Dr. Sidhu 23. Arrived to the stepdown extubated on no drips. Overnight had PEA arrest, requiring ACLS and intubation. Tx to ICU, bedside TTE showing pericardial effusion with tamponade. Bedside procedure with 600cc blood aspirated. Given 2 units pRBC. Taken to OR early AM for sternotomy with 1.5L L hemothorax evacuated. Arrived intubated on epi, levo, vaso, rose mary. Switched to dobutamine and weaned off some pressors. Initially had lactic acidosis, now resolved. CPAP'd overnight. : extubated,  off, Bijan weaned.  swan dc, Bijan weaned.  R pigtail placed for effusion, 850cc out. Placed on BIPAP for increased wob and atelectasis.  new R subclavian central line placed. Given bumex for diuresis, buckley placed.  worsening Cr, RIJ Eugene placed,  and primacor started, bumex given, underwent awake bronch, broad spectrum abx started. Dobhoff placed for nutrition. Vasopressin added.     Nutrition consult received for evaluation of calorie counts, enteral feeds. Pt assessed at bedside. Son present. Son reports very limited PO intake for the last 2 days however tolerated enteral feeding advance well. Calorie count from  reviewed, consisting of 3 Nepro shakes and minimal bites of food (~1400kcal, 57g protein). Enteral feeds started , now running at 50ml/hr meeting >100% EER. Recommendations below for adequate enteral feeding rate. RD to continue to follow.    Previous Nutrition Diagnosis:  Increased Nutrient Needs... kcal/ protein needs R/T physiological demand for nutrient AEB post op    Active [ X ]  Resolved [   ]    Goal: Pt to meet at least 75% of nutritional needs during hospital stay consistently    Recommendations:  1. Continue DASH/TLC diet as tolerated, continue to monitor PO intake and titrate EN rate  2. Continue Nepro EN, recommend decrease to 45ml/hr to provide 1080ml, 1913kcal, 87g protein to not exceed calorie/protein needs.  3. Pain and bowel regimen per team   4. RD to follow    Education: Reviewed PO/EN goals    Risk Level: High [ X ] Moderate [   ] Low [   ]

## 2023-05-02 NOTE — OCCUPATIONAL THERAPY INITIAL EVALUATION ADULT - ADDITIONAL COMMENTS
Pt lives w/ his wife in apt w/ elevator access. Pt states that he was independent prior to admission; no prior use of AEs/DMEs reported.

## 2023-05-02 NOTE — OCCUPATIONAL THERAPY INITIAL EVALUATION ADULT - PERTINENT HX OF CURRENT PROBLEM, REHAB EVAL
78 y/o male with polycystic kidney disease s/p kidney transplant in 2007, HTN, HLD, DM2, Prostate cancer s/p radical prostatectomy 2015, DVT 2006 (in setting of pelvic fracture), and afib/atrial flutter s/p ablation in 1/2018, who is now off oral anticoagulation secondary to GI bleed and presents for DENNY occlusoin. Pt as no symptoms. He was admitted to [facility] 7/2022 with a GI bleed. He is a [employer] and refuses blood transfusions. Eliquis was stopped at that time.

## 2023-05-02 NOTE — PROGRESS NOTE ADULT - ASSESSMENT
77Y M now with oliguric DAYO on underlying CKD 3, sCr today at 2.45, stable HDS on pressors     Non-Oliguric iATN on underlying CKD 3 (baseline sCr at 1.2-1.4)   sCr now at 2.45  Urine Na at 36, no UA   Hematuria noted in buckley   Last 24hr UOP 1.5L net pos 1.2L   Liley etiology iATN given hypotensive episodes, also with drop in Hgb and drained effusion       Plan:  Please repeat UA and lytes   Can consider adding 10mg midodrine TID   Hold further diuretics   Agree with albumin and can bolus IVF to help with intravascular repletion   Strict in and outs   Please ensure that trough level should be drawn 30 minutes before the next dose in AM  Goal tacro level 4-5  can resume next tacro dose, but given ATN will adjust tacro dose based on levels   If Urine output decreases, and worsening in clearance will start CVVHD , as of now will continue to monitor   Continue with epo as per heme/onc for anemia   Maintain MAP >70 for renal perfusion

## 2023-05-02 NOTE — OCCUPATIONAL THERAPY INITIAL EVALUATION ADULT - MD ORDER
Chronic Atrial Fibrillation  Percutaneous DENNY closure  Intraoperative transesophageal echocardiography, Occlusion, left atrial appendage on 4/26/23 Chronic Atrial Fibrillation  Percutaneous DENNY closure  Intraoperative transesophageal echocardiography, Occlusion, left atrial appendage on 4/26/23  Occlusion, left atrial appendage (4/25); Sternotomy for exploration (4/28) Chronic Atrial Fibrillation  Percutaneous DENNY closure  Intraoperative transesophageal echocardiography, Occlusion, left atrial appendage (4/25); Sternotomy for exploration (4/28)

## 2023-05-02 NOTE — PROGRESS NOTE ADULT - SUBJECTIVE AND OBJECTIVE BOX
Interval Events: Reviewed  Patient seen and examined at bedside.    Patient is a 77y old  Male who presents with a chief complaint of percutaneous DENNY closure (02 May 2023 21:38)    not in distress on the BiPAP  PAST MEDICAL & SURGICAL HISTORY:  Polycystic kidney disease      DM2 (diabetes mellitus, type 2)      HLD (hyperlipidemia)      HTN (hypertension)      Prostate cancer      Kidney transplant recipient      DVT (deep venous thrombosis)      Chronic CHF      H/O radical prostatectomy  2010      S/P hernia repair  Abdominal and inguinal around 2005          MEDICATIONS:  Pulmonary:  acetylcysteine 10%  Inhalation 4 milliLiter(s) Inhalation every 6 hours  levalbuterol Inhalation 0.63 milliGRAM(s) Inhalation every 6 hours    Antimicrobials:  cefTRIAXone   IVPB        Anticoagulants:    Cardiac:  DOBUTamine Infusion 5 MICROgram(s)/kG/Min IV Continuous <Continuous>  midodrine 10 milliGRAM(s) Oral every 8 hours  milrinone Infusion 0.25 MICROgram(s)/kG/Min IV Continuous <Continuous>  phenylephrine    Infusion 0.3 MICROgram(s)/kG/Min IV Continuous <Continuous>      Allergies    naproxen (Hives)  Bairon&#x27;s Witness - No blood products (Unknown)    Intolerances        Vital Signs Last 24 Hrs  T(C): 36.4 (02 May 2023 21:01), Max: 37.2 (02 May 2023 01:09)  T(F): 97.5 (02 May 2023 21:01), Max: 99 (02 May 2023 01:09)  HR: 94 (02 May 2023 21:16) (81 - 104)  BP: 201/81 (02 May 2023 20:00) (134/59 - 201/81)  BP(mean): 116 (02 May 2023 20:00) (85 - 116)  RR: 22 (02 May 2023 21:00) (12 - 29)  SpO2: 100% (02 May 2023 21:16) (89% - 100%)    Parameters below as of 02 May 2023 21:16  Patient On (Oxygen Delivery Method): BiPAP/CPAP        05-01 @ 07:01  -  05-02 @ 07:00  --------------------------------------------------------  IN: 3397.8 mL / OUT: 2180 mL / NET: 1217.8 mL    05-02 @ 07:01 - 05-02 @ 22:10  --------------------------------------------------------  IN: 2317.7 mL / OUT: 715 mL / NET: 1602.7 mL          Review of Systems:   •	General: negative  •	Skin/Breast: negative  •	Ophthalmologic: negative  •	ENMT: negative  •	Respiratory and Thorax: negative  •	Cardiovascular: negative  •	Gastrointestinal: negative  •	Genitourinary: negative  •	Musculoskeletal: negative  •	Neurological: negative  •	Psychiatric: negative  •	Hematology/Lymphatics: negative  •	Endocrine: negative  •	Allergic/Immunologic: negative    Physical Exam:   • Constitutional:	refer to the dietion /Nutritionist note  • Eyes:	EOMI; PERRL; no drainage or redness  • ENMT:	No oral lesions; no gross abnormalities  • Neck	No bruits; no thyromegaly or nodules  • Breasts:	not examined  • Back:	No deformity or limitation of movement  • Respiratory:	Breath Sounds equal & clear to percussion & auscultation, no accessory muscle use  • Cardiovascular:	Regular rate & rhythm, normal S1, S2; no murmurs, gallops or rubs; no S3, S4  • Gastrointestinal:	Soft, non-tender, no hepatosplenomegaly, normal bowel sounds  • Genitourinary:	not examined  • Rectal: not examined  • Extremities:	No cyanosis, clubbing or edema  • Vascular:	Equal and normal pulses (carotid, femoral, dorsalis pedis)  • Neurologica:l	not examined  • Skin:	No lesions; no rash  • Lymph Nodes:	No lymphadedenopathy  • Musculoskeletal:	No joint pain, swelling or deformity; no limitation of movement        LABS:  ABG - ( 02 May 2023 16:22 )  pH, Arterial: 7.39  pH, Blood: x     /  pCO2: 37    /  pO2: 106   / HCO3: 22    / Base Excess: -2.2  /  SaO2: 98.7                CBC Full  -  ( 02 May 2023 16:24 )  WBC Count : 10.74 K/uL  RBC Count : 1.86 M/uL  Hemoglobin : 6.0 g/dL  Hematocrit : 19.0 %  Platelet Count - Automated : 139 K/uL  Mean Cell Volume : 102.2 fl  Mean Cell Hemoglobin : 32.3 pg  Mean Cell Hemoglobin Concentration : 31.6 gm/dL  Auto Neutrophil # : 8.08 K/uL  Auto Lymphocyte # : 0.68 K/uL  Auto Monocyte # : 0.83 K/uL  Auto Eosinophil # : 0.02 K/uL  Auto Basophil # : 0.02 K/uL  Auto Neutrophil % : 75.3 %  Auto Lymphocyte % : 6.3 %  Auto Monocyte % : 7.7 %  Auto Eosinophil % : 0.2 %  Auto Basophil % : 0.2 %    05-02    138  |  106  |  60<H>  ----------------------------<  367<H>  4.0   |  23  |  2.53<H>    Ca    8.3<L>      02 May 2023 16:24  Phos  2.1     05-02  Mg     2.4     05-02    TPro  5.2<L>  /  Alb  3.4  /  TBili  2.3<H>  /  DBili  x   /  AST  23  /  ALT  9<L>  /  AlkPhos  84  05-02    PT/INR - ( 02 May 2023 16:24 )   PT: 15.3 sec;   INR: 1.28          PTT - ( 02 May 2023 16:24 )  PTT:31.1 sec      < from: Xray Chest 1 View-PORTABLE IMMEDIATE (Xray Chest 1 View-PORTABLE IMMEDIATE .) (05.02.23 @ 12:36) >    ACC: [id number] EXAM:  XR CHEST PORTABLE IMMED 1V   ORDERED BY: EKTA CAPPS     PROCEDURE DATE:  05/02/2023          INTERPRETATION:  XR CHEST IMMEDIATE dated 5/2/2023 12:36 PM    CLINICAL INFORMATION: Male, 77 years old.  ct removal.    PRIOR STUDIES: 5/2/2023    FINDINGS: Persistent cardiomegaly despite the rotated portable technique.   There is been removal of left-sided chest tube. No evidence of pneumo   thorax. There has been interval decrease in left-sided pleural effusion.   Mediastinal drains as well as a percutaneous pigtail catheter is noted   over the right lower lung zone. Multiple central lines and support   catheters are redemonstrated including a right IJV [place]-Charity catheter,   right clavian central venous catheter/pelvic, and enteric tube. There has   been interval removal of multiple mediastinal drains with a residual   mediastinal catheter noted to the right of midline.    IMPRESSION:  Interval line changes as above.  No pneumothorax.    < end of copied text >            Culture Results:   Normal Respiratory Shelly present to date (05-01 @ 14:46)  Culture Results:   No growth at 1 day. (05-01 @ 12:54)      RADIOLOGY & ADDITIONAL STUDIES (The following images were personally reviewed):

## 2023-05-03 LAB
ACANTHOCYTES BLD QL SMEAR: SLIGHT — SIGNIFICANT CHANGE UP
ALBUMIN SERPL ELPH-MCNC: 3.3 G/DL — SIGNIFICANT CHANGE UP (ref 3.3–5)
ALBUMIN SERPL ELPH-MCNC: 3.5 G/DL — SIGNIFICANT CHANGE UP (ref 3.3–5)
ALP SERPL-CCNC: 67 U/L — SIGNIFICANT CHANGE UP (ref 40–120)
ALP SERPL-CCNC: 74 U/L — SIGNIFICANT CHANGE UP (ref 40–120)
ALT FLD-CCNC: 7 U/L — LOW (ref 10–45)
ALT FLD-CCNC: 7 U/L — LOW (ref 10–45)
ANION GAP SERPL CALC-SCNC: 12 MMOL/L — SIGNIFICANT CHANGE UP (ref 5–17)
ANION GAP SERPL CALC-SCNC: 9 MMOL/L — SIGNIFICANT CHANGE UP (ref 5–17)
ANISOCYTOSIS BLD QL: SIGNIFICANT CHANGE UP
ANISOCYTOSIS BLD QL: SLIGHT — SIGNIFICANT CHANGE UP
APTT BLD: 29.1 SEC — SIGNIFICANT CHANGE UP (ref 27.5–35.5)
APTT BLD: 30.8 SEC — SIGNIFICANT CHANGE UP (ref 27.5–35.5)
AST SERPL-CCNC: 21 U/L — SIGNIFICANT CHANGE UP (ref 10–40)
AST SERPL-CCNC: 22 U/L — SIGNIFICANT CHANGE UP (ref 10–40)
BASE EXCESS BLDV CALC-SCNC: -2.6 MMOL/L — LOW (ref -2–3)
BASOPHILS # BLD AUTO: 0 K/UL — SIGNIFICANT CHANGE UP (ref 0–0.2)
BASOPHILS # BLD AUTO: 0.19 K/UL — SIGNIFICANT CHANGE UP (ref 0–0.2)
BASOPHILS NFR BLD AUTO: 0 % — SIGNIFICANT CHANGE UP (ref 0–2)
BASOPHILS NFR BLD AUTO: 1.8 % — SIGNIFICANT CHANGE UP (ref 0–2)
BILIRUB SERPL-MCNC: 1.7 MG/DL — HIGH (ref 0.2–1.2)
BILIRUB SERPL-MCNC: 2 MG/DL — HIGH (ref 0.2–1.2)
BUN SERPL-MCNC: 56 MG/DL — HIGH (ref 7–23)
BUN SERPL-MCNC: 59 MG/DL — HIGH (ref 7–23)
BURR CELLS BLD QL SMEAR: PRESENT — SIGNIFICANT CHANGE UP
CA-I SERPL-SCNC: 1.26 MMOL/L — SIGNIFICANT CHANGE UP (ref 1.15–1.33)
CALCIUM SERPL-MCNC: 8.8 MG/DL — SIGNIFICANT CHANGE UP (ref 8.4–10.5)
CALCIUM SERPL-MCNC: 9 MG/DL — SIGNIFICANT CHANGE UP (ref 8.4–10.5)
CHLORIDE SERPL-SCNC: 107 MMOL/L — SIGNIFICANT CHANGE UP (ref 96–108)
CHLORIDE SERPL-SCNC: 110 MMOL/L — HIGH (ref 96–108)
CK SERPL-CCNC: 21 U/L — LOW (ref 30–200)
CO2 BLDV-SCNC: 24.5 MMOL/L — SIGNIFICANT CHANGE UP (ref 22–26)
CO2 SERPL-SCNC: 23 MMOL/L — SIGNIFICANT CHANGE UP (ref 22–31)
CO2 SERPL-SCNC: 24 MMOL/L — SIGNIFICANT CHANGE UP (ref 22–31)
CREAT SERPL-MCNC: 2.4 MG/DL — HIGH (ref 0.5–1.3)
CREAT SERPL-MCNC: 2.44 MG/DL — HIGH (ref 0.5–1.3)
EGFR: 27 ML/MIN/1.73M2 — LOW
EGFR: 27 ML/MIN/1.73M2 — LOW
ELLIPTOCYTES BLD QL SMEAR: SLIGHT — SIGNIFICANT CHANGE UP
EOSINOPHIL # BLD AUTO: 0 K/UL — SIGNIFICANT CHANGE UP (ref 0–0.5)
EOSINOPHIL # BLD AUTO: 0 K/UL — SIGNIFICANT CHANGE UP (ref 0–0.5)
EOSINOPHIL NFR BLD AUTO: 0 % — SIGNIFICANT CHANGE UP (ref 0–6)
EOSINOPHIL NFR BLD AUTO: 0 % — SIGNIFICANT CHANGE UP (ref 0–6)
FERRITIN SERPL-MCNC: 594 NG/ML — HIGH (ref 30–400)
GAS PNL BLDA: SIGNIFICANT CHANGE UP
GAS PNL BLDA: SIGNIFICANT CHANGE UP
GAS PNL BLDV: 140 MMOL/L — SIGNIFICANT CHANGE UP (ref 136–145)
GAS PNL BLDV: SIGNIFICANT CHANGE UP
GIANT PLATELETS BLD QL SMEAR: PRESENT — SIGNIFICANT CHANGE UP
GIANT PLATELETS BLD QL SMEAR: PRESENT — SIGNIFICANT CHANGE UP
GLUCOSE BLDC GLUCOMTR-MCNC: 106 MG/DL — HIGH (ref 70–99)
GLUCOSE BLDC GLUCOMTR-MCNC: 118 MG/DL — HIGH (ref 70–99)
GLUCOSE BLDC GLUCOMTR-MCNC: 124 MG/DL — HIGH (ref 70–99)
GLUCOSE BLDC GLUCOMTR-MCNC: 137 MG/DL — HIGH (ref 70–99)
GLUCOSE BLDC GLUCOMTR-MCNC: 140 MG/DL — HIGH (ref 70–99)
GLUCOSE BLDC GLUCOMTR-MCNC: 143 MG/DL — HIGH (ref 70–99)
GLUCOSE BLDC GLUCOMTR-MCNC: 143 MG/DL — HIGH (ref 70–99)
GLUCOSE BLDC GLUCOMTR-MCNC: 144 MG/DL — HIGH (ref 70–99)
GLUCOSE BLDC GLUCOMTR-MCNC: 144 MG/DL — HIGH (ref 70–99)
GLUCOSE BLDC GLUCOMTR-MCNC: 152 MG/DL — HIGH (ref 70–99)
GLUCOSE BLDC GLUCOMTR-MCNC: 167 MG/DL — HIGH (ref 70–99)
GLUCOSE BLDC GLUCOMTR-MCNC: 170 MG/DL — HIGH (ref 70–99)
GLUCOSE BLDC GLUCOMTR-MCNC: 172 MG/DL — HIGH (ref 70–99)
GLUCOSE BLDC GLUCOMTR-MCNC: 183 MG/DL — HIGH (ref 70–99)
GLUCOSE BLDC GLUCOMTR-MCNC: 198 MG/DL — HIGH (ref 70–99)
GLUCOSE BLDC GLUCOMTR-MCNC: 198 MG/DL — HIGH (ref 70–99)
GLUCOSE BLDC GLUCOMTR-MCNC: 251 MG/DL — HIGH (ref 70–99)
GLUCOSE BLDC GLUCOMTR-MCNC: 83 MG/DL — SIGNIFICANT CHANGE UP (ref 70–99)
GLUCOSE SERPL-MCNC: 126 MG/DL — HIGH (ref 70–99)
GLUCOSE SERPL-MCNC: 198 MG/DL — HIGH (ref 70–99)
GRAM STN FLD: SIGNIFICANT CHANGE UP
HCO3 BLDV-SCNC: 23 MMOL/L — SIGNIFICANT CHANGE UP (ref 22–29)
HCT VFR BLD CALC: 18.4 % — CRITICAL LOW (ref 39–50)
HCT VFR BLD CALC: 18.8 % — CRITICAL LOW (ref 39–50)
HGB BLD-MCNC: 5.9 G/DL — CRITICAL LOW (ref 13–17)
HGB BLD-MCNC: 6 G/DL — CRITICAL LOW (ref 13–17)
HYPOCHROMIA BLD QL: SLIGHT — SIGNIFICANT CHANGE UP
INR BLD: 1.28 — HIGH (ref 0.88–1.16)
INR BLD: 1.31 — HIGH (ref 0.88–1.16)
IRON SATN MFR SERPL: 30 UG/DL — LOW (ref 45–165)
IRON SATN MFR SERPL: 42 % — SIGNIFICANT CHANGE UP (ref 16–55)
LACTATE SERPL-SCNC: 1 MMOL/L — SIGNIFICANT CHANGE UP (ref 0.5–2)
LYMPHOCYTES # BLD AUTO: 0.1 K/UL — LOW (ref 1–3.3)
LYMPHOCYTES # BLD AUTO: 0.47 K/UL — LOW (ref 1–3.3)
LYMPHOCYTES # BLD AUTO: 0.9 % — LOW (ref 13–44)
LYMPHOCYTES # BLD AUTO: 4.4 % — LOW (ref 13–44)
MACROCYTES BLD QL: SIGNIFICANT CHANGE UP
MACROCYTES BLD QL: SLIGHT — SIGNIFICANT CHANGE UP
MAGNESIUM SERPL-MCNC: 2.3 MG/DL — SIGNIFICANT CHANGE UP (ref 1.6–2.6)
MAGNESIUM SERPL-MCNC: 2.4 MG/DL — SIGNIFICANT CHANGE UP (ref 1.6–2.6)
MANUAL SMEAR VERIFICATION: SIGNIFICANT CHANGE UP
MANUAL SMEAR VERIFICATION: SIGNIFICANT CHANGE UP
MCHC RBC-ENTMCNC: 31.9 GM/DL — LOW (ref 32–36)
MCHC RBC-ENTMCNC: 32.1 GM/DL — SIGNIFICANT CHANGE UP (ref 32–36)
MCHC RBC-ENTMCNC: 32.8 PG — SIGNIFICANT CHANGE UP (ref 27–34)
MCHC RBC-ENTMCNC: 33.1 PG — SIGNIFICANT CHANGE UP (ref 27–34)
MCV RBC AUTO: 102.7 FL — HIGH (ref 80–100)
MCV RBC AUTO: 103.4 FL — HIGH (ref 80–100)
METAMYELOCYTES # FLD: 0.9 % — HIGH (ref 0–0)
MICROCYTES BLD QL: SLIGHT — SIGNIFICANT CHANGE UP
MONOCYTES # BLD AUTO: 0.31 K/UL — SIGNIFICANT CHANGE UP (ref 0–0.9)
MONOCYTES # BLD AUTO: 0.39 K/UL — SIGNIFICANT CHANGE UP (ref 0–0.9)
MONOCYTES NFR BLD AUTO: 2.7 % — SIGNIFICANT CHANGE UP (ref 2–14)
MONOCYTES NFR BLD AUTO: 3.6 % — SIGNIFICANT CHANGE UP (ref 2–14)
MYELOCYTES NFR BLD: 0.9 % — HIGH (ref 0–0)
MYELOCYTES NFR BLD: 1.8 % — HIGH (ref 0–0)
MYOGLOBIN SERPL-MCNC: 82 NG/ML — SIGNIFICANT CHANGE UP (ref 16–96)
NEUTROPHILS # BLD AUTO: 10.83 K/UL — HIGH (ref 1.8–7.4)
NEUTROPHILS # BLD AUTO: 9.42 K/UL — HIGH (ref 1.8–7.4)
NEUTROPHILS NFR BLD AUTO: 85.7 % — HIGH (ref 43–77)
NEUTROPHILS NFR BLD AUTO: 93.7 % — HIGH (ref 43–77)
NEUTS BAND # BLD: 0.9 % — SIGNIFICANT CHANGE UP (ref 0–8)
NEUTS BAND # BLD: 1.8 % — SIGNIFICANT CHANGE UP (ref 0–8)
NRBC # BLD: 18 /100 — HIGH (ref 0–0)
NRBC # BLD: 28 /100 — HIGH (ref 0–0)
NRBC # BLD: SIGNIFICANT CHANGE UP /100 WBCS (ref 0–0)
NRBC # BLD: SIGNIFICANT CHANGE UP /100 WBCS (ref 0–0)
OVALOCYTES BLD QL SMEAR: SLIGHT — SIGNIFICANT CHANGE UP
OVALOCYTES BLD QL SMEAR: SLIGHT — SIGNIFICANT CHANGE UP
PCO2 BLDV: 43 MMHG — SIGNIFICANT CHANGE UP (ref 42–55)
PH BLDV: 7.34 — SIGNIFICANT CHANGE UP (ref 7.32–7.43)
PHOSPHATE SERPL-MCNC: 1.7 MG/DL — LOW (ref 2.5–4.5)
PHOSPHATE SERPL-MCNC: 2.7 MG/DL — SIGNIFICANT CHANGE UP (ref 2.5–4.5)
PLAT MORPH BLD: ABNORMAL
PLAT MORPH BLD: ABNORMAL
PLATELET # BLD AUTO: 130 K/UL — LOW (ref 150–400)
PLATELET # BLD AUTO: 130 K/UL — LOW (ref 150–400)
PO2 BLDV: 42 MMHG — SIGNIFICANT CHANGE UP (ref 25–45)
POIKILOCYTOSIS BLD QL AUTO: SLIGHT — SIGNIFICANT CHANGE UP
POIKILOCYTOSIS BLD QL AUTO: SLIGHT — SIGNIFICANT CHANGE UP
POLYCHROMASIA BLD QL SMEAR: SLIGHT — SIGNIFICANT CHANGE UP
POLYCHROMASIA BLD QL SMEAR: SLIGHT — SIGNIFICANT CHANGE UP
POTASSIUM BLDV-SCNC: 3.7 MMOL/L — SIGNIFICANT CHANGE UP (ref 3.5–5.1)
POTASSIUM SERPL-MCNC: 3.4 MMOL/L — LOW (ref 3.5–5.3)
POTASSIUM SERPL-MCNC: 4 MMOL/L — SIGNIFICANT CHANGE UP (ref 3.5–5.3)
POTASSIUM SERPL-SCNC: 3.4 MMOL/L — LOW (ref 3.5–5.3)
POTASSIUM SERPL-SCNC: 4 MMOL/L — SIGNIFICANT CHANGE UP (ref 3.5–5.3)
PROT SERPL-MCNC: 4.8 G/DL — LOW (ref 6–8.3)
PROT SERPL-MCNC: 4.9 G/DL — LOW (ref 6–8.3)
PROTHROM AB SERPL-ACNC: 15.3 SEC — HIGH (ref 10.5–13.4)
PROTHROM AB SERPL-ACNC: 15.6 SEC — HIGH (ref 10.5–13.4)
RBC # BLD: 1.78 M/UL — LOW (ref 4.2–5.8)
RBC # BLD: 1.83 M/UL — LOW (ref 4.2–5.8)
RBC # FLD: 17.9 % — HIGH (ref 10.3–14.5)
RBC # FLD: 19.1 % — HIGH (ref 10.3–14.5)
RBC BLD AUTO: ABNORMAL
RBC BLD AUTO: ABNORMAL
RETICS #: 129.7 K/UL — HIGH (ref 25–125)
RETICS/RBC NFR: 7.3 % — HIGH (ref 0.5–2.5)
SAO2 % BLDV: 71.6 % — SIGNIFICANT CHANGE UP (ref 67–88)
SCHISTOCYTES BLD QL AUTO: SLIGHT — SIGNIFICANT CHANGE UP
SCHISTOCYTES BLD QL AUTO: SLIGHT — SIGNIFICANT CHANGE UP
SODIUM SERPL-SCNC: 140 MMOL/L — SIGNIFICANT CHANGE UP (ref 135–145)
SODIUM SERPL-SCNC: 145 MMOL/L — SIGNIFICANT CHANGE UP (ref 135–145)
SPECIMEN SOURCE: SIGNIFICANT CHANGE UP
SPHEROCYTES BLD QL SMEAR: SLIGHT — SIGNIFICANT CHANGE UP
STOMATOCYTES BLD QL SMEAR: SLIGHT — SIGNIFICANT CHANGE UP
TACROLIMUS SERPL-MCNC: 2.7 NG/ML — SIGNIFICANT CHANGE UP
TIBC SERPL-MCNC: 72 UG/DL — LOW (ref 220–430)
TRANSFERRIN SERPL-MCNC: 66 MG/DL — LOW (ref 200–360)
UIBC SERPL-MCNC: 42 UG/DL — LOW (ref 110–370)
VARIANT LYMPHS # BLD: 0.9 % — SIGNIFICANT CHANGE UP (ref 0–6)
WBC # BLD: 10.76 K/UL — HIGH (ref 3.8–10.5)
WBC # BLD: 11.45 K/UL — HIGH (ref 3.8–10.5)
WBC # FLD AUTO: 10.76 K/UL — HIGH (ref 3.8–10.5)
WBC # FLD AUTO: 11.45 K/UL — HIGH (ref 3.8–10.5)

## 2023-05-03 PROCEDURE — 31500 INSERT EMERGENCY AIRWAY: CPT

## 2023-05-03 PROCEDURE — 71045 X-RAY EXAM CHEST 1 VIEW: CPT | Mod: 26

## 2023-05-03 PROCEDURE — 99291 CRITICAL CARE FIRST HOUR: CPT | Mod: 25

## 2023-05-03 PROCEDURE — 99292 CRITICAL CARE ADDL 30 MIN: CPT | Mod: 25

## 2023-05-03 RX ORDER — FENTANYL CITRATE 50 UG/ML
50 INJECTION INTRAVENOUS ONCE
Refills: 0 | Status: DISCONTINUED | OUTPATIENT
Start: 2023-05-03 | End: 2023-05-03

## 2023-05-03 RX ORDER — MIDODRINE HYDROCHLORIDE 2.5 MG/1
10 TABLET ORAL EVERY 8 HOURS
Refills: 0 | Status: DISCONTINUED | OUTPATIENT
Start: 2023-05-03 | End: 2023-05-04

## 2023-05-03 RX ORDER — CISATRACURIUM BESYLATE 2 MG/ML
5 INJECTION INTRAVENOUS ONCE
Refills: 0 | Status: COMPLETED | OUTPATIENT
Start: 2023-05-03 | End: 2023-05-03

## 2023-05-03 RX ORDER — POTASSIUM CHLORIDE 20 MEQ
10 PACKET (EA) ORAL ONCE
Refills: 0 | Status: COMPLETED | OUTPATIENT
Start: 2023-05-03 | End: 2023-05-03

## 2023-05-03 RX ORDER — CHLORHEXIDINE GLUCONATE 213 G/1000ML
15 SOLUTION TOPICAL EVERY 12 HOURS
Refills: 0 | Status: DISCONTINUED | OUTPATIENT
Start: 2023-05-03 | End: 2023-05-07

## 2023-05-03 RX ORDER — ALBUMIN HUMAN 25 %
250 VIAL (ML) INTRAVENOUS ONCE
Refills: 0 | Status: COMPLETED | OUTPATIENT
Start: 2023-05-03 | End: 2023-05-03

## 2023-05-03 RX ORDER — PANTOPRAZOLE SODIUM 20 MG/1
40 TABLET, DELAYED RELEASE ORAL DAILY
Refills: 0 | Status: DISCONTINUED | OUTPATIENT
Start: 2023-05-03 | End: 2023-05-10

## 2023-05-03 RX ORDER — ERYTHROPOIETIN 10000 [IU]/ML
20000 INJECTION, SOLUTION INTRAVENOUS; SUBCUTANEOUS
Refills: 0 | Status: DISCONTINUED | OUTPATIENT
Start: 2023-05-03 | End: 2023-05-05

## 2023-05-03 RX ORDER — EPINEPHRINE 0.3 MG/.3ML
0.03 INJECTION INTRAMUSCULAR; SUBCUTANEOUS
Qty: 4 | Refills: 0 | Status: DISCONTINUED | OUTPATIENT
Start: 2023-05-03 | End: 2023-05-03

## 2023-05-03 RX ORDER — TACROLIMUS 5 MG/1
1 CAPSULE ORAL EVERY 12 HOURS
Refills: 0 | Status: DISCONTINUED | OUTPATIENT
Start: 2023-05-03 | End: 2023-05-06

## 2023-05-03 RX ORDER — IRON SUCROSE 20 MG/ML
200 INJECTION, SOLUTION INTRAVENOUS ONCE
Refills: 0 | Status: COMPLETED | OUTPATIENT
Start: 2023-05-03 | End: 2023-05-03

## 2023-05-03 RX ORDER — PROPOFOL 10 MG/ML
10 INJECTION, EMULSION INTRAVENOUS
Qty: 1000 | Refills: 0 | Status: DISCONTINUED | OUTPATIENT
Start: 2023-05-03 | End: 2023-05-08

## 2023-05-03 RX ORDER — TACROLIMUS 5 MG/1
1 CAPSULE ORAL EVERY 12 HOURS
Refills: 0 | Status: DISCONTINUED | OUTPATIENT
Start: 2023-05-03 | End: 2023-05-03

## 2023-05-03 RX ORDER — BUMETANIDE 0.25 MG/ML
2 INJECTION INTRAMUSCULAR; INTRAVENOUS ONCE
Refills: 0 | Status: COMPLETED | OUTPATIENT
Start: 2023-05-03 | End: 2023-05-03

## 2023-05-03 RX ORDER — BUMETANIDE 0.25 MG/ML
1 INJECTION INTRAMUSCULAR; INTRAVENOUS ONCE
Refills: 0 | Status: COMPLETED | OUTPATIENT
Start: 2023-05-03 | End: 2023-05-03

## 2023-05-03 RX ORDER — DEXMEDETOMIDINE HYDROCHLORIDE IN 0.9% SODIUM CHLORIDE 4 UG/ML
0.1 INJECTION INTRAVENOUS
Qty: 400 | Refills: 0 | Status: DISCONTINUED | OUTPATIENT
Start: 2023-05-03 | End: 2023-05-10

## 2023-05-03 RX ORDER — FOLIC ACID 0.8 MG
1 TABLET ORAL DAILY
Refills: 0 | Status: DISCONTINUED | OUTPATIENT
Start: 2023-05-03 | End: 2023-05-21

## 2023-05-03 RX ORDER — ATORVASTATIN CALCIUM 80 MG/1
20 TABLET, FILM COATED ORAL AT BEDTIME
Refills: 0 | Status: DISCONTINUED | OUTPATIENT
Start: 2023-05-03 | End: 2023-05-21

## 2023-05-03 RX ORDER — POTASSIUM PHOSPHATE, MONOBASIC POTASSIUM PHOSPHATE, DIBASIC 236; 224 MG/ML; MG/ML
15 INJECTION, SOLUTION INTRAVENOUS ONCE
Refills: 0 | Status: COMPLETED | OUTPATIENT
Start: 2023-05-03 | End: 2023-05-03

## 2023-05-03 RX ADMIN — MIDODRINE HYDROCHLORIDE 10 MILLIGRAM(S): 2.5 TABLET ORAL at 06:48

## 2023-05-03 RX ADMIN — ERYTHROPOIETIN 20000 UNIT(S): 10000 INJECTION, SOLUTION INTRAVENOUS; SUBCUTANEOUS at 19:56

## 2023-05-03 RX ADMIN — Medication 8 MILLIGRAM(S): at 12:00

## 2023-05-03 RX ADMIN — CHLORHEXIDINE GLUCONATE 15 MILLILITER(S): 213 SOLUTION TOPICAL at 19:56

## 2023-05-03 RX ADMIN — Medication 50 MILLIEQUIVALENT(S): at 12:39

## 2023-05-03 RX ADMIN — PANTOPRAZOLE SODIUM 40 MILLIGRAM(S): 20 TABLET, DELAYED RELEASE ORAL at 06:48

## 2023-05-03 RX ADMIN — Medication 4 MILLILITER(S): at 14:52

## 2023-05-03 RX ADMIN — POTASSIUM PHOSPHATE, MONOBASIC POTASSIUM PHOSPHATE, DIBASIC 62.5 MILLIMOLE(S): 236; 224 INJECTION, SOLUTION INTRAVENOUS at 05:30

## 2023-05-03 RX ADMIN — Medication 4 MILLILITER(S): at 22:15

## 2023-05-03 RX ADMIN — Medication 8 MILLIGRAM(S): at 18:18

## 2023-05-03 RX ADMIN — FENTANYL CITRATE 50 MICROGRAM(S): 50 INJECTION INTRAVENOUS at 12:50

## 2023-05-03 RX ADMIN — Medication 8 MILLIGRAM(S): at 12:38

## 2023-05-03 RX ADMIN — VASOPRESSIN 1.5 UNIT(S)/MIN: 20 INJECTION INTRAVENOUS at 07:12

## 2023-05-03 RX ADMIN — FENTANYL CITRATE 50 MICROGRAM(S): 50 INJECTION INTRAVENOUS at 13:30

## 2023-05-03 RX ADMIN — IRON SUCROSE 110 MILLIGRAM(S): 20 INJECTION, SOLUTION INTRAVENOUS at 15:37

## 2023-05-03 RX ADMIN — Medication 1 MILLIGRAM(S): at 15:39

## 2023-05-03 RX ADMIN — MIDODRINE HYDROCHLORIDE 10 MILLIGRAM(S): 2.5 TABLET ORAL at 21:44

## 2023-05-03 RX ADMIN — OXYCODONE HYDROCHLORIDE 10 MILLIGRAM(S): 5 TABLET ORAL at 08:01

## 2023-05-03 RX ADMIN — TACROLIMUS 1 MILLIGRAM(S): 5 CAPSULE ORAL at 19:55

## 2023-05-03 RX ADMIN — Medication 5 MILLIGRAM(S): at 06:48

## 2023-05-03 RX ADMIN — OXYCODONE HYDROCHLORIDE 5 MILLIGRAM(S): 5 TABLET ORAL at 07:48

## 2023-05-03 RX ADMIN — Medication 4 MILLILITER(S): at 11:25

## 2023-05-03 RX ADMIN — BUMETANIDE 1 MILLIGRAM(S): 0.25 INJECTION INTRAMUSCULAR; INTRAVENOUS at 17:35

## 2023-05-03 RX ADMIN — LEVALBUTEROL 0.63 MILLIGRAM(S): 1.25 SOLUTION, CONCENTRATE RESPIRATORY (INHALATION) at 14:51

## 2023-05-03 RX ADMIN — INSULIN HUMAN 1 UNIT(S)/HR: 100 INJECTION, SOLUTION SUBCUTANEOUS at 11:26

## 2023-05-03 RX ADMIN — Medication 9.87 MICROGRAM(S)/KG/MIN: at 20:11

## 2023-05-03 RX ADMIN — LEVALBUTEROL 0.63 MILLIGRAM(S): 1.25 SOLUTION, CONCENTRATE RESPIRATORY (INHALATION) at 11:25

## 2023-05-03 RX ADMIN — PREGABALIN 1000 MICROGRAM(S): 225 CAPSULE ORAL at 15:38

## 2023-05-03 RX ADMIN — ATORVASTATIN CALCIUM 20 MILLIGRAM(S): 80 TABLET, FILM COATED ORAL at 21:44

## 2023-05-03 RX ADMIN — FENTANYL CITRATE 50 MICROGRAM(S): 50 INJECTION INTRAVENOUS at 14:15

## 2023-05-03 RX ADMIN — BUMETANIDE 2 MILLIGRAM(S): 0.25 INJECTION INTRAMUSCULAR; INTRAVENOUS at 06:49

## 2023-05-03 RX ADMIN — FENTANYL CITRATE 50 MICROGRAM(S): 50 INJECTION INTRAVENOUS at 13:45

## 2023-05-03 RX ADMIN — INSULIN HUMAN 1 UNIT(S)/HR: 100 INJECTION, SOLUTION SUBCUTANEOUS at 20:12

## 2023-05-03 RX ADMIN — CEFTRIAXONE 100 MILLIGRAM(S): 500 INJECTION, POWDER, FOR SOLUTION INTRAMUSCULAR; INTRAVENOUS at 11:22

## 2023-05-03 RX ADMIN — Medication 4 MILLILITER(S): at 06:25

## 2023-05-03 RX ADMIN — DEXMEDETOMIDINE HYDROCHLORIDE IN 0.9% SODIUM CHLORIDE 1.65 MICROGRAM(S)/KG/HR: 4 INJECTION INTRAVENOUS at 17:00

## 2023-05-03 RX ADMIN — OXYCODONE HYDROCHLORIDE 5 MILLIGRAM(S): 5 TABLET ORAL at 06:48

## 2023-05-03 RX ADMIN — LEVALBUTEROL 0.63 MILLIGRAM(S): 1.25 SOLUTION, CONCENTRATE RESPIRATORY (INHALATION) at 04:26

## 2023-05-03 RX ADMIN — Medication 8 MILLIGRAM(S): at 07:19

## 2023-05-03 RX ADMIN — Medication 50 MILLIEQUIVALENT(S): at 22:30

## 2023-05-03 RX ADMIN — CISATRACURIUM BESYLATE 5 MILLIGRAM(S): 2 INJECTION INTRAVENOUS at 12:55

## 2023-05-03 RX ADMIN — VASOPRESSIN 1.5 UNIT(S)/MIN: 20 INJECTION INTRAVENOUS at 16:57

## 2023-05-03 RX ADMIN — MIDODRINE HYDROCHLORIDE 10 MILLIGRAM(S): 2.5 TABLET ORAL at 15:39

## 2023-05-03 RX ADMIN — CHLORHEXIDINE GLUCONATE 1 APPLICATION(S): 213 SOLUTION TOPICAL at 06:20

## 2023-05-03 RX ADMIN — Medication 250 MILLILITER(S): at 12:15

## 2023-05-03 RX ADMIN — OXYCODONE HYDROCHLORIDE 10 MILLIGRAM(S): 5 TABLET ORAL at 09:00

## 2023-05-03 RX ADMIN — LEVALBUTEROL 0.63 MILLIGRAM(S): 1.25 SOLUTION, CONCENTRATE RESPIRATORY (INHALATION) at 22:16

## 2023-05-03 RX ADMIN — BUMETANIDE 1 MILLIGRAM(S): 0.25 INJECTION INTRAMUSCULAR; INTRAVENOUS at 16:57

## 2023-05-03 RX ADMIN — PROPOFOL 3.95 MICROGRAM(S)/KG/MIN: 10 INJECTION, EMULSION INTRAVENOUS at 13:20

## 2023-05-03 RX ADMIN — MILRINONE LACTATE 4.94 MICROGRAM(S)/KG/MIN: 1 INJECTION, SOLUTION INTRAVENOUS at 05:38

## 2023-05-03 NOTE — PROGRESS NOTE ADULT - ASSESSMENT
Neuro-> no issues, AAOx3    Cardio->  -Amulet in place with no sissy-device leak  -unable to start ASA (or DAPT) due to low hgb counts and bleeding  -continue inotropes/pressor to keep MAP up for kidney perfusion  -in NSR (pAfib)    Respiratory- labored breathing, unable to wean off HFNC/BiPAP  -discussion noted above, Pulmonary (Dr. Brock), myself and Dr. Lazar all feel that he needs intubation. Plan for intubation and bronchoscopy to clear secretions  -management of Chest Tubes as per CT surgery team  -Abx for bacteria in sputum    GI-> nutrition/calorie evaluation  -NGT in place and feeds as per team    Heme-> hgb 6.0 today  -f/u Hematology recs  -plt count and INR improved  -Heme recs pending, on EPO and has gotten iron/B12/folate  -emergent bleeding, patient's accepted product list includes Kcentra and cryoprecipitate    Renal-> oliguric DAYO  -renal recommendations  -pt is ok with short term dialysis but my concern is the blood draws required with dialysis and drop in BP  -will continue to try and improve kidney perfusion and monitor I/O  -tacro dosing as per renal team    Dispo- ICU level of care  NO BLOOD TRANSFUSIONS due to Jehova's witness

## 2023-05-03 NOTE — PROGRESS NOTE ADULT - SUBJECTIVE AND OBJECTIVE BOX
Subjective:  - Seen at bedside with son/daughter. Patient reports more fatigue and difficulty breathing  - he feels very week  - we had a long discussion about goals of care with myself, his family and Dr. Lazar. Given worsening renal function he is ok with short term dialysis (but does not want to be on dialysis permanently). We talked about intubation for his work of breathing and patient would like intubation to help his work of breathing. He continues to want aggressive medical care, but only wish is that he is not on dialysis for long term.  - Events/Chart from overnight reviewed    PAST MEDICAL & SURGICAL HISTORY:  Polycystic kidney disease      DM2 (diabetes mellitus, type 2)      HLD (hyperlipidemia)      HTN (hypertension)      Prostate cancer      Kidney transplant recipient      DVT (deep venous thrombosis)      Chronic CHF      H/O radical prostatectomy  2010      S/P hernia repair  Abdominal and inguinal around 2005          Vital Signs Last 24 Hrs  T(C): 36.2 (03 May 2023 08:39), Max: 36.6 (03 May 2023 01:07)  T(F): 97.2 (03 May 2023 08:39), Max: 97.8 (03 May 2023 01:07)  HR: 91 (03 May 2023 09:00) (86 - 100)  BP: 165/70 (03 May 2023 09:00) (134/59 - 201/81)  BP(mean): 101 (03 May 2023 09:00) (85 - 116)  RR: 12 (03 May 2023 09:00) (12 - 29)  SpO2: 100% (03 May 2023 09:00) (89% - 100%)    Parameters below as of 03 May 2023 09:00  Patient On (Oxygen Delivery Method): BiPAP/CPAP,IPAP 16 EPAP 8 RR 12 PEEP 8    O2 Concentration (%): 60   I&O's Detail    02 May 2023 07:01  -  03 May 2023 07:00  --------------------------------------------------------  IN:    Albumin 25%  -  50 mL: 500 mL    DOBUTamine: 237.6 mL    Enteral Tube Flush: 90 mL    Insulin: 79 mL    IV PiggyBack: 275 mL    Lactated Ringers Bolus: 30 mL    Milrinone: 119.9 mL    Nepro with Carb Steady: 1200 mL    Oral Fluid: 150 mL    Phenylephrine: 123.7 mL    sodium chloride 0.9%: 315 mL    Vasopressin: 237 mL  Total IN: 3357.2 mL    OUT:    Bulb (mL): 0 mL    Chest Tube (mL): 5 mL    Chest Tube (mL): 560 mL    Indwelling Catheter - Urethral (mL): 1000 mL  Total OUT: 1565 mL    Total NET: 1792.2 mL      03 May 2023 07:01  -  03 May 2023 10:22  --------------------------------------------------------  IN:    DOBUTamine: 19.8 mL    Milrinone: 10 mL    Nepro with Carb Steady: 100 mL    Phenylephrine: 5 mL    sodium chloride 0.9%: 25 mL    Vasopressin: 24 mL  Total IN: 183.8 mL    OUT:    Bulb (mL): 0 mL    Chest Tube (mL): 0 mL    Indwelling Catheter - Urethral (mL): 145 mL  Total OUT: 145 mL    Total NET: 38.8 mL        Daily     Daily     Physical Exam:   GEN: AAOx3, tachypneic  HEENT: MMM, no icterus  CV: S1 S2 RRR, no MRG  Lung: tachypneic, on HF nasal cannula  Abd: soft NT ND +BS  Ext: extremities with non-pitting 1-2+ edema b/lc  Neuro: no focal neuro deficit    MEDICATIONS  (STANDING):  acetylcysteine 10%  Inhalation 4 milliLiter(s) Inhalation every 6 hours  atorvastatin 20 milliGRAM(s) Oral at bedtime  cefTRIAXone   IVPB      cefTRIAXone   IVPB 1000 milliGRAM(s) IV Intermittent every 24 hours  chlorhexidine 2% Cloths 1 Application(s) Topical <User Schedule>  cyanocobalamin 1000 MICROGram(s) Oral daily  dextrose 5%. 1000 milliLiter(s) (100 mL/Hr) IV Continuous <Continuous>  dextrose 5%. 1000 milliLiter(s) (50 mL/Hr) IV Continuous <Continuous>  dextrose 50% Injectable 25 Gram(s) IV Push once  dextrose 50% Injectable 12.5 Gram(s) IV Push once  dextrose 50% Injectable 25 Gram(s) IV Push once  DOBUTamine Infusion 5 MICROgram(s)/kG/Min (9.87 mL/Hr) IV Continuous <Continuous>  folic acid 1 milliGRAM(s) Oral daily  glucagon  Injectable 1 milliGRAM(s) IntraMuscular once  insulin regular Infusion 1 Unit(s)/Hr (1 mL/Hr) IV Continuous <Continuous>  levalbuterol Inhalation 0.63 milliGRAM(s) Inhalation every 6 hours  midodrine 10 milliGRAM(s) Oral every 8 hours  milrinone Infusion 0.25 MICROgram(s)/kG/Min (4.94 mL/Hr) IV Continuous <Continuous>  pantoprazole    Tablet 40 milliGRAM(s) Oral before breakfast  phenylephrine    Infusion 0.3 MICROgram(s)/kG/Min (7.4 mL/Hr) IV Continuous <Continuous>  polyethylene glycol 3350 17 Gram(s) Oral daily  potassium chloride  10 mEq/50 mL IVPB 10 milliEquivalent(s) IV Intermittent once  predniSONE   Tablet 5 milliGRAM(s) Oral daily  sodium chloride 0.9%. 1000 milliLiter(s) (10 mL/Hr) IV Continuous <Continuous>  testosterone patch 4 mG/24 Hr(s) 8 milliGRAM(s) Transdermal daily  vasopressin Infusion 0.01 Unit(s)/Min (1.5 mL/Hr) IV Continuous <Continuous>      LABS:                        6.0    10.76 )-----------( 130      ( 03 May 2023 02:22 )             18.8     05-03    140  |  107  |  59<H>  ----------------------------<  126<H>  3.4<L>   |  24  |  2.40<H>    Ca    9.0      03 May 2023 02:22  Phos  1.7     05-03  Mg     2.4     05-03    TPro  4.9<L>  /  Alb  3.5  /  TBili  2.0<H>  /  DBili  x   /  AST  21  /  ALT  7<L>  /  AlkPhos  74  05-03        PT/INR - ( 03 May 2023 02:22 )   PT: 15.6 sec;   INR: 1.31          PTT - ( 03 May 2023 02:22 )  PTT:30.8 sec       Subjective:  - Seen at bedside with son/daughter. Patient reports more fatigue and difficulty breathing  - he feels very week  - we had a long discussion about goals of care with myself, his family and Dr. Lazar. Given worsening renal function he is ok with short term dialysis (but does not want to be on dialysis permanently). We talked about intubation for his work of breathing and patient would like intubation to help his work of breathing. He continues to want aggressive medical care, but only wish is that he is not on dialysis for long term.  - Events/Chart from overnight reviewed    PAST MEDICAL & SURGICAL HISTORY:  Polycystic kidney disease      DM2 (diabetes mellitus, type 2)      HLD (hyperlipidemia)      HTN (hypertension)      Prostate cancer      Kidney transplant recipient      DVT (deep venous thrombosis)      Chronic CHF      H/O radical prostatectomy  2010      S/P hernia repair  Abdominal and inguinal around 2005          Vital Signs Last 24 Hrs  T(C): 36.2 (03 May 2023 08:39), Max: 36.6 (03 May 2023 01:07)  T(F): 97.2 (03 May 2023 08:39), Max: 97.8 (03 May 2023 01:07)  HR: 91 (03 May 2023 09:00) (86 - 100)  BP: 165/70 (03 May 2023 09:00) (134/59 - 201/81)  BP(mean): 101 (03 May 2023 09:00) (85 - 116)  RR: 12 (03 May 2023 09:00) (12 - 29)  SpO2: 100% (03 May 2023 09:00) (89% - 100%)    Parameters below as of 03 May 2023 09:00  Patient On (Oxygen Delivery Method): BiPAP/CPAP,IPAP 16 EPAP 8 RR 12 PEEP 8    O2 Concentration (%): 60   I&O's Detail    02 May 2023 07:01  -  03 May 2023 07:00  --------------------------------------------------------  IN:    Albumin 25%  -  50 mL: 500 mL    DOBUTamine: 237.6 mL    Enteral Tube Flush: 90 mL    Insulin: 79 mL    IV PiggyBack: 275 mL    Lactated Ringers Bolus: 30 mL    Milrinone: 119.9 mL    Nepro with Carb Steady: 1200 mL    Oral Fluid: 150 mL    Phenylephrine: 123.7 mL    sodium chloride 0.9%: 315 mL    Vasopressin: 237 mL  Total IN: 3357.2 mL    OUT:    Bulb (mL): 0 mL    Chest Tube (mL): 5 mL    Chest Tube (mL): 560 mL    Indwelling Catheter - Urethral (mL): 1000 mL  Total OUT: 1565 mL    Total NET: 1792.2 mL      03 May 2023 07:01  -  03 May 2023 10:22  --------------------------------------------------------  IN:    DOBUTamine: 19.8 mL    Milrinone: 10 mL    Nepro with Carb Steady: 100 mL    Phenylephrine: 5 mL    sodium chloride 0.9%: 25 mL    Vasopressin: 24 mL  Total IN: 183.8 mL    OUT:    Bulb (mL): 0 mL    Chest Tube (mL): 0 mL    Indwelling Catheter - Urethral (mL): 145 mL  Total OUT: 145 mL    Total NET: 38.8 mL        Daily     Daily     Physical Exam:   GEN: AAOx3, tachypneic  HEENT: MMM, +pallor  CV: S1 S2 RRR, no MRG  Lung: tachypneic, on HF nasal cannula  Abd: soft NT ND +BS  Ext: extremities with non-pitting 1-2+ edema b/lc  Neuro: no focal neuro deficit    MEDICATIONS  (STANDING):  acetylcysteine 10%  Inhalation 4 milliLiter(s) Inhalation every 6 hours  atorvastatin 20 milliGRAM(s) Oral at bedtime  cefTRIAXone   IVPB      cefTRIAXone   IVPB 1000 milliGRAM(s) IV Intermittent every 24 hours  chlorhexidine 2% Cloths 1 Application(s) Topical <User Schedule>  cyanocobalamin 1000 MICROGram(s) Oral daily  dextrose 5%. 1000 milliLiter(s) (100 mL/Hr) IV Continuous <Continuous>  dextrose 5%. 1000 milliLiter(s) (50 mL/Hr) IV Continuous <Continuous>  dextrose 50% Injectable 25 Gram(s) IV Push once  dextrose 50% Injectable 12.5 Gram(s) IV Push once  dextrose 50% Injectable 25 Gram(s) IV Push once  DOBUTamine Infusion 5 MICROgram(s)/kG/Min (9.87 mL/Hr) IV Continuous <Continuous>  folic acid 1 milliGRAM(s) Oral daily  glucagon  Injectable 1 milliGRAM(s) IntraMuscular once  insulin regular Infusion 1 Unit(s)/Hr (1 mL/Hr) IV Continuous <Continuous>  levalbuterol Inhalation 0.63 milliGRAM(s) Inhalation every 6 hours  midodrine 10 milliGRAM(s) Oral every 8 hours  milrinone Infusion 0.25 MICROgram(s)/kG/Min (4.94 mL/Hr) IV Continuous <Continuous>  pantoprazole    Tablet 40 milliGRAM(s) Oral before breakfast  phenylephrine    Infusion 0.3 MICROgram(s)/kG/Min (7.4 mL/Hr) IV Continuous <Continuous>  polyethylene glycol 3350 17 Gram(s) Oral daily  potassium chloride  10 mEq/50 mL IVPB 10 milliEquivalent(s) IV Intermittent once  predniSONE   Tablet 5 milliGRAM(s) Oral daily  sodium chloride 0.9%. 1000 milliLiter(s) (10 mL/Hr) IV Continuous <Continuous>  testosterone patch 4 mG/24 Hr(s) 8 milliGRAM(s) Transdermal daily  vasopressin Infusion 0.01 Unit(s)/Min (1.5 mL/Hr) IV Continuous <Continuous>      LABS:                        6.0    10.76 )-----------( 130      ( 03 May 2023 02:22 )             18.8     05-03    140  |  107  |  59<H>  ----------------------------<  126<H>  3.4<L>   |  24  |  2.40<H>    Ca    9.0      03 May 2023 02:22  Phos  1.7     05-03  Mg     2.4     05-03    TPro  4.9<L>  /  Alb  3.5  /  TBili  2.0<H>  /  DBili  x   /  AST  21  /  ALT  7<L>  /  AlkPhos  74  05-03        PT/INR - ( 03 May 2023 02:22 )   PT: 15.6 sec;   INR: 1.31          PTT - ( 03 May 2023 02:22 )  PTT:30.8 sec

## 2023-05-03 NOTE — PROGRESS NOTE ADULT - SUBJECTIVE AND OBJECTIVE BOX
CTICU  CRITICAL  CARE  attending     Hand off received 					   Pertinent clinical, laboratory, radiographic, hemodynamic, echocardiographic, respiratory data, microbiologic data and chart were reviewed and analyzed frequently throughout the course of the day and night  Patient seen and examined with CTS/ SH attending at bedside  Pt is a 77y , Male, HEALTH ISSUES - PROBLEM Dx:  Pericardial effusion with cardiac tamponade    Acute respiratory failure with hypoxia    Atelectasis    Ground glass opacity present on imaging of lung        , FAMILY HISTORY:  PAST MEDICAL & SURGICAL HISTORY:  Polycystic kidney disease      DM2 (diabetes mellitus, type 2)      HLD (hyperlipidemia)      HTN (hypertension)      Prostate cancer      Kidney transplant recipient      DVT (deep venous thrombosis)      Chronic CHF      H/O radical prostatectomy  2010      S/P hernia repair  Abdominal and inguinal around 2005        Patient is a 77y old  Male who presents with a chief complaint of percutaneous DENNY closure (03 May 2023 15:55)      14 system review was unremarkable    Vital signs, hemodynamic and respiratory parameters were reviewed from the bedside nursing flowsheet.  ICU Vital Signs Last 24 Hrs  T(C): 37.1 (03 May 2023 18:00), Max: 37.1 (03 May 2023 18:00)  T(F): 98.8 (03 May 2023 18:00), Max: 98.8 (03 May 2023 18:00)  HR: 101 (03 May 2023 21:00) (84 - 101)  BP: 161/70 (03 May 2023 14:00) (136/62 - 186/77)  BP(mean): 100 (03 May 2023 14:00) (89 - 110)  ABP: 131/54 (03 May 2023 21:00) (129/58 - 171/60)  ABP(mean): 76 (03 May 2023 21:00) (65 - 96)  RR: 22 (03 May 2023 21:00) (12 - 26)  SpO2: 100% (03 May 2023 21:00) (90% - 100%)    O2 Parameters below as of 03 May 2023 21:00  Patient On (Oxygen Delivery Method): ventilator    O2 Concentration (%): 100      Adult Advanced Hemodynamics Last 24 Hrs  CVP(mm Hg): 12 (03 May 2023 21:00) (1 - 25)  CVP(cm H2O): --  CO: 5.8 (03 May 2023 15:00) (5.3 - 6.6)  CI: 3.2 (03 May 2023 15:00) (3 - 3.7)  PA: 54/19 (03 May 2023 21:00) (44/12 - 64/35)  PA(mean): 34 (03 May 2023 21:00) (25 - 45)  PCWP: --  SVR: 936 (03 May 2023 15:00) (884 - 1115)  SVRI: 1697 (03 May 2023 15:00) (1570 - 1970)  PVR: --  PVRI: --, ABG - ( 03 May 2023 15:09 )  pH, Arterial: 7.41  pH, Blood: x     /  pCO2: 36    /  pO2: 282   / HCO3: 23    / Base Excess: -1.4  /  SaO2: 100.0             Mode: AC/ CMV (Assist Control/ Continuous Mandatory Ventilation)  RR (machine): 12  TV (machine): 650  FiO2: 100  PEEP: 5  ITime: 1  MAP: 11  PIP: 25    Intake and output was reviewed and the fluid balance was calculated  Daily     Daily   I&O's Summary    02 May 2023 07:01  -  03 May 2023 07:00  --------------------------------------------------------  IN: 3357.2 mL / OUT: 1565 mL / NET: 1792.2 mL    03 May 2023 07:01  -  03 May 2023 22:12  --------------------------------------------------------  IN: 1739.3 mL / OUT: 1225 mL / NET: 514.3 mL        All lines and drain sites were assessed  Glycemic trend was reviewedCAPILLARY BLOOD GLUCOSE      POCT Blood Glucose.: 144 mg/dL (03 May 2023 22:04)    No acute change in mental status  Auscultation of the chest reveals equal bs  Abdomen is soft  Extremities are warm and well perfused  Wounds appear clean and unremarkable  Antibiotics are periop    labs  CBC Full  -  ( 03 May 2023 15:11 )  WBC Count : 11.45 K/uL  RBC Count : 1.78 M/uL  Hemoglobin : 5.9 g/dL  Hematocrit : 18.4 %  Platelet Count - Automated : 130 K/uL  Mean Cell Volume : 103.4 fl  Mean Cell Hemoglobin : 33.1 pg  Mean Cell Hemoglobin Concentration : 32.1 gm/dL  Auto Neutrophil # : 10.83 K/uL  Auto Lymphocyte # : 0.10 K/uL  Auto Monocyte # : 0.31 K/uL  Auto Eosinophil # : 0.00 K/uL  Auto Basophil # : 0.00 K/uL  Auto Neutrophil % : 93.7 %  Auto Lymphocyte % : 0.9 %  Auto Monocyte % : 2.7 %  Auto Eosinophil % : 0.0 %  Auto Basophil % : 0.0 %    05-03    145  |  110<H>  |  56<H>  ----------------------------<  198<H>  4.0   |  23  |  2.44<H>    Ca    8.8      03 May 2023 15:11  Phos  2.7     05-03  Mg     2.3     05-03    TPro  4.8<L>  /  Alb  3.3  /  TBili  1.7<H>  /  DBili  x   /  AST  22  /  ALT  7<L>  /  AlkPhos  67  05-03    PT/INR - ( 03 May 2023 15:11 )   PT: 15.3 sec;   INR: 1.28          PTT - ( 03 May 2023 15:11 )  PTT:29.1 sec  The current medications were reviewed   MEDICATIONS  (STANDING):  acetylcysteine 10%  Inhalation 4 milliLiter(s) Inhalation every 6 hours  atorvastatin 20 milliGRAM(s) Oral at bedtime  cefTRIAXone   IVPB 1000 milliGRAM(s) IV Intermittent every 24 hours  cefTRIAXone   IVPB      chlorhexidine 0.12% Liquid 15 milliLiter(s) Oral Mucosa every 12 hours  chlorhexidine 2% Cloths 1 Application(s) Topical <User Schedule>  cyanocobalamin 1000 MICROGram(s) Oral daily  dexMEDEtomidine Infusion 0.1 MICROgram(s)/kG/Hr (1.65 mL/Hr) IV Continuous <Continuous>  dextrose 5%. 1000 milliLiter(s) (50 mL/Hr) IV Continuous <Continuous>  dextrose 5%. 1000 milliLiter(s) (100 mL/Hr) IV Continuous <Continuous>  dextrose 50% Injectable 25 Gram(s) IV Push once  dextrose 50% Injectable 12.5 Gram(s) IV Push once  dextrose 50% Injectable 25 Gram(s) IV Push once  DOBUTamine Infusion 5 MICROgram(s)/kG/Min (9.87 mL/Hr) IV Continuous <Continuous>  epoetin annalee-epbx (RETACRIT) Injectable 98887 Unit(s) IV Push <User Schedule>  folic acid 1 milliGRAM(s) Oral daily  glucagon  Injectable 1 milliGRAM(s) IntraMuscular once  insulin regular Infusion 1 Unit(s)/Hr (1 mL/Hr) IV Continuous <Continuous>  levalbuterol Inhalation 0.63 milliGRAM(s) Inhalation every 6 hours  metolazone 5 milliGRAM(s) Oral daily  midodrine 10 milliGRAM(s) Oral every 8 hours  milrinone Infusion 0.25 MICROgram(s)/kG/Min (4.94 mL/Hr) IV Continuous <Continuous>  pantoprazole  Injectable 40 milliGRAM(s) IV Push daily  phenylephrine    Infusion 0.3 MICROgram(s)/kG/Min (7.4 mL/Hr) IV Continuous <Continuous>  predniSONE   Tablet 5 milliGRAM(s) Oral daily  propofol Infusion 10 MICROgram(s)/kG/Min (3.95 mL/Hr) IV Continuous <Continuous>  sodium chloride 0.9%. 1000 milliLiter(s) (10 mL/Hr) IV Continuous <Continuous>  tacrolimus 1 milliGRAM(s) Oral every 12 hours  testosterone patch 4 mG/24 Hr(s) 8 milliGRAM(s) Transdermal daily  vasopressin Infusion 0.01 Unit(s)/Min (1.5 mL/Hr) IV Continuous <Continuous>    MEDICATIONS  (PRN):  dextrose Oral Gel 15 Gram(s) Oral once PRN Blood Glucose LESS THAN 70 milliGRAM(s)/deciliter       PROBLEM LIST/ ASSESSMENT:  HEALTH ISSUES - PROBLEM Dx:  Pericardial effusion with cardiac tamponade    Acute respiratory failure with hypoxia    Atelectasis    Ground glass opacity present on imaging of lung        ,   Patient is a 77y old  Male who presents with a chief complaint of percutaneous DENNY closure (03 May 2023 15:55)     s/p cardiac surgery    Acute systolic and diastolic heart failure evidenced by SOB and parenchymal infiltrates; will treat with diuresis    Cardiogenic shock on ionotropy    Vasogenic shock due to hypotension in the cticu , will keep on pressors    Hypovolemic shock - > 20% intravascular depletion will replete volume        Acute respiratory failure ruled in due to hypoxemia, O2 sats < 91% on RA treated with HFNC      Acute respiratory failure ruled in due to prolonged mechanical ventilation > 24 hrs on the vent due to failure to wean off niv due to weak respiratory mechanics      Acidosis evidenced by anion gap and negative base excess    Acute kidney injury - creatinine > 0.3 due to combined prerenal and intrarenal factors can presume ATN      Moderate protein calorie malutrition        My plan includes :    will need to intubate to eliminate work of breathing    volumize     bronch     sputum cx has mrsa    nutrition    discussed patient wishes at length - he would like all resuscitation for now - but no permanent support     d/w heme and renal -   close hemodynamic, ventilatory and drain monitoring and management per post op routine    Monitor for arrhythmias and monitor parameters for organ perfusion  beta blockade not administered due to hemodynamic instability and bradycardia  monitor neurologic status  Head of the bed should remain elevated to 45 deg .   chest PT and IS will be encouraged  monitor adequacy of oxygenation and ventilation and attempt to wean oxygen  antibiotic regimen will be tailored to the clinical, laboratory and microbiologic data  Nutritional goals will be met using po eventually , ensure adequate caloric intake and montior the same  Stress ulcer and VTE prophylaxis will be achieved    Glycemic control is satisfactory  Electrolytes have been repleted as necessary and wound care has been carried out. Pain control has been achieved.   agressive physical therapy and early mobility and ambulation goals will be met   The family was updated about the course and plan  CRITICAL CARE TIME Upon my evaluation, this patient had a high probability of imminent or life-threatening deterioration due to the above problems which required my direct attention, intervention, and personal management.  I have personally provided 110 minutes of critical care time exclusive of time spent on separately billable procedures. Time included review of laboratory data, radiology results, discussion with consultants, and monitoring for potential decompensation. Interventions were performed as documented abovepersonally provided by me  in evaluation and management, reassessments, review and interpretation of labs and x-rays, ventilator and hemodynamic management, formulating a plan and coordinating care: ___110___ MIN.  Time does not include procedural time.    CTICU ATTENDING     					    Satya Lazar MD

## 2023-05-03 NOTE — PROGRESS NOTE ADULT - SUBJECTIVE AND OBJECTIVE BOX
Interval Events: Reviewed  Patient seen and examined at bedside.    Patient is a 77y old  Male who presents with a chief complaint of percutaneous DENNY closure (03 May 2023 14:39)  he is comfortable on bipap and slept in bed    PAST MEDICAL & SURGICAL HISTORY:  Polycystic kidney disease      DM2 (diabetes mellitus, type 2)      HLD (hyperlipidemia)      HTN (hypertension)      Prostate cancer      Kidney transplant recipient      DVT (deep venous thrombosis)      Chronic CHF      H/O radical prostatectomy  2010      S/P hernia repair  Abdominal and inguinal around 2005          MEDICATIONS:  Pulmonary:  acetylcysteine 10%  Inhalation 4 milliLiter(s) Inhalation every 6 hours  levalbuterol Inhalation 0.63 milliGRAM(s) Inhalation every 6 hours    Antimicrobials:  cefTRIAXone   IVPB 1000 milliGRAM(s) IV Intermittent every 24 hours  cefTRIAXone   IVPB        Anticoagulants:    Cardiac:  DOBUTamine Infusion 5 MICROgram(s)/kG/Min IV Continuous <Continuous>  EPINEPHrine    Infusion 0.03 MICROgram(s)/kG/Min IV Continuous <Continuous>  midodrine 10 milliGRAM(s) Oral every 8 hours  milrinone Infusion 0.25 MICROgram(s)/kG/Min IV Continuous <Continuous>  phenylephrine    Infusion 0.3 MICROgram(s)/kG/Min IV Continuous <Continuous>      Allergies    naproxen (Hives)  Bairon&#x27;s Witness - No blood products (Unknown)    Intolerances        Vital Signs Last 24 Hrs  T(C): 36.2 (03 May 2023 08:39), Max: 36.6 (03 May 2023 01:07)  T(F): 97.2 (03 May 2023 08:39), Max: 97.8 (03 May 2023 01:07)  HR: 87 (03 May 2023 14:00) (86 - 101)  BP: 161/70 (03 May 2023 14:00) (136/62 - 201/81)  BP(mean): 100 (03 May 2023 14:00) (89 - 116)  RR: 20 (03 May 2023 14:00) (12 - 29)  SpO2: 100% (03 May 2023 14:00) (89% - 100%)    Parameters below as of 03 May 2023 14:00  Patient On (Oxygen Delivery Method): ventilator    O2 Concentration (%): 100    05-02 @ 07:01  -  05-03 @ 07:00  --------------------------------------------------------  IN: 3357.2 mL / OUT: 1565 mL / NET: 1792.2 mL    05-03 @ 07:01  - 05-03 @ 14:49  --------------------------------------------------------  IN: 430.6 mL / OUT: 185 mL / NET: 245.6 mL      Mode: AC/ CMV (Assist Control/ Continuous Mandatory Ventilation)  RR (machine): 12  TV (machine): 650  FiO2: 100  PEEP: 5  ITime: 1  MAP: 11  PIP: 25      Review of Systems:   •	General: negative  •	Skin/Breast: negative  •	Ophthalmologic: negative  •	ENMT: negative  •	Respiratory and Thorax: negative  •	Cardiovascular: negative  •	Gastrointestinal: negative  •	Genitourinary: negative  •	Musculoskeletal: negative  •	Neurological: negative  •	Psychiatric: negative  •	Hematology/Lymphatics: negative  •	Endocrine: negative  •	Allergic/Immunologic: negative    Physical Exam:   • Constitutional:	refer to the dietion /Nutritionist note  • Eyes:	EOMI; PERRL; no drainage or redness  • ENMT:	No oral lesions; no gross abnormalities  • Neck	No bruits; no thyromegaly or nodules  • Breasts:	not examined  • Back:	No deformity or limitation of movement  • Respiratory:	Breath Sounds equal & clear to percussion & auscultation, no accessory muscle use  • Cardiovascular:	Regular rate & rhythm, normal S1, S2; no murmurs, gallops or rubs; no S3, S4  • Gastrointestinal:	Soft, non-tender, no hepatosplenomegaly, normal bowel sounds  • Genitourinary:	not examined  • Rectal: not examined  • Extremities:	No cyanosis, clubbing or edema  • Vascular:	Equal and normal pulses (carotid, femoral, dorsalis pedis)  • Neurologica:l	not examined  • Skin:	No lesions; no rash  • Lymph Nodes:	No lymphadedenopathy  • Musculoskeletal:	No joint pain, swelling or deformity; no limitation of movement        LABS:  ABG - ( 03 May 2023 02:22 )  pH, Arterial: 7.40  pH, Blood: x     /  pCO2: 39    /  pO2: 166   / HCO3: 24    / Base Excess: -0.5  /  SaO2: 98.8                CBC Full  -  ( 03 May 2023 02:22 )  WBC Count : 10.76 K/uL  RBC Count : 1.83 M/uL  Hemoglobin : 6.0 g/dL  Hematocrit : 18.8 %  Platelet Count - Automated : 130 K/uL  Mean Cell Volume : 102.7 fl  Mean Cell Hemoglobin : 32.8 pg  Mean Cell Hemoglobin Concentration : 31.9 gm/dL  Auto Neutrophil # : 9.42 K/uL  Auto Lymphocyte # : 0.47 K/uL  Auto Monocyte # : 0.39 K/uL  Auto Eosinophil # : 0.00 K/uL  Auto Basophil # : 0.19 K/uL  Auto Neutrophil % : 85.7 %  Auto Lymphocyte % : 4.4 %  Auto Monocyte % : 3.6 %  Auto Eosinophil % : 0.0 %  Auto Basophil % : 1.8 %    05-03    140  |  107  |  59<H>  ----------------------------<  126<H>  3.4<L>   |  24  |  2.40<H>    Ca    9.0      03 May 2023 02:22  Phos  1.7     05-03  Mg     2.4     05-03    TPro  4.9<L>  /  Alb  3.5  /  TBili  2.0<H>  /  DBili  x   /  AST  21  /  ALT  7<L>  /  AlkPhos  74  05-03    PT/INR - ( 03 May 2023 02:22 )   PT: 15.6 sec;   INR: 1.31          PTT - ( 03 May 2023 02:22 )  PTT:30.8 sec        < from: Xray Chest 1 View-PORTABLE IMMEDIATE (Xray Chest 1 View-PORTABLE IMMEDIATE .) (05.02.23 @ 12:36) >  PROCEDURE DATE:  05/02/2023          INTERPRETATION:  XR CHEST IMMEDIATE dated 5/2/2023 12:36 PM    CLINICAL INFORMATION: Male, 77 years old.  ct removal.    PRIOR STUDIES: 5/2/2023    FINDINGS: Persistent cardiomegaly despite the rotated portable technique.   There is been removal of left-sided chest tube. No evidence of pneumo   thorax. There has been interval decrease in left-sided pleural effusion.   Mediastinal drains as well as a percutaneous pigtail catheter is noted   over the right lower lung zone. Multiple central lines and support   catheters are redemonstrated including a right IJV White [place]-Charity catheter,   right clavian central venous catheter/pelvic, and enteric tube. There has   been interval removal of multiple mediastinal drains with a residual   mediastinal catheter noted to the right of midline.    IMPRESSION:  Interval line changes as above.  No pneumothorax.    < end of copied text >              RADIOLOGY & ADDITIONAL STUDIES (The following images were personally reviewed):

## 2023-05-03 NOTE — PROGRESS NOTE ADULT - ASSESSMENT
77Y M w/ CKD s/p renal transplant 2007 h/o PKD now admitted with DENNY occulsion repair and cardiac tamponade, stay c/b DAYO initially oliguric, now non-oliguric. sCr today at 2.4, stable HDS on pressors     #Non-Oliguric iATN on underlying CKD 3 (baseline sCr at 1.2-1.4)   sCr now at 2.40  Urine Na at 36, no UA   Hematuria noted in buckley   Last 24hr UOP 1 L net pos 1.7L/24 hours s/p bumex pushes (1+1+2)  s/p multiple doses of albumin 5/2  Liley etiology iATN given hypotensive episodes, also with severe anemia    Plan:  Discussed with CTICU. Pt to be intubated for increased work of breathing and vol overload  Strict in and outs   Resume tacro at 1mg BID  Please ensure that trough level should be drawn 30 minutes before the next dose in AM  Goal tacro level 4.5-5  recheck a Judi  Ensure MAP>70s for adequate renal perfusion and recovery  Discused with CTICU, will closely monitor electrolytes and vol status to see if patient would need RRT. Risk with HD and CVVHD will be in case of extracorporeal system clotting, will lower his Hb even more. PD is an option to consider but will have a delay between PD catheter and starting PD, will discuss more  Maintain MAP >70 for renal perfusion       #Hypophosphatemia  Replete to keep levels 3-5.5    #Acute anemia  On epo and IV iron  Check iron profile

## 2023-05-03 NOTE — PROGRESS NOTE ADULT - SUBJECTIVE AND OBJECTIVE BOX
CTICU  CRITICAL  CARE  attending     Hand off received 					   Pertinent clinical, laboratory, radiographic, hemodynamic, echocardiographic, respiratory data, microbiologic data and chart were reviewed and analyzed frequently throughout the course of the day and night  Patient seen and examined with CTS/ SH attending at bedside    Pt is a 77y , Male, post op day # 8 s/p percutaneous DENNY closure with Watchman device;     post procedure c/b    Pericardial effusion/tamponade  PEA arrest; s/p CPR  s/p emergent bedside pericardiocentesis    POD # 7 s/p sternotomy; exploration for bleeding  evac of L Hemothorax    s/p R Pigtail thoracentesis for pleural effusion    today    intubated for increasing WOB  Inotrope support with dobutamine/Primacor  UO >100 ml/hr  acute post hemorrhagic anemia   on Procrit      78 y/o male with polycystic kidney disease s/p kidney transplant in 2007, HTN, HLD, DM2, Prostate cancer s/p radical prostatectomy 2015, DVT 2006 (in setting of pelvic fracture), and afib/atrial flutter s/p ablation in 1/2018, who is now off oral anticoagulation secondary to GI bleed and presents for DENNY occlusoin. Pt as no symptomsHe was [facility] with [facility]. He is a Jew and refuses blood transfusions. Eliquis was stopped at that time.     Pericardial effusion with cardiac tamponade    Acute respiratory failure with hypoxia    Atelectasis    Ground glass opacity present on imaging of lung        , FAMILY HISTORY:  PAST MEDICAL & SURGICAL HISTORY:  Polycystic kidney disease      DM2 (diabetes mellitus, type 2)      HLD (hyperlipidemia)      HTN (hypertension)      Prostate cancer      Kidney transplant recipient      DVT (deep venous thrombosis)      Chronic CHF      H/O radical prostatectomy  2010      S/P hernia repair  Abdominal and inguinal around 2005        Patient is a 77y old  Male who presents with a chief complaint of percutaneous DENNY closure (03 May 2023 22:11)      14 system review unable to assess  acute changes include acute respiratory failure  Vital signs, hemodynamic and respiratory parameters were reviewed from the bedside nursing flowsheet.  ICU Vital Signs Last 24 Hrs  T(C): 37.1 (04 May 2023 01:01), Max: 37.1 (03 May 2023 18:00)  T(F): 98.7 (04 May 2023 01:01), Max: 98.8 (03 May 2023 18:00)  HR: 87 (04 May 2023 01:00) (81 - 101)  BP: 161/70 (03 May 2023 14:00) (136/62 - 186/74)  BP(mean): 100 (03 May 2023 14:00) (89 - 107)  ABP: 153/58 (04 May 2023 01:00) (129/58 - 177/67)  ABP(mean): 84 (04 May 2023 01:00) (65 - 97)  RR: 15 (04 May 2023 00:00) (12 - 26)  SpO2: 100% (04 May 2023 01:00) (90% - 100%)    O2 Parameters below as of 04 May 2023 02:00  Patient On (Oxygen Delivery Method): ventilator    O2 Concentration (%): 50      Adult Advanced Hemodynamics Last 24 Hrs  CVP(mm Hg): 11 (04 May 2023 01:00) (1 - 25)  CVP(cm H2O): --  CO: 5.8 (03 May 2023 15:00) (5.3 - 6.1)  CI: 3.2 (03 May 2023 15:00) (3 - 3.4)  PA: 56/24 (04 May 2023 01:00) (44/12 - 67/26)  PA(mean): 36 (04 May 2023 01:00) (25 - 45)  PCWP: --  SVR: 936 (03 May 2023 15:00) (936 - 1115)  SVRI: 1697 (03 May 2023 15:00) (1692 - 1970)  PVR: --  PVRI: --, ABG - ( 03 May 2023 15:09 )  pH, Arterial: 7.41  pH, Blood: x     /  pCO2: 36    /  pO2: 282   / HCO3: 23    / Base Excess: -1.4  /  SaO2: 100.0             Mode: AC/ CMV (Assist Control/ Continuous Mandatory Ventilation)  RR (machine): 12  TV (machine): 650  FiO2: 80  PEEP: 5  ITime: 1  MAP: 8  PIP: 19    Intake and output was reviewed and the fluid balance was calculated  Daily     Daily   I&O's Summary    02 May 2023 07:01  -  03 May 2023 07:00  --------------------------------------------------------  IN: 3357.2 mL / OUT: 1565 mL / NET: 1792.2 mL    03 May 2023 07:01  -  04 May 2023 01:58  --------------------------------------------------------  IN: 2270.8 mL / OUT: 1900 mL / NET: 370.8 mL        All lines and drain sites were assessed  [facility]      POCT Blood Glucose.: 99 mg/dL (04 May 2023 01:06)    No acute change in mental status  (+) Orotracheally intubated  Auscultation of the chest reveals equal bs  Abdomen is soft  Extremities are warm and well perfused  Wounds appear clean and unremarkable  Antibiotics are periop    labs  CBC Full  -  ( 03 May 2023 15:11 )  WBC Count : 11.45 K/uL  RBC Count : 1.78 M/uL  Hemoglobin : 5.9 g/dL  Hematocrit : 18.4 %  Platelet Count - Automated : 130 K/uL  Mean Cell Volume : 103.4 fl  Mean Cell Hemoglobin : 33.1 pg  Mean Cell Hemoglobin Concentration : 32.1 gm/dL  Auto Neutrophil # : 10.83 K/uL  Auto Lymphocyte # : 0.10 K/uL  Auto Monocyte # : 0.31 K/uL  Auto Eosinophil # : 0.00 K/uL  Auto Basophil # : 0.00 K/uL  Auto Neutrophil % : 93.7 %  Auto Lymphocyte % : 0.9 %  Auto Monocyte % : 2.7 %  Auto Eosinophil % : 0.0 %  Auto Basophil % : 0.0 %    05-03    145  |  110<H>  |  56<H>  ----------------------------<  198<H>  4.0   |  23  |  2.44<H>    Ca    8.8      03 May 2023 15:11  Phos  2.7     05-03  Mg     2.3     05-03    TPro  4.8<L>  /  Alb  3.3  /  TBili  1.7<H>  /  DBili  x   /  AST  22  /  ALT  7<L>  /  AlkPhos  67  05-03    PT/INR - ( 03 May 2023 15:11 )   PT: 15.3 sec;   INR: 1.28          PTT - ( 03 May 2023 15:11 )  PTT:29.1 sec  The current medications were reviewed   MEDICATIONS  (STANDING):  acetylcysteine 10%  Inhalation 4 milliLiter(s) Inhalation every 6 hours  atorvastatin 20 milliGRAM(s) Oral at bedtime  cefTRIAXone   IVPB 1000 milliGRAM(s) IV Intermittent every 24 hours  cefTRIAXone   IVPB      chlorhexidine 0.12% Liquid 15 milliLiter(s) Oral Mucosa every 12 hours  chlorhexidine 2% Cloths 1 Application(s) Topical <User Schedule>  cyanocobalamin 1000 MICROGram(s) Oral daily  dexMEDEtomidine Infusion 0.1 MICROgram(s)/kG/Hr (1.65 mL/Hr) IV Continuous <Continuous>  dextrose 5%. 1000 milliLiter(s) (50 mL/Hr) IV Continuous <Continuous>  dextrose 5%. 1000 milliLiter(s) (100 mL/Hr) IV Continuous <Continuous>  dextrose 50% Injectable 25 Gram(s) IV Push once  dextrose 50% Injectable 12.5 Gram(s) IV Push once  dextrose 50% Injectable 25 Gram(s) IV Push once  DOBUTamine Infusion 5 MICROgram(s)/kG/Min (9.87 mL/Hr) IV Continuous <Continuous>  epoetin annalee-epbx (RETACRIT) Injectable 39404 Unit(s) IV Push <User Schedule>  folic acid 1 milliGRAM(s) Oral daily  glucagon  Injectable 1 milliGRAM(s) IntraMuscular once  insulin regular Infusion 1 Unit(s)/Hr (1 mL/Hr) IV Continuous <Continuous>  levalbuterol Inhalation 0.63 milliGRAM(s) Inhalation every 6 hours  metolazone 5 milliGRAM(s) Oral daily  midodrine 10 milliGRAM(s) Oral every 8 hours  milrinone Infusion 0.25 MICROgram(s)/kG/Min (4.94 mL/Hr) IV Continuous <Continuous>  pantoprazole  Injectable 40 milliGRAM(s) IV Push daily  phenylephrine    Infusion 0.3 MICROgram(s)/kG/Min (7.4 mL/Hr) IV Continuous <Continuous>  potassium chloride  10 mEq/50 mL IVPB 10 milliEquivalent(s) IV Intermittent once  predniSONE   Tablet 5 milliGRAM(s) Oral daily  propofol Infusion 10 MICROgram(s)/kG/Min (3.95 mL/Hr) IV Continuous <Continuous>  sodium chloride 0.9%. 1000 milliLiter(s) (10 mL/Hr) IV Continuous <Continuous>  tacrolimus 1 milliGRAM(s) Oral every 12 hours  testosterone patch 4 mG/24 Hr(s) 8 milliGRAM(s) Transdermal daily  vasopressin Infusion 0.01 Unit(s)/Min (1.5 mL/Hr) IV Continuous <Continuous>    MEDICATIONS  (PRN):  dextrose Oral Gel 15 Gram(s) Oral once PRN Blood Glucose LESS THAN 70 milliGRAM(s)/deciliter       PROBLEM LIST/ ASSESSMENT:  HEALTH ISSUES - PROBLEM Dx:  Pericardial effusion with cardiac tamponade    Acute respiratory failure with hypoxia    Atelectasis    Ground glass opacity present on imaging of lung        ,   Patient is a 77y old  Male who presents for percutaneous DENNY closure (03 May 2023 22:11)     s/p same; followed by sternotomy and exploration for bleeding    acute changes include acute respiratory failure    My plan includes :    Titrate pressor support to maintain MAP >80  Full Vent support  Titrate Fio2 to maintain Sao2 >95%  Serial ABGs  Avoid nephrotoxic agents  continue inotrope support with dobutamine and primacor  Titrate to maintain CI >2.2/MVO2 >60  Trend lactate levels    close hemodynamic, ventilatory and drain monitoring and management per post op routine    Monitor for arrhythmias and monitor parameters for organ perfusion  monitor neurologic status  Head of the bed should remain elevated to 45 deg .   chest PT and IS will be encouraged  monitor adequacy of oxygenation and ventilation and attempt to wean oxygen  Nutritional goals will be met using po eventually , ensure adequate caloric intake and montior the same  Stress ulcer and VTE prophylaxis will be achieved    Glycemic control is satisfactory  Electrolytes have been repleted as necessary and wound care has been carried out. Pain control has been achieved.   agressive physical therapy and early mobility and ambulation goals will be met   The family was updated about the course and plan  CRITICAL CARE TIME SPENT in evaluation and management, reassessments, review and interpretation of labs and x-rays, ventilator and hemodynamic management, formulating a plan and coordinating care: __30__ MIN.  Time does not include procedural time.  CTICU ATTENDING     					    Modesto Ny MD

## 2023-05-03 NOTE — PROGRESS NOTE ADULT - ASSESSMENT
77 M with past medical history of PCKD s/p kidney tx DDKT 2007, HTN, T2DM, Prostate cancer s/p radical prostatectomy 2015, DVT 2006 (in setting of pelvic fracture), and afib/atrial flutter s/p ablation in 2018, who is now off oral anticoagulation secondary to GI bleed (7/2022) and presents for DENNY occlusion. S/p DENNY occlusion 4/25 c/b cardiac tamponade and PEA arrest requiring bedside pericardiocentesis/ OR for sternotomy and local exploration 4/26. S/p > 1 L left hemothorax evacuated in OR and additional blood loss via chest tubes. Emergent stabilization in CTICU. Hematology consulted now for anemia and thrombocytopenia in [employer].    # Acute blood loss anemia and thrombocytopenia  Pre-operatively patient with hemoglobin 14 and platelet 240's. Hospital course complicated by cardiac tamponade and PEA arrest requiring emergent stabilization in CTICU. Acute drop in hemoglobin and platelets corresponds to episode of acute blood loss around time of tamponade and arrest. Received FEIBA 1000 units and protamine 50 mg which may explain mild coagulopathy. Patient reports receiving EPO and iron infusions at other hospitalizations without complication. He tolerated EPO dose and iron infusion on 4/26  - Hemoglobin trend: post-operatively has been decreasing with serosanguinous drain output.     Plan:  - s/p iron 300 mg daily x3 days 4/26. Can give 1 more does of IV Venofer 300 mg  today (05/03).   - Repeat Iron studies (Ferritin, Serum Iron, Transferrin, TIBC) today   - s/p EPO 13,000 units on 4/26  - s/p EPO 20,000 units per day for 5 days (04/28-05/02) .   - Can continue with EPO 20,000 units once daily (hold for Hb >11)  - Continue with PO Folate 1 mg QD and Vitamin B12 1000 mcg QD  - Trend CBC and coagulation panel using pediatric tubes per CT Surgery team  - If worsening or emergent bleeding, patient's accepted product list includes Kcentra and cryoprecipitate    Discussed with Dr. Danny Guadalupe

## 2023-05-03 NOTE — PROGRESS NOTE ADULT - SUBJECTIVE AND OBJECTIVE BOX
CTICU  CRITICAL  CARE  PROCEDURE  NOTE      				     Name of procedure – Flexible fiberoptic bronchoscopy      Flexible fiberoptic bronchoscopy was performed under propofol and fentanyl sedation while the patient was intubated for controlled mechanical ventilation  Pre-procedural assessment reveals no risk of Tb  Ventilator settings were adjusted to reduce airway pressures to reduce the risk of ptx  The scope was advanced past the ett which was noted to be 2 cm the khoa   The khoa was sharp  Right LL and RML air way were patent , mucopur thick secretions  Lavaged and sent for culture  Left LL air way  with copious purulent secretions, lavaged, and sent for culture  CXR confirmed no pneumothorax post bronch  There were no complications  The patient tolerated the procedure well

## 2023-05-03 NOTE — PROGRESS NOTE ADULT - SUBJECTIVE AND OBJECTIVE BOX
Patient is a 77y Male seen and evaluated at bedside.       Meds:    acetylcysteine 10%  Inhalation 4 every 6 hours  atorvastatin 20 at bedtime  cefTRIAXone   IVPB 1000 every 24 hours  cefTRIAXone   IVPB    chlorhexidine 2% Cloths 1 <User Schedule>  cyanocobalamin 1000 daily  dextrose 5%. 1000 <Continuous>  dextrose 5%. 1000 <Continuous>  dextrose 50% Injectable 25 once  dextrose 50% Injectable 12.5 once  dextrose 50% Injectable 25 once  dextrose Oral Gel 15 once PRN  DOBUTamine Infusion 5 <Continuous>  EPINEPHrine    Infusion 0.03 <Continuous>  folic acid 1 daily  glucagon  Injectable 1 once  insulin regular Infusion 1 <Continuous>  iron sucrose IVPB 200 once  levalbuterol Inhalation 0.63 every 6 hours  midodrine 10 every 8 hours  milrinone Infusion 0.25 <Continuous>  oxyCODONE    IR 5 every 4 hours PRN  oxyCODONE    IR 10 every 6 hours PRN  pantoprazole    Tablet 40 before breakfast  phenylephrine    Infusion 0.3 <Continuous>  predniSONE   Tablet 5 daily  sodium chloride 0.9%. 1000 <Continuous>  testosterone patch 4 mG/24 Hr(s) 8 daily  vasopressin Infusion 0.01 <Continuous>      T(C): , Max: 36.6 (05-03-23 @ 01:07)  T(F): , Max: 97.8 (05-03-23 @ 01:07)  HR: 95 (05-03-23 @ 13:20)  BP: 156/72 (05-03-23 @ 12:00)  BP(mean): 104 (05-03-23 @ 12:00)  RR: 15 (05-03-23 @ 13:20)  SpO2: 100% (05-03-23 @ 13:20)  Wt(kg): --    05-02 @ 07:01  -  05-03 @ 07:00  --------------------------------------------------------  IN: 3357.2 mL / OUT: 1565 mL / NET: 1792.2 mL    05-03 @ 07:01  -  05-03 @ 14:39  --------------------------------------------------------  IN: 430.6 mL / OUT: 185 mL / NET: 245.6 mL          Review of Systems:  ROS negative except as per HPI      PHYSICAL EXAM:  GENERAL: NAD  NECK: supple, No JVD  CHEST/LUNG: Clear to auscultation bilaterally  HEART: normal S1S2, RRR  ABDOMEN: Soft, Nontender, +BS, No flank tenderness bilateral  EXTREMITIES: No clubbing, cyanosis, or edema   NEUROLOGY: AAO x3, no focal neurological deficit  ACCESS: good thrill and bruit appreciated      LABS:                        6.0    10.76 )-----------( 130      ( 03 May 2023 02:22 )             18.8     05-03    140  |  107  |  59<H>  ----------------------------<  126<H>  3.4<L>   |  24  |  2.40<H>    Ca    9.0      03 May 2023 02:22  Phos  1.7     05-03  Mg     2.4     05-03    TPro  4.9<L>  /  Alb  3.5  /  TBili  2.0<H>  /  DBili  x   /  AST  21  /  ALT  7<L>  /  AlkPhos  74  05-03      PT/INR - ( 03 May 2023 02:22 )   PT: 15.6 sec;   INR: 1.31          PTT - ( 03 May 2023 02:22 )  PTT:30.8 sec          RADIOLOGY & ADDITIONAL STUDIES:           Patient is a 77y Male seen and evaluated at bedside.       Meds:    acetylcysteine 10%  Inhalation 4 every 6 hours  atorvastatin 20 at bedtime  cefTRIAXone   IVPB 1000 every 24 hours  cefTRIAXone   IVPB    chlorhexidine 2% Cloths 1 <User Schedule>  cyanocobalamin 1000 daily  dextrose 5%. 1000 <Continuous>  dextrose 5%. 1000 <Continuous>  dextrose 50% Injectable 25 once  dextrose 50% Injectable 12.5 once  dextrose 50% Injectable 25 once  dextrose Oral Gel 15 once PRN  DOBUTamine Infusion 5 <Continuous>  EPINEPHrine    Infusion 0.03 <Continuous>  folic acid 1 daily  glucagon  Injectable 1 once  insulin regular Infusion 1 <Continuous>  iron sucrose IVPB 200 once  levalbuterol Inhalation 0.63 every 6 hours  midodrine 10 every 8 hours  milrinone Infusion 0.25 <Continuous>  oxyCODONE    IR 5 every 4 hours PRN  oxyCODONE    IR 10 every 6 hours PRN  pantoprazole    Tablet 40 before breakfast  phenylephrine    Infusion 0.3 <Continuous>  predniSONE   Tablet 5 daily  sodium chloride 0.9%. 1000 <Continuous>  testosterone patch 4 mG/24 Hr(s) 8 daily  vasopressin Infusion 0.01 <Continuous>      T(C): , Max: 36.6 (05-03-23 @ 01:07)  T(F): , Max: 97.8 (05-03-23 @ 01:07)  HR: 95 (05-03-23 @ 13:20)  BP: 156/72 (05-03-23 @ 12:00)  BP(mean): 104 (05-03-23 @ 12:00)  RR: 15 (05-03-23 @ 13:20)  SpO2: 100% (05-03-23 @ 13:20)  Wt(kg): --    05-02 @ 07:01  -  05-03 @ 07:00  --------------------------------------------------------  IN: 3357.2 mL / OUT: 1565 mL / NET: 1792.2 mL    05-03 @ 07:01  -  05-03 @ 14:39  --------------------------------------------------------  IN: 430.6 mL / OUT: 185 mL / NET: 245.6 mL          Review of Systems:  ROS negative except as per HPI      PHYSICAL EXAM:  GENERAL: Mild distress, sitting up in bed on HF NC O2   CHEST/LUNG: Decreased breath sounds, sternotomy scar c/d/i, left side chest tube   HEART: normal S1S2, RRR  ABDOMEN: Soft, Nontender, +BS, No flank tenderness bilateral  : Tucker with wine colored urine   EXTREMITIES: No clubbing, cyanosis, or edema   NEUROLOGY: AAO x3, no focal neurological deficit    LABS:                        6.0    10.76 )-----------( 130      ( 03 May 2023 02:22 )             18.8     05-03    140  |  107  |  59<H>  ----------------------------<  126<H>  3.4<L>   |  24  |  2.40<H>    Ca    9.0      03 May 2023 02:22  Phos  1.7     05-03  Mg     2.4     05-03    TPro  4.9<L>  /  Alb  3.5  /  TBili  2.0<H>  /  DBili  x   /  AST  21  /  ALT  7<L>  /  AlkPhos  74  05-03      PT/INR - ( 03 May 2023 02:22 )   PT: 15.6 sec;   INR: 1.31          PTT - ( 03 May 2023 02:22 )  PTT:30.8 sec          RADIOLOGY & ADDITIONAL STUDIES:

## 2023-05-03 NOTE — PROGRESS NOTE ADULT - SUBJECTIVE AND OBJECTIVE BOX
LENGTH OF HOSPITAL STAY: 8d    SUBJECTIVE: Was agitated today and was reintubated. No significant bleeding noted clinically by primary team.     HISTORY OF PRESENTING ILLNESS: 76 y/o male with polycystic kidney disease s/p kidney transplant in 2007, HTN, HLD, DM2, Prostate cancer s/p radical prostatectomy 2015, DVT 2006 (in setting of pelvic fracture), and afib/atrial flutter s/p ablation in 1/2018, who is now off oral anticoagulation secondary to GI bleed and presents for DENNY occlusoin. Pt as no symptomsHe was admitted [facility] with [facility]. He is a [employer] and refuses blood transfusions. Eliquis was stopped at that time.     KIMMIE reviewed from prior ablation- appears to have good anatomy for LAAO.     Patient seen in same day holding area; Reports no changes to PMHx or medications since last seen by our team. Denies acute or current SOB, chest pain, palpitation, N/V/D, fever/chills, recent illness, or any other concerning symptoms.  NYHA 1 Class (25 Apr 2023 07:57)    PAST MEDICAL & SURGICAL HISTORY:  Polycystic kidney disease  DM2 (diabetes mellitus, type 2)  HLD (hyperlipidemia)  HTN (hypertension)  Prostate cancer  Kidney transplant recipient  DVT (deep venous thrombosis)  Chronic CHF  H/O radical prostatectomy  2010  S/P hernia repair  Abdominal and inguinal around 2005    ALLERGIES:  naproxen (Hives)  Bairon&#x27;s Witness - No blood products (Unknown)    MEDICATIONS:  STANDING MEDICATIONS  acetylcysteine 10%  Inhalation 4 milliLiter(s) Inhalation every 6 hours  albumin human  5% IVPB 250 milliLiter(s) IV Intermittent once  atorvastatin 20 milliGRAM(s) Oral at bedtime  buMETAnide Injectable 1 milliGRAM(s) IV Push once  cefTRIAXone   IVPB 1000 milliGRAM(s) IV Intermittent every 24 hours  cefTRIAXone   IVPB      chlorhexidine 0.12% Liquid 15 milliLiter(s) Oral Mucosa every 12 hours  chlorhexidine 2% Cloths 1 Application(s) Topical <User Schedule>  cisatracurium Injectable 5 milliGRAM(s) IV Push once  cyanocobalamin 1000 MICROGram(s) Oral daily  dexMEDEtomidine Infusion 0.1 MICROgram(s)/kG/Hr IV Continuous <Continuous>  dextrose 5%. 1000 milliLiter(s) IV Continuous <Continuous>  dextrose 5%. 1000 milliLiter(s) IV Continuous <Continuous>  dextrose 50% Injectable 25 Gram(s) IV Push once  dextrose 50% Injectable 12.5 Gram(s) IV Push once  dextrose 50% Injectable 25 Gram(s) IV Push once  DOBUTamine Infusion 5 MICROgram(s)/kG/Min IV Continuous <Continuous>  epoetin annalee-epbx (RETACRIT) Injectable 84052 Unit(s) IV Push <User Schedule>  fentaNYL    Injectable 50 MICROGram(s) IV Push once  fentaNYL    Injectable 50 MICROGram(s) IV Push once  folic acid 1 milliGRAM(s) Oral daily  glucagon  Injectable 1 milliGRAM(s) IntraMuscular once  insulin regular Infusion 1 Unit(s)/Hr IV Continuous <Continuous>  levalbuterol Inhalation 0.63 milliGRAM(s) Inhalation every 6 hours  midodrine 10 milliGRAM(s) Oral every 8 hours  milrinone Infusion 0.25 MICROgram(s)/kG/Min IV Continuous <Continuous>  pantoprazole  Injectable 40 milliGRAM(s) IV Push daily  phenylephrine    Infusion 0.3 MICROgram(s)/kG/Min IV Continuous <Continuous>  predniSONE   Tablet 5 milliGRAM(s) Oral daily  propofol Infusion 10 MICROgram(s)/kG/Min IV Continuous <Continuous>  sodium chloride 0.9%. 1000 milliLiter(s) IV Continuous <Continuous>  tacrolimus 1 milliGRAM(s) Oral every 12 hours  testosterone patch 4 mG/24 Hr(s) 8 milliGRAM(s) Transdermal daily  vasopressin Infusion 0.01 Unit(s)/Min IV Continuous <Continuous>    PRN MEDICATIONS  dextrose Oral Gel 15 Gram(s) Oral once PRN    VITALS:   T(F): 97.2  HR: 84  BP: 161/70  RR: 12  SpO2: 100%    LABS:                        5.9    11.45 )-----------( 130      ( 03 May 2023 15:11 )             18.4     05-03    145  |  110<H>  |  56<H>  ----------------------------<  198<H>  4.0   |  23  |  2.44<H>    Ca    8.8      03 May 2023 15:11  Phos  2.7     05-03  Mg     2.3     05-03    TPro  4.8<L>  /  Alb  3.3  /  TBili  1.7<H>  /  DBili  x   /  AST  22  /  ALT  7<L>  /  AlkPhos  67  05-03    PT/INR - ( 03 May 2023 15:11 )   PT: 15.3 sec;   INR: 1.28        PTT - ( 03 May 2023 15:11 )  PTT:29.1 sec    ABG - ( 03 May 2023 15:09 )  pH, Arterial: 7.41  pH, Blood: x     /  pCO2: 36    /  pO2: 282   / HCO3: 23    / Base Excess: -1.4  /  SaO2: 100.0     Lactate, Blood: 1.0 mmol/L (05-03-23 @ 15:11)  Lactate, Blood: 1.5 mmol/L (05-02-23 @ 16:24)    Culture - Sputum (collected 01 May 2023 14:46)  Source: .Sputum Sputum  Gram Stain (01 May 2023 17:28):    No epithelial cells seen    Moderate WBC's    Rare Gram Positive Cocci in Clusters    Rare to few Yeast  Preliminary Report (03 May 2023 12:18):    Moderate Staphylococcus aureus Presumptive Methicillin susceptible    Confirmation to follow within 24 hrs. Susceptibility to follow.    Accompanied by normal respiratory otis    Culture in progress    Culture - Blood (collected 01 May 2023 12:54)  Source: .Blood Blood-Peripheral  Preliminary Report (03 May 2023 14:01):    No growth at 2 days.    RADIOLOGY:  Reviewed    PHYSICAL EXAM:  General: NAD  HEENT: Intubated  Neck: supple  Respiratory: CTA b/l, HFNC  Cardiovascular: +S1/S2; RRR, midline sternotomy bandaged, 2 drains with serosanguinous drainage  Abdomen: soft, NT/ND; +BS x4  Extremities: WWP, 2+ peripheral pulses b/l; no LE edema  Skin: normal color and turgor; no rash  Neurological: AAOx0, sedated

## 2023-05-03 NOTE — PATIENT PROFILE ADULT - IS THERE A SUSPICION OF ABUSE/NEGLIGENCE?
Dentist and [address] [address]  [address]  [phone]  700 [facility]  [address]  [phone]  816 [facility] 1525 [facility]  [address] [place] [address]   Pt must have source of income & must bring 2 recent check stubs to appt Call for an appointment  [phone]  Dental Pain or a dental emergency Dentist available 24 hours/7 days a week [phone]  [facility]  (Service for the Homeless)  [address]  [phone]    Dentists who take [facility]    Hieu Kang, [address]  Call [address] for appointments  [phone]  Trevon Hawthorne  [address] [facility]  Call [address] for appointments  [phone] 235 [facility] w/ PCP referral only   1000 [facility])   $60 for initial consultation, exam and cleaning    Does not accept [facility] Monday - Friday  8a - 5p  One evening/week for appointments  Call for an appointment  [phone]    Pediatric Dentists    Mady Gutiérrez, [address] [facility] Medicaid  Only Children 12 and under [phone]  [facility]  [address] 12 and under [phone]  [facility] Department  [facility] Ages 3 - 18 years only [phone]    Dentists (Non-Medicaid Patients)    Radha Neal  [address]  [phone]  [address], [address] [place] [address] Road  [phone]  [address], [address] [facility]  [phone]  [address] [place] [address] Drive, [address] [facility]  [phone]  [facility]  [place]  [address], 1000 [employer] [facility]  [phone], [address] and partials only [phone]  [address], 1500 [facility]  [phone], [address], [address]  [phone]      Dentists (Non-Medicaid Patients)    Rosie Situ  [address]  Garth Monteiro, no

## 2023-05-03 NOTE — PROGRESS NOTE ADULT - SUBJECTIVE AND OBJECTIVE BOX
Moderate sedation was performed by me using propofol and fentanyl  for the procedure of intubation  continuous hemodynamic monitoring was performed   continuous monitoring of adequacy of oxygenation and ventilation was performed  patient remained stable thorughout the procedure  post - sedation monitoring of hemodynamics and oxygenation and ventilation was performed  Time required for moderate sedation was 30 minutes  time does not include proceural time or critical care time

## 2023-05-04 LAB
-  CLINDAMYCIN: SIGNIFICANT CHANGE UP
-  ERYTHROMYCIN: SIGNIFICANT CHANGE UP
-  LINEZOLID: SIGNIFICANT CHANGE UP
-  OXACILLIN: SIGNIFICANT CHANGE UP
-  RIFAMPIN: SIGNIFICANT CHANGE UP
-  TRIMETHOPRIM/SULFAMETHOXAZOLE: SIGNIFICANT CHANGE UP
-  VANCOMYCIN: SIGNIFICANT CHANGE UP
ALBUMIN SERPL ELPH-MCNC: 3.2 G/DL — LOW (ref 3.3–5)
ALBUMIN SERPL ELPH-MCNC: 3.3 G/DL — SIGNIFICANT CHANGE UP (ref 3.3–5)
ALBUMIN SERPL ELPH-MCNC: 3.3 G/DL — SIGNIFICANT CHANGE UP (ref 3.3–5)
ALP SERPL-CCNC: 67 U/L — SIGNIFICANT CHANGE UP (ref 40–120)
ALP SERPL-CCNC: 68 U/L — SIGNIFICANT CHANGE UP (ref 40–120)
ALP SERPL-CCNC: 72 U/L — SIGNIFICANT CHANGE UP (ref 40–120)
ALT FLD-CCNC: 10 U/L — SIGNIFICANT CHANGE UP (ref 10–45)
ALT FLD-CCNC: 8 U/L — LOW (ref 10–45)
ALT FLD-CCNC: SIGNIFICANT CHANGE UP (ref 10–45)
ANION GAP SERPL CALC-SCNC: 11 MMOL/L — SIGNIFICANT CHANGE UP (ref 5–17)
ANISOCYTOSIS BLD QL: SLIGHT — SIGNIFICANT CHANGE UP
ANISOCYTOSIS BLD QL: SLIGHT — SIGNIFICANT CHANGE UP
APTT BLD: 29.7 SEC — SIGNIFICANT CHANGE UP (ref 27.5–35.5)
APTT BLD: 30.7 SEC — SIGNIFICANT CHANGE UP (ref 27.5–35.5)
AST SERPL-CCNC: 25 U/L — SIGNIFICANT CHANGE UP (ref 10–40)
AST SERPL-CCNC: 29 U/L — SIGNIFICANT CHANGE UP (ref 10–40)
AST SERPL-CCNC: SIGNIFICANT CHANGE UP (ref 10–40)
BASE EXCESS BLDV CALC-SCNC: -0.8 MMOL/L — SIGNIFICANT CHANGE UP (ref -2–3)
BASE EXCESS BLDV CALC-SCNC: -2.5 MMOL/L — LOW (ref -2–3)
BASOPHILS # BLD AUTO: 0 K/UL — SIGNIFICANT CHANGE UP (ref 0–0.2)
BASOPHILS # BLD AUTO: 0.09 K/UL — SIGNIFICANT CHANGE UP (ref 0–0.2)
BASOPHILS NFR BLD AUTO: 0 % — SIGNIFICANT CHANGE UP (ref 0–2)
BASOPHILS NFR BLD AUTO: 0.9 % — SIGNIFICANT CHANGE UP (ref 0–2)
BILIRUB SERPL-MCNC: 1.2 MG/DL — SIGNIFICANT CHANGE UP (ref 0.2–1.2)
BILIRUB SERPL-MCNC: 1.2 MG/DL — SIGNIFICANT CHANGE UP (ref 0.2–1.2)
BILIRUB SERPL-MCNC: 1.5 MG/DL — HIGH (ref 0.2–1.2)
BUN SERPL-MCNC: 55 MG/DL — HIGH (ref 7–23)
BUN SERPL-MCNC: 59 MG/DL — HIGH (ref 7–23)
BUN SERPL-MCNC: 61 MG/DL — HIGH (ref 7–23)
BURR CELLS BLD QL SMEAR: PRESENT — SIGNIFICANT CHANGE UP
CALCIUM SERPL-MCNC: 8.7 MG/DL — SIGNIFICANT CHANGE UP (ref 8.4–10.5)
CALCIUM SERPL-MCNC: 8.7 MG/DL — SIGNIFICANT CHANGE UP (ref 8.4–10.5)
CALCIUM SERPL-MCNC: 9.1 MG/DL — SIGNIFICANT CHANGE UP (ref 8.4–10.5)
CHLORIDE SERPL-SCNC: 105 MMOL/L — SIGNIFICANT CHANGE UP (ref 96–108)
CHLORIDE SERPL-SCNC: 108 MMOL/L — SIGNIFICANT CHANGE UP (ref 96–108)
CHLORIDE SERPL-SCNC: 110 MMOL/L — HIGH (ref 96–108)
CO2 BLDV-SCNC: 23.7 MMOL/L — SIGNIFICANT CHANGE UP (ref 22–26)
CO2 BLDV-SCNC: 25.6 MMOL/L — SIGNIFICANT CHANGE UP (ref 22–26)
CO2 SERPL-SCNC: 21 MMOL/L — LOW (ref 22–31)
CO2 SERPL-SCNC: 22 MMOL/L — SIGNIFICANT CHANGE UP (ref 22–31)
CO2 SERPL-SCNC: 23 MMOL/L — SIGNIFICANT CHANGE UP (ref 22–31)
CREAT SERPL-MCNC: 2.38 MG/DL — HIGH (ref 0.5–1.3)
CREAT SERPL-MCNC: 2.4 MG/DL — HIGH (ref 0.5–1.3)
CREAT SERPL-MCNC: 2.41 MG/DL — HIGH (ref 0.5–1.3)
CULTURE RESULTS: SIGNIFICANT CHANGE UP
DACRYOCYTES BLD QL SMEAR: SLIGHT — SIGNIFICANT CHANGE UP
EGFR: 27 ML/MIN/1.73M2 — LOW
EGFR: 27 ML/MIN/1.73M2 — LOW
EGFR: 28 ML/MIN/1.73M2 — LOW
EOSINOPHIL # BLD AUTO: 0 K/UL — SIGNIFICANT CHANGE UP (ref 0–0.5)
EOSINOPHIL # BLD AUTO: 0 K/UL — SIGNIFICANT CHANGE UP (ref 0–0.5)
EOSINOPHIL NFR BLD AUTO: 0 % — SIGNIFICANT CHANGE UP (ref 0–6)
EOSINOPHIL NFR BLD AUTO: 0 % — SIGNIFICANT CHANGE UP (ref 0–6)
GAS PNL BLDA: SIGNIFICANT CHANGE UP
GIANT PLATELETS BLD QL SMEAR: PRESENT — SIGNIFICANT CHANGE UP
GIANT PLATELETS BLD QL SMEAR: PRESENT — SIGNIFICANT CHANGE UP
GLUCOSE BLDC GLUCOMTR-MCNC: 104 MG/DL — HIGH (ref 70–99)
GLUCOSE BLDC GLUCOMTR-MCNC: 107 MG/DL — HIGH (ref 70–99)
GLUCOSE BLDC GLUCOMTR-MCNC: 111 MG/DL — HIGH (ref 70–99)
GLUCOSE BLDC GLUCOMTR-MCNC: 120 MG/DL — HIGH (ref 70–99)
GLUCOSE BLDC GLUCOMTR-MCNC: 129 MG/DL — HIGH (ref 70–99)
GLUCOSE BLDC GLUCOMTR-MCNC: 132 MG/DL — HIGH (ref 70–99)
GLUCOSE BLDC GLUCOMTR-MCNC: 135 MG/DL — HIGH (ref 70–99)
GLUCOSE BLDC GLUCOMTR-MCNC: 145 MG/DL — HIGH (ref 70–99)
GLUCOSE BLDC GLUCOMTR-MCNC: 167 MG/DL — HIGH (ref 70–99)
GLUCOSE BLDC GLUCOMTR-MCNC: 169 MG/DL — HIGH (ref 70–99)
GLUCOSE BLDC GLUCOMTR-MCNC: 195 MG/DL — HIGH (ref 70–99)
GLUCOSE BLDC GLUCOMTR-MCNC: 210 MG/DL — HIGH (ref 70–99)
GLUCOSE BLDC GLUCOMTR-MCNC: 85 MG/DL — SIGNIFICANT CHANGE UP (ref 70–99)
GLUCOSE BLDC GLUCOMTR-MCNC: 88 MG/DL — SIGNIFICANT CHANGE UP (ref 70–99)
GLUCOSE BLDC GLUCOMTR-MCNC: 88 MG/DL — SIGNIFICANT CHANGE UP (ref 70–99)
GLUCOSE BLDC GLUCOMTR-MCNC: 94 MG/DL — SIGNIFICANT CHANGE UP (ref 70–99)
GLUCOSE BLDC GLUCOMTR-MCNC: 99 MG/DL — SIGNIFICANT CHANGE UP (ref 70–99)
GLUCOSE SERPL-MCNC: 101 MG/DL — HIGH (ref 70–99)
GLUCOSE SERPL-MCNC: 122 MG/DL — HIGH (ref 70–99)
GLUCOSE SERPL-MCNC: 85 MG/DL — SIGNIFICANT CHANGE UP (ref 70–99)
HCO3 BLDV-SCNC: 22 MMOL/L — SIGNIFICANT CHANGE UP (ref 22–29)
HCO3 BLDV-SCNC: 24 MMOL/L — SIGNIFICANT CHANGE UP (ref 22–29)
HCT VFR BLD CALC: 19.9 % — CRITICAL LOW (ref 39–50)
HCT VFR BLD CALC: 22.6 % — LOW (ref 39–50)
HGB BLD-MCNC: 6.1 G/DL — CRITICAL LOW (ref 13–17)
HGB BLD-MCNC: 6.8 G/DL — CRITICAL LOW (ref 13–17)
INR BLD: 1.17 — HIGH (ref 0.88–1.16)
INR BLD: 1.18 — HIGH (ref 0.88–1.16)
LACTATE SERPL-SCNC: 1.1 MMOL/L — SIGNIFICANT CHANGE UP (ref 0.5–2)
LACTATE SERPL-SCNC: 1.2 MMOL/L — SIGNIFICANT CHANGE UP (ref 0.5–2)
LYMPHOCYTES # BLD AUTO: 0.71 K/UL — LOW (ref 1–3.3)
LYMPHOCYTES # BLD AUTO: 0.77 K/UL — LOW (ref 1–3.3)
LYMPHOCYTES # BLD AUTO: 5.5 % — LOW (ref 13–44)
LYMPHOCYTES # BLD AUTO: 7.3 % — LOW (ref 13–44)
MACROCYTES BLD QL: SLIGHT — SIGNIFICANT CHANGE UP
MACROCYTES BLD QL: SLIGHT — SIGNIFICANT CHANGE UP
MAGNESIUM SERPL-MCNC: 2.2 MG/DL — SIGNIFICANT CHANGE UP (ref 1.6–2.6)
MAGNESIUM SERPL-MCNC: 2.4 MG/DL — SIGNIFICANT CHANGE UP (ref 1.6–2.6)
MANUAL SMEAR VERIFICATION: SIGNIFICANT CHANGE UP
MANUAL SMEAR VERIFICATION: SIGNIFICANT CHANGE UP
MCHC RBC-ENTMCNC: 30.1 GM/DL — LOW (ref 32–36)
MCHC RBC-ENTMCNC: 30.7 GM/DL — LOW (ref 32–36)
MCHC RBC-ENTMCNC: 32.1 PG — SIGNIFICANT CHANGE UP (ref 27–34)
MCHC RBC-ENTMCNC: 33 PG — SIGNIFICANT CHANGE UP (ref 27–34)
MCV RBC AUTO: 106.6 FL — HIGH (ref 80–100)
MCV RBC AUTO: 107.6 FL — HIGH (ref 80–100)
METHOD TYPE: SIGNIFICANT CHANGE UP
MONOCYTES # BLD AUTO: 0.19 K/UL — SIGNIFICANT CHANGE UP (ref 0–0.9)
MONOCYTES # BLD AUTO: 0.71 K/UL — SIGNIFICANT CHANGE UP (ref 0–0.9)
MONOCYTES NFR BLD AUTO: 1.8 % — LOW (ref 2–14)
MONOCYTES NFR BLD AUTO: 5.5 % — SIGNIFICANT CHANGE UP (ref 2–14)
MYELOCYTES NFR BLD: 1.8 % — HIGH (ref 0–0)
MYELOCYTES NFR BLD: 1.8 % — HIGH (ref 0–0)
NEUTROPHILS # BLD AUTO: 11.18 K/UL — HIGH (ref 1.8–7.4)
NEUTROPHILS # BLD AUTO: 9.3 K/UL — HIGH (ref 1.8–7.4)
NEUTROPHILS NFR BLD AUTO: 82.6 % — HIGH (ref 43–77)
NEUTROPHILS NFR BLD AUTO: 86.4 % — HIGH (ref 43–77)
NEUTS BAND # BLD: 1.8 % — SIGNIFICANT CHANGE UP (ref 0–8)
NEUTS BAND # BLD: 4.6 % — SIGNIFICANT CHANGE UP (ref 0–8)
NRBC # BLD: 16 /100 — HIGH (ref 0–0)
NRBC # BLD: 20 /100 — HIGH (ref 0–0)
NRBC # BLD: SIGNIFICANT CHANGE UP /100 WBCS (ref 0–0)
NRBC # BLD: SIGNIFICANT CHANGE UP /100 WBCS (ref 0–0)
ORGANISM # SPEC MICROSCOPIC CNT: SIGNIFICANT CHANGE UP
ORGANISM # SPEC MICROSCOPIC CNT: SIGNIFICANT CHANGE UP
OVALOCYTES BLD QL SMEAR: SLIGHT — SIGNIFICANT CHANGE UP
OVALOCYTES BLD QL SMEAR: SLIGHT — SIGNIFICANT CHANGE UP
PCO2 BLDV: 39 MMHG — LOW (ref 42–55)
PCO2 BLDV: 41 MMHG — LOW (ref 42–55)
PH BLDV: 7.37 — SIGNIFICANT CHANGE UP (ref 7.32–7.43)
PH BLDV: 7.38 — SIGNIFICANT CHANGE UP (ref 7.32–7.43)
PHOSPHATE SERPL-MCNC: 2.4 MG/DL — LOW (ref 2.5–4.5)
PHOSPHATE SERPL-MCNC: 3 MG/DL — SIGNIFICANT CHANGE UP (ref 2.5–4.5)
PLAT MORPH BLD: ABNORMAL
PLAT MORPH BLD: ABNORMAL
PLATELET # BLD AUTO: 134 K/UL — LOW (ref 150–400)
PLATELET # BLD AUTO: 167 K/UL — SIGNIFICANT CHANGE UP (ref 150–400)
PO2 BLDV: 35 MMHG — SIGNIFICANT CHANGE UP (ref 25–45)
PO2 BLDV: 50 MMHG — HIGH (ref 25–45)
POIKILOCYTOSIS BLD QL AUTO: SLIGHT — SIGNIFICANT CHANGE UP
POIKILOCYTOSIS BLD QL AUTO: SLIGHT — SIGNIFICANT CHANGE UP
POLYCHROMASIA BLD QL SMEAR: SIGNIFICANT CHANGE UP
POLYCHROMASIA BLD QL SMEAR: SLIGHT — SIGNIFICANT CHANGE UP
POTASSIUM SERPL-MCNC: 3.6 MMOL/L — SIGNIFICANT CHANGE UP (ref 3.5–5.3)
POTASSIUM SERPL-MCNC: 3.7 MMOL/L — SIGNIFICANT CHANGE UP (ref 3.5–5.3)
POTASSIUM SERPL-MCNC: SIGNIFICANT CHANGE UP (ref 3.5–5.3)
POTASSIUM SERPL-SCNC: 3.6 MMOL/L — SIGNIFICANT CHANGE UP (ref 3.5–5.3)
POTASSIUM SERPL-SCNC: 3.7 MMOL/L — SIGNIFICANT CHANGE UP (ref 3.5–5.3)
POTASSIUM SERPL-SCNC: SIGNIFICANT CHANGE UP (ref 3.5–5.3)
PROT SERPL-MCNC: 5 G/DL — LOW (ref 6–8.3)
PROT SERPL-MCNC: 5 G/DL — LOW (ref 6–8.3)
PROT SERPL-MCNC: 5.4 G/DL — LOW (ref 6–8.3)
PROTHROM AB SERPL-ACNC: 13.9 SEC — HIGH (ref 10.5–13.4)
PROTHROM AB SERPL-ACNC: 14.1 SEC — HIGH (ref 10.5–13.4)
RBC # BLD: 1.85 M/UL — LOW (ref 4.2–5.8)
RBC # BLD: 2.12 M/UL — LOW (ref 4.2–5.8)
RBC # FLD: 20.5 % — HIGH (ref 10.3–14.5)
RBC # FLD: 21.6 % — HIGH (ref 10.3–14.5)
RBC BLD AUTO: ABNORMAL
RBC BLD AUTO: ABNORMAL
SAO2 % BLDV: 63.4 % — LOW (ref 67–88)
SAO2 % BLDV: 85.6 % — SIGNIFICANT CHANGE UP (ref 67–88)
SCHISTOCYTES BLD QL AUTO: SLIGHT — SIGNIFICANT CHANGE UP
SODIUM SERPL-SCNC: 139 MMOL/L — SIGNIFICANT CHANGE UP (ref 135–145)
SODIUM SERPL-SCNC: 141 MMOL/L — SIGNIFICANT CHANGE UP (ref 135–145)
SODIUM SERPL-SCNC: 142 MMOL/L — SIGNIFICANT CHANGE UP (ref 135–145)
SPECIMEN SOURCE: SIGNIFICANT CHANGE UP
TACROLIMUS SERPL-MCNC: 2.3 NG/ML — SIGNIFICANT CHANGE UP
TARGETS BLD QL SMEAR: SLIGHT — SIGNIFICANT CHANGE UP
WBC # BLD: 10.54 K/UL — HIGH (ref 3.8–10.5)
WBC # BLD: 12.82 K/UL — HIGH (ref 3.8–10.5)
WBC # FLD AUTO: 10.54 K/UL — HIGH (ref 3.8–10.5)
WBC # FLD AUTO: 12.82 K/UL — HIGH (ref 3.8–10.5)

## 2023-05-04 PROCEDURE — 71045 X-RAY EXAM CHEST 1 VIEW: CPT | Mod: 26

## 2023-05-04 PROCEDURE — 74018 RADEX ABDOMEN 1 VIEW: CPT | Mod: 26

## 2023-05-04 PROCEDURE — 99232 SBSQ HOSP IP/OBS MODERATE 35: CPT

## 2023-05-04 PROCEDURE — 99291 CRITICAL CARE FIRST HOUR: CPT

## 2023-05-04 PROCEDURE — 99292 CRITICAL CARE ADDL 30 MIN: CPT

## 2023-05-04 PROCEDURE — 93971 EXTREMITY STUDY: CPT | Mod: 26,RT

## 2023-05-04 PROCEDURE — 93308 TTE F-UP OR LMTD: CPT | Mod: 26

## 2023-05-04 RX ORDER — ACETAMINOPHEN 500 MG
1000 TABLET ORAL ONCE
Refills: 0 | Status: COMPLETED | OUTPATIENT
Start: 2023-05-04 | End: 2023-05-04

## 2023-05-04 RX ORDER — GLUCAGON INJECTION, SOLUTION 0.5 MG/.1ML
1 INJECTION, SOLUTION SUBCUTANEOUS ONCE
Refills: 0 | Status: DISCONTINUED | OUTPATIENT
Start: 2023-05-04 | End: 2023-05-21

## 2023-05-04 RX ORDER — CEFAZOLIN SODIUM 1 G
500 VIAL (EA) INJECTION EVERY 12 HOURS
Refills: 0 | Status: DISCONTINUED | OUTPATIENT
Start: 2023-05-04 | End: 2023-05-11

## 2023-05-04 RX ORDER — DEXTROSE 50 % IN WATER 50 %
25 SYRINGE (ML) INTRAVENOUS ONCE
Refills: 0 | Status: DISCONTINUED | OUTPATIENT
Start: 2023-05-04 | End: 2023-05-07

## 2023-05-04 RX ORDER — SODIUM CHLORIDE 9 MG/ML
1000 INJECTION, SOLUTION INTRAVENOUS
Refills: 0 | Status: DISCONTINUED | OUTPATIENT
Start: 2023-05-04 | End: 2023-05-07

## 2023-05-04 RX ORDER — CEFAZOLIN SODIUM 1 G
1000 VIAL (EA) INJECTION ONCE
Refills: 0 | Status: COMPLETED | OUTPATIENT
Start: 2023-05-04 | End: 2023-05-04

## 2023-05-04 RX ORDER — POTASSIUM CHLORIDE 20 MEQ
10 PACKET (EA) ORAL ONCE
Refills: 0 | Status: COMPLETED | OUTPATIENT
Start: 2023-05-04 | End: 2023-05-04

## 2023-05-04 RX ORDER — DEXTROSE 50 % IN WATER 50 %
15 SYRINGE (ML) INTRAVENOUS ONCE
Refills: 0 | Status: DISCONTINUED | OUTPATIENT
Start: 2023-05-04 | End: 2023-05-07

## 2023-05-04 RX ORDER — TACROLIMUS 5 MG/1
1 CAPSULE ORAL ONCE
Refills: 0 | Status: COMPLETED | OUTPATIENT
Start: 2023-05-04 | End: 2023-05-04

## 2023-05-04 RX ORDER — CEFAZOLIN SODIUM 1 G
VIAL (EA) INJECTION
Refills: 0 | Status: DISCONTINUED | OUTPATIENT
Start: 2023-05-04 | End: 2023-05-11

## 2023-05-04 RX ORDER — INSULIN HUMAN 100 [IU]/ML
INJECTION, SOLUTION SUBCUTANEOUS EVERY 6 HOURS
Refills: 0 | Status: DISCONTINUED | OUTPATIENT
Start: 2023-05-04 | End: 2023-05-05

## 2023-05-04 RX ORDER — DEXTROSE 50 % IN WATER 50 %
12.5 SYRINGE (ML) INTRAVENOUS ONCE
Refills: 0 | Status: DISCONTINUED | OUTPATIENT
Start: 2023-05-04 | End: 2023-05-07

## 2023-05-04 RX ORDER — POTASSIUM CHLORIDE 20 MEQ
20 PACKET (EA) ORAL ONCE
Refills: 0 | Status: COMPLETED | OUTPATIENT
Start: 2023-05-04 | End: 2023-05-04

## 2023-05-04 RX ORDER — BUMETANIDE 0.25 MG/ML
2 INJECTION INTRAMUSCULAR; INTRAVENOUS ONCE
Refills: 0 | Status: COMPLETED | OUTPATIENT
Start: 2023-05-04 | End: 2023-05-04

## 2023-05-04 RX ADMIN — ERYTHROPOIETIN 20000 UNIT(S): 10000 INJECTION, SOLUTION INTRAVENOUS; SUBCUTANEOUS at 18:42

## 2023-05-04 RX ADMIN — Medication 1000 MILLIGRAM(S): at 11:45

## 2023-05-04 RX ADMIN — Medication 3.95 MICROGRAM(S)/KG/MIN: at 08:26

## 2023-05-04 RX ADMIN — Medication 100 MILLIGRAM(S): at 12:34

## 2023-05-04 RX ADMIN — ATORVASTATIN CALCIUM 20 MILLIGRAM(S): 80 TABLET, FILM COATED ORAL at 21:51

## 2023-05-04 RX ADMIN — TACROLIMUS 1 MILLIGRAM(S): 5 CAPSULE ORAL at 05:27

## 2023-05-04 RX ADMIN — LEVALBUTEROL 0.63 MILLIGRAM(S): 1.25 SOLUTION, CONCENTRATE RESPIRATORY (INHALATION) at 21:46

## 2023-05-04 RX ADMIN — MIDODRINE HYDROCHLORIDE 10 MILLIGRAM(S): 2.5 TABLET ORAL at 05:28

## 2023-05-04 RX ADMIN — LEVALBUTEROL 0.63 MILLIGRAM(S): 1.25 SOLUTION, CONCENTRATE RESPIRATORY (INHALATION) at 03:52

## 2023-05-04 RX ADMIN — PANTOPRAZOLE SODIUM 40 MILLIGRAM(S): 20 TABLET, DELAYED RELEASE ORAL at 12:04

## 2023-05-04 RX ADMIN — CHLORHEXIDINE GLUCONATE 1 APPLICATION(S): 213 SOLUTION TOPICAL at 05:27

## 2023-05-04 RX ADMIN — Medication 100 MILLIEQUIVALENT(S): at 14:42

## 2023-05-04 RX ADMIN — Medication 5 MILLIGRAM(S): at 05:28

## 2023-05-04 RX ADMIN — Medication 8 MILLIGRAM(S): at 07:19

## 2023-05-04 RX ADMIN — LEVALBUTEROL 0.63 MILLIGRAM(S): 1.25 SOLUTION, CONCENTRATE RESPIRATORY (INHALATION) at 15:32

## 2023-05-04 RX ADMIN — Medication 1 MILLIGRAM(S): at 12:04

## 2023-05-04 RX ADMIN — PHENYLEPHRINE HYDROCHLORIDE 7.4 MICROGRAM(S)/KG/MIN: 10 INJECTION INTRAVENOUS at 18:43

## 2023-05-04 RX ADMIN — SODIUM CHLORIDE 10 MILLILITER(S): 9 INJECTION INTRAMUSCULAR; INTRAVENOUS; SUBCUTANEOUS at 18:43

## 2023-05-04 RX ADMIN — CHLORHEXIDINE GLUCONATE 15 MILLILITER(S): 213 SOLUTION TOPICAL at 05:26

## 2023-05-04 RX ADMIN — VASOPRESSIN 1.5 UNIT(S)/MIN: 20 INJECTION INTRAVENOUS at 19:50

## 2023-05-04 RX ADMIN — CHLORHEXIDINE GLUCONATE 15 MILLILITER(S): 213 SOLUTION TOPICAL at 18:42

## 2023-05-04 RX ADMIN — PREGABALIN 1000 MICROGRAM(S): 225 CAPSULE ORAL at 12:04

## 2023-05-04 RX ADMIN — Medication 8 MILLIGRAM(S): at 12:00

## 2023-05-04 RX ADMIN — Medication 4 MILLILITER(S): at 15:32

## 2023-05-04 RX ADMIN — Medication 4 MILLILITER(S): at 09:03

## 2023-05-04 RX ADMIN — Medication 8 MILLIGRAM(S): at 12:05

## 2023-05-04 RX ADMIN — TACROLIMUS 1 MILLIGRAM(S): 5 CAPSULE ORAL at 18:43

## 2023-05-04 RX ADMIN — LEVALBUTEROL 0.63 MILLIGRAM(S): 1.25 SOLUTION, CONCENTRATE RESPIRATORY (INHALATION) at 09:02

## 2023-05-04 RX ADMIN — MIDODRINE HYDROCHLORIDE 10 MILLIGRAM(S): 2.5 TABLET ORAL at 14:42

## 2023-05-04 RX ADMIN — CEFTRIAXONE 100 MILLIGRAM(S): 500 INJECTION, POWDER, FOR SOLUTION INTRAMUSCULAR; INTRAVENOUS at 10:07

## 2023-05-04 RX ADMIN — Medication 100 MILLIGRAM(S): at 23:01

## 2023-05-04 RX ADMIN — MILRINONE LACTATE 4.94 MICROGRAM(S)/KG/MIN: 1 INJECTION, SOLUTION INTRAVENOUS at 18:43

## 2023-05-04 RX ADMIN — Medication 8 MILLIGRAM(S): at 18:26

## 2023-05-04 RX ADMIN — Medication 50 MILLIEQUIVALENT(S): at 00:00

## 2023-05-04 RX ADMIN — Medication 4 MILLILITER(S): at 03:52

## 2023-05-04 RX ADMIN — BUMETANIDE 2 MILLIGRAM(S): 0.25 INJECTION INTRAMUSCULAR; INTRAVENOUS at 23:50

## 2023-05-04 RX ADMIN — Medication 4 MILLILITER(S): at 21:46

## 2023-05-04 RX ADMIN — INSULIN HUMAN 2: 100 INJECTION, SOLUTION SUBCUTANEOUS at 23:01

## 2023-05-04 RX ADMIN — Medication 400 MILLIGRAM(S): at 10:57

## 2023-05-04 NOTE — PROGRESS NOTE ADULT - SUBJECTIVE AND OBJECTIVE BOX
Patient is a 77y Male seen and evaluated at bedside. Intubated, HDS on pressors, off Levo, UP increased with 2685. Lytes stable, Kidney function stable. awaiting AM tacro level.       Meds:    acetylcysteine 10%  Inhalation 4 every 6 hours  atorvastatin 20 at bedtime  cefTRIAXone   IVPB 1000 every 24 hours  cefTRIAXone   IVPB    chlorhexidine 0.12% Liquid 15 every 12 hours  chlorhexidine 2% Cloths 1 <User Schedule>  cyanocobalamin 1000 daily  dexMEDEtomidine Infusion 0.1 <Continuous>  dextrose 5%. 1000 <Continuous>  dextrose 5%. 1000 <Continuous>  dextrose 50% Injectable 25 once  dextrose 50% Injectable 12.5 once  dextrose 50% Injectable 25 once  dextrose Oral Gel 15 once PRN  DOBUTamine Infusion 2 <Continuous>  epoetin annalee-epbx (RETACRIT) Injectable 63392 <User Schedule>  folic acid 1 daily  glucagon  Injectable 1 once  insulin regular Infusion 1 <Continuous>  levalbuterol Inhalation 0.63 every 6 hours  metolazone 5 daily  midodrine 10 every 8 hours  milrinone Infusion 0.25 <Continuous>  pantoprazole  Injectable 40 daily  phenylephrine    Infusion 0.3 <Continuous>  predniSONE   Tablet 5 daily  propofol Infusion 10 <Continuous>  sodium chloride 0.9%. 1000 <Continuous>  tacrolimus 1 every 12 hours  testosterone patch 4 mG/24 Hr(s) 8 daily  vasopressin Infusion 0.01 <Continuous>      T(C): , Max: 37.6 (05-04-23 @ 09:00)  T(F): , Max: 99.7 (05-04-23 @ 09:00)  HR: 86 (05-04-23 @ 11:00)  BP: 123/56 (05-04-23 @ 07:00)  BP(mean): 80 (05-04-23 @ 07:00)  RR: 8 (05-04-23 @ 11:00)  SpO2: 100% (05-04-23 @ 11:00)  Wt(kg): --    05-03 @ 07:01  -  05-04 @ 07:00  --------------------------------------------------------  IN: 2809.5 mL / OUT: 2685 mL / NET: 124.5 mL    05-04 @ 07:01  -  05-04 @ 11:37  --------------------------------------------------------  IN: 387 mL / OUT: 520 mL / NET: -133 mL          Review of Systems:  ROS negative except as per HPI      PHYSICAL EXAM:  GENERAL: Intubated, sedated   CHEST/LUNG: Decreased breath sounds, sternotomy scar c/d/i, left side chest tube   HEART: normal S1S2, RRR  ABDOMEN: Soft, Nontender, +BS, No flank tenderness bilateral  : Tucker with wine colored urine   EXTREMITIES: No clubbing, cyanosis, or edema   NEUROLOGY: sedated     LABS:                        6.1    10.54 )-----------( 134      ( 04 May 2023 02:09 )             19.9     05-04    142  |  110<H>  |  61<H>  ----------------------------<  85  3.6   |  21<L>  |  2.40<H>    Ca    8.7      04 May 2023 02:09  Phos  2.4     05-04  Mg     2.2     05-04    TPro  5.0<L>  /  Alb  3.2<L>  /  TBili  1.5<H>  /  DBili  x   /  AST  25  /  ALT  8<L>  /  AlkPhos  68  05-04      PT/INR - ( 04 May 2023 02:09 )   PT: 13.9 sec;   INR: 1.17          PTT - ( 04 May 2023 02:09 )  PTT:29.7 sec          RADIOLOGY & ADDITIONAL STUDIES:

## 2023-05-04 NOTE — PROGRESS NOTE ADULT - SUBJECTIVE AND OBJECTIVE BOX
CTICU  CRITICAL  CARE  attending     Hand off received 					   Pertinent clinical, laboratory, radiographic, hemodynamic, echocardiographic, respiratory data, microbiologic data and chart were reviewed and analyzed frequently throughout the course of the day and night  Patient seen and examined with CTS/ SH attending at bedside    Pt is a 77y , Male, post op day # 9 s/p percutaneous DENNY closure with Watchman device;     post procedure c/b    Pericardial effusion/tamponade  PEA arrest; s/p CPR  s/p emergent bedside pericardiocentesis    POD # 8 s/p sternotomy; exploration for bleeding  evac of L Hemothorax    s/p R Pigtail thoracentesis for pleural effusion    currently    intubated for increasing WOB  Inotrope support with dobutamine/Primacor  UO >100 ml/hr  acute post hemorrhagic anemia   on Procrit      76 y/o male with polycystic kidney disease s/p kidney transplant in 2007, HTN, HLD, DM2, Prostate cancer s/p radical prostatectomy 2015, DVT 2006 (in setting of pelvic fracture), and afib/atrial flutter s/p ablation in 1/2018, who is now off oral anticoagulation secondary to GI bleed and presents for DENNY occlusoin. Pt as no symptomsHe was admitted [facility] with [facility]. He is a [employer] and refuses blood transfusions. Eliquis was stopped at that time.   Pericardial effusion with cardiac tamponade    Acute respiratory failure with hypoxia    Atelectasis    Ground glass opacity present on imaging of lung        , FAMILY HISTORY:  PAST MEDICAL & SURGICAL HISTORY:  Polycystic kidney disease      DM2 (diabetes mellitus, type 2)      HLD (hyperlipidemia)      HTN (hypertension)      Prostate cancer      Kidney transplant recipient      DVT (deep venous thrombosis)      Chronic CHF      H/O radical prostatectomy  2010      S/P hernia repair  Abdominal and inguinal around 2005        Patient is a 77y old  Male who presents for percutaneous DENNY closure (04 May 2023 16:38)      14 system review unable to assess  acute changes include acute respiratory failure  Vital signs, hemodynamic and respiratory parameters were reviewed from the bedside nursing flowsheet.  ICU Vital Signs Last 24 Hrs  T(C): 37 (05 May 2023 01:06), Max: 37.7 (04 May 2023 13:00)  T(F): 98.6 (05 May 2023 01:06), Max: 99.9 (04 May 2023 13:00)  HR: 74 (05 May 2023 01:00) (61 - 91)  BP: 123/56 (04 May 2023 07:00) (123/56 - 123/56)  BP(mean): 80 (04 May 2023 07:00) (80 - 80)  ABP: 155/63 (05 May 2023 01:00) (123/51 - 167/69)  ABP(mean): 92 (05 May 2023 01:00) (71 - 99)  RR: 12 (05 May 2023 01:00) (8 - 22)  SpO2: 100% (05 May 2023 01:00) (100% - 100%)    O2 Parameters below as of 05 May 2023 01:00  Patient On (Oxygen Delivery Method): ventilator    O2 Concentration (%): 40      Adult Advanced Hemodynamics Last 24 Hrs  CVP(mm Hg): 4 (05 May 2023 01:00) (4 - 15)  CVP(cm H2O): --  CO: 5.2 (04 May 2023 19:00) (5.2 - 6.2)  CI: 2.9 (04 May 2023 19:00) (2.9 - 3.5)  PA: 55/17 (05 May 2023 01:00) (50/20 - 66/26)  PA(mean): 32 (05 May 2023 01:00) (30 - 41)  PCWP: --  SVR: 1321 (04 May 2023 19:00) (940 - 1321)  SVRI: 2369 (04 May 2023 19:00) (1666 - 2369)  PVR: --  PVRI: --, ABG - ( 04 May 2023 16:57 )  pH, Arterial: 7.42  pH, Blood: x     /  pCO2: 34    /  pO2: 152   / HCO3: 22    / Base Excess: -1.7  /  SaO2: 98.5              Mode: AC/ CMV (Assist Control/ Continuous Mandatory Ventilation)  RR (machine): 12  TV (machine): 650  FiO2: 40  PEEP: 5  ITime: 1  MAP: 9  PIP: 21    Intake and output was reviewed and the fluid balance was calculated  Daily     Daily   I&O's Summary    03 May 2023 07:01  -  04 May 2023 07:00  --------------------------------------------------------  IN: 2809.5 mL / OUT: 2685 mL / NET: 124.5 mL    04 May 2023 07:01  -  05 May 2023 01:18  --------------------------------------------------------  IN: 2125.8 mL / OUT: 2000 mL / NET: 125.8 mL        All lines and drain sites were assessed  [facility]      POCT Blood Glucose.: 195 mg/dL (04 May 2023 22:53)    No acute change in mental status  (+) Orotracheally intubated  Auscultation of the chest reveals equal bs  Abdomen is soft  Extremities are warm and well perfused  Wounds appear clean and unremarkable  Antibiotics are periop    labs  CBC Full  -  ( 04 May 2023 16:56 )  WBC Count : 12.82 K/uL  RBC Count : 2.12 M/uL  Hemoglobin : 6.8 g/dL  Hematocrit : 22.6 %  Platelet Count - Automated : 167 K/uL  Mean Cell Volume : 106.6 fl  Mean Cell Hemoglobin : 32.1 pg  Mean Cell Hemoglobin Concentration : 30.1 gm/dL  Auto Neutrophil # : 11.18 K/uL  Auto Lymphocyte # : 0.71 K/uL  Auto Monocyte # : 0.71 K/uL  Auto Eosinophil # : 0.00 K/uL  Auto Basophil # : 0.00 K/uL  Auto Neutrophil % : 82.6 %  Auto Lymphocyte % : 5.5 %  Auto Monocyte % : 5.5 %  Auto Eosinophil % : 0.0 %  Auto Basophil % : 0.0 %    05-04    141  |  108  |  55<H>  ----------------------------<  122<H>  3.7   |  22  |  2.38<H>    Ca    8.7      04 May 2023 19:07  Phos  3.0     05-04  Mg     2.4     05-04    TPro  5.0<L>  /  Alb  3.3  /  TBili  1.2  /  DBili  x   /  AST  29  /  ALT  10  /  AlkPhos  67  05-04    PT/INR - ( 04 May 2023 16:56 )   PT: 14.1 sec;   INR: 1.18          PTT - ( 04 May 2023 16:56 )  PTT:30.7 sec  The current medications were reviewed   MEDICATIONS  (STANDING):  acetylcysteine 10%  Inhalation 4 milliLiter(s) Inhalation every 6 hours  atorvastatin 20 milliGRAM(s) Oral at bedtime  ceFAZolin   IVPB      ceFAZolin   IVPB 500 milliGRAM(s) IV Intermittent every 12 hours  chlorhexidine 0.12% Liquid 15 milliLiter(s) Oral Mucosa every 12 hours  chlorhexidine 2% Cloths 1 Application(s) Topical <User Schedule>  cyanocobalamin 1000 MICROGram(s) Oral daily  dexMEDEtomidine Infusion 0.1 MICROgram(s)/kG/Hr (1.65 mL/Hr) IV Continuous <Continuous>  dextrose 5%. 1000 milliLiter(s) (100 mL/Hr) IV Continuous <Continuous>  dextrose 5%. 1000 milliLiter(s) (50 mL/Hr) IV Continuous <Continuous>  dextrose 5%. 1000 milliLiter(s) (50 mL/Hr) IV Continuous <Continuous>  dextrose 5%. 1000 milliLiter(s) (100 mL/Hr) IV Continuous <Continuous>  dextrose 50% Injectable 25 Gram(s) IV Push once  dextrose 50% Injectable 12.5 Gram(s) IV Push once  dextrose 50% Injectable 25 Gram(s) IV Push once  dextrose 50% Injectable 25 Gram(s) IV Push once  dextrose 50% Injectable 12.5 Gram(s) IV Push once  dextrose 50% Injectable 25 Gram(s) IV Push once  epoetin annalee-epbx (RETACRIT) Injectable 76370 Unit(s) IV Push <User Schedule>  folic acid 1 milliGRAM(s) Oral daily  glucagon  Injectable 1 milliGRAM(s) IntraMuscular once  glucagon  Injectable 1 milliGRAM(s) IntraMuscular once  insulin regular  human corrective regimen sliding scale   SubCutaneous every 6 hours  levalbuterol Inhalation 0.63 milliGRAM(s) Inhalation every 6 hours  metolazone 5 milliGRAM(s) Oral daily  milrinone Infusion 0.25 MICROgram(s)/kG/Min (4.94 mL/Hr) IV Continuous <Continuous>  pantoprazole  Injectable 40 milliGRAM(s) IV Push daily  phenylephrine    Infusion 0.3 MICROgram(s)/kG/Min (7.4 mL/Hr) IV Continuous <Continuous>  predniSONE   Tablet 5 milliGRAM(s) Oral daily  propofol Infusion 10 MICROgram(s)/kG/Min (3.95 mL/Hr) IV Continuous <Continuous>  sodium chloride 0.9%. 1000 milliLiter(s) (10 mL/Hr) IV Continuous <Continuous>  tacrolimus 1 milliGRAM(s) Oral every 12 hours  testosterone patch 4 mG/24 Hr(s) 8 milliGRAM(s) Transdermal daily  vasopressin Infusion 0.01 Unit(s)/Min (1.5 mL/Hr) IV Continuous <Continuous>    MEDICATIONS  (PRN):  dextrose Oral Gel 15 Gram(s) Oral once PRN Blood Glucose LESS THAN 70 milliGRAM(s)/deciliter  dextrose Oral Gel 15 Gram(s) Oral once PRN Blood Glucose LESS THAN 70 milliGRAM(s)/deciliter       PROBLEM LIST/ ASSESSMENT:  HEALTH ISSUES - PROBLEM Dx:  Pericardial effusion with cardiac tamponade    Acute respiratory failure with hypoxia    Atelectasis    Ground glass opacity present on imaging of lung        ,   Patient is a 77y old  Male who presents for percutaneous DENNY closure (04 May 2023 16:38)     s/p same; followed by sternotomy and exploration for bleeding  acute changes include acute respiratory failure    My plan includes :    Titrate pressor support to maintain MAP >80  Full Vent support  Titrate Fio2 to maintain Sao2 >95%  Serial ABGs  Avoid nephrotoxic agents  continue inotrope support with dobutamine and primacor  Titrate to maintain CI >2.2/MVO2 >60  Trend lactate levels  close hemodynamic, ventilatory and drain monitoring and management per post op routine    Monitor for arrhythmias and monitor parameters for organ perfusion  monitor neurologic status  Head of the bed should remain elevated to 45 deg .   chest PT and IS will be encouraged  monitor adequacy of oxygenation and ventilation and attempt to wean oxygen  Nutritional goals will be met using po eventually , ensure adequate caloric intake and montior the same  Stress ulcer and VTE prophylaxis will be achieved    Glycemic control is satisfactory  Electrolytes have been repleted as necessary and wound care has been carried out. Pain control has been achieved.   agressive physical therapy and early mobility and ambulation goals will be met   The family was updated about the course and plan  CRITICAL CARE TIME SPENT in evaluation and management, reassessments, review and interpretation of labs and x-rays, ventilator and hemodynamic management, formulating a plan and coordinating care: ___30____ MIN.  Time does not include procedural time.  CTICU ATTENDING     					    Modesto Ny MD

## 2023-05-04 NOTE — PROGRESS NOTE ADULT - ASSESSMENT
77 M with PCKD s/p kidney tx DDKT , HTN, T2DM, Prostate cancer s/p radical prostatectomy , DVT  (in setting of pelvic fracture), and afib/atrial flutter s/p ablation in 2018, who is now off oral anticoagulation secondary to GI bleed and presents for DENNY occlusoin.  S/p DENNY occlusion  c/b cardiac tamponade and PEA arrest requiring bedside pericardiocentesis  OR for sternotomy and local exploration  -S/p > 1 L left hemothorax evacuated in OR   Required hydroxocobalamin in OR for refractory vasodilatory shock  Reintubated 5/3 for resp distress with + BAL for MSSA   A/p  Long cardiogenic shock pic and inotrop requirement with improving hemodynamics, cardiac indices and perfusion parameters   Will start to titrate down inotrops starting with   Continue low dose rose mary and vaso for higher MAP goals ~ 80 an adequate renal perfusion   DAYO on CKD 3- S/p DDKT on tacrolimus- most likely iATN non oligurics with improving UOP and good clearance  Cr plateauing ~ 2.4--no need for dialytic interventions at this time  Patient on Nepro with low K and Phos and rising sugars requiring escalating doses of insulin gtt  Will switch nepro  to glucerna continue at 40 cc/hr   Low predose tac trough-received additional 1 mg in pm will follow am predose trough and adjust prograf dose   Continue low dose prednisone    Maintaining MAPs ~ 80s for adequate renal perfusion   CVP ~ 11/ Anasarcic- will consider prn diuretics for genlty neg FB   Reintubated for hypoxia most likely related to MSSA pneumonia--ceftriaxone switched to renally dosed ancef--> CPAP as tolerated  Pocus with improving right sided effusion and stable small left effusion/ right pigtail with mild serosang drainage    Post op Anemia - Voodoo/Hg stable ~ 6.5 no active bleeding, Iron supplemented intravenously continue Epo per heme recs   ICU ppx: holding Sq hep/ continue SCDs/ PPI/ Asp precaution       ATTENDING: I have personally and independently provided 90 min of critical care services. This excludes time spent on any procedures or teaching.

## 2023-05-04 NOTE — PROGRESS NOTE ADULT - SUBJECTIVE AND OBJECTIVE BOX
INTERVAL COURSE  Pressors and inotrops unchanged, goodperfusion parameters flotrack and swan correlating  Kidney fx plateauing with good autodiuresis/ Lytes low, supplemented  still on insulin gtt   Good MS, tolerating CPAP   BAL with MSSA     ICU Vital Signs Last 24 Hrs  T(C): 37.7 (04 May 2023 13:00), Max: 37.7 (04 May 2023 13:00)  T(F): 99.9 (04 May 2023 13:00), Max: 99.9 (04 May 2023 13:00)  HR: 73 (04 May 2023 23:00) (61 - 91)  BP: 123/56 (04 May 2023 07:00) (123/56 - 123/56)  BP(mean): 80 (04 May 2023 07:00) (80 - 80)  ABP: 155/63 (04 May 2023 23:00) (123/51 - 177/67)  ABP(mean): 93 (04 May 2023 23:00) (71 - 99)  RR: 12 (04 May 2023 23:00) (8 - 22)  SpO2: 100% (04 May 2023 23:00) (100% - 100%)    O2 Parameters below as of 04 May 2023 23:00  Patient On (Oxygen Delivery Method): ventilator  O2 Concentration (%): 40    Adult Advanced Hemodynamics Last 24 Hrs  CVP(mm Hg): 7 (04 May 2023 23:00) (7 - 15)  CO: 5.2 (04 May 2023 19:00) (5.2 - 6.2)  CI: 2.9 (04 May 2023 19:00) (2.9 - 3.5)  PA: 59/20 (04 May 2023 23:00) (50/20 - 67/26)  PA(mean): 35 (04 May 2023 23:00) (30 - 41)  SVR: 1321 (04 May 2023 19:00) (940 - 1321)  SVRI: 2369 (04 May 2023 19:00) (1666 - 2369)  ABG - ( 04 May 2023 16:57 )  pH, Arterial: 7.42  pH, Blood: x     /  pCO2: 34    /  pO2: 152   / HCO3: 22    / Base Excess: -1.7  /  SaO2: 98.5        Mode: AC/ CMV (Assist Control/ Continuous Mandatory Ventilation)  RR (machine): 12  TV (machine): 650  FiO2: 40  PEEP: 5  ITime: 1  MAP: 9  PIP: 21    VITALS  Vital Signs Last 24 Hrs  T(C): 37.7 (04 May 2023 13:00), Max: 37.7 (04 May 2023 13:00)  T(F): 99.9 (04 May 2023 13:00), Max: 99.9 (04 May 2023 13:00)  HR: 73 (04 May 2023 23:00) (61 - 91)  BP: 123/56 (04 May 2023 07:00) (123/56 - 123/56)  BP(mean): 80 (04 May 2023 07:00) (80 - 80)  RR: 12 (04 May 2023 23:00) (8 - 22)  SpO2: 100% (04 May 2023 23:00) (100% - 100%)    Parameters below as of 04 May 2023 23:00  Patient On (Oxygen Delivery Method): ventilator    O2 Concentration (%): 40    I&O's Summary    03 May 2023 07:01  -  04 May 2023 07:00  --------------------------------------------------------  IN: 2809.5 mL / OUT: 2685 mL / NET: 124.5 mL    04 May 2023 07:01  -  04 May 2023 23:40  --------------------------------------------------------  IN: 1960.4 mL / OUT: 1660 mL / NET: 300.4 mL    PHYSICAL EXAM  General: lightly sedated, arousable   Respiratory: CTA B/L anteriorly   Cardiovascular: Atrial fibrillation   Gastrointestinal: Soft; NTND  Extremities: Warm, anasarcic   Neurological:  nonfocal     IMAGING/EKG/ETC  Reviewed.

## 2023-05-04 NOTE — PROGRESS NOTE ADULT - SUBJECTIVE AND OBJECTIVE BOX
Interval Events: Reviewed  Patient seen and examined at bedside.    Patient is a 77y old  Male who presents with a chief complaint of percutaneous DENNY closure (03 May 2023 22:58)  he is sedated  PAST MEDICAL & SURGICAL HISTORY:  Polycystic kidney disease      DM2 (diabetes mellitus, type 2)      HLD (hyperlipidemia)      HTN (hypertension)      Prostate cancer      Kidney transplant recipient      DVT (deep venous thrombosis)      Chronic CHF      H/O radical prostatectomy  2010      S/P hernia repair  Abdominal and inguinal around 2005          MEDICATIONS:  Pulmonary:  acetylcysteine 10%  Inhalation 4 milliLiter(s) Inhalation every 6 hours  levalbuterol Inhalation 0.63 milliGRAM(s) Inhalation every 6 hours    Antimicrobials:  cefTRIAXone   IVPB 1000 milliGRAM(s) IV Intermittent every 24 hours  cefTRIAXone   IVPB        Anticoagulants:    Cardiac:  DOBUTamine Infusion 5 MICROgram(s)/kG/Min IV Continuous <Continuous>  metolazone 5 milliGRAM(s) Oral daily  midodrine 10 milliGRAM(s) Oral every 8 hours  milrinone Infusion 0.25 MICROgram(s)/kG/Min IV Continuous <Continuous>  phenylephrine    Infusion 0.3 MICROgram(s)/kG/Min IV Continuous <Continuous>      Allergies    naproxen (Hives)  Bairon&#x27;s Witness - No blood products (Unknown)    Intolerances        Vital Signs Last 24 Hrs  T(C): 36.7 (04 May 2023 05:04), Max: 37.1 (03 May 2023 18:00)  T(F): 98.1 (04 May 2023 05:04), Max: 98.8 (03 May 2023 18:00)  HR: 91 (04 May 2023 08:00) (76 - 101)  BP: 123/56 (04 May 2023 07:00) (123/56 - 165/70)  BP(mean): 80 (04 May 2023 07:00) (80 - 104)  RR: 18 (04 May 2023 08:00) (12 - 26)  SpO2: 100% (04 May 2023 08:00) (90% - 100%)    Parameters below as of 04 May 2023 08:00  Patient On (Oxygen Delivery Method): ventilator    O2 Concentration (%): 40    05-03 @ 07:01  -  05-04 @ 07:00  --------------------------------------------------------  IN: 2809.5 mL / OUT: 2685 mL / NET: 124.5 mL    05-04 @ 07:01  -  05-04 @ 08:40  --------------------------------------------------------  IN: 66.2 mL / OUT: 170 mL / NET: -103.8 mL      Mode: AC/ CMV (Assist Control/ Continuous Mandatory Ventilation)  RR (machine): 12  TV (machine): 650  FiO2: 40  PEEP: 5  ITime: 1  MAP: 9  PIP: 21      Review of Systems:   •	General: negative  •	Skin/Breast: negative  •	Ophthalmologic: negative  •	ENMT: negative  •	Respiratory and Thorax: negative  •	Cardiovascular: negative  •	Gastrointestinal: negative  •	Genitourinary: negative  •	Musculoskeletal: negative  •	Neurological: negative  •	Psychiatric: negative  •	Hematology/Lymphatics: negative  •	Endocrine: negative  •	Allergic/Immunologic: negative    Physical Exam:   • Constitutional:	refer to the dietion /Nutritionist note  • Eyes:	EOMI; PERRL; no drainage or redness  • ENMT:	No oral lesions; no gross abnormalities  • Neck	No bruits; no thyromegaly or nodules  • Breasts:	not examined  • Back:	No deformity or limitation of movement  • Respiratory:	Breath Sounds equal & clear to percussion & auscultation, no accessory muscle use  • Cardiovascular:	Regular rate & rhythm, normal S1, S2; no murmurs, gallops or rubs; no S3, S4  • Gastrointestinal:	Soft, non-tender, no hepatosplenomegaly, normal bowel sounds  • Genitourinary:	not examined  • Rectal: not examined  • Extremities:	No cyanosis, clubbing or edema  • Vascular:	Equal and normal pulses (carotid, femoral, dorsalis pedis)  • Neurologica:l	not examined  • Skin:	No lesions; no rash  • Lymph Nodes:	No lymphadedenopathy  • Musculoskeletal:	No joint pain, swelling or deformity; no limitation of movement        LABS:  ABG - ( 04 May 2023 02:34 )  pH, Arterial: 7.45  pH, Blood: x     /  pCO2: 34    /  pO2: 177   / HCO3: 24    / Base Excess: 0.1   /  SaO2: 99.6                CBC Full  -  ( 04 May 2023 02:09 )  WBC Count : 10.54 K/uL  RBC Count : 1.85 M/uL  Hemoglobin : 6.1 g/dL  Hematocrit : 19.9 %  Platelet Count - Automated : 134 K/uL  Mean Cell Volume : 107.6 fl  Mean Cell Hemoglobin : 33.0 pg  Mean Cell Hemoglobin Concentration : 30.7 gm/dL  Auto Neutrophil # : 9.30 K/uL  Auto Lymphocyte # : 0.77 K/uL  Auto Monocyte # : 0.19 K/uL  Auto Eosinophil # : 0.00 K/uL  Auto Basophil # : 0.09 K/uL  Auto Neutrophil % : 86.4 %  Auto Lymphocyte % : 7.3 %  Auto Monocyte % : 1.8 %  Auto Eosinophil % : 0.0 %  Auto Basophil % : 0.9 %    05-04    142  |  110<H>  |  61<H>  ----------------------------<  85  3.6   |  21<L>  |  2.40<H>    Ca    8.7      04 May 2023 02:09  Phos  2.4     05-04  Mg     2.2     05-04    TPro  5.0<L>  /  Alb  3.2<L>  /  TBili  1.5<H>  /  DBili  x   /  AST  25  /  ALT  8<L>  /  AlkPhos  68  05-04    PT/INR - ( 04 May 2023 02:09 )   PT: 13.9 sec;   INR: 1.17          PTT - ( 04 May 2023 02:09 )  PTT:29.7 sec    CXR new RLL infiltrate                RADIOLOGY & ADDITIONAL STUDIES (The following images were personally reviewed):

## 2023-05-04 NOTE — PROGRESS NOTE ADULT - ASSESSMENT
77Y M w/ CKD s/p renal transplant 2007 h/o PKD now admitted with DENNY occulsion repair and cardiac tamponade, stay c/b DAYO initially oliguric, now non-oliguric. sCr today at 2.4, stable HDS on pressors     #Non-Oliguric iATN on underlying CKD 3 (baseline sCr at 1.2-1.4)   sCr now at 2.40  Urine Na at 36, no UA   Hematuria noted in buckley   Last 24hr UOP 1 L net pos 1.7L/24 hours s/p bumex pushes (1+1+2)  s/p multiple doses of albumin 5/2  Liley etiology iATN given hypotensive episodes, also with severe anemia    Plan:  Discussed with CTICU. Pt to be intubated for increased work of breathing and vol overload  Strict in and outs   Resume tacro at 1mg BID  Please ensure that trough level should be drawn 30 minutes before the next dose in AM  Goal tacro level 4.5-5  recheck a Judi  Ensure MAP>70s for adequate renal perfusion and recovery  Discused with CTICU, will closely monitor electrolytes and vol status to see if patient would need RRT. Risk with HD and CVVHD will be in case of extracorporeal system clotting, will lower his Hb even more. PD is an option to consider but will have a delay between PD catheter and starting PD, will discuss more  Maintain MAP >70 for renal perfusion       #Hypophosphatemia  Replete to keep levels 3-5.5    #Acute anemia  On epo and IV iron  Check iron profile   77Y M w/ CKD s/p renal transplant 2007 h/o PKD now admitted with DENNY occulsion repair and cardiac tamponade, stay c/b DAYO initially oliguric, now non-oliguric. sCr today at 2.4, stable HDS on pressors     #Non-Oliguric iATN on underlying CKD 3 (baseline sCr at 1.2-1.4)   sCr now at 2.40  Urine Na at 36, no UA   Hematuria noted in buckley   Last 24hr UOP 2.7 L net pos 125mL/24 hours s/p bumex pushes (1+1+2)  s/p multiple doses of albumin 5/2  Liley etiology iATN given hypotensive episodes, also with severe anemia    Plan:  Improved HDS, with good urine output, would hold further diuresis   Strict in and outs   Resume tacro at 1mg BID, if next trough less than 3, increase next dose to 2mg   Please ensure that trough level should be drawn 30 minutes before the next dose in AM  Goal tacro level 4.5-5  recheck a Judi  Ensure MAP>70s for adequate renal perfusion and recovery  Discused with CTICU, will closely monitor electrolytes and vol status to see if patient would need RRT. Risk with HD and CVVHD will be in case of extracorporeal system clotting, will lower his Hb even more. PD is an option to consider but will have a delay between PD catheter and starting PD, will discuss more  Maintain MAP >70 for renal perfusion       #Hypophosphatemia  Replete to keep levels 3-5.5    #Acute anemia  On epo and IV iron  Check iron profile

## 2023-05-05 LAB
ALBUMIN SERPL ELPH-MCNC: 3.1 G/DL — LOW (ref 3.3–5)
ALBUMIN SERPL ELPH-MCNC: 3.2 G/DL — LOW (ref 3.3–5)
ALP SERPL-CCNC: 72 U/L — SIGNIFICANT CHANGE UP (ref 40–120)
ALP SERPL-CCNC: 72 U/L — SIGNIFICANT CHANGE UP (ref 40–120)
ALT FLD-CCNC: 10 U/L — SIGNIFICANT CHANGE UP (ref 10–45)
ALT FLD-CCNC: 11 U/L — SIGNIFICANT CHANGE UP (ref 10–45)
ANION GAP SERPL CALC-SCNC: 11 MMOL/L — SIGNIFICANT CHANGE UP (ref 5–17)
ANION GAP SERPL CALC-SCNC: 12 MMOL/L — SIGNIFICANT CHANGE UP (ref 5–17)
ANISOCYTOSIS BLD QL: SIGNIFICANT CHANGE UP
APTT BLD: 28.8 SEC — SIGNIFICANT CHANGE UP (ref 27.5–35.5)
APTT BLD: 30.6 SEC — SIGNIFICANT CHANGE UP (ref 27.5–35.5)
AST SERPL-CCNC: 28 U/L — SIGNIFICANT CHANGE UP (ref 10–40)
AST SERPL-CCNC: 31 U/L — SIGNIFICANT CHANGE UP (ref 10–40)
BASE EXCESS BLDV CALC-SCNC: -0.3 MMOL/L — SIGNIFICANT CHANGE UP (ref -2–3)
BASE EXCESS BLDV CALC-SCNC: 0.8 MMOL/L — SIGNIFICANT CHANGE UP (ref -2–3)
BASOPHILS # BLD AUTO: 0 K/UL — SIGNIFICANT CHANGE UP (ref 0–0.2)
BASOPHILS NFR BLD AUTO: 0 % — SIGNIFICANT CHANGE UP (ref 0–2)
BILIRUB SERPL-MCNC: 1.2 MG/DL — SIGNIFICANT CHANGE UP (ref 0.2–1.2)
BILIRUB SERPL-MCNC: 1.2 MG/DL — SIGNIFICANT CHANGE UP (ref 0.2–1.2)
BUN SERPL-MCNC: 63 MG/DL — HIGH (ref 7–23)
BUN SERPL-MCNC: 68 MG/DL — HIGH (ref 7–23)
CA-I SERPL-SCNC: 1.25 MMOL/L — SIGNIFICANT CHANGE UP (ref 1.15–1.33)
CALCIUM SERPL-MCNC: 8.7 MG/DL — SIGNIFICANT CHANGE UP (ref 8.4–10.5)
CALCIUM SERPL-MCNC: 9 MG/DL — SIGNIFICANT CHANGE UP (ref 8.4–10.5)
CHLORIDE SERPL-SCNC: 105 MMOL/L — SIGNIFICANT CHANGE UP (ref 96–108)
CHLORIDE SERPL-SCNC: 105 MMOL/L — SIGNIFICANT CHANGE UP (ref 96–108)
CO2 BLDV-SCNC: 25.3 MMOL/L — SIGNIFICANT CHANGE UP (ref 22–26)
CO2 BLDV-SCNC: 26.5 MMOL/L — HIGH (ref 22–26)
CO2 SERPL-SCNC: 22 MMOL/L — SIGNIFICANT CHANGE UP (ref 22–31)
CO2 SERPL-SCNC: 24 MMOL/L — SIGNIFICANT CHANGE UP (ref 22–31)
CREAT SERPL-MCNC: 2.46 MG/DL — HIGH (ref 0.5–1.3)
CREAT SERPL-MCNC: 2.46 MG/DL — HIGH (ref 0.5–1.3)
CULTURE RESULTS: SIGNIFICANT CHANGE UP
DACRYOCYTES BLD QL SMEAR: SLIGHT — SIGNIFICANT CHANGE UP
EGFR: 27 ML/MIN/1.73M2 — LOW
EGFR: 27 ML/MIN/1.73M2 — LOW
EOSINOPHIL # BLD AUTO: 0.09 K/UL — SIGNIFICANT CHANGE UP (ref 0–0.5)
EOSINOPHIL NFR BLD AUTO: 0.8 % — SIGNIFICANT CHANGE UP (ref 0–6)
GAS PNL BLDA: SIGNIFICANT CHANGE UP
GAS PNL BLDA: SIGNIFICANT CHANGE UP
GAS PNL BLDV: 137 MMOL/L — SIGNIFICANT CHANGE UP (ref 136–145)
GAS PNL BLDV: SIGNIFICANT CHANGE UP
GIANT PLATELETS BLD QL SMEAR: PRESENT — SIGNIFICANT CHANGE UP
GLUCOSE BLDC GLUCOMTR-MCNC: 230 MG/DL — HIGH (ref 70–99)
GLUCOSE BLDC GLUCOMTR-MCNC: 264 MG/DL — HIGH (ref 70–99)
GLUCOSE BLDC GLUCOMTR-MCNC: 268 MG/DL — HIGH (ref 70–99)
GLUCOSE BLDC GLUCOMTR-MCNC: 280 MG/DL — HIGH (ref 70–99)
GLUCOSE BLDC GLUCOMTR-MCNC: 297 MG/DL — HIGH (ref 70–99)
GLUCOSE BLDC GLUCOMTR-MCNC: 337 MG/DL — HIGH (ref 70–99)
GLUCOSE BLDC GLUCOMTR-MCNC: 341 MG/DL — HIGH (ref 70–99)
GLUCOSE BLDC GLUCOMTR-MCNC: 392 MG/DL — HIGH (ref 70–99)
GLUCOSE BLDC GLUCOMTR-MCNC: 393 MG/DL — HIGH (ref 70–99)
GLUCOSE SERPL-MCNC: 243 MG/DL — HIGH (ref 70–99)
GLUCOSE SERPL-MCNC: 324 MG/DL — HIGH (ref 70–99)
HCO3 BLDV-SCNC: 24 MMOL/L — SIGNIFICANT CHANGE UP (ref 22–29)
HCO3 BLDV-SCNC: 25 MMOL/L — SIGNIFICANT CHANGE UP (ref 22–29)
HCT VFR BLD CALC: 21.6 % — LOW (ref 39–50)
HCT VFR BLD CALC: 22.2 % — LOW (ref 39–50)
HGB BLD-MCNC: 6.9 G/DL — CRITICAL LOW (ref 13–17)
HGB BLD-MCNC: 6.9 G/DL — CRITICAL LOW (ref 13–17)
HYPOCHROMIA BLD QL: SIGNIFICANT CHANGE UP
INR BLD: 1.26 — HIGH (ref 0.88–1.16)
INR BLD: 1.31 — HIGH (ref 0.88–1.16)
LACTATE SERPL-SCNC: 0.9 MMOL/L — SIGNIFICANT CHANGE UP (ref 0.5–2)
LACTATE SERPL-SCNC: 1.2 MMOL/L — SIGNIFICANT CHANGE UP (ref 0.5–2)
LYMPHOCYTES # BLD AUTO: 0.19 K/UL — LOW (ref 1–3.3)
LYMPHOCYTES # BLD AUTO: 1.7 % — LOW (ref 13–44)
MACROCYTES BLD QL: SIGNIFICANT CHANGE UP
MAGNESIUM SERPL-MCNC: 2.3 MG/DL — SIGNIFICANT CHANGE UP (ref 1.6–2.6)
MAGNESIUM SERPL-MCNC: 2.3 MG/DL — SIGNIFICANT CHANGE UP (ref 1.6–2.6)
MANUAL SMEAR VERIFICATION: SIGNIFICANT CHANGE UP
MCHC RBC-ENTMCNC: 31.1 GM/DL — LOW (ref 32–36)
MCHC RBC-ENTMCNC: 31.9 GM/DL — LOW (ref 32–36)
MCHC RBC-ENTMCNC: 32.9 PG — SIGNIFICANT CHANGE UP (ref 27–34)
MCHC RBC-ENTMCNC: 33.7 PG — SIGNIFICANT CHANGE UP (ref 27–34)
MCV RBC AUTO: 105.4 FL — HIGH (ref 80–100)
MCV RBC AUTO: 105.7 FL — HIGH (ref 80–100)
MICROCYTES BLD QL: SLIGHT — SIGNIFICANT CHANGE UP
MONOCYTES # BLD AUTO: 0.58 K/UL — SIGNIFICANT CHANGE UP (ref 0–0.9)
MONOCYTES NFR BLD AUTO: 5.2 % — SIGNIFICANT CHANGE UP (ref 2–14)
MYELOCYTES NFR BLD: 0.9 % — HIGH (ref 0–0)
NEUTROPHILS # BLD AUTO: 10.15 K/UL — HIGH (ref 1.8–7.4)
NEUTROPHILS NFR BLD AUTO: 90.5 % — HIGH (ref 43–77)
NEUTS BAND # BLD: 0.9 % — SIGNIFICANT CHANGE UP (ref 0–8)
NRBC # BLD: 13 /100 WBCS — HIGH (ref 0–0)
NRBC # BLD: 7 /100 — HIGH (ref 0–0)
NRBC # BLD: SIGNIFICANT CHANGE UP /100 WBCS (ref 0–0)
OVALOCYTES BLD QL SMEAR: SLIGHT — SIGNIFICANT CHANGE UP
PCO2 BLDV: 38 MMHG — LOW (ref 42–55)
PCO2 BLDV: 39 MMHG — LOW (ref 42–55)
PH BLDV: 7.41 — SIGNIFICANT CHANGE UP (ref 7.32–7.43)
PH BLDV: 7.42 — SIGNIFICANT CHANGE UP (ref 7.32–7.43)
PHOSPHATE SERPL-MCNC: 3.2 MG/DL — SIGNIFICANT CHANGE UP (ref 2.5–4.5)
PHOSPHATE SERPL-MCNC: 3.4 MG/DL — SIGNIFICANT CHANGE UP (ref 2.5–4.5)
PLAT MORPH BLD: ABNORMAL
PLATELET # BLD AUTO: 159 K/UL — SIGNIFICANT CHANGE UP (ref 150–400)
PLATELET # BLD AUTO: 163 K/UL — SIGNIFICANT CHANGE UP (ref 150–400)
PO2 BLDV: 35 MMHG — SIGNIFICANT CHANGE UP (ref 25–45)
PO2 BLDV: 37 MMHG — SIGNIFICANT CHANGE UP (ref 25–45)
POIKILOCYTOSIS BLD QL AUTO: SLIGHT — SIGNIFICANT CHANGE UP
POLYCHROMASIA BLD QL SMEAR: SIGNIFICANT CHANGE UP
POTASSIUM BLDV-SCNC: 4.1 MMOL/L — SIGNIFICANT CHANGE UP (ref 3.5–5.1)
POTASSIUM SERPL-MCNC: 3.9 MMOL/L — SIGNIFICANT CHANGE UP (ref 3.5–5.3)
POTASSIUM SERPL-MCNC: 4.2 MMOL/L — SIGNIFICANT CHANGE UP (ref 3.5–5.3)
POTASSIUM SERPL-SCNC: 3.9 MMOL/L — SIGNIFICANT CHANGE UP (ref 3.5–5.3)
POTASSIUM SERPL-SCNC: 4.2 MMOL/L — SIGNIFICANT CHANGE UP (ref 3.5–5.3)
PROT SERPL-MCNC: 5.1 G/DL — LOW (ref 6–8.3)
PROT SERPL-MCNC: 5.2 G/DL — LOW (ref 6–8.3)
PROTHROM AB SERPL-ACNC: 15 SEC — HIGH (ref 10.5–13.4)
PROTHROM AB SERPL-ACNC: 15.6 SEC — HIGH (ref 10.5–13.4)
RBC # BLD: 2.05 M/UL — LOW (ref 4.2–5.8)
RBC # BLD: 2.1 M/UL — LOW (ref 4.2–5.8)
RBC # FLD: 22 % — HIGH (ref 10.3–14.5)
RBC # FLD: 22.7 % — HIGH (ref 10.3–14.5)
RBC BLD AUTO: ABNORMAL
SAO2 % BLDV: 61.7 % — LOW (ref 67–88)
SAO2 % BLDV: 65.4 % — LOW (ref 67–88)
SODIUM SERPL-SCNC: 139 MMOL/L — SIGNIFICANT CHANGE UP (ref 135–145)
SODIUM SERPL-SCNC: 140 MMOL/L — SIGNIFICANT CHANGE UP (ref 135–145)
SPECIMEN SOURCE: SIGNIFICANT CHANGE UP
SPHEROCYTES BLD QL SMEAR: SLIGHT — SIGNIFICANT CHANGE UP
WBC # BLD: 11.1 K/UL — HIGH (ref 3.8–10.5)
WBC # BLD: 11.45 K/UL — HIGH (ref 3.8–10.5)
WBC # FLD AUTO: 11.1 K/UL — HIGH (ref 3.8–10.5)
WBC # FLD AUTO: 11.45 K/UL — HIGH (ref 3.8–10.5)

## 2023-05-05 PROCEDURE — 99291 CRITICAL CARE FIRST HOUR: CPT

## 2023-05-05 PROCEDURE — 71045 X-RAY EXAM CHEST 1 VIEW: CPT | Mod: 26

## 2023-05-05 PROCEDURE — 99292 CRITICAL CARE ADDL 30 MIN: CPT

## 2023-05-05 RX ORDER — POTASSIUM CHLORIDE 20 MEQ
20 PACKET (EA) ORAL ONCE
Refills: 0 | Status: COMPLETED | OUTPATIENT
Start: 2023-05-05 | End: 2023-05-05

## 2023-05-05 RX ORDER — TACROLIMUS 5 MG/1
1 CAPSULE ORAL ONCE
Refills: 0 | Status: COMPLETED | OUTPATIENT
Start: 2023-05-05 | End: 2023-05-05

## 2023-05-05 RX ORDER — MILRINONE LACTATE 1 MG/ML
0.12 INJECTION, SOLUTION INTRAVENOUS
Qty: 20 | Refills: 0 | Status: DISCONTINUED | OUTPATIENT
Start: 2023-05-05 | End: 2023-05-06

## 2023-05-05 RX ORDER — BUMETANIDE 0.25 MG/ML
2 INJECTION INTRAMUSCULAR; INTRAVENOUS ONCE
Refills: 0 | Status: COMPLETED | OUTPATIENT
Start: 2023-05-05 | End: 2023-05-05

## 2023-05-05 RX ORDER — INSULIN HUMAN 100 [IU]/ML
1 INJECTION, SOLUTION SUBCUTANEOUS
Qty: 100 | Refills: 0 | Status: DISCONTINUED | OUTPATIENT
Start: 2023-05-05 | End: 2023-05-07

## 2023-05-05 RX ORDER — TACROLIMUS 5 MG/1
2 CAPSULE ORAL ONCE
Refills: 0 | Status: DISCONTINUED | OUTPATIENT
Start: 2023-05-05 | End: 2023-05-05

## 2023-05-05 RX ADMIN — LEVALBUTEROL 0.63 MILLIGRAM(S): 1.25 SOLUTION, CONCENTRATE RESPIRATORY (INHALATION) at 09:02

## 2023-05-05 RX ADMIN — PREGABALIN 1000 MICROGRAM(S): 225 CAPSULE ORAL at 11:08

## 2023-05-05 RX ADMIN — LEVALBUTEROL 0.63 MILLIGRAM(S): 1.25 SOLUTION, CONCENTRATE RESPIRATORY (INHALATION) at 06:32

## 2023-05-05 RX ADMIN — Medication 8 MILLIGRAM(S): at 12:00

## 2023-05-05 RX ADMIN — Medication 5 MILLIGRAM(S): at 06:37

## 2023-05-05 RX ADMIN — INSULIN HUMAN 6: 100 INJECTION, SOLUTION SUBCUTANEOUS at 06:45

## 2023-05-05 RX ADMIN — LEVALBUTEROL 0.63 MILLIGRAM(S): 1.25 SOLUTION, CONCENTRATE RESPIRATORY (INHALATION) at 22:59

## 2023-05-05 RX ADMIN — ATORVASTATIN CALCIUM 20 MILLIGRAM(S): 80 TABLET, FILM COATED ORAL at 21:08

## 2023-05-05 RX ADMIN — Medication 1 MILLIGRAM(S): at 11:08

## 2023-05-05 RX ADMIN — DEXMEDETOMIDINE HYDROCHLORIDE IN 0.9% SODIUM CHLORIDE 1.65 MICROGRAM(S)/KG/HR: 4 INJECTION INTRAVENOUS at 01:59

## 2023-05-05 RX ADMIN — Medication 4 MILLILITER(S): at 22:59

## 2023-05-05 RX ADMIN — Medication 8 MILLIGRAM(S): at 06:40

## 2023-05-05 RX ADMIN — TACROLIMUS 1 MILLIGRAM(S): 5 CAPSULE ORAL at 06:41

## 2023-05-05 RX ADMIN — Medication 100 MILLIGRAM(S): at 11:07

## 2023-05-05 RX ADMIN — DEXMEDETOMIDINE HYDROCHLORIDE IN 0.9% SODIUM CHLORIDE 1.65 MICROGRAM(S)/KG/HR: 4 INJECTION INTRAVENOUS at 17:35

## 2023-05-05 RX ADMIN — CHLORHEXIDINE GLUCONATE 1 APPLICATION(S): 213 SOLUTION TOPICAL at 06:40

## 2023-05-05 RX ADMIN — Medication 4 MILLILITER(S): at 06:33

## 2023-05-05 RX ADMIN — INSULIN HUMAN 10: 100 INJECTION, SOLUTION SUBCUTANEOUS at 17:49

## 2023-05-05 RX ADMIN — Medication 100 MILLIEQUIVALENT(S): at 06:37

## 2023-05-05 RX ADMIN — TACROLIMUS 1 MILLIGRAM(S): 5 CAPSULE ORAL at 11:00

## 2023-05-05 RX ADMIN — Medication 100 MILLIGRAM(S): at 23:14

## 2023-05-05 RX ADMIN — INSULIN HUMAN 8: 100 INJECTION, SOLUTION SUBCUTANEOUS at 11:25

## 2023-05-05 RX ADMIN — LEVALBUTEROL 0.63 MILLIGRAM(S): 1.25 SOLUTION, CONCENTRATE RESPIRATORY (INHALATION) at 15:39

## 2023-05-05 RX ADMIN — BUMETANIDE 2 MILLIGRAM(S): 0.25 INJECTION INTRAMUSCULAR; INTRAVENOUS at 15:24

## 2023-05-05 RX ADMIN — PANTOPRAZOLE SODIUM 40 MILLIGRAM(S): 20 TABLET, DELAYED RELEASE ORAL at 11:08

## 2023-05-05 RX ADMIN — Medication 8 MILLIGRAM(S): at 19:00

## 2023-05-05 RX ADMIN — Medication 4 MILLILITER(S): at 09:02

## 2023-05-05 RX ADMIN — TACROLIMUS 1 MILLIGRAM(S): 5 CAPSULE ORAL at 17:35

## 2023-05-05 RX ADMIN — CHLORHEXIDINE GLUCONATE 15 MILLILITER(S): 213 SOLUTION TOPICAL at 06:39

## 2023-05-05 RX ADMIN — CHLORHEXIDINE GLUCONATE 15 MILLILITER(S): 213 SOLUTION TOPICAL at 17:35

## 2023-05-05 RX ADMIN — Medication 4 MILLILITER(S): at 15:38

## 2023-05-05 RX ADMIN — MILRINONE LACTATE 4.94 MICROGRAM(S)/KG/MIN: 1 INJECTION, SOLUTION INTRAVENOUS at 11:10

## 2023-05-05 RX ADMIN — Medication 8 MILLIGRAM(S): at 11:08

## 2023-05-05 NOTE — PROGRESS NOTE ADULT - SUBJECTIVE AND OBJECTIVE BOX
LENGTH OF HOSPITAL STAY: 10d    SUBJECTIVE: RUE DVT    HISTORY OF PRESENTING ILLNESS:   78 y/o male with polycystic kidney disease s/p kidney transplant in 2007, HTN, HLD, DM2, Prostate cancer s/p radical prostatectomy 2015, DVT 2006 (in setting of pelvic fracture), and afib/atrial flutter s/p ablation in 1/2018, who is now off oral anticoagulation secondary to GI bleed and presents for DENNY occlusoin. Pt as no symptomsHe was admitted to [facility] 7/20[facility] with a GI bleed. He is a Jehovah's witness and refuses blood transfusions. Eliquis was stopped at that time.     KIMMIE reviewed from prior ablation- appears to have good anatomy for LAAO.     Patient seen in same day holding area; Reports no changes to PMHx or medications since last seen by our team. Denies acute or current SOB, chest pain, palpitation, N/V/D, fever/chills, recent illness, or any other concerning symptoms.  NYHA 1 Class (25 Apr 2023 07:57)    PAST MEDICAL & SURGICAL HISTORY:  Polycystic kidney disease  DM2 (diabetes mellitus, type 2)  HLD (hyperlipidemia)  HTN (hypertension)  Prostate cancer  Kidney transplant recipient  DVT (deep venous thrombosis)  Chronic CHF  H/O radical prostatectomy  2010  S/P hernia repair  Abdominal and inguinal around 2005    ALLERGIES:  naproxen (Hives)  Keishavajaguar&#x27;s Witness - No blood products (Unknown)    MEDICATIONS:  STANDING MEDICATIONS  acetylcysteine 10%  Inhalation 4 milliLiter(s) Inhalation every 6 hours  atorvastatin 20 milliGRAM(s) Oral at bedtime  ceFAZolin   IVPB      ceFAZolin   IVPB 500 milliGRAM(s) IV Intermittent every 12 hours  chlorhexidine 0.12% Liquid 15 milliLiter(s) Oral Mucosa every 12 hours  chlorhexidine 2% Cloths 1 Application(s) Topical <User Schedule>  cyanocobalamin 1000 MICROGram(s) Oral daily  dexMEDEtomidine Infusion 0.1 MICROgram(s)/kG/Hr IV Continuous <Continuous>  dextrose 5%. 1000 milliLiter(s) IV Continuous <Continuous>  dextrose 5%. 1000 milliLiter(s) IV Continuous <Continuous>  dextrose 5%. 1000 milliLiter(s) IV Continuous <Continuous>  dextrose 5%. 1000 milliLiter(s) IV Continuous <Continuous>  dextrose 50% Injectable 25 Gram(s) IV Push once  dextrose 50% Injectable 12.5 Gram(s) IV Push once  dextrose 50% Injectable 25 Gram(s) IV Push once  dextrose 50% Injectable 25 Gram(s) IV Push once  dextrose 50% Injectable 25 Gram(s) IV Push once  dextrose 50% Injectable 12.5 Gram(s) IV Push once  folic acid 1 milliGRAM(s) Oral daily  glucagon  Injectable 1 milliGRAM(s) IntraMuscular once  glucagon  Injectable 1 milliGRAM(s) IntraMuscular once  insulin regular  human corrective regimen sliding scale   SubCutaneous every 6 hours  levalbuterol Inhalation 0.63 milliGRAM(s) Inhalation every 6 hours  metolazone 5 milliGRAM(s) Oral daily  milrinone Infusion 0.125 MICROgram(s)/kG/Min IV Continuous <Continuous>  pantoprazole  Injectable 40 milliGRAM(s) IV Push daily  phenylephrine    Infusion 0.3 MICROgram(s)/kG/Min IV Continuous <Continuous>  predniSONE   Tablet 5 milliGRAM(s) Oral daily  propofol Infusion 10 MICROgram(s)/kG/Min IV Continuous <Continuous>  sodium chloride 0.9%. 1000 milliLiter(s) IV Continuous <Continuous>  tacrolimus 1 milliGRAM(s) Oral every 12 hours  testosterone patch 4 mG/24 Hr(s) 8 milliGRAM(s) Transdermal daily  vasopressin Infusion 0.01 Unit(s)/Min IV Continuous <Continuous>    PRN MEDICATIONS  dextrose Oral Gel 15 Gram(s) Oral once PRN  dextrose Oral Gel 15 Gram(s) Oral once PRN    VITALS:   T(F): 99.7  HR: 80  BP: --  RR: 18  SpO2: 100%    LABS:                        6.9    11.10 )-----------( 163      ( 05 May 2023 09:41 )             21.6     05-05    140  |  105  |  68<H>  ----------------------------<  324<H>  4.2   |  24  |  2.46<H>    Ca    8.7      05 May 2023 09:41  Phos  3.4     05-05  Mg     2.3     05-05    TPro  5.1<L>  /  Alb  3.2<L>  /  TBili  1.2  /  DBili  x   /  AST  28  /  ALT  10  /  AlkPhos  72  05-05    PT/INR - ( 05 May 2023 09:41 )   PT: 15.6 sec;   INR: 1.31        PTT - ( 05 May 2023 09:41 )  PTT:30.6 sec    ABG - ( 05 May 2023 09:41 )  pH, Arterial: 7.44  pH, Blood: x     /  pCO2: 35    /  pO2: 120   / HCO3: 24    / Base Excess: 0.1   /  SaO2: 98.7      Lactate, Blood: 1.2 mmol/L (05-05-23 @ 09:41)  Lactate, Blood: 0.9 mmol/L (05-05-23 @ 02:19)    Culture - Bronchial (collected 03 May 2023 14:39)  Source: Lavage Bronchial Lavage  Gram Stain (03 May 2023 16:51):    No epithelial cells seen    Few WBC's    Rare Gram Negative Rods  Final Report (05 May 2023 08:53):    Normal Respiratory Shelly present    RADIOLOGY:  < from: US Duplex Venous Upper Ext Ltd, Right (05.04.23 @ 20:42) >  Deep vein thrombosis in the proximal right brachial vein.  Superficial venous thrombosis in the right basilic vein.    < end of copied text >    PHYSICAL EXAM:  General: NAD  HEENT: Intubated  Neck: supple  Respiratory: CTA b/l, HFNC  Cardiovascular: +S1/S2; RRR, midline sternotomy bandaged, 2 drains with serosanguinous drainage  Abdomen: soft, NT/ND; +BS x4  Extremities: WWP, 2+ peripheral pulses b/l; no LE edema. swollen RUE   Skin: normal color and turgor; no rash  Neurological: AAOx0, sedated

## 2023-05-05 NOTE — PROGRESS NOTE ADULT - SUBJECTIVE AND OBJECTIVE BOX
CTICU  CRITICAL  CARE  attending     Hand off received 					   Pertinent clinical, laboratory, radiographic, hemodynamic, echocardiographic, respiratory data, microbiologic data and chart were reviewed and analyzed frequently throughout the course of the day and night        77 year old male with  HTN, HLD, DM, polycystic kidney disease s/p kidney transplant in 2007,, Prostate cancer s/p radical prostatectomy 2015, DVT 2006 (in setting of pelvic fracture).  He has history of atrial fibrillation and atrial flutter s/p ablation in 1/2018.  He has history of GI bleed in July 2022.  Eliquis was stopped at that time.   He is a Orthodox and refuses blood transfusions.    S/P percutaneous occlusion of the left atrial appendage.  S/P PEA arrest.  S/P drainage of pericardial effusion.  S/P Evacuation of left sided hemothorax.          HEALTH ISSUES - PROBLEM Dx:  Pericardial effusion with cardiac tamponade  Acute respiratory failure with hypoxia  Atelectasis  Ground glass opacity present on imaging of lung        FAMILY HISTORY:  PAST MEDICAL & SURGICAL HISTORY:  Polycystic kidney disease  DM2 (diabetes mellitus, type 2)  HLD (hyperlipidemia)  HTN (hypertension)  Prostate cancer  Kidney transplant recipient  DVT (deep venous thrombosis)  Chronic CHF  H/O radical prostatectomy in 2010  S/P hernia repair  Abdominal and inguinal around 2005          14 system review was unremarkable    Vital signs, hemodynamic and respiratory parameters were reviewed from the bedside nursing flow sheet.  ICU Vital Signs Last 24 Hrs  T(C): 36.9 (06 May 2023 01:11), Max: 38 (05 May 2023 12:00)  T(F): 98.5 (06 May 2023 01:11), Max: 100.4 (05 May 2023 12:00)  HR: 77 (06 May 2023 02:00) (12 - 92)  BP: --  BP(mean): --  ABP: 160/66 (06 May 2023 02:00) (129/61 - 160/66)  ABP(mean): 95 (06 May 2023 02:00) (80 - 98)  RR: 16 (06 May 2023 02:00) (12 - 19)  SpO2: 100% (06 May 2023 02:00) (96% - 100%)    O2 Parameters below as of 06 May 2023 02:00  Patient On (Oxygen Delivery Method): ventilator    O2 Concentration (%): 40      Adult Advanced Hemodynamics Last 24 Hrs  CVP(mm Hg): 11 (06 May 2023 02:00) (5 - 16)  CVP(cm H2O): --  CO: 5 (06 May 2023 02:00) (4.1 - 5.6)  CI: 2.8 (06 May 2023 02:00) (2.3 - 3.2)  PA: 63/24 (06 May 2023 02:00) (50/25 - 68/29)  PA(mean): 38 (06 May 2023 02:00) (32 - 45)  PCWP: --  SVR: 1342 (06 May 2023 02:00) (1121 - 1636)  SVRI: 2397 (06 May 2023 02:00) (1972 - 2918)  PVR: --  PVRI: --, ABG - ( 05 May 2023 09:41 )  pH, Arterial: 7.44  pH, Blood: x     /  pCO2: 35    /  pO2: 120   / HCO3: 24    / Base Excess: 0.1   /  SaO2: 98.7              Mode: AC/ CMV (Assist Control/ Continuous Mandatory Ventilation)  RR (machine): 12  TV (machine): 650  FiO2: 40  PEEP: 5  ITime: 1  MAP: 11  PIP: 20    Intake and output was reviewed and the fluid balance was calculated  Daily     Daily   I&O's Summary    04 May 2023 07:01  -  05 May 2023 07:00  --------------------------------------------------------  IN: 2712.3 mL / OUT: 2795 mL / NET: -82.7 mL    05 May 2023 07:01  -  06 May 2023 02:20  --------------------------------------------------------  IN: 1834.1 mL / OUT: 1990 mL / NET: -155.9 mL        All lines and drain sites were assessed    Neuro: No change in the mental status from the baseline. Follows commands. Moves all 4 extremities.  Neck: No JVD.  CVS: S1, S2, No S3.  Lungs: Good air entry bilaterally.  Abd: Soft. No tenderness. + Bowel sounds.  Vascular: + DP/PT.  Extremities: No edema.  Lymphatic: Normal.  Skin: No abnormalities.      labs  CBC Full  -  ( 05 May 2023 09:41 )  WBC Count : 11.10 K/uL  RBC Count : 2.05 M/uL  Hemoglobin : 6.9 g/dL  Hematocrit : 21.6 %  Platelet Count - Automated : 163 K/uL  Mean Cell Volume : 105.4 fl  Mean Cell Hemoglobin : 33.7 pg  Mean Cell Hemoglobin Concentration : 31.9 gm/dL  Auto Neutrophil # : 10.15 K/uL  Auto Lymphocyte # : 0.19 K/uL  Auto Monocyte # : 0.58 K/uL  Auto Eosinophil # : 0.09 K/uL  Auto Basophil # : 0.00 K/uL  Auto Neutrophil % : 90.5 %  Auto Lymphocyte % : 1.7 %  Auto Monocyte % : 5.2 %  Auto Eosinophil % : 0.8 %  Auto Basophil % : 0.0 %    05-05    140  |  105  |  68<H>  ----------------------------<  324<H>  4.2   |  24  |  2.46<H>    Ca    8.7      05 May 2023 09:41  Phos  3.4     05-05  Mg     2.3     05-05    TPro  5.1<L>  /  Alb  3.2<L>  /  TBili  1.2  /  DBili  x   /  AST  28  /  ALT  10  /  AlkPhos  72  05-05    PT/INR - ( 05 May 2023 09:41 )   PT: 15.6 sec;   INR: 1.31          PTT - ( 05 May 2023 09:41 )  PTT:30.6 sec  The current medications were reviewed   MEDICATIONS  (STANDING):  acetylcysteine 10%  Inhalation 4 milliLiter(s) Inhalation every 6 hours  atorvastatin 20 milliGRAM(s) Oral at bedtime  ceFAZolin   IVPB      ceFAZolin   IVPB 500 milliGRAM(s) IV Intermittent every 12 hours  chlorhexidine 0.12% Liquid 15 milliLiter(s) Oral Mucosa every 12 hours  chlorhexidine 2% Cloths 1 Application(s) Topical <User Schedule>  cyanocobalamin 1000 MICROGram(s) Oral daily  dexMEDEtomidine Infusion 0.1 MICROgram(s)/kG/Hr (1.65 mL/Hr) IV Continuous <Continuous>  dextrose 5%. 1000 milliLiter(s) (100 mL/Hr) IV Continuous <Continuous>  dextrose 5%. 1000 milliLiter(s) (50 mL/Hr) IV Continuous <Continuous>  dextrose 5%. 1000 milliLiter(s) (100 mL/Hr) IV Continuous <Continuous>  dextrose 5%. 1000 milliLiter(s) (50 mL/Hr) IV Continuous <Continuous>  dextrose 50% Injectable 25 Gram(s) IV Push once  dextrose 50% Injectable 12.5 Gram(s) IV Push once  dextrose 50% Injectable 25 Gram(s) IV Push once  dextrose 50% Injectable 25 Gram(s) IV Push once  dextrose 50% Injectable 12.5 Gram(s) IV Push once  dextrose 50% Injectable 25 Gram(s) IV Push once  folic acid 1 milliGRAM(s) Oral daily  glucagon  Injectable 1 milliGRAM(s) IntraMuscular once  glucagon  Injectable 1 milliGRAM(s) IntraMuscular once  insulin regular Infusion 1 Unit(s)/Hr (1 mL/Hr) IV Continuous <Continuous>  levalbuterol Inhalation 0.63 milliGRAM(s) Inhalation every 6 hours  metolazone 5 milliGRAM(s) Oral daily  milrinone Infusion 0.125 MICROgram(s)/kG/Min (2.47 mL/Hr) IV Continuous <Continuous>  pantoprazole  Injectable 40 milliGRAM(s) IV Push daily  phenylephrine    Infusion 0.3 MICROgram(s)/kG/Min (7.4 mL/Hr) IV Continuous <Continuous>  predniSONE   Tablet 5 milliGRAM(s) Oral daily  propofol Infusion 10 MICROgram(s)/kG/Min (3.95 mL/Hr) IV Continuous <Continuous>  sodium chloride 0.9%. 1000 milliLiter(s) (10 mL/Hr) IV Continuous <Continuous>  tacrolimus 1 milliGRAM(s) Oral every 12 hours  testosterone patch 4 mG/24 Hr(s) 8 milliGRAM(s) Transdermal daily  vasopressin Infusion 0.01 Unit(s)/Min (1.5 mL/Hr) IV Continuous <Continuous>    MEDICATIONS  (PRN):  dextrose Oral Gel 15 Gram(s) Oral once PRN Blood Glucose LESS THAN 70 milliGRAM(s)/deciliter  dextrose Oral Gel 15 Gram(s) Oral once PRN Blood Glucose LESS THAN 70 milliGRAM(s)/deciliter             77 years old male with history of atrial fibrillation and contraindications to anticoagulation.   S/P Occlusion of LA appendage.  Postoperative course complicated by PEA arrest, pericardial tamponade cardiogenic shock, respiratory failure, DAYO, Transaminitis, metabolic acidosis, DVT.  S/P Drainage of pericardial effusion.   S/P Evacuation of left hemothorax.  Acute on Chronic renal failure.  Reintubated for respiratory failure.  Hemodynamically stable.  Good oxygenation.  Borderline to Fair urine out put.  Uncontrolled DM.        My plan includes :  OPTIMIZE Glycemic control with intravenous insulin infusion.  WEAN vent as tolerated.  Enteral feeding.  Gentle diuresis.   Statin Rx.  Low dose milrinone.   Bronchodilator Rx.  Steroid Rx and prograf for renal transplant.  No anticoagulation for now (History of GI Bleeding in combination with Worship beliefs).  Hold procrit as advised by hematology.  Close hemodynamic, ventilatory and drain monitoring and management  Monitor for arrhythmias and monitor parameters for organ perfusion  Monitor neurologic status  Monitor renal function.  Head of the bed should remain elevated to 45 deg .   Chest PT and IS will be encouraged  Monitor adequacy of oxygenation and ventilation and attempt to wean oxygen  Nutritional goals will be met using po eventually , ensure adequate caloric intake and monitor the same  Stress ulcer and VTE prophylaxis will be achieved    Electrolytes have been repleted as necessary and wound care has been carried out. Pain control has been achieved.   Aggressive physical therapy and early mobility and ambulation goals will be met   The family was updated about the course and plan  CRITICAL CARE TIME SPENT in evaluation and management, reassessments, review and interpretation of labs and x-rays, ventilator and hemodynamic management, formulating a plan and coordinating care: ___30____ MIN.  Time does not include procedural time.  CTICU ATTENDING     					    Antwan Colon MD

## 2023-05-05 NOTE — PROVIDER CONTACT NOTE (OTHER) - ACTION/TREATMENT ORDERED:
Provider aware; draw labs per provider.  1 lab draw per day is not feasible since tacri levels are BID.  Draw next tacri level before evening dose. Provider aware; draw labs per provider.  1 lab draw per day is not feasible since tacrolimus levels are BID.  Draw next tacrolimus level before evening dose.

## 2023-05-05 NOTE — PROVIDER CONTACT NOTE (CRITICAL VALUE NOTIFICATION) - SITUATION
Unchanged from previous lab draw earlier this morning.
HGB improving
post-op Amulet device and pericardial effusion
s/p Amulet device c/b PEA arrest 2/2 effusion; underwent sternotomy washout with clot evac

## 2023-05-05 NOTE — PROGRESS NOTE ADULT - SUBJECTIVE AND OBJECTIVE BOX
Patient is a 77y Male seen and evaluated at bedside. Intubated, HDS stable on pressors. Hgb 6.9 today, UO 2.7/24hrs, with sCr stable at 2.45, [place] level 2.4.       Meds:    acetylcysteine 10%  Inhalation 4 every 6 hours  atorvastatin 20 at bedtime  ceFAZolin   IVPB    ceFAZolin   IVPB 500 every 12 hours  chlorhexidine 0.12% Liquid 15 every 12 hours  chlorhexidine 2% Cloths 1 <User Schedule>  cyanocobalamin 1000 daily  dexMEDEtomidine Infusion 0.1 <Continuous>  dextrose 5%. 1000 <Continuous>  dextrose 5%. 1000 <Continuous>  dextrose 5%. 1000 <Continuous>  dextrose 5%. 1000 <Continuous>  dextrose 50% Injectable 12.5 once  dextrose 50% Injectable 25 once  dextrose 50% Injectable 12.5 once  dextrose 50% Injectable 25 once  dextrose 50% Injectable 25 once  dextrose 50% Injectable 25 once  dextrose Oral Gel 15 once PRN  dextrose Oral Gel 15 once PRN  epoetin annalee-epbx (RETACRIT) Injectable 82097 <User Schedule>  folic acid 1 daily  glucagon  Injectable 1 once  glucagon  Injectable 1 once  insulin regular  human corrective regimen sliding scale  every 6 hours  levalbuterol Inhalation 0.63 every 6 hours  metolazone 5 daily  milrinone Infusion 0.25 <Continuous>  pantoprazole  Injectable 40 daily  phenylephrine    Infusion 0.3 <Continuous>  predniSONE   Tablet 5 daily  propofol Infusion 10 <Continuous>  sodium chloride 0.9%. 1000 <Continuous>  tacrolimus 1 every 12 hours  tacrolimus 1 once  testosterone patch 4 mG/24 Hr(s) 8 daily  vasopressin Infusion 0.01 <Continuous>      T(C): , Max: 37.7 (05-04-23 @ 13:00)  T(F): , Max: 99.9 (05-04-23 @ 13:00)  HR: 79 (05-05-23 @ 10:00)  BP: --  BP(mean): --  RR: 17 (05-05-23 @ 10:00)  SpO2: 100% (05-05-23 @ 10:00)  Wt(kg): --    05-04 @ 07:01  -  05-05 @ 07:00  --------------------------------------------------------  IN: 2712.3 mL / OUT: 2795 mL / NET: -82.7 mL    05-05 @ 07:01  -  05-05 @ 10:57  --------------------------------------------------------  IN: 248.4 mL / OUT: 315 mL / NET: -66.6 mL          Review of Systems:  ROS negative except as per HPI      PHYSICAL EXAM:  GENERAL: Intubated, sedated   CHEST/LUNG: Decreased breath sounds, sternotomy scar c/d/i, left side chest tube   HEART: normal S1S2, RRR  ABDOMEN: Soft, Nontender, +BS, No flank tenderness bilateral  : Tucker with wine colored urine   EXTREMITIES: No clubbing, cyanosis,2+ pitting edema b/l LE   NEUROLOGY: no focal neurological deficits     LABS:                        6.9    11.10 )-----------( 163      ( 05 May 2023 09:41 )             21.6     05-05    140  |  105  |  68<H>  ----------------------------<  324<H>  4.2   |  24  |  2.46<H>    Ca    8.7      05 May 2023 09:41  Phos  3.4     05-05  Mg     2.3     05-05    TPro  5.1<L>  /  Alb  3.2<L>  /  TBili  1.2  /  DBili  x   /  AST  28  /  ALT  10  /  AlkPhos  72  05-05      PT/INR - ( 05 May 2023 09:41 )   PT: 15.6 sec;   INR: 1.31          PTT - ( 05 May 2023 09:41 )  PTT:30.6 sec          RADIOLOGY & ADDITIONAL STUDIES:

## 2023-05-05 NOTE — PROVIDER CONTACT NOTE (CRITICAL VALUE NOTIFICATION) - ACTION/TREATMENT ORDERED:
Provider aware, no new orders.
Providers aware. Pt refuses blood products at this time.
give IVPB iron sucrose, limit blood draws
no new orders

## 2023-05-05 NOTE — PROGRESS NOTE ADULT - ASSESSMENT
77 M with past medical history of PCKD s/p kidney tx DDKT 2007, HTN, T2DM, Prostate cancer s/p radical prostatectomy 2015, DVT 2006 (in setting of pelvic fracture), and afib/atrial flutter s/p ablation in 2018, who is now off oral anticoagulation secondary to GI bleed (7/2022) and presents for DENNY occlusion. S/p DENNY occlusion 4/25 c/b cardiac tamponade and PEA arrest requiring bedside pericardiocentesis/ OR for sternotomy and local exploration 4/26. S/p > 1 L left hemothorax evacuated in OR and additional blood loss via chest tubes. Emergent stabilization in CTICU. Hematology consulted now for anemia and thrombocytopenia in [facility].    # Acute blood loss anemia and thrombocytopenia  Pre-operatively patient with hemoglobin 14 and platelet 240's. Hospital course complicated by cardiac tamponade and PEA arrest requiring emergent stabilization in CTICU. Acute drop in hemoglobin and platelets corresponds to episode of acute blood loss around time of tamponade and arrest. Received FEIBA 1000 units and protamine 50 mg which may explain mild coagulopathy. Patient reports receiving EPO and iron infusions at other hospitalizations without complication. He tolerated EPO dose and iron infusion on 4/26  - Hemoglobin trend: post-operatively has been decreasing with serosanguinous drain output.     Plan:  - s/p iron 300 mg daily x 4. Last dose on 05/03. Iron studies performed prior to last Venofer infusion already showed adequate iron stores (Iron 30, TSat 42%, Ferritin 594).   - s/p EPO 13,000 units on 4/26  - s/p EPO 20,000 units QD (04/28-05/05), stop further doses of EPO for now given increased thrombotic risk. He is found to have a DVT of proximal right brachial vein and superficial venous thrombosis in the right basilic vein (05/04/23), possibly provoked by multiple lines placed. .    - Continue with PO Folate 1 mg QD and Vitamin B12 1000 mcg QD  - Trend CBC and coagulation panel using pediatric tubes per CT Surgery team. Minimize blood draws   - If worsening or emergent bleeding, patient's accepted product list includes Kcentra and cryoprecipitate    Discussed with Dr. Danny Guadalupe 77 M with past medical history of PCKD s/p kidney tx DDKT 2007, HTN, T2DM, Prostate cancer s/p radical prostatectomy 2015, DVT 2006 (in setting of pelvic fracture), and afib/atrial flutter s/p ablation in 2018, who is now off oral anticoagulation secondary to GI bleed (7/2022) and presents for DENNY occlusion. S/p DENNY occlusion 4/25 c/b cardiac tamponade and PEA arrest requiring bedside pericardiocentesis/ OR for sternotomy and local exploration 4/26. S/p > 1 L left hemothorax evacuated in OR and additional blood loss via chest tubes. Emergent stabilization in CTICU. Hematology consulted now for anemia and thrombocytopenia in [facility].    # Acute blood loss anemia and thrombocytopenia  Pre-operatively patient with hemoglobin 14 and platelet 240's. Hospital course complicated by cardiac tamponade and PEA arrest requiring emergent stabilization in CTICU. Acute drop in hemoglobin and platelets corresponds to episode of acute blood loss around time of tamponade and arrest. Received FEIBA 1000 units and protamine 50 mg which may explain mild coagulopathy. Patient reports receiving EPO and iron infusions at other hospitalizations without complication. He tolerated EPO dose and iron infusion on 4/26  - Hemoglobin trend: post-operatively has been decreasing with serosanguinous drain output.     Plan:  - s/p iron 300 mg daily x 4. Last dose on 05/03. Iron studies performed prior to last Venofer infusion already showed adequate iron stores (Iron 30, TSat 42%, Ferritin 594).   - s/p EPO 13,000 units on 4/26  - s/p EPO 20,000 units QD (04/28-05/05), stop further doses of EPO for now given increased thrombotic risk. He is found to have a DVT of proximal right brachial vein and superficial venous thrombosis in the right basilic vein (05/04/23), possibly provoked by multiple lines placed. Ideally, full anticoagulation for the treatment of RUE DVT should be initiated; however, given patient preference to not receive transfusions and severe anemia, primary team holding off for now until patient is more stabilized.   - Continue with PO Folate 1 mg QD and Vitamin B12 1000 mcg QD  - Trend CBC and coagulation panel using pediatric tubes per CT Surgery team. Minimize blood draws   - If worsening or emergent bleeding, patient's accepted product list includes Kcentra and cryoprecipitate    Discussed with Dr. Danny Guadalupe 77 M with past medical history of PCKD s/p kidney tx DDKT 2007, HTN, T2DM, Prostate cancer s/p radical prostatectomy 2015, DVT 2006 (in setting of pelvic fracture), and afib/atrial flutter s/p ablation in 2018, who is now off oral anticoagulation secondary to GI bleed (7/2022) and presents for DENNY occlusion. S/p DENNY occlusion 4/25 c/b cardiac tamponade and PEA arrest requiring bedside pericardiocentesis/ OR for sternotomy and local exploration 4/26. S/p > 1 L left hemothorax evacuated in OR and additional blood loss via chest tubes. Emergent stabilization in CTICU. Hematology consulted now for anemia and thrombocytopenia in [facility].    # Acute blood loss anemia and thrombocytopenia  Pre-operatively patient with hemoglobin 14 and platelet 240's. Hospital course complicated by cardiac tamponade and PEA arrest requiring emergent stabilization in CTICU. Acute drop in hemoglobin and platelets corresponds to episode of acute blood loss around time of tamponade and arrest. Received FEIBA 1000 units and protamine 50 mg which may explain mild coagulopathy. Patient reports receiving EPO and iron infusions at other hospitalizations without complication. He tolerated EPO dose and iron infusion on 4/26  - Hemoglobin trend: post-operatively had been decreasing with serosanguinous drain output. Now stable/improving    Plan:  - s/p iron 300 mg daily x 4. Last dose on 05/03. Iron studies performed prior to last Venofer infusion already showed adequate iron stores (Iron 30, TSat 42%, Ferritin 594).   - s/p EPO 13,000 units on 4/26  - s/p EPO 20,000 units QD (04/28-05/05), stop further doses of EPO for now given increased thrombotic risk. He is found to have a DVT of proximal right brachial vein and superficial venous thrombosis in the right basilic vein (05/04/23), possibly provoked by multiple lines placed. Ideally, full anticoagulation for the treatment of RUE DVT should be initiated; however, given patient preference to not receive transfusions and severe anemia, primary team holding off for now until patient is more stabilized.   - Continue with PO Folate 1 mg QD and Vitamin B12 1000 mcg QD  - Trend CBC and coagulation panel using pediatric tubes per CT Surgery team. Minimize blood draws   - If worsening or emergent bleeding, patient's accepted product list includes Kcentra and cryoprecipitate    Discussed with Dr. Danny Guadalupe

## 2023-05-05 NOTE — PROGRESS NOTE ADULT - ASSESSMENT
77Y M w/ CKD s/p renal transplant 2007 h/o PKD h/c c/b cardiogenic shock with worsening renal failure initially oliguric, started on pressors with improvement in BP and urine output ,  now non-oliguric with 2.7L UO/24hrs with sCr stable at 2.45 with receiving intermittent does of bumex and metolazone         #Non-Oliguric iATN on underlying CKD 3 (baseline sCr at 1.2-1.4)   Latoya pre-renal initially but given prolonged hypoperfusion developed into iATN   sCr now at 2.45  Urine Na at 36, no UA   Hematuria noted in buckley   Last 24hr UOP 2.7 L net neg 100mL/24 hours s/p bumex pushes   s/p multiple doses of albumin 5/2      Plan:  Please obtain UA with Urine Na, Urine Cr and Urine Urea   Improved HDS, with good urine output, continue with diuresis   Strict in and outs   Resume tacro at 2mg AM dose and 1MG PM dose   Continue with prednisone   Please ensure that trough level should be drawn 30 minutes before the next dose in AM  Goal tacro level 4.5-5  Ensure MAP>70s for adequate renal perfusion and recovery  Discused with CTICU, will closely monitor electrolytes and vol status to see if patient would need RRT. Risk with HD and CVVHD will be in case of extracorporeal system clotting, will lower his Hb even more. PD is an option to consider but will have a delay between PD catheter and starting PD, will discuss more  Maintain MAP >70 for renal perfusion  Can continue with EPO 20,000 units once daily

## 2023-05-05 NOTE — PROGRESS NOTE ADULT - SUBJECTIVE AND OBJECTIVE BOX
CTICU  CRITICAL  CARE  attending     Hand off received 					   Pertinent clinical, laboratory, radiographic, hemodynamic, echocardiographic, respiratory data, microbiologic data and chart were reviewed and analyzed frequently throughout the course of the day  Patient seen and examined with CTS/ SH attending at bedside  Pt is a 77yr old male with PMH PKD sp kidney transplant (), HTN, HLD, DM, prostate ca sp radical prostatectomy (), DVT in setting of pelvic fx (), afib/flutter sp ablation (, off AC), admitted for surgical mgmt. Patient underwent left atrial appendage occlusion percutaneously with Dr. Sidhu 23. Arrived to the stepdown extubated on no drips. Overnight had PEA arrest, requiring ACLS and intubation. Tx to ICU, bedside TTE showing pericardial effusion with tamponade. Bedside procedure with 600cc blood aspirated. Given 2 units pRBC. Taken to OR early AM for sternotomy with 1.5L L hemothorax evacuated. Arrived intubated on epi, levo, vaso, fuad. Switched to dobutamine and weaned off some pressors. Initially had lactic acidosis, now resolved. CPAP'd overnight. : extubated,  off, Bijan weaned.  swan dc, Bijan weaned.  R pigtail placed for effusion, 850cc out. Placed on BIPAP for increased wob and atelectasis.  new R subclavian central line placed. Given bumex for diuresis, buckley placed.  worsening Cr, RIJ [place] placed,  and primacor started, bumex given, underwent awake bronch, broad spectrum abx started. Dobhoff placed for nutrition. Vasopressin added. 5/2Given albumins throughout day, low urine output, given bumex towards end of day. Abx deescalated to ceftriaxone. Tacro on hold. Fuad started to achieve elevated MAPs for renal perfusion. 5/3 intubated, Vancomycin started for MSSA in BAL.  Tacro restarted.       FAMILY HISTORY:  PAST MEDICAL & SURGICAL HISTORY:  Polycystic kidney disease  DM2 (diabetes mellitus, type 2)  HLD (hyperlipidemi  HTN (hypertension)  Prostate cancer  Kidney transplant recipient  DVT (deep venous thrombosis)  Chronic CHF  H/O radical prostatectomy    S/P hernia repair  Abdominal and inguinal around         Patient is a 77y old  Male who presents with a chief complaint of percutaneous DENNY closure.      14 system review was unremarkable    Vital signs, hemodynamic and respiratory parameters were reviewed from the bedside nursing flowsheet.  ICU Vital Signs Last 24 Hrs  T(C): 37 (05 May 2023 05:05), Max: 37.7 (04 May 2023 13:00)  T(F): 98.6 (05 May 2023 05:05), Max: 99.9 (04 May 2023 13:00)  HR: 68 (05 May 2023 09:06) (12 - 86)  BP: --  BP(mean): --  ABP: 144/63 (05 May 2023 09:00) (123/51 - 167/69)  ABP(mean): 88 (05 May 2023 09:00) (71 - 99)  RR: 16 (05 May 2023 09:00) (8 - 22)  SpO2: 100% (05 May 2023 09:06) (96% - 100%)    O2 Parameters below as of 05 May 2023 09:06  Patient On (Oxygen Delivery Method): ventilator          Adult Advanced Hemodynamics Last 24 Hrs  CVP(mm Hg): 9 (05 May 2023 09:00) (4 - 14)  CVP(cm H2O): --  CO: 4.3 (05 May 2023 09:00) (4.1 - 5.6)  CI: 2.4 (05 May 2023 09:00) (2.3 - 3.2)  PA: 58/21 (05 May 2023 09:00) (50/20 - 66/26)  PA(mean): 36 (05 May 2023 09:00) (30 - 41)  PCWP: --  SVR: 1467 (05 May 2023 09:00) (1199 - 1636)  SVRI: 2630 (05 May 2023 09:00) (2129 - 2918)  PVR: --  PVRI: --, ABG - ( 05 May 2023 02:24 )  pH, Arterial: 7.45  pH, Blood: x     /  pCO2: 34    /  pO2: 128   / HCO3: 24    / Base Excess: 0.1   /  SaO2: 97.9              Mode: AC/ CMV (Assist Control/ Continuous Mandatory Ventilation)  RR (machine): 12  TV (machine): 650  FiO2: 40  PEEP: 5  ITime: 1  MAP: 9  PIP: 21    Intake and output was reviewed and the fluid balance was calculated  Daily     Daily   I&O's Summary    04 May 2023 07:01  -  05 May 2023 07:00  --------------------------------------------------------  IN: 2712.3 mL / OUT: 2795 mL / NET: -82.7 mL    05 May 2023 07:01  -  05 May 2023 09:39  --------------------------------------------------------  IN: 165.6 mL / OUT: 230 mL / NET: -64.4 mL        All lines and drain sites were assessed  Glycemic trend was reviewed    POCT Blood Glucose.: 280 mg/dL (05 May 2023 06:44)      Neuro: sedated  HEENT: ett  Heart: s1 s2  Lungs: decreased throughout  Abdomen: soft nt nd  Extremities: wwp    Lines:  R subclavian TLC 4/30  r radial arterial line 5/1  RIJ Cordis with [place] 5/1    Tubes:  L CT  R pigtail      labs  CBC Full  -  ( 05 May 2023 02:19 )  WBC Count : 11.45 K/uL  RBC Count : 2.10 M/uL  Hemoglobin : 6.9 g/dL  Hematocrit : 22.2 %  Platelet Count - Automated : 159 K/uL  Mean Cell Volume : 105.7 fl  Mean Cell Hemoglobin : 32.9 pg  Mean Cell Hemoglobin Concentration : 31.1 gm/dL  Auto Neutrophil # : x  Auto Lymphocyte # : x  Auto Monocyte # : x  Auto Eosinophil # : x  Auto Basophil # : x  Auto Neutrophil % : x  Auto Lymphocyte % : x  Auto Monocyte % : x  Auto Eosinophil % : x  Auto Basophil % : x    05    139  |  105  |  63<H>  ----------------------------<  243<H>  3.9   |  22  |  2.46<H>    Ca    9.0      05 May 2023 02:19  Phos  3.2     05-05  Mg     2.3     05-    TPro  5.2<L>  /  Alb  3.1<L>  /  TBili  1.2  /  DBili  x   /  AST  31  /  ALT  11  /  AlkPhos  72  -    PT/INR - ( 05 May 2023 02:19 )   PT: 15.0 sec;   INR: 1.26          PTT - ( 05 May 2023 02:19 )  PTT:28.8 sec  The current medications were reviewed   MEDICATIONS  (STANDING):  acetylcysteine 10%  Inhalation 4 milliLiter(s) Inhalation every 6 hours  atorvastatin 20 milliGRAM(s) Oral at bedtime  ceFAZolin   IVPB      ceFAZolin   IVPB 500 milliGRAM(s) IV Intermittent every 12 hours  chlorhexidine 0.12% Liquid 15 milliLiter(s) Oral Mucosa every 12 hours  chlorhexidine 2% Cloths 1 Application(s) Topical <User Schedule>  cyanocobalamin 1000 MICROGram(s) Oral daily  dexMEDEtomidine Infusion 0.1 MICROgram(s)/kG/Hr (1.65 mL/Hr) IV Continuous <Continuous>  dextrose 5%. 1000 milliLiter(s) (100 mL/Hr) IV Continuous <Continuous>  dextrose 5%. 1000 milliLiter(s) (50 mL/Hr) IV Continuous <Continuous>  dextrose 5%. 1000 milliLiter(s) (50 mL/Hr) IV Continuous <Continuous>  dextrose 5%. 1000 milliLiter(s) (100 mL/Hr) IV Continuous <Continuous>  dextrose 50% Injectable 25 Gram(s) IV Push once  dextrose 50% Injectable 25 Gram(s) IV Push once  dextrose 50% Injectable 12.5 Gram(s) IV Push once  dextrose 50% Injectable 25 Gram(s) IV Push once  dextrose 50% Injectable 25 Gram(s) IV Push once  dextrose 50% Injectable 12.5 Gram(s) IV Push once  epoetin annalee-epbx (RETACRIT) Injectable 74918 Unit(s) IV Push <User Schedule>  folic acid 1 milliGRAM(s) Oral daily  glucagon  Injectable 1 milliGRAM(s) IntraMuscular once  glucagon  Injectable 1 milliGRAM(s) IntraMuscular once  insulin regular  human corrective regimen sliding scale   SubCutaneous every 6 hours  levalbuterol Inhalation 0.63 milliGRAM(s) Inhalation every 6 hours  metolazone 5 milliGRAM(s) Oral daily  milrinone Infusion 0.25 MICROgram(s)/kG/Min (4.94 mL/Hr) IV Continuous <Continuous>  pantoprazole  Injectable 40 milliGRAM(s) IV Push daily  phenylephrine    Infusion 0.3 MICROgram(s)/kG/Min (7.4 mL/Hr) IV Continuous <Continuous>  predniSONE   Tablet 5 milliGRAM(s) Oral daily  propofol Infusion 10 MICROgram(s)/kG/Min (3.95 mL/Hr) IV Continuous <Continuous>  sodium chloride 0.9%. 1000 milliLiter(s) (10 mL/Hr) IV Continuous <Continuous>  tacrolimus 1 milliGRAM(s) Oral once  tacrolimus 1 milliGRAM(s) Oral every 12 hours  testosterone patch 4 mG/24 Hr(s) 8 milliGRAM(s) Transdermal daily  vasopressin Infusion 0.01 Unit(s)/Min (1.5 mL/Hr) IV Continuous <Continuous>    MEDICATIONS  (PRN):  dextrose Oral Gel 15 Gram(s) Oral once PRN Blood Glucose LESS THAN 70 milliGRAM(s)/deciliter  dextrose Oral Gel 15 Gram(s) Oral once PRN Blood Glucose LESS THAN 70 milliGRAM(s)/deciliter      Assessment/Plan:  77yr old male with PMH PKD sp kidney transplant (), HTN, HLD, DM, prostate ca sp radical prostatectomy (), DVT in setting of pelvic fx (), afib/flutter sp ablation (, off AC), admitted for surgical mgmt. Patient is a [employer].    POD10 left atrial appendage occlusion percutaneously ([place], 23)  POD9 RTOR for L hemothorax evacuation (23)  MAP goal 80 with vaso, fuad  Acute respiratory failure requiring mechanical ventilation-wean as tolerated  Bumex pushes for diuresis  DAYO  Cardiogenic shock on primacor-wean as tolerated  Serial CI/CO  Trend end organ perfusion markers  Monitor urine output  Acute post operative anemia-trend H/H  Thrombocytopenia-monitor  Continue IS for renal transplant  Appreciate heme/onc recs  Diet as tolerated  Replete lytes prn  Monitor CT output  GI/DVT PPX  Bowel Regimen  Pain control  Close hemodynamic, ventilatory and drain monitoring and management per post op routine  Monitor for arrhythmias and monitor parameters for organ perfusion  Beta blockade not administered due to hemodynamic instability and bradycardia  Monitor neurologic status  Head of the bed should remain elevated to 45 deg   Chest PT and IS will be encouraged  Monitor adequacy of oxygenation and ventilation and attempt to wean oxygen  Antibiotic regimen will be tailored to the clinical, laboratory and microbiologic data  Nutritional goals will be met using po eventually, ensure adequate caloric intake and monitor the same  Stress ulcer and VTE prophylaxis will be achieved    Glycemic control is satisfactory  Electrolytes have been repleted as necessary and wound care has been carried out   Pain control has been achieved.   Aggressive physical therapy and early mobility and ambulation goals will be met   The family was updated about the course and plan.    CRITICAL CARE TIME personally provided by me  in evaluation and management, reassessments, review and interpretation of labs and x-rays, ventilator and hemodynamic management, formulating a plan and coordinating care: ___105____ MIN.  Time does not include procedural time.    CTICU ATTENDING     					  Carmen Ortega MD

## 2023-05-06 LAB
ALBUMIN SERPL ELPH-MCNC: 3.1 G/DL — LOW (ref 3.3–5)
ALP SERPL-CCNC: 71 U/L — SIGNIFICANT CHANGE UP (ref 40–120)
ALT FLD-CCNC: 10 U/L — SIGNIFICANT CHANGE UP (ref 10–45)
ANION GAP SERPL CALC-SCNC: 10 MMOL/L — SIGNIFICANT CHANGE UP (ref 5–17)
APPEARANCE UR: ABNORMAL
AST SERPL-CCNC: 31 U/L — SIGNIFICANT CHANGE UP (ref 10–40)
BACTERIA # UR AUTO: SIGNIFICANT CHANGE UP /HPF
BASE EXCESS BLDV CALC-SCNC: 1.5 MMOL/L — SIGNIFICANT CHANGE UP (ref -2–3)
BILIRUB SERPL-MCNC: 1 MG/DL — SIGNIFICANT CHANGE UP (ref 0.2–1.2)
BILIRUB UR-MCNC: NEGATIVE — SIGNIFICANT CHANGE UP
BUN SERPL-MCNC: 75 MG/DL — HIGH (ref 7–23)
CA-I SERPL-SCNC: 1.28 MMOL/L — SIGNIFICANT CHANGE UP (ref 1.15–1.33)
CALCIUM SERPL-MCNC: 9 MG/DL — SIGNIFICANT CHANGE UP (ref 8.4–10.5)
CHLORIDE SERPL-SCNC: 106 MMOL/L — SIGNIFICANT CHANGE UP (ref 96–108)
CO2 BLDV-SCNC: 27.9 MMOL/L — HIGH (ref 22–26)
CO2 SERPL-SCNC: 25 MMOL/L — SIGNIFICANT CHANGE UP (ref 22–31)
COLOR SPEC: SIGNIFICANT CHANGE UP
COMMENT - URINE: SIGNIFICANT CHANGE UP
CREAT ?TM UR-MCNC: 29 MG/DL — SIGNIFICANT CHANGE UP
CREAT SERPL-MCNC: 2.54 MG/DL — HIGH (ref 0.5–1.3)
CULTURE RESULTS: SIGNIFICANT CHANGE UP
DIFF PNL FLD: ABNORMAL
EGFR: 26 ML/MIN/1.73M2 — LOW
EPI CELLS # UR: SIGNIFICANT CHANGE UP /HPF (ref 0–5)
GAS PNL BLDA: SIGNIFICANT CHANGE UP
GAS PNL BLDV: 141 MMOL/L — SIGNIFICANT CHANGE UP (ref 136–145)
GAS PNL BLDV: SIGNIFICANT CHANGE UP
GLUCOSE BLDC GLUCOMTR-MCNC: 101 MG/DL — HIGH (ref 70–99)
GLUCOSE BLDC GLUCOMTR-MCNC: 107 MG/DL — HIGH (ref 70–99)
GLUCOSE BLDC GLUCOMTR-MCNC: 110 MG/DL — HIGH (ref 70–99)
GLUCOSE BLDC GLUCOMTR-MCNC: 115 MG/DL — HIGH (ref 70–99)
GLUCOSE BLDC GLUCOMTR-MCNC: 117 MG/DL — HIGH (ref 70–99)
GLUCOSE BLDC GLUCOMTR-MCNC: 122 MG/DL — HIGH (ref 70–99)
GLUCOSE BLDC GLUCOMTR-MCNC: 123 MG/DL — HIGH (ref 70–99)
GLUCOSE BLDC GLUCOMTR-MCNC: 125 MG/DL — HIGH (ref 70–99)
GLUCOSE BLDC GLUCOMTR-MCNC: 128 MG/DL — HIGH (ref 70–99)
GLUCOSE BLDC GLUCOMTR-MCNC: 129 MG/DL — HIGH (ref 70–99)
GLUCOSE BLDC GLUCOMTR-MCNC: 134 MG/DL — HIGH (ref 70–99)
GLUCOSE BLDC GLUCOMTR-MCNC: 137 MG/DL — HIGH (ref 70–99)
GLUCOSE BLDC GLUCOMTR-MCNC: 140 MG/DL — HIGH (ref 70–99)
GLUCOSE BLDC GLUCOMTR-MCNC: 140 MG/DL — HIGH (ref 70–99)
GLUCOSE BLDC GLUCOMTR-MCNC: 142 MG/DL — HIGH (ref 70–99)
GLUCOSE BLDC GLUCOMTR-MCNC: 144 MG/DL — HIGH (ref 70–99)
GLUCOSE BLDC GLUCOMTR-MCNC: 147 MG/DL — HIGH (ref 70–99)
GLUCOSE BLDC GLUCOMTR-MCNC: 151 MG/DL — HIGH (ref 70–99)
GLUCOSE BLDC GLUCOMTR-MCNC: 151 MG/DL — HIGH (ref 70–99)
GLUCOSE BLDC GLUCOMTR-MCNC: 154 MG/DL — HIGH (ref 70–99)
GLUCOSE BLDC GLUCOMTR-MCNC: 173 MG/DL — HIGH (ref 70–99)
GLUCOSE BLDC GLUCOMTR-MCNC: 174 MG/DL — HIGH (ref 70–99)
GLUCOSE BLDC GLUCOMTR-MCNC: 188 MG/DL — HIGH (ref 70–99)
GLUCOSE BLDC GLUCOMTR-MCNC: 199 MG/DL — HIGH (ref 70–99)
GLUCOSE BLDC GLUCOMTR-MCNC: 323 MG/DL — HIGH (ref 70–99)
GLUCOSE BLDC GLUCOMTR-MCNC: 415 MG/DL — HIGH (ref 70–99)
GLUCOSE BLDC GLUCOMTR-MCNC: 548 MG/DL — CRITICAL HIGH (ref 70–99)
GLUCOSE BLDC GLUCOMTR-MCNC: 82 MG/DL — SIGNIFICANT CHANGE UP (ref 70–99)
GLUCOSE SERPL-MCNC: 108 MG/DL — HIGH (ref 70–99)
GLUCOSE UR QL: NEGATIVE — SIGNIFICANT CHANGE UP
HCO3 BLDV-SCNC: 27 MMOL/L — SIGNIFICANT CHANGE UP (ref 22–29)
HCT VFR BLD CALC: 22.7 % — LOW (ref 39–50)
HGB BLD-MCNC: 7.2 G/DL — LOW (ref 13–17)
KETONES UR-MCNC: NEGATIVE — SIGNIFICANT CHANGE UP
LEUKOCYTE ESTERASE UR-ACNC: ABNORMAL
MAGNESIUM SERPL-MCNC: 2.4 MG/DL — SIGNIFICANT CHANGE UP (ref 1.6–2.6)
MCHC RBC-ENTMCNC: 31.7 GM/DL — LOW (ref 32–36)
MCHC RBC-ENTMCNC: 33.6 PG — SIGNIFICANT CHANGE UP (ref 27–34)
MCV RBC AUTO: 106.1 FL — HIGH (ref 80–100)
NITRITE UR-MCNC: POSITIVE
NRBC # BLD: 10 /100 WBCS — HIGH (ref 0–0)
PCO2 BLDV: 43 MMHG — SIGNIFICANT CHANGE UP (ref 42–55)
PH BLDV: 7.4 — SIGNIFICANT CHANGE UP (ref 7.32–7.43)
PH UR: 5 — SIGNIFICANT CHANGE UP (ref 5–8)
PHOSPHATE SERPL-MCNC: 2.9 MG/DL — SIGNIFICANT CHANGE UP (ref 2.5–4.5)
PLATELET # BLD AUTO: 187 K/UL — SIGNIFICANT CHANGE UP (ref 150–400)
PO2 BLDV: 39 MMHG — SIGNIFICANT CHANGE UP (ref 25–45)
POTASSIUM BLDV-SCNC: 3.3 MMOL/L — LOW (ref 3.5–5.1)
POTASSIUM SERPL-MCNC: 3.5 MMOL/L — SIGNIFICANT CHANGE UP (ref 3.5–5.3)
POTASSIUM SERPL-SCNC: 3.5 MMOL/L — SIGNIFICANT CHANGE UP (ref 3.5–5.3)
PROT SERPL-MCNC: 5.3 G/DL — LOW (ref 6–8.3)
PROT UR-MCNC: NEGATIVE MG/DL — SIGNIFICANT CHANGE UP
RBC # BLD: 2.14 M/UL — LOW (ref 4.2–5.8)
RBC # FLD: 23.6 % — HIGH (ref 10.3–14.5)
RBC CASTS # UR COMP ASSIST: < 5 /HPF — SIGNIFICANT CHANGE UP
SAO2 % BLDV: 64.2 % — LOW (ref 67–88)
SODIUM SERPL-SCNC: 141 MMOL/L — SIGNIFICANT CHANGE UP (ref 135–145)
SODIUM UR-SCNC: 97 MMOL/L — SIGNIFICANT CHANGE UP
SP GR SPEC: 1.02 — SIGNIFICANT CHANGE UP (ref 1–1.03)
SPECIMEN SOURCE: SIGNIFICANT CHANGE UP
TACROLIMUS SERPL-MCNC: 2.4 NG/ML — SIGNIFICANT CHANGE UP
UROBILINOGEN FLD QL: 0.2 E.U./DL — SIGNIFICANT CHANGE UP
UUN UR-MCNC: 242 MG/DL — SIGNIFICANT CHANGE UP
WBC # BLD: 13.17 K/UL — HIGH (ref 3.8–10.5)
WBC # FLD AUTO: 13.17 K/UL — HIGH (ref 3.8–10.5)
WBC UR QL: ABNORMAL /HPF

## 2023-05-06 PROCEDURE — 99233 SBSQ HOSP IP/OBS HIGH 50: CPT

## 2023-05-06 PROCEDURE — 99292 CRITICAL CARE ADDL 30 MIN: CPT

## 2023-05-06 PROCEDURE — 99291 CRITICAL CARE FIRST HOUR: CPT

## 2023-05-06 PROCEDURE — 71045 X-RAY EXAM CHEST 1 VIEW: CPT | Mod: 26

## 2023-05-06 RX ORDER — ALBUMIN HUMAN 25 %
250 VIAL (ML) INTRAVENOUS ONCE
Refills: 0 | Status: COMPLETED | OUTPATIENT
Start: 2023-05-06 | End: 2023-05-06

## 2023-05-06 RX ORDER — BUMETANIDE 0.25 MG/ML
2 INJECTION INTRAMUSCULAR; INTRAVENOUS ONCE
Refills: 0 | Status: COMPLETED | OUTPATIENT
Start: 2023-05-06 | End: 2023-05-06

## 2023-05-06 RX ORDER — POTASSIUM CHLORIDE 20 MEQ
20 PACKET (EA) ORAL ONCE
Refills: 0 | Status: DISCONTINUED | OUTPATIENT
Start: 2023-05-06 | End: 2023-05-06

## 2023-05-06 RX ORDER — TACROLIMUS 5 MG/1
2 CAPSULE ORAL
Refills: 0 | Status: DISCONTINUED | OUTPATIENT
Start: 2023-05-07 | End: 2023-05-07

## 2023-05-06 RX ORDER — ALBUMIN HUMAN 25 %
50 VIAL (ML) INTRAVENOUS EVERY 8 HOURS
Refills: 0 | Status: COMPLETED | OUTPATIENT
Start: 2023-05-06 | End: 2023-05-07

## 2023-05-06 RX ORDER — TACROLIMUS 5 MG/1
1 CAPSULE ORAL
Refills: 0 | Status: DISCONTINUED | OUTPATIENT
Start: 2023-05-06 | End: 2023-05-07

## 2023-05-06 RX ORDER — BUMETANIDE 0.25 MG/ML
1 INJECTION INTRAMUSCULAR; INTRAVENOUS ONCE
Refills: 0 | Status: COMPLETED | OUTPATIENT
Start: 2023-05-06 | End: 2023-05-06

## 2023-05-06 RX ORDER — POTASSIUM CHLORIDE 20 MEQ
20 PACKET (EA) ORAL ONCE
Refills: 0 | Status: COMPLETED | OUTPATIENT
Start: 2023-05-06 | End: 2023-05-06

## 2023-05-06 RX ORDER — TACROLIMUS 5 MG/1
1 CAPSULE ORAL ONCE
Refills: 0 | Status: COMPLETED | OUTPATIENT
Start: 2023-05-06 | End: 2023-05-06

## 2023-05-06 RX ORDER — ACETAMINOPHEN 500 MG
1000 TABLET ORAL ONCE
Refills: 0 | Status: COMPLETED | OUTPATIENT
Start: 2023-05-06 | End: 2023-05-06

## 2023-05-06 RX ADMIN — Medication 400 MILLIGRAM(S): at 15:17

## 2023-05-06 RX ADMIN — Medication 4 MILLILITER(S): at 21:32

## 2023-05-06 RX ADMIN — Medication 5 MILLIGRAM(S): at 06:34

## 2023-05-06 RX ADMIN — BUMETANIDE 1 MILLIGRAM(S): 0.25 INJECTION INTRAMUSCULAR; INTRAVENOUS at 00:52

## 2023-05-06 RX ADMIN — TACROLIMUS 1 MILLIGRAM(S): 5 CAPSULE ORAL at 11:05

## 2023-05-06 RX ADMIN — Medication 8 MILLIGRAM(S): at 06:37

## 2023-05-06 RX ADMIN — PREGABALIN 1000 MICROGRAM(S): 225 CAPSULE ORAL at 11:08

## 2023-05-06 RX ADMIN — PROPOFOL 3.95 MICROGRAM(S)/KG/MIN: 10 INJECTION, EMULSION INTRAVENOUS at 22:56

## 2023-05-06 RX ADMIN — BUMETANIDE 2 MILLIGRAM(S): 0.25 INJECTION INTRAMUSCULAR; INTRAVENOUS at 18:32

## 2023-05-06 RX ADMIN — CHLORHEXIDINE GLUCONATE 15 MILLILITER(S): 213 SOLUTION TOPICAL at 18:32

## 2023-05-06 RX ADMIN — Medication 8 MILLIGRAM(S): at 11:08

## 2023-05-06 RX ADMIN — Medication 4 MILLILITER(S): at 04:59

## 2023-05-06 RX ADMIN — Medication 125 MILLILITER(S): at 18:32

## 2023-05-06 RX ADMIN — Medication 8 MILLIGRAM(S): at 11:01

## 2023-05-06 RX ADMIN — LEVALBUTEROL 0.63 MILLIGRAM(S): 1.25 SOLUTION, CONCENTRATE RESPIRATORY (INHALATION) at 21:32

## 2023-05-06 RX ADMIN — Medication 4 MILLILITER(S): at 16:28

## 2023-05-06 RX ADMIN — CHLORHEXIDINE GLUCONATE 15 MILLILITER(S): 213 SOLUTION TOPICAL at 06:35

## 2023-05-06 RX ADMIN — PANTOPRAZOLE SODIUM 40 MILLIGRAM(S): 20 TABLET, DELAYED RELEASE ORAL at 11:08

## 2023-05-06 RX ADMIN — Medication 100 MILLIGRAM(S): at 11:08

## 2023-05-06 RX ADMIN — TACROLIMUS 1 MILLIGRAM(S): 5 CAPSULE ORAL at 19:42

## 2023-05-06 RX ADMIN — LEVALBUTEROL 0.63 MILLIGRAM(S): 1.25 SOLUTION, CONCENTRATE RESPIRATORY (INHALATION) at 16:28

## 2023-05-06 RX ADMIN — LEVALBUTEROL 0.63 MILLIGRAM(S): 1.25 SOLUTION, CONCENTRATE RESPIRATORY (INHALATION) at 04:59

## 2023-05-06 RX ADMIN — LEVALBUTEROL 0.63 MILLIGRAM(S): 1.25 SOLUTION, CONCENTRATE RESPIRATORY (INHALATION) at 10:24

## 2023-05-06 RX ADMIN — Medication 50 MILLILITER(S): at 21:12

## 2023-05-06 RX ADMIN — Medication 4 MILLILITER(S): at 10:24

## 2023-05-06 RX ADMIN — TACROLIMUS 1 MILLIGRAM(S): 5 CAPSULE ORAL at 06:36

## 2023-05-06 RX ADMIN — Medication 8 MILLIGRAM(S): at 19:02

## 2023-05-06 RX ADMIN — CHLORHEXIDINE GLUCONATE 1 APPLICATION(S): 213 SOLUTION TOPICAL at 06:35

## 2023-05-06 RX ADMIN — Medication 50 MILLILITER(S): at 13:56

## 2023-05-06 RX ADMIN — Medication 1000 MILLIGRAM(S): at 16:00

## 2023-05-06 RX ADMIN — ATORVASTATIN CALCIUM 20 MILLIGRAM(S): 80 TABLET, FILM COATED ORAL at 21:12

## 2023-05-06 RX ADMIN — Medication 1 MILLIGRAM(S): at 11:07

## 2023-05-06 RX ADMIN — Medication 100 MILLIEQUIVALENT(S): at 12:33

## 2023-05-06 NOTE — PROVIDER CONTACT NOTE (MEDICATION) - SITUATION
Found an empty 1G/50ml cefazolin attached to the patient. This RN found an empty 1G/50ml cefazolin attached to the patient.

## 2023-05-06 NOTE — PROGRESS NOTE ADULT - ASSESSMENT
77Y M w/ CKD s/p renal transplant 2007 h/o PKD s/p DENNY occlusion repair complicated by pericardial effusion s/p chest washout, course complicated by cardiogenic shock and DAYO now stable and nonoliguric on pressors/diuretic with elevated MAP goal        #Non-Oliguric iATN on underlying CKD 3 (baseline sCr at 1.2-1.4)   Latoya pre-renal initially but given prolonged hypoperfusion developed into iATN   sCr now stable in ~2.5 range, monitoring for further recovery   - Continue diuresis and hemodynamic augmentation  - Strict I/Os    Transplant - 2007, initial failure 2/2 PCKD  - Continue tacro 2AM, 1PM. Trough from 5/5 and 5/6 pending, will make adjustments as needed, goal tacro 4-5    - continue to draw daily trough 30 minutes before AM dose of tacro  - Continue home prednisone 5mg PO QD    Anemia - 2/2 bleed, JW. Continue EPO 20K, s/p iron repletion 4/26, can recheck    Discussed in detail with primary team      Also history of: HTN, DM2, Prostate cancer s/p radical prostatectomy 2015, DVT 2006 (in setting of pelvic fracture), and afib/atrial flutter s/p ablation in 1/2018, who is now off oral anticoagulation secondary to GI bleed and   Polycystic kidney disease

## 2023-05-06 NOTE — PROGRESS NOTE ADULT - SUBJECTIVE AND OBJECTIVE BOX
CTICU  CRITICAL  CARE  attending     Hand off received 					   Pertinent clinical, laboratory, radiographic, hemodynamic, echocardiographic, respiratory data, microbiologic data and chart were reviewed and analyzed frequently throughout the course of the day and night  Patient seen and examined with CTS/ SH attending at bedside    Pt is a 77y , Male, post op day # 11 s/p percutaneous DENNY closure with Watchman device;     post procedure c/b    Pericardial effusion/tamponade  PEA arrest; s/p CPR  s/p emergent bedside pericardiocentesis    POD # 10 s/p sternotomy; exploration for bleeding  evac of L Hemothorax    s/p R Pigtail thoracentesis for pleural effusion    currently    intubated for increasing WOB  Inotrope support with /Primacor  UO >100 ml/hr  acute post hemorrhagic anemia   on Procrit      78 y/o male with polycystic kidney disease s/p kidney transplant in 2007, HTN, HLD, DM2, Prostate cancer s/p radical prostatectomy 2015, DVT 2006 (in setting of pelvic fracture), and afib/atrial flutter s/p ablation in 1/2018, who is now off oral anticoagulation secondary to GI bleed and presents for DENNY occlusoin. Pt as no symptomsHe was [facility] with [facility]. He is a Druze and refuses blood transfusions. Eliquis was stopped at that time  Pericardial effusion with cardiac tamponade    Acute respiratory failure with hypoxia    Atelectasis    Ground glass opacity present on imaging of lung        , FAMILY HISTORY:  PAST MEDICAL & SURGICAL HISTORY:  Polycystic kidney disease      DM2 (diabetes mellitus, type 2)      HLD (hyperlipidemia)      HTN (hypertension)      Prostate cancer      Kidney transplant recipient      DVT (deep venous thrombosis)      Chronic CHF      H/O radical prostatectomy  2010      S/P hernia repair  Abdominal and inguinal around 2005        Patient is a 77y old  Male who presents for  percutaneous DENNY closure (06 May 2023 13:47)      14 system review unable to assess  acute changes include acute respiratory failure  Vital signs, hemodynamic and respiratory parameters were reviewed from the bedside nursing flowsheet.  ICU Vital Signs Last 24 Hrs  T(C): 37.1 (06 May 2023 14:30), Max: 37.6 (05 May 2023 22:00)  T(F): 98.8 (06 May 2023 14:30), Max: 99.7 (05 May 2023 22:00)  HR: 66 (06 May 2023 15:00) (60 - 92)  BP: --  BP(mean): --  ABP: 145/57 (06 May 2023 15:00) (138/63 - 160/71)  ABP(mean): 84 (06 May 2023 15:00) (84 - 99)  RR: 14 (06 May 2023 15:00) (11 - 20)  SpO2: 100% (06 May 2023 15:00) (100% - 100%)    O2 Parameters below as of 06 May 2023 15:00  Patient On (Oxygen Delivery Method): CPAP, via ventilator     O2 Concentration (%): 40      Adult Advanced Hemodynamics Last 24 Hrs  CVP(mm Hg): 8 (06 May 2023 15:00) (7 - 20)  CVP(cm H2O): --  CO: 4.7 (06 May 2023 15:00) (4.1 - 5.6)  CI: 2.6 (06 May 2023 15:00) (2.3 - 3.2)  PA: 63/18 (06 May 2023 15:00) (48/21 - 68/29)  PA(mean): 36 (06 May 2023 15:00) (32 - 45)  PCWP: --  SVR: 1292 (06 May 2023 15:00) (1121 - 1578)  SVRI: 2335 (06 May 2023 15:00) (6393 - 3904)  PVR: --  PVRI: --, ABG - ( 06 May 2023 11:48 )  pH, Arterial: 7.44  pH, Blood: x     /  pCO2: 38    /  pO2: 140   / HCO3: 26    / Base Excess: 1.7   /  SaO2: 99.1              Mode: AC/ CMV (Assist Control/ Continuous Mandatory Ventilation)  RR (machine): 12  TV (machine): 650  FiO2: 40  PEEP: 5  MAP: 8  PIP: 21    Intake and output was reviewed and the fluid balance was calculated  Daily     Daily   I&O's Summary    05 May 2023 07:01  -  06 May 2023 07:00  --------------------------------------------------------  IN: 2243.5 mL / OUT: 2790 mL / NET: -546.5 mL    06 May 2023 07:01  -  06 May 2023 15:30  --------------------------------------------------------  IN: 928.8 mL / OUT: 840 mL / NET: 88.8 mL        All lines and drain sites were assessed  [facility]      POCT Blood Glucose.: 142 mg/dL (06 May 2023 15:07)    No acute change in mental status  (+)Orotracheally intubated  Auscultation of the chest reveals equal bs  Abdomen is soft  Extremities are warm and well perfused  Wounds appear clean and unremarkable  Antibiotics are periop    labs  CBC Full  -  ( 06 May 2023 04:56 )  WBC Count : 13.17 K/uL  RBC Count : 2.14 M/uL  Hemoglobin : 7.2 g/dL  Hematocrit : 22.7 %  Platelet Count - Automated : 187 K/uL  Mean Cell Volume : 106.1 fl  Mean Cell Hemoglobin : 33.6 pg  Mean Cell Hemoglobin Concentration : 31.7 gm/dL  Auto Neutrophil # : x  Auto Lymphocyte # : x  Auto Monocyte # : x  Auto Eosinophil # : x  Auto Basophil # : x  Auto Neutrophil % : x  Auto Lymphocyte % : x  Auto Monocyte % : x  Auto Eosinophil % : x  Auto Basophil % : x    05-06    141  |  106  |  75<H>  ----------------------------<  108<H>  3.5   |  25  |  2.54<H>    Ca    9.0      06 May 2023 04:56  Phos  2.9     05-06  Mg     2.4     05-06    TPro  5.3<L>  /  Alb  3.1<L>  /  TBili  1.0  /  DBili  x   /  AST  31  /  ALT  10  /  AlkPhos  71  05-06    PT/INR - ( 05 May 2023 09:41 )   PT: 15.6 sec;   INR: 1.31          PTT - ( 05 May 2023 09:41 )  PTT:30.6 sec  The current medications were reviewed   MEDICATIONS  (STANDING):  acetylcysteine 10%  Inhalation 4 milliLiter(s) Inhalation every 6 hours  albumin human 25% IVPB 50 milliLiter(s) IV Intermittent every 8 hours  atorvastatin 20 milliGRAM(s) Oral at bedtime  ceFAZolin   IVPB      ceFAZolin   IVPB 500 milliGRAM(s) IV Intermittent every 12 hours  chlorhexidine 0.12% Liquid 15 milliLiter(s) Oral Mucosa every 12 hours  chlorhexidine 2% Cloths 1 Application(s) Topical <User Schedule>  cyanocobalamin 1000 MICROGram(s) Oral daily  dexMEDEtomidine Infusion 0.1 MICROgram(s)/kG/Hr (1.65 mL/Hr) IV Continuous <Continuous>  dextrose 5%. 1000 milliLiter(s) (100 mL/Hr) IV Continuous <Continuous>  dextrose 5%. 1000 milliLiter(s) (50 mL/Hr) IV Continuous <Continuous>  dextrose 5%. 1000 milliLiter(s) (50 mL/Hr) IV Continuous <Continuous>  dextrose 5%. 1000 milliLiter(s) (100 mL/Hr) IV Continuous <Continuous>  dextrose 50% Injectable 25 Gram(s) IV Push once  dextrose 50% Injectable 25 Gram(s) IV Push once  dextrose 50% Injectable 25 Gram(s) IV Push once  dextrose 50% Injectable 12.5 Gram(s) IV Push once  dextrose 50% Injectable 25 Gram(s) IV Push once  dextrose 50% Injectable 12.5 Gram(s) IV Push once  folic acid 1 milliGRAM(s) Oral daily  glucagon  Injectable 1 milliGRAM(s) IntraMuscular once  glucagon  Injectable 1 milliGRAM(s) IntraMuscular once  insulin regular Infusion 1 Unit(s)/Hr (1 mL/Hr) IV Continuous <Continuous>  levalbuterol Inhalation 0.63 milliGRAM(s) Inhalation every 6 hours  metolazone 5 milliGRAM(s) Oral daily  milrinone Infusion 0.125 MICROgram(s)/kG/Min (2.47 mL/Hr) IV Continuous <Continuous>  pantoprazole  Injectable 40 milliGRAM(s) IV Push daily  phenylephrine    Infusion 0.3 MICROgram(s)/kG/Min (7.4 mL/Hr) IV Continuous <Continuous>  predniSONE   Tablet 5 milliGRAM(s) Oral daily  propofol Infusion 10 MICROgram(s)/kG/Min (3.95 mL/Hr) IV Continuous <Continuous>  sodium chloride 0.9%. 1000 milliLiter(s) (10 mL/Hr) IV Continuous <Continuous>  tacrolimus 1 milliGRAM(s) Oral <User Schedule>  testosterone patch 4 mG/24 Hr(s) 8 milliGRAM(s) Transdermal daily  vasopressin Infusion 0.01 Unit(s)/Min (1.5 mL/Hr) IV Continuous <Continuous>    MEDICATIONS  (PRN):  dextrose Oral Gel 15 Gram(s) Oral once PRN Blood Glucose LESS THAN 70 milliGRAM(s)/deciliter  dextrose Oral Gel 15 Gram(s) Oral once PRN Blood Glucose LESS THAN 70 milliGRAM(s)/deciliter       PROBLEM LIST/ ASSESSMENT:  HEALTH ISSUES - PROBLEM Dx:  Pericardial effusion with cardiac tamponade    Acute respiratory failure with hypoxia    Atelectasis    Ground glass opacity present on imaging of lung        ,   Patient is a 77y old  Male who presents for percutaneous DENNY closure (06 May 2023 13:47)     s/p percutaneous DENNY closure with Watchman device;   acute changes include acute respiratory failure    My plan includes :    Titrate pressor support to maintain MAP >80  Full Vent support  Titrate Fio2 to maintain Sao2 >95%  Serial ABGs  Avoid nephrotoxic agents  continue inotrope support with  primacor  Titrate to maintain CI >2.2/MVO2 >60  Trend lactate levels  close hemodynamic, ventilatory and drain monitoring and management per post op routine    Monitor for arrhythmias and monitor parameters for organ perfusion  monitor neurologic status  Head of the bed should remain elevated to 45 deg .   chest PT and IS will be encouraged  monitor adequacy of oxygenation and ventilation and attempt to wean oxygen  Nutritional goals will be met using po eventually , ensure adequate caloric intake and montior the same  Stress ulcer and VTE prophylaxis will be achieved    Glycemic control is satisfactory  Electrolytes have been repleted as necessary and wound care has been carried out. Pain control has been achieved.   agressive physical therapy and early mobility and ambulation goals will be met   The family was updated about the course and plan  CRITICAL CARE TIME SPENT in evaluation and management, reassessments, review and interpretation of labs and x-rays, ventilator and hemodynamic management, formulating a plan and coordinating care: ___90____ MIN.  Time does not include procedural time.  CTICU ATTENDING     					    Modesto Ny MD

## 2023-05-06 NOTE — PROGRESS NOTE ADULT - SUBJECTIVE AND OBJECTIVE BOX
Nephrology Progress note    Subjective: Patient seen and examined at bedside. Intubated but awake, discussed with patient and family at bedside current condition, Kidney stable with higher BP, monitoring closely. Maintaining net even to negative output. Otherwise no complaints, hopeful to have ET tube removed.     [OBJECTIVE]:    Vital Signs:  T(F): , Max: 99.7 (23 @ 14:00)  HR:  (60 - 92)  BP: --  BP(mean): --  ABP: 145/59 (23 @ 13:00) (133/61 - 160/71)  ABP(mean):  (81 - 99)  RR:  (11 - 20)  SpO2:  (100% - 100%)  CVP(mm Hg): 11 (23 @ 13:00) (7 - 20)  CVP(cm H2O): --  Mode: AC/ CMV (Assist Control/ Continuous Mandatory Ventilation), RR (machine): 12, RR (patient): 18, TV (machine): 650, FiO2: 40, PEEP: 5, MAP: 8, PIP: 21     @ 07:01  -   @ 07:00  --------------------------------------------------------  IN: 2243.5 mL / OUT: 2790 mL / NET: -546.5 mL     @ 07:01  -   @ 13:49  --------------------------------------------------------  IN: 479.8 mL / OUT: 625 mL / NET: -145.2 mL      CAPILLARY BLOOD GLUCOSE      POCT Blood Glucose.: 151 mg/dL (06 May 2023 13:18)      .  VITAL SIGNS:  T(F): 99.1 (23 @ 11:00), Max: 99.7 (23 @ 14:00)  HR: 60 (23 @ 13:00) (60 - 92)  BP: --  BP(mean): --  ABP: 145/59 (23 @ 13:00) (133/61 - 160/71)  ABP(mean): 85 (23 @ 13:00) (81 - 99)  RR: 14 (23 @ 13:00) (11 - 20)  SpO2: 100% (23 @ 13:00) (100% - 100%)    PHYSICAL EXAM:  Constitutional: Intubated, awake; NAD  HEENT: ETT tube in place, clear secretions  Respiratory: Mechanical breath sounds bilaterally, not overbreathing vent  Cardiac: +S1/S2; RRR; no M/R/G  Gastrointestinal: soft, NT/ND; no rebound or guarding; +BS; RLQ graft Nontender no thrill/bruit  Extremities: WWP, no clubbing or cyanosis; no peripheral edema  Dermatologic: skin warm, dry and intact; no rashes, wounds, or scars  Neuro: Responds to questions, follow commands      Medications:  MEDICATIONS  (STANDING):  acetylcysteine 10%  Inhalation 4 milliLiter(s) Inhalation every 6 hours  albumin human 25% IVPB 50 milliLiter(s) IV Intermittent every 8 hours  atorvastatin 20 milliGRAM(s) Oral at bedtime  ceFAZolin   IVPB      ceFAZolin   IVPB 500 milliGRAM(s) IV Intermittent every 12 hours  chlorhexidine 0.12% Liquid 15 milliLiter(s) Oral Mucosa every 12 hours  chlorhexidine 2% Cloths 1 Application(s) Topical <User Schedule>  cyanocobalamin 1000 MICROGram(s) Oral daily  dexMEDEtomidine Infusion 0.1 MICROgram(s)/kG/Hr (1.65 mL/Hr) IV Continuous <Continuous>  dextrose 5%. 1000 milliLiter(s) (100 mL/Hr) IV Continuous <Continuous>  dextrose 5%. 1000 milliLiter(s) (50 mL/Hr) IV Continuous <Continuous>  dextrose 5%. 1000 milliLiter(s) (100 mL/Hr) IV Continuous <Continuous>  dextrose 5%. 1000 milliLiter(s) (50 mL/Hr) IV Continuous <Continuous>  dextrose 50% Injectable 25 Gram(s) IV Push once  dextrose 50% Injectable 12.5 Gram(s) IV Push once  dextrose 50% Injectable 25 Gram(s) IV Push once  dextrose 50% Injectable 12.5 Gram(s) IV Push once  dextrose 50% Injectable 25 Gram(s) IV Push once  dextrose 50% Injectable 25 Gram(s) IV Push once  folic acid 1 milliGRAM(s) Oral daily  glucagon  Injectable 1 milliGRAM(s) IntraMuscular once  glucagon  Injectable 1 milliGRAM(s) IntraMuscular once  insulin regular Infusion 1 Unit(s)/Hr (1 mL/Hr) IV Continuous <Continuous>  levalbuterol Inhalation 0.63 milliGRAM(s) Inhalation every 6 hours  metolazone 5 milliGRAM(s) Oral daily  milrinone Infusion 0.125 MICROgram(s)/kG/Min (2.47 mL/Hr) IV Continuous <Continuous>  pantoprazole  Injectable 40 milliGRAM(s) IV Push daily  phenylephrine    Infusion 0.3 MICROgram(s)/kG/Min (7.4 mL/Hr) IV Continuous <Continuous>  potassium chloride  20 mEq/100 mL IVPB 20 milliEquivalent(s) IV Intermittent once  predniSONE   Tablet 5 milliGRAM(s) Oral daily  propofol Infusion 10 MICROgram(s)/kG/Min (3.95 mL/Hr) IV Continuous <Continuous>  sodium chloride 0.9%. 1000 milliLiter(s) (10 mL/Hr) IV Continuous <Continuous>  tacrolimus 1 milliGRAM(s) Oral <User Schedule>  testosterone patch 4 mG/24 Hr(s) 8 milliGRAM(s) Transdermal daily  vasopressin Infusion 0.01 Unit(s)/Min (1.5 mL/Hr) IV Continuous <Continuous>    MEDICATIONS  (PRN):  dextrose Oral Gel 15 Gram(s) Oral once PRN Blood Glucose LESS THAN 70 milliGRAM(s)/deciliter  dextrose Oral Gel 15 Gram(s) Oral once PRN Blood Glucose LESS THAN 70 milliGRAM(s)/deciliter      Allergies:  Allergies    naproxen (Hives)  Bairon&#x27;s Witness - No blood products (Unknown)    Intolerances        Labs:                        7.2    13.17 )-----------( 187      ( 06 May 2023 04:56 )             22.7     05-06    141  |  106  |  75<H>  ----------------------------<  108<H>  3.5   |  25  |  2.54<H>    Ca    9.0      06 May 2023 04:56  Phos  2.9     -  Mg     2.4     -    TPro  5.3<L>  /  Alb  3.1<L>  /  TBili  1.0  /  DBili  x   /  AST  31  /  ALT  10  /  AlkPhos  71  -    PT/INR - ( 05 May 2023 09:41 )   PT: 15.6 sec;   INR: 1.31          PTT - ( 05 May 2023 09:41 )  PTT:30.6 sec  Urinalysis Basic - ( 06 May 2023 08:44 )    Color: Pink / Appearance: SL Cloudy / S.020 / pH: x  Gluc: x / Ketone: NEGATIVE  / Bili: Negative / Urobili: 0.2 E.U./dL   Blood: x / Protein: NEGATIVE mg/dL / Nitrite: POSITIVE   Leuk Esterase: Trace / RBC: < 5 /HPF / WBC 5-10 /HPF   Sq Epi: x / Non Sq Epi: x / Bacteria: None /HPF        Radiology and other tests:  Creatinine, Serum: 2.54 mg/dL (23 @ 04:56)  Creatinine, Serum: 2.46 mg/dL (23 @ 09:41)  Creatinine, Serum: 2.46 mg/dL (23 @ 02:19)  Creatinine, Serum: 2.38 mg/dL (23 @ 19:07)  Creatinine, Serum: 2.41 mg/dL (23 @ 16:56)  Creatinine, Serum: 2.40 mg/dL (23 @ 02:09)  Creatinine, Serum: 2.44 mg/dL (23 @ 15:11)  Creatinine, Serum: 2.40 mg/dL (23 @ 02:22)  Creatinine, Serum: 2.53 mg/dL (23 @ 16:24)  Creatinine, Serum: 2.45 mg/dL (23 @ 11:29)      taacrolimus (), Serum (23 @ 02:14)   Tacrolimus (), Serum: 2.3: Tacrolimus testing is performed on the [employer]  by   chemiluminescent microparticle immunoassay. The therapeutic range of   tacrolimus is not clearly defined but target 12-hour trough whole blood   concentrations are 5.0 - 20.0 ng/mL early post transplant. Twenty-four   hour   trough concentrations are 33-50% less than the corresponding 12-hour   trough. ng/mL      Historical Values  Tacrolimus (), Serum (23 @ 02:14)   Tacrolimus (), Serum: 2.3: Tacrolimus testing is performed on the [employer]  by   chemiluminescent microparticle immunoassay. The therapeutic range of   tacrolimus is not clearly defined but target 12-hour trough whole blood   concentrations are 5.0 - 20.0 ng/mL early post transplant. Twenty-four   hour   trough concentrations are 33-50% less than the corresponding 12-hour   trough. ng/mL  Tacrolimus (), Serum (23 @ 03:06)   Tacrolimus (), Serum: 2.7: Tacrolimus testing is performed on the [employer]  by   chemiluminescent microparticle immunoassay. The therapeutic range of   tacrolimus is not clearly defined but target 12-hour trough whole blood   concentrations are 5.0 - 20.0 ng/mL early post transplant. Twenty-four   hour   trough concentrations are 33-50% less than the corresponding 12-hour   trough. ng/mL  Tacrolimus (), Serum (23 @ 05:14)   Tacrolimus (), Serum: 3.1: Tacrolimus testing is performed on the [employer]  by   chemiluminescent microparticle immunoassay. The therapeutic range of   tacrolimus is not clearly defined but target 12-hour trough whole blood   concentrations are 5.0 - 20.0 ng/mL early post transplant. Twenty-four   hour   trough concentrations are 33-50% less than the corresponding 12-hour   trough. ng/mL  Tacrolimus (), Serum (23 @ 17:46)   Tacrolimus (), Serum: 5.8: Tacrolimus testing is performed on the [employer]  by   chemiluminescent microparticle immunoassay. The therapeutic range of   tacrolimus is not clearly defined but target 12-hour trough whole blood   concentrations are 5.0 - 20.0 ng/mL early post transplant. Twenty-four   hour   trough concentrations are 33-50% less than the corresponding 12-hour   trough. ng/mL  Tacrolimus (), Serum (23 @ 04:57)   Tacrolimus (), Serum: 7.5: Tacrolimus testing is performed on the [employer]  by   chemiluminescent microparticle immunoassay. The therapeutic range of   tacrolimus is not clearly defined but target 12-hour trough whole blood   concentrations are 5.0 - 20.0 ng/mL early post transplant. Twenty-four   hour

## 2023-05-06 NOTE — PROVIDER CONTACT NOTE (MEDICATION) - ACTION/TREATMENT ORDERED:
Elisa CARMONA RN, ANM aware.  Provider aware and Elisa CARMONA RN, ANM aware.  Provider aware and ordered to hold tonight's dose of cefazolin.

## 2023-05-06 NOTE — PROGRESS NOTE ADULT - SUBJECTIVE AND OBJECTIVE BOX
CTICU  CRITICAL  CARE  attending     Hand off received 					   Pertinent clinical, laboratory, radiographic, hemodynamic, echocardiographic, respiratory data, microbiologic data and chart were reviewed and analyzed frequently throughout the course of the day and night      77 year old male with  HTN, HLD, DM, polycystic kidney disease s/p kidney transplant in 2007,, Prostate cancer s/p radical prostatectomy 2015, DVT 2006 (in setting of pelvic fracture).  He has history of atrial fibrillation and atrial flutter s/p ablation in 1/2018.  He has history of GI bleed in July 2022.  Eliquis was stopped at that time.   He is a Yarsanism and refuses blood transfusions.    S/P percutaneous occlusion of the left atrial appendage.  S/P PEA arrest.  S/P drainage of pericardial effusion.  S/P Evacuation of left sided hemothorax.        HEALTH ISSUES - PROBLEM Dx:  Pericardial effusion with cardiac tamponade  Acute respiratory failure with hypoxia  Atelectasis  Ground glass opacity present on imaging of lung       FAMILY HISTORY:  PAST MEDICAL & SURGICAL HISTORY:  Polycystic kidney disease  DM2 (diabetes mellitus, type 2)  HLD (hyperlipidemia)  HTN (hypertension)  Prostate cancer  Kidney transplant recipient  DVT (deep venous thrombosis)  Chronic CHF  H/O radical prostatectomy in 2010  S/P hernia repair (Abdominal and inguinal around 2005)        14 system review was unremarkable    Vital signs, hemodynamic and respiratory parameters were reviewed from the bedside nursing flow sheet.  ICU Vital Signs Last 24 Hrs  T(C): 36.4 (06 May 2023 22:10), Max: 37.6 (06 May 2023 00:00)  T(F): 97.5 (06 May 2023 22:10), Max: 99.7 (06 May 2023 00:00)  HR: 55 (06 May 2023 23:00) (55 - 82)  BP: --  BP(mean): --  ABP: 160/66 (06 May 2023 23:00) (143/59 - 160/71)  ABP(mean): 96 (06 May 2023 23:00) (84 - 99)  RR: 12 (06 May 2023 23:00) (11 - 20)  SpO2: 100% (06 May 2023 23:00) (99% - 100%)    O2 Parameters below as of 06 May 2023 23:00  Patient On (Oxygen Delivery Method): ventilator          Adult Advanced Hemodynamics Last 24 Hrs  CVP(mm Hg): 14 (06 May 2023 23:00) (7 - 21)  CVP(cm H2O): --  CO: 3.6 (06 May 2023 23:00) (3.6 - 5.1)  CI: 2 (06 May 2023 23:00) (2 - 2.9)  PA: 69/25 (06 May 2023 16:00) (48/21 - 69/25)  PA(mean): 40 (06 May 2023 16:00) (32 - 45)  PCWP: --  SVR: 1819 (06 May 2023 23:00) (1206 - 1819)  SVRI: 3275 (06 May 2023 23:00) (0451 - 3275)  PVR: --  PVRI: --, ABG - ( 06 May 2023 11:48 )  pH, Arterial: 7.44  pH, Blood: x     /  pCO2: 38    /  pO2: 140   / HCO3: 26    / Base Excess: 1.7   /  SaO2: 99.1              Mode: AC/ CMV (Assist Control/ Continuous Mandatory Ventilation)  RR (machine): 12  TV (machine): 650  FiO2: 40  PEEP: 5  ITime: 1  MAP: 9  PIP: 23    Intake and output was reviewed and the fluid balance was calculated  Daily     Daily   I&O's Summary    05 May 2023 07:01  -  06 May 2023 07:00  --------------------------------------------------------  IN: 2243.5 mL / OUT: 2790 mL / NET: -546.5 mL    06 May 2023 07:01  -  06 May 2023 23:56  --------------------------------------------------------  IN: 1983.8 mL / OUT: 2245 mL / NET: -261.2 mL        All lines and drain sites were assessed    Neuro: No change in the mental status from the baseline. Follows commands.  Moves all 4 extremities.  Neck: No JVD.  CVS: S1, S2, No S3.  Lungs: Good air entry bilaterally.  Abd: Soft. No tenderness. + Bowel sounds.  Vascular: + DP/PT.  Extremities: No edema.  Lymphatic: Normal.  Skin: No abnormalities.      labs  CBC Full  -  ( 06 May 2023 04:56 )  WBC Count : 13.17 K/uL  RBC Count : 2.14 M/uL  Hemoglobin : 7.2 g/dL  Hematocrit : 22.7 %  Platelet Count - Automated : 187 K/uL  Mean Cell Volume : 106.1 fl  Mean Cell Hemoglobin : 33.6 pg  Mean Cell Hemoglobin Concentration : 31.7 gm/dL  Auto Neutrophil # : x  Auto Lymphocyte # : x  Auto Monocyte # : x  Auto Eosinophil # : x  Auto Basophil # : x  Auto Neutrophil % : x  Auto Lymphocyte % : x  Auto Monocyte % : x  Auto Eosinophil % : x  Auto Basophil % : x    05-06    141  |  106  |  75<H>  ----------------------------<  108<H>  3.5   |  25  |  2.54<H>    Ca    9.0      06 May 2023 04:56  Phos  2.9     05-06  Mg     2.4     05-06    TPro  5.3<L>  /  Alb  3.1<L>  /  TBili  1.0  /  DBili  x   /  AST  31  /  ALT  10  /  AlkPhos  71  05-06    PT/INR - ( 05 May 2023 09:41 )   PT: 15.6 sec;   INR: 1.31          PTT - ( 05 May 2023 09:41 )  PTT:30.6 sec  The current medications were reviewed   MEDICATIONS  (STANDING):  acetylcysteine 10%  Inhalation 4 milliLiter(s) Inhalation every 6 hours  albumin human 25% IVPB 50 milliLiter(s) IV Intermittent every 8 hours  atorvastatin 20 milliGRAM(s) Oral at bedtime  ceFAZolin   IVPB      ceFAZolin   IVPB 500 milliGRAM(s) IV Intermittent every 12 hours  chlorhexidine 0.12% Liquid 15 milliLiter(s) Oral Mucosa every 12 hours  chlorhexidine 2% Cloths 1 Application(s) Topical <User Schedule>  cyanocobalamin 1000 MICROGram(s) Oral daily  dexMEDEtomidine Infusion 0.1 MICROgram(s)/kG/Hr (1.65 mL/Hr) IV Continuous <Continuous>  dextrose 5%. 1000 milliLiter(s) (100 mL/Hr) IV Continuous <Continuous>  dextrose 5%. 1000 milliLiter(s) (50 mL/Hr) IV Continuous <Continuous>  dextrose 5%. 1000 milliLiter(s) (100 mL/Hr) IV Continuous <Continuous>  dextrose 5%. 1000 milliLiter(s) (50 mL/Hr) IV Continuous <Continuous>  dextrose 50% Injectable 25 Gram(s) IV Push once  dextrose 50% Injectable 12.5 Gram(s) IV Push once  dextrose 50% Injectable 25 Gram(s) IV Push once  dextrose 50% Injectable 25 Gram(s) IV Push once  dextrose 50% Injectable 12.5 Gram(s) IV Push once  dextrose 50% Injectable 25 Gram(s) IV Push once  folic acid 1 milliGRAM(s) Oral daily  glucagon  Injectable 1 milliGRAM(s) IntraMuscular once  glucagon  Injectable 1 milliGRAM(s) IntraMuscular once  insulin regular Infusion 1 Unit(s)/Hr (1 mL/Hr) IV Continuous <Continuous>  levalbuterol Inhalation 0.63 milliGRAM(s) Inhalation every 6 hours  metolazone 5 milliGRAM(s) Oral daily  pantoprazole  Injectable 40 milliGRAM(s) IV Push daily  phenylephrine    Infusion 0.3 MICROgram(s)/kG/Min (7.4 mL/Hr) IV Continuous <Continuous>  predniSONE   Tablet 5 milliGRAM(s) Oral daily  propofol Infusion 10 MICROgram(s)/kG/Min (3.95 mL/Hr) IV Continuous <Continuous>  sodium chloride 0.9%. 1000 milliLiter(s) (10 mL/Hr) IV Continuous <Continuous>  tacrolimus 1 milliGRAM(s) Oral <User Schedule>  testosterone patch 4 mG/24 Hr(s) 8 milliGRAM(s) Transdermal daily  vasopressin Infusion 0.01 Unit(s)/Min (1.5 mL/Hr) IV Continuous <Continuous>    MEDICATIONS  (PRN):  dextrose Oral Gel 15 Gram(s) Oral once PRN Blood Glucose LESS THAN 70 milliGRAM(s)/deciliter  dextrose Oral Gel 15 Gram(s) Oral once PRN Blood Glucose LESS THAN 70 milliGRAM(s)/deciliter           77 years old male with history of atrial fibrillation and contraindications to anticoagulation.   S/P Occlusion of LA appendage.  Postoperative course complicated by PEA arrest, pericardial tamponade cardiogenic shock, respiratory failure, DAYO, Transaminitis, metabolic acidosis, DVT.  S/P Drainage of pericardial effusion.   S/P Evacuation of left hemothorax.  Acute on Chronic renal failure.  Reintubated for respiratory failure.  Hemodynamically stable.  Good oxygenation.  Fair urine out put.        My plan includes :  Hold enteral feeding.  Extubate in AM.  Bronchodilator Rx.  Steroid Rx and prograf for renal transplant.  D/C milrinone  Statin and Betablocker.  Close hemodynamic, ventilatory and drain monitoring and management  Monitor for arrhythmias and monitor parameters for organ perfusion  Monitor neurologic status  Monitor renal function.  Head of the bed should remain elevated to 45 deg .   Chest PT and IS will be encouraged  Monitor adequacy of oxygenation and ventilation and attempt to wean oxygen  Nutritional goals will be met using po eventually , ensure adequate caloric intake and monitor the same  Stress ulcer and VTE prophylaxis will be achieved    Glycemic control is satisfactory  Electrolytes have been repleted as necessary and wound care has been carried out. Pain control has been achieved.   Aggressive physical therapy and early mobility and ambulation goals will be met   The family was updated about the course and plan  CRITICAL CARE TIME SPENT in evaluation and management, reassessments, review and interpretation of labs and x-rays, ventilator and hemodynamic management, formulating a plan and coordinating care: ___30____ MIN.  Time does not include procedural time.  CTICU ATTENDING     					    Antwan Colon MD

## 2023-05-06 NOTE — PROVIDER CONTACT NOTE (OTHER) - SITUATION
R IJ SLC with introducer placed on 5/1, due to be removed 5/7.  R Subclavian TLC placed 4/30, due to be removed 5/6.
Patient's daughter notified this RN that the patient "is only supposed to have 1 lab draw per day."

## 2023-05-07 LAB
ALBUMIN SERPL ELPH-MCNC: 3.6 G/DL — SIGNIFICANT CHANGE UP (ref 3.3–5)
ALP SERPL-CCNC: 64 U/L — SIGNIFICANT CHANGE UP (ref 40–120)
ALT FLD-CCNC: 12 U/L — SIGNIFICANT CHANGE UP (ref 10–45)
ANION GAP SERPL CALC-SCNC: 14 MMOL/L — SIGNIFICANT CHANGE UP (ref 5–17)
ANISOCYTOSIS BLD QL: SIGNIFICANT CHANGE UP
APTT BLD: 26.4 SEC — LOW (ref 27.5–35.5)
AST SERPL-CCNC: 36 U/L — SIGNIFICANT CHANGE UP (ref 10–40)
BASOPHILS # BLD AUTO: 0 K/UL — SIGNIFICANT CHANGE UP (ref 0–0.2)
BASOPHILS NFR BLD AUTO: 0 % — SIGNIFICANT CHANGE UP (ref 0–2)
BILIRUB SERPL-MCNC: 1.1 MG/DL — SIGNIFICANT CHANGE UP (ref 0.2–1.2)
BUN SERPL-MCNC: 83 MG/DL — HIGH (ref 7–23)
CALCIUM SERPL-MCNC: 8.8 MG/DL — SIGNIFICANT CHANGE UP (ref 8.4–10.5)
CHLORIDE SERPL-SCNC: 107 MMOL/L — SIGNIFICANT CHANGE UP (ref 96–108)
CO2 SERPL-SCNC: 24 MMOL/L — SIGNIFICANT CHANGE UP (ref 22–31)
CREAT SERPL-MCNC: 2.31 MG/DL — HIGH (ref 0.5–1.3)
EGFR: 29 ML/MIN/1.73M2 — LOW
ELLIPTOCYTES BLD QL SMEAR: SLIGHT — SIGNIFICANT CHANGE UP
EOSINOPHIL # BLD AUTO: 0.23 K/UL — SIGNIFICANT CHANGE UP (ref 0–0.5)
EOSINOPHIL NFR BLD AUTO: 1.8 % — SIGNIFICANT CHANGE UP (ref 0–6)
GAS PNL BLDA: SIGNIFICANT CHANGE UP
GAS PNL BLDA: SIGNIFICANT CHANGE UP
GIANT PLATELETS BLD QL SMEAR: PRESENT — SIGNIFICANT CHANGE UP
GLUCOSE BLDC GLUCOMTR-MCNC: 107 MG/DL — HIGH (ref 70–99)
GLUCOSE BLDC GLUCOMTR-MCNC: 108 MG/DL — HIGH (ref 70–99)
GLUCOSE BLDC GLUCOMTR-MCNC: 112 MG/DL — HIGH (ref 70–99)
GLUCOSE BLDC GLUCOMTR-MCNC: 125 MG/DL — HIGH (ref 70–99)
GLUCOSE BLDC GLUCOMTR-MCNC: 127 MG/DL — HIGH (ref 70–99)
GLUCOSE BLDC GLUCOMTR-MCNC: 130 MG/DL — HIGH (ref 70–99)
GLUCOSE BLDC GLUCOMTR-MCNC: 137 MG/DL — HIGH (ref 70–99)
GLUCOSE BLDC GLUCOMTR-MCNC: 216 MG/DL — HIGH (ref 70–99)
GLUCOSE BLDC GLUCOMTR-MCNC: 242 MG/DL — HIGH (ref 70–99)
GLUCOSE BLDC GLUCOMTR-MCNC: 75 MG/DL — SIGNIFICANT CHANGE UP (ref 70–99)
GLUCOSE BLDC GLUCOMTR-MCNC: 96 MG/DL — SIGNIFICANT CHANGE UP (ref 70–99)
GLUCOSE SERPL-MCNC: 94 MG/DL — SIGNIFICANT CHANGE UP (ref 70–99)
HCT VFR BLD CALC: 22.8 % — LOW (ref 39–50)
HGB BLD-MCNC: 7.2 G/DL — LOW (ref 13–17)
HYPOCHROMIA BLD QL: SIGNIFICANT CHANGE UP
INR BLD: 1.26 — HIGH (ref 0.88–1.16)
LYMPHOCYTES # BLD AUTO: 0.7 K/UL — LOW (ref 1–3.3)
LYMPHOCYTES # BLD AUTO: 5.4 % — LOW (ref 13–44)
MACROCYTES BLD QL: SIGNIFICANT CHANGE UP
MAGNESIUM SERPL-MCNC: 2.5 MG/DL — SIGNIFICANT CHANGE UP (ref 1.6–2.6)
MANUAL SMEAR VERIFICATION: SIGNIFICANT CHANGE UP
MCHC RBC-ENTMCNC: 31.6 GM/DL — LOW (ref 32–36)
MCHC RBC-ENTMCNC: 34.3 PG — HIGH (ref 27–34)
MCV RBC AUTO: 108.6 FL — HIGH (ref 80–100)
MICROCYTES BLD QL: SLIGHT — SIGNIFICANT CHANGE UP
MONOCYTES # BLD AUTO: 0.12 K/UL — SIGNIFICANT CHANGE UP (ref 0–0.9)
MONOCYTES NFR BLD AUTO: 0.9 % — LOW (ref 2–14)
NEUTROPHILS # BLD AUTO: 11.87 K/UL — HIGH (ref 1.8–7.4)
NEUTROPHILS NFR BLD AUTO: 91 % — HIGH (ref 43–77)
NEUTS BAND # BLD: 0.9 % — SIGNIFICANT CHANGE UP (ref 0–8)
NRBC # BLD: 2 /100 — HIGH (ref 0–0)
NRBC # BLD: SIGNIFICANT CHANGE UP /100 WBCS (ref 0–0)
OVALOCYTES BLD QL SMEAR: SLIGHT — SIGNIFICANT CHANGE UP
PHOSPHATE SERPL-MCNC: 3.8 MG/DL — SIGNIFICANT CHANGE UP (ref 2.5–4.5)
PLAT MORPH BLD: ABNORMAL
PLATELET # BLD AUTO: 204 K/UL — SIGNIFICANT CHANGE UP (ref 150–400)
POIKILOCYTOSIS BLD QL AUTO: SIGNIFICANT CHANGE UP
POLYCHROMASIA BLD QL SMEAR: SIGNIFICANT CHANGE UP
POTASSIUM SERPL-MCNC: 3 MMOL/L — LOW (ref 3.5–5.3)
POTASSIUM SERPL-SCNC: 3 MMOL/L — LOW (ref 3.5–5.3)
PROT SERPL-MCNC: 5.5 G/DL — LOW (ref 6–8.3)
PROTHROM AB SERPL-ACNC: 15 SEC — HIGH (ref 10.5–13.4)
RBC # BLD: 2.1 M/UL — LOW (ref 4.2–5.8)
RBC # FLD: 25.8 % — HIGH (ref 10.3–14.5)
RBC BLD AUTO: ABNORMAL
SCHISTOCYTES BLD QL AUTO: SLIGHT — SIGNIFICANT CHANGE UP
SODIUM SERPL-SCNC: 145 MMOL/L — SIGNIFICANT CHANGE UP (ref 135–145)
SPHEROCYTES BLD QL SMEAR: SLIGHT — SIGNIFICANT CHANGE UP
WBC # BLD: 12.92 K/UL — HIGH (ref 3.8–10.5)
WBC # FLD AUTO: 12.92 K/UL — HIGH (ref 3.8–10.5)

## 2023-05-07 PROCEDURE — 99292 CRITICAL CARE ADDL 30 MIN: CPT | Mod: 25

## 2023-05-07 PROCEDURE — 99292 CRITICAL CARE ADDL 30 MIN: CPT

## 2023-05-07 PROCEDURE — 31645 BRNCHSC W/THER ASPIR 1ST: CPT

## 2023-05-07 PROCEDURE — 99233 SBSQ HOSP IP/OBS HIGH 50: CPT

## 2023-05-07 PROCEDURE — 99291 CRITICAL CARE FIRST HOUR: CPT | Mod: 25

## 2023-05-07 PROCEDURE — 71045 X-RAY EXAM CHEST 1 VIEW: CPT | Mod: 26

## 2023-05-07 RX ORDER — ACETAMINOPHEN 500 MG
1000 TABLET ORAL ONCE
Refills: 0 | Status: COMPLETED | OUTPATIENT
Start: 2023-05-07 | End: 2023-05-07

## 2023-05-07 RX ORDER — DOBUTAMINE HCL 250MG/20ML
2.5 VIAL (ML) INTRAVENOUS
Qty: 500 | Refills: 0 | Status: DISCONTINUED | OUTPATIENT
Start: 2023-05-07 | End: 2023-05-10

## 2023-05-07 RX ORDER — TACROLIMUS 5 MG/1
2 CAPSULE ORAL
Refills: 0 | Status: DISCONTINUED | OUTPATIENT
Start: 2023-05-07 | End: 2023-05-09

## 2023-05-07 RX ORDER — MIDODRINE HYDROCHLORIDE 2.5 MG/1
10 TABLET ORAL EVERY 8 HOURS
Refills: 0 | Status: DISCONTINUED | OUTPATIENT
Start: 2023-05-07 | End: 2023-05-08

## 2023-05-07 RX ORDER — ALBUMIN HUMAN 25 %
50 VIAL (ML) INTRAVENOUS EVERY 8 HOURS
Refills: 0 | Status: COMPLETED | OUTPATIENT
Start: 2023-05-07 | End: 2023-05-08

## 2023-05-07 RX ORDER — SODIUM CHLORIDE 9 MG/ML
1000 INJECTION, SOLUTION INTRAVENOUS
Refills: 0 | Status: DISCONTINUED | OUTPATIENT
Start: 2023-05-07 | End: 2023-05-07

## 2023-05-07 RX ORDER — LIDOCAINE 4 G/100G
1 CREAM TOPICAL ONCE
Refills: 0 | Status: COMPLETED | OUTPATIENT
Start: 2023-05-07 | End: 2023-05-07

## 2023-05-07 RX ORDER — DEXTROSE 50 % IN WATER 50 %
25 SYRINGE (ML) INTRAVENOUS ONCE
Refills: 0 | Status: DISCONTINUED | OUTPATIENT
Start: 2023-05-07 | End: 2023-05-07

## 2023-05-07 RX ORDER — DEXTROSE 50 % IN WATER 50 %
15 SYRINGE (ML) INTRAVENOUS ONCE
Refills: 0 | Status: DISCONTINUED | OUTPATIENT
Start: 2023-05-07 | End: 2023-05-21

## 2023-05-07 RX ORDER — SODIUM CHLORIDE 9 MG/ML
1000 INJECTION, SOLUTION INTRAVENOUS
Refills: 0 | Status: DISCONTINUED | OUTPATIENT
Start: 2023-05-07 | End: 2023-05-21

## 2023-05-07 RX ORDER — POTASSIUM CHLORIDE 20 MEQ
20 PACKET (EA) ORAL ONCE
Refills: 0 | Status: COMPLETED | OUTPATIENT
Start: 2023-05-07 | End: 2023-05-07

## 2023-05-07 RX ORDER — DEXTROSE 50 % IN WATER 50 %
12.5 SYRINGE (ML) INTRAVENOUS ONCE
Refills: 0 | Status: DISCONTINUED | OUTPATIENT
Start: 2023-05-07 | End: 2023-05-21

## 2023-05-07 RX ORDER — DEXTROSE 50 % IN WATER 50 %
12.5 SYRINGE (ML) INTRAVENOUS ONCE
Refills: 0 | Status: DISCONTINUED | OUTPATIENT
Start: 2023-05-07 | End: 2023-05-07

## 2023-05-07 RX ORDER — DEXTROSE 50 % IN WATER 50 %
15 SYRINGE (ML) INTRAVENOUS ONCE
Refills: 0 | Status: DISCONTINUED | OUTPATIENT
Start: 2023-05-07 | End: 2023-05-07

## 2023-05-07 RX ORDER — GLUCAGON INJECTION, SOLUTION 0.5 MG/.1ML
1 INJECTION, SOLUTION SUBCUTANEOUS ONCE
Refills: 0 | Status: DISCONTINUED | OUTPATIENT
Start: 2023-05-07 | End: 2023-05-21

## 2023-05-07 RX ORDER — TACROLIMUS 5 MG/1
1 CAPSULE ORAL ONCE
Refills: 0 | Status: COMPLETED | OUTPATIENT
Start: 2023-05-07 | End: 2023-05-07

## 2023-05-07 RX ORDER — DEXTROSE 50 % IN WATER 50 %
25 SYRINGE (ML) INTRAVENOUS ONCE
Refills: 0 | Status: DISCONTINUED | OUTPATIENT
Start: 2023-05-07 | End: 2023-05-21

## 2023-05-07 RX ORDER — METOCLOPRAMIDE HCL 10 MG
5 TABLET ORAL
Refills: 0 | Status: DISCONTINUED | OUTPATIENT
Start: 2023-05-07 | End: 2023-05-08

## 2023-05-07 RX ORDER — INSULIN LISPRO 100/ML
VIAL (ML) SUBCUTANEOUS
Refills: 0 | Status: DISCONTINUED | OUTPATIENT
Start: 2023-05-07 | End: 2023-05-21

## 2023-05-07 RX ORDER — BUMETANIDE 0.25 MG/ML
2 INJECTION INTRAMUSCULAR; INTRAVENOUS ONCE
Refills: 0 | Status: COMPLETED | OUTPATIENT
Start: 2023-05-07 | End: 2023-05-07

## 2023-05-07 RX ADMIN — LEVALBUTEROL 0.63 MILLIGRAM(S): 1.25 SOLUTION, CONCENTRATE RESPIRATORY (INHALATION) at 17:43

## 2023-05-07 RX ADMIN — Medication 4.94 MICROGRAM(S)/KG/MIN: at 19:09

## 2023-05-07 RX ADMIN — TACROLIMUS 1 MILLIGRAM(S): 5 CAPSULE ORAL at 10:06

## 2023-05-07 RX ADMIN — Medication 8 MILLIGRAM(S): at 13:36

## 2023-05-07 RX ADMIN — Medication 4 MILLILITER(S): at 17:43

## 2023-05-07 RX ADMIN — Medication 8 MILLIGRAM(S): at 18:11

## 2023-05-07 RX ADMIN — Medication 5 MILLIGRAM(S): at 05:16

## 2023-05-07 RX ADMIN — Medication 4: at 22:41

## 2023-05-07 RX ADMIN — PANTOPRAZOLE SODIUM 40 MILLIGRAM(S): 20 TABLET, DELAYED RELEASE ORAL at 13:04

## 2023-05-07 RX ADMIN — Medication 1 MILLIGRAM(S): at 13:04

## 2023-05-07 RX ADMIN — Medication 50 MILLILITER(S): at 21:30

## 2023-05-07 RX ADMIN — MIDODRINE HYDROCHLORIDE 10 MILLIGRAM(S): 2.5 TABLET ORAL at 14:01

## 2023-05-07 RX ADMIN — LIDOCAINE 1 PATCH: 4 CREAM TOPICAL at 16:00

## 2023-05-07 RX ADMIN — LIDOCAINE 1 PATCH: 4 CREAM TOPICAL at 18:11

## 2023-05-07 RX ADMIN — VASOPRESSIN 1.5 UNIT(S)/MIN: 20 INJECTION INTRAVENOUS at 18:40

## 2023-05-07 RX ADMIN — Medication 100 MILLIGRAM(S): at 23:20

## 2023-05-07 RX ADMIN — CHLORHEXIDINE GLUCONATE 15 MILLILITER(S): 213 SOLUTION TOPICAL at 05:16

## 2023-05-07 RX ADMIN — Medication 1000 MILLIGRAM(S): at 02:15

## 2023-05-07 RX ADMIN — Medication 8 MILLIGRAM(S): at 13:35

## 2023-05-07 RX ADMIN — LEVALBUTEROL 0.63 MILLIGRAM(S): 1.25 SOLUTION, CONCENTRATE RESPIRATORY (INHALATION) at 06:09

## 2023-05-07 RX ADMIN — Medication 100 MILLIGRAM(S): at 11:45

## 2023-05-07 RX ADMIN — Medication 4 MILLILITER(S): at 06:09

## 2023-05-07 RX ADMIN — Medication 4 MILLILITER(S): at 13:04

## 2023-05-07 RX ADMIN — Medication 100 MILLIEQUIVALENT(S): at 07:47

## 2023-05-07 RX ADMIN — CHLORHEXIDINE GLUCONATE 1 APPLICATION(S): 213 SOLUTION TOPICAL at 05:16

## 2023-05-07 RX ADMIN — LEVALBUTEROL 0.63 MILLIGRAM(S): 1.25 SOLUTION, CONCENTRATE RESPIRATORY (INHALATION) at 13:03

## 2023-05-07 RX ADMIN — DEXMEDETOMIDINE HYDROCHLORIDE IN 0.9% SODIUM CHLORIDE 1.65 MICROGRAM(S)/KG/HR: 4 INJECTION INTRAVENOUS at 18:40

## 2023-05-07 RX ADMIN — ATORVASTATIN CALCIUM 20 MILLIGRAM(S): 80 TABLET, FILM COATED ORAL at 21:30

## 2023-05-07 RX ADMIN — Medication 50 MILLILITER(S): at 05:17

## 2023-05-07 RX ADMIN — BUMETANIDE 2 MILLIGRAM(S): 0.25 INJECTION INTRAMUSCULAR; INTRAVENOUS at 19:10

## 2023-05-07 RX ADMIN — PREGABALIN 1000 MICROGRAM(S): 225 CAPSULE ORAL at 13:04

## 2023-05-07 RX ADMIN — Medication 8 MILLIGRAM(S): at 06:44

## 2023-05-07 RX ADMIN — Medication 1000 MILLIGRAM(S): at 13:35

## 2023-05-07 RX ADMIN — INSULIN HUMAN 1 UNIT(S)/HR: 100 INJECTION, SOLUTION SUBCUTANEOUS at 18:40

## 2023-05-07 RX ADMIN — Medication 400 MILLIGRAM(S): at 02:00

## 2023-05-07 RX ADMIN — Medication 100 MILLIGRAM(S): at 00:13

## 2023-05-07 RX ADMIN — MIDODRINE HYDROCHLORIDE 10 MILLIGRAM(S): 2.5 TABLET ORAL at 21:30

## 2023-05-07 RX ADMIN — Medication 5 MILLIGRAM(S): at 18:40

## 2023-05-07 RX ADMIN — TACROLIMUS 2 MILLIGRAM(S): 5 CAPSULE ORAL at 19:49

## 2023-05-07 RX ADMIN — Medication 400 MILLIGRAM(S): at 13:04

## 2023-05-07 NOTE — PROGRESS NOTE ADULT - SUBJECTIVE AND OBJECTIVE BOX
CTICU  CRITICAL  CARE  attending     Hand off received 					   Pertinent clinical, laboratory, radiographic, hemodynamic, echocardiographic, respiratory data, microbiologic data and chart were reviewed and analyzed frequently throughout the course of the day and night  Patient seen and examined with CTS/ SH attending at bedside    Pt is a 77y , Male, post op day # 12 s/p percutaneous DENNY closure with Watchman device;     post procedure c/b    Pericardial effusion/tamponade  PEA arrest; s/p CPR  s/p emergent bedside pericardiocentesis    POD # 11 s/p sternotomy; exploration for bleeding  evac of L Hemothorax    s/p R Pigtail thoracentesis for pleural effusion  re-intubated for increasing WOB/Critical organ support ( worsening renal fn)     today:    Bronched while intubated; scant secretions  Extubated to nasal canula; upgraded to HFNC 2/2 borderline sats and WOB  Awake bronch done; copious thin secretions; supra glottic and in the main airways; aspirated  Dobutamine @ 2.5 mcgs for post extubation RV support  NGT feeds on hold until resp status stable  NPO until cleared by SLP        78 y/o male with polycystic kidney disease s/p kidney transplant in 2007, HTN, HLD, DM2, Prostate cancer s/p radical prostatectomy 2015, DVT 2006 (in setting of pelvic fracture), and afib/atrial flutter s/p ablation in 1/2018, who is now off oral anticoagulation secondary to GI bleed and presents for DENNY occlusoin. Pt as no symptomsHe was admitted [facility] with a GI bleed. He is a Sabianism and refuses blood transfusions. Eliquis was stopped at that time  Pericardial effusion with cardiac tamponade    Acute respiratory failure with hypoxia    Atelectasis    Ground glass opacity present on imaging of lung        , FAMILY HISTORY:  PAST MEDICAL & SURGICAL HISTORY:  Polycystic kidney disease      DM2 (diabetes mellitus, type 2)      HLD (hyperlipidemia)      HTN (hypertension)      Prostate cancer      Kidney transplant recipient      DVT (deep venous thrombosis)      Chronic CHF      H/O radical prostatectomy  2010      S/P hernia repair  Abdominal and inguinal around 2005        Patient is a 77y old  Male who presents for percutaneous DENNY closure (07 May 2023 13:15)      14 system review limited 2/2 post op morbidity  acute changes include acute respiratory failure  Vital signs, hemodynamic and respiratory parameters were reviewed from the bedside nursing flowsheet.  ICU Vital Signs Last 24 Hrs  T(C): 37.1 (07 May 2023 17:09), Max: 37.7 (07 May 2023 09:00)  T(F): 98.7 (07 May 2023 17:09), Max: 99.8 (07 May 2023 09:00)  HR: 107 (07 May 2023 16:05) (46 - 117)  BP: --  BP(mean): --  ABP: 178/74 (07 May 2023 16:00) (146/55 - 178/74)  ABP(mean): 106 (07 May 2023 16:00) (82 - 106)  RR: 20 (07 May 2023 16:05) (12 - 22)  SpO2: 97% (07 May 2023 16:05) (92% - 100%)    O2 Parameters below as of 07 May 2023 17:00  Patient On (Oxygen Delivery Method): nasal cannula, high flow  O2 Flow (L/min): 50  O2 Concentration (%): 50      Adult Advanced Hemodynamics Last 24 Hrs  CVP(mm Hg): 18 (07 May 2023 16:00) (8 - 27)  CVP(cm H2O): --  CO: 6.4 (07 May 2023 16:00) (3.6 - 6.4)  CI: 3.6 (07 May 2023 16:00) (2 - 3.6)  PA: --  PA(mean): --  PCWP: --  SVR: 1098 (07 May 2023 16:00) (1047 - 1819)  SVRI: 1953 (07 May 2023 16:00) (5810 - 3275)  PVR: --  PVRI: --, ABG - ( 07 May 2023 13:54 )  pH, Arterial: 7.43  pH, Blood: x     /  pCO2: 35    /  pO2: 68    / HCO3: 23    / Base Excess: -0.6  /  SaO2: 95.6              Mode: standby    Intake and output was reviewed and the fluid balance was calculated  Daily     Daily   I&O's Summary    06 May 2023 07:01  -  07 May 2023 07:00  --------------------------------------------------------  IN: 2530.4 mL / OUT: 3490 mL / NET: -959.6 mL    07 May 2023 07:01  -  07 May 2023 17:51  --------------------------------------------------------  IN: 320.9 mL / OUT: 1025 mL / NET: -704.1 mL        All lines and drain sites were assessed  [facility]      POCT Blood Glucose.: 107 mg/dL (07 May 2023 09:03)    No acute change in mental status  Auscultation of the chest reveals equal bs  Abdomen is soft  Extremities are warm and well perfused  Wounds appear clean and unremarkable  Antibiotics are periop    labs  CBC Full  -  ( 07 May 2023 06:53 )  WBC Count : 12.92 K/uL  RBC Count : 2.10 M/uL  Hemoglobin : 7.2 g/dL  Hematocrit : 22.8 %  Platelet Count - Automated : 204 K/uL  Mean Cell Volume : 108.6 fl  Mean Cell Hemoglobin : 34.3 pg  Mean Cell Hemoglobin Concentration : 31.6 gm/dL  Auto Neutrophil # : 11.87 K/uL  Auto Lymphocyte # : 0.70 K/uL  Auto Monocyte # : 0.12 K/uL  Auto Eosinophil # : 0.23 K/uL  Auto Basophil # : 0.00 K/uL  Auto Neutrophil % : 91.0 %  Auto Lymphocyte % : 5.4 %  Auto Monocyte % : 0.9 %  Auto Eosinophil % : 1.8 %  Auto Basophil % : 0.0 %    05-07    145  |  107  |  83<H>  ----------------------------<  94  3.0<L>   |  24  |  2.31<H>    Ca    8.8      07 May 2023 06:53  Phos  3.8     05-07  Mg     2.5     05-07    TPro  5.5<L>  /  Alb  3.6  /  TBili  1.1  /  DBili  x   /  AST  36  /  ALT  12  /  AlkPhos  64  05-07    PT/INR - ( 07 May 2023 06:53 )   PT: 15.0 sec;   INR: 1.26          PTT - ( 07 May 2023 06:53 )  PTT:26.4 sec  The current medications were reviewed   MEDICATIONS  (STANDING):  acetylcysteine 10%  Inhalation 4 milliLiter(s) Inhalation every 6 hours  albumin human 25% IVPB 50 milliLiter(s) IV Intermittent every 8 hours  atorvastatin 20 milliGRAM(s) Oral at bedtime  ceFAZolin   IVPB      ceFAZolin   IVPB 500 milliGRAM(s) IV Intermittent every 12 hours  chlorhexidine 2% Cloths 1 Application(s) Topical <User Schedule>  cyanocobalamin 1000 MICROGram(s) Oral daily  dexMEDEtomidine Infusion 0.1 MICROgram(s)/kG/Hr (1.65 mL/Hr) IV Continuous <Continuous>  dextrose 5%. 1000 milliLiter(s) (100 mL/Hr) IV Continuous <Continuous>  dextrose 5%. 1000 milliLiter(s) (50 mL/Hr) IV Continuous <Continuous>  dextrose 5%. 1000 milliLiter(s) (100 mL/Hr) IV Continuous <Continuous>  dextrose 5%. 1000 milliLiter(s) (50 mL/Hr) IV Continuous <Continuous>  dextrose 50% Injectable 25 Gram(s) IV Push once  dextrose 50% Injectable 25 Gram(s) IV Push once  dextrose 50% Injectable 12.5 Gram(s) IV Push once  dextrose 50% Injectable 12.5 Gram(s) IV Push once  dextrose 50% Injectable 25 Gram(s) IV Push once  dextrose 50% Injectable 25 Gram(s) IV Push once  folic acid 1 milliGRAM(s) Oral daily  glucagon  Injectable 1 milliGRAM(s) IntraMuscular once  glucagon  Injectable 1 milliGRAM(s) IntraMuscular once  insulin regular Infusion 1 Unit(s)/Hr (1 mL/Hr) IV Continuous <Continuous>  levalbuterol Inhalation 0.63 milliGRAM(s) Inhalation every 6 hours  metoclopramide Injectable 5 milliGRAM(s) IV Push two times a day  metolazone 5 milliGRAM(s) Oral daily  midodrine 10 milliGRAM(s) Oral every 8 hours  pantoprazole  Injectable 40 milliGRAM(s) IV Push daily  phenylephrine    Infusion 0.3 MICROgram(s)/kG/Min (7.4 mL/Hr) IV Continuous <Continuous>  predniSONE   Tablet 5 milliGRAM(s) Oral daily  propofol Infusion 10 MICROgram(s)/kG/Min (3.95 mL/Hr) IV Continuous <Continuous>  sodium chloride 0.9%. 1000 milliLiter(s) (10 mL/Hr) IV Continuous <Continuous>  tacrolimus 1 milliGRAM(s) Oral <User Schedule>  tacrolimus 2 milliGRAM(s) Oral <User Schedule>  testosterone patch 4 mG/24 Hr(s) 8 milliGRAM(s) Transdermal daily  vasopressin Infusion 0.01 Unit(s)/Min (1.5 mL/Hr) IV Continuous <Continuous>    MEDICATIONS  (PRN):  dextrose Oral Gel 15 Gram(s) Oral once PRN Blood Glucose LESS THAN 70 milliGRAM(s)/deciliter  dextrose Oral Gel 15 Gram(s) Oral once PRN Blood Glucose LESS THAN 70 milliGRAM(s)/deciliter       PROBLEM LIST/ ASSESSMENT:  HEALTH ISSUES - PROBLEM Dx:  Pericardial effusion with cardiac tamponade    Acute respiratory failure with hypoxia    Atelectasis    Ground glass opacity present on imaging of lung        ,   Patient is a 77y old  Male who presents for percutaneous DENNY closure (07 May 2023 13:15)     s/p percutaneous DENNY closure with Watchman device; followed my sternotomy for exploration  acute changes include acute respiratory failure    My plan includes :    Titrate pressor support to maintain MAP >80  supplemental O2 as needed  Titrate Fio2 to maintain Sao2 >95%  Serial ABGs  Avoid nephrotoxic agents  continue inotrope support with  dobutamine x 24 hrs  Trend lactate levels  Maintain NPO; re-start NG feeds when resp status stable  close hemodynamic, ventilatory and drain monitoring and management per post op routine    Monitor for arrhythmias and monitor parameters for organ perfusion  monitor neurologic status  Head of the bed should remain elevated to 45 deg .   chest PT and IS will be encouraged  monitor adequacy of oxygenation and ventilation and attempt to wean oxygen  Nutritional goals will be met using po eventually , ensure adequate caloric intake and montior the same  Stress ulcer and VTE prophylaxis will be achieved    Glycemic control is satisfactory  Electrolytes have been repleted as necessary and wound care has been carried out. Pain control has been achieved.   agressive physical therapy and early mobility and ambulation goals will be met   The family was updated about the course and plan  CRITICAL CARE TIME SPENT in evaluation and management, reassessments, review and interpretation of labs and x-rays, ventilator and hemodynamic management, formulating a plan and coordinating care: ___90____ MIN.  Time does not include procedural time.  CTICU ATTENDING     					    Modesto Ny MD

## 2023-05-07 NOTE — PROGRESS NOTE ADULT - SUBJECTIVE AND OBJECTIVE BOX
Name of procedure – Flexible fiberoptic bronchoscopy    Indication –     Post extubation increased WOB; resp distress    Flexible fiberoptic bronchoscopy was performed under precede anxiolyis    Pre-procedural assessment reveals no risk of Tb    The scope was advanced orotracheally   VC were sharp and moving well…    The khoa was sharp  Right LL and RML air way were patent , copious thin secretions  LLL and SELENA air way were patent , scant secretions    CXR confirmed no pneumothorax post bronch  There were no complications  The patient tolerated the procedure well    Modesto Ny M.D.

## 2023-05-07 NOTE — PROGRESS NOTE ADULT - SUBJECTIVE AND OBJECTIVE BOX
NEPHROLOGY PROGRESS NOTE    Subjective: Patient seen and examined at bedside. Intubated but awake, hopeful to d/c ett today. No other complaints. Tacro level low, will increase dosing.     [OBJECTIVE]:    Vital Signs:  T(F): , Max: 99.8 (23 @ 09:00)  HR:  (46 - 87)  BP: --  BP(mean): --  ABP: 151/62 (23 @ 12:00) (145/57 - 168/63)  ABP(mean):  (82 - 97)  RR:  (12 - 18)  SpO2:  (95% - 100%)  CVP(mm Hg): 8 (23 @ 12:00) (8 - 27)  CVP(cm H2O): --  Mode: standby     07:01  -   07:00  --------------------------------------------------------  IN: 2530.4 mL / OUT: 3490 mL / NET: -959.6 mL     07:01  -   @ 13:15  --------------------------------------------------------  IN: 123.4 mL / OUT: 625 mL / NET: -501.6 mL      CAPILLARY BLOOD GLUCOSE      POCT Blood Glucose.: 107 mg/dL (07 May 2023 09:03)      Physical Exam:  T(F): 99.8 (23 @ 09:00)  HR: 87 (23 @ 13:01)  BP: --  RR: 12 (23 @ 12:00)  SpO2: 95% (23 @ 13:01)  Wt(kg): --    PHYSICAL EXAM:  Constitutional: Intubated, awake; NAD  HEENT: ETT tube in place, clear secretions  Respiratory: Mechanical breath sounds bilaterally, not overbreathing vent  Cardiac: +S1/S2; RRR; no M/R/G  Gastrointestinal: soft, NT/ND; no rebound or guarding; +BS; RLQ graft Nontender no thrill/bruit  Extremities: WWP, no clubbing or cyanosis; no peripheral edema  Dermatologic: skin warm, dry and intact; no rashes, wounds, or scars  Neuro: Responds to questions, follow commands    Medications:  MEDICATIONS  (STANDING):  acetylcysteine 10%  Inhalation 4 milliLiter(s) Inhalation every 6 hours  atorvastatin 20 milliGRAM(s) Oral at bedtime  ceFAZolin   IVPB      ceFAZolin   IVPB 500 milliGRAM(s) IV Intermittent every 12 hours  chlorhexidine 0.12% Liquid 15 milliLiter(s) Oral Mucosa every 12 hours  chlorhexidine 2% Cloths 1 Application(s) Topical <User Schedule>  cyanocobalamin 1000 MICROGram(s) Oral daily  dexMEDEtomidine Infusion 0.1 MICROgram(s)/kG/Hr (1.65 mL/Hr) IV Continuous <Continuous>  dextrose 5%. 1000 milliLiter(s) (50 mL/Hr) IV Continuous <Continuous>  dextrose 5%. 1000 milliLiter(s) (50 mL/Hr) IV Continuous <Continuous>  dextrose 5%. 1000 milliLiter(s) (100 mL/Hr) IV Continuous <Continuous>  dextrose 5%. 1000 milliLiter(s) (100 mL/Hr) IV Continuous <Continuous>  dextrose 50% Injectable 25 Gram(s) IV Push once  dextrose 50% Injectable 12.5 Gram(s) IV Push once  dextrose 50% Injectable 25 Gram(s) IV Push once  dextrose 50% Injectable 25 Gram(s) IV Push once  dextrose 50% Injectable 12.5 Gram(s) IV Push once  dextrose 50% Injectable 25 Gram(s) IV Push once  folic acid 1 milliGRAM(s) Oral daily  glucagon  Injectable 1 milliGRAM(s) IntraMuscular once  glucagon  Injectable 1 milliGRAM(s) IntraMuscular once  insulin regular Infusion 1 Unit(s)/Hr (1 mL/Hr) IV Continuous <Continuous>  levalbuterol Inhalation 0.63 milliGRAM(s) Inhalation every 6 hours  metoclopramide Injectable 5 milliGRAM(s) IV Push two times a day  metolazone 5 milliGRAM(s) Oral daily  pantoprazole  Injectable 40 milliGRAM(s) IV Push daily  phenylephrine    Infusion 0.3 MICROgram(s)/kG/Min (7.4 mL/Hr) IV Continuous <Continuous>  predniSONE   Tablet 5 milliGRAM(s) Oral daily  propofol Infusion 10 MICROgram(s)/kG/Min (3.95 mL/Hr) IV Continuous <Continuous>  sodium chloride 0.9%. 1000 milliLiter(s) (10 mL/Hr) IV Continuous <Continuous>  tacrolimus 2 milliGRAM(s) Oral <User Schedule>  tacrolimus 1 milliGRAM(s) Oral <User Schedule>  testosterone patch 4 mG/24 Hr(s) 8 milliGRAM(s) Transdermal daily  vasopressin Infusion 0.01 Unit(s)/Min (1.5 mL/Hr) IV Continuous <Continuous>    MEDICATIONS  (PRN):  dextrose Oral Gel 15 Gram(s) Oral once PRN Blood Glucose LESS THAN 70 milliGRAM(s)/deciliter  dextrose Oral Gel 15 Gram(s) Oral once PRN Blood Glucose LESS THAN 70 milliGRAM(s)/deciliter      Allergies:  Allergies    naproxen (Hives)  Bairon&#x27;s Witness - No blood products (Unknown)    Intolerances        Labs:                        7.2    12.92 )-----------( 204      ( 07 May 2023 06:53 )             22.8     05-07    145  |  107  |  83<H>  ----------------------------<  94  3.0<L>   |  24  |  2.31<H>    Ca    8.8      07 May 2023 06:53  Phos  3.8     05-07  Mg     2.5     05-07    TPro  5.5<L>  /  Alb  3.6  /  TBili  1.1  /  DBili  x   /  AST  36  /  ALT  12  /  AlkPhos  64  05-07    PT/INR - ( 07 May 2023 06:53 )   PT: 15.0 sec;   INR: 1.26          PTT - ( 07 May 2023 06:53 )  PTT:26.4 sec  Urinalysis Basic - ( 06 May 2023 08:44 )    Color: Pink / Appearance: SL Cloudy / S.020 / pH: x  Gluc: x / Ketone: NEGATIVE  / Bili: Negative / Urobili: 0.2 E.U./dL   Blood: x / Protein: NEGATIVE mg/dL / Nitrite: POSITIVE   Leuk Esterase: Trace / RBC: < 5 /HPF / WBC 5-10 /HPF   Sq Epi: x / Non Sq Epi: x / Bacteria: None /HPF        Radiology and other tests:  Creatinine, Serum: 2.31 mg/dL (23 @ 06:53)  Creatinine, Serum: 2.54 mg/dL (23 @ 04:56)  Creatinine, Serum: 2.46 mg/dL (23 @ 09:41)  Creatinine, Serum: 2.46 mg/dL (23 @ 02:19)  Creatinine, Serum: 2.38 mg/dL (23 @ 19:07)  Creatinine, Serum: 2.41 mg/dL (23 @ 16:56)  Creatinine, Serum: 2.40 mg/dL (23 @ 02:09)  Creatinine, Serum: 2.44 mg/dL (23 @ 15:11)  Creatinine, Serum: 2.40 mg/dL (23 @ 02:22)  Creatinine, Serum: 2.53 mg/dL (23 @ 16:24)      Tacrolimus (), Serum (23 @ 04:56)   Tacrolimus (), Serum: 2.4: Tacrolimus testing is performed on the Abbott  by   chemiluminescent microparticle immunoassay. The therapeutic range of   tacrolimus is not clearly defined but target 12-hour trough whole blood   concentrations are 5.0 - 20.0 ng/mL early post transplant. Twenty-four   hour   trough concentrations are 33-50% less than the corresponding 12-hour   trough. ng/mL      Historical Values  Tacrolimus (), Serum (23 @ 04:56)   Tacrolimus (), Serum: 2.4: Tacrolimus testing is performed on the Abbott  by   chemiluminescent microparticle immunoassay. The therapeutic range of   tacrolimus is not clearly defined but target 12-hour trough whole blood   concentrations are 5.0 - 20.0 ng/mL early post transplant. Twenty-four   hour   trough concentrations are 33-50% less than the corresponding 12-hour   trough. ng/mL  Tacrolimus (), Serum (23 @ 02:14)   Tacrolimus (), Serum: 2.3: Tacrolimus testing is performed on the Abbott  by   chemiluminescent microparticle immunoassay. The therapeutic range of   tacrolimus is not clearly defined but target 12-hour trough whole blood   concentrations are 5.0 - 20.0 ng/mL early post transplant. Twenty-four   hour   trough concentrations are 33-50% less than the corresponding 12-hour   trough. ng/mL  Tacrolimus (), Serum (23 @ 03:06)   Tacrolimus (), Serum: 2.7: Tacrolimus testing is performed on the Abbott  by   chemiluminescent microparticle immunoassay. The therapeutic range of   tacrolimus is not clearly defined but target 12-hour trough whole blood   concentrations are 5.0 - 20.0 ng/mL early post transplant. Twenty-four   hour

## 2023-05-07 NOTE — PROGRESS NOTE ADULT - SUBJECTIVE AND OBJECTIVE BOX
CTICU  CRITICAL  CARE  PROCEDURE  NOTE      				     Name of procedure – Flexible fiberoptic bronchoscopy    Indication –   Respiratory failure    Flexible fiberoptic bronchoscopy was performed under propofol and fentanyl sedation while the patient was intubated for controlled mechanical ventilation  Pre-procedural assessment reveals no risk of Tb  Ventilator settings were adjusted to reduce airway pressures to reduce the risk of ptx  The scope was advanced past the ett which was noted to be 2 cm the khoa   The khoa was sharp  Right LL and RML air way were patent , scant thin secretions  Left LL air way  with scant thin secretions.  CXR confirmed no pneumothorax post bronch  There were no complications  The patient tolerated the procedure well    Modesto Ny M.D.

## 2023-05-07 NOTE — PROGRESS NOTE ADULT - ASSESSMENT
77Y M w/ CKD s/p renal transplant 2007 h/o PKD s/p DENNY occlusion repair complicated by pericardial effusion s/p chest washout, course complicated by cardiogenic shock and DAYO now stable and nonoliguric on pressors/diuretic with elevated MAP goal        #Non-Oliguric iATN on underlying CKD 3 (baseline sCr at 1.2-1.4)   Latoya pre-renal initially but given prolonged hypoperfusion developed into iATN   sCr now stable in ~2.5 range, monitoring for further recovery   - Continue diuresis and hemodynamic augmentation  - Strict I/Os    Transplant - 2007, initial failure 2/2 PCKD  - Increase tacro 2mg AM, 2mg PM. Trough 5/6 below goal, goal tacro 4-5    - continue to draw trough 30 minutes before AM dose of tacro - next draw tuesday AM to adjust tacro again  - Continue home prednisone 5mg PO QD    Anemia - 2/2 bleed, JW. Continue EPO 20K weekly, next dose due 5/10  s/p iron repletion 4/26, can recheck on 5/8    Discussed in detail with primary team      Also history of: HTN, DM2, Prostate cancer s/p radical prostatectomy 2015, DVT 2006 (in setting of pelvic fracture), and afib/atrial flutter s/p ablation in 1/2018, who is now off oral anticoagulation secondary to GI bleed and   Polycystic kidney disease

## 2023-05-07 NOTE — AIRWAY REMOVAL NOTE  ADULT & PEDS - RESPIRATORY RHYTHM/PATTERN
no shortness of breath/rate regular/depth regular/pattern regular/unlabored
no shortness of breath/rate regular/depth regular/pattern regular/unlabored

## 2023-05-07 NOTE — PROGRESS NOTE ADULT - SUBJECTIVE AND OBJECTIVE BOX
CTICU  CRITICAL  CARE  attending     Hand off received 					   Pertinent clinical, laboratory, radiographic, hemodynamic, echocardiographic, respiratory data, microbiologic data and chart were reviewed and analyzed frequently throughout the course of the day and night      77 year old male with  HTN, HLD, DM, polycystic kidney disease s/p kidney transplant in 2007,, Prostate cancer s/p radical prostatectomy 2015, DVT 2006 (in setting of pelvic fracture).  He has history of atrial fibrillation and atrial flutter s/p ablation in 1/2018.  He has history of GI bleed in July 2022.  Eliquis was stopped at that time.   He is a [place] and refuses blood transfusions.    S/P percutaneous occlusion of the left atrial appendage.  S/P PEA arrest.  S/P drainage of pericardial effusion.  S/P Evacuation of left sided hemothorax.      HEALTH ISSUES - PROBLEM Dx:  Pericardial effusion with cardiac tamponade  Acute respiratory failure with hypoxia  Atelectasis  Ground glass opacity present on imaging of lung        FAMILY HISTORY:  PAST MEDICAL & SURGICAL HISTORY:  Polycystic kidney disease  DM2 (diabetes mellitus, type 2)  HLD (hyperlipidemia)  HTN (hypertension)  Prostate cancer  Kidney transplant recipient  DVT (deep venous thrombosis)  Chronic CHF  H/O radical prostatectomy in 2010  S/P hernia repair  Abdominal and inguinal around 2005        14 system review was unremarkable    Vital signs, hemodynamic and respiratory parameters were reviewed from the bedside nursing flow sheet.  ICU Vital Signs Last 24 Hrs  T(C): 36.9 (07 May 2023 22:19), Max: 37.7 (07 May 2023 09:00)  T(F): 98.4 (07 May 2023 22:19), Max: 99.8 (07 May 2023 09:00)  HR: 97 (07 May 2023 22:00) (46 - 117)  BP: --  BP(mean): --  ABP: 157/64 (07 May 2023 22:00) (146/55 - 178/74)  ABP(mean): 91 (07 May 2023 22:00) (82 - 106)  RR: 18 (07 May 2023 22:00) (12 - 22)  SpO2: 100% (07 May 2023 22:00) (92% - 100%)    O2 Parameters below as of 07 May 2023 22:00  Patient On (Oxygen Delivery Method): nasal cannula w/ humidification  O2 Flow (L/min): 50  O2 Concentration (%): 50      Adult Advanced Hemodynamics Last 24 Hrs  CVP(mm Hg): 11 (07 May 2023 22:00) (8 - 27)  CVP(cm H2O): --  CO: 5.3 (07 May 2023 21:00) (3.6 - 6.4)  CI: 3 (07 May 2023 21:00) (2 - 3.6)  PA: --  PA(mean): --  PCWP: --  SVR: 1145 (07 May 2023 21:00) (1047 - 1819)  SVRI: 2024 (07 May 2023 21:00) (0710 - 3275)  PVR: --  PVRI: --, ABG - ( 07 May 2023 13:54 )  pH, Arterial: 7.43  pH, Blood: x     /  pCO2: 35    /  pO2: 68    / HCO3: 23    / Base Excess: -0.6  /  SaO2: 95.6              Mode: standby    Intake and output was reviewed and the fluid balance was calculated  Daily     Daily   I&O's Summary    06 May 2023 07:01  -  07 May 2023 07:00  --------------------------------------------------------  IN: 2530.4 mL / OUT: 3490 mL / NET: -959.6 mL    07 May 2023 07:01  -  07 May 2023 22:26  --------------------------------------------------------  IN: 608.9 mL / OUT: 1987 mL / NET: -1378.1 mL        All lines and drain sites were assessed    Neuro: Follows commands.  Moves all 4 extremities.  Neck: No JVD.  CVS: S1, S2, No S3.  Lungs: Good air entry bilaterally.  Abd: Soft. No tenderness. + Bowel sounds.  Vascular: + DP/PT.  Extremities: No edema.  Lymphatic: Normal.  Skin: No abnormalities.      labs  CBC Full  -  ( 07 May 2023 06:53 )  WBC Count : 12.92 K/uL  RBC Count : 2.10 M/uL  Hemoglobin : 7.2 g/dL  Hematocrit : 22.8 %  Platelet Count - Automated : 204 K/uL  Mean Cell Volume : 108.6 fl  Mean Cell Hemoglobin : 34.3 pg  Mean Cell Hemoglobin Concentration : 31.6 gm/dL  Auto Neutrophil # : 11.87 K/uL  Auto Lymphocyte # : 0.70 K/uL  Auto Monocyte # : 0.12 K/uL  Auto Eosinophil # : 0.23 K/uL  Auto Basophil # : 0.00 K/uL  Auto Neutrophil % : 91.0 %  Auto Lymphocyte % : 5.4 %  Auto Monocyte % : 0.9 %  Auto Eosinophil % : 1.8 %  Auto Basophil % : 0.0 %    05-07    145  |  107  |  83<H>  ----------------------------<  94  3.0<L>   |  24  |  2.31<H>    Ca    8.8      07 May 2023 06:53  Phos  3.8     05-07  Mg     2.5     05-07    TPro  5.5<L>  /  Alb  3.6  /  TBili  1.1  /  DBili  x   /  AST  36  /  ALT  12  /  AlkPhos  64  05-07    PT/INR - ( 07 May 2023 06:53 )   PT: 15.0 sec;   INR: 1.26          PTT - ( 07 May 2023 06:53 )  PTT:26.4 sec  The current medications were reviewed   MEDICATIONS  (STANDING):  acetylcysteine 10%  Inhalation 4 milliLiter(s) Inhalation every 6 hours  albumin human 25% IVPB 50 milliLiter(s) IV Intermittent every 8 hours  atorvastatin 20 milliGRAM(s) Oral at bedtime  ceFAZolin   IVPB      ceFAZolin   IVPB 500 milliGRAM(s) IV Intermittent every 12 hours  chlorhexidine 2% Cloths 1 Application(s) Topical <User Schedule>  cyanocobalamin 1000 MICROGram(s) Oral daily  dexMEDEtomidine Infusion 0.1 MICROgram(s)/kG/Hr (1.65 mL/Hr) IV Continuous <Continuous>  DOBUTamine Infusion 2.5 MICROgram(s)/kG/Min (4.94 mL/Hr) IV Continuous <Continuous>  folic acid 1 milliGRAM(s) Oral daily  glucagon  Injectable 1 milliGRAM(s) IntraMuscular once  glucagon  Injectable 1 milliGRAM(s) IntraMuscular once  glucagon  Injectable 1 milliGRAM(s) IntraMuscular once  levalbuterol Inhalation 0.63 milliGRAM(s) Inhalation every 6 hours  metoclopramide Injectable 5 milliGRAM(s) IV Push two times a day  midodrine 10 milliGRAM(s) Oral every 8 hours  pantoprazole  Injectable 40 milliGRAM(s) IV Push daily  predniSONE   Tablet 5 milliGRAM(s) Oral daily  propofol Infusion 10 MICROgram(s)/kG/Min (3.95 mL/Hr) IV Continuous <Continuous>  sodium chloride 0.9%. 1000 milliLiter(s) (10 mL/Hr) IV Continuous <Continuous>  tacrolimus 2 milliGRAM(s) Oral two times a day  testosterone patch 4 mG/24 Hr(s) 8 milliGRAM(s) Transdermal daily  vasopressin Infusion 0.01 Unit(s)/Min (1.5 mL/Hr) IV Continuous <Continuous>    MEDICATIONS  (PRN):          77 years old male with history of atrial fibrillation and contraindications to anticoagulation.   S/P Occlusion of LA appendage.  Postoperative course complicated by PEA arrest, pericardial tamponade cardiogenic shock, respiratory failure, DAYO, Transaminitis, metabolic acidosis, DVT.  S/P Drainage of pericardial effusion.   S/P Evacuation of left hemothorax.  Bronchoscopy showed copious secretions.  Extubated today.  Acute on Chronic renal failure.  Azotemia  Hypokalemia  DVT.  Hemodynamically stable.  Good oxygenation.  Fair urine out put.        My plan includes :  IV dobutamine for right heart support.  Increase Tacrolimus dose.  Statin Rx.   Bronchodilator Rx.  Hold Beta blocker Rx (Symptomatic bradycardia).   No anticoagulation for now.  Close hemodynamic monitoring   Monitor for arrhythmias and monitor parameters for organ perfusion  Monitor neurologic status  Monitor renal function.  Head of the bed should remain elevated to 45 deg .   Chest PT and IS will be encouraged  Monitor adequacy of oxygenation  Nutritional goals will be met using po eventually , ensure adequate caloric intake and monitor the same  Stress ulcer and VTE prophylaxis will be achieved    Glycemic control is satisfactory  Electrolytes have been repleted as necessary and wound care has been carried out. Pain control has been achieved.   Aggressive physical therapy and early mobility and ambulation goals will be met   The family was updated about the course and plan  CRITICAL CARE TIME SPENT in evaluation and management, review and interpretation of labs and x-rays, hemodynamic management, formulating a plan and coordinating care: ___30____ MIN.  Time does not include procedural time.  CTICU ATTENDING     					    Antwan Colon MD

## 2023-05-08 LAB
ALBUMIN SERPL ELPH-MCNC: 3.7 G/DL — SIGNIFICANT CHANGE UP (ref 3.3–5)
ALP SERPL-CCNC: 65 U/L — SIGNIFICANT CHANGE UP (ref 40–120)
ALT FLD-CCNC: 7 U/L — LOW (ref 10–45)
ANION GAP SERPL CALC-SCNC: 16 MMOL/L — SIGNIFICANT CHANGE UP (ref 5–17)
ANISOCYTOSIS BLD QL: SIGNIFICANT CHANGE UP
APTT BLD: 25.3 SEC — LOW (ref 27.5–35.5)
AST SERPL-CCNC: 28 U/L — SIGNIFICANT CHANGE UP (ref 10–40)
BASOPHILS # BLD AUTO: 0 K/UL — SIGNIFICANT CHANGE UP (ref 0–0.2)
BASOPHILS NFR BLD AUTO: 0 % — SIGNIFICANT CHANGE UP (ref 0–2)
BILIRUB SERPL-MCNC: 1.7 MG/DL — HIGH (ref 0.2–1.2)
BUN SERPL-MCNC: 80 MG/DL — HIGH (ref 7–23)
CALCIUM SERPL-MCNC: 9 MG/DL — SIGNIFICANT CHANGE UP (ref 8.4–10.5)
CHLORIDE SERPL-SCNC: 103 MMOL/L — SIGNIFICANT CHANGE UP (ref 96–108)
CO2 SERPL-SCNC: 24 MMOL/L — SIGNIFICANT CHANGE UP (ref 22–31)
CREAT SERPL-MCNC: 2.47 MG/DL — HIGH (ref 0.5–1.3)
EGFR: 27 ML/MIN/1.73M2 — LOW
EOSINOPHIL # BLD AUTO: 0 K/UL — SIGNIFICANT CHANGE UP (ref 0–0.5)
EOSINOPHIL NFR BLD AUTO: 0 % — SIGNIFICANT CHANGE UP (ref 0–6)
GAS PNL BLDA: SIGNIFICANT CHANGE UP
GLUCOSE BLDC GLUCOMTR-MCNC: 146 MG/DL — HIGH (ref 70–99)
GLUCOSE BLDC GLUCOMTR-MCNC: 195 MG/DL — HIGH (ref 70–99)
GLUCOSE BLDC GLUCOMTR-MCNC: 217 MG/DL — HIGH (ref 70–99)
GLUCOSE BLDC GLUCOMTR-MCNC: 218 MG/DL — HIGH (ref 70–99)
GLUCOSE BLDC GLUCOMTR-MCNC: 219 MG/DL — HIGH (ref 70–99)
GLUCOSE BLDC GLUCOMTR-MCNC: 425 MG/DL — HIGH (ref 70–99)
GLUCOSE SERPL-MCNC: 213 MG/DL — HIGH (ref 70–99)
HCT VFR BLD CALC: 24.4 % — LOW (ref 39–50)
HGB BLD-MCNC: 7.4 G/DL — LOW (ref 13–17)
INR BLD: 1.32 — HIGH (ref 0.88–1.16)
LYMPHOCYTES # BLD AUTO: 0.19 K/UL — LOW (ref 1–3.3)
LYMPHOCYTES # BLD AUTO: 1.7 % — LOW (ref 13–44)
MACROCYTES BLD QL: SIGNIFICANT CHANGE UP
MAGNESIUM SERPL-MCNC: 2.6 MG/DL — SIGNIFICANT CHANGE UP (ref 1.6–2.6)
MANUAL SMEAR VERIFICATION: SIGNIFICANT CHANGE UP
MCHC RBC-ENTMCNC: 30.3 GM/DL — LOW (ref 32–36)
MCHC RBC-ENTMCNC: 33.3 PG — SIGNIFICANT CHANGE UP (ref 27–34)
MCV RBC AUTO: 109.9 FL — HIGH (ref 80–100)
MONOCYTES # BLD AUTO: 0.29 K/UL — SIGNIFICANT CHANGE UP (ref 0–0.9)
MONOCYTES NFR BLD AUTO: 2.6 % — SIGNIFICANT CHANGE UP (ref 2–14)
MYOGLOBIN UR-MCNC: 2 NG/ML — SIGNIFICANT CHANGE UP (ref 0–13)
NEUTROPHILS # BLD AUTO: 10.74 K/UL — HIGH (ref 1.8–7.4)
NEUTROPHILS NFR BLD AUTO: 95.7 % — HIGH (ref 43–77)
NRBC # BLD: 3 /100 — HIGH (ref 0–0)
NRBC # BLD: SIGNIFICANT CHANGE UP /100 WBCS (ref 0–0)
OVALOCYTES BLD QL SMEAR: SLIGHT — SIGNIFICANT CHANGE UP
PHOSPHATE SERPL-MCNC: 5.7 MG/DL — HIGH (ref 2.5–4.5)
PLAT MORPH BLD: ABNORMAL
PLATELET # BLD AUTO: 254 K/UL — SIGNIFICANT CHANGE UP (ref 150–400)
POIKILOCYTOSIS BLD QL AUTO: SLIGHT — SIGNIFICANT CHANGE UP
POLYCHROMASIA BLD QL SMEAR: SLIGHT — SIGNIFICANT CHANGE UP
POTASSIUM SERPL-MCNC: 3.2 MMOL/L — LOW (ref 3.5–5.3)
POTASSIUM SERPL-SCNC: 3.2 MMOL/L — LOW (ref 3.5–5.3)
PROT SERPL-MCNC: 5.6 G/DL — LOW (ref 6–8.3)
PROTHROM AB SERPL-ACNC: 15.7 SEC — HIGH (ref 10.5–13.4)
RBC # BLD: 2.22 M/UL — LOW (ref 4.2–5.8)
RBC # FLD: 26.6 % — HIGH (ref 10.3–14.5)
RBC BLD AUTO: ABNORMAL
SCHISTOCYTES BLD QL AUTO: SLIGHT — SIGNIFICANT CHANGE UP
SODIUM SERPL-SCNC: 143 MMOL/L — SIGNIFICANT CHANGE UP (ref 135–145)
STOMATOCYTES BLD QL SMEAR: SLIGHT — SIGNIFICANT CHANGE UP
TACROLIMUS SERPL-MCNC: 5.5 NG/ML — SIGNIFICANT CHANGE UP
TACROLIMUS SERPL-MCNC: <2 NG/ML — SIGNIFICANT CHANGE UP
WBC # BLD: 11.22 K/UL — HIGH (ref 3.8–10.5)
WBC # FLD AUTO: 11.22 K/UL — HIGH (ref 3.8–10.5)

## 2023-05-08 PROCEDURE — 99291 CRITICAL CARE FIRST HOUR: CPT

## 2023-05-08 PROCEDURE — 71045 X-RAY EXAM CHEST 1 VIEW: CPT | Mod: 26

## 2023-05-08 PROCEDURE — 99232 SBSQ HOSP IP/OBS MODERATE 35: CPT

## 2023-05-08 PROCEDURE — 93970 EXTREMITY STUDY: CPT | Mod: 26

## 2023-05-08 PROCEDURE — 99292 CRITICAL CARE ADDL 30 MIN: CPT

## 2023-05-08 RX ORDER — HEPARIN SODIUM 5000 [USP'U]/ML
5000 INJECTION INTRAVENOUS; SUBCUTANEOUS EVERY 12 HOURS
Refills: 0 | Status: DISCONTINUED | OUTPATIENT
Start: 2023-05-08 | End: 2023-05-21

## 2023-05-08 RX ORDER — POTASSIUM CHLORIDE 20 MEQ
20 PACKET (EA) ORAL
Refills: 0 | Status: COMPLETED | OUTPATIENT
Start: 2023-05-08 | End: 2023-05-08

## 2023-05-08 RX ORDER — BUMETANIDE 0.25 MG/ML
2 INJECTION INTRAMUSCULAR; INTRAVENOUS DAILY
Refills: 0 | Status: DISCONTINUED | OUTPATIENT
Start: 2023-05-09 | End: 2023-05-16

## 2023-05-08 RX ORDER — ACETAMINOPHEN 500 MG
1000 TABLET ORAL ONCE
Refills: 0 | Status: COMPLETED | OUTPATIENT
Start: 2023-05-08 | End: 2023-05-08

## 2023-05-08 RX ORDER — MIDODRINE HYDROCHLORIDE 2.5 MG/1
5 TABLET ORAL EVERY 8 HOURS
Refills: 0 | Status: DISCONTINUED | OUTPATIENT
Start: 2023-05-08 | End: 2023-05-11

## 2023-05-08 RX ORDER — INSULIN GLARGINE 100 [IU]/ML
12 INJECTION, SOLUTION SUBCUTANEOUS AT BEDTIME
Refills: 0 | Status: DISCONTINUED | OUTPATIENT
Start: 2023-05-08 | End: 2023-05-15

## 2023-05-08 RX ADMIN — Medication 100 MILLIGRAM(S): at 11:49

## 2023-05-08 RX ADMIN — Medication 50 MILLIEQUIVALENT(S): at 05:53

## 2023-05-08 RX ADMIN — Medication 4: at 07:38

## 2023-05-08 RX ADMIN — Medication 5 MILLIGRAM(S): at 05:15

## 2023-05-08 RX ADMIN — Medication 8 MILLIGRAM(S): at 19:29

## 2023-05-08 RX ADMIN — Medication 50 MILLIEQUIVALENT(S): at 09:16

## 2023-05-08 RX ADMIN — Medication 8 MILLIGRAM(S): at 13:44

## 2023-05-08 RX ADMIN — PANTOPRAZOLE SODIUM 40 MILLIGRAM(S): 20 TABLET, DELAYED RELEASE ORAL at 13:42

## 2023-05-08 RX ADMIN — Medication 50 MILLIEQUIVALENT(S): at 07:37

## 2023-05-08 RX ADMIN — Medication 4 MILLILITER(S): at 00:11

## 2023-05-08 RX ADMIN — INSULIN GLARGINE 12 UNIT(S): 100 INJECTION, SOLUTION SUBCUTANEOUS at 22:03

## 2023-05-08 RX ADMIN — Medication 400 MILLIGRAM(S): at 22:00

## 2023-05-08 RX ADMIN — Medication 1000 MILLIGRAM(S): at 22:30

## 2023-05-08 RX ADMIN — Medication 5 MILLIGRAM(S): at 17:36

## 2023-05-08 RX ADMIN — Medication 50 MILLILITER(S): at 05:05

## 2023-05-08 RX ADMIN — ATORVASTATIN CALCIUM 20 MILLIGRAM(S): 80 TABLET, FILM COATED ORAL at 21:50

## 2023-05-08 RX ADMIN — TACROLIMUS 2 MILLIGRAM(S): 5 CAPSULE ORAL at 05:19

## 2023-05-08 RX ADMIN — Medication 1 MILLIGRAM(S): at 11:50

## 2023-05-08 RX ADMIN — LEVALBUTEROL 0.63 MILLIGRAM(S): 1.25 SOLUTION, CONCENTRATE RESPIRATORY (INHALATION) at 05:17

## 2023-05-08 RX ADMIN — LEVALBUTEROL 0.63 MILLIGRAM(S): 1.25 SOLUTION, CONCENTRATE RESPIRATORY (INHALATION) at 21:08

## 2023-05-08 RX ADMIN — LEVALBUTEROL 0.63 MILLIGRAM(S): 1.25 SOLUTION, CONCENTRATE RESPIRATORY (INHALATION) at 15:02

## 2023-05-08 RX ADMIN — Medication 8 MILLIGRAM(S): at 06:53

## 2023-05-08 RX ADMIN — HEPARIN SODIUM 5000 UNIT(S): 5000 INJECTION INTRAVENOUS; SUBCUTANEOUS at 17:36

## 2023-05-08 RX ADMIN — Medication 50 MILLILITER(S): at 13:50

## 2023-05-08 RX ADMIN — LEVALBUTEROL 0.63 MILLIGRAM(S): 1.25 SOLUTION, CONCENTRATE RESPIRATORY (INHALATION) at 00:04

## 2023-05-08 RX ADMIN — Medication 4: at 19:04

## 2023-05-08 RX ADMIN — Medication 4 MILLILITER(S): at 05:15

## 2023-05-08 RX ADMIN — Medication 12: at 22:02

## 2023-05-08 RX ADMIN — CHLORHEXIDINE GLUCONATE 1 APPLICATION(S): 213 SOLUTION TOPICAL at 06:53

## 2023-05-08 RX ADMIN — Medication 4 MILLILITER(S): at 15:15

## 2023-05-08 RX ADMIN — Medication 4 MILLILITER(S): at 21:07

## 2023-05-08 RX ADMIN — TACROLIMUS 2 MILLIGRAM(S): 5 CAPSULE ORAL at 17:32

## 2023-05-08 RX ADMIN — PREGABALIN 1000 MICROGRAM(S): 225 CAPSULE ORAL at 11:50

## 2023-05-08 RX ADMIN — MIDODRINE HYDROCHLORIDE 10 MILLIGRAM(S): 2.5 TABLET ORAL at 05:15

## 2023-05-08 RX ADMIN — Medication 8 MILLIGRAM(S): at 13:52

## 2023-05-08 RX ADMIN — LIDOCAINE 1 PATCH: 4 CREAM TOPICAL at 03:35

## 2023-05-08 RX ADMIN — Medication 2: at 13:30

## 2023-05-08 NOTE — PROGRESS NOTE ADULT - SUBJECTIVE AND OBJECTIVE BOX
Interval Events: Reviewed  Patient seen and examined at bedside.    Patient is a 77y old  Male who presents with a chief complaint of percutaneous DENNY closure (08 May 2023 12:31)  he has a cough    PAST MEDICAL & SURGICAL HISTORY:  Polycystic kidney disease      DM2 (diabetes mellitus, type 2)      HLD (hyperlipidemia)      HTN (hypertension)      Prostate cancer      Kidney transplant recipient      DVT (deep venous thrombosis)      Chronic CHF      H/O radical prostatectomy  2010      S/P hernia repair  Abdominal and inguinal around 2005          MEDICATIONS:  Pulmonary:  acetylcysteine 10%  Inhalation 4 milliLiter(s) Inhalation every 6 hours  levalbuterol Inhalation 0.63 milliGRAM(s) Inhalation every 6 hours    Antimicrobials:  ceFAZolin   IVPB      ceFAZolin   IVPB 500 milliGRAM(s) IV Intermittent every 12 hours    Anticoagulants:  heparin   Injectable 5000 Unit(s) SubCutaneous every 12 hours    Cardiac:  DOBUTamine Infusion 2.5 MICROgram(s)/kG/Min IV Continuous <Continuous>  midodrine 5 milliGRAM(s) Oral every 8 hours      Allergies    naproxen (Hives)  Bairon&#x27;s Witness - No blood products (Unknown)    Intolerances        Vital Signs Last 24 Hrs  T(C): 37.1 (08 May 2023 18:17), Max: 37.1 (08 May 2023 18:17)  T(F): 98.8 (08 May 2023 18:17), Max: 98.8 (08 May 2023 18:17)  HR: 95 (08 May 2023 20:00) (79 - 104)  BP: --  BP(mean): --  RR: 20 (08 May 2023 20:00) (16 - 22)  SpO2: 100% (08 May 2023 20:00) (96% - 100%)    Parameters below as of 08 May 2023 20:00  Patient On (Oxygen Delivery Method): nasal cannula, high flow  O2 Flow (L/min): 50  O2 Concentration (%): 50    05-07 @ 07:01  -  05-08 @ 07:00  --------------------------------------------------------  IN: 1088.9 mL / OUT: 3517 mL / NET: -2428.1 mL    05-08 @ 07:01  - 05-08 @ 20:47  --------------------------------------------------------  IN: 747 mL / OUT: 1309 mL / NET: -562 mL          Review of Systems:   •	General: negative  •	Skin/Breast: negative  •	Ophthalmologic: negative  •	ENMT: negative  •	Respiratory and Thorax: cough  •	Cardiovascular: negative  •	Gastrointestinal: negative  •	Genitourinary: negative  •	Musculoskeletal: negative  •	Neurological: negative  •	Psychiatric: negative  •	Hematology/Lymphatics: negative  •	Endocrine: negative  •	Allergic/Immunologic: negative    Physical Exam:   • Constitutional:	refer to the dietion /Nutritionist note  • Eyes:	EOMI; PERRL; no drainage or redness  • ENMT:	No oral lesions; no gross abnormalities  • Neck	No bruits; no thyromegaly or nodules  • Breasts:	not examined  • Back:	No deformity or limitation of movement  • Respiratory:	Breath Sounds equal & clear to percussion & bl rhonchi auscultation, no accessory muscle use  • Cardiovascular:	Regular rate & rhythm, normal S1, S2; no murmurs, gallops or rubs; no S3, S4  • Gastrointestinal:	Soft, non-tender, no hepatosplenomegaly, normal bowel sounds  • Genitourinary:	not examined  • Rectal: not examined  • Extremities:	No cyanosis, clubbing or edema  • Vascular:	Equal and normal pulses (carotid, femoral, dorsalis pedis)  • Neurologica:l	not examined  • Skin:	No lesions; no rash  • Lymph Nodes:	No lymphadedenopathy  • Musculoskeletal:	No joint pain, swelling or deformity; no limitation of movement        LABS:  ABG - ( 08 May 2023 03:09 )  pH, Arterial: 7.46  pH, Blood: x     /  pCO2: 36    /  pO2: 126   / HCO3: 26    / Base Excess: 2.0   /  SaO2: 99.0                CBC Full  -  ( 08 May 2023 03:32 )  WBC Count : 11.22 K/uL  RBC Count : 2.22 M/uL  Hemoglobin : 7.4 g/dL  Hematocrit : 24.4 %  Platelet Count - Automated : 254 K/uL  Mean Cell Volume : 109.9 fl  Mean Cell Hemoglobin : 33.3 pg  Mean Cell Hemoglobin Concentration : 30.3 gm/dL  Auto Neutrophil # : 10.74 K/uL  Auto Lymphocyte # : 0.19 K/uL  Auto Monocyte # : 0.29 K/uL  Auto Eosinophil # : 0.00 K/uL  Auto Basophil # : 0.00 K/uL  Auto Neutrophil % : 95.7 %  Auto Lymphocyte % : 1.7 %  Auto Monocyte % : 2.6 %  Auto Eosinophil % : 0.0 %  Auto Basophil % : 0.0 %    05-08    143  |  103  |  80<H>  ----------------------------<  213<H>  3.2<L>   |  24  |  2.47<H>    Ca    9.0      08 May 2023 03:32  Phos  5.7     05-08  Mg     2.6     05-08    TPro  5.6<L>  /  Alb  3.7  /  TBili  1.7<H>  /  DBili  x   /  AST  28  /  ALT  7<L>  /  AlkPhos  65  05-08    PT/INR - ( 08 May 2023 03:32 )   PT: 15.7 sec;   INR: 1.32          PTT - ( 08 May 2023 03:32 )  PTT:25.3 sec        < from: Xray Chest 1 View- PORTABLE-Routine (Xray Chest 1 View- PORTABLE-Routine in AM.) (05.08.23 @ 05:23) >  ACC: [id number] EXAM:  XR CHEST PORTABLE ROUTINE 1V   ORDERED BY: PATRICK GODFREY     PROCEDURE DATE:  05/08/2023          INTERPRETATION:  TECHNIQUE: Single portable view of the chest.    COMPARISON:  5/7/2023    CLINICAL HISTORY: post op    FINDINGS:    Single frontal view of the chest demonstrates line and tubes are   unchanged. Moderate congestive changes. Small bilateral pleural   effusions. Right hilar opacity, unchanged. The cardiomediastinal   silhouette is enlarged. Mediastinal sternotomy wires. No acute osseous   abnormalities. Overlying EKG leads and wires are noted    IMPRESSION: No interval change.    < end of copied text >              RADIOLOGY & ADDITIONAL STUDIES (The following images were personally reviewed):

## 2023-05-08 NOTE — PROCEDURE NOTE - ADDITIONAL PROCEDURE DETAILS
Right basilic vein accessed with good flow, unable to pass wire more than 10cm. Noted to have large clot on ultrasound in right axilla in venous system.     Procedure aborted and 18 gauge peripheral IV placed in right AC
right heart cath via cordis paop at 55 cms tolerated well
Patient with hemodynamic collapse in setting of pericardial effusion with tamponade, requiring high dose pressors. Decision made to perform emergent pericardiocentesis at bedside with ultrasound guidance. Time out was performed, patient previously intubated and sedated by ICU team. Hemodynamic monitoring by ICU team via arterial line. TTE imaging demonstrated large circumferential pericardial effusion. Decision made to proceed with pericardiocentesis via subxiphoid approach. Under TTE-guidance, a 21G Micropuncture needle was inserted into the pericardial space under negative suction. A Olga wire was advanced via the needle, and a micropuncture catheter was advanced over the wire into the pericardial space. Bubble study with TTE further confirmed pericardial location of microcatheter. The Olga wire was exchanged for an 0.035 Versicore wire and after pre-dilation with an 8F dilator, an 8.5F biliary drain was placed over the Versicore wire and advanced into the pericardial space. 600cc of bloody effusion was drained. Samples were sent to lab/pathology for analysis. TTE demonstrated near-complete resolution of pericardial effusion. Drains sutured in place and connected to LIWS to encourage tissue apposition.

## 2023-05-08 NOTE — PROGRESS NOTE ADULT - SUBJECTIVE AND OBJECTIVE BOX
Patient is a 77y Male seen and evaluated at bedside. Extubated, on HFNC, kidney function stable, lytes Tacro level <2.0      Meds:    acetylcysteine 10%  Inhalation 4 every 6 hours  albumin human 25% IVPB 50 every 8 hours  atorvastatin 20 at bedtime  ceFAZolin   IVPB    ceFAZolin   IVPB 500 every 12 hours  chlorhexidine 2% Cloths 1 <User Schedule>  cyanocobalamin 1000 daily  dexMEDEtomidine Infusion 0.1 <Continuous>  dextrose 5%. 1000 <Continuous>  dextrose 5%. 1000 <Continuous>  dextrose 50% Injectable 25 once  dextrose 50% Injectable 25 once  dextrose 50% Injectable 12.5 once  dextrose Oral Gel 15 once PRN  DOBUTamine Infusion 2.5 <Continuous>  folic acid 1 daily  glucagon  Injectable 1 once  glucagon  Injectable 1 once  glucagon  Injectable 1 once  heparin   Injectable 5000 every 12 hours  insulin lispro (ADMELOG) corrective regimen sliding scale  three times a day before meals  levalbuterol Inhalation 0.63 every 6 hours  metoclopramide Injectable 5 two times a day  midodrine 10 every 8 hours  pantoprazole  Injectable 40 daily  predniSONE   Tablet 5 daily  sodium chloride 0.9%. 1000 <Continuous>  tacrolimus 2 two times a day  testosterone patch 4 mG/24 Hr(s) 8 daily      T(C): , Max: 37.1 (05-07-23 @ 17:09)  T(F): , Max: 98.7 (05-07-23 @ 17:09)  HR: 90 (05-08-23 @ 12:20)  BP: --  BP(mean): --  RR: 19 (05-08-23 @ 12:20)  SpO2: 98% (05-08-23 @ 12:20)  Wt(kg): --    05-07 @ 07:01  -  05-08 @ 07:00  --------------------------------------------------------  IN: 1088.9 mL / OUT: 3517 mL / NET: -2428.1 mL    05-08 @ 07:01  -  05-08 @ 12:31  --------------------------------------------------------  IN: 160 mL / OUT: 454 mL / NET: -294 mL          Review of Systems:  ROS negative except as per HPI      PHYSICAL EXAM:  GENERAL: NAD, sitting in chair on HFNC   NECK: supple, No JVD  CHEST/LUNG:  Decreased b/l   HEART: normal S1S2, RRR  ABDOMEN: Soft, Nontender, +BS, No flank tenderness bilateral  EXTREMITIES: No clubbing, cyanosis, 2+ pitting edema b/l LE   NEUROLOGY: AAO x3, no focal neurological deficit        LABS:                        7.4    11.22 )-----------( 254      ( 08 May 2023 03:32 )             24.4     05-08    143  |  103  |  80<H>  ----------------------------<  213<H>  3.2<L>   |  24  |  2.47<H>    Ca    9.0      08 May 2023 03:32  Phos  5.7     05-08  Mg     2.6     05-08    TPro  5.6<L>  /  Alb  3.7  /  TBili  1.7<H>  /  DBili  x   /  AST  28  /  ALT  7<L>  /  AlkPhos  65  05-08      PT/INR - ( 08 May 2023 03:32 )   PT: 15.7 sec;   INR: 1.32          PTT - ( 08 May 2023 03:32 )  PTT:25.3 sec          RADIOLOGY & ADDITIONAL STUDIES:

## 2023-05-08 NOTE — PROGRESS NOTE ADULT - SUBJECTIVE AND OBJECTIVE BOX
CTICU  CRITICAL  CARE  attending     Hand off received 					   Pertinent clinical, laboratory, radiographic, hemodynamic, echocardiographic, respiratory data, microbiologic data and chart were reviewed and analyzed frequently throughout the course of the day  Patient seen and examined with CTS/ SH attending at bedside  Pt is a 77yr old male with PMH PKD sp kidney transplant (), HTN, HLD, DM, prostate ca sp radical prostatectomy (), DVT in setting of pelvic fx (), afib/flutter sp ablation (, off AC), admitted for surgical mgmt. Patient underwent left atrial appendage occlusion percutaneously with Dr. Sidhu 23. Arrived to the stepdown extubated on no drips. Overnight had PEA arrest, requiring ACLS and intubation. Tx to ICU, bedside TTE showing pericardial effusion with tamponade. Bedside procedure with 600cc blood aspirated. Given 2 units pRBC. Taken to OR early AM for sternotomy with 1.5L L hemothorax evacuated. Arrived intubated on epi, levo, vaso, fuad. Switched to dobutamine and weaned off some pressors. Initially had lactic acidosis, now resolved. CPAP'd overnight. : extubated,  off, Bijan weaned.  swan dc, Bijan weaned.  R pigtail placed for effusion, 850cc out. Placed on BIPAP for increased wob and atelectasis.  new R subclavian central line placed. Given bumex for diuresis, buckley placed.  worsening Cr, RIJ Twilight placed,  and primacor started, bumex given, underwent awake bronch, broad spectrum abx started. Dobhoff placed for nutrition. Vasopressin added. 5/2Given albumins throughout day, low urine output, given bumex towards end of day. Abx deescalated to ceftriaxone. Tacro on hold. Fuad started to achieve elevated MAPs for renal perfusion. 5/3 intubated, Vancomycin started for MSSA in BAL.  Tacro restarted.  found to have R prox brachial DVT and SVT in basilic vein. Procrit on hold. Primacor decreased to .125.  weaned off pressors. Extubated.  at 2.5 off primacor. Post extubation awake bronch.       FAMILY HISTORY:  PAST MEDICAL & SURGICAL HISTORY:  Polycystic kidney disease  DM2 (diabetes mellitus, type 2)  HLD (hyperlipidemia)  HTN (hypertension)  Prostate cancer  Kidney transplant recipient  DVT (deep venous thrombosis)  Chronic CHF  H/O radical prostatectomy    S/P hernia repair  Abdominal and inguinal around         Patient is a 77y old  Male who presents with a chief complaint of percutaneous DENNY closure.      14 system review was unremarkable    Vital signs, hemodynamic and respiratory parameters were reviewed from the bedside nursing flowsheet.  ICU Vital Signs Last 24 Hrs  T(C): 36.8 (08 May 2023 05:17), Max: 37.7 (07 May 2023 09:00)  T(F): 98.2 (08 May 2023 05:17), Max: 99.8 (07 May 2023 09:00)  HR: 83 (08 May 2023 07:00) (55 - 117)  BP: --  BP(mean): --  ABP: 155/59 (08 May 2023 07:00) (149/60 - 178/74)  ABP(mean): 90 (08 May 2023 07:00) (85 - 106)  RR: 18 (08 May 2023 07:00) (12 - 22)  SpO2: 100% (08 May 2023 07:00) (92% - 100%)    O2 Parameters below as of 08 May 2023 07:00  Patient On (Oxygen Delivery Method): nasal cannula, high flow  O2 Flow (L/min): 50  O2 Concentration (%): 50      Adult Advanced Hemodynamics Last 24 Hrs  CVP(mm Hg): 7 (08 May 2023 07:00) (7 - 20)  CVP(cm H2O): --  CO: 5.3 (08 May 2023 07:00) (3.7 - 6.4)  CI: 3 (08 May 2023 07:00) (2.1 - 3.6)  PA: --  PA(mean): --  PCWP: --  SVR: 1251 (08 May 2023 07:00) (1034 - 1705)  SVRI: 2210 (08 May 2023 07:00) (1856 - 3005)  PVR: --  PVRI: --, ABG - ( 08 May 2023 03:09 )  pH, Arterial: 7.46  pH, Blood: x     /  pCO2: 36    /  pO2: 126   / HCO3: 26    / Base Excess: 2.0   /  SaO2: 99.0              Mode: standby    Intake and output was reviewed and the fluid balance was calculated  Daily     Daily   I&O's Summary    07 May 2023 07:01  -  08 May 2023 07:00  --------------------------------------------------------  IN: 988.9 mL / OUT: 3517 mL / NET: -2528.1 mL    08 May 2023 07:01  -  08 May 2023 07:58  --------------------------------------------------------  IN: 15 mL / OUT: 0 mL / NET: 15 mL        All lines and drain sites were assessed  Glycemic trend was reviewedCAPILLARY BLOOD GLUCOSE      POCT Blood Glucose.: 218 mg/dL (08 May 2023 07:33)      Neuro: awake nad  HEENT: mmm  Heart: s1 s2  Lungs: decreased throughout  Abdomen: soft nt nd  Extremities: wwp    Lines:  R radial arterial line   RIJ Cordis     Tubes:  R pigtail      labs  CBC Full  -  ( 08 May 2023 03:32 )  WBC Count : 11.22 K/uL  RBC Count : 2.22 M/uL  Hemoglobin : 7.4 g/dL  Hematocrit : 24.4 %  Platelet Count - Automated : 254 K/uL  Mean Cell Volume : 109.9 fl  Mean Cell Hemoglobin : 33.3 pg  Mean Cell Hemoglobin Concentration : 30.3 gm/dL  Auto Neutrophil # : 10.74 K/uL  Auto Lymphocyte # : 0.19 K/uL  Auto Monocyte # : 0.29 K/uL  Auto Eosinophil # : 0.00 K/uL  Auto Basophil # : 0.00 K/uL  Auto Neutrophil % : 95.7 %  Auto Lymphocyte % : 1.7 %  Auto Monocyte % : 2.6 %  Auto Eosinophil % : 0.0 %  Auto Basophil % : 0.0 %    -08    143  |  103  |  80<H>  ----------------------------<  213<H>  3.2<L>   |  24  |  2.47<H>    Ca    9.0      08 May 2023 03:32  Phos  5.7     05-08  Mg     2.6     -08    TPro  5.6<L>  /  Alb  3.7  /  TBili  1.7<H>  /  DBili  x   /  AST  28  /  ALT  7<L>  /  AlkPhos  65  05-08    PT/INR - ( 08 May 2023 03:32 )   PT: 15.7 sec;   INR: 1.32          PTT - ( 08 May 2023 03:32 )  PTT:25.3 sec  The current medications were reviewed   MEDICATIONS  (STANDING):  acetylcysteine 10%  Inhalation 4 milliLiter(s) Inhalation every 6 hours  albumin human 25% IVPB 50 milliLiter(s) IV Intermittent every 8 hours  atorvastatin 20 milliGRAM(s) Oral at bedtime  ceFAZolin   IVPB      ceFAZolin   IVPB 500 milliGRAM(s) IV Intermittent every 12 hours  chlorhexidine 2% Cloths 1 Application(s) Topical <User Schedule>  cyanocobalamin 1000 MICROGram(s) Oral daily  dexMEDEtomidine Infusion 0.1 MICROgram(s)/kG/Hr (1.65 mL/Hr) IV Continuous <Continuous>  dextrose 5%. 1000 milliLiter(s) (50 mL/Hr) IV Continuous <Continuous>  dextrose 5%. 1000 milliLiter(s) (100 mL/Hr) IV Continuous <Continuous>  dextrose 50% Injectable 12.5 Gram(s) IV Push once  dextrose 50% Injectable 25 Gram(s) IV Push once  dextrose 50% Injectable 25 Gram(s) IV Push once  DOBUTamine Infusion 2.5 MICROgram(s)/kG/Min (4.94 mL/Hr) IV Continuous <Continuous>  folic acid 1 milliGRAM(s) Oral daily  glucagon  Injectable 1 milliGRAM(s) IntraMuscular once  glucagon  Injectable 1 milliGRAM(s) IntraMuscular once  glucagon  Injectable 1 milliGRAM(s) IntraMuscular once  heparin   Injectable 5000 Unit(s) SubCutaneous every 12 hours  insulin lispro (ADMELOG) corrective regimen sliding scale   SubCutaneous three times a day before meals  levalbuterol Inhalation 0.63 milliGRAM(s) Inhalation every 6 hours  metoclopramide Injectable 5 milliGRAM(s) IV Push two times a day  midodrine 10 milliGRAM(s) Oral every 8 hours  pantoprazole  Injectable 40 milliGRAM(s) IV Push daily  potassium chloride  20 mEq/100 mL IVPB 20 milliEquivalent(s) IV Intermittent every 2 hours  predniSONE   Tablet 5 milliGRAM(s) Oral daily  sodium chloride 0.9%. 1000 milliLiter(s) (10 mL/Hr) IV Continuous <Continuous>  tacrolimus 2 milliGRAM(s) Oral two times a day  testosterone patch 4 mG/24 Hr(s) 8 milliGRAM(s) Transdermal daily    MEDICATIONS  (PRN):  dextrose Oral Gel 15 Gram(s) Oral once PRN Blood Glucose LESS THAN 70 milliGRAM(s)/deciliter      Assessment/Plan:  77yr old male with PMH PKD sp kidney transplant (), HTN, HLD, DM, prostate ca sp radical prostatectomy (), DVT in setting of pelvic fx (), afib/flutter sp ablation (, off AC), admitted for surgical mgmt. Patient is a Taoist.    POD14 left atrial appendage occlusion percutaneously (Nahum, 23)  POD13 RTOR for L hemothorax evacuation (23)  On midodrine q8-wean as tolerated  HFNC-wean as tolerated  DAYO  Cardiogenic shock on dobutamine-wean as tolerated  LE duplex  Trend end organ perfusion markers  Monitor urine output  Acute post operative anemia-trend H/H  Thrombocytopenia-monitor  Continue IS for renal transplant  Appreciate heme/onc recs  Tube feeds  Replete lytes prn  Monitor CT output  GI/DVT PPX  Bowel Regimen  Pain control  Close hemodynamic, ventilatory and drain monitoring and management per post op routine  Monitor for arrhythmias and monitor parameters for organ perfusion  Beta blockade not administered due to hemodynamic instability and bradycardia  Monitor neurologic status  Head of the bed should remain elevated to 45 deg   Chest PT and IS will be encouraged  Monitor adequacy of oxygenation and ventilation and attempt to wean oxygen  Antibiotic regimen will be tailored to the clinical, laboratory and microbiologic data  Nutritional goals will be met using po eventually, ensure adequate caloric intake and monitor the same  Stress ulcer and VTE prophylaxis will be achieved    Glycemic control is satisfactory  Electrolytes have been repleted as necessary and wound care has been carried out   Pain control has been achieved.   Aggressive physical therapy and early mobility and ambulation goals will be met   The family was updated about the course and plan.    CRITICAL CARE TIME personally provided by me  in evaluation and management, reassessments, review and interpretation of labs and x-rays, ventilator and hemodynamic management, formulating a plan and coordinating care: ___105____ MIN.  Time does not include procedural time.    CTICU ATTENDING     					  Carmen Ortega MD

## 2023-05-08 NOTE — PROGRESS NOTE ADULT - SUBJECTIVE AND OBJECTIVE BOX
INTERVAL COURSE  On  at 2  Good MS on HFNC 50/50 Strong productive cough   RUE DVT/ Improving anasarca   Renal fx plateaued/ Good UOP/K runs low- phos borderline high     ICU Vital Signs Last 24 Hrs  T(C): 37.2 (08 May 2023 23:00), Max: 37.2 (08 May 2023 23:00)  T(F): 98.9 (08 May 2023 23:00), Max: 98.9 (08 May 2023 23:00)  HR: 89 (08 May 2023 23:00) (79 - 104)  ABP: 156/68 (08 May 2023 23:00) (154/61 - 184/75)  ABP(mean): 95 (08 May 2023 23:00) (88 - 109)  RR: 20 (08 May 2023 23:00) (17 - 23)  SpO2: 100% (08 May 2023 23:00) (96% - 100%)  O2 Parameters below as of 08 May 2023 23:00  Patient On (Oxygen Delivery Method): nasal cannula, high flow  O2 Flow (L/min): 50  O2 Concentration (%): 50    Adult Advanced Hemodynamics Last 24 Hrs  CVP(mm Hg): 15 (08 May 2023 12:00) (4 - 20)  CO: 5.8 (08 May 2023 23:00) (5.3 - 7.3)  CI: 3.2 (08 May 2023 23:00) (3 - 4.1)  SVR: 1177 (08 May 2023 12:00) (1035 - 1296)  SVRI: 2097 (08 May 2023 12:00) (6754 - 2290)  ABG - ( 08 May 2023 03:09 )  pH, Arterial: 7.46  pH, Blood: x     /  pCO2: 36    /  pO2: 126   / HCO3: 26    / Base Excess: 2.0   /  SaO2: 99.0      I&O's Summary  07 May 2023 07:01  -  08 May 2023 07:00  --------------------------------------------------------  IN: 1088.9 mL / OUT: 3517 mL / NET: -2428.1 mL      PHYSICAL EXAM  General: A&Ox 3; NAD  Respiratory: Rhonchorous   Cardiovascular: A fib in 90s   Gastrointestinal: Soft; NTND/ RLQ tx kidney palpable   Extremities: Warm, anasarcic   Neurological:  CNII-XII grossly intact; no obvious focal deficits      IMAGING/EKG/ETC  Reviewed.  INTERVAL COURSE  On  at 2  Good MS on HFNC 50/50 Strong productive cough   RUE DVT/ Improving anasarca   Renal fx plateaued/ Good UOP/K runs low- phos borderline high     ICU Vital Signs Last 24 Hrs  T(C): 37.2 (08 May 2023 23:00), Max: 37.2 (08 May 2023 23:00)  T(F): 98.9 (08 May 2023 23:00), Max: 98.9 (08 May 2023 23:00)  HR: 89 (08 May 2023 23:00) (79 - 104)  ABP: 156/68 (08 May 2023 23:00) (154/61 - 184/75)  ABP(mean): 95 (08 May 2023 23:00) (88 - 109)  RR: 20 (08 May 2023 23:00) (17 - 23)  SpO2: 100% (08 May 2023 23:00) (96% - 100%)  O2 Parameters below as of 08 May 2023 23:00  Patient On (Oxygen Delivery Method): nasal cannula, high flow  O2 Flow (L/min): 50  O2 Concentration (%): 50    Adult Advanced Hemodynamics Last 24 Hrs  CVP(mm Hg): 15 (08 May 2023 12:00) (4 - 20)  CO: 5.8 (08 May 2023 23:00) (5.3 - 7.3)  CI: 3.2 (08 May 2023 23:00) (3 - 4.1)  SVR: 1177 (08 May 2023 12:00) (1035 - 1296)  SVRI: 2097 (08 May 2023 12:00) (2184 - 2290)  ABG - ( 08 May 2023 03:09 )  pH, Arterial: 7.46  pH, Blood: x     /  pCO2: 36    /  pO2: 126   / HCO3: 26    / Base Excess: 2.0   /  SaO2: 99.0      I&O's Summary  07 May 2023 07:01  -  08 May 2023 07:00  --------------------------------------------------------  IN: 1088.9 mL / OUT: 3517 mL / NET: -2428.1 mL      PHYSICAL EXAM  General: A&Ox 3; NAD  Respiratory: Rhonchorous   Cardiovascular: A fib in 90s   Gastrointestinal: Soft; NTND/ RLQ tx kidney palpable   Extremities: Warm, anasarcic   Neurological:  CNII-XII grossly intact; no obvious focal deficits      IMAGING/EKG/ETC  Reviewed.

## 2023-05-08 NOTE — PROGRESS NOTE ADULT - ASSESSMENT
77 M with PCKD s/p kidney tx DDKT , HTN, T2DM, Prostate cancer s/p radical prostatectomy , DVT  (in setting of pelvic fracture), and afib/atrial flutter s/p ablation in 2018, who is now off oral anticoagulation secondary to GI bleed and presents for DENNY occlusoin.  S/p DENNY occlusion  c/b cardiac tamponade and PEA arrest requiring bedside pericardiocentesis  OR for sternotomy and local exploration  -S/p > 1 L left hemothorax evacuated in OR   Required hydroxocobalamin in OR for refractory vasodilatory shock  Reintubated 5/3 for resp distress with + BAL for MSSA   A/p  On  at 2/ good cardiac indices on floTrack and hypertensive / autodiuresing well will wean off  and monitor perfusion parameters closely  Repeat labs in 2 hours   DAYO on CKD 3- S/p DDKT on tacrolimus- most likely iATN non oligurics with improving UOP and good clearance  Cr plateauing ~ 2.4--no need for dialytic interventions at this time  Tac trough therapeutic 5.5 on 2 mg BID/ monitor daily predose level and adjust prograf dose accordingly avoid suprahepatic levels given DAYO   Continue 5 mg prednisone daily   Passed S&S- started on PO diet/ would liberalize diet from renal restriction given hypokalemia can add sevelamer if Phos > 5.5   Sugars controlled with glargine/SSC   Cr plateauing with good autodiuresis/ starting daily standing bumex for anasarca and pulm congestion   MSSA pneumonia on renally dosed Cefazolin- clinically improving, extubated  continue IS/BPH has strong productive cough   Post op Anemia - [place]/Hg stable ~ 7.5 no active bleeding, Iron supplemented intravenously continue Epo per heme recs   ICU ppx: Sq hep/ continue SCDs/ PPI/ Asp precaution and bowel regimen   Daily PT and ambulation     ATTENDING: I have personally and independently provided 30 min of critical care services.  77 M with PCKD s/p kidney tx DDKT , HTN, T2DM, Prostate cancer s/p radical prostatectomy , DVT  (in setting of pelvic fracture), and afib/atrial flutter s/p ablation in 2018, who is now off oral anticoagulation secondary to GI bleed and presents for DENNY occlusoin.  S/p DENNY occlusion  c/b cardiac tamponade and PEA arrest requiring bedside pericardiocentesis  OR for sternotomy and local exploration  -S/p > 1 L left hemothorax evacuated in OR   Required hydroxocobalamin in OR for refractory vasodilatory shock  Reintubated 5/3 for resp distress with + BAL for MSSA   A/p  On  at 2/ good cardiac indices on floTrack and hypertensive / autodiuresing well will wean off  and monitor perfusion parameters closely  Repeat labs in 2 hours   DAYO on CKD 3- S/p DDKT on tacrolimus- most likely iATN non oligurics with improving UOP and good clearance  Cr plateauing ~ 2.4--no need for dialytic interventions at this time  Tac trough therapeutic 5.5 on 2 mg BID/ monitor daily predose level and adjust prograf dose accordingly avoid suprahepatic levels given DAYO   Continue 5 mg prednisone daily   Passed S&S- started on PO diet/ would liberalize diet from renal restriction given hypokalemia can add sevelamer if Phos > 5.5   Sugars controlled with glargine/SSC   Cr plateauing with good autodiuresis/ starting daily standing bumex for anasarca and pulm congestion   MSSA pneumonia on renally dosed Cefazolin- clinically improving, extubated  continue IS/BPH has strong productive cough   Post op Anemia - [employer]/Hg stable ~ 7.5 no active bleeding, Iron supplemented intravenously continue Epo per heme recs   RUE DVT not on AC/ LE with no DVT-- continue SCDs and Sq hep  PPI/ Asp precaution and bowel regimen   Daily PT and ambulation     ATTENDING: I have personally and independently provided 30 min of critical care services.

## 2023-05-08 NOTE — PROGRESS NOTE ADULT - ASSESSMENT
77Y M w/ CKD s/p renal transplant 2007 h/o PKD s/p DENNY occlusion repair complicated by pericardial effusion s/p chest washout, course complicated by cardiogenic shock and DAYO now stable and nonoliguric on pressors/diuretic with elevated MAP goal        #Non-Oliguric iATN on underlying CKD 3 (baseline sCr at 1.2-1.4)   Latoya pre-renal initially but given prolonged hypoperfusion developed into iATN   sCr now stable in ~2.5 range, monitoring for further recovery   - Continue diuresis and hemodynamic augmentation  - Strict I/Os    Transplant - 2007, initial failure 2/2 PCKD  - Increase tacro 2mg AM, 2mg PM. below goal, goal tacro 4-5    - continue to draw trough 30 minutes before AM dose of tacro - next draw tuesday AM to adjust tacro again  - Continue home prednisone 5mg PO QD

## 2023-05-09 LAB
ALBUMIN SERPL ELPH-MCNC: 3.8 G/DL — SIGNIFICANT CHANGE UP (ref 3.3–5)
ALBUMIN SERPL ELPH-MCNC: 3.9 G/DL — SIGNIFICANT CHANGE UP (ref 3.3–5)
ALBUMIN SERPL ELPH-MCNC: 4 G/DL — SIGNIFICANT CHANGE UP (ref 3.3–5)
ALP SERPL-CCNC: 60 U/L — SIGNIFICANT CHANGE UP (ref 40–120)
ALP SERPL-CCNC: 61 U/L — SIGNIFICANT CHANGE UP (ref 40–120)
ALP SERPL-CCNC: 62 U/L — SIGNIFICANT CHANGE UP (ref 40–120)
ALT FLD-CCNC: 6 U/L — LOW (ref 10–45)
ALT FLD-CCNC: 7 U/L — LOW (ref 10–45)
ALT FLD-CCNC: 7 U/L — LOW (ref 10–45)
ANION GAP SERPL CALC-SCNC: 11 MMOL/L — SIGNIFICANT CHANGE UP (ref 5–17)
ANION GAP SERPL CALC-SCNC: 12 MMOL/L — SIGNIFICANT CHANGE UP (ref 5–17)
ANION GAP SERPL CALC-SCNC: 20 MMOL/L — HIGH (ref 5–17)
APTT BLD: 27.3 SEC — LOW (ref 27.5–35.5)
AST SERPL-CCNC: 24 U/L — SIGNIFICANT CHANGE UP (ref 10–40)
AST SERPL-CCNC: 32 U/L — SIGNIFICANT CHANGE UP (ref 10–40)
AST SERPL-CCNC: 34 U/L — SIGNIFICANT CHANGE UP (ref 10–40)
BASOPHILS # BLD AUTO: 0.01 K/UL — SIGNIFICANT CHANGE UP (ref 0–0.2)
BASOPHILS NFR BLD AUTO: 0.1 % — SIGNIFICANT CHANGE UP (ref 0–2)
BILIRUB DIRECT SERPL-MCNC: 0.5 MG/DL — HIGH (ref 0–0.3)
BILIRUB INDIRECT FLD-MCNC: 1.1 MG/DL — HIGH (ref 0.2–1)
BILIRUB SERPL-MCNC: 1.5 MG/DL — HIGH (ref 0.2–1.2)
BILIRUB SERPL-MCNC: 1.6 MG/DL — HIGH (ref 0.2–1.2)
BILIRUB SERPL-MCNC: 1.6 MG/DL — HIGH (ref 0.2–1.2)
BUN SERPL-MCNC: 67 MG/DL — HIGH (ref 7–23)
BUN SERPL-MCNC: 70 MG/DL — HIGH (ref 7–23)
BUN SERPL-MCNC: 73 MG/DL — HIGH (ref 7–23)
CALCIUM SERPL-MCNC: 8.5 MG/DL — SIGNIFICANT CHANGE UP (ref 8.4–10.5)
CALCIUM SERPL-MCNC: 8.7 MG/DL — SIGNIFICANT CHANGE UP (ref 8.4–10.5)
CALCIUM SERPL-MCNC: 9.1 MG/DL — SIGNIFICANT CHANGE UP (ref 8.4–10.5)
CHLORIDE SERPL-SCNC: 106 MMOL/L — SIGNIFICANT CHANGE UP (ref 96–108)
CO2 SERPL-SCNC: 17 MMOL/L — LOW (ref 22–31)
CO2 SERPL-SCNC: 26 MMOL/L — SIGNIFICANT CHANGE UP (ref 22–31)
CO2 SERPL-SCNC: 28 MMOL/L — SIGNIFICANT CHANGE UP (ref 22–31)
CORTICOSTEROID BINDING GLOBULIN RESULT: <0.5 MG/DL — LOW
CORTIS F/TOTAL MFR SERPL: SIGNIFICANT CHANGE UP %
CORTIS SERPL-MCNC: 18 UG/DL — SIGNIFICANT CHANGE UP
CORTISOL, FREE RESULT: SIGNIFICANT CHANGE UP UG/DL
CREAT SERPL-MCNC: 1.99 MG/DL — HIGH (ref 0.5–1.3)
CREAT SERPL-MCNC: 2.07 MG/DL — HIGH (ref 0.5–1.3)
CREAT SERPL-MCNC: 2.23 MG/DL — HIGH (ref 0.5–1.3)
EGFR: 30 ML/MIN/1.73M2 — LOW
EGFR: 33 ML/MIN/1.73M2 — LOW
EGFR: 34 ML/MIN/1.73M2 — LOW
EOSINOPHIL # BLD AUTO: 0.09 K/UL — SIGNIFICANT CHANGE UP (ref 0–0.5)
EOSINOPHIL NFR BLD AUTO: 0.9 % — SIGNIFICANT CHANGE UP (ref 0–6)
GAS PNL BLDA: SIGNIFICANT CHANGE UP
GLUCOSE BLDC GLUCOMTR-MCNC: 158 MG/DL — HIGH (ref 70–99)
GLUCOSE BLDC GLUCOMTR-MCNC: 177 MG/DL — HIGH (ref 70–99)
GLUCOSE BLDC GLUCOMTR-MCNC: 179 MG/DL — HIGH (ref 70–99)
GLUCOSE BLDC GLUCOMTR-MCNC: 210 MG/DL — HIGH (ref 70–99)
GLUCOSE SERPL-MCNC: 231 MG/DL — HIGH (ref 70–99)
GLUCOSE SERPL-MCNC: 233 MG/DL — HIGH (ref 70–99)
GLUCOSE SERPL-MCNC: 69 MG/DL — LOW (ref 70–99)
HCT VFR BLD CALC: 26.2 % — LOW (ref 39–50)
HGB BLD-MCNC: 8 G/DL — LOW (ref 13–17)
IMM GRANULOCYTES NFR BLD AUTO: 0.7 % — SIGNIFICANT CHANGE UP (ref 0–0.9)
INR BLD: 1.24 — HIGH (ref 0.88–1.16)
LACTATE SERPL-SCNC: 1 MMOL/L — SIGNIFICANT CHANGE UP (ref 0.5–2)
LACTATE SERPL-SCNC: 1.3 MMOL/L — SIGNIFICANT CHANGE UP (ref 0.5–2)
LYMPHOCYTES # BLD AUTO: 0.57 K/UL — LOW (ref 1–3.3)
LYMPHOCYTES # BLD AUTO: 5.7 % — LOW (ref 13–44)
MAGNESIUM SERPL-MCNC: 2.4 MG/DL — SIGNIFICANT CHANGE UP (ref 1.6–2.6)
MAGNESIUM SERPL-MCNC: 2.6 MG/DL — SIGNIFICANT CHANGE UP (ref 1.6–2.6)
MCHC RBC-ENTMCNC: 30.5 GM/DL — LOW (ref 32–36)
MCHC RBC-ENTMCNC: 33.5 PG — SIGNIFICANT CHANGE UP (ref 27–34)
MCV RBC AUTO: 109.6 FL — HIGH (ref 80–100)
MONOCYTES # BLD AUTO: 0.65 K/UL — SIGNIFICANT CHANGE UP (ref 0–0.9)
MONOCYTES NFR BLD AUTO: 6.5 % — SIGNIFICANT CHANGE UP (ref 2–14)
NEUTROPHILS # BLD AUTO: 8.58 K/UL — HIGH (ref 1.8–7.4)
NEUTROPHILS NFR BLD AUTO: 86.1 % — HIGH (ref 43–77)
NRBC # BLD: 0 /100 WBCS — SIGNIFICANT CHANGE UP (ref 0–0)
PHOSPHATE SERPL-MCNC: 4.2 MG/DL — SIGNIFICANT CHANGE UP (ref 2.5–4.5)
PLATELET # BLD AUTO: 339 K/UL — SIGNIFICANT CHANGE UP (ref 150–400)
POTASSIUM SERPL-MCNC: 2.8 MMOL/L — CRITICAL LOW (ref 3.5–5.3)
POTASSIUM SERPL-MCNC: 3.7 MMOL/L — SIGNIFICANT CHANGE UP (ref 3.5–5.3)
POTASSIUM SERPL-MCNC: 3.7 MMOL/L — SIGNIFICANT CHANGE UP (ref 3.5–5.3)
POTASSIUM SERPL-SCNC: 2.8 MMOL/L — CRITICAL LOW (ref 3.5–5.3)
POTASSIUM SERPL-SCNC: 3.7 MMOL/L — SIGNIFICANT CHANGE UP (ref 3.5–5.3)
POTASSIUM SERPL-SCNC: 3.7 MMOL/L — SIGNIFICANT CHANGE UP (ref 3.5–5.3)
PROT SERPL-MCNC: 5.9 G/DL — LOW (ref 6–8.3)
PROT SERPL-MCNC: 6 G/DL — SIGNIFICANT CHANGE UP (ref 6–8.3)
PROT SERPL-MCNC: 6 G/DL — SIGNIFICANT CHANGE UP (ref 6–8.3)
PROTHROM AB SERPL-ACNC: 14.8 SEC — HIGH (ref 10.5–13.4)
RBC # BLD: 2.39 M/UL — LOW (ref 4.2–5.8)
RBC # FLD: 26 % — HIGH (ref 10.3–14.5)
SODIUM SERPL-SCNC: 143 MMOL/L — SIGNIFICANT CHANGE UP (ref 135–145)
SODIUM SERPL-SCNC: 144 MMOL/L — SIGNIFICANT CHANGE UP (ref 135–145)
SODIUM SERPL-SCNC: 145 MMOL/L — SIGNIFICANT CHANGE UP (ref 135–145)
TACROLIMUS SERPL-MCNC: 4.6 NG/ML — SIGNIFICANT CHANGE UP
WBC # BLD: 9.97 K/UL — SIGNIFICANT CHANGE UP (ref 3.8–10.5)
WBC # FLD AUTO: 9.97 K/UL — SIGNIFICANT CHANGE UP (ref 3.8–10.5)

## 2023-05-09 PROCEDURE — 99292 CRITICAL CARE ADDL 30 MIN: CPT

## 2023-05-09 PROCEDURE — 99291 CRITICAL CARE FIRST HOUR: CPT

## 2023-05-09 PROCEDURE — 71045 X-RAY EXAM CHEST 1 VIEW: CPT | Mod: 26

## 2023-05-09 RX ORDER — TACROLIMUS 5 MG/1
1 CAPSULE ORAL
Refills: 0 | Status: DISCONTINUED | OUTPATIENT
Start: 2023-05-09 | End: 2023-05-11

## 2023-05-09 RX ORDER — TACROLIMUS 5 MG/1
2 CAPSULE ORAL
Refills: 0 | Status: DISCONTINUED | OUTPATIENT
Start: 2023-05-09 | End: 2023-05-11

## 2023-05-09 RX ORDER — POTASSIUM CHLORIDE 20 MEQ
40 PACKET (EA) ORAL ONCE
Refills: 0 | Status: COMPLETED | OUTPATIENT
Start: 2023-05-09 | End: 2023-05-09

## 2023-05-09 RX ORDER — ACETAMINOPHEN 500 MG
650 TABLET ORAL EVERY 6 HOURS
Refills: 0 | Status: DISCONTINUED | OUTPATIENT
Start: 2023-05-09 | End: 2023-05-09

## 2023-05-09 RX ADMIN — Medication 650 MILLIGRAM(S): at 19:15

## 2023-05-09 RX ADMIN — Medication 8 MILLIGRAM(S): at 07:01

## 2023-05-09 RX ADMIN — PANTOPRAZOLE SODIUM 40 MILLIGRAM(S): 20 TABLET, DELAYED RELEASE ORAL at 11:12

## 2023-05-09 RX ADMIN — Medication 4 MILLIGRAM(S): at 14:48

## 2023-05-09 RX ADMIN — Medication 650 MILLIGRAM(S): at 18:16

## 2023-05-09 RX ADMIN — TACROLIMUS 2 MILLIGRAM(S): 5 CAPSULE ORAL at 13:36

## 2023-05-09 RX ADMIN — Medication 40 MILLIEQUIVALENT(S): at 13:03

## 2023-05-09 RX ADMIN — Medication 100 MILLIGRAM(S): at 23:44

## 2023-05-09 RX ADMIN — INSULIN GLARGINE 12 UNIT(S): 100 INJECTION, SOLUTION SUBCUTANEOUS at 21:47

## 2023-05-09 RX ADMIN — Medication 4 MILLILITER(S): at 05:39

## 2023-05-09 RX ADMIN — HEPARIN SODIUM 5000 UNIT(S): 5000 INJECTION INTRAVENOUS; SUBCUTANEOUS at 07:25

## 2023-05-09 RX ADMIN — Medication 40 MILLIEQUIVALENT(S): at 17:04

## 2023-05-09 RX ADMIN — Medication 2: at 11:18

## 2023-05-09 RX ADMIN — Medication 8 MILLIGRAM(S): at 13:44

## 2023-05-09 RX ADMIN — Medication 4 MILLILITER(S): at 10:20

## 2023-05-09 RX ADMIN — Medication 5 MILLIGRAM(S): at 07:25

## 2023-05-09 RX ADMIN — HEPARIN SODIUM 5000 UNIT(S): 5000 INJECTION INTRAVENOUS; SUBCUTANEOUS at 17:28

## 2023-05-09 RX ADMIN — BUMETANIDE 2 MILLIGRAM(S): 0.25 INJECTION INTRAMUSCULAR; INTRAVENOUS at 07:25

## 2023-05-09 RX ADMIN — Medication 650 MILLIGRAM(S): at 10:00

## 2023-05-09 RX ADMIN — LEVALBUTEROL 0.63 MILLIGRAM(S): 1.25 SOLUTION, CONCENTRATE RESPIRATORY (INHALATION) at 05:40

## 2023-05-09 RX ADMIN — PREGABALIN 1000 MICROGRAM(S): 225 CAPSULE ORAL at 11:12

## 2023-05-09 RX ADMIN — TACROLIMUS 1 MILLIGRAM(S): 5 CAPSULE ORAL at 21:46

## 2023-05-09 RX ADMIN — Medication 1 MILLIGRAM(S): at 11:12

## 2023-05-09 RX ADMIN — Medication 4 MILLILITER(S): at 18:30

## 2023-05-09 RX ADMIN — LEVALBUTEROL 0.63 MILLIGRAM(S): 1.25 SOLUTION, CONCENTRATE RESPIRATORY (INHALATION) at 18:16

## 2023-05-09 RX ADMIN — Medication 4: at 21:47

## 2023-05-09 RX ADMIN — CHLORHEXIDINE GLUCONATE 1 APPLICATION(S): 213 SOLUTION TOPICAL at 07:26

## 2023-05-09 RX ADMIN — Medication 40 MILLIEQUIVALENT(S): at 04:30

## 2023-05-09 RX ADMIN — ATORVASTATIN CALCIUM 20 MILLIGRAM(S): 80 TABLET, FILM COATED ORAL at 21:46

## 2023-05-09 RX ADMIN — Medication 650 MILLIGRAM(S): at 09:15

## 2023-05-09 RX ADMIN — Medication 2: at 17:05

## 2023-05-09 RX ADMIN — LEVALBUTEROL 0.63 MILLIGRAM(S): 1.25 SOLUTION, CONCENTRATE RESPIRATORY (INHALATION) at 10:05

## 2023-05-09 RX ADMIN — Medication 100 MILLIGRAM(S): at 00:12

## 2023-05-09 RX ADMIN — Medication 4 MILLIGRAM(S): at 18:10

## 2023-05-09 RX ADMIN — Medication 100 MILLIGRAM(S): at 11:14

## 2023-05-09 NOTE — CHART NOTE - NSCHARTNOTEFT_GEN_A_CORE
Admitting Diagnosis:   Patient is a 77y old  Male who presents with a chief complaint of percutaneous DENNY closure (09 May 2023 10:24)      PAST MEDICAL & SURGICAL HISTORY:  Polycystic kidney disease      DM2 (diabetes mellitus, type 2)      HLD (hyperlipidemia)      HTN (hypertension)      Prostate cancer      Kidney transplant recipient      DVT (deep venous thrombosis)      Chronic CHF      H/O radical prostatectomy        S/P hernia repair  Abdominal and inguinal around       Current Nutrition Order:  Minced, moist (CONSCHO)  Ensure Enlive TID (350Kcal, 20g protein per serving)     PO Intake: Good (%) [   ]  Fair (50-75%) [   ] Poor (<25%) [ x ]    GI Issues:   denies nvdc  per chart, LBM  however pt reports regular BMs    Pain: no pain noted on assessment     Skin Integrity:  bruised, with surgical incision; no PUs noted   kathie scale 17; edema 3+ BLE      Labs:       145  |  106  |  73<H>  ----------------------------<  69<L>  2.8<LL>   |  28  |  2.23<H>    Ca    9.1      09 May 2023 03:13  Phos  4.2     -  Mg     2.6     -    TPro  6.0  /  Alb  3.9  /  TBili  1.6<H>  /  DBili  x   /  AST  24  /  ALT  6<L>  /  AlkPhos  61  -    CAPILLARY BLOOD GLUCOSE      POCT Blood Glucose.: 179 mg/dL (09 May 2023 11:03)  POCT Blood Glucose.: 158 mg/dL (09 May 2023 06:30)  POCT Blood Glucose.: 146 mg/dL (08 May 2023 23:10)  POCT Blood Glucose.: 425 mg/dL (08 May 2023 21:51)  POCT Blood Glucose.: 217 mg/dL (08 May 2023 18:57)  POCT Blood Glucose.: 195 mg/dL (08 May 2023 12:57)      Medications:  MEDICATIONS  (STANDING):  acetylcysteine 10%  Inhalation 4 milliLiter(s) Inhalation every 6 hours  atorvastatin 20 milliGRAM(s) Oral at bedtime  buMETAnide Injectable 2 milliGRAM(s) IV Push daily  ceFAZolin   IVPB      ceFAZolin   IVPB 500 milliGRAM(s) IV Intermittent every 12 hours  chlorhexidine 2% Cloths 1 Application(s) Topical <User Schedule>  cyanocobalamin 1000 MICROGram(s) Oral daily  dexMEDEtomidine Infusion 0.1 MICROgram(s)/kG/Hr (1.65 mL/Hr) IV Continuous <Continuous>  dextrose 5%. 1000 milliLiter(s) (100 mL/Hr) IV Continuous <Continuous>  dextrose 5%. 1000 milliLiter(s) (50 mL/Hr) IV Continuous <Continuous>  dextrose 50% Injectable 25 Gram(s) IV Push once  dextrose 50% Injectable 25 Gram(s) IV Push once  dextrose 50% Injectable 12.5 Gram(s) IV Push once  DOBUTamine Infusion 2.5 MICROgram(s)/kG/Min (4.94 mL/Hr) IV Continuous <Continuous>  folic acid 1 milliGRAM(s) Oral daily  glucagon  Injectable 1 milliGRAM(s) IntraMuscular once  glucagon  Injectable 1 milliGRAM(s) IntraMuscular once  glucagon  Injectable 1 milliGRAM(s) IntraMuscular once  heparin   Injectable 5000 Unit(s) SubCutaneous every 12 hours  insulin glargine Injectable (LANTUS) 12 Unit(s) SubCutaneous at bedtime  insulin lispro (ADMELOG) corrective regimen sliding scale   SubCutaneous Before meals and at bedtime  levalbuterol Inhalation 0.63 milliGRAM(s) Inhalation every 6 hours  midodrine 5 milliGRAM(s) Oral every 8 hours  pantoprazole  Injectable 40 milliGRAM(s) IV Push daily  predniSONE   Tablet 5 milliGRAM(s) Oral daily  sodium chloride 0.9%. 1000 milliLiter(s) (10 mL/Hr) IV Continuous <Continuous>  tacrolimus 2 milliGRAM(s) Oral two times a day    MEDICATIONS  (PRN):  acetaminophen     Tablet .. 650 milliGRAM(s) Oral every 6 hours PRN Mild Pain (1 - 3)  dextrose Oral Gel 15 Gram(s) Oral once PRN Blood Glucose LESS THAN 70 milliGRAM(s)/deciliter      Weight:  Daily     Daily     Weight Change: no new wts to review at this time; last taken on adm     Estimated energy needs:   Estimated Energy Needs From (denton/kg) 25  Estimated Energy Needs To (denton/kg)	30  Estimated Energy Needs Calculated From (denton/kg) 1642  Estimated Energy Needs Calculated To (denton/kg)	1971    Estimated Protein Needs From (g/kg)	1.2  Estimated Protein Needs To (g/kg)	1.4  Estimated Protein Needs Calculated From (g/kg) 78.84  Estimated Protein Needs Calculated To (g/kg)	91.98    Estimated Fluid Needs From (ml/kg)	30  Estimated Fluid Needs To (ml/kg)	35  Estimated Fluid Needs Calculated From (ml/kg)	1971  Estimated Fluid Needs Calculated To (ml/kg) 2299    Based on Standards of Care pt within % IBW thus actual body weight used for all calculations. Needs adjusted for advanced age and post op.     Subjective:   77yr old male with PMH PKD sp kidney transplant (), HTN, HLD, DM, prostate ca sp radical prostatectomy (), DVT in setting of pelvic fx (), afib/flutter sp ablation (, off AC), admitted for surgical mgmt. Patient underwent left atrial appendage occlusion percutaneously with Dr. Sidhu 23. Arrived to the stepdown extubated on no drips. Overnight had PEA arrest, requiring ACLS and intubation. Tx to ICU, bedside TTE showing pericardial effusion with tamponade. Bedside procedure with 600cc blood aspirated. Given 2 units pRBC. Taken to OR early AM for sternotomy with 1.5L L hemothorax evacuated. Arrived intubated on epi, levo, vaso, rose mary. Switched to dobutamine and weaned off some pressors. Initially had lactic acidosis, now resolved. CPAP'd overnight. : extubated,  off, Bijan weaned.  swan dc, Bijan weaned.  R pigtail placed for effusion, 850cc out. Placed on BIPAP for increased wob and atelectasis.  new R subclavian central line placed. Given bumex for diuresis, buckley placed.  worsening Cr, RIJ Harrington placed,  and primacor started, bumex given, underwent awake bronch, broad spectrum abx started. Dobhoff placed for nutrition. Vasopressin added  speech/swallow - minced moist    Visited pt at bedside this AM on 9EA; daughter in the room. On assessment, pt is awake and alert. Reports poor appetite r/t food does not look "appetizing"; mostly consumes applesauce, jello, broth. Dietary renal restriction d/c, now on a CONSCHO diet order. Since this morning, also ordered Ensure Enlive TID. Recommend liberalizing diet given poor PO intake to possibly increase food choice and promote better intake. Dislike for hospital food is the main barrier to adequate PO intake at this time. Also recommend changing ONS to Glucerna ONS TID (220Kcal, 10g protein). Nutrition related labs reviewed; high FSBG 179, 158, 146 - insulin regimen ordered; low K (2.8). Denies nvdc. No pain noted on assessment. View full recs below. Clinical nutrition services will continue to follow up pt per organizational policy. Please place new consult for any acute nutrition-related issues that may arise prior to follow up     Previous PES: Increased Nutrient Needs... kcal/ protein needs R/T physiological demand for nutrient AEB post op    Active [ X ]  Resolved [   ]    Goal: Pt to meet at least 75% of nutritional needs during hospital stay consistently    Recommendations:  *Continue align nutrition with SLP recs for text modification at all times*  1. Liberalize diet order to possibly promote better PO intake  2. Monitor PO intake closely; honor pt food preferences as able  3. provide max encouragement at all meals  4. Change ONS regimen to Glucerna TID (220Kcal, 10g protein per serving)  5. Monitor lytes, replete prn   6. Monitor GI fxn, skin integrity, chemistry, wts  7. Continue insulin regimen per team; FSBG q4-6hrs   8. RD to remain available for recs adjustment prn or will follow up pt per organizational policy     Education: encouraged adequate PO intake in-house. Provided edu on current diet order - Made pt aware RD remains available     Risk Level: High [ X ] Moderate [   ] Low [   ]

## 2023-05-09 NOTE — PROGRESS NOTE ADULT - ASSESSMENT
77Y M w/ CKD s/p renal transplant 2007 h/o PKD s/p DENNY occlusion repair complicated by pericardial effusion s/p chest washout, course complicated by cardiogenic shock and DAYO now stable and nonoliguric on pressors/diuretic with elevated MAP goal        #Non-Oliguric iATN on underlying CKD 3 (baseline sCr at 1.2-1.4)   Latoya pre-renal initially but given prolonged hypoperfusion developed into iATN   sCr now stable in ~2.5 range, downtrend to 2.23 noted today, monitoring for further recovery  - Continue diuresis and hemodynamic augmentation. Maintain mild net neg fluid balance  - Strict I/Os    Transplant - 2007, initial failure 2/2 PCKD  - Increased tacro to 2mg AM, 2mg PM 5/8 after tacro level was below goal of 4-5  - Tacro 5.5 5/8, pending level today to decide the dose today  - continue to draw trough 30 minutes before AM dose of tacro   - Continue home prednisone 5mg PO QD     77Y M w/ CKD s/p renal transplant 2007 h/o PKD s/p DENNY occlusion repair complicated by pericardial effusion s/p chest washout, course complicated by cardiogenic shock and DAYO now stable and nonoliguric on pressors/diuretic with elevated MAP goal        #Non-Oliguric iATN on underlying CKD 3 (baseline sCr at 1.2-1.4)   Latoya pre-renal initially but given prolonged hypoperfusion developed into iATN   sCr now stable in ~2.5 range, downtrend to 2.23 noted today, monitoring for further recovery  - Continue diuresis and hemodynamic augmentation. Maintain mild net neg fluid balance  - Strict I/Os    Transplant - 2007, initial failure 2/2 PCKD  - Increased tacro to 2mg AM, 2mg PM 5/8 after tacro level was below goal of 4-5  - Tacro 5.5 5/8, pending level today to decide the dose today but given the above goal level yesterday, OK to hold this AM dose  - continue to draw trough 30 minutes before AM dose of tacro   - Continue home prednisone 5mg PO QD

## 2023-05-09 NOTE — PROGRESS NOTE ADULT - SUBJECTIVE AND OBJECTIVE BOX
CTICU  CRITICAL  CARE  attending     Hand off received 					   Pertinent clinical, laboratory, radiographic, hemodynamic, echocardiographic, respiratory data, microbiologic data and chart were reviewed and analyzed frequently throughout the course of the day and night        77 year old male with  HTN, HLD, DM, polycystic kidney disease s/p kidney transplant in 2007,, Prostate cancer s/p radical prostatectomy 2015, DVT 2006 (in setting of pelvic fracture).  He has history of atrial fibrillation and atrial flutter s/p ablation in 1/2018.  He has history of GI bleed in July 2022.  Eliquis was stopped at that time.   He is a [employer] and refuses blood transfusions.    S/P percutaneous occlusion of the left atrial appendage.  S/P PEA arrest.  S/P drainage of pericardial effusion.  S/P Evacuation of left sided hemothorax.        HEALTH ISSUES - PROBLEM Dx:  Pericardial effusion with cardiac tamponade  Acute respiratory failure with hypoxia  Atelectasis  Ground glass opacity present on imaging of lung        FAMILY HISTORY:  PAST MEDICAL & SURGICAL HISTORY:  Polycystic kidney disease      DM2 (diabetes mellitus, type 2)  HLD (hyperlipidemia)  HTN (hypertension)  Prostate cancer  Kidney transplant recipient  DVT (deep venous thrombosis)  Chronic CHF  H/O radical prostatectomy in 2010  S/P hernia repair  Abdominal and inguinal around 2005          14 system review was unremarkable    Vital signs, hemodynamic and respiratory parameters were reviewed from the bedside nursing flow sheet.  ICU Vital Signs Last 24 Hrs  T(C): 36.9 (09 May 2023 21:30), Max: 37.2 (09 May 2023 17:21)  T(F): 98.4 (09 May 2023 21:30), Max: 98.9 (09 May 2023 17:21)  HR: 93 (09 May 2023 23:00) (79 - 112)  BP: --  BP(mean): --  ABP: 164/72 (09 May 2023 23:00) (133/123 - 190/85)  ABP(mean): 102 (09 May 2023 23:00) (92 - 126)  RR: 16 (09 May 2023 23:00) (16 - 20)  SpO2: 100% (09 May 2023 23:00) (87% - 100%)    O2 Parameters below as of 09 May 2023 23:00  Patient On (Oxygen Delivery Method): nasal cannula, high flow  O2 Flow (L/min): 50  O2 Concentration (%): 50      Adult Advanced Hemodynamics Last 24 Hrs  CVP(mm Hg): --  CVP(cm H2O): --  CO: 6.3 (09 May 2023 12:00) (5.1 - 6.3)  CI: 3.5 (09 May 2023 12:00) (2.8 - 3.5)  PA: --  PA(mean): --  PCWP: --  SVR: 1450 (09 May 2023 01:00) (1450 - 1450)  SVRI: --  PVR: --  PVRI: --, ABG - ( 09 May 2023 03:13 )  pH, Arterial: 7.46  pH, Blood: x     /  pCO2: 36    /  pO2: 98    / HCO3: 26    / Base Excess: 2.0   /  SaO2: 98.8                Intake and output was reviewed and the fluid balance was calculated  Daily     Daily   I&O's Summary    08 May 2023 07:01  -  09 May 2023 07:00  --------------------------------------------------------  IN: 1019 mL / OUT: 2334 mL / NET: -1315 mL    09 May 2023 07:01  -  10 May 2023 00:31  --------------------------------------------------------  IN: 1040 mL / OUT: 2790 mL / NET: -1750 mL        All lines and drain sites were assessed    Neuro: No focal motor deficit.  Neck: No JVD.  CVS: S1, S2, No S3.  Lungs: Good air entry bilaterally.   Abd: Soft. No tenderness. + Bowel sounds.  Vascular: + DP/PT.  Extremities: No edema.  Lymphatic: Normal.  Skin: No abnormalities.      labs  CBC Full  -  ( 09 May 2023 03:13 )  WBC Count : 9.97 K/uL  RBC Count : 2.39 M/uL  Hemoglobin : 8.0 g/dL  Hematocrit : 26.2 %  Platelet Count - Automated : 339 K/uL  Mean Cell Volume : 109.6 fl  Mean Cell Hemoglobin : 33.5 pg  Mean Cell Hemoglobin Concentration : 30.5 gm/dL  Auto Neutrophil # : 8.58 K/uL  Auto Lymphocyte # : 0.57 K/uL  Auto Monocyte # : 0.65 K/uL  Auto Eosinophil # : 0.09 K/uL  Auto Basophil # : 0.01 K/uL  Auto Neutrophil % : 86.1 %  Auto Lymphocyte % : 5.7 %  Auto Monocyte % : 6.5 %  Auto Eosinophil % : 0.9 %  Auto Basophil % : 0.1 %    05-09    144  |  106  |  67<H>  ----------------------------<  231<H>  3.7   |  26  |  2.07<H>    Ca    8.7      09 May 2023 14:39  Phos  4.2     05-09  Mg     2.4     05-09    TPro  5.9<L>  /  Alb  3.8  /  TBili  1.5<H>  /  DBili  x   /  AST  32  /  ALT  7<L>  /  AlkPhos  60  05-09    PT/INR - ( 09 May 2023 03:13 )   PT: 14.8 sec;   INR: 1.24          PTT - ( 09 May 2023 03:13 )  PTT:27.3 sec  The current medications were reviewed   MEDICATIONS  (STANDING):  acetylcysteine 10%  Inhalation 4 milliLiter(s) Inhalation every 6 hours  atorvastatin 20 milliGRAM(s) Oral at bedtime  buMETAnide Injectable 2 milliGRAM(s) IV Push daily  ceFAZolin   IVPB 500 milliGRAM(s) IV Intermittent every 12 hours  ceFAZolin   IVPB      chlorhexidine 2% Cloths 1 Application(s) Topical <User Schedule>  cyanocobalamin 1000 MICROGram(s) Oral daily  dexMEDEtomidine Infusion 0.1 MICROgram(s)/kG/Hr (1.65 mL/Hr) IV Continuous <Continuous>  dextrose 5%. 1000 milliLiter(s) (50 mL/Hr) IV Continuous <Continuous>  dextrose 5%. 1000 milliLiter(s) (100 mL/Hr) IV Continuous <Continuous>  dextrose 50% Injectable 25 Gram(s) IV Push once  dextrose 50% Injectable 12.5 Gram(s) IV Push once  dextrose 50% Injectable 25 Gram(s) IV Push once  DOBUTamine Infusion 2.5 MICROgram(s)/kG/Min (4.94 mL/Hr) IV Continuous <Continuous>  folic acid 1 milliGRAM(s) Oral daily  glucagon  Injectable 1 milliGRAM(s) IntraMuscular once  glucagon  Injectable 1 milliGRAM(s) IntraMuscular once  glucagon  Injectable 1 milliGRAM(s) IntraMuscular once  heparin   Injectable 5000 Unit(s) SubCutaneous every 12 hours  insulin glargine Injectable (LANTUS) 12 Unit(s) SubCutaneous at bedtime  insulin lispro (ADMELOG) corrective regimen sliding scale   SubCutaneous Before meals and at bedtime  levalbuterol Inhalation 0.63 milliGRAM(s) Inhalation every 6 hours  midodrine 5 milliGRAM(s) Oral every 8 hours  pantoprazole  Injectable 40 milliGRAM(s) IV Push daily  predniSONE   Tablet 5 milliGRAM(s) Oral daily  sodium chloride 0.9%. 1000 milliLiter(s) (10 mL/Hr) IV Continuous <Continuous>  tacrolimus 1 milliGRAM(s) Oral <User Schedule>  tacrolimus 2 milliGRAM(s) Oral <User Schedule>  testosterone patch 4 mG/24 Hr(s) 4 milliGRAM(s) Transdermal daily    MEDICATIONS  (PRN):  dextrose Oral Gel 15 Gram(s) Oral once PRN Blood Glucose LESS THAN 70 milliGRAM(s)/deciliter          77 years old male with history of atrial fibrillation and contraindications to anticoagulation.   S/P Occlusion of LA appendage.  Postoperative course complicated by PEA arrest, pericardial tamponade cardiogenic shock, respiratory failure, DAYO, Transaminitis, metabolic acidosis, DVT.  S/P Drainage of pericardial effusion.   S/P Evacuation of left hemothorax.  Labile blood sugar control.  Intermitted hypokalemia.  Hemodynamically stable.  Good oxygenation.  Fair urine out put.        My plan includes :  WEAN off dobutamine  Immunosuppression Rx for renal transplant protocol.  Bronchodilator Rx.  Statin and Betablocker.  Close hemodynamic monitoring   Monitor for arrhythmias and monitor parameters for organ perfusion  Monitor neurologic status  Monitor renal function.  Head of the bed should remain elevated to 45 deg .   Chest PT and IS will be encouraged  Monitor adequacy of oxygenation   Nutritional goals will be met using po eventually , ensure adequate caloric intake and monitor the same  Stress ulcer and VTE prophylaxis will be achieved    Glycemic control is satisfactory  Electrolytes have been repleted as necessary and wound care has been carried out. Pain control has been achieved.   Aggressive physical therapy and early mobility and ambulation goals will be met   The family was updated about the course and plan  CRITICAL CARE TIME SPENT in evaluation and management, review and interpretation of labs and x-rays, hemodynamic management, formulating a plan and coordinating care: ___30____ MIN.  Time does not include procedural time.  CTICU ATTENDING     					    Antwan Colon MD

## 2023-05-09 NOTE — PROGRESS NOTE ADULT - SUBJECTIVE AND OBJECTIVE BOX
CTICU  CRITICAL  CARE  attending     Hand off received 					   Pertinent clinical, laboratory, radiographic, hemodynamic, echocardiographic, respiratory data, microbiologic data and chart were reviewed and analyzed frequently throughout the course of the day and night  Patient seen and examined with CTS/ SH attending at bedside  Pt is a 77y , Male, HEALTH ISSUES - PROBLEM Dx:  Pericardial effusion with cardiac tamponade    Acute respiratory failure with hypoxia    Atelectasis    Ground glass opacity present on imaging of lung        , FAMILY HISTORY:  PAST MEDICAL & SURGICAL HISTORY:  Polycystic kidney disease      DM2 (diabetes mellitus, type 2)      HLD (hyperlipidemia)      HTN (hypertension)      Prostate cancer      Kidney transplant recipient      DVT (deep venous thrombosis)      Chronic CHF      H/O radical prostatectomy  2010      S/P hernia repair  Abdominal and inguinal around 2005        Patient is a 77y old  Male who presents with a chief complaint of percutaneous DENNY closure (09 May 2023 10:24)      14 system review was unremarkable    Vital signs, hemodynamic and respiratory parameters were reviewed from the bedside nursing flowsheet.  ICU Vital Signs Last 24 Hrs  T(C): 36.9 (09 May 2023 21:30), Max: 37.2 (09 May 2023 17:21)  T(F): 98.4 (09 May 2023 21:30), Max: 98.9 (09 May 2023 17:21)  HR: 87 (09 May 2023 22:00) (79 - 112)  BP: --  BP(mean): --  ABP: 168/71 (09 May 2023 22:00) (133/123 - 190/85)  ABP(mean): 102 (09 May 2023 22:00) (92 - 126)  RR: 16 (09 May 2023 22:00) (16 - 20)  SpO2: 99% (09 May 2023 22:00) (87% - 100%)    O2 Parameters below as of 09 May 2023 22:00  Patient On (Oxygen Delivery Method): nasal cannula, high flow  O2 Flow (L/min): 50  O2 Concentration (%): 50      Adult Advanced Hemodynamics Last 24 Hrs  CVP(mm Hg): --  CVP(cm H2O): --  CO: 6.3 (09 May 2023 12:00) (5.1 - 6.3)  CI: 3.5 (09 May 2023 12:00) (2.8 - 3.5)  PA: --  PA(mean): --  PCWP: --  SVR: 1450 (09 May 2023 01:00) (1200 - 1450)  SVRI: --  PVR: --  PVRI: --, ABG - ( 09 May 2023 03:13 )  pH, Arterial: 7.46  pH, Blood: x     /  pCO2: 36    /  pO2: 98    / HCO3: 26    / Base Excess: 2.0   /  SaO2: 98.8                Intake and output was reviewed and the fluid balance was calculated  Daily     Daily   I&O's Summary    08 May 2023 07:01  -  09 May 2023 07:00  --------------------------------------------------------  IN: 1019 mL / OUT: 2334 mL / NET: -1315 mL    09 May 2023 07:01  -  09 May 2023 23:44  --------------------------------------------------------  IN: 970 mL / OUT: 2580 mL / NET: -1610 mL        All lines and drain sites were assessed  Glycemic trend was reviewedCAPILLARY BLOOD GLUCOSE      POCT Blood Glucose.: 210 mg/dL (09 May 2023 21:41)    No acute change in mental status  Auscultation of the chest reveals equal bs  Abdomen is soft  Extremities are warm and well perfused  Wounds appear clean and unremarkable  Antibiotics are periop    labs  CBC Full  -  ( 09 May 2023 03:13 )  WBC Count : 9.97 K/uL  RBC Count : 2.39 M/uL  Hemoglobin : 8.0 g/dL  Hematocrit : 26.2 %  Platelet Count - Automated : 339 K/uL  Mean Cell Volume : 109.6 fl  Mean Cell Hemoglobin : 33.5 pg  Mean Cell Hemoglobin Concentration : 30.5 gm/dL  Auto Neutrophil # : 8.58 K/uL  Auto Lymphocyte # : 0.57 K/uL  Auto Monocyte # : 0.65 K/uL  Auto Eosinophil # : 0.09 K/uL  Auto Basophil # : 0.01 K/uL  Auto Neutrophil % : 86.1 %  Auto Lymphocyte % : 5.7 %  Auto Monocyte % : 6.5 %  Auto Eosinophil % : 0.9 %  Auto Basophil % : 0.1 %    05-09    144  |  106  |  67<H>  ----------------------------<  231<H>  3.7   |  26  |  2.07<H>    Ca    8.7      09 May 2023 14:39  Phos  4.2     05-09  Mg     2.4     05-09    TPro  5.9<L>  /  Alb  3.8  /  TBili  1.5<H>  /  DBili  x   /  AST  32  /  ALT  7<L>  /  AlkPhos  60  05-09    PT/INR - ( 09 May 2023 03:13 )   PT: 14.8 sec;   INR: 1.24          PTT - ( 09 May 2023 03:13 )  PTT:27.3 sec  The current medications were reviewed   MEDICATIONS  (STANDING):  acetylcysteine 10%  Inhalation 4 milliLiter(s) Inhalation every 6 hours  atorvastatin 20 milliGRAM(s) Oral at bedtime  buMETAnide Injectable 2 milliGRAM(s) IV Push daily  ceFAZolin   IVPB      ceFAZolin   IVPB 500 milliGRAM(s) IV Intermittent every 12 hours  chlorhexidine 2% Cloths 1 Application(s) Topical <User Schedule>  cyanocobalamin 1000 MICROGram(s) Oral daily  dexMEDEtomidine Infusion 0.1 MICROgram(s)/kG/Hr (1.65 mL/Hr) IV Continuous <Continuous>  dextrose 5%. 1000 milliLiter(s) (50 mL/Hr) IV Continuous <Continuous>  dextrose 5%. 1000 milliLiter(s) (100 mL/Hr) IV Continuous <Continuous>  dextrose 50% Injectable 25 Gram(s) IV Push once  dextrose 50% Injectable 12.5 Gram(s) IV Push once  dextrose 50% Injectable 25 Gram(s) IV Push once  DOBUTamine Infusion 2.5 MICROgram(s)/kG/Min (4.94 mL/Hr) IV Continuous <Continuous>  folic acid 1 milliGRAM(s) Oral daily  glucagon  Injectable 1 milliGRAM(s) IntraMuscular once  glucagon  Injectable 1 milliGRAM(s) IntraMuscular once  glucagon  Injectable 1 milliGRAM(s) IntraMuscular once  heparin   Injectable 5000 Unit(s) SubCutaneous every 12 hours  insulin glargine Injectable (LANTUS) 12 Unit(s) SubCutaneous at bedtime  insulin lispro (ADMELOG) corrective regimen sliding scale   SubCutaneous Before meals and at bedtime  levalbuterol Inhalation 0.63 milliGRAM(s) Inhalation every 6 hours  midodrine 5 milliGRAM(s) Oral every 8 hours  pantoprazole  Injectable 40 milliGRAM(s) IV Push daily  predniSONE   Tablet 5 milliGRAM(s) Oral daily  sodium chloride 0.9%. 1000 milliLiter(s) (10 mL/Hr) IV Continuous <Continuous>  tacrolimus 1 milliGRAM(s) Oral <User Schedule>  tacrolimus 2 milliGRAM(s) Oral <User Schedule>  testosterone patch 4 mG/24 Hr(s) 4 milliGRAM(s) Transdermal daily    MEDICATIONS  (PRN):  dextrose Oral Gel 15 Gram(s) Oral once PRN Blood Glucose LESS THAN 70 milliGRAM(s)/deciliter       PROBLEM LIST/ ASSESSMENT:  HEALTH ISSUES - PROBLEM Dx:  Pericardial effusion with cardiac tamponade    Acute respiratory failure with hypoxia    Atelectasis    Ground glass opacity present on imaging of lung        ,   Patient is a 77y old  Male who presents with a chief complaint of percutaneous DENNY closure (09 May 2023 10:24)     s/p cardiac surgery        Cardiogenic shock on ionotropy    Vasogenic shock due to hypotension in the cticu , will keep on pressors    HAcute respiratory failure ruled in due to hypoxemia, O2 sats < 91% on RA treated with HFNC      Acute kidney injury - creatinine > 0.3 due to combined prerenal and intrarenal factors can presume ATN    ESRD          My plan includes :    dc ngt  encourage po    ensure suppl    work w pt   close hemodynamic, ventilatory and drain monitoring and management per post op routine    Monitor for arrhythmias and monitor parameters for organ perfusion  beta blockade not administered due to hemodynamic instability and bradycardia  monitor neurologic status  Head of the bed should remain elevated to 45 deg .   chest PT and IS will be encouraged  monitor adequacy of oxygenation and ventilation and attempt to wean oxygen  antibiotic regimen will be tailored to the clinical, laboratory and microbiologic data  Nutritional goals will be met using po eventually , ensure adequate caloric intake and montior the same  Stress ulcer and VTE prophylaxis will be achieved    Glycemic control is satisfactory  Electrolytes have been repleted as necessary and wound care has been carried out. Pain control has been achieved.   agressive physical therapy and early mobility and ambulation goals will be met   The family was updated about the course and plan  CRITICAL CARE TIME Upon my evaluation, this patient had a high probability of imminent or life-threatening deterioration due to the above problems which required my direct attention, intervention, and personal management.  I have personally provided 110 minutes of critical care time exclusive of time spent on separately billable procedures. Time included review of laboratory data, radiology results, discussion with consultants, and monitoring for potential decompensation. Interventions were performed as documented abovepersonally provided by me  in evaluation and management, reassessments, review and interpretation of labs and x-rays, ventilator and hemodynamic management, formulating a plan and coordinating care: ___110___ MIN.  Time does not include procedural time.    CTICU ATTENDING     					    Satya Lazar MD

## 2023-05-09 NOTE — PROGRESS NOTE ADULT - SUBJECTIVE AND OBJECTIVE BOX
Patient is a 77y Male seen and evaluated at bedside. Sitting up in chair. Resting comfortably in chair. Denies any CP, SOB, Nausea. Does endorse pain with swallowing food. Urinating normally      Meds:    acetylcysteine 10%  Inhalation 4 every 6 hours  atorvastatin 20 at bedtime  buMETAnide Injectable 2 daily  ceFAZolin   IVPB    ceFAZolin   IVPB 500 every 12 hours  chlorhexidine 2% Cloths 1 <User Schedule>  cyanocobalamin 1000 daily  dexMEDEtomidine Infusion 0.1 <Continuous>  dextrose 5%. 1000 <Continuous>  dextrose 5%. 1000 <Continuous>  dextrose 50% Injectable 25 once  dextrose 50% Injectable 25 once  dextrose 50% Injectable 12.5 once  dextrose Oral Gel 15 once PRN  DOBUTamine Infusion 2.5 <Continuous>  folic acid 1 daily  glucagon  Injectable 1 once  glucagon  Injectable 1 once  glucagon  Injectable 1 once  heparin   Injectable 5000 every 12 hours  insulin glargine Injectable (LANTUS) 12 at bedtime  insulin lispro (ADMELOG) corrective regimen sliding scale  Before meals and at bedtime  levalbuterol Inhalation 0.63 every 6 hours  midodrine 5 every 8 hours  pantoprazole  Injectable 40 daily  predniSONE   Tablet 5 daily  sodium chloride 0.9%. 1000 <Continuous>  tacrolimus 2 two times a day      T(C): , Max: 37.2 (05-08-23 @ 23:00)  T(F): , Max: 98.9 (05-08-23 @ 23:00)  HR: 96 (05-09-23 @ 10:00)  BP: --  BP(mean): --  RR: 20 (05-09-23 @ 10:00)  SpO2: 99% (05-09-23 @ 10:00)  Wt(kg): --    05-08 @ 07:01  -  05-09 @ 07:00  --------------------------------------------------------  IN: 1019 mL / OUT: 2334 mL / NET: -1315 mL    05-09 @ 07:01  -  05-09 @ 10:24  --------------------------------------------------------  IN: 120 mL / OUT: 700 mL / NET: -580 mL          Review of Systems:  ROS negative except as per HPI      PHYSICAL EXAM:  GENERAL: NAD, sitting in chair on HFNC   NECK: supple, No JVD  CHEST/LUNG:  Decreased b/l in bases, no accessory muscle use  HEART: normal S1S2, RRR  ABDOMEN: Soft, Nontender  EXTREMITIES: 1+ pitting edema b/l LE   NEUROLOGY: AAO x3, no focal neurological deficit      LABS:                        8.0    9.97  )-----------( 339      ( 09 May 2023 03:13 )             26.2     05-09    145  |  106  |  73<H>  ----------------------------<  69<L>  2.8<LL>   |  28  |  2.23<H>    Ca    9.1      09 May 2023 03:13  Phos  4.2     05-09  Mg     2.6     05-09    TPro  6.0  /  Alb  3.9  /  TBili  1.6<H>  /  DBili  x   /  AST  24  /  ALT  6<L>  /  AlkPhos  61  05-09      PT/INR - ( 09 May 2023 03:13 )   PT: 14.8 sec;   INR: 1.24          PTT - ( 09 May 2023 03:13 )  PTT:27.3 sec          RADIOLOGY & ADDITIONAL STUDIES:

## 2023-05-10 LAB
ALBUMIN SERPL ELPH-MCNC: 3.5 G/DL — SIGNIFICANT CHANGE UP (ref 3.3–5)
ALP SERPL-CCNC: 55 U/L — SIGNIFICANT CHANGE UP (ref 40–120)
ALT FLD-CCNC: 5 U/L — LOW (ref 10–45)
ANION GAP SERPL CALC-SCNC: 10 MMOL/L — SIGNIFICANT CHANGE UP (ref 5–17)
APTT BLD: 27.2 SEC — LOW (ref 27.5–35.5)
AST SERPL-CCNC: 20 U/L — SIGNIFICANT CHANGE UP (ref 10–40)
BASOPHILS # BLD AUTO: 0.02 K/UL — SIGNIFICANT CHANGE UP (ref 0–0.2)
BASOPHILS NFR BLD AUTO: 0.2 % — SIGNIFICANT CHANGE UP (ref 0–2)
BILIRUB SERPL-MCNC: 1.2 MG/DL — SIGNIFICANT CHANGE UP (ref 0.2–1.2)
BUN SERPL-MCNC: 61 MG/DL — HIGH (ref 7–23)
CALCIUM SERPL-MCNC: 8.3 MG/DL — LOW (ref 8.4–10.5)
CHLORIDE SERPL-SCNC: 104 MMOL/L — SIGNIFICANT CHANGE UP (ref 96–108)
CO2 SERPL-SCNC: 29 MMOL/L — SIGNIFICANT CHANGE UP (ref 22–31)
CREAT SERPL-MCNC: 1.9 MG/DL — HIGH (ref 0.5–1.3)
EGFR: 36 ML/MIN/1.73M2 — LOW
EOSINOPHIL # BLD AUTO: 0.2 K/UL — SIGNIFICANT CHANGE UP (ref 0–0.5)
EOSINOPHIL NFR BLD AUTO: 2.4 % — SIGNIFICANT CHANGE UP (ref 0–6)
GAS PNL BLDA: SIGNIFICANT CHANGE UP
GLUCOSE BLDC GLUCOMTR-MCNC: 122 MG/DL — HIGH (ref 70–99)
GLUCOSE BLDC GLUCOMTR-MCNC: 171 MG/DL — HIGH (ref 70–99)
GLUCOSE BLDC GLUCOMTR-MCNC: 195 MG/DL — HIGH (ref 70–99)
GLUCOSE BLDC GLUCOMTR-MCNC: 272 MG/DL — HIGH (ref 70–99)
GLUCOSE SERPL-MCNC: 103 MG/DL — HIGH (ref 70–99)
HCT VFR BLD CALC: 26.9 % — LOW (ref 39–50)
HGB BLD-MCNC: 8 G/DL — LOW (ref 13–17)
IMM GRANULOCYTES NFR BLD AUTO: 0.6 % — SIGNIFICANT CHANGE UP (ref 0–0.9)
INR BLD: 1.33 — HIGH (ref 0.88–1.16)
LYMPHOCYTES # BLD AUTO: 0.43 K/UL — LOW (ref 1–3.3)
LYMPHOCYTES # BLD AUTO: 5.1 % — LOW (ref 13–44)
MAGNESIUM SERPL-MCNC: 2.4 MG/DL — SIGNIFICANT CHANGE UP (ref 1.6–2.6)
MCHC RBC-ENTMCNC: 29.7 GM/DL — LOW (ref 32–36)
MCHC RBC-ENTMCNC: 33.1 PG — SIGNIFICANT CHANGE UP (ref 27–34)
MCV RBC AUTO: 111.2 FL — HIGH (ref 80–100)
MONOCYTES # BLD AUTO: 0.5 K/UL — SIGNIFICANT CHANGE UP (ref 0–0.9)
MONOCYTES NFR BLD AUTO: 5.9 % — SIGNIFICANT CHANGE UP (ref 2–14)
NEUTROPHILS # BLD AUTO: 7.25 K/UL — SIGNIFICANT CHANGE UP (ref 1.8–7.4)
NEUTROPHILS NFR BLD AUTO: 85.8 % — HIGH (ref 43–77)
NRBC # BLD: 0 /100 WBCS — SIGNIFICANT CHANGE UP (ref 0–0)
PHOSPHATE SERPL-MCNC: 3.1 MG/DL — SIGNIFICANT CHANGE UP (ref 2.5–4.5)
PLATELET # BLD AUTO: 352 K/UL — SIGNIFICANT CHANGE UP (ref 150–400)
POTASSIUM SERPL-MCNC: 3.3 MMOL/L — LOW (ref 3.5–5.3)
POTASSIUM SERPL-SCNC: 3.3 MMOL/L — LOW (ref 3.5–5.3)
PROT SERPL-MCNC: 5.3 G/DL — LOW (ref 6–8.3)
PROTHROM AB SERPL-ACNC: 15.9 SEC — HIGH (ref 10.5–13.4)
RBC # BLD: 2.42 M/UL — LOW (ref 4.2–5.8)
RBC # FLD: 25.5 % — HIGH (ref 10.3–14.5)
SODIUM SERPL-SCNC: 143 MMOL/L — SIGNIFICANT CHANGE UP (ref 135–145)
TACROLIMUS SERPL-MCNC: 6.8 NG/ML — SIGNIFICANT CHANGE UP
WBC # BLD: 8.45 K/UL — SIGNIFICANT CHANGE UP (ref 3.8–10.5)
WBC # FLD AUTO: 8.45 K/UL — SIGNIFICANT CHANGE UP (ref 3.8–10.5)

## 2023-05-10 PROCEDURE — 99232 SBSQ HOSP IP/OBS MODERATE 35: CPT

## 2023-05-10 PROCEDURE — 71045 X-RAY EXAM CHEST 1 VIEW: CPT | Mod: 26

## 2023-05-10 PROCEDURE — 99292 CRITICAL CARE ADDL 30 MIN: CPT

## 2023-05-10 RX ORDER — POLYETHYLENE GLYCOL 3350 17 G/17G
17 POWDER, FOR SOLUTION ORAL DAILY
Refills: 0 | Status: DISCONTINUED | OUTPATIENT
Start: 2023-05-10 | End: 2023-05-21

## 2023-05-10 RX ORDER — ACETAMINOPHEN 500 MG
1000 TABLET ORAL ONCE
Refills: 0 | Status: COMPLETED | OUTPATIENT
Start: 2023-05-10 | End: 2023-05-10

## 2023-05-10 RX ORDER — METOPROLOL TARTRATE 50 MG
12.5 TABLET ORAL EVERY 8 HOURS
Refills: 0 | Status: DISCONTINUED | OUTPATIENT
Start: 2023-05-10 | End: 2023-05-11

## 2023-05-10 RX ORDER — POTASSIUM CHLORIDE 20 MEQ
40 PACKET (EA) ORAL ONCE
Refills: 0 | Status: COMPLETED | OUTPATIENT
Start: 2023-05-10 | End: 2023-05-10

## 2023-05-10 RX ORDER — SENNA PLUS 8.6 MG/1
2 TABLET ORAL AT BEDTIME
Refills: 0 | Status: DISCONTINUED | OUTPATIENT
Start: 2023-05-10 | End: 2023-05-21

## 2023-05-10 RX ORDER — BUMETANIDE 0.25 MG/ML
2 INJECTION INTRAMUSCULAR; INTRAVENOUS ONCE
Refills: 0 | Status: COMPLETED | OUTPATIENT
Start: 2023-05-10 | End: 2023-05-10

## 2023-05-10 RX ORDER — PANTOPRAZOLE SODIUM 20 MG/1
40 TABLET, DELAYED RELEASE ORAL
Refills: 0 | Status: DISCONTINUED | OUTPATIENT
Start: 2023-05-10 | End: 2023-05-21

## 2023-05-10 RX ORDER — ACETAMINOPHEN 500 MG
650 TABLET ORAL EVERY 6 HOURS
Refills: 0 | Status: DISCONTINUED | OUTPATIENT
Start: 2023-05-10 | End: 2023-05-21

## 2023-05-10 RX ORDER — OXYCODONE HYDROCHLORIDE 5 MG/1
5 TABLET ORAL EVERY 6 HOURS
Refills: 0 | Status: DISCONTINUED | OUTPATIENT
Start: 2023-05-10 | End: 2023-05-17

## 2023-05-10 RX ADMIN — Medication 2: at 17:11

## 2023-05-10 RX ADMIN — Medication 2: at 12:06

## 2023-05-10 RX ADMIN — TACROLIMUS 1 MILLIGRAM(S): 5 CAPSULE ORAL at 19:29

## 2023-05-10 RX ADMIN — BUMETANIDE 2 MILLIGRAM(S): 0.25 INJECTION INTRAMUSCULAR; INTRAVENOUS at 05:36

## 2023-05-10 RX ADMIN — Medication 4 MILLIGRAM(S): at 18:55

## 2023-05-10 RX ADMIN — Medication 1000 MILLIGRAM(S): at 01:58

## 2023-05-10 RX ADMIN — Medication 650 MILLIGRAM(S): at 22:50

## 2023-05-10 RX ADMIN — ATORVASTATIN CALCIUM 20 MILLIGRAM(S): 80 TABLET, FILM COATED ORAL at 22:22

## 2023-05-10 RX ADMIN — Medication 40 MILLIEQUIVALENT(S): at 07:47

## 2023-05-10 RX ADMIN — Medication 6: at 22:20

## 2023-05-10 RX ADMIN — Medication 4 MILLILITER(S): at 23:32

## 2023-05-10 RX ADMIN — HEPARIN SODIUM 5000 UNIT(S): 5000 INJECTION INTRAVENOUS; SUBCUTANEOUS at 17:12

## 2023-05-10 RX ADMIN — Medication 5 MILLIGRAM(S): at 05:35

## 2023-05-10 RX ADMIN — LEVALBUTEROL 0.63 MILLIGRAM(S): 1.25 SOLUTION, CONCENTRATE RESPIRATORY (INHALATION) at 10:48

## 2023-05-10 RX ADMIN — Medication 12.5 MILLIGRAM(S): at 19:29

## 2023-05-10 RX ADMIN — Medication 4 MILLIGRAM(S): at 11:15

## 2023-05-10 RX ADMIN — Medication 4 MILLIGRAM(S): at 13:39

## 2023-05-10 RX ADMIN — Medication 12.5 MILLIGRAM(S): at 12:36

## 2023-05-10 RX ADMIN — TACROLIMUS 2 MILLIGRAM(S): 5 CAPSULE ORAL at 08:46

## 2023-05-10 RX ADMIN — MIDODRINE HYDROCHLORIDE 5 MILLIGRAM(S): 2.5 TABLET ORAL at 05:35

## 2023-05-10 RX ADMIN — INSULIN GLARGINE 12 UNIT(S): 100 INJECTION, SOLUTION SUBCUTANEOUS at 22:19

## 2023-05-10 RX ADMIN — BUMETANIDE 2 MILLIGRAM(S): 0.25 INJECTION INTRAMUSCULAR; INTRAVENOUS at 22:19

## 2023-05-10 RX ADMIN — CHLORHEXIDINE GLUCONATE 1 APPLICATION(S): 213 SOLUTION TOPICAL at 05:35

## 2023-05-10 RX ADMIN — Medication 4 MILLILITER(S): at 10:47

## 2023-05-10 RX ADMIN — Medication 4 MILLILITER(S): at 16:34

## 2023-05-10 RX ADMIN — Medication 1 MILLIGRAM(S): at 12:06

## 2023-05-10 RX ADMIN — Medication 40 MILLIEQUIVALENT(S): at 03:08

## 2023-05-10 RX ADMIN — Medication 4 MILLIGRAM(S): at 12:07

## 2023-05-10 RX ADMIN — Medication 4 MILLILITER(S): at 06:18

## 2023-05-10 RX ADMIN — LEVALBUTEROL 0.63 MILLIGRAM(S): 1.25 SOLUTION, CONCENTRATE RESPIRATORY (INHALATION) at 16:55

## 2023-05-10 RX ADMIN — Medication 400 MILLIGRAM(S): at 01:42

## 2023-05-10 RX ADMIN — Medication 650 MILLIGRAM(S): at 22:22

## 2023-05-10 RX ADMIN — Medication 100 MILLIGRAM(S): at 12:05

## 2023-05-10 RX ADMIN — PREGABALIN 1000 MICROGRAM(S): 225 CAPSULE ORAL at 12:06

## 2023-05-10 RX ADMIN — HEPARIN SODIUM 5000 UNIT(S): 5000 INJECTION INTRAVENOUS; SUBCUTANEOUS at 05:35

## 2023-05-10 RX ADMIN — Medication 100 MILLIGRAM(S): at 23:25

## 2023-05-10 RX ADMIN — Medication 4 MILLILITER(S): at 00:15

## 2023-05-10 RX ADMIN — OXYCODONE HYDROCHLORIDE 5 MILLIGRAM(S): 5 TABLET ORAL at 22:23

## 2023-05-10 RX ADMIN — LEVALBUTEROL 0.63 MILLIGRAM(S): 1.25 SOLUTION, CONCENTRATE RESPIRATORY (INHALATION) at 23:32

## 2023-05-10 RX ADMIN — OXYCODONE HYDROCHLORIDE 5 MILLIGRAM(S): 5 TABLET ORAL at 22:50

## 2023-05-10 RX ADMIN — LEVALBUTEROL 0.63 MILLIGRAM(S): 1.25 SOLUTION, CONCENTRATE RESPIRATORY (INHALATION) at 00:15

## 2023-05-10 RX ADMIN — LEVALBUTEROL 0.63 MILLIGRAM(S): 1.25 SOLUTION, CONCENTRATE RESPIRATORY (INHALATION) at 06:18

## 2023-05-10 NOTE — PROGRESS NOTE ADULT - SUBJECTIVE AND OBJECTIVE BOX
Patient is a 77y Male seen and evaluated at bedside. No acute distress, off pressors, on NC sitting in chair, urine starting to clear up, sCr at 1.9, UP 3.4L, net negative 2.3L. Tacro levl at goal       Meds:    acetylcysteine 10%  Inhalation 4 every 6 hours  atorvastatin 20 at bedtime  buMETAnide Injectable 2 daily  ceFAZolin   IVPB    ceFAZolin   IVPB 500 every 12 hours  chlorhexidine 2% Cloths 1 <User Schedule>  cyanocobalamin 1000 daily  dextrose 5%. 1000 <Continuous>  dextrose 5%. 1000 <Continuous>  dextrose 50% Injectable 12.5 once  dextrose 50% Injectable 25 once  dextrose 50% Injectable 25 once  dextrose Oral Gel 15 once PRN  folic acid 1 daily  glucagon  Injectable 1 once  glucagon  Injectable 1 once  glucagon  Injectable 1 once  heparin   Injectable 5000 every 12 hours  insulin glargine Injectable (LANTUS) 12 at bedtime  insulin lispro (ADMELOG) corrective regimen sliding scale  Before meals and at bedtime  levalbuterol Inhalation 0.63 every 6 hours  midodrine 5 every 8 hours  pantoprazole    Tablet 40 before breakfast  polyethylene glycol 3350 17 daily  predniSONE   Tablet 5 daily  senna 2 at bedtime  sodium chloride 0.9%. 1000 <Continuous>  tacrolimus 2 <User Schedule>  tacrolimus 1 <User Schedule>  testosterone patch 4 mG/24 Hr(s) 4 daily      T(C): , Max: 37.2 (05-09-23 @ 17:21)  T(F): , Max: 98.9 (05-09-23 @ 17:21)  HR: 103 (05-10-23 @ 10:00)  BP: 178/100  BP(mean): --  RR: 20 (05-10-23 @ 10:00)  SpO2: 98% (05-10-23 @ 10:00)  Wt(kg): --    05-09 @ 07:01  -  05-10 @ 07:00  --------------------------------------------------------  IN: 1210 mL / OUT: 3495 mL / NET: -2285 mL    05-10 @ 07:01  -  05-10 @ 11:38  --------------------------------------------------------  IN: 90 mL / OUT: 520 mL / NET: -430 mL          Review of Systems:  ROS negative except as per HPI      PHYSICAL EXAM:  GENERAL: NAD, sitting in chair on NC   NECK: supple, No JVD  CHEST/LUNG:  Decreased b/l in bases, no accessory muscle use  HEART: normal S1S2, RRR  ABDOMEN: Soft, Nontender  EXTREMITIES: 1+ pitting edema b/l LE   NEUROLOGY: AAO x3, no focal neurological deficit      LABS:                        8.0    8.45  )-----------( 352      ( 10 May 2023 02:53 )             26.9     05-10    143  |  104  |  61<H>  ----------------------------<  103<H>  3.3<L>   |  29  |  1.90<H>    Ca    8.3<L>      10 May 2023 02:53  Phos  3.1     05-10  Mg     2.4     05-10    TPro  5.3<L>  /  Alb  3.5  /  TBili  1.2  /  DBili  x   /  AST  20  /  ALT  5<L>  /  AlkPhos  55  05-10      PT/INR - ( 10 May 2023 02:53 )   PT: 15.9 sec;   INR: 1.33          PTT - ( 10 May 2023 02:53 )  PTT:27.2 sec          RADIOLOGY & ADDITIONAL STUDIES:

## 2023-05-10 NOTE — PROGRESS NOTE ADULT - ASSESSMENT
77 M with past medical history of PCKD s/p kidney tx DDKT 2007, HTN, T2DM, Prostate cancer s/p radical prostatectomy 2015, DVT 2006 (in setting of pelvic fracture), and afib/atrial flutter s/p ablation in 2018, who is now off oral anticoagulation secondary to GI bleed (7/2022) and presents for DENNY occlusion. S/p DENNY occlusion 4/25 c/b cardiac tamponade and PEA arrest requiring bedside pericardiocentesis/ OR for sternotomy and local exploration 4/26. S/p > 1 L left hemothorax evacuated in OR and additional blood loss via chest tubes. Emergent stabilization in CTICU. Hematology consulted now for anemia and thrombocytopenia in [facility].    # Acute blood loss anemia and thrombocytopenia  Pre-operatively patient with hemoglobin 14 and platelet 240's. Hospital course complicated by cardiac tamponade and PEA arrest requiring emergent stabilization in CTICU. Acute drop in hemoglobin and platelets corresponds to episode of acute blood loss around time of tamponade and arrest. Received FEIBA 1000 units and protamine 50 mg.    Plan:  - s/p iron 300 mg daily x 4. Last dose on 05/03. Iron studies performed prior to last Venofer infusion already showed adequate iron stores (Iron 30, TSat 42%, Ferritin 594).   - s/p EPO 13,000 units on 4/26  - s/p EPO 20,000 units QD (04/28-05/05), stop further doses of EPO for now given increased thrombotic risk. He is found to have a DVT of proximal right brachial vein and superficial venous thrombosis in the right basilic vein (05/04/23), possibly provoked by multiple IV lines placed. Ideally, full anticoagulation for the treatment of RUE DVT should be initiated and continue for at least 3 months; however, given patient preference to not receive transfusions and severe anemia, primary team holding off for now until patient is more stabilized.   - Continue with PO Folate 1 mg QD and Vitamin B12 1000 mcg QD  - Trend CBC and coagulation panel using pediatric tubes per CT Surgery team. Minimize blood draws   - If worsening or emergent bleeding, patient's accepted product list includes Kcentra and cryoprecipitate    - Hematology team to sign off given stable Hb.    Discussed with Dr. Danny Guadalupe

## 2023-05-10 NOTE — PROGRESS NOTE ADULT - SUBJECTIVE AND OBJECTIVE BOX
LENGTH OF HOSPITAL STAY: 15d    SUBJECTIVE: No acute overnight events    HISTORY OF PRESENTING ILLNESS:   76 y/o male with polycystic kidney disease s/p kidney transplant in 2007, HTN, HLD, DM2, Prostate cancer s/p radical prostatectomy 2015, DVT 2006 (in setting of pelvic fracture), and afib/atrial flutter s/p ablation in 1/2018, who is now off oral anticoagulation secondary to GI bleed and presents for DENNY occlusoin. Pt as no symptomsHe was [facility] with [facility]. He is a [employer] and refuses blood transfusions. Eliquis was stopped at that time.     KIMMIE reviewed from prior ablation- appears to have good anatomy for LAAO.     Patient seen in same day holding area; Reports no changes to PMHx or medications since last seen by our team. Denies acute or current SOB, chest pain, palpitation, N/V/D, fever/chills, recent illness, or any other concerning symptoms.  NYHA 1 Class (25 Apr 2023 07:57)    PAST MEDICAL & SURGICAL HISTORY:  Polycystic kidney disease  DM2 (diabetes mellitus, type 2)  HLD (hyperlipidemia)  HTN (hypertension)  Prostate cancer  Kidney transplant recipient  DVT (deep venous thrombosis)  Chronic CHF  H/O radical prostatectomy  2010  S/P hernia repair  Abdominal and inguinal around 2005    ALLERGIES:  naproxen (Hives)  Bairon&#x27;s Witness - No blood products (Unknown)    MEDICATIONS:  STANDING MEDICATIONS  acetylcysteine 10%  Inhalation 4 milliLiter(s) Inhalation every 6 hours  atorvastatin 20 milliGRAM(s) Oral at bedtime  buMETAnide Injectable 2 milliGRAM(s) IV Push daily  ceFAZolin   IVPB 500 milliGRAM(s) IV Intermittent every 12 hours  ceFAZolin   IVPB      chlorhexidine 2% Cloths 1 Application(s) Topical <User Schedule>  cyanocobalamin 1000 MICROGram(s) Oral daily  dextrose 5%. 1000 milliLiter(s) IV Continuous <Continuous>  dextrose 5%. 1000 milliLiter(s) IV Continuous <Continuous>  dextrose 50% Injectable 25 Gram(s) IV Push once  dextrose 50% Injectable 25 Gram(s) IV Push once  dextrose 50% Injectable 12.5 Gram(s) IV Push once  folic acid 1 milliGRAM(s) Oral daily  glucagon  Injectable 1 milliGRAM(s) IntraMuscular once  glucagon  Injectable 1 milliGRAM(s) IntraMuscular once  glucagon  Injectable 1 milliGRAM(s) IntraMuscular once  heparin   Injectable 5000 Unit(s) SubCutaneous every 12 hours  insulin glargine Injectable (LANTUS) 12 Unit(s) SubCutaneous at bedtime  insulin lispro (ADMELOG) corrective regimen sliding scale   SubCutaneous Before meals and at bedtime  levalbuterol Inhalation 0.63 milliGRAM(s) Inhalation every 6 hours  metoprolol tartrate 12.5 milliGRAM(s) Oral every 8 hours  midodrine 5 milliGRAM(s) Oral every 8 hours  pantoprazole    Tablet 40 milliGRAM(s) Oral before breakfast  polyethylene glycol 3350 17 Gram(s) Oral daily  predniSONE   Tablet 5 milliGRAM(s) Oral daily  senna 2 Tablet(s) Oral at bedtime  sodium chloride 0.9%. 1000 milliLiter(s) IV Continuous <Continuous>  tacrolimus 2 milliGRAM(s) Oral <User Schedule>  tacrolimus 1 milliGRAM(s) Oral <User Schedule>  testosterone patch 4 mG/24 Hr(s) 4 milliGRAM(s) Transdermal daily    PRN MEDICATIONS  dextrose Oral Gel 15 Gram(s) Oral once PRN    VITALS:   T(F): 98.2  HR: 80  BP: 192/115  RR: 18  SpO2: 100%    LABS:                        8.0    8.45  )-----------( 352      ( 10 May 2023 02:53 )             26.9     05-10    143  |  104  |  61<H>  ----------------------------<  103<H>  3.3<L>   |  29  |  1.90<H>    Ca    8.3<L>      10 May 2023 02:53  Phos  3.1     05-10  Mg     2.4     05-10    TPro  5.3<L>  /  Alb  3.5  /  TBili  1.2  /  DBili  x   /  AST  20  /  ALT  5<L>  /  AlkPhos  55  05-10    PT/INR - ( 10 May 2023 02:53 )   PT: 15.9 sec;   INR: 1.33       PTT - ( 10 May 2023 02:53 )  PTT:27.2 sec    ABG - ( 10 May 2023 02:30 )  pH, Arterial: 7.46  pH, Blood: x     /  pCO2: 38    /  pO2: 171   / HCO3: 27    / Base Excess: 3.1   /  SaO2: 99.6      RADIOLOGY:  < from: US Duplex Venous Lower Ext Complete, Bilateral (05.08.23 @ 11:38) >  IMPRESSION:  No evidence of deep venous thrombosis in either lowerextremity above the   knees.    < end of copied text >    < from: US Duplex Venous Upper Ext Ltd, Right (05.04.23 @ 20:42) >  Deep vein thrombosis in the proximal right brachial vein.  Superficial venous thrombosis in the right basilic vein.    < end of copied text >    PHYSICAL EXAM:  Constitutional: Resting comfortably in bed; NAD  Head: NC/AT  Respiratory: Decreased breath sounds bilaterally   Cardiac: +S1/S2; RRR  Gastrointestinal: soft, NT/ND  Extremities: No pitting edema  Neurologic: AAOx3  Psychiatric: affect and characteristics of appearance, verbalizations, behaviors are appropriate

## 2023-05-10 NOTE — PROGRESS NOTE ADULT - SUBJECTIVE AND OBJECTIVE BOX
CTICU  CRITICAL  CARE  attending     Hand off received 					   Pertinent clinical, laboratory, radiographic, hemodynamic, echocardiographic, respiratory data, microbiologic data and chart were reviewed and analyzed frequently throughout the course of the day and night      77 year old male with  HTN, HLD, DM, polycystic kidney disease s/p kidney transplant in 2007,, Prostate cancer s/p radical prostatectomy 2015, DVT 2006 (in setting of pelvic fracture).  He has history of atrial fibrillation and atrial flutter s/p ablation in 1/2018.  He has history of GI bleed in July 2022.  Eliquis was stopped at that time.   He is a [employer] and refuses blood transfusions.    S/P percutaneous occlusion of the left atrial appendage.  S/P PEA arrest.  S/P drainage of pericardial effusion.  S/P Evacuation of left sided hemothorax.      HEALTH ISSUES - PROBLEM Dx:  Pericardial effusion with cardiac tamponade  Acute respiratory failure with hypoxia  Atelectasis  Ground glass opacity present on imaging of lung        FAMILY HISTORY:  PAST MEDICAL & SURGICAL HISTORY:  Polycystic kidney disease  DM2 (diabetes mellitus, type 2)  HLD (hyperlipidemia)  HTN (hypertension)  Prostate cancer  Kidney transplant recipient  DVT (deep venous thrombosis)  Chronic CHF  H/O radical prostatectomy in  2010  S/P hernia repair (Abdominal and inguinal around 2005).            14 system review was unremarkable    Vital signs, hemodynamic and respiratory parameters were reviewed from the bedside nursing flow sheet.  ICU Vital Signs Last 24 Hrs  T(C): 37.1 (10 May 2023 21:17), Max: 37.1 (10 May 2023 05:01)  T(F): 98.8 (10 May 2023 21:17), Max: 98.8 (10 May 2023 21:17)  HR: 89 (10 May 2023 22:00) (79 - 115)  BP: 192/115 (10 May 2023 11:30) (192/115 - 192/115)  BP(mean): 146 (10 May 2023 11:30) (146 - 146)  ABP: 178/82 (10 May 2023 22:00) (119/56 - 215/92)  ABP(mean): 111 (10 May 2023 22:00) (75 - 133)  RR: 18 (10 May 2023 22:00) (16 - 22)  SpO2: 97% (10 May 2023 22:00) (75% - 100%)    O2 Parameters below as of 10 May 2023 23:00  Patient On (Oxygen Delivery Method): nasal cannula w/ humidification  O2 Flow (L/min): 5        Adult Advanced Hemodynamics Last 24 Hrs  CVP(mm Hg): --  CVP(cm H2O): --  CO: --  CI: --  PA: --  PA(mean): --  PCWP: --  SVR: --  SVRI: --  PVR: --  PVRI: --, ABG - ( 10 May 2023 02:30 )  pH, Arterial: 7.46  pH, Blood: x     /  pCO2: 38    /  pO2: 171   / HCO3: 27    / Base Excess: 3.1   /  SaO2: 99.6                Intake and output was reviewed and the fluid balance was calculated  Daily     Daily   I&O's Summary    09 May 2023 07:01  -  10 May 2023 07:00  --------------------------------------------------------  IN: 1210 mL / OUT: 3495 mL / NET: -2285 mL    10 May 2023 07:01  -  10 May 2023 22:21  --------------------------------------------------------  IN: 390 mL / OUT: 1255 mL / NET: -865 mL          Neuro: No change in the neuro status from the baseline.   Neck: No JVD.  CVS: S1, S2, No S3.  Lungs: Good air entry bilaterally.  Abd: Soft. No tenderness. + Bowel sounds.  Vascular: + DP/PT.  Extremities: No edema.  Lymphatic: Normal.  Skin: No abnormalities.      labs  CBC Full  -  ( 10 May 2023 02:53 )  WBC Count : 8.45 K/uL  RBC Count : 2.42 M/uL  Hemoglobin : 8.0 g/dL  Hematocrit : 26.9 %  Platelet Count - Automated : 352 K/uL  Mean Cell Volume : 111.2 fl  Mean Cell Hemoglobin : 33.1 pg  Mean Cell Hemoglobin Concentration : 29.7 gm/dL  Auto Neutrophil # : 7.25 K/uL  Auto Lymphocyte # : 0.43 K/uL  Auto Monocyte # : 0.50 K/uL  Auto Eosinophil # : 0.20 K/uL  Auto Basophil # : 0.02 K/uL  Auto Neutrophil % : 85.8 %  Auto Lymphocyte % : 5.1 %  Auto Monocyte % : 5.9 %  Auto Eosinophil % : 2.4 %  Auto Basophil % : 0.2 %    05-10    143  |  104  |  61<H>  ----------------------------<  103<H>  3.3<L>   |  29  |  1.90<H>    Ca    8.3<L>      10 May 2023 02:53  Phos  3.1     05-10  Mg     2.4     05-10    TPro  5.3<L>  /  Alb  3.5  /  TBili  1.2  /  DBili  x   /  AST  20  /  ALT  5<L>  /  AlkPhos  55  05-10    PT/INR - ( 10 May 2023 02:53 )   PT: 15.9 sec;   INR: 1.33          PTT - ( 10 May 2023 02:53 )  PTT:27.2 sec  The current medications were reviewed   MEDICATIONS  (STANDING):  acetylcysteine 10%  Inhalation 4 milliLiter(s) Inhalation every 6 hours  atorvastatin 20 milliGRAM(s) Oral at bedtime  buMETAnide Injectable 2 milliGRAM(s) IV Push daily  ceFAZolin   IVPB      ceFAZolin   IVPB 500 milliGRAM(s) IV Intermittent every 12 hours  chlorhexidine 2% Cloths 1 Application(s) Topical <User Schedule>  cyanocobalamin 1000 MICROGram(s) Oral daily  dextrose 5%. 1000 milliLiter(s) (100 mL/Hr) IV Continuous <Continuous>  dextrose 5%. 1000 milliLiter(s) (50 mL/Hr) IV Continuous <Continuous>  dextrose 50% Injectable 25 Gram(s) IV Push once  dextrose 50% Injectable 25 Gram(s) IV Push once  dextrose 50% Injectable 12.5 Gram(s) IV Push once  folic acid 1 milliGRAM(s) Oral daily  glucagon  Injectable 1 milliGRAM(s) IntraMuscular once  glucagon  Injectable 1 milliGRAM(s) IntraMuscular once  glucagon  Injectable 1 milliGRAM(s) IntraMuscular once  heparin   Injectable 5000 Unit(s) SubCutaneous every 12 hours  insulin glargine Injectable (LANTUS) 12 Unit(s) SubCutaneous at bedtime  insulin lispro (ADMELOG) corrective regimen sliding scale   SubCutaneous Before meals and at bedtime  levalbuterol Inhalation 0.63 milliGRAM(s) Inhalation every 6 hours  metoprolol tartrate 12.5 milliGRAM(s) Oral every 8 hours  midodrine 5 milliGRAM(s) Oral every 8 hours  pantoprazole    Tablet 40 milliGRAM(s) Oral before breakfast  polyethylene glycol 3350 17 Gram(s) Oral daily  predniSONE   Tablet 5 milliGRAM(s) Oral daily  senna 2 Tablet(s) Oral at bedtime  sodium chloride 0.9%. 1000 milliLiter(s) (10 mL/Hr) IV Continuous <Continuous>  tacrolimus 2 milliGRAM(s) Oral <User Schedule>  tacrolimus 1 milliGRAM(s) Oral <User Schedule>  testosterone patch 4 mG/24 Hr(s) 4 milliGRAM(s) Transdermal daily    MEDICATIONS  (PRN):  acetaminophen     Tablet .. 650 milliGRAM(s) Oral every 6 hours PRN Mild Pain (1 - 3)  dextrose Oral Gel 15 Gram(s) Oral once PRN Blood Glucose LESS THAN 70 milliGRAM(s)/deciliter  oxyCODONE    IR 5 milliGRAM(s) Oral every 6 hours PRN Moderate Pain (4 - 6)          77 years old male with history of atrial fibrillation and contraindications to anticoagulation.   S/P Occlusion of LA appendage.  Postoperative course complicated by PEA arrest, pericardial tamponade cardiogenic shock, respiratory failure, DAYO, Transaminitis, metabolic acidosis, DVT.  S/P Drainage of pericardial effusion.   S/P Evacuation of left hemothorax.  Labile blood sugar control.  Intermitted hypokalemia.  Hemodynamically stable.  Good oxygenation.  Fair urine out put.        My plan includes :  Correct hypokalemia.  Bronchodilator Rx.   Gentle Diuresis.  Statin and Betablocker.  Close hemodynamic monitoring   Monitor for arrhythmias and monitor parameters for organ perfusion  Monitor neurologic status  Monitor renal function.  Immunosuppression as per nephrology (Tacrolimus 2mg in AM and 1 mg in the afternoon).  Head of the bed should remain elevated to 45 deg .   Chest PT and IS will be encouraged  Monitor adequacy of oxygenation   Nutritional goals will be met using po eventually , ensure adequate caloric intake and monitor the same  Stress ulcer and VTE prophylaxis will be achieved    Glycemic control is satisfactory  Electrolytes have been repleted as necessary and wound care has been carried out. Pain control has been achieved.   Aggressive physical therapy and early mobility and ambulation goals will be met   The family was updated about the course and plan  CRITICAL CARE TIME SPENT in evaluation and management, review and interpretation of labs and x-ray,  hemodynamic management, formulating a plan and coordinating care: ___30____ MIN.  Time does not include procedural time.  CTICU ATTENDING     					    Antwan Colon MD

## 2023-05-10 NOTE — PROGRESS NOTE ADULT - ASSESSMENT
77Y M w/ CKD s/p renal transplant 2007 h/o PKD s/p DENNY occlusion repair complicated by pericardial effusion s/p chest washout, course complicated by cardiogenic shock with iATN now improving with sCr at 1.9 today       #Non-Oliguric iATN on underlying CKD 3 (baseline sCr at 1.2-1.4)   Latoya pre-renal initially but given prolonged hypoperfusion developed into iATN   sCr now at 1.9 today range   - Continue diuresis and hemodynamic augmentation. Maintain mild net neg fluid balance   - Strict I/Os  -Discontinue Midodrine as BP now at goal range     Transplant - 2007, initial failure 2/2 PCKD  - Continue with  tacro to 2mg AM, and 1mg PM today  tacro level was below goal of 4-5  - continue to draw trough 30 minutes before AM dose of tacro   - Continue home prednisone 5mg PO QD

## 2023-05-10 NOTE — PROGRESS NOTE ADULT - SUBJECTIVE AND OBJECTIVE BOX
CTICU  CRITICAL  CARE  attending     Hand off received 					   Pertinent clinical, laboratory, radiographic, hemodynamic, echocardiographic, respiratory data, microbiologic data and chart were reviewed and analyzed frequently throughout the course of the day and night  Patient seen and examined with CTS/ SH attending at bedside    Pt is a 77y , Male, post op day # 15 s/p percutaneous DENNY closure with Watchman device;     post procedure c/b    Pericardial effusion/tamponade  PEA arrest; s/p CPR  s/p emergent bedside pericardiocentesis    POD # 14 s/p sternotomy; exploration for bleeding  evac of L Hemothorax    s/p R Pigtail thoracentesis for pleural effusion  re-intubated for increasing WOB/Critical organ support ( worsening renal fn)       currently:    extubated x >48 hrs  minimal O2 support  no drips  auto diuresing  Cr down to 1.9          78 y/o male with polycystic kidney disease s/p kidney transplant in 2007, HTN, HLD, DM2, Prostate cancer s/p radical prostatectomy 2015, DVT 2006 (in setting of pelvic fracture), and afib/atrial flutter s/p ablation in 1/2018, who is now off oral anticoagulation secondary to GI bleed and presents for DENNY occlusoin. Pt as no symptomsHe was [facility] with [facility]. He is a [employer] and refuses blood transfusions. Eliquis was stopped at that time  Pericardial effusion with cardiac tamponade    Pericardial effusion with cardiac tamponade    Acute respiratory failure with hypoxia    Atelectasis    Ground glass opacity present on imaging of lung        , FAMILY HISTORY:  PAST MEDICAL & SURGICAL HISTORY:  Polycystic kidney disease      DM2 (diabetes mellitus, type 2)      HLD (hyperlipidemia)      HTN (hypertension)      Prostate cancer      Kidney transplant recipient      DVT (deep venous thrombosis)      Chronic CHF      H/O radical prostatectomy  2010      S/P hernia repair  Abdominal and inguinal around 2005        Patient is a 77y old  Male who presents for  percutaneous DENNY closure (10 May 2023 15:56)      14 system review limited 2/2 post op morbidity  acute changes include acute respiratory failure  Vital signs, hemodynamic and respiratory parameters were reviewed from the bedside nursing flowsheet.  ICU Vital Signs Last 24 Hrs  T(C): 36.8 (10 May 2023 13:59), Max: 37.2 (09 May 2023 17:21)  T(F): 98.2 (10 May 2023 13:59), Max: 98.9 (09 May 2023 17:21)  HR: 80 (10 May 2023 15:00) (80 - 115)  BP: 192/115 (10 May 2023 11:30) (192/115 - 192/115)  BP(mean): 146 (10 May 2023 11:30) (146 - 146)  ABP: 151/70 (10 May 2023 15:00) (119/56 - 215/92)  ABP(mean): 96 (10 May 2023 15:00) (75 - 133)  RR: 18 (10 May 2023 15:00) (16 - 22)  SpO2: 100% (10 May 2023 15:00) (75% - 100%)    O2 Parameters below as of 10 May 2023 15:00  Patient On (Oxygen Delivery Method): nasal cannula w/ humidification  O2 Flow (L/min): 6        Adult Advanced Hemodynamics Last 24 Hrs  CVP(mm Hg): --  CVP(cm H2O): --  CO: --  CI: --  PA: --  PA(mean): --  PCWP: --  SVR: --  SVRI: --  PVR: --  PVRI: --, ABG - ( 10 May 2023 02:30 )  pH, Arterial: 7.46  pH, Blood: x     /  pCO2: 38    /  pO2: 171   / HCO3: 27    / Base Excess: 3.1   /  SaO2: 99.6                Intake and output was reviewed and the fluid balance was calculated  Daily     Daily   I&O's Summary    09 May 2023 07:01  -  10 May 2023 07:00  --------------------------------------------------------  IN: 1210 mL / OUT: 3495 mL / NET: -2285 mL    10 May 2023 07:01  -  10 May 2023 16:33  --------------------------------------------------------  IN: 290 mL / OUT: 930 mL / NET: -640 mL        All lines and drain sites were assessed  [facility]      POCT Blood Glucose.: 195 mg/dL (10 May 2023 11:31)    No acute change in mental status  Auscultation of the chest reveals equal bs  Abdomen is soft  Extremities are warm and well perfused  Wounds appear clean and unremarkable  Antibiotics are periop    labs  CBC Full  -  ( 10 May 2023 02:53 )  WBC Count : 8.45 K/uL  RBC Count : 2.42 M/uL  Hemoglobin : 8.0 g/dL  Hematocrit : 26.9 %  Platelet Count - Automated : 352 K/uL  Mean Cell Volume : 111.2 fl  Mean Cell Hemoglobin : 33.1 pg  Mean Cell Hemoglobin Concentration : 29.7 gm/dL  Auto Neutrophil # : 7.25 K/uL  Auto Lymphocyte # : 0.43 K/uL  Auto Monocyte # : 0.50 K/uL  Auto Eosinophil # : 0.20 K/uL  Auto Basophil # : 0.02 K/uL  Auto Neutrophil % : 85.8 %  Auto Lymphocyte % : 5.1 %  Auto Monocyte % : 5.9 %  Auto Eosinophil % : 2.4 %  Auto Basophil % : 0.2 %    05-10    143  |  104  |  61<H>  ----------------------------<  103<H>  3.3<L>   |  29  |  1.90<H>    Ca    8.3<L>      10 May 2023 02:53  Phos  3.1     05-10  Mg     2.4     05-10    TPro  5.3<L>  /  Alb  3.5  /  TBili  1.2  /  DBili  x   /  AST  20  /  ALT  5<L>  /  AlkPhos  55  05-10    PT/INR - ( 10 May 2023 02:53 )   PT: 15.9 sec;   INR: 1.33          PTT - ( 10 May 2023 02:53 )  PTT:27.2 sec  The current medications were reviewed   MEDICATIONS  (STANDING):  acetylcysteine 10%  Inhalation 4 milliLiter(s) Inhalation every 6 hours  atorvastatin 20 milliGRAM(s) Oral at bedtime  buMETAnide Injectable 2 milliGRAM(s) IV Push daily  ceFAZolin   IVPB      ceFAZolin   IVPB 500 milliGRAM(s) IV Intermittent every 12 hours  chlorhexidine 2% Cloths 1 Application(s) Topical <User Schedule>  cyanocobalamin 1000 MICROGram(s) Oral daily  dextrose 5%. 1000 milliLiter(s) (50 mL/Hr) IV Continuous <Continuous>  dextrose 5%. 1000 milliLiter(s) (100 mL/Hr) IV Continuous <Continuous>  dextrose 50% Injectable 25 Gram(s) IV Push once  dextrose 50% Injectable 12.5 Gram(s) IV Push once  dextrose 50% Injectable 25 Gram(s) IV Push once  folic acid 1 milliGRAM(s) Oral daily  glucagon  Injectable 1 milliGRAM(s) IntraMuscular once  glucagon  Injectable 1 milliGRAM(s) IntraMuscular once  glucagon  Injectable 1 milliGRAM(s) IntraMuscular once  heparin   Injectable 5000 Unit(s) SubCutaneous every 12 hours  insulin glargine Injectable (LANTUS) 12 Unit(s) SubCutaneous at bedtime  insulin lispro (ADMELOG) corrective regimen sliding scale   SubCutaneous Before meals and at bedtime  levalbuterol Inhalation 0.63 milliGRAM(s) Inhalation every 6 hours  metoprolol tartrate 12.5 milliGRAM(s) Oral every 8 hours  midodrine 5 milliGRAM(s) Oral every 8 hours  pantoprazole    Tablet 40 milliGRAM(s) Oral before breakfast  polyethylene glycol 3350 17 Gram(s) Oral daily  predniSONE   Tablet 5 milliGRAM(s) Oral daily  senna 2 Tablet(s) Oral at bedtime  sodium chloride 0.9%. 1000 milliLiter(s) (10 mL/Hr) IV Continuous <Continuous>  tacrolimus 2 milliGRAM(s) Oral <User Schedule>  tacrolimus 1 milliGRAM(s) Oral <User Schedule>  testosterone patch 4 mG/24 Hr(s) 4 milliGRAM(s) Transdermal daily    MEDICATIONS  (PRN):  acetaminophen     Tablet .. 650 milliGRAM(s) Oral every 6 hours PRN Mild Pain (1 - 3)  dextrose Oral Gel 15 Gram(s) Oral once PRN Blood Glucose LESS THAN 70 milliGRAM(s)/deciliter  oxyCODONE    IR 5 milliGRAM(s) Oral every 6 hours PRN Moderate Pain (4 - 6)       PROBLEM LIST/ ASSESSMENT:  HEALTH ISSUES - PROBLEM Dx:  Pericardial effusion with cardiac tamponade    Acute respiratory failure with hypoxia    Atelectasis    Ground glass opacity present on imaging of lung        ,   Patient is a 77y old  Male who presents for percutaneous DENNY closure (10 May 2023 15:56)     s/p percutaneous DENNY closure with Watchman device;   acute changes include acute respiratory failure    My plan includes :    Maintain MAP >80  Supplemental O2 as needed  Avoid nephrotoxic agents  monitor prograf level.    close hemodynamic, ventilatory and drain monitoring and management per post op routine    Monitor for arrhythmias and monitor parameters for organ perfusion  monitor neurologic status  Head of the bed should remain elevated to 45 deg .   chest PT and IS will be encouraged  monitor adequacy of oxygenation and ventilation and attempt to wean oxygen  Nutritional goals will be met using po eventually , ensure adequate caloric intake and montior the same  Stress ulcer and VTE prophylaxis will be achieved    Glycemic control is satisfactory  Electrolytes have been repleted as necessary and wound care has been carried out. Pain control has been achieved.   agressive physical therapy and early mobility and ambulation goals will be met   The family was updated about the course and plan  CRITICAL CARE TIME SPENT in evaluation and management, reassessments, review and interpretation of labs and x-rays, ventilator and hemodynamic management, formulating a plan and coordinating care: __30___ MIN.  Time does not include procedural time.  CTICU ATTENDING     					    Modesto Ny MD

## 2023-05-11 ENCOUNTER — APPOINTMENT (OUTPATIENT)
Dept: ENDOCRINOLOGY | Facility: CLINIC | Age: 78
End: 2023-05-11

## 2023-05-11 LAB
ACANTHOCYTES BLD QL SMEAR: SLIGHT — SIGNIFICANT CHANGE UP
ALBUMIN SERPL ELPH-MCNC: 3.5 G/DL — SIGNIFICANT CHANGE UP (ref 3.3–5)
ALP SERPL-CCNC: 57 U/L — SIGNIFICANT CHANGE UP (ref 40–120)
ALT FLD-CCNC: SIGNIFICANT CHANGE UP (ref 10–45)
ANION GAP SERPL CALC-SCNC: 10 MMOL/L — SIGNIFICANT CHANGE UP (ref 5–17)
ANISOCYTOSIS BLD QL: SIGNIFICANT CHANGE UP
AST SERPL-CCNC: SIGNIFICANT CHANGE UP (ref 10–40)
BASOPHILS # BLD AUTO: 0.12 K/UL — SIGNIFICANT CHANGE UP (ref 0–0.2)
BASOPHILS NFR BLD AUTO: 1.6 % — SIGNIFICANT CHANGE UP (ref 0–2)
BILIRUB SERPL-MCNC: 1 MG/DL — SIGNIFICANT CHANGE UP (ref 0.2–1.2)
BUN SERPL-MCNC: 59 MG/DL — HIGH (ref 7–23)
CALCIUM SERPL-MCNC: 8.7 MG/DL — SIGNIFICANT CHANGE UP (ref 8.4–10.5)
CHLORIDE SERPL-SCNC: 106 MMOL/L — SIGNIFICANT CHANGE UP (ref 96–108)
CO2 SERPL-SCNC: 27 MMOL/L — SIGNIFICANT CHANGE UP (ref 22–31)
CREAT SERPL-MCNC: 1.89 MG/DL — HIGH (ref 0.5–1.3)
DACRYOCYTES BLD QL SMEAR: SLIGHT — SIGNIFICANT CHANGE UP
EGFR: 37 ML/MIN/1.73M2 — LOW
ELLIPTOCYTES BLD QL SMEAR: SLIGHT — SIGNIFICANT CHANGE UP
EOSINOPHIL # BLD AUTO: 0.42 K/UL — SIGNIFICANT CHANGE UP (ref 0–0.5)
EOSINOPHIL NFR BLD AUTO: 5.8 % — SIGNIFICANT CHANGE UP (ref 0–6)
GAS PNL BLDA: SIGNIFICANT CHANGE UP
GLUCOSE BLDC GLUCOMTR-MCNC: 109 MG/DL — HIGH (ref 70–99)
GLUCOSE BLDC GLUCOMTR-MCNC: 163 MG/DL — HIGH (ref 70–99)
GLUCOSE BLDC GLUCOMTR-MCNC: 168 MG/DL — HIGH (ref 70–99)
GLUCOSE BLDC GLUCOMTR-MCNC: 182 MG/DL — HIGH (ref 70–99)
GLUCOSE SERPL-MCNC: 139 MG/DL — HIGH (ref 70–99)
HCT VFR BLD CALC: 26.8 % — LOW (ref 39–50)
HGB BLD-MCNC: 8.2 G/DL — LOW (ref 13–17)
HYPOCHROMIA BLD QL: SIGNIFICANT CHANGE UP
LYMPHOCYTES # BLD AUTO: 0.37 K/UL — LOW (ref 1–3.3)
LYMPHOCYTES # BLD AUTO: 5 % — LOW (ref 13–44)
MACROCYTES BLD QL: SIGNIFICANT CHANGE UP
MAGNESIUM SERPL-MCNC: 2.4 MG/DL — SIGNIFICANT CHANGE UP (ref 1.6–2.6)
MANUAL SMEAR VERIFICATION: SIGNIFICANT CHANGE UP
MCHC RBC-ENTMCNC: 30.6 GM/DL — LOW (ref 32–36)
MCHC RBC-ENTMCNC: 33.7 PG — SIGNIFICANT CHANGE UP (ref 27–34)
MCV RBC AUTO: 110.3 FL — HIGH (ref 80–100)
MICROCYTES BLD QL: SLIGHT — SIGNIFICANT CHANGE UP
MONOCYTES # BLD AUTO: 0.18 K/UL — SIGNIFICANT CHANGE UP (ref 0–0.9)
MONOCYTES NFR BLD AUTO: 2.5 % — SIGNIFICANT CHANGE UP (ref 2–14)
NEUTROPHILS # BLD AUTO: 6.21 K/UL — SIGNIFICANT CHANGE UP (ref 1.8–7.4)
NEUTROPHILS NFR BLD AUTO: 84.3 % — HIGH (ref 43–77)
NEUTS BAND # BLD: 0.8 % — SIGNIFICANT CHANGE UP (ref 0–8)
OVALOCYTES BLD QL SMEAR: SLIGHT — SIGNIFICANT CHANGE UP
PHOSPHATE SERPL-MCNC: 3 MG/DL — SIGNIFICANT CHANGE UP (ref 2.5–4.5)
PLAT MORPH BLD: NORMAL — SIGNIFICANT CHANGE UP
PLATELET # BLD AUTO: 398 K/UL — SIGNIFICANT CHANGE UP (ref 150–400)
POIKILOCYTOSIS BLD QL AUTO: SIGNIFICANT CHANGE UP
POLYCHROMASIA BLD QL SMEAR: SIGNIFICANT CHANGE UP
POTASSIUM SERPL-MCNC: SIGNIFICANT CHANGE UP (ref 3.5–5.3)
POTASSIUM SERPL-SCNC: SIGNIFICANT CHANGE UP (ref 3.5–5.3)
PROT SERPL-MCNC: 5.6 G/DL — LOW (ref 6–8.3)
RBC # BLD: 2.43 M/UL — LOW (ref 4.2–5.8)
RBC # FLD: 25.1 % — HIGH (ref 10.3–14.5)
RBC BLD AUTO: ABNORMAL
SCHISTOCYTES BLD QL AUTO: SLIGHT — SIGNIFICANT CHANGE UP
SODIUM SERPL-SCNC: 143 MMOL/L — SIGNIFICANT CHANGE UP (ref 135–145)
SPHEROCYTES BLD QL SMEAR: SLIGHT — SIGNIFICANT CHANGE UP
STOMATOCYTES BLD QL SMEAR: SLIGHT — SIGNIFICANT CHANGE UP
TACROLIMUS SERPL-MCNC: 5.6 NG/ML — SIGNIFICANT CHANGE UP
WBC # BLD: 7.3 K/UL — SIGNIFICANT CHANGE UP (ref 3.8–10.5)
WBC # FLD AUTO: 7.3 K/UL — SIGNIFICANT CHANGE UP (ref 3.8–10.5)

## 2023-05-11 PROCEDURE — 71045 X-RAY EXAM CHEST 1 VIEW: CPT | Mod: 26,77

## 2023-05-11 PROCEDURE — 71045 X-RAY EXAM CHEST 1 VIEW: CPT | Mod: 26

## 2023-05-11 PROCEDURE — 99292 CRITICAL CARE ADDL 30 MIN: CPT

## 2023-05-11 PROCEDURE — 99291 CRITICAL CARE FIRST HOUR: CPT

## 2023-05-11 RX ORDER — MIDODRINE HYDROCHLORIDE 2.5 MG/1
5 TABLET ORAL EVERY 8 HOURS
Refills: 0 | Status: DISCONTINUED | OUTPATIENT
Start: 2023-05-11 | End: 2023-05-11

## 2023-05-11 RX ADMIN — LEVALBUTEROL 0.63 MILLIGRAM(S): 1.25 SOLUTION, CONCENTRATE RESPIRATORY (INHALATION) at 23:24

## 2023-05-11 RX ADMIN — OXYCODONE HYDROCHLORIDE 5 MILLIGRAM(S): 5 TABLET ORAL at 18:38

## 2023-05-11 RX ADMIN — LEVALBUTEROL 0.63 MILLIGRAM(S): 1.25 SOLUTION, CONCENTRATE RESPIRATORY (INHALATION) at 18:38

## 2023-05-11 RX ADMIN — Medication 2: at 13:02

## 2023-05-11 RX ADMIN — CHLORHEXIDINE GLUCONATE 1 APPLICATION(S): 213 SOLUTION TOPICAL at 07:20

## 2023-05-11 RX ADMIN — HEPARIN SODIUM 5000 UNIT(S): 5000 INJECTION INTRAVENOUS; SUBCUTANEOUS at 17:32

## 2023-05-11 RX ADMIN — Medication 2: at 23:28

## 2023-05-11 RX ADMIN — Medication 4 MILLILITER(S): at 06:34

## 2023-05-11 RX ADMIN — Medication 4 MILLIGRAM(S): at 12:00

## 2023-05-11 RX ADMIN — INSULIN GLARGINE 12 UNIT(S): 100 INJECTION, SOLUTION SUBCUTANEOUS at 23:28

## 2023-05-11 RX ADMIN — TACROLIMUS 2 MILLIGRAM(S): 5 CAPSULE ORAL at 07:32

## 2023-05-11 RX ADMIN — LEVALBUTEROL 0.63 MILLIGRAM(S): 1.25 SOLUTION, CONCENTRATE RESPIRATORY (INHALATION) at 12:14

## 2023-05-11 RX ADMIN — MIDODRINE HYDROCHLORIDE 5 MILLIGRAM(S): 2.5 TABLET ORAL at 12:57

## 2023-05-11 RX ADMIN — Medication 1 MILLIGRAM(S): at 13:03

## 2023-05-11 RX ADMIN — OXYCODONE HYDROCHLORIDE 5 MILLIGRAM(S): 5 TABLET ORAL at 06:16

## 2023-05-11 RX ADMIN — Medication 5 MILLIGRAM(S): at 07:15

## 2023-05-11 RX ADMIN — OXYCODONE HYDROCHLORIDE 5 MILLIGRAM(S): 5 TABLET ORAL at 07:21

## 2023-05-11 RX ADMIN — Medication 4 MILLIGRAM(S): at 13:04

## 2023-05-11 RX ADMIN — LEVALBUTEROL 0.63 MILLIGRAM(S): 1.25 SOLUTION, CONCENTRATE RESPIRATORY (INHALATION) at 06:33

## 2023-05-11 RX ADMIN — Medication 12.5 MILLIGRAM(S): at 05:13

## 2023-05-11 RX ADMIN — PANTOPRAZOLE SODIUM 40 MILLIGRAM(S): 20 TABLET, DELAYED RELEASE ORAL at 07:14

## 2023-05-11 RX ADMIN — PREGABALIN 1000 MICROGRAM(S): 225 CAPSULE ORAL at 13:04

## 2023-05-11 RX ADMIN — Medication 4 MILLIGRAM(S): at 07:21

## 2023-05-11 RX ADMIN — POLYETHYLENE GLYCOL 3350 17 GRAM(S): 17 POWDER, FOR SOLUTION ORAL at 13:03

## 2023-05-11 RX ADMIN — Medication 4 MILLILITER(S): at 23:16

## 2023-05-11 RX ADMIN — Medication 4 MILLILITER(S): at 18:38

## 2023-05-11 RX ADMIN — Medication 4 MILLIGRAM(S): at 19:28

## 2023-05-11 RX ADMIN — Medication 2: at 17:29

## 2023-05-11 RX ADMIN — OXYCODONE HYDROCHLORIDE 5 MILLIGRAM(S): 5 TABLET ORAL at 18:08

## 2023-05-11 RX ADMIN — Medication 4 MILLILITER(S): at 12:14

## 2023-05-11 RX ADMIN — SENNA PLUS 2 TABLET(S): 8.6 TABLET ORAL at 22:16

## 2023-05-11 RX ADMIN — HEPARIN SODIUM 5000 UNIT(S): 5000 INJECTION INTRAVENOUS; SUBCUTANEOUS at 06:18

## 2023-05-11 RX ADMIN — Medication 100 MILLIGRAM(S): at 13:03

## 2023-05-11 RX ADMIN — ATORVASTATIN CALCIUM 20 MILLIGRAM(S): 80 TABLET, FILM COATED ORAL at 22:16

## 2023-05-11 RX ADMIN — BUMETANIDE 2 MILLIGRAM(S): 0.25 INJECTION INTRAMUSCULAR; INTRAVENOUS at 06:17

## 2023-05-11 NOTE — PROGRESS NOTE ADULT - SUBJECTIVE AND OBJECTIVE BOX
CTICU  CRITICAL  CARE  attending     Hand off received 					   Pertinent clinical, laboratory, radiographic, hemodynamic, echocardiographic, respiratory data, microbiologic data and chart were reviewed and analyzed frequently throughout the course of the day and night  Patient seen and examined with CTS/ SH attending at bedside  Pt is a 77y , Male, HEALTH ISSUES - PROBLEM Dx:  Pericardial effusion with cardiac tamponade    Acute respiratory failure with hypoxia    Atelectasis    Ground glass opacity present on imaging of lung        , FAMILY HISTORY:  PAST MEDICAL & SURGICAL HISTORY:  Polycystic kidney disease      DM2 (diabetes mellitus, type 2)      HLD (hyperlipidemia)      HTN (hypertension)      Prostate cancer      Kidney transplant recipient      DVT (deep venous thrombosis)      Chronic CHF      H/O radical prostatectomy  2010      S/P hernia repair  Abdominal and inguinal around 2005        Patient is a 77y old  Male who presents with a chief complaint of percutaneous DENNY closure (12 May 2023 10:54)      14 system review was unremarkable    Vital signs, hemodynamic and respiratory parameters were reviewed from the bedside nursing flowsheet.  ICU Vital Signs Last 24 Hrs  T(C): 36.3 (12 May 2023 09:19), Max: 36.8 (11 May 2023 14:32)  T(F): 97.4 (12 May 2023 09:19), Max: 98.2 (11 May 2023 14:32)  HR: 96 (12 May 2023 11:00) (77 - 96)  BP: --  BP(mean): --  ABP: 166/97 (12 May 2023 11:00) (129/53 - 177/73)  ABP(mean): 106 (12 May 2023 11:00) (79 - 106)  RR: 18 (12 May 2023 11:00) (16 - 18)  SpO2: 99% (12 May 2023 11:00) (92% - 100%)    O2 Parameters below as of 12 May 2023 11:00  Patient On (Oxygen Delivery Method): room air          Adult Advanced Hemodynamics Last 24 Hrs  CVP(mm Hg): --  CVP(cm H2O): --  CO: --  CI: --  PA: --  PA(mean): --  PCWP: --  SVR: --  SVRI: --  PVR: --  PVRI: --, ABG - ( 11 May 2023 03:10 )  pH, Arterial: 7.46  pH, Blood: x     /  pCO2: 41    /  pO2: 144   / HCO3: 29    / Base Excess: 4.9   /  SaO2: 100.0               Intake and output was reviewed and the fluid balance was calculated  Daily     Daily   I&O's Summary    11 May 2023 07:01  -  12 May 2023 07:00  --------------------------------------------------------  IN: 480 mL / OUT: 1625 mL / NET: -1145 mL    12 May 2023 07:01  -  12 May 2023 11:29  --------------------------------------------------------  IN: 400 mL / OUT: 355 mL / NET: 45 mL        All lines and drain sites were assessed  [facility]      POCT Blood Glucose.: 140 mg/dL (12 May 2023 10:05)    No acute change in mental status  Auscultation of the chest reveals equal bs  Abdomen is soft  Extremities are warm and well perfused  Wounds appear clean and unremarkable  Antibiotics are periop    labs  CBC Full  -  ( 12 May 2023 03:29 )  WBC Count : 6.72 K/uL  RBC Count : 2.53 M/uL  Hemoglobin : 8.5 g/dL  Hematocrit : 27.9 %  Platelet Count - Automated : 396 K/uL  Mean Cell Volume : 110.3 fl  Mean Cell Hemoglobin : 33.6 pg  Mean Cell Hemoglobin Concentration : 30.5 gm/dL  Auto Neutrophil # : 5.22 K/uL  Auto Lymphocyte # : 0.66 K/uL  Auto Monocyte # : 0.52 K/uL  Auto Eosinophil # : 0.26 K/uL  Auto Basophil # : 0.03 K/uL  Auto Neutrophil % : 77.8 %  Auto Lymphocyte % : 9.8 %  Auto Monocyte % : 7.7 %  Auto Eosinophil % : 3.9 %  Auto Basophil % : 0.4 %    05-12    143  |  104  |  58<H>  ----------------------------<  101<H>  3.4<L>   |  28  |  2.05<H>    Ca    8.9      12 May 2023 03:29  Phos  2.6     05-12  Mg     2.2     05-12    TPro  5.6<L>  /  Alb  3.5  /  TBili  1.1  /  DBili  x   /  AST  33  /  ALT  <5<L>  /  AlkPhos  59  05-12    PT/INR - ( 12 May 2023 02:47 )   PT: 15.4 sec;   INR: 1.29          PTT - ( 12 May 2023 02:47 )  PTT:28.2 sec  The current medications were reviewed   MEDICATIONS  (STANDING):  acetylcysteine 10%  Inhalation 4 milliLiter(s) Inhalation every 6 hours  atorvastatin 20 milliGRAM(s) Oral at bedtime  buMETAnide Injectable 2 milliGRAM(s) IV Push daily  chlorhexidine 2% Cloths 1 Application(s) Topical <User Schedule>  cyanocobalamin 1000 MICROGram(s) Oral daily  dextrose 5%. 1000 milliLiter(s) (50 mL/Hr) IV Continuous <Continuous>  dextrose 5%. 1000 milliLiter(s) (100 mL/Hr) IV Continuous <Continuous>  dextrose 50% Injectable 25 Gram(s) IV Push once  dextrose 50% Injectable 12.5 Gram(s) IV Push once  dextrose 50% Injectable 25 Gram(s) IV Push once  folic acid 1 milliGRAM(s) Oral daily  glucagon  Injectable 1 milliGRAM(s) IntraMuscular once  glucagon  Injectable 1 milliGRAM(s) IntraMuscular once  glucagon  Injectable 1 milliGRAM(s) IntraMuscular once  heparin   Injectable 5000 Unit(s) SubCutaneous every 12 hours  insulin glargine Injectable (LANTUS) 12 Unit(s) SubCutaneous at bedtime  insulin lispro (ADMELOG) corrective regimen sliding scale   SubCutaneous Before meals and at bedtime  levalbuterol Inhalation 0.63 milliGRAM(s) Inhalation every 6 hours  pantoprazole    Tablet 40 milliGRAM(s) Oral before breakfast  polyethylene glycol 3350 17 Gram(s) Oral daily  predniSONE   Tablet 5 milliGRAM(s) Oral daily  senna 2 Tablet(s) Oral at bedtime  sodium chloride 0.9%. 1000 milliLiter(s) (10 mL/Hr) IV Continuous <Continuous>  testosterone patch 4 mG/24 Hr(s) 4 milliGRAM(s) Transdermal daily    MEDICATIONS  (PRN):  acetaminophen     Tablet .. 650 milliGRAM(s) Oral every 6 hours PRN Mild Pain (1 - 3)  dextrose Oral Gel 15 Gram(s) Oral once PRN Blood Glucose LESS THAN 70 milliGRAM(s)/deciliter  oxyCODONE    IR 5 milliGRAM(s) Oral every 6 hours PRN Moderate Pain (4 - 6)       PROBLEM LIST/ ASSESSMENT:  HEALTH ISSUES - PROBLEM Dx:  Pericardial effusion with cardiac tamponade    Acute respiratory failure with hypoxia    Atelectasis    Ground glass opacity present on imaging of lung        ,   Patient is a 77y old  Male who presents with a chief complaint of percutaneous DENNY closure (12 May 2023 10:54)     s/p cardiac surgery    Acute systolic and diastolic heart failure evidenced by SOB and parenchymal infiltrates; will treat with diuresis      Acute respiratory failure ruled in due to hypoxemia, O2 sats < 91% on RA treated with HFNC    Acute kidney injury - creatinine > 0.3 due to combined prerenal and intrarenal factors can presume ATN      Moderate protein calorie malutrition        My plan includes :    appreciate nephrology recs    will dc buckley d/w renal    tacro mgmt per renal    work w pt    wean hfnc   close hemodynamic, ventilatory and drain monitoring and management per post op routine    Monitor for arrhythmias and monitor parameters for organ perfusion  beta blockade not administered due to hemodynamic instability and bradycardia  monitor neurologic status  Head of the bed should remain elevated to 45 deg .   chest PT and IS will be encouraged  monitor adequacy of oxygenation and ventilation and attempt to wean oxygen  antibiotic regimen will be tailored to the clinical, laboratory and microbiologic data  Nutritional goals will be met using po eventually , ensure adequate caloric intake and montior the same  Stress ulcer and VTE prophylaxis will be achieved    Glycemic control is satisfactory  Electrolytes have been repleted as necessary and wound care has been carried out. Pain control has been achieved.   agressive physical therapy and early mobility and ambulation goals will be met   The family was updated about the course and plan  CRITICAL CARE TIME Upon my evaluation, this patient had a high probability of imminent or life-threatening deterioration due to the above problems which required my direct attention, intervention, and personal management.  I have personally provided 110 minutes of critical care time exclusive of time spent on separately billable procedures. Time included review of laboratory data, radiology results, discussion with consultants, and monitoring for potential decompensation. Interventions were performed as documented abovepersonally provided by me  in evaluation and management, reassessments, review and interpretation of labs and x-rays, ventilator and hemodynamic management, formulating a plan and coordinating care: ___110___ MIN.  Time does not include procedural time.    CTICU ATTENDING     					    Satya Lazar MD

## 2023-05-11 NOTE — PROGRESS NOTE ADULT - ASSESSMENT
77Y M w/ CKD s/p renal transplant 2007 h/o PKD s/p DENNY occlusion repair complicated by pericardial effusion s/p chest washout, course complicated by cardiogenic shock with iATN now improving with sCr at 1.9 today       #Non-Oliguric iATN on underlying CKD 3 (baseline sCr at 1.2-1.4)   Latoya pre-renal initially but given prolonged hypoperfusion developed into iATN   sCr now at 1.9 today range   - Continue diuresis and hemodynamic augmentation. Maintain mild net neg fluid balance   - Strict I/Os  -Discontinue Midodrine as BP now at goal range     Transplant - 2007, initial failure 2/2 PCKD  - Hold next tacro dose given elevated trough level, can resume,  tacro to 2mg AM, and 1mg PM once tacro level at goal of 4-5  - continue to draw trough 30 minutes before AM dose of tacro   - Continue home prednisone 5mg PO QD

## 2023-05-11 NOTE — PROGRESS NOTE ADULT - SUBJECTIVE AND OBJECTIVE BOX
Patient is a 77y Male seen and evaluated at bedside. No acute distress, does not offer any complaints, sCr stable, lytes stable.       Meds:    acetaminophen     Tablet .. 650 every 6 hours PRN  acetylcysteine 10%  Inhalation 4 every 6 hours  atorvastatin 20 at bedtime  buMETAnide Injectable 2 daily  ceFAZolin   IVPB 500 every 12 hours  ceFAZolin   IVPB    chlorhexidine 2% Cloths 1 <User Schedule>  cyanocobalamin 1000 daily  dextrose 5%. 1000 <Continuous>  dextrose 5%. 1000 <Continuous>  dextrose 50% Injectable 12.5 once  dextrose 50% Injectable 25 once  dextrose 50% Injectable 25 once  dextrose Oral Gel 15 once PRN  folic acid 1 daily  glucagon  Injectable 1 once  glucagon  Injectable 1 once  glucagon  Injectable 1 once  heparin   Injectable 5000 every 12 hours  insulin glargine Injectable (LANTUS) 12 at bedtime  insulin lispro (ADMELOG) corrective regimen sliding scale  Before meals and at bedtime  levalbuterol Inhalation 0.63 every 6 hours  midodrine 5 every 8 hours  oxyCODONE    IR 5 every 6 hours PRN  pantoprazole    Tablet 40 before breakfast  polyethylene glycol 3350 17 daily  predniSONE   Tablet 5 daily  senna 2 at bedtime  sodium chloride 0.9%. 1000 <Continuous>  tacrolimus 1 <User Schedule>  tacrolimus 2 <User Schedule>  testosterone patch 4 mG/24 Hr(s) 4 daily      T(C): , Max: 37.1 (05-10-23 @ 21:17)  T(F): , Max: 98.8 (05-10-23 @ 21:17)  HR: 85 (05-11-23 @ 12:00)  BP: --  BP(mean): --  RR: 18 (05-11-23 @ 12:00)  SpO2: 94% (05-11-23 @ 12:00)  Wt(kg): --    05-10 @ 07:01  -  05-11 @ 07:00  --------------------------------------------------------  IN: 740 mL / OUT: 2445 mL / NET: -1705 mL    05-11 @ 07:01  -  05-11 @ 12:54  --------------------------------------------------------  IN: 240 mL / OUT: 475 mL / NET: -235 mL          Review of Systems:  ROS negative except as per HPI      PHYSICAL EXAM:  GENERAL: NAD, sitting in chair on NC   NECK: supple, No JVD  CHEST/LUNG:  Decreased b/l in bases, no accessory muscle use  HEART: normal S1S2, RRR  ABDOMEN: Soft, Nontender  EXTREMITIES: 1+ pitting edema b/l LE   NEUROLOGY: AAO x3, no focal neurological deficit      LABS:                        8.2    7.30  )-----------( 398      ( 11 May 2023 03:38 )             26.8     05-11    143  |  106  |  59<H>  ----------------------------<  139<H>  See Note   |  27  |  1.89<H>    Ca    8.7      11 May 2023 03:38  Phos  3.0     05-11  Mg     2.4     05-11    TPro  5.6<L>  /  Alb  3.5  /  TBili  1.0  /  DBili  x   /  AST  See Note  /  ALT  See Note  /  AlkPhos  57  05-11      PT/INR - ( 10 May 2023 02:53 )   PT: 15.9 sec;   INR: 1.33          PTT - ( 10 May 2023 02:53 )  PTT:27.2 sec          RADIOLOGY & ADDITIONAL STUDIES:

## 2023-05-12 LAB
ALBUMIN SERPL ELPH-MCNC: 3.5 G/DL — SIGNIFICANT CHANGE UP (ref 3.3–5)
ALP SERPL-CCNC: 59 U/L — SIGNIFICANT CHANGE UP (ref 40–120)
ALT FLD-CCNC: <5 U/L — LOW (ref 10–45)
ANION GAP SERPL CALC-SCNC: 11 MMOL/L — SIGNIFICANT CHANGE UP (ref 5–17)
APTT BLD: 28.2 SEC — SIGNIFICANT CHANGE UP (ref 27.5–35.5)
AST SERPL-CCNC: 33 U/L — SIGNIFICANT CHANGE UP (ref 10–40)
BASOPHILS # BLD AUTO: 0.03 K/UL — SIGNIFICANT CHANGE UP (ref 0–0.2)
BASOPHILS NFR BLD AUTO: 0.4 % — SIGNIFICANT CHANGE UP (ref 0–2)
BILIRUB SERPL-MCNC: 1.1 MG/DL — SIGNIFICANT CHANGE UP (ref 0.2–1.2)
BUN SERPL-MCNC: 58 MG/DL — HIGH (ref 7–23)
CALCIUM SERPL-MCNC: 8.9 MG/DL — SIGNIFICANT CHANGE UP (ref 8.4–10.5)
CHLORIDE SERPL-SCNC: 104 MMOL/L — SIGNIFICANT CHANGE UP (ref 96–108)
CO2 SERPL-SCNC: 28 MMOL/L — SIGNIFICANT CHANGE UP (ref 22–31)
CREAT SERPL-MCNC: 2.05 MG/DL — HIGH (ref 0.5–1.3)
EGFR: 33 ML/MIN/1.73M2 — LOW
EOSINOPHIL # BLD AUTO: 0.26 K/UL — SIGNIFICANT CHANGE UP (ref 0–0.5)
EOSINOPHIL NFR BLD AUTO: 3.9 % — SIGNIFICANT CHANGE UP (ref 0–6)
GLUCOSE BLDC GLUCOMTR-MCNC: 130 MG/DL — HIGH (ref 70–99)
GLUCOSE BLDC GLUCOMTR-MCNC: 136 MG/DL — HIGH (ref 70–99)
GLUCOSE BLDC GLUCOMTR-MCNC: 140 MG/DL — HIGH (ref 70–99)
GLUCOSE BLDC GLUCOMTR-MCNC: 198 MG/DL — HIGH (ref 70–99)
GLUCOSE BLDC GLUCOMTR-MCNC: 271 MG/DL — HIGH (ref 70–99)
GLUCOSE BLDC GLUCOMTR-MCNC: 51 MG/DL — CRITICAL LOW (ref 70–99)
GLUCOSE BLDC GLUCOMTR-MCNC: 53 MG/DL — CRITICAL LOW (ref 70–99)
GLUCOSE BLDC GLUCOMTR-MCNC: 55 MG/DL — LOW (ref 70–99)
GLUCOSE BLDC GLUCOMTR-MCNC: 62 MG/DL — LOW (ref 70–99)
GLUCOSE BLDC GLUCOMTR-MCNC: 89 MG/DL — SIGNIFICANT CHANGE UP (ref 70–99)
GLUCOSE SERPL-MCNC: 101 MG/DL — HIGH (ref 70–99)
HCT VFR BLD CALC: 27.9 % — LOW (ref 39–50)
HGB BLD-MCNC: 8.5 G/DL — LOW (ref 13–17)
IMM GRANULOCYTES NFR BLD AUTO: 0.4 % — SIGNIFICANT CHANGE UP (ref 0–0.9)
INR BLD: 1.29 — HIGH (ref 0.88–1.16)
LYMPHOCYTES # BLD AUTO: 0.66 K/UL — LOW (ref 1–3.3)
LYMPHOCYTES # BLD AUTO: 9.8 % — LOW (ref 13–44)
MAGNESIUM SERPL-MCNC: 2.2 MG/DL — SIGNIFICANT CHANGE UP (ref 1.6–2.6)
MCHC RBC-ENTMCNC: 30.5 GM/DL — LOW (ref 32–36)
MCHC RBC-ENTMCNC: 33.6 PG — SIGNIFICANT CHANGE UP (ref 27–34)
MCV RBC AUTO: 110.3 FL — HIGH (ref 80–100)
MONOCYTES # BLD AUTO: 0.52 K/UL — SIGNIFICANT CHANGE UP (ref 0–0.9)
MONOCYTES NFR BLD AUTO: 7.7 % — SIGNIFICANT CHANGE UP (ref 2–14)
NEUTROPHILS # BLD AUTO: 5.22 K/UL — SIGNIFICANT CHANGE UP (ref 1.8–7.4)
NEUTROPHILS NFR BLD AUTO: 77.8 % — HIGH (ref 43–77)
NRBC # BLD: 0 /100 WBCS — SIGNIFICANT CHANGE UP (ref 0–0)
PHOSPHATE SERPL-MCNC: 2.6 MG/DL — SIGNIFICANT CHANGE UP (ref 2.5–4.5)
PLATELET # BLD AUTO: 396 K/UL — SIGNIFICANT CHANGE UP (ref 150–400)
POTASSIUM SERPL-MCNC: 3.4 MMOL/L — LOW (ref 3.5–5.3)
POTASSIUM SERPL-SCNC: 3.4 MMOL/L — LOW (ref 3.5–5.3)
PROT SERPL-MCNC: 5.6 G/DL — LOW (ref 6–8.3)
PROTHROM AB SERPL-ACNC: 15.4 SEC — HIGH (ref 10.5–13.4)
RBC # BLD: 2.53 M/UL — LOW (ref 4.2–5.8)
RBC # FLD: 24.6 % — HIGH (ref 10.3–14.5)
SODIUM SERPL-SCNC: 143 MMOL/L — SIGNIFICANT CHANGE UP (ref 135–145)
WBC # BLD: 6.72 K/UL — SIGNIFICANT CHANGE UP (ref 3.8–10.5)
WBC # FLD AUTO: 6.72 K/UL — SIGNIFICANT CHANGE UP (ref 3.8–10.5)

## 2023-05-12 PROCEDURE — 71045 X-RAY EXAM CHEST 1 VIEW: CPT | Mod: 26

## 2023-05-12 PROCEDURE — 99233 SBSQ HOSP IP/OBS HIGH 50: CPT

## 2023-05-12 PROCEDURE — 71045 X-RAY EXAM CHEST 1 VIEW: CPT | Mod: 26,77

## 2023-05-12 RX ORDER — POTASSIUM CHLORIDE 20 MEQ
40 PACKET (EA) ORAL ONCE
Refills: 0 | Status: COMPLETED | OUTPATIENT
Start: 2023-05-12 | End: 2023-05-12

## 2023-05-12 RX ORDER — METOPROLOL TARTRATE 50 MG
12.5 TABLET ORAL EVERY 8 HOURS
Refills: 0 | Status: DISCONTINUED | OUTPATIENT
Start: 2023-05-12 | End: 2023-05-15

## 2023-05-12 RX ORDER — DEXTROSE 10 % IN WATER 10 %
250 INTRAVENOUS SOLUTION INTRAVENOUS ONCE
Refills: 0 | Status: COMPLETED | OUTPATIENT
Start: 2023-05-12 | End: 2023-05-12

## 2023-05-12 RX ADMIN — Medication 4 MILLIGRAM(S): at 12:17

## 2023-05-12 RX ADMIN — CHLORHEXIDINE GLUCONATE 1 APPLICATION(S): 213 SOLUTION TOPICAL at 06:48

## 2023-05-12 RX ADMIN — ATORVASTATIN CALCIUM 20 MILLIGRAM(S): 80 TABLET, FILM COATED ORAL at 22:44

## 2023-05-12 RX ADMIN — Medication 4 MILLILITER(S): at 17:40

## 2023-05-12 RX ADMIN — OXYCODONE HYDROCHLORIDE 5 MILLIGRAM(S): 5 TABLET ORAL at 20:03

## 2023-05-12 RX ADMIN — POLYETHYLENE GLYCOL 3350 17 GRAM(S): 17 POWDER, FOR SOLUTION ORAL at 12:05

## 2023-05-12 RX ADMIN — Medication 1500 MILLILITER(S): at 00:50

## 2023-05-12 RX ADMIN — BUMETANIDE 2 MILLIGRAM(S): 0.25 INJECTION INTRAMUSCULAR; INTRAVENOUS at 06:47

## 2023-05-12 RX ADMIN — HEPARIN SODIUM 5000 UNIT(S): 5000 INJECTION INTRAVENOUS; SUBCUTANEOUS at 17:17

## 2023-05-12 RX ADMIN — Medication 40 MILLIEQUIVALENT(S): at 06:47

## 2023-05-12 RX ADMIN — PANTOPRAZOLE SODIUM 40 MILLIGRAM(S): 20 TABLET, DELAYED RELEASE ORAL at 06:47

## 2023-05-12 RX ADMIN — LEVALBUTEROL 0.63 MILLIGRAM(S): 1.25 SOLUTION, CONCENTRATE RESPIRATORY (INHALATION) at 11:21

## 2023-05-12 RX ADMIN — INSULIN GLARGINE 12 UNIT(S): 100 INJECTION, SOLUTION SUBCUTANEOUS at 22:44

## 2023-05-12 RX ADMIN — OXYCODONE HYDROCHLORIDE 5 MILLIGRAM(S): 5 TABLET ORAL at 12:48

## 2023-05-12 RX ADMIN — OXYCODONE HYDROCHLORIDE 5 MILLIGRAM(S): 5 TABLET ORAL at 19:33

## 2023-05-12 RX ADMIN — Medication 4 MILLIGRAM(S): at 19:30

## 2023-05-12 RX ADMIN — Medication 12.5 MILLIGRAM(S): at 23:14

## 2023-05-12 RX ADMIN — Medication 4 MILLILITER(S): at 23:54

## 2023-05-12 RX ADMIN — Medication 4 MILLIGRAM(S): at 08:38

## 2023-05-12 RX ADMIN — Medication 650 MILLIGRAM(S): at 14:24

## 2023-05-12 RX ADMIN — Medication 2: at 23:13

## 2023-05-12 RX ADMIN — Medication 650 MILLIGRAM(S): at 13:54

## 2023-05-12 RX ADMIN — LEVALBUTEROL 0.63 MILLIGRAM(S): 1.25 SOLUTION, CONCENTRATE RESPIRATORY (INHALATION) at 17:40

## 2023-05-12 RX ADMIN — Medication 1 MILLIGRAM(S): at 12:04

## 2023-05-12 RX ADMIN — Medication 4 MILLILITER(S): at 11:21

## 2023-05-12 RX ADMIN — Medication 40 MILLIEQUIVALENT(S): at 04:56

## 2023-05-12 RX ADMIN — Medication 5 MILLIGRAM(S): at 06:47

## 2023-05-12 RX ADMIN — HEPARIN SODIUM 5000 UNIT(S): 5000 INJECTION INTRAVENOUS; SUBCUTANEOUS at 06:48

## 2023-05-12 RX ADMIN — Medication 6: at 17:16

## 2023-05-12 RX ADMIN — OXYCODONE HYDROCHLORIDE 5 MILLIGRAM(S): 5 TABLET ORAL at 12:18

## 2023-05-12 RX ADMIN — LEVALBUTEROL 0.63 MILLIGRAM(S): 1.25 SOLUTION, CONCENTRATE RESPIRATORY (INHALATION) at 23:55

## 2023-05-12 RX ADMIN — Medication 12.5 MILLIGRAM(S): at 13:46

## 2023-05-12 RX ADMIN — LEVALBUTEROL 0.63 MILLIGRAM(S): 1.25 SOLUTION, CONCENTRATE RESPIRATORY (INHALATION) at 06:47

## 2023-05-12 RX ADMIN — Medication 4 MILLIGRAM(S): at 12:05

## 2023-05-12 RX ADMIN — PREGABALIN 1000 MICROGRAM(S): 225 CAPSULE ORAL at 12:04

## 2023-05-12 RX ADMIN — Medication 4 MILLILITER(S): at 06:47

## 2023-05-12 NOTE — PROGRESS NOTE ADULT - ASSESSMENT
77Y M w/ Non-Oliguric iATN on underlying CKD 3 (baseline sCr at 1.2-1.4) s/p renal transplant 2007 h/o PKD s/p DENNY occlusion repair complicated by pericardial effusion s/p chest washout, course complicated by cardiogenic shock, with improving renal function and  tacro level with in limits       #Non-Oliguric iATN on underlying CKD 3 (baseline sCr at 1.2-1.4)   Latoya pre-renal initially but given prolonged hypoperfusion developed into iATN   sCr now at 1.9 today range   - Continue diuresis and hemodynamic augmentation. Maintain mild net neg fluid balance   - Strict I/Os      Transplant - 2007, initial failure 2/2 PCKD  - Hold PM tacro  dose given  slight elevated trough level, can resume,  tacro to 2mg AM, and 1mg PM once tacro level at goal of 4-5  - continue to draw trough 30 minutes before AM dose of tacro   - Continue home prednisone 5mg PO QD

## 2023-05-12 NOTE — ADVANCED PRACTICE NURSE CONSULT - ASSESSMENT
Patient remains in ICU. Awake, alert. 1 chest tube.  Working with PT/OT to ambulate with assist and walker. 2 person assist to stand.  Oral nutrition with ensure - currently doing a calorie count. Per wife, patient eating poorly the past 2-3 days but improved today.     Sacrum, Stage 3 pressure injury: full thickness wound 90% clean pink, 10% yellow slough. Wound 2x1x0.2cm. No drainage. No signs of infection. Tender with cleansing  Patient remains in ICU. Awake, alert. 1 chest tube.  Working with PT/OT to ambulate with assist and walker. 2 person assist to stand.  Oral nutrition with ensure - currently doing a calorie count. Per wife, patient eating poorly the past 2-3 days but improved today.  Tucker out, pt continent per RN.    Sacrum, Stage 3 pressure injury: full thickness wound 90% clean pink, 10% yellow slough. Wound 2x1x0.2cm. No drainage. No signs of infection. Tender with cleansing

## 2023-05-12 NOTE — PROGRESS NOTE ADULT - SUBJECTIVE AND OBJECTIVE BOX
Patient is a 77y Male seen and evaluated at bedside. No acute distress, does not offer any complaints at this time, VSS stable off pressors, slight uptick in sCr today tacro level wnL       Meds:    acetaminophen     Tablet .. 650 every 6 hours PRN  acetylcysteine 10%  Inhalation 4 every 6 hours  atorvastatin 20 at bedtime  buMETAnide Injectable 2 daily  chlorhexidine 2% Cloths 1 <User Schedule>  cyanocobalamin 1000 daily  dextrose 5%. 1000 <Continuous>  dextrose 5%. 1000 <Continuous>  dextrose 50% Injectable 12.5 once  dextrose 50% Injectable 25 once  dextrose 50% Injectable 25 once  dextrose Oral Gel 15 once PRN  folic acid 1 daily  glucagon  Injectable 1 once  glucagon  Injectable 1 once  glucagon  Injectable 1 once  heparin   Injectable 5000 every 12 hours  insulin glargine Injectable (LANTUS) 12 at bedtime  insulin lispro (ADMELOG) corrective regimen sliding scale  Before meals and at bedtime  levalbuterol Inhalation 0.63 every 6 hours  oxyCODONE    IR 5 every 6 hours PRN  pantoprazole    Tablet 40 before breakfast  polyethylene glycol 3350 17 daily  predniSONE   Tablet 5 daily  senna 2 at bedtime  sodium chloride 0.9%. 1000 <Continuous>  testosterone patch 4 mG/24 Hr(s) 4 daily      T(C): , Max: 36.8 (05-11-23 @ 14:32)  T(F): , Max: 98.2 (05-11-23 @ 14:32)  HR: 95 (05-12-23 @ 12:00)  BP: --  BP(mean): --  RR: 16 (05-12-23 @ 12:00)  SpO2: 98% (05-12-23 @ 12:00)  Wt(kg): --    05-11 @ 07:01  -  05-12 @ 07:00  --------------------------------------------------------  IN: 480 mL / OUT: 1625 mL / NET: -1145 mL    05-12 @ 07:01  -  05-12 @ 12:38  --------------------------------------------------------  IN: 600 mL / OUT: 390 mL / NET: 210 mL          Review of Systems:  ROS negative except as per HPI      PHYSICAL EXAM:  GENERAL: NAD, sitting in chair on RA   NECK: supple, No JVD  CHEST/LUNG:  Decreased b/l in bases, no accessory muscle use  HEART: normal S1S2, RRR  ABDOMEN: Soft, Nontender  EXTREMITIES: 1+ pitting edema b/l LE   NEUROLOGY: AAO x3, no focal neurological deficit      LABS:                        8.5    6.72  )-----------( 396      ( 12 May 2023 03:29 )             27.9     05-12    143  |  104  |  58<H>  ----------------------------<  101<H>  3.4<L>   |  28  |  2.05<H>    Ca    8.9      12 May 2023 03:29  Phos  2.6     05-12  Mg     2.2     05-12    TPro  5.6<L>  /  Alb  3.5  /  TBili  1.1  /  DBili  x   /  AST  33  /  ALT  <5<L>  /  AlkPhos  59  05-12      PT/INR - ( 12 May 2023 02:47 )   PT: 15.4 sec;   INR: 1.29          PTT - ( 12 May 2023 02:47 )  PTT:28.2 sec          RADIOLOGY & ADDITIONAL STUDIES:

## 2023-05-12 NOTE — PROGRESS NOTE ADULT - SUBJECTIVE AND OBJECTIVE BOX
CTICU  CRITICAL  CARE  attending     Hand off received 					   Pertinent clinical, laboratory, radiographic, hemodynamic, echocardiographic, respiratory data, microbiologic data and chart were reviewed and analyzed frequently throughout the course of the day  Patient seen and examined with CTS/ SH attending at bedside  Pt is a 77yr old male with PMH PKD sp kidney transplant (), HTN, HLD, DM, prostate ca sp radical prostatectomy (), DVT in setting of pelvic fx (), afib/flutter sp ablation (, off AC), admitted for surgical mgmt. Patient underwent left atrial appendage occlusion percutaneously with Dr. Sidhu 23. Arrived to the stepdown extubated on no drips. Overnight had PEA arrest, requiring ACLS and intubation. Tx to ICU, bedside TTE showing pericardial effusion with tamponade. Bedside procedure with 600cc blood aspirated. Given 2 units pRBC. Taken to OR early AM for sternotomy with 1.5L L hemothorax evacuated. Arrived intubated on epi, levo, vaso, fuad. Switched to dobutamine and weaned off some pressors. Initially had lactic acidosis, now resolved. CPAP'd overnight. : extubated,  off, Bijan weaned.  swan dc, Bijan weaned.  R pigtail placed for effusion, 850cc out. Placed on BIPAP for increased wob and atelectasis.  new R subclavian central line placed. Given bumex for diuresis, buckley placed.  worsening Cr, RIJ Wirt placed,  and primacor started, bumex given, underwent awake bronch, broad spectrum abx started. Dobhoff placed for nutrition. Vasopressin added. 5/2Given albumins throughout day, low urine output, given bumex towards end of day. Abx deescalated to ceftriaxone. Tacro on hold. Fuad started to achieve elevated MAPs for renal perfusion. 5/3 intubated, Vancomycin started for MSSA in BAL.  Tacro restarted.  found to have R prox brachial DVT and SVT in basilic vein. Procrit on hold. Primacor decreased to .125.  weaned off pressors. Extubated.  at 2.5 off primacor. Post extubation awake bronch.  POCUS RUE for potential midline revealed DVT extended into axilla, no midline; PIV placed.  weaned.  this AM hypoglycemic, not improved with PO and given D10 IV    FAMILY HISTORY:  PAST MEDICAL & SURGICAL HISTORY:  Polycystic kidney disease  DM2 (diabetes mellitus, type 2)  HLD (hyperlipidemia)  HTN (hypertension)  Prostate cancer  Kidney transplant recipient  DVT (deep venous thrombosis)  Chronic CHF  H/O radical prostatectomy    S/P hernia repair  Abdominal and inguinal around         Patient is a 77y old  Male who presents with a chief complaint of percutaneous DENNY closure.      14 system review was unremarkable    Vital signs, hemodynamic and respiratory parameters were reviewed from the bedside nursing flowsheet.  ICU Vital Signs Last 24 Hrs  T(C): 36.3 (12 May 2023 09:19), Max: 36.8 (11 May 2023 14:32)  T(F): 97.4 (12 May 2023 09:19), Max: 98.2 (11 May 2023 14:32)  HR: 93 (12 May 2023 10:00) (77 - 96)  BP: --  BP(mean): --  ABP: 163/67 (12 May 2023 10:00) (129/53 - 177/73)  ABP(mean): 95 (12 May 2023 10:00) (79 - 106)  RR: 18 (12 May 2023 10:00) (16 - 18)  SpO2: 97% (12 May 2023 10:00) (92% - 100%)    O2 Parameters below as of 12 May 2023 10:00  Patient On (Oxygen Delivery Method): room air          Adult Advanced Hemodynamics Last 24 Hrs  CVP(mm Hg): --  CVP(cm H2O): --  CO: --  CI: --  PA: --  PA(mean): --  PCWP: --  SVR: --  SVRI: --  PVR: --  PVRI: --, ABG - ( 11 May 2023 03:10 )  pH, Arterial: 7.46  pH, Blood: x     /  pCO2: 41    /  pO2: 144   / HCO3: 29    / Base Excess: 4.9   /  SaO2: 100.0               Intake and output was reviewed and the fluid balance was calculated  Daily     Daily   I&O's Summary    11 May 2023 07:01  -  12 May 2023 07:00  --------------------------------------------------------  IN: 480 mL / OUT: 1625 mL / NET: -1145 mL    12 May 2023 07:01  -  12 May 2023 10:54  --------------------------------------------------------  IN: 400 mL / OUT: 140 mL / NET: 260 mL        All lines and drain sites were assessed  Glycemic trend was reviewedCAPILLARY BLOOD GLUCOSE      POCT Blood Glucose.: 140 mg/dL (12 May 2023 10:05)    Neuro: awake nad  HEENT: mmm  Heart: s1 s2  Lungs: decreased throughout  Abdomen: soft nt nd  Extremities: wwp    Lines:  R radial arterial line /  RIJ Cordis 5    Tubes:  R pigtail        labs  CBC Full  -  ( 12 May 2023 03:29 )  WBC Count : 6.72 K/uL  RBC Count : 2.53 M/uL  Hemoglobin : 8.5 g/dL  Hematocrit : 27.9 %  Platelet Count - Automated : 396 K/uL  Mean Cell Volume : 110.3 fl  Mean Cell Hemoglobin : 33.6 pg  Mean Cell Hemoglobin Concentration : 30.5 gm/dL  Auto Neutrophil # : 5.22 K/uL  Auto Lymphocyte # : 0.66 K/uL  Auto Monocyte # : 0.52 K/uL  Auto Eosinophil # : 0.26 K/uL  Auto Basophil # : 0.03 K/uL  Auto Neutrophil % : 77.8 %  Auto Lymphocyte % : 9.8 %  Auto Monocyte % : 7.7 %  Auto Eosinophil % : 3.9 %  Auto Basophil % : 0.4 %        143  |  104  |  58<H>  ----------------------------<  101<H>  3.4<L>   |  28  |  2.05<H>    Ca    8.9      12 May 2023 03:29  Phos  2.6     05-12  Mg     2.2     05-12    TPro  5.6<L>  /  Alb  3.5  /  TBili  1.1  /  DBili  x   /  AST  33  /  ALT  <5<L>  /  AlkPhos  59  05-12    PT/INR - ( 12 May 2023 02:47 )   PT: 15.4 sec;   INR: 1.29          PTT - ( 12 May 2023 02:47 )  PTT:28.2 sec  The current medications were reviewed   MEDICATIONS  (STANDING):  acetylcysteine 10%  Inhalation 4 milliLiter(s) Inhalation every 6 hours  atorvastatin 20 milliGRAM(s) Oral at bedtime  buMETAnide Injectable 2 milliGRAM(s) IV Push daily  chlorhexidine 2% Cloths 1 Application(s) Topical <User Schedule>  cyanocobalamin 1000 MICROGram(s) Oral daily  dextrose 5%. 1000 milliLiter(s) (50 mL/Hr) IV Continuous <Continuous>  dextrose 5%. 1000 milliLiter(s) (100 mL/Hr) IV Continuous <Continuous>  dextrose 50% Injectable 12.5 Gram(s) IV Push once  dextrose 50% Injectable 25 Gram(s) IV Push once  dextrose 50% Injectable 25 Gram(s) IV Push once  folic acid 1 milliGRAM(s) Oral daily  glucagon  Injectable 1 milliGRAM(s) IntraMuscular once  glucagon  Injectable 1 milliGRAM(s) IntraMuscular once  glucagon  Injectable 1 milliGRAM(s) IntraMuscular once  heparin   Injectable 5000 Unit(s) SubCutaneous every 12 hours  insulin glargine Injectable (LANTUS) 12 Unit(s) SubCutaneous at bedtime  insulin lispro (ADMELOG) corrective regimen sliding scale   SubCutaneous Before meals and at bedtime  levalbuterol Inhalation 0.63 milliGRAM(s) Inhalation every 6 hours  pantoprazole    Tablet 40 milliGRAM(s) Oral before breakfast  polyethylene glycol 3350 17 Gram(s) Oral daily  predniSONE   Tablet 5 milliGRAM(s) Oral daily  senna 2 Tablet(s) Oral at bedtime  sodium chloride 0.9%. 1000 milliLiter(s) (10 mL/Hr) IV Continuous <Continuous>  testosterone patch 4 mG/24 Hr(s) 4 milliGRAM(s) Transdermal daily    MEDICATIONS  (PRN):  acetaminophen     Tablet .. 650 milliGRAM(s) Oral every 6 hours PRN Mild Pain (1 - 3)  dextrose Oral Gel 15 Gram(s) Oral once PRN Blood Glucose LESS THAN 70 milliGRAM(s)/deciliter  oxyCODONE    IR 5 milliGRAM(s) Oral every 6 hours PRN Moderate Pain (4 - 6)      Assessment/Plan:  77yr old male with PMH PKD sp kidney transplant (), HTN, HLD, DM, prostate ca sp radical prostatectomy (), DVT in setting of pelvic fx (), afib/flutter sp ablation (2018, off AC), admitted for surgical mgmt. Patient is a Muslim.    POD17 left atrial appendage occlusion percutaneously (Nahum, 23)  POD16 RTOR for L hemothorax evacuation (23)  HFNC-wean as tolerated  DAYO-improving  Hypoglycemia-sp D10, serial FS, encourage PO  Monitor urine output  Acute post operative anemia-trend H/H  Thrombocytopenia-monitor  Continue IS for renal transplant  Appreciate heme/onc recs  Diet as tolerated  Replete lytes prn  Monitor CT output  GI/DVT PPX  Bowel Regimen  Pain control  Close hemodynamic, ventilatory and drain monitoring and management per post op routine  Monitor for arrhythmias and monitor parameters for organ perfusion  Beta blockade not administered due to hemodynamic instability and bradycardia  Monitor neurologic status  Head of the bed should remain elevated to 45 deg   Chest PT and IS will be encouraged  Monitor adequacy of oxygenation and ventilation and attempt to wean oxygen  Antibiotic regimen will be tailored to the clinical, laboratory and microbiologic data  Nutritional goals will be met using po eventually, ensure adequate caloric intake and monitor the same  Stress ulcer and VTE prophylaxis will be achieved    Glycemic control is satisfactory  Electrolytes have been repleted as necessary and wound care has been carried out   Pain control has been achieved.   Aggressive physical therapy and early mobility and ambulation goals will be met   The family was updated about the course and plan.    CRITICAL CARE TIME personally provided by me  in evaluation and management, reassessments, review and interpretation of labs and x-rays, ventilator and hemodynamic management, formulating a plan and coordinating care: ___35____ MIN.  Time does not include procedural time.    CTICU ATTENDING     					  Carmen Ortega MD

## 2023-05-12 NOTE — ADVANCED PRACTICE NURSE CONSULT - REASON FOR CONSULT
WOCN consult for assessment and recommendations for possible sacral and right buttocks pressure injury
follow up for sacral pressure injury

## 2023-05-12 NOTE — CHART NOTE - NSCHARTNOTEFT_GEN_A_CORE
Right pigtail removed.  Drainage slowed.  Occlusive dressing applied.  No air leak prior to removal.  CXR ordered

## 2023-05-12 NOTE — ADVANCED PRACTICE NURSE CONSULT - RECOMMEDATIONS
Sacrum: cleanse with normal saline, pat dry.  Apply Cavilon No Sting Barrier Film to periwound. Apply Triad to wound bed, cover with Allevyn foam. Change every other day and prn soiled.    Continue use of [facility] TAMI mattress for pressure redistribution and microclimate management  Continue frequent repositioning for offloading with air fluidized repositioner and/or pillows  Continue use of chair cushion when OOB - assist patient with weight shifts to avoid sitting on the wound.   Offload heels at all times with pillows or heel boots  Support nutrition and hydration; RD following    Discussed with primary RN and   Sacrum: cleanse with normal saline, pat dry.  Apply Cavilon No Sting Barrier Film to periwound. Apply Triad to wound bed, cover with Allevyn foam. Change every other day and prn soiled.    Continue use of [facility] TAMI mattress for pressure redistribution and microclimate management  Continue frequent repositioning for offloading with air fluidized repositioner  Continue use of chair cushion when OOB - assist patient with weight shifts to avoid sitting on the wound.   Offload heels at all times with pillows or heel boots  Support nutrition and hydration; RD following    Discussed with primary SOLOMON Lopes and   Sacrum: cleanse with normal saline, pat dry.  Apply Cavilon No Sting Barrier Film to periwound. Apply Triad to wound bed, cover with Allevyn foam. Change every other day and prn soiled.    Continue use of [facility] TAMI mattress for pressure redistribution and microclimate management  Continue frequent repositioning for offloading with air fluidized repositioner  Continue use of chair cushion when OOB - assist patient with weight shifts to avoid sitting on the wound.   Offload heels at all times with pillows or heel boots  Support nutrition and hydration; RD following    Discussed with primary RN Marianne and PA Sacrum: cleanse with normal saline, pat dry.  Apply Cavilon No Sting Barrier Film to periwound. Apply Triad to wound bed, cover with Allevyn foam. Change every other day and prn soiled.    Continue use of [facility] TAMI mattress for pressure redistribution and microclimate management  Continue frequent repositioning for offloading with air fluidized repositioner  Continue use of chair cushion when OOB - assist patient with weight shifts to decrease pressure on the wound.   Offload heels at all times with pillows or heel boots  Support nutrition and hydration; RD following    Discussed with primary RN Marianne, family member at bedside and ORLANDO Campo

## 2023-05-13 LAB
ALBUMIN SERPL ELPH-MCNC: 3.4 G/DL — SIGNIFICANT CHANGE UP (ref 3.3–5)
ALP SERPL-CCNC: 66 U/L — SIGNIFICANT CHANGE UP (ref 40–120)
ALT FLD-CCNC: 6 U/L — LOW (ref 10–45)
ANION GAP SERPL CALC-SCNC: 9 MMOL/L — SIGNIFICANT CHANGE UP (ref 5–17)
APTT BLD: 28.6 SEC — SIGNIFICANT CHANGE UP (ref 27.5–35.5)
AST SERPL-CCNC: 28 U/L — SIGNIFICANT CHANGE UP (ref 10–40)
BASOPHILS # BLD AUTO: 0.03 K/UL — SIGNIFICANT CHANGE UP (ref 0–0.2)
BASOPHILS NFR BLD AUTO: 0.4 % — SIGNIFICANT CHANGE UP (ref 0–2)
BILIRUB SERPL-MCNC: 1.3 MG/DL — HIGH (ref 0.2–1.2)
BUN SERPL-MCNC: 59 MG/DL — HIGH (ref 7–23)
CALCIUM SERPL-MCNC: 8.9 MG/DL — SIGNIFICANT CHANGE UP (ref 8.4–10.5)
CHLORIDE SERPL-SCNC: 104 MMOL/L — SIGNIFICANT CHANGE UP (ref 96–108)
CO2 SERPL-SCNC: 25 MMOL/L — SIGNIFICANT CHANGE UP (ref 22–31)
CREAT SERPL-MCNC: 2.01 MG/DL — HIGH (ref 0.5–1.3)
EGFR: 34 ML/MIN/1.73M2 — LOW
EOSINOPHIL # BLD AUTO: 0.25 K/UL — SIGNIFICANT CHANGE UP (ref 0–0.5)
EOSINOPHIL NFR BLD AUTO: 3.4 % — SIGNIFICANT CHANGE UP (ref 0–6)
GLUCOSE BLDC GLUCOMTR-MCNC: 150 MG/DL — HIGH (ref 70–99)
GLUCOSE BLDC GLUCOMTR-MCNC: 165 MG/DL — HIGH (ref 70–99)
GLUCOSE BLDC GLUCOMTR-MCNC: 229 MG/DL — HIGH (ref 70–99)
GLUCOSE BLDC GLUCOMTR-MCNC: 80 MG/DL — SIGNIFICANT CHANGE UP (ref 70–99)
GLUCOSE SERPL-MCNC: 116 MG/DL — HIGH (ref 70–99)
HCT VFR BLD CALC: 27.6 % — LOW (ref 39–50)
HGB BLD-MCNC: 8.4 G/DL — LOW (ref 13–17)
IMM GRANULOCYTES NFR BLD AUTO: 0.4 % — SIGNIFICANT CHANGE UP (ref 0–0.9)
INR BLD: 1.32 — HIGH (ref 0.88–1.16)
LYMPHOCYTES # BLD AUTO: 0.62 K/UL — LOW (ref 1–3.3)
LYMPHOCYTES # BLD AUTO: 8.5 % — LOW (ref 13–44)
MAGNESIUM SERPL-MCNC: 2.2 MG/DL — SIGNIFICANT CHANGE UP (ref 1.6–2.6)
MCHC RBC-ENTMCNC: 30.4 GM/DL — LOW (ref 32–36)
MCHC RBC-ENTMCNC: 33.3 PG — SIGNIFICANT CHANGE UP (ref 27–34)
MCV RBC AUTO: 109.5 FL — HIGH (ref 80–100)
MONOCYTES # BLD AUTO: 0.59 K/UL — SIGNIFICANT CHANGE UP (ref 0–0.9)
MONOCYTES NFR BLD AUTO: 8.1 % — SIGNIFICANT CHANGE UP (ref 2–14)
NEUTROPHILS # BLD AUTO: 5.8 K/UL — SIGNIFICANT CHANGE UP (ref 1.8–7.4)
NEUTROPHILS NFR BLD AUTO: 79.2 % — HIGH (ref 43–77)
NRBC # BLD: 0 /100 WBCS — SIGNIFICANT CHANGE UP (ref 0–0)
PHOSPHATE SERPL-MCNC: 2.3 MG/DL — LOW (ref 2.5–4.5)
PLATELET # BLD AUTO: 399 K/UL — SIGNIFICANT CHANGE UP (ref 150–400)
POTASSIUM SERPL-MCNC: 4.2 MMOL/L — SIGNIFICANT CHANGE UP (ref 3.5–5.3)
POTASSIUM SERPL-SCNC: 4.2 MMOL/L — SIGNIFICANT CHANGE UP (ref 3.5–5.3)
PROT SERPL-MCNC: 5.7 G/DL — LOW (ref 6–8.3)
PROTHROM AB SERPL-ACNC: 15.7 SEC — HIGH (ref 10.5–13.4)
RBC # BLD: 2.52 M/UL — LOW (ref 4.2–5.8)
RBC # FLD: 24.4 % — HIGH (ref 10.3–14.5)
SODIUM SERPL-SCNC: 138 MMOL/L — SIGNIFICANT CHANGE UP (ref 135–145)
TACROLIMUS SERPL-MCNC: 2 NG/ML — SIGNIFICANT CHANGE UP
WBC # BLD: 7.32 K/UL — SIGNIFICANT CHANGE UP (ref 3.8–10.5)
WBC # FLD AUTO: 7.32 K/UL — SIGNIFICANT CHANGE UP (ref 3.8–10.5)

## 2023-05-13 PROCEDURE — 99232 SBSQ HOSP IP/OBS MODERATE 35: CPT | Mod: GC

## 2023-05-13 PROCEDURE — 71045 X-RAY EXAM CHEST 1 VIEW: CPT | Mod: 26,76

## 2023-05-13 PROCEDURE — 99233 SBSQ HOSP IP/OBS HIGH 50: CPT

## 2023-05-13 PROCEDURE — 32555 ASPIRATE PLEURA W/ IMAGING: CPT

## 2023-05-13 RX ORDER — LEVALBUTEROL 1.25 MG/.5ML
0.31 SOLUTION, CONCENTRATE RESPIRATORY (INHALATION) EVERY 6 HOURS
Refills: 0 | Status: DISCONTINUED | OUTPATIENT
Start: 2023-05-13 | End: 2023-05-17

## 2023-05-13 RX ORDER — ASPIRIN/CALCIUM CARB/MAGNESIUM 324 MG
81 TABLET ORAL DAILY
Refills: 0 | Status: DISCONTINUED | OUTPATIENT
Start: 2023-05-13 | End: 2023-05-21

## 2023-05-13 RX ORDER — LIDOCAINE HCL 20 MG/ML
10 VIAL (ML) INJECTION ONCE
Refills: 0 | Status: COMPLETED | OUTPATIENT
Start: 2023-05-13 | End: 2023-05-13

## 2023-05-13 RX ORDER — LIDOCAINE HCL 20 MG/ML
3 VIAL (ML) INJECTION ONCE
Refills: 0 | Status: COMPLETED | OUTPATIENT
Start: 2023-05-13 | End: 2023-05-13

## 2023-05-13 RX ADMIN — HEPARIN SODIUM 5000 UNIT(S): 5000 INJECTION INTRAVENOUS; SUBCUTANEOUS at 17:16

## 2023-05-13 RX ADMIN — PANTOPRAZOLE SODIUM 40 MILLIGRAM(S): 20 TABLET, DELAYED RELEASE ORAL at 06:12

## 2023-05-13 RX ADMIN — Medication 12.5 MILLIGRAM(S): at 06:13

## 2023-05-13 RX ADMIN — Medication 2: at 17:15

## 2023-05-13 RX ADMIN — Medication 650 MILLIGRAM(S): at 02:25

## 2023-05-13 RX ADMIN — Medication 3 MILLILITER(S): at 16:21

## 2023-05-13 RX ADMIN — CHLORHEXIDINE GLUCONATE 1 APPLICATION(S): 213 SOLUTION TOPICAL at 06:11

## 2023-05-13 RX ADMIN — Medication 4 MILLIGRAM(S): at 06:17

## 2023-05-13 RX ADMIN — Medication 4 MILLILITER(S): at 19:03

## 2023-05-13 RX ADMIN — Medication 12.5 MILLIGRAM(S): at 21:35

## 2023-05-13 RX ADMIN — Medication 650 MILLIGRAM(S): at 22:30

## 2023-05-13 RX ADMIN — POLYETHYLENE GLYCOL 3350 17 GRAM(S): 17 POWDER, FOR SOLUTION ORAL at 12:38

## 2023-05-13 RX ADMIN — Medication 1 MILLIGRAM(S): at 12:38

## 2023-05-13 RX ADMIN — Medication 4: at 21:35

## 2023-05-13 RX ADMIN — LEVALBUTEROL 0.31 MILLIGRAM(S): 1.25 SOLUTION, CONCENTRATE RESPIRATORY (INHALATION) at 19:03

## 2023-05-13 RX ADMIN — ATORVASTATIN CALCIUM 20 MILLIGRAM(S): 80 TABLET, FILM COATED ORAL at 21:35

## 2023-05-13 RX ADMIN — Medication 81 MILLIGRAM(S): at 13:39

## 2023-05-13 RX ADMIN — HEPARIN SODIUM 5000 UNIT(S): 5000 INJECTION INTRAVENOUS; SUBCUTANEOUS at 06:12

## 2023-05-13 RX ADMIN — BUMETANIDE 2 MILLIGRAM(S): 0.25 INJECTION INTRAMUSCULAR; INTRAVENOUS at 06:12

## 2023-05-13 RX ADMIN — Medication 4 MILLIGRAM(S): at 14:42

## 2023-05-13 RX ADMIN — PREGABALIN 1000 MICROGRAM(S): 225 CAPSULE ORAL at 12:38

## 2023-05-13 RX ADMIN — Medication 12.5 MILLIGRAM(S): at 13:38

## 2023-05-13 RX ADMIN — INSULIN GLARGINE 12 UNIT(S): 100 INJECTION, SOLUTION SUBCUTANEOUS at 23:22

## 2023-05-13 RX ADMIN — Medication 4 MILLILITER(S): at 07:00

## 2023-05-13 RX ADMIN — LEVALBUTEROL 0.31 MILLIGRAM(S): 1.25 SOLUTION, CONCENTRATE RESPIRATORY (INHALATION) at 12:18

## 2023-05-13 RX ADMIN — SENNA PLUS 2 TABLET(S): 8.6 TABLET ORAL at 21:35

## 2023-05-13 RX ADMIN — Medication 650 MILLIGRAM(S): at 01:25

## 2023-05-13 RX ADMIN — Medication 4 MILLILITER(S): at 13:34

## 2023-05-13 RX ADMIN — Medication 650 MILLIGRAM(S): at 21:38

## 2023-05-13 RX ADMIN — Medication 5 MILLIGRAM(S): at 06:13

## 2023-05-13 NOTE — PROCEDURE NOTE - NSSITEPREP_SKIN_A_CORE
chlorhexidine
chlorhexidine/Adherence to aseptic technique: hand hygiene prior to donning barriers (gown, gloves), don cap and mask, sterile drape over patient
chlorhexidine

## 2023-05-13 NOTE — PROCEDURE NOTE - NSPROCDETAILS_GEN_ALL_CORE
Seldinger technique/dressing applied/secured in place/sterile dressing applied/percutaneous/thoracostomy tube placed percutaneously/ultrasound assessment of fluid (location)
location identified, draped/prepped, sterile technique used
ultrasound guidance with use of sterile gel and probe cove
guidewire recovered/lumen(s) aspirated and flushed/sterile technique, catheter placed
Seldinger technique/dressing applied/secured in place/sterile dressing applied/supine position/percutaneous/thoracostomy tube placed percutaneously/ultrasound assessment of fluid (location)
patient pre-oxygenated, tube inserted, placement confirmed
location identified, draped/prepped, sterile technique used, needle inserted/introduced/positive blood return obtained via catheter
location identified, draped/prepped, sterile technique used, needle inserted/introduced/Seldinger technique/all materials/supplies accounted for at end of procedure/site prepped/sterile

## 2023-05-13 NOTE — PROGRESS NOTE ADULT - ASSESSMENT
77Y M w/ Non-Oliguric iATN on underlying CKD 3 (baseline sCr at 1.2-1.4) s/p renal transplant 2007 h/o PKD s/p DENNY occlusion repair complicated by pericardial effusion s/p chest washout, course complicated by cardiogenic shock, with improving renal function and  tacro level mostly with in limits       #Non-Oliguric iATN on underlying CKD 3 (baseline sCr at 1.2-1.4)   Likely pre-renal initially but given prolonged hypoperfusion developed into iATN   sCr now at 2.01 today range   - Pt w/ improved PO intake. Pt appears euvolemic today. Maintain mild net neg fluid balance, c/w diuresis to achieve this. Can be transitioned to PO diuresis in the next 24-48 hours if hemodynamics remain stable  - Strict I/Os      Transplant - 2007, initial failure 2/2 PCKD  - tacro 5.6 5/11, so held PM dose yesterday. Pending Tacro level now, can resume tacro 2mg AM, and 1mg PM once tacro level at goal of 4-5  - continue to draw trough 30 minutes before AM dose of tacro   - Continue home prednisone 5mg PO QD

## 2023-05-13 NOTE — PROCEDURE NOTE - PROCEDURE DATE TIME, MLM
01-May-2023 12:20
01-May-2023
03-May-2023
26-Apr-2023 01:53
08-May-2023 11:00
13-May-2023 13:30
30-Apr-2023 17:15
29-Apr-2023 16:40

## 2023-05-13 NOTE — PROCEDURE NOTE - NSPOSTPRCRAD_GEN_A_CORE
central line located in the superior vena cava/no pneumothorax/post-procedure radiography performed
pending

## 2023-05-13 NOTE — PROGRESS NOTE ADULT - SUBJECTIVE AND OBJECTIVE BOX
CTICU  CRITICAL  CARE  attending     Hand off received 					   Pertinent clinical, laboratory, radiographic, hemodynamic, echocardiographic, respiratory data, microbiologic data and chart were reviewed and analyzed frequently throughout the course of the day  Patient seen and examined with CTS/ SH attending at bedside  Pt is a 77yr old male with PMH PKD sp kidney transplant (), HTN, HLD, DM, prostate ca sp radical prostatectomy (), DVT in setting of pelvic fx (), afib/flutter sp ablation (, off AC), admitted for surgical mgmt. Patient underwent left atrial appendage occlusion percutaneously with Dr. Sidhu 23. Arrived to the stepdown extubated on no drips. Overnight had PEA arrest, requiring ACLS and intubation. Tx to ICU, bedside TTE showing pericardial effusion with tamponade. Bedside procedure with 600cc blood aspirated. Given 2 units pRBC. Taken to OR early AM for sternotomy with 1.5L L hemothorax evacuated. Arrived intubated on epi, levo, vaso, fuad. Switched to dobutamine and weaned off some pressors. Initially had lactic acidosis, now resolved. CPAP'd overnight. : extubated,  off, Bijan weaned.  swan dc, Bijan weaned.  R pigtail placed for effusion, 850cc out. Placed on BIPAP for increased wob and atelectasis.  new R subclavian central line placed. Given bumex for diuresis, buckley placed.  worsening Cr, RIJ [place] placed,  and primacor started, bumex given, underwent awake bronch, broad spectrum abx started. Dobhoff placed for nutrition. Vasopressin added. 5/2Given albumins throughout day, low urine output, given bumex towards end of day. Abx deescalated to ceftriaxone. Tacro on hold. Fuad started to achieve elevated MAPs for renal perfusion. 5/3 intubated, Vancomycin started for MSSA in BAL.  Tacro restarted.  found to have R prox brachial DVT and SVT in basilic vein. Procrit on hold. Primacor decreased to .125.  weaned off pressors. Extubated.  at 2.5 off primacor. Post extubation awake bronch.  POCUS RUE for potential midline revealed DVT extended into axilla, no midline; PIV placed.  weaned.  this AM hypoglycemic, not improved with PO and given D10 IV. Ambulated with PT.     FAMILY HISTORY:  PAST MEDICAL & SURGICAL HISTORY:  Polycystic kidney disease  DM2 (diabetes mellitus, type 2)  HLD (hyperlipidemia)  HTN (hypertension)  Prostate cancer  Kidney transplant recipient  DVT (deep venous thrombosis)  Chronic CHF  H/O radical prostatectomy    Hernia repair  Abdominal and inguinal around         Patient is a 77y old  Male who presents with a chief complaint of percutaneous DENNY closure.      14 system review was unremarkable    Vital signs, hemodynamic and respiratory parameters were reviewed from the bedside nursing flowsheet.  ICU Vital Signs Last 24 Hrs  T(C): 37.4 (13 May 2023 09:33), Max: 37.4 (13 May 2023 09:33)  T(F): 99.4 (13 May 2023 09:33), Max: 99.4 (13 May 2023 09:33)  HR: 87 (13 May 2023 08:00) (71 - 113)  BP: --  BP(mean): --  ABP: 154/70 (13 May 2023 08:00) (124/55 - 188/90)  ABP(mean): 97 (13 May 2023 08:00) (76 - 123)  RR: 18 (13 May 2023 08:00) (14 - 20)  SpO2: 99% (13 May 2023 08:00) (93% - 100%)    O2 Parameters below as of 13 May 2023 08:00  Patient On (Oxygen Delivery Method): room air          Adult Advanced Hemodynamics Last 24 Hrs  CVP(mm Hg): --  CVP(cm H2O): --  CO: --  CI: --  PA: --  PA(mean): --  PCWP: --  SVR: --  SVRI: --  PVR: --  PVRI: --,     Intake and output was reviewed and the fluid balance was calculated  Daily     Daily   I&O's Summary    12 May 2023 07:01  -  13 May 2023 07:00  --------------------------------------------------------  IN: 970 mL / OUT: 1035 mL / NET: -65 mL        All lines and drain sites were assessed  Glycemic trend was reviewed    POCT Blood Glucose.: 80 mg/dL (13 May 2023 07:11)    Neuro: awake nad  HEENT: mmm  Heart: s1 s2  Lungs: clear bl  Abdomen: soft nt nd  Extremities: wwp    Lines:  R radial arterial line /    labs  CBC Full  -  ( 13 May 2023 02:40 )  WBC Count : 7.32 K/uL  RBC Count : 2.52 M/uL  Hemoglobin : 8.4 g/dL  Hematocrit : 27.6 %  Platelet Count - Automated : 399 K/uL  Mean Cell Volume : 109.5 fl  Mean Cell Hemoglobin : 33.3 pg  Mean Cell Hemoglobin Concentration : 30.4 gm/dL  Auto Neutrophil # : 5.80 K/uL  Auto Lymphocyte # : 0.62 K/uL  Auto Monocyte # : 0.59 K/uL  Auto Eosinophil # : 0.25 K/uL  Auto Basophil # : 0.03 K/uL  Auto Neutrophil % : 79.2 %  Auto Lymphocyte % : 8.5 %  Auto Monocyte % : 8.1 %  Auto Eosinophil % : 3.4 %  Auto Basophil % : 0.4 %        138  |  104  |  59<H>  ----------------------------<  116<H>  4.2   |  25  |  2.01<H>    Ca    8.9      13 May 2023 02:40  Phos  2.3     05-13  Mg     2.2     05-13    TPro  5.7<L>  /  Alb  3.4  /  TBili  1.3<H>  /  DBili  x   /  AST  28  /  ALT  6<L>  /  AlkPhos  66  05-13    PT/INR - ( 13 May 2023 02:40 )   PT: 15.7 sec;   INR: 1.32          PTT - ( 13 May 2023 02:40 )  PTT:28.6 sec  The current medications were reviewed   MEDICATIONS  (STANDING):  acetylcysteine 10%  Inhalation 4 milliLiter(s) Inhalation every 6 hours  aspirin  chewable 81 milliGRAM(s) Oral daily  atorvastatin 20 milliGRAM(s) Oral at bedtime  buMETAnide Injectable 2 milliGRAM(s) IV Push daily  chlorhexidine 2% Cloths 1 Application(s) Topical <User Schedule>  cyanocobalamin 1000 MICROGram(s) Oral daily  dextrose 5%. 1000 milliLiter(s) (100 mL/Hr) IV Continuous <Continuous>  dextrose 5%. 1000 milliLiter(s) (50 mL/Hr) IV Continuous <Continuous>  dextrose 50% Injectable 25 Gram(s) IV Push once  dextrose 50% Injectable 25 Gram(s) IV Push once  dextrose 50% Injectable 12.5 Gram(s) IV Push once  folic acid 1 milliGRAM(s) Oral daily  glucagon  Injectable 1 milliGRAM(s) IntraMuscular once  glucagon  Injectable 1 milliGRAM(s) IntraMuscular once  glucagon  Injectable 1 milliGRAM(s) IntraMuscular once  heparin   Injectable 5000 Unit(s) SubCutaneous every 12 hours  insulin glargine Injectable (LANTUS) 12 Unit(s) SubCutaneous at bedtime  insulin lispro (ADMELOG) corrective regimen sliding scale   SubCutaneous Before meals and at bedtime  levalbuterol Inhalation 0.31 milliGRAM(s) Inhalation every 6 hours  metoprolol tartrate 12.5 milliGRAM(s) Oral every 8 hours  pantoprazole    Tablet 40 milliGRAM(s) Oral before breakfast  polyethylene glycol 3350 17 Gram(s) Oral daily  predniSONE   Tablet 5 milliGRAM(s) Oral daily  senna 2 Tablet(s) Oral at bedtime  sodium chloride 0.9%. 1000 milliLiter(s) (10 mL/Hr) IV Continuous <Continuous>  testosterone patch 4 mG/24 Hr(s) 4 milliGRAM(s) Transdermal daily    MEDICATIONS  (PRN):  acetaminophen     Tablet .. 650 milliGRAM(s) Oral every 6 hours PRN Mild Pain (1 - 3)  dextrose Oral Gel 15 Gram(s) Oral once PRN Blood Glucose LESS THAN 70 milliGRAM(s)/deciliter  oxyCODONE    IR 5 milliGRAM(s) Oral every 6 hours PRN Moderate Pain (4 - 6)      Assessment/Plan:         s/p cardiac surgery    Acute systolic and diastolic heart failure evidenced by SOB and parenchymal infiltrates; will treat with diuresis    Cardiogenic shock on ionotropy    Vasogenic shock due to hypotension in the cticu , will keep on pressors    Hypovolemic shock - > 20% intravascular depletion will replete volume    Acute blood loss anemia with relative hypotension treated with > 1 unit PC    Acute respiratory failure ruled in due to hypoxemia, O2 sats < 91% on RA treated with HFNC    Acute respiratory failure ruled in due to hypercapnea on abg will treat with mechanical ventilation    Acute respiratory failure ruled in due to prolonged mechanical ventilation > 24 hrs on the vent due to failure to wean due to weak respiratory mechanics    Toxic metabolic encephalopathy ; sundowning due to anesthesia pain medications    Acidosis evidenced by anion gap and negative base excess    Acute kidney injury - creatinine > 0.3 due to combined prerenal and intrarenal factors can presume ATN    ESRD    Acute sissy-operative ischemic stroke    Moderate protein calorie malutrition    Diet as tolerated  Replete lytes prn  Monitor CT output  GI/DVT PPX  Bowel Regimen  Pain control  OOB with PT      Close hemodynamic, ventilatory and drain monitoring and management per post op routine  Monitor for arrhythmias and monitor parameters for organ perfusion  Beta blockade not administered due to hemodynamic instability and bradycardia  Monitor neurologic status  Head of the bed should remain elevated to 45 deg   Chest PT and IS will be encouraged  Monitor adequacy of oxygenation and ventilation and attempt to wean oxygen  Antibiotic regimen will be tailored to the clinical, laboratory and microbiologic data  Nutritional goals will be met using po eventually, ensure adequate caloric intake and monitor the same  Stress ulcer and VTE prophylaxis will be achieved    Glycemic control is satisfactory  Electrolytes have been repleted as necessary and wound care has been carried out   Pain control has been achieved.   Aggressive physical therapy and early mobility and ambulation goals will be met   The family was updated about the course and plan.    CRITICAL CARE TIME personally provided by me  in evaluation and management, reassessments, review and interpretation of labs and x-rays, ventilator and hemodynamic management, formulating a plan and coordinating care: ___105____ MIN.  Time does not include procedural time.    CTICU ATTENDING     					  Carmen Ortega MD CTICU  CRITICAL  CARE  attending     Hand off received 					   Pertinent clinical, laboratory, radiographic, hemodynamic, echocardiographic, respiratory data, microbiologic data and chart were reviewed and analyzed frequently throughout the course of the day  Patient seen and examined with CTS/ SH attending at bedside  Pt is a 77yr old male with PMH PKD sp kidney transplant (), HTN, HLD, DM, prostate ca sp radical prostatectomy (), DVT in setting of pelvic fx (), afib/flutter sp ablation (, off AC), admitted for surgical mgmt. Patient underwent left atrial appendage occlusion percutaneously with Dr. Sidhu 23. Arrived to the stepdown extubated on no drips. Overnight had PEA arrest, requiring ACLS and intubation. Tx to ICU, bedside TTE showing pericardial effusion with tamponade. Bedside procedure with 600cc blood aspirated. Given 2 units pRBC. Taken to OR early AM for sternotomy with 1.5L L hemothorax evacuated. Arrived intubated on epi, levo, vaso, fuad. Switched to dobutamine and weaned off some pressors. Initially had lactic acidosis, now resolved. CPAP'd overnight. : extubated,  off, Bijan weaned.  swan dc, Bijan weaned.  R pigtail placed for effusion, 850cc out. Placed on BIPAP for increased wob and atelectasis.  new R subclavian central line placed. Given bumex for diuresis, buckley placed.  worsening Cr, RIJ [place] placed,  and primacor started, bumex given, underwent awake bronch, broad spectrum abx started. Dobhoff placed for nutrition. Vasopressin added. 5/2Given albumins throughout day, low urine output, given bumex towards end of day. Abx deescalated to ceftriaxone. Tacro on hold. Fuad started to achieve elevated MAPs for renal perfusion. 5/3 intubated, Vancomycin started for MSSA in BAL.  Tacro restarted.  found to have R prox brachial DVT and SVT in basilic vein. Procrit on hold. Primacor decreased to .125.  weaned off pressors. Extubated.  at 2.5 off primacor. Post extubation awake bronch.  POCUS RUE for potential midline revealed DVT extended into axilla, no midline; PIV placed.  weaned.  this AM hypoglycemic, not improved with PO and given D10 IV. Ambulated with PT.     FAMILY HISTORY:  PAST MEDICAL & SURGICAL HISTORY:  Polycystic kidney disease  DM2 (diabetes mellitus, type 2)  HLD (hyperlipidemia)  HTN (hypertension)  Prostate cancer  Kidney transplant recipient  DVT (deep venous thrombosis)  Chronic CHF  H/O radical prostatectomy    Hernia repair  Abdominal and inguinal around         Patient is a 77y old  Male who presents with a chief complaint of percutaneous DENNY closure.      14 system review was unremarkable    Vital signs, hemodynamic and respiratory parameters were reviewed from the bedside nursing flowsheet.  ICU Vital Signs Last 24 Hrs  T(C): 37.4 (13 May 2023 09:33), Max: 37.4 (13 May 2023 09:33)  T(F): 99.4 (13 May 2023 09:33), Max: 99.4 (13 May 2023 09:33)  HR: 87 (13 May 2023 08:00) (71 - 113)  BP: --  BP(mean): --  ABP: 154/70 (13 May 2023 08:00) (124/55 - 188/90)  ABP(mean): 97 (13 May 2023 08:00) (76 - 123)  RR: 18 (13 May 2023 08:00) (14 - 20)  SpO2: 99% (13 May 2023 08:00) (93% - 100%)    O2 Parameters below as of 13 May 2023 08:00  Patient On (Oxygen Delivery Method): room air          Adult Advanced Hemodynamics Last 24 Hrs  CVP(mm Hg): --  CVP(cm H2O): --  CO: --  CI: --  PA: --  PA(mean): --  PCWP: --  SVR: --  SVRI: --  PVR: --  PVRI: --,     Intake and output was reviewed and the fluid balance was calculated  Daily     Daily   I&O's Summary    12 May 2023 07:01  -  13 May 2023 07:00  --------------------------------------------------------  IN: 970 mL / OUT: 1035 mL / NET: -65 mL        All lines and drain sites were assessed  Glycemic trend was reviewed    POCT Blood Glucose.: 80 mg/dL (13 May 2023 07:11)    Neuro: awake nad  HEENT: mmm  Heart: s1 s2  Lungs: clear bl  Abdomen: soft nt nd  Extremities: wwp    Lines:  R radial arterial line /    labs  CBC Full  -  ( 13 May 2023 02:40 )  WBC Count : 7.32 K/uL  RBC Count : 2.52 M/uL  Hemoglobin : 8.4 g/dL  Hematocrit : 27.6 %  Platelet Count - Automated : 399 K/uL  Mean Cell Volume : 109.5 fl  Mean Cell Hemoglobin : 33.3 pg  Mean Cell Hemoglobin Concentration : 30.4 gm/dL  Auto Neutrophil # : 5.80 K/uL  Auto Lymphocyte # : 0.62 K/uL  Auto Monocyte # : 0.59 K/uL  Auto Eosinophil # : 0.25 K/uL  Auto Basophil # : 0.03 K/uL  Auto Neutrophil % : 79.2 %  Auto Lymphocyte % : 8.5 %  Auto Monocyte % : 8.1 %  Auto Eosinophil % : 3.4 %  Auto Basophil % : 0.4 %        138  |  104  |  59<H>  ----------------------------<  116<H>  4.2   |  25  |  2.01<H>    Ca    8.9      13 May 2023 02:40  Phos  2.3     05-13  Mg     2.2     05-13    TPro  5.7<L>  /  Alb  3.4  /  TBili  1.3<H>  /  DBili  x   /  AST  28  /  ALT  6<L>  /  AlkPhos  66  05-13    PT/INR - ( 13 May 2023 02:40 )   PT: 15.7 sec;   INR: 1.32          PTT - ( 13 May 2023 02:40 )  PTT:28.6 sec  The current medications were reviewed   MEDICATIONS  (STANDING):  acetylcysteine 10%  Inhalation 4 milliLiter(s) Inhalation every 6 hours  aspirin  chewable 81 milliGRAM(s) Oral daily  atorvastatin 20 milliGRAM(s) Oral at bedtime  buMETAnide Injectable 2 milliGRAM(s) IV Push daily  chlorhexidine 2% Cloths 1 Application(s) Topical <User Schedule>  cyanocobalamin 1000 MICROGram(s) Oral daily  dextrose 5%. 1000 milliLiter(s) (100 mL/Hr) IV Continuous <Continuous>  dextrose 5%. 1000 milliLiter(s) (50 mL/Hr) IV Continuous <Continuous>  dextrose 50% Injectable 25 Gram(s) IV Push once  dextrose 50% Injectable 25 Gram(s) IV Push once  dextrose 50% Injectable 12.5 Gram(s) IV Push once  folic acid 1 milliGRAM(s) Oral daily  glucagon  Injectable 1 milliGRAM(s) IntraMuscular once  glucagon  Injectable 1 milliGRAM(s) IntraMuscular once  glucagon  Injectable 1 milliGRAM(s) IntraMuscular once  heparin   Injectable 5000 Unit(s) SubCutaneous every 12 hours  insulin glargine Injectable (LANTUS) 12 Unit(s) SubCutaneous at bedtime  insulin lispro (ADMELOG) corrective regimen sliding scale   SubCutaneous Before meals and at bedtime  levalbuterol Inhalation 0.31 milliGRAM(s) Inhalation every 6 hours  metoprolol tartrate 12.5 milliGRAM(s) Oral every 8 hours  pantoprazole    Tablet 40 milliGRAM(s) Oral before breakfast  polyethylene glycol 3350 17 Gram(s) Oral daily  predniSONE   Tablet 5 milliGRAM(s) Oral daily  senna 2 Tablet(s) Oral at bedtime  sodium chloride 0.9%. 1000 milliLiter(s) (10 mL/Hr) IV Continuous <Continuous>  testosterone patch 4 mG/24 Hr(s) 4 milliGRAM(s) Transdermal daily    MEDICATIONS  (PRN):  acetaminophen     Tablet .. 650 milliGRAM(s) Oral every 6 hours PRN Mild Pain (1 - 3)  dextrose Oral Gel 15 Gram(s) Oral once PRN Blood Glucose LESS THAN 70 milliGRAM(s)/deciliter  oxyCODONE    IR 5 milliGRAM(s) Oral every 6 hours PRN Moderate Pain (4 - 6)      Assessment/Plan:  77yr old male with PMH PKD sp kidney transplant (), HTN, HLD, DM, prostate ca sp radical prostatectomy (), DVT in setting of pelvic fx (), afib/flutter sp ablation (2018, off AC), admitted for surgical mgmt. Patient is a [employer].    POD18 left atrial appendage occlusion percutaneously (Nahum, 23)  POD17 RTOR for L hemothorax evacuation (23)  NC  DAYO-improving  Serial FS  Monitor urine output  Bblocker-uptitrate prn  Aspirin start  Acute post operative anemia-trend H/H  Continue IS for renal transplant  Appreciate heme/onc recs  Diet as tolerated  Replete lytes prn  GI/DVT PPX  Bowel Regimen  Pain control  OOB with PT  Close hemodynamic, ventilatory and drain monitoring and management per post op routine  Monitor for arrhythmias and monitor parameters for organ perfusion  Monitor neurologic status  Head of the bed should remain elevated to 45 deg   Chest PT and IS will be encouraged  Monitor adequacy of oxygenation and ventilation and attempt to wean oxygen  Antibiotic regimen will be tailored to the clinical, laboratory and microbiologic data  Nutritional goals will be met using po eventually, ensure adequate caloric intake and monitor the same  Stress ulcer and VTE prophylaxis will be achieved    Glycemic control is satisfactory  Electrolytes have been repleted as necessary and wound care has been carried out   Pain control has been achieved.   Aggressive physical therapy and early mobility and ambulation goals will be met   The family was updated about the course and plan.    CRITICAL CARE TIME personally provided by me  in evaluation and management, reassessments, review and interpretation of labs and x-rays, ventilator and hemodynamic management, formulating a plan and coordinating care: ___35____ MIN.  Time does not include procedural time.    CTICU ATTENDING     					  Carmen Ortega MD

## 2023-05-13 NOTE — PROCEDURE NOTE - NSINFORMCONSENT_GEN_A_CORE
Benefits, risks, and possible complications of procedure explained to patient/caregiver who verbalized understanding and gave written consent.
Benefits, risks, and possible complications of procedure explained to patient/caregiver who verbalized understanding and gave written consent.
This was an emergent procedure.
Benefits, risks, and possible complications of procedure explained to patient/caregiver who verbalized understanding and gave verbal consent.
Benefits, risks, and possible complications of procedure explained to patient/caregiver who verbalized understanding and gave written consent.
This was an emergent procedure.
Benefits, risks, and possible complications of procedure explained to patient/caregiver who verbalized understanding and gave written consent.
Benefits, risks, and possible complications of procedure explained to patient/caregiver who verbalized understanding and gave written consent.

## 2023-05-13 NOTE — PROCEDURE NOTE - NSINDICATIONS_GEN_A_CORE
airway protection
critical illness/hemodynamic monitoring
critical patient/monitoring purposes
venous access
pericardial tamponade
pleural effusion
critical illness/venous access
pleural effusion

## 2023-05-13 NOTE — PROCEDURE NOTE - NSPOSTCAREGUIDE_GEN_A_CORE
Verbal/written post procedure instructions were given to patient/caregiver
Care for catheter as per unit/ICU protocols
Care for catheter as per unit/ICU protocols
Verbal/written post procedure instructions were given to patient/caregiver/Care for catheter as per unit/ICU protocols

## 2023-05-13 NOTE — PROGRESS NOTE ADULT - SUBJECTIVE AND OBJECTIVE BOX
Patient is a 77y Male seen and evaluated at bedside. Sitting up in chair. Denies any SOB, CP, difficulty swallowing, urinary symptoms. Voiding without difficulty since [facility]'ed.       Meds:    acetaminophen     Tablet .. 650 every 6 hours PRN  acetylcysteine 10%  Inhalation 4 every 6 hours  atorvastatin 20 at bedtime  buMETAnide Injectable 2 daily  chlorhexidine 2% Cloths 1 <User Schedule>  cyanocobalamin 1000 daily  dextrose 5%. 1000 <Continuous>  dextrose 5%. 1000 <Continuous>  dextrose 50% Injectable 12.5 once  dextrose 50% Injectable 25 once  dextrose 50% Injectable 25 once  dextrose Oral Gel 15 once PRN  folic acid 1 daily  glucagon  Injectable 1 once  glucagon  Injectable 1 once  glucagon  Injectable 1 once  heparin   Injectable 5000 every 12 hours  insulin glargine Injectable (LANTUS) 12 at bedtime  insulin lispro (ADMELOG) corrective regimen sliding scale  Before meals and at bedtime  levalbuterol Inhalation 0.63 every 6 hours  metoprolol tartrate 12.5 every 8 hours  oxyCODONE    IR 5 every 6 hours PRN  pantoprazole    Tablet 40 before breakfast  polyethylene glycol 3350 17 daily  predniSONE   Tablet 5 daily  senna 2 at bedtime  sodium chloride 0.9%. 1000 <Continuous>  testosterone patch 4 mG/24 Hr(s) 4 daily      T(C): , Max: 36.8 (05-12-23 @ 14:52)  T(F): , Max: 98.2 (05-12-23 @ 14:52)  HR: 87 (05-13-23 @ 08:00)  BP: --  BP(mean): --  RR: 18 (05-13-23 @ 08:00)  SpO2: 99% (05-13-23 @ 08:00)  Wt(kg): --    05-12 @ 07:01  -  05-13 @ 07:00  --------------------------------------------------------  IN: 970 mL / OUT: 1035 mL / NET: -65 mL          Review of Systems:  ROS negative except as per HPI      PHYSICAL EXAM:  GENERAL: NAD, sitting in chair on RA   NECK: supple, No JVD  CHEST/LUNG:  Decreased BS b/l in bases, no accessory muscle use  HEART: normal S1S2, RRR  ABDOMEN: Soft, Nontender  EXTREMITIES: trace pitting edema b/l LE   NEUROLOGY: AAO x3, no focal neurological deficit      LABS:                        8.4    7.32  )-----------( 399      ( 13 May 2023 02:40 )             27.6     05-13    138  |  104  |  59<H>  ----------------------------<  116<H>  4.2   |  25  |  2.01<H>    Ca    8.9      13 May 2023 02:40  Phos  2.3     05-13  Mg     2.2     05-13    TPro  5.7<L>  /  Alb  3.4  /  TBili  1.3<H>  /  DBili  x   /  AST  28  /  ALT  6<L>  /  AlkPhos  66  05-13      PT/INR - ( 13 May 2023 02:40 )   PT: 15.7 sec;   INR: 1.32          PTT - ( 13 May 2023 02:40 )  PTT:28.6 sec          RADIOLOGY & ADDITIONAL STUDIES:

## 2023-05-14 LAB
ALBUMIN SERPL ELPH-MCNC: 3.3 G/DL — SIGNIFICANT CHANGE UP (ref 3.3–5)
ALP SERPL-CCNC: 65 U/L — SIGNIFICANT CHANGE UP (ref 40–120)
ALT FLD-CCNC: 7 U/L — LOW (ref 10–45)
ANION GAP SERPL CALC-SCNC: 10 MMOL/L — SIGNIFICANT CHANGE UP (ref 5–17)
APTT BLD: 28.2 SEC — SIGNIFICANT CHANGE UP (ref 27.5–35.5)
AST SERPL-CCNC: 26 U/L — SIGNIFICANT CHANGE UP (ref 10–40)
BASOPHILS # BLD AUTO: 0.04 K/UL — SIGNIFICANT CHANGE UP (ref 0–0.2)
BASOPHILS NFR BLD AUTO: 0.6 % — SIGNIFICANT CHANGE UP (ref 0–2)
BILIRUB SERPL-MCNC: 1.4 MG/DL — HIGH (ref 0.2–1.2)
BLD GP AB SCN SERPL QL: NEGATIVE — SIGNIFICANT CHANGE UP
BUN SERPL-MCNC: 55 MG/DL — HIGH (ref 7–23)
CALCIUM SERPL-MCNC: 9 MG/DL — SIGNIFICANT CHANGE UP (ref 8.4–10.5)
CHLORIDE SERPL-SCNC: 103 MMOL/L — SIGNIFICANT CHANGE UP (ref 96–108)
CO2 SERPL-SCNC: 26 MMOL/L — SIGNIFICANT CHANGE UP (ref 22–31)
CREAT SERPL-MCNC: 1.94 MG/DL — HIGH (ref 0.5–1.3)
EGFR: 35 ML/MIN/1.73M2 — LOW
EOSINOPHIL # BLD AUTO: 0.23 K/UL — SIGNIFICANT CHANGE UP (ref 0–0.5)
EOSINOPHIL NFR BLD AUTO: 3.3 % — SIGNIFICANT CHANGE UP (ref 0–6)
GAS PNL BLDA: SIGNIFICANT CHANGE UP
GLUCOSE BLDC GLUCOMTR-MCNC: 140 MG/DL — HIGH (ref 70–99)
GLUCOSE BLDC GLUCOMTR-MCNC: 184 MG/DL — HIGH (ref 70–99)
GLUCOSE BLDC GLUCOMTR-MCNC: 255 MG/DL — HIGH (ref 70–99)
GLUCOSE BLDC GLUCOMTR-MCNC: 55 MG/DL — LOW (ref 70–99)
GLUCOSE BLDC GLUCOMTR-MCNC: 79 MG/DL — SIGNIFICANT CHANGE UP (ref 70–99)
GLUCOSE SERPL-MCNC: 98 MG/DL — SIGNIFICANT CHANGE UP (ref 70–99)
HCT VFR BLD CALC: 28.4 % — LOW (ref 39–50)
HGB BLD-MCNC: 8.6 G/DL — LOW (ref 13–17)
IMM GRANULOCYTES NFR BLD AUTO: 0.4 % — SIGNIFICANT CHANGE UP (ref 0–0.9)
INR BLD: 1.21 — HIGH (ref 0.88–1.16)
LYMPHOCYTES # BLD AUTO: 0.68 K/UL — LOW (ref 1–3.3)
LYMPHOCYTES # BLD AUTO: 9.6 % — LOW (ref 13–44)
MAGNESIUM SERPL-MCNC: 2.1 MG/DL — SIGNIFICANT CHANGE UP (ref 1.6–2.6)
MCHC RBC-ENTMCNC: 30.3 GM/DL — LOW (ref 32–36)
MCHC RBC-ENTMCNC: 33 PG — SIGNIFICANT CHANGE UP (ref 27–34)
MCV RBC AUTO: 108.8 FL — HIGH (ref 80–100)
MONOCYTES # BLD AUTO: 0.51 K/UL — SIGNIFICANT CHANGE UP (ref 0–0.9)
MONOCYTES NFR BLD AUTO: 7.2 % — SIGNIFICANT CHANGE UP (ref 2–14)
NEUTROPHILS # BLD AUTO: 5.57 K/UL — SIGNIFICANT CHANGE UP (ref 1.8–7.4)
NEUTROPHILS NFR BLD AUTO: 78.9 % — HIGH (ref 43–77)
NRBC # BLD: 0 /100 WBCS — SIGNIFICANT CHANGE UP (ref 0–0)
PHOSPHATE SERPL-MCNC: 2.5 MG/DL — SIGNIFICANT CHANGE UP (ref 2.5–4.5)
PLATELET # BLD AUTO: 401 K/UL — HIGH (ref 150–400)
POTASSIUM SERPL-MCNC: 3.8 MMOL/L — SIGNIFICANT CHANGE UP (ref 3.5–5.3)
POTASSIUM SERPL-SCNC: 3.8 MMOL/L — SIGNIFICANT CHANGE UP (ref 3.5–5.3)
PROT SERPL-MCNC: 5.6 G/DL — LOW (ref 6–8.3)
PROTHROM AB SERPL-ACNC: 14.4 SEC — HIGH (ref 10.5–13.4)
RBC # BLD: 2.61 M/UL — LOW (ref 4.2–5.8)
RBC # FLD: 24.2 % — HIGH (ref 10.3–14.5)
RH IG SCN BLD-IMP: POSITIVE — SIGNIFICANT CHANGE UP
SODIUM SERPL-SCNC: 139 MMOL/L — SIGNIFICANT CHANGE UP (ref 135–145)
TACROLIMUS SERPL-MCNC: <2 NG/ML — SIGNIFICANT CHANGE UP
WBC # BLD: 7.06 K/UL — SIGNIFICANT CHANGE UP (ref 3.8–10.5)
WBC # FLD AUTO: 7.06 K/UL — SIGNIFICANT CHANGE UP (ref 3.8–10.5)

## 2023-05-14 PROCEDURE — 99233 SBSQ HOSP IP/OBS HIGH 50: CPT

## 2023-05-14 PROCEDURE — 99232 SBSQ HOSP IP/OBS MODERATE 35: CPT | Mod: GC

## 2023-05-14 PROCEDURE — 71045 X-RAY EXAM CHEST 1 VIEW: CPT | Mod: 26,76

## 2023-05-14 RX ORDER — TACROLIMUS 5 MG/1
2 CAPSULE ORAL
Refills: 0 | Status: DISCONTINUED | OUTPATIENT
Start: 2023-05-14 | End: 2023-05-16

## 2023-05-14 RX ORDER — POTASSIUM CHLORIDE 20 MEQ
40 PACKET (EA) ORAL ONCE
Refills: 0 | Status: COMPLETED | OUTPATIENT
Start: 2023-05-14 | End: 2023-05-14

## 2023-05-14 RX ORDER — TACROLIMUS 5 MG/1
2 CAPSULE ORAL DAILY
Refills: 0 | Status: DISCONTINUED | OUTPATIENT
Start: 2023-05-14 | End: 2023-05-14

## 2023-05-14 RX ORDER — METOPROLOL TARTRATE 50 MG
12.5 TABLET ORAL ONCE
Refills: 0 | Status: COMPLETED | OUTPATIENT
Start: 2023-05-14 | End: 2023-05-14

## 2023-05-14 RX ORDER — POTASSIUM CHLORIDE 20 MEQ
10 PACKET (EA) ORAL ONCE
Refills: 0 | Status: COMPLETED | OUTPATIENT
Start: 2023-05-14 | End: 2023-05-14

## 2023-05-14 RX ORDER — TACROLIMUS 5 MG/1
1 CAPSULE ORAL AT BEDTIME
Refills: 0 | Status: DISCONTINUED | OUTPATIENT
Start: 2023-05-14 | End: 2023-05-21

## 2023-05-14 RX ORDER — CALCIUM GLUCONATE 100 MG/ML
2 VIAL (ML) INTRAVENOUS ONCE
Refills: 0 | Status: COMPLETED | OUTPATIENT
Start: 2023-05-14 | End: 2023-05-14

## 2023-05-14 RX ADMIN — Medication 12.5 MILLIGRAM(S): at 13:40

## 2023-05-14 RX ADMIN — OXYCODONE HYDROCHLORIDE 5 MILLIGRAM(S): 5 TABLET ORAL at 20:15

## 2023-05-14 RX ADMIN — Medication 6: at 21:17

## 2023-05-14 RX ADMIN — HEPARIN SODIUM 5000 UNIT(S): 5000 INJECTION INTRAVENOUS; SUBCUTANEOUS at 06:08

## 2023-05-14 RX ADMIN — PREGABALIN 1000 MICROGRAM(S): 225 CAPSULE ORAL at 11:28

## 2023-05-14 RX ADMIN — Medication 12.5 MILLIGRAM(S): at 06:08

## 2023-05-14 RX ADMIN — PANTOPRAZOLE SODIUM 40 MILLIGRAM(S): 20 TABLET, DELAYED RELEASE ORAL at 06:08

## 2023-05-14 RX ADMIN — Medication 1 MILLIGRAM(S): at 11:27

## 2023-05-14 RX ADMIN — Medication 81 MILLIGRAM(S): at 11:28

## 2023-05-14 RX ADMIN — CHLORHEXIDINE GLUCONATE 1 APPLICATION(S): 213 SOLUTION TOPICAL at 06:26

## 2023-05-14 RX ADMIN — Medication 200 GRAM(S): at 06:09

## 2023-05-14 RX ADMIN — HEPARIN SODIUM 5000 UNIT(S): 5000 INJECTION INTRAVENOUS; SUBCUTANEOUS at 18:40

## 2023-05-14 RX ADMIN — Medication 5 MILLIGRAM(S): at 06:08

## 2023-05-14 RX ADMIN — Medication 4 MILLILITER(S): at 22:30

## 2023-05-14 RX ADMIN — Medication 2: at 18:39

## 2023-05-14 RX ADMIN — POLYETHYLENE GLYCOL 3350 17 GRAM(S): 17 POWDER, FOR SOLUTION ORAL at 11:28

## 2023-05-14 RX ADMIN — Medication 4 MILLILITER(S): at 17:53

## 2023-05-14 RX ADMIN — LEVALBUTEROL 0.31 MILLIGRAM(S): 1.25 SOLUTION, CONCENTRATE RESPIRATORY (INHALATION) at 00:34

## 2023-05-14 RX ADMIN — LEVALBUTEROL 0.31 MILLIGRAM(S): 1.25 SOLUTION, CONCENTRATE RESPIRATORY (INHALATION) at 05:58

## 2023-05-14 RX ADMIN — SENNA PLUS 2 TABLET(S): 8.6 TABLET ORAL at 21:13

## 2023-05-14 RX ADMIN — LEVALBUTEROL 0.31 MILLIGRAM(S): 1.25 SOLUTION, CONCENTRATE RESPIRATORY (INHALATION) at 17:54

## 2023-05-14 RX ADMIN — INSULIN GLARGINE 12 UNIT(S): 100 INJECTION, SOLUTION SUBCUTANEOUS at 21:18

## 2023-05-14 RX ADMIN — Medication 4 MILLILITER(S): at 05:58

## 2023-05-14 RX ADMIN — TACROLIMUS 1 MILLIGRAM(S): 5 CAPSULE ORAL at 21:13

## 2023-05-14 RX ADMIN — Medication 12.5 MILLIGRAM(S): at 20:16

## 2023-05-14 RX ADMIN — TACROLIMUS 2 MILLIGRAM(S): 5 CAPSULE ORAL at 09:49

## 2023-05-14 RX ADMIN — OXYCODONE HYDROCHLORIDE 5 MILLIGRAM(S): 5 TABLET ORAL at 21:30

## 2023-05-14 RX ADMIN — BUMETANIDE 2 MILLIGRAM(S): 0.25 INJECTION INTRAMUSCULAR; INTRAVENOUS at 06:08

## 2023-05-14 RX ADMIN — Medication 40 MILLIEQUIVALENT(S): at 06:08

## 2023-05-14 RX ADMIN — Medication 100 MILLIEQUIVALENT(S): at 06:09

## 2023-05-14 RX ADMIN — LEVALBUTEROL 0.31 MILLIGRAM(S): 1.25 SOLUTION, CONCENTRATE RESPIRATORY (INHALATION) at 22:31

## 2023-05-14 RX ADMIN — Medication 4 MILLILITER(S): at 00:34

## 2023-05-14 RX ADMIN — Medication 12.5 MILLIGRAM(S): at 16:17

## 2023-05-14 RX ADMIN — ATORVASTATIN CALCIUM 20 MILLIGRAM(S): 80 TABLET, FILM COATED ORAL at 21:13

## 2023-05-14 NOTE — PROGRESS NOTE ADULT - SUBJECTIVE AND OBJECTIVE BOX
CTICU  CRITICAL  CARE  attending     Hand off received 					   Pertinent clinical, laboratory, radiographic, hemodynamic, echocardiographic, respiratory data, microbiologic data and chart were reviewed and analyzed frequently throughout the course of the day  Patient seen and examined with CTS/ SH attending at bedside  Pt is a 77yr old male with PMH PKD sp kidney transplant (), HTN, HLD, DM, prostate ca sp radical prostatectomy (), DVT in setting of pelvic fx (), afib/flutter sp ablation (, off AC), admitted for surgical mgmt. Patient underwent left atrial appendage occlusion percutaneously with Dr. Sidhu 23. Arrived to the stepdown extubated on no drips. Overnight had PEA arrest, requiring ACLS and intubation. Tx to ICU, bedside TTE showing pericardial effusion with tamponade. Bedside procedure with 600cc blood aspirated. Given 2 units pRBC. Taken to OR early AM for sternotomy with 1.5L L hemothorax evacuated. Arrived intubated on epi, levo, vaso, fuad. Switched to dobutamine and weaned off some pressors. Initially had lactic acidosis, now resolved. CPAP'd overnight. : extubated,  off, Bijan weaned.  swan dc, Bijan weaned.  R pigtail placed for effusion, 850cc out. Placed on BIPAP for increased wob and atelectasis.  new R subclavian central line placed. Given bumex for diuresis, buckley placed.  worsening Cr, RIJ [place] placed,  and primacor started, bumex given, underwent awake bronch, broad spectrum abx started. Dobhoff placed for nutrition. Vasopressin added. 5/2Given albumins throughout day, low urine output, given bumex towards end of day. Abx deescalated to ceftriaxone. Tacro on hold. Fuad started to achieve elevated MAPs for renal perfusion. 5/3 intubated, Vancomycin started for MSSA in BAL.  Tacro restarted.  found to have R prox brachial DVT and SVT in basilic vein. Procrit on hold. Primacor decreased to .125.  weaned off pressors. Extubated.  at 2.5 off primacor. Post extubation awake bronch.  POCUS RUE for potential midline revealed DVT extended into axilla, no midline; PIV placed.  weaned.  this AM hypoglycemic, not improved with PO and given D10 IV. Ambulated with PT.  L pleural effusion sp pigtail cb pneumothorax.       FAMILY HISTORY:  PAST MEDICAL & SURGICAL HISTORY:  Polycystic kidney disease  DM2 (diabetes mellitus, type 2)  HLD (hyperlipidemia)  HTN (hypertension)  Prostate cancer  Kidney transplant recipient  DVT (deep venous thrombosis)  Chronic CHF  H/O radical prostatectomy    S/P hernia repair  Abdominal and inguinal around         Patient is a 77y old  Male who presents with a chief complaint of percutaneous DENNY closure.      14 system review was unremarkable    Vital signs, hemodynamic and respiratory parameters were reviewed from the bedside nursing flowsheet.  ICU Vital Signs Last 24 Hrs  T(C): 37.1 (14 May 2023 08:30), Max: 37.1 (14 May 2023 08:30)  T(F): 98.7 (14 May 2023 08:30), Max: 98.7 (14 May 2023 08:30)  HR: 105 (14 May 2023 09:00) (74 - 105)  BP: --  BP(mean): --  ABP: 154/64 (14 May 2023 09:00) (123/63 - 171/73)  ABP(mean): 90 (14 May 2023 09:00) (79 - 107)  RR: 16 (14 May 2023 09:00) (16 - 18)  SpO2: 98% (14 May 2023 09:00) (94% - 100%)    O2 Parameters below as of 14 May 2023 09:00  Patient On (Oxygen Delivery Method): room air          Adult Advanced Hemodynamics Last 24 Hrs  CVP(mm Hg): --  CVP(cm H2O): --  CO: --  CI: --  PA: --  PA(mean): --  PCWP: --  SVR: --  SVRI: --  PVR: --  PVRI: --, ABG - ( 14 May 2023 02:37 )  pH, Arterial: 7.50  pH, Blood: x     /  pCO2: 21    /  pO2: 107   / HCO3: 16    / Base Excess: -4.6  /  SaO2: 99.8                Intake and output was reviewed and the fluid balance was calculated  Daily     Daily   I&O's Summary    13 May 2023 07:01  -  14 May 2023 07:00  --------------------------------------------------------  IN: 1090 mL / OUT: 960 mL / NET: 130 mL    14 May 2023 07:01  -  14 May 2023 10:01  --------------------------------------------------------  IN: 240 mL / OUT: 10 mL / NET: 230 mL        All lines and drain sites were assessed  Glycemic trend was reviewedCAPILLARY BLOOD GLUCOSE      POCT Blood Glucose.: 79 mg/dL (14 May 2023 07:52)      Neuro: awake nad  HEENT: mmm  Heart: s1 s2  Lungs: clear bl  Abdomen: soft nt nd  Extremities: wwp    Lines:  R radial arterial line 5/1    Tubes:  L pigtail    labs  CBC Full  -  ( 14 May 2023 02:41 )  WBC Count : 7.06 K/uL  RBC Count : 2.61 M/uL  Hemoglobin : 8.6 g/dL  Hematocrit : 28.4 %  Platelet Count - Automated : 401 K/uL  Mean Cell Volume : 108.8 fl  Mean Cell Hemoglobin : 33.0 pg  Mean Cell Hemoglobin Concentration : 30.3 gm/dL  Auto Neutrophil # : 5.57 K/uL  Auto Lymphocyte # : 0.68 K/uL  Auto Monocyte # : 0.51 K/uL  Auto Eosinophil # : 0.23 K/uL  Auto Basophil # : 0.04 K/uL  Auto Neutrophil % : 78.9 %  Auto Lymphocyte % : 9.6 %  Auto Monocyte % : 7.2 %  Auto Eosinophil % : 3.3 %  Auto Basophil % : 0.6 %        139  |  103  |  55<H>  ----------------------------<  98  3.8   |  26  |  1.94<H>    Ca    9.0      14 May 2023 02:41  Phos  2.5     05-14  Mg     2.1     05-14    TPro  5.6<L>  /  Alb  3.3  /  TBili  1.4<H>  /  DBili  x   /  AST  26  /  ALT  7<L>  /  AlkPhos  65  05-14    PT/INR - ( 14 May 2023 02:41 )   PT: 14.4 sec;   INR: 1.21          PTT - ( 14 May 2023 02:41 )  PTT:28.2 sec  The current medications were reviewed   MEDICATIONS  (STANDING):  acetylcysteine 10%  Inhalation 4 milliLiter(s) Inhalation every 6 hours  aspirin  chewable 81 milliGRAM(s) Oral daily  atorvastatin 20 milliGRAM(s) Oral at bedtime  buMETAnide Injectable 2 milliGRAM(s) IV Push daily  chlorhexidine 2% Cloths 1 Application(s) Topical <User Schedule>  cyanocobalamin 1000 MICROGram(s) Oral daily  dextrose 5%. 1000 milliLiter(s) (50 mL/Hr) IV Continuous <Continuous>  dextrose 5%. 1000 milliLiter(s) (100 mL/Hr) IV Continuous <Continuous>  dextrose 50% Injectable 12.5 Gram(s) IV Push once  dextrose 50% Injectable 25 Gram(s) IV Push once  dextrose 50% Injectable 25 Gram(s) IV Push once  folic acid 1 milliGRAM(s) Oral daily  glucagon  Injectable 1 milliGRAM(s) IntraMuscular once  glucagon  Injectable 1 milliGRAM(s) IntraMuscular once  glucagon  Injectable 1 milliGRAM(s) IntraMuscular once  heparin   Injectable 5000 Unit(s) SubCutaneous every 12 hours  insulin glargine Injectable (LANTUS) 12 Unit(s) SubCutaneous at bedtime  insulin lispro (ADMELOG) corrective regimen sliding scale   SubCutaneous Before meals and at bedtime  levalbuterol Inhalation 0.31 milliGRAM(s) Inhalation every 6 hours  metoprolol tartrate 12.5 milliGRAM(s) Oral every 8 hours  pantoprazole    Tablet 40 milliGRAM(s) Oral before breakfast  polyethylene glycol 3350 17 Gram(s) Oral daily  predniSONE   Tablet 5 milliGRAM(s) Oral daily  senna 2 Tablet(s) Oral at bedtime  sodium chloride 0.9%. 1000 milliLiter(s) (10 mL/Hr) IV Continuous <Continuous>  tacrolimus 1 milliGRAM(s) Oral at bedtime  tacrolimus 2 milliGRAM(s) Oral <User Schedule>    MEDICATIONS  (PRN):  acetaminophen     Tablet .. 650 milliGRAM(s) Oral every 6 hours PRN Mild Pain (1 - 3)  dextrose Oral Gel 15 Gram(s) Oral once PRN Blood Glucose LESS THAN 70 milliGRAM(s)/deciliter  oxyCODONE    IR 5 milliGRAM(s) Oral every 6 hours PRN Moderate Pain (4 - 6)      Assessment/Plan:  77yr old male with PMH PKD sp kidney transplant (), HTN, HLD, DM, prostate ca sp radical prostatectomy (), DVT in setting of pelvic fx (), afib/flutter sp ablation (, off AC), admitted for surgical mgmt. Patient is a [employer].    POD19 left atrial appendage occlusion percutaneously (Basman, 23)  POD18 RTOR for L hemothorax evacuation (23)  NC  DAYO-improving  Serial FS  Monitor urine output  Bblocker-uptitrate prn  Aspirin start  Acute post operative anemia-trend H/H  Continue IS for renal transplant  Appreciate heme/onc recs  Diet as tolerated  Replete lytes prn  GI/DVT PPX  Bowel Regimen  Pain control  OOB with PT  Close hemodynamic, ventilatory and drain monitoring and management per post op routine  Monitor for arrhythmias and monitor parameters for organ perfusion  Beta blockade not administered due to hemodynamic instability and bradycardia  Monitor neurologic status  Head of the bed should remain elevated to 45 deg   Chest PT and IS will be encouraged  Monitor adequacy of oxygenation and ventilation and attempt to wean oxygen  Antibiotic regimen will be tailored to the clinical, laboratory and microbiologic data  Nutritional goals will be met using po eventually, ensure adequate caloric intake and monitor the same  Stress ulcer and VTE prophylaxis will be achieved    Glycemic control is satisfactory  Electrolytes have been repleted as necessary and wound care has been carried out   Pain control has been achieved.   Aggressive physical therapy and early mobility and ambulation goals will be met   The family was updated about the course and plan.    CRITICAL CARE TIME personally provided by me  in evaluation and management, reassessments, review and interpretation of labs and x-rays, ventilator and hemodynamic management, formulating a plan and coordinating care: __35____ MIN.  Time does not include procedural time.    CTICU ATTENDING     					  Carmen Ortega MD

## 2023-05-14 NOTE — PROGRESS NOTE ADULT - SUBJECTIVE AND OBJECTIVE BOX
Patient is a 77y Male seen and evaluated at bedside.       Meds:    acetaminophen     Tablet .. 650 every 6 hours PRN  acetylcysteine 10%  Inhalation 4 every 6 hours  aspirin  chewable 81 daily  atorvastatin 20 at bedtime  buMETAnide Injectable 2 daily  chlorhexidine 2% Cloths 1 <User Schedule>  cyanocobalamin 1000 daily  dextrose 5%. 1000 <Continuous>  dextrose 5%. 1000 <Continuous>  dextrose 50% Injectable 25 once  dextrose 50% Injectable 12.5 once  dextrose 50% Injectable 25 once  dextrose Oral Gel 15 once PRN  folic acid 1 daily  glucagon  Injectable 1 once  glucagon  Injectable 1 once  glucagon  Injectable 1 once  heparin   Injectable 5000 every 12 hours  insulin glargine Injectable (LANTUS) 12 at bedtime  insulin lispro (ADMELOG) corrective regimen sliding scale  Before meals and at bedtime  levalbuterol Inhalation 0.31 every 6 hours  metoprolol tartrate 12.5 every 8 hours  oxyCODONE    IR 5 every 6 hours PRN  pantoprazole    Tablet 40 before breakfast  polyethylene glycol 3350 17 daily  predniSONE   Tablet 5 daily  senna 2 at bedtime  sodium chloride 0.9%. 1000 <Continuous>  tacrolimus 2 daily  tacrolimus 1 at bedtime      T(C): , Max: 37.4 (05-13-23 @ 09:33)  T(F): , Max: 99.4 (05-13-23 @ 09:33)  HR: 100 (05-14-23 @ 08:00)  BP: --  BP(mean): --  RR: 18 (05-14-23 @ 08:00)  SpO2: 100% (05-14-23 @ 08:00)  Wt(kg): --    05-13 @ 07:01  -  05-14 @ 07:00  --------------------------------------------------------  IN: 1090 mL / OUT: 960 mL / NET: 130 mL          Review of Systems:  ROS negative except as per HPI      PHYSICAL EXAM:  GENERAL: NAD, sitting in chair on RA   NECK: supple, No JVD  CHEST/LUNG:  Decreased BS b/l in bases, no accessory muscle use  HEART: normal S1S2, RRR  ABDOMEN: Soft, Nontender  EXTREMITIES: trace pitting edema b/l LE   NEUROLOGY: AAO x3, no focal neurological deficit      LABS:                        8.6    7.06  )-----------( 401      ( 14 May 2023 02:41 )             28.4     05-14    139  |  103  |  55<H>  ----------------------------<  98  3.8   |  26  |  1.94<H>    Ca    9.0      14 May 2023 02:41  Phos  2.5     05-14  Mg     2.1     05-14    TPro  5.6<L>  /  Alb  3.3  /  TBili  1.4<H>  /  DBili  x   /  AST  26  /  ALT  7<L>  /  AlkPhos  65  05-14      PT/INR - ( 14 May 2023 02:41 )   PT: 14.4 sec;   INR: 1.21          PTT - ( 14 May 2023 02:41 )  PTT:28.2 sec          RADIOLOGY & ADDITIONAL STUDIES:

## 2023-05-14 NOTE — PROGRESS NOTE ADULT - ASSESSMENT
77Y M w/ Non-Oliguric iATN on underlying CKD 3 (baseline sCr at 1.2-1.4) s/p renal transplant 2007 h/o PKD s/p DENNY occlusion repair complicated by pericardial effusion s/p chest washout, course complicated by cardiogenic shock, with improving renal function and  tacro level mostly with in limits       #Non-Oliguric iATN on underlying CKD 3 (baseline sCr at 1.2-1.4)   Likely pre-renal initially but given prolonged hypoperfusion developed into iATN   sCr now at 1.94 today. Likely around new baseline   - Pt w/ improved PO intake. Pt appears euvolemic today. Maintain mild net neg fluid balance, c/w diuresis to achieve this. Can be transitioned to PO diuresis in the next 24-48 hours if hemodynamics remain stable  - Strict I/Os      Transplant - 2007, initial failure 2/2 PCKD  - tacro <2 noted 5/13. OK to resume tacro at his home dose of 2mg AM and 1mg PM for now since level<2 and kidney function more stable. Will continue to follow level to adjust dosing as his new baseline Cr may be higher than before and may need a lower dose eventually   - Tacro level goal of 4-5  - continue to draw trough 30 minutes before AM dose of tacro   - Continue home prednisone 5mg PO QD

## 2023-05-14 NOTE — CHART NOTE - NSCHARTNOTEFT_GEN_A_CORE
Patient seen and examined at bedside.  Case discussed with Dr. Rodríguez. Minimal output from CT.  No air leak appreciated.  Per Dr. Rodríguez's request, CT removed and tie down secured without incident.  Occlusive DSD placed.  Patient tolerated procedure well.  Chest Xray pending.

## 2023-05-14 NOTE — PROGRESS NOTE ADULT - SUBJECTIVE AND OBJECTIVE BOX
CTICU  CRITICAL  CARE  attending     Hand off received 					   Pertinent clinical, laboratory, radiographic, hemodynamic, echocardiographic, respiratory data, microbiologic data and chart were reviewed and analyzed frequently throughout the course of the day and night      77 year old male with  HTN, HLD, DM, polycystic kidney disease s/p kidney transplant in 2007,, Prostate cancer s/p radical prostatectomy 2015, DVT 2006 (in setting of pelvic fracture).  He has history of atrial fibrillation and atrial flutter s/p ablation in 1/2018.  He has history of GI bleed in July 2022.  Eliquis was stopped at that time.   He is a Scientologist and refuses blood transfusions.    S/P percutaneous occlusion of the left atrial appendage.  S/P PEA arrest.  S/P drainage of pericardial effusion.  S/P Evacuation of left sided hemothorax.      HEALTH ISSUES - PROBLEM Dx:  Pericardial effusion with cardiac tamponade  Acute respiratory failure with hypoxia  Atelectasis  Ground glass opacity present on imaging of lung        FAMILY HISTORY:  PAST MEDICAL & SURGICAL HISTORY:  Polycystic kidney disease  DM2 (diabetes mellitus, type 2)  HLD (hyperlipidemia)  HTN (hypertension)  Prostate cancer  Kidney transplant recipient  DVT (deep venous thrombosis)  Chronic CHF  H/O radical prostatectomy in the year 2010  S/P hernia repair of Abdominal and inguinal around 2005        14 system review was unremarkable    Vital signs, hemodynamic and respiratory parameters were reviewed from the bedside nursing flow sheet.  ICU Vital Signs Last 24 Hrs  T(C): 37 (14 May 2023 18:01), Max: 37.1 (14 May 2023 08:30)  T(F): 98.6 (14 May 2023 18:01), Max: 98.7 (14 May 2023 08:30)  HR: 96 (14 May 2023 21:00) (74 - 107)  BP: 145/92 (14 May 2023 16:00) (145/92 - 145/92)  BP(mean): 114 (14 May 2023 16:00) (114 - 114)  ABP: 162/67 (14 May 2023 21:00) (136/62 - 180/89)  ABP(mean): 98 (14 May 2023 21:00) (84 - 121)  RR: 18 (14 May 2023 21:00) (16 - 19)  SpO2: 95% (14 May 2023 21:00) (93% - 100%)    O2 Parameters below as of 14 May 2023 21:00  Patient On (Oxygen Delivery Method): room air          Adult Advanced Hemodynamics Last 24 Hrs  CVP(mm Hg): --  CVP(cm H2O): --  CO: --  CI: --  PA: --  PA(mean): --  PCWP: --  SVR: --  SVRI: --  PVR: --  PVRI: --, ABG - ( 14 May 2023 02:37 )  pH, Arterial: 7.50  pH, Blood: x     /  pCO2: 21    /  pO2: 107   / HCO3: 16    / Base Excess: -4.6  /  SaO2: 99.8                Intake and output was reviewed and the fluid balance was calculated  Daily     Daily   I&O's Summary    13 May 2023 07:01  -  14 May 2023 07:00  --------------------------------------------------------  IN: 1090 mL / OUT: 960 mL / NET: 130 mL    14 May 2023 07:01  -  14 May 2023 22:11  --------------------------------------------------------  IN: 240 mL / OUT: 910 mL / NET: -670 mL        All lines and drain sites were assessed    Neuro: No change in the mental status from the baseline.   Neck: No JVD.  CVS: S1, S2, No S3.  Lungs: Good air entry bilaterally.  Abd: Soft. No tenderness. + Bowel sounds.  Vascular: + DP/PT.  Extremities: No edema.  Lymphatic: Normal.  Skin: No abnormalities.      labs  CBC Full  -  ( 14 May 2023 02:41 )  WBC Count : 7.06 K/uL  RBC Count : 2.61 M/uL  Hemoglobin : 8.6 g/dL  Hematocrit : 28.4 %  Platelet Count - Automated : 401 K/uL  Mean Cell Volume : 108.8 fl  Mean Cell Hemoglobin : 33.0 pg  Mean Cell Hemoglobin Concentration : 30.3 gm/dL  Auto Neutrophil # : 5.57 K/uL  Auto Lymphocyte # : 0.68 K/uL  Auto Monocyte # : 0.51 K/uL  Auto Eosinophil # : 0.23 K/uL  Auto Basophil # : 0.04 K/uL  Auto Neutrophil % : 78.9 %  Auto Lymphocyte % : 9.6 %  Auto Monocyte % : 7.2 %  Auto Eosinophil % : 3.3 %  Auto Basophil % : 0.6 %    05-14    139  |  103  |  55<H>  ----------------------------<  98  3.8   |  26  |  1.94<H>    Ca    9.0      14 May 2023 02:41  Phos  2.5     05-14  Mg     2.1     05-14    TPro  5.6<L>  /  Alb  3.3  /  TBili  1.4<H>  /  DBili  x   /  AST  26  /  ALT  7<L>  /  AlkPhos  65  05-14    PT/INR - ( 14 May 2023 02:41 )   PT: 14.4 sec;   INR: 1.21          PTT - ( 14 May 2023 02:41 )  PTT:28.2 sec  The current medications were reviewed   MEDICATIONS  (STANDING):  acetylcysteine 10%  Inhalation 4 milliLiter(s) Inhalation every 6 hours  aspirin  chewable 81 milliGRAM(s) Oral daily  atorvastatin 20 milliGRAM(s) Oral at bedtime  buMETAnide Injectable 2 milliGRAM(s) IV Push daily  chlorhexidine 2% Cloths 1 Application(s) Topical <User Schedule>  cyanocobalamin 1000 MICROGram(s) Oral daily  dextrose 5%. 1000 milliLiter(s) (100 mL/Hr) IV Continuous <Continuous>  dextrose 5%. 1000 milliLiter(s) (50 mL/Hr) IV Continuous <Continuous>  dextrose 50% Injectable 25 Gram(s) IV Push once  dextrose 50% Injectable 25 Gram(s) IV Push once  dextrose 50% Injectable 12.5 Gram(s) IV Push once  folic acid 1 milliGRAM(s) Oral daily  glucagon  Injectable 1 milliGRAM(s) IntraMuscular once  glucagon  Injectable 1 milliGRAM(s) IntraMuscular once  glucagon  Injectable 1 milliGRAM(s) IntraMuscular once  heparin   Injectable 5000 Unit(s) SubCutaneous every 12 hours  insulin glargine Injectable (LANTUS) 12 Unit(s) SubCutaneous at bedtime  insulin lispro (ADMELOG) corrective regimen sliding scale   SubCutaneous Before meals and at bedtime  levalbuterol Inhalation 0.31 milliGRAM(s) Inhalation every 6 hours  metoprolol tartrate 12.5 milliGRAM(s) Oral every 8 hours  pantoprazole    Tablet 40 milliGRAM(s) Oral before breakfast  polyethylene glycol 3350 17 Gram(s) Oral daily  predniSONE   Tablet 5 milliGRAM(s) Oral daily  senna 2 Tablet(s) Oral at bedtime  sodium chloride 0.9%. 1000 milliLiter(s) (10 mL/Hr) IV Continuous <Continuous>  tacrolimus 1 milliGRAM(s) Oral at bedtime  tacrolimus 2 milliGRAM(s) Oral <User Schedule>    MEDICATIONS  (PRN):  acetaminophen     Tablet .. 650 milliGRAM(s) Oral every 6 hours PRN Mild Pain (1 - 3)  dextrose Oral Gel 15 Gram(s) Oral once PRN Blood Glucose LESS THAN 70 milliGRAM(s)/deciliter  oxyCODONE    IR 5 milliGRAM(s) Oral every 6 hours PRN Moderate Pain (4 - 6)        PROBLEM LIST/ ASSESSMENT:  HEALTH ISSUES - PROBLEM Dx:  Pericardial effusion with cardiac tamponade  Acute respiratory failure with hypoxia  Atelectasis  Ground glass opacity present on imaging of lung                 77 years old male with history of atrial fibrillation and contraindications to anticoagulation.   S/P Occlusion of LA appendage.  Postoperative course complicated by PEA arrest, pericardial tamponade cardiogenic shock, respiratory failure, DAYO, Transaminitis, metabolic acidosis, DVT.  S/P Drainage of pericardial effusion.   S/P Evacuation of left hemothorax.  Renal Failure.  Hemodynamically stable.  Good oxygenation.  Fair urine out put.        My plan includes :  Bronchodilator Rx.  Statin and Betablocker.  Gentle Diuresis.   Immunosuppression for renal transplant.  Close hemodynamic monitoring   Monitor for arrhythmias and monitor parameters for organ perfusion  Monitor neurologic status  Monitor renal function.  Head of the bed should remain elevated to 45 deg .   Chest PT and IS will be encouraged  Monitor adequacy of oxygenation   Nutritional goals will be met using po eventually , ensure adequate caloric intake and monitor the same  Stress ulcer and VTE prophylaxis will be achieved    Glycemic control is satisfactory  Electrolytes have been repleted as necessary and wound care has been carried out. Pain control has been achieved.   Aggressive physical therapy and early mobility and ambulation goals will be met   The family was updated about the course and plan  CRITICAL CARE TIME SPENT in evaluation and management, eview and interpretation of labs and x-rays, hemodynamic management, formulating a plan and coordinating care: ___25____ MIN.  Time does not include procedural time.  CTICU ATTENDING     					    Antwan Colon MD

## 2023-05-15 LAB
ACANTHOCYTES BLD QL SMEAR: SLIGHT — SIGNIFICANT CHANGE UP
ALBUMIN SERPL ELPH-MCNC: 3.5 G/DL — SIGNIFICANT CHANGE UP (ref 3.3–5)
ALP SERPL-CCNC: 72 U/L — SIGNIFICANT CHANGE UP (ref 40–120)
ALT FLD-CCNC: 9 U/L — LOW (ref 10–45)
ANION GAP SERPL CALC-SCNC: 11 MMOL/L — SIGNIFICANT CHANGE UP (ref 5–17)
ANISOCYTOSIS BLD QL: SIGNIFICANT CHANGE UP
APTT BLD: 29.8 SEC — SIGNIFICANT CHANGE UP (ref 27.5–35.5)
AST SERPL-CCNC: 25 U/L — SIGNIFICANT CHANGE UP (ref 10–40)
BASOPHILS # BLD AUTO: 0.07 K/UL — SIGNIFICANT CHANGE UP (ref 0–0.2)
BASOPHILS NFR BLD AUTO: 0.9 % — SIGNIFICANT CHANGE UP (ref 0–2)
BILIRUB SERPL-MCNC: 1.6 MG/DL — HIGH (ref 0.2–1.2)
BUN SERPL-MCNC: 52 MG/DL — HIGH (ref 7–23)
CALCIUM SERPL-MCNC: 9.5 MG/DL — SIGNIFICANT CHANGE UP (ref 8.4–10.5)
CHLORIDE SERPL-SCNC: 104 MMOL/L — SIGNIFICANT CHANGE UP (ref 96–108)
CO2 SERPL-SCNC: 26 MMOL/L — SIGNIFICANT CHANGE UP (ref 22–31)
CREAT SERPL-MCNC: 1.95 MG/DL — HIGH (ref 0.5–1.3)
DACRYOCYTES BLD QL SMEAR: SLIGHT — SIGNIFICANT CHANGE UP
EGFR: 35 ML/MIN/1.73M2 — LOW
ELLIPTOCYTES BLD QL SMEAR: SLIGHT — SIGNIFICANT CHANGE UP
EOSINOPHIL # BLD AUTO: 0.07 K/UL — SIGNIFICANT CHANGE UP (ref 0–0.5)
EOSINOPHIL NFR BLD AUTO: 0.9 % — SIGNIFICANT CHANGE UP (ref 0–6)
GAS PNL BLDA: SIGNIFICANT CHANGE UP
GLUCOSE BLDC GLUCOMTR-MCNC: 130 MG/DL — HIGH (ref 70–99)
GLUCOSE BLDC GLUCOMTR-MCNC: 181 MG/DL — HIGH (ref 70–99)
GLUCOSE BLDC GLUCOMTR-MCNC: 204 MG/DL — HIGH (ref 70–99)
GLUCOSE BLDC GLUCOMTR-MCNC: 230 MG/DL — HIGH (ref 70–99)
GLUCOSE BLDC GLUCOMTR-MCNC: 74 MG/DL — SIGNIFICANT CHANGE UP (ref 70–99)
GLUCOSE SERPL-MCNC: 64 MG/DL — LOW (ref 70–99)
HCT VFR BLD CALC: 29.2 % — LOW (ref 39–50)
HGB BLD-MCNC: 8.9 G/DL — LOW (ref 13–17)
HYPOCHROMIA BLD QL: SIGNIFICANT CHANGE UP
INR BLD: 1.28 — HIGH (ref 0.88–1.16)
LYMPHOCYTES # BLD AUTO: 0.89 K/UL — LOW (ref 1–3.3)
LYMPHOCYTES # BLD AUTO: 12.3 % — LOW (ref 13–44)
MACROCYTES BLD QL: SIGNIFICANT CHANGE UP
MAGNESIUM SERPL-MCNC: 2.1 MG/DL — SIGNIFICANT CHANGE UP (ref 1.6–2.6)
MANUAL SMEAR VERIFICATION: SIGNIFICANT CHANGE UP
MCHC RBC-ENTMCNC: 30.5 GM/DL — LOW (ref 32–36)
MCHC RBC-ENTMCNC: 33.1 PG — SIGNIFICANT CHANGE UP (ref 27–34)
MCV RBC AUTO: 108.6 FL — HIGH (ref 80–100)
MICROCYTES BLD QL: SLIGHT — SIGNIFICANT CHANGE UP
MONOCYTES # BLD AUTO: 0.44 K/UL — SIGNIFICANT CHANGE UP (ref 0–0.9)
MONOCYTES NFR BLD AUTO: 6.1 % — SIGNIFICANT CHANGE UP (ref 2–14)
NEUTROPHILS # BLD AUTO: 5.79 K/UL — SIGNIFICANT CHANGE UP (ref 1.8–7.4)
NEUTROPHILS NFR BLD AUTO: 78.9 % — HIGH (ref 43–77)
NEUTS BAND # BLD: 0.9 % — SIGNIFICANT CHANGE UP (ref 0–8)
OVALOCYTES BLD QL SMEAR: SLIGHT — SIGNIFICANT CHANGE UP
PHOSPHATE SERPL-MCNC: 2.9 MG/DL — SIGNIFICANT CHANGE UP (ref 2.5–4.5)
PLAT MORPH BLD: NORMAL — SIGNIFICANT CHANGE UP
PLATELET # BLD AUTO: 408 K/UL — HIGH (ref 150–400)
POIKILOCYTOSIS BLD QL AUTO: SIGNIFICANT CHANGE UP
POLYCHROMASIA BLD QL SMEAR: SLIGHT — SIGNIFICANT CHANGE UP
POTASSIUM SERPL-MCNC: 4.3 MMOL/L — SIGNIFICANT CHANGE UP (ref 3.5–5.3)
POTASSIUM SERPL-SCNC: 4.3 MMOL/L — SIGNIFICANT CHANGE UP (ref 3.5–5.3)
PROT SERPL-MCNC: 5.9 G/DL — LOW (ref 6–8.3)
PROTHROM AB SERPL-ACNC: 15.3 SEC — HIGH (ref 10.5–13.4)
RBC # BLD: 2.69 M/UL — LOW (ref 4.2–5.8)
RBC # FLD: 23.7 % — HIGH (ref 10.3–14.5)
RBC BLD AUTO: ABNORMAL
SCHISTOCYTES BLD QL AUTO: SLIGHT — SIGNIFICANT CHANGE UP
SODIUM SERPL-SCNC: 141 MMOL/L — SIGNIFICANT CHANGE UP (ref 135–145)
SPHEROCYTES BLD QL SMEAR: SLIGHT — SIGNIFICANT CHANGE UP
TACROLIMUS SERPL-MCNC: 5.5 NG/ML — SIGNIFICANT CHANGE UP
TACROLIMUS SERPL-MCNC: <2 NG/ML — SIGNIFICANT CHANGE UP
TARGETS BLD QL SMEAR: SLIGHT — SIGNIFICANT CHANGE UP
WBC # BLD: 7.26 K/UL — SIGNIFICANT CHANGE UP (ref 3.8–10.5)
WBC # FLD AUTO: 7.26 K/UL — SIGNIFICANT CHANGE UP (ref 3.8–10.5)

## 2023-05-15 PROCEDURE — 71045 X-RAY EXAM CHEST 1 VIEW: CPT | Mod: 26

## 2023-05-15 RX ORDER — METOPROLOL TARTRATE 50 MG
50 TABLET ORAL DAILY
Refills: 0 | Status: DISCONTINUED | OUTPATIENT
Start: 2023-05-15 | End: 2023-05-21

## 2023-05-15 RX ORDER — INSULIN GLARGINE 100 [IU]/ML
10 INJECTION, SOLUTION SUBCUTANEOUS AT BEDTIME
Refills: 0 | Status: DISCONTINUED | OUTPATIENT
Start: 2023-05-15 | End: 2023-05-19

## 2023-05-15 RX ORDER — SODIUM CHLORIDE 9 MG/ML
3 INJECTION INTRAMUSCULAR; INTRAVENOUS; SUBCUTANEOUS EVERY 8 HOURS
Refills: 0 | Status: DISCONTINUED | OUTPATIENT
Start: 2023-05-15 | End: 2023-05-21

## 2023-05-15 RX ADMIN — BUMETANIDE 2 MILLIGRAM(S): 0.25 INJECTION INTRAMUSCULAR; INTRAVENOUS at 06:24

## 2023-05-15 RX ADMIN — Medication 12.5 MILLIGRAM(S): at 13:12

## 2023-05-15 RX ADMIN — Medication 4: at 22:37

## 2023-05-15 RX ADMIN — Medication 1 MILLIGRAM(S): at 11:16

## 2023-05-15 RX ADMIN — Medication 2: at 13:11

## 2023-05-15 RX ADMIN — CHLORHEXIDINE GLUCONATE 1 APPLICATION(S): 213 SOLUTION TOPICAL at 06:32

## 2023-05-15 RX ADMIN — Medication 81 MILLIGRAM(S): at 11:16

## 2023-05-15 RX ADMIN — INSULIN GLARGINE 10 UNIT(S): 100 INJECTION, SOLUTION SUBCUTANEOUS at 23:38

## 2023-05-15 RX ADMIN — Medication 5 MILLIGRAM(S): at 06:23

## 2023-05-15 RX ADMIN — SENNA PLUS 2 TABLET(S): 8.6 TABLET ORAL at 22:46

## 2023-05-15 RX ADMIN — HEPARIN SODIUM 5000 UNIT(S): 5000 INJECTION INTRAVENOUS; SUBCUTANEOUS at 06:23

## 2023-05-15 RX ADMIN — Medication 650 MILLIGRAM(S): at 08:31

## 2023-05-15 RX ADMIN — SODIUM CHLORIDE 3 MILLILITER(S): 9 INJECTION INTRAMUSCULAR; INTRAVENOUS; SUBCUTANEOUS at 21:50

## 2023-05-15 RX ADMIN — PANTOPRAZOLE SODIUM 40 MILLIGRAM(S): 20 TABLET, DELAYED RELEASE ORAL at 06:23

## 2023-05-15 RX ADMIN — Medication 650 MILLIGRAM(S): at 09:01

## 2023-05-15 RX ADMIN — Medication 650 MILLIGRAM(S): at 19:41

## 2023-05-15 RX ADMIN — HEPARIN SODIUM 5000 UNIT(S): 5000 INJECTION INTRAVENOUS; SUBCUTANEOUS at 18:45

## 2023-05-15 RX ADMIN — OXYCODONE HYDROCHLORIDE 5 MILLIGRAM(S): 5 TABLET ORAL at 23:46

## 2023-05-15 RX ADMIN — LEVALBUTEROL 0.31 MILLIGRAM(S): 1.25 SOLUTION, CONCENTRATE RESPIRATORY (INHALATION) at 05:07

## 2023-05-15 RX ADMIN — Medication 4 MILLILITER(S): at 05:07

## 2023-05-15 RX ADMIN — TACROLIMUS 2 MILLIGRAM(S): 5 CAPSULE ORAL at 11:15

## 2023-05-15 RX ADMIN — OXYCODONE HYDROCHLORIDE 5 MILLIGRAM(S): 5 TABLET ORAL at 13:16

## 2023-05-15 RX ADMIN — TACROLIMUS 1 MILLIGRAM(S): 5 CAPSULE ORAL at 22:39

## 2023-05-15 RX ADMIN — OXYCODONE HYDROCHLORIDE 5 MILLIGRAM(S): 5 TABLET ORAL at 13:46

## 2023-05-15 RX ADMIN — POLYETHYLENE GLYCOL 3350 17 GRAM(S): 17 POWDER, FOR SOLUTION ORAL at 11:16

## 2023-05-15 RX ADMIN — Medication 650 MILLIGRAM(S): at 20:04

## 2023-05-15 RX ADMIN — PREGABALIN 1000 MICROGRAM(S): 225 CAPSULE ORAL at 11:16

## 2023-05-15 RX ADMIN — Medication 4 MILLILITER(S): at 19:51

## 2023-05-15 RX ADMIN — SODIUM CHLORIDE 3 MILLILITER(S): 9 INJECTION INTRAMUSCULAR; INTRAVENOUS; SUBCUTANEOUS at 13:09

## 2023-05-15 RX ADMIN — Medication 12.5 MILLIGRAM(S): at 06:23

## 2023-05-15 RX ADMIN — ATORVASTATIN CALCIUM 20 MILLIGRAM(S): 80 TABLET, FILM COATED ORAL at 22:39

## 2023-05-15 NOTE — PROGRESS NOTE ADULT - ASSESSMENT
77Y M w/ Non-Oliguric iATN on underlying CKD 3 (baseline sCr at 1.2-1.4) s/p renal transplant 2007 h/o PKD s/p DENNY occlusion repair complicated by pericardial effusion s/p chest washout, course complicated by cardiogenic shock, with improving renal function and  tacro level mostly with in limits       #Non-Oliguric iATN on underlying CKD 3 (baseline sCr at 1.2-1.4)   Likely pre-renal initially but given prolonged hypoperfusion developed into iATN   sCr now at 1.95 today. Likely around new baseline   - Pt w/ improved PO intake. Pt appears euvolemic today. Maintain mild net neg fluid balance, c/w diuresis to achieve this. Can be transitioned to PO diuresis in the next 24-48 hours if hemodynamics remain stable  - Strict I/Os      Transplant - 2007, initial failure 2/2 PCKD  - tacro level noted 5.5 today. Currently on home dose of 2mg AM and 1mg PM   - Hold PM dose today and start 1mg BID tacro from tomorrow 5/16. Check next tacro level on 5/17 AM 30 min prior to next dose or 12 hour after previous dose  - Will continue to follow level to adjust dosing as his new baseline Cr may be higher than before and may need a lower dose eventually   - Tacro level goal of 4-5  - continue to draw trough 30 minutes before AM dose of tacro   - Continue home prednisone 5mg PO QD

## 2023-05-15 NOTE — PROGRESS NOTE ADULT - SUBJECTIVE AND OBJECTIVE BOX
Patient is a 77y Male seen and evaluated at bedside.       Meds:    acetaminophen     Tablet .. 650 every 6 hours PRN  acetylcysteine 10%  Inhalation 4 every 6 hours  aspirin  chewable 81 daily  atorvastatin 20 at bedtime  buMETAnide Injectable 2 daily  cyanocobalamin 1000 daily  dextrose 5%. 1000 <Continuous>  dextrose 5%. 1000 <Continuous>  dextrose 50% Injectable 12.5 once  dextrose 50% Injectable 25 once  dextrose 50% Injectable 25 once  dextrose Oral Gel 15 once PRN  folic acid 1 daily  glucagon  Injectable 1 once  glucagon  Injectable 1 once  glucagon  Injectable 1 once  heparin   Injectable 5000 every 12 hours  insulin glargine Injectable (LANTUS) 12 at bedtime  insulin lispro (ADMELOG) corrective regimen sliding scale  Before meals and at bedtime  levalbuterol Inhalation 0.31 every 6 hours  metoprolol succinate ER 50 daily  oxyCODONE    IR 5 every 6 hours PRN  pantoprazole    Tablet 40 before breakfast  polyethylene glycol 3350 17 daily  predniSONE   Tablet 5 daily  senna 2 at bedtime  sodium chloride 0.9% lock flush 3 every 8 hours  tacrolimus 1 at bedtime  tacrolimus 2 <User Schedule>      T(C): , Max: 37 (05-14-23 @ 18:01)  T(F): , Max: 98.6 (05-14-23 @ 18:01)  HR: 74 (05-15-23 @ 14:00)  BP: 130/77 (05-15-23 @ 14:00)  BP(mean): 99 (05-15-23 @ 14:00)  RR: 16 (05-15-23 @ 14:00)  SpO2: 97% (05-15-23 @ 14:00)  Wt(kg): --    05-14 @ 07:01  -  05-15 @ 07:00  --------------------------------------------------------  IN: 340 mL / OUT: 1410 mL / NET: -1070 mL    05-15 @ 07:01  -  05-15 @ 15:12  --------------------------------------------------------  IN: 250 mL / OUT: 200 mL / NET: 50 mL          Review of Systems:  ROS negative except as per HPI      PHYSICAL EXAM:  GENERAL: NAD, sitting in chair on RA   NECK: supple, No JVD  CHEST/LUNG:  Decreased BS b/l in bases, no accessory muscle use  HEART: normal S1S2, RRR  ABDOMEN: Soft, Nontender  EXTREMITIES: trace pitting edema b/l LE   NEUROLOGY: AAO x3, no focal neurological deficit      LABS:                        8.9    7.26  )-----------( 408      ( 15 May 2023 03:59 )             29.2     05-15    141  |  104  |  52<H>  ----------------------------<  64<L>  4.3   |  26  |  1.95<H>    Ca    9.5      15 May 2023 03:59  Phos  2.9     05-15  Mg     2.1     05-15    TPro  5.9<L>  /  Alb  3.5  /  TBili  1.6<H>  /  DBili  x   /  AST  25  /  ALT  9<L>  /  AlkPhos  72  05-15      PT/INR - ( 15 May 2023 03:59 )   PT: 15.3 sec;   INR: 1.28          PTT - ( 15 May 2023 03:59 )  PTT:29.8 sec          RADIOLOGY & ADDITIONAL STUDIES:

## 2023-05-15 NOTE — PROGRESS NOTE ADULT - ASSESSMENT
Assessment/Plan:    78yo M with past medical history significant for HTN, HLD, DM, hx of PKD (hx of kidney transplant in ), hx of prostate cancer (s/p radical prostatectomy in ), DVT (), AF/AFL (hx of ablation in ).       Patient initially presented to out facility and underwent percutaneous left atrial appendage closure with Dr. Sidhu 23. Was transferred to telemetry postop. Overnight PEA arrest- ROSC s/p ACLS. Patient intubated and transferred to ICU. TTE with evidence of pericardial tamponade s/p tap with removal of 600cc heme fluid; transfused 2uPRBC. Taken to OR 23 for sternotomy with 1.5L L hemothorax evacuated. Transferred back to ICU intubated on multi-pressor support.  he was extubated and started to wean pressor support.  right pigtail placed for effusion (850cc drained)- placed on BiPAP for worsening dyspnea and atelectasis. 23 R central line placed. Started in diuresis with Bumex. 23 renal function worsening, swan placed, started on dobutamine and milrinone. Bronch done and started on empiric antibiotics. Dobhoff placed. 23 low urine output, continued diuresis. Tacrolimus held.     5/2Given albumins throughout day, low urine output, given bumex towards end of day. Abx deescalated to ceftriaxone. Tacro on hold. Fuad started to achieve elevated MAPs for renal perfusion  5/3 intubated, Vancomycin started for MSSA in BAL   Tacro restarted   found to have R prox brachial DVT and SVT in basilic vein. Procrit on hold. Primacor decreased to .125   weaned off pressors. Extubated.  at 2.5 off primacor. Post extubation awake bronch   POCUS RUE for potential midline revealed DVT extended into axilla, no midline; PIV placed.  weaned   this AM hypoglycemic, not improved with PO and given D10 IV. Ambulated with PT   L pleural effusion sp pigtail cb pneumothorax.      POD19 left atrial appendage occlusion percutaneously (Nahum, 23)  POD18 RTOR for L hemothorax evacuation (23)  NC  DAYO-improving  Serial FS  Monitor urine output  Bblocker-uptitrate prn  Aspirin start  Acute post operative anemia-trend H/H  Continue IS for renal transplant  Appreciate heme/onc recs  Diet as tolerated  Replete lytes prn  GI/DVT PPX  Bowel Regimen  Pain control  OOB with PT  Close hemodynamic, ventilatory and drain monitoring and management per post op routine  Monitor for arrhythmias and monitor parameters for organ perfusion  Beta blockade not administered due to hemodynamic instability and bradycardia  Monitor neurologic status  Head of the bed should remain elevated to 45 deg   Chest PT and IS will be encouraged  Monitor adequacy of oxygenation and ventilation and attempt to wean oxygen  Antibiotic regimen will be tailored to the clinical, laboratory and microbiologic data      Neurovascular:   -Pain well controlled with current regimen. PRN's:    Cardiovascular:   -Hemodynamically stable.   -Monitor: BP, HR, tele    Respiratory:   -Oxygenating well on room air  -Encourage continued use of IS 10x/hr and frequent ambulation  -CXR:    GI:  -GI PPX:  -PO Diet  -Bowel Regimen:     Renal / :  -Continue to monitor renal function: BUN/Cr  -Monitor I/O's daily     Endocrine:    -No hx of DM or thyroid dx  -A1c:  -TSH:    Hematologic:  -CBC: H/H-  -Coagulation Panel.    ID:  -Temperature:   -CBC: WBC-  -Continue to observe for SIRS/Sepsis Syndrome.    Prophylaxis:  -DVT prophylaxis with 5000 SubQ Heparin q8h.  -Continue with SCD's b/l while patient is at rest     Disposition:  -Discharge home once patient is medically ready  Assessment/Plan:    78yo M with past medical history significant for HTN, HLD, DM, hx of PKD (hx of kidney transplant in ), hx of prostate cancer (s/p radical prostatectomy in ), DVT (), AF/AFL (hx of ablation in ).       Patient initially presented to out facility and underwent percutaneous left atrial appendage closure with Dr. Sidhu 23. Was transferred to telemetry postop. Overnight PEA arrest- ROSC s/p ACLS. Patient intubated and transferred to ICU. TTE with evidence of pericardial tamponade s/p tap with removal of 600cc heme fluid; transfused 2uPRBC. Taken to OR 23 for sternotomy with 1.5L L hemothorax evacuated. Transferred back to ICU intubated on multi-pressor support.  he was extubated and started to wean pressor support.  right pigtail placed for effusion (850cc drained)- placed on BiPAP for worsening dyspnea and atelectasis. 23 R central line placed. Started in diuresis with Bumex. 23 renal function worsening, swan placed, started on dobutamine and milrinone. Bronch done and started on empiric antibiotics. Dobhoff placed. 23 low urine output, continued diuresis. Tacrolimus held. Patient reintubated 5/3/23     5/2Given albumins throughout day, low urine output, given bumex towards end of day. Abx deescalated to ceftriaxone. Tacro on hold. Fuad started to achieve elevated MAPs for renal perfusion  5/3 intubated, Vancomycin started for MSSA in BAL   Tacro restarted   found to have R prox brachial DVT and SVT in basilic vein. Procrit on hold. Primacor decreased to .125   weaned off pressors. Extubated.  at 2.5 off primacor. Post extubation awake bronch   POCUS RUE for potential midline revealed DVT extended into axilla, no midline; PIV placed.  weaned   this AM hypoglycemic, not improved with PO and given D10 IV. Ambulated with PT   L pleural effusion sp pigtail cb pneumothorax.      POD19 left atrial appendage occlusion percutaneously (Nahum, 23)  POD18 RTOR for L hemothorax evacuation (23)  NC  DAYO-improving  Serial FS  Monitor urine output  Bblocker-uptitrate prn  Aspirin start  Acute post operative anemia-trend H/H  Continue IS for renal transplant  Appreciate heme/onc recs  Diet as tolerated  Replete lytes prn  GI/DVT PPX  Bowel Regimen  Pain control  OOB with PT  Close hemodynamic, ventilatory and drain monitoring and management per post op routine  Monitor for arrhythmias and monitor parameters for organ perfusion  Beta blockade not administered due to hemodynamic instability and bradycardia  Monitor neurologic status  Head of the bed should remain elevated to 45 deg   Chest PT and IS will be encouraged  Monitor adequacy of oxygenation and ventilation and attempt to wean oxygen  Antibiotic regimen will be tailored to the clinical, laboratory and microbiologic data      Neurovascular:   -Pain well controlled with current regimen. PRN's:    Cardiovascular:   -Hemodynamically stable.   -Monitor: BP, HR, tele    Respiratory:   -Oxygenating well on room air  -Encourage continued use of IS 10x/hr and frequent ambulation  -CXR:    GI:  -GI PPX:  -PO Diet  -Bowel Regimen:     Renal / :  -Continue to monitor renal function: BUN/Cr  -Monitor I/O's daily     Endocrine:    -No hx of DM or thyroid dx  -A1c:  -TSH:    Hematologic:  -CBC: H/H-  -Coagulation Panel.    ID:  -Temperature:   -CBC: WBC-  -Continue to observe for SIRS/Sepsis Syndrome.    Prophylaxis:  -DVT prophylaxis with 5000 SubQ Heparin q8h.  -Continue with SCD's b/l while patient is at rest     Disposition:  -Discharge home once patient is medically ready  Assessment/Plan:    78yo M with past medical history significant for HTN, HLD, DM, hx of PKD (hx of kidney transplant in ), hx of prostate cancer (s/p radical prostatectomy in ), DVT (), AF/AFL (hx of ablation in ). Patient initially presented to out facility and underwent percutaneous left atrial appendage closure with Dr. Sidhu 23. Was transferred to telemetry postop. Overnight PEA arrest- ROSC s/p ACLS. Patient intubated and transferred to ICU. TTE with evidence of pericardial tamponade s/p tap with removal of 600cc heme fluid; transfused 2uPRBC. Taken to OR 23 for sternotomy with 1.5L L hemothorax evacuated. Transferred back to ICU intubated on multi-pressor support.  he was extubated and started to wean pressor support.  right pigtail placed for effusion (850cc drained)- placed on BiPAP for worsening dyspnea and atelectasis. 23 R central line placed. Started in diuresis with Bumex. 23 renal function worsening, swan placed, started on dobutamine and milrinone. Bronch done and started on empiric antibiotics. Dobhoff placed. 23 low urine output, continued diuresis. Tacrolimus held. Patient reintubated 5/3/23 and started on vanc for MSSA (on BAL). Patient restarted on Tacrolimus 23.  R prox brachial DVT and SVT in basilic vein found 23. Inotropic support decreased.    weaned off pressors. Extubated.  at 2.5 off primacor. Post extubation awake bronch   POCUS RUE for potential midline revealed DVT extended into axilla, no midline; PIV placed.  weaned   this AM hypoglycemic, not improved with PO and given D10 IV. Ambulated with PT   L pleural effusion sp pigtail cb pneumothorax.      POD19 left atrial appendage occlusion percutaneously (Nahum, 23)  POD18 RTOR for L hemothorax evacuation (23)  NC  DAYO-improving  Serial FS  Monitor urine output  Bblocker-uptitrate prn  Aspirin start  Acute post operative anemia-trend H/H  Continue IS for renal transplant  Appreciate heme/onc recs  Diet as tolerated  Replete lytes prn  GI/DVT PPX  Bowel Regimen  Pain control  OOB with PT  Close hemodynamic, ventilatory and drain monitoring and management per post op routine  Monitor for arrhythmias and monitor parameters for organ perfusion  Beta blockade not administered due to hemodynamic instability and bradycardia  Monitor neurologic status  Head of the bed should remain elevated to 45 deg   Chest PT and IS will be encouraged  Monitor adequacy of oxygenation and ventilation and attempt to wean oxygen  Antibiotic regimen will be tailored to the clinical, laboratory and microbiologic data      Neurovascular:   -Pain well controlled with current regimen. PRN's:    Cardiovascular:   -Hemodynamically stable.   -Monitor: BP, HR, tele    Respiratory:   -Oxygenating well on room air  -Encourage continued use of IS 10x/hr and frequent ambulation  -CXR:    GI:  -GI PPX:  -PO Diet  -Bowel Regimen:     Renal / :  -Continue to monitor renal function: BUN/Cr  -Monitor I/O's daily     Endocrine:    -No hx of DM or thyroid dx  -A1c:  -TSH:    Hematologic:  -CBC: H/H-  -Coagulation Panel.    ID:  -Temperature:   -CBC: WBC-  -Continue to observe for SIRS/Sepsis Syndrome.    Prophylaxis:  -DVT prophylaxis with 5000 SubQ Heparin q8h.  -Continue with SCD's b/l while patient is at rest     Disposition:  -Discharge home once patient is medically ready  Assessment/Plan:    76yo M with past medical history significant for HTN, HLD, DM, hx of PKD (hx of kidney transplant in 2007), hx of prostate cancer (s/p radical prostatectomy in 2015), DVT (2006), AF/AFL (hx of ablation in 2018). Patient initially presented to out facility and underwent percutaneous left atrial appendage closure with Dr. Sidhu 4/25/23. Was transferred to telemetry postop. Overnight PEA arrest- ROSC s/p ACLS. Patient intubated and transferred to ICU. TTE with evidence of pericardial tamponade s/p tap with removal of 600cc heme fluid; transfused 2uPRBC. Taken to OR 4/26/23 for sternotomy with 1.5L L hemothorax evacuated. Transferred back to ICU intubated on multi-pressor support. 4/27 he was extubated and started to wean pressor support. 4/29 right pigtail placed for effusion (850cc drained)- placed on BiPAP for worsening dyspnea and atelectasis. 4/30/23 R central line placed. Started in diuresis with Bumex. 5/1/23 renal function worsening, swan placed, started on dobutamine and milrinone. Bronch done and started on empiric antibiotics. Dobhoff placed. 5/2/23 low urine output, continued diuresis. Tacrolimus held. Patient reintubated 5/3/23 and started on vanc for MSSA (on BAL). Patient restarted on Tacrolimus 5/4/23.  R prox brachial DVT and SVT in basilic vein found 5/5/23. Inotropic support decreased. Patient extubated 5/7/23, pressors weaned, milrinone off. 5/8/23 ultrasound showed DVT extending into axilla, PIV placed. 5/12/23 given IV D10 for hypoglycemia. 5/13 L pleural effusion s/p pigtail complicated by pneumothorax. Patient now transferred to the floor for further management.       Neurovascular:   -Pain well controlled with current regimen. PRN's: oxy5, tylenol     Cardiovascular:   - s/p left atrial appendage occlusion percutaneously (Nahum, 4/25/23)  - postoperative course complicated by: PEA arrest, pericardial tamponade, cardiogenic shock   - continue ASA, Toprol-XL 50mg qd, lipitor   -Hemodynamically stable.   -Monitor: BP, HR, tele    Respiratory:   -Oxygenating well on room air  -Encourage continued use of IS 10x/hr and frequent ambulation  -CXR today with small left pleural effusion, no pneumo     GI:  -GI PPX: protonix   -PO Diet  -Bowel Regimen: miralax/senna     Renal / :  - hx of renal transplant; on tacrolimus and prednisone   - DAYO improving   - renal following; continue gentle diuresis with Bumex   - continue to monitor renal function: BUN/Cr 1.95/52  - monitor I/O's daily     Endocrine:    - ***    Hematologic:  - Acute post operative anemia-trend H/H  -CBC: H/H- 8.9/29.2  -Coagulation Panel.    ID:  -Temperature: afebrile   -CBC: WBC- 7.26  -Continue to observe for SIRS/Sepsis Syndrome.    Prophylaxis:  -DVT prophylaxis with 5000 SubQ Heparin q8h.  -Continue with SCD's b/l while patient is at rest     Disposition:  -Discharge home once patient is medically ready  Assessment/Plan:    78yo M with past medical history significant for HTN, HLD, DM, hx of PKD (hx of kidney transplant in 2007), hx of prostate cancer (s/p radical prostatectomy in 2015), DVT (2006), AF/AFL (hx of ablation in 2018). Patient initially presented to out facility and underwent percutaneous left atrial appendage closure with Dr. Sidhu 4/25/23. Was transferred to telemetry postop. Overnight PEA arrest- ROSC s/p ACLS. Patient intubated and transferred to ICU. TTE with evidence of pericardial tamponade s/p tap with removal of 600cc heme fluid; Taken to OR 4/26/23 for sternotomy with 1.5L L hemothorax evacuated. Transferred back to ICU intubated on multi-pressor support. 4/27 he was extubated and started to wean pressor support. 4/29 right pigtail placed for effusion (850cc drained)- placed on BiPAP for worsening dyspnea and atelectasis. 4/30/23 R central line placed. Started in diuresis with Bumex. 5/1/23 renal function worsening, swan placed, started on dobutamine and milrinone. Bronch done and started on empiric antibiotics. Dobhoff placed. 5/2/23 low urine output, continued diuresis. Tacrolimus held. Patient reintubated 5/3/23 and started on vanc for MSSA (on BAL). Patient restarted on Tacrolimus 5/4/23.  R prox brachial DVT and SVT in basilic vein found 5/5/23. Inotropic support decreased. Patient extubated 5/7/23, pressors weaned, milrinone off. 5/8/23 ultrasound showed DVT extending into axilla, PIV placed. 5/12/23 given IV D10 for hypoglycemia. 5/13 L pleural effusion s/p pigtail complicated by pneumothorax. Patient now transferred to the floor for further management.       Neurovascular:   -Pain well controlled with current regimen. PRN's: oxy5, tylenol     Cardiovascular:   - s/p left atrial appendage occlusion percutaneously (Nahum, 4/25/23)  - postoperative course complicated by: PEA arrest, pericardial tamponade, cardiogenic shock   - continue ASA, Toprol-XL 50mg qd, lipitor   -Hemodynamically stable.   -Monitor: BP, HR, tele    Respiratory:   -Oxygenating well on room air  -Encourage continued use of IS 10x/hr and frequent ambulation  -CXR today with small left pleural effusion, no pneumo     GI:  -GI PPX: protonix   -PO Diet  -Bowel Regimen: miralax/senna     Renal / :  - hx of renal transplant; on tacrolimus and prednisone   - DAYO improving   - renal following; continue gentle diuresis with Bumex   - continue to monitor renal function: BUN/Cr 1.95/52  - monitor I/O's daily     Endocrine:    - ***    Hematologic:  - Acute post operative anemia-trend H/H  -CBC: H/H- 8.9/29.2  -Coagulation Panel.    ID:  -Temperature: afebrile   -CBC: WBC- 7.26  -Continue to observe for SIRS/Sepsis Syndrome.    Prophylaxis:  -DVT prophylaxis with 5000 SubQ Heparin q8h.  -Continue with SCD's b/l while patient is at rest     Disposition:  -Discharge home once patient is medically ready  Assessment/Plan:    76yo M with past medical history significant for HTN, HLD, DM, hx of PKD (hx of kidney transplant in 2007), hx of prostate cancer (s/p radical prostatectomy in 2015), DVT (2006), AF/AFL (hx of ablation in 2018). Patient initially presented to out facility and underwent percutaneous left atrial appendage closure with Dr. Sidhu 4/25/23. Was transferred to telemetry postop. Overnight PEA arrest- ROSC s/p ACLS. Patient intubated and transferred to ICU. TTE with evidence of pericardial tamponade s/p tap with removal of 600cc heme fluid; Taken to OR 4/26/23 for sternotomy with 1.5L L hemothorax evacuated. Transferred back to ICU intubated on multi-pressor support. 4/27 he was extubated and started to wean pressor support. 4/29 right pigtail placed for effusion (850cc drained)- placed on BiPAP for worsening dyspnea and atelectasis. 4/30/23 R central line placed. Started in diuresis with Bumex. 5/1/23 renal function worsening, swan placed, started on dobutamine and milrinone. Bronch done and started on empiric antibiotics. Dobhoff placed. 5/2/23 low urine output, continued diuresis. Tacrolimus held. Patient reintubated 5/3/23 and started on vanc for MSSA (on BAL). Patient restarted on Tacrolimus 5/4/23.  R prox brachial DVT and SVT in basilic vein found 5/5/23. Inotropic support decreased. Patient extubated 5/7/23, pressors weaned, milrinone off. 5/8/23 ultrasound showed DVT extending into axilla, PIV placed. 5/12/23 given IV D10 for hypoglycemia. 5/13 L pleural effusion s/p pigtail complicated by pneumothorax. Patient now transferred to the floor for further management.       Neurovascular:   -Pain well controlled with current regimen. PRN's: oxy5, tylenol     Cardiovascular:   - s/p left atrial appendage occlusion percutaneously (Nahum, 4/25/23)  - postoperative course complicated by: PEA arrest, pericardial tamponade, cardiogenic shock   - continue ASA, Toprol-XL 50mg qd, lipitor   - Hemodynamically stable.   - Monitor: BP, HR, tele    Respiratory:   -Oxygenating well on room air  -Encourage continued use of IS 10x/hr and frequent ambulation  -CXR today with small left pleural effusion, no pneumo; continue gentle diuresis per renal     GI:  -GI PPX: protonix   -PO Diet  -Bowel Regimen: miralax/senna     Renal / :  - hx of renal transplant; on tacrolimus and prednisone   - postop DAYO improving   - renal following; continue gentle diuresis with Bumex   - continue to monitor renal function: BUN/Cr 1.95/52  - monitor I/O's daily     Endocrine:    - A1c: 6.0  - will decrease lantus to 10u (labile glucose levels)    Hematologic:  -Acute post operative anemia (improved)  -CBC: H/H- 8.9/29.2  -Coagulation Panel.    ID:  -Temperature: afebrile   -CBC: WBC- 7.26  -Continue to observe for SIRS/Sepsis Syndrome.    Prophylaxis:  -DVT prophylaxis with 5000 SubQ Heparin q8h.  -Continue with SCD's b/l while patient is at rest     Disposition:  -Discharge home once patient is medically ready  Assessment/Plan:    78yo M with past medical history significant for HTN, HLD, DM, hx of PKD (hx of kidney transplant in 2007), hx of prostate cancer (s/p radical prostatectomy in 2015), DVT (2006), AF/AFL (hx of ablation in 2018). Patient initially presented to out facility and underwent percutaneous left atrial appendage closure with Dr. Sidhu 4/25/23. Was transferred to telemetry postop. Overnight PEA arrest- ROSC s/p ACLS. Patient intubated and transferred to ICU. TTE with evidence of pericardial tamponade s/p tap with removal of 600cc heme fluid; Taken to OR 4/26/23 for sternotomy with 1.5L L hemothorax evacuated. Transferred back to ICU intubated on multi-pressor support. 4/27 he was extubated and started to wean pressor support. 4/29 right pigtail placed for effusion (850cc drained)- placed on BiPAP for worsening dyspnea and atelectasis. 4/30/23 R central line placed. Started in diuresis with Bumex. 5/1/23 renal function worsening, swan placed, started on dobutamine and milrinone. Bronch done and started on empiric antibiotics. Dobhoff placed. 5/2/23 low urine output, continued diuresis. Tacrolimus held. Patient reintubated 5/3/23 and started on vanc for MSSA (on BAL). Patient restarted on Tacrolimus 5/4/23.  R prox brachial DVT and SVT in basilic vein found 5/5/23. Inotropic support decreased. Patient extubated 5/7/23, pressors weaned, milrinone off. 5/8/23 ultrasound showed DVT extending into axilla, PIV placed. 5/12/23 given IV D10 for hypoglycemia. 5/13 L pleural effusion s/p pigtail complicated by pneumothorax. Patient now transferred to the floor for further management.       Neurovascular:   -Pain well controlled with current regimen. PRN's: oxy5, tylenol     Cardiovascular:   - s/p left atrial appendage occlusion percutaneously (Nahum, 4/25/23)  - postoperative course complicated by: PEA arrest, pericardial tamponade, cardiogenic shock   - continue ASA, Toprol-XL 50mg qd, lipitor   - Hemodynamically stable.   - Monitor: BP, HR, tele    Respiratory:   -Oxygenating well on room air  -Encourage continued use of IS 10x/hr and frequent ambulation  -CXR today with small left pleural effusion, no pneumo; continue gentle diuresis per renal     GI:  -GI PPX: protonix   -PO Diet  -Bowel Regimen: miralax/senna     Renal / :  - hx of renal transplant; on tacrolimus 1mg and prednisone 5mg (needs daily tacrolimus levels)  - postop DAYO improving   - renal following; continue gentle diuresis with Bumex   - continue to monitor renal function: BUN/Cr 1.95/52  - monitor I/O's daily     Endocrine:    - A1c: 6.0  - will decrease lantus to 10u (labile glucose levels)    Hematologic:  -Acute post operative anemia (improved)  -CBC: H/H- 8.9/29.2  -Coagulation Panel.    ID:  -Temperature: afebrile   -CBC: WBC- 7.26  -Continue to observe for SIRS/Sepsis Syndrome.    Prophylaxis:  -DVT prophylaxis with 5000 SubQ Heparin q8h.  -Continue with SCD's b/l while patient is at rest     Disposition:  -Discharge home once patient is medically ready

## 2023-05-15 NOTE — PROGRESS NOTE ADULT - SUBJECTIVE AND OBJECTIVE BOX
Patient discussed on morning rounds with Dr. Sidhu.    OPERATION & DATE: percutaneous left atrial appendage occlusion 4/25/23    SUBJECTIVE ASSESSMENT:    VITAL SIGNS:  Vital Signs Last 24 Hrs  T(C): 36.7 (15 May 2023 09:00), Max: 37 (14 May 2023 18:01)  T(F): 98 (15 May 2023 09:00), Max: 98.6 (14 May 2023 18:01)  HR: 73 (15 May 2023 15:00) (73 - 97)  BP: 137/78 (15 May 2023 15:00) (130/77 - 145/92)  BP(mean): 102 (15 May 2023 15:00) (96 - 114)  RR: 18 (15 May 2023 15:00) (16 - 18)  SpO2: 97% (15 May 2023 15:00) (94% - 100%)    Parameters below as of 15 May 2023 15:00  Patient On (Oxygen Delivery Method): nasal cannula w/ humidification  O2 Flow (L/min): 2    I&O's Detail    14 May 2023 07:01  -  15 May 2023 07:00  --------------------------------------------------------  IN:    Oral Fluid: 340 mL  Total IN: 340 mL    OUT:    Drain (mL): 10 mL    Voided (mL): 1400 mL  Total OUT: 1410 mL    Total NET: -1070 mL      15 May 2023 07:01  -  15 May 2023 15:47  --------------------------------------------------------  IN:    Oral Fluid: 250 mL  Total IN: 250 mL    OUT:    Voided (mL): 200 mL  Total OUT: 200 mL    Total NET: 50 mL      PHYSICAL EXAM:  General: NAD, sitting comfortably in chair, conversing appropriately  Neurological: alert and oriented, UE and LE strength equal b/l, facial symmetry present  Cardiovascular: RRR, Clear S1 and S2, no murmurs appreciated  Respiratory: chest expansion symmetrical, CTA b/l, no wheezing noted  Gastrointestinal: +BS, soft, NT, ND  Extremities: moving spontaneously, no calf tenderness or edema.  Vascular: warm, well perfused. DP/PT pulses palpable b/l.  CT site C/D/I (removed yesterday) **  R radial art line **    LABS:                        8.9    7.26  )-----------( 408      ( 15 May 2023 03:59 )             29.2     PT/INR - ( 15 May 2023 03:59 )   PT: 15.3 sec;   INR: 1.28          PTT - ( 15 May 2023 03:59 )  PTT:29.8 sec  05-15    141  |  104  |  52<H>  ----------------------------<  64<L>  4.3   |  26  |  1.95<H>    Ca    9.5      15 May 2023 03:59  Phos  2.9     05-15  Mg     2.1     05-15    TPro  5.9<L>  /  Alb  3.5  /  TBili  1.6<H>  /  DBili  x   /  AST  25  /  ALT  9<L>  /  AlkPhos  72  05-15      MEDICATIONS  (STANDING):  acetylcysteine 10%  Inhalation 4 milliLiter(s) Inhalation every 6 hours  aspirin  chewable 81 milliGRAM(s) Oral daily  atorvastatin 20 milliGRAM(s) Oral at bedtime  buMETAnide Injectable 2 milliGRAM(s) IV Push daily  cyanocobalamin 1000 MICROGram(s) Oral daily  dextrose 5%. 1000 milliLiter(s) (100 mL/Hr) IV Continuous <Continuous>  dextrose 5%. 1000 milliLiter(s) (50 mL/Hr) IV Continuous <Continuous>  dextrose 50% Injectable 25 Gram(s) IV Push once  dextrose 50% Injectable 25 Gram(s) IV Push once  dextrose 50% Injectable 12.5 Gram(s) IV Push once  folic acid 1 milliGRAM(s) Oral daily  glucagon  Injectable 1 milliGRAM(s) IntraMuscular once  glucagon  Injectable 1 milliGRAM(s) IntraMuscular once  glucagon  Injectable 1 milliGRAM(s) IntraMuscular once  heparin   Injectable 5000 Unit(s) SubCutaneous every 12 hours  insulin glargine Injectable (LANTUS) 12 Unit(s) SubCutaneous at bedtime  insulin lispro (ADMELOG) corrective regimen sliding scale   SubCutaneous Before meals and at bedtime  levalbuterol Inhalation 0.31 milliGRAM(s) Inhalation every 6 hours  metoprolol succinate ER 50 milliGRAM(s) Oral daily  pantoprazole    Tablet 40 milliGRAM(s) Oral before breakfast  polyethylene glycol 3350 17 Gram(s) Oral daily  predniSONE   Tablet 5 milliGRAM(s) Oral daily  senna 2 Tablet(s) Oral at bedtime  sodium chloride 0.9% lock flush 3 milliLiter(s) IV Push every 8 hours  tacrolimus 1 milliGRAM(s) Oral at bedtime  tacrolimus 2 milliGRAM(s) Oral <User Schedule>    MEDICATIONS  (PRN):  acetaminophen     Tablet .. 650 milliGRAM(s) Oral every 6 hours PRN Mild Pain (1 - 3)  dextrose Oral Gel 15 Gram(s) Oral once PRN Blood Glucose LESS THAN 70 milliGRAM(s)/deciliter  oxyCODONE    IR 5 milliGRAM(s) Oral every 6 hours PRN Moderate Pain (4 - 6)    RADIOLOGY & ADDITIONAL TESTS:     Patient discussed on morning rounds with Dr. Sidhu.    OPERATION & DATE: percutaneous left atrial appendage occlusion 4/25/23    SUBJECTIVE ASSESSMENT: Patient seen resting in bed in NAD. Denies current chest pain, SOB, palpitations, N/V/D.    VITAL SIGNS:  Vital Signs Last 24 Hrs  T(C): 36.7 (15 May 2023 09:00), Max: 37 (14 May 2023 18:01)  T(F): 98 (15 May 2023 09:00), Max: 98.6 (14 May 2023 18:01)  HR: 73 (15 May 2023 15:00) (73 - 97)  BP: 137/78 (15 May 2023 15:00) (130/77 - 145/92)  BP(mean): 102 (15 May 2023 15:00) (96 - 114)  RR: 18 (15 May 2023 15:00) (16 - 18)  SpO2: 97% (15 May 2023 15:00) (94% - 100%)    Parameters below as of 15 May 2023 15:00  Patient On (Oxygen Delivery Method): nasal cannula w/ humidification  O2 Flow (L/min): 2    I&O's Detail    14 May 2023 07:01  -  15 May 2023 07:00  --------------------------------------------------------  IN:    Oral Fluid: 340 mL  Total IN: 340 mL    OUT:    Drain (mL): 10 mL    Voided (mL): 1400 mL  Total OUT: 1410 mL    Total NET: -1070 mL      15 May 2023 07:01  -  15 May 2023 15:47  --------------------------------------------------------  IN:    Oral Fluid: 250 mL  Total IN: 250 mL    OUT:    Voided (mL): 200 mL  Total OUT: 200 mL    Total NET: 50 mL      PHYSICAL EXAM:  General: NAD, sitting comfortably in chair, conversing appropriately  Neurological: alert and oriented, UE and LE strength equal b/l, facial symmetry present  Cardiovascular: RRR, Clear S1 and S2, no murmurs appreciated  Respiratory: chest expansion symmetrical, CTA b/l, no wheezing noted  Gastrointestinal: +BS, soft, NT, ND  Extremities: moving spontaneously, no calf tenderness or edema.  Vascular: warm, well perfused. DP/PT pulses palpable b/l.  CT site C/D/I (removed yesterday) **  R radial art line **    LABS:                        8.9    7.26  )-----------( 408      ( 15 May 2023 03:59 )             29.2     PT/INR - ( 15 May 2023 03:59 )   PT: 15.3 sec;   INR: 1.28          PTT - ( 15 May 2023 03:59 )  PTT:29.8 sec  05-15    141  |  104  |  52<H>  ----------------------------<  64<L>  4.3   |  26  |  1.95<H>    Ca    9.5      15 May 2023 03:59  Phos  2.9     05-15  Mg     2.1     05-15    TPro  5.9<L>  /  Alb  3.5  /  TBili  1.6<H>  /  DBili  x   /  AST  25  /  ALT  9<L>  /  AlkPhos  72  05-15      MEDICATIONS  (STANDING):  acetylcysteine 10%  Inhalation 4 milliLiter(s) Inhalation every 6 hours  aspirin  chewable 81 milliGRAM(s) Oral daily  atorvastatin 20 milliGRAM(s) Oral at bedtime  buMETAnide Injectable 2 milliGRAM(s) IV Push daily  cyanocobalamin 1000 MICROGram(s) Oral daily  dextrose 5%. 1000 milliLiter(s) (100 mL/Hr) IV Continuous <Continuous>  dextrose 5%. 1000 milliLiter(s) (50 mL/Hr) IV Continuous <Continuous>  dextrose 50% Injectable 25 Gram(s) IV Push once  dextrose 50% Injectable 25 Gram(s) IV Push once  dextrose 50% Injectable 12.5 Gram(s) IV Push once  folic acid 1 milliGRAM(s) Oral daily  glucagon  Injectable 1 milliGRAM(s) IntraMuscular once  glucagon  Injectable 1 milliGRAM(s) IntraMuscular once  glucagon  Injectable 1 milliGRAM(s) IntraMuscular once  heparin   Injectable 5000 Unit(s) SubCutaneous every 12 hours  insulin glargine Injectable (LANTUS) 12 Unit(s) SubCutaneous at bedtime  insulin lispro (ADMELOG) corrective regimen sliding scale   SubCutaneous Before meals and at bedtime  levalbuterol Inhalation 0.31 milliGRAM(s) Inhalation every 6 hours  metoprolol succinate ER 50 milliGRAM(s) Oral daily  pantoprazole    Tablet 40 milliGRAM(s) Oral before breakfast  polyethylene glycol 3350 17 Gram(s) Oral daily  predniSONE   Tablet 5 milliGRAM(s) Oral daily  senna 2 Tablet(s) Oral at bedtime  sodium chloride 0.9% lock flush 3 milliLiter(s) IV Push every 8 hours  tacrolimus 1 milliGRAM(s) Oral at bedtime  tacrolimus 2 milliGRAM(s) Oral <User Schedule>    MEDICATIONS  (PRN):  acetaminophen     Tablet .. 650 milliGRAM(s) Oral every 6 hours PRN Mild Pain (1 - 3)  dextrose Oral Gel 15 Gram(s) Oral once PRN Blood Glucose LESS THAN 70 milliGRAM(s)/deciliter  oxyCODONE    IR 5 milliGRAM(s) Oral every 6 hours PRN Moderate Pain (4 - 6)    RADIOLOGY & ADDITIONAL TESTS:     Patient discussed on morning rounds with Dr. Sidhu.    OPERATION & DATE: percutaneous left atrial appendage occlusion 4/25/23    SUBJECTIVE ASSESSMENT: Patient seen resting in chair bedside in NAD. Denies current chest pain, SOB, palpitations, N/V/D.    VITAL SIGNS:  Vital Signs Last 24 Hrs  T(C): 36.7 (15 May 2023 09:00), Max: 37 (14 May 2023 18:01)  T(F): 98 (15 May 2023 09:00), Max: 98.6 (14 May 2023 18:01)  HR: 73 (15 May 2023 15:00) (73 - 97)  BP: 137/78 (15 May 2023 15:00) (130/77 - 145/92)  BP(mean): 102 (15 May 2023 15:00) (96 - 114)  RR: 18 (15 May 2023 15:00) (16 - 18)  SpO2: 97% (15 May 2023 15:00) (94% - 100%)    Parameters below as of 15 May 2023 15:00  Patient On (Oxygen Delivery Method): nasal cannula w/ humidification  O2 Flow (L/min): 2    I&O's Detail    14 May 2023 07:01  -  15 May 2023 07:00  --------------------------------------------------------  IN:    Oral Fluid: 340 mL  Total IN: 340 mL    OUT:    Drain (mL): 10 mL    Voided (mL): 1400 mL  Total OUT: 1410 mL    Total NET: -1070 mL      15 May 2023 07:01  -  15 May 2023 15:47  --------------------------------------------------------  IN:    Oral Fluid: 250 mL  Total IN: 250 mL    OUT:    Voided (mL): 200 mL  Total OUT: 200 mL    Total NET: 50 mL    PHYSICAL EXAM:  General: NAD, sitting comfortably in chair, conversing appropriately  Neurological: alert and oriented, UE and LE strength equal b/l, facial symmetry present  Cardiovascular: RRR, Clear S1 and S2, no murmurs appreciated  Respiratory: chest expansion symmetrical, mildly diminished in bases bilaterally L>R  Gastrointestinal: +BS, soft, NT, ND  Extremities: 2+ lower extremity edema (L>R)  Vascular: warm, well perfused. DP/PT pulses palpable b/l.    Wounds:  MSI well healing with no oozing or evidence of dehiscence   CT removal sites C/D/I     LABS:                        8.9    7.26  )-----------( 408      ( 15 May 2023 03:59 )             29.2     PT/INR - ( 15 May 2023 03:59 )   PT: 15.3 sec;   INR: 1.28          PTT - ( 15 May 2023 03:59 )  PTT:29.8 sec  05-15    141  |  104  |  52<H>  ----------------------------<  64<L>  4.3   |  26  |  1.95<H>    Ca    9.5      15 May 2023 03:59  Phos  2.9     05-15  Mg     2.1     05-15    TPro  5.9<L>  /  Alb  3.5  /  TBili  1.6<H>  /  DBili  x   /  AST  25  /  ALT  9<L>  /  AlkPhos  72  05-15      MEDICATIONS  (STANDING):  acetylcysteine 10%  Inhalation 4 milliLiter(s) Inhalation every 6 hours  aspirin  chewable 81 milliGRAM(s) Oral daily  atorvastatin 20 milliGRAM(s) Oral at bedtime  buMETAnide Injectable 2 milliGRAM(s) IV Push daily  cyanocobalamin 1000 MICROGram(s) Oral daily  dextrose 5%. 1000 milliLiter(s) (100 mL/Hr) IV Continuous <Continuous>  dextrose 5%. 1000 milliLiter(s) (50 mL/Hr) IV Continuous <Continuous>  dextrose 50% Injectable 25 Gram(s) IV Push once  dextrose 50% Injectable 25 Gram(s) IV Push once  dextrose 50% Injectable 12.5 Gram(s) IV Push once  folic acid 1 milliGRAM(s) Oral daily  glucagon  Injectable 1 milliGRAM(s) IntraMuscular once  glucagon  Injectable 1 milliGRAM(s) IntraMuscular once  glucagon  Injectable 1 milliGRAM(s) IntraMuscular once  heparin   Injectable 5000 Unit(s) SubCutaneous every 12 hours  insulin glargine Injectable (LANTUS) 12 Unit(s) SubCutaneous at bedtime  insulin lispro (ADMELOG) corrective regimen sliding scale   SubCutaneous Before meals and at bedtime  levalbuterol Inhalation 0.31 milliGRAM(s) Inhalation every 6 hours  metoprolol succinate ER 50 milliGRAM(s) Oral daily  pantoprazole    Tablet 40 milliGRAM(s) Oral before breakfast  polyethylene glycol 3350 17 Gram(s) Oral daily  predniSONE   Tablet 5 milliGRAM(s) Oral daily  senna 2 Tablet(s) Oral at bedtime  sodium chloride 0.9% lock flush 3 milliLiter(s) IV Push every 8 hours  tacrolimus 1 milliGRAM(s) Oral at bedtime  tacrolimus 2 milliGRAM(s) Oral <User Schedule>    MEDICATIONS  (PRN):  acetaminophen     Tablet .. 650 milliGRAM(s) Oral every 6 hours PRN Mild Pain (1 - 3)  dextrose Oral Gel 15 Gram(s) Oral once PRN Blood Glucose LESS THAN 70 milliGRAM(s)/deciliter  oxyCODONE    IR 5 milliGRAM(s) Oral every 6 hours PRN Moderate Pain (4 - 6)    RADIOLOGY & ADDITIONAL TESTS:

## 2023-05-16 LAB
ANION GAP SERPL CALC-SCNC: 13 MMOL/L — SIGNIFICANT CHANGE UP (ref 5–17)
BUN SERPL-MCNC: 59 MG/DL — HIGH (ref 7–23)
CALCIUM SERPL-MCNC: 9.2 MG/DL — SIGNIFICANT CHANGE UP (ref 8.4–10.5)
CHLORIDE SERPL-SCNC: 103 MMOL/L — SIGNIFICANT CHANGE UP (ref 96–108)
CO2 SERPL-SCNC: 25 MMOL/L — SIGNIFICANT CHANGE UP (ref 22–31)
CREAT SERPL-MCNC: 1.98 MG/DL — HIGH (ref 0.5–1.3)
EGFR: 35 ML/MIN/1.73M2 — LOW
GLUCOSE BLDC GLUCOMTR-MCNC: 108 MG/DL — HIGH (ref 70–99)
GLUCOSE BLDC GLUCOMTR-MCNC: 185 MG/DL — HIGH (ref 70–99)
GLUCOSE BLDC GLUCOMTR-MCNC: 196 MG/DL — HIGH (ref 70–99)
GLUCOSE BLDC GLUCOMTR-MCNC: 93 MG/DL — SIGNIFICANT CHANGE UP (ref 70–99)
GLUCOSE SERPL-MCNC: 99 MG/DL — SIGNIFICANT CHANGE UP (ref 70–99)
HCT VFR BLD CALC: 32.6 % — LOW (ref 39–50)
HGB BLD-MCNC: 9.8 G/DL — LOW (ref 13–17)
MAGNESIUM SERPL-MCNC: 2.1 MG/DL — SIGNIFICANT CHANGE UP (ref 1.6–2.6)
MCHC RBC-ENTMCNC: 30.1 GM/DL — LOW (ref 32–36)
MCHC RBC-ENTMCNC: 33.4 PG — SIGNIFICANT CHANGE UP (ref 27–34)
MCV RBC AUTO: 111.3 FL — HIGH (ref 80–100)
NRBC # BLD: 0 /100 WBCS — SIGNIFICANT CHANGE UP (ref 0–0)
PHOSPHATE SERPL-MCNC: 3.3 MG/DL — SIGNIFICANT CHANGE UP (ref 2.5–4.5)
PLATELET # BLD AUTO: 349 K/UL — SIGNIFICANT CHANGE UP (ref 150–400)
POTASSIUM SERPL-MCNC: 4.7 MMOL/L — SIGNIFICANT CHANGE UP (ref 3.5–5.3)
POTASSIUM SERPL-SCNC: 4.7 MMOL/L — SIGNIFICANT CHANGE UP (ref 3.5–5.3)
RBC # BLD: 2.93 M/UL — LOW (ref 4.2–5.8)
RBC # FLD: 23 % — HIGH (ref 10.3–14.5)
SODIUM SERPL-SCNC: 141 MMOL/L — SIGNIFICANT CHANGE UP (ref 135–145)
TACROLIMUS SERPL-MCNC: 6.2 NG/ML — SIGNIFICANT CHANGE UP
WBC # BLD: 5.29 K/UL — SIGNIFICANT CHANGE UP (ref 3.8–10.5)
WBC # FLD AUTO: 5.29 K/UL — SIGNIFICANT CHANGE UP (ref 3.8–10.5)

## 2023-05-16 PROCEDURE — 99232 SBSQ HOSP IP/OBS MODERATE 35: CPT

## 2023-05-16 RX ORDER — INSULIN LISPRO 100/ML
2 VIAL (ML) SUBCUTANEOUS
Refills: 0 | Status: DISCONTINUED | OUTPATIENT
Start: 2023-05-16 | End: 2023-05-19

## 2023-05-16 RX ORDER — TACROLIMUS 5 MG/1
1 CAPSULE ORAL
Refills: 0 | Status: DISCONTINUED | OUTPATIENT
Start: 2023-05-16 | End: 2023-05-18

## 2023-05-16 RX ORDER — INSULIN LISPRO 100/ML
2 VIAL (ML) SUBCUTANEOUS ONCE
Refills: 0 | Status: COMPLETED | OUTPATIENT
Start: 2023-05-16 | End: 2023-05-16

## 2023-05-16 RX ORDER — BUMETANIDE 0.25 MG/ML
2 INJECTION INTRAMUSCULAR; INTRAVENOUS DAILY
Refills: 0 | Status: DISCONTINUED | OUTPATIENT
Start: 2023-05-17 | End: 2023-05-21

## 2023-05-16 RX ADMIN — Medication 2 UNIT(S): at 17:04

## 2023-05-16 RX ADMIN — HEPARIN SODIUM 5000 UNIT(S): 5000 INJECTION INTRAVENOUS; SUBCUTANEOUS at 06:08

## 2023-05-16 RX ADMIN — PREGABALIN 1000 MICROGRAM(S): 225 CAPSULE ORAL at 11:44

## 2023-05-16 RX ADMIN — SENNA PLUS 2 TABLET(S): 8.6 TABLET ORAL at 21:53

## 2023-05-16 RX ADMIN — TACROLIMUS 1 MILLIGRAM(S): 5 CAPSULE ORAL at 21:50

## 2023-05-16 RX ADMIN — BUMETANIDE 2 MILLIGRAM(S): 0.25 INJECTION INTRAMUSCULAR; INTRAVENOUS at 08:27

## 2023-05-16 RX ADMIN — INSULIN GLARGINE 10 UNIT(S): 100 INJECTION, SOLUTION SUBCUTANEOUS at 21:52

## 2023-05-16 RX ADMIN — HEPARIN SODIUM 5000 UNIT(S): 5000 INJECTION INTRAVENOUS; SUBCUTANEOUS at 17:38

## 2023-05-16 RX ADMIN — POLYETHYLENE GLYCOL 3350 17 GRAM(S): 17 POWDER, FOR SOLUTION ORAL at 11:44

## 2023-05-16 RX ADMIN — Medication 81 MILLIGRAM(S): at 11:44

## 2023-05-16 RX ADMIN — SODIUM CHLORIDE 3 MILLILITER(S): 9 INJECTION INTRAMUSCULAR; INTRAVENOUS; SUBCUTANEOUS at 21:16

## 2023-05-16 RX ADMIN — LEVALBUTEROL 0.31 MILLIGRAM(S): 1.25 SOLUTION, CONCENTRATE RESPIRATORY (INHALATION) at 06:10

## 2023-05-16 RX ADMIN — ATORVASTATIN CALCIUM 20 MILLIGRAM(S): 80 TABLET, FILM COATED ORAL at 21:49

## 2023-05-16 RX ADMIN — Medication 2 UNIT(S): at 12:33

## 2023-05-16 RX ADMIN — OXYCODONE HYDROCHLORIDE 5 MILLIGRAM(S): 5 TABLET ORAL at 00:50

## 2023-05-16 RX ADMIN — Medication 2: at 21:51

## 2023-05-16 RX ADMIN — Medication 50 MILLIGRAM(S): at 06:08

## 2023-05-16 RX ADMIN — Medication 2: at 11:46

## 2023-05-16 RX ADMIN — Medication 1 MILLIGRAM(S): at 11:44

## 2023-05-16 RX ADMIN — PANTOPRAZOLE SODIUM 40 MILLIGRAM(S): 20 TABLET, DELAYED RELEASE ORAL at 06:08

## 2023-05-16 RX ADMIN — Medication 650 MILLIGRAM(S): at 23:32

## 2023-05-16 RX ADMIN — TACROLIMUS 1 MILLIGRAM(S): 5 CAPSULE ORAL at 10:21

## 2023-05-16 RX ADMIN — SODIUM CHLORIDE 3 MILLILITER(S): 9 INJECTION INTRAMUSCULAR; INTRAVENOUS; SUBCUTANEOUS at 14:00

## 2023-05-16 RX ADMIN — Medication 5 MILLIGRAM(S): at 06:08

## 2023-05-16 RX ADMIN — SODIUM CHLORIDE 3 MILLILITER(S): 9 INJECTION INTRAMUSCULAR; INTRAVENOUS; SUBCUTANEOUS at 06:10

## 2023-05-16 RX ADMIN — Medication 4 MILLILITER(S): at 12:03

## 2023-05-16 RX ADMIN — Medication 4 MILLILITER(S): at 06:09

## 2023-05-16 NOTE — PROGRESS NOTE ADULT - ASSESSMENT
76yo M with past medical history significant for HTN, HLD, DM, hx of PKD (hx of kidney transplant in 2007), hx of prostate cancer (s/p radical prostatectomy in 2015), DVT (2006), AF/AFL (hx of ablation in 2018). Patient initially presented to out facility and underwent percutaneous left atrial appendage closure with Dr. Sidhu 4/25/23. Was transferred to telemetry postop. Overnight PEA arrest- ROSC s/p ACLS. Patient intubated and transferred to ICU. TTE with evidence of pericardial tamponade s/p tap with removal of 600cc heme fluid; Taken to OR 4/26/23 for sternotomy with 1.5L L hemothorax evacuated. Transferred back to ICU intubated on multi-pressor support. 4/27 he was extubated and started to wean pressor support. 4/29 right pigtail placed for effusion (850cc drained)- placed on BiPAP for worsening dyspnea and atelectasis. 4/30/23 R central line placed. Started in diuresis with Bumex. 5/1/23 renal function worsening, swan placed, started on dobutamine and milrinone. Bronch done and started on empiric antibiotics. Dobhoff placed. 5/2/23 low urine output, continued diuresis. Tacrolimus held. Patient reintubated 5/3/23 and started on vanc for MSSA (on BAL). Patient restarted on Tacrolimus 5/4/23.  R prox brachial DVT and SVT in basilic vein found 5/5/23. Inotropic support decreased. Patient extubated 5/7/23, pressors weaned, milrinone off. 5/8/23 ultrasound showed DVT extending into axilla, PIV placed. 5/12/23 given IV D10 for hypoglycemia. 5/13 L pleural effusion s/p pigtail complicated by pneumothorax. Now 5/16 pending bed availability at rehab      Neurovascular:   -Pain well controlled with current regimen. PRN's: oxy5, tylenol     Cardiovascular:   - s/p left atrial appendage occlusion percutaneously (Nahum, 4/25/23)  - postoperative course complicated by: PEA arrest, pericardial tamponade, cardiogenic shock   - continue ASA, Toprol-XL 50mg qd, lipitor   - Hemodynamically stable.   - Monitor: BP, HR, tele    Respiratory:   -Oxygenating well on room air  -Encourage continued use of IS 10x/hr and frequent ambulation  -CXR today with small left pleural effusion, no pneumo; continue gentle diuresis per renal     GI:  -GI PPX: protonix   -PO Diet  -Bowel Regimen: miralax/senna     Renal / :  - hx of renal transplant; on tacrolimus 1mg BID and prednisone 5mg (needs daily tacrolimus levels)  - postop DAYO improving   - renal following; continue gentle diuresis with Bumex, transition to oral   - continue to monitor renal function: BUN/Cr 59/1.98  - monitor I/O's daily     Endocrine:    - A1c: 6.0  - will decrease lantus to 10u (labile glucose levels), added premeal for lunch and dinner    Hematologic:  -Acute post operative anemia (improved)  -CBC: H/H- 9.8/32/6  -Coagulation Panel.    ID:  -Temperature: afebrile   -CBC: WBC-5.29  -Continue to observe for SIRS/Sepsis Syndrome.    Prophylaxis:  -DVT prophylaxis with SCD  -Continue with SCD's b/l while patient is at rest     Disposition:  -Discharge to rehab once bed is available

## 2023-05-16 NOTE — PROGRESS NOTE ADULT - SUBJECTIVE AND OBJECTIVE BOX
Patient discussed on morning rounds with Dr. Yoder    Operation / Date: 4/25 Amulet device  4/26 bedside pericardiaocentesis, then RTOR for sternotomy, washout, and evacuation of pericardial clot    SUBJECTIVE ASSESSMENT:  77y Male seen and examined at bedside. Patient with no complaints. Working with PT, denies chest pain, shortness of breath, nausea, vomiting.        Vital Signs Last 24 Hrs  T(C): 36.4 (16 May 2023 08:54), Max: 36.6 (15 May 2023 17:16)  T(F): 97.5 (16 May 2023 08:54), Max: 97.9 (15 May 2023 17:16)  HR: 86 (16 May 2023 09:00) (73 - 90)  BP: 144/80 (16 May 2023 09:00) (127/68 - 153/94)  BP(mean): 107 (16 May 2023 09:00) (92 - 119)  RR: 16 (16 May 2023 09:00) (16 - 18)  SpO2: 95% (16 May 2023 09:00) (94% - 98%)    Parameters below as of 16 May 2023 09:00  Patient On (Oxygen Delivery Method): room air      I&O's Detail    15 May 2023 07:01  -  16 May 2023 07:00  --------------------------------------------------------  IN:    Oral Fluid: 250 mL  Total IN: 250 mL    OUT:    Voided (mL): 480 mL  Total OUT: 480 mL    Total NET: -230 mL      16 May 2023 07:01  -  16 May 2023 12:40  --------------------------------------------------------  IN:    Oral Fluid: 150 mL  Total IN: 150 mL    OUT:    Voided (mL): 200 mL  Total OUT: 200 mL    Total NET: -50 mL          CHEST TUBE:  No.  MANDY DRAIN:  No.  EPICARDIAL WIRES: No.  TIE DOWNS: No.  SANDHU: No.    PHYSICAL EXAM:    General: NAD, sitting comfortably in chair, conversing appropriately  Neurological: alert and oriented, UE and LE strength equal b/l, facial symmetry present  Cardiovascular: RRR, Clear S1 and S2, no murmurs appreciated  Respiratory: chest expansion symmetrical, mildly diminished in bases bilaterally L>R  Gastrointestinal: +BS, soft, NT, ND  Extremities: 2+ lower extremity edema (L>R)  Vascular: warm, well perfused. DP/PT pulses palpable b/l.    LABS:                        9.8    5.29  )-----------( 349      ( 16 May 2023 05:30 )             32.6       COUMADIN:  No. REASON: .    PT/INR - ( 15 May 2023 03:59 )   PT: 15.3 sec;   INR: 1.28          PTT - ( 15 May 2023 03:59 )  PTT:29.8 sec    05-16    141  |  103  |  59<H>  ----------------------------<  99  4.7   |  25  |  1.98<H>    Ca    9.2      16 May 2023 05:30  Phos  3.3     05-16  Mg     2.1     05-16    TPro  5.9<L>  /  Alb  3.5  /  TBili  1.6<H>  /  DBili  x   /  AST  25  /  ALT  9<L>  /  AlkPhos  72  05-15          MEDICATIONS  (STANDING):  acetylcysteine 10%  Inhalation 4 milliLiter(s) Inhalation every 6 hours  aspirin  chewable 81 milliGRAM(s) Oral daily  atorvastatin 20 milliGRAM(s) Oral at bedtime  cyanocobalamin 1000 MICROGram(s) Oral daily  dextrose 5%. 1000 milliLiter(s) (50 mL/Hr) IV Continuous <Continuous>  dextrose 5%. 1000 milliLiter(s) (100 mL/Hr) IV Continuous <Continuous>  dextrose 50% Injectable 12.5 Gram(s) IV Push once  dextrose 50% Injectable 25 Gram(s) IV Push once  dextrose 50% Injectable 25 Gram(s) IV Push once  folic acid 1 milliGRAM(s) Oral daily  glucagon  Injectable 1 milliGRAM(s) IntraMuscular once  glucagon  Injectable 1 milliGRAM(s) IntraMuscular once  glucagon  Injectable 1 milliGRAM(s) IntraMuscular once  heparin   Injectable 5000 Unit(s) SubCutaneous every 12 hours  insulin glargine Injectable (LANTUS) 10 Unit(s) SubCutaneous at bedtime  insulin lispro (ADMELOG) corrective regimen sliding scale   SubCutaneous Before meals and at bedtime  insulin lispro Injectable (ADMELOG) 2 Unit(s) SubCutaneous before dinner  levalbuterol Inhalation 0.31 milliGRAM(s) Inhalation every 6 hours  metoprolol succinate ER 50 milliGRAM(s) Oral daily  pantoprazole    Tablet 40 milliGRAM(s) Oral before breakfast  polyethylene glycol 3350 17 Gram(s) Oral daily  predniSONE   Tablet 5 milliGRAM(s) Oral daily  senna 2 Tablet(s) Oral at bedtime  sodium chloride 0.9% lock flush 3 milliLiter(s) IV Push every 8 hours  tacrolimus 1 milliGRAM(s) Oral <User Schedule>  tacrolimus 1 milliGRAM(s) Oral at bedtime    MEDICATIONS  (PRN):  acetaminophen     Tablet .. 650 milliGRAM(s) Oral every 6 hours PRN Mild Pain (1 - 3)  dextrose Oral Gel 15 Gram(s) Oral once PRN Blood Glucose LESS THAN 70 milliGRAM(s)/deciliter  oxyCODONE    IR 5 milliGRAM(s) Oral every 6 hours PRN Moderate Pain (4 - 6)        RADIOLOGY & ADDITIONAL TESTS:    < from: Xray Chest 1 View- PORTABLE-Routine (Xray Chest 1 View- PORTABLE-Routine in AM.) (05.15.23 @ 05:19) >  Unchanged small left pleural effusion. Probable pulmonary   venous congestion. Enlarged cardiomediastinal silhouette, unchanged. No   pneumothorax.    < end of copied text >

## 2023-05-16 NOTE — CHART NOTE - NSCHARTNOTEFT_GEN_A_CORE
Admitting Diagnosis:   Patient is a 77y old  Male who presents with a chief complaint of percutaneous DENNY closure (16 May 2023 14:26)    PAST MEDICAL & SURGICAL HISTORY:  Polycystic kidney disease  DM2 (diabetes mellitus, type 2)  HLD (hyperlipidemia)  HTN (hypertension)  Prostate cancer  Kidney transplant recipient  DVT (deep venous thrombosis)  Chronic CHF  H/O radical prostatectomy  2010  S/P hernia repair  Abdominal and inguinal around 2005    Current Nutrition Order:  Diet, Consistent Carbohydrate w/Evening Snack:   Supplement Feeding Modality:  Oral  Nepro Cans or Servings Per Day:  3       Frequency:  Three Times a day (05-15-23 @ 12:20)    PO Intake: Good (%) [   ]  Fair (50-75%) [   ] Poor (<25%) [   ] - - Pt completing ~50% or less of meals per RN and family    GI Issues: No report of N/V/D/C; ordered for Miralax and Senna, +BM 5/16. No abd distention noted.    Pain: Denies, per chart    Skin Integrity: Surgical incisions @ R groin, MSI. Stage III PI @ sacrum. 1+ generalized edema, 2+ edema B/L leg. Madhav 15.    Labs:   05-16    141  |  103  |  59<H>  ----------------------------<  99  4.7   |  25  |  1.98<H>    Ca    9.2      16 May 2023 05:30  Phos  3.3     05-16  Mg     2.1     05-16    TPro  5.9<L>  /  Alb  3.5  /  TBili  1.6<H>  /  DBili  x   /  AST  25  /  ALT  9<L>  /  AlkPhos  72  05-15    CAPILLARY BLOOD GLUCOSE  POCT Blood Glucose.: 196 mg/dL (16 May 2023 11:05)  POCT Blood Glucose.: 93 mg/dL (16 May 2023 07:13)  POCT Blood Glucose.: 204 mg/dL (15 May 2023 22:32)  POCT Blood Glucose.: 230 mg/dL (15 May 2023 21:16)  POCT Blood Glucose.: 130 mg/dL (15 May 2023 16:39)    Medications:  MEDICATIONS  (STANDING):  acetylcysteine 10%  Inhalation 4 milliLiter(s) Inhalation every 6 hours  aspirin  chewable 81 milliGRAM(s) Oral daily  atorvastatin 20 milliGRAM(s) Oral at bedtime  cyanocobalamin 1000 MICROGram(s) Oral daily  dextrose 5%. 1000 milliLiter(s) (100 mL/Hr) IV Continuous <Continuous>  dextrose 5%. 1000 milliLiter(s) (50 mL/Hr) IV Continuous <Continuous>  dextrose 50% Injectable 25 Gram(s) IV Push once  dextrose 50% Injectable 25 Gram(s) IV Push once  dextrose 50% Injectable 12.5 Gram(s) IV Push once  folic acid 1 milliGRAM(s) Oral daily  glucagon  Injectable 1 milliGRAM(s) IntraMuscular once  glucagon  Injectable 1 milliGRAM(s) IntraMuscular once  glucagon  Injectable 1 milliGRAM(s) IntraMuscular once  heparin   Injectable 5000 Unit(s) SubCutaneous every 12 hours  insulin glargine Injectable (LANTUS) 10 Unit(s) SubCutaneous at bedtime  insulin lispro (ADMELOG) corrective regimen sliding scale   SubCutaneous Before meals and at bedtime  insulin lispro Injectable (ADMELOG) 2 Unit(s) SubCutaneous before dinner  levalbuterol Inhalation 0.31 milliGRAM(s) Inhalation every 6 hours  metoprolol succinate ER 50 milliGRAM(s) Oral daily  pantoprazole    Tablet 40 milliGRAM(s) Oral before breakfast  polyethylene glycol 3350 17 Gram(s) Oral daily  predniSONE   Tablet 5 milliGRAM(s) Oral daily  senna 2 Tablet(s) Oral at bedtime  sodium chloride 0.9% lock flush 3 milliLiter(s) IV Push every 8 hours  tacrolimus 1 milliGRAM(s) Oral <User Schedule>  tacrolimus 1 milliGRAM(s) Oral at bedtime    MEDICATIONS  (PRN):  acetaminophen     Tablet .. 650 milliGRAM(s) Oral every 6 hours PRN Mild Pain (1 - 3)  dextrose Oral Gel 15 Gram(s) Oral once PRN Blood Glucose LESS THAN 70 milliGRAM(s)/deciliter  oxyCODONE    IR 5 milliGRAM(s) Oral every 6 hours PRN Moderate Pain (4 - 6)    Anthropometrics:  Ht: 5'7"  Dosing wt (4/26): 65.8kg/145lb     IBW: 67.3kg/148lb % IBW: 98%    Weight Change: No new weights obtained during this admission. Please see nutrition recommendations below. Recommend obtaining updated weight weekly.     Estimated energy needs: Actual BW used to calculate estimated needs per Standards of Care given pt within % IBW. Needs adjusted for advanced age and post-op. Meet lower end of protein needs pending renal function improvement.    Estimated Energy Needs From (denton/kg): 25  Estimated Energy Needs To (denton/kg): 30  Estimated Energy Needs Calculated From (denton/kg): 1642  Estimated Energy Needs Calculated To (denton/kg): 1971    Estimated Protein Needs From (g/kg): 1.2  Estimated Protein Needs To (g/kg): 1.4  Estimated Protein Needs Calculated From (g/kg): 78.84  Estimated Protein Needs Calculated To (g/kg): 91.98    Estimated Fluid Needs From (ml/kg): 30  Estimated Fluid Needs To (ml/kg): 35  Estimated Fluid Needs Calculated From (ml/kg): 1971  Estimated Fluid Needs Calculated To (ml/kg): 2299    Subjective:   76yo M with past medical history significant for HTN, HLD, DM, hx of PKD (hx of kidney transplant in 2007), hx of prostate cancer (s/p radical prostatectomy in 2015), DVT (2006), AF/AFL (hx of ablation in 2018). Patient initially presented to out facility and underwent percutaneous left atrial appendage closure with Dr. Sidhu 4/25/23. Was transferred to telemetry postop. Overnight PEA arrest- ROSC s/p ACLS. Patient intubated and transferred to ICU. TTE with evidence of pericardial tamponade s/p tap with removal of 600cc heme fluid; Taken to OR 4/26/23 for sternotomy with 1.5L L hemothorax evacuated. Transferred back to ICU intubated on multi-pressor support. 4/27 he was extubated and started to wean pressor support. 4/29 right pigtail placed for effusion (850cc drained)- placed on BiPAP for worsening dyspnea and atelectasis. 4/30/23 R central line placed. Started in diuresis with Bumex. 5/1/23 renal function worsening, swan placed, started on dobutamine and milrinone. Bronch done and started on empiric antibiotics. Dobhoff placed. 5/2/23 low urine output, continued diuresis. Tacrolimus held. Patient reintubated 5/3/23 and started on vanc for MSSA (on BAL). Patient restarted on Tacrolimus 5/4/23.  R prox brachial DVT and SVT in basilic vein found 5/5/23. Inotropic support decreased. Patient extubated 5/7/23, pressors weaned, milrinone off. 5/8/23 ultrasound showed DVT extending into axilla, PIV placed. 5/12/23 given IV D10 for hypoglycemia. 5/13 L pleural effusion s/p pigtail complicated by pneumothorax. Now 5/16 pending bed availability at rehab.    RDN attempted to see pt 3x on 9LA for follow up nutrition assessment, however, pt was receiving OT and then PT. Updated history obtained from RN, pt’s sisters at bedside, and EMR. Pt currently placed on a CSTCHO diet with Nepro TID (1260kcal, 57gm pro). Per RN, pt is completing ~1-2 bottles of ONS and ~50% of meals. Pt’s family reported pt took bites of lunch today and completed 1 ONS (?Glucerna); they stated the pt expressed that he “does not feel hungry”. Calorie count obtained from 5/11-5/13 (below); at the time, pt was placed on a CSTCHO diet with Soft & Bite Sized texture and Ensure TID. Please see nutrition recommendations. Pt’s family made aware RDN remains available. Will continue to follow per RD protocol.     Day 1 (5/11): 297kcal, 10g pro  Meeting 18% estimated energy and 12% estimated protein needs    Day 2 (5/12): 1034kcal, 52g pro  Meeting 63% estimated energy and 66% estimated protein needs    Day 3 (5/13): 795kcal, 42g pro  Meeting 48% estimated energy and 54% estimated protein needs    Previous Nutrition Diagnosis: Increased Nutrient Needs (kcal/protein) R/T physiological demand for nutrient AEB post-op     Active [ x ]  Resolved [   ]    Goal: Pt to meet at least 75% of nutritional needs during hospital stay consistently    Recommendations:  1. Continue current diet order - CSTCHO with Nepro Carbsteady TID (1260kcal, 57gm pro)  2. Monitor PO intake  >> Consistently meet >75% of estimated needs during admission   3. Monitor chemistry, wt trends, GI function, and skin integrity   4. Encourage pt to meet nutritional needs as able   5. Honor food preferences as able  6. RD to remain available for additional nutrition interventions and diet edu as needed     Education: Discussed continued encouragement during mealtimes with pt's family.     Risk Level: High [ x ] Moderate [   ] Low [   ] Admitting Diagnosis:   Patient is a 77y old  Male who presents with a chief complaint of percutaneous DENNY closure (16 May 2023 14:26)    PAST MEDICAL & SURGICAL HISTORY:  Polycystic kidney disease  DM2 (diabetes mellitus, type 2)  HLD (hyperlipidemia)  HTN (hypertension)  Prostate cancer  Kidney transplant recipient  DVT (deep venous thrombosis)  Chronic CHF  H/O radical prostatectomy  2010  S/P hernia repair  Abdominal and inguinal around 2005    Current Nutrition Order:  Diet, Consistent Carbohydrate w/Evening Snack:   Supplement Feeding Modality:  Oral  Nepro Cans or Servings Per Day:  3       Frequency:  Three Times a day (05-15-23 @ 12:20)    PO Intake: Good (%) [   ]  Fair (50-75%) [   ] Poor (<25%) [   ] - - Pt completing ~50% or less of meals per RN and family    GI Issues: No report of N/V/D/C; ordered for Miralax and Senna, +BM 5/16. No abd distention noted.    Pain: Denies, per chart    Skin Integrity: Surgical incisions @ R groin, MSI. Stage III PI @ sacrum. 1+ generalized edema, 2+ edema B/L leg. Madhav 15.    Labs:   05-16    141  |  103  |  59<H>  ----------------------------<  99  4.7   |  25  |  1.98<H>    Ca    9.2      16 May 2023 05:30  Phos  3.3     05-16  Mg     2.1     05-16    TPro  5.9<L>  /  Alb  3.5  /  TBili  1.6<H>  /  DBili  x   /  AST  25  /  ALT  9<L>  /  AlkPhos  72  05-15    CAPILLARY BLOOD GLUCOSE  POCT Blood Glucose.: 196 mg/dL (16 May 2023 11:05)  POCT Blood Glucose.: 93 mg/dL (16 May 2023 07:13)  POCT Blood Glucose.: 204 mg/dL (15 May 2023 22:32)  POCT Blood Glucose.: 230 mg/dL (15 May 2023 21:16)  POCT Blood Glucose.: 130 mg/dL (15 May 2023 16:39)    Medications:  MEDICATIONS  (STANDING):  acetylcysteine 10%  Inhalation 4 milliLiter(s) Inhalation every 6 hours  aspirin  chewable 81 milliGRAM(s) Oral daily  atorvastatin 20 milliGRAM(s) Oral at bedtime  cyanocobalamin 1000 MICROGram(s) Oral daily  dextrose 5%. 1000 milliLiter(s) (100 mL/Hr) IV Continuous <Continuous>  dextrose 5%. 1000 milliLiter(s) (50 mL/Hr) IV Continuous <Continuous>  dextrose 50% Injectable 25 Gram(s) IV Push once  dextrose 50% Injectable 25 Gram(s) IV Push once  dextrose 50% Injectable 12.5 Gram(s) IV Push once  folic acid 1 milliGRAM(s) Oral daily  glucagon  Injectable 1 milliGRAM(s) IntraMuscular once  glucagon  Injectable 1 milliGRAM(s) IntraMuscular once  glucagon  Injectable 1 milliGRAM(s) IntraMuscular once  heparin   Injectable 5000 Unit(s) SubCutaneous every 12 hours  insulin glargine Injectable (LANTUS) 10 Unit(s) SubCutaneous at bedtime  insulin lispro (ADMELOG) corrective regimen sliding scale   SubCutaneous Before meals and at bedtime  insulin lispro Injectable (ADMELOG) 2 Unit(s) SubCutaneous before dinner  levalbuterol Inhalation 0.31 milliGRAM(s) Inhalation every 6 hours  metoprolol succinate ER 50 milliGRAM(s) Oral daily  pantoprazole    Tablet 40 milliGRAM(s) Oral before breakfast  polyethylene glycol 3350 17 Gram(s) Oral daily  predniSONE   Tablet 5 milliGRAM(s) Oral daily  senna 2 Tablet(s) Oral at bedtime  sodium chloride 0.9% lock flush 3 milliLiter(s) IV Push every 8 hours  tacrolimus 1 milliGRAM(s) Oral <User Schedule>  tacrolimus 1 milliGRAM(s) Oral at bedtime    MEDICATIONS  (PRN):  acetaminophen     Tablet .. 650 milliGRAM(s) Oral every 6 hours PRN Mild Pain (1 - 3)  dextrose Oral Gel 15 Gram(s) Oral once PRN Blood Glucose LESS THAN 70 milliGRAM(s)/deciliter  oxyCODONE    IR 5 milliGRAM(s) Oral every 6 hours PRN Moderate Pain (4 - 6)    Anthropometrics:  Ht: 5'7"  Dosing wt (4/26): 65.8kg/145lb     IBW: 67.3kg/148lb % IBW: 98%    Weight Change: No new weights obtained during this admission. Please see nutrition recommendations below. Recommend obtaining updated weight weekly.     Estimated energy needs: Actual BW used to calculate estimated needs per Standards of Care given pt within % IBW. Needs adjusted for advanced age and post-op. Meet lower end of protein needs pending renal function improvement.    Estimated Energy Needs From (denton/kg): 25  Estimated Energy Needs To (denton/kg): 30  Estimated Energy Needs Calculated From (denton/kg): 1642  Estimated Energy Needs Calculated To (denton/kg): 1971    Estimated Protein Needs From (g/kg): 1.2  Estimated Protein Needs To (g/kg): 1.4  Estimated Protein Needs Calculated From (g/kg): 78.84  Estimated Protein Needs Calculated To (g/kg): 91.98    Estimated Fluid Needs From (ml/kg): 30  Estimated Fluid Needs To (ml/kg): 35  Estimated Fluid Needs Calculated From (ml/kg): 1971  Estimated Fluid Needs Calculated To (ml/kg): 2299    Subjective:   76yo M with past medical history significant for HTN, HLD, DM, hx of PKD (hx of kidney transplant in 2007), hx of prostate cancer (s/p radical prostatectomy in 2015), DVT (2006), AF/AFL (hx of ablation in 2018). Patient initially presented to out facility and underwent percutaneous left atrial appendage closure with Dr. Sidhu 4/25/23. Was transferred to telemetry postop. Overnight PEA arrest- ROSC s/p ACLS. Patient intubated and transferred to ICU. TTE with evidence of pericardial tamponade s/p tap with removal of 600cc heme fluid; Taken to OR 4/26/23 for sternotomy with 1.5L L hemothorax evacuated. Transferred back to ICU intubated on multi-pressor support. 4/27 he was extubated and started to wean pressor support. 4/29 right pigtail placed for effusion (850cc drained)- placed on BiPAP for worsening dyspnea and atelectasis. 4/30/23 R central line placed. Started in diuresis with Bumex. 5/1/23 renal function worsening, swan placed, started on dobutamine and milrinone. Bronch done and started on empiric antibiotics. Dobhoff placed. 5/2/23 low urine output, continued diuresis. Tacrolimus held. Patient reintubated 5/3/23 and started on vanc for MSSA (on BAL). Patient restarted on Tacrolimus 5/4/23.  R prox brachial DVT and SVT in basilic vein found 5/5/23. Inotropic support decreased. Patient extubated 5/7/23, pressors weaned, milrinone off. 5/8/23 ultrasound showed DVT extending into axilla, PIV placed. 5/12/23 given IV D10 for hypoglycemia. 5/13 L pleural effusion s/p pigtail complicated by pneumothorax. Now 5/16 pending bed availability at rehab.    RDN attempted to see pt 3x on 9LA for follow up nutrition assessment, however, pt was receiving OT and then PT. Updated history obtained from RN, pt’s sisters at bedside, and EMR. Pt currently placed on a CSTCHO diet with Nepro TID (1260kcal, 57gm pro). Per RN, pt is completing ~1-2 bottles of ONS and ~50% of meals. Pt’s family reported pt took bites of lunch today and completed 1 ONS (?Glucerna); they stated the pt expressed that he “does not feel hungry”. Calorie count obtained from 5/11-5/13 (below); at the time, pt was placed on a CSTCHO diet with Soft & Bite Sized texture and Ensure TID. Please see nutrition recommendations. Pt’s family made aware RDN remains available. Will continue to follow per RD protocol.     Day 1 (5/11): 297kcal, 10g pro  Meeting 18% estimated energy and 12% estimated protein needs    Day 2 (5/12): 1034kcal, 52g pro  Meeting 63% estimated energy and 66% estimated protein needs    Day 3 (5/13): 795kcal, 42g pro  Meeting 48% estimated energy and 54% estimated protein needs    Previous Nutrition Diagnosis: Increased Nutrient Needs (kcal/protein) R/T physiological demand for nutrient AEB post-op, Stage III PI (updated 5/16)    Active [ x ]  Resolved [   ]    Goal: Pt to meet at least 75% of nutritional needs during hospital stay consistently    Recommendations:  1. Continue current diet order - CSTCHO with Nepro Carbsteady TID (1260kcal, 57gm pro)  2. Monitor PO intake  >> Consistently meet >75% of estimated needs during admission   3. Monitor chemistry, wt trends, GI function, and skin integrity   4. Encourage pt to meet nutritional needs as able   5. Honor food preferences as able  6. RD to remain available for additional nutrition interventions and diet edu as needed     Education: Discussed continued encouragement during mealtimes with pt's family.     Risk Level: High [ x ] Moderate [   ] Low [   ] Admitting Diagnosis:   Patient is a 77y old  Male who presents with a chief complaint of percutaneous DENNY closure (16 May 2023 14:26)    PAST MEDICAL & SURGICAL HISTORY:  Polycystic kidney disease  DM2 (diabetes mellitus, type 2)  HLD (hyperlipidemia)  HTN (hypertension)  Prostate cancer  Kidney transplant recipient  DVT (deep venous thrombosis)  Chronic CHF  H/O radical prostatectomy  2010  S/P hernia repair  Abdominal and inguinal around 2005    Current Nutrition Order:  Diet, Consistent Carbohydrate w/Evening Snack:   Supplement Feeding Modality:  Oral  Nepro Cans or Servings Per Day:  3       Frequency:  Three Times a day (05-15-23 @ 12:20)    PO Intake: Good (%) [   ]  Fair (50-75%) [   ] Poor (<25%) [   ] - - Pt completing ~50% or less of meals per RN and family    GI Issues: No report of N/V/D/C; ordered for Miralax and Senna, +BM 5/16. No abd distention noted.    Pain: Denies, per chart    Skin Integrity: Surgical incisions @ R groin, MSI. Stage III PI @ sacrum. 1+ generalized edema, 2+ edema B/L leg. Madhav 15.    Labs:   05-16    141  |  103  |  59<H>  ----------------------------<  99  4.7   |  25  |  1.98<H>    Ca    9.2      16 May 2023 05:30  Phos  3.3     05-16  Mg     2.1     05-16    TPro  5.9<L>  /  Alb  3.5  /  TBili  1.6<H>  /  DBili  x   /  AST  25  /  ALT  9<L>  /  AlkPhos  72  05-15    CAPILLARY BLOOD GLUCOSE  POCT Blood Glucose.: 196 mg/dL (16 May 2023 11:05)  POCT Blood Glucose.: 93 mg/dL (16 May 2023 07:13)  POCT Blood Glucose.: 204 mg/dL (15 May 2023 22:32)  POCT Blood Glucose.: 230 mg/dL (15 May 2023 21:16)  POCT Blood Glucose.: 130 mg/dL (15 May 2023 16:39)    Medications:  MEDICATIONS  (STANDING):  acetylcysteine 10%  Inhalation 4 milliLiter(s) Inhalation every 6 hours  aspirin  chewable 81 milliGRAM(s) Oral daily  atorvastatin 20 milliGRAM(s) Oral at bedtime  cyanocobalamin 1000 MICROGram(s) Oral daily  dextrose 5%. 1000 milliLiter(s) (100 mL/Hr) IV Continuous <Continuous>  dextrose 5%. 1000 milliLiter(s) (50 mL/Hr) IV Continuous <Continuous>  dextrose 50% Injectable 25 Gram(s) IV Push once  dextrose 50% Injectable 25 Gram(s) IV Push once  dextrose 50% Injectable 12.5 Gram(s) IV Push once  folic acid 1 milliGRAM(s) Oral daily  glucagon  Injectable 1 milliGRAM(s) IntraMuscular once  glucagon  Injectable 1 milliGRAM(s) IntraMuscular once  glucagon  Injectable 1 milliGRAM(s) IntraMuscular once  heparin   Injectable 5000 Unit(s) SubCutaneous every 12 hours  insulin glargine Injectable (LANTUS) 10 Unit(s) SubCutaneous at bedtime  insulin lispro (ADMELOG) corrective regimen sliding scale   SubCutaneous Before meals and at bedtime  insulin lispro Injectable (ADMELOG) 2 Unit(s) SubCutaneous before dinner  levalbuterol Inhalation 0.31 milliGRAM(s) Inhalation every 6 hours  metoprolol succinate ER 50 milliGRAM(s) Oral daily  pantoprazole    Tablet 40 milliGRAM(s) Oral before breakfast  polyethylene glycol 3350 17 Gram(s) Oral daily  predniSONE   Tablet 5 milliGRAM(s) Oral daily  senna 2 Tablet(s) Oral at bedtime  sodium chloride 0.9% lock flush 3 milliLiter(s) IV Push every 8 hours  tacrolimus 1 milliGRAM(s) Oral <User Schedule>  tacrolimus 1 milliGRAM(s) Oral at bedtime    MEDICATIONS  (PRN):  acetaminophen     Tablet .. 650 milliGRAM(s) Oral every 6 hours PRN Mild Pain (1 - 3)  dextrose Oral Gel 15 Gram(s) Oral once PRN Blood Glucose LESS THAN 70 milliGRAM(s)/deciliter  oxyCODONE    IR 5 milliGRAM(s) Oral every 6 hours PRN Moderate Pain (4 - 6)    Anthropometrics:  Ht: 5'7"  Dosing wt (4/26): 65.8kg/145lb     IBW: 67.3kg/148lb % IBW: 98%    Weight Change: No new weights obtained during this admission. Please see nutrition recommendations below. Recommend obtaining updated weight weekly.     Estimated energy needs: Actual BW used to calculate estimated needs per Standards of Care given pt within % IBW. Needs adjusted for advanced age and post-op. Meet lower end of protein needs pending renal function improvement.    Estimated Energy Needs From (denton/kg): 25  Estimated Energy Needs To (denton/kg): 30  Estimated Energy Needs Calculated From (denton/kg): 1642  Estimated Energy Needs Calculated To (denton/kg): 1971    Estimated Protein Needs From (g/kg): 1.2  Estimated Protein Needs To (g/kg): 1.4  Estimated Protein Needs Calculated From (g/kg): 78.84  Estimated Protein Needs Calculated To (g/kg): 91.98    Estimated Fluid Needs From (ml/kg): 30  Estimated Fluid Needs To (ml/kg): 35  Estimated Fluid Needs Calculated From (ml/kg): 1971  Estimated Fluid Needs Calculated To (ml/kg): 2299    Subjective:   78yo M with past medical history significant for HTN, HLD, DM, hx of PKD (hx of kidney transplant in 2007), hx of prostate cancer (s/p radical prostatectomy in 2015), DVT (2006), AF/AFL (hx of ablation in 2018). Patient initially presented to out facility and underwent percutaneous left atrial appendage closure with Dr. Sidhu 4/25/23. Was transferred to telemetry postop. Overnight PEA arrest- ROSC s/p ACLS. Patient intubated and transferred to ICU. TTE with evidence of pericardial tamponade s/p tap with removal of 600cc heme fluid; Taken to OR 4/26/23 for sternotomy with 1.5L L hemothorax evacuated. Transferred back to ICU intubated on multi-pressor support. 4/27 he was extubated and started to wean pressor support. 4/29 right pigtail placed for effusion (850cc drained)- placed on BiPAP for worsening dyspnea and atelectasis. 4/30/23 R central line placed. Started in diuresis with Bumex. 5/1/23 renal function worsening, swan placed, started on dobutamine and milrinone. Bronch done and started on empiric antibiotics. Dobhoff placed. 5/2/23 low urine output, continued diuresis. Tacrolimus held. Patient reintubated 5/3/23 and started on vanc for MSSA (on BAL). Patient restarted on Tacrolimus 5/4/23.  R prox brachial DVT and SVT in basilic vein found 5/5/23. Inotropic support decreased. Patient extubated 5/7/23, pressors weaned, milrinone off. 5/8/23 ultrasound showed DVT extending into axilla, PIV placed. 5/12/23 given IV D10 for hypoglycemia. 5/13 L pleural effusion s/p pigtail complicated by pneumothorax. Now 5/16 pending bed availability at rehab.    RDN attempted to see pt 3x on 9LA for follow up nutrition assessment, however, pt was receiving OT and then PT. Updated history obtained from RN, pt’s sisters at bedside, and EMR. Pt currently placed on a CSTCHO diet with Nepro TID (1260kcal, 57gm pro). Per RN, pt is completing ~1-2 bottles of ONS and ~50% of meals. Pt’s family reported pt took bites of lunch today and completed 1 ONS (?Glucerna); they stated the pt expressed that he “does not feel hungry”. Calorie count obtained from 5/11-5/13 (below); at the time, pt was placed on a CSTCHO diet with Soft & Bite Sized texture and Ensure TID. Please see nutrition recommendations. Pt’s family made aware RDN remains available. Will continue to follow per RD protocol.     Day 1 (5/11): 297kcal, 10g pro  Meeting 18% estimated energy and 12% estimated protein needs    Day 2 (5/12): 1034kcal, 52g pro  Meeting 63% estimated energy and 66% estimated protein needs    Day 3 (5/13): 795kcal, 42g pro  Meeting 48% estimated energy and 54% estimated protein needs    Previous Nutrition Diagnosis: Increased Nutrient Needs (kcal/protein) R/T physiological demand for nutrient AEB post-op, Stage III PI (updated 5/16)    Active [ x ]  Resolved [   ]    Goal: Pt to meet at least 75% of nutritional needs during hospital stay consistently    Recommendations:  1. Continue current diet order - CSTCHO with Nepro Carbsteady TID (1260kcal, 57gm pro)  2. Monitor PO intake  >> Consistently meet >75% of estimated needs during admission   3. Monitor chemistry, wt trends, GI function, and skin integrity   4. Encourage pt to meet nutritional needs as able   5. Honor food preferences as able  6. RD to remain available for additional nutrition interventions and diet edu as needed     Education: Discussed continued encouragement during mealtimes with pt's family.     Risk Level: High [  ] Moderate [ x ] Low [   ]

## 2023-05-16 NOTE — PROGRESS NOTE ADULT - ASSESSMENT
77Y M w/ Non-Oliguric iATN on underlying CKD 3 (baseline sCr at 1.2-1.4) s/p renal transplant 2007 h/o PKD s/p DENNY occlusion repair complicated by pericardial effusion s/p chest washout, course complicated by cardiogenic shock, with improving renal function and  tacro level supratherapeutic       #Non-Oliguric iATN on underlying CKD 3 (baseline sCr at 1.2-1.4)   Likely pre-renal initially but given prolonged hypoperfusion developed into iATN   sCr now at 1.95 today. Likely around new baseline   - Pt w/ improved PO intake. Pt appears euvolemic today.   -Can start on PO torsemide 20mg   - Strict I/Os      Transplant - 2007, initial failure 2/2 PCKD  - tacro level noted 6.2 today. Currently on home dose of 2mg AM and 1mg PM   - Hold PM dose today and start 1mg BID tacro from tomorrow 5/16.   Check next tacro level on 5/17 AM 30 min prior to next dose or 12 hour after previous dose  - Will continue to follow level to adjust dosing as his new baseline Cr may be higher than before and may need a lower dose eventually   - Tacro level goal of 4-5  - continue to draw trough 30 minutes before AM dose of tacro   - Continue home prednisone 5mg PO QD

## 2023-05-16 NOTE — PROGRESS NOTE ADULT - SUBJECTIVE AND OBJECTIVE BOX
Patient is a 77y Male seen and evaluated at bedside. No acute distress, does not offer any complaints. VSS. Stable kidney function       Meds:    acetaminophen     Tablet .. 650 every 6 hours PRN  acetylcysteine 10%  Inhalation 4 every 6 hours  aspirin  chewable 81 daily  atorvastatin 20 at bedtime  cyanocobalamin 1000 daily  dextrose 5%. 1000 <Continuous>  dextrose 5%. 1000 <Continuous>  dextrose 50% Injectable 12.5 once  dextrose 50% Injectable 25 once  dextrose 50% Injectable 25 once  dextrose Oral Gel 15 once PRN  folic acid 1 daily  glucagon  Injectable 1 once  glucagon  Injectable 1 once  glucagon  Injectable 1 once  heparin   Injectable 5000 every 12 hours  insulin glargine Injectable (LANTUS) 10 at bedtime  insulin lispro (ADMELOG) corrective regimen sliding scale  Before meals and at bedtime  insulin lispro Injectable (ADMELOG) 2 before dinner  levalbuterol Inhalation 0.31 every 6 hours  metoprolol succinate ER 50 daily  oxyCODONE    IR 5 every 6 hours PRN  pantoprazole    Tablet 40 before breakfast  polyethylene glycol 3350 17 daily  predniSONE   Tablet 5 daily  senna 2 at bedtime  sodium chloride 0.9% lock flush 3 every 8 hours  tacrolimus 1 <User Schedule>  tacrolimus 1 at bedtime      T(C): , Max: 36.6 (05-15-23 @ 17:16)  T(F): , Max: 97.9 (05-15-23 @ 17:16)  HR: 95 (05-16-23 @ 13:00)  BP: 134/82 (05-16-23 @ 13:00)  BP(mean): 99 (05-16-23 @ 13:00)  RR: 16 (05-16-23 @ 13:00)  SpO2: 96% (05-16-23 @ 13:00)  Wt(kg): --    05-15 @ 07:01  -  05-16 @ 07:00  --------------------------------------------------------  IN: 250 mL / OUT: 480 mL / NET: -230 mL    05-16 @ 07:01  -  05-16 @ 14:26  --------------------------------------------------------  IN: 150 mL / OUT: 300 mL / NET: -150 mL          Review of Systems:  ROS negative except as per HPI      PHYSICAL EXAM:  GENERAL: NAD, sitting in chair on RA   NECK: supple, No JVD  CHEST/LUNG:  Decreased BS b/l in bases, no accessory muscle use  HEART: normal S1S2, RRR  ABDOMEN: Soft, Nontender  EXTREMITIES: trace pitting edema b/l LE   NEUROLOGY: AAO x3, no focal neurological deficit      LABS:                        9.8    5.29  )-----------( 349      ( 16 May 2023 05:30 )             32.6     05-16    141  |  103  |  59<H>  ----------------------------<  99  4.7   |  25  |  1.98<H>    Ca    9.2      16 May 2023 05:30  Phos  3.3     05-16  Mg     2.1     05-16    TPro  5.9<L>  /  Alb  3.5  /  TBili  1.6<H>  /  DBili  x   /  AST  25  /  ALT  9<L>  /  AlkPhos  72  05-15      PT/INR - ( 15 May 2023 03:59 )   PT: 15.3 sec;   INR: 1.28          PTT - ( 15 May 2023 03:59 )  PTT:29.8 sec          RADIOLOGY & ADDITIONAL STUDIES:

## 2023-05-17 LAB
ANION GAP SERPL CALC-SCNC: 12 MMOL/L — SIGNIFICANT CHANGE UP (ref 5–17)
BUN SERPL-MCNC: 57 MG/DL — HIGH (ref 7–23)
CALCIUM SERPL-MCNC: 8.9 MG/DL — SIGNIFICANT CHANGE UP (ref 8.4–10.5)
CHLORIDE SERPL-SCNC: 104 MMOL/L — SIGNIFICANT CHANGE UP (ref 96–108)
CO2 SERPL-SCNC: 25 MMOL/L — SIGNIFICANT CHANGE UP (ref 22–31)
CREAT SERPL-MCNC: 1.94 MG/DL — HIGH (ref 0.5–1.3)
EGFR: 35 ML/MIN/1.73M2 — LOW
GLUCOSE BLDC GLUCOMTR-MCNC: 111 MG/DL — HIGH (ref 70–99)
GLUCOSE BLDC GLUCOMTR-MCNC: 161 MG/DL — HIGH (ref 70–99)
GLUCOSE BLDC GLUCOMTR-MCNC: 70 MG/DL — SIGNIFICANT CHANGE UP (ref 70–99)
GLUCOSE BLDC GLUCOMTR-MCNC: 80 MG/DL — SIGNIFICANT CHANGE UP (ref 70–99)
GLUCOSE SERPL-MCNC: 73 MG/DL — SIGNIFICANT CHANGE UP (ref 70–99)
HCT VFR BLD CALC: 32.3 % — LOW (ref 39–50)
HGB BLD-MCNC: 9.5 G/DL — LOW (ref 13–17)
MAGNESIUM SERPL-MCNC: 2.1 MG/DL — SIGNIFICANT CHANGE UP (ref 1.6–2.6)
MCHC RBC-ENTMCNC: 29.4 GM/DL — LOW (ref 32–36)
MCHC RBC-ENTMCNC: 32.8 PG — SIGNIFICANT CHANGE UP (ref 27–34)
MCV RBC AUTO: 111.4 FL — HIGH (ref 80–100)
NRBC # BLD: 0 /100 WBCS — SIGNIFICANT CHANGE UP (ref 0–0)
PHOSPHATE SERPL-MCNC: 3.2 MG/DL — SIGNIFICANT CHANGE UP (ref 2.5–4.5)
PLATELET # BLD AUTO: 328 K/UL — SIGNIFICANT CHANGE UP (ref 150–400)
POTASSIUM SERPL-MCNC: 4 MMOL/L — SIGNIFICANT CHANGE UP (ref 3.5–5.3)
POTASSIUM SERPL-SCNC: 4 MMOL/L — SIGNIFICANT CHANGE UP (ref 3.5–5.3)
RBC # BLD: 2.9 M/UL — LOW (ref 4.2–5.8)
RBC # FLD: 22.5 % — HIGH (ref 10.3–14.5)
SODIUM SERPL-SCNC: 141 MMOL/L — SIGNIFICANT CHANGE UP (ref 135–145)
TACROLIMUS SERPL-MCNC: 4.2 NG/ML — SIGNIFICANT CHANGE UP
WBC # BLD: 5.4 K/UL — SIGNIFICANT CHANGE UP (ref 3.8–10.5)
WBC # FLD AUTO: 5.4 K/UL — SIGNIFICANT CHANGE UP (ref 3.8–10.5)

## 2023-05-17 PROCEDURE — 71045 X-RAY EXAM CHEST 1 VIEW: CPT | Mod: 26

## 2023-05-17 RX ORDER — CLOPIDOGREL BISULFATE 75 MG/1
75 TABLET, FILM COATED ORAL DAILY
Refills: 0 | Status: DISCONTINUED | OUTPATIENT
Start: 2023-05-18 | End: 2023-05-21

## 2023-05-17 RX ORDER — LEVALBUTEROL 1.25 MG/.5ML
0.31 SOLUTION, CONCENTRATE RESPIRATORY (INHALATION)
Refills: 0 | Status: DISCONTINUED | OUTPATIENT
Start: 2023-05-17 | End: 2023-05-21

## 2023-05-17 RX ORDER — ACETYLCYSTEINE 200 MG/ML
4 VIAL (ML) MISCELLANEOUS EVERY 12 HOURS
Refills: 0 | Status: DISCONTINUED | OUTPATIENT
Start: 2023-05-17 | End: 2023-05-21

## 2023-05-17 RX ORDER — ALPRAZOLAM 0.25 MG
0.25 TABLET ORAL EVERY 8 HOURS
Refills: 0 | Status: DISCONTINUED | OUTPATIENT
Start: 2023-05-17 | End: 2023-05-21

## 2023-05-17 RX ADMIN — Medication 650 MILLIGRAM(S): at 21:31

## 2023-05-17 RX ADMIN — TACROLIMUS 1 MILLIGRAM(S): 5 CAPSULE ORAL at 10:06

## 2023-05-17 RX ADMIN — Medication 4 MILLILITER(S): at 06:11

## 2023-05-17 RX ADMIN — Medication 5 MILLIGRAM(S): at 06:03

## 2023-05-17 RX ADMIN — POLYETHYLENE GLYCOL 3350 17 GRAM(S): 17 POWDER, FOR SOLUTION ORAL at 12:02

## 2023-05-17 RX ADMIN — Medication 4 MILLILITER(S): at 20:05

## 2023-05-17 RX ADMIN — LEVALBUTEROL 0.31 MILLIGRAM(S): 1.25 SOLUTION, CONCENTRATE RESPIRATORY (INHALATION) at 18:06

## 2023-05-17 RX ADMIN — HEPARIN SODIUM 5000 UNIT(S): 5000 INJECTION INTRAVENOUS; SUBCUTANEOUS at 16:48

## 2023-05-17 RX ADMIN — TACROLIMUS 1 MILLIGRAM(S): 5 CAPSULE ORAL at 21:30

## 2023-05-17 RX ADMIN — SODIUM CHLORIDE 3 MILLILITER(S): 9 INJECTION INTRAMUSCULAR; INTRAVENOUS; SUBCUTANEOUS at 21:13

## 2023-05-17 RX ADMIN — LEVALBUTEROL 0.31 MILLIGRAM(S): 1.25 SOLUTION, CONCENTRATE RESPIRATORY (INHALATION) at 06:08

## 2023-05-17 RX ADMIN — INSULIN GLARGINE 10 UNIT(S): 100 INJECTION, SOLUTION SUBCUTANEOUS at 21:30

## 2023-05-17 RX ADMIN — Medication 650 MILLIGRAM(S): at 22:30

## 2023-05-17 RX ADMIN — OXYCODONE HYDROCHLORIDE 5 MILLIGRAM(S): 5 TABLET ORAL at 03:50

## 2023-05-17 RX ADMIN — PANTOPRAZOLE SODIUM 40 MILLIGRAM(S): 20 TABLET, DELAYED RELEASE ORAL at 06:03

## 2023-05-17 RX ADMIN — Medication 81 MILLIGRAM(S): at 12:02

## 2023-05-17 RX ADMIN — SODIUM CHLORIDE 3 MILLILITER(S): 9 INJECTION INTRAMUSCULAR; INTRAVENOUS; SUBCUTANEOUS at 05:18

## 2023-05-17 RX ADMIN — Medication 50 MILLIGRAM(S): at 06:03

## 2023-05-17 RX ADMIN — Medication 650 MILLIGRAM(S): at 09:40

## 2023-05-17 RX ADMIN — Medication 650 MILLIGRAM(S): at 08:40

## 2023-05-17 RX ADMIN — Medication 650 MILLIGRAM(S): at 00:30

## 2023-05-17 RX ADMIN — Medication 1 MILLIGRAM(S): at 12:02

## 2023-05-17 RX ADMIN — OXYCODONE HYDROCHLORIDE 5 MILLIGRAM(S): 5 TABLET ORAL at 04:50

## 2023-05-17 RX ADMIN — ATORVASTATIN CALCIUM 20 MILLIGRAM(S): 80 TABLET, FILM COATED ORAL at 21:31

## 2023-05-17 RX ADMIN — PREGABALIN 1000 MICROGRAM(S): 225 CAPSULE ORAL at 12:02

## 2023-05-17 RX ADMIN — BUMETANIDE 2 MILLIGRAM(S): 0.25 INJECTION INTRAMUSCULAR; INTRAVENOUS at 06:03

## 2023-05-17 RX ADMIN — SODIUM CHLORIDE 3 MILLILITER(S): 9 INJECTION INTRAMUSCULAR; INTRAVENOUS; SUBCUTANEOUS at 13:11

## 2023-05-17 RX ADMIN — Medication 2: at 12:01

## 2023-05-17 RX ADMIN — HEPARIN SODIUM 5000 UNIT(S): 5000 INJECTION INTRAVENOUS; SUBCUTANEOUS at 06:06

## 2023-05-17 RX ADMIN — Medication 2 UNIT(S): at 16:44

## 2023-05-17 NOTE — PROGRESS NOTE ADULT - SUBJECTIVE AND OBJECTIVE BOX
Patient discussed on morning rounds with Dr. Sidhu    Operation / Date: 4/25 Amulet device  4/26 bedside pericardiaocentesis, then RTOR for sternotomy, washout, and evacuation of pericardial clot    SUBJECTIVE ASSESSMENT:    Vital Signs Last 24 Hrs  T(C): 35.8 (17 May 2023 13:32), Max: 37.1 (16 May 2023 21:21)  T(F): 96.5 (17 May 2023 13:32), Max: 98.7 (16 May 2023 21:21)  HR: 90 (17 May 2023 15:30) (74 - 100)  BP: 170/87 (17 May 2023 15:30) (129/63 - 173/84)  BP(mean): 119 (17 May 2023 15:30) (91 - 124)  RR: 18 (17 May 2023 13:07) (16 - 18)  SpO2: 96% (17 May 2023 15:30) (93% - 96%)    Parameters below as of 17 May 2023 15:30  Patient On (Oxygen Delivery Method): room air    I&O's Detail    16 May 2023 07:01  -  17 May 2023 07:00  --------------------------------------------------------  IN:    Oral Fluid: 150 mL  Total IN: 150 mL    OUT:    Voided (mL): 1150 mL  Total OUT: 1150 mL    Total NET: -1000 mL    17 May 2023 07:01  -  17 May 2023 16:00  --------------------------------------------------------  IN:  Total IN: 0 mL    OUT:    Voided (mL): 250 mL  Total OUT: 250 mL    Total NET: -250 mL    CHEST TUBE:    MANDY DRAIN:    EPICARDIAL WIRES:   TIE OMEGA:   EDSON:     PHYSICAL EXAM:  *****    LABS:                        9.5    5.40  )-----------( 328      ( 17 May 2023 05:30 )             32.3     05-17    141  |  104  |  57<H>  ----------------------------<  73  4.0   |  25  |  1.94<H>    Ca    8.9      17 May 2023 05:30  Phos  3.2     05-17  Mg     2.1     05-17    MEDICATIONS  (STANDING):  acetylcysteine 10%  Inhalation 4 milliLiter(s) Inhalation every 12 hours  aspirin  chewable 81 milliGRAM(s) Oral daily  atorvastatin 20 milliGRAM(s) Oral at bedtime  buMETAnide 2 milliGRAM(s) Oral daily  cyanocobalamin 1000 MICROGram(s) Oral daily  dextrose 5%. 1000 milliLiter(s) (50 mL/Hr) IV Continuous <Continuous>  dextrose 5%. 1000 milliLiter(s) (100 mL/Hr) IV Continuous <Continuous>  dextrose 50% Injectable 12.5 Gram(s) IV Push once  dextrose 50% Injectable 25 Gram(s) IV Push once  dextrose 50% Injectable 25 Gram(s) IV Push once  folic acid 1 milliGRAM(s) Oral daily  glucagon  Injectable 1 milliGRAM(s) IntraMuscular once  glucagon  Injectable 1 milliGRAM(s) IntraMuscular once  glucagon  Injectable 1 milliGRAM(s) IntraMuscular once  heparin   Injectable 5000 Unit(s) SubCutaneous every 12 hours  insulin glargine Injectable (LANTUS) 10 Unit(s) SubCutaneous at bedtime  insulin lispro (ADMELOG) corrective regimen sliding scale   SubCutaneous Before meals and at bedtime  insulin lispro Injectable (ADMELOG) 2 Unit(s) SubCutaneous before dinner  levalbuterol Inhalation 0.31 milliGRAM(s) Inhalation two times a day  metoprolol succinate ER 50 milliGRAM(s) Oral daily  pantoprazole    Tablet 40 milliGRAM(s) Oral before breakfast  polyethylene glycol 3350 17 Gram(s) Oral daily  predniSONE   Tablet 5 milliGRAM(s) Oral daily  senna 2 Tablet(s) Oral at bedtime  sodium chloride 0.9% lock flush 3 milliLiter(s) IV Push every 8 hours  tacrolimus 1 milliGRAM(s) Oral at bedtime  tacrolimus 1 milliGRAM(s) Oral <User Schedule>    MEDICATIONS  (PRN):  acetaminophen     Tablet .. 650 milliGRAM(s) Oral every 6 hours PRN Mild Pain (1 - 3)  ALPRAZolam 0.25 milliGRAM(s) Oral every 8 hours PRN Anxiety  dextrose Oral Gel 15 Gram(s) Oral once PRN Blood Glucose LESS THAN 70 milliGRAM(s)/deciliter  oxyCODONE    IR 5 milliGRAM(s) Oral every 6 hours PRN Moderate Pain (4 - 6)        RADIOLOGY & ADDITIONAL TESTS:     Patient discussed on morning rounds with Dr. Sidhu    Operation / Date: 4/25 Amulet device  4/26 bedside pericardiaocentesis, then RTOR for sternotomy, washout, and evacuation of pericardial clot    SUBJECTIVE ASSESSMENT: Patient seen and examined at bedside. Patient admits to incisional pain earlier this AM that has since resolved. Patient ambulated with PT and using IS periodically. Denies chills, chest pain, SOB, palpitations, N/V.     Vital Signs Last 24 Hrs  T(C): 35.8 (17 May 2023 13:32), Max: 37.1 (16 May 2023 21:21)  T(F): 96.5 (17 May 2023 13:32), Max: 98.7 (16 May 2023 21:21)  HR: 90 (17 May 2023 15:30) (74 - 100)  BP: 170/87 (17 May 2023 15:30) (129/63 - 173/84)  BP(mean): 119 (17 May 2023 15:30) (91 - 124)  RR: 18 (17 May 2023 13:07) (16 - 18)  SpO2: 96% (17 May 2023 15:30) (93% - 96%)    Parameters below as of 17 May 2023 15:30  Patient On (Oxygen Delivery Method): room air    I&O's Detail    16 May 2023 07:01  -  17 May 2023 07:00  --------------------------------------------------------  IN:    Oral Fluid: 150 mL  Total IN: 150 mL    OUT:    Voided (mL): 1150 mL  Total OUT: 1150 mL    Total NET: -1000 mL    17 May 2023 07:01  -  17 May 2023 16:00  --------------------------------------------------------  IN:  Total IN: 0 mL    OUT:    Voided (mL): 250 mL  Total OUT: 250 mL    Total NET: -250 mL    CHEST TUBE:  None  MANDY DRAIN:  None  EPICARDIAL WIRES: None  TIE DOWNS: None  SANDHU: None    PHYSICAL EXAM:  GENERAL: NAD, sitting upright in chair  HEAD:  Atraumatic, Normocephalic  EYES: EOMI, PERRLA, conjunctiva and sclera clear  ENT: Moist mucous membranes  NECK: Supple, No JVD  CHEST/LUNG: MSI well-approximated. CTAB  HEART: RRR  ABDOMEN: Bowel sounds present; Soft, Nontender, Nondistended. No hepatomegally  EXTREMITIES:  2+ Peripheral Pulses, brisk capillary refill. No clubbing, cyanosis, or edema  NERVOUS SYSTEM:  Alert & Oriented X3, speech clear. No deficits     LABS:                        9.5    5.40  )-----------( 328      ( 17 May 2023 05:30 )             32.3     05-17    141  |  104  |  57<H>  ----------------------------<  73  4.0   |  25  |  1.94<H>    Ca    8.9      17 May 2023 05:30  Phos  3.2     05-17  Mg     2.1     05-17    MEDICATIONS  (STANDING):  acetylcysteine 10%  Inhalation 4 milliLiter(s) Inhalation every 12 hours  aspirin  chewable 81 milliGRAM(s) Oral daily  atorvastatin 20 milliGRAM(s) Oral at bedtime  buMETAnide 2 milliGRAM(s) Oral daily  cyanocobalamin 1000 MICROGram(s) Oral daily  dextrose 5%. 1000 milliLiter(s) (50 mL/Hr) IV Continuous <Continuous>  dextrose 5%. 1000 milliLiter(s) (100 mL/Hr) IV Continuous <Continuous>  dextrose 50% Injectable 12.5 Gram(s) IV Push once  dextrose 50% Injectable 25 Gram(s) IV Push once  dextrose 50% Injectable 25 Gram(s) IV Push once  folic acid 1 milliGRAM(s) Oral daily  glucagon  Injectable 1 milliGRAM(s) IntraMuscular once  glucagon  Injectable 1 milliGRAM(s) IntraMuscular once  glucagon  Injectable 1 milliGRAM(s) IntraMuscular once  heparin   Injectable 5000 Unit(s) SubCutaneous every 12 hours  insulin glargine Injectable (LANTUS) 10 Unit(s) SubCutaneous at bedtime  insulin lispro (ADMELOG) corrective regimen sliding scale   SubCutaneous Before meals and at bedtime  insulin lispro Injectable (ADMELOG) 2 Unit(s) SubCutaneous before dinner  levalbuterol Inhalation 0.31 milliGRAM(s) Inhalation two times a day  metoprolol succinate ER 50 milliGRAM(s) Oral daily  pantoprazole    Tablet 40 milliGRAM(s) Oral before breakfast  polyethylene glycol 3350 17 Gram(s) Oral daily  predniSONE   Tablet 5 milliGRAM(s) Oral daily  senna 2 Tablet(s) Oral at bedtime  sodium chloride 0.9% lock flush 3 milliLiter(s) IV Push every 8 hours  tacrolimus 1 milliGRAM(s) Oral at bedtime  tacrolimus 1 milliGRAM(s) Oral <User Schedule>    MEDICATIONS  (PRN):  acetaminophen     Tablet .. 650 milliGRAM(s) Oral every 6 hours PRN Mild Pain (1 - 3)  ALPRAZolam 0.25 milliGRAM(s) Oral every 8 hours PRN Anxiety  dextrose Oral Gel 15 Gram(s) Oral once PRN Blood Glucose LESS THAN 70 milliGRAM(s)/deciliter  oxyCODONE    IR 5 milliGRAM(s) Oral every 6 hours PRN Moderate Pain (4 - 6)    RADIOLOGY & ADDITIONAL TESTS:  < from: Xray Chest 1 View- PORTABLE-Routine (05.17.23 @ 05:40) >  IMPRESSION:    Similar appearance to prior exam 5/15/2023. Moderate left pleural   effusion. Postop change. No acute infiltrate appearing. No pneumothorax.

## 2023-05-18 ENCOUNTER — TRANSCRIPTION ENCOUNTER (OUTPATIENT)
Age: 78
End: 2023-05-18

## 2023-05-18 DIAGNOSIS — J90 PLEURAL EFFUSION, NOT ELSEWHERE CLASSIFIED: ICD-10-CM

## 2023-05-18 LAB
ANION GAP SERPL CALC-SCNC: 14 MMOL/L — SIGNIFICANT CHANGE UP (ref 5–17)
BUN SERPL-MCNC: 53 MG/DL — HIGH (ref 7–23)
CALCIUM SERPL-MCNC: 9 MG/DL — SIGNIFICANT CHANGE UP (ref 8.4–10.5)
CHLORIDE SERPL-SCNC: 106 MMOL/L — SIGNIFICANT CHANGE UP (ref 96–108)
CO2 SERPL-SCNC: 23 MMOL/L — SIGNIFICANT CHANGE UP (ref 22–31)
CREAT SERPL-MCNC: 2.11 MG/DL — HIGH (ref 0.5–1.3)
EGFR: 32 ML/MIN/1.73M2 — LOW
GLUCOSE BLDC GLUCOMTR-MCNC: 100 MG/DL — HIGH (ref 70–99)
GLUCOSE BLDC GLUCOMTR-MCNC: 105 MG/DL — HIGH (ref 70–99)
GLUCOSE BLDC GLUCOMTR-MCNC: 158 MG/DL — HIGH (ref 70–99)
GLUCOSE BLDC GLUCOMTR-MCNC: 199 MG/DL — HIGH (ref 70–99)
GLUCOSE BLDC GLUCOMTR-MCNC: 53 MG/DL — CRITICAL LOW (ref 70–99)
GLUCOSE SERPL-MCNC: 55 MG/DL — LOW (ref 70–99)
HCT VFR BLD CALC: 32.1 % — LOW (ref 39–50)
HGB BLD-MCNC: 9.7 G/DL — LOW (ref 13–17)
MAGNESIUM SERPL-MCNC: 2.1 MG/DL — SIGNIFICANT CHANGE UP (ref 1.6–2.6)
MCHC RBC-ENTMCNC: 30.2 GM/DL — LOW (ref 32–36)
MCHC RBC-ENTMCNC: 32.9 PG — SIGNIFICANT CHANGE UP (ref 27–34)
MCV RBC AUTO: 108.8 FL — HIGH (ref 80–100)
NRBC # BLD: 0 /100 WBCS — SIGNIFICANT CHANGE UP (ref 0–0)
PLATELET # BLD AUTO: 314 K/UL — SIGNIFICANT CHANGE UP (ref 150–400)
POTASSIUM SERPL-MCNC: 4 MMOL/L — SIGNIFICANT CHANGE UP (ref 3.5–5.3)
POTASSIUM SERPL-SCNC: 4 MMOL/L — SIGNIFICANT CHANGE UP (ref 3.5–5.3)
RBC # BLD: 2.95 M/UL — LOW (ref 4.2–5.8)
RBC # FLD: 22.2 % — HIGH (ref 10.3–14.5)
SODIUM SERPL-SCNC: 143 MMOL/L — SIGNIFICANT CHANGE UP (ref 135–145)
TACROLIMUS SERPL-MCNC: 4.5 NG/ML — SIGNIFICANT CHANGE UP
WBC # BLD: 5.81 K/UL — SIGNIFICANT CHANGE UP (ref 3.8–10.5)
WBC # FLD AUTO: 5.81 K/UL — SIGNIFICANT CHANGE UP (ref 3.8–10.5)

## 2023-05-18 PROCEDURE — 99232 SBSQ HOSP IP/OBS MODERATE 35: CPT | Mod: GC

## 2023-05-18 PROCEDURE — 76604 US EXAM CHEST: CPT | Mod: 26

## 2023-05-18 RX ORDER — TACROLIMUS 5 MG/1
2 CAPSULE ORAL
Refills: 0 | Status: DISCONTINUED | OUTPATIENT
Start: 2023-05-19 | End: 2023-05-21

## 2023-05-18 RX ADMIN — Medication 81 MILLIGRAM(S): at 11:00

## 2023-05-18 RX ADMIN — BUMETANIDE 2 MILLIGRAM(S): 0.25 INJECTION INTRAMUSCULAR; INTRAVENOUS at 05:48

## 2023-05-18 RX ADMIN — TACROLIMUS 1 MILLIGRAM(S): 5 CAPSULE ORAL at 22:07

## 2023-05-18 RX ADMIN — LEVALBUTEROL 0.31 MILLIGRAM(S): 1.25 SOLUTION, CONCENTRATE RESPIRATORY (INHALATION) at 17:48

## 2023-05-18 RX ADMIN — ATORVASTATIN CALCIUM 20 MILLIGRAM(S): 80 TABLET, FILM COATED ORAL at 22:06

## 2023-05-18 RX ADMIN — HEPARIN SODIUM 5000 UNIT(S): 5000 INJECTION INTRAVENOUS; SUBCUTANEOUS at 05:49

## 2023-05-18 RX ADMIN — Medication 4 MILLILITER(S): at 19:03

## 2023-05-18 RX ADMIN — HEPARIN SODIUM 5000 UNIT(S): 5000 INJECTION INTRAVENOUS; SUBCUTANEOUS at 17:40

## 2023-05-18 RX ADMIN — Medication 50 MILLIGRAM(S): at 05:49

## 2023-05-18 RX ADMIN — PANTOPRAZOLE SODIUM 40 MILLIGRAM(S): 20 TABLET, DELAYED RELEASE ORAL at 08:00

## 2023-05-18 RX ADMIN — Medication 2: at 11:52

## 2023-05-18 RX ADMIN — Medication 5 MILLIGRAM(S): at 05:49

## 2023-05-18 RX ADMIN — Medication 1 MILLIGRAM(S): at 11:00

## 2023-05-18 RX ADMIN — SODIUM CHLORIDE 3 MILLILITER(S): 9 INJECTION INTRAMUSCULAR; INTRAVENOUS; SUBCUTANEOUS at 22:08

## 2023-05-18 RX ADMIN — Medication 650 MILLIGRAM(S): at 05:47

## 2023-05-18 RX ADMIN — PREGABALIN 1000 MICROGRAM(S): 225 CAPSULE ORAL at 11:01

## 2023-05-18 RX ADMIN — INSULIN GLARGINE 10 UNIT(S): 100 INJECTION, SOLUTION SUBCUTANEOUS at 22:57

## 2023-05-18 RX ADMIN — Medication 0.25 MILLIGRAM(S): at 22:07

## 2023-05-18 RX ADMIN — Medication 2 UNIT(S): at 17:40

## 2023-05-18 RX ADMIN — Medication 4 MILLILITER(S): at 07:51

## 2023-05-18 RX ADMIN — SODIUM CHLORIDE 3 MILLILITER(S): 9 INJECTION INTRAMUSCULAR; INTRAVENOUS; SUBCUTANEOUS at 14:24

## 2023-05-18 RX ADMIN — TACROLIMUS 1 MILLIGRAM(S): 5 CAPSULE ORAL at 09:02

## 2023-05-18 RX ADMIN — SENNA PLUS 2 TABLET(S): 8.6 TABLET ORAL at 22:07

## 2023-05-18 RX ADMIN — SODIUM CHLORIDE 3 MILLILITER(S): 9 INJECTION INTRAMUSCULAR; INTRAVENOUS; SUBCUTANEOUS at 06:45

## 2023-05-18 RX ADMIN — Medication 650 MILLIGRAM(S): at 23:04

## 2023-05-18 RX ADMIN — LEVALBUTEROL 0.31 MILLIGRAM(S): 1.25 SOLUTION, CONCENTRATE RESPIRATORY (INHALATION) at 05:47

## 2023-05-18 RX ADMIN — CLOPIDOGREL BISULFATE 75 MILLIGRAM(S): 75 TABLET, FILM COATED ORAL at 11:01

## 2023-05-18 RX ADMIN — Medication 2: at 17:39

## 2023-05-18 NOTE — DISCHARGE NOTE PROVIDER - HOSPITAL COURSE
Patient discussed on morning rounds with  _____    Operation Date:   4/25 Amulet device  4/26 bedside pericardiaocentesis, then RTOR for sternotomy, washout, and evacuation of pericardial clot    Primary Surgeon/Attending MD: Dr. Sidhu    Referring Physician: Dr. Gracy Lovelace  _ _ _ _ _ _ _ _ _ _ _ _  HOSPITAL COURSE:    _ _ _ _ _ _ _ _ _ _ _ _  DISCHARGE PHYSICAL EXAM:  General:  Neuro:  Cardio:  Pulm:  GI/:  Vascular:  Extremities:  Incisions:  _ _ _ _ _ _ _ _ _ _ _ _  REMOVAL CHECKLIST:        [X ] Epicardial wires          [ ] Stitches/tie downs,   If no, why? ______  _ _ _ _ _ _ _ _ _ _ _ _   MEDICATION DISCHARGE CHECKLIST         Surgical Valve        [ ] Aspirin, [  ] Contraindicated, Reason_______________________________        [ ] Lasix, [  ] Contraindicated, Reason_______________________________             Duration: _____        [ ] Beta-Blocker, [  ] Contraindicated, Reason_______________________________        TAVR Valve        [ ] Aspirin, [  ] Contraindicated, Reason_______________________________        [ ] Plavix, [ ] Contraindicated, Reason _______________________________        Anticoagulation        [ ] NOAC, [ ] Reason _______________________________              Cost/Insurance barriers addressed: YES/NO         [ ] Coumadin, [ ] Reason _______________________________              Dose:___________              INR Goal: ___________              Follow up established: YES/NO  _ _ _ _ _ _ _ _ _ _ _ _  RELEVANT LABS/IMAGING:    _ _ _ _ _ _ _ _ _ _ _ _  CLINICAL FOLLOW UP NEEDS:     [ ] Labwork           Labs needed:           When/Timing:           Outpatient team aware: YES/NO         [ ] Imaging            Type:           When/Timing:           Outpatient team aware: YES/NO       [ ] Home equipment           Type: (i.e. wound vac, pneumostat, prevena, wet/dry dressings, picc/midlines, MCOT, buckley etc)           Specific needs:           Outpatient team aware: YES/NO  _ _ _ _ _ _ _ _ _ _ _ _  Over 35 minutes was spent with the patient reviewing the discharge material including medications, follow up appointments, recovery, concerning symptoms, and how to contact their health care providers if they have questions Patient discussed on morning rounds with  _____    Operation Date:    Amulet device   bedside pericardiaocentesis, then RTOR for sternotomy, washout, and evacuation of pericardial clot    Primary Surgeon/Attending MD: Dr. Sidhu    Referring Physician: Dr. Gracy Lovelace  _ _ _ _ _ _ _ _ _ _ _ _  HOSPITAL COURSE:  78 YO Male with PMHx of HTN, HLD, DM, hx of PKD (hx of kidney transplant in ), hx of prostate cancer (s/p radical prostatectomy in ), DVT ( s/p pelvic fracture), AF/AFL (hx of ablation in ), GIB who presented to [facility] on 23 for planned procedure. On 23 he underwent percutaneous DENNY closure with Dr. Sidhu. Intra-op course uncomplicated and pt recovered on 9LA post-operatively. Overnight PEA arrest- ROSC s/p ACLS. Patient intubated and transferred to CTICU. TTE with evidence of pericardial tamponade s/p bedside pericardiocentesis with removal of 600cc heme fluid. () pt RTOR for sternotomy, washout with 1.5L L hemothorax evacuated. New MR intra-op. Pt arrived back to CTICU intubated on multi-pressor support. POD1 pt extubated and pressors weaned. POD3 right pigtail placed for pleural effusion (850cc drained) and placed on BiPAP for worsening dyspnea and atelectasis. POD4 unable to wean off BiPAP, diuresed with bumex. POD5 renal function worsening, swan placed, started on dobutamine and milrinone. Bronch done and started on empiric abx. Dobhoff placed. POD6 Tacro held 2/2 high level, midodrine started, low UO and Cr uptrended. POD7 Patient reintubated and started on vanc for MSSA (on BAL). Restarted Tacrolimus. POD8  weaned off, on CPAP. POD9 milrinone weaned. R prox brachial DVT and SVT in basilic vein found. Inotropic support decreased. POD11 Patient extubated, pressors weaned, milrinone off. POD13 R pigtail placed, RIJ removed. Unable to place midline, PIVs placed. POD15 weaned to RA, diuresed. POD 16 R pigtail removed. POD 17 UOP improving. POD18 L pleural effusion s/p pigtail c/b PTX. POD 19 L pigtail removed. [id number] pending bed availability at rehab. POD23 POCUS revealed L moderate pleural effusion which was not intervened on per Dr. Sidhu. On POD#24 patient was accepted to [facility] and medically cleared to transfer to the rehab center.     _ _ _ _ _ _ _ _ _ _ _ _  DISCHARGE PHYSICAL EXAM:  GEN: NAD, looks comfortable  Psych: Mood appropriate  Neuro: A&Ox3.  No focal deficits.  Moving all extremities.   HEENT: No obvious abnormalities  CV: S1S2, regular, no murmurs appreciated.  No carotid bruits.  No JVD  Lungs: fine bibasilar crackles. no wheeze or rhonchi.   ABD: Soft, non-tender, non-distended.  +Bowel sounds  EXT: Warm and well perfused.  bilateral pitting edema to mid shin on both lower extremities.   Musculoskeletal: Moving all extremities with normal ROM, no joint swelling  PV: Pedal pulses palpable  _ _ _ _   _ _ _ _ _ _ _ _  REMOVAL CHECKLIST:        [X ] Epicardial wires          [ ] Stitches/tie downs,   If no, why? ______  _ _ _ _ _ _ _ _ _ _ _ _   MEDICATION DISCHARGE CHECKLIST         Surgical Valve        [ ] Aspirin, [  ] Contraindicated, Reason_______________________________        [ ] Lasix, [  ] Contraindicated, Reason_______________________________             Duration: _____        [ ] Beta-Blocker, [  ] Contraindicated, Reason_______________________________        TAVR Valve        [ ] Aspirin, [  ] Contraindicated, Reason_______________________________        [ ] Plavix, [ ] Contraindicated, Reason _______________________________        Anticoagulation        [ ] NOAC, [ ] Reason _______________________________              Cost/Insurance barriers addressed: YES/NO         [ ] Coumadin, [ ] Reason _______________________________              Dose:___________              INR Goal: ___________              Follow up established: YES/NO  _ _ _ _ _ _ _ _ _ _ _ _  RELEVANT LABS/IMAGING:    _ _ _ _ _ _ _ _ _ _ _ _  CLINICAL FOLLOW UP NEEDS:     [ ] Labwork           Labs needed:           When/Timing:           Outpatient team aware: YES/NO         [ ] Imaging            Type:           When/Timing:           Outpatient team aware: YES/NO       [ ] Home equipment           Type: (i.e. wound vac, pneumostat, prevena, wet/dry dressings, picc/midlines, MCOT, buckley etc)           Specific needs:           Outpatient team aware: YES/NO  _ _ _ _ _ _ _ _ _ _ _ _  Over 35 minutes was spent with the patient reviewing the discharge material including medications, follow up appointments, recovery, concerning symptoms, and how to contact their health care providers if they have questions Patient discussed on morning rounds with Dr. Thomas     Operation Date:    Amulet device   bedside pericardiaocentesis, then RTOR for sternotomy, washout, and evacuation of pericardial clot    Primary Surgeon/Attending MD: Dr. Sidhu    Referring Physician: Dr. Gracy Lovelace  _ _ _ _ _ _ _ _ _ _ _ _  HOSPITAL COURSE:  76 YO Male with PMHx of HTN, HLD, DM, hx of PKD (hx of kidney transplant in ), hx of prostate cancer (s/p radical prostatectomy in ), DVT ( s/p pelvic fracture), AF/AFL (hx of ablation in ), GIB who presented to [facility] on 23 for planned procedure. On 23 he underwent percutaneous DENNY closure with Dr. Sidhu. Intra-op course uncomplicated and pt recovered on 9LA post-operatively. Overnight PEA arrest- ROSC s/p ACLS. Patient intubated and transferred to CTICU. TTE with evidence of pericardial tamponade s/p bedside pericardiocentesis with removal of 600cc heme fluid. () pt RTOR for sternotomy, washout with 1.5L L hemothorax evacuated. New MR intra-op. Pt arrived back to CTICU intubated on multi-pressor support. POD1 pt extubated and pressors weaned. POD3 right pigtail placed for pleural effusion (850cc drained) and placed on BiPAP for worsening dyspnea and atelectasis. POD4 unable to wean off BiPAP, diuresed with bumex. POD5 renal function worsening, swan placed, started on dobutamine and milrinone. Bronch done and started on empiric abx. Dobhoff placed. POD6 Tacro held 2/2 high level, midodrine started, low UO and Cr uptrended. POD7 Patient reintubated and started on vanc for MSSA (on BAL). Restarted Tacrolimus. POD8  weaned off, on CPAP. POD9 milrinone weaned. R prox brachial DVT and SVT in basilic vein found. Inotropic support decreased. POD11 Patient extubated, pressors weaned, milrinone off. POD13 R pigtail placed, RIJ removed. Unable to place midline, PIVs placed. POD15 weaned to RA, diuresed. POD 16 R pigtail removed. POD 17 UOP improving. POD18 L pleural effusion s/p pigtail c/b PTX. POD 19 L pigtail removed. [id number] pending bed availability at rehab. POD23 POCUS revealed L moderate pleural effusion which was not intervened on per Dr. Sidhu. On POD#24 patient was accepted to [facility] and medically cleared to transfer to the rehab center, however due to the center requiring additional testing and results not avalible fast enough, pt had to wait until  for discharge. On POD 26 pt was accepted to [facility] [place] and medically cleared for discharge. No further tacrolimus levels needed on day of discharge per renal and pt to be discharge on all current medications. pt medically cleared by Dr. Thomas. At time of discharge pt denies dizziness, vision changes, chest pain, palpitations, shortness of breath, cough, n/v/d, extremity swelling, calf tenderness.     **PT will be going to [facility] [phone]  _ _ _ _ _ _ _ _ _ _ _ _  DISCHARGE PHYSICAL EXAM:  GEN: NAD, looks comfortable  Psych: Mood appropriate  Neuro: A&Ox3.  No focal deficits.  Moving all extremities.   HEENT: No obvious abnormalities  CV: S1S2, regular, no murmurs appreciated.  No carotid bruits.  No JVD  Lungs: fine bibasilar crackles. no wheeze or rhonchi.   ABD: Soft, non-tender, non-distended.  +Bowel sounds  EXT: Warm and well perfused.  bilateral pitting edema to mid shin on both lower extremities.   Musculoskeletal: Moving all extremities with normal ROM, no joint swelling  PV: Pedal pulses palpable  Incisions: MSI clean dry and intact without drainage or bleeding noted, no sternal click, drain sites clean dry and intact, groins are clean dry and intact, neck site for TLC no suture in place.   _ _ _ _ _ _ _ _ _ _ _ _  REMOVAL CHECKLIST:        [X ] Epicardial wires          [X ] Stitches/tie downs,   If no, why? ______  _ _ _ _ _ _ _ _ _ _ _ _  RELEVANT LABS/IMAGING:  < from: Xray Chest 1 View- PORTABLE-Routine (Xray Chest 1 View- PORTABLE-Routine in AM.) (23 @ 05:27) >      Findings/  impression: Stable cardiomegaly, thoracic aortic calcification, status   post median sternotomy, watchman device. Left opacity/pleural effusion,   decreased. Right basilar opacity.    --- End of Report ---    < end of copied text >    _ _ _ _ _ _ _ _ _ _ _ _  Over 35 minutes was spent with the patient reviewing the discharge material including medications, follow up appointments, recovery, concerning symptoms, and how to contact their health care providers if they have questions

## 2023-05-18 NOTE — PROGRESS NOTE ADULT - SUBJECTIVE AND OBJECTIVE BOX
Patient is a 77y Male seen and evaluated at bedside. Sitting up in chair. Appears comfortable. Kahlil rae [facility]      Meds:    acetaminophen     Tablet .. 650 every 6 hours PRN  acetylcysteine 10%  Inhalation 4 every 12 hours  ALPRAZolam 0.25 every 8 hours PRN  aspirin  chewable 81 daily  atorvastatin 20 at bedtime  buMETAnide 2 daily  clopidogrel Tablet 75 daily  cyanocobalamin 1000 daily  dextrose 5%. 1000 <Continuous>  dextrose 5%. 1000 <Continuous>  dextrose 50% Injectable 25 once  dextrose 50% Injectable 25 once  dextrose 50% Injectable 12.5 once  dextrose Oral Gel 15 once PRN  folic acid 1 daily  glucagon  Injectable 1 once  glucagon  Injectable 1 once  glucagon  Injectable 1 once  heparin   Injectable 5000 every 12 hours  insulin glargine Injectable (LANTUS) 10 at bedtime  insulin lispro (ADMELOG) corrective regimen sliding scale  Before meals and at bedtime  insulin lispro Injectable (ADMELOG) 2 before dinner  levalbuterol Inhalation 0.31 two times a day  metoprolol succinate ER 50 daily  pantoprazole    Tablet 40 before breakfast  polyethylene glycol 3350 17 daily  predniSONE   Tablet 5 daily  senna 2 at bedtime  sodium chloride 0.9% lock flush 3 every 8 hours  tacrolimus 1 <User Schedule>  tacrolimus 1 at bedtime      T(C): , Max: 36.5 (05-17-23 @ 21:27)  T(F): , Max: 97.7 (05-17-23 @ 21:27)  HR: 96 (05-18-23 @ 09:05)  BP: 124/69 (05-18-23 @ 09:05)  BP(mean): 90 (05-18-23 @ 09:05)  RR: 12 (05-18-23 @ 09:05)  SpO2: 95% (05-18-23 @ 09:05)  Wt(kg): --    05-17 @ 07:01  -  05-18 @ 07:00  --------------------------------------------------------  IN: 0 mL / OUT: 750 mL / NET: -750 mL    05-18 @ 07:01  -  05-18 @ 12:24  --------------------------------------------------------  IN: 280 mL / OUT: 500 mL / NET: -220 mL          Review of Systems:  ROS negative except as per HPI      PHYSICAL EXAM:  GENERAL: NAD  CHEST/LUNG: Decreased BS L lung base, no accessory muscle use  HEART: S1S2, RRR  ABDOMEN: Soft, Nontender  EXTREMITIES: No edema   NEUROLOGY: AAO x3, no focal neurological deficit        LABS:                        9.7    5.81  )-----------( 314      ( 18 May 2023 05:30 )             32.1     05-18    143  |  106  |  53<H>  ----------------------------<  55<L>  4.0   |  23  |  2.11<H>    Ca    9.0      18 May 2023 05:30  Phos  3.2     05-17  Mg     2.1     05-18                  RADIOLOGY & ADDITIONAL STUDIES:

## 2023-05-18 NOTE — PROGRESS NOTE ADULT - ASSESSMENT
78 YO Male with PMHx of HTN, HLD, DM, hx of PKD (hx of kidney transplant in ), hx of prostate cancer (s/p radical prostatectomy in ), DVT ( s/p pelvic fracture), AF/AFL (hx of ablation in ), GIB who presented to [facility] on 23 for planned procedure. On 23 he underwent percutaneous DENNY closure with Dr. Sidhu. Intra-op course uncomplicated and pt recovered on 9LA post-operatively. Overnight PEA arrest- ROSC s/p ACLS. Patient intubated and transferred to CTICU. TTE with evidence of pericardial tamponade s/p bedside pericardiocentesis with removal of 600cc heme fluid. () pt RTOR for sternotomy, washout with 1.5L L hemothorax evacuated. New MR intra-op. Pt arrived back to CTICU intubated on multi-pressor support. POD1 pt extubated and pressors weaned. POD3 right pigtail placed for pleural effusion (850cc drained) and placed on BiPAP for worsening dyspnea and atelectasis. POD4 unable to wean off BiPAP, diuresed with bumex. POD5 renal function worsening, swan placed, started on dobutamine and milrinone. Bronch done and started on empiric abx. Dobhoff placed. POD6 Tacro held 2/2 high level, midodrine started, low UO and Cr uptrended. POD7 Patient reintubated and started on vanc for MSSA (on BAL). Restarted Tacrolimus. POD8  weaned off, on CPAP. POD9 milrinone weaned. R prox brachial DVT and SVT in basilic vein found. Inotropic support decreased. POD11 Patient extubated, pressors weaned, milrinone off. POD13 R pigtail placed, RIJ removed. Unable to place midline, PIVs placed. POD15 weaned to RA, diuresed. POD 16 R pigtail removed. POD 17 UOP improving. POD18 L pleural effusion s/p pigtail c/b PTX. POD 19 L pigtail removed. TYS18-37 pending bed availability at rehab. POD23 POCUS revealed L moderate pleural effusion.    Plan:    Neurovascular:   -Hx of DVT in 2006  -Pain well controlled with current regimen. PRN's: Tylenol and Oxycodone  -Cont PRN Xanax for anxiety    Cardiovascular:   -S/p DENNY occlusion percutaneously on   -Post-op course c/b PEA arrest, cardiogenic shock, pericardial tamponade s/p bedside pericardiaocentesis, then RTOR for sternotomy, washout, and evacuation of pericardial clot on 23  -Cont ASA, BB, statin  -Plavix started today  -Hx of A-Fib  -Cont BB  -Hx of HTN  -Cont BB  -Hx of HLD  -Cont statin  -Hemodynamically stable.   -Monitor: BP, HR, tele    Respiratory:   -Cont Xopenex  -Oxygenating well on room air  -Encourage continued use of IS 10x/hr and frequent ambulation  -CXR: L moderate pleural effusion    GI:  -GI PPX: Protonix  -PO Diet  -Bowel Regimen: senna, miralax     Renal / :  -Hx of PKD s/p renal transplant  -Renal following  -Cont Tacrolimus, level yesterday 4.2. Goal 4-5  -Cont prednisone and Bumex per renal recs  -Continue to monitor renal function: BUN/Cr: 53/2.11  -Monitor I/O's daily     Endocrine:    -Hx of DMII  -A1c: 6  -Cont ISS, lantus, lispro  -No hx of thyroid dx  -TSH: 0.537    Hematologic:  -Cont folic acid  -Hx of post-op anemia (resolved)  -CBC: H/H- 9.7/32.1  -Coagulation Panel.    ID:  -Temperature: Afebrile  -CBC: WBC- 5.81  -Continue to observe for SIRS/Sepsis Syndrome.    Prophylaxis:  -DVT prophylaxis with 5000 SubQ Heparin q8h.  -Continue with SCD's b/l while patient is at rest     Disposition:  -Awaiting rehab bed

## 2023-05-18 NOTE — DISCHARGE NOTE PROVIDER - CARE PROVIDERS DIRECT ADDRESSES
,DirectAddress_Unknown,DirectAddress_Unknown,[email or web address]

## 2023-05-18 NOTE — DISCHARGE NOTE PROVIDER - NSDCFUADDAPPT_GEN_ALL_CORE_FT
Dr. Saldivar's office will call you to set up a follow up appointment for management of your anti-rejection medications for your renal tranplant.  Dr. Saldivar's office will call you to set up a follow up appointment for management of your anti-rejection medications for your renal tranplant.     Our office will call you with the changed follow up appointments while in your rehab facility.

## 2023-05-18 NOTE — PROGRESS NOTE ADULT - SUBJECTIVE AND OBJECTIVE BOX
Patient discussed on morning rounds with Dr. Rodríguez    Operation / Date: 4/25 Amulet device  4/26 bedside pericardiaocentesis, then RTOR for sternotomy, washout, and evacuation of pericardial clot    SUBJECTIVE ASSESSMENT: Patient seen and examined at bedside. Patient admits to some discomfort on his lower back from his ulcer, otherwise feels okay. Denies chills, chest pain, SOB, palpitations, N/V    Vital Signs Last 24 Hrs  T(C): 36.2 (18 May 2023 09:07), Max: 36.5 (17 May 2023 21:27)  T(F): 97.1 (18 May 2023 09:07), Max: 97.7 (17 May 2023 21:27)  HR: 96 (18 May 2023 09:05) (70 - 100)  BP: 124/69 (18 May 2023 09:05) (124/69 - 170/87)  BP(mean): 90 (18 May 2023 09:05) (90 - 119)  RR: 12 (18 May 2023 09:05) (12 - 20)  SpO2: 95% (18 May 2023 09:05) (92% - 100%)    Parameters below as of 18 May 2023 09:05  Patient On (Oxygen Delivery Method): room air      I&O's Detail    17 May 2023 07:01  -  18 May 2023 07:00  --------------------------------------------------------  IN:  Total IN: 0 mL    OUT:    Voided (mL): 750 mL  Total OUT: 750 mL    Total NET: -750 mL      18 May 2023 07:01  -  18 May 2023 11:05  --------------------------------------------------------  IN:    Oral Fluid: 280 mL  Total IN: 280 mL    OUT:    Voided (mL): 225 mL  Total OUT: 225 mL    Total NET: 55 mL    CHEST TUBE:  none  MANDY DRAIN:  none  EPICARDIAL WIRES: none  TIE DOWNS: none  SANDHU: none    PHYSICAL EXAM:  GENERAL: NAD, sitting upright in chair  HEAD:  Atraumatic, Normocephalic  EYES: EOMI, PERRLA, conjunctiva and sclera clear  ENT: Moist mucous membranes  NECK: Supple, No JVD  CHEST/LUNG: MSI well-approximated, previous drain sites healing well. Mild expiratory wheezing on R upper lung field.  HEART: RRR  ABDOMEN: Bowel sounds present; Soft, Nontender, Nondistended. No hepatomegally  EXTREMITIES:  2+ Peripheral Pulses, brisk capillary refill. No clubbing, cyanosis, or edema  NERVOUS SYSTEM:  Alert & Oriented X3, speech clear. No deficits     LABS:                        9.7    5.81  )-----------( 314      ( 18 May 2023 05:30 )             32.1     05-18    143  |  106  |  53<H>  ----------------------------<  55<L>  4.0   |  23  |  2.11<H>    Ca    9.0      18 May 2023 05:30  Phos  3.2     05-17  Mg     2.1     05-18    MEDICATIONS  (STANDING):  acetylcysteine 10%  Inhalation 4 milliLiter(s) Inhalation every 12 hours  aspirin  chewable 81 milliGRAM(s) Oral daily  atorvastatin 20 milliGRAM(s) Oral at bedtime  buMETAnide 2 milliGRAM(s) Oral daily  clopidogrel Tablet 75 milliGRAM(s) Oral daily  cyanocobalamin 1000 MICROGram(s) Oral daily  dextrose 5%. 1000 milliLiter(s) (100 mL/Hr) IV Continuous <Continuous>  dextrose 5%. 1000 milliLiter(s) (50 mL/Hr) IV Continuous <Continuous>  dextrose 50% Injectable 25 Gram(s) IV Push once  dextrose 50% Injectable 25 Gram(s) IV Push once  dextrose 50% Injectable 12.5 Gram(s) IV Push once  folic acid 1 milliGRAM(s) Oral daily  glucagon  Injectable 1 milliGRAM(s) IntraMuscular once  glucagon  Injectable 1 milliGRAM(s) IntraMuscular once  glucagon  Injectable 1 milliGRAM(s) IntraMuscular once  heparin   Injectable 5000 Unit(s) SubCutaneous every 12 hours  insulin glargine Injectable (LANTUS) 10 Unit(s) SubCutaneous at bedtime  insulin lispro (ADMELOG) corrective regimen sliding scale   SubCutaneous Before meals and at bedtime  insulin lispro Injectable (ADMELOG) 2 Unit(s) SubCutaneous before dinner  levalbuterol Inhalation 0.31 milliGRAM(s) Inhalation two times a day  metoprolol succinate ER 50 milliGRAM(s) Oral daily  pantoprazole    Tablet 40 milliGRAM(s) Oral before breakfast  polyethylene glycol 3350 17 Gram(s) Oral daily  predniSONE   Tablet 5 milliGRAM(s) Oral daily  senna 2 Tablet(s) Oral at bedtime  sodium chloride 0.9% lock flush 3 milliLiter(s) IV Push every 8 hours  tacrolimus 1 milliGRAM(s) Oral <User Schedule>  tacrolimus 1 milliGRAM(s) Oral at bedtime    MEDICATIONS  (PRN):  acetaminophen     Tablet .. 650 milliGRAM(s) Oral every 6 hours PRN Mild Pain (1 - 3)  ALPRAZolam 0.25 milliGRAM(s) Oral every 8 hours PRN Anxiety  dextrose Oral Gel 15 Gram(s) Oral once PRN Blood Glucose LESS THAN 70 milliGRAM(s)/deciliter    RADIOLOGY & ADDITIONAL TESTS:  < from: Xray Chest 1 View- PORTABLE-Routine (05.17.23 @ 05:40) >  IMPRESSION:    Similar appearance to prior exam 5/15/2023. Moderate left pleural   effusion. Postop change. No acute infiltrate appearing. No pneumothorax.    < end of copied text >

## 2023-05-18 NOTE — DISCHARGE NOTE PROVIDER - PROVIDER TOKENS
PROVIDER:[TOKEN:[[id number]]],PROVIDER:[TOKEN:[[id number]]],PROVIDER:[TOKEN:[[id number]]] PROVIDER:[TOKEN:[[id number]],SCHEDULEDAPPT:[05/22/2023],SCHEDULEDAPPTTIME:[01:45 PM]],PROVIDER:[TOKEN:[[id number]],SCHEDULEDAPPT:[06/05/2023],SCHEDULEDAPPTTIME:[01:40 PM]],PROVIDER:[TOKEN:[[id number]]]

## 2023-05-18 NOTE — DISCHARGE NOTE PROVIDER - NSDCFUSCHEDAPPT_GEN_ALL_CORE_FT
Myles Sidhu  [facility] Physician [facility]  HEARTVASC [address]  Scheduled Appointment: 05/22/2023     Myles Sidhu  [facility] Physician [facility] access hospital  HEARTVASC [address]  Scheduled Appointment: 05/22/2023    Jarad Saldivar  [facility]  NEPHRO [address]  Scheduled Appointment: 06/14/2023     Jarad Saldivar  [facility] Physician [facility]  NEPHRO [address]  Scheduled Appointment: 06/14/2023

## 2023-05-18 NOTE — PROGRESS NOTE ADULT - ASSESSMENT
77Y M w/ Non-Oliguric iATN on underlying CKD 3 (baseline sCr at 1.2-1.4) s/p renal transplant 2007 h/o PKD s/p DENNY occlusion repair complicated by pericardial effusion s/p chest washout, course complicated by cardiogenic shock, with improving renal function and  tacro level stable now      #Non-Oliguric iATN on underlying CKD 3 (baseline sCr at 1.2-1.4)   Initial insult pre-renal initially but with prolonged hypoperfusion developed into iATN   sCr at 2.11 today. Likely around new baseline. No BP drops noted in last 24 hours to explain the slight uptrend in Cr today  - Pt appears euvolemic today  - C/w bumex 2mg PO daily. Maintain mild net neg fluid balance  - Strict I/Os  - Check a post void bladder scan today to ensure no urinary retention to explain slight rise in Cr today      Transplant - 2007, initial failure 2/2 PCKD  - tacro level noted 4.2 5/17  - Increase tacro to his usual home dose of 2mg AM and 1mg PM. Pt can be discharged on this dose   - Check next tacro level on 5/19 AM 30 min prior to next dose or 12 hour after previous dose. Please ensure the timing is accurate otherwise trough level can be difficult to assess  - Tacro level goal of 5-6  - Continue home prednisone 5mg PO QD

## 2023-05-18 NOTE — PROCEDURE NOTE - NSICDXPROBLEMMLMBOX_GEN
IPSTART~^~1~^~[id number]~^~~^~IPEND  PKSTART~^~[id number]~^~PKEND  
IPSTART~^~1~^~[id number]~^~~^~IPEND  PKSTART~^~[id number]~^~PKEND

## 2023-05-18 NOTE — DISCHARGE NOTE PROVIDER - NSDCCPTREATMENT_GEN_ALL_CORE_FT
PRINCIPAL PROCEDURE  Procedure: Occlusion, left atrial appendage  Findings and Treatment: Percutaneous using a 25 mm Amulet device      SECONDARY PROCEDURE  Procedure: Sternotomy for exploration  Findings and Treatment:     Procedure: Pericardiocentesis  Findings and Treatment:     Procedure: Chest tube insertion  Findings and Treatment:

## 2023-05-18 NOTE — PROCEDURE NOTE - NSUS ED ADDITIONAL DETAIL1 FT
4. [facility]  N17.9       5. Transaminitis  R74.01       6. Non-traumatic rhabdomyolysis  M62.82       7. Acidosis  E87.20       8.  Polysubstance abuse ([facility]  F19.10                Sharan Infante MD  05/10/23 6071 L-sided moderate pleural effusion- about 400cc  Minimal fluid on R side however some errors may be present due to limitations of this technology and occasionally words are not transcribed correctly.     MD Donis Fairbanks MD  05/09/23 3951

## 2023-05-18 NOTE — DISCHARGE NOTE PROVIDER - NSDCMRMEDTOKEN_GEN_ALL_CORE_FT
Coreg 12.5 mg oral tablet: 1 tab(s) orally 2 times a day  Jardiance 25 mg oral tablet: 1 tab(s) orally once a day (in the morning)  Levemir FlexTouch 100 units/mL subcutaneous solution: 12 unit(s) subcutaneous once a day (at bedtime)  Lipitor 20 mg oral tablet: 1 tab(s) orally once a day  lisinopril 40 mg oral tablet: 1 tab(s) orally once a day  metFORMIN 500 mg oral tablet, extended release: 1 tab(s) orally once a day in AM and 3 tabs in PM  Norvasc 10 mg oral tablet: 1 orally once a day  repaglinide 2 mg oral tablet: 1 tab(s) orally 2 times a day (before lunch and dinner )  tacrolimus 1 mg oral capsule: 2 cap(s) orally once a day (in the morning) and 1 cap in the PM  torsemide 20 mg oral tablet: 1 tab(s) orally 2 times a day  Trulicity Pen 1.5 mg/0.5 mL subcutaneous solution: subcutaneous once a week  Vitamin D3 1000 intl units oral capsule: 1 cap(s) orally 2 times a day   acetaminophen 325 mg oral tablet: 2 tab(s) orally every 6 hours As needed Mild Pain (1 - 3)  acetylcysteine 10% inhalation solution: 4 milliliter(s) inhaled every 12 hours  ALPRAZolam 0.25 mg oral tablet: 1 tab(s) orally every 8 hours As needed Anxiety  aspirin 81 mg oral tablet, chewable: 1 tab(s) orally once a day  atorvastatin 20 mg oral tablet: 1 tab(s) orally once a day (at bedtime)  bumetanide 2 mg oral tablet: 1 tab(s) orally once a day  clopidogrel 75 mg oral tablet: 1 tab(s) orally once a day  cyanocobalamin 1000 mcg oral tablet: 1 tab(s) orally once a day  folic acid 1 mg oral tablet: 1 tab(s) orally once a day  heparin: 5,000 unit(s) subcutaneous every 12 hours  insulin lispro 100 units/mL injectable solution: 2 unit(s) injectable 3 times a day 2 Unit(s) if Glucose 151 - 200  4 Unit(s) if Glucose 201 - 250  6 Unit(s) if Glucose 251 - 300  8 Unit(s) if Glucose 301 - 350  10 Unit(s) if Glucose 351 - 400  12 Unit(s) if Glucose Greater Than 400    PLEASE FOLLOW SLIDING SCALE  insulin lispro 100 units/mL injectable solution: 2 unit(s) injectable 3 times a day (before meals)  levalbuterol 0.31 mg/3 mL inhalation solution: 3 milliliter(s) inhaled 2 times a day  metoprolol succinate 50 mg oral tablet, extended release: 1 tab(s) orally once a day  pantoprazole 40 mg oral delayed release tablet: 1 tab(s) orally once a day (before a meal)  polyethylene glycol 3350 oral powder for reconstitution: 17 gram(s) orally once a day  predniSONE 5 mg oral tablet: 1 tab(s) orally once a day  senna leaf extract oral tablet: 2 tab(s) orally once a day (at bedtime)  tacrolimus 1 mg oral capsule: 1 cap(s) orally once a day (at bedtime)  tacrolimus 1 mg oral capsule: 2 cap(s) orally once a day  Vitamin D3 1000 intl units oral capsule: 1 cap(s) orally 2 times a day

## 2023-05-18 NOTE — PROCEDURE NOTE - NSPROCNAME_GEN_A_CORE
Point of Care Ultrasound Lung
Tracheal Intubation
Midline Insertion
Chest Tube
Arterial Puncture/Cannulation
Chest Tube
Central Line Insertion
Central Line Insertion
Pericardiocentesis

## 2023-05-18 NOTE — DISCHARGE NOTE PROVIDER - NSDCCPCAREPLAN_GEN_ALL_CORE_FT
PRINCIPAL DISCHARGE DIAGNOSIS  Diagnosis: Chronic atrial fibrillation  Assessment and Plan of Treatment:       SECONDARY DISCHARGE DIAGNOSES  Diagnosis: Pericardial effusion with cardiac tamponade  Assessment and Plan of Treatment:

## 2023-05-18 NOTE — DISCHARGE NOTE PROVIDER - CARE PROVIDER_API CALL
Myles Sidhu)  Cardiovascular Disease; Internal Medicine; Interventional Cardiology  [address] [place] [address]  Phone: [phone]  Fax: [phone]  Follow Up Time:     SEPIDEH SERVIN  Internal Medicine  [address] [place] [address]  Phone: [phone]  Fax: [phone]  Follow Up Time:     Jarad Saldivar)  Internal Medicine; Nephrology  [address], [address]  [place] [address]  Phone: [phone]  Fax: [phone]  Follow Up Time:    Myles Sidhu)  Cardiovascular Disease; Internal Medicine; Interventional Cardiology  [address] [place] [address]  Phone: [phone]  Fax: [phone]  Scheduled Appointment: 05/22/2023 01:45 PM    SEPIDEH SERVIN  Internal Medicine  [address] [place] [address]  Phone: [phone]  Fax: [phone]  Scheduled Appointment: 06/05/2023 01:40 PM    Jarad Saldivar)  Internal Medicine; Nephrology  [address], [address]  [place] [address]  Phone: [phone]  Fax: [phone]  Follow Up Time:

## 2023-05-18 NOTE — PROGRESS NOTE ADULT - ATTENDING COMMENTS
Case reviewed throughout the day with renal fellow, Dr. Valdovinos.  Interim chart, labs, notes appreciated.  iATN on underlying CKD 3 with worsening UO.  Renal service following closely with you.  Agree with recs above.    Will continue to reassess frequently.
Case reviewed with Dr. Valdovinos on renal rounds.  Pt seen at bedside. Sitting in chair on NC O2.  iATN as above in this renal transplant pt.  Agree with fellow's recs.  Renal will continue to follow closely with you.
Hx and PE as noted above  Patient seen at bedside with Dr. King  Case discussed with Dr. Lazar and Dr. Sidhu  Plan as outlined above
I: HTN controlled.    Cr 1.94.   A: Stable CKD.   P: No indications for dialysis. Continue BP meds. Follow SCr.
Non-Oliguric iATN on underlying CKD 3 in pt with a renal transplant (2007)  Interim chart was reviewed and case d/w Dr. Valdovinos, and communicated with Dr. Lazar.  Labs appreciated and pt's progress noted.  Off Midodrine; trending BP's.  Tacro dosing as above.   Renal service following closely with you.
Pt sitting in chair on RA. Tucker out. Urinating well. He's been receiving PT.  Case reviewed with renal fellow. Creat at 2.0 this am.  Agree with assessment above and recs.  Continue to closely monitor Cr/UO.  Tacro dosing as above.  Renal service will continue to follow closely with you.   Any concerns, please contact renal service when needed.
Renal service following closely with you.  Interim chart reviewed from the weekend.  Case d/w renal fellow, Dr. Cleary.  Non-Oliguric iATN on underlying CKD 3 in pt with kidney transplant (2007)  Volume status as above. Creat stable.   Agree with Tacro recs.
Renal transplant pt with Non-Oliguric iATN on underlying CKD 3.  Complicated hospital course. Other consultant notes appreciated.  Case d/w Dr. Valdovinos earlier today.  Agree with note above.
Reviewed case with fellow, Dr. Valdovinos.  Cr may be at new baseline.  Interim chart reviewed.  Concern for dosing adjustments of Tacrolimus.  Continue to check levels while here.  Pt will need close nephrology f/u upon eventual discharge.   Decision re diuretic regimen should be made in conjunction with other services.  Notes read and appreciated.  Please feel free to contact renal service with any questions or concerns.
iATN on CKD in pt with Renal Tansplant.  Case reviewed with Dr. Valdovinos on renal rounds.  Agree with assessment above.  Tacro as outlined.   Monitor BP.  Renal service will continue to follow with you.
interim events noted, chart reviewed.  creat stable  adjusting tacrolimus dosage as outlined above
Case reviewed this am and d/w renal fellow Dr. Cleary this afternoon.  iATN, creatinine fairly stable. Continue diuresis.   Consider d/c Midodrine if BP's remain elevated.  Continue prednisone and continue to monitor tacro trough levels.  Renal following with you.
I: HTN controlled.     A: Stable CKD.   P: No indications for dialysis. Continue BP meds. Follow SCr.
Interim chart reviewed; events noted. Pt seen at bedside.  Details of labs and other specialist notes appreciated.  Events d/w Dr. Valdovinos, renal fellow, at length.  Will reassess in am and renal service will continue to follow closely with you.
Non-Oliguric iATN on underlying CKD 3.   Details of interim chart reviewed; events noted.  Pt seen at bedside; now intubated.  Last 24hr UOP 1 L net pos 1.7L/24 hours s/p bumex pushes (1+1+2)  s/p multiple doses of albumin 5/2.    Some improvement in UO 5/3.   Case d/w Dr. Hernadez and ICU team.  Case reviewed with Dr. Cleary.  Resume Tacro as above.  Maintain MAP>70.  PO4 supplemented.  Concern for gross hematuria, galina given anemia.  Consult . Flush buckley.  Renal service to follow closely with you.
Renal transplant pt with Non-Oliguric iATN on underlying CKD 3 (baseline sCr at 1.2-1.4).  Complicated hospital course.  Interim chart reviewed and case d/w Dr. Valdovinos on renal rounds.   Agree with above assessment and recs.  Monitor tacro levels.   Renal service will continue to follow closely with you.

## 2023-05-19 LAB
ANION GAP SERPL CALC-SCNC: 13 MMOL/L — SIGNIFICANT CHANGE UP (ref 5–17)
BUN SERPL-MCNC: 48 MG/DL — HIGH (ref 7–23)
CALCIUM SERPL-MCNC: 8.7 MG/DL — SIGNIFICANT CHANGE UP (ref 8.4–10.5)
CHLORIDE SERPL-SCNC: 103 MMOL/L — SIGNIFICANT CHANGE UP (ref 96–108)
CO2 SERPL-SCNC: 23 MMOL/L — SIGNIFICANT CHANGE UP (ref 22–31)
CREAT SERPL-MCNC: 1.9 MG/DL — HIGH (ref 0.5–1.3)
EGFR: 36 ML/MIN/1.73M2 — LOW
GLUCOSE BLDC GLUCOMTR-MCNC: 102 MG/DL — HIGH (ref 70–99)
GLUCOSE BLDC GLUCOMTR-MCNC: 163 MG/DL — HIGH (ref 70–99)
GLUCOSE BLDC GLUCOMTR-MCNC: 251 MG/DL — HIGH (ref 70–99)
GLUCOSE BLDC GLUCOMTR-MCNC: 61 MG/DL — LOW (ref 70–99)
GLUCOSE BLDC GLUCOMTR-MCNC: 86 MG/DL — SIGNIFICANT CHANGE UP (ref 70–99)
GLUCOSE SERPL-MCNC: 62 MG/DL — LOW (ref 70–99)
HCT VFR BLD CALC: 34.9 % — LOW (ref 39–50)
HGB BLD-MCNC: 10.5 G/DL — LOW (ref 13–17)
MAGNESIUM SERPL-MCNC: 2.1 MG/DL — SIGNIFICANT CHANGE UP (ref 1.6–2.6)
MCHC RBC-ENTMCNC: 30.1 GM/DL — LOW (ref 32–36)
MCHC RBC-ENTMCNC: 32.4 PG — SIGNIFICANT CHANGE UP (ref 27–34)
MCV RBC AUTO: 107.7 FL — HIGH (ref 80–100)
NRBC # BLD: 0 /100 WBCS — SIGNIFICANT CHANGE UP (ref 0–0)
PLATELET # BLD AUTO: 268 K/UL — SIGNIFICANT CHANGE UP (ref 150–400)
POTASSIUM SERPL-MCNC: 4.5 MMOL/L — SIGNIFICANT CHANGE UP (ref 3.5–5.3)
POTASSIUM SERPL-SCNC: 4.5 MMOL/L — SIGNIFICANT CHANGE UP (ref 3.5–5.3)
RBC # BLD: 3.24 M/UL — LOW (ref 4.2–5.8)
RBC # FLD: 21.9 % — HIGH (ref 10.3–14.5)
SARS-COV-2 RNA SPEC QL NAA+PROBE: NEGATIVE — SIGNIFICANT CHANGE UP
SODIUM SERPL-SCNC: 139 MMOL/L — SIGNIFICANT CHANGE UP (ref 135–145)
WBC # BLD: 4.7 K/UL — SIGNIFICANT CHANGE UP (ref 3.8–10.5)
WBC # FLD AUTO: 4.7 K/UL — SIGNIFICANT CHANGE UP (ref 3.8–10.5)

## 2023-05-19 PROCEDURE — 71045 X-RAY EXAM CHEST 1 VIEW: CPT | Mod: 26

## 2023-05-19 PROCEDURE — 99232 SBSQ HOSP IP/OBS MODERATE 35: CPT

## 2023-05-19 RX ORDER — INSULIN GLARGINE 100 [IU]/ML
8 INJECTION, SOLUTION SUBCUTANEOUS AT BEDTIME
Refills: 0 | Status: DISCONTINUED | OUTPATIENT
Start: 2023-05-19 | End: 2023-05-19

## 2023-05-19 RX ORDER — INSULIN LISPRO 100/ML
2 VIAL (ML) SUBCUTANEOUS
Refills: 0 | Status: DISCONTINUED | OUTPATIENT
Start: 2023-05-19 | End: 2023-05-21

## 2023-05-19 RX ADMIN — Medication 0.25 MILLIGRAM(S): at 22:38

## 2023-05-19 RX ADMIN — SODIUM CHLORIDE 3 MILLILITER(S): 9 INJECTION INTRAMUSCULAR; INTRAVENOUS; SUBCUTANEOUS at 06:14

## 2023-05-19 RX ADMIN — Medication 50 MILLIGRAM(S): at 06:18

## 2023-05-19 RX ADMIN — SENNA PLUS 2 TABLET(S): 8.6 TABLET ORAL at 22:38

## 2023-05-19 RX ADMIN — SODIUM CHLORIDE 3 MILLILITER(S): 9 INJECTION INTRAMUSCULAR; INTRAVENOUS; SUBCUTANEOUS at 23:16

## 2023-05-19 RX ADMIN — HEPARIN SODIUM 5000 UNIT(S): 5000 INJECTION INTRAVENOUS; SUBCUTANEOUS at 06:12

## 2023-05-19 RX ADMIN — CLOPIDOGREL BISULFATE 75 MILLIGRAM(S): 75 TABLET, FILM COATED ORAL at 11:32

## 2023-05-19 RX ADMIN — SODIUM CHLORIDE 3 MILLILITER(S): 9 INJECTION INTRAMUSCULAR; INTRAVENOUS; SUBCUTANEOUS at 13:31

## 2023-05-19 RX ADMIN — LEVALBUTEROL 0.31 MILLIGRAM(S): 1.25 SOLUTION, CONCENTRATE RESPIRATORY (INHALATION) at 18:20

## 2023-05-19 RX ADMIN — Medication 4 MILLILITER(S): at 23:16

## 2023-05-19 RX ADMIN — PANTOPRAZOLE SODIUM 40 MILLIGRAM(S): 20 TABLET, DELAYED RELEASE ORAL at 06:12

## 2023-05-19 RX ADMIN — Medication 650 MILLIGRAM(S): at 12:31

## 2023-05-19 RX ADMIN — Medication 650 MILLIGRAM(S): at 22:44

## 2023-05-19 RX ADMIN — LEVALBUTEROL 0.31 MILLIGRAM(S): 1.25 SOLUTION, CONCENTRATE RESPIRATORY (INHALATION) at 06:13

## 2023-05-19 RX ADMIN — PREGABALIN 1000 MICROGRAM(S): 225 CAPSULE ORAL at 11:32

## 2023-05-19 RX ADMIN — HEPARIN SODIUM 5000 UNIT(S): 5000 INJECTION INTRAVENOUS; SUBCUTANEOUS at 18:20

## 2023-05-19 RX ADMIN — Medication 4 MILLILITER(S): at 07:28

## 2023-05-19 RX ADMIN — TACROLIMUS 1 MILLIGRAM(S): 5 CAPSULE ORAL at 22:38

## 2023-05-19 RX ADMIN — Medication 81 MILLIGRAM(S): at 11:33

## 2023-05-19 RX ADMIN — Medication 6: at 17:08

## 2023-05-19 RX ADMIN — Medication 5 MILLIGRAM(S): at 06:12

## 2023-05-19 RX ADMIN — Medication 1 MILLIGRAM(S): at 11:31

## 2023-05-19 RX ADMIN — TACROLIMUS 2 MILLIGRAM(S): 5 CAPSULE ORAL at 11:32

## 2023-05-19 RX ADMIN — BUMETANIDE 2 MILLIGRAM(S): 0.25 INJECTION INTRAMUSCULAR; INTRAVENOUS at 07:31

## 2023-05-19 RX ADMIN — Medication 650 MILLIGRAM(S): at 23:41

## 2023-05-19 RX ADMIN — ATORVASTATIN CALCIUM 20 MILLIGRAM(S): 80 TABLET, FILM COATED ORAL at 22:38

## 2023-05-19 RX ADMIN — Medication 2: at 11:57

## 2023-05-19 RX ADMIN — Medication 650 MILLIGRAM(S): at 11:30

## 2023-05-19 NOTE — PROGRESS NOTE ADULT - PROBLEM SELECTOR PROBLEM 3
Ground glass opacity present on imaging of lung

## 2023-05-19 NOTE — PROGRESS NOTE ADULT - TIME BILLING
Patient seen and examined with house-staff during bedside rounds.  Resident note read, including vitals, physical findings, laboratory data, and radiological reports.   Revisions included below.  Direct personal management at bed side and extensive interpretation of the data.  Plan was outlined and discussed in details with the housestaff.  Decision making of high complexity  Action taken for acute disease activity to reflect the level of care provided:  - medication reconciliation  - review laboratory data  Discussed with CCM he may need to be intubated
Patient seen and examined with house-staff during bedside rounds.  Resident note read, including vitals, physical findings, laboratory data, and radiological reports.   Revisions included below.  Direct personal management at bed side and extensive interpretation of the data.  Plan was outlined and discussed in details with the housestaff.  Decision making of high complexity  Action taken for acute disease activity to reflect the level of care provided:  - medication reconciliation  - review laboratory data  Discussed with CCM
Patient seen and examined with house-staff during bedside rounds.  Resident note read, including vitals, physical findings, laboratory data, and radiological reports.   Revisions included below.  Direct personal management at bed side and extensive interpretation of the data.  Plan was outlined and discussed in details with the housestaff.  Decision making of high complexity  Action taken for acute disease activity to reflect the level of care provided:  - medication reconciliation  - review laboratory data  Discussed with CCM
Patient seen and examined with house-staff during bedside rounds.  Resident note read, including vitals, physical findings, laboratory data, and radiological reports.   Revisions included below.  Direct personal management at bed side and extensive interpretation of the data.  Plan was outlined and discussed in details with the housestaff.  Decision making of high complexity  Action taken for acute disease activity to reflect the level of care provided:  - medication reconciliation  - review laboratory data
Patient seen and examined with house-staff during bedside rounds.  Resident note read, including vitals, physical findings, laboratory data, and radiological reports.   Revisions included below.  Direct personal management at bed side and extensive interpretation of the data.  Plan was outlined and discussed in details with the housestaff.  Decision making of high complexity  Action taken for acute disease activity to reflect the level of care provided:  - medication reconciliation  - review laboratory data  Discussed with CCM

## 2023-05-19 NOTE — PROGRESS NOTE ADULT - SUBJECTIVE AND OBJECTIVE BOX
Interval Events: Reviewed  Patient seen and examined at bedside.    Patient is a 77y old  Male who presents with a chief complaint of percutaneous DENNY closure (18 May 2023 12:23)  he is doing better    PAST MEDICAL & SURGICAL HISTORY:  Polycystic kidney disease      DM2 (diabetes mellitus, type 2)      HLD (hyperlipidemia)      HTN (hypertension)      Prostate cancer      Kidney transplant recipient      DVT (deep venous thrombosis)      Chronic CHF      H/O radical prostatectomy  2010      S/P hernia repair  Abdominal and inguinal around 2005          MEDICATIONS:  Pulmonary:  acetylcysteine 10%  Inhalation 4 milliLiter(s) Inhalation every 12 hours  levalbuterol Inhalation 0.31 milliGRAM(s) Inhalation two times a day    Antimicrobials:    Anticoagulants:  aspirin  chewable 81 milliGRAM(s) Oral daily  clopidogrel Tablet 75 milliGRAM(s) Oral daily  heparin   Injectable 5000 Unit(s) SubCutaneous every 12 hours    Cardiac:  buMETAnide 2 milliGRAM(s) Oral daily  metoprolol succinate ER 50 milliGRAM(s) Oral daily      Allergies    naproxen (Hives)  [facility]x27[employer] - No blood products (Unknown)    Intolerances        Vital Signs Last 24 Hrs  T(C): 36.2 (19 May 2023 08:51), Max: 36.4 (18 May 2023 17:13)  T(F): 97.1 (19 May 2023 08:51), Max: 97.6 (18 May 2023 17:13)  HR: 78 (19 May 2023 11:48) (62 - 94)  BP: 156/89 (19 May 2023 11:40) (121/68 - 159/100)  BP(mean): 117 (19 May 2023 11:40) (89 - 125)  RR: 18 (19 May 2023 11:40) (12 - 18)  SpO2: 86% (19 May 2023 11:48) (86% - 98%)    Parameters below as of 19 May 2023 11:40  Patient On (Oxygen Delivery Method): room air        05-18 @ 07:01  -  05-19 @ 07:00  --------------------------------------------------------  IN: 517 mL / OUT: 1125 mL / NET: -608 mL    05-19 @ 07:01 - 05-19 @ 12:37  --------------------------------------------------------  IN: 200 mL / OUT: 0 mL / NET: 200 mL          Review of Systems:   •	General: negative  •	Skin/Breast: negative  •	Ophthalmologic: negative  •	ENMT: negative  •	Respiratory and Thorax: negative  •	Cardiovascular: negative  •	Gastrointestinal: negative  •	Genitourinary: negative  •	Musculoskeletal: negative  •	Neurological: negative  •	Psychiatric: negative  •	Hematology/Lymphatics: negative  •	Endocrine: negative  •	Allergic/Immunologic: negative    Physical Exam:   • Constitutional:	refer to the dietion /Nutritionist note  • Eyes:	EOMI; PERRL; no drainage or redness  • ENMT:	No oral lesions; no gross abnormalities  • Neck	No bruits; no thyromegaly or nodules  • Breasts:	not examined  • Back:	No deformity or limitation of movement  • Respiratory:	Breath Sounds equal & clear to percussion & auscultation, no accessory muscle use  • Cardiovascular:	Regular rate & rhythm, normal S1, S2; no murmurs, gallops or rubs; no S3, S4  • Gastrointestinal:	Soft, non-tender, no hepatosplenomegaly, normal bowel sounds  • Genitourinary:	not examined  • Rectal: not examined  • Extremities:	No cyanosis, clubbing or edema  • Vascular:	Equal and normal pulses (carotid, femoral, dorsalis pedis)  • Neurologica:l	not examined  • Skin:	No lesions; no rash  • Lymph Nodes:	No lymphadedenopathy  • Musculoskeletal:	No joint pain, swelling or deformity; no limitation of movement        LABS:      CBC Full  -  ( 19 May 2023 06:13 )  WBC Count : 4.70 K/uL  RBC Count : 3.24 M/uL  Hemoglobin : 10.5 g/dL  Hematocrit : 34.9 %  Platelet Count - Automated : 268 K/uL  Mean Cell Volume : 107.7 fl  Mean Cell Hemoglobin : 32.4 pg  Mean Cell Hemoglobin Concentration : 30.1 gm/dL  Auto Neutrophil # : x  Auto Lymphocyte # : x  Auto Monocyte # : x  Auto Eosinophil # : x  Auto Basophil # : x  Auto Neutrophil % : x  Auto Lymphocyte % : x  Auto Monocyte % : x  Auto Eosinophil % : x  Auto Basophil % : x    05-19    139  |  103  |  48<H>  ----------------------------<  62<L>  4.5   |  23  |  1.90<H>    Ca    8.7      19 May 2023 06:13  Mg     2.1     05-19  < from: Xray Chest 1 View- PORTABLE-Routine (Xray Chest 1 View- PORTABLE-Routine in AM.) (05.19.23 @ 05:27) >  ACC: [id number] EXAM:  XR CHEST PORTABLE ROUTINE 1V   ORDERED BY: MARY HANCOCK     PROCEDURE DATE:  05/19/2023          INTERPRETATION:  Clinical history reason for exam: Pleural effusion.    Frontal chest.    COMPARISON: May 17, 2023.    Findings/  impression: Stable cardiomegaly, thoracic aortic calcification, status   post median sternotomy, watchman device. Left opacity/pleural effusion,   decreased. Right basilar opacity.    < end of copied text >                          RADIOLOGY & ADDITIONAL STUDIES (The following images were personally reviewed):

## 2023-05-19 NOTE — CHART NOTE - NSCHARTNOTESELECT_GEN_ALL_CORE
CT remvoal/Event Note
Follow Up/Nutrition Services
Nutrition Services
POCUS/Event Note
echo/Event Note
Event Note
Nutrition Services
POCUS/Event Note
Pigtail Removal/Event Note
Tyree CT removal/Event Note

## 2023-05-19 NOTE — PROGRESS NOTE ADULT - PROBLEM SELECTOR PLAN 2
Patient is out of bed in chart and is on noninvasive ventilator
stable and he has a strong cough

## 2023-05-20 DIAGNOSIS — Z94.0 KIDNEY TRANSPLANT STATUS: ICD-10-CM

## 2023-05-20 LAB
ALBUMIN SERPL ELPH-MCNC: 3.5 G/DL — SIGNIFICANT CHANGE UP (ref 3.3–5)
ALP SERPL-CCNC: 89 U/L — SIGNIFICANT CHANGE UP (ref 40–120)
ALT FLD-CCNC: 23 U/L — SIGNIFICANT CHANGE UP (ref 10–45)
ANION GAP SERPL CALC-SCNC: 10 MMOL/L — SIGNIFICANT CHANGE UP (ref 5–17)
ANISOCYTOSIS BLD QL: SIGNIFICANT CHANGE UP
AST SERPL-CCNC: 37 U/L — SIGNIFICANT CHANGE UP (ref 10–40)
BASOPHILS # BLD AUTO: 0.12 K/UL — SIGNIFICANT CHANGE UP (ref 0–0.2)
BASOPHILS NFR BLD AUTO: 2.7 % — HIGH (ref 0–2)
BILIRUB SERPL-MCNC: 1.3 MG/DL — HIGH (ref 0.2–1.2)
BUN SERPL-MCNC: 45 MG/DL — HIGH (ref 7–23)
CALCIUM SERPL-MCNC: 8.9 MG/DL — SIGNIFICANT CHANGE UP (ref 8.4–10.5)
CHLORIDE SERPL-SCNC: 106 MMOL/L — SIGNIFICANT CHANGE UP (ref 96–108)
CO2 SERPL-SCNC: 26 MMOL/L — SIGNIFICANT CHANGE UP (ref 22–31)
CREAT SERPL-MCNC: 1.98 MG/DL — HIGH (ref 0.5–1.3)
EGFR: 35 ML/MIN/1.73M2 — LOW
EOSINOPHIL # BLD AUTO: 0.32 K/UL — SIGNIFICANT CHANGE UP (ref 0–0.5)
EOSINOPHIL NFR BLD AUTO: 7.2 % — HIGH (ref 0–6)
GIANT PLATELETS BLD QL SMEAR: PRESENT — SIGNIFICANT CHANGE UP
GLUCOSE BLDC GLUCOMTR-MCNC: 116 MG/DL — HIGH (ref 70–99)
GLUCOSE BLDC GLUCOMTR-MCNC: 176 MG/DL — HIGH (ref 70–99)
GLUCOSE BLDC GLUCOMTR-MCNC: 201 MG/DL — HIGH (ref 70–99)
GLUCOSE BLDC GLUCOMTR-MCNC: 275 MG/DL — HIGH (ref 70–99)
GLUCOSE SERPL-MCNC: 93 MG/DL — SIGNIFICANT CHANGE UP (ref 70–99)
HCT VFR BLD CALC: 34.9 % — LOW (ref 39–50)
HGB BLD-MCNC: 10.4 G/DL — LOW (ref 13–17)
HYPOCHROMIA BLD QL: SLIGHT — SIGNIFICANT CHANGE UP
LYMPHOCYTES # BLD AUTO: 0.35 K/UL — LOW (ref 1–3.3)
LYMPHOCYTES # BLD AUTO: 8.1 % — LOW (ref 13–44)
MACROCYTES BLD QL: SIGNIFICANT CHANGE UP
MAGNESIUM SERPL-MCNC: 2.1 MG/DL — SIGNIFICANT CHANGE UP (ref 1.6–2.6)
MANUAL SMEAR VERIFICATION: SIGNIFICANT CHANGE UP
MCHC RBC-ENTMCNC: 29.8 GM/DL — LOW (ref 32–36)
MCHC RBC-ENTMCNC: 32.3 PG — SIGNIFICANT CHANGE UP (ref 27–34)
MCV RBC AUTO: 108.4 FL — HIGH (ref 80–100)
MONOCYTES # BLD AUTO: 0.28 K/UL — SIGNIFICANT CHANGE UP (ref 0–0.9)
MONOCYTES NFR BLD AUTO: 6.3 % — SIGNIFICANT CHANGE UP (ref 2–14)
NEUTROPHILS # BLD AUTO: 3.32 K/UL — SIGNIFICANT CHANGE UP (ref 1.8–7.4)
NEUTROPHILS NFR BLD AUTO: 75.7 % — SIGNIFICANT CHANGE UP (ref 43–77)
OVALOCYTES BLD QL SMEAR: SLIGHT — SIGNIFICANT CHANGE UP
PHOSPHATE SERPL-MCNC: 3.4 MG/DL — SIGNIFICANT CHANGE UP (ref 2.5–4.5)
PLAT MORPH BLD: ABNORMAL
PLATELET # BLD AUTO: 256 K/UL — SIGNIFICANT CHANGE UP (ref 150–400)
POIKILOCYTOSIS BLD QL AUTO: SLIGHT — SIGNIFICANT CHANGE UP
POLYCHROMASIA BLD QL SMEAR: SLIGHT — SIGNIFICANT CHANGE UP
POTASSIUM SERPL-MCNC: 4.3 MMOL/L — SIGNIFICANT CHANGE UP (ref 3.5–5.3)
POTASSIUM SERPL-SCNC: 4.3 MMOL/L — SIGNIFICANT CHANGE UP (ref 3.5–5.3)
PROT SERPL-MCNC: 6.4 G/DL — SIGNIFICANT CHANGE UP (ref 6–8.3)
RBC # BLD: 3.22 M/UL — LOW (ref 4.2–5.8)
RBC # FLD: 21.4 % — HIGH (ref 10.3–14.5)
RBC BLD AUTO: ABNORMAL
SMUDGE CELLS # BLD: PRESENT — SIGNIFICANT CHANGE UP
SODIUM SERPL-SCNC: 142 MMOL/L — SIGNIFICANT CHANGE UP (ref 135–145)
TACROLIMUS SERPL-MCNC: 4.7 NG/ML — SIGNIFICANT CHANGE UP
WBC # BLD: 4.38 K/UL — SIGNIFICANT CHANGE UP (ref 3.8–10.5)
WBC # FLD AUTO: 4.38 K/UL — SIGNIFICANT CHANGE UP (ref 3.8–10.5)

## 2023-05-20 RX ADMIN — SODIUM CHLORIDE 3 MILLILITER(S): 9 INJECTION INTRAMUSCULAR; INTRAVENOUS; SUBCUTANEOUS at 22:07

## 2023-05-20 RX ADMIN — CLOPIDOGREL BISULFATE 75 MILLIGRAM(S): 75 TABLET, FILM COATED ORAL at 11:50

## 2023-05-20 RX ADMIN — Medication 81 MILLIGRAM(S): at 11:50

## 2023-05-20 RX ADMIN — Medication 650 MILLIGRAM(S): at 06:50

## 2023-05-20 RX ADMIN — Medication 4 MILLILITER(S): at 07:46

## 2023-05-20 RX ADMIN — TACROLIMUS 2 MILLIGRAM(S): 5 CAPSULE ORAL at 09:51

## 2023-05-20 RX ADMIN — HEPARIN SODIUM 5000 UNIT(S): 5000 INJECTION INTRAVENOUS; SUBCUTANEOUS at 18:00

## 2023-05-20 RX ADMIN — HEPARIN SODIUM 5000 UNIT(S): 5000 INJECTION INTRAVENOUS; SUBCUTANEOUS at 05:58

## 2023-05-20 RX ADMIN — Medication 2: at 21:35

## 2023-05-20 RX ADMIN — LEVALBUTEROL 0.31 MILLIGRAM(S): 1.25 SOLUTION, CONCENTRATE RESPIRATORY (INHALATION) at 05:58

## 2023-05-20 RX ADMIN — Medication 2 UNIT(S): at 11:48

## 2023-05-20 RX ADMIN — Medication 2 UNIT(S): at 06:50

## 2023-05-20 RX ADMIN — SENNA PLUS 2 TABLET(S): 8.6 TABLET ORAL at 21:35

## 2023-05-20 RX ADMIN — LEVALBUTEROL 0.31 MILLIGRAM(S): 1.25 SOLUTION, CONCENTRATE RESPIRATORY (INHALATION) at 18:46

## 2023-05-20 RX ADMIN — Medication 5 MILLIGRAM(S): at 05:59

## 2023-05-20 RX ADMIN — Medication 4: at 17:00

## 2023-05-20 RX ADMIN — BUMETANIDE 2 MILLIGRAM(S): 0.25 INJECTION INTRAMUSCULAR; INTRAVENOUS at 05:59

## 2023-05-20 RX ADMIN — Medication 650 MILLIGRAM(S): at 07:44

## 2023-05-20 RX ADMIN — TACROLIMUS 1 MILLIGRAM(S): 5 CAPSULE ORAL at 21:35

## 2023-05-20 RX ADMIN — Medication 50 MILLIGRAM(S): at 05:59

## 2023-05-20 RX ADMIN — Medication 6: at 11:47

## 2023-05-20 RX ADMIN — PREGABALIN 1000 MICROGRAM(S): 225 CAPSULE ORAL at 11:50

## 2023-05-20 RX ADMIN — Medication 2 UNIT(S): at 17:00

## 2023-05-20 RX ADMIN — SODIUM CHLORIDE 3 MILLILITER(S): 9 INJECTION INTRAMUSCULAR; INTRAVENOUS; SUBCUTANEOUS at 14:17

## 2023-05-20 RX ADMIN — ATORVASTATIN CALCIUM 20 MILLIGRAM(S): 80 TABLET, FILM COATED ORAL at 21:35

## 2023-05-20 RX ADMIN — SODIUM CHLORIDE 3 MILLILITER(S): 9 INJECTION INTRAMUSCULAR; INTRAVENOUS; SUBCUTANEOUS at 06:12

## 2023-05-20 RX ADMIN — Medication 4 MILLILITER(S): at 19:42

## 2023-05-20 RX ADMIN — Medication 1 MILLIGRAM(S): at 11:50

## 2023-05-20 RX ADMIN — Medication 0.25 MILLIGRAM(S): at 21:34

## 2023-05-20 RX ADMIN — PANTOPRAZOLE SODIUM 40 MILLIGRAM(S): 20 TABLET, DELAYED RELEASE ORAL at 05:59

## 2023-05-20 NOTE — PROGRESS NOTE ADULT - SUBJECTIVE AND OBJECTIVE BOX
Patient discussed on morning rounds with Dr. Thomas     Operation / Date: 4/25 Amulet device  4/26 bedside pericardiaocentesis, then RTOR for sternotomy, washout, and evacuation of pericardial clot    SUBJECTIVE ASSESSMENT:  77y Male seen and examined at bedside without complaints plan for DC to [facility] tomorrow at 11 am. Pt denies dizziness, vision changes, chest pain, palpitations, shortness of breath, cough, n/v/d, extremity swelling, calf tenderness.     Vital Signs Last 24 Hrs  T(C): 36.6 (20 May 2023 17:20), Max: 36.6 (20 May 2023 17:20)  T(F): 97.8 (20 May 2023 17:20), Max: 97.8 (20 May 2023 17:20)  HR: 76 (20 May 2023 13:12) (70 - 104)  BP: 146/85 (20 May 2023 13:12) (132/80 - 159/89)  BP(mean): 108 (20 May 2023 13:12) (91 - 108)  RR: 12 (20 May 2023 13:12) (12 - 18)  SpO2: 100% (20 May 2023 13:12) (94% - 100%)    Parameters below as of 20 May 2023 13:12  Patient On (Oxygen Delivery Method): room air    I&O's Detail    19 May 2023 07:01  -  20 May 2023 07:00  --------------------------------------------------------  IN:    Oral Fluid: 400 mL  Total IN: 400 mL    OUT:    Voided (mL): 100 mL  Total OUT: 100 mL    Total NET: 300 mL      20 May 2023 07:01  -  20 May 2023 17:34  --------------------------------------------------------  IN:  Total IN: 0 mL    OUT:    Voided (mL): 200 mL  Total OUT: 200 mL    Total NET: -200 mL    CHEST TUBE:  none   MANDY DRAIN:  none   EPICARDIAL WIRES: none   TIE DOWNS: none  SANDHU: none    PHYSICAL EXAM:  GEN: NAD, looks comfortable  Psych: Mood appropriate  Neuro: A&Ox3.  No focal deficits.  Moving all extremities.   HEENT: No obvious abnormalities  CV: S1S2, regular, no murmurs appreciated.  No carotid bruits.  No JVD  Lungs: Clear B/L.  No wheezing, rales or rhonchi  ABD: Soft, non-tender, non-distended.  EXT: Warm and well perfused.  No peripheral edema noted  Musculoskeletal: Moving all extremities with normal ROM, no joint swelling  PV: Pedal pulses palpable  Incisions: MSI is clean dry and intact without drainage or bleeding. No sternal click, drain sites are clean dry and intact.       LABS:                        10.4   4.38  )-----------( 256      ( 20 May 2023 08:47 )             34.9           05-20    142  |  106  |  45<H>  ----------------------------<  93  4.3   |  26  |  1.98<H>    Ca    8.9      20 May 2023 08:47  Phos  3.4     05-20  Mg     2.1     05-20    TPro  6.4  /  Alb  3.5  /  TBili  1.3<H>  /  DBili  x   /  AST  37  /  ALT  23  /  AlkPhos  89  05-20    MEDICATIONS  (STANDING):  acetylcysteine 10%  Inhalation 4 milliLiter(s) Inhalation every 12 hours  aspirin  chewable 81 milliGRAM(s) Oral daily  atorvastatin 20 milliGRAM(s) Oral at bedtime  buMETAnide 2 milliGRAM(s) Oral daily  clopidogrel Tablet 75 milliGRAM(s) Oral daily  cyanocobalamin 1000 MICROGram(s) Oral daily  dextrose 5%. 1000 milliLiter(s) (100 mL/Hr) IV Continuous <Continuous>  dextrose 5%. 1000 milliLiter(s) (50 mL/Hr) IV Continuous <Continuous>  dextrose 50% Injectable 25 Gram(s) IV Push once  dextrose 50% Injectable 25 Gram(s) IV Push once  dextrose 50% Injectable 12.5 Gram(s) IV Push once  folic acid 1 milliGRAM(s) Oral daily  glucagon  Injectable 1 milliGRAM(s) IntraMuscular once  glucagon  Injectable 1 milliGRAM(s) IntraMuscular once  glucagon  Injectable 1 milliGRAM(s) IntraMuscular once  heparin   Injectable 5000 Unit(s) SubCutaneous every 12 hours  insulin lispro (ADMELOG) corrective regimen sliding scale   SubCutaneous Before meals and at bedtime  insulin lispro Injectable (ADMELOG) 2 Unit(s) SubCutaneous three times a day before meals  levalbuterol Inhalation 0.31 milliGRAM(s) Inhalation two times a day  metoprolol succinate ER 50 milliGRAM(s) Oral daily  pantoprazole    Tablet 40 milliGRAM(s) Oral before breakfast  polyethylene glycol 3350 17 Gram(s) Oral daily  predniSONE   Tablet 5 milliGRAM(s) Oral daily  senna 2 Tablet(s) Oral at bedtime  sodium chloride 0.9% lock flush 3 milliLiter(s) IV Push every 8 hours  tacrolimus 2 milliGRAM(s) Oral <User Schedule>  tacrolimus 1 milliGRAM(s) Oral at bedtime    MEDICATIONS  (PRN):  acetaminophen     Tablet .. 650 milliGRAM(s) Oral every 6 hours PRN Mild Pain (1 - 3)  ALPRAZolam 0.25 milliGRAM(s) Oral every 8 hours PRN Anxiety  dextrose Oral Gel 15 Gram(s) Oral once PRN Blood Glucose LESS THAN 70 milliGRAM(s)/deciliter    RADIOLOGY & ADDITIONAL TESTS:  < from: Xray Chest 1 View- PORTABLE-Routine (Xray Chest 1 View- PORTABLE-Routine in AM.) (05.19.23 @ 05:27) >  Findings/  impression: Stable cardiomegaly, thoracic aortic calcification, status   post median sternotomy, watchman device. Left opacity/pleural effusion,   decreased. Right basilar opacity.    --- End of Report ---    < end of copied text >

## 2023-05-20 NOTE — PROGRESS NOTE ADULT - ASSESSMENT
78 YO Male with PMHx of HTN, HLD, DM, hx of PKD (hx of kidney transplant in ), hx of prostate cancer (s/p radical prostatectomy in ), DVT ( s/p pelvic fracture), AF/AFL (hx of ablation in ), GIB who presented to [facility] on 23 for planned procedure. On 23 he underwent percutaneous DENNY closure with Dr. Sidhu. Intra-op course uncomplicated and pt recovered on 9LA post-operatively. Overnight PEA arrest- ROSC s/p ACLS. Patient intubated and transferred to CTICU. TTE with evidence of pericardial tamponade s/p bedside pericardiocentesis with removal of 600cc heme fluid. () pt RTOR for sternotomy, washout with 1.5L L hemothorax evacuated. New MR intra-op. Pt arrived back to CTICU intubated on multi-pressor support. POD1 pt extubated and pressors weaned. POD3 right pigtail placed for pleural effusion (850cc drained) and placed on BiPAP for worsening dyspnea and atelectasis. POD4 unable to wean off BiPAP, diuresed with bumex. POD5 renal function worsening, swan placed, started on dobutamine and milrinone. Bronch done and started on empiric abx. Dobhoff placed. POD6 Tacro held 2/2 high level, midodrine started, low UO and Cr uptrended. POD7 Patient reintubated and started on vanc for MSSA (on BAL). Restarted Tacrolimus. POD8  weaned off, on CPAP. POD9 milrinone weaned. R prox brachial DVT and SVT in basilic vein found. Inotropic support decreased. POD11 Patient extubated, pressors weaned, milrinone off. POD13 R pigtail placed, RIJ removed. Unable to place midline, PIVs placed. POD15 weaned to RA, diuresed. POD 16 R pigtail removed. POD 17 UOP improving. POD18 L pleural effusion s/p pigtail c/b PTX. POD 19 L pigtail removed. [id number] pending bed availability at rehab. POD23 POCUS revealed L moderate pleural effusion. POD24 pending latonya tomorrow at 11 am.     Plan:    Neurovascular:   -Hx of DVT in 2006  -Pain well controlled with current regimen. PRN's: Tylenol and Oxycodone  -Cont PRN Xanax for anxiety    Cardiovascular:   -S/p DENNY occlusion percutaneously on   -Post-op course c/b PEA arrest, cardiogenic shock, pericardial tamponade s/p bedside pericardiaocentesis, then RTOR for sternotomy, washout, and evacuation of pericardial clot on 23  -Cont ASA, BB, statin  -Plavix started today  -Hx of A-Fib  -Cont BB  -Hx of HTN  -Cont BB  -Hx of HLD  -Cont statin  -Hemodynamically stable.   -Monitor: BP, HR, tele    Respiratory:   -Cont Xopenex  -Oxygenating well on room air  -Encourage continued use of IS 10x/hr and frequent ambulation  -CXR: L moderate pleural effusion    GI:  -GI PPX: Protonix  -PO Diet  -Bowel Regimen: senna, miralax     Renal / :  -Hx of PKD s/p renal transplant  -Renal following  -Cont Tacrolimus, level yesterday 4.2. Goal 4-5  -Cont prednisone and Bumex per renal recs  -Continue to monitor renal function: BUN/Cr: 45/1.98  -Monitor I/O's daily     Endocrine:    -Hx of DMII  -A1c: 6  -Cont ISS, lantus, lispro  -No hx of thyroid dx  -TSH: 0.537    Hematologic:  -Cont folic acid  -Hx of post-op anemia (resolved)  -CBC: H/H- 10.4/34.9  -Coagulation Panel.    ID:  -Temperature: Afebrile  -CBC: WBC- 4.38  -Continue to observe for SIRS/Sepsis Syndrome.    Prophylaxis:  -DVT prophylaxis with 5000 SubQ Heparin q8h.  -Continue with SCD's b/l while patient is at rest     Disposition:  -Awaiting rehab bed

## 2023-05-20 NOTE — PROGRESS NOTE ADULT - ASSESSMENT
78 YO Male with PMHx of HTN, HLD, DM, hx of PKD (hx of kidney transplant in 2007), hx of prostate cancer (s/p radical prostatectomy in 2015), DVT (2006 s/p pelvic fracture), AF/AFL (hx of ablation in 2018), GIB who presented to [facility] on 4/25/23 for  percutaneous DENNY closure  complicated by  pericardial tamponade s/p bedside pericardiocentesis with removal of 600cc heme fluid. (4/26) pt RTOR for sternotomy, washout with 1.5L L hemothorax evacuated  following renal function given history of renal transplant

## 2023-05-20 NOTE — PROGRESS NOTE ADULT - SUBJECTIVE AND OBJECTIVE BOX
CC: I48.91        INTERVAL HISTORY: resting comfortably in bed  no complaints      ROS: No chest pain, no sob, no abd pain. No n/v/d    PAST MEDICAL & SURGICAL HISTORY:  Polycystic kidney disease    DM2 (diabetes mellitus, type 2)    HLD (hyperlipidemia)    HTN (hypertension)    Prostate cancer    Kidney transplant recipient    DVT (deep venous thrombosis)    Chronic CHF    H/O radical prostatectomy  2010    S/P hernia repair  Abdominal and inguinal around 2005        PHYSICAL EXAM:  T(C): 35.7 (05-20-23 @ 05:01), Max: 36.4 (05-20-23 @ 01:02)  HR: 78 (05-20-23 @ 05:55)  BP: 141/78 (05-20-23 @ 05:55) (115/59 - 159/100)  RR: 18 (05-20-23 @ 05:55)  SpO2: 95% (05-20-23 @ 05:55)  Wt(kg): --  I&O's Summary    18 May 2023 07:01  -  19 May 2023 07:00  --------------------------------------------------------  IN: 517 mL / OUT: 1125 mL / NET: -608 mL    19 May 2023 07:01  -  20 May 2023 06:45  --------------------------------------------------------  IN: 400 mL / OUT: 100 mL / NET: 300 mL      Weight   General: AAO x 3,  NAD.  HEENT: moist mucous membranes, no pallor/cyanosis.  Neck: no JVD visible.  Cardiac: S1, S2. RRR. No murmurs   Respratory: CTA b/l, no access muscle use.   Abdomen: soft. nontender. nondistended  Skin: no rashes.  Extremities: no LE edema b/l  Access:       DATA:                        10.5<L>  4.70  )-----------( 268      ( 19 May 2023 06:13 )             34.9<L>    Ferritin, Serum: 594 ng/mL *H* (05-03 @ 15:40)      139    |  103    |  48<H>  ----------------------------<  62<L>  Ca:8.7   (19 May 2023 06:13)  4.5     |  23     |  1.90<H>                            MEDICATIONS  (STANDING):  acetylcysteine 10%  Inhalation 4 milliLiter(s) Inhalation every 12 hours  aspirin  chewable 81 milliGRAM(s) Oral daily  atorvastatin 20 milliGRAM(s) Oral at bedtime  buMETAnide 2 milliGRAM(s) Oral daily  clopidogrel Tablet 75 milliGRAM(s) Oral daily  cyanocobalamin 1000 MICROGram(s) Oral daily  dextrose 5%. 1000 milliLiter(s) (100 mL/Hr) IV Continuous <Continuous>  dextrose 5%. 1000 milliLiter(s) (50 mL/Hr) IV Continuous <Continuous>  dextrose 50% Injectable 25 Gram(s) IV Push once  dextrose 50% Injectable 25 Gram(s) IV Push once  dextrose 50% Injectable 12.5 Gram(s) IV Push once  folic acid 1 milliGRAM(s) Oral daily  glucagon  Injectable 1 milliGRAM(s) IntraMuscular once  glucagon  Injectable 1 milliGRAM(s) IntraMuscular once  glucagon  Injectable 1 milliGRAM(s) IntraMuscular once  heparin   Injectable 5000 Unit(s) SubCutaneous every 12 hours  insulin lispro (ADMELOG) corrective regimen sliding scale   SubCutaneous Before meals and at bedtime  insulin lispro Injectable (ADMELOG) 2 Unit(s) SubCutaneous three times a day before meals  levalbuterol Inhalation 0.31 milliGRAM(s) Inhalation two times a day  metoprolol succinate ER 50 milliGRAM(s) Oral daily  pantoprazole    Tablet 40 milliGRAM(s) Oral before breakfast  polyethylene glycol 3350 17 Gram(s) Oral daily  predniSONE   Tablet 5 milliGRAM(s) Oral daily  senna 2 Tablet(s) Oral at bedtime  sodium chloride 0.9% lock flush 3 milliLiter(s) IV Push every 8 hours  tacrolimus 2 milliGRAM(s) Oral <User Schedule>  tacrolimus 1 milliGRAM(s) Oral at bedtime    MEDICATIONS  (PRN):  acetaminophen     Tablet .. 650 milliGRAM(s) Oral every 6 hours PRN Mild Pain (1 - 3)  ALPRAZolam 0.25 milliGRAM(s) Oral every 8 hours PRN Anxiety  dextrose Oral Gel 15 Gram(s) Oral once PRN Blood Glucose LESS THAN 70 milliGRAM(s)/deciliter

## 2023-05-21 ENCOUNTER — TRANSCRIPTION ENCOUNTER (OUTPATIENT)
Age: 78
End: 2023-05-21

## 2023-05-21 VITALS
RESPIRATION RATE: 17 BRPM | DIASTOLIC BLOOD PRESSURE: 75 MMHG | SYSTOLIC BLOOD PRESSURE: 157 MMHG | OXYGEN SATURATION: 97 % | HEART RATE: 82 BPM

## 2023-05-21 LAB — GLUCOSE BLDC GLUCOMTR-MCNC: 186 MG/DL — HIGH (ref 70–99)

## 2023-05-21 PROCEDURE — 83735 ASSAY OF MAGNESIUM: CPT

## 2023-05-21 PROCEDURE — P9016: CPT

## 2023-05-21 PROCEDURE — 84443 ASSAY THYROID STIM HORMONE: CPT

## 2023-05-21 PROCEDURE — 94003 VENT MGMT INPAT SUBQ DAY: CPT

## 2023-05-21 PROCEDURE — 85730 THROMBOPLASTIN TIME PARTIAL: CPT

## 2023-05-21 PROCEDURE — P9045: CPT

## 2023-05-21 PROCEDURE — 87040 BLOOD CULTURE FOR BACTERIA: CPT

## 2023-05-21 PROCEDURE — 80076 HEPATIC FUNCTION PANEL: CPT

## 2023-05-21 PROCEDURE — 82803 BLOOD GASES ANY COMBINATION: CPT

## 2023-05-21 PROCEDURE — 82962 GLUCOSE BLOOD TEST: CPT

## 2023-05-21 PROCEDURE — 97116 GAIT TRAINING THERAPY: CPT

## 2023-05-21 PROCEDURE — 87070 CULTURE OTHR SPECIMN AEROBIC: CPT

## 2023-05-21 PROCEDURE — 93308 TTE F-UP OR LMTD: CPT

## 2023-05-21 PROCEDURE — 97164 PT RE-EVAL EST PLAN CARE: CPT

## 2023-05-21 PROCEDURE — 82947 ASSAY GLUCOSE BLOOD QUANT: CPT

## 2023-05-21 PROCEDURE — 84145 PROCALCITONIN (PCT): CPT

## 2023-05-21 PROCEDURE — 85027 COMPLETE CBC AUTOMATED: CPT

## 2023-05-21 PROCEDURE — 85384 FIBRINOGEN ACTIVITY: CPT

## 2023-05-21 PROCEDURE — 97168 OT RE-EVAL EST PLAN CARE: CPT

## 2023-05-21 PROCEDURE — C9399: CPT

## 2023-05-21 PROCEDURE — C1769: CPT

## 2023-05-21 PROCEDURE — C1894: CPT

## 2023-05-21 PROCEDURE — 83874 ASSAY OF MYOGLOBIN: CPT

## 2023-05-21 PROCEDURE — 94002 VENT MGMT INPAT INIT DAY: CPT

## 2023-05-21 PROCEDURE — 80053 COMPREHEN METABOLIC PANEL: CPT

## 2023-05-21 PROCEDURE — 84466 ASSAY OF TRANSFERRIN: CPT

## 2023-05-21 PROCEDURE — 97535 SELF CARE MNGMENT TRAINING: CPT

## 2023-05-21 PROCEDURE — 82330 ASSAY OF CALCIUM: CPT

## 2023-05-21 PROCEDURE — 85379 FIBRIN DEGRADATION QUANT: CPT

## 2023-05-21 PROCEDURE — 82728 ASSAY OF FERRITIN: CPT

## 2023-05-21 PROCEDURE — 97110 THERAPEUTIC EXERCISES: CPT

## 2023-05-21 PROCEDURE — 83540 ASSAY OF IRON: CPT

## 2023-05-21 PROCEDURE — 71045 X-RAY EXAM CHEST 1 VIEW: CPT

## 2023-05-21 PROCEDURE — 36430 TRANSFUSION BLD/BLD COMPNT: CPT

## 2023-05-21 PROCEDURE — 85045 AUTOMATED RETICULOCYTE COUNT: CPT

## 2023-05-21 PROCEDURE — 80197 ASSAY OF TACROLIMUS: CPT

## 2023-05-21 PROCEDURE — C1766: CPT

## 2023-05-21 PROCEDURE — 74018 RADEX ABDOMEN 1 VIEW: CPT

## 2023-05-21 PROCEDURE — P9012: CPT

## 2023-05-21 PROCEDURE — C1887: CPT

## 2023-05-21 PROCEDURE — 94660 CPAP INITIATION&MGMT: CPT

## 2023-05-21 PROCEDURE — 86923 COMPATIBILITY TEST ELECTRIC: CPT

## 2023-05-21 PROCEDURE — 87186 SC STD MICRODIL/AGAR DIL: CPT

## 2023-05-21 PROCEDURE — 85025 COMPLETE CBC W/AUTO DIFF WBC: CPT

## 2023-05-21 PROCEDURE — 84132 ASSAY OF SERUM POTASSIUM: CPT

## 2023-05-21 PROCEDURE — C1889: CPT

## 2023-05-21 PROCEDURE — 85610 PROTHROMBIN TIME: CPT

## 2023-05-21 PROCEDURE — 92526 ORAL FUNCTION THERAPY: CPT

## 2023-05-21 PROCEDURE — 83550 IRON BINDING TEST: CPT

## 2023-05-21 PROCEDURE — 83036 HEMOGLOBIN GLYCOSYLATED A1C: CPT

## 2023-05-21 PROCEDURE — 83935 ASSAY OF URINE OSMOLALITY: CPT

## 2023-05-21 PROCEDURE — 86900 BLOOD TYPING SEROLOGIC ABO: CPT

## 2023-05-21 PROCEDURE — 86965 POOLING BLOOD PLATELETS: CPT

## 2023-05-21 PROCEDURE — 93312 ECHO TRANSESOPHAGEAL: CPT

## 2023-05-21 PROCEDURE — 93970 EXTREMITY STUDY: CPT

## 2023-05-21 PROCEDURE — 93005 ELECTROCARDIOGRAM TRACING: CPT

## 2023-05-21 PROCEDURE — 84295 ASSAY OF SERUM SODIUM: CPT

## 2023-05-21 PROCEDURE — 94799 UNLISTED PULMONARY SVC/PX: CPT

## 2023-05-21 PROCEDURE — 84540 ASSAY OF URINE/UREA-N: CPT

## 2023-05-21 PROCEDURE — 97162 PT EVAL MOD COMPLEX 30 MIN: CPT

## 2023-05-21 PROCEDURE — P9047: CPT

## 2023-05-21 PROCEDURE — 80202 ASSAY OF VANCOMYCIN: CPT

## 2023-05-21 PROCEDURE — 86850 RBC ANTIBODY SCREEN: CPT

## 2023-05-21 PROCEDURE — 86891 AUTOLOGOUS BLOOD OP SALVAGE: CPT

## 2023-05-21 PROCEDURE — 97165 OT EVAL LOW COMPLEX 30 MIN: CPT

## 2023-05-21 PROCEDURE — 36415 COLL VENOUS BLD VENIPUNCTURE: CPT

## 2023-05-21 PROCEDURE — 93306 TTE W/DOPPLER COMPLETE: CPT

## 2023-05-21 PROCEDURE — 82550 ASSAY OF CK (CPK): CPT

## 2023-05-21 PROCEDURE — 82570 ASSAY OF URINE CREATININE: CPT

## 2023-05-21 PROCEDURE — 82533 TOTAL CORTISOL: CPT

## 2023-05-21 PROCEDURE — 80048 BASIC METABOLIC PNL TOTAL CA: CPT

## 2023-05-21 PROCEDURE — 83880 ASSAY OF NATRIURETIC PEPTIDE: CPT

## 2023-05-21 PROCEDURE — 97530 THERAPEUTIC ACTIVITIES: CPT

## 2023-05-21 PROCEDURE — 81001 URINALYSIS AUTO W/SCOPE: CPT

## 2023-05-21 PROCEDURE — 87635 SARS-COV-2 COVID-19 AMP PRB: CPT

## 2023-05-21 PROCEDURE — 86901 BLOOD TYPING SEROLOGIC RH(D): CPT

## 2023-05-21 PROCEDURE — 84100 ASSAY OF PHOSPHORUS: CPT

## 2023-05-21 PROCEDURE — 85014 HEMATOCRIT: CPT

## 2023-05-21 PROCEDURE — 84300 ASSAY OF URINE SODIUM: CPT

## 2023-05-21 PROCEDURE — 92610 EVALUATE SWALLOWING FUNCTION: CPT

## 2023-05-21 PROCEDURE — 94640 AIRWAY INHALATION TREATMENT: CPT

## 2023-05-21 PROCEDURE — 93971 EXTREMITY STUDY: CPT

## 2023-05-21 PROCEDURE — 83605 ASSAY OF LACTIC ACID: CPT

## 2023-05-21 PROCEDURE — 83050 HGB METHEMOGLOBIN QUAN: CPT

## 2023-05-21 RX ORDER — TACROLIMUS 5 MG/1
2 CAPSULE ORAL
Qty: 0 | Refills: 0 | DISCHARGE
Start: 2023-05-21

## 2023-05-21 RX ORDER — CARVEDILOL PHOSPHATE 80 MG/1
1 CAPSULE, EXTENDED RELEASE ORAL
Qty: 0 | Refills: 0 | DISCHARGE

## 2023-05-21 RX ORDER — EMPAGLIFLOZIN 10 MG/1
1 TABLET, FILM COATED ORAL
Qty: 0 | Refills: 0 | DISCHARGE

## 2023-05-21 RX ORDER — METOPROLOL TARTRATE 50 MG
1 TABLET ORAL
Qty: 0 | Refills: 0 | DISCHARGE
Start: 2023-05-21

## 2023-05-21 RX ORDER — REPAGLINIDE 1 MG/1
1 TABLET ORAL
Qty: 0 | Refills: 0 | DISCHARGE

## 2023-05-21 RX ORDER — LEVALBUTEROL 1.25 MG/.5ML
3 SOLUTION, CONCENTRATE RESPIRATORY (INHALATION)
Qty: 0 | Refills: 0 | DISCHARGE
Start: 2023-05-21

## 2023-05-21 RX ORDER — INSULIN LISPRO 100/ML
2 VIAL (ML) SUBCUTANEOUS
Qty: 0 | Refills: 0 | DISCHARGE
Start: 2023-05-21

## 2023-05-21 RX ORDER — POLYETHYLENE GLYCOL 3350 17 G/17G
17 POWDER, FOR SOLUTION ORAL
Qty: 0 | Refills: 0 | DISCHARGE
Start: 2023-05-21

## 2023-05-21 RX ORDER — SENNA PLUS 8.6 MG/1
2 TABLET ORAL
Qty: 0 | Refills: 0 | DISCHARGE
Start: 2023-05-21

## 2023-05-21 RX ORDER — METFORMIN HYDROCHLORIDE 850 MG/1
1 TABLET ORAL
Qty: 0 | Refills: 0 | DISCHARGE

## 2023-05-21 RX ORDER — TACROLIMUS 5 MG/1
2 CAPSULE ORAL
Qty: 0 | Refills: 0 | DISCHARGE

## 2023-05-21 RX ORDER — ALPRAZOLAM 0.25 MG
1 TABLET ORAL
Qty: 0 | Refills: 0 | DISCHARGE
Start: 2023-05-21

## 2023-05-21 RX ORDER — ACETAMINOPHEN 500 MG
2 TABLET ORAL
Qty: 0 | Refills: 0 | DISCHARGE
Start: 2023-05-21

## 2023-05-21 RX ORDER — HEPARIN SODIUM 5000 [USP'U]/ML
5000 INJECTION INTRAVENOUS; SUBCUTANEOUS
Qty: 0 | Refills: 0 | DISCHARGE
Start: 2023-05-21

## 2023-05-21 RX ORDER — ATORVASTATIN CALCIUM 80 MG/1
1 TABLET, FILM COATED ORAL
Qty: 0 | Refills: 0 | DISCHARGE
Start: 2023-05-21

## 2023-05-21 RX ORDER — PREGABALIN 225 MG/1
1 CAPSULE ORAL
Qty: 0 | Refills: 0 | DISCHARGE
Start: 2023-05-21

## 2023-05-21 RX ORDER — BUMETANIDE 0.25 MG/ML
1 INJECTION INTRAMUSCULAR; INTRAVENOUS
Qty: 0 | Refills: 0 | DISCHARGE
Start: 2023-05-21

## 2023-05-21 RX ORDER — INSULIN DETEMIR 100/ML (3)
12 INSULIN PEN (ML) SUBCUTANEOUS
Qty: 0 | Refills: 0 | DISCHARGE

## 2023-05-21 RX ORDER — FOLIC ACID 0.8 MG
1 TABLET ORAL
Qty: 0 | Refills: 0 | DISCHARGE
Start: 2023-05-21

## 2023-05-21 RX ORDER — ACETYLCYSTEINE 200 MG/ML
4 VIAL (ML) MISCELLANEOUS
Qty: 0 | Refills: 0 | DISCHARGE
Start: 2023-05-21

## 2023-05-21 RX ORDER — ASPIRIN/CALCIUM CARB/MAGNESIUM 324 MG
1 TABLET ORAL
Qty: 0 | Refills: 0 | DISCHARGE
Start: 2023-05-21

## 2023-05-21 RX ORDER — AMLODIPINE BESYLATE 2.5 MG/1
1 TABLET ORAL
Refills: 0 | DISCHARGE

## 2023-05-21 RX ORDER — ATORVASTATIN CALCIUM 80 MG/1
1 TABLET, FILM COATED ORAL
Qty: 0 | Refills: 0 | DISCHARGE

## 2023-05-21 RX ORDER — DULAGLUTIDE 4.5 MG/.5ML
0 INJECTION, SOLUTION SUBCUTANEOUS
Qty: 0 | Refills: 0 | DISCHARGE

## 2023-05-21 RX ORDER — CLOPIDOGREL BISULFATE 75 MG/1
1 TABLET, FILM COATED ORAL
Qty: 0 | Refills: 0 | DISCHARGE
Start: 2023-05-21

## 2023-05-21 RX ORDER — PANTOPRAZOLE SODIUM 20 MG/1
1 TABLET, DELAYED RELEASE ORAL
Qty: 0 | Refills: 0 | DISCHARGE
Start: 2023-05-21

## 2023-05-21 RX ORDER — TACROLIMUS 5 MG/1
1 CAPSULE ORAL
Qty: 0 | Refills: 0 | DISCHARGE
Start: 2023-05-21

## 2023-05-21 RX ADMIN — LEVALBUTEROL 0.31 MILLIGRAM(S): 1.25 SOLUTION, CONCENTRATE RESPIRATORY (INHALATION) at 07:16

## 2023-05-21 RX ADMIN — PANTOPRAZOLE SODIUM 40 MILLIGRAM(S): 20 TABLET, DELAYED RELEASE ORAL at 06:57

## 2023-05-21 RX ADMIN — Medication 50 MILLIGRAM(S): at 06:57

## 2023-05-21 RX ADMIN — Medication 2: at 07:17

## 2023-05-21 RX ADMIN — Medication 2 UNIT(S): at 07:17

## 2023-05-21 RX ADMIN — BUMETANIDE 2 MILLIGRAM(S): 0.25 INJECTION INTRAMUSCULAR; INTRAVENOUS at 06:57

## 2023-05-21 RX ADMIN — SODIUM CHLORIDE 3 MILLILITER(S): 9 INJECTION INTRAMUSCULAR; INTRAVENOUS; SUBCUTANEOUS at 07:00

## 2023-05-21 RX ADMIN — Medication 5 MILLIGRAM(S): at 06:57

## 2023-05-21 RX ADMIN — HEPARIN SODIUM 5000 UNIT(S): 5000 INJECTION INTRAVENOUS; SUBCUTANEOUS at 06:57

## 2023-05-21 NOTE — PROGRESS NOTE ADULT - PROBLEM SELECTOR PROBLEM 1
Transplanted kidney
Acute respiratory failure with hypoxia
Transplanted kidney
Acute respiratory failure with hypoxia
Acute respiratory failure with hypoxia

## 2023-05-21 NOTE — DISCHARGE NOTE NURSING/CASE MANAGEMENT/SOCIAL WORK - NSDCFUADDAPPT_GEN_ALL_CORE_FT
Dr. Saldivar's office will call you to set up a follow up appointment for management of your anti-rejection medications for your renal tranplant.     Our office will call you with the changed follow up appointments while in your rehab facility.

## 2023-05-21 NOTE — PROGRESS NOTE ADULT - ASSESSMENT
76 YO Male with PMHx of HTN, HLD, DM, hx of PKD (hx of kidney transplant in 2007), hx of prostate cancer (s/p radical prostatectomy in 2015), DVT (2006 s/p pelvic fracture), AF/AFL (hx of ablation in 2018), GIB who presented to [facility] on 4/25/23 for  percutaneous DENNY closure  complicated by  pericardial tamponade s/p bedside pericardiocentesis with removal of 600cc heme fluid. (4/26) pt RTOR for sternotomy, washout with 1.5L L hemothorax evacuated  following renal function given history of renal transplant

## 2023-05-21 NOTE — PROGRESS NOTE ADULT - PROBLEM SELECTOR PLAN 1
resolved and he is on RA with adequate sat.  he is on Xopenex and can change to PRN
the patient was extubated.  he is on HFNC 50% and 50L/min.  He has a strong productive cough.  CXR unchanged.
The patient has noninvasive ventilation.  Patient had a pulmonary artery catheter with elevated filling pressures.  Patient is on pressors inotrope.   He was intubated and stable pulmonary wise.  There is an element of pneumonia contributing to his respiratory failure in addition to PH/RF
ESRD with transplant  continue Tacrolimus and Prednisone  last Tacrolimus level was 4.7 on 5/19
The patient has noninvasive ventilation.  Patient had a pulmonary artery catheter with elevated filling pressures.  Patient is on pressors inotropes.  Patient tidal volume is high and might be a risk of patient induced lung injury.  Continue antibiotic as there is might be an element of underlying pulmonary infection.  Patient is on 2 antibiotics.  Cultures are negative to date.  His course is complicated by worsening the renal function and also the anemia
CKD 3 stable  continue Tacrolimus  if patient not scheduled for discharge tomorrow, please repeat Tacrolimus level one half hour prior to AM dose
The patient has noninvasive ventilation.  Patient had a pulmonary artery catheter with elevated filling pressures.  Patient is on pressors inotropes.  Patient tidal volume is high and might be a risk of patient induced lung injury.  Continue antibiotic as there is might be an element of underlying pulmonary infection.  Patient is on 2 antibiotics.  Cultures are negative to date.  His course is complicated by worsening the renal function and also the anemia.  He is on EPAP 8 and FiO2 60%. TV is lower than yesterday.  PA pressures decreased

## 2023-05-21 NOTE — PROGRESS NOTE ADULT - SUBJECTIVE AND OBJECTIVE BOX
CC: I48.91        INTERVAL HISTORY: sitting in chair  preparing for discharge to rehab today      ROS: No chest pain, no sob, no abd pain. No n/v/d    PAST MEDICAL & SURGICAL HISTORY:  Polycystic kidney disease    DM2 (diabetes mellitus, type 2)    HLD (hyperlipidemia)    HTN (hypertension)    Prostate cancer    Kidney transplant recipient    DVT (deep venous thrombosis)    Chronic CHF    H/O radical prostatectomy  2010    S/P hernia repair  Abdominal and inguinal around 2005        PHYSICAL EXAM:  T(C): 36.1 (05-21-23 @ 05:01), Max: 36.7 (05-20-23 @ 21:35)  HR: 74 (05-21-23 @ 05:00)  BP: 161/94 (05-21-23 @ 05:00) (126/58 - 163/90)  RR: 18 (05-21-23 @ 05:00)  SpO2: 97% (05-21-23 @ 05:00)  Wt(kg): --  I&O's Summary    19 May 2023 07:01  -  20 May 2023 07:00  --------------------------------------------------------  IN: 400 mL / OUT: 100 mL / NET: 300 mL    20 May 2023 07:01  -  21 May 2023 06:49  --------------------------------------------------------  IN: 0 mL / OUT: 800 mL / NET: -800 mL      Weight   General: AAO x 3,  NAD.  HEENT: moist mucous membranes, no pallor/cyanosis.  Neck: no JVD visible.  Cardiac: S1, S2. RRR. No murmurs   Respratory: + expiratory wheezing  Abdomen: soft. nontender. nondistended  Skin: no rashes.  Extremities: no LE edema b/l  Access:       DATA:                        10.4<L>  4.38  )-----------( 256      ( 20 May 2023 08:47 )             34.9<L>    Ferritin, Serum: 594 ng/mL *H* (05-03 @ 15:40)      142    |  106    |  45<H>  ----------------------------<  93     Ca:8.9   (20 May 2023 08:47)  4.3     |  26     |  1.98<H>        TPro  6.4    /  Alb  3.5    /  TBili  1.3<H>  /  DBili  x      /  AST  37     /  ALT  23     /  AlkPhos  89     20 May 2023 08:47                    MEDICATIONS  (STANDING):  acetylcysteine 10%  Inhalation 4 milliLiter(s) Inhalation every 12 hours  aspirin  chewable 81 milliGRAM(s) Oral daily  atorvastatin 20 milliGRAM(s) Oral at bedtime  buMETAnide 2 milliGRAM(s) Oral daily  clopidogrel Tablet 75 milliGRAM(s) Oral daily  cyanocobalamin 1000 MICROGram(s) Oral daily  dextrose 5%. 1000 milliLiter(s) (50 mL/Hr) IV Continuous <Continuous>  dextrose 5%. 1000 milliLiter(s) (100 mL/Hr) IV Continuous <Continuous>  dextrose 50% Injectable 25 Gram(s) IV Push once  dextrose 50% Injectable 12.5 Gram(s) IV Push once  dextrose 50% Injectable 25 Gram(s) IV Push once  folic acid 1 milliGRAM(s) Oral daily  glucagon  Injectable 1 milliGRAM(s) IntraMuscular once  glucagon  Injectable 1 milliGRAM(s) IntraMuscular once  glucagon  Injectable 1 milliGRAM(s) IntraMuscular once  heparin   Injectable 5000 Unit(s) SubCutaneous every 12 hours  insulin lispro (ADMELOG) corrective regimen sliding scale   SubCutaneous Before meals and at bedtime  insulin lispro Injectable (ADMELOG) 2 Unit(s) SubCutaneous three times a day before meals  levalbuterol Inhalation 0.31 milliGRAM(s) Inhalation two times a day  metoprolol succinate ER 50 milliGRAM(s) Oral daily  pantoprazole    Tablet 40 milliGRAM(s) Oral before breakfast  polyethylene glycol 3350 17 Gram(s) Oral daily  predniSONE   Tablet 5 milliGRAM(s) Oral daily  senna 2 Tablet(s) Oral at bedtime  sodium chloride 0.9% lock flush 3 milliLiter(s) IV Push every 8 hours  tacrolimus 1 milliGRAM(s) Oral at bedtime  tacrolimus 2 milliGRAM(s) Oral <User Schedule>    MEDICATIONS  (PRN):  acetaminophen     Tablet .. 650 milliGRAM(s) Oral every 6 hours PRN Mild Pain (1 - 3)  ALPRAZolam 0.25 milliGRAM(s) Oral every 8 hours PRN Anxiety  dextrose Oral Gel 15 Gram(s) Oral once PRN Blood Glucose LESS THAN 70 milliGRAM(s)/deciliter

## 2023-05-21 NOTE — DISCHARGE NOTE NURSING/CASE MANAGEMENT/SOCIAL WORK - PATIENT PORTAL LINK FT
You can access the FollowMyHealth Patient Portal offered by [facility] by registering at the following website: [email or web address]. By joining [employer]’s FollowMyHealth portal, you will also be able to view your health information using other applications (apps) compatible with our system.

## 2023-05-21 NOTE — PROGRESS NOTE ADULT - REASON FOR ADMISSION
percutaneous DENNY closure
percutaneous Left Atrial Appendage closure
percutaneous DENNY closure
percutaneous DNENY closure
percutaneous DENNY closure

## 2023-05-22 ENCOUNTER — APPOINTMENT (OUTPATIENT)
Dept: HEART AND VASCULAR | Facility: CLINIC | Age: 78
End: 2023-05-22

## 2023-06-14 ENCOUNTER — APPOINTMENT (OUTPATIENT)
Dept: NEPHROLOGY | Facility: CLINIC | Age: 78
End: 2023-06-14

## 2023-06-14 NOTE — ED ADULT TRIAGE NOTE - CHIEF COMPLAINT QUOTE
160
Pt c/o shortness of breath and weight gain over past 5 days. Hx of CHF, reports compliance with diuretic. Reports intermittent chest pain. Denies fevers, chills. Speaking clearly in full sentences.

## 2023-06-27 ENCOUNTER — NON-APPOINTMENT (OUTPATIENT)
Age: 78
End: 2023-06-27

## 2023-07-06 NOTE — BRIEF OPERATIVE NOTE - TYPE OF ANESTHESIA
CT chest abdomen and pelvis order re faxed to [facility] via [facility].     Notified patient order states no oral contrast.   
General
General

## 2023-07-26 NOTE — H&P ADULT - NSHPSOCIALHISTORY_GEN_ALL_CORE
Lives with wife  Former smoker (quit 45 years ago)  Rare EtOH  No illicit drug use 3 = A little assistance

## 2023-07-31 NOTE — PATIENT PROFILE ADULT - FALL HARM RISK - DEVICES
Patient sees another provider for PCP and has f/u with oncology.   Sera Mtz RN     Cane Bactrim Pregnancy And Lactation Text: This medication is Pregnancy Category D and is known to cause fetal risk.  It is also excreted in breast milk.

## 2023-08-01 ENCOUNTER — NON-APPOINTMENT (OUTPATIENT)
Age: 78
End: 2023-08-01

## 2023-08-01 ENCOUNTER — APPOINTMENT (OUTPATIENT)
Dept: HEART AND VASCULAR | Facility: CLINIC | Age: 78
End: 2023-08-01
Payer: MEDICARE

## 2023-08-01 VITALS
HEART RATE: 87 BPM | WEIGHT: 144 LBS | OXYGEN SATURATION: 96 % | HEIGHT: 67 IN | SYSTOLIC BLOOD PRESSURE: 122 MMHG | DIASTOLIC BLOOD PRESSURE: 78 MMHG | BODY MASS INDEX: 22.6 KG/M2 | TEMPERATURE: 97.5 F

## 2023-08-01 DIAGNOSIS — Z95.818 PRESENCE OF OTHER CARDIAC IMPLANTS AND GRAFTS: ICD-10-CM

## 2023-08-01 PROCEDURE — 93000 ELECTROCARDIOGRAM COMPLETE: CPT

## 2023-08-01 PROCEDURE — 99213 OFFICE O/P EST LOW 20 MIN: CPT

## 2023-08-01 RX ORDER — TORSEMIDE 20 MG/1
20 TABLET ORAL
Refills: 0 | Status: ACTIVE | COMMUNITY

## 2023-08-01 RX ORDER — KRILL/OM-3/DHA/EPA/PHOSPHO/AST 1000-230MG
81 CAPSULE ORAL DAILY
Qty: 7 | Refills: 0 | Status: ACTIVE | COMMUNITY
Start: 2023-08-01

## 2023-08-01 RX ORDER — CLOPIDOGREL BISULFATE 75 MG/1
75 TABLET, FILM COATED ORAL DAILY
Qty: 30 | Refills: 0 | Status: DISCONTINUED | COMMUNITY
Start: 2023-03-01 | End: 2023-08-01

## 2023-08-02 PROBLEM — Z95.818 PRESENCE OF AMULET LEFT ATRIAL APPENDAGE CLOSURE DEVICE: Status: ACTIVE | Noted: 2023-08-02

## 2023-08-02 PROBLEM — Z95.818 PRESENCE OF WATCHMAN LEFT ATRIAL APPENDAGE CLOSURE DEVICE: Status: RESOLVED | Noted: 2023-08-01 | Resolved: 2023-08-02

## 2023-08-02 NOTE — ED ADULT NURSE NOTE - CAS EDN DISCHARGE ASSESSMENT
[facility] is calling to see if patient recently had her INR checked for warfarin dosage.  
Pharmacy advised per discharge summary on 8/1/23, pt was discharged to [facility]. [facility] does not manage pt while admitted to SNF. Pharmacy will contact [facility] to make sure pt is admitted to SNF and follow up on plan for warfarin.  
Patient baseline mental status/Alert and oriented to person, place and time

## 2023-08-02 NOTE — REASON FOR VISIT
[FreeTextEntry1] : 77 M referred by Dr Sidhu to establish care H/o polycystic kidney disease s/p kidney transplant in 2007, CKD, HTN, HLD, DM2, Prostate cancer s/p radical prostatectomy 2015, DVT 2006 (in setting of pelvic fracture), and afib/atrial flutter s/p ablation in 1/2018, who is now off oral anticoagulation secondary to GI bleed s/p Amulet Device c/b pericardiocentesis, RTOR for sternotomy, washout, evacuate pericardial clot 4/2023  here to establish care. he feels well is still recovering from a prolonged hospital stay has worsening leg edema bilaterally  echo EF normal RV function mildly reduced   ecg nsr 8/1/2023

## 2023-08-02 NOTE — PHYSICAL EXAM

## 2023-08-02 NOTE — ASSESSMENT
[FreeTextEntry1] : - leg edema reduce amlodipine to 5 mg daily - HTN continue coreg 12.5 mg bid Lisinopril 40 mg daily - DM on jardiance 25 mg daily and atorvastatin 20 mg daily - amulet on asa 81 mg daily - Continue torsemide 20 mg bid - Fu in one month for bp check

## 2023-08-04 RX ORDER — AMLODIPINE BESYLATE 5 MG/1
5 TABLET ORAL DAILY
Qty: 90 | Refills: 2 | Status: ACTIVE | COMMUNITY
Start: 1900-01-01 | End: 1900-01-01

## 2023-08-14 ENCOUNTER — APPOINTMENT (OUTPATIENT)
Dept: CARDIOTHORACIC SURGERY | Facility: CLINIC | Age: 78
End: 2023-08-14

## 2023-10-05 ENCOUNTER — APPOINTMENT (OUTPATIENT)
Dept: ENDOCRINOLOGY | Facility: CLINIC | Age: 78
End: 2023-10-05

## 2023-10-11 ENCOUNTER — NON-APPOINTMENT (OUTPATIENT)
Age: 78
End: 2023-10-11

## 2023-10-11 ENCOUNTER — APPOINTMENT (OUTPATIENT)
Dept: HEART AND VASCULAR | Facility: CLINIC | Age: 78
End: 2023-10-11
Payer: MEDICARE

## 2023-10-11 VITALS
TEMPERATURE: 97.8 F | BODY MASS INDEX: 21.35 KG/M2 | HEIGHT: 67 IN | DIASTOLIC BLOOD PRESSURE: 80 MMHG | HEART RATE: 72 BPM | WEIGHT: 136 LBS | OXYGEN SATURATION: 95 % | SYSTOLIC BLOOD PRESSURE: 138 MMHG

## 2023-10-11 DIAGNOSIS — I48.92 UNSPECIFIED ATRIAL FLUTTER: ICD-10-CM

## 2023-10-11 PROCEDURE — 93000 ELECTROCARDIOGRAM COMPLETE: CPT

## 2023-10-11 PROCEDURE — 99214 OFFICE O/P EST MOD 30 MIN: CPT

## 2023-10-11 RX ORDER — REPAGLINIDE 2 MG/1
2 TABLET ORAL 3 TIMES DAILY
Qty: 270 | Refills: 3 | Status: DISCONTINUED | COMMUNITY
Start: 2019-04-24 | End: 2023-10-11

## 2023-10-12 PROBLEM — I48.92 ATRIAL FLUTTER: Status: ACTIVE | Noted: 2018-02-26

## 2023-11-19 ENCOUNTER — NON-APPOINTMENT (OUTPATIENT)
Age: 78
End: 2023-11-19

## 2023-11-20 ENCOUNTER — APPOINTMENT (OUTPATIENT)
Dept: ENDOCRINOLOGY | Facility: CLINIC | Age: 78
End: 2023-11-20
Payer: MEDICARE

## 2023-11-20 VITALS
BODY MASS INDEX: 21.97 KG/M2 | HEART RATE: 74 BPM | WEIGHT: 140 LBS | HEIGHT: 67 IN | SYSTOLIC BLOOD PRESSURE: 142 MMHG | DIASTOLIC BLOOD PRESSURE: 81 MMHG

## 2023-11-20 DIAGNOSIS — M85.80 OTHER SPECIFIED DISORDERS OF BONE DENSITY AND STRUCTURE, UNSPECIFIED SITE: ICD-10-CM

## 2023-11-20 LAB — GLUCOSE BLDC GLUCOMTR-MCNC: 122

## 2023-11-20 PROCEDURE — 82962 GLUCOSE BLOOD TEST: CPT

## 2023-11-20 PROCEDURE — 99213 OFFICE O/P EST LOW 20 MIN: CPT | Mod: 25

## 2023-11-20 PROCEDURE — 36415 COLL VENOUS BLD VENIPUNCTURE: CPT

## 2023-11-20 RX ORDER — INSULIN DETEMIR 100 [IU]/ML
100 INJECTION, SOLUTION SUBCUTANEOUS
Qty: 15 | Refills: 1 | Status: ACTIVE | COMMUNITY
Start: 2018-10-24 | End: 1900-01-01

## 2023-11-20 RX ORDER — PEN NEEDLE, DIABETIC 29 G X1/2"
32G X 4 MM NEEDLE, DISPOSABLE MISCELLANEOUS
Qty: 1 | Refills: 3 | Status: ACTIVE | COMMUNITY
Start: 2018-10-24 | End: 1900-01-01

## 2023-11-21 LAB
25(OH)D3 SERPL-MCNC: 51.5 NG/ML
ALBUMIN SERPL ELPH-MCNC: 4.3 G/DL
ALP BLD-CCNC: 67 U/L
ALT SERPL-CCNC: 16 U/L
ANION GAP SERPL CALC-SCNC: 20 MMOL/L
AST SERPL-CCNC: 21 U/L
BILIRUB SERPL-MCNC: 0.6 MG/DL
BUN SERPL-MCNC: 48 MG/DL
CALCIUM SERPL-MCNC: 10.1 MG/DL
CHLORIDE SERPL-SCNC: 100 MMOL/L
CHOLEST SERPL-MCNC: 141 MG/DL
CO2 SERPL-SCNC: 21 MMOL/L
CREAT SERPL-MCNC: 1.82 MG/DL
CREAT SPEC-SCNC: 81 MG/DL
EGFR: 38 ML/MIN/1.73M2
ESTIMATED AVERAGE GLUCOSE: 166 MG/DL
GLUCOSE SERPL-MCNC: 105 MG/DL
HBA1C MFR BLD HPLC: 7.4 %
HDLC SERPL-MCNC: 55 MG/DL
LDLC SERPL CALC-MCNC: 68 MG/DL
MICROALBUMIN 24H UR DL<=1MG/L-MCNC: 3.4 MG/DL
MICROALBUMIN/CREAT 24H UR-RTO: 41 MG/G
NONHDLC SERPL-MCNC: 86 MG/DL
POTASSIUM SERPL-SCNC: 4.3 MMOL/L
PROT SERPL-MCNC: 7.4 G/DL
SODIUM SERPL-SCNC: 141 MMOL/L
TRIGL SERPL-MCNC: 93 MG/DL
VIT B12 SERPL-MCNC: >2000 PG/ML

## 2023-12-15 ENCOUNTER — APPOINTMENT (OUTPATIENT)
Dept: HEART AND VASCULAR | Facility: CLINIC | Age: 78
End: 2023-12-15
Payer: MEDICARE

## 2023-12-15 ENCOUNTER — NON-APPOINTMENT (OUTPATIENT)
Age: 78
End: 2023-12-15

## 2023-12-15 VITALS
SYSTOLIC BLOOD PRESSURE: 140 MMHG | DIASTOLIC BLOOD PRESSURE: 80 MMHG | OXYGEN SATURATION: 98 % | WEIGHT: 139 LBS | BODY MASS INDEX: 21.82 KG/M2 | HEIGHT: 67 IN | HEART RATE: 71 BPM | TEMPERATURE: 97.9 F

## 2023-12-15 DIAGNOSIS — I51.7 CARDIOMEGALY: ICD-10-CM

## 2023-12-15 DIAGNOSIS — I50.9 HEART FAILURE, UNSPECIFIED: ICD-10-CM

## 2023-12-15 DIAGNOSIS — I77.810 THORACIC AORTIC ECTASIA: ICD-10-CM

## 2023-12-15 DIAGNOSIS — I71.21 ANEURYSM OF THE ASCENDING AORTA, WITHOUT RUPTURE: ICD-10-CM

## 2023-12-15 PROCEDURE — 36415 COLL VENOUS BLD VENIPUNCTURE: CPT

## 2023-12-15 PROCEDURE — 93000 ELECTROCARDIOGRAM COMPLETE: CPT

## 2023-12-15 PROCEDURE — 93306 TTE W/DOPPLER COMPLETE: CPT

## 2023-12-15 PROCEDURE — 99214 OFFICE O/P EST MOD 30 MIN: CPT

## 2023-12-18 PROBLEM — I50.9 ACUTE CHF: Status: ACTIVE | Noted: 2020-10-05

## 2023-12-18 PROBLEM — I77.810 ASCENDING AORTA DILATATION: Status: ACTIVE | Noted: 2017-08-03

## 2023-12-18 LAB
ALBUMIN SERPL ELPH-MCNC: 4.3 G/DL
ALP BLD-CCNC: 61 U/L
ALT SERPL-CCNC: 23 U/L
ANION GAP SERPL CALC-SCNC: 14 MMOL/L
AST SERPL-CCNC: 18 U/L
BILIRUB SERPL-MCNC: 0.9 MG/DL
BUN SERPL-MCNC: 42 MG/DL
CALCIUM SERPL-MCNC: 9.6 MG/DL
CHLORIDE SERPL-SCNC: 103 MMOL/L
CO2 SERPL-SCNC: 24 MMOL/L
CREAT SERPL-MCNC: 1.75 MG/DL
EGFR: 40 ML/MIN/1.73M2
GLUCOSE SERPL-MCNC: 98 MG/DL
NT-PROBNP SERPL-MCNC: 4877 PG/ML
POTASSIUM SERPL-SCNC: 3.6 MMOL/L
PROT SERPL-MCNC: 7.3 G/DL
SODIUM SERPL-SCNC: 141 MMOL/L

## 2023-12-18 NOTE — REASON FOR VISIT
[FreeTextEntry1] : 77 M referred by Dr Sidhu to establish care H/o polycystic kidney disease s/p kidney transplant in 2007, CKD, HTN, HLD, DM2, Prostate cancer s/p radical prostatectomy 2015, DVT 2006 (in setting of pelvic fracture), and afib/atrial flutter s/p ablation in 1/2018, who is now off oral anticoagulation secondary to GI bleed s/p Amulet Device c/b pericardiocentesis, RTOR for sternotomy, washout, evacuate pericardial clot 4/2023  feeling well overall. Cr is up to 1.8. leg swelling has resolved. able to walk 2 flights of stairs without stopping -not limited by SOB but rather leg pain  getting a cataract surgery with [facility] with Dr. Katerina Davila's surgical team-jan 3rd  follows by Dr. Sulaiman Modi at [facility] for nephrology   echo  EF normal RV function mildly reduced severely dilated LA aortic root 4.15  ecg sr with LVH, unchanged ST depression 12/15/2023 ct chest -2/3/23 -aortic root 4.3

## 2023-12-18 NOTE — ASSESSMENT
[FreeTextEntry1] : pre op -operate on aspirin  -from a cardiology perspective, optimized for cataract procedure   -EKG with increase in voltage -st depressions unchanged -will repeat echo to assess for LV fx  -BNP, CMP today  -HTN continue coreg 12.5 mg bid Lisinopril 40 mg daily - DM on jardiance 25 mg daily and atorvastatin 20 mg daily - amulet on asa 81 mg daily - Continue torsemide 20 mg bid - Fu in 3 months.

## 2023-12-18 NOTE — PHYSICAL EXAM

## 2024-01-03 NOTE — HISTORY OF PRESENT ILLNESS
oriented to person, place and time , normal sensation , short and long term memory intact [FreeTextEntry1] : Mr. Wooten is a pleasant 89 y/o male with polycystic kidney disease s/p kidney transplant in 2007, HTN, HLD, DM2, Prostate cancer s/p radical prostatectomy 2015, DVT 2006 (in setting of pelvic fracture), and atrial flutter s/p ablation in 1/2018, who presents for routine follow up.\par \par Overall he is doing well.  No symptoms concerning for recurrent arrhythmia and specifically no chest pain, SOB, palpitations, syncope or near syncope.  He is maintained on Eliquis.  \par \par

## 2024-01-04 PROBLEM — I71.21 AORTIC ROOT ANEURYSM: Status: ACTIVE | Noted: 2017-08-03

## 2024-01-04 PROBLEM — I51.7 LVH (LEFT VENTRICULAR HYPERTROPHY): Status: ACTIVE | Noted: 2023-12-15

## 2024-01-10 ENCOUNTER — APPOINTMENT (OUTPATIENT)
Dept: HEART AND VASCULAR | Facility: CLINIC | Age: 79
End: 2024-01-10

## 2024-02-15 ENCOUNTER — APPOINTMENT (OUTPATIENT)
Dept: HEART AND VASCULAR | Facility: CLINIC | Age: 79
End: 2024-02-15

## 2024-02-28 ENCOUNTER — APPOINTMENT (OUTPATIENT)
Dept: ENDOCRINOLOGY | Facility: CLINIC | Age: 79
End: 2024-02-28
Payer: MEDICARE

## 2024-02-28 VITALS
BODY MASS INDEX: 21.3 KG/M2 | WEIGHT: 136 LBS | DIASTOLIC BLOOD PRESSURE: 82 MMHG | HEART RATE: 78 BPM | SYSTOLIC BLOOD PRESSURE: 149 MMHG

## 2024-02-28 DIAGNOSIS — E11.22 TYPE 2 DIABETES MELLITUS WITH DIABETIC CHRONIC KIDNEY DISEASE: ICD-10-CM

## 2024-02-28 LAB
GLUCOSE BLDC GLUCOMTR-MCNC: 86
HBA1C MFR BLD HPLC: 7.9

## 2024-02-28 PROCEDURE — 83036 HEMOGLOBIN GLYCOSYLATED A1C: CPT | Mod: QW

## 2024-02-28 PROCEDURE — 82962 GLUCOSE BLOOD TEST: CPT

## 2024-02-28 PROCEDURE — 99213 OFFICE O/P EST LOW 20 MIN: CPT | Mod: 25

## 2024-02-28 RX ORDER — EMPAGLIFLOZIN 25 MG/1
25 TABLET, FILM COATED ORAL DAILY
Qty: 90 | Refills: 1 | Status: ACTIVE | COMMUNITY
Start: 2022-05-26 | End: 1900-01-01

## 2024-02-28 RX ORDER — INSULIN GLARGINE 100 [IU]/ML
100 INJECTION, SOLUTION SUBCUTANEOUS DAILY
Qty: 1 | Refills: 1 | Status: ACTIVE | COMMUNITY
Start: 2024-02-28 | End: 1900-01-01

## 2024-02-28 RX ORDER — DULAGLUTIDE 4.5 MG/.5ML
4.5 INJECTION, SOLUTION SUBCUTANEOUS
Qty: 3 | Refills: 1 | Status: ACTIVE | COMMUNITY
Start: 2018-09-27 | End: 1900-01-01

## 2024-02-28 RX ORDER — LANCETS 33 GAUGE
EACH MISCELLANEOUS
Qty: 200 | Refills: 3 | Status: ACTIVE | COMMUNITY
Start: 2024-02-28 | End: 1900-01-01

## 2024-02-28 NOTE — ASSESSMENT
[FreeTextEntry1] : 1. T2D A1C goal <7.5% A1C -7.9% (2/28/24) from 7.4% (11/2023) from  7.5% (12/21/2022) from 5.8% (9/7/22) from 8.1% (5/2022) Current regimen: Levemir 12 units daily, Metformin 500/1000mg, Trulicity 4.5mg/weekly,  Jardiance 25mg/daily Glucometer readings at home reveal fasting and prandial BG at goal on current regimen Glucometer reading performed in office today is at goal in fasting state. Denies hypoglycemia  Since last visit, Neto continues current regimen with tolerance and very rare hypoglycemia.  Discussed that LEvemir is being discontinued later this year and will send replacement basal insulin (glargine, Lantus) to evaluate for plan preferred basal regimen to switch to -- continue levemir 12 units daily, with plan to switch to Lantus 12 units daily once supply runs out -- continue metformin 1500mg daily -- continue Trulicity 4.5mg/weekly -- continue Jardiance 25mg daily  2. Osteopenia Osteopenia diagnosed in 2017 by bone density significant for femoral neck T-score -2.4; his 10-year predicted fracture risk was 21% for major osteoporotic fracture and 11% for hip fracture (risk factors of parental history of hip fracture, rheumatoid arthritis as a proxy for type 2 diabetes mellitus) Fracture history: Pelvis and right rib fractures in 2006 when bus shelter fell on him; history of right hip fusion at age 6 in the setting of infection, he believes tuberculosis. Family history: Father with history of hip fracture in his 80s Treatment: Zoledronic acid 5 mg IV in January 2018, January 2019, March 2020 -- due for interval DEXA, patient prefers to plan for this in the summer and will take order with FU scheduled in May 2024 -- continue calcium and vitD supplementation  Refills provided Follow up in 3 months  Maryana Sorto MS, FNP-BC, [facility] 02/28/2024

## 2024-02-28 NOTE — REVIEW OF SYSTEMS
[Constipation] : no constipation [Nausea] : no nausea [Diarrhea] : no diarrhea [Vomiting] : no vomiting [Polyuria] : no polyuria [Joint Pain] : joint pain [Negative] : Integumentary

## 2024-02-28 NOTE — HISTORY OF PRESENT ILLNESS
[FreeTextEntry1] : JORGE WU is a 78 year old male with pmhx of T2D, polycystic kidney disease (s/p renal transplant in 2007), osteopenia, atrial flutter who presents for T2D follow-up.   Patient of Dr. Tinoco, last visit 12/21/2022 Last (initial) visit with myself, 11/20/2023   Interval change: Endorses sugars "not too bad", ranges as below Tolerating regimen without issues endorses need for refills today, provided Due for interval DEXA Denies fracture since last visit.    A1C - 7.9% (2/28/24) from 7.5% (12/21/2022) from 5.8% (9/7/22) from 8.1% (5/2022) Office Fingerstick -- 86 (fasting)   Current medication: - Levemir 12 units daily - Metformin 500/1000mg - Trulicity 4.5mg/weekly - Jardiance 25mg/daily   Past medication: - repaglinide 2mg with lunch/dinner (self-discontinued with guidance of PCP, r/t forgetfulness and not seeing excursions)   JORGE reports they take their diabetes medication all of the time. JORGE endorses very seldom hypoglycemia symptoms or BG <70, if occurs happens overnight    Diabetes Self-Management: Monitoring BG daily Glucometer downloaded, reviewed, scanned into media --fasting BG range:  - postprandial before bed 170-180s (~4H postprandial)   Diet-- Typically consumes 2 meals/daily, +/- snacks Beverages: water, tea (daily)   Ophthalmology: UTD, s/p cataract surgery in December 2023 (left eye), will have right eye in [place] 2024    2. Osteopenia with elevated fracture risk Osteopenia diagnosed in 2017 by bone density significant for femoral neck T-score -2.4; his 10-year predicted fracture risk was 21% for major osteoporotic fracture and 11% for hip fracture (risk factors of parental history of hip fracture, rheumatoid arthritis as a proxy for type 2 diabetes mellitus) Fracture history: Pelvis and right rib fractures in 2006 when bus shelter fell on him; history of right hip fusion at age 6 in the setting of infection, he believes tuberculosis. Family history: Father with history of hip fracture in his 80s Treatment: Zoledronic acid 5 mg IV in January 2018, January 2019, March 2020  Falls: None recent Height loss: 1/2 inch height loss Kidney stones: No Dairy intake: 0-1 serving daily (cup of milk ever other day)  Calcium supplements: 600 mg daily Multivitamin: Centrum Silver with 210 mg calcium and 1000 intl units vitamin D Vitamin D supplements: 2000 intl units daily  Osteoporosis risk factors include: Postmenopausal status,  race, prior fracture, falls, height loss, small thin bones, tobacco use, excessive alcohol, anorexia, family history, vitamin D deficiency, corticosteroid use, seizure medications, malabsorption, hyperparathyroidism, hyperthyroidism. NEGATIVE EXCEPT: Renal transplant, prior fracture (although traumatic), parental history of hip fracture

## 2024-02-28 NOTE — PHYSICAL EXAM
[Alert] : alert [No Acute Distress] : no acute distress [Normal Voice/Communication] : normal voice communication [Normal Hearing] : hearing was normal [No Respiratory Distress] : no respiratory distress [Normal Rate and Effort] : normal respiratory rate and effort [Normal Rate] : heart rate was normal [Regular Rhythm] : with a regular rhythm [Normal Gait] : normal gait [Oriented x3] : oriented to person, place, and time [Normal Affect] : the affect was normal [Normal Insight/Judgement] : insight and judgment were intact [Normal Mood] : the mood was normal

## 2024-03-28 ENCOUNTER — NON-APPOINTMENT (OUTPATIENT)
Age: 79
End: 2024-03-28

## 2024-03-28 ENCOUNTER — APPOINTMENT (OUTPATIENT)
Dept: HEART AND VASCULAR | Facility: CLINIC | Age: 79
End: 2024-03-28
Payer: MEDICARE

## 2024-03-28 VITALS
DIASTOLIC BLOOD PRESSURE: 74 MMHG | BODY MASS INDEX: 21.35 KG/M2 | HEART RATE: 87 BPM | SYSTOLIC BLOOD PRESSURE: 143 MMHG | HEIGHT: 67 IN | OXYGEN SATURATION: 96 % | TEMPERATURE: 98 F | WEIGHT: 136 LBS

## 2024-03-28 DIAGNOSIS — M25.579 PAIN IN UNSPECIFIED ANKLE AND JOINTS OF UNSPECIFIED FOOT: ICD-10-CM

## 2024-03-28 PROCEDURE — 99214 OFFICE O/P EST MOD 30 MIN: CPT

## 2024-03-28 PROCEDURE — 93000 ELECTROCARDIOGRAM COMPLETE: CPT

## 2024-03-28 PROCEDURE — G2211 COMPLEX E/M VISIT ADD ON: CPT

## 2024-03-29 NOTE — PHYSICAL EXAM
[Well Developed] : well developed [Well Nourished] : well nourished [No Acute Distress] : no acute distress [Normal Conjunctiva] : normal conjunctiva [Normal Venous Pressure] : normal venous pressure [No Carotid Bruit] : no carotid bruit [Normal S1, S2] : normal S1, S2 [No Murmur] : no murmur [No Gallop] : no gallop [No Rub] : no rub [Clear Lung Fields] : clear lung fields [No Respiratory Distress] : no respiratory distress  [Good Air Entry] : good air entry [Soft] : abdomen soft [Non Tender] : non-tender [No Masses/organomegaly] : no masses/organomegaly [Normal Bowel Sounds] : normal bowel sounds [Normal Gait] : normal gait [No Edema] : no edema [No Cyanosis] : no cyanosis [No Clubbing] : no clubbing [No Varicosities] : no varicosities [No Rash] : no rash [No Skin Lesions] : no skin lesions [Moves all extremities] : moves all extremities [No Focal Deficits] : no focal deficits [Normal Speech] : normal speech [Alert and Oriented] : alert and oriented [Normal memory] : normal memory [de-identified] : bilateral leg edema

## 2024-03-29 NOTE — ASSESSMENT
[FreeTextEntry1] :   -HTN continue coreg 12.5 mg bid Lisinopril 40 mg daily would consider aldactone 25 mg daily for him will defer to his nephrologist - DM on jardiance 25 mg daily and atorvastatin 20 mg daily - amulet on asa 81 mg daily - Continue torsemide 20 mg bid - Fu in 4 months.

## 2024-04-16 NOTE — ED PROVIDER NOTE - IV ALTEPLASE EXCL REL HIDDEN
Called patient and informed her Lara has not gotten to her messages. Will call her as soon as her messages are completed.    show

## 2024-05-09 ENCOUNTER — APPOINTMENT (OUTPATIENT)
Dept: ENDOCRINOLOGY | Facility: CLINIC | Age: 79
End: 2024-05-09

## 2024-06-13 ENCOUNTER — APPOINTMENT (OUTPATIENT)
Dept: OTOLARYNGOLOGY | Facility: CLINIC | Age: 79
End: 2024-06-13
Payer: MEDICARE

## 2024-06-13 DIAGNOSIS — H61.23 IMPACTED CERUMEN, BILATERAL: ICD-10-CM

## 2024-06-13 DIAGNOSIS — H93.299 OTHER ABNORMAL AUDITORY PERCEPTIONS, UNSPECIFIED EAR: ICD-10-CM

## 2024-06-13 PROCEDURE — 99213 OFFICE O/P EST LOW 20 MIN: CPT | Mod: 25

## 2024-06-13 PROCEDURE — 69210 REMOVE IMPACTED EAR WAX UNI: CPT

## 2024-06-16 PROBLEM — H61.23 BILATERAL IMPACTED CERUMEN: Status: ACTIVE | Noted: 2022-06-16

## 2024-06-16 PROBLEM — H93.299 ABNORMAL AUDITORY PERCEPTION: Status: ACTIVE | Noted: 2024-06-16

## 2024-06-16 NOTE — HISTORY OF PRESENT ILLNESS
[de-identified] : CC: AU CI   HISTORY OF PRESENT ILLNESS: Mr. Wooten is a pleasant 78 year old gentelman with AU CI previously seen by Dr. Huber, had a history of OE feels the ears are clogged again     REVIEW OF SYSTEMS: General ROS: negative for - chills, fatigue or fever Psychological ROS: negative for - anxiety or depression Ophthalmic ROS: negative for - blurry vision, decreased vision or double vision ENT ROS: negative except as noted from HPI Allergy and Immunology ROS: negative except as noted from HPI Hematological and Lymphatic ROS: negative for - bleeding problems Endocrine ROS: negative for - malaise/lethargy Respiratory ROS: negative for - stridor Cardiovascular ROS: negative for - chest pain Gastrointestinal ROS: negative for - appetite loss or nausea/vomiting Genitourinary ROS: negative for - incontinence Musculoskeletal ROS: negative for - gait disturbance Neurological ROS: negative for - behavioral changes Dermatological ROS: negative for - nail changes   Physical Exam:   GENERAL APPEARANCE: Well-developed and No Acute Distress. COMMUNICATION: Able to Communicate. Strong Voice.   HEAD AND FACE Eyes: Testing of ocular motility including primary gaze alignment normal. Inspection and Appearance: No evidence of lesions or masses Palpation: Palpation of the face reveals no sinus tenderness Salivary Glands: Symmetric without masses Facial Strength: Symmetric without evidence of facial paralysis   EAR, NOSE, MOUTH, and THROAT: Ear Canals and Tympanic Membranes, Bilateral: No evidence of inflammation or lesions. Thresholds: Clinical speech reception thresholds normal. External, Nose and Auricle: No lesions or masses.   NECK: Evaluation: No evidence of masses or crepitus. The neck is symmetric and the trachea is in the midline position. Thyroid: No evidence of enlargement, tenderness or mass. Neck Lymph Nodes: WNL. Respiratory: Inspection of the chest including symmetry, expansion and/or assessment of respiratory effort normal. Cardiovascular: Evaluation of peripheral vascular system by observation and palpation of capillary refill, normal. Neurological/Psychiatric: Alert, Oriented, Mood, and Affect Normal.   PROCEDURE: Removal impacted cerumen requiring instrumentation. (01010)   PREOPERATIVE DIAGNOSIS: Cerumen Impaction   POSTOPERATIVE DIAGNOSIS Dx: Same   INDICATION FOR PROCEDURE: Patient has noted fullness and hearing loss in the affected ears for significant period of time.   EXAM FINDINGS: Auditory canal(s) was obstructed with cerumen.  Cerumen was observed with the microscope after the ear speculum was placed in the EAC, and gently loosened with the cerumen loop circumferentially.  The cerumen was then removed using suction, cerumen loop, and irrigation.  The tympanic membranes are intact following the procedure.  Auditory canals appear minimally inflamed.   Left ear: CI removed TMI Right ear: same as left ear  IMPRESSION: Mr. Wooten is a pleasant 78 year old gentleman with AU CI s/p removal   PLAN: -mineral oil PRN -RTC PRN     Lonnie Smyth MD [facility] Department of Otolaryngology- Head and Neck Surgery [facility]

## 2024-08-14 ENCOUNTER — APPOINTMENT (OUTPATIENT)
Dept: ENDOCRINOLOGY | Facility: CLINIC | Age: 79
End: 2024-08-14

## 2024-08-15 ENCOUNTER — APPOINTMENT (OUTPATIENT)
Dept: OTOLARYNGOLOGY | Facility: CLINIC | Age: 79
End: 2024-08-15

## 2024-09-04 NOTE — ED ADULT NURSE NOTE - NS ED NURSE TRANSPORT WITH
Bella Meyer is a 74 y.o. female with PMH of HTN, HLD, DM 2, recent hx of PE (6/2024, on eliqiuis) that was admitted 9/2 for dehydration/weakness and is now s/p ExLap/FANNY/BSO/OMX/LAR/Ileocecectomy/tumor debulking for high grade serous ovarian cancer on 9/3. Stroke code activated 9/4 AM for lethargy and altered mental status. LKN around midnight, ~7.5hrs prior. Patient was given dilauded at 0022 for pain, rapid response called at 0530 for decreased responsiveness. Patient was given narcan x 2 with mild improvement in mentation, however mentation did not return to baseline prompting stroke code activation. Exam significant for generalized weakness and lethargy, but no focal neuro deficits appreciated. Of note patient intermittently with brief extremity spasm/twitching however no rhythmic jerking or overt seizure like activity observed. CTH/CTA negative for acute intracranial abnormalities, vascular occlusion, or critical stenosis. Determined not a candidate for acute stroke interventions, OOW for TNK and no LVO for thrombectomy.    cardiac monitor/bipap

## 2024-09-05 ENCOUNTER — NON-APPOINTMENT (OUTPATIENT)
Age: 79
End: 2024-09-05

## 2024-09-05 ENCOUNTER — APPOINTMENT (OUTPATIENT)
Dept: HEART AND VASCULAR | Facility: CLINIC | Age: 79
End: 2024-09-05
Payer: MEDICARE

## 2024-09-05 VITALS
HEART RATE: 61 BPM | HEIGHT: 67 IN | BODY MASS INDEX: 23.7 KG/M2 | TEMPERATURE: 98.4 F | OXYGEN SATURATION: 98 % | WEIGHT: 151 LBS | DIASTOLIC BLOOD PRESSURE: 62 MMHG | SYSTOLIC BLOOD PRESSURE: 140 MMHG

## 2024-09-05 PROCEDURE — G2211 COMPLEX E/M VISIT ADD ON: CPT

## 2024-09-05 PROCEDURE — 99215 OFFICE O/P EST HI 40 MIN: CPT

## 2024-09-05 PROCEDURE — 93000 ELECTROCARDIOGRAM COMPLETE: CPT

## 2024-09-05 RX ORDER — SACUBITRIL AND VALSARTAN 49; 51 MG/1; MG/1
49-51 TABLET, FILM COATED ORAL
Qty: 60 | Refills: 3 | Status: ACTIVE | COMMUNITY
Start: 2024-09-05 | End: 1900-01-01

## 2024-09-05 NOTE — PHYSICAL EXAM
[Well Developed] : well developed [Well Nourished] : well nourished [No Acute Distress] : no acute distress [Normal Conjunctiva] : normal conjunctiva [Normal Venous Pressure] : normal venous pressure [No Carotid Bruit] : no carotid bruit [Normal S1, S2] : normal S1, S2 [No Murmur] : no murmur [No Rub] : no rub [No Gallop] : no gallop [Clear Lung Fields] : clear lung fields [Good Air Entry] : good air entry [No Respiratory Distress] : no respiratory distress  [Soft] : abdomen soft [Non Tender] : non-tender [No Masses/organomegaly] : no masses/organomegaly [Normal Bowel Sounds] : normal bowel sounds [Normal Gait] : normal gait [No Edema] : no edema [No Cyanosis] : no cyanosis [No Clubbing] : no clubbing [No Varicosities] : no varicosities [No Rash] : no rash [No Skin Lesions] : no skin lesions [Moves all extremities] : moves all extremities [No Focal Deficits] : no focal deficits [Normal Speech] : normal speech [Alert and Oriented] : alert and oriented [Normal memory] : normal memory [de-identified] : elevated JVD  [de-identified] : ENRRIQUE  [de-identified] : bilateral leg edema

## 2024-09-05 NOTE — ASSESSMENT
[FreeTextEntry1] : - he is in acute diastolic HF will stop lisinopril and amlodipine start entresto 49/51 bid on 9/7/24 will give him furoscix today and tomorrow - DM on jardiance 25 mg daily and atorvastatin 20 mg daily - amulet on asa 81 mg daily - Continue torsemide 20 mg bid for now  - Fu in one week

## 2024-09-05 NOTE — HISTORY OF PRESENT ILLNESS
[FreeTextEntry1] : 78 M  H/o polycystic kidney disease s/p kidney transplant in 2007, CKD, HTN, HLD, DM2, Prostate cancer s/p radical prostatectomy 2015, DVT 2006 (in setting of pelvic fracture), and afib/atrial flutter s/p ablation in 1/2018, who is now off oral anticoagulation secondary to GI bleed s/p Amulet Device c/b pericardiocentesis, RTOR for sternotomy, washout, evacuation of pericardial clot 4/2023 Aortic Aneurysm   here today for fu he has more leg edema he is not complaining of more sob as he has become more house bound    ecg atrial fibrillation rate controlled  LVH 9/5/24 Echo EF normal Moderate MR Aorta 4.1 cm 3/1/24

## 2024-09-13 ENCOUNTER — APPOINTMENT (OUTPATIENT)
Dept: HEART AND VASCULAR | Facility: CLINIC | Age: 79
End: 2024-09-13
Payer: MEDICARE

## 2024-09-13 ENCOUNTER — NON-APPOINTMENT (OUTPATIENT)
Age: 79
End: 2024-09-13

## 2024-09-13 VITALS
BODY MASS INDEX: 24.01 KG/M2 | TEMPERATURE: 98.4 F | SYSTOLIC BLOOD PRESSURE: 148 MMHG | DIASTOLIC BLOOD PRESSURE: 80 MMHG | HEIGHT: 67 IN | OXYGEN SATURATION: 96 % | HEART RATE: 54 BPM | WEIGHT: 153 LBS

## 2024-09-13 DIAGNOSIS — I50.9 HEART FAILURE, UNSPECIFIED: ICD-10-CM

## 2024-09-13 PROCEDURE — 93000 ELECTROCARDIOGRAM COMPLETE: CPT

## 2024-09-13 PROCEDURE — G2211 COMPLEX E/M VISIT ADD ON: CPT

## 2024-09-13 PROCEDURE — 36415 COLL VENOUS BLD VENIPUNCTURE: CPT

## 2024-09-13 PROCEDURE — 99214 OFFICE O/P EST MOD 30 MIN: CPT

## 2024-09-13 NOTE — HISTORY OF PRESENT ILLNESS
[FreeTextEntry1] : 78 M  H/o polycystic kidney disease s/p kidney transplant in 2007, CKD, HTN, HLD, DM2, Prostate cancer s/p radical prostatectomy 2015, DVT 2006 (in setting of pelvic fracture), and afib/atrial flutter s/p ablation in 1/2018, who is now off oral anticoagulation secondary to GI bleed s/p Amulet Device c/b pericardiocentesis, RTOR for sternotomy, washout, evacuation of pericardial clot 4/2023 Aortic Aneurysm   leg edema has gotten better he is very sedentary and there fore not sob   ecg atrial fibrillation rate controlled  LVH  9/13/24 Echo EF normal Moderate MR Aorta 4.1 cm 3/1/24

## 2024-09-13 NOTE — ASSESSMENT
[FreeTextEntry1] : -HFpEF on entresto 49/51 bid and torsemide 20 mg bid will check labs  - DM on jardiance 25 mg daily and atorvastatin 20 mg daily - amulet on asa 81 mg daily - fu in two weeks

## 2024-09-13 NOTE — PHYSICAL EXAM
[Well Developed] : well developed [Well Nourished] : well nourished [No Acute Distress] : no acute distress [Normal Conjunctiva] : normal conjunctiva [Normal Venous Pressure] : normal venous pressure [No Carotid Bruit] : no carotid bruit [Normal S1, S2] : normal S1, S2 [No Murmur] : no murmur [No Rub] : no rub [No Gallop] : no gallop [Clear Lung Fields] : clear lung fields [Good Air Entry] : good air entry [No Respiratory Distress] : no respiratory distress  [Soft] : abdomen soft [Non Tender] : non-tender [No Masses/organomegaly] : no masses/organomegaly [Normal Bowel Sounds] : normal bowel sounds [Normal Gait] : normal gait [No Edema] : no edema [No Cyanosis] : no cyanosis [No Clubbing] : no clubbing [No Varicosities] : no varicosities [No Rash] : no rash [No Skin Lesions] : no skin lesions [Moves all extremities] : moves all extremities [No Focal Deficits] : no focal deficits [Normal Speech] : normal speech [Alert and Oriented] : alert and oriented [Normal memory] : normal memory [de-identified] : ENRRIQUE  [de-identified] :  left greater than right

## 2024-09-16 LAB
ALBUMIN SERPL ELPH-MCNC: 3.8 G/DL
ALP BLD-CCNC: 62 U/L
ALT SERPL-CCNC: 29 U/L
ANION GAP SERPL CALC-SCNC: 13 MMOL/L
AST SERPL-CCNC: 25 U/L
BILIRUB SERPL-MCNC: 0.6 MG/DL
BUN SERPL-MCNC: 43 MG/DL
CALCIUM SERPL-MCNC: 9.6 MG/DL
CHLORIDE SERPL-SCNC: 106 MMOL/L
CO2 SERPL-SCNC: 25 MMOL/L
CREAT SERPL-MCNC: 1.62 MG/DL
EGFR: 44 ML/MIN/1.73M2
GLUCOSE SERPL-MCNC: 52 MG/DL
NT-PROBNP SERPL-MCNC: 9954 PG/ML
POTASSIUM SERPL-SCNC: 3.9 MMOL/L
PROT SERPL-MCNC: 6.3 G/DL
SODIUM SERPL-SCNC: 144 MMOL/L

## 2024-09-26 ENCOUNTER — APPOINTMENT (OUTPATIENT)
Dept: HEART AND VASCULAR | Facility: CLINIC | Age: 79
End: 2024-09-26
Payer: MEDICARE

## 2024-09-26 ENCOUNTER — NON-APPOINTMENT (OUTPATIENT)
Age: 79
End: 2024-09-26

## 2024-09-26 VITALS
HEIGHT: 67 IN | DIASTOLIC BLOOD PRESSURE: 90 MMHG | OXYGEN SATURATION: 96 % | WEIGHT: 153 LBS | SYSTOLIC BLOOD PRESSURE: 162 MMHG | BODY MASS INDEX: 24.01 KG/M2 | TEMPERATURE: 98.7 F | HEART RATE: 74 BPM

## 2024-09-26 DIAGNOSIS — I50.9 HEART FAILURE, UNSPECIFIED: ICD-10-CM

## 2024-09-26 PROCEDURE — 99214 OFFICE O/P EST MOD 30 MIN: CPT

## 2024-09-26 PROCEDURE — G2211 COMPLEX E/M VISIT ADD ON: CPT

## 2024-09-26 PROCEDURE — 36415 COLL VENOUS BLD VENIPUNCTURE: CPT

## 2024-09-26 PROCEDURE — 93000 ELECTROCARDIOGRAM COMPLETE: CPT

## 2024-09-26 NOTE — PHYSICAL EXAM
[Well Developed] : well developed [Well Nourished] : well nourished [No Acute Distress] : no acute distress [Normal Conjunctiva] : normal conjunctiva [Normal Venous Pressure] : normal venous pressure [No Carotid Bruit] : no carotid bruit [Normal S1, S2] : normal S1, S2 [No Murmur] : no murmur [No Rub] : no rub [No Gallop] : no gallop [Clear Lung Fields] : clear lung fields [Good Air Entry] : good air entry [No Respiratory Distress] : no respiratory distress  [Soft] : abdomen soft [Non Tender] : non-tender [No Masses/organomegaly] : no masses/organomegaly [Normal Bowel Sounds] : normal bowel sounds [Normal Gait] : normal gait [No Edema] : no edema [No Cyanosis] : no cyanosis [No Clubbing] : no clubbing [No Varicosities] : no varicosities [No Rash] : no rash [No Skin Lesions] : no skin lesions [Moves all extremities] : moves all extremities [No Focal Deficits] : no focal deficits [Normal Speech] : normal speech [Alert and Oriented] : alert and oriented [Normal memory] : normal memory [de-identified] : elevated JVD [de-identified] : ENRRIQUE  [de-identified] :  left greater than right

## 2024-09-26 NOTE — ASSESSMENT
[FreeTextEntry1] : -HFpEF need to confirm his entresto dose should be on 97/103 bid - increase torsemide to 40 mg po bid  - DM on jardiance 25 mg daily and atorvastatin 20 mg daily - amulet on asa 81 mg daily - check cr to ensure this is not rejection - fu in one week - will need repeat echo when euvolemic

## 2024-09-26 NOTE — HISTORY OF PRESENT ILLNESS
[FreeTextEntry1] : 78 M  H/o polycystic kidney disease s/p kidney transplant in 2007, CKD, HTN, HLD, DM2, Prostate cancer s/p radical prostatectomy 2015, DVT 2006 (in setting of pelvic fracture), and afib/atrial flutter s/p ablation in 1/2018, who is now off oral anticoagulation secondary to GI bleed s/p Amulet Device c/b pericardiocentesis, RTOR for sternotomy, washout, evacuation of pericardial clot 4/2023 Aortic Aneurysm   here for fu he is sob walking has not had a robust response with higher dose entresto   ecg atrial fibrillation rate controlled  LVH   9/26/24 Echo EF normal Moderate MR Aorta 4.1 cm 3/1/24

## 2024-09-27 ENCOUNTER — TRANSCRIPTION ENCOUNTER (OUTPATIENT)
Age: 79
End: 2024-09-27

## 2024-09-27 LAB
ALBUMIN SERPL ELPH-MCNC: 4.1 G/DL
ALP BLD-CCNC: 74 U/L
ALT SERPL-CCNC: 34 U/L
ANION GAP SERPL CALC-SCNC: 19 MMOL/L
AST SERPL-CCNC: 22 U/L
BILIRUB SERPL-MCNC: 0.9 MG/DL
BUN SERPL-MCNC: 51 MG/DL
CALCIUM SERPL-MCNC: 10 MG/DL
CHLORIDE SERPL-SCNC: 104 MMOL/L
CO2 SERPL-SCNC: 24 MMOL/L
CREAT SERPL-MCNC: 1.8 MG/DL
EGFR: 39 ML/MIN/1.73M2
GLUCOSE SERPL-MCNC: 54 MG/DL
NT-PROBNP SERPL-MCNC: ABNORMAL PG/ML
POTASSIUM SERPL-SCNC: 4.2 MMOL/L
PROT SERPL-MCNC: 6.9 G/DL
SODIUM SERPL-SCNC: 147 MMOL/L

## 2024-10-09 ENCOUNTER — NON-APPOINTMENT (OUTPATIENT)
Age: 79
End: 2024-10-09

## 2024-10-09 ENCOUNTER — APPOINTMENT (OUTPATIENT)
Dept: HEART AND VASCULAR | Facility: CLINIC | Age: 79
End: 2024-10-09
Payer: MEDICARE

## 2024-10-09 VITALS
OXYGEN SATURATION: 98 % | HEART RATE: 59 BPM | TEMPERATURE: 98 F | SYSTOLIC BLOOD PRESSURE: 146 MMHG | DIASTOLIC BLOOD PRESSURE: 84 MMHG | HEIGHT: 67 IN | BODY MASS INDEX: 23.86 KG/M2 | WEIGHT: 152 LBS

## 2024-10-09 DIAGNOSIS — R53.1 WEAKNESS: ICD-10-CM

## 2024-10-09 DIAGNOSIS — I50.9 HEART FAILURE, UNSPECIFIED: ICD-10-CM

## 2024-10-09 PROCEDURE — 99214 OFFICE O/P EST MOD 30 MIN: CPT

## 2024-10-09 PROCEDURE — 93000 ELECTROCARDIOGRAM COMPLETE: CPT

## 2024-10-09 PROCEDURE — 36415 COLL VENOUS BLD VENIPUNCTURE: CPT

## 2024-10-09 PROCEDURE — G2211 COMPLEX E/M VISIT ADD ON: CPT

## 2024-10-14 LAB
ALBUMIN SERPL ELPH-MCNC: 3.7 G/DL
ALP BLD-CCNC: 61 U/L
ALT SERPL-CCNC: 20 U/L
ANION GAP SERPL CALC-SCNC: 16 MMOL/L
AST SERPL-CCNC: 19 U/L
BILIRUB SERPL-MCNC: 0.9 MG/DL
BUN SERPL-MCNC: 49 MG/DL
CALCIUM SERPL-MCNC: 9.2 MG/DL
CHLORIDE SERPL-SCNC: 106 MMOL/L
CO2 SERPL-SCNC: 24 MMOL/L
CREAT SERPL-MCNC: 1.81 MG/DL
EGFR: 38 ML/MIN/1.73M2
GLUCOSE SERPL-MCNC: 55 MG/DL
NT-PROBNP SERPL-MCNC: ABNORMAL PG/ML
POTASSIUM SERPL-SCNC: 3.8 MMOL/L
PROT SERPL-MCNC: 6.4 G/DL
SODIUM SERPL-SCNC: 147 MMOL/L

## 2024-10-16 ENCOUNTER — APPOINTMENT (OUTPATIENT)
Dept: HEART AND VASCULAR | Facility: CLINIC | Age: 79
End: 2024-10-16
Payer: MEDICARE

## 2024-10-16 PROCEDURE — 93306 TTE W/DOPPLER COMPLETE: CPT

## 2024-10-21 DIAGNOSIS — I50.9 HEART FAILURE, UNSPECIFIED: ICD-10-CM

## 2024-11-05 NOTE — SWALLOW BEDSIDE ASSESSMENT ADULT - NS SPL SWALLOW CLINIC TRIAL FT
Patient received Flu vaccination on 9/26/24 per [facility].    Patient still missing the following requirements:  Requirements to be completed by 12/31/24  Visit with your physician (Second yearly visit)  Urine Microalbumin (once yearly)    [employer] message not read by patient.  Information updated and mailed to patient in a letter.    No further patient outreach planned at this time.    Luz Haynes [facility]   Population Health Clinical   [facility]  Department, toll free: [phone], option 3      
Oral stage is significant for mildly inefficient bolus formation and transport with lingual residue. Residue increased with increase in texture but was cleared with a liquid wash. There were no overt signs of airway protection deficits on this exam. However, given high O2 requirements, Pt advised to follow aspiration precautions to ensure adequate coordination between respiration and deglutition. This service will monitor tolerance and determine candidacy for diet upgrade.

## 2024-11-14 ENCOUNTER — APPOINTMENT (OUTPATIENT)
Dept: HEART AND VASCULAR | Facility: CLINIC | Age: 79
End: 2024-11-14
Payer: MEDICARE

## 2024-11-14 ENCOUNTER — APPOINTMENT (OUTPATIENT)
Dept: HEART AND VASCULAR | Facility: CLINIC | Age: 79
End: 2024-11-14

## 2024-11-14 ENCOUNTER — INPATIENT (INPATIENT)
Facility: HOSPITAL | Age: 79
LOS: 5 days | Discharge: HOME HEALTH | End: 2024-11-20
Attending: INTERNAL MEDICINE | Admitting: INTERNAL MEDICINE
Payer: MEDICARE

## 2024-11-14 VITALS
SYSTOLIC BLOOD PRESSURE: 146 MMHG | OXYGEN SATURATION: 99 % | WEIGHT: 162.99 LBS | HEIGHT: 67 IN | TEMPERATURE: 97.8 F | HEART RATE: 72 BPM | BODY MASS INDEX: 25.58 KG/M2 | DIASTOLIC BLOOD PRESSURE: 99 MMHG

## 2024-11-14 VITALS
HEART RATE: 71 BPM | HEIGHT: 67 IN | WEIGHT: 156.09 LBS | TEMPERATURE: 97 F | DIASTOLIC BLOOD PRESSURE: 91 MMHG | SYSTOLIC BLOOD PRESSURE: 166 MMHG | OXYGEN SATURATION: 97 % | RESPIRATION RATE: 18 BRPM

## 2024-11-14 DIAGNOSIS — I50.30 UNSPECIFIED DIASTOLIC (CONGESTIVE) HEART FAILURE: ICD-10-CM

## 2024-11-14 DIAGNOSIS — Z98.890 OTHER SPECIFIED POSTPROCEDURAL STATES: Chronic | ICD-10-CM

## 2024-11-14 DIAGNOSIS — I10 ESSENTIAL (PRIMARY) HYPERTENSION: ICD-10-CM

## 2024-11-14 DIAGNOSIS — Z94.0 KIDNEY TRANSPLANT STATUS: ICD-10-CM

## 2024-11-14 DIAGNOSIS — I50.9 HEART FAILURE, UNSPECIFIED: ICD-10-CM

## 2024-11-14 DIAGNOSIS — I48.91 UNSPECIFIED ATRIAL FIBRILLATION: ICD-10-CM

## 2024-11-14 DIAGNOSIS — I50.22 CHRONIC SYSTOLIC (CONGESTIVE) HEART FAILURE: ICD-10-CM

## 2024-11-14 DIAGNOSIS — C61 MALIGNANT NEOPLASM OF PROSTATE: ICD-10-CM

## 2024-11-14 DIAGNOSIS — E78.5 HYPERLIPIDEMIA, UNSPECIFIED: ICD-10-CM

## 2024-11-14 LAB
ADD ON TEST-SPECIMEN IN LAB: SIGNIFICANT CHANGE UP
ADD ON TEST-SPECIMEN IN LAB: SIGNIFICANT CHANGE UP
ANION GAP SERPL CALC-SCNC: 10 MMOL/L — SIGNIFICANT CHANGE UP (ref 5–17)
ANION GAP SERPL CALC-SCNC: 9 MMOL/L — SIGNIFICANT CHANGE UP (ref 5–17)
BASOPHILS # BLD AUTO: 0.03 K/UL — SIGNIFICANT CHANGE UP (ref 0–0.2)
BASOPHILS NFR BLD AUTO: 0.3 % — SIGNIFICANT CHANGE UP (ref 0–2)
BUN SERPL-MCNC: 34 MG/DL — HIGH (ref 7–23)
BUN SERPL-MCNC: 34 MG/DL — HIGH (ref 7–23)
CALCIUM SERPL-MCNC: 8.7 MG/DL — SIGNIFICANT CHANGE UP (ref 8.4–10.5)
CALCIUM SERPL-MCNC: 8.7 MG/DL — SIGNIFICANT CHANGE UP (ref 8.4–10.5)
CHLORIDE SERPL-SCNC: 104 MMOL/L — SIGNIFICANT CHANGE UP (ref 96–108)
CHLORIDE SERPL-SCNC: 107 MMOL/L — SIGNIFICANT CHANGE UP (ref 96–108)
CK MB CFR SERPL CALC: 2.3 NG/ML — SIGNIFICANT CHANGE UP (ref 0–6.7)
CK SERPL-CCNC: 49 U/L — SIGNIFICANT CHANGE UP (ref 30–200)
CO2 SERPL-SCNC: 22 MMOL/L — SIGNIFICANT CHANGE UP (ref 22–31)
CO2 SERPL-SCNC: 23 MMOL/L — SIGNIFICANT CHANGE UP (ref 22–31)
CREAT SERPL-MCNC: 1.53 MG/DL — HIGH (ref 0.5–1.3)
CREAT SERPL-MCNC: 1.63 MG/DL — HIGH (ref 0.5–1.3)
EGFR: 43 ML/MIN/1.73M2 — LOW
EGFR: 47 ML/MIN/1.73M2 — LOW
EOSINOPHIL # BLD AUTO: 0.5 K/UL — SIGNIFICANT CHANGE UP (ref 0–0.5)
EOSINOPHIL NFR BLD AUTO: 5.3 % — SIGNIFICANT CHANGE UP (ref 0–6)
GLUCOSE SERPL-MCNC: 142 MG/DL — HIGH (ref 70–99)
GLUCOSE SERPL-MCNC: 186 MG/DL — HIGH (ref 70–99)
HCT VFR BLD CALC: 33.1 % — LOW (ref 39–50)
HGB BLD-MCNC: 10.9 G/DL — LOW (ref 13–17)
IMM GRANULOCYTES NFR BLD AUTO: 0.4 % — SIGNIFICANT CHANGE UP (ref 0–0.9)
LYMPHOCYTES # BLD AUTO: 1.21 K/UL — SIGNIFICANT CHANGE UP (ref 1–3.3)
LYMPHOCYTES # BLD AUTO: 12.9 % — LOW (ref 13–44)
MCHC RBC-ENTMCNC: 30.3 PG — SIGNIFICANT CHANGE UP (ref 27–34)
MCHC RBC-ENTMCNC: 32.9 G/DL — SIGNIFICANT CHANGE UP (ref 32–36)
MCV RBC AUTO: 91.9 FL — SIGNIFICANT CHANGE UP (ref 80–100)
MONOCYTES # BLD AUTO: 0.44 K/UL — SIGNIFICANT CHANGE UP (ref 0–0.9)
MONOCYTES NFR BLD AUTO: 4.7 % — SIGNIFICANT CHANGE UP (ref 2–14)
NEUTROPHILS # BLD AUTO: 7.15 K/UL — SIGNIFICANT CHANGE UP (ref 1.8–7.4)
NEUTROPHILS NFR BLD AUTO: 76.4 % — SIGNIFICANT CHANGE UP (ref 43–77)
NRBC # BLD: 0 /100 WBCS — SIGNIFICANT CHANGE UP (ref 0–0)
PLATELET # BLD AUTO: 184 K/UL — SIGNIFICANT CHANGE UP (ref 150–400)
POTASSIUM SERPL-MCNC: 3.6 MMOL/L — SIGNIFICANT CHANGE UP (ref 3.5–5.3)
POTASSIUM SERPL-MCNC: SIGNIFICANT CHANGE UP (ref 3.5–5.3)
POTASSIUM SERPL-SCNC: 3.6 MMOL/L — SIGNIFICANT CHANGE UP (ref 3.5–5.3)
POTASSIUM SERPL-SCNC: SIGNIFICANT CHANGE UP (ref 3.5–5.3)
RBC # BLD: 3.6 M/UL — LOW (ref 4.2–5.8)
RBC # FLD: 18.6 % — HIGH (ref 10.3–14.5)
SODIUM SERPL-SCNC: 135 MMOL/L — SIGNIFICANT CHANGE UP (ref 135–145)
SODIUM SERPL-SCNC: 140 MMOL/L — SIGNIFICANT CHANGE UP (ref 135–145)
TROPONIN T, HIGH SENSITIVITY RESULT: 58 NG/L — CRITICAL HIGH (ref 0–51)
WBC # BLD: 9.37 K/UL — SIGNIFICANT CHANGE UP (ref 3.8–10.5)
WBC # FLD AUTO: 9.37 K/UL — SIGNIFICANT CHANGE UP (ref 3.8–10.5)

## 2024-11-14 PROCEDURE — A9500: CPT

## 2024-11-14 PROCEDURE — 71045 X-RAY EXAM CHEST 1 VIEW: CPT | Mod: 26

## 2024-11-14 PROCEDURE — 78452 HT MUSCLE IMAGE SPECT MULT: CPT

## 2024-11-14 PROCEDURE — 93015 CV STRESS TEST SUPVJ I&R: CPT

## 2024-11-14 PROCEDURE — 99285 EMERGENCY DEPT VISIT HI MDM: CPT

## 2024-11-14 PROCEDURE — 99215 OFFICE O/P EST HI 40 MIN: CPT

## 2024-11-14 PROCEDURE — 93000 ELECTROCARDIOGRAM COMPLETE: CPT

## 2024-11-14 PROCEDURE — G2211 COMPLEX E/M VISIT ADD ON: CPT

## 2024-11-14 RX ORDER — TACROLIMUS 5 MG/1
2 CAPSULE ORAL DAILY
Refills: 0 | Status: DISCONTINUED | OUTPATIENT
Start: 2024-11-15 | End: 2024-11-15

## 2024-11-14 RX ORDER — ALLOPURINOL 300 MG/1
1 TABLET ORAL
Refills: 0 | DISCHARGE

## 2024-11-14 RX ORDER — ALLOPURINOL 300 MG/1
100 TABLET ORAL DAILY
Refills: 0 | Status: DISCONTINUED | OUTPATIENT
Start: 2024-11-15 | End: 2024-11-20

## 2024-11-14 RX ORDER — 0.9 % SODIUM CHLORIDE 0.9 %
1000 INTRAVENOUS SOLUTION INTRAVENOUS
Refills: 0 | Status: DISCONTINUED | OUTPATIENT
Start: 2024-11-14 | End: 2024-11-20

## 2024-11-14 RX ORDER — POTASSIUM CHLORIDE 600 MG/1
20 TABLET, EXTENDED RELEASE ORAL ONCE
Refills: 0 | Status: COMPLETED | OUTPATIENT
Start: 2024-11-14 | End: 2024-11-14

## 2024-11-14 RX ORDER — DULAGLUTIDE 4.5 MG/.5ML
4.5 INJECTION, SOLUTION SUBCUTANEOUS
Refills: 0 | DISCHARGE

## 2024-11-14 RX ORDER — TACROLIMUS 5 MG/1
1 CAPSULE ORAL AT BEDTIME
Refills: 0 | Status: DISCONTINUED | OUTPATIENT
Start: 2024-11-14 | End: 2024-11-15

## 2024-11-14 RX ORDER — PREDNISONE 20 MG/1
5 TABLET ORAL DAILY
Refills: 0 | Status: DISCONTINUED | OUTPATIENT
Start: 2024-11-15 | End: 2024-11-20

## 2024-11-14 RX ORDER — PREDNISONE 20 MG/1
1 TABLET ORAL
Refills: 0 | DISCHARGE

## 2024-11-14 RX ORDER — SACUBITRIL AND VALSARTAN 24; 26 MG/1; MG/1
1 TABLET, FILM COATED ORAL EVERY 12 HOURS
Refills: 0 | Status: DISCONTINUED | OUTPATIENT
Start: 2024-11-15 | End: 2024-11-16

## 2024-11-14 RX ORDER — CARVEDILOL 25 MG/1
12.5 TABLET, FILM COATED ORAL EVERY 12 HOURS
Refills: 0 | Status: DISCONTINUED | OUTPATIENT
Start: 2024-11-15 | End: 2024-11-20

## 2024-11-14 RX ORDER — FUROSEMIDE 40 MG/1
40 TABLET ORAL ONCE
Refills: 0 | Status: COMPLETED | OUTPATIENT
Start: 2024-11-14 | End: 2024-11-14

## 2024-11-14 RX ORDER — GLUCAGON INJECTION, SOLUTION 0.5 MG/.1ML
1 INJECTION, SOLUTION SUBCUTANEOUS ONCE
Refills: 0 | Status: DISCONTINUED | OUTPATIENT
Start: 2024-11-14 | End: 2024-11-20

## 2024-11-14 RX ORDER — PANTOPRAZOLE SODIUM 40 MG/1
40 TABLET, DELAYED RELEASE ORAL
Refills: 0 | Status: DISCONTINUED | OUTPATIENT
Start: 2024-11-14 | End: 2024-11-17

## 2024-11-14 RX ORDER — EMPAGLIFLOZIN 25 MG/1
1 TABLET, FILM COATED ORAL
Refills: 0 | DISCHARGE

## 2024-11-14 RX ORDER — HEPARIN SODIUM,PORCINE 1000/ML
5000 VIAL (ML) INJECTION EVERY 8 HOURS
Refills: 0 | Status: DISCONTINUED | OUTPATIENT
Start: 2024-11-14 | End: 2024-11-20

## 2024-11-14 RX ORDER — INSULIN GLARGINE 100 [IU]/ML
12 INJECTION, SOLUTION SUBCUTANEOUS
Refills: 0 | DISCHARGE

## 2024-11-14 RX ORDER — INSULIN GLARGINE 100 [IU]/ML
8 INJECTION, SOLUTION SUBCUTANEOUS AT BEDTIME
Refills: 0 | Status: DISCONTINUED | OUTPATIENT
Start: 2024-11-14 | End: 2024-11-20

## 2024-11-14 RX ADMIN — FUROSEMIDE 40 MILLIGRAM(S): 40 TABLET ORAL at 18:25

## 2024-11-14 RX ADMIN — POTASSIUM CHLORIDE 20 MILLIEQUIVALENT(S): 600 TABLET, EXTENDED RELEASE ORAL at 22:53

## 2024-11-14 RX ADMIN — Medication 20 MILLIGRAM(S): at 22:54

## 2024-11-14 RX ADMIN — FUROSEMIDE 40 MILLIGRAM(S): 40 TABLET ORAL at 19:09

## 2024-11-14 RX ADMIN — INSULIN GLARGINE 8 UNIT(S): 100 INJECTION, SOLUTION SUBCUTANEOUS at 22:53

## 2024-11-14 RX ADMIN — TACROLIMUS 1 MILLIGRAM(S): 5 CAPSULE ORAL at 22:54

## 2024-11-14 RX ADMIN — Medication 5000 UNIT(S): at 22:53

## 2024-11-14 NOTE — H&P ADULT - BP NONINVASIVE DIASTOLIC (MM HG)
PAT phone history completed with pt for upcoming procedure on  1/24/23, with Dr JESSICA Haynes.    PAT PASS GIVEN/REVIEWED WITH PT.  VERBALIZED UNDERSTANDING OF THE FOLLOWING:  DO NOT EAT, DRINK, SMOKE, USE SMOKELESS TOBACCO OR CHEW GUM AFTER MIDNIGHT THE NIGHT BEFORE SURGERY.  THIS ALSO INCLUDES HARD CANDIES AND MINTS.    DO NOT SHAVE THE AREA TO BE OPERATED ON AT LEAST 48 HOURS PRIOR TO THE PROCEDURE.  DO NOT WEAR MAKE UP OR NAIL POLISH.  DO NOT LEAVE IN ANY PIERCING OR WEAR JEWELRY THE DAY OF SURGERY.      DO NOT USE ADHESIVES IF YOU WEAR DENTURES.    DO NOT WEAR EYE CONTACTS; BRING IN YOUR GLASSES.    ONLY TAKE MEDICATION THE MORNING OF YOUR PROCEDURE IF INSTRUCTED BY YOUR SURGEON WITH ENOUGH WATER TO SWALLOW THE MEDICATION.  IF YOUR SURGEON DID NOT SPECIFY WHICH MEDICATIONS TO TAKE, YOU WILL NEED TO CALL THEIR OFFICE FOR FURTHER INSTRUCTIONS AND DO AS THEY INSTRUCT.    LEAVE ANYTHING YOU CONSIDER VALUABLE AT HOME.    YOU WILL NEED TO ARRANGE FOR SOMEONE TO DRIVE YOU HOME AFTER SURGERY.  IT IS RECOMMENDED THAT YOU DO NOT DRIVE, WORK, DRINK ALCOHOL OR MAKE MAJOR DECISIONS FOR AT LEAST 24 HOURS AFTER YOUR PROCEDURE IS COMPLETE.      THE DAY OF YOUR PROCEDURE, BRING IN THE FOLLOWING IF APPLICABLE:   PICTURE ID AND INSURANCE/MEDICARE OR MEDICAID CARDS/ANY CO-PAY THAT MAY BE DUE   COPY OF ADVANCED DIRECTIVE/LIVING WILL/POWER OR    CPAP/BIPAP/INHALERS   SKIN PREP SHEET   YOUR PREADMISSION TESTING PASS (IF NOT A PHONE HISTORY)           
91

## 2024-11-14 NOTE — H&P ADULT - NSHPLABSRESULTS_GEN_ALL_CORE
10.9   9.37  )-----------( 184      ( 14 Nov 2024 16:23 )             33.1       11-14    140  |  107  |  34[H]  ----------------------------<  142[H]  3.6   |  23  |  1.63[H]    Ca    8.7      14 Nov 2024 19:54      CARDIAC MARKERS ( 14 Nov 2024 19:54 )  x     / x     / x     / x     / 2.3 ng/mL        Urinalysis Basic - ( 14 Nov 2024 19:54 )  Color: x / Appearance: x / SG: x / pH: x  Gluc: 142 mg/dL / Ketone: x  / Bili: x / Urobili: x   Blood: x / Protein: x / Nitrite: x   Leuk Esterase: x / RBC: x / WBC x   Sq Epi: x / Non Sq Epi: x / Bacteria: x

## 2024-11-14 NOTE — ED PROVIDER NOTE - NSICDXPASTSURGICALHX_GEN_ALL_CORE_FT
PAST SURGICAL HISTORY:  H/O radical prostatectomy 2010    S/P hernia repair Abdominal and inguinal around 2005

## 2024-11-14 NOTE — ED ADULT TRIAGE NOTE - CHIEF COMPLAINT QUOTE
pt bibems from cardiology office c/o unintentional weight gain and possible worsening CHF. Pt endorses gaining 15lb in 2x wks. Taking torsemide without relief. pt denies any CP, SOB, cough.

## 2024-11-14 NOTE — H&P ADULT - PROBLEM SELECTOR PROBLEM 1
Heart failure with mid-range ejection fraction (HFmEF) (HFpEF) heart failure with preserved ejection fraction

## 2024-11-14 NOTE — ED PROVIDER NOTE - NSICDXPASTMEDICALHX_GEN_ALL_CORE_FT
PAST MEDICAL HISTORY:  Chronic CHF     DM2 (diabetes mellitus, type 2)     DVT (deep venous thrombosis)     HLD (hyperlipidemia)     HTN (hypertension)     Kidney transplant recipient     Polycystic kidney disease     Prostate cancer

## 2024-11-14 NOTE — H&P ADULT - NS ATTEND AMEND GEN_ALL_CORE FT
Patient sent in for acute on chronic diastolic HF  elevated JVD   clear lungs  extensive edema   patient failed outside diuresis  admit for IV diuretics  start lasix 5 mg iv per hour 80 mg IV bolus  consult renal due to kidney transplant

## 2024-11-14 NOTE — ED ADULT NURSE NOTE - NSFALLHARMRISKINTERV_ED_ALL_ED

## 2024-11-14 NOTE — H&P ADULT - CONVERSATION DETAILS
Goals of Care Discussion  Date of evaluation: 11/14/24   Purpose of discussion: In the setting of advanced age and multiple comorbidities, discussion of patient's wishes was performed should there be any deterioration in health status.   Purpose of discussion: In the event of an emergency situation requiring life-rescuing therapy, it is best to proactively address patient's wishes.   Presentation: as above    Plan:  Code status: Patient is FULL CODE  HCP/Wife Desiree Wooten [phone]   2nd agent/Daughter Rosa Elena Wooten [phone]   Blood product transfusions: Patient is Muslim and DOES NOT ACCEPT TRANSFUSIONS OF WHOLE BLOOD, RED CELLS, WHITE CELLS, PLATELETS, OR PLASMA.

## 2024-11-14 NOTE — ED PROVIDER NOTE - OBJECTIVE STATEMENT
78 M with medical hx polycystic kidney disease s/p kidney transplant in 2007, CKD, HTN, HLD, DM2, Prostate cancer s/p radical   prostatectomy 2015, DVT 2006 (in setting of pelvic fracture), and afib/atrial flutter s/p ablation in 1/2018, who is now   off oral anticoagulation secondary to GI bleed s/p Amulet Device c/b pericardiocentesis, RTOR for sternotomy,   washout, evacuation of pericardial clot 4/2023 Aortic Aneurysm presented to cardiologist office for stress test and was noted to be significantly SOB. Pt has not tolerated torsemide 40 mg BID, caused cramping and gout   here today for stress test noted to be very sob. per patient he has had a hard time tolerating torsemide 40 mg bid   due to cramping and gout was taking it twice a day. Pt has also failed PO furosemide  ecg atrial fibrillation rate controlled LVH with strain 11/14/24 (in doctor's office today)  Echo EF Normal WMA mild MR Aorta 4.3 cm 10/16/24   Pharm nuc normal perfusion 11/14/24

## 2024-11-14 NOTE — H&P ADULT - PROBLEM SELECTOR PLAN 1
WWP, +JVD, B/L crackles, B/L 3+ pitting edema   - Etiology: unclear given NST negative and patient's Afib rate controlled   TTE: 10/21/24: LVEF 58%, normal RVSF w/ increased wall thickness, hypokinetic basal and mid anterior septum and basal and mid inferior septum , PFO vs secundum ASD w/ L-R shunting, mild MR, aortic root 4.30 cm, no pericaridial effusion  - NST 11/14/24 normal perfusion per outpatient MD note in HIE  - EKG Afib non ischemic   - Diuretic: S/P Lasix 40mg IVPx2 in ER . Re-assess in AM  - GMDT: Coreg 12.5mg BID (reduced home dose), Entresto  mg BID   - Core measures, strict input/out, tele, daily weights, fluid restriction

## 2024-11-14 NOTE — H&P ADULT - PROBLEM SELECTOR PLAN 5
- C/w Atorva 20mg QHS   - Follow up lipid panel in AM      F: PO (1.2L fluid restriction)  E: Replete for K <4, Mg <2  N: DASH (CC)  GI PPx: Protonix   DVT PPx: SQ Heparin     FULL CODE

## 2024-11-14 NOTE — PATIENT PROFILE ADULT - FALL HARM RISK - HARM RISK INTERVENTIONS

## 2024-11-14 NOTE — ED ADULT NURSE NOTE - OBJECTIVE STATEMENT
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 Pt endorses "torsemide is not working." Pt denies CP, SOB @ rest, fever/chills, abd pain, N/V/D, dizziness/lightheadedness, urinary symptoms, recent falls @ home. Pt w/ 2+ pitting edema to BLE. Uses cane/walker out of house. Pt A/Ox3, speaking in clear/complete sentences. Respirations easy/even and unlabored on RA. Pt placed in gown, resting comfortably in stretcher, no acute distress. Pending ED provider eval.

## 2024-11-14 NOTE — H&P ADULT - PROBLEM SELECTOR PLAN 3
Hx PCKD s/p kidney transplant in 2017 w/ bl Cr ranging from 1.2 -1.4 in 2023   - C/w Prograf 2mg in AM and Prograf 1mg in PM   - C/w Prednisone 5mg QD   - F/u AM prograf level   - Nephro consult in AM   - Avoid nephrotoxic agents

## 2024-11-14 NOTE — ED PROVIDER NOTE - CLINICAL SUMMARY MEDICAL DECISION MAKING FREE TEXT BOX
79-year-old male with significant past medical history see HPI presents with shortness of breath.  Outpatient management of CHF with  torsemide  has not been successful.  Patient has gained weight and has developed lower extremity edema.     BNP - over 30,000    CXR- no significant fluid overload       Case discussed with Dr. Vee.  Patient to be given total of Lasix 80 mg IV and the plan is to admit and diurese overnight.

## 2024-11-14 NOTE — ED ADULT TRIAGE NOTE - HEART RATE (BEATS/MIN)
Received notification that visit to [facility] for evaluation is covered by [employer]. Left message that approval was received for the visit.    71

## 2024-11-14 NOTE — H&P ADULT - ASSESSMENT
79M [employer] PMHx HTN, HLD, HFpEF (EF 58% by TTE October 2024), TDM2, CKD, PCKD s/p kidney transplant in 2007, prostate CA s/p radical prostatectomy 2015, DVT 2006 (in setting of pelvic fracture), Afib/AFl s/p ablation in Jan 2018 not on AC 2/2 GIB s/p Amulet device c/b pericardiocentesis RTOR for sternotomy, washout, evacuation of pericardial clot April 2023, aortic aneurysm referred to [facility] ER on 11/14/24 by outpatient cardiologist for HFpEF exacerbation. In ER labs significant for BUN/Cr 34/1.53 Trop T 48 BNP 30k and CXR w/ pulmonary congestion. Patient now admitted for HFmrEF exacerbation requiring IV diuresis.          79M [employer] PMHx HTN, HLD, HFpEF (EF 58% by TTE October 2024), TDM2, CKD, PCKD s/p kidney transplant in 2007, prostate CA s/p radical prostatectomy 2015, DVT 2006 (in setting of pelvic fracture), Afib/AFl s/p ablation in Jan 2018 not on AC 2/2 GIB s/p Amulet device in April 2023 with hospital course c/b PEA arrest w/ ROSC achieved patient noted to have pericardial tamponade s/p bedside pericardiocentesis, RTOR for stenotomy/washout and evacuation of pericardial clot, aortic aneurysm referred to [facility] ER on 11/14/24 by outpatient cardiologist for HFpEF exacerbation. In ER labs significant for BUN/Cr 34/1.53 Trop T 48 BNP 30k and CXR w/ pulmonary congestion. Patient now admitted for HFmrEF exacerbation requiring IV diuresis.        79M [employer] PMHx HTN, HLD, HFpEF (EF 58% by TTE October 2024), TDM2, CKD, PCKD s/p kidney transplant in 2007, prostate CA s/p radical prostatectomy 2015, DVT 2006 (in setting of pelvic fracture), Afib/AFl s/p ablation in Jan 2018 not on AC 2/2 GIB s/p Amulet device in April 2023 with hospital course c/b PEA arrest w/ ROSC achieved patient noted to have pericardial tamponade s/p bedside pericardiocentesis, RTOR for stenotomy/washout and evacuation of pericardial clot, aortic aneurysm referred to [facility] ER on 11/14/24 by outpatient cardiologist for HFpEF exacerbation. In ER labs significant for BUN/Cr 34/1.53 Trop T 48 BNP 30k and CXR w/ pulmonary congestion. Patient now admitted for HFpEF exacerbation requiring IV diuresis.

## 2024-11-14 NOTE — H&P ADULT - HISTORY OF PRESENT ILLNESS
**incomplete**   79M PMHx HTN, HLD, DM2, CKD, PCKD s/p kidney transplant in 2007, prostate CA s/p radical prostatectomy 2015, DVT 2006 (in setting of pelvic fracture), Afib/AFl s/p ablation in Jan 2018 not on AC 2/2 GIB s/p Amulet device c/b pericardiocentosis, RTOR for sternotomy, washout, evacuation of pericardial clot April 2023, aortic aneurysm referred to [facility] ER on 11/14/24 by outpatient cardiologist after being found to be significant SOB during stress test.     Labs  WBC 9.37 H/H 10.9/33.1 Plt 184 Na 135 K hemolyzed Cl 104 CO2 22 BUN/Cr 34/1.53 Glu 186 Ca 8.7   Trop T 48 BNP 30k     Cardiac Studies   10/21/24: LVEF 58%, normal RVSF w/ increased wall thickness, hypokinetic basal and mid anterior septum and basal and mid inferior septum , PFO vs secundum ASD w/ L-R shunting, mild MR, aortic root 4.30 cm, no pericaridial effusion  NST 11/14/24 normal perfusion per outpatient MD note in HIE    Medications    Aspirin 81 81 MG Oral Tablet    Atorvastatin Calcium 20 MG Oral Tablet   Calcium Citrate TABS   Carvedilol 12.5 MG BID   Entresto 97-103mg BID    Jardiance 25 MG Oral QD  Lantus SoloStar 100 UNIT/ML 12 units daily   Magnesium TABS  MetFORMIN HCl  MG Oral Tablet Extended Release 24 Hour; TAKE 3 TABLET AFTER DINNER   PredniSONE 5 MG Oral Tablet   Tacrolimus 1 MG Oral Capsule; takes 2 mg in the morning and 1 mg at night   Torsemide 20 MG Oral Tablet; TAKE 1 TABLETS TWICE A DAY   Trulicity 4.5 MG/0.5ML SOPN; INJECT 4.5 MG Weekly   Vitamin D 1000 UNIT TABS      HFpEF   - Diuretic: IV Lasix 40mg x2 in ER  79M [employer] PMHx HTN, HLD, DM2, CKD, PCKD s/p kidney transplant in 2007, prostate CA s/p radical prostatectomy 2015, DVT 2006 (in setting of pelvic fracture), Afib/AFl s/p ablation in Jan 2018 not on AC 2/2 GIB s/p Amulet device c/b pericardiocentesis RTOR for sternotomy, washout, evacuation of pericardial clot April 2023, aortic aneurysm referred to [facility] ER on 11/14/24 by outpatient cardiologist after being found to be significant SOB during stress test.  Endorse progressive, worsening GAFFNEY w/ ADLs xweeks, 2 pillow orthopnea, LE swelling x few days. Patient endorses 14lb weight gain and compliance to medications.    Patient endorses compliance to medications - difficult time tolerating Torsemide 40mg BID 2/2 cramping and gout and tried serial furoscix w/o robust response.       Denies CP, palpitations, PND, lightheadedness, dizziness, LOC, syncope, abd pain, N/V/D, dysuria, bleeding, fever, chills, cough, recent travel, or sick contacts.     ER Course  Vitals T 97.2-97.9F HR 70s -170s/DBP 70-90s RR 18 SpO2 07% RA   Labs WBC 9.37 H/H 10.9/33.1 Plt 184 Na 135 K hemolyzed Cl 104 CO2 22 BUN/Cr 34/1.53 Glu 186 Ca 8.7 Trop T 48 BNP 30k   EKG 68 BPM Afib   CXR per PA read pulmonary congestions cardiomegaly     Cardiac Studies   10/21/24: LVEF 58%, normal RVSF w/ increased wall thickness, hypokinetic basal and mid anterior septum and basal and mid inferior septum , PFO vs secundum ASD w/ L-R shunting, mild MR, aortic root 4.30 cm, no pericaridial effusion  NST 11/14/24 normal perfusion per outpatient MD note in HIE    Admit to cardiac tele for HFmrEF exacerbation.

## 2024-11-14 NOTE — ED PROVIDER NOTE - CARDIAC, MLM
Normal rate, irregular rhythm.  Heart sounds S1, S2.  No murmurs, rubs or gallops. + bilateral LE 3+ edema

## 2024-11-15 LAB
A1C WITH ESTIMATED AVERAGE GLUCOSE RESULT: 7.6 % — HIGH (ref 4–5.6)
ALBUMIN SERPL ELPH-MCNC: 3.1 G/DL — LOW (ref 3.3–5)
ALP SERPL-CCNC: 81 U/L — SIGNIFICANT CHANGE UP (ref 40–120)
ALT FLD-CCNC: 38 U/L — SIGNIFICANT CHANGE UP (ref 10–45)
ANION GAP SERPL CALC-SCNC: 8 MMOL/L — SIGNIFICANT CHANGE UP (ref 5–17)
APPEARANCE UR: CLEAR — SIGNIFICANT CHANGE UP
AST SERPL-CCNC: 28 U/L — SIGNIFICANT CHANGE UP (ref 10–40)
BILIRUB SERPL-MCNC: 1.3 MG/DL — HIGH (ref 0.2–1.2)
BILIRUB UR-MCNC: NEGATIVE — SIGNIFICANT CHANGE UP
BUN SERPL-MCNC: 32 MG/DL — HIGH (ref 7–23)
CALCIUM SERPL-MCNC: 8.8 MG/DL — SIGNIFICANT CHANGE UP (ref 8.4–10.5)
CHLORIDE SERPL-SCNC: 107 MMOL/L — SIGNIFICANT CHANGE UP (ref 96–108)
CHOLEST SERPL-MCNC: 93 MG/DL — SIGNIFICANT CHANGE UP
CK MB CFR SERPL CALC: 1.8 NG/ML — SIGNIFICANT CHANGE UP (ref 0–6.7)
CK SERPL-CCNC: 38 U/L — SIGNIFICANT CHANGE UP (ref 30–200)
CO2 SERPL-SCNC: 27 MMOL/L — SIGNIFICANT CHANGE UP (ref 22–31)
COLOR SPEC: YELLOW — SIGNIFICANT CHANGE UP
CREAT ?TM UR-MCNC: 13 MG/DL — SIGNIFICANT CHANGE UP
CREAT ?TM UR-MCNC: 15 MG/DL — SIGNIFICANT CHANGE UP
CREAT SERPL-MCNC: 1.72 MG/DL — HIGH (ref 0.5–1.3)
DIFF PNL FLD: NEGATIVE — SIGNIFICANT CHANGE UP
EGFR: 40 ML/MIN/1.73M2 — LOW
ESTIMATED AVERAGE GLUCOSE: 171 MG/DL — HIGH (ref 68–114)
GLUCOSE SERPL-MCNC: 108 MG/DL — HIGH (ref 70–99)
GLUCOSE UR QL: >=1000 MG/DL
HCT VFR BLD CALC: 32.3 % — LOW (ref 39–50)
HDLC SERPL-MCNC: 39 MG/DL — LOW
HGB BLD-MCNC: 10.5 G/DL — LOW (ref 13–17)
KETONES UR-MCNC: NEGATIVE MG/DL — SIGNIFICANT CHANGE UP
LEUKOCYTE ESTERASE UR-ACNC: NEGATIVE — SIGNIFICANT CHANGE UP
LIPID PNL WITH DIRECT LDL SERPL: 36 MG/DL — SIGNIFICANT CHANGE UP
MAGNESIUM SERPL-MCNC: 1.9 MG/DL — SIGNIFICANT CHANGE UP (ref 1.6–2.6)
MCHC RBC-ENTMCNC: 29.7 PG — SIGNIFICANT CHANGE UP (ref 27–34)
MCHC RBC-ENTMCNC: 32.5 G/DL — SIGNIFICANT CHANGE UP (ref 32–36)
MCV RBC AUTO: 91.2 FL — SIGNIFICANT CHANGE UP (ref 80–100)
NITRITE UR-MCNC: NEGATIVE — SIGNIFICANT CHANGE UP
NON HDL CHOLESTEROL: 54 MG/DL — SIGNIFICANT CHANGE UP
NRBC # BLD: 0 /100 WBCS — SIGNIFICANT CHANGE UP (ref 0–0)
OSMOLALITY UR: 314 MOSM/KG — SIGNIFICANT CHANGE UP (ref 300–900)
OSMOLALITY UR: 321 MOSM/KG — SIGNIFICANT CHANGE UP (ref 300–900)
PH UR: 7.5 — SIGNIFICANT CHANGE UP (ref 5–8)
PHOSPHATE SERPL-MCNC: 3.3 MG/DL — SIGNIFICANT CHANGE UP (ref 2.5–4.5)
PLATELET # BLD AUTO: 192 K/UL — SIGNIFICANT CHANGE UP (ref 150–400)
POTASSIUM SERPL-MCNC: 3.6 MMOL/L — SIGNIFICANT CHANGE UP (ref 3.5–5.3)
POTASSIUM SERPL-SCNC: 3.6 MMOL/L — SIGNIFICANT CHANGE UP (ref 3.5–5.3)
POTASSIUM UR-SCNC: 13 MMOL/L — SIGNIFICANT CHANGE UP
PROT ?TM UR-MCNC: 9 MG/DL — SIGNIFICANT CHANGE UP (ref 0–12)
PROT SERPL-MCNC: 5.8 G/DL — LOW (ref 6–8.3)
PROT UR-MCNC: NEGATIVE MG/DL — SIGNIFICANT CHANGE UP
PROT/CREAT UR-RTO: 0.6 RATIO — HIGH (ref 0–0.2)
RBC # BLD: 3.54 M/UL — LOW (ref 4.2–5.8)
RBC # FLD: 18.4 % — HIGH (ref 10.3–14.5)
SODIUM SERPL-SCNC: 142 MMOL/L — SIGNIFICANT CHANGE UP (ref 135–145)
SODIUM UR-SCNC: 108 MMOL/L — SIGNIFICANT CHANGE UP
SODIUM UR-SCNC: 115 MMOL/L — SIGNIFICANT CHANGE UP
SP GR SPEC: 1.01 — SIGNIFICANT CHANGE UP (ref 1–1.03)
TRIGL SERPL-MCNC: 93 MG/DL — SIGNIFICANT CHANGE UP
TROPONIN T, HIGH SENSITIVITY RESULT: 62 NG/L — CRITICAL HIGH (ref 0–51)
TSH SERPL-MCNC: 3.15 UIU/ML — SIGNIFICANT CHANGE UP (ref 0.27–4.2)
UROBILINOGEN FLD QL: 0.2 MG/DL — SIGNIFICANT CHANGE UP (ref 0.2–1)
UUN UR-MCNC: 113 MG/DL — SIGNIFICANT CHANGE UP
UUN UR-MCNC: 155 MG/DL — SIGNIFICANT CHANGE UP
WBC # BLD: 7.77 K/UL — SIGNIFICANT CHANGE UP (ref 3.8–10.5)
WBC # FLD AUTO: 7.77 K/UL — SIGNIFICANT CHANGE UP (ref 3.8–10.5)

## 2024-11-15 PROCEDURE — 99223 1ST HOSP IP/OBS HIGH 75: CPT

## 2024-11-15 RX ORDER — CARVEDILOL 25 MG/1
12.5 TABLET, FILM COATED ORAL ONCE
Refills: 0 | Status: COMPLETED | OUTPATIENT
Start: 2024-11-15 | End: 2024-11-15

## 2024-11-15 RX ORDER — TACROLIMUS 5 MG/1
1 CAPSULE ORAL
Refills: 0 | Status: DISCONTINUED | OUTPATIENT
Start: 2024-11-15 | End: 2024-11-17

## 2024-11-15 RX ORDER — POTASSIUM CHLORIDE 600 MG/1
40 TABLET, EXTENDED RELEASE ORAL ONCE
Refills: 0 | Status: COMPLETED | OUTPATIENT
Start: 2024-11-15 | End: 2024-11-15

## 2024-11-15 RX ORDER — TACROLIMUS 5 MG/1
2 CAPSULE ORAL
Refills: 0 | Status: DISCONTINUED | OUTPATIENT
Start: 2024-11-15 | End: 2024-11-17

## 2024-11-15 RX ORDER — FUROSEMIDE 40 MG/1
5 TABLET ORAL
Qty: 500 | Refills: 0 | Status: DISCONTINUED | OUTPATIENT
Start: 2024-11-15 | End: 2024-11-16

## 2024-11-15 RX ORDER — INFLUENZA VIRUS VACCINE 15; 15; 15; 15 UG/.5ML; UG/.5ML; UG/.5ML; UG/.5ML
0.5 SUSPENSION INTRAMUSCULAR ONCE
Refills: 0 | Status: DISCONTINUED | OUTPATIENT
Start: 2024-11-15 | End: 2024-11-20

## 2024-11-15 RX ORDER — FUROSEMIDE 40 MG/1
80 TABLET ORAL ONCE
Refills: 0 | Status: COMPLETED | OUTPATIENT
Start: 2024-11-15 | End: 2024-11-15

## 2024-11-15 RX ADMIN — ALLOPURINOL 100 MILLIGRAM(S): 300 TABLET ORAL at 12:49

## 2024-11-15 RX ADMIN — Medication 1: at 12:49

## 2024-11-15 RX ADMIN — INSULIN GLARGINE 8 UNIT(S): 100 INJECTION, SOLUTION SUBCUTANEOUS at 22:34

## 2024-11-15 RX ADMIN — Medication 400 MILLIGRAM(S): at 10:56

## 2024-11-15 RX ADMIN — CARVEDILOL 12.5 MILLIGRAM(S): 25 TABLET, FILM COATED ORAL at 16:57

## 2024-11-15 RX ADMIN — Medication 5000 UNIT(S): at 06:58

## 2024-11-15 RX ADMIN — TACROLIMUS 2 MILLIGRAM(S): 5 CAPSULE ORAL at 12:50

## 2024-11-15 RX ADMIN — CARVEDILOL 12.5 MILLIGRAM(S): 25 TABLET, FILM COATED ORAL at 00:30

## 2024-11-15 RX ADMIN — Medication 50 MILLILITER(S): at 14:46

## 2024-11-15 RX ADMIN — Medication 20 MILLIGRAM(S): at 22:34

## 2024-11-15 RX ADMIN — FUROSEMIDE 2.5 MG/HR: 40 TABLET ORAL at 10:56

## 2024-11-15 RX ADMIN — Medication 5000 UNIT(S): at 14:27

## 2024-11-15 RX ADMIN — CARVEDILOL 12.5 MILLIGRAM(S): 25 TABLET, FILM COATED ORAL at 06:57

## 2024-11-15 RX ADMIN — TACROLIMUS 1 MILLIGRAM(S): 5 CAPSULE ORAL at 22:34

## 2024-11-15 RX ADMIN — Medication 5000 UNIT(S): at 22:35

## 2024-11-15 RX ADMIN — Medication 1: at 22:34

## 2024-11-15 RX ADMIN — PREDNISONE 5 MILLIGRAM(S): 20 TABLET ORAL at 06:57

## 2024-11-15 RX ADMIN — Medication 50 MILLILITER(S): at 18:20

## 2024-11-15 RX ADMIN — FUROSEMIDE 80 MILLIGRAM(S): 40 TABLET ORAL at 10:56

## 2024-11-15 RX ADMIN — POTASSIUM CHLORIDE 40 MILLIEQUIVALENT(S): 600 TABLET, EXTENDED RELEASE ORAL at 10:56

## 2024-11-15 RX ADMIN — SACUBITRIL AND VALSARTAN 1 TABLET(S): 24; 26 TABLET, FILM COATED ORAL at 22:34

## 2024-11-15 RX ADMIN — Medication 1: at 19:23

## 2024-11-15 RX ADMIN — PANTOPRAZOLE SODIUM 40 MILLIGRAM(S): 40 TABLET, DELAYED RELEASE ORAL at 06:58

## 2024-11-15 RX ADMIN — Medication 81 MILLIGRAM(S): at 12:50

## 2024-11-15 RX ADMIN — SACUBITRIL AND VALSARTAN 1 TABLET(S): 24; 26 TABLET, FILM COATED ORAL at 11:24

## 2024-11-15 NOTE — PHYSICAL THERAPY INITIAL EVALUATION ADULT - FUNCTIONAL LIMITATIONS, PT EVAL
[place] AMBULATORY ENCOUNTER  HEMATOLOGY/ONCOLOGY OFFICE VISIT    CHIEF COMPLAINT:   Follow-up (Lung/labs/4 week MD FU/Results)      Oncologic History:   Pulmonary adenocarcinoma  Oncologic history:   Diagnosis: Pulmonary adenocarcinoma of right upper lobe, poorly differentiated  PDL1 CPS of 20%  MMRp    Date of diagnosis: 11/21/22    Stage at diagnosis:  Cancer Staging   Non-small cell cancer of right lung (CMD)  Staging form: Lung, AJCC 8th Edition  - Pathologic stage from 2/9/2023: Stage IIB (pT1b, pN1, cM0) - Signed by Gibson Melton MD on 6/26/2023      Previous therapy/significant history:   1. CT Chest PE with spiculated mass at the base of his right upper lobe.   2. Biopsy performed 11/21/2022 showing moderate-to-poorly differentiated adenocarcinoma, likely new primary.   3.  MRI Brain from 11/29/2022 showed no acute intracranial findings, there was suspicion of sinusitis vs mucocele. PET/CT from 12/7/2022 showed mild FDG avidity at biopsy-proven malignancy. There were other changes which were suggestive of inflammatory changes. No signs of distant metastasis were noted.   4. Status post right upper and middle lobectomy with mediastinal LN dissection on 2/9/23.       Current therapy:   Initiate Carboplatin, Pemetrexed, Pembrolizumab q 3 weeks x4 cycles on 3/23/2023     Intent of therapy: Curative     Performance status: ECOG PS of 1-2        History of Cecum/colon adenocarcinoma.  1. Status post right hemicolectomy Clinical stage III B with 3 positive nodes.   2. Status post 12 cycles of adjuvant FOLFOX. Finished in 10/16. EGD in 10/01/2019 and colonoscopy from 12/10/2019 were negative for any recurrent/metastatic disease   3. CT from ED visit on 6/9/20 did not show any metastatic disease, splenic lesions are benign \"cysts.\"  4. Currently on observation.       HISTORY OF PRESENT ILLNESS:   Mark Anthony Urrutia is a 71 year old male who presents for a follow-up of lung cancer.     He is feeling tired, simialr to  prior visits. Does feel he is getting adequate sleep of 8 to 10 hours per day. Works to remain active as able but still requires naps of 2 to 3 hours per day. Shortness of breath is unchanged, increases on exertion. Denies chest pain. Cough remains stable, with occasional phlegm production, without hemoptysis or foul odor.     Long-term disability being denied, \"pre-existing condition\" as he was not with company 1 year prior to diagnosis. He is working with clinic  to help resolve this issue.       PAST HISTORIES:  Past medical history, surgical history, family history, and social history were reviewed and updated.    MEDICATIONS / ALLERGIES:  Current Outpatient Medications   Medication Sig Dispense Refill   • furosemide (LASIX) 20 MG tablet Take 1 tablet by mouth daily. 14 tablet 0   • lisinopril-hydroCHLOROthiazide (ZESTORETIC) 10-12.5 MG per tablet Take 1 tablet by mouth daily. 90 tablet 3   • potassium CHLORIDE (KLOR-CON M) 20 MEQ amanda ER tablet Take 1 tablet by mouth in the morning and 1 tablet in the evening. 60 tablet 3   • diphenhydrAMINE (SOMINEX) 25 MG tablet Take 2 tablets by mouth 1 hour prior to CT Angiogram 2 tablet 0   • ZINC SULFATE PO Take 2 tablets by mouth daily.     • acetaminophen (TYLENOL) 325 MG tablet Take 325-650 mg by mouth every 6 hours as needed for Pain.     • ondansetron (ZOFRAN ODT) 8 MG disintegrating tablet Place 1 tablet onto the tongue every 8 hours as needed for Nausea. 30 tablet 1   • ferrous sulfate 325 (65 FE) MG tablet Take 1 tablet by mouth daily (with breakfast). Indications: Anemia From Inadequate Iron in the Body 30 tablet 3   • dexAMETHasone (DECADRON) 4 MG tablet Take 1 tablet by mouth 2 times daily. Take for 3 days with each chemotherapy cycle. Start the day before each pemetrexed treatment. 18 tablet 0   • folic acid (FOLATE) 1 MG tablet Take 1 tablet by mouth daily. Start 7 days prior to the first dose of pemetrexed. Continue until 21 days after last dose of  pemetrexed. 30 tablet 11   • aspirin 81 MG EC tablet Take 81 mg by mouth daily. Indications: Disease involving Lipid Deposits in the Arteries, Joint Damage causing Pain and Loss of Function, Pain     • albuterol 108 (90 Base) MCG/ACT inhaler Inhale 2 puffs into the lungs every 4 hours as needed for Shortness of Breath or Wheezing. 18 g 2   • Ascorbic Acid (vitamin C) 1000 MG tablet Take 2,000 mg by mouth daily. Indications: Inadequate Vitamin C, support wound healing     • B Complex Vitamins (vitamin B complex) tablet Take 2 tablets by mouth daily. Indications: Vitamin Deficiency     • docusate sodium (COLACE) 100 MG capsule Take 100 mg by mouth in the morning and 100 mg in the evening. Indications: Constipation. Docusate Sodium 50 mg with Sennosides 8.6 mg     • gabapentin (NEURONTIN) 600 MG tablet Take 1,200 mg by mouth at bedtime. Indications: Neuropathic Pain     • melatonin 5 MG Take 25 mg by mouth nightly. Indications: Trouble Sleeping Patient states he takes 30mg of this nightly     • Cholecalciferol (VITAMIN D3) 5000 units capsule Take 3 capsules by mouth daily. Indications: Osteoporosis, Vitamin D Deficiency     • Multiple Vitamin (MULTI VITAMIN DAILY) Tab Take 2 tablets by mouth daily. Indications: Nutritional Support, Vitamin Deficiency       No current facility-administered medications for this visit.     ALLERGIES:   Allergen Reactions   • Augmentin [Amoxicillin-Pot Clavulanate] CARDIAC DISTURBANCES and SHORTNESS OF BREATH   • Gadolinium WEAKNESS, MYALGIA and Tremors     Gadolinium - Possible MRI contrast allergic reaction admitted for OBS 11/29/22  Patient had also taken 10 mg Valium prior to scan  Symptoms significantly improved after receiving IV Solu-Medrol, Benadryl, and epinephrine            REVIEW OF SYSTEMS:   Comprehensive review of system completed as per nursing assessment and verified by me.   Nursing Assessment:   A focused nursing assessment addressing the toxicity of chemotherapy was  performed and the patient reports the following:     Anxiety/Depression/Insomnia: NO  Pain: YES, Pain Ratin, Location: knees/hips, Pain Description: joint pain  During this visit, medication was administered to treat pain: No           Toxicity Assessment     Auditory/Ear  Assessment: Yes (w/ Exceptions to WDL)  Tinnitus: Grade 1  Hearing Impaired: Grade 1 - see row information     Constitutional  Assessment: Yes (w/ Exceptions to WDL)  Fatigue: Grade 1     Dermatology/Skin  Assessment: Yes (w/ Exceptions to WDL)  Rash Acneiform: -- (right thigh-bumps, itching, usually go away)     Endocrine  Assessment: Yes (Within Defined Limits)     Gastrointestinal  Assessment: Yes (w/ Exceptions to WDL)  Constipation: Grade 1     Hemorrhage/Bleeding  Assessment: Yes (Within Defined Limits)     Infection  Assessment: Yes (Within Defined Limits)     Lymphatics  Assessment: Yes (Within Defined Limits)     Musculoskeletal  Assessment: Yes (Within Defined Limits)     Neurology  Assessment: Yes (w/ Exceptions to WDL)  Ataxia: Grade 1 (d/t hip and knee pain)  Paresthesia: Grade 1 (hands and feet)     Ocular  Assessment: Yes (Within Defined Limits)     Pain  Assessment: Yes (w/ Exceptions to WDL)  Pain: Grade 2 (8/10-hip and knee when walking)     Pulmonary/Upper Respiratory  Assessment: Yes (w/ Exceptions to WDL)  Cough: Grade 1  Dyspnea: Grade 1     Genitourinary  Assessment: Yes (Within Defined Limits)      PHYSICAL EXAM:  Vital Signs:  Visit Vitals  /72   Pulse 84   Temp 97.6 °F (36.4 °C) (Temporal)   Resp 18   Ht 5' 10.98\" (1.803 m)   Wt (!) 171.6 kg (378 lb 3.2 oz)   SpO2 93%   BMI 52.77 kg/m²    Wt Readings from Last 10 Encounters:   23 (!) 171.6 kg (378 lb 3.2 oz)   23 (!) 174.1 kg (383 lb 12.8 oz)   23 (!) 171.9 kg (379 lb)   23 (!) 173.4 kg (382 lb 4.8 oz)   23 (!) 171 kg (377 lb)   23 (!) 171.4 kg (377 lb 14.4 oz)   23 (!) 169.6 kg (373 lb 14.4 oz)   23 (!) 168.1 kg  (370 lb 9.5 oz)   05/10/23 (!) 163.3 kg (360 lb)   05/04/23 (!) 167.8 kg (369 lb 14.9 oz)        ECOG [08/21/23 0947]   ECOG Performance Status 2     General:  Well groomed, alert and oriented, well appearing, not in distress. Came in with crutches as usual.   HEENT: PERRLA, EOMI. OP clear.   Neck: Supple, no mass   CV: Normal rate and regular rhythm. No Murmur, rub or gallop.    Chest: No chest wall tenderness or skin changes. Decreased breath sounds on right upper lung field. Bilateral normal vesicular breath sounds without any wheeze or crackles. Baseline respiratory distress when he talks or walks.   Abd: Not distended, Soft, nontender, no organomegaly or mass. Positive bowel sounds.  Ext: Bilateral 1+ pitting pedal edema left more than right. No tenderness or deformities.   Lymph nodes:  No cervical, supraclavicular, axillary or inguinal adenopathy.  Skin: normal color, no rashes or bruises or lesions.  CNS: cranial nerves 2 to 12 grossly intact, no focal neurologic deficit  Psych: affect appropriate; speech normal; cooperative      LABORATORY DATA:  Recent Results (from the past 336 hour(s))   Magnesium    Collection Time: 08/21/23  9:29 AM   Result Value Ref Range    Magnesium 2.1 1.7 - 2.4 mg/dL   Comprehensive Metabolic Panel    Collection Time: 08/21/23  9:29 AM   Result Value Ref Range    Fasting Status      Sodium 139 135 - 145 mmol/L    Potassium 3.9 3.4 - 5.1 mmol/L    Chloride 109 97 - 110 mmol/L    Carbon Dioxide 25 21 - 32 mmol/L    Anion Gap 9 7 - 19 mmol/L    Glucose 126 (H) 70 - 99 mg/dL    BUN 27 (H) 6 - 20 mg/dL    Creatinine 1.17 0.67 - 1.17 mg/dL    Glomerular Filtration Rate 67 >=60    BUN/Cr 23 7 - 25    Calcium 9.0 8.4 - 10.2 mg/dL    Bilirubin, Total 0.3 0.2 - 1.0 mg/dL    GOT/AST 18 <=37 Units/L    GPT/ALT 36 <64 Units/L    Alkaline Phosphatase 110 45 - 117 Units/L    Albumin 3.4 (L) 3.6 - 5.1 g/dL    Protein, Total 7.8 6.4 - 8.2 g/dL    Globulin 4.4 (H) 2.0 - 4.0 g/dL    A/G Ratio 0.8  (L) 1.0 - 2.4   CBC with Automated Differential (performable only)    Collection Time: 08/21/23  9:29 AM   Result Value Ref Range    WBC 6.6 4.2 - 11.0 K/mcL    RBC 4.05 (L) 4.50 - 5.90 mil/mcL    HGB 11.4 (L) 13.0 - 17.0 g/dL    HCT 36.5 (L) 39.0 - 51.0 %    MCV 90.1 78.0 - 100.0 fl    MCH 28.1 26.0 - 34.0 pg    MCHC 31.2 (L) 32.0 - 36.5 g/dL    RDW-CV 15.1 (H) 11.0 - 15.0 %    RDW-SD 50.7 (H) 39.0 - 50.0 fL     140 - 450 K/mcL    NRBC 0 <=0 /100 WBC    Neutrophil, Percent 58 %    Lymphocytes, Percent 26 %    Mono, Percent 9 %    Eosinophils, Percent 7 %    Basophils, Percent 0 %    Immature Granulocytes 0 %    Analyzer ANC 3.7 1.8 - 7.7 K/mcl    Absolute Neutrophils 3.7 1.8 - 7.7 K/mcL    Absolute Lymphocytes 1.7 1.0 - 4.0 K/mcL    Absolute Monocytes 0.6 0.3 - 0.9 K/mcL    Absolute Eosinophils  0.5 0.0 - 0.5 K/mcL    Absolute Basophils 0.0 0.0 - 0.3 K/mcL    Absolute Immature Granulocytes 0.0 0.0 - 0.2 K/mcL         PATHOLOGY:   None for review      IMAGING STUDIES:   CT Chest 8/15/2023 was not reported at time of visit. Reviewed images with the patient per my interpretation.       ASSESSMENT:   1. Pulmonary adenocarcinoma   CT Chest PE with spiculated mass at the base of his right upper lobe.   Biopsy performed 11/21/2022 showing moderate-to-poorly differentiated adenocarcinoma, likely new primary.   MRI Brain from 11/29/2022 showed no acute intracranial findings, there was suspicion of sinusitis vs mucocele. PET/CT from 12/7/2022 showed mild FDG avidity at biopsy-proven malignancy. There were other changes which were suggestive of inflammatory changes. No signs of distant metastasis were noted.   Right upper and middle lobectomy 2/9/2023 confirming poorly-differentiated adenocarcinoma.  Patient initiated Carboplatin, Pemetrexed, Pembrolizumab q 3 weeks on 3/23/2023. B12 injection given 3/16/2023.   CT Chest from 5/1/2023 noted a soft tissue density along sutur line of right upper medial hemithorax,  difficult to assess whether atelectatic or recurrent neoplasm.   PET/CT from 5/10/23 shows moderate to marked focal uptake within right apical masslike opacity extending to right paratracheal region. Progressive or recurrent disease versus infectious etiology  Had bronchoscopic biopsy of the mass on 5/16/23. The sample contained very sparse cellularity.   After reviewing with radiologist, the plan is to pursue IR guided core needle biopsy of the mass which is scheduled for 9th.   S/p admission 5/20-5/22/2023 due to COPD exacerbation and pneumonia. CT Chest 5/21/2023 showed increasing lobular soft tissue of the right hilium with new subpleural opacities.     Patient continues to experience tiredness.   All labs from today were reviewed. Electrolytes, kidney function, and organ function are stable. Albumin is gradually improving. Hgb is improved at 11.4.   Patient obtained CT Chest on 8/15/2023. Radiology has not read imaging at time of visit. Images reviewed with the patient per my interpretation showing overall improvement, mild thickening of right upper lung still noticeable. Patient will be notified when Radiology's read is finalized.   Reviewed previous biopsy efforts returning negative for malignancy. At this time patient remains without convincing evidence of disease. Will continue to monitor. Should he be recommended cardiac surgery by Dr. Neville will also discuss possibly obtaining repeat biopsy of right upper lung at that time.   Follow up in about 2 months for MD visit with labs and CT Chest.   Reviewed signs and symptoms which to monitor for and to contact the clinic if any issues arise.       2. Cecum/colon adenocarcinoma   Status post right hemicolectomy with Clinical stage III B with 3 positive nodes.   Status post 12 cycles of adjuvant FOLFOX. Finished in 10/2016.  EGD in 10/01/2019 and colonoscopy from 12/10/2019 were negative for any recurrent/metastatic disease.   CT from ED visit on 6/9/20 did  not show any metastatic disease, splenic lesions are benign \"cysts.\"    Continue on observation.     3. DJD hip and DDD back, with pain that limits his activities.  Right hip worse than left. Patient is following with Orthopedic specialists, with reconstructive surgery on hold until he loses weight.   Manages pain with ibuprofen nightly prn to help with sleep.    4. Morbid obesity   Previously counseled about healthy life style and weight loss.     5. Possible mucocele secondary to pansinusitis  Identified on MRI Brain.   Following with ENT. Was referred to [facility] Daniel [facility] for further evaluation and treatment. Will plan to send him for referral once he completes adjuvant chemotherapy      6. Abnormal activity of skin on PET/CT  Secondary to Hidradenitis.   Will continue to monitor closely due to risk of infection while on chemotherapy    7. Hypokalemia   Resolved.   Patient has been taking KLOR 20 mEq BID.  Continue with the same.     8. Elevated Creatinine  Improved.   Will continue to monitor.       Rationale for proposed plan of care discussed. Patient stated understanding and agreement. Questions were answered. Follow-up was arranged.     Follow up:   Will call with result of the CT if there is any changes   Return for visit with me in 2 months with CBC, CMP and CT chest with contrast a week prior. Please send prescription for prednisone and benadryl for history of contrast allergy.          There are no Patient Instructions on file for this visit.     Gibson Melton MD  Department of Hematology/Oncology   8/21/2023     This chart was documented by Callum Tony, acting as a scribe for Gibson Melton MD. 8/21/2023, 10:25 AM.      The documentation recorded by the scribe accurately and completely reflects the service(s) I personally performed and the decisions made by me.   Gibson Melton MD         self-care/home management/community/leisure

## 2024-11-15 NOTE — CONSULT NOTE ADULT - SUBJECTIVE AND OBJECTIVE BOX
NEPHROLOGY SERVICE INITIAL CONSULT NOTE    HPI:  79M Zoroastrian PMHx HTN, HLD, DM2, CKD, PCKD s/p kidney transplant in 2007, prostate CA s/p radical prostatectomy 2015, DVT 2006 (in setting of pelvic fracture), Afib/AFl s/p ablation in Jan 2018 not on AC 2/2 GIB s/p Amulet device c/b pericardiocentesis RTOR for sternotomy, washout, evacuation of pericardial clot April 2023, aortic aneurysm referred to [facility] on 11/14/24 by outpatient cardiologist after being found to be significant SOB during stress test.  Endorse progressive, worsening GAFFNEY w/ ADLs xweeks, 2 pillow orthopnea, LE swelling x few days. Patient endorses 14lb weight gain and compliance to medications.    Patient endorses compliance to medications - difficult time tolerating Torsemide 40mg BID 2/2 cramping and gout and tried serial furoscix w/o robust response.       Denies CP, palpitations, PND, lightheadedness, dizziness, LOC, syncope, abd pain, N/V/D, dysuria, bleeding, fever, chills, cough, recent travel, or sick contacts.     ER Course  Vitals T 97.2-97.9F HR 70s -170s/DBP 70-90s RR 18 SpO2 07% RA   Labs WBC 9.37 H/H 10.9/33.1 Plt 184 Na 135 K hemolyzed Cl 104 CO2 22 BUN/Cr 34/1.53 Glu 186 Ca 8.7 Trop T 48 BNP 30k   EKG 68 BPM Afib   CXR per PA read pulmonary congestions cardiomegaly     Cardiac Studies   10/21/24: LVEF 58%, normal RVSF w/ increased wall thickness, hypokinetic basal and mid anterior septum and basal and mid inferior septum , PFO vs secundum ASD w/ L-R shunting, mild MR, aortic root 4.30 cm, no pericaridial effusion  NST 11/14/24 normal perfusion per outpatient MD note in [facility]    Admit to cardiac tele for HFmrEF exacerbation.     ADDITIONAL NEPHROLOGY HPI: He had a right kidney transplant in 2007 for PCKD and follows Dr. Sulaiman Modi outpatient. He has not had a biopsy done since his transplant. He is currently on Prograf 2mg AM/1mg PM and prednisone 5mg qd. he does not know his baseline Cr. He noticed increased LE swelling a few days ago along with GAFFNEY. Currently, feels better. Denies fever, chills, chest pain, abdominal pain.    REVIEW OF SYSTEMS:   Otherwise negative except as specified in HPI    PAST MEDICAL AND SURGICAL HISTORY:   PAST MEDICAL & SURGICAL HISTORY:  Polycystic kidney disease      DM2 (diabetes mellitus, type 2)      HLD (hyperlipidemia)      HTN (hypertension)      Prostate cancer      Kidney transplant recipient      DVT (deep venous thrombosis)      Chronic CHF      H/O radical prostatectomy  2010      S/P hernia repair  Abdominal and inguinal around 2005          FAMILY HISTORY:  FAMILY HISTORY:  Family history of cerebrovascular accident (CVA) in mother        Otherwise Noncontributory to current admission    SOCIAL HISTORY:  Tobacco use: Denies  EtOH use: Denies  Illicit drug use: Denies    HOME MEDICATIONS:      ACTIVE MEDICATIONS:  MEDICATIONS  (STANDING):  albumin human 25% IVPB 50 milliLiter(s) IV Intermittent every 6 hours  allopurinol 100 milliGRAM(s) Oral daily  aspirin enteric coated 81 milliGRAM(s) Oral daily  atorvastatin 20 milliGRAM(s) Oral at bedtime  carvedilol 12.5 milliGRAM(s) Oral every 12 hours  dextrose 5%. 1000 milliLiter(s) (50 mL/Hr) IV Continuous <Continuous>  dextrose 5%. 1000 milliLiter(s) (100 mL/Hr) IV Continuous <Continuous>  dextrose 50% Injectable 12.5 Gram(s) IV Push once  dextrose 50% Injectable 25 Gram(s) IV Push once  dextrose 50% Injectable 25 Gram(s) IV Push once  furosemide Infusion 5 mG/Hr (2.5 mL/Hr) IV Continuous <Continuous>  glucagon  Injectable 1 milliGRAM(s) IntraMuscular once  heparin   Injectable 5000 Unit(s) SubCutaneous every 8 hours  influenza  Vaccine (HIGH DOSE) 0.5 milliLiter(s) IntraMuscular once  insulin glargine Injectable (LANTUS) 8 Unit(s) SubCutaneous at bedtime  insulin lispro (ADMELOG) corrective regimen sliding scale   SubCutaneous Before meals and at bedtime  pantoprazole    Tablet 40 milliGRAM(s) Oral before breakfast  predniSONE   Tablet 5 milliGRAM(s) Oral daily  sacubitril 97 mG/valsartan 103 mG 1 Tablet(s) Oral every 12 hours  tacrolimus 2 milliGRAM(s) Oral <User Schedule>  tacrolimus 1 milliGRAM(s) Oral <User Schedule>    MEDICATIONS  (PRN):  dextrose Oral Gel 15 Gram(s) Oral once PRN Blood Glucose LESS THAN 70 milliGRAM(s)/deciliter      ALLERGIES:  Allergies    Jehovah&#x27;s Witness - No blood products (Unknown)  naproxen (Hives)    Intolerances        VITAL SIGNS:  Vital Signs Last 24 Hrs  T(C): 36.4 (15 Nov 2024 09:32), Max: 36.6 (14 Nov 2024 18:03)  T(F): 97.6 (15 Nov 2024 09:32), Max: 97.9 (14 Nov 2024 18:03)  HR: 70 (15 Nov 2024 10:49) (66 - 78)  BP: 160/98 (15 Nov 2024 10:49) (140/86 - 183/100)  BP(mean): 107 (15 Nov 2024 09:32) (107 - 136)  RR: 18 (15 Nov 2024 10:49) (16 - 18)  SpO2: 97% (15 Nov 2024 10:49) (94% - 98%)    Parameters below as of 15 Nov 2024 10:49  Patient On (Oxygen Delivery Method): room air        11-14-24 @ 07:01  -  11-15-24 @ 07:00  --------------------------------------------------------  IN:    Oral Fluid: 200 mL  Total IN: 200 mL    OUT:    Voided (mL): 1150 mL  Total OUT: 1150 mL    Total NET: -950 mL      11-15-24 @ 07:01  -  11-15-24 @ 11:56  --------------------------------------------------------  IN:    Oral Fluid: 200 mL  Total IN: 200 mL    OUT:  Total OUT: 0 mL    Total NET: 200 mL          PHYSICAL EXAM:  Constitutional: WDWN resting comfortably in bed; NAD  Head: NC/AT  Eyes: PERRL, EOMI, anicteric sclera  ENT: no nasal discharge; uvula midline, no oropharyngeal erythema or exudates; MMM  Neck: supple; no JVD or thyromegaly  Respiratory: BL crackles lowe lobes  Cardiac: +S1/S2,  Gastrointestinal: abdomen soft, NT/ND; no rebound or guarding; +BSx4  +R transplant kidney in RLQ with no palpable pulses or thrills  Genitourinary: normal external genitalia  Extremities: WWP, no clubbing or cyanosis; 3+ pitting edema up to knees  Musculoskeletal: NROM x4; no joint swelling, tenderness or erythema  Vascular: 2+ radial, femoral, DP/PT pulses B/L  Dermatologic: skin warm, dry and intact; no rashes, wounds, or scars  Lymphatic: no submandibular or cervical LAD  Neurologic: AAOx3; CNII-XII grossly intact; no focal deficits      LABS:                        10.5   7.77  )-----------( 192      ( 15 Nov 2024 05:30 )             32.3     11-15    142  |  107  |  32[H]  ----------------------------<  108[H]  3.6   |  27  |  1.72[H]    Ca    8.8      15 Nov 2024 05:30  Phos  3.3     11-15  Mg     1.9     11-15    TPro  5.8[L]  /  Alb  3.1[L]  /  TBili  1.3[H]  /  DBili  x   /  AST  28  /  ALT  38  /  AlkPhos  81  11-15      Urinalysis Basic - ( 15 Nov 2024 05:30 )    Color: x / Appearance: x / SG: x / pH: x  Gluc: 108 mg/dL / Ketone: x  / Bili: x / Urobili: x   Blood: x / Protein: x / Nitrite: x   Leuk Esterase: x / RBC: x / WBC x   Sq Epi: x / Non Sq Epi: x / Bacteria: x      CARDIAC MARKERS ( 15 Nov 2024 05:30 )  x     / x     / x     / x     / 1.8 ng/mL  CARDIAC MARKERS ( 14 Nov 2024 19:54 )  x     / x     / x     / x     / 2.3 ng/mL  CARDIAC MARKERS ( 14 Nov 2024 16:23 )  x     / x     / x     / x     / 2.2 ng/mL      CAPILLARY BLOOD GLUCOSE      POCT Blood Glucose.: 104 mg/dL (15 Nov 2024 07:40)  POCT Blood Glucose.: 114 mg/dL (14 Nov 2024 22:13)          RADIOLOGY & ADDITIONAL TESTS: Reviewed.

## 2024-11-15 NOTE — PROGRESS NOTE ADULT - PROBLEM SELECTOR PLAN 5
Lipid panel WNL, LDL 36  - C/w Atorva 20mg QHS     F: PO (1.2L fluid restriction)  E: Replete for K <4, Mg <2  N: DASH (CC)  GI PPx: Protonix   DVT PPx: SQ Heparin   C: FULL CODE  Dispo: admit to cardiac tele

## 2024-11-15 NOTE — PROGRESS NOTE ADULT - ASSESSMENT
79M Voodoo w/ PMHx HTN, HLD, HFpEF (EF 58% by TTE October 2024), TDM2, CKD, PCKD s/p kidney transplant in 2007 ([facility]), prostate CA s/p radical prostatectomy 2015, DVT 2006 (in setting of pelvic fracture), Afib/AFl s/p ablation in Jan 2018 not on AC 2/2 GIB s/p Amulet device in April 2023 with hospital course c/b PEA arrest w/ ROSC and pericardial tamponade s/p bedside pericardiocentesis, RTOR for sternotomy/washout and evacuation of pericardial clot, aortic aneurysm referred to [facility] ER on 11/14/24 by outpatient cardiologist for progressive worsening GAFFNEY and 14lb weight gain x 2 weeks. Admitted to cardiac tele for acute on chronic HFpEF exacerbation, currently on Lasix gtt. Renal following for Hx kidney transplant.

## 2024-11-15 NOTE — PHYSICAL THERAPY INITIAL EVALUATION ADULT - ADDITIONAL COMMENTS
Patient lives with wife and mother-in-law in apartment +elevator access. PTA, pt was independent with all ADLs and functional mobility, with the use of a tripod cane within the home. For community distances, patient utilizes a rollator. Patient has tub shower with shower chair and grab bars. Of note, patient with remote hx of R hip fusion surgery ; unable to fully flex R hip.

## 2024-11-15 NOTE — PROGRESS NOTE ADULT - PROBLEM SELECTOR PLAN 1
Fluid overloaded on exam  - Etiology: unclear given NST negative and patient's Afib rate controlled   - TTE: 10/21/24: LVEF 58%, normal RVSF w/ increased wall thickness, hypokinetic basal and mid anterior septum and basal and mid inferior septum , PFO vs secundum ASD w/ L-R shunting, mild MR, aortic root 4.30 cm, no pericardial effusion  - NST 11/14/24 normal perfusion per outpatient MD note in [facility]  - EKG Afib non ischemic   - Diuretic: s/p IV Lasix 80mg x 1 in AM. Started Lasix gtt 5mg/hr  - GMDT: Coreg 12.5mg BID (reduced home dose), Entresto  mg BID   - Core measures, strict input/out, tele, daily weights, fluid restriction

## 2024-11-15 NOTE — PHYSICAL THERAPY INITIAL EVALUATION ADULT - IMPAIRED TRANSFERS: SIT/STAND, REHAB EVAL
slightly unsteady with no ep of LOB, decreased endurance/impaired balance/decreased ROM/decreased strength

## 2024-11-15 NOTE — PHYSICAL THERAPY INITIAL EVALUATION ADULT - GENERAL OBSERVATIONS, REHAB EVAL
Patient encountered semi-supine in bed in no apparent distress, +IV +tele +pulseOx. Agreeable to PT cindy.

## 2024-11-15 NOTE — PHYSICAL THERAPY INITIAL EVALUATION ADULT - IMPAIRMENTS CONTRIBUTING TO GAIT DEVIATIONS, PT EVAL
slightly unsteady with no ep of LOB, decreased endurance/impaired balance/impaired postural control/decreased strength

## 2024-11-15 NOTE — CONSULT NOTE ADULT - ATTENDING COMMENTS
I agree with the fellow's findings and plans as written above with the following additions/amendments:    Seen and examined at bedside, long history reviewed as above, POCUS with volume overload in portal and liver but kidney low flow likely pre-renal DAYO from diuresis, will need some balance between intravascular pressure and diuresis to support transplanted kidney. Would give 25% albumin or hypertonic to help intravascular space and continue to diurese as above, trend urine sodium, osm and UA with BMP to help guide diuresis. Further recs as above

## 2024-11-15 NOTE — PROGRESS NOTE ADULT - SUBJECTIVE AND OBJECTIVE BOX
CARDIOLOGY NP PROGRESS NOTE    Subjective:   Remainder ROS otherwise negative.    Interval Events:     TELEMETRY:    EKG:      VITAL SIGNS:  T(C): 36.4 (11-15-24 @ 09:32), Max: 36.6 (11-14-24 @ 18:03)  HR: 70 (11-15-24 @ 10:49) (66 - 78)  BP: 160/98 (11-15-24 @ 10:49) (140/86 - 183/100)  RR: 18 (11-15-24 @ 10:49) (16 - 18)  SpO2: 97% (11-15-24 @ 10:49) (94% - 98%)  Wt(kg): --    I&O's Summary    14 Nov 2024 07:01  -  15 Nov 2024 07:00  --------------------------------------------------------  IN: 200 mL / OUT: 1150 mL / NET: -950 mL    15 Nov 2024 07:01  -  15 Nov 2024 11:50  --------------------------------------------------------  IN: 200 mL / OUT: 0 mL / NET: 200 mL          PHYSICAL EXAM:    General: A/ox 3, No acute Distress  Neck: Supple, NO JVD  Cardiac: S1 S2, No M/R/G  Pulmonary: CTAB, Breathing unlabored, No Rhonchi/Rales/Wheezing  Abdomen: Soft, Non -tender, +BS x 4 quads  Extremities: No Rashes, No edema  Neuro: A/o x 3, No focal deficits          LABS:                          10.5   7.77  )-----------( 192      ( 15 Nov 2024 05:30 )             32.3                              11-15    142  |  107  |  32[H]  ----------------------------<  108[H]  3.6   |  27  |  1.72[H]    Ca    8.8      15 Nov 2024 05:30  Phos  3.3     11-15  Mg     1.9     11-15    TPro  5.8[L]  /  Alb  3.1[L]  /  TBili  1.3[H]  /  DBili  x   /  AST  28  /  ALT  38  /  AlkPhos  81  11-15    LIVER FUNCTIONS - ( 15 Nov 2024 05:30 )  Alb: 3.1 g/dL / Pro: 5.8 g/dL / ALK PHOS: 81 U/L / ALT: 38 U/L / AST: 28 U/L / GGT: x                                   CAPILLARY BLOOD GLUCOSE      POCT Blood Glucose.: 104 mg/dL (15 Nov 2024 07:40)  POCT Blood Glucose.: 114 mg/dL (14 Nov 2024 22:13)    CARDIAC MARKERS ( 15 Nov 2024 05:30 )  x     / x     / x     / x     / 1.8 ng/mL  CARDIAC MARKERS ( 14 Nov 2024 19:54 )  x     / x     / x     / x     / 2.3 ng/mL  CARDIAC MARKERS ( 14 Nov 2024 16:23 )  x     / x     / x     / x     / 2.2 ng/mL          Allergies:  Jehovah&#x27;s Witness - No blood products (Unknown)  naproxen (Hives)    MEDICATIONS  (STANDING):  allopurinol 100 milliGRAM(s) Oral daily  aspirin enteric coated 81 milliGRAM(s) Oral daily  atorvastatin 20 milliGRAM(s) Oral at bedtime  carvedilol 12.5 milliGRAM(s) Oral every 12 hours  dextrose 5%. 1000 milliLiter(s) (100 mL/Hr) IV Continuous <Continuous>  dextrose 5%. 1000 milliLiter(s) (50 mL/Hr) IV Continuous <Continuous>  dextrose 50% Injectable 12.5 Gram(s) IV Push once  dextrose 50% Injectable 25 Gram(s) IV Push once  dextrose 50% Injectable 25 Gram(s) IV Push once  furosemide Infusion 5 mG/Hr (2.5 mL/Hr) IV Continuous <Continuous>  glucagon  Injectable 1 milliGRAM(s) IntraMuscular once  heparin   Injectable 5000 Unit(s) SubCutaneous every 8 hours  influenza  Vaccine (HIGH DOSE) 0.5 milliLiter(s) IntraMuscular once  insulin glargine Injectable (LANTUS) 8 Unit(s) SubCutaneous at bedtime  insulin lispro (ADMELOG) corrective regimen sliding scale   SubCutaneous Before meals and at bedtime  pantoprazole    Tablet 40 milliGRAM(s) Oral before breakfast  predniSONE   Tablet 5 milliGRAM(s) Oral daily  sacubitril 97 mG/valsartan 103 mG 1 Tablet(s) Oral every 12 hours  tacrolimus 1 milliGRAM(s) Oral <User Schedule>  tacrolimus 2 milliGRAM(s) Oral <User Schedule>    MEDICATIONS  (PRN):  dextrose Oral Gel 15 Gram(s) Oral once PRN Blood Glucose LESS THAN 70 milliGRAM(s)/deciliter        DIAGNOSTIC TESTS:        CARDIOLOGY NP PROGRESS NOTE    Subjective: Pt seen and examined at bedside. Reports feeling GAFFNEY and 14lbs weight gain over last 2 wks. Denies orthopnea, chest pain, sob, lightheadedness, dizziness, palpitations, fever, chills.  Remainder ROS otherwise negative.    Interval Events: net negative 950cc over last 24hrs    TELEMETRY:         VITAL SIGNS:  T(C): 36.4 (11-15-24 @ 09:32), Max: 36.6 (11-14-24 @ 18:03)  HR: 70 (11-15-24 @ 10:49) (66 - 78)  BP: 160/98 (11-15-24 @ 10:49) (140/86 - 183/100)  RR: 18 (11-15-24 @ 10:49) (16 - 18)  SpO2: 97% (11-15-24 @ 10:49) (94% - 98%)  Wt(kg): --    I&O's Summary    14 Nov 2024 07:01  -  15 Nov 2024 07:00  --------------------------------------------------------  IN: 200 mL / OUT: 1150 mL / NET: -950 mL    15 Nov 2024 07:01  -  15 Nov 2024 11:50  --------------------------------------------------------  IN: 200 mL / OUT: 0 mL / NET: 200 mL          PHYSICAL EXAM:    General: A/ox 3, No acute Distress  Neck: Supple, + JVD  Cardiac: S1 S2, No M/R/G  Pulmonary: CTAB, Breathing unlabored on RA, No Rhonchi/Rales/Wheezing  Abdomen: Soft, Non -tender, +BS x 4 quads  Extremities: No Rashes, unique LE 2+ edema  Neuro: A/o x 3, No focal deficits          LABS:                          10.5   7.77  )-----------( 192      ( 15 Nov 2024 05:30 )             32.3                              11-15    142  |  107  |  32[H]  ----------------------------<  108[H]  3.6   |  27  |  1.72[H]    Ca    8.8      15 Nov 2024 05:30  Phos  3.3     11-15  Mg     1.9     11-15    TPro  5.8[L]  /  Alb  3.1[L]  /  TBili  1.3[H]  /  DBili  x   /  AST  28  /  ALT  38  /  AlkPhos  81  11-15    LIVER FUNCTIONS - ( 15 Nov 2024 05:30 )  Alb: 3.1 g/dL / Pro: 5.8 g/dL / ALK PHOS: 81 U/L / ALT: 38 U/L / AST: 28 U/L / GGT: x                                   CAPILLARY BLOOD GLUCOSE      POCT Blood Glucose.: 104 mg/dL (15 Nov 2024 07:40)  POCT Blood Glucose.: 114 mg/dL (14 Nov 2024 22:13)    CARDIAC MARKERS ( 15 Nov 2024 05:30 )  x     / x     / x     / x     / 1.8 ng/mL  CARDIAC MARKERS ( 14 Nov 2024 19:54 )  x     / x     / x     / x     / 2.3 ng/mL  CARDIAC MARKERS ( 14 Nov 2024 16:23 )  x     / x     / x     / x     / 2.2 ng/mL          Allergies:  Jehovah&#x27;s Witness - No blood products (Unknown)  naproxen (Hives)    MEDICATIONS  (STANDING):  allopurinol 100 milliGRAM(s) Oral daily  aspirin enteric coated 81 milliGRAM(s) Oral daily  atorvastatin 20 milliGRAM(s) Oral at bedtime  carvedilol 12.5 milliGRAM(s) Oral every 12 hours  dextrose 5%. 1000 milliLiter(s) (100 mL/Hr) IV Continuous <Continuous>  dextrose 5%. 1000 milliLiter(s) (50 mL/Hr) IV Continuous <Continuous>  dextrose 50% Injectable 12.5 Gram(s) IV Push once  dextrose 50% Injectable 25 Gram(s) IV Push once  dextrose 50% Injectable 25 Gram(s) IV Push once  furosemide Infusion 5 mG/Hr (2.5 mL/Hr) IV Continuous <Continuous>  glucagon  Injectable 1 milliGRAM(s) IntraMuscular once  heparin   Injectable 5000 Unit(s) SubCutaneous every 8 hours  influenza  Vaccine (HIGH DOSE) 0.5 milliLiter(s) IntraMuscular once  insulin glargine Injectable (LANTUS) 8 Unit(s) SubCutaneous at bedtime  insulin lispro (ADMELOG) corrective regimen sliding scale   SubCutaneous Before meals and at bedtime  pantoprazole    Tablet 40 milliGRAM(s) Oral before breakfast  predniSONE   Tablet 5 milliGRAM(s) Oral daily  sacubitril 97 mG/valsartan 103 mG 1 Tablet(s) Oral every 12 hours  tacrolimus 1 milliGRAM(s) Oral <User Schedule>  tacrolimus 2 milliGRAM(s) Oral <User Schedule>    MEDICATIONS  (PRN):  dextrose Oral Gel 15 Gram(s) Oral once PRN Blood Glucose LESS THAN 70 milliGRAM(s)/deciliter        DIAGNOSTIC TESTS:

## 2024-11-15 NOTE — PROGRESS NOTE ADULT - PROBLEM SELECTOR PLAN 3
Hx PCKD s/p kidney transplant in 2007 ([facility], follows w/ Nephrologist Dr Sulaiman Modi) w/ baseline Cr ranging from 1.2 -1.4 in 2023   - C/w Prograf 2mg in AM and Prograf 1mg in PM   - C/w Prednisone 5mg QD   - F/u Prograf level, may get weekly as needed per Renal  - Obtain daily urine lytes   - Nephro consulted  - Will add Albumin 25% 50mL x3 for intravascular repletion to avoid poor perfusion to kidney, will reassess tomorrow  - Avoid nephrotoxic agents

## 2024-11-15 NOTE — CONSULT NOTE ADULT - ASSESSMENT
79M Jehovah's witness PMHx of PCKD s/p kidney transplant in 2007, CKD (baseline Cr 1.2-1.4), HTN, HLD, HFpEF (EF 58% by TTE October 2024), TDM2, prostate CA s/p radical prostatectomy 2015, Afib/AFl s/p ablation, admitted for HFpEF exacerbation requiring IV diuresis. Nephrology consulted for DAYO on CKD and diuresis co-management.     SCr 1.53>1.72  Urine studies with P/Cr: 0.6, Judi 108  UOP L24h: 1150. net neg 950  Bedside POCUS with dilated bilateral atriums, dilated IVC, hepatic congestion, R transplant kidney with no congestion  PE with BL 3+ pitting edema to the level of knees     #Rt kidney transplant in 2007, on prograf and prednisone  #Nonoliguric DAYO on CKD   secondary to volume overload i/s/o HFpEF exacerbation    Recommendations:  - Continue lasix drip  - Would give albumin 25% 50mL x3 for intravascular repletion to avoid poor perfusion to kidney, will reassess tomorrow  - Daily urine lytes   - Continue mobilization  - Maintain SBP >110 for renal perfusion  - Strict I/Os, monitor UOP, electrolytes  - Avoid nephrotoxic agents   - Continue prograf and prednisone  - No need to check prograf levels  - Nephrology will continue to follow       79M Mandaen PMHx of PCKD s/p kidney transplant in 2007, CKD (baseline Cr 1.2-1.4), HTN, HLD, HFpEF (EF 58% by TTE October 2024), TDM2, prostate CA s/p radical prostatectomy 2015, Afib/AFl s/p ablation, admitted for HFpEF exacerbation requiring IV diuresis. Nephrology consulted for DAYO on CKD and diuresis co-management.     SCr 1.53>1.72  Urine studies with P/Cr: 0.6, Judi 108  UOP L24h: 1150. net neg 950  Bedside POCUS with dilated bilateral atriums, dilated IVC, hepatic congestion, R transplant kidney with no congestion  PE with BL 3+ pitting edema to the level of knees     #Rt kidney transplant in 2007, on prograf and prednisone  #Nonoliguric DAYO on CKD   secondary to volume overload i/s/o HFpEF exacerbation    Recommendations:  - Continue lasix drip  - Would give albumin 25% 50mL x3 for intravascular repletion to avoid poor perfusion to kidney, will reassess tomorrow  - Daily urine lytes   - Continue mobilization  - Maintain SBP >110 for renal perfusion  - Strict I/Os, monitor UOP, electrolytes  - Avoid nephrotoxic agents   - Continue prograf and prednisone  - No need to check prograf levels  - Nephrology will continue to follow    Please see attending addendum for final recommendations.

## 2024-11-15 NOTE — PHYSICAL THERAPY INITIAL EVALUATION ADULT - REFERRING PHYSICIAN, REHAB EVAL
PARMJIT Kilgore cleared patient for PT eval despite slightly elevated troponin levels ; no concern for ACS.

## 2024-11-15 NOTE — PHYSICAL THERAPY INITIAL EVALUATION ADULT - PERTINENT HX OF CURRENT PROBLEM, REHAB EVAL
79M Rastafari w/ PMHx HTN, HLD, HFpEF (EF 58% by TTE October 2024), TDM2, CKD, PCKD s/p kidney transplant in 2007 ([facility]), prostate CA s/p radical prostatectomy 2015, DVT 2006 (in setting of pelvic fracture), Afib/AFl s/p ablation in Jan 2018 not on AC 2/2 GIB s/p Amulet device in April 2023 with hospital course c/b PEA arrest w/ ROSC and pericardial tamponade s/p bedside pericardiocentesis, RTOR for sternotomy/washout and evacuation of pericardial clot, aortic aneurysm referred to [facility] ER on 11/14/24 by outpatient cardiologist for progressive worsening GAFFNEY and 14lb weight gain x 2 weeks. Admitted to cardiac tele for acute on chronic HFpEF exacerbation, currently on Lasix gtt. Renal following for Hx kidney transplant.

## 2024-11-16 LAB
ADD ON TEST-SPECIMEN IN LAB: SIGNIFICANT CHANGE UP
ANION GAP SERPL CALC-SCNC: 11 MMOL/L — SIGNIFICANT CHANGE UP (ref 5–17)
BUN SERPL-MCNC: 37 MG/DL — HIGH (ref 7–23)
CALCIUM SERPL-MCNC: 8.7 MG/DL — SIGNIFICANT CHANGE UP (ref 8.4–10.5)
CHLORIDE SERPL-SCNC: 107 MMOL/L — SIGNIFICANT CHANGE UP (ref 96–108)
CO2 SERPL-SCNC: 26 MMOL/L — SIGNIFICANT CHANGE UP (ref 22–31)
CREAT ?TM UR-MCNC: 34 MG/DL — SIGNIFICANT CHANGE UP
CREAT SERPL-MCNC: 1.87 MG/DL — HIGH (ref 0.5–1.3)
EGFR: 37 ML/MIN/1.73M2 — LOW
GLUCOSE SERPL-MCNC: 88 MG/DL — SIGNIFICANT CHANGE UP (ref 70–99)
HCT VFR BLD CALC: 29.7 % — LOW (ref 39–50)
HGB BLD-MCNC: 9.7 G/DL — LOW (ref 13–17)
MAGNESIUM SERPL-MCNC: 2.1 MG/DL — SIGNIFICANT CHANGE UP (ref 1.6–2.6)
MCHC RBC-ENTMCNC: 30.2 PG — SIGNIFICANT CHANGE UP (ref 27–34)
MCHC RBC-ENTMCNC: 32.7 G/DL — SIGNIFICANT CHANGE UP (ref 32–36)
MCV RBC AUTO: 92.5 FL — SIGNIFICANT CHANGE UP (ref 80–100)
NRBC # BLD: 0 /100 WBCS — SIGNIFICANT CHANGE UP (ref 0–0)
OSMOLALITY UR: 336 MOSM/KG — SIGNIFICANT CHANGE UP (ref 300–900)
PHOSPHATE SERPL-MCNC: 3.2 MG/DL — SIGNIFICANT CHANGE UP (ref 2.5–4.5)
PLATELET # BLD AUTO: 183 K/UL — SIGNIFICANT CHANGE UP (ref 150–400)
POTASSIUM SERPL-MCNC: 3.5 MMOL/L — SIGNIFICANT CHANGE UP (ref 3.5–5.3)
POTASSIUM SERPL-SCNC: 3.5 MMOL/L — SIGNIFICANT CHANGE UP (ref 3.5–5.3)
RBC # BLD: 3.21 M/UL — LOW (ref 4.2–5.8)
RBC # FLD: 18.3 % — HIGH (ref 10.3–14.5)
SODIUM SERPL-SCNC: 144 MMOL/L — SIGNIFICANT CHANGE UP (ref 135–145)
SODIUM UR-SCNC: 83 MMOL/L — SIGNIFICANT CHANGE UP
TACROLIMUS SERPL-MCNC: 3.3 NG/ML — SIGNIFICANT CHANGE UP
UUN UR-MCNC: 283 MG/DL — SIGNIFICANT CHANGE UP
WBC # BLD: 6.21 K/UL — SIGNIFICANT CHANGE UP (ref 3.8–10.5)
WBC # FLD AUTO: 6.21 K/UL — SIGNIFICANT CHANGE UP (ref 3.8–10.5)

## 2024-11-16 PROCEDURE — 99232 SBSQ HOSP IP/OBS MODERATE 35: CPT

## 2024-11-16 PROCEDURE — 99233 SBSQ HOSP IP/OBS HIGH 50: CPT

## 2024-11-16 PROCEDURE — 99223 1ST HOSP IP/OBS HIGH 75: CPT

## 2024-11-16 RX ORDER — BUMETANIDE 1 MG/1
1 TABLET ORAL
Refills: 0 | Status: DISCONTINUED | OUTPATIENT
Start: 2024-11-16 | End: 2024-11-17

## 2024-11-16 RX ORDER — POTASSIUM CHLORIDE 600 MG/1
40 TABLET, EXTENDED RELEASE ORAL ONCE
Refills: 0 | Status: COMPLETED | OUTPATIENT
Start: 2024-11-16 | End: 2024-11-16

## 2024-11-16 RX ADMIN — Medication 2: at 21:58

## 2024-11-16 RX ADMIN — Medication 20 MILLIGRAM(S): at 22:00

## 2024-11-16 RX ADMIN — Medication 5000 UNIT(S): at 06:26

## 2024-11-16 RX ADMIN — Medication 5000 UNIT(S): at 22:00

## 2024-11-16 RX ADMIN — TACROLIMUS 1 MILLIGRAM(S): 5 CAPSULE ORAL at 23:05

## 2024-11-16 RX ADMIN — POTASSIUM CHLORIDE 40 MILLIEQUIVALENT(S): 600 TABLET, EXTENDED RELEASE ORAL at 12:22

## 2024-11-16 RX ADMIN — Medication 50 MILLILITER(S): at 17:58

## 2024-11-16 RX ADMIN — SACUBITRIL AND VALSARTAN 1 TABLET(S): 24; 26 TABLET, FILM COATED ORAL at 12:23

## 2024-11-16 RX ADMIN — Medication 81 MILLIGRAM(S): at 12:22

## 2024-11-16 RX ADMIN — BUMETANIDE 1 MILLIGRAM(S): 1 TABLET ORAL at 15:30

## 2024-11-16 RX ADMIN — Medication 50 MILLILITER(S): at 01:22

## 2024-11-16 RX ADMIN — PREDNISONE 5 MILLIGRAM(S): 20 TABLET ORAL at 06:27

## 2024-11-16 RX ADMIN — Medication 1: at 12:21

## 2024-11-16 RX ADMIN — Medication 5000 UNIT(S): at 13:16

## 2024-11-16 RX ADMIN — TACROLIMUS 2 MILLIGRAM(S): 5 CAPSULE ORAL at 12:22

## 2024-11-16 RX ADMIN — PANTOPRAZOLE SODIUM 40 MILLIGRAM(S): 40 TABLET, DELAYED RELEASE ORAL at 06:28

## 2024-11-16 RX ADMIN — CARVEDILOL 12.5 MILLIGRAM(S): 25 TABLET, FILM COATED ORAL at 06:28

## 2024-11-16 RX ADMIN — INSULIN GLARGINE 8 UNIT(S): 100 INJECTION, SOLUTION SUBCUTANEOUS at 21:59

## 2024-11-16 RX ADMIN — ALLOPURINOL 100 MILLIGRAM(S): 300 TABLET ORAL at 12:22

## 2024-11-16 RX ADMIN — CARVEDILOL 12.5 MILLIGRAM(S): 25 TABLET, FILM COATED ORAL at 17:58

## 2024-11-16 NOTE — CONSULT NOTE ADULT - ASSESSMENT
79M L[employer]-day w/ PMHx HTN, HLD, HFpEF (EF 58% by TTE October 2024), TDM2, CKD, PCKD s/p kidney transplant in 2007 ([facility]), prostate CA s/p radical prostatectomy 2015, DVT 2006 (in setting of pelvic fracture), Afib/AFl s/p ablation in Jan 2018 not on AC 2/2 GIB s/p Amulet device in April 2023 with hospital course c/b PEA arrest w/ ROSC and pericardial tamponade s/p bedside pericardiocentesis, RTOR for sternotomy/washout and evacuation of pericardial clot, aortic aneurysm, referred to [facility] ER on 11/14/24 by outpatient cardiologist for progressive worsening GAFFNEY and 14lb weight gain x 2 weeks. Admitted to cardiac tele for acute on chronic HFpEF exacerbation, currently on IV diuresis. Renal following for Hx kidney transplant.       1) (HFpEF) heart failure with preserved ejection fraction.   ·  Plan: Fluid overloaded on exam, now improving  - Etiology: unclear given NST negative and patient's Afib rate controlled. - Compliant w/ meds   - Bumex 1mg IV BID  - Coreg 12.5mg BID (reduced home dose).   - Entresto on hold.  2) Afib:  - Coreg 12.5mg BID.  - ASA 81mg daily.  - AC previously discontinued due to GI Bleed.  3) History of Kidney transplant:  - Avoiding nephrotoxins.  - Nephrology following.  - Tacrolimus levels tomorrow.  - Bumex for diuresis.  4) HTN:  - Coreg 12.5mg BID.  - Avoid hypotension given renal transplant.  5) HLD:  - Atorvastatin 20mg Qhs.   6) DVT proph:  - HSQ

## 2024-11-16 NOTE — PROGRESS NOTE ADULT - SUBJECTIVE AND OBJECTIVE BOX
CARDIOLOGY NP PROGRESS NOTE    Subjective: Pt seen and examined at bedside. Reports feeling mild improvement in GAFFNEY while walking in hallway. Denies chest pain, SOB at rest, lightheadedness, palpitations, fever, chills.  Remainder ROS otherwise negative.    Interval Events: net negative 1.3L over last 24hrs.    TELEMETRY: Afib 50-80s         VITAL SIGNS:  T(C): 36.6 (11-16-24 @ 15:30), Max: 36.7 (11-16-24 @ 08:30)  HR: 70 (11-16-24 @ 15:30) (62 - 94)  BP: 141/74 (11-16-24 @ 15:30) (141/74 - 169/102)  RR: 18 (11-16-24 @ 15:30) (15 - 20)  SpO2: 95% (11-16-24 @ 15:30) (93% - 98%)  Wt(kg): --    I&O's Summary    15 Nov 2024 07:01  -  16 Nov 2024 07:00  --------------------------------------------------------  IN: 1090 mL / OUT: 2400 mL / NET: -1310 mL    16 Nov 2024 07:01  -  16 Nov 2024 16:28  --------------------------------------------------------  IN: 360 mL / OUT: 850 mL / NET: -490 mL          PHYSICAL EXAM:    General: A/ox 3, No acute Distress  Neck: Supple, improving JVD  Cardiac: S1 S2, No M/R/G  Pulmonary: CTAB, Breathing unlabored on RA, No Rhonchi/Rales/Wheezing  Abdomen: Soft, Non -tender, +BS x 4 quads  Extremities: No Rashes, unique LE 2+ edema  Neuro: A/o x 3, No focal deficits            LABS:                          9.7    6.21  )-----------( 183      ( 16 Nov 2024 05:30 )             29.7                              11-16    144  |  107  |  37[H]  ----------------------------<  88  3.5   |  26  |  1.87[H]    Ca    8.7      16 Nov 2024 05:30  Phos  3.2     11-16  Mg     2.1     11-16    TPro  5.8[L]  /  Alb  3.1[L]  /  TBili  1.3[H]  /  DBili  x   /  AST  28  /  ALT  38  /  AlkPhos  81  11-15    LIVER FUNCTIONS - ( 15 Nov 2024 05:30 )  Alb: 3.1 g/dL / Pro: 5.8 g/dL / ALK PHOS: 81 U/L / ALT: 38 U/L / AST: 28 U/L / GGT: x                                   CAPILLARY BLOOD GLUCOSE      POCT Blood Glucose.: 190 mg/dL (16 Nov 2024 11:46)  POCT Blood Glucose.: 97 mg/dL (16 Nov 2024 07:40)  POCT Blood Glucose.: 151 mg/dL (15 Nov 2024 22:04)  POCT Blood Glucose.: 171 mg/dL (15 Nov 2024 19:01)  POCT Blood Glucose.: 187 mg/dL (15 Nov 2024 16:55)    CARDIAC MARKERS ( 15 Nov 2024 05:30 )  x     / x     / x     / x     / 1.8 ng/mL  CARDIAC MARKERS ( 14 Nov 2024 19:54 )  x     / x     / x     / x     / 2.3 ng/mL          Allergies:  Jehovah&#x27;s Witness - No blood products (Unknown)  naproxen (Hives)    MEDICATIONS  (STANDING):  albumin human 25% IVPB 50 milliLiter(s) IV Intermittent once  allopurinol 100 milliGRAM(s) Oral daily  aspirin enteric coated 81 milliGRAM(s) Oral daily  atorvastatin 20 milliGRAM(s) Oral at bedtime  buMETAnide Injectable 1 milliGRAM(s) IV Push <User Schedule>  carvedilol 12.5 milliGRAM(s) Oral every 12 hours  dextrose 5%. 1000 milliLiter(s) (100 mL/Hr) IV Continuous <Continuous>  dextrose 5%. 1000 milliLiter(s) (50 mL/Hr) IV Continuous <Continuous>  dextrose 50% Injectable 25 Gram(s) IV Push once  dextrose 50% Injectable 12.5 Gram(s) IV Push once  dextrose 50% Injectable 25 Gram(s) IV Push once  glucagon  Injectable 1 milliGRAM(s) IntraMuscular once  heparin   Injectable 5000 Unit(s) SubCutaneous every 8 hours  influenza  Vaccine (HIGH DOSE) 0.5 milliLiter(s) IntraMuscular once  insulin glargine Injectable (LANTUS) 8 Unit(s) SubCutaneous at bedtime  insulin lispro (ADMELOG) corrective regimen sliding scale   SubCutaneous Before meals and at bedtime  pantoprazole    Tablet 40 milliGRAM(s) Oral before breakfast  predniSONE   Tablet 5 milliGRAM(s) Oral daily  tacrolimus 2 milliGRAM(s) Oral <User Schedule>  tacrolimus 1 milliGRAM(s) Oral <User Schedule>    MEDICATIONS  (PRN):  dextrose Oral Gel 15 Gram(s) Oral once PRN Blood Glucose LESS THAN 70 milliGRAM(s)/deciliter        DIAGNOSTIC TESTS:

## 2024-11-16 NOTE — PROGRESS NOTE ADULT - PROBLEM SELECTOR PLAN 5
Lipid panel WNL, LDL 36  - C/w Atorvastatin 20mg QHS     F: PO (1.2L fluid restriction)  E: Replete for K <4, Mg <2  N: DASH (CC)  GI PPx: Protonix   DVT PPx: SQ Heparin   C: FULL CODE  Dispo: admit to cardiac tele

## 2024-11-16 NOTE — CONSULT NOTE ADULT - TIME BILLING
Time exclusive of ACP discussion  Time inclusive of chart review, medication ordering, discussion with primary team, clinical documentation, and communication with family/caregiver

## 2024-11-16 NOTE — CONSULT NOTE ADULT - SUBJECTIVE AND OBJECTIVE BOX
JORGE WU  79y  Male      Patient is a 79M [place] w/ PMHx HTN, HLD, HFpEF (EF 58% by TTE October 2024), TDM2, CKD, PCKD s/p kidney transplant in 2007 ([facility]), prostate CA s/p radical prostatectomy 2015, DVT 2006 (in setting of pelvic fracture), Afib/AFl s/p ablation in Jan 2018 not on AC 2/2 GIB s/p Amulet device in April 2023 with hospital course c/b PEA arrest w/ ROSC and pericardial tamponade s/p bedside pericardiocentesis, RTOR for sternotomy/washout and evacuation of pericardial clot, aortic aneurysm, referred to [facility] ER on 11/14/24 by outpatient cardiologist for progressive worsening GAFFNEY and 14lb weight gain x 2 weeks. Admitted to cardiac tele for acute on chronic HFpEF exacerbation, currently on IV diuresis. Renal following for Hx kidney transplant.         PAST MEDICAL/SURGICAL HISTORY  PAST MEDICAL & SURGICAL HISTORY:  Polycystic kidney disease      DM2 (diabetes mellitus, type 2)      HLD (hyperlipidemia)      HTN (hypertension)      Prostate cancer      Kidney transplant recipient      DVT (deep venous thrombosis)      Chronic CHF      H/O radical prostatectomy  2010      S/P hernia repair  Abdominal and inguinal around 2005          REVIEW OF SYSTEMS:  CONSTITUTIONAL: No fever, weight loss, or fatigue  EYES: No eye pain, visual disturbances, or discharge  ENMT:  No difficulty hearing, tinnitus, vertigo; No sinus or throat pain  NECK: No pain or stiffness  BREASTS: No pain, masses, or nipple discharge  RESPIRATORY: shortness of breath on exertion  CARDIOVASCULAR: No chest pain, palpitations, dizziness, or leg swelling  GASTROINTESTINAL: No abdominal or epigastric pain. No nausea, vomiting, or hematemesis; No diarrhea or constipation. No melena or hematochezia.  GENITOURINARY: No dysuria, frequency, hematuria, or incontinence  NEUROLOGICAL: No headaches, memory loss, loss of strength, numbness, or tremors  SKIN: No itching, burning, rashes, or lesions   LYMPH NODES: No enlarged glands  ENDOCRINE: No heat or cold intolerance; No hair loss  MUSCULOSKELETAL: No joint pain or swelling; No muscle, back, or extremity pain  PSYCHIATRIC: No depression, anxiety, mood swings, or difficulty sleeping  HEME/LYMPH: No easy bruising, or bleeding gums  ALLERY AND IMMUNOLOGIC: No hives or eczema    T(C): 36.6 (11-16-24 @ 15:30), Max: 36.7 (11-16-24 @ 08:30)  HR: 70 (11-16-24 @ 15:30) (62 - 94)  BP: 141/74 (11-16-24 @ 15:30) (141/74 - 165/91)  RR: 18 (11-16-24 @ 15:30) (15 - 20)  SpO2: 95% (11-16-24 @ 15:30) (93% - 97%)  Wt(kg): --Vital Signs Last 24 Hrs  T(C): 36.6 (16 Nov 2024 15:30), Max: 36.7 (16 Nov 2024 08:30)  T(F): 97.8 (16 Nov 2024 15:30), Max: 98 (16 Nov 2024 08:30)  HR: 70 (16 Nov 2024 15:30) (62 - 94)  BP: 141/74 (16 Nov 2024 15:30) (141/74 - 165/91)  BP(mean): 101 (16 Nov 2024 15:30) (101 - 122)  RR: 18 (16 Nov 2024 15:30) (15 - 20)  SpO2: 95% (16 Nov 2024 15:30) (93% - 97%)    Parameters below as of 16 Nov 2024 15:30  Patient On (Oxygen Delivery Method): room air        PHYSICAL EXAM:  GENERAL: NAD, well-groomed, well-developed  HEAD:  Atraumatic, Normocephalic  EYES: EOMI, PERRLA, conjunctiva and sclera clear  ENMT: No tonsillar erythema, exudates, or enlargement; Moist mucous membranes, Good dentition, No lesions  NECK: Supple, No JVD, Normal thyroid  NERVOUS SYSTEM:  Alert & Oriented X3, Good concentration; Motor Strength 5/5 B/L upper and lower extremities; DTRs 2+ intact and symmetric  CHEST/LUNG: Clear to percussion bilaterally; No rales, rhonchi, wheezing, or rubs  HEART: Regular rate and rhythm; No murmurs, rubs, or gallops  ABDOMEN: Soft, Nontender, Nondistended; Bowel sounds present  EXTREMITIES:  2+ Peripheral Pulses, No clubbing, cyanosis, or edema  LYMPH: No lymphadenopathy noted  SKIN: No rashes or lesions    Consultant(s) Notes Reviewed:  [x ] YES  [ ] NO  Care Discussed with Consultants/Other Providers [ x] YES  [ ] NO    LABS:  CBC   11-16-24 @ 05:30  Hematcorit 29.7  Hemoglobin 9.7  Mean Cell Hemoglobin 30.2  Platelet Count-Automated 183  RBC Count 3.21  Red Cell Distrib Width 18.3  Wbc Count 6.21      BMP  11-16-24 @ 05:30  Anion Gap. Serum 11  Blood Urea Nitrogen,Serm 37  Calcium, Total Serum 8.7  Carbon Dioxide, Serum 26  Chloride, Serum 107  Creatinine, Serum 1.87  eGFR in  --  eGFR in Non Afican American --  Gloucose, serum 88  Potassium, Serum 3.5  Sodium, Serum 144              11-15-24 @ 05:30  Anion Gap. Serum 8  Blood Urea Nitrogen,Serm 32  Calcium, Total Serum 8.8  Carbon Dioxide, Serum 27  Chloride, Serum 107  Creatinine, Serum 1.72  eGFR in  --  eGFR in Non Afican American --  Gloucose, serum 108  Potassium, Serum 3.6  Sodium, Serum 142              11-14-24 @ 19:54  Anion Gap. Serum 10  Blood Urea Nitrogen,Serm 34  Calcium, Total Serum 8.7  Carbon Dioxide, Serum 23  Chloride, Serum 107  Creatinine, Serum 1.63  eGFR in  --  eGFR in Non Afican American --  Gloucose, serum 142  Potassium, Serum 3.6  Sodium, Serum 140              11-14-24 @ 16:23  Anion Gap. Serum 9  Blood Urea Nitrogen,Serm 34  Calcium, Total Serum 8.7  Carbon Dioxide, Serum 22  Chloride, Serum 104  Creatinine, Serum 1.53  eGFR in  --  eGFR in Non Afican American --  Gloucose, serum 186  Potassium, Serum See Note  Sodium, Serum 135                  CMP  11-16-24 @ 05:30  Maral Aminotransferase(ALT/SGPT)36  Albumin, Serum 3.3  Alkaline Phosphatase, Serum 71  Anion Gap, Serum 11  Aspartate Aminotransferase (AST/SGOT)31  Bilirubin Total, Serum 1.0  Blood Urea Nitrogen, Serum 37  Calcium,Total Serum 8.7  Carbon Dioxide, Serum 26  Chloride, Serum 107  Creatinine, Serum 1.87  eGFR if  --  eGFR if Non African American --  Glucose, Serum 88  Potassium, Serum 3.5  Protein Total, Serum 5.9  Sodium, Serum 144                      11-15-24 @ 05:30  Maral Aminotransferase(ALT/SGPT)38  Albumin, Serum 3.1  Alkaline Phosphatase, Serum 81  Anion Gap, Serum 8  Aspartate Aminotransferase (AST/SGOT)28  Bilirubin Total, Serum 1.3  Blood Urea Nitrogen, Serum 32  Calcium,Total Serum 8.8  Carbon Dioxide, Serum 27  Chloride, Serum 107  Creatinine, Serum 1.72  eGFR if  --  eGFR if Non African American --  Glucose, Serum 108  Potassium, Serum 3.6  Protein Total, Serum 5.8  Sodium, Serum 142                      11-14-24 @ 19:54  Maral Aminotransferase(ALT/SGPT)--  Albumin, Serum --  Alkaline Phosphatase, Serum --  Anion Gap, Serum 10  Aspartate Aminotransferase (AST/SGOT)--  Bilirubin Total, Serum --  Blood Urea Nitrogen, Serum 34  Calcium,Total Serum 8.7  Carbon Dioxide, Serum 23  Chloride, Serum 107  Creatinine, Serum 1.63  eGFR if  --  eGFR if Non African American --  Glucose, Serum 142  Potassium, Serum 3.6  Protein Total, Serum --  Sodium, Serum 140                      11-14-24 @ 16:23  Maral Aminotransferase(ALT/SGPT)--  Albumin, Serum --  Alkaline Phosphatase, Serum --  Anion Gap, Serum 9  Aspartate Aminotransferase (AST/SGOT)--  Bilirubin Total, Serum --  Blood Urea Nitrogen, Serum 34  Calcium,Total Serum 8.7  Carbon Dioxide, Serum 22  Chloride, Serum 104  Creatinine, Serum 1.53  eGFR if  --  eGFR if Non African American --  Glucose, Serum 186  Potassium, Serum See Note  Protein Total, Serum --  Sodium, Serum 135                          PT/INR      Amylase/Lipase            RADIOLOGY & ADDITIONAL TESTS:    Imaging Personally Reviewed:  [ ] YES  [ ] NO

## 2024-11-16 NOTE — PROGRESS NOTE ADULT - SUBJECTIVE AND OBJECTIVE BOX
Evaluated at bedside, clinically improved.     REVIEW OF SYSTEMS:   Otherwise negative except as specified in HPI      T(C): 36.4 (11-16-24 @ 12:00), Max: 36.7 (11-16-24 @ 08:30)  HR: 64 (11-16-24 @ 12:00) (62 - 94)  BP: 157/87 (11-16-24 @ 12:00) (138/86 - 169/102)  RR: 18 (11-16-24 @ 12:00) (15 - 20)  SpO2: 94% (11-16-24 @ 12:00) (93% - 98%)    CONSTITUTIONAL:  no apparent distress  RESP: No respiratory distress, no use of accessory muscles; CTA b/l,   CV: RRR, +S1S2, no MRG; no JVD; +1 peripheral edema  GI: Soft, NT, ND, no rebound, no guarding  NEURO: normal reflexes in upper and lower extremities, sensation intact in upper and lower extremities      VITALS:  T(F): 97.6 (11-16-24 @ 12:00), Max: 98 (11-16-24 @ 08:30)  HR: 64 (11-16-24 @ 12:00)  BP: 157/87 (11-16-24 @ 12:00)  RR: 18 (11-16-24 @ 12:00)  SpO2: 94% (11-16-24 @ 12:00)  Wt(kg): --    11-14 @ 07:01  -  11-15 @ 07:00  --------------------------------------------------------  IN: 200 mL / OUT: 1150 mL / NET: -950 mL    11-15 @ 07:01  -  11-16 @ 07:00  --------------------------------------------------------  IN: 1090 mL / OUT: 2400 mL / NET: -1310 mL    11-16 @ 07:01  -  11-16 @ 14:25  --------------------------------------------------------  IN: 360 mL / OUT: 550 mL / NET: -190 mL        LABS:  11-16    144  |  107  |  37[H]  ----------------------------<  88  3.5   |  26  |  1.87[H]    Ca    8.7      16 Nov 2024 05:30  Phos  3.2     11-16  Mg     2.1     11-16    TPro  5.8[L]  /  Alb  3.1[L]  /  TBili  1.3[H]  /  DBili      /  AST  28  /  ALT  38  /  AlkPhos  81  11-15                          9.7    6.21  )-----------( 183      ( 16 Nov 2024 05:30 )             29.7         allopurinol 100 milliGRAM(s) Oral daily  aspirin enteric coated 81 milliGRAM(s) Oral daily  atorvastatin 20 milliGRAM(s) Oral at bedtime  carvedilol 12.5 milliGRAM(s) Oral every 12 hours  dextrose 5%. 1000 milliLiter(s) IV Continuous <Continuous>  dextrose 5%. 1000 milliLiter(s) IV Continuous <Continuous>  dextrose 50% Injectable 25 Gram(s) IV Push once  dextrose 50% Injectable 12.5 Gram(s) IV Push once  dextrose 50% Injectable 25 Gram(s) IV Push once  dextrose Oral Gel 15 Gram(s) Oral once PRN  furosemide Infusion 5 mG/Hr IV Continuous <Continuous>  glucagon  Injectable 1 milliGRAM(s) IntraMuscular once  heparin   Injectable 5000 Unit(s) SubCutaneous every 8 hours  influenza  Vaccine (HIGH DOSE) 0.5 milliLiter(s) IntraMuscular once  insulin glargine Injectable (LANTUS) 8 Unit(s) SubCutaneous at bedtime  insulin lispro (ADMELOG) corrective regimen sliding scale   SubCutaneous Before meals and at bedtime  pantoprazole    Tablet 40 milliGRAM(s) Oral before breakfast  predniSONE   Tablet 5 milliGRAM(s) Oral daily  tacrolimus 2 milliGRAM(s) Oral <User Schedule>  tacrolimus 1 milliGRAM(s) Oral <User Schedule>        RADIOLOGY & ADDITIONAL TESTS: Reviewed.

## 2024-11-16 NOTE — PROGRESS NOTE ADULT - PROBLEM SELECTOR PLAN 3
Hx PCKD s/p kidney transplant in 2007 ([facility], follows w/ Nephrologist Dr Sulaiman Modi) w/ baseline Cr ranging from 1.2 -1.4 in 2023   - C/w Prograf 2mg in AM and Prograf 1mg in PM   - C/w Prednisone 5mg QD   - F/u Prograf level, may get weekly as needed per Renal  - Obtain daily urine lytes   - Nephro following  - S/p Albumin 25% 50mL x4 total for intravascular repletion to avoid poor perfusion to kidney  - Avoid nephrotoxic agents

## 2024-11-16 NOTE — PROGRESS NOTE ADULT - PROBLEM SELECTOR PLAN 1
Fluid overloaded on exam, now improving  - Etiology: unclear given NST negative and patient's Afib rate controlled. Compliant w/ meds   - TTE: 10/21/24: LVEF 58%, normal RVSF w/ increased wall thickness, hypokinetic basal and mid anterior septum and basal and mid inferior septum , PFO vs secundum ASD w/ L-R shunting, mild MR, aortic root 4.30 cm, no pericardial effusion  - NST 11/14/24 normal perfusion per outpatient MD note in HIE  - EKG Afib non ischemic   - Diuretic: D/c'ed further Lasix gtt 5mg/hr. Start IV Bumex 1mg BID on 11/16  - GMDT: Coreg 12.5mg BID (reduced home dose).   - HOLD home Entresto  mg BID 2/2 uptrending crea  - Core measures, strict input/out, tele, daily weights, fluid restriction

## 2024-11-16 NOTE — PROGRESS NOTE ADULT - ASSESSMENT
79M [place] PMHx of PCKD s/p kidney transplant in 2007, CKD (baseline Cr 1.2-1.4), HTN, HLD, HFpEF (EF 58% by TTE October 2024), TDM2, prostate CA s/p radical prostatectomy 2015, Afib/AFl s/p ablation, admitted for HFpEF exacerbation requiring IV diuresis. Nephrology consulted for DAYO on CKD and diuresis co-management.     -Imp: Nonoliguric DAYO on CKD, suspected CRS.   Rt kidney transplant in 2007, on Tacro and prednisone  Jennings UA on 11/15    Plan:  Hold Entresto for now.   Cont. loop diuretics. Aim for net negative 1-2L for 11/17 am.   Obtain Tacro Level on 11/17 am, 30 min before am dose.   Cont. Tacro 2/1. Tacro goal 4-6  Maintain SBP >120 for renal perfusion  Strict I/Os, monitor UOP  Avoid nephrotoxic agents

## 2024-11-16 NOTE — CONSULT NOTE ADULT - CONSULT REQUESTED BY NAME
Pt presents to the Urgent care of [facility]      Quang Rivas is a 66 year old male       Pt states: \"  I have a bump on my back and now it is infected. Its been there for over a year. Girlfriend tried to recently pop it and pus came out, but then it became worse.\"                                                     SX are:  Back presents a cyst that is erythema and raised.                                    Symptoms present for :  1 + year with it just becoming erythema in the 2 weeks      OTC medications used: none      Pt is here with his girlfriend    Suicide score: Negative                    
Dr. Vee
Nanda

## 2024-11-16 NOTE — PROGRESS NOTE ADULT - ASSESSMENT
79M [place] w/ PMHx HTN, HLD, HFpEF (EF 58% by TTE October 2024), TDM2, CKD, PCKD s/p kidney transplant in 2007 ([facility]), prostate CA s/p radical prostatectomy 2015, DVT 2006 (in setting of pelvic fracture), Afib/AFl s/p ablation in Jan 2018 not on AC 2/2 GIB s/p Amulet device in April 2023 with hospital course c/b PEA arrest w/ ROSC and pericardial tamponade s/p bedside pericardiocentesis, RTOR for sternotomy/washout and evacuation of pericardial clot, aortic aneurysm, referred to [facility] ER on 11/14/24 by outpatient cardiologist for progressive worsening GAFFNEY and 14lb weight gain x 2 weeks. Admitted to cardiac tele for acute on chronic HFpEF exacerbation, currently on IV diuresis. Renal following for Hx kidney transplant.

## 2024-11-16 NOTE — PROGRESS NOTE ADULT - NSPROGADDITIONALINFOA_GEN_ALL_CORE
Attending Attestation:  I was physically present for the key portions of the evaluation and management (E/M) service provided. I reviewed relevant data including imaging, labs and prior procedure reports available. I agree with the above history, physical, and plan.    Jesse Lott MD   Cardiology Attending Attestation:  I was physically present for the key portions of the evaluation and management (E/M) service provided. I reviewed relevant data including imaging, labs and prior procedure reports available. I agree with the above history, physical, and plan.  -discussed to slow down on diuresis given pt reports he is at his dry wt ~150 lbs. transitioned to IV bumex 1 mg bid    Jesse Lott MD   Cardiology

## 2024-11-17 LAB
ALBUMIN SERPL ELPH-MCNC: 3.7 G/DL — SIGNIFICANT CHANGE UP (ref 3.3–5)
ALP SERPL-CCNC: 81 U/L — SIGNIFICANT CHANGE UP (ref 40–120)
ALT FLD-CCNC: 39 U/L — SIGNIFICANT CHANGE UP (ref 10–45)
ANION GAP SERPL CALC-SCNC: 11 MMOL/L — SIGNIFICANT CHANGE UP (ref 5–17)
ANION GAP SERPL CALC-SCNC: 8 MMOL/L — SIGNIFICANT CHANGE UP (ref 5–17)
AST SERPL-CCNC: 34 U/L — SIGNIFICANT CHANGE UP (ref 10–40)
BILIRUB SERPL-MCNC: 1 MG/DL — SIGNIFICANT CHANGE UP (ref 0.2–1.2)
BUN SERPL-MCNC: 43 MG/DL — HIGH (ref 7–23)
BUN SERPL-MCNC: 44 MG/DL — HIGH (ref 7–23)
CALCIUM SERPL-MCNC: 9.1 MG/DL — SIGNIFICANT CHANGE UP (ref 8.4–10.5)
CALCIUM SERPL-MCNC: 9.2 MG/DL — SIGNIFICANT CHANGE UP (ref 8.4–10.5)
CHLORIDE SERPL-SCNC: 107 MMOL/L — SIGNIFICANT CHANGE UP (ref 96–108)
CHLORIDE SERPL-SCNC: 108 MMOL/L — SIGNIFICANT CHANGE UP (ref 96–108)
CO2 SERPL-SCNC: 25 MMOL/L — SIGNIFICANT CHANGE UP (ref 22–31)
CO2 SERPL-SCNC: 26 MMOL/L — SIGNIFICANT CHANGE UP (ref 22–31)
CREAT ?TM UR-MCNC: 70 MG/DL — SIGNIFICANT CHANGE UP
CREAT SERPL-MCNC: 2 MG/DL — HIGH (ref 0.5–1.3)
CREAT SERPL-MCNC: 2.01 MG/DL — HIGH (ref 0.5–1.3)
EGFR: 34 ML/MIN/1.73M2 — LOW
EGFR: 34 ML/MIN/1.73M2 — LOW
GLUCOSE SERPL-MCNC: 120 MG/DL — HIGH (ref 70–99)
GLUCOSE SERPL-MCNC: 218 MG/DL — HIGH (ref 70–99)
HCT VFR BLD CALC: 34.3 % — LOW (ref 39–50)
HGB BLD-MCNC: 11 G/DL — LOW (ref 13–17)
MAGNESIUM SERPL-MCNC: 2.2 MG/DL — SIGNIFICANT CHANGE UP (ref 1.6–2.6)
MCHC RBC-ENTMCNC: 29.6 PG — SIGNIFICANT CHANGE UP (ref 27–34)
MCHC RBC-ENTMCNC: 32.1 G/DL — SIGNIFICANT CHANGE UP (ref 32–36)
MCV RBC AUTO: 92.2 FL — SIGNIFICANT CHANGE UP (ref 80–100)
NRBC # BLD: 0 /100 WBCS — SIGNIFICANT CHANGE UP (ref 0–0)
OSMOLALITY UR: 457 MOSM/KG — SIGNIFICANT CHANGE UP (ref 300–900)
PHOSPHATE SERPL-MCNC: 3.1 MG/DL — SIGNIFICANT CHANGE UP (ref 2.5–4.5)
PLATELET # BLD AUTO: 207 K/UL — SIGNIFICANT CHANGE UP (ref 150–400)
POTASSIUM SERPL-MCNC: 4.3 MMOL/L — SIGNIFICANT CHANGE UP (ref 3.5–5.3)
POTASSIUM SERPL-MCNC: 4.4 MMOL/L — SIGNIFICANT CHANGE UP (ref 3.5–5.3)
POTASSIUM SERPL-SCNC: 4.3 MMOL/L — SIGNIFICANT CHANGE UP (ref 3.5–5.3)
POTASSIUM SERPL-SCNC: 4.4 MMOL/L — SIGNIFICANT CHANGE UP (ref 3.5–5.3)
POTASSIUM UR-SCNC: 46 MMOL/L — SIGNIFICANT CHANGE UP
PROT ?TM UR-MCNC: 39 MG/DL — HIGH (ref 0–12)
PROT SERPL-MCNC: 6.4 G/DL — SIGNIFICANT CHANGE UP (ref 6–8.3)
PROT/CREAT UR-RTO: 0.6 RATIO — HIGH (ref 0–0.2)
RBC # BLD: 3.72 M/UL — LOW (ref 4.2–5.8)
RBC # FLD: 18.3 % — HIGH (ref 10.3–14.5)
SODIUM SERPL-SCNC: 142 MMOL/L — SIGNIFICANT CHANGE UP (ref 135–145)
SODIUM SERPL-SCNC: 143 MMOL/L — SIGNIFICANT CHANGE UP (ref 135–145)
SODIUM UR-SCNC: 43 MMOL/L — SIGNIFICANT CHANGE UP
TACROLIMUS SERPL-MCNC: 2.4 NG/ML — SIGNIFICANT CHANGE UP
UUN UR-MCNC: 682 MG/DL — SIGNIFICANT CHANGE UP
WBC # BLD: 7.45 K/UL — SIGNIFICANT CHANGE UP (ref 3.8–10.5)
WBC # FLD AUTO: 7.45 K/UL — SIGNIFICANT CHANGE UP (ref 3.8–10.5)

## 2024-11-17 PROCEDURE — 99233 SBSQ HOSP IP/OBS HIGH 50: CPT

## 2024-11-17 PROCEDURE — 99232 SBSQ HOSP IP/OBS MODERATE 35: CPT

## 2024-11-17 RX ORDER — TACROLIMUS 5 MG/1
2 CAPSULE ORAL EVERY 24 HOURS
Refills: 0 | Status: DISCONTINUED | OUTPATIENT
Start: 2024-11-17 | End: 2024-11-17

## 2024-11-17 RX ORDER — FAMOTIDINE 20 MG/1
20 TABLET, FILM COATED ORAL DAILY
Refills: 0 | Status: DISCONTINUED | OUTPATIENT
Start: 2024-11-17 | End: 2024-11-20

## 2024-11-17 RX ORDER — TACROLIMUS 5 MG/1
2 CAPSULE ORAL
Refills: 0 | Status: DISCONTINUED | OUTPATIENT
Start: 2024-11-17 | End: 2024-11-18

## 2024-11-17 RX ADMIN — Medication 2: at 16:52

## 2024-11-17 RX ADMIN — Medication 5000 UNIT(S): at 15:24

## 2024-11-17 RX ADMIN — TACROLIMUS 2 MILLIGRAM(S): 5 CAPSULE ORAL at 11:37

## 2024-11-17 RX ADMIN — CARVEDILOL 12.5 MILLIGRAM(S): 25 TABLET, FILM COATED ORAL at 06:30

## 2024-11-17 RX ADMIN — ALLOPURINOL 100 MILLIGRAM(S): 300 TABLET ORAL at 11:37

## 2024-11-17 RX ADMIN — FAMOTIDINE 20 MILLIGRAM(S): 20 TABLET, FILM COATED ORAL at 22:36

## 2024-11-17 RX ADMIN — Medication 5000 UNIT(S): at 06:31

## 2024-11-17 RX ADMIN — Medication 20 MILLIGRAM(S): at 22:23

## 2024-11-17 RX ADMIN — Medication 5000 UNIT(S): at 22:24

## 2024-11-17 RX ADMIN — Medication 1: at 12:24

## 2024-11-17 RX ADMIN — CARVEDILOL 12.5 MILLIGRAM(S): 25 TABLET, FILM COATED ORAL at 17:03

## 2024-11-17 RX ADMIN — TACROLIMUS 2 MILLIGRAM(S): 5 CAPSULE ORAL at 22:26

## 2024-11-17 RX ADMIN — Medication 81 MILLIGRAM(S): at 11:37

## 2024-11-17 RX ADMIN — PANTOPRAZOLE SODIUM 40 MILLIGRAM(S): 40 TABLET, DELAYED RELEASE ORAL at 06:30

## 2024-11-17 RX ADMIN — PREDNISONE 5 MILLIGRAM(S): 20 TABLET ORAL at 06:30

## 2024-11-17 RX ADMIN — INSULIN GLARGINE 8 UNIT(S): 100 INJECTION, SOLUTION SUBCUTANEOUS at 22:24

## 2024-11-17 NOTE — PROGRESS NOTE ADULT - PROBLEM SELECTOR PLAN 1
Fluid overloaded on exam, now improving  - Etiology: unclear given NST negative and patient's Afib rate controlled. Compliant w/ meds   - TTE: 10/21/24: LVEF 58%, normal RVSF w/ increased wall thickness, hypokinetic basal and mid anterior septum and basal and mid inferior septum , PFO vs secundum ASD w/ L-R shunting, mild MR, aortic root 4.30 cm, no pericardial effusion  - NST 11/14/24 normal perfusion per outpatient MD note in HIE  - EKG Afib non ischemic   - Diuretic: s/p Lasix gtt 5mg/hr. Holding Bumex 1mg BID IV due to rising Cr, today 2.00  - GMDT: Coreg 12.5mg BID (reduced home dose).   --- holding home Entresto  mg BID 2/2 uptrending crea  - Core measures, strict input/out, tele, daily weights, fluid restriction Fluid overloaded on exam, now improving  - Etiology: unclear given NST negative and patient's Afib rate controlled. Compliant w/ meds.  - TTE: 10/21/24: LVEF 58%, normal RVSF w/ increased wall thickness, hypokinetic basal and mid anterior septum and basal and mid inferior septum , PFO vs secundum ASD w/ L-R shunting, mild MR, aortic root 4.30 cm, no pericardial effusion  - NST 11/14/24 normal perfusion per outpatient MD note in HIE  - EKG Afib non ischemic   - Diuretic: s/p Lasix gtt 5mg/hr. Holding Bumex 1mg BID IV due to rising Cr, today 2.00  - GMDT: Coreg 12.5mg BID (reduced home dose).   --- holding home Entresto  mg BID 2/2 uptrending crea  - Core measures, strict input/out, tele, daily weights, fluid restriction

## 2024-11-17 NOTE — PROGRESS NOTE ADULT - ASSESSMENT
79M [place] w/ PMHx HTN, HLD, HFpEF (EF 58% by TTE October 2024), TDM2, CKD, PCKD s/p kidney transplant in 2007 ([facility]), prostate CA s/p radical prostatectomy 2015, DVT 2006 (in setting of pelvic fracture), Afib/AFl s/p ablation in Jan 2018 not on AC 2/2 GIB s/p Amulet device in April 2023 with hospital course c/b PEA arrest w/ ROSC and pericardial tamponade s/p bedside pericardiocentesis, RTOR for sternotomy/washout and evacuation of pericardial clot, aortic aneurysm, referred to [facility] ER on 11/14/24 by outpatient cardiologist for progressive worsening GAFFNEY and 14lb weight gain x 2 weeks. Admitted to cardiac tele for acute on chronic HFpEF exacerbation, currently on IV diuresis. Renal following for Hx kidney transplant.       1) (HFpEF) heart failure with preserved ejection fraction.   ·  Plan: Fluid overloaded on exam, now improving  - Etiology: unclear given NST negative and patient's Afib rate controlled. - Compliant w/ meds   - Diuretic on hold today given increasing creatinine.  - Coreg 12.5mg BID (reduced home dose).   - Entresto on hold.  2) Afib:  - Coreg 12.5mg BID.  - ASA 81mg daily.  - AC previously discontinued due to GI Bleed.  3) History of Kidney transplant:  - Avoiding nephrotoxins.  - Nephrology following.  - Tacrolimus dose increase to 2mg BID as per nephrology.   - Bumex for diuresis.  4) HTN:  - Coreg 12.5mg BID.  - Avoid hypotension given renal transplant.  5) HLD:  - Atorvastatin 20mg Qhs.   6) DVT proph:  - HSQ

## 2024-11-17 NOTE — PROGRESS NOTE ADULT - SUBJECTIVE AND OBJECTIVE BOX
Evaluated at bedside, clinically improved. Tacro 2.4 (low), sCr 2.0 today.     REVIEW OF SYSTEMS:   Otherwise negative except as specified in HPI      T(C): 36.4 (11-16-24 @ 12:00), Max: 36.7 (11-16-24 @ 08:30)  HR: 64 (11-16-24 @ 12:00) (62 - 94)  BP: 157/87 (11-16-24 @ 12:00) (138/86 - 169/102)  RR: 18 (11-16-24 @ 12:00) (15 - 20)  SpO2: 94% (11-16-24 @ 12:00) (93% - 98%)    CONSTITUTIONAL:  no apparent distress  RESP: No respiratory distress, no use of accessory muscles; CTA b/l,   CV: RRR, +S1S2, no MRG; no JVD; +1 peripheral edema  GI: Soft, NT, ND, no rebound, no guarding  NEURO: normal reflexes in upper and lower extremities, sensation intact in upper and lower extremities      VITALS:  T(F): 97.6 (11-16-24 @ 12:00), Max: 98 (11-16-24 @ 08:30)  HR: 64 (11-16-24 @ 12:00)  BP: 157/87 (11-16-24 @ 12:00)  RR: 18 (11-16-24 @ 12:00)  SpO2: 94% (11-16-24 @ 12:00)  Wt(kg): --    11-14 @ 07:01  -  11-15 @ 07:00  --------------------------------------------------------  IN: 200 mL / OUT: 1150 mL / NET: -950 mL    11-15 @ 07:01  -  11-16 @ 07:00  --------------------------------------------------------  IN: 1090 mL / OUT: 2400 mL / NET: -1310 mL    11-16 @ 07:01  -  11-16 @ 14:25  --------------------------------------------------------  IN: 360 mL / OUT: 550 mL / NET: -190 mL      LABS:  11-16    144  |  107  |  37[H]  ----------------------------<  88  3.5   |  26  |  1.87[H]    Ca    8.7      16 Nov 2024 05:30  Phos  3.2     11-16  Mg     2.1     11-16    TPro  5.8[L]  /  Alb  3.1[L]  /  TBili  1.3[H]  /  DBili      /  AST  28  /  ALT  38  /  AlkPhos  81  11-15                          9.7    6.21  )-----------( 183      ( 16 Nov 2024 05:30 )             29.7       allopurinol 100 milliGRAM(s) Oral daily  aspirin enteric coated 81 milliGRAM(s) Oral daily  atorvastatin 20 milliGRAM(s) Oral at bedtime  carvedilol 12.5 milliGRAM(s) Oral every 12 hours  dextrose 5%. 1000 milliLiter(s) IV Continuous <Continuous>  dextrose 5%. 1000 milliLiter(s) IV Continuous <Continuous>  dextrose 50% Injectable 25 Gram(s) IV Push once  dextrose 50% Injectable 12.5 Gram(s) IV Push once  dextrose 50% Injectable 25 Gram(s) IV Push once  dextrose Oral Gel 15 Gram(s) Oral once PRN  furosemide Infusion 5 mG/Hr IV Continuous <Continuous>  glucagon  Injectable 1 milliGRAM(s) IntraMuscular once  heparin   Injectable 5000 Unit(s) SubCutaneous every 8 hours  influenza  Vaccine (HIGH DOSE) 0.5 milliLiter(s) IntraMuscular once  insulin glargine Injectable (LANTUS) 8 Unit(s) SubCutaneous at bedtime  insulin lispro (ADMELOG) corrective regimen sliding scale   SubCutaneous Before meals and at bedtime  pantoprazole    Tablet 40 milliGRAM(s) Oral before breakfast  predniSONE   Tablet 5 milliGRAM(s) Oral daily  tacrolimus 2 milliGRAM(s) Oral <User Schedule>  tacrolimus 1 milliGRAM(s) Oral <User Schedule>        RADIOLOGY & ADDITIONAL TESTS: Reviewed.

## 2024-11-17 NOTE — PROGRESS NOTE ADULT - NSPROGADDITIONALINFOA_GEN_ALL_CORE
Attending Attestation:  I was physically present for the key portions of the evaluation and management (E/M) service provided. I reviewed relevant data including imaging, labs and prior procedure reports available. I agree with the above history, physical, and plan.  - hold diuresis with increasing creatinine. Pt appears euvolemic on exam. Repeat BMP in PM. Encourage OFI. If needed, may need some repletion 11/18  Jesse Lott MD   Cardiology

## 2024-11-17 NOTE — PROGRESS NOTE ADULT - SUBJECTIVE AND OBJECTIVE BOX
CARDIOLOGY PA PROGRESS NOTE    SUBJECTIVE:  Pt seen and examined at bedside this AM. Pt reports feeling well and offers no other acute complaints & ROS otherwise negative.     INTERVAL HISTORY:  No acute events.  	  MEDICATIONS:  carvedilol 12.5 milliGRAM(s) Oral every 12 hours          famotidine    Tablet 20 milliGRAM(s) Oral daily    allopurinol 100 milliGRAM(s) Oral daily  atorvastatin 20 milliGRAM(s) Oral at bedtime  dextrose 50% Injectable 25 Gram(s) IV Push once  dextrose 50% Injectable 12.5 Gram(s) IV Push once  dextrose 50% Injectable 25 Gram(s) IV Push once  dextrose Oral Gel 15 Gram(s) Oral once PRN  glucagon  Injectable 1 milliGRAM(s) IntraMuscular once  insulin glargine Injectable (LANTUS) 8 Unit(s) SubCutaneous at bedtime  insulin lispro (ADMELOG) corrective regimen sliding scale   SubCutaneous Before meals and at bedtime  predniSONE   Tablet 5 milliGRAM(s) Oral daily    aspirin enteric coated 81 milliGRAM(s) Oral daily  dextrose 5%. 1000 milliLiter(s) IV Continuous <Continuous>  dextrose 5%. 1000 milliLiter(s) IV Continuous <Continuous>  heparin   Injectable 5000 Unit(s) SubCutaneous every 8 hours  influenza  Vaccine (HIGH DOSE) 0.5 milliLiter(s) IntraMuscular once  tacrolimus 2 milliGRAM(s) Oral every 24 hours  tacrolimus 2 milliGRAM(s) Oral <User Schedule>          PHYSICAL EXAM:  T(C): 36.7 (11-17-24 @ 08:47), Max: 36.7 (11-17-24 @ 08:47)  HR: 66 (11-17-24 @ 11:59) (63 - 75)  BP: 161/86 (11-17-24 @ 11:59) (154/83 - 166/92)  RR: 19 (11-17-24 @ 11:59) (18 - 20)  SpO2: 96% (11-17-24 @ 11:59) (94% - 96%)  Wt(kg): --  I&O's Summary    16 Nov 2024 07:01  -  17 Nov 2024 07:00  --------------------------------------------------------  IN: 540 mL / OUT: 1050 mL / NET: -510 mL    17 Nov 2024 07:01  -  17 Nov 2024 16:59  --------------------------------------------------------  IN: 480 mL / OUT: 0 mL / NET: 480 mL          Appearance: NAD	  Cardiovascular: Normal S1 S2, No JVD, No murmurs  Respiratory: CTA b/l, No rales, No rhonchi, No wheezing  Gastrointestinal:  Soft, Non-tender, + BS	  Skin: No rashes, No ecchymoses, No cyanosis  Neurologic: Non-focal  Extremities: 2+ pitting pedal edema, Normal range of motion,   Psychiatry: A & O x 3, Mood & affect appropriate    TELEMETRY: 	    ECG:  	  RADIOLOGY:   DIAGNOSTIC TESTING:  [ ] Echocardiogram:  [ ]  Catheterization:  [ ] Stress Test:    OTHER: 	    LABS:	 	    CARDIAC MARKERS:                                  11.0   7.45  )-----------( 207      ( 17 Nov 2024 05:30 )             34.3     11-17    143  |  107  |  43[H]  ----------------------------<  120[H]  4.3   |  25  |  2.00[H]    Ca    9.2      17 Nov 2024 05:30  Phos  3.1     11-17  Mg     2.2     11-17    TPro  6.4  /  Alb  3.7  /  TBili  1.0  /  DBili  x   /  AST  34  /  ALT  39  /  AlkPhos  81  11-17    proBNP:   Lipid Profile:   HgA1c:   TSH:          CARDIOLOGY PA PROGRESS NOTE    SUBJECTIVE:  Pt seen and examined at bedside this AM. Pt reports feeling well and offers no other acute complaints & ROS otherwise negative.     INTERVAL HISTORY:  No acute events.  	  MEDICATIONS:  carvedilol 12.5 milliGRAM(s) Oral every 12 hours          famotidine    Tablet 20 milliGRAM(s) Oral daily    allopurinol 100 milliGRAM(s) Oral daily  atorvastatin 20 milliGRAM(s) Oral at bedtime  dextrose 50% Injectable 25 Gram(s) IV Push once  dextrose 50% Injectable 12.5 Gram(s) IV Push once  dextrose 50% Injectable 25 Gram(s) IV Push once  dextrose Oral Gel 15 Gram(s) Oral once PRN  glucagon  Injectable 1 milliGRAM(s) IntraMuscular once  insulin glargine Injectable (LANTUS) 8 Unit(s) SubCutaneous at bedtime  insulin lispro (ADMELOG) corrective regimen sliding scale   SubCutaneous Before meals and at bedtime  predniSONE   Tablet 5 milliGRAM(s) Oral daily    aspirin enteric coated 81 milliGRAM(s) Oral daily  dextrose 5%. 1000 milliLiter(s) IV Continuous <Continuous>  dextrose 5%. 1000 milliLiter(s) IV Continuous <Continuous>  heparin   Injectable 5000 Unit(s) SubCutaneous every 8 hours  influenza  Vaccine (HIGH DOSE) 0.5 milliLiter(s) IntraMuscular once  tacrolimus 2 milliGRAM(s) Oral every 24 hours  tacrolimus 2 milliGRAM(s) Oral <User Schedule>          PHYSICAL EXAM:  T(C): 36.7 (11-17-24 @ 08:47), Max: 36.7 (11-17-24 @ 08:47)  HR: 66 (11-17-24 @ 11:59) (63 - 75)  BP: 161/86 (11-17-24 @ 11:59) (154/83 - 166/92)  RR: 19 (11-17-24 @ 11:59) (18 - 20)  SpO2: 96% (11-17-24 @ 11:59) (94% - 96%)  Wt(kg): --  I&O's Summary    16 Nov 2024 07:01  -  17 Nov 2024 07:00  --------------------------------------------------------  IN: 540 mL / OUT: 1050 mL / NET: -510 mL    17 Nov 2024 07:01  -  17 Nov 2024 16:59  --------------------------------------------------------  IN: 480 mL / OUT: 0 mL / NET: 480 mL          Appearance: NAD	  Cardiovascular: Normal S1 S2, No JVD, No murmurs  Respiratory: CTA b/l, No Rales, No Rhonchi, No wheezing  Gastrointestinal:  Soft, Non-tender, + BS	  Skin: No rashes, No ecchymoses, No cyanosis  Neurologic: Non-focal  Extremities: 2+ bilateral pitting pedal edema, Normal range of motion  Psychiatry: A & O x 3, Mood & affect appropriate    TELEMETRY: 	    ECG:  	  RADIOLOGY:   DIAGNOSTIC TESTING:  [ ] Echocardiogram:  [ ]  Catheterization:  [ ] Stress Test:    OTHER: 	    LABS:	 	    CARDIAC MARKERS:                                  11.0   7.45  )-----------( 207      ( 17 Nov 2024 05:30 )             34.3     11-17    143  |  107  |  43[H]  ----------------------------<  120[H]  4.3   |  25  |  2.00[H]    Ca    9.2      17 Nov 2024 05:30  Phos  3.1     11-17  Mg     2.2     11-17    TPro  6.4  /  Alb  3.7  /  TBili  1.0  /  DBili  x   /  AST  34  /  ALT  39  /  AlkPhos  81  11-17    proBNP:   Lipid Profile:   HgA1c:   TSH:

## 2024-11-17 NOTE — PROGRESS NOTE ADULT - PROBLEM SELECTOR PLAN 3
Hx PCKD s/p kidney transplant in 2007 ([facility], follows w/ Nephrologist Dr Sulaiman Modi) w/ baseline Cr ranging from 1.2 -1.4 in 2023   - Prograf 2mg AM and 1mg PM increased to 2mg BID due to tacrol not within goal   - F/u Prograf level daily, 30 minutes before morning dose  ---today tacro level 2.4, goal 4-6  - c/w Prednisone 5mg QD   - Obtain daily urine lytes   - Nephro following, recs appreciated   - S/p Albumin 25% 50mL x4 total for intravascular repletion to avoid poor perfusion to kidney  - Avoid nephrotoxic agents Hx PCKD s/p kidney transplant in 2007 ([facility], follows w/ Nephrologist Dr Sulaiman Modi) w/ baseline Cr ranging from 1.2 -1.4 in 2023   - Prograf 2mg AM and 1mg PM increased to 2mg BID due to tacro level not within goal   - F/u Prograf level daily, 30 minutes before morning dose  ---today tacro level 2.4, goal 4-6  - c/w Prednisone 5mg QD   - Obtain daily urine lytes   - Nephro following, recs appreciated   - S/p Albumin 25% 50mL x4 total for intravascular repletion to avoid poor perfusion to kidney  - Avoid nephrotoxic agents

## 2024-11-17 NOTE — PROGRESS NOTE ADULT - ASSESSMENT
79M, [employer], w/ PMHx HTN, HLD, HFpEF (EF 58% by TTE 10/2024), TDM2, CKD, PCKD s/p kidney transplant in 2007 ([facility]), prostate CA s/p radical prostatectomy 2015, DVT 2006 (in setting of pelvic fracture), Afib/AFl s/p ablation in Jan 2018 not on AC 2/2 GIB s/p Amulet device in April 2023 with hospital course c/b PEA arrest w/ ROSC and pericardial tamponade s/p bedside pericardiocentesis, RTOR for sternotomy/washout and evacuation of pericardial clot, aortic aneurysm, referred to [facility] ER on 11/14/24 by outpatient cardiologist for progressive worsening GAFFNEY and 14lb weight gain x 2 weeks. Admitted to cardiac tele for acute on chronic HFpEF exacerbation, currently on IV diuresis.

## 2024-11-17 NOTE — PROGRESS NOTE ADULT - ASSESSMENT
79M [place] PMHx of PCKD s/p kidney transplant in 2007, CKD, HTN, HLD, HFpEF (EF 58% by TTE October 2024), TDM2, prostate CA s/p radical prostatectomy 2015, Afib/AFl s/p ablation, admitted for HFpEF exacerbation requiring IV diuresis. Nephrology consulted for DAYO on CKD and diuresis co-management.     -Imp: Nonoliguric ?DAYO on CKD3, suspected CRS.   bsCr 1.5-1.9, currently sCr 2.0, close to baseline, or possible new baseline(1.7-2.0)  Rt kidney transplant in 2007, on Tacro and prednisone.  [place] UA on 11/15  Tacro 2.4 (low), sCr 2.0 today.   Last 24h uop: 1050 ml    Plan:  Tacro 2.4 is subtherapeutic. Increase to 2mg BID instead of 2/1.   Obtain Tacro Level on 11/18 am, 30 min before am dose. Tacro goal 4-6  Holding Entresto for now.   Lasix holiday today.   Repeat UA with microscopy, U. lytes, and UPCR on 11/18 am.   DC PPI(interferes with Tacro absorption) or switch to Famotidine.   Maintain SBP >120 for renal perfusion  Strict I/Os, monitor UOP  Avoid nephrotoxic agents

## 2024-11-17 NOTE — PROGRESS NOTE ADULT - PROBLEM SELECTOR PLAN 5
Lipid panel WNL, LDL 36  - C/w Atorvastatin 20mg QHS     F: 2L fluid restriction, to to rising Cr  E: Replete for K <4, Mg <2  N: DASH/TLC  GI PPx: Famotidine   DVT PPx: SQ Heparin     Code Status: FULL CODE    Dispo: pending medical course

## 2024-11-17 NOTE — PROGRESS NOTE ADULT - SUBJECTIVE AND OBJECTIVE BOX
Patient is a 79y old  Male who presents with a chief complaint of HFpEF exacerbation (17 Nov 2024 08:33)      SUBJECTIVE / OVERNIGHT EVENTS:    MEDICATIONS  (STANDING):  allopurinol 100 milliGRAM(s) Oral daily  aspirin enteric coated 81 milliGRAM(s) Oral daily  atorvastatin 20 milliGRAM(s) Oral at bedtime  carvedilol 12.5 milliGRAM(s) Oral every 12 hours  dextrose 5%. 1000 milliLiter(s) (100 mL/Hr) IV Continuous <Continuous>  dextrose 5%. 1000 milliLiter(s) (50 mL/Hr) IV Continuous <Continuous>  dextrose 50% Injectable 25 Gram(s) IV Push once  dextrose 50% Injectable 12.5 Gram(s) IV Push once  dextrose 50% Injectable 25 Gram(s) IV Push once  glucagon  Injectable 1 milliGRAM(s) IntraMuscular once  heparin   Injectable 5000 Unit(s) SubCutaneous every 8 hours  influenza  Vaccine (HIGH DOSE) 0.5 milliLiter(s) IntraMuscular once  insulin glargine Injectable (LANTUS) 8 Unit(s) SubCutaneous at bedtime  insulin lispro (ADMELOG) corrective regimen sliding scale   SubCutaneous Before meals and at bedtime  pantoprazole    Tablet 40 milliGRAM(s) Oral before breakfast  predniSONE   Tablet 5 milliGRAM(s) Oral daily  tacrolimus 2 milliGRAM(s) Oral <User Schedule>  tacrolimus 1 milliGRAM(s) Oral <User Schedule>    MEDICATIONS  (PRN):  dextrose Oral Gel 15 Gram(s) Oral once PRN Blood Glucose LESS THAN 70 milliGRAM(s)/deciliter      CAPILLARY BLOOD GLUCOSE      POCT Blood Glucose.: 103 mg/dL (17 Nov 2024 08:04)  POCT Blood Glucose.: 201 mg/dL (16 Nov 2024 21:55)  POCT Blood Glucose.: 141 mg/dL (16 Nov 2024 16:43)  POCT Blood Glucose.: 190 mg/dL (16 Nov 2024 11:46)    I&O's Summary    16 Nov 2024 07:01  -  17 Nov 2024 07:00  --------------------------------------------------------  IN: 540 mL / OUT: 1050 mL / NET: -510 mL    17 Nov 2024 07:01  -  17 Nov 2024 11:37  --------------------------------------------------------  IN: 240 mL / OUT: 0 mL / NET: 240 mL        PHYSICAL EXAM:  Vital Signs Last 24 Hrs  T(C): 36.7 (17 Nov 2024 08:47), Max: 36.7 (17 Nov 2024 08:47)  T(F): 98 (17 Nov 2024 08:47), Max: 98 (17 Nov 2024 08:47)  HR: 64 (17 Nov 2024 08:47) (63 - 75)  BP: 163/90 (17 Nov 2024 08:47) (141/74 - 166/92)  BP(mean): 111 (17 Nov 2024 00:45) (101 - 119)  RR: 19 (17 Nov 2024 08:47) (18 - 20)  SpO2: 95% (17 Nov 2024 08:47) (94% - 95%)    Parameters below as of 17 Nov 2024 08:47  Patient On (Oxygen Delivery Method): room air      GENERAL: NAD, well-developed  HEAD:  Atraumatic, Normocephalic  EYES: EOMI, PERRLA, conjunctiva and sclera clear  NECK: Supple, No JVD  CHEST/LUNG: Clear to auscultation bilaterally; No wheeze  HEART: Regular rate and rhythm; No murmurs, rubs, or gallops  ABDOMEN: Soft, Nontender, Nondistended; Bowel sounds present  EXTREMITIES:  2+ Peripheral Pulses, No clubbing, cyanosis, or edema  PSYCH: AAOx3  NEUROLOGY: non-focal  SKIN: No rashes or lesions    LABS:                        11.0   7.45  )-----------( 207      ( 17 Nov 2024 05:30 )             34.3     11-17    143  |  107  |  43[H]  ----------------------------<  120[H]  4.3   |  25  |  2.00[H]    Ca    9.2      17 Nov 2024 05:30  Phos  3.1     11-17  Mg     2.2     11-17    TPro  6.4  /  Alb  3.7  /  TBili  1.0  /  DBili  x   /  AST  34  /  ALT  39  /  AlkPhos  81  11-17          Urinalysis Basic - ( 17 Nov 2024 05:30 )    Color: x / Appearance: x / SG: x / pH: x  Gluc: 120 mg/dL / Ketone: x  / Bili: x / Urobili: x   Blood: x / Protein: x / Nitrite: x   Leuk Esterase: x / RBC: x / WBC x   Sq Epi: x / Non Sq Epi: x / Bacteria: x        RADIOLOGY & ADDITIONAL TESTS:    Imaging Personally Reviewed:    Consultant(s) Notes Reviewed:      Care Discussed with Consultants/Other Providers:   Patient is a 79y old  Male who presents with a chief complaint of HFpEF exacerbation (17 Nov 2024 08:33)      SUBJECTIVE / OVERNIGHT EVENTS:  Clinically improving.  DAYO slightly higher today.  Tacrolimus level low.     MEDICATIONS  (STANDING):  allopurinol 100 milliGRAM(s) Oral daily  aspirin enteric coated 81 milliGRAM(s) Oral daily  atorvastatin 20 milliGRAM(s) Oral at bedtime  carvedilol 12.5 milliGRAM(s) Oral every 12 hours  dextrose 5%. 1000 milliLiter(s) (100 mL/Hr) IV Continuous <Continuous>  dextrose 5%. 1000 milliLiter(s) (50 mL/Hr) IV Continuous <Continuous>  dextrose 50% Injectable 25 Gram(s) IV Push once  dextrose 50% Injectable 12.5 Gram(s) IV Push once  dextrose 50% Injectable 25 Gram(s) IV Push once  glucagon  Injectable 1 milliGRAM(s) IntraMuscular once  heparin   Injectable 5000 Unit(s) SubCutaneous every 8 hours  influenza  Vaccine (HIGH DOSE) 0.5 milliLiter(s) IntraMuscular once  insulin glargine Injectable (LANTUS) 8 Unit(s) SubCutaneous at bedtime  insulin lispro (ADMELOG) corrective regimen sliding scale   SubCutaneous Before meals and at bedtime  pantoprazole    Tablet 40 milliGRAM(s) Oral before breakfast  predniSONE   Tablet 5 milliGRAM(s) Oral daily  tacrolimus 2 milliGRAM(s) Oral <User Schedule>  tacrolimus 1 milliGRAM(s) Oral <User Schedule>    MEDICATIONS  (PRN):  dextrose Oral Gel 15 Gram(s) Oral once PRN Blood Glucose LESS THAN 70 milliGRAM(s)/deciliter      CAPILLARY BLOOD GLUCOSE      POCT Blood Glucose.: 103 mg/dL (17 Nov 2024 08:04)  POCT Blood Glucose.: 201 mg/dL (16 Nov 2024 21:55)  POCT Blood Glucose.: 141 mg/dL (16 Nov 2024 16:43)  POCT Blood Glucose.: 190 mg/dL (16 Nov 2024 11:46)    I&O's Summary    16 Nov 2024 07:01  -  17 Nov 2024 07:00  --------------------------------------------------------  IN: 540 mL / OUT: 1050 mL / NET: -510 mL    17 Nov 2024 07:01  -  17 Nov 2024 11:37  --------------------------------------------------------  IN: 240 mL / OUT: 0 mL / NET: 240 mL        PHYSICAL EXAM:  Vital Signs Last 24 Hrs  T(C): 36.7 (17 Nov 2024 08:47), Max: 36.7 (17 Nov 2024 08:47)  T(F): 98 (17 Nov 2024 08:47), Max: 98 (17 Nov 2024 08:47)  HR: 64 (17 Nov 2024 08:47) (63 - 75)  BP: 163/90 (17 Nov 2024 08:47) (141/74 - 166/92)  BP(mean): 111 (17 Nov 2024 00:45) (101 - 119)  RR: 19 (17 Nov 2024 08:47) (18 - 20)  SpO2: 95% (17 Nov 2024 08:47) (94% - 95%)    Parameters below as of 17 Nov 2024 08:47  Patient On (Oxygen Delivery Method): room air      GENERAL: NAD, well-developed  HEAD:  Atraumatic, Normocephalic  EYES: EOMI, PERRLA, conjunctiva and sclera clear  NECK: Supple, No JVD  CHEST/LUNG: Clear to auscultation bilaterally; No wheeze  HEART: Regular rate and rhythm; No murmurs, rubs, or gallops  ABDOMEN: Soft, Nontender, Nondistended; Bowel sounds present  EXTREMITIES:  2+ Peripheral Pulses, No clubbing, cyanosis, or edema  PSYCH: AAOx3  NEUROLOGY: non-focal  SKIN: No rashes or lesions    LABS:                        11.0   7.45  )-----------( 207      ( 17 Nov 2024 05:30 )             34.3     11-17    143  |  107  |  43[H]  ----------------------------<  120[H]  4.3   |  25  |  2.00[H]    Ca    9.2      17 Nov 2024 05:30  Phos  3.1     11-17  Mg     2.2     11-17    TPro  6.4  /  Alb  3.7  /  TBili  1.0  /  DBili  x   /  AST  34  /  ALT  39  /  AlkPhos  81  11-17          Urinalysis Basic - ( 17 Nov 2024 05:30 )    Color: x / Appearance: x / SG: x / pH: x  Gluc: 120 mg/dL / Ketone: x  / Bili: x / Urobili: x   Blood: x / Protein: x / Nitrite: x   Leuk Esterase: x / RBC: x / WBC x   Sq Epi: x / Non Sq Epi: x / Bacteria: x        RADIOLOGY & ADDITIONAL TESTS:    Imaging Personally Reviewed:    Consultant(s) Notes Reviewed:      Care Discussed with Consultants/Other Providers:

## 2024-11-18 DIAGNOSIS — D64.9 ANEMIA, UNSPECIFIED: ICD-10-CM

## 2024-11-18 LAB
ALBUMIN SERPL ELPH-MCNC: 3.3 G/DL — SIGNIFICANT CHANGE UP (ref 3.3–5)
ALP SERPL-CCNC: 64 U/L — SIGNIFICANT CHANGE UP (ref 40–120)
ALT FLD-CCNC: 31 U/L — SIGNIFICANT CHANGE UP (ref 10–45)
ANION GAP SERPL CALC-SCNC: 11 MMOL/L — SIGNIFICANT CHANGE UP (ref 5–17)
APPEARANCE UR: CLEAR — SIGNIFICANT CHANGE UP
AST SERPL-CCNC: 23 U/L — SIGNIFICANT CHANGE UP (ref 10–40)
BILIRUB SERPL-MCNC: 1 MG/DL — SIGNIFICANT CHANGE UP (ref 0.2–1.2)
BILIRUB UR-MCNC: NEGATIVE — SIGNIFICANT CHANGE UP
BUN SERPL-MCNC: 43 MG/DL — HIGH (ref 7–23)
CALCIUM SERPL-MCNC: 8.7 MG/DL — SIGNIFICANT CHANGE UP (ref 8.4–10.5)
CHLORIDE SERPL-SCNC: 106 MMOL/L — SIGNIFICANT CHANGE UP (ref 96–108)
CO2 SERPL-SCNC: 26 MMOL/L — SIGNIFICANT CHANGE UP (ref 22–31)
COLOR SPEC: YELLOW — SIGNIFICANT CHANGE UP
CREAT ?TM UR-MCNC: 91 MG/DL — SIGNIFICANT CHANGE UP
CREAT SERPL-MCNC: 2.04 MG/DL — HIGH (ref 0.5–1.3)
DIFF PNL FLD: NEGATIVE — SIGNIFICANT CHANGE UP
EGFR: 33 ML/MIN/1.73M2 — LOW
GLUCOSE SERPL-MCNC: 154 MG/DL — HIGH (ref 70–99)
GLUCOSE UR QL: >=1000 MG/DL
HCT VFR BLD CALC: 30.1 % — LOW (ref 39–50)
HGB BLD-MCNC: 9.7 G/DL — LOW (ref 13–17)
KETONES UR-MCNC: NEGATIVE MG/DL — SIGNIFICANT CHANGE UP
LEUKOCYTE ESTERASE UR-ACNC: NEGATIVE — SIGNIFICANT CHANGE UP
MAGNESIUM SERPL-MCNC: 2.2 MG/DL — SIGNIFICANT CHANGE UP (ref 1.6–2.6)
MCHC RBC-ENTMCNC: 30 PG — SIGNIFICANT CHANGE UP (ref 27–34)
MCHC RBC-ENTMCNC: 32.2 G/DL — SIGNIFICANT CHANGE UP (ref 32–36)
MCV RBC AUTO: 93.2 FL — SIGNIFICANT CHANGE UP (ref 80–100)
NITRITE UR-MCNC: NEGATIVE — SIGNIFICANT CHANGE UP
NRBC # BLD: 0 /100 WBCS — SIGNIFICANT CHANGE UP (ref 0–0)
OSMOLALITY UR: 502 MOSM/KG — SIGNIFICANT CHANGE UP (ref 300–900)
PH UR: 6 — SIGNIFICANT CHANGE UP (ref 5–8)
PLATELET # BLD AUTO: 182 K/UL — SIGNIFICANT CHANGE UP (ref 150–400)
POTASSIUM SERPL-MCNC: 4 MMOL/L — SIGNIFICANT CHANGE UP (ref 3.5–5.3)
POTASSIUM SERPL-SCNC: 4 MMOL/L — SIGNIFICANT CHANGE UP (ref 3.5–5.3)
POTASSIUM UR-SCNC: 43 MMOL/L — SIGNIFICANT CHANGE UP
PROT ?TM UR-MCNC: 38 MG/DL — HIGH (ref 0–12)
PROT SERPL-MCNC: 5.8 G/DL — LOW (ref 6–8.3)
PROT UR-MCNC: 30 MG/DL
PROT/CREAT UR-RTO: 0.4 RATIO — HIGH (ref 0–0.2)
RBC # BLD: 3.23 M/UL — LOW (ref 4.2–5.8)
RBC # FLD: 18.2 % — HIGH (ref 10.3–14.5)
SODIUM SERPL-SCNC: 143 MMOL/L — SIGNIFICANT CHANGE UP (ref 135–145)
SODIUM UR-SCNC: 24 MMOL/L — SIGNIFICANT CHANGE UP
SP GR SPEC: 1.02 — SIGNIFICANT CHANGE UP (ref 1–1.03)
UROBILINOGEN FLD QL: 1 MG/DL — SIGNIFICANT CHANGE UP (ref 0.2–1)
UUN UR-MCNC: 822 MG/DL — SIGNIFICANT CHANGE UP
WBC # BLD: 7.76 K/UL — SIGNIFICANT CHANGE UP (ref 3.8–10.5)
WBC # FLD AUTO: 7.76 K/UL — SIGNIFICANT CHANGE UP (ref 3.8–10.5)

## 2024-11-18 PROCEDURE — 99232 SBSQ HOSP IP/OBS MODERATE 35: CPT

## 2024-11-18 PROCEDURE — 99233 SBSQ HOSP IP/OBS HIGH 50: CPT

## 2024-11-18 RX ORDER — TACROLIMUS 5 MG/1
2 CAPSULE ORAL
Refills: 0 | Status: DISCONTINUED | OUTPATIENT
Start: 2024-11-18 | End: 2024-11-20

## 2024-11-18 RX ORDER — NIFEDIPINE 10 MG
60 CAPSULE ORAL DAILY
Refills: 0 | Status: DISCONTINUED | OUTPATIENT
Start: 2024-11-18 | End: 2024-11-19

## 2024-11-18 RX ORDER — BUMETANIDE 1 MG/1
1 TABLET ORAL
Refills: 0 | Status: DISCONTINUED | OUTPATIENT
Start: 2024-11-18 | End: 2024-11-19

## 2024-11-18 RX ORDER — BUMETANIDE 1 MG/1
1 TABLET ORAL ONCE
Refills: 0 | Status: COMPLETED | OUTPATIENT
Start: 2024-11-18 | End: 2024-11-18

## 2024-11-18 RX ADMIN — Medication 5000 UNIT(S): at 05:53

## 2024-11-18 RX ADMIN — INSULIN GLARGINE 8 UNIT(S): 100 INJECTION, SOLUTION SUBCUTANEOUS at 22:20

## 2024-11-18 RX ADMIN — Medication 2: at 11:59

## 2024-11-18 RX ADMIN — CARVEDILOL 12.5 MILLIGRAM(S): 25 TABLET, FILM COATED ORAL at 17:27

## 2024-11-18 RX ADMIN — Medication 5000 UNIT(S): at 22:20

## 2024-11-18 RX ADMIN — Medication 60 MILLIGRAM(S): at 17:27

## 2024-11-18 RX ADMIN — CARVEDILOL 12.5 MILLIGRAM(S): 25 TABLET, FILM COATED ORAL at 05:53

## 2024-11-18 RX ADMIN — Medication 81 MILLIGRAM(S): at 11:48

## 2024-11-18 RX ADMIN — TACROLIMUS 2 MILLIGRAM(S): 5 CAPSULE ORAL at 11:47

## 2024-11-18 RX ADMIN — TACROLIMUS 2 MILLIGRAM(S): 5 CAPSULE ORAL at 22:21

## 2024-11-18 RX ADMIN — PREDNISONE 5 MILLIGRAM(S): 20 TABLET ORAL at 05:53

## 2024-11-18 RX ADMIN — Medication 5000 UNIT(S): at 14:28

## 2024-11-18 RX ADMIN — Medication 1: at 17:27

## 2024-11-18 RX ADMIN — BUMETANIDE 1 MILLIGRAM(S): 1 TABLET ORAL at 17:28

## 2024-11-18 RX ADMIN — BUMETANIDE 1 MILLIGRAM(S): 1 TABLET ORAL at 14:28

## 2024-11-18 RX ADMIN — ALLOPURINOL 100 MILLIGRAM(S): 300 TABLET ORAL at 11:47

## 2024-11-18 RX ADMIN — FAMOTIDINE 20 MILLIGRAM(S): 20 TABLET, FILM COATED ORAL at 11:47

## 2024-11-18 RX ADMIN — Medication 20 MILLIGRAM(S): at 22:21

## 2024-11-18 NOTE — PROGRESS NOTE ADULT - SUBJECTIVE AND OBJECTIVE BOX
CARDIOLOGY PA PROGRESS NOTE    SUBJECTIVE:  Pt seen and examined at bedside this AM. Pt reports feeling well.  Denies CP, SOB, orthpnea, PND. Offers no other acute complaints & ROS otherwise negative.      INTERVAL HISTORY:  	  MEDICATIONS:  buMETAnide Injectable 1 milliGRAM(s) IV Push <User Schedule>  carvedilol 12.5 milliGRAM(s) Oral every 12 hours  NIFEdipine XL 60 milliGRAM(s) Oral daily          famotidine    Tablet 20 milliGRAM(s) Oral daily    allopurinol 100 milliGRAM(s) Oral daily  atorvastatin 20 milliGRAM(s) Oral at bedtime  dextrose 50% Injectable 25 Gram(s) IV Push once  dextrose 50% Injectable 12.5 Gram(s) IV Push once  dextrose 50% Injectable 25 Gram(s) IV Push once  dextrose Oral Gel 15 Gram(s) Oral once PRN  glucagon  Injectable 1 milliGRAM(s) IntraMuscular once  insulin glargine Injectable (LANTUS) 8 Unit(s) SubCutaneous at bedtime  insulin lispro (ADMELOG) corrective regimen sliding scale   SubCutaneous Before meals and at bedtime  predniSONE   Tablet 5 milliGRAM(s) Oral daily    aspirin enteric coated 81 milliGRAM(s) Oral daily  dextrose 5%. 1000 milliLiter(s) IV Continuous <Continuous>  dextrose 5%. 1000 milliLiter(s) IV Continuous <Continuous>  heparin   Injectable 5000 Unit(s) SubCutaneous every 8 hours  influenza  Vaccine (HIGH DOSE) 0.5 milliLiter(s) IntraMuscular once  tacrolimus 2 milliGRAM(s) Oral <User Schedule>        PHYSICAL EXAM:  T(C): 36.3 (11-18-24 @ 17:25), Max: 36.8 (11-18-24 @ 05:27)  HR: 66 (11-18-24 @ 18:54) (62 - 81)  BP: 191/109 (11-18-24 @ 18:54) (156/93 - 191/109)  RR: 18 (11-18-24 @ 17:25) (18 - 18)  SpO2: 95% (11-18-24 @ 18:54) (93% - 97%)  Wt(kg): --  I&O's Summary    17 Nov 2024 07:01  -  18 Nov 2024 07:00  --------------------------------------------------------  IN: 480 mL / OUT: 255 mL / NET: 225 mL    18 Nov 2024 07:01  -  18 Nov 2024 19:59  --------------------------------------------------------  IN: 480 mL / OUT: 750 mL / NET: -270 mL          Appearance: Normal	  HEENT:   Normal oral mucosa, PERRL, EOMI	  Lymphatic: No lymphadenopathy  Cardiovascular: Normal S1 S2, No JVD, No murmurs, No edema  Respiratory: Lungs clear to auscultation	  Psychiatry: A & O x 3, Mood & affect appropriate  Gastrointestinal:  Soft, Non-tender, + BS	  Skin: No rashes, No ecchymoses, No cyanosis  Neurologic: Non-focal  Extremities: Normal range of motion, No clubbing, cyanosis or edema  Vascular: Peripheral pulses palpable 2+ bilaterally    TELEMETRY: 	    ECG:  	  RADIOLOGY:   DIAGNOSTIC TESTING:  [ ] Echocardiogram:  [ ]  Catheterization:  [ ] Stress Test:    OTHER: 	    LABS:	 	    CARDIAC MARKERS:                                  9.7    7.76  )-----------( 182      ( 18 Nov 2024 05:30 )             30.1     11-18    143  |  106  |  43[H]  ----------------------------<  154[H]  4.0   |  26  |  2.04[H]    Ca    8.7      18 Nov 2024 05:30  Phos  3.1     11-17  Mg     2.2     11-18    TPro  5.8[L]  /  Alb  3.3  /  TBili  1.0  /  DBili  x   /  AST  23  /  ALT  31  /  AlkPhos  64  11-18    proBNP:   Lipid Profile:   HgA1c:   TSH:     ASSESSMENT/PLAN: 	     CARDIOLOGY PA PROGRESS NOTE    SUBJECTIVE:  Pt seen and examined at bedside this AM. Pt reports feeling well.  Denies CP, SOB, orthopnea, PND. Offers no other acute complaints & ROS otherwise negative.      INTERVAL HISTORY:  No acute events.  	  MEDICATIONS:  buMETAnide Injectable 1 milliGRAM(s) IV Push <User Schedule>  carvedilol 12.5 milliGRAM(s) Oral every 12 hours  NIFEdipine XL 60 milliGRAM(s) Oral daily          famotidine    Tablet 20 milliGRAM(s) Oral daily    allopurinol 100 milliGRAM(s) Oral daily  atorvastatin 20 milliGRAM(s) Oral at bedtime  dextrose 50% Injectable 25 Gram(s) IV Push once  dextrose 50% Injectable 12.5 Gram(s) IV Push once  dextrose 50% Injectable 25 Gram(s) IV Push once  dextrose Oral Gel 15 Gram(s) Oral once PRN  glucagon  Injectable 1 milliGRAM(s) IntraMuscular once  insulin glargine Injectable (LANTUS) 8 Unit(s) SubCutaneous at bedtime  insulin lispro (ADMELOG) corrective regimen sliding scale   SubCutaneous Before meals and at bedtime  predniSONE   Tablet 5 milliGRAM(s) Oral daily    aspirin enteric coated 81 milliGRAM(s) Oral daily  dextrose 5%. 1000 milliLiter(s) IV Continuous <Continuous>  dextrose 5%. 1000 milliLiter(s) IV Continuous <Continuous>  heparin   Injectable 5000 Unit(s) SubCutaneous every 8 hours  influenza  Vaccine (HIGH DOSE) 0.5 milliLiter(s) IntraMuscular once  tacrolimus 2 milliGRAM(s) Oral <User Schedule>        PHYSICAL EXAM:  T(C): 36.3 (11-18-24 @ 17:25), Max: 36.8 (11-18-24 @ 05:27)  HR: 66 (11-18-24 @ 18:54) (62 - 81)  BP: 191/109 (11-18-24 @ 18:54) (156/93 - 191/109)  RR: 18 (11-18-24 @ 17:25) (18 - 18)  SpO2: 95% (11-18-24 @ 18:54) (93% - 97%)  Wt(kg): --  I&O's Summary    17 Nov 2024 07:01  -  18 Nov 2024 07:00  --------------------------------------------------------  IN: 480 mL / OUT: 255 mL / NET: 225 mL    18 Nov 2024 07:01  -  18 Nov 2024 19:59  --------------------------------------------------------  IN: 480 mL / OUT: 750 mL / NET: -270 mL        Appearance: NAD	  Cardiovascular: Normal S1 S2, No JVD, No murmurs  Respiratory: CTA b/l, No Rales, No Rhonchi, No wheezing  Gastrointestinal:  Soft, Non-tender, + BS	  Skin: No rashes, No ecchymoses, No cyanosis  Neurologic: Non-focal  Extremities: 2+ RLE, Normal range of motion  Psychiatry: A & O x 3, Mood & affect appropriate    TELEMETRY: 	    ECG:  	  RADIOLOGY:   DIAGNOSTIC TESTING:  [ ] Echocardiogram:  [ ]  Catheterization:  [ ] Stress Test:    OTHER: 	    LABS:	 	    CARDIAC MARKERS:                                  9.7    7.76  )-----------( 182      ( 18 Nov 2024 05:30 )             30.1     11-18    143  |  106  |  43[H]  ----------------------------<  154[H]  4.0   |  26  |  2.04[H]    Ca    8.7      18 Nov 2024 05:30  Phos  3.1     11-17  Mg     2.2     11-18    TPro  5.8[L]  /  Alb  3.3  /  TBili  1.0  /  DBili  x   /  AST  23  /  ALT  31  /  AlkPhos  64  11-18    proBNP:   Lipid Profile:   HgA1c:   TSH:

## 2024-11-18 NOTE — PROGRESS NOTE ADULT - PROBLEM SELECTOR PLAN 3
Problem: OCCUPATIONAL THERAPY ADULT  Goal: Performs self-care activities at highest level of function for planned discharge setting  See evaluation for individualized goals  Description: Treatment Interventions: ADL retraining, Functional transfer training, UE strengthening/ROM, Endurance training, Cognitive reorientation, Patient/family training, Compensatory technique education, Energy conservation, Activityengagement     See flowsheet documentation for full assessment, interventions and recommendations  Note: Limitation: Decreased ADL status, Decreased UE strength, Decreased Safe judgement during ADL, Decreased endurance, Decreased self-care trans, Decreased high-level ADLs  Prognosis: Good  Assessment: Pt is a 78 y o  male seen for OT evaluation s/p admit to [facility] on 4/26/2023 w/ Severe sepsis ([facility] [address] )  Comorbidities affecting pt's functional performance at time of assessment include: CA, DM, Bladder CA, Hypercholesterolemia, HTN, Hyponatremia  Personal factors affecting pt at time of IE include:difficulty performing ADLS, limited insight into deficits and decreased initiation and engagement   Prior to admission, pt required A with ADLs  Upon evaluation: the following deficits impact occupational performance: decreased strength, decreased balance, decreased tolerance, impaired memory, impaired sequencing, impaired problem solving and decreased safety awareness  Pt to benefit from continued skilled OT tx while in the hospital to address deficits as defined above and maximize level of functional independence w ADL's and functional mobility  Occupational Performance areas to address include: bathing/shower, toilet hygiene, dressing, functional mobility and clothing management  From OT standpoint, recommendation at time of d/c would be return to facility with rehab services       OT Discharge Recommendation: Return to facility with rehabilitation services     [address] [place] [address] Road, [place], OTR/L Hx PCKD s/p kidney transplant in 2007 ([facility], follows w/ Nephrologist Dr Sulaimna Modi) w/ baseline Cr ranging from 1.2 -1.4 in 2023   - Prograf 2mg BID  - F/u Prograf level daily, 30 minutes before morning dose (Goal 4-6)  - c/w Prednisone 5mg QD   - Obtain daily urine lytes   - Nephro following, recs appreciated   - S/p Albumin 25% 50mL x4 total for intravascular repletion to avoid poor perfusion to kidney  - Avoid nephrotoxic agents

## 2024-11-18 NOTE — PROGRESS NOTE ADULT - PROBLEM SELECTOR PLAN 1
Fluid overloaded improving  - Etiology: unclear given NST negative and patient's Afib rate controlled. Compliant w/ meds.  - TTE: 10/21/24: LVEF 58%, normal RVSF w/ increased wall thickness, hypokinetic basal and mid anterior septum and basal and mid inferior septum , PFO vs secundum ASD w/ L-R shunting, mild MR, aortic root 4.30 cm, no pericardial effusion  - NST 11/14/24 normal perfusion per outpatient MD note in HIE  - EKG Afib non ischemic   - Diuretic: s/p Lasix gtt 5mg/hr. Restarted Bumex 1mg IV BID   - GMDT: Coreg 12.5mg BID (reduced home dose).   --- holding home Entresto  mg BID 2/2 uptrending Cr  - Core measures (strict I&Os, daily weights, fluid restriction)

## 2024-11-18 NOTE — PROGRESS NOTE ADULT - ASSESSMENT
-79M L[employer] PMHx of PCKD s/p kidney transplant in 2007, CKD, HTN, HLD, HFpEF (EF 58% by TTE October 2024), TDM2, prostate CA s/p radical prostatectomy 2015, Afib/AFl s/p ablation, admitted for HFpEF exacerbation requiring IV diuresis. Nephrology consulted for DAYO on CKD and diuresis co-management.     -Imp: Nonoliguric ?DAYO on CKD3, suspected CRS.   bsCr 1.5-1.9, currently sCr 2.0, close to baseline, or possible new baseline(1.7-2.0)  Rt kidney transplant in 2007, on Tacro and prednisone.  [place] UA on 11/15  Tacro 2.4 (low), sCr 2.0 today.   Last 24h uop:255    Plan:  - Tacro levels  was repeated 11/18 ( goal 4-6)  - Dose Tacrolimus 8 am and 8 pm  -Request allograft Renal Sono  -add nifedipine xr 60 mg daily   Strict I/Os, monitor UOP  -daily standing weight  -Avoid nephrotoxic agents   -fluid restriction 1 L/day  -we will follow -79M [employer] PMHx of PCKD s/p kidney transplant in 2007, CKD, HTN, HLD, HFpEF (EF 58% by TTE October 2024), TDM2, prostate CA s/p radical prostatectomy 2015, Afib/AFl s/p ablation, admitted for HFpEF exacerbation requiring IV diuresis. Nephrology consulted for DAYO on CKD and diuresis co-management.     -Imp: Nonoliguric ?DAYO on CKD3, suspected CRS.   bsCr 1.5-1.9, currently sCr 2.0, close to baseline, or possible new baseline(1.7-2.0)  Rt kidney transplant in 2007, on Tacro and prednisone.  [place] UA on 11/15  Tacro 2.4 (low), sCr 2.0 today.   Last 24h uop:255    Plan:  - Dose Tacrolimus 8 am and 8 pm  - Tacro levels 30 mins before morning dose  -Tacro goal 4-6   -Request allograft Renal Sono  -add nifedipine xr 60 mg daily   Strict I/Os, monitor UOP  -daily standing weight  -Avoid nephrotoxic agents   -fluid restriction 1 L/day  -we will follow 79M [employer] PMHx of PCKD s/p kidney transplant in 2007 with residual CKD, HTN, HLD, HFpEF (EF 58% by TTE October 2024), TDM2, prostate CA s/p radical prostatectomy 2015, Afib/AFl s/p ablation, admitted for HFpEF exacerbation requiring IV diuresis.  Nephrology consulted for DAYO on CKD and transplant IS management      -Imp: Nonoliguric DAYO possibly related to CRS - Unknown baseline   Admission SCR 1.53 currently at sCr 2.0  s/p kidney transplant in 2007, on Tacro and prednisone.  Last 24h uop:255    Plan:  - Recommend dosing Tacrolimus 8 am and 8 pm for better compliance   - Check Tacro levels 30 mins before morning dose  - Aim for Tacro goal 4-6   -Request allograft Renal Sono  - Continue with strict I/Os, monitor UOP  - c/w daily standing weight  -Avoid nephrotoxic agents   - Can c/w bumex IV 1mg bid   - c/w nifedipine 60mg daily for now for better BP control   - check Ferritin and TSAT levels   - Will contact transplant nephrologist: Sulaiman Modi at [facility]

## 2024-11-18 NOTE — PROGRESS NOTE ADULT - PROBLEM SELECTOR PLAN 5
-190s  - Coreg 12.5mg BID  - started Nifidipine 60mg QD -190s  - Coreg 12.5mg BID  - started Nifedipine 60mg QD

## 2024-11-18 NOTE — PROGRESS NOTE ADULT - PROBLEM SELECTOR PLAN 6
Lipid panel WNL, LDL 36  - C/w Atorvastatin 20mg QHS     F: 2L fluid restriction, to to rising Cr  E: Replete for K <4, Mg <2  N: DASH/TLC  GI PPx: Famotidine   DVT PPx: SQ Heparin     Code Status: FULL CODE    Dispo: pending medical course Lipid panel WNL, LDL 36  - c/w Atorvastatin 20mg QHS     F: 2L fluid restriction  E: Replete for K <4, Mg <2  N: DASH/TLC  GI PPx: Famotidine   DVT PPx: SQ Heparin     Code Status: FULL CODE    Dispo: pending medical course

## 2024-11-18 NOTE — PROGRESS NOTE ADULT - ASSESSMENT
79M, [employer]an, w/ PMHx HTN, HLD, HFpEF (EF 58% by TTE 10/2024), TDM2, CKD, PCKD s/p kidney transplant in 2007 ([facility]), prostate CA s/p radical prostatectomy 2015, DVT 2006 (in setting of pelvic fracture), Afib/AFl s/p ablation in Jan 2018 not on AC 2/2 GIB s/p Amulet device in April 2023 with hospital course c/b PEA arrest w/ ROSC and pericardial tamponade s/p bedside pericardiocentesis, RTOR for sternotomy/washout and evacuation of pericardial clot, aortic aneurysm, referred to [facility] ER on 11/14/24 by outpatient cardiologist for progressive worsening GAFFNEY and 14lb weight gain x 2 weeks. Admitted to cardiac tele for acute on chronic HFpEF exacerbation, currently on IV diuresis.

## 2024-11-18 NOTE — PROGRESS NOTE ADULT - ASSESSMENT
79M [employer] w/ PMHx HTN, HLD, HFpEF (EF 58% by TTE October 2024), TDM2, CKD, PCKD s/p kidney transplant in 2007 ([facility]), prostate CA s/p radical prostatectomy 2015, DVT 2006 (in setting of pelvic fracture), Afib/AFl s/p ablation in Jan 2018 not on AC 2/2 GIB s/p Amulet device in April 2023 with hospital course c/b PEA arrest w/ ROSC and pericardial tamponade s/p bedside pericardiocentesis, RTOR for sternotomy/washout and evacuation of pericardial clot, aortic aneurysm, referred to [facility] ER on 11/14/24 by outpatient cardiologist for progressive worsening GAFFNEY and 14lb weight gain x 2 weeks. Admitted to cardiac tele for acute on chronic HFpEF exacerbation, currently on IV diuresis. Renal following for Hx kidney transplant.     1) (HFpEF) heart failure with preserved ejection fraction.   ·  Plan: Fluid overloaded on exam, now improving  - Etiology: unclear given NST negative and patient's Afib rate controlled. - Compliant w/ meds   - Diuresis with IV bumex 1 mg BID   - Coreg 12.5mg BID (reduced home dose).   - Nifedipine XL 60 mg daily   - Entresto on hold.    2) Afib:  - Coreg 12.5mg BID.  - ASA 81mg daily.  - AC previously discontinued due to GI Bleed.    3) History of Kidney transplant:  - Avoiding nephrotoxins.  - Nephrology following.  - Tacrolimus dose increase to 2mg BID as per nephrology.   - Bumex for diuresis as above.    4) HTN:  - Coreg 12.5mg BID.  - Nifedipine XL 60 mg daily     5) HLD:  - Atorvastatin 20mg Qhs.     6) DVT proph:  -SQH

## 2024-11-18 NOTE — PROGRESS NOTE ADULT - SUBJECTIVE AND OBJECTIVE BOX
Medicine Progress Note    Patient is a 79y old  Male who presents with a chief complaint of HFpEF exacerbation (18 Nov 2024 13:15)      SUBJECTIVE / OVERNIGHT EVENTS:  Feeling unwell this morning though cannot pinpoint why, able to tolerate breakfast and breathing comfortably on room air, not in any pain.     MEDICATIONS  (STANDING):  allopurinol 100 milliGRAM(s) Oral daily  aspirin enteric coated 81 milliGRAM(s) Oral daily  atorvastatin 20 milliGRAM(s) Oral at bedtime  buMETAnide Injectable 1 milliGRAM(s) IV Push <User Schedule>  buMETAnide Injectable 1 milliGRAM(s) IV Push once  carvedilol 12.5 milliGRAM(s) Oral every 12 hours  dextrose 5%. 1000 milliLiter(s) (100 mL/Hr) IV Continuous <Continuous>  dextrose 5%. 1000 milliLiter(s) (50 mL/Hr) IV Continuous <Continuous>  dextrose 50% Injectable 25 Gram(s) IV Push once  dextrose 50% Injectable 12.5 Gram(s) IV Push once  dextrose 50% Injectable 25 Gram(s) IV Push once  famotidine    Tablet 20 milliGRAM(s) Oral daily  glucagon  Injectable 1 milliGRAM(s) IntraMuscular once  heparin   Injectable 5000 Unit(s) SubCutaneous every 8 hours  influenza  Vaccine (HIGH DOSE) 0.5 milliLiter(s) IntraMuscular once  insulin glargine Injectable (LANTUS) 8 Unit(s) SubCutaneous at bedtime  insulin lispro (ADMELOG) corrective regimen sliding scale   SubCutaneous Before meals and at bedtime  NIFEdipine XL 60 milliGRAM(s) Oral daily  predniSONE   Tablet 5 milliGRAM(s) Oral daily  tacrolimus 2 milliGRAM(s) Oral <User Schedule>    MEDICATIONS  (PRN):  dextrose Oral Gel 15 Gram(s) Oral once PRN Blood Glucose LESS THAN 70 milliGRAM(s)/deciliter    CAPILLARY BLOOD GLUCOSE      POCT Blood Glucose.: 230 mg/dL (18 Nov 2024 11:51)  POCT Blood Glucose.: 146 mg/dL (18 Nov 2024 07:48)  POCT Blood Glucose.: 139 mg/dL (17 Nov 2024 21:30)  POCT Blood Glucose.: 214 mg/dL (17 Nov 2024 16:46)    I&O's Summary    17 Nov 2024 07:01  -  18 Nov 2024 07:00  --------------------------------------------------------  IN: 480 mL / OUT: 255 mL / NET: 225 mL    18 Nov 2024 07:01  -  18 Nov 2024 14:12  --------------------------------------------------------  IN: 480 mL / OUT: 0 mL / NET: 480 mL        PHYSICAL EXAM:  Vital Signs Last 24 Hrs  T(C): 36.5 (18 Nov 2024 11:54), Max: 36.8 (18 Nov 2024 05:27)  T(F): 97.7 (18 Nov 2024 11:54), Max: 98.2 (18 Nov 2024 05:27)  HR: 65 (18 Nov 2024 11:54) (62 - 74)  BP: 161/87 (18 Nov 2024 11:54) (156/93 - 182/93)  BP(mean): 118 (18 Nov 2024 05:27) (118 - 118)  RR: 18 (18 Nov 2024 11:54) (18 - 19)  SpO2: 93% (18 Nov 2024 11:54) (93% - 98%)    Parameters below as of 18 Nov 2024 11:54  Patient On (Oxygen Delivery Method): room air      Appears comfortable laying on back in bed   MMM  Normal WOB on RA, CTAB   RRR, no mrg   Abdomen soft, nontender, nondistended  Extremities warm, wrapped in compression ace bandages, bilateral feet with some pitting edema   AOX3, no focal neuro deficits     LABS:                        9.7    7.76  )-----------( 182      ( 18 Nov 2024 05:30 )             30.1     11-18    143  |  106  |  43[H]  ----------------------------<  154[H]  4.0   |  26  |  2.04[H]    Ca    8.7      18 Nov 2024 05:30  Phos  3.1     11-17  Mg     2.2     11-18    TPro  5.8[L]  /  Alb  3.3  /  TBili  1.0  /  DBili  x   /  AST  23  /  ALT  31  /  AlkPhos  64  11-18          Urinalysis Basic - ( 18 Nov 2024 05:30 )    Color: x / Appearance: x / SG: x / pH: x  Gluc: 154 mg/dL / Ketone: x  / Bili: x / Urobili: x   Blood: x / Protein: x / Nitrite: x   Leuk Esterase: x / RBC: x / WBC x   Sq Epi: x / Non Sq Epi: x / Bacteria: x            RADIOLOGY & ADDITIONAL TESTS:  Imaging from Last 24 Hours:  None new

## 2024-11-18 NOTE — PROGRESS NOTE ADULT - SUBJECTIVE AND OBJECTIVE BOX
Nephrology progress note    Subjective  Pt seen, no complaints , state she is feeling well    Allergies:  Bairon&#x27;s Witness - No blood products (Unknown)  naproxen (Hives)    Hospital Medications:   MEDICATIONS  (STANDING):  allopurinol 100 milliGRAM(s) Oral daily  aspirin enteric coated 81 milliGRAM(s) Oral daily  atorvastatin 20 milliGRAM(s) Oral at bedtime  buMETAnide Injectable 1 milliGRAM(s) IV Push <User Schedule>  buMETAnide Injectable 1 milliGRAM(s) IV Push once  carvedilol 12.5 milliGRAM(s) Oral every 12 hours  dextrose 5%. 1000 milliLiter(s) (50 mL/Hr) IV Continuous <Continuous>  dextrose 5%. 1000 milliLiter(s) (100 mL/Hr) IV Continuous <Continuous>  dextrose 50% Injectable 25 Gram(s) IV Push once  dextrose 50% Injectable 12.5 Gram(s) IV Push once  dextrose 50% Injectable 25 Gram(s) IV Push once  famotidine    Tablet 20 milliGRAM(s) Oral daily  glucagon  Injectable 1 milliGRAM(s) IntraMuscular once  heparin   Injectable 5000 Unit(s) SubCutaneous every 8 hours  influenza  Vaccine (HIGH DOSE) 0.5 milliLiter(s) IntraMuscular once  insulin glargine Injectable (LANTUS) 8 Unit(s) SubCutaneous at bedtime  insulin lispro (ADMELOG) corrective regimen sliding scale   SubCutaneous Before meals and at bedtime  predniSONE   Tablet 5 milliGRAM(s) Oral daily  tacrolimus 2 milliGRAM(s) Oral <User Schedule>    REVIEW OF SYSTEMS:  All other review of systems is negative unless indicated above.    VITALS:  T(F): 97.7 (11-18-24 @ 11:54), Max: 98.2 (11-18-24 @ 05:27)  HR: 65 (11-18-24 @ 11:54)  BP: 161/87 (11-18-24 @ 11:54)  RR: 18 (11-18-24 @ 11:54)  SpO2: 93% (11-18-24 @ 11:54)  Wt(kg): --    11-16 @ 07:01  -  11-17 @ 07:00  --------------------------------------------------------  IN: 540 mL / OUT: 1050 mL / NET: -510 mL    11-17 @ 07:01  -  11-18 @ 07:00  --------------------------------------------------------  IN: 480 mL / OUT: 255 mL / NET: 225 mL    11-18 @ 07:01  -  11-18 @ 13:16  --------------------------------------------------------  IN: 240 mL / OUT: 0 mL / NET: 240 mL        PHYSICAL EXAM:  CONSTITUTIONAL:  no apparent distress  RESP: No respiratory distress, no use of accessory muscles; CTA b/l,   CV: RRR, +S1S2, no MRG; no JVD; GI: Soft, NT, ND, no rebound, no guarding  Extremities:+1 peripheral edema  NEURO: awake, answers questions appropriately       LABS:  11-18    143  |  106  |  43[H]  ----------------------------<  154[H]  4.0   |  26  |  2.04[H]    Ca    8.7      18 Nov 2024 05:30  Phos  3.1     11-17  Mg     2.2     11-18    TPro  5.8[L]  /  Alb  3.3  /  TBili  1.0  /  DBili      /  AST  23  /  ALT  31  /  AlkPhos  64  11-18                          9.7    7.76  )-----------( 182      ( 18 Nov 2024 05:30 )             30.1       Urine Studies:  Creatinine Trend: 2.04<--, 2.01<--, 2.00<--, 1.87<--, 1.72<--, 1.63<--  Urinalysis Basic - ( 18 Nov 2024 05:30 )    Color:  / Appearance:  / SG:  / pH:   Gluc: 154 mg/dL / Ketone:   / Bili:  / Urobili:    Blood:  / Protein:  / Nitrite:    Leuk Esterase:  / RBC:  / WBC    Sq Epi:  / Non Sq Epi:  / Bacteria:       Sodium, Random Urine: 43 mmol/L (11-17 @ 12:03)  Creatinine, Random Urine: 70 mg/dL (11-17 @ 12:03)  Protein/Creatinine Ratio Calculation: 0.6 Ratio (11-17 @ 12:03)  Osmolality, Random Urine: 457 mosm/kg (11-17 @ 12:03)  Potassium, Random Urine: 46 mmol/L (11-17 @ 12:03)  Creatinine, Random Urine: 34 mg/dL (11-16 @ 12:06)  Sodium, Random Urine: 83 mmol/L (11-16 @ 12:06)  Osmolality, Random Urine: 336 mosm/kg (11-16 @ 12:06)  Sodium, Random Urine: 115 mmol/L (11-15 @ 15:06)  Osmolality, Random Urine: 314 mosm/kg (11-15 @ 15:06)  Creatinine, Random Urine: 13 mg/dL (11-15 @ 15:06)  Sodium, Random Urine: 108 mmol/L (11-15 @ 04:01)  Creatinine, Random Urine: 15 mg/dL (11-15 @ 04:01)  Protein/Creatinine Ratio Calculation: 0.6 Ratio (11-15 @ 04:01)  Osmolality, Random Urine: 321 mosm/kg (11-15 @ 04:01)  Potassium, Random Urine: 13 mmol/L (11-15 @ 04:01)    RADIOLOGY & ADDITIONAL STUDIES:   Nephrology progress note    Pt seen, no complaints , state she is feeling well    Allergies:  Bairon&#x27;s Witness - No blood products (Unknown)  naproxen (Hives)    Hospital Medications:   MEDICATIONS  (STANDING):  allopurinol 100 milliGRAM(s) Oral daily  aspirin enteric coated 81 milliGRAM(s) Oral daily  atorvastatin 20 milliGRAM(s) Oral at bedtime  buMETAnide Injectable 1 milliGRAM(s) IV Push <User Schedule>  buMETAnide Injectable 1 milliGRAM(s) IV Push once  carvedilol 12.5 milliGRAM(s) Oral every 12 hours  dextrose 5%. 1000 milliLiter(s) (50 mL/Hr) IV Continuous <Continuous>  dextrose 5%. 1000 milliLiter(s) (100 mL/Hr) IV Continuous <Continuous>  dextrose 50% Injectable 25 Gram(s) IV Push once  dextrose 50% Injectable 12.5 Gram(s) IV Push once  dextrose 50% Injectable 25 Gram(s) IV Push once  famotidine    Tablet 20 milliGRAM(s) Oral daily  glucagon  Injectable 1 milliGRAM(s) IntraMuscular once  heparin   Injectable 5000 Unit(s) SubCutaneous every 8 hours  influenza  Vaccine (HIGH DOSE) 0.5 milliLiter(s) IntraMuscular once  insulin glargine Injectable (LANTUS) 8 Unit(s) SubCutaneous at bedtime  insulin lispro (ADMELOG) corrective regimen sliding scale   SubCutaneous Before meals and at bedtime  predniSONE   Tablet 5 milliGRAM(s) Oral daily  tacrolimus 2 milliGRAM(s) Oral <User Schedule>    REVIEW OF SYSTEMS:  All other review of systems is negative unless indicated above.    VITALS:  T(F): 97.7 (11-18-24 @ 11:54), Max: 98.2 (11-18-24 @ 05:27)  HR: 65 (11-18-24 @ 11:54)  BP: 161/87 (11-18-24 @ 11:54)  RR: 18 (11-18-24 @ 11:54)  SpO2: 93% (11-18-24 @ 11:54)  Wt(kg): --    11-16 @ 07:01  -  11-17 @ 07:00  --------------------------------------------------------  IN: 540 mL / OUT: 1050 mL / NET: -510 mL    11-17 @ 07:01  -  11-18 @ 07:00  --------------------------------------------------------  IN: 480 mL / OUT: 255 mL / NET: 225 mL    11-18 @ 07:01  -  11-18 @ 13:16  --------------------------------------------------------  IN: 240 mL / OUT: 0 mL / NET: 240 mL        PHYSICAL EXAM:  CONSTITUTIONAL:  no apparent distress  RESP: No respiratory distress, no use of accessory muscles; CTA b/l,   CV: RRR, +S1S2, no MRG; no JVD; GI: Soft, NT, ND, no rebound, no guarding  Extremities:+1 peripheral edema  NEURO: awake, answers questions appropriately       LABS:  11-18    143  |  106  |  43[H]  ----------------------------<  154[H]  4.0   |  26  |  2.04[H]    Ca    8.7      18 Nov 2024 05:30  Phos  3.1     11-17  Mg     2.2     11-18    TPro  5.8[L]  /  Alb  3.3  /  TBili  1.0  /  DBili      /  AST  23  /  ALT  31  /  AlkPhos  64  11-18                          9.7    7.76  )-----------( 182      ( 18 Nov 2024 05:30 )             30.1       Urine Studies:  Creatinine Trend: 2.04<--, 2.01<--, 2.00<--, 1.87<--, 1.72<--, 1.63<--  Urinalysis Basic - ( 18 Nov 2024 05:30 )    Color:  / Appearance:  / SG:  / pH:   Gluc: 154 mg/dL / Ketone:   / Bili:  / Urobili:    Blood:  / Protein:  / Nitrite:    Leuk Esterase:  / RBC:  / WBC    Sq Epi:  / Non Sq Epi:  / Bacteria:       Sodium, Random Urine: 43 mmol/L (11-17 @ 12:03)  Creatinine, Random Urine: 70 mg/dL (11-17 @ 12:03)  Protein/Creatinine Ratio Calculation: 0.6 Ratio (11-17 @ 12:03)  Osmolality, Random Urine: 457 mosm/kg (11-17 @ 12:03)  Potassium, Random Urine: 46 mmol/L (11-17 @ 12:03)  Creatinine, Random Urine: 34 mg/dL (11-16 @ 12:06)  Sodium, Random Urine: 83 mmol/L (11-16 @ 12:06)  Osmolality, Random Urine: 336 mosm/kg (11-16 @ 12:06)  Sodium, Random Urine: 115 mmol/L (11-15 @ 15:06)  Osmolality, Random Urine: 314 mosm/kg (11-15 @ 15:06)  Creatinine, Random Urine: 13 mg/dL (11-15 @ 15:06)  Sodium, Random Urine: 108 mmol/L (11-15 @ 04:01)  Creatinine, Random Urine: 15 mg/dL (11-15 @ 04:01)  Protein/Creatinine Ratio Calculation: 0.6 Ratio (11-15 @ 04:01)  Osmolality, Random Urine: 321 mosm/kg (11-15 @ 04:01)  Potassium, Random Urine: 13 mmol/L (11-15 @ 04:01)    RADIOLOGY & ADDITIONAL STUDIES:

## 2024-11-19 LAB
ANION GAP SERPL CALC-SCNC: 11 MMOL/L — SIGNIFICANT CHANGE UP (ref 5–17)
BUN SERPL-MCNC: 53 MG/DL — HIGH (ref 7–23)
CALCIUM SERPL-MCNC: 9 MG/DL — SIGNIFICANT CHANGE UP (ref 8.4–10.5)
CHLORIDE SERPL-SCNC: 105 MMOL/L — SIGNIFICANT CHANGE UP (ref 96–108)
CO2 SERPL-SCNC: 24 MMOL/L — SIGNIFICANT CHANGE UP (ref 22–31)
CREAT ?TM UR-MCNC: 71 MG/DL — SIGNIFICANT CHANGE UP
CREAT SERPL-MCNC: 2.4 MG/DL — HIGH (ref 0.5–1.3)
EGFR: 27 ML/MIN/1.73M2 — LOW
FERRITIN SERPL-MCNC: 156 NG/ML — SIGNIFICANT CHANGE UP (ref 30–400)
GLUCOSE SERPL-MCNC: 132 MG/DL — HIGH (ref 70–99)
HCT VFR BLD CALC: 30.9 % — LOW (ref 39–50)
HGB BLD-MCNC: 10.1 G/DL — LOW (ref 13–17)
IRON SATN MFR SERPL: 22 % — SIGNIFICANT CHANGE UP (ref 16–55)
IRON SATN MFR SERPL: 31 UG/DL — LOW (ref 45–165)
MAGNESIUM SERPL-MCNC: 2.2 MG/DL — SIGNIFICANT CHANGE UP (ref 1.6–2.6)
MCHC RBC-ENTMCNC: 29.8 PG — SIGNIFICANT CHANGE UP (ref 27–34)
MCHC RBC-ENTMCNC: 32.7 G/DL — SIGNIFICANT CHANGE UP (ref 32–36)
MCV RBC AUTO: 91.2 FL — SIGNIFICANT CHANGE UP (ref 80–100)
NRBC # BLD: 0 /100 WBCS — SIGNIFICANT CHANGE UP (ref 0–0)
OSMOLALITY UR: 444 MOSM/KG — SIGNIFICANT CHANGE UP (ref 300–900)
PLATELET # BLD AUTO: 207 K/UL — SIGNIFICANT CHANGE UP (ref 150–400)
POTASSIUM SERPL-MCNC: 4.3 MMOL/L — SIGNIFICANT CHANGE UP (ref 3.5–5.3)
POTASSIUM SERPL-SCNC: 4.3 MMOL/L — SIGNIFICANT CHANGE UP (ref 3.5–5.3)
POTASSIUM UR-SCNC: 35 MMOL/L — SIGNIFICANT CHANGE UP
PROT ?TM UR-MCNC: 21 MG/DL — HIGH (ref 0–12)
PROT/CREAT UR-RTO: 0.3 RATIO — HIGH (ref 0–0.2)
RBC # BLD: 3.39 M/UL — LOW (ref 4.2–5.8)
RBC # FLD: 18.1 % — HIGH (ref 10.3–14.5)
SODIUM SERPL-SCNC: 140 MMOL/L — SIGNIFICANT CHANGE UP (ref 135–145)
SODIUM UR-SCNC: 39 MMOL/L — SIGNIFICANT CHANGE UP
TACROLIMUS SERPL-MCNC: 5.5 NG/ML — SIGNIFICANT CHANGE UP
TIBC SERPL-MCNC: 144 UG/DL — LOW (ref 220–430)
TRANSFERRIN SERPL-MCNC: 134 MG/DL — LOW (ref 200–360)
UIBC SERPL-MCNC: 113 UG/DL — SIGNIFICANT CHANGE UP (ref 110–370)
UUN UR-MCNC: 723 MG/DL — SIGNIFICANT CHANGE UP
VIT B12 SERPL-MCNC: 862 PG/ML — SIGNIFICANT CHANGE UP (ref 232–1245)
WBC # BLD: 8.61 K/UL — SIGNIFICANT CHANGE UP (ref 3.8–10.5)
WBC # FLD AUTO: 8.61 K/UL — SIGNIFICANT CHANGE UP (ref 3.8–10.5)

## 2024-11-19 PROCEDURE — 99233 SBSQ HOSP IP/OBS HIGH 50: CPT

## 2024-11-19 PROCEDURE — 76775 US EXAM ABDO BACK WALL LIM: CPT | Mod: 26

## 2024-11-19 PROCEDURE — 99232 SBSQ HOSP IP/OBS MODERATE 35: CPT

## 2024-11-19 PROCEDURE — 76776 US EXAM K TRANSPL W/DOPPLER: CPT | Mod: 26

## 2024-11-19 RX ORDER — NIFEDIPINE 10 MG
30 CAPSULE ORAL DAILY
Refills: 0 | Status: DISCONTINUED | OUTPATIENT
Start: 2024-11-20 | End: 2024-11-20

## 2024-11-19 RX ADMIN — Medication 1: at 17:44

## 2024-11-19 RX ADMIN — FAMOTIDINE 20 MILLIGRAM(S): 20 TABLET, FILM COATED ORAL at 12:35

## 2024-11-19 RX ADMIN — Medication 81 MILLIGRAM(S): at 12:34

## 2024-11-19 RX ADMIN — Medication 20 MILLIGRAM(S): at 23:22

## 2024-11-19 RX ADMIN — Medication 5000 UNIT(S): at 23:23

## 2024-11-19 RX ADMIN — ALLOPURINOL 100 MILLIGRAM(S): 300 TABLET ORAL at 12:35

## 2024-11-19 RX ADMIN — Medication 5000 UNIT(S): at 17:50

## 2024-11-19 RX ADMIN — Medication 5000 UNIT(S): at 05:28

## 2024-11-19 RX ADMIN — Medication 2: at 23:34

## 2024-11-19 RX ADMIN — CARVEDILOL 12.5 MILLIGRAM(S): 25 TABLET, FILM COATED ORAL at 17:49

## 2024-11-19 RX ADMIN — TACROLIMUS 2 MILLIGRAM(S): 5 CAPSULE ORAL at 23:22

## 2024-11-19 RX ADMIN — TACROLIMUS 2 MILLIGRAM(S): 5 CAPSULE ORAL at 14:07

## 2024-11-19 RX ADMIN — INSULIN GLARGINE 8 UNIT(S): 100 INJECTION, SOLUTION SUBCUTANEOUS at 23:34

## 2024-11-19 RX ADMIN — PREDNISONE 5 MILLIGRAM(S): 20 TABLET ORAL at 06:08

## 2024-11-19 NOTE — PROGRESS NOTE ADULT - ASSESSMENT
79M Oriental orthodox PMHx of PCKD s/p kidney transplant in 2007 with residual CKD, HTN, HLD, HFpEF (EF 58% by TTE October 2024), TDM2, prostate CA s/p radical prostatectomy 2015, Afib/AFl s/p ablation, admitted for HFpEF exacerbation requiring IV diuresis.  Nephrology consulted for DAYO on CKD and transplant IS management    #anemia of chronic disease  #HTN  -Imp: Nonoliguric DAYO likely related to CRS - Unknown baseline   Admission SCR 1.53 currently at sCr 2.4, now with soft BPs (80-90s/50s)  s/p kidney transplant in 2007, on Tacro and prednisone.  Last 24h uop: 1200cc    Plan:  - f/u tacro level  - Aim for Tacro goal 4-6   - continue tacro 2mg BID at 8am and 8pm   - Consider decreasing Bumex 1mg BID to 1mg once daily given soft BPs   - Request allograft Renal US  - Continue with strict I/Os, monitor UOP  - c/w daily standing weight  - Avoid nephrotoxic agents   - c/w nifedipine 60mg daily for now for better BP control   - Will contact transplant nephrologist: Sulaiman Modi at [facility]    79M [employer] PMHx of PCKD s/p kidney transplant in 2007 with residual CKD, HTN, HLD, HFpEF (EF 58% by TTE October 2024), TDM2, prostate CA s/p radical prostatectomy 2015, Afib/AFl s/p ablation, admitted for HFpEF exacerbation requiring IV diuresis.  Nephrology consulted for DAYO on CKD and transplant IS management    #anemia of chronic disease  #HTN  -Imp: Nonoliguric DAYO likely related to CRS - Unknown baseline   Admission SCR 1.53 currently at sCr 2.4, now with soft BPs (80-90s/50s)  s/p kidney transplant in 2007, on Tacro and prednisone.  Last 24h uop: 1200cc  Improved edema    Plan:  - f/u tacro level  - Aim for Tacro goal 4-6   - continue tacro 2mg BID at 8am and 8pm   - Consider decreasing Bumex 1mg BID to 1mg once daily  - Can lower nifedipine dose if BP continues to be soft  - Request allograft Renal US  - Continue with strict I/Os, monitor UOP  - c/w daily standing weight  - Avoid nephrotoxic agents   - c/w nifedipine 60mg daily for now for better BP control   - Will contact transplant nephrologist: Sulaiman Modi at [facility]    79M [employer] orth[employer] PMHx of PCKD s/p kidney transplant in 2007 with residual CKD, HTN, HLD, HFpEF (EF 58% by TTE October 2024), TDM2, prostate CA s/p radical prostatectomy 2015, Afib/AFl s/p ablation, admitted for HFpEF exacerbation requiring IV diuresis.  Nephrology consulted for DAYO on CKD and transplant IS management    #anemia of chronic disease  #HTN  -Imp: Nonoliguric DAYO likely related to CRS - Unknown baseline   Admission SCR 1.53 currently at sCr 2.4, now with soft BPs (80-90s/50s)  s/p kidney transplant in 2007, on Tacro and prednisone.  Last 24h uop: 1200cc  Improved edema    Plan:  - continue tacro 2mg BID at 8am and 8pm and prednisone 5mg daily  - Aim for Tacro goal 4-6   - Consider decreasing Bumex 1mg  BID to 1mg PO bid or daily depending on hemodynamics   - Can lower nifedipine dose to 30mg daily to avoid bouts of hypotension   - f/u allograft Renal US  - Continue with strict I/Os, monitor UOP  - c/w daily standing weight  - Avoid nephrotoxic agents such as NSAIDs and PPI   - c/w nifedipine 60mg daily for now for better BP control     Discussed with primary team

## 2024-11-19 NOTE — PROGRESS NOTE ADULT - NS ATTEND OPT1 GEN_ALL_CORE
Personalized Preventive Plan for Chelo Singh - 2/16/2021  Medicare offers a range of preventive health benefits. Some of the tests and screenings are paid in full while other may be subject to a deductible, co-insurance, and/or copay. Some of these benefits include a comprehensive review of your medical history including lifestyle, illnesses that may run in your family, and various assessments and screenings as appropriate. After reviewing your medical record and screening and assessments performed today your provider may have ordered immunizations, labs, imaging, and/or referrals for you. A list of these orders (if applicable) as well as your Preventive Care list are included within your After Visit Summary for your review. Other Preventive Recommendations:    · A preventive eye exam performed by an eye specialist is recommended every 1-2 years to screen for glaucoma; cataracts, macular degeneration, and other eye disorders. · A preventive dental visit is recommended every 6 months. · Try to get at least 150 minutes of exercise per week or 10,000 steps per day on a pedometer . · Order or download the FREE \"Exercise & Physical Activity: Your Everyday Guide\" from [employer]. Call 7-576.989.8290 or search [employer] online. · You need 5208-5431 mg of calcium and 7116-3907 IU of vitamin D per day. It is possible to meet your calcium requirement with diet alone, but a vitamin D supplement is usually necessary to meet this goal.  · When exposed to the sun, use a sunscreen that protects against both UVA and UVB radiation with an SPF of 30 or greater. Reapply every 2 to 3 hours or after sweating, drying off with a towel, or swimming. · Always wear a seat belt when traveling in a car. Always wear a helmet when riding a bicycle or motorcycle.
I attest my time as attending is greater than 50% of the total combined time spent on qualifying patient care activities by the PA/NP and attending.
I attest my time as attending is greater than 50% of the total combined time spent on qualifying patient care activities by the PA/NP and attending.

## 2024-11-19 NOTE — DIETITIAN INITIAL EVALUATION ADULT - NSFNSPHYEXAMSKINFT_GEN_A_CORE
Skin: 1+edema (right leg; left leg; right ankle; left ankle; right knee; left knee; left foot; right foot), No pressure ulcers noted at this time, Madhav 20.

## 2024-11-19 NOTE — PROGRESS NOTE ADULT - NS ATTEND AMEND GEN_ALL_CORE FT
79M Hinduism w/ PMHx HTN, HLD, HFpEF (EF 58% by TTE October 2024), TDM2, CKD, PCKD s/p kidney transplant in 2007 ([facility]), prostate CA s/p radical prostatectomy 2015, DVT 2006 (in setting of pelvic fracture), Afib/AFl s/p ablation in Jan 2018 not on AC 2/2 GIB s/p Amulet device in April 2023 with hospital course c/b PEA arrest w/ ROSC and pericardial tamponade s/p bedside pericardiocentesis, RTOR for sternotomy/washout and evacuation of pericardial clot, aortic aneurysm referred to [facility] ER on 11/14/24 by outpatient cardiologist for progressive worsening GAFFNEY and 14lb weight gain x 2 weeks. Admitted to cardiac tele for acute on chronic HFpEF exacerbation, currently on Lasix gtt. Renal following for Hx kidney transplant.       1. HFpEF  Acute on chronic decompensated HFpEF. Appears warm and overloaded. Continue IV diuresis, suspect 2-3 days of diuresis needed.  PT.    2. DAYO on transplant  Renal tx f.u.    During non face-to-face time, I reviewed relevant portions of the patient’s medical record; including vitals, labs, medications, cardiac studies, remaining additional imaging and consultant recommendations. During face-to-face time, I took a relevant history and examined the patient. I also explained to the patient their diagnoses, and specific cardiopulmonary management plan, which required a high level of medical decision making.  I answered all questions related to the patient's medical conditions.
79M Sikhism w/ PMHx HTN, HLD, HFpEF (EF 58% by TTE October 2024), TDM2, CKD, PCKD s/p kidney transplant in 2007 ([facility]), prostate CA s/p radical prostatectomy 2015, DVT 2006 (in setting of pelvic fracture), Afib/AFl s/p ablation in Jan 2018 not on AC 2/2 GIB s/p Amulet device in April 2023 with hospital course c/b PEA arrest w/ ROSC and pericardial tamponade s/p bedside pericardiocentesis, RTOR for sternotomy/washout and evacuation of pericardial clot, aortic aneurysm referred to [facility] ER on 11/14/24 by outpatient cardiologist for progressive worsening GAFFNEY and 14lb weight gain x 2 weeks. Admitted to cardiac tele for acute on chronic HFpEF exacerbation, currently on Lasix gtt. Renal following for Hx kidney transplant.       1. HFpEF  Acute on chronic decompensated HFpEF. Appears warm and now euvolemic. Stop IV diuresis, transition to PO diuretics tomorrow and d.c.  PT.    2. DAYO on transplant  Renal tx f.u. reduce nifedipine.    During non face-to-face time, I reviewed relevant portions of the patient’s medical record; including vitals, labs, medications, cardiac studies, remaining additional imaging and consultant recommendations. During face-to-face time, I took a relevant history and examined the patient. I also explained to the patient their diagnoses, and specific cardiopulmonary management plan, which required a high level of medical decision making.  I answered all questions related to the patient's medical conditions.

## 2024-11-19 NOTE — PROGRESS NOTE ADULT - PROBLEM SELECTOR PLAN 2
Hx of Afib/AFl s/p ablation in Jan 2018 not on AC 2/2 GIB s/p Amulet device   - rate controlled w/ HR in 70s   - Rate control: Coreg 12.5mg BID (reduced home dose)  - AC: No longer on AC 2/2 GIB. C/w ASA 81mg QD
Hx of Afib/AFl s/p ablation in Jan 2018 not on AC 2/2 GIB s/p Amulet device   - rate controlled w/ HR 50-80s  - Rate control: Coreg 12.5mg BID (reduced home dose)  - AC: No longer on AC 2/2 GIB. C/w ASA 81mg QD

## 2024-11-19 NOTE — PROGRESS NOTE ADULT - PROBLEM SELECTOR PLAN 1
Fluid overloaded improving  - Etiology: unclear given NST negative and patient's Afib rate controlled. Compliant w/ meds.  - TTE: 10/21/24: LVEF 58%, normal RVSF w/ increased wall thickness, hypokinetic basal and mid anterior septum and basal and mid inferior septum, PFO vs secundum ASD w/ L-R shunting, mild MR, aortic root 4.30 cm, no pericardial effusion  - NST 11/14/24 normal perfusion per outpatient MD note in HIE  - EKG Afib non ischemic   - Diuretic: s/p Lasix gtt 5mg/hr. DC'ed further Bumex 1mg IV BID. HOLD further diuresis today, plan to resume home Torsemide 20mg BID in AM if crea improves  - GMDT: Coreg 12.5mg BID (reduced home dose).   --- Holding home Entresto  mg BID 2/2 uptrending Crea  - Core measures (strict I&Os, daily weights, fluid restriction)

## 2024-11-19 NOTE — DIETITIAN INITIAL EVALUATION ADULT - OTHER CALCULATIONS
5'7''  pounds +-10%  Wt 156 pounds BMI 24.4 %QZG=547  IBW for EER d/t HF   Adjusted for age, HF/Renal - Fluids per team

## 2024-11-19 NOTE — PROGRESS NOTE ADULT - ASSESSMENT
79M Jewish w/ PMHx HTN, HLD, HFpEF (EF 58% by TTE October 2024), TDM2, CKD, PCKD s/p kidney transplant in 2007 ([facility]), prostate CA s/p radical prostatectomy 2015, DVT 2006 (in setting of pelvic fracture), Afib/AFl s/p ablation in Jan 2018 not on AC 2/2 GIB s/p Amulet device in April 2023 with hospital course c/b PEA arrest w/ ROSC and pericardial tamponade s/p bedside pericardiocentesis, RTOR for sternotomy/washout and evacuation of pericardial clot, aortic aneurysm, referred to [facility] ER on 11/14/24 by outpatient cardiologist for progressive worsening GAFFNEY and 14lb weight gain x 2 weeks. Admitted to cardiac tele for acute on chronic HFpEF exacerbation, currently on IV diuresis. Renal following for Hx kidney transplant.     #Heart failure with preserved ejection fraction.   -Will continue management as per HF team   -Will continue Coreg 12.5mg BID (reduced home dose).   - Entresto held 2/2  given elevated creatinine     #Afib  -Will continue rate control with  Coreg 12.5mg BID.  - Will ASA 81mg daily.  - The pt was on systemic AC previously discontinued due to GI Bleed.    #History of Kidney transplant  -Pt's creatinine uptrending 2.04->2.4   -Nephrology following   - f/u allograft Renal US  - Avoiding nephrotoxins.  - continue tacro 2mg BID at 8am and 8pm and prednisone 5mg daily  - Tacro goal 4-6 as per Nephrology     #HTN/HLD  - Continue Coreg 12.5mg BID with holding parameters   - Nifedipine XL decreased from  60 mg to 30mg daily with holding parameters 2/2 low BP   - Continue Atorvastatin 20mg Qhs.     # DVT proph  -SQH    #DISPO   -Pt was seen by PT and recommended for home PT   -As per primary team     55 minutes spent on total encounter. The necessity of the time spent during the encounter on this date of service was due to:    Review of hospital course, labs, vitals, medical records.  Bedside exam   Discussed plan of care with primary team   Documenting the encounter.

## 2024-11-19 NOTE — DIETITIAN INITIAL EVALUATION ADULT - PERSON TAUGHT/METHOD
CHF diet education provided. Discussed importance of daily weights, choosing low salt foods, Reviewed foods high/low in salt. Understanding stated, provide additional motivation as needed./verbal instruction/written material/teach back - (Patient repeats in own words)/patient instructed

## 2024-11-19 NOTE — DIETITIAN INITIAL EVALUATION ADULT - NS FNS DIET ORDER
Diet, DASH/TLC:   Sodium & Cholesterol Restricted  Consistent Carbohydrate {No Snacks} (CSTCHO)  1000mL Fluid Restriction ([id number]) (11-18-24 @ 13:51)

## 2024-11-19 NOTE — DIETITIAN INITIAL EVALUATION ADULT - ADD RECOMMEND
1. Monitor PO intake/appetite, GI distress, diet tolerance, labs, weights.  2. Honor pt food preferences as able.  3. RD to remain available for additional nutrition interventions as needed.  -- Moderate Nutrition Risk.  1. Monitor PO intake/appetite, GI distress, diet tolerance, labs, weights.  -- Honor pt food preferences as able.  -- Ongoing Insulin adjustments PRN while inhouse based on BG/POCT. Ongoing Assessment and adjustment for medication for d/c as able.   3. RD to remain available for additional nutrition interventions as needed.  -- Moderate Nutrition Risk.

## 2024-11-19 NOTE — DIETITIAN INITIAL EVALUATION ADULT - OTHER INFO
79M PMHx HTN, HLD, HFpEF (EF 58% by TTE October 2024), TDM2, CKD, PCKD s/p kidney transplant in 2007, prostate CA s/p radical prostatectomy 2015, DVT 2006 (in setting of pelvic fracture), Afib/AFl s/p ablation in Jan 2018 not on AC 2/2 GIB s/p Amulet device in April 2023 with hospital course c/b PEA arrest w/ ROSC and pericardial tamponade s/p bedside pericardiocentesis, RTOR for sternotomy/washout and evacuation of pericardial clot, aortic aneurysm, referred to [facility] ER on 11/14/24 by outpatient cardiologist for progressive worsening GAFFNEY and 14lb weight gain x2 weeks. Admitted to cardiac tele for acute on chronic HFpEF exacerbation, currently on IV diuresis.    Pt seen this AM on 5ur. About to consume breakfast. PTA reports that wife does the food shopping and cooking. Denies cooking with salt. Cook with both canned and fresh food. No known food allergies. No issues chewing. No issues swallowing. Pepcid ordered; LBM per flow sheets 11/17.   +DM hx Noted. Per H&P use of METFORMIN (GFR 27), JARDIANCE (K) and Trulicity PTA. A1c o/a 7.6%. POCT x24hrs 11/18 119-230, 11/17 103-214 - Deltasone ordered at this time. Pt is now ordered for 8U HS Lantus and ADMELOG HS/premeals.   Pain: none per flow sheets - pain medications are ordered.   Please see below for nutritions recommendations.  79M PMHx HTN, HLD, HFpEF (EF 58% by TTE October 2024), TDM2, CKD, PCKD s/p kidney transplant in 2007, prostate CA s/p radical prostatectomy 2015, DVT 2006 (in setting of pelvic fracture), Afib/AFl s/p ablation in Jan 2018 not on AC 2/2 GIB s/p Amulet device in April 2023 with hospital course c/b PEA arrest w/ ROSC and pericardial tamponade s/p bedside pericardiocentesis, RTOR for sternotomy/washout and evacuation of pericardial clot, aortic aneurysm, referred to [facility] ER on 11/14/24 by outpatient cardiologist for progressive worsening GAFFNEY and 14lb weight gain x2 weeks. Admitted to cardiac tele for acute on chronic HFpEF exacerbation, currently on IV diuresis.    Pt seen this AM on 5ur. About to consume breakfast. PTA reports that wife does the food shopping and cooking. Denies cooking with salt. Cooks with both canned and fresh food. No known food allergies. No issues chewing. No issues swallowing. Pepcid ordered; LBM per flow sheets 11/17.   +DM hx Noted. Per H&P use of METFORMIN (GFR 27), JARDIANCE (K WNL, /58) and Trulicity PTA. A1c o/a 7.6%. POCT x24hrs 11/18 119-230, 11/17 103-214 - Deltasone ordered at this time. Pt is now ordered for 8U HS Lantus and ADMELOG HS/premeals.   Pain: none per flow sheets - pain medications are ordered.   Please see below for nutritions recommendations.

## 2024-11-19 NOTE — DIETITIAN INITIAL EVALUATION ADULT - PERTINENT LABORATORY DATA
11-19    140  |  105  |  53[H]  ----------------------------<  132[H]  4.3   |  24  |  2.40[H]    Ca    9.0      19 Nov 2024 05:30  Mg     2.2     11-19    TPro  5.8[L]  /  Alb  3.3  /  TBili  1.0  /  DBili  x   /  AST  23  /  ALT  31  /  AlkPhos  64  11-18  POCT Blood Glucose.: 138 mg/dL (11-19-24 @ 07:47)  A1C with Estimated Average Glucose Result: 7.6 % (11-15-24 @ 05:30)  HDL 39

## 2024-11-19 NOTE — PROGRESS NOTE ADULT - PROBLEM SELECTOR PROBLEM 1
(HFpEF) heart failure with preserved ejection fraction

## 2024-11-19 NOTE — DIETITIAN INITIAL EVALUATION ADULT - NS FNS REASON FOR WEIGHT CHANG
Pt reports fluid gain which resulted in wt gain PTA. Pt did not state timeframe of wt changes. Pt did report taking wts daily PTA.

## 2024-11-19 NOTE — DIETITIAN INITIAL EVALUATION ADULT - PROBLEM SELECTOR PLAN 2
Hx of Afib/AFl s/p ablation in Jan 2018 not on AC 2/2 GIB s/p Amulet device   HR in 70s   - Rate control: Coreg 12.5mg BID (reduced home dose)  - AC: No longer on AC 2/2 GIB c/w ASA 81mg QD

## 2024-11-19 NOTE — DIETITIAN INITIAL EVALUATION ADULT - NAME AND PHONE
Yes, [phone]; dietitian Roslyn Salinas RD CDN [facility]

## 2024-11-19 NOTE — PROGRESS NOTE ADULT - SUBJECTIVE AND OBJECTIVE BOX
CARDIOLOGY NP PROGRESS NOTE    Subjective:   Remainder ROS otherwise negative.    Interval Events:     TELEMETRY:    EKG:      VITAL SIGNS:  T(C): 36.6 (11-19-24 @ 08:54), Max: 36.6 (11-19-24 @ 08:54)  HR: 58 (11-19-24 @ 08:54) (56 - 81)  BP: 108/58 (11-19-24 @ 08:54) (84/56 - 191/109)  RR: 17 (11-19-24 @ 08:54) (17 - 18)  SpO2: 93% (11-19-24 @ 08:54) (93% - 97%)  Wt(kg): --    I&O's Summary    18 Nov 2024 07:01  -  19 Nov 2024 07:00  --------------------------------------------------------  IN: 580 mL / OUT: 1200 mL / NET: -620 mL          PHYSICAL EXAM:    General: A/ox 3, No acute Distress  Neck: Supple, NO JVD  Cardiac: S1 S2, No M/R/G  Pulmonary: CTAB, Breathing unlabored, No Rhonchi/Rales/Wheezing  Abdomen: Soft, Non -tender, +BS x 4 quads  Extremities: No Rashes, No edema  Neuro: A/o x 3, No focal deficits          LABS:                          10.1   8.61  )-----------( 207      ( 19 Nov 2024 05:30 )             30.9                              11-19    140  |  105  |  53[H]  ----------------------------<  132[H]  4.3   |  24  |  2.40[H]    Ca    9.0      19 Nov 2024 05:30  Mg     2.2     11-19    TPro  5.8[L]  /  Alb  3.3  /  TBili  1.0  /  DBili  x   /  AST  23  /  ALT  31  /  AlkPhos  64  11-18    LIVER FUNCTIONS - ( 18 Nov 2024 05:30 )  Alb: 3.3 g/dL / Pro: 5.8 g/dL / ALK PHOS: 64 U/L / ALT: 31 U/L / AST: 23 U/L / GGT: x                                   CAPILLARY BLOOD GLUCOSE      POCT Blood Glucose.: 138 mg/dL (19 Nov 2024 07:47)  POCT Blood Glucose.: 119 mg/dL (18 Nov 2024 22:11)  POCT Blood Glucose.: 190 mg/dL (18 Nov 2024 16:39)  POCT Blood Glucose.: 230 mg/dL (18 Nov 2024 11:51)            Allergies:  [employer]&#[employer]27[employer] - No blood products (Unknown)  naproxen (Hives)    MEDICATIONS  (STANDING):  allopurinol 100 milliGRAM(s) Oral daily  aspirin enteric coated 81 milliGRAM(s) Oral daily  atorvastatin 20 milliGRAM(s) Oral at bedtime  carvedilol 12.5 milliGRAM(s) Oral every 12 hours  dextrose 5%. 1000 milliLiter(s) (100 mL/Hr) IV Continuous <Continuous>  dextrose 5%. 1000 milliLiter(s) (50 mL/Hr) IV Continuous <Continuous>  dextrose 50% Injectable 25 Gram(s) IV Push once  dextrose 50% Injectable 12.5 Gram(s) IV Push once  dextrose 50% Injectable 25 Gram(s) IV Push once  famotidine    Tablet 20 milliGRAM(s) Oral daily  glucagon  Injectable 1 milliGRAM(s) IntraMuscular once  heparin   Injectable 5000 Unit(s) SubCutaneous every 8 hours  influenza  Vaccine (HIGH DOSE) 0.5 milliLiter(s) IntraMuscular once  insulin glargine Injectable (LANTUS) 8 Unit(s) SubCutaneous at bedtime  insulin lispro (ADMELOG) corrective regimen sliding scale   SubCutaneous Before meals and at bedtime  NIFEdipine XL 60 milliGRAM(s) Oral daily  predniSONE   Tablet 5 milliGRAM(s) Oral daily  tacrolimus 2 milliGRAM(s) Oral <User Schedule>    MEDICATIONS  (PRN):  dextrose Oral Gel 15 Gram(s) Oral once PRN Blood Glucose LESS THAN 70 milliGRAM(s)/deciliter        DIAGNOSTIC TESTS:        CARDIOLOGY NP PROGRESS NOTE    Subjective: Pt seen and examined at bedside. Reports feeling well. Denies chest pain, sob, lightheadedness, dizziness, palpitations, fever, chills.  Remainder ROS otherwise negative.    Interval Events: Hypotensive overnight, SBP 80-90s, now resolved.            VITAL SIGNS:  T(C): 36.6 (11-19-24 @ 08:54), Max: 36.6 (11-19-24 @ 08:54)  HR: 58 (11-19-24 @ 08:54) (56 - 81)  BP: 108/58 (11-19-24 @ 08:54) (84/56 - 191/109)  RR: 17 (11-19-24 @ 08:54) (17 - 18)  SpO2: 93% (11-19-24 @ 08:54) (93% - 97%)  Wt(kg): --    I&O's Summary    18 Nov 2024 07:01  -  19 Nov 2024 07:00  --------------------------------------------------------  IN: 580 mL / OUT: 1200 mL / NET: -620 mL          PHYSICAL EXAM:    General: A/ox 3, No acute Distress  Neck: Supple, mild JVD  Cardiac: S1 S2, No M/R/G  Pulmonary: CTAB, Breathing unlabored on RA, No Rhonchi/Rales/Wheezing  Abdomen: Soft, Non -tender, +BS x 4 quads  Extremities: No Rashes, unique LE 1+ edema  Neuro: A/o x 3, No focal deficits          LABS:                          10.1   8.61  )-----------( 207      ( 19 Nov 2024 05:30 )             30.9                              11-19    140  |  105  |  53[H]  ----------------------------<  132[H]  4.3   |  24  |  2.40[H]    Ca    9.0      19 Nov 2024 05:30  Mg     2.2     11-19    TPro  5.8[L]  /  Alb  3.3  /  TBili  1.0  /  DBili  x   /  AST  23  /  ALT  31  /  AlkPhos  64  11-18    LIVER FUNCTIONS - ( 18 Nov 2024 05:30 )  Alb: 3.3 g/dL / Pro: 5.8 g/dL / ALK PHOS: 64 U/L / ALT: 31 U/L / AST: 23 U/L / GGT: x                                   CAPILLARY BLOOD GLUCOSE      POCT Blood Glucose.: 138 mg/dL (19 Nov 2024 07:47)  POCT Blood Glucose.: 119 mg/dL (18 Nov 2024 22:11)  POCT Blood Glucose.: 190 mg/dL (18 Nov 2024 16:39)  POCT Blood Glucose.: 230 mg/dL (18 Nov 2024 11:51)            Allergies:  [employer]ah&#x27[employer]s [employer] - No blood products (Unknown)  naproxen (Hives)    MEDICATIONS  (STANDING):  allopurinol 100 milliGRAM(s) Oral daily  aspirin enteric coated 81 milliGRAM(s) Oral daily  atorvastatin 20 milliGRAM(s) Oral at bedtime  carvedilol 12.5 milliGRAM(s) Oral every 12 hours  dextrose 5%. 1000 milliLiter(s) (100 mL/Hr) IV Continuous <Continuous>  dextrose 5%. 1000 milliLiter(s) (50 mL/Hr) IV Continuous <Continuous>  dextrose 50% Injectable 25 Gram(s) IV Push once  dextrose 50% Injectable 12.5 Gram(s) IV Push once  dextrose 50% Injectable 25 Gram(s) IV Push once  famotidine    Tablet 20 milliGRAM(s) Oral daily  glucagon  Injectable 1 milliGRAM(s) IntraMuscular once  heparin   Injectable 5000 Unit(s) SubCutaneous every 8 hours  influenza  Vaccine (HIGH DOSE) 0.5 milliLiter(s) IntraMuscular once  insulin glargine Injectable (LANTUS) 8 Unit(s) SubCutaneous at bedtime  insulin lispro (ADMELOG) corrective regimen sliding scale   SubCutaneous Before meals and at bedtime  NIFEdipine XL 60 milliGRAM(s) Oral daily  predniSONE   Tablet 5 milliGRAM(s) Oral daily  tacrolimus 2 milliGRAM(s) Oral <User Schedule>    MEDICATIONS  (PRN):  dextrose Oral Gel 15 Gram(s) Oral once PRN Blood Glucose LESS THAN 70 milliGRAM(s)/deciliter        DIAGNOSTIC TESTS:        CARDIOLOGY NP PROGRESS NOTE    Subjective: Pt seen and examined at bedside. Reports feeling well. Denies chest pain, sob, lightheadedness, dizziness, palpitations, fever, chills.  Remainder ROS otherwise negative.    Interval Events: Hypotensive overnight, SBP 80-90s, now resolved.       VITAL SIGNS:  T(C): 36.6 (11-19-24 @ 08:54), Max: 36.6 (11-19-24 @ 08:54)  HR: 58 (11-19-24 @ 08:54) (56 - 81)  BP: 108/58 (11-19-24 @ 08:54) (84/56 - 191/109)  RR: 17 (11-19-24 @ 08:54) (17 - 18)  SpO2: 93% (11-19-24 @ 08:54) (93% - 97%)  Wt(kg): --    I&O's Summary    18 Nov 2024 07:01  -  19 Nov 2024 07:00  --------------------------------------------------------  IN: 580 mL / OUT: 1200 mL / NET: -620 mL          PHYSICAL EXAM:    General: A/ox 3, No acute Distress  Neck: Supple, mild JVD  Cardiac: S1 S2, No M/R/G  Pulmonary: CTAB, Breathing unlabored on RA, No Rhonchi/Rales/Wheezing  Abdomen: Soft, Non -tender, +BS x 4 quads  Extremities: No Rashes, unique LE 1+ edema  Neuro: A/o x 3, No focal deficits          LABS:                          10.1   8.61  )-----------( 207      ( 19 Nov 2024 05:30 )             30.9                              11-19    140  |  105  |  53[H]  ----------------------------<  132[H]  4.3   |  24  |  2.40[H]    Ca    9.0      19 Nov 2024 05:30  Mg     2.2     11-19    TPro  5.8[L]  /  Alb  3.3  /  TBili  1.0  /  DBili  x   /  AST  23  /  ALT  31  /  AlkPhos  64  11-18    LIVER FUNCTIONS - ( 18 Nov 2024 05:30 )  Alb: 3.3 g/dL / Pro: 5.8 g/dL / ALK PHOS: 64 U/L / ALT: 31 U/L / AST: 23 U/L / GGT: x                                   CAPILLARY BLOOD GLUCOSE      POCT Blood Glucose.: 138 mg/dL (19 Nov 2024 07:47)  POCT Blood Glucose.: 119 mg/dL (18 Nov 2024 22:11)  POCT Blood Glucose.: 190 mg/dL (18 Nov 2024 16:39)  POCT Blood Glucose.: 230 mg/dL (18 Nov 2024 11:51)            Allergies:  [employer]&#x27[employer] - No blood products (Unknown)  naproxen (Hives)    MEDICATIONS  (STANDING):  allopurinol 100 milliGRAM(s) Oral daily  aspirin enteric coated 81 milliGRAM(s) Oral daily  atorvastatin 20 milliGRAM(s) Oral at bedtime  carvedilol 12.5 milliGRAM(s) Oral every 12 hours  dextrose 5%. 1000 milliLiter(s) (100 mL/Hr) IV Continuous <Continuous>  dextrose 5%. 1000 milliLiter(s) (50 mL/Hr) IV Continuous <Continuous>  dextrose 50% Injectable 25 Gram(s) IV Push once  dextrose 50% Injectable 12.5 Gram(s) IV Push once  dextrose 50% Injectable 25 Gram(s) IV Push once  famotidine    Tablet 20 milliGRAM(s) Oral daily  glucagon  Injectable 1 milliGRAM(s) IntraMuscular once  heparin   Injectable 5000 Unit(s) SubCutaneous every 8 hours  influenza  Vaccine (HIGH DOSE) 0.5 milliLiter(s) IntraMuscular once  insulin glargine Injectable (LANTUS) 8 Unit(s) SubCutaneous at bedtime  insulin lispro (ADMELOG) corrective regimen sliding scale   SubCutaneous Before meals and at bedtime  NIFEdipine XL 60 milliGRAM(s) Oral daily  predniSONE   Tablet 5 milliGRAM(s) Oral daily  tacrolimus 2 milliGRAM(s) Oral <User Schedule>    MEDICATIONS  (PRN):  dextrose Oral Gel 15 Gram(s) Oral once PRN Blood Glucose LESS THAN 70 milliGRAM(s)/deciliter        DIAGNOSTIC TESTS:

## 2024-11-19 NOTE — PROGRESS NOTE ADULT - SUBJECTIVE AND OBJECTIVE BOX
INTERVAL/OVERNIGHT EVENTS: None    SUBJECTIVE: Patient seen and examined at bedside, comfortable, NAD. BP was in 80-90s/50s this morning, denies headache, lightheadedness, dizziness. he does endorse mild suprapubic pain. Denied fever, chest pain, dyspnea, urinary sx.  Last 24h UOP: 1200cc    Vital Signs Last 12 Hrs  T(F): 97.9 (11-19-24 @ 08:54), Max: 97.9 (11-19-24 @ 08:54)  HR: 58 (11-19-24 @ 08:54) (56 - 73)  BP: 108/58 (11-19-24 @ 08:54) (84/56 - 131/72)  BP(mean): 77 (11-19-24 @ 08:54) (63 - 77)  RR: 17 (11-19-24 @ 08:54) (17 - 18)  SpO2: 93% (11-19-24 @ 08:54) (93% - 97%)  I&O's Summary    18 Nov 2024 07:01  -  19 Nov 2024 07:00  --------------------------------------------------------  IN: 580 mL / OUT: 1200 mL / NET: -620 mL        PHYSICAL EXAM:  General: NAD  HEENT: PERRL, EOM intact, sclera anicteric, MMM  Cardiovascular: bradycardic   Respiratory: CTAB; no WRR  GI/: soft; NTND; BS x4  Extremities: 2+ pitting edema BLE  Skin: normal color & turgor; no rash  Neurologic: aox3; no focal deficits    LABS:                        10.1   8.61  )-----------( 207      ( 19 Nov 2024 05:30 )             30.9     11-19    140  |  105  |  53[H]  ----------------------------<  132[H]  4.3   |  24  |  2.40[H]    Ca    9.0      19 Nov 2024 05:30  Mg     2.2     11-19    TPro  5.8[L]  /  Alb  3.3  /  TBili  1.0  /  DBili  x   /  AST  23  /  ALT  31  /  AlkPhos  64  11-18      Urinalysis Basic - ( 19 Nov 2024 05:30 )    Color: x / Appearance: x / SG: x / pH: x  Gluc: 132 mg/dL / Ketone: x  / Bili: x / Urobili: x   Blood: x / Protein: x / Nitrite: x   Leuk Esterase: x / RBC: x / WBC x   Sq Epi: x / Non Sq Epi: x / Bacteria: x          RADIOLOGY & ADDITIONAL TESTS:    MEDICATIONS  (STANDING):  allopurinol 100 milliGRAM(s) Oral daily  aspirin enteric coated 81 milliGRAM(s) Oral daily  atorvastatin 20 milliGRAM(s) Oral at bedtime  buMETAnide Injectable 1 milliGRAM(s) IV Push <User Schedule>  carvedilol 12.5 milliGRAM(s) Oral every 12 hours  dextrose 5%. 1000 milliLiter(s) (100 mL/Hr) IV Continuous <Continuous>  dextrose 5%. 1000 milliLiter(s) (50 mL/Hr) IV Continuous <Continuous>  dextrose 50% Injectable 25 Gram(s) IV Push once  dextrose 50% Injectable 12.5 Gram(s) IV Push once  dextrose 50% Injectable 25 Gram(s) IV Push once  famotidine    Tablet 20 milliGRAM(s) Oral daily  glucagon  Injectable 1 milliGRAM(s) IntraMuscular once  heparin   Injectable 5000 Unit(s) SubCutaneous every 8 hours  influenza  Vaccine (HIGH DOSE) 0.5 milliLiter(s) IntraMuscular once  insulin glargine Injectable (LANTUS) 8 Unit(s) SubCutaneous at bedtime  insulin lispro (ADMELOG) corrective regimen sliding scale   SubCutaneous Before meals and at bedtime  NIFEdipine XL 60 milliGRAM(s) Oral daily  predniSONE   Tablet 5 milliGRAM(s) Oral daily  tacrolimus 2 milliGRAM(s) Oral <User Schedule>    MEDICATIONS  (PRN):  dextrose Oral Gel 15 Gram(s) Oral once PRN Blood Glucose LESS THAN 70 milliGRAM(s)/deciliter   INTERVAL/OVERNIGHT EVENTS: None    SUBJECTIVE: Patient seen and examined at bedside, comfortable, NAD. BP was in 80-90s/50s this morning, denies headache, lightheadedness, dizziness. he does endorse mild suprapubic pain. Denied fever, chest pain, dyspnea, urinary sx.  Last 24h UOP: 1200cc    Vital Signs Last 12 Hrs  T(F): 97.9 (11-19-24 @ 08:54), Max: 97.9 (11-19-24 @ 08:54)  HR: 58 (11-19-24 @ 08:54) (56 - 73)  BP: 108/58 (11-19-24 @ 08:54) (84/56 - 131/72)  BP(mean): 77 (11-19-24 @ 08:54) (63 - 77)  RR: 17 (11-19-24 @ 08:54) (17 - 18)  SpO2: 93% (11-19-24 @ 08:54) (93% - 97%)  I&O's Summary    18 Nov 2024 07:01  -  19 Nov 2024 07:00  --------------------------------------------------------  IN: 580 mL / OUT: 1200 mL / NET: -620 mL        PHYSICAL EXAM:  General: NAD  HEENT: PERRL, EOM intact, sclera anicteric, MMM  Cardiovascular: bradycardic   Respiratory: CTAB; no WRR  GI/: soft; NTND; BS x4  Extremities: 2+ pitting ankle edema BL  Skin: normal color & turgor; no rash  Neurologic: aox3; no focal deficits    LABS:                        10.1   8.61  )-----------( 207      ( 19 Nov 2024 05:30 )             30.9     11-19    140  |  105  |  53[H]  ----------------------------<  132[H]  4.3   |  24  |  2.40[H]    Ca    9.0      19 Nov 2024 05:30  Mg     2.2     11-19    TPro  5.8[L]  /  Alb  3.3  /  TBili  1.0  /  DBili  x   /  AST  23  /  ALT  31  /  AlkPhos  64  11-18      Urinalysis Basic - ( 19 Nov 2024 05:30 )    Color: x / Appearance: x / SG: x / pH: x  Gluc: 132 mg/dL / Ketone: x  / Bili: x / Urobili: x   Blood: x / Protein: x / Nitrite: x   Leuk Esterase: x / RBC: x / WBC x   Sq Epi: x / Non Sq Epi: x / Bacteria: x          RADIOLOGY & ADDITIONAL TESTS:    MEDICATIONS  (STANDING):  allopurinol 100 milliGRAM(s) Oral daily  aspirin enteric coated 81 milliGRAM(s) Oral daily  atorvastatin 20 milliGRAM(s) Oral at bedtime  buMETAnide Injectable 1 milliGRAM(s) IV Push <User Schedule>  carvedilol 12.5 milliGRAM(s) Oral every 12 hours  dextrose 5%. 1000 milliLiter(s) (100 mL/Hr) IV Continuous <Continuous>  dextrose 5%. 1000 milliLiter(s) (50 mL/Hr) IV Continuous <Continuous>  dextrose 50% Injectable 25 Gram(s) IV Push once  dextrose 50% Injectable 12.5 Gram(s) IV Push once  dextrose 50% Injectable 25 Gram(s) IV Push once  famotidine    Tablet 20 milliGRAM(s) Oral daily  glucagon  Injectable 1 milliGRAM(s) IntraMuscular once  heparin   Injectable 5000 Unit(s) SubCutaneous every 8 hours  influenza  Vaccine (HIGH DOSE) 0.5 milliLiter(s) IntraMuscular once  insulin glargine Injectable (LANTUS) 8 Unit(s) SubCutaneous at bedtime  insulin lispro (ADMELOG) corrective regimen sliding scale   SubCutaneous Before meals and at bedtime  NIFEdipine XL 60 milliGRAM(s) Oral daily  predniSONE   Tablet 5 milliGRAM(s) Oral daily  tacrolimus 2 milliGRAM(s) Oral <User Schedule>    MEDICATIONS  (PRN):  dextrose Oral Gel 15 Gram(s) Oral once PRN Blood Glucose LESS THAN 70 milliGRAM(s)/deciliter

## 2024-11-19 NOTE — PROGRESS NOTE ADULT - PROBLEM SELECTOR PLAN 6
Lipid panel WNL, LDL 36  - c/w Atorvastatin 20mg QHS     F: 1L fluid restriction  E: Replete for K <4, Mg <2  N: DASH/TLC  GI PPx: Famotidine   DVT PPx: SQ Heparin   Code Status: FULL CODE  Dispo: pending clinical improvement  PT rec home PT

## 2024-11-19 NOTE — PROGRESS NOTE ADULT - SUBJECTIVE AND OBJECTIVE BOX
Patient was seen and examined at bedside. Case discuss with the primary team. Pt w/o complaints this morning.     OBJECTIVE:  Vital Signs Last 24 Hrs  T(C): 36.6 (19 Nov 2024 08:54), Max: 36.6 (19 Nov 2024 08:54)  T(F): 97.9 (19 Nov 2024 08:54), Max: 97.9 (19 Nov 2024 08:54)  HR: 71 (19 Nov 2024 12:32) (56 - 81)  BP: 121/69 (19 Nov 2024 12:32) (84/56 - 191/109)  BP(mean): 91 (19 Nov 2024 12:32) (63 - 128)  RR: 17 (19 Nov 2024 12:32) (17 - 18)  SpO2: 95% (19 Nov 2024 12:32) (93% - 97%)    Parameters below as of 19 Nov 2024 12:32  Patient On (Oxygen Delivery Method): room air    PHYSICAL EXAM:  Appears comfortable laying on back in bed   MMM  Normal WOB on RA, CTAB   RRR, no mrg   Abdomen soft, nontender, nondistended  Extremities warm, wrapped in compression ace bandages, bilateral feet with some pitting edema   AOX3, no focal neuro deficits     LABS:                        10.1   8.61  )-----------( 207      ( 19 Nov 2024 05:30 )             30.9       140  |  105  |  53[H]  ----------------------------<  132[H]  4.3   |  24  |  2.40[H]    Ca    9.0      19 Nov 2024 05:30  Mg     2.2     11-19    TPro  5.8[L]  /  Alb  3.3  /  TBili  1.0  /  DBili  x   /  AST  23  /  ALT  31  /  AlkPhos  64  11-18    LIVER FUNCTIONS - ( 18 Nov 2024 05:30 )  Alb: 3.3 g/dL / Pro: 5.8 g/dL / ALK PHOS: 64 U/L / ALT: 31 U/L / AST: 23 U/L / GGT: x             CAPILLARY BLOOD GLUCOSE  POCT Blood Glucose.: 138 mg/dL (19 Nov 2024 07:47)  POCT Blood Glucose.: 119 mg/dL (18 Nov 2024 22:11)  POCT Blood Glucose.: 190 mg/dL (18 Nov 2024 16:39)    11/19 Renal US: pending     allopurinol 100 milliGRAM(s) Oral daily  aspirin enteric coated 81 milliGRAM(s) Oral daily  atorvastatin 20 milliGRAM(s) Oral at bedtime  carvedilol 12.5 milliGRAM(s) Oral every 12 hours  famotidine    Tablet 20 milliGRAM(s) Oral daily  glucagon  Injectable 1 milliGRAM(s) IntraMuscular once  heparin   Injectable 5000 Unit(s) SubCutaneous every 8 hours  influenza  Vaccine (HIGH DOSE) 0.5 milliLiter(s) IntraMuscular once  insulin glargine Injectable (LANTUS) 8 Unit(s) SubCutaneous at bedtime  insulin lispro (ADMELOG) corrective regimen sliding scale   SubCutaneous Before meals and at bedtime  predniSONE   Tablet 5 milliGRAM(s) Oral daily  tacrolimus 2 milliGRAM(s) Oral <User Schedule>

## 2024-11-19 NOTE — PROGRESS NOTE ADULT - PROBLEM SELECTOR PROBLEM 3
Kidney transplant recipient

## 2024-11-19 NOTE — CHART NOTE - NSCHARTNOTEFT_GEN_A_CORE
Called by RN for pt with hypotension this morning.  Baseline bp 160s/90s, this morning 80-90s/50s with MAPs 63-67  Pt denies headache, lightheadedness, cp, sob or other complaint  states symptoms of volume overload (edema, sob) are much improved since admission  he reports bm x 3 yesterday, formed, no blood, no dark tarry stool  voided <200cc clear urine about 2 hours ago    Review of chart shows pt s/p IV diuresis, bumex bid resumed yesterday afternoon with first dose nifedipine XL 60mg given at 17:27 yesterday evening.    T(C): , Max: 36.5 (11-18-24 @ 11:54)  HR: 60 (11-19-24 @ 05:49) (60 - 81)  BP: 92/52 (11-19-24 @ 05:49) (84/56 - 191/109)  RR: 18 (11-19-24 @ 05:49) (17 - 18)  SpO2: 97% (11-19-24 @ 05:49) (93% - 97%)    gen: pt supine in bed, nad  cv: irreg, canelo  lungs: cta bilat w/o w/r/r  abd: soft, ntnd, nabsx4  ext: 1+ pitting edema bilat LE. LUE with AVF (non functional)  neuro: aaox3 no focal deficit    A/P: This is a 78yo M [employer] who does not accept blood/blood products, s/p renal transplant, afib (no a/c) s/p amulet, admitted 11/14 with acute on chronic HFpEF exacerbation, s/p IV diuresis.  BPs remained elevated, started on nifedipine xl 60mg yesterday evening, bumex resumed. This morning with asx low bp with MAPs 63-67.  - as pt asymptomatic, will hold IVF at this time  - hold am coreg dose  - bedrest until after breakfast  - will continue to monitor and titrate meds prn

## 2024-11-19 NOTE — PROGRESS NOTE ADULT - ASSESSMENT
79M, Jew, w/ PMHx HTN, HLD, HFpEF (EF 58% by TTE 10/2024), TDM2, CKD, PCKD s/p kidney transplant in 2007 ([facility]), prostate CA s/p radical prostatectomy 2015, DVT 2006 (in setting of pelvic fracture), Afib/AFl s/p ablation in Jan 2018 not on AC 2/2 GIB s/p Amulet device in April 2023 with hospital course c/b PEA arrest w/ ROSC and pericardial tamponade s/p bedside pericardiocentesis, RTOR for sternotomy/washout and evacuation of pericardial clot, aortic aneurysm, referred to [facility] ER on 11/14/24 by outpatient cardiologist for progressive worsening GAFFNEY and 14lb weight gain x 2 weeks. Admitted to cardiac tele for acute on chronic HFpEF exacerbation, s/p IV diuresis. Renal following for Hx renal TXP w/ uptrending crea.

## 2024-11-19 NOTE — PROGRESS NOTE ADULT - PROBLEM SELECTOR PLAN 3
Hx PCKD s/p kidney transplant in 2007 ([facility], follows w/ Nephrologist Dr Sulaiman Modi) w/ baseline Cr ranging from 1.2 -1.4 in 2023   - Prograf 2mg BID  - F/u Prograf level 5.5 on 11/18, f/u Tacro level daily, draw 30 minutes before morning dose (Goal 4-6)  - c/w Prednisone 5mg QD   - Nephro following, recs appreciated   -F/u allograft Renal US result  - S/p Albumin 25% 50mL x4 total for intravascular repletion to avoid poor perfusion to kidney  - Avoid nephrotoxic agents

## 2024-11-19 NOTE — PROGRESS NOTE ADULT - PROBLEM SELECTOR PROBLEM 5
HLD (hyperlipidemia)
HTN (hypertension)
HLD (hyperlipidemia)
HLD (hyperlipidemia)
HTN (hypertension)

## 2024-11-20 ENCOUNTER — TRANSCRIPTION ENCOUNTER (OUTPATIENT)
Age: 79
End: 2024-11-20

## 2024-11-20 VITALS — DIASTOLIC BLOOD PRESSURE: 76 MMHG | SYSTOLIC BLOOD PRESSURE: 147 MMHG | HEART RATE: 84 BPM

## 2024-11-20 LAB
ALBUMIN SERPL ELPH-MCNC: 3.1 G/DL — LOW (ref 3.3–5)
ALP SERPL-CCNC: 65 U/L — SIGNIFICANT CHANGE UP (ref 40–120)
ALT FLD-CCNC: 25 U/L — SIGNIFICANT CHANGE UP (ref 10–45)
ANION GAP SERPL CALC-SCNC: 11 MMOL/L — SIGNIFICANT CHANGE UP (ref 5–17)
AST SERPL-CCNC: 20 U/L — SIGNIFICANT CHANGE UP (ref 10–40)
BILIRUB SERPL-MCNC: 1 MG/DL — SIGNIFICANT CHANGE UP (ref 0.2–1.2)
BLD GP AB SCN SERPL QL: NEGATIVE — SIGNIFICANT CHANGE UP
BUN SERPL-MCNC: 48 MG/DL — HIGH (ref 7–23)
CALCIUM SERPL-MCNC: 9.1 MG/DL — SIGNIFICANT CHANGE UP (ref 8.4–10.5)
CHLORIDE SERPL-SCNC: 105 MMOL/L — SIGNIFICANT CHANGE UP (ref 96–108)
CO2 SERPL-SCNC: 24 MMOL/L — SIGNIFICANT CHANGE UP (ref 22–31)
CREAT SERPL-MCNC: 2.12 MG/DL — HIGH (ref 0.5–1.3)
EGFR: 31 ML/MIN/1.73M2 — LOW
GLUCOSE SERPL-MCNC: 113 MG/DL — HIGH (ref 70–99)
HCT VFR BLD CALC: 29.7 % — LOW (ref 39–50)
HGB BLD-MCNC: 9.6 G/DL — LOW (ref 13–17)
MAGNESIUM SERPL-MCNC: 2.3 MG/DL — SIGNIFICANT CHANGE UP (ref 1.6–2.6)
MCHC RBC-ENTMCNC: 29.4 PG — SIGNIFICANT CHANGE UP (ref 27–34)
MCHC RBC-ENTMCNC: 32.3 G/DL — SIGNIFICANT CHANGE UP (ref 32–36)
MCV RBC AUTO: 91.1 FL — SIGNIFICANT CHANGE UP (ref 80–100)
NRBC # BLD: 0 /100 WBCS — SIGNIFICANT CHANGE UP (ref 0–0)
PHOSPHATE SERPL-MCNC: 3.3 MG/DL — SIGNIFICANT CHANGE UP (ref 2.5–4.5)
PLATELET # BLD AUTO: 197 K/UL — SIGNIFICANT CHANGE UP (ref 150–400)
POTASSIUM SERPL-MCNC: 4 MMOL/L — SIGNIFICANT CHANGE UP (ref 3.5–5.3)
POTASSIUM SERPL-SCNC: 4 MMOL/L — SIGNIFICANT CHANGE UP (ref 3.5–5.3)
PROT SERPL-MCNC: 5.9 G/DL — LOW (ref 6–8.3)
RBC # BLD: 3.26 M/UL — LOW (ref 4.2–5.8)
RBC # FLD: 17.9 % — HIGH (ref 10.3–14.5)
RH IG SCN BLD-IMP: POSITIVE — SIGNIFICANT CHANGE UP
SODIUM SERPL-SCNC: 140 MMOL/L — SIGNIFICANT CHANGE UP (ref 135–145)
TACROLIMUS SERPL-MCNC: 4.9 NG/ML — SIGNIFICANT CHANGE UP
TACROLIMUS SERPL-MCNC: 4.9 NG/ML — SIGNIFICANT CHANGE UP
WBC # BLD: 7.39 K/UL — SIGNIFICANT CHANGE UP (ref 3.8–10.5)
WBC # FLD AUTO: 7.39 K/UL — SIGNIFICANT CHANGE UP (ref 3.8–10.5)

## 2024-11-20 PROCEDURE — 99233 SBSQ HOSP IP/OBS HIGH 50: CPT

## 2024-11-20 PROCEDURE — 99232 SBSQ HOSP IP/OBS MODERATE 35: CPT | Mod: GC

## 2024-11-20 PROCEDURE — 99239 HOSP IP/OBS DSCHRG MGMT >30: CPT

## 2024-11-20 RX ORDER — TACROLIMUS 5 MG/1
2 CAPSULE ORAL
Qty: 120 | Refills: 0
Start: 2024-11-20 | End: 2024-12-19

## 2024-11-20 RX ORDER — SACUBITRIL AND VALSARTAN 24; 26 MG/1; MG/1
1 TABLET, FILM COATED ORAL
Refills: 0 | DISCHARGE

## 2024-11-20 RX ORDER — NIFEDIPINE 10 MG
1 CAPSULE ORAL
Qty: 30 | Refills: 1
Start: 2024-11-20 | End: 2025-01-18

## 2024-11-20 RX ORDER — TORSEMIDE 10 MG
1 TABLET ORAL
Refills: 0 | DISCHARGE

## 2024-11-20 RX ORDER — TACROLIMUS 5 MG/1
1 CAPSULE ORAL
Refills: 0 | DISCHARGE

## 2024-11-20 RX ORDER — CARVEDILOL 25 MG/1
1 TABLET, FILM COATED ORAL
Qty: 60 | Refills: 1
Start: 2024-11-20 | End: 2025-01-18

## 2024-11-20 RX ORDER — BUMETANIDE 1 MG/1
1 TABLET ORAL
Qty: 30 | Refills: 1
Start: 2024-11-20 | End: 2025-01-18

## 2024-11-20 RX ORDER — CARVEDILOL 25 MG/1
1 TABLET, FILM COATED ORAL
Qty: 0 | Refills: 0 | DISCHARGE
Start: 2024-11-20

## 2024-11-20 RX ORDER — TACROLIMUS 5 MG/1
2 CAPSULE ORAL
Refills: 0 | DISCHARGE

## 2024-11-20 RX ORDER — CARVEDILOL 25 MG/1
2 TABLET, FILM COATED ORAL
Refills: 0 | DISCHARGE

## 2024-11-20 RX ADMIN — Medication 81 MILLIGRAM(S): at 12:41

## 2024-11-20 RX ADMIN — TACROLIMUS 2 MILLIGRAM(S): 5 CAPSULE ORAL at 12:40

## 2024-11-20 RX ADMIN — Medication 1: at 12:42

## 2024-11-20 RX ADMIN — CARVEDILOL 12.5 MILLIGRAM(S): 25 TABLET, FILM COATED ORAL at 05:59

## 2024-11-20 RX ADMIN — Medication 5000 UNIT(S): at 05:59

## 2024-11-20 RX ADMIN — Medication 30 MILLIGRAM(S): at 06:00

## 2024-11-20 RX ADMIN — ALLOPURINOL 100 MILLIGRAM(S): 300 TABLET ORAL at 12:40

## 2024-11-20 RX ADMIN — PREDNISONE 5 MILLIGRAM(S): 20 TABLET ORAL at 06:00

## 2024-11-20 RX ADMIN — FAMOTIDINE 20 MILLIGRAM(S): 20 TABLET, FILM COATED ORAL at 12:40

## 2024-11-20 NOTE — PROGRESS NOTE ADULT - SUBJECTIVE AND OBJECTIVE BOX
INTERVAL/OVERNIGHT EVENTS: None    SUBJECTIVE: Patient seen and examined at bedside, comfortable, NAD. He feels overall well, does endorse SOB when working with PT. Denied fever, chest pain, abdominal pain, urinary sx.  Last 24h UOP: 950cc    Vital Signs Last 12 Hrs  T(F): --  HR: 58 (11-20-24 @ 08:38) (58 - 65)  BP: 146/74 (11-20-24 @ 08:38) (138/74 - 146/76)  BP(mean): 104 (11-20-24 @ 05:58) (100 - 104)  RR: 17 (11-20-24 @ 08:38) (16 - 17)  SpO2: 98% (11-20-24 @ 08:38) (94% - 98%)  I&O's Summary    19 Nov 2024 07:01  -  20 Nov 2024 07:00  --------------------------------------------------------  IN: 350 mL / OUT: 950 mL / NET: -600 mL      PHYSICAL EXAM:  General: NAD  HEENT: PERRL, EOM intact, sclera anicteric, MMM  Cardiovascular: RRR; no MRG;   Respiratory: CTAB; no WRR  GI/: soft; NTND; BS x4  Extremities: WWP; 2+ peripheral pulses bilaterally; LLE 1+ pitting ankle edema, RLE 2+ pitting edema up to knee  Skin: normal color & turgor; no rash  Neurologic: aox3; no focal deficits    LABS:                        9.6    7.39  )-----------( 197      ( 20 Nov 2024 05:30 )             29.7     11-20    140  |  105  |  48[H]  ----------------------------<  113[H]  4.0   |  24  |  2.12[H]    Ca    9.1      20 Nov 2024 05:30  Phos  3.3     11-20  Mg     2.3     11-20    TPro  5.9[L]  /  Alb  3.1[L]  /  TBili  1.0  /  DBili  x   /  AST  20  /  ALT  25  /  AlkPhos  65  11-20      Urinalysis Basic - ( 20 Nov 2024 05:30 )    Color: x / Appearance: x / SG: x / pH: x  Gluc: 113 mg/dL / Ketone: x  / Bili: x / Urobili: x   Blood: x / Protein: x / Nitrite: x   Leuk Esterase: x / RBC: x / WBC x   Sq Epi: x / Non Sq Epi: x / Bacteria: x          RADIOLOGY & ADDITIONAL TESTS:    MEDICATIONS  (STANDING):  allopurinol 100 milliGRAM(s) Oral daily  aspirin enteric coated 81 milliGRAM(s) Oral daily  atorvastatin 20 milliGRAM(s) Oral at bedtime  carvedilol 12.5 milliGRAM(s) Oral every 12 hours  dextrose 5%. 1000 milliLiter(s) (100 mL/Hr) IV Continuous <Continuous>  dextrose 5%. 1000 milliLiter(s) (50 mL/Hr) IV Continuous <Continuous>  dextrose 50% Injectable 25 Gram(s) IV Push once  dextrose 50% Injectable 12.5 Gram(s) IV Push once  dextrose 50% Injectable 25 Gram(s) IV Push once  famotidine    Tablet 20 milliGRAM(s) Oral daily  glucagon  Injectable 1 milliGRAM(s) IntraMuscular once  heparin   Injectable 5000 Unit(s) SubCutaneous every 8 hours  influenza  Vaccine (HIGH DOSE) 0.5 milliLiter(s) IntraMuscular once  insulin glargine Injectable (LANTUS) 8 Unit(s) SubCutaneous at bedtime  insulin lispro (ADMELOG) corrective regimen sliding scale   SubCutaneous Before meals and at bedtime  NIFEdipine XL 30 milliGRAM(s) Oral daily  predniSONE   Tablet 5 milliGRAM(s) Oral daily  tacrolimus 2 milliGRAM(s) Oral <User Schedule>    MEDICATIONS  (PRN):  dextrose Oral Gel 15 Gram(s) Oral once PRN Blood Glucose LESS THAN 70 milliGRAM(s)/deciliter

## 2024-11-20 NOTE — DISCHARGE NOTE PROVIDER - CARE PROVIDER_API CALL
Karla Vee G  Cardiology  [address] [address]  Phone: [phone]  Fax: [phone]  Established Patient  Scheduled Appointment: 11/29/2024 01:45 PM   Karla Vee  Cardiology  [address] [address]  Phone: [phone]  Fax: [phone]  Established Patient  Scheduled Appointment: 11/29/2024 01:45 PM    Sulaiman Modi  [place] [facility]ist  [address], [address]  [place], [place], [address]  Phone: [phone]  Fax: (   )    -  Established Patient  Follow Up Time: 2 weeks

## 2024-11-20 NOTE — DISCHARGE NOTE NURSING/CASE MANAGEMENT/SOCIAL WORK - NSDCPEFALRISK_GEN_ALL_CORE
For information on Fall & Injury Prevention, visit: [email or web address] OR  [email or web address] OR  https://www.cdc.gov/steadi/patient.html

## 2024-11-20 NOTE — DISCHARGE NOTE PROVIDER - CARE PROVIDERS DIRECT ADDRESSES
,[email or web address]

## 2024-11-20 NOTE — PROGRESS NOTE ADULT - SUBJECTIVE AND OBJECTIVE BOX
Medicine Progress Note    Patient is a 79y old  Male who presents with a chief complaint of HFpEF exacerbation (20 Nov 2024 13:00)      SUBJECTIVE / OVERNIGHT EVENTS:  Feels well this morning, seen before working with PT. Breathing feels comfortable on room air, tolerating diet, no complaints or new concerns noted.     MEDICATIONS  (STANDING):  allopurinol 100 milliGRAM(s) Oral daily  aspirin enteric coated 81 milliGRAM(s) Oral daily  atorvastatin 20 milliGRAM(s) Oral at bedtime  carvedilol 12.5 milliGRAM(s) Oral every 12 hours  dextrose 5%. 1000 milliLiter(s) (100 mL/Hr) IV Continuous <Continuous>  dextrose 5%. 1000 milliLiter(s) (50 mL/Hr) IV Continuous <Continuous>  dextrose 50% Injectable 25 Gram(s) IV Push once  dextrose 50% Injectable 12.5 Gram(s) IV Push once  dextrose 50% Injectable 25 Gram(s) IV Push once  famotidine    Tablet 20 milliGRAM(s) Oral daily  glucagon  Injectable 1 milliGRAM(s) IntraMuscular once  heparin   Injectable 5000 Unit(s) SubCutaneous every 8 hours  influenza  Vaccine (HIGH DOSE) 0.5 milliLiter(s) IntraMuscular once  insulin glargine Injectable (LANTUS) 8 Unit(s) SubCutaneous at bedtime  insulin lispro (ADMELOG) corrective regimen sliding scale   SubCutaneous Before meals and at bedtime  NIFEdipine XL 30 milliGRAM(s) Oral daily  predniSONE   Tablet 5 milliGRAM(s) Oral daily  tacrolimus 2 milliGRAM(s) Oral <User Schedule>    MEDICATIONS  (PRN):  dextrose Oral Gel 15 Gram(s) Oral once PRN Blood Glucose LESS THAN 70 milliGRAM(s)/deciliter    CAPILLARY BLOOD GLUCOSE      POCT Blood Glucose.: 192 mg/dL (20 Nov 2024 11:48)  POCT Blood Glucose.: 115 mg/dL (20 Nov 2024 08:06)  POCT Blood Glucose.: 239 mg/dL (19 Nov 2024 23:29)  POCT Blood Glucose.: 184 mg/dL (19 Nov 2024 16:46)    I&O's Summary    19 Nov 2024 07:01  -  20 Nov 2024 07:00  --------------------------------------------------------  IN: 350 mL / OUT: 950 mL / NET: -600 mL    20 Nov 2024 07:01  -  20 Nov 2024 14:31  --------------------------------------------------------  IN: 480 mL / OUT: 0 mL / NET: 480 mL        PHYSICAL EXAM:  Vital Signs Last 24 Hrs  T(C): 36.6 (19 Nov 2024 21:27), Max: 36.6 (19 Nov 2024 21:27)  T(F): 97.9 (19 Nov 2024 21:27), Max: 97.9 (19 Nov 2024 21:27)  HR: 84 (20 Nov 2024 11:25) (58 - 84)  BP: 147/76 (20 Nov 2024 11:25) (91/54 - 147/76)  BP(mean): 104 (20 Nov 2024 05:58) (66 - 104)  RR: 17 (20 Nov 2024 08:38) (16 - 18)  SpO2: 98% (20 Nov 2024 08:38) (94% - 98%)    Parameters below as of 20 Nov 2024 11:25  Patient On (Oxygen Delivery Method): room air      Appears comfortable   MMM  Normal WOB on RA, CTAB   RRR, no mrg   Abdomen soft, nontender, nondistended  Extremities warm and without edema   AOX3, no focal neuro deficits     LABS:                        9.6    7.39  )-----------( 197      ( 20 Nov 2024 05:30 )             29.7     11-20    140  |  105  |  48[H]  ----------------------------<  113[H]  4.0   |  24  |  2.12[H]    Ca    9.1      20 Nov 2024 05:30  Phos  3.3     11-20  Mg     2.3     11-20    TPro  5.9[L]  /  Alb  3.1[L]  /  TBili  1.0  /  DBili  x   /  AST  20  /  ALT  25  /  AlkPhos  65  11-20          Urinalysis Basic - ( 20 Nov 2024 05:30 )    Color: x / Appearance: x / SG: x / pH: x  Gluc: 113 mg/dL / Ketone: x  / Bili: x / Urobili: x   Blood: x / Protein: x / Nitrite: x   Leuk Esterase: x / RBC: x / WBC x   Sq Epi: x / Non Sq Epi: x / Bacteria: x            RADIOLOGY & ADDITIONAL TESTS:  Imaging from Last 24 Hours:  None new

## 2024-11-20 NOTE — PROGRESS NOTE ADULT - ASSESSMENT
79M Alevism w/ PMHx HTN, HLD, HFpEF (EF 58% by TTE October 2024), TDM2, CKD, PCKD s/p kidney transplant in 2007 ([facility]), prostate CA s/p radical prostatectomy 2015, DVT 2006 (in setting of pelvic fracture), Afib/AFl s/p ablation in Jan 2018 not on AC 2/2 GIB s/p Amulet device in April 2023 with hospital course c/b PEA arrest w/ ROSC and pericardial tamponade s/p bedside pericardiocentesis, RTOR for sternotomy/washout and evacuation of pericardial clot, aortic aneurysm, referred to [facility] ER on 11/14/24 by outpatient cardiologist for progressive worsening GAFFNEY and 14lb weight gain x 2 weeks. Admitted to cardiac tele for acute on chronic HFpEF exacerbation, s/p aggressive IV diuresis. Renal following for Hx kidney transplant. Medicine consulted for comanagement.     #Heart failure with preserved ejection fraction.   -Will continue management as per HF team   -Will continue Coreg 12.5mg BID (reduced home dose).   - Entresto held 2/2  given elevated creatinine     #Afib  -Will continue rate control with Coreg 12.5mg BID.  - The pt was on systemic AC previously discontinued due to GI Bleed.    #DAYO on CKD   #History of Kidney transplant  -Pt's creatinine improving, now 2.12 from 2.4 yesterday   -Nephrology following   - f/u allograft Renal US, no abnormal findings   - Avoiding nephrotoxins.  - continue tacro 2mg BID at 8am and 8pm and prednisone 5mg daily  - Tacro goal 4-6 as per Nephrology     #HTN/HLD  - Continue Coreg 12.5mg BID with holding parameters   - Nifedipine XL decreased from 60 mg to 30mg daily with holding parameters 2/2 low BP   - Continue Atorvastatin 20mg Qhs.     # DVT proph  -SQH    #DISPO   -Pt was seen by PT and recommended for home PT   -As per primary team

## 2024-11-20 NOTE — DISCHARGE NOTE PROVIDER - HOSPITAL COURSE
79M, [employer], w/ PMHx HTN, HLD, HFpEF (EF 58% by TTE 10/2024), TDM2, CKD, PCKD s/p kidney transplant in 2007 ([facility]), prostate CA s/p radical prostatectomy 2015, DVT 2006 (in setting of pelvic fracture), Afib/AFl s/p ablation in Jan 2018 not on AC 2/2 GIB s/p Amulet device in April 2023 with hospital course c/b PEA arrest w/ ROSC and pericardial tamponade s/p bedside pericardiocentesis, RTOR for sternotomy/washout and evacuation of pericardial clot, aortic aneurysm, referred to [facility] ER on 11/14/24 by outpatient cardiologist for progressive worsening GAFFNEY and 14lb weight gain x 2 weeks. BNP 30k.    Admitted to cardiac tele for acute on chronic HFpEF exacerbation. Pt was diuresed initially w/ Lasix gtt 5mg/hr, then subsequently IV Bumex 1mg BID. Reached euvolemia w/ uptrending        Renal following for Hx renal TXP w/ uptrending crea.     ·  Problem: (HFpEF) heart failure with preserved ejection fraction.   ·  Plan: Fluid overloaded improving  - Etiology: unclear given NST negative and patient's Afib rate controlled. Compliant w/ meds.  - TTE: 10/21/24: LVEF 58%, normal RVSF w/ increased wall thickness, hypokinetic basal and mid anterior septum and basal and mid inferior septum, PFO vs secundum ASD w/ L-R shunting, mild MR, aortic root 4.30 cm, no pericardial effusion  - NST 11/14/24 normal perfusion per outpatient MD note in HIE  - EKG Afib non ischemic   - Diuretic: s/p Lasix gtt 5mg/hr. DC'ed further Bumex 1mg IV BID. HOLD further diuresis today, plan to resume home Torsemide 20mg BID in AM if crea improves  - GMDT: Coreg 12.5mg BID (reduced home dose).   --- Holding home Entresto  mg BID 2/2 uptrending Crea  - Core measures (strict I&Os, daily weights, fluid restriction).     Problem/Plan - 2:  ·  Problem: Atrial fibrillation and flutter.   ·  Plan: Hx of Afib/AFl s/p ablation in Jan 2018 not on AC 2/2 GIB s/p Amulet device   - rate controlled w/ HR 50-80s  - Rate control: Coreg 12.5mg BID (reduced home dose)  - AC: No longer on AC 2/2 GIB. C/w ASA 81mg QD.     Problem/Plan - 3:  ·  Problem: Kidney transplant recipient.   ·  Plan: Hx PCKD s/p kidney transplant in 2007 ([facility], follows w/ Nephrologist Dr Sulaiman Modi) w/ baseline Cr ranging from 1.2 -1.4 in 2023   - Prograf 2mg BID  - F/u Prograf level 5.5 on 11/18, f/u Tacro level daily, draw 30 minutes before morning dose (Goal 4-6)  - c/w Prednisone 5mg QD   - Nephro following, recs appreciated   -F/u allograft Renal US result  - S/p Albumin 25% 50mL x4 total for intravascular repletion to avoid poor perfusion to kidney        Problem/Plan - 4:    Problem/Plan - 5:  ·  Problem: HTN (hypertension).   ·  Plan: SBP 80-120s  - Coreg 12.5mg BID  - started Nifedipine XL 60mg QD. 79M, [employer]ism, w/ PMHx HTN, HLD, HFpEF (EF 58% by TTE 10/2024), TDM2, CKD, PCKD s/p kidney transplant in 2007 ([facility]), prostate CA s/p radical prostatectomy 2015, DVT 2006 (in setting of pelvic fracture), Afib/AFl s/p ablation in Jan 2018 not on AC 2/2 GIB s/p Amulet device in April 2023 with hospital course c/b PEA arrest w/ ROSC and pericardial tamponade s/p bedside pericardiocentesis, RTOR for sternotomy/washout and evacuation of pericardial clot, aortic aneurysm, referred to [facility] ER on 11/14/24 by outpatient cardiologist for progressive worsening GAFFNEY and 14lb weight gain x 2 weeks. BNP 30k.    Admitted to cardiac tele for acute on chronic HFpEF exacerbation. Pt was diuresed initially w/ Lasix gtt 5mg/hr, then subsequently IV Bumex 1mg BID. Reached euvolemia w/ uptrending crea 2.4.        Renal following for Hx renal TXP w/ uptrending crea.     ·  Problem: (HFpEF) heart failure with preserved ejection fraction.   ·  Plan: Fluid overloaded improving  - Etiology: unclear given NST negative and patient's Afib rate controlled. Compliant w/ meds.  - TTE: 10/21/24: LVEF 58%, normal RVSF w/ increased wall thickness, hypokinetic basal and mid anterior septum and basal and mid inferior septum, PFO vs secundum ASD w/ L-R shunting, mild MR, aortic root 4.30 cm, no pericardial effusion  - NST 11/14/24 normal perfusion per outpatient MD note in HIE  - EKG Afib non ischemic   - Diuretic: s/p Lasix gtt 5mg/hr. DC'ed further Bumex 1mg IV BID. HOLD further diuresis today, plan to resume home Torsemide 20mg BID in AM if crea improves  - GMDT: Coreg 12.5mg BID (reduced home dose).   --- Holding home Entresto  mg BID 2/2 uptrending Crea  - Core measures (strict I&Os, daily weights, fluid restriction).     Problem/Plan - 2:  ·  Problem: Atrial fibrillation and flutter.   ·  Plan: Hx of Afib/AFl s/p ablation in Jan 2018 not on AC 2/2 GIB s/p Amulet device   - rate controlled w/ HR 50-80s  - Rate control: Coreg 12.5mg BID (reduced home dose)  - AC: No longer on AC 2/2 GIB. C/w ASA 81mg QD.     Problem/Plan - 3:  ·  Problem: Kidney transplant recipient.   ·  Plan: Hx PCKD s/p kidney transplant in 2007 ([facility], follows w/ Nephrologist Dr Sulaiman Modi) w/ baseline Cr ranging from 1.2 -1.4 in 2023   - Prograf 2mg BID  - F/u Prograf level 5.5 on 11/18, f/u Tacro level daily, draw 30 minutes before morning dose (Goal 4-6)  - c/w Prednisone 5mg QD   - Nephro following, recs appreciated   -F/u allograft Renal US result  - S/p Albumin 25% 50mL x4 total for intravascular repletion to avoid poor perfusion to kidney        Problem/Plan - 4:    Problem/Plan - 5:  ·  Problem: HTN (hypertension).   ·  Plan: SBP 80-120s  - Coreg 12.5mg BID  - started Nifedipine XL 60mg QD. 79M, [place], w/ PMHx HTN, HLD, HFpEF (EF 58% by TTE 10/2024), TDM2, CKD, PCKD s/p kidney transplant in 2007 ([facility]), prostate CA s/p radical prostatectomy 2015, DVT 2006 (in setting of pelvic fracture), Afib/AFl s/p ablation in Jan 2018 not on AC 2/2 GIB s/p Amulet device in April 2023 with hospital course c/b PEA arrest w/ ROSC and pericardial tamponade s/p bedside pericardiocentesis, RTOR for sternotomy/washout and evacuation of pericardial clot, aortic aneurysm, referred to [facility] ER on 11/14/24 by outpatient cardiologist for progressive worsening GAFFNEY and 14lb weight gain x 2 weeks. BNP 30k.    Admitted to cardiac tele for acute on chronic HFpEF exacerbation. Pt was diuresed initially w/ Lasix gtt 5mg/hr, then subsequently IV Bumex 1mg BID. Reached euvolemia w/ uptrending crea, peaked at 2.4 and downtrended to 2.1 on day of discharge. Holding home Entresto  mg BID 2/2 uptrending Crea. Pt's home diuretic Torsemide was switched to Bumex 1mg qd per Renal preference.     Renal following for Hx renal TXP w/ uptrending crea.     ·  Problem: (HFpEF) heart failure with preserved ejection fraction.   ·  Plan: Fluid overloaded improving  - Etiology: unclear given NST negative and patient's Afib rate controlled. Compliant w/ meds.     -   - GMDT: Coreg 12.5mg BID (reduced home dose).       Problem/Plan - 2:  ·  Problem: Atrial fibrillation and flutter.   ·  Plan: Hx of Afib/AFl s/p ablation in Jan 2018 not on AC 2/2 GIB s/p Amulet device   - rate controlled w/ HR 50-80s  - Rate control: Coreg 12.5mg BID (reduced home dose)  - AC: No longer on AC 2/2 GIB. C/w ASA 81mg QD.     Problem/Plan - 3:  ·  Problem: Kidney transplant recipient.   ·  Plan: Hx PCKD s/p kidney transplant in 2007 ([facility], follows w/ Nephrologist Dr Sulaiman Modi) w/ baseline Cr ranging from 1.2 -1.4 in 2023   - Prograf 2mg BID  - F/u Prograf level 5.5 on 11/18, f/u Tacro level daily, draw 30 minutes before morning dose (Goal 4-6)  - c/w Prednisone 5mg QD   - Nephro following, recs appreciated   -F/u allograft Renal US result  - S/p Albumin 25% 50mL x4 total for intravascular repletion to avoid poor perfusion to kidney        Problem/Plan - 4:    Problem/Plan - 5:  ·  Problem: HTN (hypertension).   ·  Plan: SBP 80-120s  - Coreg 12.5mg BID  - started Nifedipine XL 60mg QD. 79M, [employer]alism, w/ PMHx HTN, HLD, HFpEF (EF 58% by TTE 10/2024), TDM2, CKD, PCKD s/p kidney transplant in 2007 ([facility]), prostate CA s/p radical prostatectomy 2015, DVT 2006 (in setting of pelvic fracture), Afib/AFl s/p ablation in Jan 2018 not on AC 2/2 GIB s/p Amulet device in April 2023 with hospital course c/b PEA arrest w/ ROSC and pericardial tamponade s/p bedside pericardiocentesis, RTOR for sternotomy/washout and evacuation of pericardial clot, aortic aneurysm, referred to [facility] ER on 11/14/24 by outpatient cardiologist for progressive worsening GAFFNEY and 14lb weight gain x 2 weeks. BNP 30k.    Admitted to cardiac tele for acute on chronic HFpEF exacerbation. Pt was diuresed initially w/ Lasix gtt 5mg/hr, then subsequently IV Bumex 1mg BID. Reached euvolemia w/ uptrending crea, peaked at 2.4 and downtrended to 2.1 on day of discharge. Holding home Entresto  mg BID 2/2 uptrending Crea. Pt's home diuretic Torsemide was switched to Bumex 1mg qd per Renal preference.     Renal following for Hx renal TXP w/ uptrending crea.      ·  Problem: (HFpEF) heart failure with preserved ejection fraction.   ·  Plan: Fluid overloaded improving  - Etiology: unclear given NST negative and patient's Afib rate controlled. Compliant w/ meds.     -   - GMDT: Coreg 12.5mg BID (reduced home dose).       Problem/Plan - 2:  ·  Problem: Atrial fibrillation and flutter.   ·  Plan: Hx of Afib/AFl s/p ablation in Jan 2018 not on AC 2/2 GIB s/p Amulet device   - rate controlled w/ HR 50-80s  - Rate control: Coreg 12.5mg BID (reduced home dose)  - AC: No longer on AC 2/2 GIB. C/w ASA 81mg QD.     Problem/Plan - 3:  ·  Problem: Kidney transplant recipient.   ·  Plan: Hx PCKD s/p kidney transplant in 2007 ([facility], follows w/ Nephrologist Dr Sulaiman Modi) w/ baseline Cr ranging from 1.2 -1.4 in 2023   - Prograf 2mg BID  - F/u Prograf level 5.5 on 11/18, f/u Tacro level daily, draw 30 minutes before morning dose (Goal 4-6)  - c/w Prednisone 5mg QD   - Nephro following, recs appreciated   -F/u allograft Renal US result  - S/p Albumin 25% 50mL x4 total for intravascular repletion to avoid poor perfusion to kidney        Problem/Plan - 4:    Problem/Plan - 5:  ·  Problem: HTN (hypertension).   ·  Plan: SBP 80-120s  - Coreg 12.5mg BID  - started Nifedipine XL 60mg QD. 79M, [place], w/ PMHx HTN, HLD, HFpEF (EF 58% by TTE 10/2024), TDM2, CKD (baseline Cr ranging from 1.2 -1.4 in 2023), PCKD s/p kidney transplant in 2007 ([facility], follows w/ Nephrologist Dr Sulaiman Modi), prostate CA s/p radical prostatectomy 2015, DVT 2006 (in setting of pelvic fracture), Afib/AFl s/p ablation in Jan 2018 not on AC 2/2 GIB s/p Amulet device in April 2023 with hospital course c/b PEA arrest w/ ROSC and pericardial tamponade s/p bedside pericardiocentesis, RTOR for sternotomy/washout and evacuation of pericardial clot, aortic aneurysm, referred to [facility] ER on 11/14/24 by outpatient cardiologist for progressive worsening GAFFNEY and 14lb weight gain x 2 weeks. BNP 30k.    Admitted to cardiac tele for acute on chronic HFpEF exacerbation. Pt was diuresed initially w/ Lasix gtt 5mg/hr, then subsequently IV Bumex 1mg BID. Reached euvolemia w/ uptrending crea, peaked at 2.4 and downtrended to 2.1 on day of discharge. GDMT adjusted while inhospital. Holding home Entresto  mg BID 2/2 elevated Crea until improves. Pt's home diuretic Torsemide 20mg BID was switched to Bumex 1mg qd per Renal preference.     Renal following for Hx renal TXP. Tacrolimus level initially subtherapeutic 2.4, increased home Tacro dose to 2mg BID and c/w Prednisone 5mg QD. Allograft Renal US was w/o renal artery stenosis. Pt started on Nifedipine XL 30mg qd for HTN.   79M, [place], w/ PMHx HTN, HLD, HFpEF (EF 58% by TTE 10/2024), TDM2, CKD (baseline Cr ranging from 1.2 -1.4 in 2023), PCKD s/p kidney transplant in 2007 ([facility], follows w/ Nephrologist Dr Sulaiman Modi), prostate CA s/p radical prostatectomy 2015, DVT 2006 (in setting of pelvic fracture), Afib/AFl s/p ablation in Jan 2018 not on AC 2/2 GIB s/p Amulet device in April 2023 with hospital course c/b PEA arrest w/ ROSC and pericardial tamponade s/p bedside pericardiocentesis, RTOR for sternotomy/washout and evacuation of pericardial clot, aortic aneurysm, referred to [facility] ER on 11/14/24 by outpatient cardiologist for progressive worsening GAFFNEY and 14lb weight gain x 2 weeks. BNP 30k.    Admitted to cardiac tele for acute on chronic HFpEF exacerbation. Pt was diuresed initially w/ Lasix gtt 5mg/hr, then subsequently IV Bumex 1mg BID. Reached euvolemia w/ uptrending crea, peaked at 2.4 and downtrended to 2.1 on day of discharge. GDMT adjusted while inhospital. Holding home Entresto  mg BID 2/2 elevated Crea until improves. Pt's home diuretic Torsemide 20mg BID was switched to Bumex 1mg qd per Renal preference. Pt started on Nifedipine XL 30mg qd for HTN.    Renal following for Hx renal TXP. Tacrolimus level initially subtherapeutic 2.4, increased home Tacro dose to 2mg BID and c/w Prednisone 5mg QD. Allograft Renal US was w/o renal artery stenosis.     On the day of discharge, the patient was seen and examined. Symptoms improved. Vital signs are stable. Labs and imaging reviewed. Patient is medically optimized and hemodynamically stable. Return precautions discussed, medication teach back done, and importance of physician followup emphasized. The patient verbalized understanding. 79M, [employer], w/ PMHx HTN, HLD, HFpEF (EF 58% by TTE 10/2024), TDM2, CKD (baseline Cr ranging from 1.2 -1.4 in 2023), PCKD s/p kidney transplant in 2007 ([facility], follows w/ Nephrologist Dr Sulaiman Modi), prostate CA s/p radical prostatectomy 2015, DVT 2006 (in setting of pelvic fracture), Afib/AFl s/p ablation in Jan 2018 not on AC 2/2 GIB s/p Amulet device in April 2023 with hospital course c/b PEA arrest w/ ROSC and pericardial tamponade s/p bedside pericardiocentesis, RTOR for sternotomy/washout and evacuation of pericardial clot, aortic aneurysm, referred to [facility] ER on 11/14/24 by outpatient cardiologist for progressive worsening GAFFNEY and 14lb weight gain x 2 weeks. BNP 30k.    Admitted to cardiac tele for acute on chronic HFpEF exacerbation. Pt was diuresed initially w/ Lasix gtt 5mg/hr, then subsequently IV Bumex 1mg BID. Reached euvolemia w/ uptrending crea, peaked at 2.4 and downtrended to 2.1 on day of discharge. GDMT adjusted while inhospital. Holding home Entresto  mg BID 2/2 elevated Crea until improves. Pt's home diuretic Torsemide 20mg BID was switched to Bumex 1mg qd per Renal preference. Pt started on Nifedipine XL 30mg qd for HTN.    Renal following for Hx renal TXP. Tacrolimus level initially subtherapeutic 2.4, increased home Tacro dose to 2mg BID and c/w Prednisone 5mg QD. Allograft Renal US was w/o renal artery stenosis.     On the day of discharge, the patient was seen and examined. Symptoms improved. Vital signs are stable. Labs and imaging reviewed. Patient is medically optimized and hemodynamically stable. Return precautions discussed, medication teach back done, and importance of physician followup emphasized. The patient verbalized understanding.  GLP-1 receptor agonist/SGLT2 inhibitor meds discussed w/ patients and encouraged to discuss further with PMD or Endocrinologist at next visit.    Pt discharge copies detail cardiovascular history, medications, testing/treatments, OR patient has created a patient portal account and instructed to provide their records at their 1st appointment.

## 2024-11-20 NOTE — PROGRESS NOTE ADULT - REASON FOR ADMISSION
HFpEF exacerbation

## 2024-11-20 NOTE — DISCHARGE NOTE PROVIDER - NSDCMRMEDTOKEN_GEN_ALL_CORE_FT
allopurinol 100 mg oral tablet: 1 tab(s) orally once a day  Aspirin EC 81 mg oral delayed release tablet: 1 tab(s) orally once a day  atorvastatin 20 mg oral tablet: 1 tab(s) orally once a day (at bedtime)  bumetanide 1 mg oral tablet: 1 tab(s) orally once a day  carvedilol 12.5 mg oral tablet: 1 tab(s) orally every 12 hours  Jardiance 25 mg oral tablet: 1 tab(s) orally once a day  Lantus 100 units/mL subcutaneous solution: 12 unit(s) subcutaneous once a day (at bedtime)  metFORMIN 500 mg oral tablet: 3 tab(s) orally once a day after dinner  NIFEdipine 30 mg oral tablet, extended release: 1 tab(s) orally once a day  predniSONE 5 mg oral tablet: 1 tab(s) orally once a day  tacrolimus 1 mg oral capsule: 2 cap(s) orally 2 times a day  Trulicity Pen 4.5 mg/0.5 mL subcutaneous solution: 4.5 milligram(s) subcutaneously once a week   allopurinol 100 mg oral tablet: 1 tab(s) orally once a day  Aspirin EC 81 mg oral delayed release tablet: 1 tab(s) orally once a day  atorvastatin 20 mg oral tablet: 1 tab(s) orally once a day (at bedtime)  bumetanide 1 mg oral tablet: 1 tab(s) orally once a day  carvedilol 25 mg oral tablet: 1 tab(s) orally 2 times a day  Jardiance 25 mg oral tablet: 1 tab(s) orally once a day  Lantus 100 units/mL subcutaneous solution: 12 unit(s) subcutaneous once a day (at bedtime)  metFORMIN 500 mg oral tablet: 3 tab(s) orally once a day after dinner  NIFEdipine 30 mg oral tablet, extended release: 1 tab(s) orally once a day  predniSONE 5 mg oral tablet: 1 tab(s) orally once a day  tacrolimus 1 mg oral capsule: 2 cap(s) orally 2 times a day  Trulicity Pen 4.5 mg/0.5 mL subcutaneous solution: 4.5 milligram(s) subcutaneously once a week

## 2024-11-20 NOTE — DISCHARGE NOTE PROVIDER - ATTENDING DISCHARGE PHYSICAL EXAMINATION:
Acute on chronic HFpEF exacerbation.   Diuresed initially w/ Lasix gtt 5mg/hr, then subsequently IV Bumex 1mg BID. Reached euvolemia w/ uptrending crea, peaked at 2.4 and downtrended to 2.1 on day of discharge. GDMT adjusted while inhospital. Holding home Entresto  mg BID 2/2 elevated Crea until improves. Pt's home diuretic Torsemide 20mg BID was switched to Bumex 1mg qd per Renal preference. Pt started on Nifedipine XL 30mg qd for HTN.

## 2024-11-20 NOTE — DISCHARGE NOTE NURSING/CASE MANAGEMENT/SOCIAL WORK - FINANCIAL ASSISTANCE
[facility] provides services at a reduced cost to those who are determined to be eligible through [facility]’s financial assistance program. Information regarding [facility]’s financial assistance program can be found by going to [email or web address] or by calling [phone].

## 2024-11-20 NOTE — DISCHARGE NOTE NURSING/CASE MANAGEMENT/SOCIAL WORK - PATIENT PORTAL LINK FT
You can access the FollowMyHealth Patient Portal offered by [facility] by registering at the following website: [email or web address]. By joining [employer]’s FollowMyHealth portal, you will also be able to view your health information using other applications (apps) compatible with our system.

## 2024-11-20 NOTE — DISCHARGE NOTE PROVIDER - PROVIDER TOKENS
PROVIDER:[TOKEN:[[id number]],SCHEDULEDAPPT:[11/29/2024],SCHEDULEDAPPTTIME:[01:45 PM],ESTABLISHEDPATIENT:[T]] PROVIDER:[TOKEN:[[id number]],SCHEDULEDAPPT:[11/29/2024],SCHEDULEDAPPTTIME:[01:45 PM],ESTABLISHEDPATIENT:[T]],FREE:[LAST:[Rian],FIRST:[Sulaiman],PHONE:[phone]],FAX:[(   [phone]    -],ADDRESS:[[facility]  [address] [place] [address], [address], [facility]],FOLLOWUP:[2 weeks],ESTABLISHEDPATIENT:[T]]

## 2024-11-20 NOTE — DISCHARGE NOTE PROVIDER - NSDCFUSCHEDAPPT_GEN_ALL_CORE_FT
Karla Vee  [facility] Physician [facility]  [facility] [address]  Scheduled Appointment: 11/29/2024

## 2024-11-20 NOTE — PROGRESS NOTE ADULT - ATTENDING COMMENTS
Agree with above
Agree with above.   UA with protein. No pyuria or hematuria. Etiology of DAYO likely cardiorenal.   Base line CR 1.2-1.4  Reports improvement in dyspnea and lower extremity edema.   Continue diuresis with bumex as you are doing. I/O will be helpful.   Continue pred, tacro.     We will follow.
79y M Religious PMHx of PCKD s/p kidney transplant in 2007 with residual CKD, HTN, HLD, HFpEF (EF 58% by TTE October 2024), TDM2, prostate CA s/p radical prostatectomy 2015, Afib/AFl s/p ablation, admitted for HFpEF exacerbation requiring IV diuresis    Non oliguric DAYO likely related to CRS - SCR at 2.04  Needs strict I/O monitoring  c/w daily standing weights  can c/w IV bumex 1mg BID with close monitoring on hemodynamics  Needs strict glycemic control   Monitor BMP    s/p Kidney transplant - IS at home tacro 2mg and 1mg and prednisone 5mg daily   Since tacro levels having subtherapeutic ~ needs to c/w tacro 2mg PO BID ideally at 8am and 8pm for better compliance  Tacro goal: 4-6   c/w prednisone 5mg daily   Will contact transplant nephrologist    HTN - can continue w/ nifedipine 60mg daily for better BP control     Anemia of chronic disease- trend cbc and check ferritin and TSAT levels.     Further recs as above  Discussed with primary team
79M [employer] PMHx of PCKD s/p kidney transplant in 2007 with residual CKD (baseline Cr 1.5-1.6), HTN, HLD, HFpEF (EF 58% by TTE October 2024), TDM2, prostate CA s/p radical prostatectomy 2015, Afib/AFl s/p ablation, admitted for HFpEF exacerbation requiring IV diuresis.  Hospital course c/b non oliguric DAYO and ATN with peak SCr at 2.4 now with gradual renal improvement   Needs to c/w tacro 2mg po BID and prednisone 5mg daily for now  c/w nifedipine 30mg daily and bumex 1mg po daily   Needs to be have a post discharge follow up with primary transplant nephrologist Dr Sulaiman Modi     Further recs as above   Discussed with primary team
79y M JW, hx of PCKD s/p kidney transplant in 2007 with residual CKD, HTN, HLD, HFpEF (EF 58% by TTE October 2024), TDM2, prostate CA s/p radical prostatectomy 2015, Afib/AFl s/p ablation, admitted for HFpEF exacerbation requiring IV diuresis    Non oliguric DAYO likely related to CRS - SCR at 2.4  Needs strict I/O monitoring  c/w daily standing weights  Appears euvolemic on exam. Consider decreasing bumex IV daily dosing or transition to PO to avoid bouts of hypotension   Needs strict glycemic control   Monitor BMP    s/p Kidney transplant - IS at home tacro 2mg and 1mg and prednisone 5mg daily   New tacro levels within goal 4-6 ranges  C/w tacro 2mg po BID and c/w prednisone 5mg daily     HTN - BP better controlled now. Can c/w nifedipine 30mg daily only     Further recs as above  Discussed with primary team

## 2024-11-20 NOTE — DISCHARGE NOTE PROVIDER - NSDCCPCAREPLAN_GEN_ALL_CORE_FT
PRINCIPAL DISCHARGE DIAGNOSIS  Diagnosis: Acute on chronic heart failure with preserved ejection fraction (HFpEF)  Assessment and Plan of Treatment:       SECONDARY DISCHARGE DIAGNOSES  Diagnosis: Kidney transplant recipient  Assessment and Plan of Treatment:     Diagnosis: Acute kidney injury superimposed on CKD  Assessment and Plan of Treatment:     Diagnosis: HTN (hypertension)  Assessment and Plan of Treatment:      PRINCIPAL DISCHARGE DIAGNOSIS  Diagnosis: Acute on chronic heart failure with preserved ejection fraction (HFpEF)  Assessment and Plan of Treatment: You were admitted to the cardiac telemetry floor with congestive heart failure. Heart failure is a condition in which the heart does not pump or fill with blood well. As a result, the heart lags behind in its job of moving blood throughout the body, which can cause fluid backup into your lungs and rest of body. This can lead to symptoms such as swelling, trouble breathing, and feeling tired. You were given intravenous diuretic medications (Lasix and Bumex) to get rid of the fluid in your lungs and body to help you breathe better. Please SWITCH your water pill/diuretic medication from Torsemide to BUMEX 1MG DAILY. CONTINUE taking Carvedilol 25mg twice a day. HOLD Entresto due to an elevated kidney creatinine level.  Please maintain a low salt diet and fluid restriction of 1.5 - 2 liters per day to prevent from fluid being reaccumulated. Weigh yourself daily and report any weight gain over 2 pounds/day or 5 pounds per week to your cardiologist.      SECONDARY DISCHARGE DIAGNOSES  Diagnosis: Kidney transplant recipient  Assessment and Plan of Treatment: You were seen by Nephrology team while in the hospital and was found to have subtherapeutic Tacrolimus level at 2.6. We have INCREASED your Tacrolimus dose to 2 mg twice a day. This was discussed with your transplant Nephrologist at [place]. Please follow up with your transplant Nephrologist in 1-2 weeks.    Diagnosis: Acute kidney injury superimposed on CKD  Assessment and Plan of Treatment: While in the hospital you were found to have abnormal kidney function with an elevated creatinine level up to 2.4 likely due to IV diuretic medications. Please HOLD Entresto due to the elevated creatinine level until your kidney function recovers. You will need bloodwork to recheck the kidney function in the next 1-2 weeks to ensure it returns to your baseline level.    Diagnosis: HTN (hypertension)  Assessment and Plan of Treatment: We have HELD Entresto due to elevated kidney creatinine level. In the mean time, please START taking blood pressure medication Nifedipine XL 30mg daily to help keep your blood pressure under control.

## 2024-11-20 NOTE — PROGRESS NOTE ADULT - PROVIDER SPECIALTY LIST ADULT
Cardiology
Cardiology
Nephrology
Cardiology
Hospitalist
Hospitalist
Cardiology
Hospitalist
Nephrology
Hospitalist
Nephrology
Cardiology

## 2024-11-20 NOTE — PROGRESS NOTE ADULT - TIME BILLING
Review of hospital course, labs, vitals, medical records.   Bedside exam and interview   Discussed plan of care with primary team ACP and housestaff   Documenting the encounter  Excludes teaching and separately reported services
Time exclusive of ACP discussion  Time inclusive of chart review, medication ordering, discussion with primary team, clinical documentation, and communication with family/caregiver
Time exclusive of ACP discussion  Time inclusive of chart review, medication ordering, discussion with primary team, clinical documentation, and communication with family/caregiver

## 2024-11-20 NOTE — PROGRESS NOTE ADULT - ASSESSMENT
79M [employer] PMHx of PCKD s/p kidney transplant in 2007 with residual CKD (baseline Cr 1.5-1.6), HTN, HLD, HFpEF (EF 58% by TTE October 2024), TDM2, prostate CA s/p radical prostatectomy 2015, Afib/AFl s/p ablation, admitted for HFpEF exacerbation requiring IV diuresis.  Nephrology consulted for DAYO on CKD and transplant IS management    #anemia of chronic disease  #HTN  -Imp: Nonoliguric DAYO likely related to CRS   Admission SCR 1.53 , peaked at 2.4, now down at 2.12 with improved BP  s/p kidney transplant in 2007, on Tacro and prednisone. (Tacro level 11/18: 5.5)  Last 24h uop: 950cc  Renal US: No evidence of a significant renal artery stenosis for the right renal transplant.    Plan:  - continue tacro 2mg BID at 8am and 8pm and prednisone 5mg daily  - Aim for Tacro goal 4-6   - C/w nifedipine 30mg qd  - Continue with strict I/Os, monitor UOP  - c/w daily standing weight  - Avoid nephrotoxic agents such as NSAIDs and PPI     Plan not finalized until attending attestation. 79M [employer] PMHx of PCKD s/p kidney transplant in 2007 with residual CKD (baseline Cr 1.5-1.6), HTN, HLD, HFpEF (EF 58% by TTE October 2024), TDM2, prostate CA s/p radical prostatectomy 2015, Afib/AFl s/p ablation, admitted for HFpEF exacerbation requiring IV diuresis.  Nephrology consulted for DAYO on CKD and transplant IS management    #anemia of chronic disease  #HTN  -Imp: Nonoliguric DAYO likely related to CRS   Admission SCR 1.53 , peaked at 2.4, now down at 2.12 with improved BP  s/p kidney transplant in 2007, on Tacro and prednisone. (Tacro level 11/18: 5.5)  Last 24h uop: 950cc  Renal US: No evidence of a significant renal artery stenosis for the right renal transplant.    Plan:  - continue tacro 2mg BID at 8am and 8pm and prednisone 5mg daily  - Aim for Tacro goal 4-6   - C/w nifedipine 30mg qd  - Can start bumex 1mg daily   - Continue with strict I/Os, monitor UOP  - c/w daily standing weight  - Avoid nephrotoxic agents such as NSAIDs and PPI   - On discharge, please make a follow-up appointment with pt's nephrologist (Dr. Sulaiman Modi)    Plan not finalized until attending attestation. 79M [employer] PMHx of PCKD s/p kidney transplant in 2007 with residual CKD (baseline Cr 1.5-1.6), HTN, HLD, HFpEF (EF 58% by TTE October 2024), TDM2, prostate CA s/p radical prostatectomy 2015, Afib/AFl s/p ablation, admitted for HFpEF exacerbation requiring IV diuresis.  Nephrology consulted for DAYO on CKD and transplant IS management    #anemia of chronic disease  #HTN  -Imp: Nonoliguric DAYO likely related to CRS vs ATN  Admission SCR 1.53 , peaked at 2.4, now down at 2.12 with improved BP  s/p kidney transplant in 2007, on Tacro and prednisone. (Tacro level 11/18: 5.5)  Last 24h uop: 950cc  Renal US: No evidence of a significant renal artery stenosis for the right renal transplant.    Plan:  - continue tacro 2mg BID at 8am and 8pm and prednisone 5mg daily  - Aim for Tacro goal 4-6   - C/w nifedipine 30mg qd  - Can start bumex 1mg daily   - Continue with strict I/Os, monitor UOP  - c/w daily standing weight  - Avoid nephrotoxic agents such as NSAIDs and PPI   - On discharge, please make a follow-up appointment with pt's nephrologist (Dr. Sulaiman Modi)    Discussed with primary team

## 2024-11-22 ENCOUNTER — NON-APPOINTMENT (OUTPATIENT)
Age: 79
End: 2024-11-22

## 2024-11-26 DIAGNOSIS — I48.92 UNSPECIFIED ATRIAL FLUTTER: ICD-10-CM

## 2024-11-26 DIAGNOSIS — N17.0 ACUTE KIDNEY FAILURE WITH TUBULAR NECROSIS: ICD-10-CM

## 2024-11-26 DIAGNOSIS — Z79.02 LONG TERM (CURRENT) USE OF ANTITHROMBOTICS/ANTIPLATELETS: ICD-10-CM

## 2024-11-26 DIAGNOSIS — Z86.74 PERSONAL HISTORY OF SUDDEN CARDIAC ARREST: ICD-10-CM

## 2024-11-26 DIAGNOSIS — Z79.4 LONG TERM (CURRENT) USE OF INSULIN: ICD-10-CM

## 2024-11-26 DIAGNOSIS — Z94.0 KIDNEY TRANSPLANT STATUS: ICD-10-CM

## 2024-11-26 DIAGNOSIS — Z79.52 LONG TERM (CURRENT) USE OF SYSTEMIC STEROIDS: ICD-10-CM

## 2024-11-26 DIAGNOSIS — I71.9 AORTIC ANEURYSM OF UNSPECIFIED SITE, WITHOUT RUPTURE: ICD-10-CM

## 2024-11-26 DIAGNOSIS — I48.91 UNSPECIFIED ATRIAL FIBRILLATION: ICD-10-CM

## 2024-11-26 DIAGNOSIS — Z95.818 PRESENCE OF OTHER CARDIAC IMPLANTS AND GRAFTS: ICD-10-CM

## 2024-11-26 DIAGNOSIS — I50.33 ACUTE ON CHRONIC DIASTOLIC (CONGESTIVE) HEART FAILURE: ICD-10-CM

## 2024-11-26 DIAGNOSIS — D63.1 ANEMIA IN CHRONIC KIDNEY DISEASE: ICD-10-CM

## 2024-11-26 DIAGNOSIS — Z85.46 PERSONAL HISTORY OF MALIGNANT NEOPLASM OF PROSTATE: ICD-10-CM

## 2024-11-26 DIAGNOSIS — Z79.621 LONG TERM (CURRENT) USE OF CALCINEURIN INHIBITOR: ICD-10-CM

## 2024-11-26 DIAGNOSIS — Z79.84 LONG TERM (CURRENT) USE OF ORAL HYPOGLYCEMIC DRUGS: ICD-10-CM

## 2024-11-26 DIAGNOSIS — E11.22 TYPE 2 DIABETES MELLITUS WITH DIABETIC CHRONIC KIDNEY DISEASE: ICD-10-CM

## 2024-11-26 DIAGNOSIS — N18.30 CHRONIC KIDNEY DISEASE, STAGE 3 UNSPECIFIED: ICD-10-CM

## 2024-11-26 DIAGNOSIS — I13.0 HYPERTENSIVE HEART AND CHRONIC KIDNEY DISEASE WITH HEART FAILURE AND STAGE 1 THROUGH STAGE 4 CHRONIC KIDNEY DISEASE, OR UNSPECIFIED CHRONIC KIDNEY DISEASE: ICD-10-CM

## 2024-11-26 DIAGNOSIS — Z79.85 LONG-TERM (CURRENT) USE OF INJECTABLE NON-INSULIN ANTIDIABETIC DRUGS: ICD-10-CM

## 2024-11-26 DIAGNOSIS — E78.5 HYPERLIPIDEMIA, UNSPECIFIED: ICD-10-CM

## 2024-11-26 DIAGNOSIS — Z79.82 LONG TERM (CURRENT) USE OF ASPIRIN: ICD-10-CM

## 2024-11-26 DIAGNOSIS — Z86.718 PERSONAL HISTORY OF OTHER VENOUS THROMBOSIS AND EMBOLISM: ICD-10-CM

## 2024-11-26 DIAGNOSIS — Z88.6 ALLERGY STATUS TO ANALGESIC AGENT: ICD-10-CM

## 2024-11-26 DIAGNOSIS — I95.9 HYPOTENSION, UNSPECIFIED: ICD-10-CM

## 2024-11-26 PROCEDURE — 83880 ASSAY OF NATRIURETIC PEPTIDE: CPT

## 2024-11-26 PROCEDURE — 80197 ASSAY OF TACROLIMUS: CPT

## 2024-11-26 PROCEDURE — 96374 THER/PROPH/DIAG INJ IV PUSH: CPT

## 2024-11-26 PROCEDURE — 82570 ASSAY OF URINE CREATININE: CPT

## 2024-11-26 PROCEDURE — 85025 COMPLETE CBC W/AUTO DIFF WBC: CPT

## 2024-11-26 PROCEDURE — 83735 ASSAY OF MAGNESIUM: CPT

## 2024-11-26 PROCEDURE — 97116 GAIT TRAINING THERAPY: CPT

## 2024-11-26 PROCEDURE — 82550 ASSAY OF CK (CPK): CPT

## 2024-11-26 PROCEDURE — 81001 URINALYSIS AUTO W/SCOPE: CPT

## 2024-11-26 PROCEDURE — 83550 IRON BINDING TEST: CPT

## 2024-11-26 PROCEDURE — 80053 COMPREHEN METABOLIC PANEL: CPT

## 2024-11-26 PROCEDURE — 85027 COMPLETE CBC AUTOMATED: CPT

## 2024-11-26 PROCEDURE — 84466 ASSAY OF TRANSFERRIN: CPT

## 2024-11-26 PROCEDURE — 80076 HEPATIC FUNCTION PANEL: CPT

## 2024-11-26 PROCEDURE — 80061 LIPID PANEL: CPT

## 2024-11-26 PROCEDURE — 84300 ASSAY OF URINE SODIUM: CPT

## 2024-11-26 PROCEDURE — 83036 HEMOGLOBIN GLYCOSYLATED A1C: CPT

## 2024-11-26 PROCEDURE — 82962 GLUCOSE BLOOD TEST: CPT

## 2024-11-26 PROCEDURE — 84100 ASSAY OF PHOSPHORUS: CPT

## 2024-11-26 PROCEDURE — 97161 PT EVAL LOW COMPLEX 20 MIN: CPT

## 2024-11-26 PROCEDURE — 84484 ASSAY OF TROPONIN QUANT: CPT

## 2024-11-26 PROCEDURE — 76776 US EXAM K TRANSPL W/DOPPLER: CPT

## 2024-11-26 PROCEDURE — 76775 US EXAM ABDO BACK WALL LIM: CPT

## 2024-11-26 PROCEDURE — 86900 BLOOD TYPING SEROLOGIC ABO: CPT

## 2024-11-26 PROCEDURE — 82728 ASSAY OF FERRITIN: CPT

## 2024-11-26 PROCEDURE — 82607 VITAMIN B-12: CPT

## 2024-11-26 PROCEDURE — 83935 ASSAY OF URINE OSMOLALITY: CPT

## 2024-11-26 PROCEDURE — 36415 COLL VENOUS BLD VENIPUNCTURE: CPT

## 2024-11-26 PROCEDURE — 86850 RBC ANTIBODY SCREEN: CPT

## 2024-11-26 PROCEDURE — P9047: CPT

## 2024-11-26 PROCEDURE — 80048 BASIC METABOLIC PNL TOTAL CA: CPT

## 2024-11-26 PROCEDURE — 86901 BLOOD TYPING SEROLOGIC RH(D): CPT

## 2024-11-26 PROCEDURE — 84443 ASSAY THYROID STIM HORMONE: CPT

## 2024-11-26 PROCEDURE — 83540 ASSAY OF IRON: CPT

## 2024-11-26 PROCEDURE — 81003 URINALYSIS AUTO W/O SCOPE: CPT

## 2024-11-26 PROCEDURE — 82553 CREATINE MB FRACTION: CPT

## 2024-11-26 PROCEDURE — 71045 X-RAY EXAM CHEST 1 VIEW: CPT

## 2024-11-26 PROCEDURE — 84156 ASSAY OF PROTEIN URINE: CPT

## 2024-11-26 PROCEDURE — 99285 EMERGENCY DEPT VISIT HI MDM: CPT | Mod: 25

## 2024-11-26 PROCEDURE — 84540 ASSAY OF URINE/UREA-N: CPT

## 2024-11-26 PROCEDURE — 84133 ASSAY OF URINE POTASSIUM: CPT

## 2024-11-27 ENCOUNTER — APPOINTMENT (OUTPATIENT)
Dept: HEART AND VASCULAR | Facility: CLINIC | Age: 79
End: 2024-11-27
Payer: MEDICARE

## 2024-11-27 ENCOUNTER — NON-APPOINTMENT (OUTPATIENT)
Age: 79
End: 2024-11-27

## 2024-11-27 VITALS
HEART RATE: 63 BPM | DIASTOLIC BLOOD PRESSURE: 80 MMHG | HEIGHT: 67 IN | WEIGHT: 148 LBS | TEMPERATURE: 97.8 F | SYSTOLIC BLOOD PRESSURE: 138 MMHG | BODY MASS INDEX: 23.23 KG/M2 | OXYGEN SATURATION: 95 %

## 2024-11-27 DIAGNOSIS — R53.1 WEAKNESS: ICD-10-CM

## 2024-11-27 DIAGNOSIS — I77.810 THORACIC AORTIC ECTASIA: ICD-10-CM

## 2024-11-27 PROBLEM — I50.30 (HFPEF) HEART FAILURE WITH PRESERVED EJECTION FRACTION: Status: ACTIVE | Noted: 2024-11-27

## 2024-11-27 PROCEDURE — 36415 COLL VENOUS BLD VENIPUNCTURE: CPT

## 2024-11-27 PROCEDURE — 99496 TRANSJ CARE MGMT HIGH F2F 7D: CPT

## 2024-11-27 PROCEDURE — 93000 ELECTROCARDIOGRAM COMPLETE: CPT

## 2024-11-27 RX ORDER — NIFEDIPINE 30 MG/1
30 TABLET, EXTENDED RELEASE ORAL
Qty: 90 | Refills: 3 | Status: ACTIVE | COMMUNITY
Start: 2024-11-27

## 2024-11-27 RX ORDER — BUMETANIDE 1 MG/1
1 TABLET ORAL DAILY
Qty: 90 | Refills: 0 | Status: ACTIVE | COMMUNITY
Start: 2024-11-27

## 2024-11-29 LAB
ALBUMIN SERPL ELPH-MCNC: 4.1 G/DL
ALP BLD-CCNC: 65 U/L
ALT SERPL-CCNC: 17 U/L
ANION GAP SERPL CALC-SCNC: 21 MMOL/L
AST SERPL-CCNC: 15 U/L
BILIRUB SERPL-MCNC: 0.7 MG/DL
BUN SERPL-MCNC: 37 MG/DL
CALCIUM SERPL-MCNC: 10 MG/DL
CHLORIDE SERPL-SCNC: 105 MMOL/L
CO2 SERPL-SCNC: 16 MMOL/L
CREAT SERPL-MCNC: 1.73 MG/DL
EGFR: 40 ML/MIN/1.73M2
GLUCOSE SERPL-MCNC: 238 MG/DL
NT-PROBNP SERPL-MCNC: ABNORMAL PG/ML
POTASSIUM SERPL-SCNC: 4.6 MMOL/L
PROT SERPL-MCNC: 6.8 G/DL
SODIUM SERPL-SCNC: 142 MMOL/L

## 2024-12-03 ENCOUNTER — APPOINTMENT (OUTPATIENT)
Dept: NEPHROLOGY | Facility: CLINIC | Age: 79
End: 2024-12-03
Payer: MEDICARE

## 2024-12-03 VITALS — DIASTOLIC BLOOD PRESSURE: 56 MMHG | SYSTOLIC BLOOD PRESSURE: 122 MMHG | HEART RATE: 62 BPM | RESPIRATION RATE: 16 BRPM

## 2024-12-03 DIAGNOSIS — I51.7 CARDIOMEGALY: ICD-10-CM

## 2024-12-03 DIAGNOSIS — N17.9 ACUTE KIDNEY FAILURE, UNSPECIFIED: ICD-10-CM

## 2024-12-03 DIAGNOSIS — I10 ESSENTIAL (PRIMARY) HYPERTENSION: ICD-10-CM

## 2024-12-03 DIAGNOSIS — I50.30 UNSPECIFIED DIASTOLIC (CONGESTIVE) HEART FAILURE: ICD-10-CM

## 2024-12-03 DIAGNOSIS — I50.9 HEART FAILURE, UNSPECIFIED: ICD-10-CM

## 2024-12-03 PROCEDURE — 76776 US EXAM K TRANSPL W/DOPPLER: CPT | Mod: 59,76

## 2024-12-03 PROCEDURE — 93308 TTE F-UP OR LMTD: CPT | Mod: 59

## 2024-12-03 PROCEDURE — 99496 TRANSJ CARE MGMT HIGH F2F 7D: CPT

## 2024-12-03 PROCEDURE — 99205 OFFICE O/P NEW HI 60 MIN: CPT

## 2024-12-10 NOTE — BRIEF OPERATIVE NOTE - NSICDXBRIEFOPLAUNCH_GEN_ALL_CORE
<--- Click to Launch ICDx for PreOp, PostOp and Procedure
Detail Level: Detailed
<--- Click to Launch ICDx for PreOp, PostOp and Procedure

## 2024-12-12 LAB
25(OH)D3 SERPL-MCNC: 46.8 NG/ML
ALBUMIN SERPL ELPH-MCNC: 4 G/DL
ALP BLD-CCNC: 80 U/L
ALT SERPL-CCNC: 37 U/L
ANION GAP SERPL CALC-SCNC: 17 MMOL/L
APPEARANCE: CLEAR
AST SERPL-CCNC: 32 U/L
BACTERIA UR CULT: NORMAL
BACTERIA: NEGATIVE /HPF
BASOPHILS # BLD AUTO: 0.08 K/UL
BASOPHILS NFR BLD AUTO: 0.8 %
BILIRUB SERPL-MCNC: 1 MG/DL
BILIRUBIN URINE: NEGATIVE
BLOOD URINE: NEGATIVE
BUN SERPL-MCNC: 34 MG/DL
CALCIUM SERPL-MCNC: 10 MG/DL
CALCIUM SERPL-MCNC: 10 MG/DL
CAST: 0 /LPF
CHLORIDE SERPL-SCNC: 107 MMOL/L
CO2 SERPL-SCNC: 21 MMOL/L
COLOR: YELLOW
CREAT SERPL-MCNC: 1.51 MG/DL
CREAT SPEC-SCNC: 36 MG/DL
CREAT SPEC-SCNC: 36 MG/DL
CREAT/PROT UR: 1 RATIO
CYSTATIN C SERPL-MCNC: 2.41 MG/L
EGFR: 47 ML/MIN/1.73M2
EOSINOPHIL # BLD AUTO: 1.17 K/UL
EOSINOPHIL NFR BLD AUTO: 12 %
EPITHELIAL CELLS: 0 /HPF
ESTIMATED AVERAGE GLUCOSE: 169 MG/DL
FERRITIN SERPL-MCNC: 206 NG/ML
GFR/BSA.PRED SERPLBLD CYS-BASED-ARV: 23 ML/MIN/1.73M2
GLUCOSE QUALITATIVE U: 500 MG/DL
GLUCOSE SERPL-MCNC: 89 MG/DL
HBA1C MFR BLD HPLC: 7.5 %
HCT VFR BLD CALC: 37.5 %
HGB BLD-MCNC: 11.8 G/DL
IMM GRANULOCYTES NFR BLD AUTO: 0.1 %
IRON SATN MFR SERPL: 17 %
IRON SERPL-MCNC: 38 UG/DL
KETONES URINE: NEGATIVE MG/DL
LEUKOCYTE ESTERASE URINE: NEGATIVE
LYMPHOCYTES # BLD AUTO: 1.46 K/UL
LYMPHOCYTES NFR BLD AUTO: 15 %
MAN DIFF?: NORMAL
MCHC RBC-ENTMCNC: 29.4 PG
MCHC RBC-ENTMCNC: 31.5 G/DL
MCV RBC AUTO: 93.3 FL
MICROALBUMIN 24H UR DL<=1MG/L-MCNC: 17.3 MG/DL
MICROALBUMIN/CREAT 24H UR-RTO: 480 MG/G
MICROSCOPIC-UA: NORMAL
MONOCYTES # BLD AUTO: 0.57 K/UL
MONOCYTES NFR BLD AUTO: 5.8 %
NEUTROPHILS # BLD AUTO: 6.46 K/UL
NEUTROPHILS NFR BLD AUTO: 66.3 %
NITRITE URINE: NEGATIVE
PARATHYROID HORMONE INTACT: 119 PG/ML
PH URINE: 6
PHOSPHATE SERPL-MCNC: 3.6 MG/DL
PLATELET # BLD AUTO: 310 K/UL
POTASSIUM SERPL-SCNC: 4.5 MMOL/L
PROT SERPL-MCNC: 7 G/DL
PROT UR-MCNC: 36 MG/DL
PROTEIN URINE: 30 MG/DL
RBC # BLD: 4.02 M/UL
RBC # FLD: 18.4 %
RED BLOOD CELLS URINE: 0 /HPF
SODIUM ?TM SUB UR QN: 78 MMOL/L
SODIUM SERPL-SCNC: 145 MMOL/L
SPECIFIC GRAVITY URINE: 1.01
TACROLIMUS SERPL-MCNC: 24.2 NG/ML
TIBC SERPL-MCNC: 222 UG/DL
UIBC SERPL-MCNC: 185 UG/DL
UROBILINOGEN URINE: 0.2 MG/DL
WBC # FLD AUTO: 9.75 K/UL
WHITE BLOOD CELLS URINE: 0 /HPF

## 2024-12-19 ENCOUNTER — APPOINTMENT (OUTPATIENT)
Dept: HEART AND VASCULAR | Facility: CLINIC | Age: 79
End: 2024-12-19
Payer: MEDICARE

## 2024-12-19 ENCOUNTER — NON-APPOINTMENT (OUTPATIENT)
Age: 79
End: 2024-12-19

## 2024-12-19 VITALS
BODY MASS INDEX: 22.44 KG/M2 | DIASTOLIC BLOOD PRESSURE: 70 MMHG | HEIGHT: 67 IN | TEMPERATURE: 97.8 F | OXYGEN SATURATION: 96 % | WEIGHT: 142.99 LBS | SYSTOLIC BLOOD PRESSURE: 134 MMHG | HEART RATE: 67 BPM

## 2024-12-19 DIAGNOSIS — I50.30 UNSPECIFIED DIASTOLIC (CONGESTIVE) HEART FAILURE: ICD-10-CM

## 2024-12-19 PROCEDURE — G2211 COMPLEX E/M VISIT ADD ON: CPT

## 2024-12-19 PROCEDURE — 99214 OFFICE O/P EST MOD 30 MIN: CPT

## 2024-12-19 PROCEDURE — 93000 ELECTROCARDIOGRAM COMPLETE: CPT

## 2025-01-01 ENCOUNTER — INPATIENT (INPATIENT)
Facility: HOSPITAL | Age: 80
LOS: 22 days | Discharge: NURSING, REHAB OR LONG-TERM CARE | End: 2025-04-05
Attending: STUDENT IN AN ORGANIZED HEALTH CARE EDUCATION/TRAINING PROGRAM | Admitting: STUDENT IN AN ORGANIZED HEALTH CARE EDUCATION/TRAINING PROGRAM
Payer: MEDICARE

## 2025-01-01 VITALS
WEIGHT: 156.09 LBS | DIASTOLIC BLOOD PRESSURE: 87 MMHG | HEART RATE: 65 BPM | OXYGEN SATURATION: 94 % | RESPIRATION RATE: 18 BRPM | TEMPERATURE: 98 F | HEIGHT: 68 IN | SYSTOLIC BLOOD PRESSURE: 164 MMHG

## 2025-01-01 VITALS
TEMPERATURE: 98 F | DIASTOLIC BLOOD PRESSURE: 78 MMHG | RESPIRATION RATE: 17 BRPM | HEART RATE: 70 BPM | SYSTOLIC BLOOD PRESSURE: 148 MMHG | OXYGEN SATURATION: 96 %

## 2025-01-01 DIAGNOSIS — Z00.6 ENCOUNTER FOR EXAMINATION FOR NORMAL COMPARISON AND CONTROL IN CLINICAL RESEARCH PROGRAM: ICD-10-CM

## 2025-01-01 DIAGNOSIS — Z98.890 OTHER SPECIFIED POSTPROCEDURAL STATES: Chronic | ICD-10-CM

## 2025-01-01 DIAGNOSIS — E11.65 TYPE 2 DIABETES MELLITUS WITH HYPERGLYCEMIA: ICD-10-CM

## 2025-01-01 DIAGNOSIS — D53.9 NUTRITIONAL ANEMIA, UNSPECIFIED: ICD-10-CM

## 2025-01-01 DIAGNOSIS — I11.0 HYPERTENSIVE HEART DISEASE WITH HEART FAILURE: ICD-10-CM

## 2025-01-01 DIAGNOSIS — N18.9 CHRONIC KIDNEY DISEASE, UNSPECIFIED: ICD-10-CM

## 2025-01-01 DIAGNOSIS — I95.9 HYPOTENSION, UNSPECIFIED: ICD-10-CM

## 2025-01-01 DIAGNOSIS — Z86.718 PERSONAL HISTORY OF OTHER VENOUS THROMBOSIS AND EMBOLISM: ICD-10-CM

## 2025-01-01 DIAGNOSIS — E78.5 HYPERLIPIDEMIA, UNSPECIFIED: ICD-10-CM

## 2025-01-01 DIAGNOSIS — Q61.3 POLYCYSTIC KIDNEY, UNSPECIFIED: ICD-10-CM

## 2025-01-01 DIAGNOSIS — R10.32 LEFT LOWER QUADRANT PAIN: ICD-10-CM

## 2025-01-01 DIAGNOSIS — I82.409 ACUTE EMBOLISM AND THROMBOSIS OF UNSPECIFIED DEEP VEINS OF UNSPECIFIED LOWER EXTREMITY: ICD-10-CM

## 2025-01-01 DIAGNOSIS — I27.20 PULMONARY HYPERTENSION, UNSPECIFIED: ICD-10-CM

## 2025-01-01 DIAGNOSIS — Z79.82 LONG TERM (CURRENT) USE OF ASPIRIN: ICD-10-CM

## 2025-01-01 DIAGNOSIS — N17.0 ACUTE KIDNEY FAILURE WITH TUBULAR NECROSIS: ICD-10-CM

## 2025-01-01 DIAGNOSIS — I82.462 ACUTE EMBOLISM AND THROMBOSIS OF LEFT CALF MUSCULAR VEIN: ICD-10-CM

## 2025-01-01 DIAGNOSIS — U07.1 COVID-19: ICD-10-CM

## 2025-01-01 DIAGNOSIS — I50.33 ACUTE ON CHRONIC DIASTOLIC (CONGESTIVE) HEART FAILURE: ICD-10-CM

## 2025-01-01 DIAGNOSIS — I25.5 ISCHEMIC CARDIOMYOPATHY: ICD-10-CM
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
## 2025-01-01 DIAGNOSIS — Z79.4 LONG TERM (CURRENT) USE OF INSULIN: ICD-10-CM

## 2025-01-01 DIAGNOSIS — J96.01 ACUTE RESPIRATORY FAILURE WITH HYPOXIA: ICD-10-CM

## 2025-01-01 DIAGNOSIS — Z94.0 KIDNEY TRANSPLANT STATUS: ICD-10-CM

## 2025-01-01 DIAGNOSIS — I25.10 ATHEROSCLEROTIC HEART DISEASE OF NATIVE CORONARY ARTERY WITHOUT ANGINA PECTORIS: ICD-10-CM

## 2025-01-01 DIAGNOSIS — Z79.02 LONG TERM (CURRENT) USE OF ANTITHROMBOTICS/ANTIPLATELETS: ICD-10-CM
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
## 2025-01-01 DIAGNOSIS — E11.9 TYPE 2 DIABETES MELLITUS WITHOUT COMPLICATIONS: ICD-10-CM

## 2025-01-01 DIAGNOSIS — Z87.891 PERSONAL HISTORY OF NICOTINE DEPENDENCE: ICD-10-CM

## 2025-01-01 DIAGNOSIS — D64.9 ANEMIA, UNSPECIFIED: ICD-10-CM

## 2025-01-01 DIAGNOSIS — T14.8XXA OTHER INJURY OF UNSPECIFIED BODY REGION, INITIAL ENCOUNTER: ICD-10-CM

## 2025-01-01 DIAGNOSIS — N39.0 URINARY TRACT INFECTION, SITE NOT SPECIFIED: ICD-10-CM

## 2025-01-01 DIAGNOSIS — Z95.5 PRESENCE OF CORONARY ANGIOPLASTY IMPLANT AND GRAFT: ICD-10-CM

## 2025-01-01 DIAGNOSIS — L76.32 POSTPROCEDURAL HEMATOMA OF SKIN AND SUBCUTANEOUS TISSUE FOLLOWING OTHER PROCEDURE: ICD-10-CM

## 2025-01-01 DIAGNOSIS — I48.91 UNSPECIFIED ATRIAL FIBRILLATION: ICD-10-CM

## 2025-01-01 LAB
ADD ON TEST-SPECIMEN IN LAB: SIGNIFICANT CHANGE UP
ALBUMIN SERPL ELPH-MCNC: 2.4 G/DL — LOW (ref 3.3–5)
ALBUMIN SERPL ELPH-MCNC: 2.5 G/DL — LOW (ref 3.3–5)
ALBUMIN SERPL ELPH-MCNC: 2.6 G/DL — LOW (ref 3.3–5)
ALBUMIN SERPL ELPH-MCNC: 2.7 G/DL — LOW (ref 3.3–5)
ALBUMIN SERPL ELPH-MCNC: 2.8 G/DL — LOW (ref 3.3–5)
ALBUMIN SERPL ELPH-MCNC: 2.9 G/DL — LOW (ref 3.3–5)
ALBUMIN SERPL ELPH-MCNC: 3 G/DL — LOW (ref 3.3–5)
ALBUMIN SERPL ELPH-MCNC: 3.1 G/DL — LOW (ref 3.3–5)
ALBUMIN SERPL ELPH-MCNC: 3.1 G/DL — LOW (ref 3.3–5)
ALBUMIN SERPL ELPH-MCNC: 3.3 G/DL — SIGNIFICANT CHANGE UP (ref 3.3–5)
ALBUMIN SERPL ELPH-MCNC: 3.4 G/DL — SIGNIFICANT CHANGE UP (ref 3.3–5)
ALP SERPL-CCNC: 32 U/L — LOW (ref 40–120)
ALP SERPL-CCNC: 36 U/L — LOW (ref 40–120)
ALP SERPL-CCNC: 38 U/L — LOW (ref 40–120)
ALP SERPL-CCNC: 38 U/L — LOW (ref 40–120)
ALP SERPL-CCNC: 39 U/L — LOW (ref 40–120)
ALP SERPL-CCNC: 40 U/L — SIGNIFICANT CHANGE UP (ref 40–120)
ALP SERPL-CCNC: 40 U/L — SIGNIFICANT CHANGE UP (ref 40–120)
ALP SERPL-CCNC: 42 U/L — SIGNIFICANT CHANGE UP (ref 40–120)
ALP SERPL-CCNC: 42 U/L — SIGNIFICANT CHANGE UP (ref 40–120)
ALP SERPL-CCNC: 43 U/L — SIGNIFICANT CHANGE UP (ref 40–120)
ALP SERPL-CCNC: 43 U/L — SIGNIFICANT CHANGE UP (ref 40–120)
ALP SERPL-CCNC: 44 U/L — SIGNIFICANT CHANGE UP (ref 40–120)
ALP SERPL-CCNC: 46 U/L — SIGNIFICANT CHANGE UP (ref 40–120)
ALP SERPL-CCNC: 46 U/L — SIGNIFICANT CHANGE UP (ref 40–120)
ALP SERPL-CCNC: 49 U/L — SIGNIFICANT CHANGE UP (ref 40–120)
ALP SERPL-CCNC: 49 U/L — SIGNIFICANT CHANGE UP (ref 40–120)
ALP SERPL-CCNC: 51 U/L — SIGNIFICANT CHANGE UP (ref 40–120)
ALP SERPL-CCNC: 52 U/L — SIGNIFICANT CHANGE UP (ref 40–120)
ALP SERPL-CCNC: 53 U/L — SIGNIFICANT CHANGE UP (ref 40–120)
ALP SERPL-CCNC: 53 U/L — SIGNIFICANT CHANGE UP (ref 40–120)
ALP SERPL-CCNC: 54 U/L — SIGNIFICANT CHANGE UP (ref 40–120)
ALP SERPL-CCNC: 55 U/L — SIGNIFICANT CHANGE UP (ref 40–120)
ALP SERPL-CCNC: 58 U/L — SIGNIFICANT CHANGE UP (ref 40–120)
ALT FLD-CCNC: 12 U/L — SIGNIFICANT CHANGE UP (ref 10–45)
ALT FLD-CCNC: 13 U/L — SIGNIFICANT CHANGE UP (ref 10–45)
ALT FLD-CCNC: 13 U/L — SIGNIFICANT CHANGE UP (ref 10–45)
ALT FLD-CCNC: 14 U/L — SIGNIFICANT CHANGE UP (ref 10–45)
ALT FLD-CCNC: 14 U/L — SIGNIFICANT CHANGE UP (ref 10–45)
ALT FLD-CCNC: 15 U/L — SIGNIFICANT CHANGE UP (ref 10–45)
ALT FLD-CCNC: 15 U/L — SIGNIFICANT CHANGE UP (ref 10–45)
ALT FLD-CCNC: 16 U/L — SIGNIFICANT CHANGE UP (ref 10–45)
ALT FLD-CCNC: 16 U/L — SIGNIFICANT CHANGE UP (ref 10–45)
ALT FLD-CCNC: 17 U/L — SIGNIFICANT CHANGE UP (ref 10–45)
ALT FLD-CCNC: 21 U/L — SIGNIFICANT CHANGE UP (ref 10–45)
ALT FLD-CCNC: 22 U/L — SIGNIFICANT CHANGE UP (ref 10–45)
ALT FLD-CCNC: 24 U/L — SIGNIFICANT CHANGE UP (ref 10–45)
ALT FLD-CCNC: 26 U/L — SIGNIFICANT CHANGE UP (ref 10–45)
ALT FLD-CCNC: 27 U/L — SIGNIFICANT CHANGE UP (ref 10–45)
ALT FLD-CCNC: 28 U/L — SIGNIFICANT CHANGE UP (ref 10–45)
ALT FLD-CCNC: 29 U/L — SIGNIFICANT CHANGE UP (ref 10–45)
ALT FLD-CCNC: 31 U/L — SIGNIFICANT CHANGE UP (ref 10–45)
ALT FLD-CCNC: 31 U/L — SIGNIFICANT CHANGE UP (ref 10–45)
ALT FLD-CCNC: 34 U/L — SIGNIFICANT CHANGE UP (ref 10–45)
ALT FLD-CCNC: 36 U/L — SIGNIFICANT CHANGE UP (ref 10–45)
ALT FLD-CCNC: 36 U/L — SIGNIFICANT CHANGE UP (ref 10–45)
ALT FLD-CCNC: 39 U/L — SIGNIFICANT CHANGE UP (ref 10–45)
ALT FLD-CCNC: 40 U/L — SIGNIFICANT CHANGE UP (ref 10–45)
ALT FLD-CCNC: SIGNIFICANT CHANGE UP (ref 10–45)
ANION GAP SERPL CALC-SCNC: 10 MMOL/L — SIGNIFICANT CHANGE UP (ref 5–17)
ANION GAP SERPL CALC-SCNC: 11 MMOL/L — SIGNIFICANT CHANGE UP (ref 5–17)
ANION GAP SERPL CALC-SCNC: 12 MMOL/L — SIGNIFICANT CHANGE UP (ref 5–17)
ANION GAP SERPL CALC-SCNC: 13 MMOL/L — SIGNIFICANT CHANGE UP (ref 5–17)
ANION GAP SERPL CALC-SCNC: 14 MMOL/L — SIGNIFICANT CHANGE UP (ref 5–17)
ANION GAP SERPL CALC-SCNC: 15 MMOL/L — SIGNIFICANT CHANGE UP (ref 5–17)
ANION GAP SERPL CALC-SCNC: 16 MMOL/L — SIGNIFICANT CHANGE UP (ref 5–17)
ANION GAP SERPL CALC-SCNC: 17 MMOL/L — SIGNIFICANT CHANGE UP (ref 5–17)
ANION GAP SERPL CALC-SCNC: 9 MMOL/L — SIGNIFICANT CHANGE UP (ref 5–17)
APPEARANCE UR: ABNORMAL
APPEARANCE UR: ABNORMAL
APPEARANCE UR: CLEAR — SIGNIFICANT CHANGE UP
APTT BLD: 26.4 SEC — SIGNIFICANT CHANGE UP (ref 24.5–35.6)
APTT BLD: 29.9 SEC — SIGNIFICANT CHANGE UP (ref 24.5–35.6)
APTT BLD: 31.4 SEC — SIGNIFICANT CHANGE UP (ref 24.5–35.6)
APTT BLD: 31.4 SEC — SIGNIFICANT CHANGE UP (ref 24.5–35.6)
APTT BLD: 31.5 SEC — SIGNIFICANT CHANGE UP (ref 24.5–35.6)
APTT BLD: 31.8 SEC — SIGNIFICANT CHANGE UP (ref 24.5–35.6)
APTT BLD: 32.1 SEC — SIGNIFICANT CHANGE UP (ref 24.5–35.6)
APTT BLD: 33.2 SEC — SIGNIFICANT CHANGE UP (ref 24.5–35.6)
AST SERPL-CCNC: 11 U/L — SIGNIFICANT CHANGE UP (ref 10–40)
AST SERPL-CCNC: 14 U/L — SIGNIFICANT CHANGE UP (ref 10–40)
AST SERPL-CCNC: 17 U/L — SIGNIFICANT CHANGE UP (ref 10–40)
AST SERPL-CCNC: 18 U/L — SIGNIFICANT CHANGE UP (ref 10–40)
AST SERPL-CCNC: 19 U/L — SIGNIFICANT CHANGE UP (ref 10–40)
AST SERPL-CCNC: 22 U/L — SIGNIFICANT CHANGE UP (ref 10–40)
AST SERPL-CCNC: 24 U/L — SIGNIFICANT CHANGE UP (ref 10–40)
AST SERPL-CCNC: 26 U/L — SIGNIFICANT CHANGE UP (ref 10–40)
AST SERPL-CCNC: 27 U/L — SIGNIFICANT CHANGE UP (ref 10–40)
AST SERPL-CCNC: 28 U/L — SIGNIFICANT CHANGE UP (ref 10–40)
AST SERPL-CCNC: 29 U/L — SIGNIFICANT CHANGE UP (ref 10–40)
AST SERPL-CCNC: 32 U/L — SIGNIFICANT CHANGE UP (ref 10–40)
AST SERPL-CCNC: 36 U/L — SIGNIFICANT CHANGE UP (ref 10–40)
AST SERPL-CCNC: SIGNIFICANT CHANGE UP (ref 10–40)
AST SERPL-CCNC: SIGNIFICANT CHANGE UP (ref 10–40)
AST SERPL-CCNC: SIGNIFICANT CHANGE UP U/L (ref 10–40)
BASOPHILS # BLD AUTO: 0 K/UL — SIGNIFICANT CHANGE UP (ref 0–0.2)
BASOPHILS # BLD AUTO: 0.01 K/UL — SIGNIFICANT CHANGE UP (ref 0–0.2)
BASOPHILS # BLD AUTO: 0.02 K/UL — SIGNIFICANT CHANGE UP (ref 0–0.2)
BASOPHILS # BLD AUTO: 0.02 K/UL — SIGNIFICANT CHANGE UP (ref 0–0.2)
BASOPHILS # BLD AUTO: 0.03 K/UL — SIGNIFICANT CHANGE UP (ref 0–0.2)
BASOPHILS # BLD AUTO: 0.04 K/UL — SIGNIFICANT CHANGE UP (ref 0–0.2)
BASOPHILS NFR BLD AUTO: 0 % — SIGNIFICANT CHANGE UP (ref 0–2)
BASOPHILS NFR BLD AUTO: 0.1 % — SIGNIFICANT CHANGE UP (ref 0–2)
BASOPHILS NFR BLD AUTO: 0.2 % — SIGNIFICANT CHANGE UP (ref 0–2)
BASOPHILS NFR BLD AUTO: 0.3 % — SIGNIFICANT CHANGE UP (ref 0–2)
BASOPHILS NFR BLD AUTO: 0.4 % — SIGNIFICANT CHANGE UP (ref 0–2)
BASOPHILS NFR BLD AUTO: 0.5 % — SIGNIFICANT CHANGE UP (ref 0–2)
BILIRUB SERPL-MCNC: 0.6 MG/DL — SIGNIFICANT CHANGE UP (ref 0.2–1.2)
BILIRUB SERPL-MCNC: 0.7 MG/DL — SIGNIFICANT CHANGE UP (ref 0.2–1.2)
BILIRUB SERPL-MCNC: 0.9 MG/DL — SIGNIFICANT CHANGE UP (ref 0.2–1.2)
BILIRUB SERPL-MCNC: 1 MG/DL — SIGNIFICANT CHANGE UP (ref 0.2–1.2)
BILIRUB SERPL-MCNC: 1 MG/DL — SIGNIFICANT CHANGE UP (ref 0.2–1.2)
BILIRUB SERPL-MCNC: 1.1 MG/DL — SIGNIFICANT CHANGE UP (ref 0.2–1.2)
BILIRUB SERPL-MCNC: 1.2 MG/DL — SIGNIFICANT CHANGE UP (ref 0.2–1.2)
BILIRUB SERPL-MCNC: 1.2 MG/DL — SIGNIFICANT CHANGE UP (ref 0.2–1.2)
BILIRUB SERPL-MCNC: 1.3 MG/DL — HIGH (ref 0.2–1.2)
BILIRUB SERPL-MCNC: 1.5 MG/DL — HIGH (ref 0.2–1.2)
BILIRUB SERPL-MCNC: 1.6 MG/DL — HIGH (ref 0.2–1.2)
BILIRUB SERPL-MCNC: 1.8 MG/DL — HIGH (ref 0.2–1.2)
BILIRUB SERPL-MCNC: 2.1 MG/DL — HIGH (ref 0.2–1.2)
BILIRUB SERPL-MCNC: 2.7 MG/DL — HIGH (ref 0.2–1.2)
BILIRUB UR-MCNC: NEGATIVE — SIGNIFICANT CHANGE UP
BLD GP AB SCN SERPL QL: NEGATIVE — SIGNIFICANT CHANGE UP
BUN SERPL-MCNC: 103 MG/DL — HIGH (ref 7–23)
BUN SERPL-MCNC: 107 MG/DL — HIGH (ref 7–23)
BUN SERPL-MCNC: 110 MG/DL — HIGH (ref 7–23)
BUN SERPL-MCNC: 117 MG/DL — HIGH (ref 7–23)
BUN SERPL-MCNC: 120 MG/DL — HIGH (ref 7–23)
BUN SERPL-MCNC: 124 MG/DL — HIGH (ref 7–23)
BUN SERPL-MCNC: 126 MG/DL — HIGH (ref 7–23)
BUN SERPL-MCNC: 127 MG/DL — HIGH (ref 7–23)
BUN SERPL-MCNC: 129 MG/DL — HIGH (ref 7–23)
BUN SERPL-MCNC: 130 MG/DL — HIGH (ref 7–23)
BUN SERPL-MCNC: 131 MG/DL — HIGH (ref 7–23)
BUN SERPL-MCNC: 134 MG/DL — HIGH (ref 7–23)
BUN SERPL-MCNC: 52 MG/DL — HIGH (ref 7–23)
BUN SERPL-MCNC: 52 MG/DL — HIGH (ref 7–23)
BUN SERPL-MCNC: 53 MG/DL — HIGH (ref 7–23)
BUN SERPL-MCNC: 54 MG/DL — HIGH (ref 7–23)
BUN SERPL-MCNC: 56 MG/DL — HIGH (ref 7–23)
BUN SERPL-MCNC: 57 MG/DL — HIGH (ref 7–23)
BUN SERPL-MCNC: 59 MG/DL — HIGH (ref 7–23)
BUN SERPL-MCNC: 63 MG/DL — HIGH (ref 7–23)
BUN SERPL-MCNC: 64 MG/DL — HIGH (ref 7–23)
BUN SERPL-MCNC: 65 MG/DL — HIGH (ref 7–23)
BUN SERPL-MCNC: 66 MG/DL — HIGH (ref 7–23)
BUN SERPL-MCNC: 72 MG/DL — HIGH (ref 7–23)
BUN SERPL-MCNC: 76 MG/DL — HIGH (ref 7–23)
BUN SERPL-MCNC: 86 MG/DL — HIGH (ref 7–23)
BUN SERPL-MCNC: 94 MG/DL — HIGH (ref 7–23)
BUN SERPL-MCNC: 97 MG/DL — HIGH (ref 7–23)
BUN SERPL-MCNC: 98 MG/DL — HIGH (ref 7–23)
CALCIUM SERPL-MCNC: 8 MG/DL — LOW (ref 8.4–10.5)
CALCIUM SERPL-MCNC: 8.1 MG/DL — LOW (ref 8.4–10.5)
CALCIUM SERPL-MCNC: 8.2 MG/DL — LOW (ref 8.4–10.5)
CALCIUM SERPL-MCNC: 8.3 MG/DL — LOW (ref 8.4–10.5)
CALCIUM SERPL-MCNC: 8.4 MG/DL — SIGNIFICANT CHANGE UP (ref 8.4–10.5)
CALCIUM SERPL-MCNC: 8.4 MG/DL — SIGNIFICANT CHANGE UP (ref 8.4–10.5)
CALCIUM SERPL-MCNC: 8.5 MG/DL — SIGNIFICANT CHANGE UP (ref 8.4–10.5)
CALCIUM SERPL-MCNC: 8.5 MG/DL — SIGNIFICANT CHANGE UP (ref 8.4–10.5)
CALCIUM SERPL-MCNC: 8.6 MG/DL — SIGNIFICANT CHANGE UP (ref 8.4–10.5)
CALCIUM SERPL-MCNC: 8.7 MG/DL — SIGNIFICANT CHANGE UP (ref 8.4–10.5)
CALCIUM SERPL-MCNC: 8.8 MG/DL — SIGNIFICANT CHANGE UP (ref 8.4–10.5)
CALCIUM SERPL-MCNC: 8.9 MG/DL — SIGNIFICANT CHANGE UP (ref 8.4–10.5)
CALCIUM SERPL-MCNC: 9 MG/DL — SIGNIFICANT CHANGE UP (ref 8.4–10.5)
CALCIUM SERPL-MCNC: 9 MG/DL — SIGNIFICANT CHANGE UP (ref 8.4–10.5)
CALCIUM SERPL-MCNC: 9.1 MG/DL — SIGNIFICANT CHANGE UP (ref 8.4–10.5)
CALCIUM SERPL-MCNC: 9.1 MG/DL — SIGNIFICANT CHANGE UP (ref 8.4–10.5)
CALCIUM SERPL-MCNC: 9.2 MG/DL — SIGNIFICANT CHANGE UP (ref 8.4–10.5)
CALCIUM SERPL-MCNC: 9.2 MG/DL — SIGNIFICANT CHANGE UP (ref 8.4–10.5)
CHLORIDE SERPL-SCNC: 100 MMOL/L — SIGNIFICANT CHANGE UP (ref 96–108)
CHLORIDE SERPL-SCNC: 100 MMOL/L — SIGNIFICANT CHANGE UP (ref 96–108)
CHLORIDE SERPL-SCNC: 101 MMOL/L — SIGNIFICANT CHANGE UP (ref 96–108)
CHLORIDE SERPL-SCNC: 102 MMOL/L — SIGNIFICANT CHANGE UP (ref 96–108)
CHLORIDE SERPL-SCNC: 103 MMOL/L — SIGNIFICANT CHANGE UP (ref 96–108)
CHLORIDE SERPL-SCNC: 104 MMOL/L — SIGNIFICANT CHANGE UP (ref 96–108)
CHLORIDE SERPL-SCNC: 105 MMOL/L — SIGNIFICANT CHANGE UP (ref 96–108)
CHLORIDE SERPL-SCNC: 106 MMOL/L — SIGNIFICANT CHANGE UP (ref 96–108)
CHLORIDE SERPL-SCNC: 107 MMOL/L — SIGNIFICANT CHANGE UP (ref 96–108)
CHLORIDE SERPL-SCNC: 98 MMOL/L — SIGNIFICANT CHANGE UP (ref 96–108)
CHLORIDE SERPL-SCNC: 99 MMOL/L — SIGNIFICANT CHANGE UP (ref 96–108)
CHOLEST SERPL-MCNC: 83 MG/DL — SIGNIFICANT CHANGE UP
CK MB CFR SERPL CALC: 1.9 NG/ML — SIGNIFICANT CHANGE UP (ref 0–6.7)
CK SERPL-CCNC: 49 U/L — SIGNIFICANT CHANGE UP (ref 30–200)
CO2 SERPL-SCNC: 19 MMOL/L — LOW (ref 22–31)
CO2 SERPL-SCNC: 20 MMOL/L — LOW (ref 22–31)
CO2 SERPL-SCNC: 23 MMOL/L — SIGNIFICANT CHANGE UP (ref 22–31)
CO2 SERPL-SCNC: 24 MMOL/L — SIGNIFICANT CHANGE UP (ref 22–31)
CO2 SERPL-SCNC: 25 MMOL/L — SIGNIFICANT CHANGE UP (ref 22–31)
CO2 SERPL-SCNC: 26 MMOL/L — SIGNIFICANT CHANGE UP (ref 22–31)
CO2 SERPL-SCNC: 27 MMOL/L — SIGNIFICANT CHANGE UP (ref 22–31)
CO2 SERPL-SCNC: 28 MMOL/L — SIGNIFICANT CHANGE UP (ref 22–31)
CO2 SERPL-SCNC: 29 MMOL/L — SIGNIFICANT CHANGE UP (ref 22–31)
CO2 SERPL-SCNC: 30 MMOL/L — SIGNIFICANT CHANGE UP (ref 22–31)
CO2 SERPL-SCNC: 31 MMOL/L — SIGNIFICANT CHANGE UP (ref 22–31)
COLOR SPEC: YELLOW — SIGNIFICANT CHANGE UP
CREAT ?TM UR-MCNC: 30 MG/DL — SIGNIFICANT CHANGE UP
CREAT ?TM UR-MCNC: 41 MG/DL — SIGNIFICANT CHANGE UP
CREAT ?TM UR-MCNC: 43 MG/DL — SIGNIFICANT CHANGE UP
CREAT ?TM UR-MCNC: 61 MG/DL — SIGNIFICANT CHANGE UP
CREAT SERPL-MCNC: 2.24 MG/DL — HIGH (ref 0.5–1.3)
CREAT SERPL-MCNC: 2.25 MG/DL — HIGH (ref 0.5–1.3)
CREAT SERPL-MCNC: 2.32 MG/DL — HIGH (ref 0.5–1.3)
CREAT SERPL-MCNC: 2.42 MG/DL — HIGH (ref 0.5–1.3)
CREAT SERPL-MCNC: 2.43 MG/DL — HIGH (ref 0.5–1.3)
CREAT SERPL-MCNC: 2.43 MG/DL — HIGH (ref 0.5–1.3)
CREAT SERPL-MCNC: 2.51 MG/DL — HIGH (ref 0.5–1.3)
CREAT SERPL-MCNC: 2.59 MG/DL — HIGH (ref 0.5–1.3)
CREAT SERPL-MCNC: 2.6 MG/DL — HIGH (ref 0.5–1.3)
CREAT SERPL-MCNC: 2.63 MG/DL — HIGH (ref 0.5–1.3)
CREAT SERPL-MCNC: 2.67 MG/DL — HIGH (ref 0.5–1.3)
CREAT SERPL-MCNC: 2.69 MG/DL — HIGH (ref 0.5–1.3)
CREAT SERPL-MCNC: 2.73 MG/DL — HIGH (ref 0.5–1.3)
CREAT SERPL-MCNC: 2.74 MG/DL — HIGH (ref 0.5–1.3)
CREAT SERPL-MCNC: 2.77 MG/DL — HIGH (ref 0.5–1.3)
CREAT SERPL-MCNC: 2.86 MG/DL — HIGH (ref 0.5–1.3)
CREAT SERPL-MCNC: 2.87 MG/DL — HIGH (ref 0.5–1.3)
CREAT SERPL-MCNC: 2.88 MG/DL — HIGH (ref 0.5–1.3)
CREAT SERPL-MCNC: 2.99 MG/DL — HIGH (ref 0.5–1.3)
CREAT SERPL-MCNC: 3.19 MG/DL — HIGH (ref 0.5–1.3)
CREAT SERPL-MCNC: 3.23 MG/DL — HIGH (ref 0.5–1.3)
CREAT SERPL-MCNC: 3.35 MG/DL — HIGH (ref 0.5–1.3)
CREAT SERPL-MCNC: 3.37 MG/DL — HIGH (ref 0.5–1.3)
CREAT SERPL-MCNC: 3.43 MG/DL — HIGH (ref 0.5–1.3)
CREAT SERPL-MCNC: 3.63 MG/DL — HIGH (ref 0.5–1.3)
CREAT SERPL-MCNC: 3.84 MG/DL — HIGH (ref 0.5–1.3)
CREAT SERPL-MCNC: 3.91 MG/DL — HIGH (ref 0.5–1.3)
CREAT SERPL-MCNC: 3.94 MG/DL — HIGH (ref 0.5–1.3)
CREAT SERPL-MCNC: 3.96 MG/DL — HIGH (ref 0.5–1.3)
CULTURE RESULTS: SIGNIFICANT CHANGE UP
CULTURE RESULTS: SIGNIFICANT CHANGE UP
CYSTATIN C SERPL-MCNC: 5.08 MG/L — HIGH (ref 0.82–1.52)
DIFF PNL FLD: ABNORMAL
DIFF PNL FLD: NEGATIVE — SIGNIFICANT CHANGE UP
EGFR: 15 ML/MIN/1.73M2 — LOW
EGFR: 16 ML/MIN/1.73M2 — LOW
EGFR: 16 ML/MIN/1.73M2 — LOW
EGFR: 18 ML/MIN/1.73M2 — LOW
EGFR: 19 ML/MIN/1.73M2 — LOW
EGFR: 21 ML/MIN/1.73M2 — LOW
EGFR: 21 ML/MIN/1.73M2 — LOW
EGFR: 22 ML/MIN/1.73M2 — LOW
EGFR: 23 ML/MIN/1.73M2 — LOW
EGFR: 24 ML/MIN/1.73M2 — LOW
EGFR: 25 ML/MIN/1.73M2 — LOW
EGFR: 26 ML/MIN/1.73M2 — LOW
EGFR: 26 ML/MIN/1.73M2 — LOW
EGFR: 27 ML/MIN/1.73M2 — LOW
EGFR: 28 ML/MIN/1.73M2 — LOW
EGFR: 28 ML/MIN/1.73M2 — LOW
EGFR: 29 ML/MIN/1.73M2 — LOW
EOSINOPHIL # BLD AUTO: 0 K/UL — SIGNIFICANT CHANGE UP (ref 0–0.5)
EOSINOPHIL # BLD AUTO: 0.08 K/UL — SIGNIFICANT CHANGE UP (ref 0–0.5)
EOSINOPHIL # BLD AUTO: 0.12 K/UL — SIGNIFICANT CHANGE UP (ref 0–0.5)
EOSINOPHIL # BLD AUTO: 0.13 K/UL — SIGNIFICANT CHANGE UP (ref 0–0.5)
EOSINOPHIL # BLD AUTO: 0.13 K/UL — SIGNIFICANT CHANGE UP (ref 0–0.5)
EOSINOPHIL # BLD AUTO: 0.14 K/UL — SIGNIFICANT CHANGE UP (ref 0–0.5)
EOSINOPHIL # BLD AUTO: 0.36 K/UL — SIGNIFICANT CHANGE UP (ref 0–0.5)
EOSINOPHIL NFR BLD AUTO: 0 % — SIGNIFICANT CHANGE UP (ref 0–6)
EOSINOPHIL NFR BLD AUTO: 0.8 % — SIGNIFICANT CHANGE UP (ref 0–6)
EOSINOPHIL NFR BLD AUTO: 1.3 % — SIGNIFICANT CHANGE UP (ref 0–6)
EOSINOPHIL NFR BLD AUTO: 1.7 % — SIGNIFICANT CHANGE UP (ref 0–6)
EOSINOPHIL NFR BLD AUTO: 2 % — SIGNIFICANT CHANGE UP (ref 0–6)
EOSINOPHIL NFR BLD AUTO: 2 % — SIGNIFICANT CHANGE UP (ref 0–6)
EOSINOPHIL NFR BLD AUTO: 4.8 % — SIGNIFICANT CHANGE UP (ref 0–6)
FERRITIN SERPL-MCNC: 356 NG/ML — SIGNIFICANT CHANGE UP (ref 30–400)
FLUAV AG NPH QL: SIGNIFICANT CHANGE UP
FLUBV AG NPH QL: SIGNIFICANT CHANGE UP
FOLATE SERPL-MCNC: 18.5 NG/ML — SIGNIFICANT CHANGE UP
GFR/BSA.PRED SERPLBLD CYS-BASED-ARV: 8 ML/MIN/1.73M2 — LOW
GLUCOSE SERPL-MCNC: 112 MG/DL — HIGH (ref 70–99)
GLUCOSE SERPL-MCNC: 114 MG/DL — HIGH (ref 70–99)
GLUCOSE SERPL-MCNC: 128 MG/DL — HIGH (ref 70–99)
GLUCOSE SERPL-MCNC: 133 MG/DL — HIGH (ref 70–99)
GLUCOSE SERPL-MCNC: 134 MG/DL — HIGH (ref 70–99)
GLUCOSE SERPL-MCNC: 135 MG/DL — HIGH (ref 70–99)
GLUCOSE SERPL-MCNC: 135 MG/DL — HIGH (ref 70–99)
GLUCOSE SERPL-MCNC: 143 MG/DL — HIGH (ref 70–99)
GLUCOSE SERPL-MCNC: 143 MG/DL — HIGH (ref 70–99)
GLUCOSE SERPL-MCNC: 152 MG/DL — HIGH (ref 70–99)
GLUCOSE SERPL-MCNC: 154 MG/DL — HIGH (ref 70–99)
GLUCOSE SERPL-MCNC: 159 MG/DL — HIGH (ref 70–99)
GLUCOSE SERPL-MCNC: 161 MG/DL — HIGH (ref 70–99)
GLUCOSE SERPL-MCNC: 178 MG/DL — HIGH (ref 70–99)
GLUCOSE SERPL-MCNC: 192 MG/DL — HIGH (ref 70–99)
GLUCOSE SERPL-MCNC: 196 MG/DL — HIGH (ref 70–99)
GLUCOSE SERPL-MCNC: 198 MG/DL — HIGH (ref 70–99)
GLUCOSE SERPL-MCNC: 208 MG/DL — HIGH (ref 70–99)
GLUCOSE SERPL-MCNC: 239 MG/DL — HIGH (ref 70–99)
GLUCOSE SERPL-MCNC: 250 MG/DL — HIGH (ref 70–99)
GLUCOSE SERPL-MCNC: 264 MG/DL — HIGH (ref 70–99)
GLUCOSE SERPL-MCNC: 27 MG/DL — CRITICAL LOW (ref 70–99)
GLUCOSE SERPL-MCNC: 371 MG/DL — HIGH (ref 70–99)
GLUCOSE SERPL-MCNC: 383 MG/DL — HIGH (ref 70–99)
GLUCOSE SERPL-MCNC: 43 MG/DL — CRITICAL LOW (ref 70–99)
GLUCOSE SERPL-MCNC: 53 MG/DL — CRITICAL LOW (ref 70–99)
GLUCOSE SERPL-MCNC: 61 MG/DL — LOW (ref 70–99)
GLUCOSE SERPL-MCNC: 65 MG/DL — LOW (ref 70–99)
GLUCOSE SERPL-MCNC: 90 MG/DL — SIGNIFICANT CHANGE UP (ref 70–99)
GLUCOSE UR QL: 250 MG/DL
GLUCOSE UR QL: 500 MG/DL
GLUCOSE UR QL: NEGATIVE MG/DL — SIGNIFICANT CHANGE UP
HAPTOGLOB SERPL-MCNC: 204 MG/DL — HIGH (ref 34–200)
HCT VFR BLD CALC: 23.1 % — LOW (ref 39–50)
HCT VFR BLD CALC: 23.5 % — LOW (ref 39–50)
HCT VFR BLD CALC: 24.5 % — LOW (ref 39–50)
HCT VFR BLD CALC: 24.9 % — LOW (ref 39–50)
HCT VFR BLD CALC: 25.4 % — LOW (ref 39–50)
HCT VFR BLD CALC: 25.6 % — LOW (ref 39–50)
HCT VFR BLD CALC: 25.6 % — LOW (ref 39–50)
HCT VFR BLD CALC: 25.8 % — LOW (ref 39–50)
HCT VFR BLD CALC: 26 % — LOW (ref 39–50)
HCT VFR BLD CALC: 26.5 % — LOW (ref 39–50)
HCT VFR BLD CALC: 26.6 % — LOW (ref 39–50)
HCT VFR BLD CALC: 26.6 % — LOW (ref 39–50)
HCT VFR BLD CALC: 26.8 % — LOW (ref 39–50)
HCT VFR BLD CALC: 26.9 % — LOW (ref 39–50)
HCT VFR BLD CALC: 27 % — LOW (ref 39–50)
HCT VFR BLD CALC: 27 % — LOW (ref 39–50)
HCT VFR BLD CALC: 27.4 % — LOW (ref 39–50)
HCT VFR BLD CALC: 27.7 % — LOW (ref 39–50)
HCT VFR BLD CALC: 27.9 % — LOW (ref 39–50)
HCT VFR BLD CALC: 29 % — LOW (ref 39–50)
HCT VFR BLD CALC: 30.9 % — LOW (ref 39–50)
HCT VFR BLD CALC: 30.9 % — LOW (ref 39–50)
HCT VFR BLD CALC: 31.3 % — LOW (ref 39–50)
HCT VFR BLD CALC: 31.7 % — LOW (ref 39–50)
HDLC SERPL-MCNC: 33 MG/DL — LOW
HGB BLD-MCNC: 10 G/DL — LOW (ref 13–17)
HGB BLD-MCNC: 7.4 G/DL — LOW (ref 13–17)
HGB BLD-MCNC: 7.8 G/DL — LOW (ref 13–17)
HGB BLD-MCNC: 7.8 G/DL — LOW (ref 13–17)
HGB BLD-MCNC: 8 G/DL — LOW (ref 13–17)
HGB BLD-MCNC: 8.2 G/DL — LOW (ref 13–17)
HGB BLD-MCNC: 8.3 G/DL — LOW (ref 13–17)
HGB BLD-MCNC: 8.3 G/DL — LOW (ref 13–17)
HGB BLD-MCNC: 8.4 G/DL — LOW (ref 13–17)
HGB BLD-MCNC: 8.5 G/DL — LOW (ref 13–17)
HGB BLD-MCNC: 8.6 G/DL — LOW (ref 13–17)
HGB BLD-MCNC: 8.8 G/DL — LOW (ref 13–17)
HGB BLD-MCNC: 8.9 G/DL — LOW (ref 13–17)
HGB BLD-MCNC: 9.4 G/DL — LOW (ref 13–17)
HGB BLD-MCNC: 9.4 G/DL — LOW (ref 13–17)
HGB BLD-MCNC: 9.6 G/DL — LOW (ref 13–17)
HGB BLD-MCNC: 9.9 G/DL — LOW (ref 13–17)
IMM GRANULOCYTES NFR BLD AUTO: 0.4 % — SIGNIFICANT CHANGE UP (ref 0–0.9)
IMM GRANULOCYTES NFR BLD AUTO: 0.5 % — SIGNIFICANT CHANGE UP (ref 0–0.9)
IMM GRANULOCYTES NFR BLD AUTO: 0.5 % — SIGNIFICANT CHANGE UP (ref 0–0.9)
INR BLD: 1.16 — SIGNIFICANT CHANGE UP (ref 0.85–1.16)
INR BLD: 1.16 — SIGNIFICANT CHANGE UP (ref 0.85–1.16)
INR BLD: 1.22 — HIGH (ref 0.85–1.16)
INR BLD: 1.24 — HIGH (ref 0.85–1.16)
INR BLD: 1.24 — HIGH (ref 0.85–1.16)
INR BLD: 1.25 — HIGH (ref 0.85–1.16)
INR BLD: 1.26 — HIGH (ref 0.85–1.16)
IRON SATN MFR SERPL: 28 % — SIGNIFICANT CHANGE UP (ref 16–55)
IRON SATN MFR SERPL: 46 UG/DL — SIGNIFICANT CHANGE UP (ref 45–165)
KETONES UR-MCNC: NEGATIVE MG/DL — SIGNIFICANT CHANGE UP
LACTATE SERPL-SCNC: 1.4 MMOL/L — SIGNIFICANT CHANGE UP (ref 0.5–2)
LACTATE SERPL-SCNC: 1.7 MMOL/L — SIGNIFICANT CHANGE UP (ref 0.5–2)
LDH SERPL L TO P-CCNC: 217 U/L — SIGNIFICANT CHANGE UP (ref 50–242)
LDLC SERPL-MCNC: 29 MG/DL — SIGNIFICANT CHANGE UP
LEUKOCYTE ESTERASE UR-ACNC: ABNORMAL
LEUKOCYTE ESTERASE UR-ACNC: NEGATIVE — SIGNIFICANT CHANGE UP
LIPID PNL WITH DIRECT LDL SERPL: 29 MG/DL — SIGNIFICANT CHANGE UP
LYMPHOCYTES # BLD AUTO: 0.19 K/UL — LOW (ref 1–3.3)
LYMPHOCYTES # BLD AUTO: 0.19 K/UL — LOW (ref 1–3.3)
LYMPHOCYTES # BLD AUTO: 0.3 K/UL — LOW (ref 1–3.3)
LYMPHOCYTES # BLD AUTO: 0.55 K/UL — LOW (ref 1–3.3)
LYMPHOCYTES # BLD AUTO: 0.89 K/UL — LOW (ref 1–3.3)
LYMPHOCYTES # BLD AUTO: 1.01 K/UL — SIGNIFICANT CHANGE UP (ref 1–3.3)
LYMPHOCYTES # BLD AUTO: 1.05 K/UL — SIGNIFICANT CHANGE UP (ref 1–3.3)
LYMPHOCYTES # BLD AUTO: 1.06 K/UL — SIGNIFICANT CHANGE UP (ref 1–3.3)
LYMPHOCYTES # BLD AUTO: 1.18 K/UL — SIGNIFICANT CHANGE UP (ref 1–3.3)
LYMPHOCYTES # BLD AUTO: 10.2 % — LOW (ref 13–44)
LYMPHOCYTES # BLD AUTO: 10.6 % — LOW (ref 13–44)
LYMPHOCYTES # BLD AUTO: 14.7 % — SIGNIFICANT CHANGE UP (ref 13–44)
LYMPHOCYTES # BLD AUTO: 15 % — SIGNIFICANT CHANGE UP (ref 13–44)
LYMPHOCYTES # BLD AUTO: 15.7 % — SIGNIFICANT CHANGE UP (ref 13–44)
LYMPHOCYTES # BLD AUTO: 2.6 % — LOW (ref 13–44)
LYMPHOCYTES # BLD AUTO: 2.6 % — LOW (ref 13–44)
LYMPHOCYTES # BLD AUTO: 2.7 % — LOW (ref 13–44)
LYMPHOCYTES # BLD AUTO: 7.6 % — LOW (ref 13–44)
MAGNESIUM SERPL-MCNC: 1.7 MG/DL — SIGNIFICANT CHANGE UP (ref 1.6–2.6)
MAGNESIUM SERPL-MCNC: 1.9 MG/DL — SIGNIFICANT CHANGE UP (ref 1.6–2.6)
MAGNESIUM SERPL-MCNC: 1.9 MG/DL — SIGNIFICANT CHANGE UP (ref 1.6–2.6)
MAGNESIUM SERPL-MCNC: 2 MG/DL — SIGNIFICANT CHANGE UP (ref 1.6–2.6)
MAGNESIUM SERPL-MCNC: 2.1 MG/DL — SIGNIFICANT CHANGE UP (ref 1.6–2.6)
MAGNESIUM SERPL-MCNC: 2.2 MG/DL — SIGNIFICANT CHANGE UP (ref 1.6–2.6)
MAGNESIUM SERPL-MCNC: 2.3 MG/DL — SIGNIFICANT CHANGE UP (ref 1.6–2.6)
MCHC RBC-ENTMCNC: 30 G/DL — LOW (ref 32–36)
MCHC RBC-ENTMCNC: 30.4 G/DL — LOW (ref 32–36)
MCHC RBC-ENTMCNC: 30.7 G/DL — LOW (ref 32–36)
MCHC RBC-ENTMCNC: 30.7 G/DL — LOW (ref 32–36)
MCHC RBC-ENTMCNC: 30.8 G/DL — LOW (ref 32–36)
MCHC RBC-ENTMCNC: 31.2 G/DL — LOW (ref 32–36)
MCHC RBC-ENTMCNC: 31.2 G/DL — LOW (ref 32–36)
MCHC RBC-ENTMCNC: 31.2 PG — SIGNIFICANT CHANGE UP (ref 27–34)
MCHC RBC-ENTMCNC: 31.3 G/DL — LOW (ref 32–36)
MCHC RBC-ENTMCNC: 31.3 G/DL — LOW (ref 32–36)
MCHC RBC-ENTMCNC: 31.7 PG — SIGNIFICANT CHANGE UP (ref 27–34)
MCHC RBC-ENTMCNC: 31.8 G/DL — LOW (ref 32–36)
MCHC RBC-ENTMCNC: 31.8 G/DL — LOW (ref 32–36)
MCHC RBC-ENTMCNC: 31.8 PG — SIGNIFICANT CHANGE UP (ref 27–34)
MCHC RBC-ENTMCNC: 31.9 G/DL — LOW (ref 32–36)
MCHC RBC-ENTMCNC: 31.9 PG — SIGNIFICANT CHANGE UP (ref 27–34)
MCHC RBC-ENTMCNC: 31.9 PG — SIGNIFICANT CHANGE UP (ref 27–34)
MCHC RBC-ENTMCNC: 32 G/DL — SIGNIFICANT CHANGE UP (ref 32–36)
MCHC RBC-ENTMCNC: 32 G/DL — SIGNIFICANT CHANGE UP (ref 32–36)
MCHC RBC-ENTMCNC: 32 PG — SIGNIFICANT CHANGE UP (ref 27–34)
MCHC RBC-ENTMCNC: 32.1 G/DL — SIGNIFICANT CHANGE UP (ref 32–36)
MCHC RBC-ENTMCNC: 32.1 G/DL — SIGNIFICANT CHANGE UP (ref 32–36)
MCHC RBC-ENTMCNC: 32.1 PG — SIGNIFICANT CHANGE UP (ref 27–34)
MCHC RBC-ENTMCNC: 32.2 PG — SIGNIFICANT CHANGE UP (ref 27–34)
MCHC RBC-ENTMCNC: 32.3 G/DL — SIGNIFICANT CHANGE UP (ref 32–36)
MCHC RBC-ENTMCNC: 32.3 PG — SIGNIFICANT CHANGE UP (ref 27–34)
MCHC RBC-ENTMCNC: 32.4 G/DL — SIGNIFICANT CHANGE UP (ref 32–36)
MCHC RBC-ENTMCNC: 32.4 G/DL — SIGNIFICANT CHANGE UP (ref 32–36)
MCHC RBC-ENTMCNC: 32.4 PG — SIGNIFICANT CHANGE UP (ref 27–34)
MCHC RBC-ENTMCNC: 32.4 PG — SIGNIFICANT CHANGE UP (ref 27–34)
MCHC RBC-ENTMCNC: 32.5 G/DL — SIGNIFICANT CHANGE UP (ref 32–36)
MCHC RBC-ENTMCNC: 32.5 PG — SIGNIFICANT CHANGE UP (ref 27–34)
MCHC RBC-ENTMCNC: 32.5 PG — SIGNIFICANT CHANGE UP (ref 27–34)
MCHC RBC-ENTMCNC: 32.6 PG — SIGNIFICANT CHANGE UP (ref 27–34)
MCHC RBC-ENTMCNC: 32.6 PG — SIGNIFICANT CHANGE UP (ref 27–34)
MCHC RBC-ENTMCNC: 32.7 G/DL — SIGNIFICANT CHANGE UP (ref 32–36)
MCHC RBC-ENTMCNC: 32.8 PG — SIGNIFICANT CHANGE UP (ref 27–34)
MCHC RBC-ENTMCNC: 33.1 PG — SIGNIFICANT CHANGE UP (ref 27–34)
MCHC RBC-ENTMCNC: 33.2 G/DL — SIGNIFICANT CHANGE UP (ref 32–36)
MCHC RBC-ENTMCNC: 33.2 G/DL — SIGNIFICANT CHANGE UP (ref 32–36)
MCHC RBC-ENTMCNC: 33.2 PG — SIGNIFICANT CHANGE UP (ref 27–34)
MCHC RBC-ENTMCNC: 33.2 PG — SIGNIFICANT CHANGE UP (ref 27–34)
MCHC RBC-ENTMCNC: 33.5 PG — SIGNIFICANT CHANGE UP (ref 27–34)
MCV RBC AUTO: 100 FL — SIGNIFICANT CHANGE UP (ref 80–100)
MCV RBC AUTO: 100 FL — SIGNIFICANT CHANGE UP (ref 80–100)
MCV RBC AUTO: 101.1 FL — HIGH (ref 80–100)
MCV RBC AUTO: 101.1 FL — HIGH (ref 80–100)
MCV RBC AUTO: 101.3 FL — HIGH (ref 80–100)
MCV RBC AUTO: 101.5 FL — HIGH (ref 80–100)
MCV RBC AUTO: 101.5 FL — HIGH (ref 80–100)
MCV RBC AUTO: 101.7 FL — HIGH (ref 80–100)
MCV RBC AUTO: 102.3 FL — HIGH (ref 80–100)
MCV RBC AUTO: 102.6 FL — HIGH (ref 80–100)
MCV RBC AUTO: 103 FL — HIGH (ref 80–100)
MCV RBC AUTO: 103.6 FL — HIGH (ref 80–100)
MCV RBC AUTO: 104 FL — HIGH (ref 80–100)
MCV RBC AUTO: 104.1 FL — HIGH (ref 80–100)
MCV RBC AUTO: 104.2 FL — HIGH (ref 80–100)
MCV RBC AUTO: 105.5 FL — HIGH (ref 80–100)
MCV RBC AUTO: 105.7 FL — HIGH (ref 80–100)
MCV RBC AUTO: 106.2 FL — HIGH (ref 80–100)
MCV RBC AUTO: 96.8 FL — SIGNIFICANT CHANGE UP (ref 80–100)
MCV RBC AUTO: 97.5 FL — SIGNIFICANT CHANGE UP (ref 80–100)
MCV RBC AUTO: 97.8 FL — SIGNIFICANT CHANGE UP (ref 80–100)
MCV RBC AUTO: 98.4 FL — SIGNIFICANT CHANGE UP (ref 80–100)
MCV RBC AUTO: 98.6 FL — SIGNIFICANT CHANGE UP (ref 80–100)
MCV RBC AUTO: 99.6 FL — SIGNIFICANT CHANGE UP (ref 80–100)
MCV RBC AUTO: 99.6 FL — SIGNIFICANT CHANGE UP (ref 80–100)
MCV RBC AUTO: 99.7 FL — SIGNIFICANT CHANGE UP (ref 80–100)
MONOCYTES # BLD AUTO: 0.04 K/UL — SIGNIFICANT CHANGE UP (ref 0–0.9)
MONOCYTES # BLD AUTO: 0.07 K/UL — SIGNIFICANT CHANGE UP (ref 0–0.9)
MONOCYTES # BLD AUTO: 0.26 K/UL — SIGNIFICANT CHANGE UP (ref 0–0.9)
MONOCYTES # BLD AUTO: 0.3 K/UL — SIGNIFICANT CHANGE UP (ref 0–0.9)
MONOCYTES # BLD AUTO: 0.3 K/UL — SIGNIFICANT CHANGE UP (ref 0–0.9)
MONOCYTES # BLD AUTO: 0.35 K/UL — SIGNIFICANT CHANGE UP (ref 0–0.9)
MONOCYTES # BLD AUTO: 0.47 K/UL — SIGNIFICANT CHANGE UP (ref 0–0.9)
MONOCYTES # BLD AUTO: 0.49 K/UL — SIGNIFICANT CHANGE UP (ref 0–0.9)
MONOCYTES # BLD AUTO: 0.59 K/UL — SIGNIFICANT CHANGE UP (ref 0–0.9)
MONOCYTES NFR BLD AUTO: 0.5 % — LOW (ref 2–14)
MONOCYTES NFR BLD AUTO: 0.9 % — LOW (ref 2–14)
MONOCYTES NFR BLD AUTO: 2.7 % — SIGNIFICANT CHANGE UP (ref 2–14)
MONOCYTES NFR BLD AUTO: 3.5 % — SIGNIFICANT CHANGE UP (ref 2–14)
MONOCYTES NFR BLD AUTO: 4.2 % — SIGNIFICANT CHANGE UP (ref 2–14)
MONOCYTES NFR BLD AUTO: 4.9 % — SIGNIFICANT CHANGE UP (ref 2–14)
MONOCYTES NFR BLD AUTO: 5.9 % — SIGNIFICANT CHANGE UP (ref 2–14)
MONOCYTES NFR BLD AUTO: 5.9 % — SIGNIFICANT CHANGE UP (ref 2–14)
MONOCYTES NFR BLD AUTO: 6.3 % — SIGNIFICANT CHANGE UP (ref 2–14)
NEUTROPHILS # BLD AUTO: 10.23 K/UL — HIGH (ref 1.8–7.4)
NEUTROPHILS # BLD AUTO: 4.51 K/UL — SIGNIFICANT CHANGE UP (ref 1.8–7.4)
NEUTROPHILS # BLD AUTO: 5.43 K/UL — SIGNIFICANT CHANGE UP (ref 1.8–7.4)
NEUTROPHILS # BLD AUTO: 5.9 K/UL — SIGNIFICANT CHANGE UP (ref 1.8–7.4)
NEUTROPHILS # BLD AUTO: 6.34 K/UL — SIGNIFICANT CHANGE UP (ref 1.8–7.4)
NEUTROPHILS # BLD AUTO: 6.9 K/UL — SIGNIFICANT CHANGE UP (ref 1.8–7.4)
NEUTROPHILS # BLD AUTO: 7 K/UL — SIGNIFICANT CHANGE UP (ref 1.8–7.4)
NEUTROPHILS # BLD AUTO: 8.19 K/UL — HIGH (ref 1.8–7.4)
NEUTROPHILS # BLD AUTO: 8.21 K/UL — HIGH (ref 1.8–7.4)
NEUTROPHILS NFR BLD AUTO: 72.4 % — SIGNIFICANT CHANGE UP (ref 43–77)
NEUTROPHILS NFR BLD AUTO: 76.3 % — SIGNIFICANT CHANGE UP (ref 43–77)
NEUTROPHILS NFR BLD AUTO: 82.3 % — HIGH (ref 43–77)
NEUTROPHILS NFR BLD AUTO: 82.5 % — HIGH (ref 43–77)
NEUTROPHILS NFR BLD AUTO: 82.6 % — HIGH (ref 43–77)
NEUTROPHILS NFR BLD AUTO: 87.8 % — HIGH (ref 43–77)
NEUTROPHILS NFR BLD AUTO: 92.2 % — HIGH (ref 43–77)
NEUTROPHILS NFR BLD AUTO: 92.8 % — HIGH (ref 43–77)
NEUTROPHILS NFR BLD AUTO: 96.5 % — HIGH (ref 43–77)
NITRITE UR-MCNC: NEGATIVE — SIGNIFICANT CHANGE UP
NONHDLC SERPL-MCNC: 50 MG/DL — SIGNIFICANT CHANGE UP
NRBC BLD AUTO-RTO: 0 /100 WBCS — SIGNIFICANT CHANGE UP (ref 0–0)
NRBC BLD AUTO-RTO: SIGNIFICANT CHANGE UP /100 WBCS (ref 0–0)
NRBC BLD AUTO-RTO: SIGNIFICANT CHANGE UP /100 WBCS (ref 0–0)
OSMOLALITY UR: 322 MOSM/KG — SIGNIFICANT CHANGE UP (ref 300–900)
OSMOLALITY UR: 341 MOSM/KG — SIGNIFICANT CHANGE UP (ref 300–900)
OSMOLALITY UR: 361 MOSM/KG — SIGNIFICANT CHANGE UP (ref 300–900)
OSMOLALITY UR: 456 MOSM/KG — SIGNIFICANT CHANGE UP (ref 300–900)
PH UR: 6 — SIGNIFICANT CHANGE UP (ref 5–8)
PH UR: 7 — SIGNIFICANT CHANGE UP (ref 5–8)
PH UR: 7.5 — SIGNIFICANT CHANGE UP (ref 5–8)
PH UR: 8 — SIGNIFICANT CHANGE UP (ref 5–8)
PHOSPHATE SERPL-MCNC: 3.1 MG/DL — SIGNIFICANT CHANGE UP (ref 2.5–4.5)
PHOSPHATE SERPL-MCNC: 3.3 MG/DL — SIGNIFICANT CHANGE UP (ref 2.5–4.5)
PHOSPHATE SERPL-MCNC: 4 MG/DL — SIGNIFICANT CHANGE UP (ref 2.5–4.5)
PHOSPHATE SERPL-MCNC: 4.4 MG/DL — SIGNIFICANT CHANGE UP (ref 2.5–4.5)
PHOSPHATE SERPL-MCNC: 4.6 MG/DL — HIGH (ref 2.5–4.5)
PHOSPHATE SERPL-MCNC: 4.8 MG/DL — HIGH (ref 2.5–4.5)
PHOSPHATE SERPL-MCNC: 4.9 MG/DL — HIGH (ref 2.5–4.5)
PLATELET # BLD AUTO: 149 K/UL — LOW (ref 150–400)
PLATELET # BLD AUTO: 163 K/UL — SIGNIFICANT CHANGE UP (ref 150–400)
PLATELET # BLD AUTO: 171 K/UL — SIGNIFICANT CHANGE UP (ref 150–400)
PLATELET # BLD AUTO: 171 K/UL — SIGNIFICANT CHANGE UP (ref 150–400)
PLATELET # BLD AUTO: 174 K/UL — SIGNIFICANT CHANGE UP (ref 150–400)
PLATELET # BLD AUTO: 177 K/UL — SIGNIFICANT CHANGE UP (ref 150–400)
PLATELET # BLD AUTO: 181 K/UL — SIGNIFICANT CHANGE UP (ref 150–400)
PLATELET # BLD AUTO: 191 K/UL — SIGNIFICANT CHANGE UP (ref 150–400)
PLATELET # BLD AUTO: 197 K/UL — SIGNIFICANT CHANGE UP (ref 150–400)
PLATELET # BLD AUTO: 205 K/UL — SIGNIFICANT CHANGE UP (ref 150–400)
PLATELET # BLD AUTO: 210 K/UL — SIGNIFICANT CHANGE UP (ref 150–400)
PLATELET # BLD AUTO: 222 K/UL — SIGNIFICANT CHANGE UP (ref 150–400)
PLATELET # BLD AUTO: 225 K/UL — SIGNIFICANT CHANGE UP (ref 150–400)
PLATELET # BLD AUTO: 235 K/UL — SIGNIFICANT CHANGE UP (ref 150–400)
PLATELET # BLD AUTO: 238 K/UL — SIGNIFICANT CHANGE UP (ref 150–400)
PLATELET # BLD AUTO: 240 K/UL — SIGNIFICANT CHANGE UP (ref 150–400)
PLATELET # BLD AUTO: 246 K/UL — SIGNIFICANT CHANGE UP (ref 150–400)
PLATELET # BLD AUTO: 264 K/UL — SIGNIFICANT CHANGE UP (ref 150–400)
PLATELET # BLD AUTO: 274 K/UL — SIGNIFICANT CHANGE UP (ref 150–400)
PLATELET # BLD AUTO: 279 K/UL — SIGNIFICANT CHANGE UP (ref 150–400)
PLATELET # BLD AUTO: 305 K/UL — SIGNIFICANT CHANGE UP (ref 150–400)
PLATELET # BLD AUTO: 308 K/UL — SIGNIFICANT CHANGE UP (ref 150–400)
PLATELET # BLD AUTO: 320 K/UL — SIGNIFICANT CHANGE UP (ref 150–400)
PLATELET # BLD AUTO: 322 K/UL — SIGNIFICANT CHANGE UP (ref 150–400)
PLATELET # BLD AUTO: 334 K/UL — SIGNIFICANT CHANGE UP (ref 150–400)
PLATELET # BLD AUTO: 336 K/UL — SIGNIFICANT CHANGE UP (ref 150–400)
POTASSIUM SERPL-MCNC: 3 MMOL/L — LOW (ref 3.5–5.3)
POTASSIUM SERPL-MCNC: 3.7 MMOL/L — SIGNIFICANT CHANGE UP (ref 3.5–5.3)
POTASSIUM SERPL-MCNC: 3.8 MMOL/L — SIGNIFICANT CHANGE UP (ref 3.5–5.3)
POTASSIUM SERPL-MCNC: 3.9 MMOL/L — SIGNIFICANT CHANGE UP (ref 3.5–5.3)
POTASSIUM SERPL-MCNC: 4 MMOL/L — SIGNIFICANT CHANGE UP (ref 3.5–5.3)
POTASSIUM SERPL-MCNC: 4.1 MMOL/L — SIGNIFICANT CHANGE UP (ref 3.5–5.3)
POTASSIUM SERPL-MCNC: 4.2 MMOL/L — SIGNIFICANT CHANGE UP (ref 3.5–5.3)
POTASSIUM SERPL-MCNC: 4.3 MMOL/L — SIGNIFICANT CHANGE UP (ref 3.5–5.3)
POTASSIUM SERPL-MCNC: 4.4 MMOL/L — SIGNIFICANT CHANGE UP (ref 3.5–5.3)
POTASSIUM SERPL-MCNC: 4.5 MMOL/L — SIGNIFICANT CHANGE UP (ref 3.5–5.3)
POTASSIUM SERPL-MCNC: 4.6 MMOL/L — SIGNIFICANT CHANGE UP (ref 3.5–5.3)
POTASSIUM SERPL-MCNC: 4.6 MMOL/L — SIGNIFICANT CHANGE UP (ref 3.5–5.3)
POTASSIUM SERPL-MCNC: 4.7 MMOL/L — SIGNIFICANT CHANGE UP (ref 3.5–5.3)
POTASSIUM SERPL-MCNC: 5.2 MMOL/L — SIGNIFICANT CHANGE UP (ref 3.5–5.3)
POTASSIUM SERPL-MCNC: SIGNIFICANT CHANGE UP (ref 3.5–5.3)
POTASSIUM SERPL-SCNC: 3 MMOL/L — LOW (ref 3.5–5.3)
POTASSIUM SERPL-SCNC: 3.7 MMOL/L — SIGNIFICANT CHANGE UP (ref 3.5–5.3)
POTASSIUM SERPL-SCNC: 3.8 MMOL/L — SIGNIFICANT CHANGE UP (ref 3.5–5.3)
POTASSIUM SERPL-SCNC: 3.9 MMOL/L — SIGNIFICANT CHANGE UP (ref 3.5–5.3)
POTASSIUM SERPL-SCNC: 4 MMOL/L — SIGNIFICANT CHANGE UP (ref 3.5–5.3)
POTASSIUM SERPL-SCNC: 4.1 MMOL/L — SIGNIFICANT CHANGE UP (ref 3.5–5.3)
POTASSIUM SERPL-SCNC: 4.2 MMOL/L — SIGNIFICANT CHANGE UP (ref 3.5–5.3)
POTASSIUM SERPL-SCNC: 4.3 MMOL/L — SIGNIFICANT CHANGE UP (ref 3.5–5.3)
POTASSIUM SERPL-SCNC: 4.4 MMOL/L — SIGNIFICANT CHANGE UP (ref 3.5–5.3)
POTASSIUM SERPL-SCNC: 4.5 MMOL/L — SIGNIFICANT CHANGE UP (ref 3.5–5.3)
POTASSIUM SERPL-SCNC: 4.6 MMOL/L — SIGNIFICANT CHANGE UP (ref 3.5–5.3)
POTASSIUM SERPL-SCNC: 4.6 MMOL/L — SIGNIFICANT CHANGE UP (ref 3.5–5.3)
POTASSIUM SERPL-SCNC: 4.7 MMOL/L — SIGNIFICANT CHANGE UP (ref 3.5–5.3)
POTASSIUM SERPL-SCNC: 5.2 MMOL/L — SIGNIFICANT CHANGE UP (ref 3.5–5.3)
POTASSIUM SERPL-SCNC: SIGNIFICANT CHANGE UP (ref 3.5–5.3)
POTASSIUM UR-SCNC: 21 MMOL/L — SIGNIFICANT CHANGE UP
POTASSIUM UR-SCNC: 24 MMOL/L — SIGNIFICANT CHANGE UP
POTASSIUM UR-SCNC: 24 MMOL/L — SIGNIFICANT CHANGE UP
POTASSIUM UR-SCNC: 41 MMOL/L — SIGNIFICANT CHANGE UP
PROT ?TM UR-MCNC: 10 MG/DL — SIGNIFICANT CHANGE UP (ref 0–12)
PROT ?TM UR-MCNC: 35 MG/DL — HIGH (ref 0–12)
PROT ?TM UR-MCNC: 5 MG/DL — SIGNIFICANT CHANGE UP (ref 0–12)
PROT ?TM UR-MCNC: 9 MG/DL — SIGNIFICANT CHANGE UP (ref 0–12)
PROT SERPL-MCNC: 4.6 G/DL — LOW (ref 6–8.3)
PROT SERPL-MCNC: 4.8 G/DL — LOW (ref 6–8.3)
PROT SERPL-MCNC: 4.9 G/DL — LOW (ref 6–8.3)
PROT SERPL-MCNC: 4.9 G/DL — LOW (ref 6–8.3)
PROT SERPL-MCNC: 5 G/DL — LOW (ref 6–8.3)
PROT SERPL-MCNC: 5.1 G/DL — LOW (ref 6–8.3)
PROT SERPL-MCNC: 5.2 G/DL — LOW (ref 6–8.3)
PROT SERPL-MCNC: 5.3 G/DL — LOW (ref 6–8.3)
PROT SERPL-MCNC: 5.4 G/DL — LOW (ref 6–8.3)
PROT SERPL-MCNC: 5.5 G/DL — LOW (ref 6–8.3)
PROT SERPL-MCNC: 5.6 G/DL — LOW (ref 6–8.3)
PROT SERPL-MCNC: 5.7 G/DL — LOW (ref 6–8.3)
PROT SERPL-MCNC: 5.8 G/DL — LOW (ref 6–8.3)
PROT SERPL-MCNC: 5.9 G/DL — LOW (ref 6–8.3)
PROT SERPL-MCNC: 6.2 G/DL — SIGNIFICANT CHANGE UP (ref 6–8.3)
PROT SERPL-MCNC: 6.4 G/DL — SIGNIFICANT CHANGE UP (ref 6–8.3)
PROT SERPL-MCNC: 6.5 G/DL — SIGNIFICANT CHANGE UP (ref 6–8.3)
PROT UR-MCNC: 30 MG/DL
PROT UR-MCNC: 30 MG/DL
PROT UR-MCNC: NEGATIVE MG/DL — SIGNIFICANT CHANGE UP
PROT UR-MCNC: SIGNIFICANT CHANGE UP MG/DL
PROT/CREAT UR-RTO: 0.2 RATIO — SIGNIFICANT CHANGE UP (ref 0–0.2)
PROT/CREAT UR-RTO: 0.8 RATIO — HIGH (ref 0–0.2)
PROTHROM AB SERPL-ACNC: 13.3 SEC — SIGNIFICANT CHANGE UP (ref 9.9–13.4)
PROTHROM AB SERPL-ACNC: 13.6 SEC — HIGH (ref 9.9–13.4)
PROTHROM AB SERPL-ACNC: 14 SEC — HIGH (ref 9.9–13.4)
PROTHROM AB SERPL-ACNC: 14.2 SEC — HIGH (ref 9.9–13.4)
PROTHROM AB SERPL-ACNC: 14.2 SEC — HIGH (ref 9.9–13.4)
PROTHROM AB SERPL-ACNC: 14.4 SEC — HIGH (ref 9.9–13.4)
PROTHROM AB SERPL-ACNC: 14.5 SEC — HIGH (ref 9.9–13.4)
RAPID RVP RESULT: DETECTED
RBC # BLD: 2.28 M/UL — LOW (ref 4.2–5.8)
RBC # BLD: 2.39 M/UL — LOW (ref 4.2–5.8)
RBC # BLD: 2.41 M/UL — LOW (ref 4.2–5.8)
RBC # BLD: 2.46 M/UL — LOW (ref 4.2–5.8)
RBC # BLD: 2.46 M/UL — LOW (ref 4.2–5.8)
RBC # BLD: 2.58 M/UL — LOW (ref 4.2–5.8)
RBC # BLD: 2.58 M/UL — LOW (ref 4.2–5.8)
RBC # BLD: 2.62 M/UL — LOW (ref 4.2–5.8)
RBC # BLD: 2.63 M/UL — LOW (ref 4.2–5.8)
RBC # BLD: 2.66 M/UL — LOW (ref 4.2–5.8)
RBC # BLD: 2.66 M/UL — LOW (ref 4.2–5.8)
RBC # BLD: 2.67 M/UL — LOW (ref 4.2–5.8)
RBC # BLD: 2.67 M/UL — LOW (ref 4.2–5.8)
RBC # BLD: 2.69 M/UL — LOW (ref 4.2–5.8)
RBC # BLD: 2.7 M/UL — LOW (ref 4.2–5.8)
RBC # BLD: 2.7 M/UL — LOW (ref 4.2–5.8)
RBC # BLD: 2.71 M/UL — LOW (ref 4.2–5.8)
RBC # BLD: 2.75 M/UL — LOW (ref 4.2–5.8)
RBC # BLD: 2.78 M/UL — LOW (ref 4.2–5.8)
RBC # BLD: 2.93 M/UL — LOW (ref 4.2–5.8)
RBC # BLD: 2.94 M/UL — LOW (ref 4.2–5.8)
RBC # BLD: 3.01 M/UL — LOW (ref 4.2–5.8)
RBC # BLD: 3.06 M/UL — LOW (ref 4.2–5.8)
RBC # BLD: 3.1 M/UL — LOW (ref 4.2–5.8)
RBC # FLD: 20.3 % — HIGH (ref 10.3–14.5)
RBC # FLD: 20.4 % — HIGH (ref 10.3–14.5)
RBC # FLD: 20.9 % — HIGH (ref 10.3–14.5)
RBC # FLD: 21.1 % — HIGH (ref 10.3–14.5)
RBC # FLD: 21.2 % — HIGH (ref 10.3–14.5)
RBC # FLD: 21.3 % — HIGH (ref 10.3–14.5)
RBC # FLD: 21.4 % — HIGH (ref 10.3–14.5)
RBC # FLD: 21.6 % — HIGH (ref 10.3–14.5)
RBC # FLD: 21.7 % — HIGH (ref 10.3–14.5)
RBC # FLD: 21.8 % — HIGH (ref 10.3–14.5)
RBC # FLD: 22 % — HIGH (ref 10.3–14.5)
RBC # FLD: 22.1 % — HIGH (ref 10.3–14.5)
RBC # FLD: 22.5 % — HIGH (ref 10.3–14.5)
RBC # FLD: 22.5 % — HIGH (ref 10.3–14.5)
RBC # FLD: 22.9 % — HIGH (ref 10.3–14.5)
RBC # FLD: 22.9 % — HIGH (ref 10.3–14.5)
RBC # FLD: 23 % — HIGH (ref 10.3–14.5)
RH IG SCN BLD-IMP: POSITIVE — SIGNIFICANT CHANGE UP
RH IG SCN BLD-IMP: POSITIVE — SIGNIFICANT CHANGE UP
RSV RNA NPH QL NAA+NON-PROBE: SIGNIFICANT CHANGE UP
SARS-COV-2 RNA SPEC QL NAA+PROBE: DETECTED
SARS-COV-2 RNA SPEC QL NAA+PROBE: SIGNIFICANT CHANGE UP
SODIUM SERPL-SCNC: 138 MMOL/L — SIGNIFICANT CHANGE UP (ref 135–145)
SODIUM SERPL-SCNC: 138 MMOL/L — SIGNIFICANT CHANGE UP (ref 135–145)
SODIUM SERPL-SCNC: 139 MMOL/L — SIGNIFICANT CHANGE UP (ref 135–145)
SODIUM SERPL-SCNC: 141 MMOL/L — SIGNIFICANT CHANGE UP (ref 135–145)
SODIUM SERPL-SCNC: 142 MMOL/L — SIGNIFICANT CHANGE UP (ref 135–145)
SODIUM SERPL-SCNC: 143 MMOL/L — SIGNIFICANT CHANGE UP (ref 135–145)
SODIUM SERPL-SCNC: 144 MMOL/L — SIGNIFICANT CHANGE UP (ref 135–145)
SODIUM SERPL-SCNC: 144 MMOL/L — SIGNIFICANT CHANGE UP (ref 135–145)
SODIUM SERPL-SCNC: 145 MMOL/L — SIGNIFICANT CHANGE UP (ref 135–145)
SODIUM SERPL-SCNC: 145 MMOL/L — SIGNIFICANT CHANGE UP (ref 135–145)
SODIUM UR-SCNC: 100 MMOL/L — SIGNIFICANT CHANGE UP
SODIUM UR-SCNC: 46 MMOL/L — SIGNIFICANT CHANGE UP
SODIUM UR-SCNC: 48 MMOL/L — SIGNIFICANT CHANGE UP
SODIUM UR-SCNC: 61 MMOL/L — SIGNIFICANT CHANGE UP
SODIUM UR-SCNC: 77 MMOL/L — SIGNIFICANT CHANGE UP
SP GR SPEC: 1.01 — SIGNIFICANT CHANGE UP (ref 1–1.03)
SPECIMEN SOURCE: SIGNIFICANT CHANGE UP
SPECIMEN SOURCE: SIGNIFICANT CHANGE UP
TACROLIMUS SERPL-MCNC: 19.7 NG/ML — SIGNIFICANT CHANGE UP
TACROLIMUS SERPL-MCNC: 2.5 NG/ML — SIGNIFICANT CHANGE UP
TACROLIMUS SERPL-MCNC: 20.1 NG/ML — SIGNIFICANT CHANGE UP
TACROLIMUS SERPL-MCNC: 3.4 NG/ML — SIGNIFICANT CHANGE UP
TACROLIMUS SERPL-MCNC: 3.4 NG/ML — SIGNIFICANT CHANGE UP
TACROLIMUS SERPL-MCNC: 3.8 NG/ML — SIGNIFICANT CHANGE UP
TACROLIMUS SERPL-MCNC: 3.9 NG/ML — SIGNIFICANT CHANGE UP
TACROLIMUS SERPL-MCNC: 5 NG/ML — SIGNIFICANT CHANGE UP
TACROLIMUS SERPL-MCNC: 5.2 NG/ML — SIGNIFICANT CHANGE UP
TACROLIMUS SERPL-MCNC: 5.3 NG/ML — SIGNIFICANT CHANGE UP
TACROLIMUS SERPL-MCNC: 5.8 NG/ML — SIGNIFICANT CHANGE UP
TACROLIMUS SERPL-MCNC: 5.9 NG/ML — SIGNIFICANT CHANGE UP
TACROLIMUS SERPL-MCNC: 6.3 NG/ML — SIGNIFICANT CHANGE UP
TACROLIMUS SERPL-MCNC: 6.7 NG/ML — SIGNIFICANT CHANGE UP
TACROLIMUS SERPL-MCNC: 8.8 NG/ML — SIGNIFICANT CHANGE UP
TACROLIMUS SERPL-MCNC: 9.3 NG/ML — SIGNIFICANT CHANGE UP
TIBC SERPL-MCNC: 166 UG/DL — LOW (ref 220–430)
TRIGL SERPL-MCNC: 112 MG/DL — SIGNIFICANT CHANGE UP
TROPONIN T, HIGH SENSITIVITY RESULT: 125 NG/L — CRITICAL HIGH (ref 0–51)
TSH SERPL-MCNC: 3.29 UIU/ML — SIGNIFICANT CHANGE UP (ref 0.27–4.2)
UIBC SERPL-MCNC: 120 UG/DL — SIGNIFICANT CHANGE UP (ref 110–370)
UROBILINOGEN FLD QL: 0.2 MG/DL — SIGNIFICANT CHANGE UP (ref 0.2–1)
UROBILINOGEN FLD QL: 1 MG/DL — SIGNIFICANT CHANGE UP (ref 0.2–1)
UROBILINOGEN FLD QL: 1 MG/DL — SIGNIFICANT CHANGE UP (ref 0.2–1)
UUN UR-MCNC: 253 MG/DL — SIGNIFICANT CHANGE UP
UUN UR-MCNC: 302 MG/DL — SIGNIFICANT CHANGE UP
UUN UR-MCNC: 537 MG/DL — SIGNIFICANT CHANGE UP
UUN UR-MCNC: 795 MG/DL — SIGNIFICANT CHANGE UP
VANCOMYCIN FLD-MCNC: 7.9 UG/ML — SIGNIFICANT CHANGE UP
VIT B12 SERPL-MCNC: 1020 PG/ML — SIGNIFICANT CHANGE UP (ref 232–1245)
WBC # BLD: 10.39 K/UL — SIGNIFICANT CHANGE UP (ref 3.8–10.5)
WBC # BLD: 10.53 K/UL — HIGH (ref 3.8–10.5)
WBC # BLD: 10.54 K/UL — HIGH (ref 3.8–10.5)
WBC # BLD: 11.02 K/UL — HIGH (ref 3.8–10.5)
WBC # BLD: 13.43 K/UL — HIGH (ref 3.8–10.5)
WBC # BLD: 5.45 K/UL — SIGNIFICANT CHANGE UP (ref 3.8–10.5)
WBC # BLD: 5.92 K/UL — SIGNIFICANT CHANGE UP (ref 3.8–10.5)
WBC # BLD: 7.14 K/UL — SIGNIFICANT CHANGE UP (ref 3.8–10.5)
WBC # BLD: 7.15 K/UL — SIGNIFICANT CHANGE UP (ref 3.8–10.5)
WBC # BLD: 7.17 K/UL — SIGNIFICANT CHANGE UP (ref 3.8–10.5)
WBC # BLD: 7.22 K/UL — SIGNIFICANT CHANGE UP (ref 3.8–10.5)
WBC # BLD: 7.25 K/UL — SIGNIFICANT CHANGE UP (ref 3.8–10.5)
WBC # BLD: 7.32 K/UL — SIGNIFICANT CHANGE UP (ref 3.8–10.5)
WBC # BLD: 7.48 K/UL — SIGNIFICANT CHANGE UP (ref 3.8–10.5)
WBC # BLD: 7.5 K/UL — SIGNIFICANT CHANGE UP (ref 3.8–10.5)
WBC # BLD: 8.03 K/UL — SIGNIFICANT CHANGE UP (ref 3.8–10.5)
WBC # BLD: 8.17 K/UL — SIGNIFICANT CHANGE UP (ref 3.8–10.5)
WBC # BLD: 8.46 K/UL — SIGNIFICANT CHANGE UP (ref 3.8–10.5)
WBC # BLD: 8.81 K/UL — SIGNIFICANT CHANGE UP (ref 3.8–10.5)
WBC # BLD: 8.84 K/UL — SIGNIFICANT CHANGE UP (ref 3.8–10.5)
WBC # BLD: 8.99 K/UL — SIGNIFICANT CHANGE UP (ref 3.8–10.5)
WBC # BLD: 9.6 K/UL — SIGNIFICANT CHANGE UP (ref 3.8–10.5)
WBC # BLD: 9.67 K/UL — SIGNIFICANT CHANGE UP (ref 3.8–10.5)
WBC # BLD: 9.76 K/UL — SIGNIFICANT CHANGE UP (ref 3.8–10.5)
WBC # BLD: 9.92 K/UL — SIGNIFICANT CHANGE UP (ref 3.8–10.5)
WBC # BLD: 9.96 K/UL — SIGNIFICANT CHANGE UP (ref 3.8–10.5)
WBC # FLD AUTO: 10.39 K/UL — SIGNIFICANT CHANGE UP (ref 3.8–10.5)
WBC # FLD AUTO: 10.53 K/UL — HIGH (ref 3.8–10.5)
WBC # FLD AUTO: 10.54 K/UL — HIGH (ref 3.8–10.5)
WBC # FLD AUTO: 11.02 K/UL — HIGH (ref 3.8–10.5)
WBC # FLD AUTO: 13.43 K/UL — HIGH (ref 3.8–10.5)
WBC # FLD AUTO: 5.45 K/UL — SIGNIFICANT CHANGE UP (ref 3.8–10.5)
WBC # FLD AUTO: 5.92 K/UL — SIGNIFICANT CHANGE UP (ref 3.8–10.5)
WBC # FLD AUTO: 7.14 K/UL — SIGNIFICANT CHANGE UP (ref 3.8–10.5)
WBC # FLD AUTO: 7.15 K/UL — SIGNIFICANT CHANGE UP (ref 3.8–10.5)
WBC # FLD AUTO: 7.17 K/UL — SIGNIFICANT CHANGE UP (ref 3.8–10.5)
WBC # FLD AUTO: 7.22 K/UL — SIGNIFICANT CHANGE UP (ref 3.8–10.5)
WBC # FLD AUTO: 7.25 K/UL — SIGNIFICANT CHANGE UP (ref 3.8–10.5)
WBC # FLD AUTO: 7.32 K/UL — SIGNIFICANT CHANGE UP (ref 3.8–10.5)
WBC # FLD AUTO: 7.48 K/UL — SIGNIFICANT CHANGE UP (ref 3.8–10.5)
WBC # FLD AUTO: 7.5 K/UL — SIGNIFICANT CHANGE UP (ref 3.8–10.5)
WBC # FLD AUTO: 8.03 K/UL — SIGNIFICANT CHANGE UP (ref 3.8–10.5)
WBC # FLD AUTO: 8.17 K/UL — SIGNIFICANT CHANGE UP (ref 3.8–10.5)
WBC # FLD AUTO: 8.46 K/UL — SIGNIFICANT CHANGE UP (ref 3.8–10.5)
WBC # FLD AUTO: 8.81 K/UL — SIGNIFICANT CHANGE UP (ref 3.8–10.5)
WBC # FLD AUTO: 8.84 K/UL — SIGNIFICANT CHANGE UP (ref 3.8–10.5)
WBC # FLD AUTO: 8.99 K/UL — SIGNIFICANT CHANGE UP (ref 3.8–10.5)
WBC # FLD AUTO: 9.6 K/UL — SIGNIFICANT CHANGE UP (ref 3.8–10.5)
WBC # FLD AUTO: 9.67 K/UL — SIGNIFICANT CHANGE UP (ref 3.8–10.5)
WBC # FLD AUTO: 9.76 K/UL — SIGNIFICANT CHANGE UP (ref 3.8–10.5)
WBC # FLD AUTO: 9.92 K/UL — SIGNIFICANT CHANGE UP (ref 3.8–10.5)
WBC # FLD AUTO: 9.96 K/UL — SIGNIFICANT CHANGE UP (ref 3.8–10.5)

## 2025-01-01 PROCEDURE — 80197 ASSAY OF TACROLIMUS: CPT

## 2025-01-01 PROCEDURE — 99232 SBSQ HOSP IP/OBS MODERATE 35: CPT

## 2025-01-01 PROCEDURE — 99223 1ST HOSP IP/OBS HIGH 75: CPT | Mod: GC

## 2025-01-01 PROCEDURE — 82962 GLUCOSE BLOOD TEST: CPT

## 2025-01-01 PROCEDURE — C1887: CPT

## 2025-01-01 PROCEDURE — 80061 LIPID PANEL: CPT

## 2025-01-01 PROCEDURE — 93926 LOWER EXTREMITY STUDY: CPT | Mod: 26,RT

## 2025-01-01 PROCEDURE — 81003 URINALYSIS AUTO W/O SCOPE: CPT

## 2025-01-01 PROCEDURE — 99233 SBSQ HOSP IP/OBS HIGH 50: CPT

## 2025-01-01 PROCEDURE — P9045: CPT

## 2025-01-01 PROCEDURE — 80053 COMPREHEN METABOLIC PANEL: CPT

## 2025-01-01 PROCEDURE — C1769: CPT

## 2025-01-01 PROCEDURE — 71045 X-RAY EXAM CHEST 1 VIEW: CPT | Mod: 26

## 2025-01-01 PROCEDURE — 97164 PT RE-EVAL EST PLAN CARE: CPT

## 2025-01-01 PROCEDURE — 82553 CREATINE MB FRACTION: CPT

## 2025-01-01 PROCEDURE — C2624: CPT

## 2025-01-01 PROCEDURE — 83540 ASSAY OF IRON: CPT

## 2025-01-01 PROCEDURE — 94640 AIRWAY INHALATION TREATMENT: CPT

## 2025-01-01 PROCEDURE — 93010 ELECTROCARDIOGRAM REPORT: CPT

## 2025-01-01 PROCEDURE — 87637 SARSCOV2&INF A&B&RSV AMP PRB: CPT

## 2025-01-01 PROCEDURE — 85025 COMPLETE CBC W/AUTO DIFF WBC: CPT

## 2025-01-01 PROCEDURE — 76700 US EXAM ABDOM COMPLETE: CPT | Mod: 26

## 2025-01-01 PROCEDURE — 83615 LACTATE (LD) (LDH) ENZYME: CPT

## 2025-01-01 PROCEDURE — 83550 IRON BINDING TEST: CPT

## 2025-01-01 PROCEDURE — 33289 TCAT IMPL WRLS P-ART PRS SNR: CPT

## 2025-01-01 PROCEDURE — 93308 TTE F-UP OR LMTD: CPT

## 2025-01-01 PROCEDURE — 85730 THROMBOPLASTIN TIME PARTIAL: CPT

## 2025-01-01 PROCEDURE — G0545: CPT

## 2025-01-01 PROCEDURE — 76776 US EXAM K TRANSPL W/DOPPLER: CPT | Mod: 26,XU

## 2025-01-01 PROCEDURE — 82728 ASSAY OF FERRITIN: CPT

## 2025-01-01 PROCEDURE — 99222 1ST HOSP IP/OBS MODERATE 55: CPT

## 2025-01-01 PROCEDURE — 97530 THERAPEUTIC ACTIVITIES: CPT

## 2025-01-01 PROCEDURE — 97161 PT EVAL LOW COMPLEX 20 MIN: CPT

## 2025-01-01 PROCEDURE — 84100 ASSAY OF PHOSPHORUS: CPT

## 2025-01-01 PROCEDURE — 97165 OT EVAL LOW COMPLEX 30 MIN: CPT

## 2025-01-01 PROCEDURE — 93005 ELECTROCARDIOGRAM TRACING: CPT

## 2025-01-01 PROCEDURE — 84145 PROCALCITONIN (PCT): CPT

## 2025-01-01 PROCEDURE — 99152 MOD SED SAME PHYS/QHP 5/>YRS: CPT

## 2025-01-01 PROCEDURE — 99231 SBSQ HOSP IP/OBS SF/LOW 25: CPT

## 2025-01-01 PROCEDURE — 97116 GAIT TRAINING THERAPY: CPT

## 2025-01-01 PROCEDURE — 80048 BASIC METABOLIC PNL TOTAL CA: CPT

## 2025-01-01 PROCEDURE — 99285 EMERGENCY DEPT VISIT HI MDM: CPT

## 2025-01-01 PROCEDURE — 85610 PROTHROMBIN TIME: CPT

## 2025-01-01 PROCEDURE — 93306 TTE W/DOPPLER COMPLETE: CPT | Mod: 26

## 2025-01-01 PROCEDURE — 99223 1ST HOSP IP/OBS HIGH 75: CPT

## 2025-01-01 PROCEDURE — 0225U NFCT DS DNA&RNA 21 SARSCOV2: CPT

## 2025-01-01 PROCEDURE — 80202 ASSAY OF VANCOMYCIN: CPT

## 2025-01-01 PROCEDURE — 80076 HEPATIC FUNCTION PANEL: CPT

## 2025-01-01 PROCEDURE — 83935 ASSAY OF URINE OSMOLALITY: CPT

## 2025-01-01 PROCEDURE — 99233 SBSQ HOSP IP/OBS HIGH 50: CPT | Mod: GC

## 2025-01-01 PROCEDURE — 71045 X-RAY EXAM CHEST 1 VIEW: CPT

## 2025-01-01 PROCEDURE — 82570 ASSAY OF URINE CREATININE: CPT

## 2025-01-01 PROCEDURE — 82607 VITAMIN B-12: CPT

## 2025-01-01 PROCEDURE — 86901 BLOOD TYPING SEROLOGIC RH(D): CPT

## 2025-01-01 PROCEDURE — 82746 ASSAY OF FOLIC ACID SERUM: CPT

## 2025-01-01 PROCEDURE — P9047: CPT

## 2025-01-01 PROCEDURE — 93971 EXTREMITY STUDY: CPT

## 2025-01-01 PROCEDURE — 84156 ASSAY OF PROTEIN URINE: CPT

## 2025-01-01 PROCEDURE — 93308 TTE F-UP OR LMTD: CPT | Mod: 26

## 2025-01-01 PROCEDURE — 86900 BLOOD TYPING SEROLOGIC ABO: CPT

## 2025-01-01 PROCEDURE — 36000 PLACE NEEDLE IN VEIN: CPT

## 2025-01-01 PROCEDURE — C1894: CPT

## 2025-01-01 PROCEDURE — 99291 CRITICAL CARE FIRST HOUR: CPT

## 2025-01-01 PROCEDURE — 86850 RBC ANTIBODY SCREEN: CPT

## 2025-01-01 PROCEDURE — 82803 BLOOD GASES ANY COMBINATION: CPT

## 2025-01-01 PROCEDURE — 85027 COMPLETE CBC AUTOMATED: CPT

## 2025-01-01 PROCEDURE — 93306 TTE W/DOPPLER COMPLETE: CPT

## 2025-01-01 PROCEDURE — 84300 ASSAY OF URINE SODIUM: CPT

## 2025-01-01 PROCEDURE — 93970 EXTREMITY STUDY: CPT | Mod: 26

## 2025-01-01 PROCEDURE — 83880 ASSAY OF NATRIURETIC PEPTIDE: CPT

## 2025-01-01 PROCEDURE — 36415 COLL VENOUS BLD VENIPUNCTURE: CPT

## 2025-01-01 PROCEDURE — 82550 ASSAY OF CK (CPK): CPT

## 2025-01-01 PROCEDURE — 84484 ASSAY OF TROPONIN QUANT: CPT

## 2025-01-01 PROCEDURE — 99232 SBSQ HOSP IP/OBS MODERATE 35: CPT | Mod: GC

## 2025-01-01 PROCEDURE — 81001 URINALYSIS AUTO W/SCOPE: CPT

## 2025-01-01 PROCEDURE — 93971 EXTREMITY STUDY: CPT | Mod: 26,RT

## 2025-01-01 PROCEDURE — 76776 US EXAM K TRANSPL W/DOPPLER: CPT

## 2025-01-01 PROCEDURE — 93970 EXTREMITY STUDY: CPT

## 2025-01-01 PROCEDURE — 76700 US EXAM ABDOM COMPLETE: CPT

## 2025-01-01 PROCEDURE — 97110 THERAPEUTIC EXERCISES: CPT

## 2025-01-01 PROCEDURE — 93926 LOWER EXTREMITY STUDY: CPT

## 2025-01-01 PROCEDURE — 96374 THER/PROPH/DIAG INJ IV PUSH: CPT

## 2025-01-01 PROCEDURE — 84443 ASSAY THYROID STIM HORMONE: CPT

## 2025-01-01 PROCEDURE — 82248 BILIRUBIN DIRECT: CPT

## 2025-01-01 PROCEDURE — 82610 CYSTATIN C: CPT

## 2025-01-01 PROCEDURE — 83735 ASSAY OF MAGNESIUM: CPT

## 2025-01-01 PROCEDURE — 99239 HOSP IP/OBS DSCHRG MGMT >30: CPT

## 2025-01-01 PROCEDURE — 87040 BLOOD CULTURE FOR BACTERIA: CPT

## 2025-01-01 PROCEDURE — 84133 ASSAY OF URINE POTASSIUM: CPT

## 2025-01-01 PROCEDURE — 83010 ASSAY OF HAPTOGLOBIN QUANT: CPT

## 2025-01-01 PROCEDURE — 83605 ASSAY OF LACTIC ACID: CPT

## 2025-01-01 PROCEDURE — 94660 CPAP INITIATION&MGMT: CPT

## 2025-01-01 PROCEDURE — 84540 ASSAY OF URINE/UREA-N: CPT

## 2025-01-01 RX ORDER — SIMETHICONE 80 MG
80 TABLET,CHEWABLE ORAL EVERY 6 HOURS
Refills: 0 | Status: DISCONTINUED | OUTPATIENT
Start: 2025-01-01 | End: 2025-01-01

## 2025-01-01 RX ORDER — BUMETANIDE 1 MG/1
2 TABLET ORAL ONCE
Refills: 0 | Status: DISCONTINUED | OUTPATIENT
Start: 2025-01-01 | End: 2025-01-01

## 2025-01-01 RX ORDER — ALBUMIN (HUMAN) 12.5 G/50ML
50 INJECTION, SOLUTION INTRAVENOUS ONCE
Refills: 0 | Status: COMPLETED | OUTPATIENT
Start: 2025-01-01 | End: 2025-01-01

## 2025-01-01 RX ORDER — ISOSORBDIE DINITRATE 30 MG/1
10 TABLET ORAL THREE TIMES A DAY
Refills: 0 | Status: DISCONTINUED | OUTPATIENT
Start: 2025-01-01 | End: 2025-01-01

## 2025-01-01 RX ORDER — INSULIN GLARGINE-YFGN 100 [IU]/ML
4 INJECTION, SOLUTION SUBCUTANEOUS AT BEDTIME
Refills: 0 | Status: DISCONTINUED | OUTPATIENT
Start: 2025-01-01 | End: 2025-01-01

## 2025-01-01 RX ORDER — ACETAMINOPHEN 500 MG/5ML
2 LIQUID (ML) ORAL
Qty: 0 | Refills: 0 | DISCHARGE
Start: 2025-01-01

## 2025-01-01 RX ORDER — TACROLIMUS 0.5 MG/1
1 CAPSULE ORAL
Qty: 0 | Refills: 0 | DISCHARGE
Start: 2025-01-01

## 2025-01-01 RX ORDER — ISOSORBDIE DINITRATE 30 MG/1
20 TABLET ORAL THREE TIMES A DAY
Refills: 0 | Status: DISCONTINUED | OUTPATIENT
Start: 2025-01-01 | End: 2025-01-01

## 2025-01-01 RX ORDER — SODIUM CHLORIDE 9 G/1000ML
1000 INJECTION, SOLUTION INTRAVENOUS
Refills: 0 | Status: DISCONTINUED | OUTPATIENT
Start: 2025-01-01 | End: 2025-01-01

## 2025-01-01 RX ORDER — TACROLIMUS 0.5 MG/1
1 CAPSULE ORAL
Refills: 0 | Status: DISCONTINUED | OUTPATIENT
Start: 2025-01-01 | End: 2025-01-01

## 2025-01-01 RX ORDER — ACETAMINOPHEN 500 MG/5ML
1000 LIQUID (ML) ORAL ONCE
Refills: 0 | Status: COMPLETED | OUTPATIENT
Start: 2025-01-01 | End: 2025-01-01

## 2025-01-01 RX ORDER — BUMETANIDE 1 MG/1
2 TABLET ORAL
Refills: 0 | Status: DISCONTINUED | OUTPATIENT
Start: 2025-01-01 | End: 2025-01-01

## 2025-01-01 RX ORDER — MAGNESIUM OXIDE 400 MG
400 TABLET ORAL ONCE
Refills: 0 | Status: COMPLETED | OUTPATIENT
Start: 2025-01-01 | End: 2025-01-01

## 2025-01-01 RX ORDER — ASPIRIN 325 MG
81 TABLET ORAL DAILY
Refills: 0 | Status: DISCONTINUED | OUTPATIENT
Start: 2025-01-01 | End: 2025-01-01

## 2025-01-01 RX ORDER — SODIUM CHLORIDE 9 G/1000ML
500 INJECTION, SOLUTION INTRAVENOUS ONCE
Refills: 0 | Status: COMPLETED | OUTPATIENT
Start: 2025-01-01 | End: 2025-01-01

## 2025-01-01 RX ORDER — BUMETANIDE 1 MG/1
2 TABLET ORAL ONCE
Refills: 0 | Status: COMPLETED | OUTPATIENT
Start: 2025-01-01 | End: 2025-01-01

## 2025-01-01 RX ORDER — ACETAMINOPHEN 500 MG/5ML
650 LIQUID (ML) ORAL ONCE
Refills: 0 | Status: COMPLETED | OUTPATIENT
Start: 2025-01-01 | End: 2025-01-01

## 2025-01-01 RX ORDER — ISOSORBDIE DINITRATE 30 MG/1
10 TABLET ORAL ONCE
Refills: 0 | Status: DISCONTINUED | OUTPATIENT
Start: 2025-01-01 | End: 2025-01-01

## 2025-01-01 RX ORDER — IRON SUCROSE 20 MG/ML
300 INJECTION, SOLUTION INTRAVENOUS EVERY 24 HOURS
Refills: 0 | Status: COMPLETED | OUTPATIENT
Start: 2025-01-01 | End: 2025-01-01

## 2025-01-01 RX ORDER — INSULIN LISPRO 100 U/ML
8 INJECTION, SOLUTION INTRAVENOUS; SUBCUTANEOUS
Refills: 0 | Status: DISCONTINUED | OUTPATIENT
Start: 2025-01-01 | End: 2025-01-01

## 2025-01-01 RX ORDER — DEXTROSE 50 % IN WATER 50 %
25 SYRINGE (ML) INTRAVENOUS ONCE
Refills: 0 | Status: DISCONTINUED | OUTPATIENT
Start: 2025-01-01 | End: 2025-01-01

## 2025-01-01 RX ORDER — INSULIN GLARGINE-YFGN 100 [IU]/ML
6 INJECTION, SOLUTION SUBCUTANEOUS AT BEDTIME
Refills: 0 | Status: DISCONTINUED | OUTPATIENT
Start: 2025-01-01 | End: 2025-01-01

## 2025-01-01 RX ORDER — DEXTROSE 50 % IN WATER 50 %
15 SYRINGE (ML) INTRAVENOUS ONCE
Refills: 0 | Status: DISCONTINUED | OUTPATIENT
Start: 2025-01-01 | End: 2025-01-01

## 2025-01-01 RX ORDER — CARVEDILOL 3.12 MG/1
25 TABLET, FILM COATED ORAL EVERY 12 HOURS
Refills: 0 | Status: DISCONTINUED | OUTPATIENT
Start: 2025-01-01 | End: 2025-01-01

## 2025-01-01 RX ORDER — INSULIN LISPRO 100 U/ML
12 INJECTION, SOLUTION INTRAVENOUS; SUBCUTANEOUS
Refills: 0 | Status: DISCONTINUED | OUTPATIENT
Start: 2025-01-01 | End: 2025-01-01

## 2025-01-01 RX ORDER — INSULIN LISPRO 100 U/ML
10 INJECTION, SOLUTION INTRAVENOUS; SUBCUTANEOUS
Refills: 0 | Status: DISCONTINUED | OUTPATIENT
Start: 2025-01-01 | End: 2025-01-01

## 2025-01-01 RX ORDER — SENNA 187 MG
2 TABLET ORAL AT BEDTIME
Refills: 0 | Status: DISCONTINUED | OUTPATIENT
Start: 2025-01-01 | End: 2025-01-01

## 2025-01-01 RX ORDER — DEXTROSE 50 % IN WATER 50 %
12.5 SYRINGE (ML) INTRAVENOUS ONCE
Refills: 0 | Status: COMPLETED | OUTPATIENT
Start: 2025-01-01 | End: 2025-01-01

## 2025-01-01 RX ORDER — PIPERACILLIN-TAZO-DEXTROSE,ISO 3.375G/5
3.38 IV SOLUTION, PIGGYBACK PREMIX FROZEN(ML) INTRAVENOUS EVERY 12 HOURS
Refills: 0 | Status: DISCONTINUED | OUTPATIENT
Start: 2025-01-01 | End: 2025-01-01

## 2025-01-01 RX ORDER — ACETAMINOPHEN 500 MG/5ML
650 LIQUID (ML) ORAL EVERY 6 HOURS
Refills: 0 | Status: DISCONTINUED | OUTPATIENT
Start: 2025-01-01 | End: 2025-01-01

## 2025-01-01 RX ORDER — BUMETANIDE 1 MG/1
1 TABLET ORAL
Qty: 20 | Refills: 0 | Status: DISCONTINUED | OUTPATIENT
Start: 2025-01-01 | End: 2025-01-01

## 2025-01-01 RX ORDER — INSULIN LISPRO 100 U/ML
5 INJECTION, SOLUTION INTRAVENOUS; SUBCUTANEOUS
Refills: 0 | Status: DISCONTINUED | OUTPATIENT
Start: 2025-01-01 | End: 2025-01-01

## 2025-01-01 RX ORDER — MELATONIN 5 MG
5 TABLET ORAL AT BEDTIME
Refills: 0 | Status: DISCONTINUED | OUTPATIENT
Start: 2025-01-01 | End: 2025-01-01

## 2025-01-01 RX ORDER — MELATONIN 5 MG
1 TABLET ORAL AT BEDTIME
Refills: 0 | Status: COMPLETED | OUTPATIENT
Start: 2025-01-01 | End: 2025-01-01

## 2025-01-01 RX ORDER — BUMETANIDE 1 MG/1
1 TABLET ORAL ONCE
Refills: 0 | Status: COMPLETED | OUTPATIENT
Start: 2025-01-01 | End: 2025-01-01

## 2025-01-01 RX ORDER — CARVEDILOL 3.12 MG/1
6.25 TABLET, FILM COATED ORAL ONCE
Refills: 0 | Status: COMPLETED | OUTPATIENT
Start: 2025-01-01 | End: 2025-01-01

## 2025-01-01 RX ORDER — REMDESIVIR 5 MG/ML
100 INJECTION INTRAVENOUS EVERY 24 HOURS
Refills: 0 | Status: COMPLETED | OUTPATIENT
Start: 2025-01-01 | End: 2025-01-01

## 2025-01-01 RX ORDER — CARVEDILOL 3.12 MG/1
6.25 TABLET, FILM COATED ORAL EVERY 12 HOURS
Refills: 0 | Status: DISCONTINUED | OUTPATIENT
Start: 2025-01-01 | End: 2025-01-01

## 2025-01-01 RX ORDER — INSULIN GLARGINE-YFGN 100 [IU]/ML
8 INJECTION, SOLUTION SUBCUTANEOUS AT BEDTIME
Refills: 0 | Status: DISCONTINUED | OUTPATIENT
Start: 2025-01-01 | End: 2025-01-01

## 2025-01-01 RX ORDER — DEXTROSE 50 % IN WATER 50 %
25 SYRINGE (ML) INTRAVENOUS ONCE
Refills: 0 | Status: COMPLETED | OUTPATIENT
Start: 2025-01-01 | End: 2025-01-01

## 2025-01-01 RX ORDER — PIPERACILLIN-TAZO-DEXTROSE,ISO 3.375G/5
3.38 IV SOLUTION, PIGGYBACK PREMIX FROZEN(ML) INTRAVENOUS ONCE
Refills: 0 | Status: COMPLETED | OUTPATIENT
Start: 2025-01-01 | End: 2025-01-01

## 2025-01-01 RX ORDER — REMDESIVIR 5 MG/ML
200 INJECTION INTRAVENOUS ONCE
Refills: 0 | Status: COMPLETED | OUTPATIENT
Start: 2025-01-01 | End: 2025-01-01

## 2025-01-01 RX ORDER — DEXAMETHASONE 0.5 MG/1
6 TABLET ORAL DAILY
Refills: 0 | Status: DISCONTINUED | OUTPATIENT
Start: 2025-01-01 | End: 2025-01-01

## 2025-01-01 RX ORDER — BENZONATATE 100 MG
1 CAPSULE ORAL
Qty: 0 | Refills: 0 | DISCHARGE
Start: 2025-01-01

## 2025-01-01 RX ORDER — HEPARIN SODIUM 1000 [USP'U]/ML
5000 INJECTION INTRAVENOUS; SUBCUTANEOUS EVERY 12 HOURS
Refills: 0 | Status: DISCONTINUED | OUTPATIENT
Start: 2025-01-01 | End: 2025-01-01

## 2025-01-01 RX ORDER — CARVEDILOL 3.12 MG/1
12.5 TABLET, FILM COATED ORAL EVERY 12 HOURS
Refills: 0 | Status: DISCONTINUED | OUTPATIENT
Start: 2025-01-01 | End: 2025-01-01

## 2025-01-01 RX ORDER — PREDNISONE 20 MG/1
5 TABLET ORAL DAILY
Refills: 0 | Status: DISCONTINUED | OUTPATIENT
Start: 2025-01-01 | End: 2025-01-01

## 2025-01-01 RX ORDER — BENZONATATE 100 MG
100 CAPSULE ORAL EVERY 8 HOURS
Refills: 0 | Status: DISCONTINUED | OUTPATIENT
Start: 2025-01-01 | End: 2025-01-01

## 2025-01-01 RX ORDER — POLYETHYLENE GLYCOL 3350 17 G/17G
17 POWDER, FOR SOLUTION ORAL
Qty: 0 | Refills: 0 | DISCHARGE
Start: 2025-01-01

## 2025-01-01 RX ORDER — EPOETIN ALFA 10000 [IU]/ML
10000 SOLUTION INTRAVENOUS; SUBCUTANEOUS ONCE
Refills: 0 | Status: COMPLETED | OUTPATIENT
Start: 2025-01-01 | End: 2025-01-01

## 2025-01-01 RX ORDER — IPRATROPIUM BROMIDE AND ALBUTEROL SULFATE .5; 2.5 MG/3ML; MG/3ML
3 SOLUTION RESPIRATORY (INHALATION)
Qty: 0 | Refills: 0 | DISCHARGE
Start: 2025-01-01

## 2025-01-01 RX ORDER — INSULIN LISPRO 100 U/ML
3 INJECTION, SOLUTION INTRAVENOUS; SUBCUTANEOUS
Refills: 0 | Status: DISCONTINUED | OUTPATIENT
Start: 2025-01-01 | End: 2025-01-01

## 2025-01-01 RX ORDER — SODIUM BICARBONATE 1 MEQ/ML
1300 SYRINGE (ML) INTRAVENOUS THREE TIMES A DAY
Refills: 0 | Status: DISCONTINUED | OUTPATIENT
Start: 2025-01-01 | End: 2025-01-01

## 2025-01-01 RX ORDER — INSULIN GLARGINE-YFGN 100 [IU]/ML
7 INJECTION, SOLUTION SUBCUTANEOUS AT BEDTIME
Refills: 0 | Status: DISCONTINUED | OUTPATIENT
Start: 2025-01-01 | End: 2025-01-01

## 2025-01-01 RX ORDER — IPRATROPIUM BROMIDE AND ALBUTEROL SULFATE .5; 2.5 MG/3ML; MG/3ML
3 SOLUTION RESPIRATORY (INHALATION) EVERY 6 HOURS
Refills: 0 | Status: DISCONTINUED | OUTPATIENT
Start: 2025-01-01 | End: 2025-01-01

## 2025-01-01 RX ORDER — ISOSORBDIE DINITRATE 30 MG/1
30 TABLET ORAL THREE TIMES A DAY
Refills: 0 | Status: DISCONTINUED | OUTPATIENT
Start: 2025-01-01 | End: 2025-01-01

## 2025-01-01 RX ORDER — POLYETHYLENE GLYCOL 3350 17 G/17G
17 POWDER, FOR SOLUTION ORAL EVERY 24 HOURS
Refills: 0 | Status: DISCONTINUED | OUTPATIENT
Start: 2025-01-01 | End: 2025-01-01

## 2025-01-01 RX ORDER — DAPAGLIFLOZIN 5 MG/1
10 TABLET, FILM COATED ORAL DAILY
Refills: 0 | Status: DISCONTINUED | OUTPATIENT
Start: 2025-01-01 | End: 2025-01-01

## 2025-01-01 RX ORDER — DAPAGLIFLOZIN 5 MG/1
10 TABLET, FILM COATED ORAL EVERY 24 HOURS
Refills: 0 | Status: DISCONTINUED | OUTPATIENT
Start: 2025-01-01 | End: 2025-01-01

## 2025-01-01 RX ORDER — GLUCAGON 3 MG/1
1 POWDER NASAL ONCE
Refills: 0 | Status: DISCONTINUED | OUTPATIENT
Start: 2025-01-01 | End: 2025-01-01

## 2025-01-01 RX ORDER — DEXAMETHASONE 0.5 MG/1
6 TABLET ORAL ONCE
Refills: 0 | Status: DISCONTINUED | OUTPATIENT
Start: 2025-01-01 | End: 2025-01-01

## 2025-01-01 RX ORDER — INSULIN LISPRO 100 U/ML
3 INJECTION, SOLUTION INTRAVENOUS; SUBCUTANEOUS
Qty: 0 | Refills: 0 | DISCHARGE
Start: 2025-01-01

## 2025-01-01 RX ORDER — EPOETIN ALFA 10000 [IU]/ML
10000 SOLUTION INTRAVENOUS; SUBCUTANEOUS ONCE
Refills: 0 | Status: DISCONTINUED | OUTPATIENT
Start: 2025-01-01 | End: 2025-01-01

## 2025-01-01 RX ORDER — ATORVASTATIN CALCIUM 80 MG/1
20 TABLET, FILM COATED ORAL AT BEDTIME
Refills: 0 | Status: DISCONTINUED | OUTPATIENT
Start: 2025-01-01 | End: 2025-01-01

## 2025-01-01 RX ORDER — TACROLIMUS 0.5 MG/1
2 CAPSULE ORAL
Refills: 0 | Status: DISCONTINUED | OUTPATIENT
Start: 2025-01-01 | End: 2025-01-01

## 2025-01-01 RX ORDER — INSULIN LISPRO 100 U/ML
12 INJECTION, SOLUTION INTRAVENOUS; SUBCUTANEOUS ONCE
Refills: 0 | Status: COMPLETED | OUTPATIENT
Start: 2025-01-01 | End: 2025-01-01

## 2025-01-01 RX ORDER — DEXTROMETHORPHAN HBR, GUAIFENESIN 200 MG/10ML
100 LIQUID ORAL EVERY 6 HOURS
Refills: 0 | Status: DISCONTINUED | OUTPATIENT
Start: 2025-01-01 | End: 2025-01-01

## 2025-01-01 RX ORDER — BUMETANIDE 1 MG/1
0.2 TABLET ORAL
Qty: 20 | Refills: 0 | Status: DISCONTINUED | OUTPATIENT
Start: 2025-01-01 | End: 2025-01-01

## 2025-01-01 RX ORDER — BUMETANIDE 1 MG/1
2 TABLET ORAL DAILY
Refills: 0 | Status: DISCONTINUED | OUTPATIENT
Start: 2025-01-01 | End: 2025-01-01

## 2025-01-01 RX ORDER — BUMETANIDE 1 MG/1
2 TABLET ORAL
Qty: 20 | Refills: 0 | Status: DISCONTINUED | OUTPATIENT
Start: 2025-01-01 | End: 2025-01-01

## 2025-01-01 RX ORDER — INSULIN GLARGINE-YFGN 100 [IU]/ML
10 INJECTION, SOLUTION SUBCUTANEOUS AT BEDTIME
Refills: 0 | Status: DISCONTINUED | OUTPATIENT
Start: 2025-01-01 | End: 2025-01-01

## 2025-01-01 RX ORDER — SIMETHICONE 80 MG
1 TABLET,CHEWABLE ORAL
Qty: 0 | Refills: 0 | DISCHARGE
Start: 2025-01-01

## 2025-01-01 RX ORDER — CLOPIDOGREL BISULFATE 75 MG/1
75 TABLET, FILM COATED ORAL DAILY
Refills: 0 | Status: DISCONTINUED | OUTPATIENT
Start: 2025-01-01 | End: 2025-01-01

## 2025-01-01 RX ORDER — DEXTROMETHORPHAN HBR, GUAIFENESIN 200 MG/10ML
5 LIQUID ORAL
Qty: 0 | Refills: 0 | DISCHARGE
Start: 2025-01-01

## 2025-01-01 RX ORDER — FERROUS SULFATE 137(45) MG
325 TABLET, EXTENDED RELEASE ORAL DAILY
Refills: 0 | Status: DISCONTINUED | OUTPATIENT
Start: 2025-01-01 | End: 2025-01-01

## 2025-01-01 RX ORDER — INSULIN LISPRO 100 U/ML
INJECTION, SOLUTION INTRAVENOUS; SUBCUTANEOUS
Refills: 0 | Status: DISCONTINUED | OUTPATIENT
Start: 2025-01-01 | End: 2025-01-01

## 2025-01-01 RX ORDER — MAGNESIUM SULFATE 500 MG/ML
2 SYRINGE (ML) INJECTION ONCE
Refills: 0 | Status: COMPLETED | OUTPATIENT
Start: 2025-01-01 | End: 2025-01-01

## 2025-01-01 RX ORDER — ALBUMIN (HUMAN) 12.5 G/50ML
250 INJECTION, SOLUTION INTRAVENOUS ONCE
Refills: 0 | Status: COMPLETED | OUTPATIENT
Start: 2025-01-01 | End: 2025-01-01

## 2025-01-01 RX ORDER — ISOSORBDIE DINITRATE 30 MG/1
1 TABLET ORAL
Qty: 0 | Refills: 0 | DISCHARGE
Start: 2025-01-01

## 2025-01-01 RX ORDER — DEXTROMETHORPHAN HBR, GUAIFENESIN 200 MG/10ML
200 LIQUID ORAL EVERY 6 HOURS
Refills: 0 | Status: DISCONTINUED | OUTPATIENT
Start: 2025-01-01 | End: 2025-01-01

## 2025-01-01 RX ORDER — LIDOCAINE HYDROCHLORIDE 20 MG/ML
1 JELLY TOPICAL DAILY
Refills: 0 | Status: DISCONTINUED | OUTPATIENT
Start: 2025-01-01 | End: 2025-01-01

## 2025-01-01 RX ORDER — DEXTROSE 50 % IN WATER 50 %
12.5 SYRINGE (ML) INTRAVENOUS ONCE
Refills: 0 | Status: DISCONTINUED | OUTPATIENT
Start: 2025-01-01 | End: 2025-01-01

## 2025-01-01 RX ORDER — NOREPINEPHRINE BITARTRATE 8 MG
0.05 SOLUTION INTRAVENOUS
Qty: 8 | Refills: 0 | Status: DISCONTINUED | OUTPATIENT
Start: 2025-01-01 | End: 2025-01-01

## 2025-01-01 RX ORDER — VANCOMYCIN HCL IN 5 % DEXTROSE 1.5G/250ML
1000 PLASTIC BAG, INJECTION (ML) INTRAVENOUS EVERY 24 HOURS
Refills: 0 | Status: DISCONTINUED | OUTPATIENT
Start: 2025-01-01 | End: 2025-01-01

## 2025-01-01 RX ORDER — ISOSORBDIE DINITRATE 30 MG/1
40 TABLET ORAL THREE TIMES A DAY
Refills: 0 | Status: DISCONTINUED | OUTPATIENT
Start: 2025-01-01 | End: 2025-01-01

## 2025-01-01 RX ADMIN — BUMETANIDE 2 MILLIGRAM(S): 1 TABLET ORAL at 06:52

## 2025-01-01 RX ADMIN — BUMETANIDE 1 MILLIGRAM(S): 1 TABLET ORAL at 19:03

## 2025-01-01 RX ADMIN — INSULIN LISPRO 2: 100 INJECTION, SOLUTION INTRAVENOUS; SUBCUTANEOUS at 08:23

## 2025-01-01 RX ADMIN — HEPARIN SODIUM 5000 UNIT(S): 1000 INJECTION INTRAVENOUS; SUBCUTANEOUS at 18:34

## 2025-01-01 RX ADMIN — CARVEDILOL 12.5 MILLIGRAM(S): 3.12 TABLET, FILM COATED ORAL at 06:11

## 2025-01-01 RX ADMIN — BUMETANIDE 10 MG/HR: 1 TABLET ORAL at 22:54

## 2025-01-01 RX ADMIN — CLOPIDOGREL BISULFATE 75 MILLIGRAM(S): 75 TABLET, FILM COATED ORAL at 12:43

## 2025-01-01 RX ADMIN — HEPARIN SODIUM 5000 UNIT(S): 1000 INJECTION INTRAVENOUS; SUBCUTANEOUS at 17:27

## 2025-01-01 RX ADMIN — Medication 1300 MILLIGRAM(S): at 05:49

## 2025-01-01 RX ADMIN — BUMETANIDE 5 MG/HR: 1 TABLET ORAL at 22:25

## 2025-01-01 RX ADMIN — ALBUMIN (HUMAN) 50 MILLILITER(S): 12.5 INJECTION, SOLUTION INTRAVENOUS at 12:07

## 2025-01-01 RX ADMIN — Medication 100 MILLIGRAM(S): at 13:15

## 2025-01-01 RX ADMIN — HEPARIN SODIUM 5000 UNIT(S): 1000 INJECTION INTRAVENOUS; SUBCUTANEOUS at 12:01

## 2025-01-01 RX ADMIN — CLOPIDOGREL BISULFATE 75 MILLIGRAM(S): 75 TABLET, FILM COATED ORAL at 09:12

## 2025-01-01 RX ADMIN — BUMETANIDE 2 MILLIGRAM(S): 1 TABLET ORAL at 05:27

## 2025-01-01 RX ADMIN — Medication 81 MILLIGRAM(S): at 09:50

## 2025-01-01 RX ADMIN — CARVEDILOL 6.25 MILLIGRAM(S): 3.12 TABLET, FILM COATED ORAL at 14:51

## 2025-01-01 RX ADMIN — ISOSORBDIE DINITRATE 20 MILLIGRAM(S): 30 TABLET ORAL at 16:32

## 2025-01-01 RX ADMIN — INSULIN LISPRO 2: 100 INJECTION, SOLUTION INTRAVENOUS; SUBCUTANEOUS at 12:08

## 2025-01-01 RX ADMIN — DEXAMETHASONE 6 MILLIGRAM(S): 0.5 TABLET ORAL at 06:17

## 2025-01-01 RX ADMIN — INSULIN GLARGINE-YFGN 10 UNIT(S): 100 INJECTION, SOLUTION SUBCUTANEOUS at 22:31

## 2025-01-01 RX ADMIN — Medication 40 MILLIGRAM(S): at 06:47

## 2025-01-01 RX ADMIN — POLYETHYLENE GLYCOL 3350 17 GRAM(S): 17 POWDER, FOR SOLUTION ORAL at 05:05

## 2025-01-01 RX ADMIN — Medication 100 MILLIGRAM(S): at 05:53

## 2025-01-01 RX ADMIN — INSULIN LISPRO 3 UNIT(S): 100 INJECTION, SOLUTION INTRAVENOUS; SUBCUTANEOUS at 09:12

## 2025-01-01 RX ADMIN — LIDOCAINE HYDROCHLORIDE 1 PATCH: 20 JELLY TOPICAL at 12:08

## 2025-01-01 RX ADMIN — IPRATROPIUM BROMIDE AND ALBUTEROL SULFATE 3 MILLILITER(S): .5; 2.5 SOLUTION RESPIRATORY (INHALATION) at 22:24

## 2025-01-01 RX ADMIN — Medication 40 MILLIGRAM(S): at 07:21

## 2025-01-01 RX ADMIN — Medication 2 TABLET(S): at 21:47

## 2025-01-01 RX ADMIN — INSULIN LISPRO 4: 100 INJECTION, SOLUTION INTRAVENOUS; SUBCUTANEOUS at 11:50

## 2025-01-01 RX ADMIN — HEPARIN SODIUM 5000 UNIT(S): 1000 INJECTION INTRAVENOUS; SUBCUTANEOUS at 05:25

## 2025-01-01 RX ADMIN — INSULIN GLARGINE-YFGN 7 UNIT(S): 100 INJECTION, SOLUTION SUBCUTANEOUS at 22:12

## 2025-01-01 RX ADMIN — IPRATROPIUM BROMIDE AND ALBUTEROL SULFATE 3 MILLILITER(S): .5; 2.5 SOLUTION RESPIRATORY (INHALATION) at 20:29

## 2025-01-01 RX ADMIN — Medication 1300 MILLIGRAM(S): at 21:43

## 2025-01-01 RX ADMIN — Medication 650 MILLIGRAM(S): at 12:51

## 2025-01-01 RX ADMIN — Medication 400 MILLIGRAM(S): at 07:55

## 2025-01-01 RX ADMIN — Medication 4 MILLILITER(S): at 00:15

## 2025-01-01 RX ADMIN — BUMETANIDE 2 MILLIGRAM(S): 1 TABLET ORAL at 16:42

## 2025-01-01 RX ADMIN — HEPARIN SODIUM 5000 UNIT(S): 1000 INJECTION INTRAVENOUS; SUBCUTANEOUS at 06:06

## 2025-01-01 RX ADMIN — INSULIN LISPRO 2: 100 INJECTION, SOLUTION INTRAVENOUS; SUBCUTANEOUS at 18:14

## 2025-01-01 RX ADMIN — BUMETANIDE 2 MILLIGRAM(S): 1 TABLET ORAL at 09:03

## 2025-01-01 RX ADMIN — Medication 40 MILLIGRAM(S): at 06:22

## 2025-01-01 RX ADMIN — INSULIN LISPRO 3 UNIT(S): 100 INJECTION, SOLUTION INTRAVENOUS; SUBCUTANEOUS at 12:10

## 2025-01-01 RX ADMIN — Medication 650 MILLIGRAM(S): at 21:05

## 2025-01-01 RX ADMIN — Medication 75 MILLIGRAM(S): at 05:49

## 2025-01-01 RX ADMIN — Medication 25 MILLIGRAM(S): at 14:50

## 2025-01-01 RX ADMIN — Medication 400 MILLIGRAM(S): at 19:49

## 2025-01-01 RX ADMIN — ISOSORBDIE DINITRATE 20 MILLIGRAM(S): 30 TABLET ORAL at 12:44

## 2025-01-01 RX ADMIN — Medication 650 MILLIGRAM(S): at 03:36

## 2025-01-01 RX ADMIN — BUMETANIDE 2 MILLIGRAM(S): 1 TABLET ORAL at 05:26

## 2025-01-01 RX ADMIN — PREDNISONE 5 MILLIGRAM(S): 20 TABLET ORAL at 05:25

## 2025-01-01 RX ADMIN — CARVEDILOL 12.5 MILLIGRAM(S): 3.12 TABLET, FILM COATED ORAL at 18:51

## 2025-01-01 RX ADMIN — BUMETANIDE 2 MILLIGRAM(S): 1 TABLET ORAL at 12:34

## 2025-01-01 RX ADMIN — Medication 25 MILLIGRAM(S): at 01:14

## 2025-01-01 RX ADMIN — Medication 100 MILLIGRAM(S): at 15:12

## 2025-01-01 RX ADMIN — PREDNISONE 5 MILLIGRAM(S): 20 TABLET ORAL at 05:36

## 2025-01-01 RX ADMIN — HEPARIN SODIUM 5000 UNIT(S): 1000 INJECTION INTRAVENOUS; SUBCUTANEOUS at 00:05

## 2025-01-01 RX ADMIN — Medication 100 MILLIGRAM(S): at 07:08

## 2025-01-01 RX ADMIN — CARVEDILOL 12.5 MILLIGRAM(S): 3.12 TABLET, FILM COATED ORAL at 06:59

## 2025-01-01 RX ADMIN — Medication 40 MILLIGRAM(S): at 06:50

## 2025-01-01 RX ADMIN — CARVEDILOL 12.5 MILLIGRAM(S): 3.12 TABLET, FILM COATED ORAL at 06:17

## 2025-01-01 RX ADMIN — EPOETIN ALFA 10000 UNIT(S): 10000 SOLUTION INTRAVENOUS; SUBCUTANEOUS at 15:27

## 2025-01-01 RX ADMIN — ATORVASTATIN CALCIUM 20 MILLIGRAM(S): 80 TABLET, FILM COATED ORAL at 22:25

## 2025-01-01 RX ADMIN — CARVEDILOL 25 MILLIGRAM(S): 3.12 TABLET, FILM COATED ORAL at 19:09

## 2025-01-01 RX ADMIN — INSULIN LISPRO 8 UNIT(S): 100 INJECTION, SOLUTION INTRAVENOUS; SUBCUTANEOUS at 11:55

## 2025-01-01 RX ADMIN — Medication 650 MILLIGRAM(S): at 19:47

## 2025-01-01 RX ADMIN — Medication 80 MILLIGRAM(S): at 15:16

## 2025-01-01 RX ADMIN — INSULIN LISPRO 5 UNIT(S): 100 INJECTION, SOLUTION INTRAVENOUS; SUBCUTANEOUS at 12:56

## 2025-01-01 RX ADMIN — INSULIN LISPRO 2: 100 INJECTION, SOLUTION INTRAVENOUS; SUBCUTANEOUS at 08:43

## 2025-01-01 RX ADMIN — Medication 100 MILLIGRAM(S): at 05:49

## 2025-01-01 RX ADMIN — TACROLIMUS 1 MILLIGRAM(S): 0.5 CAPSULE ORAL at 17:15

## 2025-01-01 RX ADMIN — INSULIN LISPRO 4: 100 INJECTION, SOLUTION INTRAVENOUS; SUBCUTANEOUS at 17:07

## 2025-01-01 RX ADMIN — CLOPIDOGREL BISULFATE 75 MILLIGRAM(S): 75 TABLET, FILM COATED ORAL at 11:19

## 2025-01-01 RX ADMIN — Medication 25 MILLIGRAM(S): at 06:59

## 2025-01-01 RX ADMIN — IPRATROPIUM BROMIDE AND ALBUTEROL SULFATE 3 MILLILITER(S): .5; 2.5 SOLUTION RESPIRATORY (INHALATION) at 21:44

## 2025-01-01 RX ADMIN — CLOPIDOGREL BISULFATE 75 MILLIGRAM(S): 75 TABLET, FILM COATED ORAL at 11:34

## 2025-01-01 RX ADMIN — INSULIN GLARGINE-YFGN 7 UNIT(S): 100 INJECTION, SOLUTION SUBCUTANEOUS at 22:30

## 2025-01-01 RX ADMIN — INSULIN LISPRO 5 UNIT(S): 100 INJECTION, SOLUTION INTRAVENOUS; SUBCUTANEOUS at 08:42

## 2025-01-01 RX ADMIN — LIDOCAINE HYDROCHLORIDE 1 PATCH: 20 JELLY TOPICAL at 11:13

## 2025-01-01 RX ADMIN — IRON SUCROSE 176.67 MILLIGRAM(S): 20 INJECTION, SOLUTION INTRAVENOUS at 23:29

## 2025-01-01 RX ADMIN — Medication 2 TABLET(S): at 21:46

## 2025-01-01 RX ADMIN — Medication 325 MILLIGRAM(S): at 12:18

## 2025-01-01 RX ADMIN — BUMETANIDE 10 MG/HR: 1 TABLET ORAL at 17:57

## 2025-01-01 RX ADMIN — CARVEDILOL 6.25 MILLIGRAM(S): 3.12 TABLET, FILM COATED ORAL at 05:26

## 2025-01-01 RX ADMIN — HEPARIN SODIUM 5000 UNIT(S): 1000 INJECTION INTRAVENOUS; SUBCUTANEOUS at 06:08

## 2025-01-01 RX ADMIN — TACROLIMUS 1 MILLIGRAM(S): 0.5 CAPSULE ORAL at 05:21

## 2025-01-01 RX ADMIN — Medication 2 TABLET(S): at 21:44

## 2025-01-01 RX ADMIN — Medication 650 MILLIGRAM(S): at 16:16

## 2025-01-01 RX ADMIN — IPRATROPIUM BROMIDE AND ALBUTEROL SULFATE 3 MILLILITER(S): .5; 2.5 SOLUTION RESPIRATORY (INHALATION) at 10:42

## 2025-01-01 RX ADMIN — Medication 100 MILLIGRAM(S): at 14:42

## 2025-01-01 RX ADMIN — Medication 100 MILLIGRAM(S): at 05:27

## 2025-01-01 RX ADMIN — IPRATROPIUM BROMIDE AND ALBUTEROL SULFATE 3 MILLILITER(S): .5; 2.5 SOLUTION RESPIRATORY (INHALATION) at 22:01

## 2025-01-01 RX ADMIN — INSULIN GLARGINE-YFGN 4 UNIT(S): 100 INJECTION, SOLUTION SUBCUTANEOUS at 22:02

## 2025-01-01 RX ADMIN — Medication 100 MILLIGRAM(S): at 22:24

## 2025-01-01 RX ADMIN — Medication 325 MILLIGRAM(S): at 12:08

## 2025-01-01 RX ADMIN — Medication 81 MILLIGRAM(S): at 11:51

## 2025-01-01 RX ADMIN — ISOSORBDIE DINITRATE 10 MILLIGRAM(S): 30 TABLET ORAL at 10:36

## 2025-01-01 RX ADMIN — IPRATROPIUM BROMIDE AND ALBUTEROL SULFATE 3 MILLILITER(S): .5; 2.5 SOLUTION RESPIRATORY (INHALATION) at 00:09

## 2025-01-01 RX ADMIN — Medication 100 MILLIGRAM(S): at 05:35

## 2025-01-01 RX ADMIN — INSULIN LISPRO 3 UNIT(S): 100 INJECTION, SOLUTION INTRAVENOUS; SUBCUTANEOUS at 08:36

## 2025-01-01 RX ADMIN — ISOSORBDIE DINITRATE 10 MILLIGRAM(S): 30 TABLET ORAL at 05:53

## 2025-01-01 RX ADMIN — ISOSORBDIE DINITRATE 30 MILLIGRAM(S): 30 TABLET ORAL at 07:59

## 2025-01-01 RX ADMIN — ISOSORBDIE DINITRATE 10 MILLIGRAM(S): 30 TABLET ORAL at 05:28

## 2025-01-01 RX ADMIN — Medication 1300 MILLIGRAM(S): at 21:05

## 2025-01-01 RX ADMIN — INSULIN LISPRO 10 UNIT(S): 100 INJECTION, SOLUTION INTRAVENOUS; SUBCUTANEOUS at 09:07

## 2025-01-01 RX ADMIN — POLYETHYLENE GLYCOL 3350 17 GRAM(S): 17 POWDER, FOR SOLUTION ORAL at 21:49

## 2025-01-01 RX ADMIN — ISOSORBDIE DINITRATE 30 MILLIGRAM(S): 30 TABLET ORAL at 05:49

## 2025-01-01 RX ADMIN — TACROLIMUS 1 MILLIGRAM(S): 0.5 CAPSULE ORAL at 17:37

## 2025-01-01 RX ADMIN — PREDNISONE 5 MILLIGRAM(S): 20 TABLET ORAL at 05:49

## 2025-01-01 RX ADMIN — CARVEDILOL 6.25 MILLIGRAM(S): 3.12 TABLET, FILM COATED ORAL at 17:37

## 2025-01-01 RX ADMIN — IPRATROPIUM BROMIDE AND ALBUTEROL SULFATE 3 MILLILITER(S): .5; 2.5 SOLUTION RESPIRATORY (INHALATION) at 14:12

## 2025-01-01 RX ADMIN — Medication 100 MILLIGRAM(S): at 21:47

## 2025-01-01 RX ADMIN — Medication 1300 MILLIGRAM(S): at 21:47

## 2025-01-01 RX ADMIN — POLYETHYLENE GLYCOL 3350 17 GRAM(S): 17 POWDER, FOR SOLUTION ORAL at 00:15

## 2025-01-01 RX ADMIN — TACROLIMUS 2 MILLIGRAM(S): 0.5 CAPSULE ORAL at 12:43

## 2025-01-01 RX ADMIN — TACROLIMUS 1 MILLIGRAM(S): 0.5 CAPSULE ORAL at 17:28

## 2025-01-01 RX ADMIN — Medication 40 MILLIGRAM(S): at 07:09

## 2025-01-01 RX ADMIN — INSULIN LISPRO 3 UNIT(S): 100 INJECTION, SOLUTION INTRAVENOUS; SUBCUTANEOUS at 12:16

## 2025-01-01 RX ADMIN — Medication 650 MILLIGRAM(S): at 12:30

## 2025-01-01 RX ADMIN — BUMETANIDE 2 MILLIGRAM(S): 1 TABLET ORAL at 16:45

## 2025-01-01 RX ADMIN — Medication 100 MILLIGRAM(S): at 11:34

## 2025-01-01 RX ADMIN — INSULIN GLARGINE-YFGN 4 UNIT(S): 100 INJECTION, SOLUTION SUBCUTANEOUS at 23:23

## 2025-01-01 RX ADMIN — Medication 325 MILLIGRAM(S): at 12:09

## 2025-01-01 RX ADMIN — TACROLIMUS 1 MILLIGRAM(S): 0.5 CAPSULE ORAL at 06:16

## 2025-01-01 RX ADMIN — Medication 325 MILLIGRAM(S): at 11:51

## 2025-01-01 RX ADMIN — INSULIN LISPRO 6: 100 INJECTION, SOLUTION INTRAVENOUS; SUBCUTANEOUS at 12:16

## 2025-01-01 RX ADMIN — INSULIN LISPRO 4: 100 INJECTION, SOLUTION INTRAVENOUS; SUBCUTANEOUS at 12:36

## 2025-01-01 RX ADMIN — IPRATROPIUM BROMIDE AND ALBUTEROL SULFATE 3 MILLILITER(S): .5; 2.5 SOLUTION RESPIRATORY (INHALATION) at 10:04

## 2025-01-01 RX ADMIN — INSULIN LISPRO 2: 100 INJECTION, SOLUTION INTRAVENOUS; SUBCUTANEOUS at 12:00

## 2025-01-01 RX ADMIN — Medication 81 MILLIGRAM(S): at 09:09

## 2025-01-01 RX ADMIN — Medication 1300 MILLIGRAM(S): at 13:36

## 2025-01-01 RX ADMIN — TACROLIMUS 2 MILLIGRAM(S): 0.5 CAPSULE ORAL at 12:17

## 2025-01-01 RX ADMIN — ISOSORBDIE DINITRATE 30 MILLIGRAM(S): 30 TABLET ORAL at 22:25

## 2025-01-01 RX ADMIN — Medication 100 MILLIGRAM(S): at 20:28

## 2025-01-01 RX ADMIN — Medication 2 TABLET(S): at 21:54

## 2025-01-01 RX ADMIN — IPRATROPIUM BROMIDE AND ALBUTEROL SULFATE 3 MILLILITER(S): .5; 2.5 SOLUTION RESPIRATORY (INHALATION) at 07:04

## 2025-01-01 RX ADMIN — ISOSORBDIE DINITRATE 10 MILLIGRAM(S): 30 TABLET ORAL at 05:21

## 2025-01-01 RX ADMIN — TACROLIMUS 2 MILLIGRAM(S): 0.5 CAPSULE ORAL at 01:02

## 2025-01-01 RX ADMIN — HEPARIN SODIUM 5000 UNIT(S): 1000 INJECTION INTRAVENOUS; SUBCUTANEOUS at 05:20

## 2025-01-01 RX ADMIN — Medication 1000 MILLIGRAM(S): at 15:36

## 2025-01-01 RX ADMIN — Medication 500 MICROGRAM(S): at 22:34

## 2025-01-01 RX ADMIN — Medication 100 MILLIGRAM(S): at 05:30

## 2025-01-01 RX ADMIN — IPRATROPIUM BROMIDE AND ALBUTEROL SULFATE 3 MILLILITER(S): .5; 2.5 SOLUTION RESPIRATORY (INHALATION) at 11:44

## 2025-01-01 RX ADMIN — Medication 50 MILLILITER(S): at 15:43

## 2025-01-01 RX ADMIN — CARVEDILOL 12.5 MILLIGRAM(S): 3.12 TABLET, FILM COATED ORAL at 06:06

## 2025-01-01 RX ADMIN — Medication 650 MILLIGRAM(S): at 10:30

## 2025-01-01 RX ADMIN — TACROLIMUS 2 MILLIGRAM(S): 0.5 CAPSULE ORAL at 21:52

## 2025-01-01 RX ADMIN — EPOETIN ALFA 10000 UNIT(S): 10000 SOLUTION INTRAVENOUS; SUBCUTANEOUS at 00:01

## 2025-01-01 RX ADMIN — HEPARIN SODIUM 5000 UNIT(S): 1000 INJECTION INTRAVENOUS; SUBCUTANEOUS at 18:22

## 2025-01-01 RX ADMIN — ISOSORBDIE DINITRATE 30 MILLIGRAM(S): 30 TABLET ORAL at 16:15

## 2025-01-01 RX ADMIN — CLOPIDOGREL BISULFATE 75 MILLIGRAM(S): 75 TABLET, FILM COATED ORAL at 12:08

## 2025-01-01 RX ADMIN — Medication 650 MILLIGRAM(S): at 00:45

## 2025-01-01 RX ADMIN — CARVEDILOL 12.5 MILLIGRAM(S): 3.12 TABLET, FILM COATED ORAL at 05:30

## 2025-01-01 RX ADMIN — INSULIN LISPRO 2: 100 INJECTION, SOLUTION INTRAVENOUS; SUBCUTANEOUS at 21:47

## 2025-01-01 RX ADMIN — INSULIN LISPRO 8 UNIT(S): 100 INJECTION, SOLUTION INTRAVENOUS; SUBCUTANEOUS at 17:18

## 2025-01-01 RX ADMIN — Medication 81 MILLIGRAM(S): at 12:00

## 2025-01-01 RX ADMIN — Medication 400 MILLIGRAM(S): at 15:05

## 2025-01-01 RX ADMIN — Medication 25 MILLIGRAM(S): at 10:42

## 2025-01-01 RX ADMIN — Medication 325 MILLIGRAM(S): at 11:45

## 2025-01-01 RX ADMIN — ISOSORBDIE DINITRATE 20 MILLIGRAM(S): 30 TABLET ORAL at 05:37

## 2025-01-01 RX ADMIN — IPRATROPIUM BROMIDE AND ALBUTEROL SULFATE 3 MILLILITER(S): .5; 2.5 SOLUTION RESPIRATORY (INHALATION) at 05:34

## 2025-01-01 RX ADMIN — INSULIN LISPRO 5 UNIT(S): 100 INJECTION, SOLUTION INTRAVENOUS; SUBCUTANEOUS at 11:21

## 2025-01-01 RX ADMIN — INSULIN LISPRO 2: 100 INJECTION, SOLUTION INTRAVENOUS; SUBCUTANEOUS at 08:41

## 2025-01-01 RX ADMIN — IPRATROPIUM BROMIDE AND ALBUTEROL SULFATE 3 MILLILITER(S): .5; 2.5 SOLUTION RESPIRATORY (INHALATION) at 15:26

## 2025-01-01 RX ADMIN — LIDOCAINE HYDROCHLORIDE 1 PATCH: 20 JELLY TOPICAL at 20:23

## 2025-01-01 RX ADMIN — INSULIN LISPRO 3 UNIT(S): 100 INJECTION, SOLUTION INTRAVENOUS; SUBCUTANEOUS at 12:33

## 2025-01-01 RX ADMIN — TACROLIMUS 2 MILLIGRAM(S): 0.5 CAPSULE ORAL at 07:37

## 2025-01-01 RX ADMIN — HEPARIN SODIUM 5000 UNIT(S): 1000 INJECTION INTRAVENOUS; SUBCUTANEOUS at 17:18

## 2025-01-01 RX ADMIN — Medication 100 MILLIGRAM(S): at 22:09

## 2025-01-01 RX ADMIN — Medication 325 MILLIGRAM(S): at 09:50

## 2025-01-01 RX ADMIN — TACROLIMUS 1 MILLIGRAM(S): 0.5 CAPSULE ORAL at 05:52

## 2025-01-01 RX ADMIN — ISOSORBDIE DINITRATE 30 MILLIGRAM(S): 30 TABLET ORAL at 06:08

## 2025-01-01 RX ADMIN — ATORVASTATIN CALCIUM 20 MILLIGRAM(S): 80 TABLET, FILM COATED ORAL at 22:09

## 2025-01-01 RX ADMIN — Medication 1300 MILLIGRAM(S): at 05:29

## 2025-01-01 RX ADMIN — Medication 100 MILLIGRAM(S): at 16:50

## 2025-01-01 RX ADMIN — Medication 1300 MILLIGRAM(S): at 16:52

## 2025-01-01 RX ADMIN — CARVEDILOL 12.5 MILLIGRAM(S): 3.12 TABLET, FILM COATED ORAL at 05:03

## 2025-01-01 RX ADMIN — HEPARIN SODIUM 5000 UNIT(S): 1000 INJECTION INTRAVENOUS; SUBCUTANEOUS at 06:16

## 2025-01-01 RX ADMIN — Medication 25 MILLIGRAM(S): at 06:23

## 2025-01-01 RX ADMIN — IPRATROPIUM BROMIDE AND ALBUTEROL SULFATE 3 MILLILITER(S): .5; 2.5 SOLUTION RESPIRATORY (INHALATION) at 06:21

## 2025-01-01 RX ADMIN — Medication 650 MILLIGRAM(S): at 15:16

## 2025-01-01 RX ADMIN — INSULIN LISPRO 5 UNIT(S): 100 INJECTION, SOLUTION INTRAVENOUS; SUBCUTANEOUS at 17:08

## 2025-01-01 RX ADMIN — TACROLIMUS 1 MILLIGRAM(S): 0.5 CAPSULE ORAL at 18:07

## 2025-01-01 RX ADMIN — INSULIN LISPRO 4: 100 INJECTION, SOLUTION INTRAVENOUS; SUBCUTANEOUS at 11:21

## 2025-01-01 RX ADMIN — Medication 81 MILLIGRAM(S): at 09:13

## 2025-01-01 RX ADMIN — INSULIN LISPRO 3 UNIT(S): 100 INJECTION, SOLUTION INTRAVENOUS; SUBCUTANEOUS at 17:09

## 2025-01-01 RX ADMIN — INSULIN LISPRO 5 UNIT(S): 100 INJECTION, SOLUTION INTRAVENOUS; SUBCUTANEOUS at 18:34

## 2025-01-01 RX ADMIN — Medication 650 MILLIGRAM(S): at 22:00

## 2025-01-01 RX ADMIN — Medication 650 MILLIGRAM(S): at 05:56

## 2025-01-01 RX ADMIN — LIDOCAINE HYDROCHLORIDE 1 PATCH: 20 JELLY TOPICAL at 15:40

## 2025-01-01 RX ADMIN — Medication 100 MILLIGRAM(S): at 13:12

## 2025-01-01 RX ADMIN — DEXAMETHASONE 6 MILLIGRAM(S): 0.5 TABLET ORAL at 05:50

## 2025-01-01 RX ADMIN — INSULIN LISPRO 3 UNIT(S): 100 INJECTION, SOLUTION INTRAVENOUS; SUBCUTANEOUS at 17:28

## 2025-01-01 RX ADMIN — CARVEDILOL 12.5 MILLIGRAM(S): 3.12 TABLET, FILM COATED ORAL at 05:48

## 2025-01-01 RX ADMIN — ATORVASTATIN CALCIUM 20 MILLIGRAM(S): 80 TABLET, FILM COATED ORAL at 22:12

## 2025-01-01 RX ADMIN — BUMETANIDE 10 MG/HR: 1 TABLET ORAL at 17:24

## 2025-01-01 RX ADMIN — TACROLIMUS 2 MILLIGRAM(S): 0.5 CAPSULE ORAL at 11:34

## 2025-01-01 RX ADMIN — HEPARIN SODIUM 5000 UNIT(S): 1000 INJECTION INTRAVENOUS; SUBCUTANEOUS at 05:28

## 2025-01-01 RX ADMIN — BUMETANIDE 10 MG/HR: 1 TABLET ORAL at 04:11

## 2025-01-01 RX ADMIN — CARVEDILOL 12.5 MILLIGRAM(S): 3.12 TABLET, FILM COATED ORAL at 05:28

## 2025-01-01 RX ADMIN — Medication 650 MILLIGRAM(S): at 13:26

## 2025-01-01 RX ADMIN — Medication 40 MILLIGRAM(S): at 05:48

## 2025-01-01 RX ADMIN — CLOPIDOGREL BISULFATE 75 MILLIGRAM(S): 75 TABLET, FILM COATED ORAL at 11:20

## 2025-01-01 RX ADMIN — INSULIN LISPRO 3 UNIT(S): 100 INJECTION, SOLUTION INTRAVENOUS; SUBCUTANEOUS at 17:27

## 2025-01-01 RX ADMIN — DEXAMETHASONE 6 MILLIGRAM(S): 0.5 TABLET ORAL at 06:59

## 2025-01-01 RX ADMIN — INSULIN GLARGINE-YFGN 7 UNIT(S): 100 INJECTION, SOLUTION SUBCUTANEOUS at 21:48

## 2025-01-01 RX ADMIN — Medication 1300 MILLIGRAM(S): at 22:11

## 2025-01-01 RX ADMIN — IRON SUCROSE 176.67 MILLIGRAM(S): 20 INJECTION, SOLUTION INTRAVENOUS at 23:48

## 2025-01-01 RX ADMIN — ISOSORBDIE DINITRATE 20 MILLIGRAM(S): 30 TABLET ORAL at 05:26

## 2025-01-01 RX ADMIN — Medication 81 MILLIGRAM(S): at 12:17

## 2025-01-01 RX ADMIN — Medication 50 MILLIGRAM(S): at 15:04

## 2025-01-01 RX ADMIN — PREDNISONE 5 MILLIGRAM(S): 20 TABLET ORAL at 06:47

## 2025-01-01 RX ADMIN — Medication 325 MILLIGRAM(S): at 09:10

## 2025-01-01 RX ADMIN — DEXAMETHASONE 6 MILLIGRAM(S): 0.5 TABLET ORAL at 06:09

## 2025-01-01 RX ADMIN — Medication 81 MILLIGRAM(S): at 12:07

## 2025-01-01 RX ADMIN — IRON SUCROSE 176.67 MILLIGRAM(S): 20 INJECTION, SOLUTION INTRAVENOUS at 19:51

## 2025-01-01 RX ADMIN — ATORVASTATIN CALCIUM 20 MILLIGRAM(S): 80 TABLET, FILM COATED ORAL at 20:28

## 2025-01-01 RX ADMIN — BUMETANIDE 5 MG/HR: 1 TABLET ORAL at 07:33

## 2025-01-01 RX ADMIN — Medication 1300 MILLIGRAM(S): at 15:16

## 2025-01-01 RX ADMIN — CLOPIDOGREL BISULFATE 75 MILLIGRAM(S): 75 TABLET, FILM COATED ORAL at 11:37

## 2025-01-01 RX ADMIN — Medication 4 MILLILITER(S): at 07:00

## 2025-01-01 RX ADMIN — Medication 325 MILLIGRAM(S): at 11:12

## 2025-01-01 RX ADMIN — ALBUMIN (HUMAN) 50 MILLILITER(S): 12.5 INJECTION, SOLUTION INTRAVENOUS at 15:38

## 2025-01-01 RX ADMIN — Medication 40 MILLIGRAM(S): at 05:28

## 2025-01-01 RX ADMIN — INSULIN LISPRO 2: 100 INJECTION, SOLUTION INTRAVENOUS; SUBCUTANEOUS at 22:30

## 2025-01-01 RX ADMIN — CARVEDILOL 12.5 MILLIGRAM(S): 3.12 TABLET, FILM COATED ORAL at 17:07

## 2025-01-01 RX ADMIN — Medication 325 MILLIGRAM(S): at 11:34

## 2025-01-01 RX ADMIN — INSULIN GLARGINE-YFGN 6 UNIT(S): 100 INJECTION, SOLUTION SUBCUTANEOUS at 22:26

## 2025-01-01 RX ADMIN — INSULIN LISPRO 2: 100 INJECTION, SOLUTION INTRAVENOUS; SUBCUTANEOUS at 18:02

## 2025-01-01 RX ADMIN — Medication 40 MILLIGRAM(S): at 05:37

## 2025-01-01 RX ADMIN — ATORVASTATIN CALCIUM 20 MILLIGRAM(S): 80 TABLET, FILM COATED ORAL at 21:48

## 2025-01-01 RX ADMIN — LIDOCAINE HYDROCHLORIDE 1 PATCH: 20 JELLY TOPICAL at 03:43

## 2025-01-01 RX ADMIN — Medication 1300 MILLIGRAM(S): at 22:08

## 2025-01-01 RX ADMIN — INSULIN LISPRO 6: 100 INJECTION, SOLUTION INTRAVENOUS; SUBCUTANEOUS at 14:18

## 2025-01-01 RX ADMIN — Medication 100 MILLIGRAM(S): at 06:17

## 2025-01-01 RX ADMIN — Medication 1300 MILLIGRAM(S): at 12:40

## 2025-01-01 RX ADMIN — Medication 325 MILLIGRAM(S): at 10:42

## 2025-01-01 RX ADMIN — ISOSORBDIE DINITRATE 10 MILLIGRAM(S): 30 TABLET ORAL at 16:30

## 2025-01-01 RX ADMIN — ISOSORBDIE DINITRATE 20 MILLIGRAM(S): 30 TABLET ORAL at 05:49

## 2025-01-01 RX ADMIN — TACROLIMUS 2 MILLIGRAM(S): 0.5 CAPSULE ORAL at 16:49

## 2025-01-01 RX ADMIN — Medication 25 GRAM(S): at 10:07

## 2025-01-01 RX ADMIN — HEPARIN SODIUM 5000 UNIT(S): 1000 INJECTION INTRAVENOUS; SUBCUTANEOUS at 07:09

## 2025-01-01 RX ADMIN — IPRATROPIUM BROMIDE AND ALBUTEROL SULFATE 3 MILLILITER(S): .5; 2.5 SOLUTION RESPIRATORY (INHALATION) at 17:38

## 2025-01-01 RX ADMIN — Medication 25 MILLIGRAM(S): at 23:22

## 2025-01-01 RX ADMIN — HEPARIN SODIUM 5000 UNIT(S): 1000 INJECTION INTRAVENOUS; SUBCUTANEOUS at 05:53

## 2025-01-01 RX ADMIN — Medication 1300 MILLIGRAM(S): at 22:42

## 2025-01-01 RX ADMIN — BUMETANIDE 10 MG/HR: 1 TABLET ORAL at 15:41

## 2025-01-01 RX ADMIN — ATORVASTATIN CALCIUM 20 MILLIGRAM(S): 80 TABLET, FILM COATED ORAL at 22:43

## 2025-01-01 RX ADMIN — POLYETHYLENE GLYCOL 3350 17 GRAM(S): 17 POWDER, FOR SOLUTION ORAL at 22:22

## 2025-01-01 RX ADMIN — Medication 1300 MILLIGRAM(S): at 22:16

## 2025-01-01 RX ADMIN — Medication 100 MILLIGRAM(S): at 21:06

## 2025-01-01 RX ADMIN — Medication 1300 MILLIGRAM(S): at 06:28

## 2025-01-01 RX ADMIN — INSULIN LISPRO 8 UNIT(S): 100 INJECTION, SOLUTION INTRAVENOUS; SUBCUTANEOUS at 13:40

## 2025-01-01 RX ADMIN — INSULIN LISPRO 2: 100 INJECTION, SOLUTION INTRAVENOUS; SUBCUTANEOUS at 19:47

## 2025-01-01 RX ADMIN — CARVEDILOL 12.5 MILLIGRAM(S): 3.12 TABLET, FILM COATED ORAL at 17:27

## 2025-01-01 RX ADMIN — INSULIN LISPRO 8: 100 INJECTION, SOLUTION INTRAVENOUS; SUBCUTANEOUS at 08:40

## 2025-01-01 RX ADMIN — Medication 1300 MILLIGRAM(S): at 21:50

## 2025-01-01 RX ADMIN — Medication 325 MILLIGRAM(S): at 09:12

## 2025-01-01 RX ADMIN — Medication 81 MILLIGRAM(S): at 10:35

## 2025-01-01 RX ADMIN — INSULIN LISPRO 3 UNIT(S): 100 INJECTION, SOLUTION INTRAVENOUS; SUBCUTANEOUS at 08:23

## 2025-01-01 RX ADMIN — CARVEDILOL 12.5 MILLIGRAM(S): 3.12 TABLET, FILM COATED ORAL at 17:15

## 2025-01-01 RX ADMIN — TACROLIMUS 2 MILLIGRAM(S): 0.5 CAPSULE ORAL at 18:10

## 2025-01-01 RX ADMIN — INSULIN LISPRO 3 UNIT(S): 100 INJECTION, SOLUTION INTRAVENOUS; SUBCUTANEOUS at 12:11

## 2025-01-01 RX ADMIN — INSULIN LISPRO 4: 100 INJECTION, SOLUTION INTRAVENOUS; SUBCUTANEOUS at 07:50

## 2025-01-01 RX ADMIN — Medication 40 MILLIGRAM(S): at 07:33

## 2025-01-01 RX ADMIN — CLOPIDOGREL BISULFATE 75 MILLIGRAM(S): 75 TABLET, FILM COATED ORAL at 00:47

## 2025-01-01 RX ADMIN — Medication 2 TABLET(S): at 22:08

## 2025-01-01 RX ADMIN — Medication 80 MILLIGRAM(S): at 15:03

## 2025-01-01 RX ADMIN — Medication 100 MILLIGRAM(S): at 13:20

## 2025-01-01 RX ADMIN — Medication 400 MILLIGRAM(S): at 12:53

## 2025-01-01 RX ADMIN — TACROLIMUS 2 MILLIGRAM(S): 0.5 CAPSULE ORAL at 12:39

## 2025-01-01 RX ADMIN — HEPARIN SODIUM 5000 UNIT(S): 1000 INJECTION INTRAVENOUS; SUBCUTANEOUS at 05:04

## 2025-01-01 RX ADMIN — IPRATROPIUM BROMIDE AND ALBUTEROL SULFATE 3 MILLILITER(S): .5; 2.5 SOLUTION RESPIRATORY (INHALATION) at 05:26

## 2025-01-01 RX ADMIN — TACROLIMUS 1 MILLIGRAM(S): 0.5 CAPSULE ORAL at 18:23

## 2025-01-01 RX ADMIN — Medication 1300 MILLIGRAM(S): at 06:09

## 2025-01-01 RX ADMIN — Medication 100 MILLIGRAM(S): at 22:25

## 2025-01-01 RX ADMIN — CARVEDILOL 25 MILLIGRAM(S): 3.12 TABLET, FILM COATED ORAL at 18:08

## 2025-01-01 RX ADMIN — CARVEDILOL 12.5 MILLIGRAM(S): 3.12 TABLET, FILM COATED ORAL at 17:36

## 2025-01-01 RX ADMIN — INSULIN GLARGINE-YFGN 6 UNIT(S): 100 INJECTION, SOLUTION SUBCUTANEOUS at 22:02

## 2025-01-01 RX ADMIN — CARVEDILOL 12.5 MILLIGRAM(S): 3.12 TABLET, FILM COATED ORAL at 18:05

## 2025-01-01 RX ADMIN — CARVEDILOL 12.5 MILLIGRAM(S): 3.12 TABLET, FILM COATED ORAL at 05:49

## 2025-01-01 RX ADMIN — CARVEDILOL 12.5 MILLIGRAM(S): 3.12 TABLET, FILM COATED ORAL at 17:12

## 2025-01-01 RX ADMIN — HEPARIN SODIUM 5000 UNIT(S): 1000 INJECTION INTRAVENOUS; SUBCUTANEOUS at 06:21

## 2025-01-01 RX ADMIN — TACROLIMUS 2 MILLIGRAM(S): 0.5 CAPSULE ORAL at 11:52

## 2025-01-01 RX ADMIN — BUMETANIDE 2 MILLIGRAM(S): 1 TABLET ORAL at 18:22

## 2025-01-01 RX ADMIN — LIDOCAINE HYDROCHLORIDE 1 PATCH: 20 JELLY TOPICAL at 11:45

## 2025-01-01 RX ADMIN — CARVEDILOL 6.25 MILLIGRAM(S): 3.12 TABLET, FILM COATED ORAL at 05:36

## 2025-01-01 RX ADMIN — Medication 81 MILLIGRAM(S): at 00:47

## 2025-01-01 RX ADMIN — DEXAMETHASONE 6 MILLIGRAM(S): 0.5 TABLET ORAL at 07:00

## 2025-01-01 RX ADMIN — INSULIN LISPRO 6: 100 INJECTION, SOLUTION INTRAVENOUS; SUBCUTANEOUS at 22:08

## 2025-01-01 RX ADMIN — INSULIN LISPRO 4: 100 INJECTION, SOLUTION INTRAVENOUS; SUBCUTANEOUS at 17:27

## 2025-01-01 RX ADMIN — PREDNISONE 5 MILLIGRAM(S): 20 TABLET ORAL at 05:04

## 2025-01-01 RX ADMIN — IRON SUCROSE 176.67 MILLIGRAM(S): 20 INJECTION, SOLUTION INTRAVENOUS at 22:12

## 2025-01-01 RX ADMIN — Medication 81 MILLIGRAM(S): at 11:37

## 2025-01-01 RX ADMIN — ISOSORBDIE DINITRATE 30 MILLIGRAM(S): 30 TABLET ORAL at 11:38

## 2025-01-01 RX ADMIN — INSULIN GLARGINE-YFGN 10 UNIT(S): 100 INJECTION, SOLUTION SUBCUTANEOUS at 22:24

## 2025-01-01 RX ADMIN — BUMETANIDE 2 MILLIGRAM(S): 1 TABLET ORAL at 18:13

## 2025-01-01 RX ADMIN — BUMETANIDE 2 MILLIGRAM(S): 1 TABLET ORAL at 14:10

## 2025-01-01 RX ADMIN — DEXAMETHASONE 6 MILLIGRAM(S): 0.5 TABLET ORAL at 06:06

## 2025-01-01 RX ADMIN — BUMETANIDE 2 MILLIGRAM(S): 1 TABLET ORAL at 05:35

## 2025-01-01 RX ADMIN — Medication 25 MILLIGRAM(S): at 21:06

## 2025-01-01 RX ADMIN — Medication 75 MILLIGRAM(S): at 22:08

## 2025-01-01 RX ADMIN — CLOPIDOGREL BISULFATE 75 MILLIGRAM(S): 75 TABLET, FILM COATED ORAL at 11:13

## 2025-01-01 RX ADMIN — Medication 100 MILLIGRAM(S): at 22:44

## 2025-01-01 RX ADMIN — HEPARIN SODIUM 5000 UNIT(S): 1000 INJECTION INTRAVENOUS; SUBCUTANEOUS at 17:05

## 2025-01-01 RX ADMIN — INSULIN LISPRO 4: 100 INJECTION, SOLUTION INTRAVENOUS; SUBCUTANEOUS at 22:45

## 2025-01-01 RX ADMIN — INSULIN LISPRO 8: 100 INJECTION, SOLUTION INTRAVENOUS; SUBCUTANEOUS at 12:28

## 2025-01-01 RX ADMIN — CARVEDILOL 25 MILLIGRAM(S): 3.12 TABLET, FILM COATED ORAL at 05:21

## 2025-01-01 RX ADMIN — Medication 100 MILLIGRAM(S): at 21:54

## 2025-01-01 RX ADMIN — INSULIN LISPRO 6: 100 INJECTION, SOLUTION INTRAVENOUS; SUBCUTANEOUS at 17:08

## 2025-01-01 RX ADMIN — ISOSORBDIE DINITRATE 10 MILLIGRAM(S): 30 TABLET ORAL at 09:36

## 2025-01-01 RX ADMIN — Medication 325 MILLIGRAM(S): at 11:37

## 2025-01-01 RX ADMIN — ATORVASTATIN CALCIUM 20 MILLIGRAM(S): 80 TABLET, FILM COATED ORAL at 21:50

## 2025-01-01 RX ADMIN — Medication 25 MILLIGRAM(S): at 21:46

## 2025-01-01 RX ADMIN — BUMETANIDE 10 MG/HR: 1 TABLET ORAL at 01:36

## 2025-01-01 RX ADMIN — INSULIN LISPRO 8 UNIT(S): 100 INJECTION, SOLUTION INTRAVENOUS; SUBCUTANEOUS at 09:28

## 2025-01-01 RX ADMIN — ISOSORBDIE DINITRATE 10 MILLIGRAM(S): 30 TABLET ORAL at 15:39

## 2025-01-01 RX ADMIN — BUMETANIDE 10 MG/HR: 1 TABLET ORAL at 10:13

## 2025-01-01 RX ADMIN — LIDOCAINE HYDROCHLORIDE 1 PATCH: 20 JELLY TOPICAL at 21:49

## 2025-01-01 RX ADMIN — HEPARIN SODIUM 5000 UNIT(S): 1000 INJECTION INTRAVENOUS; SUBCUTANEOUS at 17:14

## 2025-01-01 RX ADMIN — Medication 80 MILLIGRAM(S): at 09:15

## 2025-01-01 RX ADMIN — Medication 100 MILLIGRAM(S): at 23:23

## 2025-01-01 RX ADMIN — CLOPIDOGREL BISULFATE 75 MILLIGRAM(S): 75 TABLET, FILM COATED ORAL at 12:18

## 2025-01-01 RX ADMIN — TACROLIMUS 1 MILLIGRAM(S): 0.5 CAPSULE ORAL at 07:21

## 2025-01-01 RX ADMIN — HEPARIN SODIUM 5000 UNIT(S): 1000 INJECTION INTRAVENOUS; SUBCUTANEOUS at 18:06

## 2025-01-01 RX ADMIN — Medication 650 MILLIGRAM(S): at 09:16

## 2025-01-01 RX ADMIN — CARVEDILOL 25 MILLIGRAM(S): 3.12 TABLET, FILM COATED ORAL at 06:12

## 2025-01-01 RX ADMIN — Medication 100 MILLIGRAM(S): at 07:00

## 2025-01-01 RX ADMIN — ISOSORBDIE DINITRATE 20 MILLIGRAM(S): 30 TABLET ORAL at 11:44

## 2025-01-01 RX ADMIN — Medication 200 GRAM(S): at 08:05

## 2025-01-01 RX ADMIN — TACROLIMUS 1 MILLIGRAM(S): 0.5 CAPSULE ORAL at 19:43

## 2025-01-01 RX ADMIN — TACROLIMUS 1 MILLIGRAM(S): 0.5 CAPSULE ORAL at 07:03

## 2025-01-01 RX ADMIN — ISOSORBDIE DINITRATE 20 MILLIGRAM(S): 30 TABLET ORAL at 06:22

## 2025-01-01 RX ADMIN — Medication 100 MILLIGRAM(S): at 05:48

## 2025-01-01 RX ADMIN — LIDOCAINE HYDROCHLORIDE 1 PATCH: 20 JELLY TOPICAL at 18:14

## 2025-01-01 RX ADMIN — ATORVASTATIN CALCIUM 20 MILLIGRAM(S): 80 TABLET, FILM COATED ORAL at 22:24

## 2025-01-01 RX ADMIN — Medication 100 MILLIGRAM(S): at 12:39

## 2025-01-01 RX ADMIN — CARVEDILOL 12.5 MILLIGRAM(S): 3.12 TABLET, FILM COATED ORAL at 17:24

## 2025-01-01 RX ADMIN — INSULIN LISPRO 2: 100 INJECTION, SOLUTION INTRAVENOUS; SUBCUTANEOUS at 08:39

## 2025-01-01 RX ADMIN — CARVEDILOL 25 MILLIGRAM(S): 3.12 TABLET, FILM COATED ORAL at 05:52

## 2025-01-01 RX ADMIN — Medication 75 MILLIGRAM(S): at 22:43

## 2025-01-01 RX ADMIN — INSULIN LISPRO 5 UNIT(S): 100 INJECTION, SOLUTION INTRAVENOUS; SUBCUTANEOUS at 16:19

## 2025-01-01 RX ADMIN — INSULIN GLARGINE-YFGN 10 UNIT(S): 100 INJECTION, SOLUTION SUBCUTANEOUS at 22:09

## 2025-01-01 RX ADMIN — TACROLIMUS 1 MILLIGRAM(S): 0.5 CAPSULE ORAL at 17:19

## 2025-01-01 RX ADMIN — TACROLIMUS 1 MILLIGRAM(S): 0.5 CAPSULE ORAL at 19:09

## 2025-01-01 RX ADMIN — Medication 100 MILLIGRAM(S): at 22:01

## 2025-01-01 RX ADMIN — REMDESIVIR 200 MILLIGRAM(S): 5 INJECTION INTRAVENOUS at 18:08

## 2025-01-01 RX ADMIN — REMDESIVIR 200 MILLIGRAM(S): 5 INJECTION INTRAVENOUS at 17:08

## 2025-01-01 RX ADMIN — POLYETHYLENE GLYCOL 3350 17 GRAM(S): 17 POWDER, FOR SOLUTION ORAL at 07:05

## 2025-01-01 RX ADMIN — Medication 40 MILLIGRAM(S): at 06:41

## 2025-01-01 RX ADMIN — TACROLIMUS 1 MILLIGRAM(S): 0.5 CAPSULE ORAL at 06:47

## 2025-01-01 RX ADMIN — Medication 40 MILLIGRAM(S): at 05:04

## 2025-01-01 RX ADMIN — Medication 650 MILLIGRAM(S): at 02:44

## 2025-01-01 RX ADMIN — INSULIN LISPRO 2: 100 INJECTION, SOLUTION INTRAVENOUS; SUBCUTANEOUS at 22:22

## 2025-01-01 RX ADMIN — CARVEDILOL 12.5 MILLIGRAM(S): 3.12 TABLET, FILM COATED ORAL at 06:22

## 2025-01-01 RX ADMIN — ATORVASTATIN CALCIUM 20 MILLIGRAM(S): 80 TABLET, FILM COATED ORAL at 22:31

## 2025-01-01 RX ADMIN — ATORVASTATIN CALCIUM 20 MILLIGRAM(S): 80 TABLET, FILM COATED ORAL at 21:54

## 2025-01-01 RX ADMIN — Medication 325 MILLIGRAM(S): at 12:43

## 2025-01-01 RX ADMIN — TACROLIMUS 1 MILLIGRAM(S): 0.5 CAPSULE ORAL at 17:13

## 2025-01-01 RX ADMIN — Medication 650 MILLIGRAM(S): at 06:29

## 2025-01-01 RX ADMIN — LIDOCAINE HYDROCHLORIDE 1 PATCH: 20 JELLY TOPICAL at 11:59

## 2025-01-01 RX ADMIN — CARVEDILOL 12.5 MILLIGRAM(S): 3.12 TABLET, FILM COATED ORAL at 17:13

## 2025-01-01 RX ADMIN — Medication 2 TABLET(S): at 22:02

## 2025-01-01 RX ADMIN — INSULIN LISPRO 4: 100 INJECTION, SOLUTION INTRAVENOUS; SUBCUTANEOUS at 17:11

## 2025-01-01 RX ADMIN — HEPARIN SODIUM 5000 UNIT(S): 1000 INJECTION INTRAVENOUS; SUBCUTANEOUS at 17:23

## 2025-01-01 RX ADMIN — ISOSORBDIE DINITRATE 20 MILLIGRAM(S): 30 TABLET ORAL at 11:51

## 2025-01-01 RX ADMIN — INSULIN LISPRO 2: 100 INJECTION, SOLUTION INTRAVENOUS; SUBCUTANEOUS at 17:18

## 2025-01-01 RX ADMIN — Medication 5 MILLIGRAM(S): at 22:02

## 2025-01-01 RX ADMIN — Medication 2 TABLET(S): at 21:49

## 2025-01-01 RX ADMIN — Medication 325 MILLIGRAM(S): at 09:37

## 2025-01-01 RX ADMIN — ISOSORBDIE DINITRATE 20 MILLIGRAM(S): 30 TABLET ORAL at 12:12

## 2025-01-01 RX ADMIN — Medication 100 MILLIGRAM(S): at 14:10

## 2025-01-01 RX ADMIN — CARVEDILOL 25 MILLIGRAM(S): 3.12 TABLET, FILM COATED ORAL at 05:28

## 2025-01-01 RX ADMIN — INSULIN GLARGINE-YFGN 10 UNIT(S): 100 INJECTION, SOLUTION SUBCUTANEOUS at 22:34

## 2025-01-01 RX ADMIN — DAPAGLIFLOZIN 10 MILLIGRAM(S): 5 TABLET, FILM COATED ORAL at 17:35

## 2025-01-01 RX ADMIN — Medication 1300 MILLIGRAM(S): at 14:38

## 2025-01-01 RX ADMIN — Medication 650 MILLIGRAM(S): at 13:30

## 2025-01-01 RX ADMIN — IPRATROPIUM BROMIDE AND ALBUTEROL SULFATE 3 MILLILITER(S): .5; 2.5 SOLUTION RESPIRATORY (INHALATION) at 05:04

## 2025-01-01 RX ADMIN — Medication 325 MILLIGRAM(S): at 12:17

## 2025-01-01 RX ADMIN — ATORVASTATIN CALCIUM 20 MILLIGRAM(S): 80 TABLET, FILM COATED ORAL at 22:01

## 2025-01-01 RX ADMIN — Medication 100 MILLIGRAM(S): at 22:02

## 2025-01-01 RX ADMIN — Medication 650 MILLIGRAM(S): at 19:29

## 2025-01-01 RX ADMIN — Medication 81 MILLIGRAM(S): at 11:13

## 2025-01-01 RX ADMIN — ALBUMIN (HUMAN) 50 MILLILITER(S): 12.5 INJECTION, SOLUTION INTRAVENOUS at 17:43

## 2025-01-01 RX ADMIN — IRON SUCROSE 176.67 MILLIGRAM(S): 20 INJECTION, SOLUTION INTRAVENOUS at 00:02

## 2025-01-01 RX ADMIN — LIDOCAINE HYDROCHLORIDE 1 PATCH: 20 JELLY TOPICAL at 19:18

## 2025-01-01 RX ADMIN — DAPAGLIFLOZIN 10 MILLIGRAM(S): 5 TABLET, FILM COATED ORAL at 09:10

## 2025-01-01 RX ADMIN — DEXAMETHASONE 6 MILLIGRAM(S): 0.5 TABLET ORAL at 05:29

## 2025-01-01 RX ADMIN — DAPAGLIFLOZIN 10 MILLIGRAM(S): 5 TABLET, FILM COATED ORAL at 12:09

## 2025-01-01 RX ADMIN — CARVEDILOL 25 MILLIGRAM(S): 3.12 TABLET, FILM COATED ORAL at 18:22

## 2025-01-01 RX ADMIN — Medication 40 MILLIEQUIVALENT(S): at 10:07

## 2025-01-01 RX ADMIN — ATORVASTATIN CALCIUM 20 MILLIGRAM(S): 80 TABLET, FILM COATED ORAL at 22:16

## 2025-01-01 RX ADMIN — INSULIN LISPRO 3 UNIT(S): 100 INJECTION, SOLUTION INTRAVENOUS; SUBCUTANEOUS at 08:40

## 2025-01-01 RX ADMIN — Medication 650 MILLIGRAM(S): at 22:45

## 2025-01-01 RX ADMIN — Medication 75 MILLIGRAM(S): at 07:30

## 2025-01-01 RX ADMIN — Medication 2 TABLET(S): at 22:22

## 2025-01-01 RX ADMIN — INSULIN LISPRO 4: 100 INJECTION, SOLUTION INTRAVENOUS; SUBCUTANEOUS at 12:44

## 2025-01-01 RX ADMIN — INSULIN GLARGINE-YFGN 7 UNIT(S): 100 INJECTION, SOLUTION SUBCUTANEOUS at 21:56

## 2025-01-01 RX ADMIN — IPRATROPIUM BROMIDE AND ALBUTEROL SULFATE 3 MILLILITER(S): .5; 2.5 SOLUTION RESPIRATORY (INHALATION) at 05:48

## 2025-01-01 RX ADMIN — INSULIN LISPRO 2: 100 INJECTION, SOLUTION INTRAVENOUS; SUBCUTANEOUS at 08:36

## 2025-01-01 RX ADMIN — LIDOCAINE HYDROCHLORIDE 1 PATCH: 20 JELLY TOPICAL at 18:51

## 2025-01-01 RX ADMIN — Medication 50 MILLIGRAM(S): at 21:44

## 2025-01-01 RX ADMIN — TACROLIMUS 2 MILLIGRAM(S): 0.5 CAPSULE ORAL at 22:16

## 2025-01-01 RX ADMIN — Medication 5 MILLIGRAM(S): at 21:54

## 2025-01-01 RX ADMIN — BUMETANIDE 2 MILLIGRAM(S): 1 TABLET ORAL at 06:22

## 2025-01-01 RX ADMIN — Medication 100 MILLIGRAM(S): at 21:44

## 2025-01-01 RX ADMIN — CARVEDILOL 12.5 MILLIGRAM(S): 3.12 TABLET, FILM COATED ORAL at 07:09

## 2025-01-01 RX ADMIN — HEPARIN SODIUM 5000 UNIT(S): 1000 INJECTION INTRAVENOUS; SUBCUTANEOUS at 05:27

## 2025-01-01 RX ADMIN — Medication 81 MILLIGRAM(S): at 10:42

## 2025-01-01 RX ADMIN — IPRATROPIUM BROMIDE AND ALBUTEROL SULFATE 3 MILLILITER(S): .5; 2.5 SOLUTION RESPIRATORY (INHALATION) at 18:33

## 2025-01-01 RX ADMIN — TACROLIMUS 1 MILLIGRAM(S): 0.5 CAPSULE ORAL at 17:23

## 2025-01-01 RX ADMIN — IRON SUCROSE 176.67 MILLIGRAM(S): 20 INJECTION, SOLUTION INTRAVENOUS at 17:28

## 2025-01-01 RX ADMIN — CLOPIDOGREL BISULFATE 75 MILLIGRAM(S): 75 TABLET, FILM COATED ORAL at 11:35

## 2025-01-01 RX ADMIN — Medication 5 MILLIGRAM(S): at 22:00

## 2025-01-01 RX ADMIN — Medication 50 MILLIGRAM(S): at 14:38

## 2025-01-01 RX ADMIN — Medication 325 MILLIGRAM(S): at 12:07

## 2025-01-01 RX ADMIN — INSULIN LISPRO 2: 100 INJECTION, SOLUTION INTRAVENOUS; SUBCUTANEOUS at 17:28

## 2025-01-01 RX ADMIN — ATORVASTATIN CALCIUM 20 MILLIGRAM(S): 80 TABLET, FILM COATED ORAL at 21:43

## 2025-01-01 RX ADMIN — Medication 325 MILLIGRAM(S): at 12:31

## 2025-01-01 RX ADMIN — INSULIN LISPRO 3 UNIT(S): 100 INJECTION, SOLUTION INTRAVENOUS; SUBCUTANEOUS at 11:52

## 2025-01-01 RX ADMIN — Medication 81 MILLIGRAM(S): at 11:44

## 2025-01-01 RX ADMIN — Medication 5 MILLIGRAM(S): at 22:33

## 2025-01-01 RX ADMIN — ISOSORBDIE DINITRATE 20 MILLIGRAM(S): 30 TABLET ORAL at 10:04

## 2025-01-01 RX ADMIN — CARVEDILOL 12.5 MILLIGRAM(S): 3.12 TABLET, FILM COATED ORAL at 18:49

## 2025-01-01 RX ADMIN — HEPARIN SODIUM 5000 UNIT(S): 1000 INJECTION INTRAVENOUS; SUBCUTANEOUS at 18:07

## 2025-01-01 RX ADMIN — HEPARIN SODIUM 5000 UNIT(S): 1000 INJECTION INTRAVENOUS; SUBCUTANEOUS at 05:36

## 2025-01-01 RX ADMIN — Medication 650 MILLIGRAM(S): at 21:43

## 2025-01-01 RX ADMIN — Medication 100 MILLIGRAM(S): at 15:40

## 2025-01-01 RX ADMIN — BUMETANIDE 2 MILLIGRAM(S): 1 TABLET ORAL at 12:07

## 2025-01-01 RX ADMIN — BUMETANIDE 10 MG/HR: 1 TABLET ORAL at 06:45

## 2025-01-01 RX ADMIN — ISOSORBDIE DINITRATE 30 MILLIGRAM(S): 30 TABLET ORAL at 11:56

## 2025-01-01 RX ADMIN — CLOPIDOGREL BISULFATE 75 MILLIGRAM(S): 75 TABLET, FILM COATED ORAL at 11:45

## 2025-01-01 RX ADMIN — Medication 325 MILLIGRAM(S): at 11:59

## 2025-01-01 RX ADMIN — LIDOCAINE HYDROCHLORIDE 1 PATCH: 20 JELLY TOPICAL at 19:57

## 2025-01-01 RX ADMIN — INSULIN LISPRO 6: 100 INJECTION, SOLUTION INTRAVENOUS; SUBCUTANEOUS at 12:41

## 2025-01-01 RX ADMIN — CARVEDILOL 12.5 MILLIGRAM(S): 3.12 TABLET, FILM COATED ORAL at 06:37

## 2025-01-01 RX ADMIN — ATORVASTATIN CALCIUM 20 MILLIGRAM(S): 80 TABLET, FILM COATED ORAL at 21:47

## 2025-01-01 RX ADMIN — BUMETANIDE 10 MG/HR: 1 TABLET ORAL at 08:47

## 2025-01-01 RX ADMIN — INSULIN LISPRO 5 UNIT(S): 100 INJECTION, SOLUTION INTRAVENOUS; SUBCUTANEOUS at 17:17

## 2025-01-01 RX ADMIN — INSULIN LISPRO 5 UNIT(S): 100 INJECTION, SOLUTION INTRAVENOUS; SUBCUTANEOUS at 12:37

## 2025-01-01 RX ADMIN — INSULIN LISPRO 2: 100 INJECTION, SOLUTION INTRAVENOUS; SUBCUTANEOUS at 13:39

## 2025-01-01 RX ADMIN — ISOSORBDIE DINITRATE 30 MILLIGRAM(S): 30 TABLET ORAL at 17:15

## 2025-01-01 RX ADMIN — INSULIN LISPRO 2: 100 INJECTION, SOLUTION INTRAVENOUS; SUBCUTANEOUS at 11:55

## 2025-01-01 RX ADMIN — CLOPIDOGREL BISULFATE 75 MILLIGRAM(S): 75 TABLET, FILM COATED ORAL at 09:50

## 2025-01-01 RX ADMIN — Medication 2 TABLET(S): at 21:50

## 2025-01-01 RX ADMIN — CARVEDILOL 12.5 MILLIGRAM(S): 3.12 TABLET, FILM COATED ORAL at 17:29

## 2025-01-01 RX ADMIN — INSULIN GLARGINE-YFGN 8 UNIT(S): 100 INJECTION, SOLUTION SUBCUTANEOUS at 21:54

## 2025-01-01 RX ADMIN — TACROLIMUS 2 MILLIGRAM(S): 0.5 CAPSULE ORAL at 00:16

## 2025-01-01 RX ADMIN — IPRATROPIUM BROMIDE AND ALBUTEROL SULFATE 3 MILLILITER(S): .5; 2.5 SOLUTION RESPIRATORY (INHALATION) at 10:48

## 2025-01-01 RX ADMIN — LIDOCAINE HYDROCHLORIDE 1 PATCH: 20 JELLY TOPICAL at 18:56

## 2025-01-01 RX ADMIN — CARVEDILOL 12.5 MILLIGRAM(S): 3.12 TABLET, FILM COATED ORAL at 06:47

## 2025-01-01 RX ADMIN — Medication 25 MILLIGRAM(S): at 15:24

## 2025-01-01 RX ADMIN — Medication 81 MILLIGRAM(S): at 11:34

## 2025-01-01 RX ADMIN — Medication 650 MILLIGRAM(S): at 02:00

## 2025-01-01 RX ADMIN — Medication 650 MILLIGRAM(S): at 14:15

## 2025-01-01 RX ADMIN — Medication 81 MILLIGRAM(S): at 12:09

## 2025-01-01 RX ADMIN — Medication 325 MILLIGRAM(S): at 10:36

## 2025-01-01 RX ADMIN — TACROLIMUS 2 MILLIGRAM(S): 0.5 CAPSULE ORAL at 12:08

## 2025-01-01 RX ADMIN — Medication 40 MILLIGRAM(S): at 06:13

## 2025-01-01 RX ADMIN — Medication 100 MILLIGRAM(S): at 15:05

## 2025-01-01 RX ADMIN — BUMETANIDE 1 MG/HR: 1 TABLET ORAL at 02:12

## 2025-01-01 RX ADMIN — ISOSORBDIE DINITRATE 20 MILLIGRAM(S): 30 TABLET ORAL at 17:33

## 2025-01-01 RX ADMIN — ATORVASTATIN CALCIUM 20 MILLIGRAM(S): 80 TABLET, FILM COATED ORAL at 21:06

## 2025-01-01 RX ADMIN — Medication 40 MILLIGRAM(S): at 06:29

## 2025-01-01 RX ADMIN — Medication 100 MILLIGRAM(S): at 05:04

## 2025-01-01 RX ADMIN — INSULIN LISPRO 10 UNIT(S): 100 INJECTION, SOLUTION INTRAVENOUS; SUBCUTANEOUS at 12:30

## 2025-01-01 RX ADMIN — Medication 1300 MILLIGRAM(S): at 15:40

## 2025-01-01 RX ADMIN — IPRATROPIUM BROMIDE AND ALBUTEROL SULFATE 3 MILLILITER(S): .5; 2.5 SOLUTION RESPIRATORY (INHALATION) at 07:34

## 2025-01-01 RX ADMIN — REMDESIVIR 200 MILLIGRAM(S): 5 INJECTION INTRAVENOUS at 15:41

## 2025-01-01 RX ADMIN — ISOSORBDIE DINITRATE 10 MILLIGRAM(S): 30 TABLET ORAL at 06:12

## 2025-01-01 RX ADMIN — Medication 2 TABLET(S): at 21:05

## 2025-01-01 RX ADMIN — Medication 40 MILLIGRAM(S): at 05:25

## 2025-01-01 RX ADMIN — INSULIN LISPRO 8 UNIT(S): 100 INJECTION, SOLUTION INTRAVENOUS; SUBCUTANEOUS at 12:28

## 2025-01-01 RX ADMIN — Medication 100 MILLIGRAM(S): at 21:48

## 2025-01-01 RX ADMIN — ISOSORBDIE DINITRATE 30 MILLIGRAM(S): 30 TABLET ORAL at 06:07

## 2025-01-01 RX ADMIN — INSULIN LISPRO 2: 100 INJECTION, SOLUTION INTRAVENOUS; SUBCUTANEOUS at 22:25

## 2025-01-01 RX ADMIN — CARVEDILOL 12.5 MILLIGRAM(S): 3.12 TABLET, FILM COATED ORAL at 05:36

## 2025-01-01 RX ADMIN — CARVEDILOL 12.5 MILLIGRAM(S): 3.12 TABLET, FILM COATED ORAL at 19:43

## 2025-01-01 RX ADMIN — LIDOCAINE HYDROCHLORIDE 1 PATCH: 20 JELLY TOPICAL at 11:19

## 2025-01-01 RX ADMIN — INSULIN LISPRO 8: 100 INJECTION, SOLUTION INTRAVENOUS; SUBCUTANEOUS at 11:52

## 2025-01-01 RX ADMIN — Medication 650 MILLIGRAM(S): at 14:06

## 2025-01-01 RX ADMIN — Medication 100 MILLIGRAM(S): at 14:50

## 2025-01-01 RX ADMIN — DAPAGLIFLOZIN 10 MILLIGRAM(S): 5 TABLET, FILM COATED ORAL at 09:51

## 2025-01-01 RX ADMIN — Medication 81 MILLIGRAM(S): at 12:08

## 2025-01-01 RX ADMIN — PREDNISONE 5 MILLIGRAM(S): 20 TABLET ORAL at 05:27

## 2025-01-01 RX ADMIN — DAPAGLIFLOZIN 10 MILLIGRAM(S): 5 TABLET, FILM COATED ORAL at 09:49

## 2025-01-01 RX ADMIN — ATORVASTATIN CALCIUM 20 MILLIGRAM(S): 80 TABLET, FILM COATED ORAL at 22:22

## 2025-01-01 RX ADMIN — Medication 50 MILLIGRAM(S): at 05:49

## 2025-01-01 RX ADMIN — LIDOCAINE HYDROCHLORIDE 1 PATCH: 20 JELLY TOPICAL at 12:07

## 2025-01-01 RX ADMIN — INSULIN LISPRO 8 UNIT(S): 100 INJECTION, SOLUTION INTRAVENOUS; SUBCUTANEOUS at 08:48

## 2025-01-01 RX ADMIN — Medication 50 MILLIGRAM(S): at 14:10

## 2025-01-01 RX ADMIN — INSULIN LISPRO 10 UNIT(S): 100 INJECTION, SOLUTION INTRAVENOUS; SUBCUTANEOUS at 18:03

## 2025-01-01 RX ADMIN — HEPARIN SODIUM 5000 UNIT(S): 1000 INJECTION INTRAVENOUS; SUBCUTANEOUS at 06:13

## 2025-01-01 RX ADMIN — Medication 25 MILLIGRAM(S): at 05:26

## 2025-01-01 RX ADMIN — CLOPIDOGREL BISULFATE 75 MILLIGRAM(S): 75 TABLET, FILM COATED ORAL at 11:51

## 2025-01-01 RX ADMIN — Medication 81 MILLIGRAM(S): at 09:37

## 2025-01-01 RX ADMIN — BUMETANIDE 10 MG/HR: 1 TABLET ORAL at 04:23

## 2025-01-01 RX ADMIN — Medication 100 MILLIGRAM(S): at 12:18

## 2025-01-01 RX ADMIN — LIDOCAINE HYDROCHLORIDE 1 PATCH: 20 JELLY TOPICAL at 11:22

## 2025-01-01 RX ADMIN — BUMETANIDE 10 MG/HR: 1 TABLET ORAL at 15:02

## 2025-01-01 RX ADMIN — INSULIN GLARGINE-YFGN 6 UNIT(S): 100 INJECTION, SOLUTION SUBCUTANEOUS at 22:13

## 2025-01-01 RX ADMIN — TACROLIMUS 2 MILLIGRAM(S): 0.5 CAPSULE ORAL at 22:11

## 2025-01-01 RX ADMIN — Medication 650 MILLIGRAM(S): at 21:54

## 2025-01-01 RX ADMIN — Medication 100 MILLIGRAM(S): at 22:31

## 2025-01-01 RX ADMIN — DEXTROMETHORPHAN HBR, GUAIFENESIN 100 MILLIGRAM(S): 200 LIQUID ORAL at 22:02

## 2025-01-01 RX ADMIN — INSULIN LISPRO 5 UNIT(S): 100 INJECTION, SOLUTION INTRAVENOUS; SUBCUTANEOUS at 08:21

## 2025-01-01 RX ADMIN — INSULIN GLARGINE-YFGN 6 UNIT(S): 100 INJECTION, SOLUTION SUBCUTANEOUS at 22:23

## 2025-01-01 RX ADMIN — INSULIN LISPRO 6: 100 INJECTION, SOLUTION INTRAVENOUS; SUBCUTANEOUS at 17:23

## 2025-01-01 RX ADMIN — INSULIN LISPRO 4: 100 INJECTION, SOLUTION INTRAVENOUS; SUBCUTANEOUS at 22:15

## 2025-01-01 RX ADMIN — CLOPIDOGREL BISULFATE 75 MILLIGRAM(S): 75 TABLET, FILM COATED ORAL at 11:59

## 2025-01-01 RX ADMIN — LIDOCAINE HYDROCHLORIDE 1 PATCH: 20 JELLY TOPICAL at 09:37

## 2025-01-01 RX ADMIN — ATORVASTATIN CALCIUM 20 MILLIGRAM(S): 80 TABLET, FILM COATED ORAL at 22:00

## 2025-01-01 RX ADMIN — BUMETANIDE 10 MG/HR: 1 TABLET ORAL at 07:09

## 2025-01-01 RX ADMIN — TACROLIMUS 1 MILLIGRAM(S): 0.5 CAPSULE ORAL at 07:09

## 2025-01-01 RX ADMIN — HEPARIN SODIUM 5000 UNIT(S): 1000 INJECTION INTRAVENOUS; SUBCUTANEOUS at 17:36

## 2025-01-01 RX ADMIN — Medication 400 MILLIGRAM(S): at 08:23

## 2025-01-01 RX ADMIN — Medication 25 MILLIGRAM(S): at 09:51

## 2025-01-01 RX ADMIN — ISOSORBDIE DINITRATE 10 MILLIGRAM(S): 30 TABLET ORAL at 18:07

## 2025-01-01 RX ADMIN — Medication 400 MILLIGRAM(S): at 02:41

## 2025-01-01 RX ADMIN — Medication 25 MILLIGRAM(S): at 15:12

## 2025-01-01 RX ADMIN — HEPARIN SODIUM 5000 UNIT(S): 1000 INJECTION INTRAVENOUS; SUBCUTANEOUS at 05:49

## 2025-01-01 RX ADMIN — INSULIN LISPRO 2: 100 INJECTION, SOLUTION INTRAVENOUS; SUBCUTANEOUS at 22:01

## 2025-01-01 RX ADMIN — ISOSORBDIE DINITRATE 20 MILLIGRAM(S): 30 TABLET ORAL at 11:18

## 2025-01-01 RX ADMIN — Medication 20 MILLIEQUIVALENT(S): at 19:27

## 2025-01-01 RX ADMIN — CARVEDILOL 12.5 MILLIGRAM(S): 3.12 TABLET, FILM COATED ORAL at 18:09

## 2025-01-01 RX ADMIN — Medication 80 MILLIGRAM(S): at 05:24

## 2025-01-01 RX ADMIN — CARVEDILOL 25 MILLIGRAM(S): 3.12 TABLET, FILM COATED ORAL at 17:19

## 2025-01-01 RX ADMIN — Medication 1300 MILLIGRAM(S): at 15:02

## 2025-01-01 RX ADMIN — Medication 81 MILLIGRAM(S): at 11:21

## 2025-01-01 RX ADMIN — HEPARIN SODIUM 5000 UNIT(S): 1000 INJECTION INTRAVENOUS; SUBCUTANEOUS at 17:37

## 2025-01-01 RX ADMIN — Medication 1300 MILLIGRAM(S): at 06:48

## 2025-01-01 RX ADMIN — TACROLIMUS 1 MILLIGRAM(S): 0.5 CAPSULE ORAL at 06:40

## 2025-01-01 RX ADMIN — CLOPIDOGREL BISULFATE 75 MILLIGRAM(S): 75 TABLET, FILM COATED ORAL at 12:17

## 2025-01-01 RX ADMIN — ISOSORBDIE DINITRATE 30 MILLIGRAM(S): 30 TABLET ORAL at 12:45

## 2025-01-01 RX ADMIN — IPRATROPIUM BROMIDE AND ALBUTEROL SULFATE 3 MILLILITER(S): .5; 2.5 SOLUTION RESPIRATORY (INHALATION) at 06:44

## 2025-01-01 RX ADMIN — Medication 25 GRAM(S): at 06:51

## 2025-01-01 RX ADMIN — Medication 650 MILLIGRAM(S): at 11:51

## 2025-01-01 RX ADMIN — ISOSORBDIE DINITRATE 20 MILLIGRAM(S): 30 TABLET ORAL at 15:26

## 2025-01-01 RX ADMIN — REMDESIVIR 200 MILLIGRAM(S): 5 INJECTION INTRAVENOUS at 18:06

## 2025-01-01 RX ADMIN — Medication 25 MILLIGRAM(S): at 05:37

## 2025-01-01 RX ADMIN — Medication 40 MILLIGRAM(S): at 05:21

## 2025-01-01 RX ADMIN — Medication 40 MILLIGRAM(S): at 05:30

## 2025-01-01 RX ADMIN — CLOPIDOGREL BISULFATE 75 MILLIGRAM(S): 75 TABLET, FILM COATED ORAL at 12:07

## 2025-01-01 RX ADMIN — ISOSORBDIE DINITRATE 20 MILLIGRAM(S): 30 TABLET ORAL at 15:31

## 2025-01-01 RX ADMIN — ALBUMIN (HUMAN) 50 MILLILITER(S): 12.5 INJECTION, SOLUTION INTRAVENOUS at 19:49

## 2025-01-01 RX ADMIN — IPRATROPIUM BROMIDE AND ALBUTEROL SULFATE 3 MILLILITER(S): .5; 2.5 SOLUTION RESPIRATORY (INHALATION) at 16:06

## 2025-01-01 RX ADMIN — INSULIN LISPRO 4: 100 INJECTION, SOLUTION INTRAVENOUS; SUBCUTANEOUS at 12:10

## 2025-01-01 RX ADMIN — BUMETANIDE 2 MILLIGRAM(S): 1 TABLET ORAL at 10:34

## 2025-01-01 RX ADMIN — HEPARIN SODIUM 5000 UNIT(S): 1000 INJECTION INTRAVENOUS; SUBCUTANEOUS at 05:40

## 2025-01-01 RX ADMIN — INSULIN LISPRO 2: 100 INJECTION, SOLUTION INTRAVENOUS; SUBCUTANEOUS at 21:48

## 2025-01-01 RX ADMIN — BUMETANIDE 2 MILLIGRAM(S): 1 TABLET ORAL at 05:48

## 2025-01-01 RX ADMIN — TACROLIMUS 1 MILLIGRAM(S): 0.5 CAPSULE ORAL at 15:23

## 2025-01-01 RX ADMIN — PREDNISONE 5 MILLIGRAM(S): 20 TABLET ORAL at 05:28

## 2025-01-01 RX ADMIN — Medication 650 MILLIGRAM(S): at 18:51

## 2025-01-01 RX ADMIN — Medication 81 MILLIGRAM(S): at 12:43

## 2025-01-01 RX ADMIN — Medication 650 MILLIGRAM(S): at 11:21

## 2025-01-01 RX ADMIN — DEXAMETHASONE 6 MILLIGRAM(S): 0.5 TABLET ORAL at 07:08

## 2025-01-01 RX ADMIN — ISOSORBDIE DINITRATE 30 MILLIGRAM(S): 30 TABLET ORAL at 11:34

## 2025-01-01 RX ADMIN — Medication 25 MILLIGRAM(S): at 14:10

## 2025-01-01 RX ADMIN — Medication 100 MILLIGRAM(S): at 05:36

## 2025-01-01 RX ADMIN — BUMETANIDE 2 MILLIGRAM(S): 1 TABLET ORAL at 15:40

## 2025-01-01 RX ADMIN — INSULIN LISPRO 10 UNIT(S): 100 INJECTION, SOLUTION INTRAVENOUS; SUBCUTANEOUS at 17:36

## 2025-01-01 RX ADMIN — Medication 650 MILLIGRAM(S): at 13:46

## 2025-01-01 RX ADMIN — LIDOCAINE HYDROCHLORIDE 1 PATCH: 20 JELLY TOPICAL at 00:04

## 2025-01-01 RX ADMIN — IPRATROPIUM BROMIDE AND ALBUTEROL SULFATE 3 MILLILITER(S): .5; 2.5 SOLUTION RESPIRATORY (INHALATION) at 11:18

## 2025-01-01 RX ADMIN — INSULIN GLARGINE-YFGN 7 UNIT(S): 100 INJECTION, SOLUTION SUBCUTANEOUS at 22:16

## 2025-01-01 RX ADMIN — BUMETANIDE 2 MILLIGRAM(S): 1 TABLET ORAL at 09:01

## 2025-01-01 RX ADMIN — Medication 100 MILLIGRAM(S): at 05:25

## 2025-01-01 RX ADMIN — Medication 100 MILLIGRAM(S): at 14:17

## 2025-01-01 RX ADMIN — Medication 100 MILLIGRAM(S): at 05:20

## 2025-01-01 RX ADMIN — REMDESIVIR 200 MILLIGRAM(S): 5 INJECTION INTRAVENOUS at 17:09

## 2025-01-01 RX ADMIN — Medication 100 MILLIGRAM(S): at 06:12

## 2025-01-01 RX ADMIN — INSULIN LISPRO 4: 100 INJECTION, SOLUTION INTRAVENOUS; SUBCUTANEOUS at 23:00

## 2025-01-01 RX ADMIN — PREDNISONE 5 MILLIGRAM(S): 20 TABLET ORAL at 05:20

## 2025-01-01 RX ADMIN — Medication 100 MILLIGRAM(S): at 22:22

## 2025-01-01 RX ADMIN — LIDOCAINE HYDROCHLORIDE 1 PATCH: 20 JELLY TOPICAL at 10:36

## 2025-01-01 RX ADMIN — TACROLIMUS 2 MILLIGRAM(S): 0.5 CAPSULE ORAL at 06:16

## 2025-01-01 RX ADMIN — BUMETANIDE 10 MG/HR: 1 TABLET ORAL at 00:01

## 2025-01-01 RX ADMIN — Medication 81 MILLIGRAM(S): at 11:19

## 2025-01-01 RX ADMIN — Medication 100 MILLIGRAM(S): at 21:50

## 2025-01-01 RX ADMIN — INSULIN LISPRO 6: 100 INJECTION, SOLUTION INTRAVENOUS; SUBCUTANEOUS at 09:28

## 2025-01-01 RX ADMIN — INSULIN LISPRO 2: 100 INJECTION, SOLUTION INTRAVENOUS; SUBCUTANEOUS at 12:38

## 2025-01-01 RX ADMIN — IPRATROPIUM BROMIDE AND ALBUTEROL SULFATE 3 MILLILITER(S): .5; 2.5 SOLUTION RESPIRATORY (INHALATION) at 16:42

## 2025-01-01 RX ADMIN — Medication 2 MILLIGRAM(S): at 06:43

## 2025-01-01 RX ADMIN — HEPARIN SODIUM 5000 UNIT(S): 1000 INJECTION INTRAVENOUS; SUBCUTANEOUS at 17:13

## 2025-01-01 RX ADMIN — INSULIN LISPRO 2: 100 INJECTION, SOLUTION INTRAVENOUS; SUBCUTANEOUS at 08:15

## 2025-01-01 RX ADMIN — CARVEDILOL 6.25 MILLIGRAM(S): 3.12 TABLET, FILM COATED ORAL at 18:34

## 2025-01-01 RX ADMIN — TACROLIMUS 1 MILLIGRAM(S): 0.5 CAPSULE ORAL at 05:36

## 2025-01-01 RX ADMIN — DEXAMETHASONE 6 MILLIGRAM(S): 0.5 TABLET ORAL at 06:13

## 2025-01-01 RX ADMIN — Medication 100 MILLIGRAM(S): at 06:09

## 2025-01-01 RX ADMIN — Medication 25 MILLIGRAM(S): at 12:50

## 2025-01-01 RX ADMIN — Medication 100 MILLIGRAM(S): at 15:24

## 2025-01-01 RX ADMIN — INSULIN LISPRO 5 UNIT(S): 100 INJECTION, SOLUTION INTRAVENOUS; SUBCUTANEOUS at 17:22

## 2025-01-01 RX ADMIN — Medication 650 MILLIGRAM(S): at 05:07

## 2025-01-01 RX ADMIN — Medication 100 MILLIGRAM(S): at 21:43

## 2025-01-01 RX ADMIN — ISOSORBDIE DINITRATE 30 MILLIGRAM(S): 30 TABLET ORAL at 07:37

## 2025-01-01 RX ADMIN — INSULIN LISPRO 2: 100 INJECTION, SOLUTION INTRAVENOUS; SUBCUTANEOUS at 12:55

## 2025-01-01 RX ADMIN — INSULIN GLARGINE-YFGN 4 UNIT(S): 100 INJECTION, SOLUTION SUBCUTANEOUS at 21:51

## 2025-01-01 RX ADMIN — INSULIN LISPRO 12 UNIT(S): 100 INJECTION, SOLUTION INTRAVENOUS; SUBCUTANEOUS at 12:41

## 2025-01-01 RX ADMIN — PREDNISONE 5 MILLIGRAM(S): 20 TABLET ORAL at 06:28

## 2025-01-01 RX ADMIN — Medication 1300 MILLIGRAM(S): at 12:44

## 2025-01-01 RX ADMIN — ATORVASTATIN CALCIUM 20 MILLIGRAM(S): 80 TABLET, FILM COATED ORAL at 23:25

## 2025-01-01 RX ADMIN — IPRATROPIUM BROMIDE AND ALBUTEROL SULFATE 3 MILLILITER(S): .5; 2.5 SOLUTION RESPIRATORY (INHALATION) at 11:00

## 2025-01-01 RX ADMIN — BUMETANIDE 2 MILLIGRAM(S): 1 TABLET ORAL at 09:06

## 2025-01-01 RX ADMIN — INSULIN GLARGINE-YFGN 6 UNIT(S): 100 INJECTION, SOLUTION SUBCUTANEOUS at 22:01

## 2025-01-01 RX ADMIN — INSULIN LISPRO 2: 100 INJECTION, SOLUTION INTRAVENOUS; SUBCUTANEOUS at 21:51

## 2025-01-01 RX ADMIN — ATORVASTATIN CALCIUM 20 MILLIGRAM(S): 80 TABLET, FILM COATED ORAL at 21:44

## 2025-01-01 RX ADMIN — Medication 325 MILLIGRAM(S): at 11:20

## 2025-01-01 RX ADMIN — Medication 400 MILLIGRAM(S): at 15:03

## 2025-01-01 RX ADMIN — IPRATROPIUM BROMIDE AND ALBUTEROL SULFATE 3 MILLILITER(S): .5; 2.5 SOLUTION RESPIRATORY (INHALATION) at 16:44

## 2025-01-01 RX ADMIN — Medication 1300 MILLIGRAM(S): at 05:04

## 2025-01-01 RX ADMIN — INSULIN GLARGINE-YFGN 7 UNIT(S): 100 INJECTION, SOLUTION SUBCUTANEOUS at 21:50

## 2025-01-01 RX ADMIN — Medication 1000 MILLIGRAM(S): at 16:05

## 2025-01-01 RX ADMIN — POLYETHYLENE GLYCOL 3350 17 GRAM(S): 17 POWDER, FOR SOLUTION ORAL at 01:02

## 2025-01-01 RX ADMIN — ISOSORBDIE DINITRATE 30 MILLIGRAM(S): 30 TABLET ORAL at 17:07

## 2025-01-01 RX ADMIN — INSULIN LISPRO 3 UNIT(S): 100 INJECTION, SOLUTION INTRAVENOUS; SUBCUTANEOUS at 19:53

## 2025-01-01 RX ADMIN — Medication 650 MILLIGRAM(S): at 06:47

## 2025-01-01 RX ADMIN — TACROLIMUS 1 MILLIGRAM(S): 0.5 CAPSULE ORAL at 22:12

## 2025-01-01 RX ADMIN — Medication 400 MILLIGRAM(S): at 17:37

## 2025-01-01 RX ADMIN — ALBUMIN (HUMAN) 125 MILLILITER(S): 12.5 INJECTION, SOLUTION INTRAVENOUS at 18:01

## 2025-01-01 RX ADMIN — CLOPIDOGREL BISULFATE 75 MILLIGRAM(S): 75 TABLET, FILM COATED ORAL at 10:36

## 2025-01-01 RX ADMIN — Medication 2 TABLET(S): at 22:12

## 2025-01-01 RX ADMIN — HEPARIN SODIUM 5000 UNIT(S): 1000 INJECTION INTRAVENOUS; SUBCUTANEOUS at 17:22

## 2025-01-01 RX ADMIN — Medication 81 MILLIGRAM(S): at 12:18

## 2025-01-01 RX ADMIN — CLOPIDOGREL BISULFATE 75 MILLIGRAM(S): 75 TABLET, FILM COATED ORAL at 09:37

## 2025-01-01 RX ADMIN — ATORVASTATIN CALCIUM 20 MILLIGRAM(S): 80 TABLET, FILM COATED ORAL at 22:02

## 2025-01-01 RX ADMIN — ISOSORBDIE DINITRATE 30 MILLIGRAM(S): 30 TABLET ORAL at 16:49

## 2025-01-01 RX ADMIN — CLOPIDOGREL BISULFATE 75 MILLIGRAM(S): 75 TABLET, FILM COATED ORAL at 09:10

## 2025-01-01 RX ADMIN — Medication 20 MILLIEQUIVALENT(S): at 10:35

## 2025-01-01 RX ADMIN — Medication 50 MILLILITER(S): at 09:02

## 2025-01-01 RX ADMIN — HEPARIN SODIUM 5000 UNIT(S): 1000 INJECTION INTRAVENOUS; SUBCUTANEOUS at 19:09

## 2025-01-01 RX ADMIN — Medication 1300 MILLIGRAM(S): at 05:28

## 2025-01-01 RX ADMIN — INSULIN LISPRO 6: 100 INJECTION, SOLUTION INTRAVENOUS; SUBCUTANEOUS at 22:30

## 2025-01-01 RX ADMIN — Medication 100 MILLIGRAM(S): at 06:05

## 2025-01-01 RX ADMIN — Medication 100 MILLIGRAM(S): at 14:06

## 2025-01-01 RX ADMIN — TACROLIMUS 2 MILLIGRAM(S): 0.5 CAPSULE ORAL at 22:10

## 2025-01-01 RX ADMIN — INSULIN LISPRO 4: 100 INJECTION, SOLUTION INTRAVENOUS; SUBCUTANEOUS at 22:25

## 2025-01-01 RX ADMIN — DEXTROMETHORPHAN HBR, GUAIFENESIN 100 MILLIGRAM(S): 200 LIQUID ORAL at 14:52

## 2025-01-01 RX ADMIN — IPRATROPIUM BROMIDE AND ALBUTEROL SULFATE 3 MILLILITER(S): .5; 2.5 SOLUTION RESPIRATORY (INHALATION) at 22:09

## 2025-01-01 RX ADMIN — Medication 25 MILLIGRAM(S): at 22:02

## 2025-01-01 RX ADMIN — PREDNISONE 5 MILLIGRAM(S): 20 TABLET ORAL at 06:22

## 2025-01-01 RX ADMIN — DEXTROMETHORPHAN HBR, GUAIFENESIN 100 MILLIGRAM(S): 200 LIQUID ORAL at 11:13

## 2025-01-01 RX ADMIN — IPRATROPIUM BROMIDE AND ALBUTEROL SULFATE 3 MILLILITER(S): .5; 2.5 SOLUTION RESPIRATORY (INHALATION) at 21:47

## 2025-01-01 RX ADMIN — INSULIN LISPRO 5 UNIT(S): 100 INJECTION, SOLUTION INTRAVENOUS; SUBCUTANEOUS at 08:39

## 2025-01-01 RX ADMIN — Medication 40 MILLIGRAM(S): at 05:53

## 2025-01-01 RX ADMIN — SODIUM CHLORIDE 250 MILLILITER(S): 9 INJECTION, SOLUTION INTRAVENOUS at 09:31

## 2025-01-01 RX ADMIN — POLYETHYLENE GLYCOL 3350 17 GRAM(S): 17 POWDER, FOR SOLUTION ORAL at 00:28

## 2025-01-01 RX ADMIN — ISOSORBDIE DINITRATE 10 MILLIGRAM(S): 30 TABLET ORAL at 06:59

## 2025-01-01 RX ADMIN — INSULIN LISPRO 5 UNIT(S): 100 INJECTION, SOLUTION INTRAVENOUS; SUBCUTANEOUS at 12:09

## 2025-01-01 RX ADMIN — Medication 40 MILLIGRAM(S): at 06:10

## 2025-01-01 RX ADMIN — BUMETANIDE 2 MILLIGRAM(S): 1 TABLET ORAL at 12:53

## 2025-01-01 RX ADMIN — BUMETANIDE 2 MILLIGRAM(S): 1 TABLET ORAL at 05:36

## 2025-01-01 RX ADMIN — CLOPIDOGREL BISULFATE 75 MILLIGRAM(S): 75 TABLET, FILM COATED ORAL at 10:41

## 2025-01-01 RX ADMIN — Medication 100 MILLIGRAM(S): at 12:44

## 2025-01-01 RX ADMIN — Medication 325 MILLIGRAM(S): at 11:18

## 2025-01-01 RX ADMIN — INSULIN LISPRO 3 UNIT(S): 100 INJECTION, SOLUTION INTRAVENOUS; SUBCUTANEOUS at 12:42

## 2025-01-01 RX ADMIN — CARVEDILOL 12.5 MILLIGRAM(S): 3.12 TABLET, FILM COATED ORAL at 17:10

## 2025-01-01 RX ADMIN — TACROLIMUS 1 MILLIGRAM(S): 0.5 CAPSULE ORAL at 06:22

## 2025-01-01 RX ADMIN — CARVEDILOL 12.5 MILLIGRAM(S): 3.12 TABLET, FILM COATED ORAL at 17:26

## 2025-01-01 RX ADMIN — Medication 40 MILLIGRAM(S): at 05:07

## 2025-01-01 RX ADMIN — Medication 1 MILLIGRAM(S): at 02:41

## 2025-01-01 RX ADMIN — SODIUM CHLORIDE 50 MILLILITER(S): 9 INJECTION, SOLUTION INTRAVENOUS at 06:53

## 2025-01-01 RX ADMIN — Medication 25 GRAM(S): at 07:20

## 2025-01-01 RX ADMIN — IPRATROPIUM BROMIDE AND ALBUTEROL SULFATE 3 MILLILITER(S): .5; 2.5 SOLUTION RESPIRATORY (INHALATION) at 12:06

## 2025-01-01 RX ADMIN — INSULIN LISPRO 8 UNIT(S): 100 INJECTION, SOLUTION INTRAVENOUS; SUBCUTANEOUS at 17:40

## 2025-01-01 RX ADMIN — Medication 1000 MILLIGRAM(S): at 20:55

## 2025-01-01 RX ADMIN — ISOSORBDIE DINITRATE 10 MILLIGRAM(S): 30 TABLET ORAL at 10:41

## 2025-01-01 RX ADMIN — TACROLIMUS 2 MILLIGRAM(S): 0.5 CAPSULE ORAL at 23:51

## 2025-01-01 RX ADMIN — Medication 100 MILLIGRAM(S): at 14:37

## 2025-01-01 RX ADMIN — IPRATROPIUM BROMIDE AND ALBUTEROL SULFATE 3 MILLILITER(S): .5; 2.5 SOLUTION RESPIRATORY (INHALATION) at 15:32

## 2025-01-01 RX ADMIN — Medication 100 MILLIGRAM(S): at 06:22

## 2025-01-01 RX ADMIN — BUMETANIDE 10 MG/HR: 1 TABLET ORAL at 18:53

## 2025-01-01 RX ADMIN — Medication 50 MILLIGRAM(S): at 06:17

## 2025-01-01 RX ADMIN — POLYETHYLENE GLYCOL 3350 17 GRAM(S): 17 POWDER, FOR SOLUTION ORAL at 06:25

## 2025-01-01 RX ADMIN — INSULIN LISPRO 10 UNIT(S): 100 INJECTION, SOLUTION INTRAVENOUS; SUBCUTANEOUS at 17:23

## 2025-01-01 RX ADMIN — ISOSORBDIE DINITRATE 10 MILLIGRAM(S): 30 TABLET ORAL at 17:09

## 2025-01-01 RX ADMIN — INSULIN LISPRO 6: 100 INJECTION, SOLUTION INTRAVENOUS; SUBCUTANEOUS at 12:52

## 2025-01-01 RX ADMIN — Medication 1300 MILLIGRAM(S): at 06:59

## 2025-01-01 RX ADMIN — INSULIN LISPRO 10 UNIT(S): 100 INJECTION, SOLUTION INTRAVENOUS; SUBCUTANEOUS at 12:36

## 2025-01-01 RX ADMIN — INSULIN LISPRO 4: 100 INJECTION, SOLUTION INTRAVENOUS; SUBCUTANEOUS at 12:30

## 2025-01-01 RX ADMIN — Medication 40 MILLIGRAM(S): at 07:04

## 2025-01-01 RX ADMIN — DEXTROMETHORPHAN HBR, GUAIFENESIN 100 MILLIGRAM(S): 200 LIQUID ORAL at 05:28

## 2025-01-01 RX ADMIN — Medication 250 MILLIGRAM(S): at 09:35

## 2025-01-01 RX ADMIN — INSULIN LISPRO 3 UNIT(S): 100 INJECTION, SOLUTION INTRAVENOUS; SUBCUTANEOUS at 17:19

## 2025-01-01 RX ADMIN — INSULIN LISPRO 6: 100 INJECTION, SOLUTION INTRAVENOUS; SUBCUTANEOUS at 17:39

## 2025-01-01 RX ADMIN — Medication 40 MILLIGRAM(S): at 06:17

## 2025-01-01 RX ADMIN — DEXAMETHASONE 6 MILLIGRAM(S): 0.5 TABLET ORAL at 18:21

## 2025-01-22 ENCOUNTER — APPOINTMENT (OUTPATIENT)
Dept: HEART AND VASCULAR | Facility: CLINIC | Age: 80
End: 2025-01-22
Payer: MEDICARE

## 2025-01-22 ENCOUNTER — NON-APPOINTMENT (OUTPATIENT)
Age: 80
End: 2025-01-22

## 2025-01-22 VITALS
WEIGHT: 140 LBS | BODY MASS INDEX: 21.97 KG/M2 | HEART RATE: 67 BPM | OXYGEN SATURATION: 97 % | SYSTOLIC BLOOD PRESSURE: 130 MMHG | TEMPERATURE: 97.8 F | HEIGHT: 67 IN | DIASTOLIC BLOOD PRESSURE: 72 MMHG

## 2025-01-22 DIAGNOSIS — I50.30 UNSPECIFIED DIASTOLIC (CONGESTIVE) HEART FAILURE: ICD-10-CM

## 2025-01-22 PROCEDURE — 93000 ELECTROCARDIOGRAM COMPLETE: CPT

## 2025-01-22 PROCEDURE — 36415 COLL VENOUS BLD VENIPUNCTURE: CPT

## 2025-01-22 PROCEDURE — G2211 COMPLEX E/M VISIT ADD ON: CPT

## 2025-01-22 PROCEDURE — 99214 OFFICE O/P EST MOD 30 MIN: CPT

## 2025-01-22 RX ORDER — SACUBITRIL AND VALSARTAN 49; 51 MG/1; MG/1
49-51 TABLET, FILM COATED ORAL
Qty: 180 | Refills: 3 | Status: DISCONTINUED | COMMUNITY
Start: 2025-01-22 | End: 2025-01-22

## 2025-01-22 RX ORDER — SACUBITRIL AND VALSARTAN 49; 51 MG/1; MG/1
49-51 TABLET, FILM COATED ORAL
Qty: 180 | Refills: 2 | Status: ACTIVE | COMMUNITY
Start: 2025-01-22 | End: 1900-01-01

## 2025-01-28 ENCOUNTER — TRANSCRIPTION ENCOUNTER (OUTPATIENT)
Age: 80
End: 2025-01-28

## 2025-01-28 LAB
ALBUMIN SERPL ELPH-MCNC: 4.1 G/DL
ALP BLD-CCNC: 67 U/L
ALT SERPL-CCNC: 21 U/L
ANION GAP SERPL CALC-SCNC: 17 MMOL/L
AST SERPL-CCNC: 16 U/L
BILIRUB SERPL-MCNC: 0.7 MG/DL
BUN SERPL-MCNC: 51 MG/DL
CALCIUM SERPL-MCNC: 10.3 MG/DL
CHLORIDE SERPL-SCNC: 101 MMOL/L
CO2 SERPL-SCNC: 26 MMOL/L
CREAT SERPL-MCNC: 1.8 MG/DL
EGFR: 38 ML/MIN/1.73M2
GLUCOSE SERPL-MCNC: 94 MG/DL
NT-PROBNP SERPL-MCNC: 6111 PG/ML
POTASSIUM SERPL-SCNC: 3.7 MMOL/L
PROT SERPL-MCNC: 7.3 G/DL
SODIUM SERPL-SCNC: 145 MMOL/L

## 2025-02-26 ENCOUNTER — APPOINTMENT (OUTPATIENT)
Dept: NEPHROLOGY | Facility: CLINIC | Age: 80
End: 2025-02-26
Payer: MEDICARE

## 2025-02-26 VITALS — DIASTOLIC BLOOD PRESSURE: 96 MMHG | HEART RATE: 55 BPM | SYSTOLIC BLOOD PRESSURE: 160 MMHG | RESPIRATION RATE: 16 BRPM

## 2025-02-26 VITALS — BODY MASS INDEX: 22.87 KG/M2 | WEIGHT: 146 LBS

## 2025-02-26 DIAGNOSIS — N17.9 ACUTE KIDNEY FAILURE, UNSPECIFIED: ICD-10-CM

## 2025-02-26 DIAGNOSIS — I10 ESSENTIAL (PRIMARY) HYPERTENSION: ICD-10-CM

## 2025-02-26 DIAGNOSIS — I50.9 HEART FAILURE, UNSPECIFIED: ICD-10-CM

## 2025-02-26 DIAGNOSIS — I51.7 CARDIOMEGALY: ICD-10-CM

## 2025-02-26 PROCEDURE — 99215 OFFICE O/P EST HI 40 MIN: CPT

## 2025-02-26 PROCEDURE — G2211 COMPLEX E/M VISIT ADD ON: CPT

## 2025-02-28 ENCOUNTER — INPATIENT (INPATIENT)
Facility: HOSPITAL | Age: 80
LOS: 1 days | Discharge: HOME | DRG: 322 | End: 2025-03-02
Attending: INTERNAL MEDICINE | Admitting: INTERNAL MEDICINE
Payer: MEDICARE

## 2025-02-28 ENCOUNTER — APPOINTMENT (OUTPATIENT)
Dept: HEART AND VASCULAR | Facility: CLINIC | Age: 80
End: 2025-02-28

## 2025-02-28 ENCOUNTER — NON-APPOINTMENT (OUTPATIENT)
Age: 80
End: 2025-02-28

## 2025-02-28 VITALS
HEART RATE: 57 BPM | TEMPERATURE: 97 F | HEIGHT: 68 IN | DIASTOLIC BLOOD PRESSURE: 88 MMHG | SYSTOLIC BLOOD PRESSURE: 161 MMHG | RESPIRATION RATE: 17 BRPM | WEIGHT: 145.06 LBS | OXYGEN SATURATION: 97 %

## 2025-02-28 VITALS
TEMPERATURE: 97.8 F | BODY MASS INDEX: 22.91 KG/M2 | HEIGHT: 67 IN | WEIGHT: 146 LBS | DIASTOLIC BLOOD PRESSURE: 88 MMHG | SYSTOLIC BLOOD PRESSURE: 150 MMHG

## 2025-02-28 DIAGNOSIS — R07.9 CHEST PAIN, UNSPECIFIED: ICD-10-CM

## 2025-02-28 DIAGNOSIS — I48.91 UNSPECIFIED ATRIAL FIBRILLATION: ICD-10-CM

## 2025-02-28 DIAGNOSIS — Z94.0 KIDNEY TRANSPLANT STATUS: ICD-10-CM

## 2025-02-28 DIAGNOSIS — D64.9 ANEMIA, UNSPECIFIED: ICD-10-CM

## 2025-02-28 DIAGNOSIS — I50.32 CHRONIC DIASTOLIC (CONGESTIVE) HEART FAILURE: ICD-10-CM

## 2025-02-28 DIAGNOSIS — I10 ESSENTIAL (PRIMARY) HYPERTENSION: ICD-10-CM

## 2025-02-28 DIAGNOSIS — Z98.890 OTHER SPECIFIED POSTPROCEDURAL STATES: Chronic | ICD-10-CM

## 2025-02-28 DIAGNOSIS — R07.89 OTHER CHEST PAIN: ICD-10-CM

## 2025-02-28 DIAGNOSIS — E78.5 HYPERLIPIDEMIA, UNSPECIFIED: ICD-10-CM

## 2025-02-28 LAB
25(OH)D3 SERPL-MCNC: 45.7 NG/ML
ADD ON TEST-SPECIMEN IN LAB: SIGNIFICANT CHANGE UP
ALBUMIN SERPL ELPH-MCNC: 3.9 G/DL
ALBUMIN SERPL ELPH-MCNC: 3.9 G/DL — SIGNIFICANT CHANGE UP (ref 3.3–5)
ALP BLD-CCNC: 74 U/L
ALP SERPL-CCNC: 66 U/L — SIGNIFICANT CHANGE UP (ref 40–120)
ALT FLD-CCNC: 34 U/L — SIGNIFICANT CHANGE UP (ref 10–45)
ALT SERPL-CCNC: 35 U/L
ANION GAP SERPL CALC-SCNC: 16 MMOL/L — SIGNIFICANT CHANGE UP (ref 5–17)
ANION GAP SERPL CALC-SCNC: 19 MMOL/L
APPEARANCE: CLEAR
APTT BLD: 32.1 SEC — SIGNIFICANT CHANGE UP (ref 24.5–35.6)
AST SERPL-CCNC: 26 U/L — SIGNIFICANT CHANGE UP (ref 10–40)
AST SERPL-CCNC: 27 U/L
BACTERIA: NEGATIVE /HPF
BASOPHILS # BLD AUTO: 0.04 K/UL — SIGNIFICANT CHANGE UP (ref 0–0.2)
BASOPHILS NFR BLD AUTO: 0.4 % — SIGNIFICANT CHANGE UP (ref 0–2)
BILIRUB DIRECT SERPL-MCNC: 0.3 MG/DL — SIGNIFICANT CHANGE UP (ref 0–0.3)
BILIRUB INDIRECT FLD-MCNC: 0.8 MG/DL — SIGNIFICANT CHANGE UP (ref 0.2–1)
BILIRUB SERPL-MCNC: 1.1 MG/DL — SIGNIFICANT CHANGE UP (ref 0.2–1.2)
BILIRUB SERPL-MCNC: 1.3 MG/DL
BILIRUBIN URINE: NEGATIVE
BLOOD URINE: NEGATIVE
BUN SERPL-MCNC: 45 MG/DL
BUN SERPL-MCNC: 46 MG/DL — HIGH (ref 7–23)
CALCIUM SERPL-MCNC: 9.2 MG/DL — SIGNIFICANT CHANGE UP (ref 8.4–10.5)
CALCIUM SERPL-MCNC: 9.8 MG/DL
CALCIUM SERPL-MCNC: 9.8 MG/DL
CAST: 12 /LPF
CHLORIDE SERPL-SCNC: 105 MMOL/L
CHLORIDE SERPL-SCNC: 108 MMOL/L — SIGNIFICANT CHANGE UP (ref 96–108)
CO2 SERPL-SCNC: 19 MMOL/L — LOW (ref 22–31)
CO2 SERPL-SCNC: 20 MMOL/L
COLOR: YELLOW
CREAT SERPL-MCNC: 1.92 MG/DL — HIGH (ref 0.5–1.3)
CREAT SERPL-MCNC: 2.06 MG/DL
CREAT SPEC-SCNC: 160 MG/DL
CREAT SPEC-SCNC: 160 MG/DL
CREAT/PROT UR: 0.4 RATIO
CYSTATIN C SERPL-MCNC: 2.96 MG/L
EGFR: 33 ML/MIN/1.73M2
EGFR: 35 ML/MIN/1.73M2 — LOW
EGFR: 35 ML/MIN/1.73M2 — LOW
EOSINOPHIL # BLD AUTO: 0.38 K/UL — SIGNIFICANT CHANGE UP (ref 0–0.5)
EOSINOPHIL NFR BLD AUTO: 4.2 % — SIGNIFICANT CHANGE UP (ref 0–6)
EPITHELIAL CELLS: 1 /HPF
GFR/BSA.PRED SERPLBLD CYS-BASED-ARV: 17 ML/MIN/1.73M2
GLUCOSE QUALITATIVE U: 500 MG/DL
GLUCOSE SERPL-MCNC: 58 MG/DL
GLUCOSE SERPL-MCNC: 95 MG/DL — SIGNIFICANT CHANGE UP (ref 70–99)
HCT VFR BLD CALC: 35.5 % — LOW (ref 39–50)
HGB BLD-MCNC: 11.7 G/DL — LOW (ref 13–17)
HYALINE CASTS: PRESENT
IMM GRANULOCYTES NFR BLD AUTO: 0.3 % — SIGNIFICANT CHANGE UP (ref 0–0.9)
INR BLD: 1.09 — SIGNIFICANT CHANGE UP (ref 0.85–1.16)
KETONES URINE: NEGATIVE MG/DL
LEUKOCYTE ESTERASE URINE: NEGATIVE
LYMPHOCYTES # BLD AUTO: 1.59 K/UL — SIGNIFICANT CHANGE UP (ref 1–3.3)
LYMPHOCYTES # BLD AUTO: 17.7 % — SIGNIFICANT CHANGE UP (ref 13–44)
MCHC RBC-ENTMCNC: 30.8 PG — SIGNIFICANT CHANGE UP (ref 27–34)
MCHC RBC-ENTMCNC: 33 G/DL — SIGNIFICANT CHANGE UP (ref 32–36)
MCV RBC AUTO: 93.4 FL — SIGNIFICANT CHANGE UP (ref 80–100)
MICROALBUMIN 24H UR DL<=1MG/L-MCNC: 28.9 MG/DL
MICROALBUMIN/CREAT 24H UR-RTO: 181 MG/G
MICROSCOPIC-UA: NORMAL
MONOCYTES # BLD AUTO: 0.54 K/UL — SIGNIFICANT CHANGE UP (ref 0–0.9)
MONOCYTES NFR BLD AUTO: 6 % — SIGNIFICANT CHANGE UP (ref 2–14)
NEUTROPHILS # BLD AUTO: 6.4 K/UL — SIGNIFICANT CHANGE UP (ref 1.8–7.4)
NEUTROPHILS NFR BLD AUTO: 71.4 % — SIGNIFICANT CHANGE UP (ref 43–77)
NITRITE URINE: NEGATIVE
NRBC BLD AUTO-RTO: 0 /100 WBCS — SIGNIFICANT CHANGE UP (ref 0–0)
NT-PROBNP SERPL-MCNC: ABNORMAL PG/ML
PARATHYROID HORMONE INTACT: 131 PG/ML
PH URINE: 5.5
PHOSPHATE SERPL-MCNC: 3.6 MG/DL
PLATELET # BLD AUTO: 241 K/UL — SIGNIFICANT CHANGE UP (ref 150–400)
POTASSIUM SERPL-MCNC: 3.4 MMOL/L — LOW (ref 3.5–5.3)
POTASSIUM SERPL-SCNC: 3.4 MMOL/L — LOW (ref 3.5–5.3)
POTASSIUM SERPL-SCNC: 3.9 MMOL/L
PROT SERPL-MCNC: 6.6 G/DL — SIGNIFICANT CHANGE UP (ref 6–8.3)
PROT SERPL-MCNC: 6.7 G/DL
PROT UR-MCNC: 66 MG/DL
PROTEIN URINE: 100 MG/DL
PROTHROM AB SERPL-ACNC: 12.7 SEC — SIGNIFICANT CHANGE UP (ref 9.9–13.4)
RBC # BLD: 3.8 M/UL — LOW (ref 4.2–5.8)
RBC # FLD: 18.8 % — HIGH (ref 10.3–14.5)
RED BLOOD CELLS URINE: 1 /HPF
REVIEW: NORMAL
SODIUM ?TM SUB UR QN: <20 MMOL/L
SODIUM SERPL-SCNC: 143 MMOL/L
SODIUM SERPL-SCNC: 143 MMOL/L — SIGNIFICANT CHANGE UP (ref 135–145)
SPECIFIC GRAVITY URINE: 1.02
TACROLIMUS SERPL-MCNC: 10 NG/ML
TROPONIN T, HIGH SENSITIVITY RESULT: 79 NG/L — CRITICAL HIGH (ref 0–51)
UROBILINOGEN URINE: 0.2 MG/DL
WBC # BLD: 8.98 K/UL — SIGNIFICANT CHANGE UP (ref 3.8–10.5)
WBC # FLD AUTO: 8.98 K/UL — SIGNIFICANT CHANGE UP (ref 3.8–10.5)
WHITE BLOOD CELLS URINE: 0 /HPF

## 2025-02-28 PROCEDURE — 93000 ELECTROCARDIOGRAM COMPLETE: CPT

## 2025-02-28 PROCEDURE — 99152 MOD SED SAME PHYS/QHP 5/>YRS: CPT

## 2025-02-28 PROCEDURE — 71045 X-RAY EXAM CHEST 1 VIEW: CPT | Mod: 26

## 2025-02-28 PROCEDURE — 92928 PRQ TCAT PLMT NTRAC ST 1 LES: CPT | Mod: RC

## 2025-02-28 PROCEDURE — 99223 1ST HOSP IP/OBS HIGH 75: CPT

## 2025-02-28 PROCEDURE — 93460 R&L HRT ART/VENTRICLE ANGIO: CPT | Mod: 26,59

## 2025-02-28 PROCEDURE — 0523T NTRAPX C FFR W/3D FUNCJL MAP: CPT

## 2025-02-28 PROCEDURE — 99285 EMERGENCY DEPT VISIT HI MDM: CPT

## 2025-02-28 PROCEDURE — 92978 ENDOLUMINL IVUS OCT C 1ST: CPT | Mod: 26,RC

## 2025-02-28 RX ORDER — ASPIRIN 325 MG
81 TABLET ORAL DAILY
Refills: 0 | Status: DISCONTINUED | OUTPATIENT
Start: 2025-03-01 | End: 2025-03-02

## 2025-02-28 RX ORDER — DEXTROSE 50 % IN WATER 50 %
15 SYRINGE (ML) INTRAVENOUS ONCE
Refills: 0 | Status: DISCONTINUED | OUTPATIENT
Start: 2025-02-28 | End: 2025-03-02

## 2025-02-28 RX ORDER — PREDNISONE 20 MG/1
5 TABLET ORAL DAILY
Refills: 0 | Status: DISCONTINUED | OUTPATIENT
Start: 2025-02-28 | End: 2025-03-02

## 2025-02-28 RX ORDER — INSULIN GLARGINE-YFGN 100 [IU]/ML
12 INJECTION, SOLUTION SUBCUTANEOUS AT BEDTIME
Refills: 0 | Status: DISCONTINUED | OUTPATIENT
Start: 2025-02-28 | End: 2025-03-02

## 2025-02-28 RX ORDER — GLUCAGON 3 MG/1
1 POWDER NASAL ONCE
Refills: 0 | Status: DISCONTINUED | OUTPATIENT
Start: 2025-02-28 | End: 2025-03-02

## 2025-02-28 RX ORDER — SODIUM CHLORIDE 9 G/1000ML
1000 INJECTION, SOLUTION INTRAVENOUS
Refills: 0 | Status: DISCONTINUED | OUTPATIENT
Start: 2025-02-28 | End: 2025-03-02

## 2025-02-28 RX ORDER — BUMETANIDE 1 MG/1
4 TABLET ORAL
Refills: 0 | Status: DISCONTINUED | OUTPATIENT
Start: 2025-03-01 | End: 2025-03-02

## 2025-02-28 RX ORDER — CARVEDILOL 3.12 MG/1
12.5 TABLET, FILM COATED ORAL EVERY 12 HOURS
Refills: 0 | Status: DISCONTINUED | OUTPATIENT
Start: 2025-02-28 | End: 2025-03-02

## 2025-02-28 RX ORDER — TACROLIMUS 0.5 MG/1
2 CAPSULE ORAL
Refills: 0 | Status: DISCONTINUED | OUTPATIENT
Start: 2025-02-28 | End: 2025-03-02

## 2025-02-28 RX ORDER — DEXTROSE 50 % IN WATER 50 %
12.5 SYRINGE (ML) INTRAVENOUS ONCE
Refills: 0 | Status: DISCONTINUED | OUTPATIENT
Start: 2025-02-28 | End: 2025-03-02

## 2025-02-28 RX ORDER — DEXTROSE 50 % IN WATER 50 %
25 SYRINGE (ML) INTRAVENOUS ONCE
Refills: 0 | Status: DISCONTINUED | OUTPATIENT
Start: 2025-02-28 | End: 2025-03-02

## 2025-02-28 RX ORDER — CLOPIDOGREL BISULFATE 75 MG/1
75 TABLET, FILM COATED ORAL DAILY
Refills: 0 | Status: DISCONTINUED | OUTPATIENT
Start: 2025-03-01 | End: 2025-03-02

## 2025-02-28 RX ORDER — ATORVASTATIN CALCIUM 80 MG/1
20 TABLET, FILM COATED ORAL AT BEDTIME
Refills: 0 | Status: DISCONTINUED | OUTPATIENT
Start: 2025-02-28 | End: 2025-03-02

## 2025-02-28 RX ORDER — BUMETANIDE 1 MG/1
2 TABLET ORAL ONCE
Refills: 0 | Status: COMPLETED | OUTPATIENT
Start: 2025-02-28 | End: 2025-02-28

## 2025-02-28 RX ADMIN — BUMETANIDE 2 MILLIGRAM(S): 1 TABLET ORAL at 23:19

## 2025-02-28 RX ADMIN — INSULIN GLARGINE-YFGN 12 UNIT(S): 100 INJECTION, SOLUTION SUBCUTANEOUS at 22:13

## 2025-02-28 RX ADMIN — Medication 250 MILLILITER(S): at 21:31

## 2025-02-28 RX ADMIN — ATORVASTATIN CALCIUM 20 MILLIGRAM(S): 80 TABLET, FILM COATED ORAL at 21:32

## 2025-02-28 RX ADMIN — TACROLIMUS 2 MILLIGRAM(S): 0.5 CAPSULE ORAL at 22:48

## 2025-02-28 RX ADMIN — Medication 40 MILLIEQUIVALENT(S): at 15:52

## 2025-02-28 RX ADMIN — CARVEDILOL 12.5 MILLIGRAM(S): 3.12 TABLET, FILM COATED ORAL at 20:14

## 2025-02-28 NOTE — H&P ADULT - PROBLEM SELECTOR PLAN 1
--currently chest pain free, EKG revealed Sinus bradycardia@57BPM with PACs, TWI inferolateral leads.  --Trop T 79, CK/CKMB normal.   --precath/consented, received ASA 324mg PO enroute to ER. Given hx of GIB/poor AC candidate will hold off Plavix load and load on table if needed. Plan for right and left heart catheterization with Dr. Murphy today.      **Prior NST 11/14/24 normal perfusion.  **Outpt office TTE 2/28/25 w/ normal LV function/wall motion, mild MR, aortic root 4.3cm.    **Prior TTE: 10/21/24: LVEF 58%, normal RVSF w/ increased wall thickness, hypokinetic basal and mid anterior septum and basal and mid inferior septum, PFO vs secundum ASD w/ L-R shunting, mild MR, aortic root 4.30 cm,

## 2025-02-28 NOTE — ED ADULT NURSE NOTE - OBJECTIVE STATEMENT
Pt reportedly with Hx of CHF and former dialysis, s/p kidney transplant in 2007. C/o MAYNOR LE edema and 2/10 chest pain and dyspnea on exertion. Presented to Cards office and arrived via EMS d/t concerning EKG per report pt received NTG x1 and 324mg ASA PTA. Pt denies current chest pain.

## 2025-02-28 NOTE — ED ADULT NURSE REASSESSMENT NOTE - NS ED NURSE REASSESS COMMENT FT1
Pt with ~5 sec run of VTach appreciated on continuous cardiac monitoring, resolved upon RN assessment of pt, pt denies symptoms, Seth MARISCAL notified and aware, pt placed on AED pads and Zoll at this time.

## 2025-02-28 NOTE — H&P ADULT - HISTORY OF PRESENT ILLNESS
Cardiologist: Dr. Vee  Pharmacy:         79 yr old M *[employer]* with PMHx of HTN, hyperlipidemia, DM, hx of polycystic kidney disease s/p renal transplant '07 (baseline Cr ~2.0), prostate Ca s/p radical prostatectomy 2015, hx of DVT, Afib/flutter s/p ablation in 1/2018 (off AC 2/2 prior GIB), s/p Amulet Device c/b pericardiocentesis c/b PEA arrest w/ ROSC and pericardial tamponade s/p bedside pericardiocentesis, RTOR for sternotomy/washout and evacuation of pericardial clot, chronic HFpEF who presented to outpatient cardiologist Dr. Vee 2/28/25 c/o chest discomfort and SOB with exertion x ~1 week. Pt was recently seen by renal with recent increase in his Bumex to 4 mg bid. EKG in the office Atrial fibrillation rate controlled LVH with strain. Echo in office: EF Normal WMA, mild MR, Aortic root 4.3 cm. Prior NST 11/14/24: normal perfusion.  Given angina patient referred to [facility] ED. Pt received SL nitro and ASA 324mg PO by EMS PTA.      In ED, BP: 161/88, HR: 50s, RR: 17, Temp: 97.3F, O2 sat: 97% RA.   EKG revealed Sinus bradycardia@57BPM with biphasic T wave V2, TWI lateral leads.  CXR revealed  Labs notable for:   Cardiologist: Dr. 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 Vee 2/28/25 c/o chest tightness and SOB with minimal exertion x ~1 week. Patient reports he can barely walk 1/4 block prior to having to stop and rest. Pt further reports 2 pillow orthopnea and worsening bilateral LE edema. Denies any palpitations, dizziness, PND, recent travel or sick contacts. Pt was recently seen by renal 2/26/25 with recommended increase in his Bumex to 4 mg bid (which pt has not yet started). EKG in the office Atrial fibrillation rate controlled LVH with strain. Echo in office: EF Normal WMA, mild MR, Aortic root 4.3 cm. Prior NST 11/14/24: normal perfusion.  Given angina patient referred to [facility] ED. Pt received SL nitro and ASA 324mg PO by EMS PTA.    In ED, BP: 161/88, HR: 50s, RR: 17, Temp: 97.3F, O2 sat: 97% RA. EKG revealed Sinus bradycardia@57BPM with PACs, TWI inferolateral leads. CXR revealed mild pulm vascular congestion. Labs notable for: Trop T 79, CK/CKMB normal.    Patient currently stable, now planned for right and left heart catheterization with possible intervention.  Cardiologist: Dr. Vee  Pharmacy: [facility]  Emergency Contact/HCP: Desiree Wooten (wife) [phone]      79 yr old M *[employer]* former heavy smoker, with PMHx of HTN, hyperlipidemia, DM, hx of polycystic kidney disease s/p renal transplant '07 (baseline Cr ~2.0), prostate Ca s/p radical prostatectomy 2015, hx of DVT, Afib/flutter s/p ablation in 1/2018 (off AC 2/2 prior GIB), s/p Amulet Device c/b pericardial tamponade c/b PEA arrest w/ ROSC and bedside pericardiocentesis, RTOR for sternotomy/washout and evacuation of pericardial clot, chronic HFpEF who presented to outpatient cardiologist Dr. Vee 2/28/25 c/o chest tightness and SOB with minimal exertion x ~1 week. Patient reports he can barely walk 1/4 block prior to having to stop and rest. Pt further reports 2 pillow orthopnea and worsening bilateral LE edema. Denies any palpitations, dizziness, PND, recent travel or sick contacts. Pt was recently seen by renal 2/26/25 with recommended increase in his Bumex to 4 mg bid (which pt has not yet started). EKG in the office Atrial fibrillation rate controlled LVH with strain. Echo in office: EF Normal WMA, mild MR, Aortic root 4.3 cm. Prior NST 11/14/24: normal perfusion.  Given angina patient referred to [facility] ED. Pt received SL nitro and ASA 324mg PO by EMS PTA.    In ED, BP: 161/88, HR: 50s, RR: 17, Temp: 97.3F, O2 sat: 97% RA. EKG revealed Sinus bradycardia@57BPM with PACs, TWI inferolateral leads. CXR clear. Labs notable for: Trop T 79, CK/CKMB normal, BNP >44k.    Patient currently stable, now planned for right and left heart catheterization with possible intervention.

## 2025-02-28 NOTE — ED PROVIDER NOTE - OBJECTIVE STATEMENT
79 year old M with ho polycystic kidney disease sp renal transplant in 2007, CKD, HTN, HLD, DM, DVT, Afib/aflutter sp ablation off AC 2/2 GIB p/w intermittent exertional cp and sob X 5 days, sent by Dr. Vee for cardiac cath with Dr. Murphy. Intermittent cp. No fever, chills, cough, nausea, vomiting, lightheadedness, dizziness. ROS as above.

## 2025-02-28 NOTE — H&P ADULT - PROBLEM SELECTOR PLAN 5
Hx PCKD s/p kidney transplant in 2007 ([facility], follows w/ Nephrologist Dr Sulaiman Modi) w/ baseline Cr ranging from 1.2 -1.4 in 2023   --Tacrolimus 2mg BID  --F/u Tacro level daily, draw 30 minutes before morning dose (Goal 4-6)  --c/w Prednisone 5mg QD   --Avoid nephrotoxic agents.  --will consult renal in AM. Hx PCKD s/p kidney transplant in 2007 ([facility], follows w/ Nephrologist Dr Sulaiman Modi) w/ baseline Cr ranging from 1.2 -1.4 in 2023   --Tacrolimus 2mg BID  --F/u Tacro level daily, draw 30 minutes before morning dose (Goal 4-6)  --c/w Prednisone 5mg QD   --Avoid nephrotoxic agents.  --will consult renal to follow. Hx PCKD s/p kidney transplant in 2007 ([facility], currently follows w/ Renal Dr. Calixto) w/ baseline Cr ranging from 1.2 -1.4 in 2023   --Tacrolimus 2mg BID  --F/u Tacro level daily (Goal 4-6)  --c/w Prednisone 5mg QD   --Renal consulted, will see patient in AM.

## 2025-02-28 NOTE — PATIENT PROFILE ADULT - HAS THE PATIENT RECEIVED THE INFLUENZA VACCINE THIS SEASON?
Subjective: Patient is here for follow-up of her 1/9/2020 right foot hallux rigidus cheilectomy Cartee the implant and Mo-Akin osteotomies. She states she is doing well she is walking in the hard soled shoe has very little discomfort. Objective: Physical exam shows incisions almost completely healed she has very little swelling is about 30 to 40 degrees of MTP extension alignment looks excellent no pain to palpation  Imaging: 3 views of the right foot show the osteotomy is healing nicely no change in position alignment looks good  Assessment and plan: Patient is doing well she is going to start physical gave her prescription for this.   She will follow-up with me in 6 weeks repeat x-rays
yes...

## 2025-02-28 NOTE — H&P ADULT - PROBLEM SELECTOR PLAN 2
mild overload, +JVD, lungs clear, 2+ pitting edema bilaterally, BNP 44,709.  --DIURESIS: continue Bumex 4mg PO BID.  --GDMT: Coreg 25mg PO BID  --CORE MEASURES: strict I&Os, daily weights, fluid restriction). mild overload, +JVD, lungs clear, 2+ pitting edema bilaterally, BNP 44,709.  --DIURESIS: continue Bumex 4mg PO BID. Will F/U RHC and adjust accordingly.  --GDMT: Coreg 25mg PO BID and Jardiance 25mg PO daily. Entresto discontinued as outpt per patient.  --CORE MEASURES: strict I&Os, daily weights, fluid restriction mild overload, +JVD, lungs clear, 2+ pitting edema bilaterally, BNP 44,709.  --DIURESIS:  Will F/U RHC and adjust accordingly.  --GDMT: Coreg 25mg PO BID and Jardiance 25mg PO daily. Entresto discontinued as outpt per patient.  --CORE MEASURES: strict I&Os, daily weights, fluid restriction mild overload, +JVD, lungs clear, 2+ pitting edema bilaterally, BNP 44,709.  --DIURESIS:  Will F/U RHC and adjust diuretics accordingly.  --GDMT: Coreg 25mg PO BID and Jardiance 25mg PO daily (not on formulary). Entresto discontinued as outpt per patient.  --CORE MEASURES: strict I&Os, daily weights, fluid restriction

## 2025-02-28 NOTE — ED ADULT NURSE NOTE - NS ED NURSE REPORT GIVEN DT
Quality 111:Pneumonia Vaccination Status For Older Adults: Pneumococcal Vaccination Previously Received Detail Level: Detailed 28-Feb-2025 15:02

## 2025-02-28 NOTE — PATIENT PROFILE ADULT - FALL HARM RISK - HARM RISK INTERVENTIONS
Assistance with ambulation/Assistance OOB with selected safe patient handling equipment/Communicate Risk of Fall with Harm to all staff/Discuss with provider need for PT consult/Monitor gait and stability/Provide patient with walking aids - walker, cane, crutches/Reinforce activity limits and safety measures with patient and family/Sit up slowly, dangle for a short time, stand at bedside before walking/Tailored Fall Risk Interventions/Use of alarms - bed, chair and/or voice tab/Visual Cue: Yellow wristband and red socks/Bed in lowest position, wheels locked, appropriate side rails in place/Call bell, personal items and telephone in reach/Instruct patient to call for assistance before getting out of bed or chair/Non-slip footwear when patient is out of bed/[place] to call system/Physically safe environment - no spills, clutter or unnecessary equipment/Purposeful Proactive Rounding/Room/bathroom lighting operational, light cord in reach

## 2025-02-28 NOTE — ED ADULT NURSE REASSESSMENT NOTE - NS ED NURSE REASSESS COMMENT FT1
Alba PERRY at bedside consenting pt, anticoags redrawn prior to transfer, additionally PA notified of pt earlier run of Vtach. Pt to be transferred to cath lab following consent per provider.

## 2025-02-28 NOTE — ED ADULT TRIAGE NOTE - CHIEF COMPLAINT QUOTE
Pt presents to ED by EMS from cardiology office c/o 4 days CP on exertion. Hx CHF. Received nitro and 324mg aspirin PTA. IV by EMS. Hx Kidney transplant 2007. EKG in progress.

## 2025-02-28 NOTE — H&P ADULT - NS ATTEND AMEND GEN_ALL_CORE FT
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 Vee 2/28/25 c/o chest tightness and SOB with minimal exertion x ~1 week, EKG w/ TWI inferolateral leads, Trop T 79, CK/CKMB normal, BNP >44k, now planned for right and left heart catheterization with possible intervention.     1. NSTEMI  Typical symptoms, + risk factors, admit for LHC.    2. HF  Slightly volume overloaded, LHC.RHC.

## 2025-02-28 NOTE — H&P ADULT - PROBLEM SELECTOR PLAN 6
Hgb 11, stable  -prior Iron panel 11/2024 consistent w/ AoCD.      F: 1L fluid restriction  E: Replete for K <4, Mg <2  N: DASH/TLC  GI PPx: PPI  DVT PPx: on hold pending cath

## 2025-02-28 NOTE — H&P ADULT - NSHPREVIEWOFSYSTEMS_GEN_ALL_CORE
Aspirin 81 81 MG Oral Tablet Chewable; CHEW AND SWALLOW 1 TABLET DAILY  Atorvastatin Calcium 20 MG Oral Tablet  Bumetanide 2 MG Oral Tablet; TAKE 2 TABLET Every twelve hours  Carvedilol 12.5 MG Oral Tablet; TAKE TWO TABLETS IN THE AM ONE TABLET IN THE PM  Entresto 49-51 MG Oral Tablet; TAKE ONE TABLET BY MOUTH TWICE A DAY  Jardiance 25 MG Oral Tablet; TAKE 1 TABLET BY MOUTH ONCE DAILY  Lantus SoloStar 100 UNIT/ML Subcutaneous Solution Pen-injector; INJECT 12 UNITDaily??   metFORMIN HCl  MG Oral Tablet Extended Release 24 Hour; TAKE 3 TABLETAFTER DINNER??   PredniSONE 5 MG Oral Tablet  Tacrolimus 1 MG Oral Capsule; takes 2 mg in the morning and 2 mg at night  Trulicity 4.5 MG/0.5ML SOPN; INJECT 4.5 MG Weekly  Vitamin D 1000 UNIT TABS

## 2025-02-28 NOTE — ED PROVIDER NOTE - PHYSICAL EXAMINATION
general: Well appearing, in no acute distress  HEENT: Normocephalic, atraumatic, extraocular movements intact  CV: Regular rate  Pulm: No respiratory distress, no tachypnea, ctab, no wrr  Abd: Flat, no gross distension  Ext: warm and well perfused  Skin: No gross rashes or lesions  Neuro: Alert and oriented, moving all extremities

## 2025-02-28 NOTE — PATIENT PROFILE ADULT - FUNCTIONAL ASSESSMENT - DAILY ACTIVITY 3.
05/31/2017  Pau Cook is a 89 y.o., female.    Anesthesia Evaluation    I have reviewed the Patient Summary Reports.    I have reviewed the Nursing Notes.   I have reviewed the Medications.     Review of Systems  Anesthesia Hx:  No problems with previous Anesthesia    Social:  Non-Smoker Smoking Status: Never Smoker  Smokeless Tobacco Status: Never Used  Alcohol use: No  Drug use: No       Hematology/Oncology:         Ovarian Current/Recent Cancer.   Hepatic/GI:   GERD    Musculoskeletal:   Arthritis   Joint Disease:  Arthritis  Bone Disorders: Fracture Closed intertrochanteric fracture of hip, left, initial encounter   Neurological:   Neuromuscular Disease,        Physical Exam  General:  Well nourished    Airway/Jaw/Neck:  Airway Findings: Mouth Opening: Normal Tongue: Normal  General Airway Assessment: Adult, Good  Mallampati: II  Improves to II with phonation.  TM Distance: 4-6 cm      Dental:  Dental Findings: In tact   Chest/Lungs:  Chest/Lungs Findings: Clear to auscultation, Normal Respiratory Rate     Heart/Vascular:  Heart Findings: Rate: Normal  Rhythm: Regular Rhythm  Sounds: Normal  Heart murmur: negative       Mental Status:  Mental Status Findings:  Cooperative, Alert and Oriented         Anesthesia Plan  Type of Anesthesia, risks & benefits discussed:  Anesthesia Type:  general  Patient's Preference:   Intra-op Monitoring Plan: standard ASA monitors  Intra-op Monitoring Plan Comments:   Post Op Pain Control Plan:   Post Op Pain Control Plan Comments:   Induction:   IV  Beta Blocker:  Patient is not currently on a Beta-Blocker (No further documentation required).       Informed Consent: Patient understands risks and agrees with Anesthesia plan.  Questions answered. Anesthesia consent signed with patient.  ASA Score: 3  emergent   Day of Surgery Review of History & Physical: I have  interviewed and examined the patient. I have reviewed the patient's H&P dated:  There are no significant changes.  H&P update referred to the surgeon.         Ready For Surgery From Anesthesia Perspective.        4 = No assist / stand by assistance

## 2025-02-28 NOTE — H&P ADULT - PROBLEM SELECTOR PLAN 3
currently SB w/ PACs  s/p ablation in Jan 2018 not on AC 2/2 GIB s/p Amulet device   --RATE CONTROL: Coreg 12.5mg BID (reduced home dose)  - AC: No longer on AC 2/2 GIB. C/w ASA 81mg QD. currently SB w/ PACs  s/p ablation in Jan 2018 not on AC 2/2 GIB s/p Amulet device   --RATE CONTROL: Coreg 25mg PO BID  - AC: No longer on AC 2/2 GIB. C/w ASA 81mg QD.

## 2025-02-28 NOTE — H&P ADULT - ASSESSMENT
79 yr old M *Nondenominational* former heavy smoker, with PMHx of HTN, hyperlipidemia, DM, hx of PKD s/p renal transplant '07 (baseline Cr ~2.0), prostate Ca s/p radical prostatectomy 2015, hx of DVT, Afib/flutter s/p ablation in 1/2018 (off AC 2/2 prior GIB), s/p Amulet Device c/b pericardial tamponade c/b PEA arrest w/ ROSC and bedside pericardiocentesis, RTOR for sternotomy/washout and evacuation of pericardial clot, chronic HFpEF who presented to outpatient cardiologist Dr. Vee 2/28/25 c/o chest tightness and SOB with minimal exertion x ~1 week, EKG w/ TWI inferolateral leads, Trop T 79, CK/CKMB normal, now planned for right and left heart catheterization with possible intervention.     ASA: III			Mallampati class: III	    Sedation Plan: Moderate    Patient Is Suitable Candidate For Sedation: Yes    Cardiac: Risks & benefits of procedure and alternative therapy have been explained to the patient &/or HCP including but not limited to: allergic reaction, bleeding, infection, arrhythmia, renal and vascular compromise, limb damage, MI, CVA, emergent CABG and death. Informed consent obtained and in chart.         79 yr old M *Mandaeism* former heavy smoker, with PMHx of HTN, hyperlipidemia, DM, hx of PKD s/p renal transplant '07 (baseline Cr ~2.0), prostate Ca s/p radical prostatectomy 2015, hx of DVT, Afib/flutter s/p ablation in 1/2018 (off AC 2/2 prior GIB), s/p Amulet Device c/b pericardial tamponade c/b PEA arrest w/ ROSC and bedside pericardiocentesis, RTOR for sternotomy/washout and evacuation of pericardial clot, chronic HFpEF who presented to outpatient cardiologist Dr. Vee 2/28/25 c/o chest tightness and SOB with minimal exertion x ~1 week, EKG w/ TWI inferolateral leads, Trop T 79, CK/CKMB normal, BNP >44k, now planned for right and left heart catheterization with possible intervention.     ASA: III			Mallampati class: III	    Sedation Plan: Moderate    Patient Is Suitable Candidate For Sedation: Yes    Cardiac: Risks & benefits of procedure and alternative therapy have been explained to the patient &/or HCP including but not limited to: allergic reaction, bleeding, infection, arrhythmia, renal and vascular compromise, limb damage, MI, CVA, emergent CABG and death. Informed consent obtained and in chart.

## 2025-03-01 LAB
A1C WITH ESTIMATED AVERAGE GLUCOSE RESULT: 9.4 % — HIGH (ref 4–5.6)
ANION GAP SERPL CALC-SCNC: 13 MMOL/L — SIGNIFICANT CHANGE UP (ref 5–17)
ANION GAP SERPL CALC-SCNC: 14 MMOL/L — SIGNIFICANT CHANGE UP (ref 5–17)
APTT BLD: 92.9 SEC — HIGH (ref 24.5–35.6)
BLD GP AB SCN SERPL QL: NEGATIVE — SIGNIFICANT CHANGE UP
BUN SERPL-MCNC: 47 MG/DL — HIGH (ref 7–23)
BUN SERPL-MCNC: 50 MG/DL — HIGH (ref 7–23)
CALCIUM SERPL-MCNC: 8.6 MG/DL — SIGNIFICANT CHANGE UP (ref 8.4–10.5)
CALCIUM SERPL-MCNC: 8.7 MG/DL — SIGNIFICANT CHANGE UP (ref 8.4–10.5)
CHLORIDE SERPL-SCNC: 107 MMOL/L — SIGNIFICANT CHANGE UP (ref 96–108)
CHLORIDE SERPL-SCNC: 110 MMOL/L — HIGH (ref 96–108)
CO2 SERPL-SCNC: 19 MMOL/L — LOW (ref 22–31)
CO2 SERPL-SCNC: 21 MMOL/L — LOW (ref 22–31)
CREAT SERPL-MCNC: 1.79 MG/DL — HIGH (ref 0.5–1.3)
CREAT SERPL-MCNC: 1.9 MG/DL — HIGH (ref 0.5–1.3)
EGFR: 36 ML/MIN/1.73M2 — LOW
EGFR: 36 ML/MIN/1.73M2 — LOW
EGFR: 39 ML/MIN/1.73M2 — LOW
EGFR: 39 ML/MIN/1.73M2 — LOW
ESTIMATED AVERAGE GLUCOSE: 223 MG/DL — HIGH (ref 68–114)
GLUCOSE SERPL-MCNC: 160 MG/DL — HIGH (ref 70–99)
GLUCOSE SERPL-MCNC: 164 MG/DL — HIGH (ref 70–99)
HCT VFR BLD CALC: 26 % — LOW (ref 39–50)
HCT VFR BLD CALC: 28.8 % — LOW (ref 39–50)
HCT VFR BLD CALC: 31.4 % — LOW (ref 39–50)
HGB BLD-MCNC: 10.2 G/DL — LOW (ref 13–17)
HGB BLD-MCNC: 8.4 G/DL — LOW (ref 13–17)
HGB BLD-MCNC: 9.1 G/DL — LOW (ref 13–17)
INR BLD: 1.67 — HIGH (ref 0.85–1.16)
MAGNESIUM SERPL-MCNC: 1.6 MG/DL — SIGNIFICANT CHANGE UP (ref 1.6–2.6)
MAGNESIUM SERPL-MCNC: 1.7 MG/DL — SIGNIFICANT CHANGE UP (ref 1.6–2.6)
MCHC RBC-ENTMCNC: 29.7 PG — SIGNIFICANT CHANGE UP (ref 27–34)
MCHC RBC-ENTMCNC: 29.9 PG — SIGNIFICANT CHANGE UP (ref 27–34)
MCHC RBC-ENTMCNC: 30.4 PG — SIGNIFICANT CHANGE UP (ref 27–34)
MCHC RBC-ENTMCNC: 31.6 G/DL — LOW (ref 32–36)
MCHC RBC-ENTMCNC: 32.3 G/DL — SIGNIFICANT CHANGE UP (ref 32–36)
MCHC RBC-ENTMCNC: 32.5 G/DL — SIGNIFICANT CHANGE UP (ref 32–36)
MCV RBC AUTO: 92.5 FL — SIGNIFICANT CHANGE UP (ref 80–100)
MCV RBC AUTO: 93.7 FL — SIGNIFICANT CHANGE UP (ref 80–100)
MCV RBC AUTO: 94.1 FL — SIGNIFICANT CHANGE UP (ref 80–100)
NRBC BLD AUTO-RTO: 0 /100 WBCS — SIGNIFICANT CHANGE UP (ref 0–0)
PLATELET # BLD AUTO: 198 K/UL — SIGNIFICANT CHANGE UP (ref 150–400)
PLATELET # BLD AUTO: 203 K/UL — SIGNIFICANT CHANGE UP (ref 150–400)
PLATELET # BLD AUTO: 203 K/UL — SIGNIFICANT CHANGE UP (ref 150–400)
POTASSIUM SERPL-MCNC: 4 MMOL/L — SIGNIFICANT CHANGE UP (ref 3.5–5.3)
POTASSIUM SERPL-MCNC: 4.1 MMOL/L — SIGNIFICANT CHANGE UP (ref 3.5–5.3)
POTASSIUM SERPL-SCNC: 4 MMOL/L — SIGNIFICANT CHANGE UP (ref 3.5–5.3)
POTASSIUM SERPL-SCNC: 4.1 MMOL/L — SIGNIFICANT CHANGE UP (ref 3.5–5.3)
PROTHROM AB SERPL-ACNC: 19.4 SEC — HIGH (ref 9.9–13.4)
RBC # BLD: 2.81 M/UL — LOW (ref 4.2–5.8)
RBC # BLD: 3.06 M/UL — LOW (ref 4.2–5.8)
RBC # BLD: 3.35 M/UL — LOW (ref 4.2–5.8)
RBC # FLD: 18.6 % — HIGH (ref 10.3–14.5)
RBC # FLD: 18.6 % — HIGH (ref 10.3–14.5)
RBC # FLD: 18.7 % — HIGH (ref 10.3–14.5)
RH IG SCN BLD-IMP: POSITIVE — SIGNIFICANT CHANGE UP
SODIUM SERPL-SCNC: 141 MMOL/L — SIGNIFICANT CHANGE UP (ref 135–145)
SODIUM SERPL-SCNC: 143 MMOL/L — SIGNIFICANT CHANGE UP (ref 135–145)
TACROLIMUS SERPL-MCNC: 7.4 NG/ML — SIGNIFICANT CHANGE UP
WBC # BLD: 10.7 K/UL — HIGH (ref 3.8–10.5)
WBC # BLD: 11.87 K/UL — HIGH (ref 3.8–10.5)
WBC # BLD: 9.16 K/UL — SIGNIFICANT CHANGE UP (ref 3.8–10.5)
WBC # FLD AUTO: 10.7 K/UL — HIGH (ref 3.8–10.5)
WBC # FLD AUTO: 11.87 K/UL — HIGH (ref 3.8–10.5)
WBC # FLD AUTO: 9.16 K/UL — SIGNIFICANT CHANGE UP (ref 3.8–10.5)

## 2025-03-01 PROCEDURE — 93926 LOWER EXTREMITY STUDY: CPT | Mod: 26,RT

## 2025-03-01 PROCEDURE — 99222 1ST HOSP IP/OBS MODERATE 55: CPT | Mod: GC

## 2025-03-01 PROCEDURE — 99233 SBSQ HOSP IP/OBS HIGH 50: CPT

## 2025-03-01 RX ORDER — SENNA 187 MG
2 TABLET ORAL AT BEDTIME
Refills: 0 | Status: DISCONTINUED | OUTPATIENT
Start: 2025-03-01 | End: 2025-03-02

## 2025-03-01 RX ORDER — IRON SUCROSE 20 MG/ML
300 INJECTION, SOLUTION INTRAVENOUS ONCE
Refills: 0 | Status: COMPLETED | OUTPATIENT
Start: 2025-03-02 | End: 2025-03-02

## 2025-03-01 RX ORDER — SODIUM BICARBONATE 1 MEQ/ML
1300 SYRINGE (ML) INTRAVENOUS THREE TIMES A DAY
Refills: 0 | Status: DISCONTINUED | OUTPATIENT
Start: 2025-03-01 | End: 2025-03-02

## 2025-03-01 RX ORDER — HYDROMORPHONE/SOD CHLOR,ISO/PF 2 MG/10 ML
0.5 SYRINGE (ML) INJECTION ONCE
Refills: 0 | Status: DISCONTINUED | OUTPATIENT
Start: 2025-03-01 | End: 2025-03-01

## 2025-03-01 RX ORDER — IRON SUCROSE 20 MG/ML
300 INJECTION, SOLUTION INTRAVENOUS ONCE
Refills: 0 | Status: COMPLETED | OUTPATIENT
Start: 2025-03-01 | End: 2025-03-01

## 2025-03-01 RX ORDER — INSULIN LISPRO 100 U/ML
4 INJECTION, SOLUTION INTRAVENOUS; SUBCUTANEOUS ONCE
Refills: 0 | Status: COMPLETED | OUTPATIENT
Start: 2025-03-01 | End: 2025-03-01

## 2025-03-01 RX ORDER — MAGNESIUM OXIDE 400 MG
800 TABLET ORAL ONCE
Refills: 0 | Status: COMPLETED | OUTPATIENT
Start: 2025-03-01 | End: 2025-03-01

## 2025-03-01 RX ORDER — ACETAMINOPHEN 500 MG/5ML
1000 LIQUID (ML) ORAL ONCE
Refills: 0 | Status: COMPLETED | OUTPATIENT
Start: 2025-03-01 | End: 2025-03-01

## 2025-03-01 RX ORDER — POLYETHYLENE GLYCOL 3350 17 G/17G
17 POWDER, FOR SOLUTION ORAL EVERY 24 HOURS
Refills: 0 | Status: DISCONTINUED | OUTPATIENT
Start: 2025-03-01 | End: 2025-03-02

## 2025-03-01 RX ORDER — INSULIN LISPRO 100 U/ML
INJECTION, SOLUTION INTRAVENOUS; SUBCUTANEOUS
Refills: 0 | Status: DISCONTINUED | OUTPATIENT
Start: 2025-03-01 | End: 2025-03-02

## 2025-03-01 RX ADMIN — Medication 2 MILLIGRAM(S): at 02:11

## 2025-03-01 RX ADMIN — Medication 800 MILLIGRAM(S): at 09:04

## 2025-03-01 RX ADMIN — CARVEDILOL 12.5 MILLIGRAM(S): 3.12 TABLET, FILM COATED ORAL at 18:54

## 2025-03-01 RX ADMIN — BUMETANIDE 4 MILLIGRAM(S): 1 TABLET ORAL at 09:03

## 2025-03-01 RX ADMIN — Medication 0.5 MILLIGRAM(S): at 09:36

## 2025-03-01 RX ADMIN — INSULIN GLARGINE-YFGN 12 UNIT(S): 100 INJECTION, SOLUTION SUBCUTANEOUS at 21:29

## 2025-03-01 RX ADMIN — IRON SUCROSE 176.67 MILLIGRAM(S): 20 INJECTION, SOLUTION INTRAVENOUS at 11:17

## 2025-03-01 RX ADMIN — BUMETANIDE 4 MILLIGRAM(S): 1 TABLET ORAL at 16:36

## 2025-03-01 RX ADMIN — Medication 400 MILLIGRAM(S): at 13:30

## 2025-03-01 RX ADMIN — Medication 1300 MILLIGRAM(S): at 21:21

## 2025-03-01 RX ADMIN — INSULIN LISPRO 8: 100 INJECTION, SOLUTION INTRAVENOUS; SUBCUTANEOUS at 12:10

## 2025-03-01 RX ADMIN — Medication 2 MILLIGRAM(S): at 02:40

## 2025-03-01 RX ADMIN — TACROLIMUS 2 MILLIGRAM(S): 0.5 CAPSULE ORAL at 21:21

## 2025-03-01 RX ADMIN — INSULIN LISPRO 6: 100 INJECTION, SOLUTION INTRAVENOUS; SUBCUTANEOUS at 21:30

## 2025-03-01 RX ADMIN — TACROLIMUS 2 MILLIGRAM(S): 0.5 CAPSULE ORAL at 09:03

## 2025-03-01 RX ADMIN — ATORVASTATIN CALCIUM 20 MILLIGRAM(S): 80 TABLET, FILM COATED ORAL at 21:21

## 2025-03-01 RX ADMIN — Medication 2 MILLIGRAM(S): at 02:35

## 2025-03-01 RX ADMIN — CLOPIDOGREL BISULFATE 75 MILLIGRAM(S): 75 TABLET, FILM COATED ORAL at 11:16

## 2025-03-01 RX ADMIN — Medication 40 MILLIGRAM(S): at 05:45

## 2025-03-01 RX ADMIN — PREDNISONE 5 MILLIGRAM(S): 20 TABLET ORAL at 05:46

## 2025-03-01 RX ADMIN — Medication 2 MILLIGRAM(S): at 02:19

## 2025-03-01 RX ADMIN — INSULIN LISPRO 4 UNIT(S): 100 INJECTION, SOLUTION INTRAVENOUS; SUBCUTANEOUS at 16:36

## 2025-03-01 RX ADMIN — Medication 81 MILLIGRAM(S): at 11:16

## 2025-03-01 RX ADMIN — CARVEDILOL 12.5 MILLIGRAM(S): 3.12 TABLET, FILM COATED ORAL at 05:45

## 2025-03-01 NOTE — PROGRESS NOTE ADULT - NS ATTEND AMEND GEN_ALL_CORE FT
79 yr old M *Rastafarian* former heavy smoker, with PMHx of HTN, hyperlipidemia, DM, hx of PKD s/p renal transplant '07 (baseline Cr ~2.0), prostate Ca s/p radical prostatectomy 2015, hx of DVT, Afib/flutter s/p ablation in 1/2018 (off AC 2/2 prior GIB), s/p Amulet Device c/b pericardial tamponade c/b PEA arrest w/ ROSC and bedside pericardiocentesis, RTOR for sternotomy/washout and evacuation of pericardial clot, chronic HFpEF who presented to outpatient cardiologist Dr. Vee 2/28/25 c/o chest tightness and SOB with minimal exertion x ~1 week, EKG w/ TWI inferolateral leads, Trop T 79, CK/CKMB normal, BNP >44k, now planned for right and left heart catheterization with possible intervention.     1. NSTEMI  [facility].RHC 2/28: RCA; ANNE MARIE x 2 prox 90%. LAD; prox 50% cathworks 0.99. LM and LCx both with mild diffuse disease.   case complicated by hematoma, hb drop, applied compression, resolved.    2. HF  Slightly volume overloaded, RHC: RA 11, RV 73/13, PA 71/28 (43), PCW 22, PA sat 62.7%, AO sat 97.7, CO/CI 4.21/2.3   resume oral diuretics.

## 2025-03-01 NOTE — PROVIDER CONTACT NOTE (CHANGE IN STATUS NOTIFICATION) - ASSESSMENT
right groin swollen and site hard to touch. complained of pain especially during palpation of site. VSS.

## 2025-03-01 NOTE — PROVIDER CONTACT NOTE (CHANGE IN STATUS NOTIFICATION) - ACTION/TREATMENT ORDERED:
ORLANDO Gama and cardiac fellow at bedside and performed compression of right groin. CBC, BMP, coags and type and screen done. morphine 4 mg total was given for pain. for duplex ultrasound of right leg. will monitor right groin

## 2025-03-01 NOTE — CONSULT NOTE ADULT - ATTENDING COMMENTS
I agree with the fellow's findings and plans as written above with the following additions/amendments:    Seen and examined at bedside, follows with me outpatient, reviewed history indeed continued to worsen after I saw him this week and now with angina, for R and L HC, creatinine stable and tolerating diuresis but not responding much, consider adding metolazone if does not increase tomorrow. Further recs as above, discussed with primary team I agree with the fellow's findings and plans as written above with the following additions/amendments:    Seen and examined at bedside, follows with me outpatient, reviewed history indeed continued to worsen after I saw him this week and now with angina, for R and L HC, creatinine stable and tolerating diuresis but not responding much, monitoring for post procedure DAYO. Further recs as above, discussed in detail with primary team

## 2025-03-01 NOTE — PROGRESS NOTE ADULT - ASSESSMENT
79 yr old M *Mosque* former heavy smoker, with PMHx of HTN, hyperlipidemia, DM, hx of PKD s/p renal transplant '07 (baseline Cr ~2.0), prostate Ca s/p radical prostatectomy 2015, hx of DVT, Afib/flutter s/p ablation in 1/2018 (off AC 2/2 prior GIB), s/p Amulet Device c/b pericardial tamponade c/b PEA arrest w/ ROSC and bedside pericardiocentesis, RTOR for sternotomy/washout and evacuation of pericardial clot, chronic HFpEF who presented to outpatient cardiologist Dr. Vee 2/28/25 c/o chest tightness and SOB with minimal exertion x ~1 week, EKG w/ TWI inferolateral leads, Trop T 79, CK/CKMB normal, BNP >44k, now s/p ANNE MARIE x2 RCA and RHC via RFA and RFV. Access site c/b 20cm R groin hematoma. Ultrasound done 3/1 without pseudoaneurysm or AV fistula. IC team Dr. Garcia and Dr. Woodruffana aware. If labs and groin stable after ambulation, can be ? DC in AM per IC team.

## 2025-03-01 NOTE — PROGRESS NOTE ADULT - PROBLEM SELECTOR PLAN 2
mild overload, +JVD, lungs clear, 2+ pitting edema bilaterally, BNP 44,709.  --DIURESIS:  Will F/U RHC and adjust diuretics accordingly.  --GDMT: Coreg 25mg PO BID and Jardiance 25mg PO daily (not on formulary). Entresto discontinued as outpt per patient.  --CORE MEASURES: strict I&Os, daily weights, fluid restriction PRE CATH: mild overload, +JVD, lungs clear, 2+ pitting edema bilaterally, BNP 44,709.  POST CATH: 1-2+ pitting LE edema b/l, has been laying flat comfortably, on room air, WWP   --RHC above   -- DIURESIS: c/w Bumex 4mg PO BID              o s/p IV bumex x1 post RHC   --GDMT: Coreg 25mg PO BID and Jardiance 25mg PO daily (not on formulary). Entresto discontinued as outpt per patient.  --CORE MEASURES: strict I&Os, daily weights, fluid restriction

## 2025-03-01 NOTE — PROGRESS NOTE ADULT - PROBLEM SELECTOR PLAN 5
Hx PCKD s/p kidney transplant in 2007 ([facility], currently follows w/ Renal Dr. Calixto) w/ baseline Cr ranging from 1.2 -1.4 in 2023   --Tacrolimus 2mg BID  --F/u Tacro level daily (Goal 4-6)  --c/w Prednisone 5mg QD   --Renal consulted, will see patient in AM. Hx PCKD s/p kidney transplant in 2007 ([facility], currently follows w/ Renal Dr. Calixto) w/ baseline Cr ranging from 1.2 -1.4 in 2023   --Tacrolimus 2mg BID  --F/u Tacro level daily (Goal 4-6)  --c/w Prednisone 5mg QD   --Renal consulted per protocol-- NTD at this time. Hx PCKD s/p kidney transplant in 2007 ([facility], currently follows w/ Renal Dr. Calixto) w/ baseline Cr ranging from 1.2 -1.4 in 2023   --Tacrolimus 2mg BID  --c/w Prednisone 5mg QD   --Renal consulted per protocol-- NTD at this time (no need to check tacro levels while admitted)

## 2025-03-01 NOTE — PROGRESS NOTE ADULT - PROBLEM SELECTOR PLAN 1
--Trop T 79, CK/CKMB normal.   **Prior NST 11/14/24 normal perfusion.  **Outpt office TTE 2/28/25 w/ normal LV function/wall motion, mild MR, aortic root 4.3cm.  **Prior TTE: 10/21/24: LVEF 58%, normal RVSF w/ increased wall thickness, hypokinetic basal and mid anterior septum and basal and mid inferior septum, PFO vs secundum ASD w/ L-R shunting, mild MR, aortic root 4.30 cm,  --now s/o R/LHC: 2/28: RCA; ANNE MARIE x 2 prox 90%. LAD; prox 50% cathworks 0.99. LM and LCx both with mild diffuse disease. RHC: RA 11, RV 73/13, PA 71/28 (43), PCW 22, PA sat 62.7%, AO sat 97.7, CO/CI 4.21/2.3     ###RFA HEMATOMA   -overnight 2/28: : large right groin hematoma, extending from groin to medial and lateral thigh approx. 20cm one hour after going to bathroom 12:30AM--> manual compression with CCU Fellow, PA and nurse manager for over one hour, morphine 2mg IV X2  for pain, stat labs, us r/o psa, strict bed rest ordered.  - US 3/1: --Trop T 79, CK/CKMB normal.   **Prior NST 11/14/24 normal perfusion.  **Outpt office TTE 2/28/25 w/ normal LV function/wall motion, mild MR, aortic root 4.3cm.  **Prior TTE: 10/21/24: LVEF 58%, normal RVSF w/ increased wall thickness, hypokinetic basal and mid anterior septum and basal and mid inferior septum, PFO vs secundum ASD w/ L-R shunting, mild MR, aortic root 4.30 cm,  --now s/p R/LHC: 2/28: RCA; ANNE MARIE x 2 prox 90%. LAD; prox 50% cathworks 0.99. LM and LCx both with mild diffuse disease. RHC: RA 11, RV 73/13, PA 71/28 (43), PCW 22, PA sat 62.7%, AO sat 97.7, CO/CI 4.21/2.3   -- c/w ASA/Plavix     ###RFA HEMATOMA   -overnight 2/28: large right groin hematoma, extending from groin to medial and lateral thigh approx. 20cm one hour after going to bathroom 12:30AM--> manual compression with CCU Fellow, PA and nurse manager for over one hour, morphine 2mg IV X2  for pain.   - US 3/1:  3.4 x 1.6 x 2.2 cm and 2.7 x 0.9 x 1.3 cm hematomas. No flow is identified within either collection.          o 2-3 point Drop in H/H-- however per Dr. Garcia stop trending as site Is stable   - pulses at baseline: R DP 1+ and R PT doppler   - Per IC team, tentative plan for DC in am IF groin is stable after ambulation and am CBC stable   - remain on bedrest throughout the evening

## 2025-03-01 NOTE — CONSULT NOTE ADULT - SUBJECTIVE AND OBJECTIVE BOX
79M, CKD3 at baseline, PKD s/p renal transplant '07 (baseline Cr ~2.0), former heavy smoker, HTN, hyperlipidemia, DM, hx of  prostate Ca s/p radical prostatectomy 2015, hx of DVT, Afib/flutter s/p ablation in 1/2018 (off AC 2/2 prior GIB), HFpEF who presented to outpatient cardiologist Dr. Vee 2/28/25 c/o chest tightness and SOB with minimal exertion x ~1 week, admitted for ACS r/o, now planned for right and left heart catheterization with possible intervention. Nephrology consulted.       PAST MEDICAL & SURGICAL HISTORY:  Polycystic kidney disease  DM2 (diabetes mellitus, type 2)  HLD (hyperlipidemia)  HTN (hypertension)  Prostate cancer  Kidney transplant recipient  DVT (deep venous thrombosis)  Chronic CHF    H/O radical prostatectomy  2010      S/P hernia repair  Abdominal and inguinal around 2005    Allergies:  naproxen (Hives)  Jehovah&#x27;s Witness - No blood products (Unknown)    Home Medications Reviewed  Hospital Medications:   MEDICATIONS  (STANDING):  aspirin enteric coated 81 milliGRAM(s) Oral daily  atorvastatin 20 milliGRAM(s) Oral at bedtime  buMETAnide 4 milliGRAM(s) Oral <User Schedule>  carvedilol 12.5 milliGRAM(s) Oral every 12 hours  clopidogrel Tablet 75 milliGRAM(s) Oral daily  dextrose 5%. 1000 milliLiter(s) (100 mL/Hr) IV Continuous <Continuous>  dextrose 5%. 1000 milliLiter(s) (50 mL/Hr) IV Continuous <Continuous>  dextrose 50% Injectable 25 Gram(s) IV Push once  dextrose 50% Injectable 12.5 Gram(s) IV Push once  dextrose 50% Injectable 25 Gram(s) IV Push once  glucagon  Injectable 1 milliGRAM(s) IntraMuscular once  insulin glargine Injectable (LANTUS) 12 Unit(s) SubCutaneous at bedtime  pantoprazole    Tablet 40 milliGRAM(s) Oral before breakfast  predniSONE   Tablet 5 milliGRAM(s) Oral daily  tacrolimus 2 milliGRAM(s) Oral two times a day    SOCIAL HISTORY:  Denies ETOH,Smoking,   FAMILY HISTORY:  Family history of cerebrovascular accident (CVA) in mother      REVIEW OF SYSTEMS:  All other review of systems is negative unless indicated above.    VITALS:  Vital Signs Last 24 Hrs  T(C): 36.7 (01 Mar 2025 05:56), Max: 36.7 (01 Mar 2025 05:56)  T(F): 98.1 (01 Mar 2025 05:56), Max: 98.1 (01 Mar 2025 05:56)  HR: 57 (01 Mar 2025 08:30) (52 - 57)  BP: 153/74 (01 Mar 2025 08:30) (133/67 - 180/88)  BP(mean): 106 (01 Mar 2025 08:30) (92 - 121)  RR: 20 (01 Mar 2025 08:30) (16 - 20)  SpO2: 98% (01 Mar 2025 08:30) (96% - 98%)    Parameters below as of 01 Mar 2025 08:30  Patient On (Oxygen Delivery Method): room air        02-28 @ 07:01  -  03-01 @ 07:00  --------------------------------------------------------  IN: 400 mL / OUT: 650 mL / NET: -250 mL      Height (cm): 172.7 (02-28 @ 16:20)  Weight (kg): 65.8 (02-28 @ 16:20)  BMI (kg/m2): 22.1 (02-28 @ 16:20)  BSA (m2): 1.78 (02-28 @ 16:20)    PHYSICAL EXAM:  Constitutional: NAD  Neck: No JVD  Respiratory: CTAB, no wheezes, rales or rhonchi  Cardiovascular: S1, S2, RRR  Gastrointestinal: ND/NT, +BS  Extremities:  No peripheral edema in LEs.   Neurological: A/O x 3, no focal motor deficits.   : No buckley.     LABS:  03-01    141  |  107  |  50[H]  ----------------------------<  160[H]  4.1   |  21[L]  |  1.90[H]    Ca    8.7      01 Mar 2025 05:30  Mg     1.7     03-01    TPro  6.6  /  Alb  3.9  /  TBili  1.1  /  DBili  0.3  /  AST  26  /  ALT  34  /  AlkPhos  66  02-28    Creatinine Trend: 1.90 <--, 1.79 <--, 1.92 <--                        9.1    11.87 )-----------( 203      ( 01 Mar 2025 05:30 )             28.8     Urine Studies:  Urinalysis Basic - ( 01 Mar 2025 05:30 )    Color:  / Appearance:  / SG:  / pH:   Gluc: 160 mg/dL / Ketone:   / Bili:  / Urobili:    Blood:  / Protein:  / Nitrite:    Leuk Esterase:  / RBC:  / WBC    Sq Epi:  / Non Sq Epi:  / Bacteria:           RADIOLOGY & ADDITIONAL STUDIES: Reviewed.                  79M, CKD3 at baseline, PKD s/p renal transplant '07 (baseline Cr ~2.0), former heavy smoker, HTN, hyperlipidemia, DM, hx of  prostate Ca s/p radical prostatectomy 2015, hx of DVT, Afib/flutter s/p ablation in 1/2018 (off AC 2/2 prior GIB), HFpEF who presented to outpatient cardiologist Dr. Vee 2/28/25 c/o chest tightness and SOB with minimal exertion x ~1 week, admitted for ACS r/o, now planned for right and left heart catheterization with possible intervention. Nephrology consulted.       PAST MEDICAL & SURGICAL HISTORY:  Polycystic kidney disease  DM2 (diabetes mellitus, type 2)  HLD (hyperlipidemia)  HTN (hypertension)  Prostate cancer  Kidney transplant recipient  DVT (deep venous thrombosis)  Chronic CHF    H/O radical prostatectomy  2010      S/P hernia repair  Abdominal and inguinal around 2005    Allergies:  naproxen (Hives)  Jehovah&#x27;s Witness - No blood products (Unknown)    Home Medications Reviewed  Hospital Medications:   MEDICATIONS  (STANDING):  aspirin enteric coated 81 milliGRAM(s) Oral daily  atorvastatin 20 milliGRAM(s) Oral at bedtime  buMETAnide 4 milliGRAM(s) Oral <User Schedule>  carvedilol 12.5 milliGRAM(s) Oral every 12 hours  clopidogrel Tablet 75 milliGRAM(s) Oral daily  dextrose 5%. 1000 milliLiter(s) (100 mL/Hr) IV Continuous <Continuous>  dextrose 5%. 1000 milliLiter(s) (50 mL/Hr) IV Continuous <Continuous>  dextrose 50% Injectable 25 Gram(s) IV Push once  dextrose 50% Injectable 12.5 Gram(s) IV Push once  dextrose 50% Injectable 25 Gram(s) IV Push once  glucagon  Injectable 1 milliGRAM(s) IntraMuscular once  insulin glargine Injectable (LANTUS) 12 Unit(s) SubCutaneous at bedtime  pantoprazole    Tablet 40 milliGRAM(s) Oral before breakfast  predniSONE   Tablet 5 milliGRAM(s) Oral daily  tacrolimus 2 milliGRAM(s) Oral two times a day    SOCIAL HISTORY:  Denies ETOH,Smoking,   FAMILY HISTORY:  Family history of cerebrovascular accident (CVA) in mother      REVIEW OF SYSTEMS:  All other review of systems is negative unless indicated above.    VITALS:  Vital Signs Last 24 Hrs  T(C): 36.7 (01 Mar 2025 05:56), Max: 36.7 (01 Mar 2025 05:56)  T(F): 98.1 (01 Mar 2025 05:56), Max: 98.1 (01 Mar 2025 05:56)  HR: 57 (01 Mar 2025 08:30) (52 - 57)  BP: 153/74 (01 Mar 2025 08:30) (133/67 - 180/88)  BP(mean): 106 (01 Mar 2025 08:30) (92 - 121)  RR: 20 (01 Mar 2025 08:30) (16 - 20)  SpO2: 98% (01 Mar 2025 08:30) (96% - 98%)    Parameters below as of 01 Mar 2025 08:30  Patient On (Oxygen Delivery Method): room air        02-28 @ 07:01  -  03-01 @ 07:00  --------------------------------------------------------  IN: 400 mL / OUT: 650 mL / NET: -250 mL      Height (cm): 172.7 (02-28 @ 16:20)  Weight (kg): 65.8 (02-28 @ 16:20)  BMI (kg/m2): 22.1 (02-28 @ 16:20)  BSA (m2): 1.78 (02-28 @ 16:20)    PHYSICAL EXAM:  Constitutional: NAD  Neck: No JVD  Respiratory: CTAB, no wheezes, rales or rhonchi  Cardiovascular: S1, S2, RRR  Gastrointestinal: ND/NT, +BS, Transplant RLQ no bruit or thrill  Extremities:  No peripheral edema in LEs.   Neurological: A/O x 3, no focal motor deficits.   : No buckley.     LABS:  03-01    141  |  107  |  50[H]  ----------------------------<  160[H]  4.1   |  21[L]  |  1.90[H]    Ca    8.7      01 Mar 2025 05:30  Mg     1.7     03-01    TPro  6.6  /  Alb  3.9  /  TBili  1.1  /  DBili  0.3  /  AST  26  /  ALT  34  /  AlkPhos  66  02-28    Creatinine Trend: 1.90 <--, 1.79 <--, 1.92 <--                        9.1    11.87 )-----------( 203      ( 01 Mar 2025 05:30 )             28.8     Urine Studies:  Urinalysis Basic - ( 01 Mar 2025 05:30 )    Color:  / Appearance:  / SG:  / pH:   Gluc: 160 mg/dL / Ketone:   / Bili:  / Urobili:    Blood:  / Protein:  / Nitrite:    Leuk Esterase:  / RBC:  / WBC    Sq Epi:  / Non Sq Epi:  / Bacteria:           RADIOLOGY & ADDITIONAL STUDIES: Reviewed.                  79M, CKD3 at baseline, PKD s/p renal transplant '07 (baseline Cr ~2.0), former heavy smoker, HTN, hyperlipidemia, DM, hx of  prostate Ca s/p radical prostatectomy 2015, hx of DVT, Afib/flutter s/p ablation in 1/2018 (off AC 2/2 prior GIB), HFpEF who presented to outpatient cardiologist Dr. Vee 2/28/25 c/o chest tightness and SOB with minimal exertion x ~1 week, admitted for ACS r/o, now s/p right and left heart catheterization with ANNE MARIE. Nephrology consulted.       PAST MEDICAL & SURGICAL HISTORY:  Polycystic kidney disease  DM2 (diabetes mellitus, type 2)  HLD (hyperlipidemia)  HTN (hypertension)  Prostate cancer  Kidney transplant recipient  DVT (deep venous thrombosis)  Chronic CHF    H/O radical prostatectomy  2010      S/P hernia repair  Abdominal and inguinal around 2005    Allergies:  naproxen (Hives)  Jehovah&#x27;s Witness - No blood products (Unknown)    Home Medications Reviewed  Hospital Medications:   MEDICATIONS  (STANDING):  aspirin enteric coated 81 milliGRAM(s) Oral daily  atorvastatin 20 milliGRAM(s) Oral at bedtime  buMETAnide 4 milliGRAM(s) Oral <User Schedule>  carvedilol 12.5 milliGRAM(s) Oral every 12 hours  clopidogrel Tablet 75 milliGRAM(s) Oral daily  dextrose 5%. 1000 milliLiter(s) (100 mL/Hr) IV Continuous <Continuous>  dextrose 5%. 1000 milliLiter(s) (50 mL/Hr) IV Continuous <Continuous>  dextrose 50% Injectable 25 Gram(s) IV Push once  dextrose 50% Injectable 12.5 Gram(s) IV Push once  dextrose 50% Injectable 25 Gram(s) IV Push once  glucagon  Injectable 1 milliGRAM(s) IntraMuscular once  insulin glargine Injectable (LANTUS) 12 Unit(s) SubCutaneous at bedtime  pantoprazole    Tablet 40 milliGRAM(s) Oral before breakfast  predniSONE   Tablet 5 milliGRAM(s) Oral daily  tacrolimus 2 milliGRAM(s) Oral two times a day    SOCIAL HISTORY:  Denies ETOH,Smoking,   FAMILY HISTORY:  Family history of cerebrovascular accident (CVA) in mother      REVIEW OF SYSTEMS:  All other review of systems is negative unless indicated above.    VITALS:  Vital Signs Last 24 Hrs  T(C): 36.7 (01 Mar 2025 05:56), Max: 36.7 (01 Mar 2025 05:56)  T(F): 98.1 (01 Mar 2025 05:56), Max: 98.1 (01 Mar 2025 05:56)  HR: 57 (01 Mar 2025 08:30) (52 - 57)  BP: 153/74 (01 Mar 2025 08:30) (133/67 - 180/88)  BP(mean): 106 (01 Mar 2025 08:30) (92 - 121)  RR: 20 (01 Mar 2025 08:30) (16 - 20)  SpO2: 98% (01 Mar 2025 08:30) (96% - 98%)    Parameters below as of 01 Mar 2025 08:30  Patient On (Oxygen Delivery Method): room air        02-28 @ 07:01  -  03-01 @ 07:00  --------------------------------------------------------  IN: 400 mL / OUT: 650 mL / NET: -250 mL      Height (cm): 172.7 (02-28 @ 16:20)  Weight (kg): 65.8 (02-28 @ 16:20)  BMI (kg/m2): 22.1 (02-28 @ 16:20)  BSA (m2): 1.78 (02-28 @ 16:20)    PHYSICAL EXAM:  Constitutional: NAD  Neck: No JVD  Respiratory: CTAB, no wheezes, rales or rhonchi  Cardiovascular: S1, S2, RRR  Gastrointestinal: ND/NT, +BS, Transplant RLQ no bruit or thrill  Extremities:  No peripheral edema in LEs.   Neurological: A/O x 3, no focal motor deficits.   : No buckley.   R groin pain on palpation    LABS:  03-01    141  |  107  |  50[H]  ----------------------------<  160[H]  4.1   |  21[L]  |  1.90[H]    Ca    8.7      01 Mar 2025 05:30  Mg     1.7     03-01    TPro  6.6  /  Alb  3.9  /  TBili  1.1  /  DBili  0.3  /  AST  26  /  ALT  34  /  AlkPhos  66  02-28    Creatinine Trend: 1.90 <--, 1.79 <--, 1.92 <--                        9.1    11.87 )-----------( 203      ( 01 Mar 2025 05:30 )             28.8     Urine Studies:  Urinalysis Basic - ( 01 Mar 2025 05:30 )    Color:  / Appearance:  / SG:  / pH:   Gluc: 160 mg/dL / Ketone:   / Bili:  / Urobili:    Blood:  / Protein:  / Nitrite:    Leuk Esterase:  / RBC:  / WBC    Sq Epi:  / Non Sq Epi:  / Bacteria:           RADIOLOGY & ADDITIONAL STUDIES: Reviewed.

## 2025-03-01 NOTE — CONSULT NOTE ADULT - ASSESSMENT
79M, CKD3 at baseline, PKD s/p renal transplant '07 (baseline Cr ~2.0), former heavy smoker, HTN, hyperlipidemia, DM, hx of  prostate Ca s/p radical prostatectomy 2015, hx of DVT, Afib/flutter s/p ablation in 1/2018 (off AC 2/2 prior GIB), HFpEF who presented to outpatient cardiologist Dr. Vee 2/28/25 c/o chest tightness and SOB with minimal exertion x ~1 week, admitted for ACS r/o, now planned for right and left heart catheterization with possible intervention. Nephrology consulted.     -CKD3 at baseline. PKD s/p renal transplant '07 with bsCr ~2.0    -Plan:  Stable CKD3.   Continue home Tacrolimus 2mg BID.   No need for Tacro level while renal function is at baseline in pte with transplant since 2007.  Continue home Prednisone 5mg daily.   Avoid Hypotension/sudden BP drops  Resume home antiHTN as need it.   Obtain Fe panel.   Start HCO3 tabs 1300mg TID.   Pending C. cath, will continuing monitoring renal function.   Start LR @ 1ml/kg/hr 6hr pre C. cath and continue LR x6hr after C. cath is performed.   GDMT as per primary team.  79M, CKD3 at baseline, PKD s/p renal transplant '07 (baseline Cr ~2.0), former heavy smoker, HTN, hyperlipidemia, DM, hx of  prostate Ca s/p radical prostatectomy 2015, hx of DVT, Afib/flutter s/p ablation in 1/2018 (off AC 2/2 prior GIB), HFpEF who presented to outpatient cardiologist Dr. Vee 2/28/25 c/o chest tightness and SOB with minimal exertion x ~1 week, admitted for ACS r/o, s.p R and L HC, s/p ANNE MARIE x2 RCA and RHC via RFA and RFV. Access site c/b 20cm R groin hematoma.     -CKD3 at baseline. PKD s/p renal transplant '07 with bsCr ~2.0    -Plan:  Stable CKD3.   Continue home Tacrolimus 2mg BID.   No need for Tacro level while renal function is at baseline in pte with transplant since 2007.  Continue home Prednisone 5mg daily.   Avoid Hypotension/sudden BP drops  Resume home antiHTN as need it.   Obtain Fe panel.   Start HCO3 tabs 1300mg TID.   Pending C. cath, will continuing monitoring renal function.   Start LR @ 1ml/kg/hr 6hr pre C. cath and continue LR x6hr after C. cath is performed.   GDMT as per primary team.

## 2025-03-01 NOTE — PROGRESS NOTE ADULT - PROBLEM SELECTOR PLAN 4
continue Atorvastatin 20mg PO qhs.  --F/U lipid panel. continue Atorvastatin 20mg PO qhs.  --F/U lipid panel in AM

## 2025-03-01 NOTE — PROGRESS NOTE ADULT - PROBLEM SELECTOR PLAN 3
currently SB w/ PACs  s/p ablation in Jan 2018 not on AC 2/2 GIB s/p Amulet device   --RATE CONTROL: Coreg 25mg PO BID  - AC: No longer on AC 2/2 GIB. C/w ASA 81mg QD.
No

## 2025-03-01 NOTE — PROGRESS NOTE ADULT - SUBJECTIVE AND OBJECTIVE BOX
Interventional Cardiology PA Adult Progress Note    Subjective Assessment:    ROS negative except as noted above.  	  MEDICATIONS:  buMETAnide 4 milliGRAM(s) Oral <User Schedule>  carvedilol 12.5 milliGRAM(s) Oral every 12 hours          pantoprazole    Tablet 40 milliGRAM(s) Oral before breakfast    atorvastatin 20 milliGRAM(s) Oral at bedtime  dextrose 50% Injectable 25 Gram(s) IV Push once  dextrose 50% Injectable 12.5 Gram(s) IV Push once  dextrose 50% Injectable 25 Gram(s) IV Push once  dextrose Oral Gel 15 Gram(s) Oral once PRN  glucagon  Injectable 1 milliGRAM(s) IntraMuscular once  insulin glargine Injectable (LANTUS) 12 Unit(s) SubCutaneous at bedtime  predniSONE   Tablet 5 milliGRAM(s) Oral daily    aspirin enteric coated 81 milliGRAM(s) Oral daily  clopidogrel Tablet 75 milliGRAM(s) Oral daily  dextrose 5%. 1000 milliLiter(s) IV Continuous <Continuous>  dextrose 5%. 1000 milliLiter(s) IV Continuous <Continuous>  magnesium oxide 800 milliGRAM(s) Oral once  tacrolimus 2 milliGRAM(s) Oral two times a day      	    [PHYSICAL EXAM:  TELEMETRY:  T(C): 36.7 (03-01-25 @ 05:56), Max: 36.7 (03-01-25 @ 05:56)  HR: 57 (03-01-25 @ 05:00) (52 - 57)  BP: 133/67 (03-01-25 @ 05:00) (133/67 - 180/88)  RR: 18 (03-01-25 @ 05:00) (16 - 20)  SpO2: 98% (03-01-25 @ 05:00) (96% - 98%)  Wt(kg): --  I&O's Summary    28 Feb 2025 07:01  -  01 Mar 2025 07:00  --------------------------------------------------------  IN: 400 mL / OUT: 650 mL / NET: -250 mL      Height (cm): 172.7 (02-28 @ 16:20)  Weight (kg): 65.8 (02-28 @ 16:20)  BMI (kg/m2): 22.1 (02-28 @ 16:20)  BSA (m2): 1.78 (02-28 @ 16:20)                                        Appearance: Normal	  HEENT:   Normal oral mucosa, PERRLA, EOMI	  Neck: Supple, + JVD/ - JVD; Carotid Bruit   Cardiovascular: Normal S1 S2, No JVD, No murmurs,   Respiratory: Lungs clear to auscultation/Decreased Breath Sounds/No Rales, Rhonchi, Wheezing	  Gastrointestinal:  Soft, Non-tender, + BS	  Skin: No rashes, No ecchymoses, No cyanosis  Extremities: Normal range of motion, No clubbing, cyanosis or edema  Vascular: Peripheral pulses palpable 2+ bilaterally  Neurologic: Non-focal  Psychiatry: A & O x 3, Mood & affect appropriate      	    ECG:  	  RADIOLOGY:   DIAGNOSTIC TESTING:  [ ] Echocardiogram:   [ ]  Catheterization:  [ ] Stress Test:    [ ] KIMMIE  OTHER: 	    LABS:	 	  CARDIAC MARKERS:                                  9.1    11.87 )-----------( 203      ( 01 Mar 2025 05:30 )             28.8     03-01    141  |  107  |  50[H]  ----------------------------<  160[H]  4.1   |  21[L]  |  1.90[H]    Ca    8.7      01 Mar 2025 05:30  Mg     1.7     03-01    TPro  6.6  /  Alb  3.9  /  TBili  1.1  /  DBili  0.3  /  AST  26  /  ALT  34  /  AlkPhos  66  02-28    proBNP:   Lipid Profile:   HgA1c:   TSH:   PT/INR - ( 01 Mar 2025 02:02 )   PT: 19.4 sec;   INR: 1.67          PTT - ( 01 Mar 2025 02:02 )  PTT:92.9 sec Cardiology PA Adult Progress Note    Subjective Assessment: Patient seen and examined at bedside. Pt laying flat, in no distress, c/o groin aching. Currently patient denies chest pain, SOB, GAFFNEY, palpitations, dizziness, LOC, N/V/D, fever/chills, diaphoresis, orthopnea/PND.  ROS negative except as noted above.  	  MEDICATIONS:  buMETAnide 4 milliGRAM(s) Oral <User Schedule>  carvedilol 12.5 milliGRAM(s) Oral every 12 hours  pantoprazole    Tablet 40 milliGRAM(s) Oral before breakfast  atorvastatin 20 milliGRAM(s) Oral at bedtime  dextrose 50% Injectable 25 Gram(s) IV Push once  dextrose 50% Injectable 12.5 Gram(s) IV Push once  dextrose 50% Injectable 25 Gram(s) IV Push once  dextrose Oral Gel 15 Gram(s) Oral once PRN  glucagon  Injectable 1 milliGRAM(s) IntraMuscular once  insulin glargine Injectable (LANTUS) 12 Unit(s) SubCutaneous at bedtime  predniSONE   Tablet 5 milliGRAM(s) Oral daily  aspirin enteric coated 81 milliGRAM(s) Oral daily  clopidogrel Tablet 75 milliGRAM(s) Oral daily  dextrose 5%. 1000 milliLiter(s) IV Continuous <Continuous>  dextrose 5%. 1000 milliLiter(s) IV Continuous <Continuous>  magnesium oxide 800 milliGRAM(s) Oral once  tacrolimus 2 milliGRAM(s) Oral two times a day      	    [PHYSICAL EXAM:  TELEMETRY:  T(C): 36.7 (03-01-25 @ 05:56), Max: 36.7 (03-01-25 @ 05:56)  HR: 57 (03-01-25 @ 05:00) (52 - 57)  BP: 133/67 (03-01-25 @ 05:00) (133/67 - 180/88)  RR: 18 (03-01-25 @ 05:00) (16 - 20)  SpO2: 98% (03-01-25 @ 05:00) (96% - 98%)  Wt(kg): --  I&O's Summary    28 Feb 2025 07:01  -  01 Mar 2025 07:00  --------------------------------------------------------  IN: 400 mL / OUT: 650 mL / NET: -250 mL      Height (cm): 172.7 (02-28 @ 16:20)  Weight (kg): 65.8 (02-28 @ 16:20)  BMI (kg/m2): 22.1 (02-28 @ 16:20)  BSA (m2): 1.78 (02-28 @ 16:20)                                        Appearance: Normal	  HEENT:   Normal oral mucosa, PERRLA, EOMI	  Neck: Supple, - JVD; Carotid Bruit   Cardiovascular: Normal S1 S2, No JVD, No murmurs,   Respiratory: Lungs clear to auscultation  Gastrointestinal:  Soft, Non-tender, + BS	  Skin: No rashes, No ecchymoses, No cyanosis  Extremities: ecchymosis extending from R groin to medial + lateral thigh (s/p 20cm hematoma decompressed overnight), + soft and tender to palpation   Vascular: R DP 1+, R PT doppler, L 1+ b/l,   Neurologic: Non-focal  Psychiatry: A & O x 3, Mood & affect appropriate      	    ECG:  	  RADIOLOGY:   DIAGNOSTIC TESTING:  [ ] Echocardiogram:   [ ]  Catheterization:  [ ] Stress Test:    [ ] KIMMIE  OTHER: 	    LABS:	 	  CARDIAC MARKERS:                          9.1    11.87 )-----------( 203      ( 01 Mar 2025 05:30 )             28.8     03-01    141  |  107  |  50[H]  ----------------------------<  160[H]  4.1   |  21[L]  |  1.90[H]    Ca    8.7      01 Mar 2025 05:30  Mg     1.7     03-01    TPro  6.6  /  Alb  3.9  /  TBili  1.1  /  DBili  0.3  /  AST  26  /  ALT  34  /  AlkPhos  66  02-28    proBNP:   Lipid Profile:   HgA1c:   TSH:   PT/INR - ( 01 Mar 2025 02:02 )   PT: 19.4 sec;   INR: 1.67          PTT - ( 01 Mar 2025 02:02 )  PTT:92.9 sec

## 2025-03-01 NOTE — PROGRESS NOTE ADULT - PROBLEM SELECTOR PLAN 6
Hgb 11, stable  -prior Iron panel 11/2024 consistent w/ AoCD.      F: 1L fluid restriction  E: Replete for K <4, Mg <2  N: DASH/TLC  GI PPx: PPI  DVT PPx: on hold pending cath -prior Iron panel 11/2024 consistent w/ AoCD.  **see RFA hematoma problem   - f/u CBC in AM   ***patient is a Yarsanism***           F: 1L fluid restriction  E: Replete for K <4, Mg <2  N: DASH/TLC  GI PPx: PPI  DVT PPx: on hold 2/2 hematoma

## 2025-03-01 NOTE — PROVIDER CONTACT NOTE (CHANGE IN STATUS NOTIFICATION) - BACKGROUND
hx of CHF, HTN, HLD, DM, prostate cancer and renal transplant. admitted for chest tightness and SOB with exertion x 1 week. s/p 2 stents 2/28

## 2025-03-02 VITALS
SYSTOLIC BLOOD PRESSURE: 131 MMHG | OXYGEN SATURATION: 99 % | HEART RATE: 61 BPM | DIASTOLIC BLOOD PRESSURE: 69 MMHG | RESPIRATION RATE: 20 BRPM

## 2025-03-02 LAB
ALBUMIN SERPL ELPH-MCNC: 3 G/DL — LOW (ref 3.3–5)
ALP SERPL-CCNC: 49 U/L — SIGNIFICANT CHANGE UP (ref 40–120)
ALT FLD-CCNC: 18 U/L — SIGNIFICANT CHANGE UP (ref 10–45)
ANION GAP SERPL CALC-SCNC: 12 MMOL/L — SIGNIFICANT CHANGE UP (ref 5–17)
AST SERPL-CCNC: 15 U/L — SIGNIFICANT CHANGE UP (ref 10–40)
BASOPHILS # BLD AUTO: 0.03 K/UL — SIGNIFICANT CHANGE UP (ref 0–0.2)
BASOPHILS NFR BLD AUTO: 0.3 % — SIGNIFICANT CHANGE UP (ref 0–2)
BILIRUB SERPL-MCNC: 0.9 MG/DL — SIGNIFICANT CHANGE UP (ref 0.2–1.2)
BUN SERPL-MCNC: 45 MG/DL — HIGH (ref 7–23)
CALCIUM SERPL-MCNC: 8 MG/DL — LOW (ref 8.4–10.5)
CHLORIDE SERPL-SCNC: 108 MMOL/L — SIGNIFICANT CHANGE UP (ref 96–108)
CHOLEST SERPL-MCNC: 76 MG/DL — SIGNIFICANT CHANGE UP
CO2 SERPL-SCNC: 22 MMOL/L — SIGNIFICANT CHANGE UP (ref 22–31)
CREAT SERPL-MCNC: 1.91 MG/DL — HIGH (ref 0.5–1.3)
EGFR: 36 ML/MIN/1.73M2 — LOW
EGFR: 36 ML/MIN/1.73M2 — LOW
EOSINOPHIL # BLD AUTO: 0.33 K/UL — SIGNIFICANT CHANGE UP (ref 0–0.5)
EOSINOPHIL NFR BLD AUTO: 3.7 % — SIGNIFICANT CHANGE UP (ref 0–6)
GLUCOSE SERPL-MCNC: 120 MG/DL — HIGH (ref 70–99)
HCT VFR BLD CALC: 25.8 % — LOW (ref 39–50)
HDLC SERPL-MCNC: 36 MG/DL — LOW
HGB BLD-MCNC: 8.2 G/DL — LOW (ref 13–17)
IMM GRANULOCYTES NFR BLD AUTO: 0.4 % — SIGNIFICANT CHANGE UP (ref 0–0.9)
LIPID PNL WITH DIRECT LDL SERPL: 24 MG/DL — SIGNIFICANT CHANGE UP
LYMPHOCYTES # BLD AUTO: 1.75 K/UL — SIGNIFICANT CHANGE UP (ref 1–3.3)
LYMPHOCYTES # BLD AUTO: 19.6 % — SIGNIFICANT CHANGE UP (ref 13–44)
MAGNESIUM SERPL-MCNC: 1.6 MG/DL — SIGNIFICANT CHANGE UP (ref 1.6–2.6)
MCHC RBC-ENTMCNC: 29.9 PG — SIGNIFICANT CHANGE UP (ref 27–34)
MCHC RBC-ENTMCNC: 31.8 G/DL — LOW (ref 32–36)
MCV RBC AUTO: 94.2 FL — SIGNIFICANT CHANGE UP (ref 80–100)
MONOCYTES # BLD AUTO: 0.84 K/UL — SIGNIFICANT CHANGE UP (ref 0–0.9)
MONOCYTES NFR BLD AUTO: 9.4 % — SIGNIFICANT CHANGE UP (ref 2–14)
NEUTROPHILS # BLD AUTO: 5.95 K/UL — SIGNIFICANT CHANGE UP (ref 1.8–7.4)
NEUTROPHILS NFR BLD AUTO: 66.6 % — SIGNIFICANT CHANGE UP (ref 43–77)
NON HDL CHOLESTEROL: 40 MG/DL — SIGNIFICANT CHANGE UP
NRBC BLD AUTO-RTO: 0 /100 WBCS — SIGNIFICANT CHANGE UP (ref 0–0)
PLATELET # BLD AUTO: 193 K/UL — SIGNIFICANT CHANGE UP (ref 150–400)
POTASSIUM SERPL-MCNC: 3.7 MMOL/L — SIGNIFICANT CHANGE UP (ref 3.5–5.3)
POTASSIUM SERPL-SCNC: 3.7 MMOL/L — SIGNIFICANT CHANGE UP (ref 3.5–5.3)
PROT SERPL-MCNC: 5.2 G/DL — LOW (ref 6–8.3)
RBC # BLD: 2.74 M/UL — LOW (ref 4.2–5.8)
RBC # FLD: 18.6 % — HIGH (ref 10.3–14.5)
SODIUM SERPL-SCNC: 142 MMOL/L — SIGNIFICANT CHANGE UP (ref 135–145)
TRIGL SERPL-MCNC: 75 MG/DL — SIGNIFICANT CHANGE UP
WBC # BLD: 8.94 K/UL — SIGNIFICANT CHANGE UP (ref 3.8–10.5)
WBC # FLD AUTO: 8.94 K/UL — SIGNIFICANT CHANGE UP (ref 3.8–10.5)

## 2025-03-02 PROCEDURE — 71045 X-RAY EXAM CHEST 1 VIEW: CPT

## 2025-03-02 PROCEDURE — 80061 LIPID PANEL: CPT

## 2025-03-02 PROCEDURE — 99285 EMERGENCY DEPT VISIT HI MDM: CPT

## 2025-03-02 PROCEDURE — 82962 GLUCOSE BLOOD TEST: CPT

## 2025-03-02 PROCEDURE — 84484 ASSAY OF TROPONIN QUANT: CPT

## 2025-03-02 PROCEDURE — 86901 BLOOD TYPING SEROLOGIC RH(D): CPT

## 2025-03-02 PROCEDURE — 85025 COMPLETE CBC W/AUTO DIFF WBC: CPT

## 2025-03-02 PROCEDURE — 82553 CREATINE MB FRACTION: CPT

## 2025-03-02 PROCEDURE — 85347 COAGULATION TIME ACTIVATED: CPT

## 2025-03-02 PROCEDURE — 86900 BLOOD TYPING SEROLOGIC ABO: CPT

## 2025-03-02 PROCEDURE — 80076 HEPATIC FUNCTION PANEL: CPT

## 2025-03-02 PROCEDURE — C1887: CPT

## 2025-03-02 PROCEDURE — 85027 COMPLETE CBC AUTOMATED: CPT

## 2025-03-02 PROCEDURE — 82803 BLOOD GASES ANY COMBINATION: CPT

## 2025-03-02 PROCEDURE — C1769: CPT

## 2025-03-02 PROCEDURE — 83735 ASSAY OF MAGNESIUM: CPT

## 2025-03-02 PROCEDURE — C1760: CPT

## 2025-03-02 PROCEDURE — C1889: CPT

## 2025-03-02 PROCEDURE — 36415 COLL VENOUS BLD VENIPUNCTURE: CPT

## 2025-03-02 PROCEDURE — 82550 ASSAY OF CK (CPK): CPT

## 2025-03-02 PROCEDURE — 99233 SBSQ HOSP IP/OBS HIGH 50: CPT

## 2025-03-02 PROCEDURE — C1894: CPT

## 2025-03-02 PROCEDURE — 83880 ASSAY OF NATRIURETIC PEPTIDE: CPT

## 2025-03-02 PROCEDURE — C1753: CPT

## 2025-03-02 PROCEDURE — 85610 PROTHROMBIN TIME: CPT

## 2025-03-02 PROCEDURE — C1874: CPT

## 2025-03-02 PROCEDURE — 93926 LOWER EXTREMITY STUDY: CPT

## 2025-03-02 PROCEDURE — 85730 THROMBOPLASTIN TIME PARTIAL: CPT

## 2025-03-02 PROCEDURE — 86850 RBC ANTIBODY SCREEN: CPT

## 2025-03-02 PROCEDURE — 80048 BASIC METABOLIC PNL TOTAL CA: CPT

## 2025-03-02 PROCEDURE — 83036 HEMOGLOBIN GLYCOSYLATED A1C: CPT

## 2025-03-02 PROCEDURE — 97161 PT EVAL LOW COMPLEX 20 MIN: CPT

## 2025-03-02 PROCEDURE — 80197 ASSAY OF TACROLIMUS: CPT

## 2025-03-02 PROCEDURE — C1725: CPT

## 2025-03-02 PROCEDURE — 80053 COMPREHEN METABOLIC PANEL: CPT

## 2025-03-02 RX ORDER — ASPIRIN 325 MG
1 TABLET ORAL
Qty: 30 | Refills: 11
Start: 2025-03-02 | End: 2026-02-24

## 2025-03-02 RX ORDER — SODIUM BICARBONATE 1 MEQ/ML
2 SYRINGE (ML) INTRAVENOUS
Qty: 84 | Refills: 0
Start: 2025-03-02 | End: 2025-03-15

## 2025-03-02 RX ORDER — BUMETANIDE 1 MG/1
2 TABLET ORAL
Qty: 120 | Refills: 0
Start: 2025-03-02 | End: 2025-03-31

## 2025-03-02 RX ORDER — SENNA 187 MG
2 TABLET ORAL
Qty: 60 | Refills: 0
Start: 2025-03-02 | End: 2025-03-31

## 2025-03-02 RX ORDER — ATORVASTATIN CALCIUM 80 MG/1
1 TABLET, FILM COATED ORAL
Qty: 30 | Refills: 2
Start: 2025-03-02 | End: 2025-05-30

## 2025-03-02 RX ORDER — LIDOCAINE HYDROCHLORIDE 20 MG/ML
1 JELLY TOPICAL ONCE
Refills: 0 | Status: COMPLETED | OUTPATIENT
Start: 2025-03-02 | End: 2025-03-02

## 2025-03-02 RX ORDER — ACETAMINOPHEN 500 MG/5ML
650 LIQUID (ML) ORAL EVERY 6 HOURS
Refills: 0 | Status: DISCONTINUED | OUTPATIENT
Start: 2025-03-02 | End: 2025-03-02

## 2025-03-02 RX ORDER — MAGNESIUM SULFATE 500 MG/ML
2 SYRINGE (ML) INJECTION ONCE
Refills: 0 | Status: COMPLETED | OUTPATIENT
Start: 2025-03-02 | End: 2025-03-02

## 2025-03-02 RX ORDER — CARVEDILOL 3.12 MG/1
1 TABLET, FILM COATED ORAL
Qty: 60 | Refills: 0
Start: 2025-03-02 | End: 2025-03-31

## 2025-03-02 RX ORDER — CARVEDILOL 3.12 MG/1
2 TABLET, FILM COATED ORAL
Refills: 0 | DISCHARGE

## 2025-03-02 RX ORDER — FERROUS SULFATE 137(45) MG
1 TABLET, EXTENDED RELEASE ORAL
Qty: 30 | Refills: 0
Start: 2025-03-02 | End: 2025-03-31

## 2025-03-02 RX ORDER — CLOPIDOGREL BISULFATE 75 MG/1
1 TABLET, FILM COATED ORAL
Qty: 30 | Refills: 11
Start: 2025-03-02 | End: 2026-02-24

## 2025-03-02 RX ORDER — EMPAGLIFLOZIN 25 MG/1
1 TABLET, FILM COATED ORAL
Qty: 30 | Refills: 0
Start: 2025-03-02 | End: 2025-03-31

## 2025-03-02 RX ORDER — CARVEDILOL 3.12 MG/1
1 TABLET, FILM COATED ORAL
Qty: 90 | Refills: 0
Start: 2025-03-02 | End: 2025-03-31

## 2025-03-02 RX ORDER — MAGNESIUM OXIDE 400 MG
400 TABLET ORAL ONCE
Refills: 0 | Status: COMPLETED | OUTPATIENT
Start: 2025-03-02 | End: 2025-03-02

## 2025-03-02 RX ORDER — ACETAMINOPHEN 500 MG/5ML
2 LIQUID (ML) ORAL
Qty: 0 | Refills: 0 | DISCHARGE
Start: 2025-03-02

## 2025-03-02 RX ORDER — BUMETANIDE 1 MG/1
2 TABLET ORAL
Refills: 0 | DISCHARGE

## 2025-03-02 RX ADMIN — INSULIN LISPRO 10: 100 INJECTION, SOLUTION INTRAVENOUS; SUBCUTANEOUS at 11:34

## 2025-03-02 RX ADMIN — IRON SUCROSE 176.67 MILLIGRAM(S): 20 INJECTION, SOLUTION INTRAVENOUS at 07:01

## 2025-03-02 RX ADMIN — CLOPIDOGREL BISULFATE 75 MILLIGRAM(S): 75 TABLET, FILM COATED ORAL at 11:34

## 2025-03-02 RX ADMIN — BUMETANIDE 4 MILLIGRAM(S): 1 TABLET ORAL at 10:00

## 2025-03-02 RX ADMIN — LIDOCAINE HYDROCHLORIDE 1 PATCH: 20 JELLY TOPICAL at 10:30

## 2025-03-02 RX ADMIN — TACROLIMUS 2 MILLIGRAM(S): 0.5 CAPSULE ORAL at 10:01

## 2025-03-02 RX ADMIN — BUMETANIDE 4 MILLIGRAM(S): 1 TABLET ORAL at 16:12

## 2025-03-02 RX ADMIN — Medication 1300 MILLIGRAM(S): at 05:37

## 2025-03-02 RX ADMIN — Medication 25 GRAM(S): at 08:51

## 2025-03-02 RX ADMIN — Medication 650 MILLIGRAM(S): at 11:50

## 2025-03-02 RX ADMIN — Medication 1300 MILLIGRAM(S): at 14:46

## 2025-03-02 RX ADMIN — CARVEDILOL 12.5 MILLIGRAM(S): 3.12 TABLET, FILM COATED ORAL at 05:36

## 2025-03-02 RX ADMIN — INSULIN LISPRO 2: 100 INJECTION, SOLUTION INTRAVENOUS; SUBCUTANEOUS at 16:43

## 2025-03-02 RX ADMIN — Medication 81 MILLIGRAM(S): at 11:34

## 2025-03-02 RX ADMIN — PREDNISONE 5 MILLIGRAM(S): 20 TABLET ORAL at 05:37

## 2025-03-02 RX ADMIN — Medication 400 MILLIGRAM(S): at 08:50

## 2025-03-02 RX ADMIN — Medication 40 MILLIGRAM(S): at 05:38

## 2025-03-02 RX ADMIN — Medication 650 MILLIGRAM(S): at 10:30

## 2025-03-02 RX ADMIN — Medication 40 MILLIEQUIVALENT(S): at 08:50

## 2025-03-02 RX ADMIN — Medication 650 MILLIGRAM(S): at 16:12

## 2025-03-02 NOTE — PHYSICAL THERAPY INITIAL EVALUATION ADULT - PERTINENT HX OF CURRENT PROBLEM, REHAB EVAL
79 year old male presented to outpatient cardiologist Dr. Vee 2/28/25 c/o chest tightness and SOB with minimal exertion x ~1 week, EKG w/ TWI inferolateral leads, Trop T 79, CK/CKMB normal, BNP >44k, now s/p ANNE MARIE x2 RCA and RHC via RFA and RFV. Access site c/b 20cm R groin hematoma. Ultrasound done 3/1 without pseudoaneurysm or AV fistula.

## 2025-03-02 NOTE — DISCHARGE NOTE PROVIDER - PROVIDER TOKENS
PROVIDER:[TOKEN:[[id number]]] PROVIDER:[TOKEN:[[id number]],SCHEDULEDAPPT:[03/05/2025],SCHEDULEDAPPTTIME:[02:30 PM]]

## 2025-03-02 NOTE — DISCHARGE NOTE PROVIDER - NSDCFUSCHEDAPPT_GEN_ALL_CORE_FT
Karla Vee  [facility] Physician [facility]  HEARTVASC [address]  Scheduled Appointment: 03/05/2025    Leonid Calixto  [facility]  NEPHRO [address]  Scheduled Appointment: 04/02/2025

## 2025-03-02 NOTE — DISCHARGE NOTE PROVIDER - POSTFACE STATEMENT FOR MINUTES SPENT
Chief Complaint   Patient presents with     Uterine Prolapse     Patient is here for prolapse. Patient says she leaks a little urine  
Patient voided 160 ml. PVR was 55ml  
minutes on the discharge service.

## 2025-03-02 NOTE — DISCHARGE NOTE NURSING/CASE MANAGEMENT/SOCIAL WORK - NSDCPEFALRISK_GEN_ALL_CORE
For information on Fall & Injury Prevention, visit: [email or web address] OR  [email or web address] OR  https://www.cdc.gov/steadi/patient.html

## 2025-03-02 NOTE — DISCHARGE NOTE NURSING/CASE MANAGEMENT/SOCIAL WORK - PATIENT PORTAL LINK FT
You can access the FollowMyHealth Patient Portal offered by [facility] by registering at the following website: [email or web address]. By joining [employer]’s FollowMyHealth portal, you will also be able to view your health information using other applications (apps) compatible with our system.

## 2025-03-02 NOTE — DISCHARGE NOTE PROVIDER - NSDCMRMEDTOKEN_GEN_ALL_CORE_FT
Aspirin EC 81 mg oral delayed release tablet: 1 tab(s) orally once a day  atorvastatin 20 mg oral tablet: 1 tab(s) orally once a day (at bedtime)  Bumex 2 mg oral tablet: 2 tab(s) orally 2 times a day (recently increased, pt had not started regimen at home yet)  carvedilol 12.5 mg oral tablet: 2 tab(s) orally 2 times a day  Jardiance 25 mg oral tablet: 1 tab(s) orally once a day  Lantus 100 units/mL subcutaneous solution: 12 unit(s) subcutaneous once a day (at bedtime)  metFORMIN 500 mg oral tablet: 3 tab(s) orally once a day after dinner  predniSONE 5 mg oral tablet: 1 tab(s) orally once a day  tacrolimus 1 mg oral capsule: 2 cap(s) orally 2 times a day   acetaminophen 325 mg oral tablet: 2 tab(s) orally every 6 hours as needed for Mild Pain (1 - 3), Moderate Pain (4 - 6) Do not exceed 3000mg in any 24hour period.  Aspirin EC 81 mg oral delayed release tablet: 1 tab(s) orally once a day  atorvastatin 20 mg oral tablet: 1 tab(s) orally once a day (at bedtime)  Bumex 2 mg oral tablet: 2 tab(s) orally 2 times a day (recently increased, pt had not started regimen at home yet)  carvedilol 12.5 mg oral tablet: 2 tab(s) orally 2 times a day  Jardiance 25 mg oral tablet: 1 tab(s) orally once a day  Lantus 100 units/mL subcutaneous solution: 12 unit(s) subcutaneous once a day (at bedtime)  metFORMIN 500 mg oral tablet: 3 tab(s) orally once a day after dinner **RESUME Monday morning 3/3/25**  predniSONE 5 mg oral tablet: 1 tab(s) orally once a day  tacrolimus 1 mg oral capsule: 2 cap(s) orally 2 times a day   acetaminophen 325 mg oral tablet: 2 tab(s) orally every 6 hours as needed for Mild Pain (1 - 3), Moderate Pain (4 - 6) Do not exceed 3000mg in any 24hour period.  Aspirin EC 81 mg oral delayed release tablet: 1 tab(s) orally once a day  atorvastatin 20 mg oral tablet: 1 tab(s) orally once a day (at bedtime)  bumetanide 2 mg oral tablet: 2 tab(s) orally 2 times a day  carvedilol 12.5 mg oral tablet: 1 tab(s) orally 2 times a day Take 2 tablets (total 25mg) in morning and 1 tablet (12.5mg) at night  clopidogrel 75 mg oral tablet: 1 tab(s) orally once a day  ferrous sulfate 325 mg (65 mg elemental iron) oral delayed release tablet: 1 tab(s) orally once a day  Jardiance 25 mg oral tablet: 1 tab(s) orally once a day  Lantus 100 units/mL subcutaneous solution: 12 unit(s) subcutaneous once a day (at bedtime)  metFORMIN 500 mg oral tablet: 3 tab(s) orally once a day after dinner **RESUME Monday morning 3/3/25**  pantoprazole 40 mg oral delayed release tablet: 1 tab(s) orally once a day (before a meal)  predniSONE 5 mg oral tablet: 1 tab(s) orally once a day  senna leaf extract oral tablet: 2 tab(s) orally once a day (at bedtime) [ stool softener to avoid straining: stop taking if loose stools occur ]  sodium bicarbonate 650 mg oral tablet: 2 tab(s) orally 3 times a day Followup with your nephrologist for further management.  tacrolimus 1 mg oral capsule: 2 cap(s) orally 2 times a day

## 2025-03-02 NOTE — DISCHARGE NOTE PROVIDER - NSDCFUADDINST_GEN_ALL_CORE_FT
No lifting more than 5lbs, straining, or exercising your Abdomen or Right Leg/Foot for 5 days. No baths or pools or hottubs etc for 7 days to prevent infection. If bleeding occurs hold tight pressure and call 911.

## 2025-03-02 NOTE — PHYSICAL THERAPY INITIAL EVALUATION ADULT - NAME OF CLINICIAN
[FreeTextEntry1] : Genet Araujo is a 55-year-old woman with a history of multiple sclerosis, hypercholesterolemia, and premature atrial complexes, and former tobacco use who returns for cardiac examination after establishing care with me in April 2022. -- she never returned for the stress test that I ordered.\par \par She was evaluated in the [facility] emergency department last month because of chest discomfort and elevated blood pressure -  high sensitivity troponin assay was normal.\par \par   She continues to describe intermittent chest pressure–sporadic and without clear exacerbating or alleviating factors.  No associated dyspnea.  She also describes frequent palpitations–no associated syncope.  She describes stress related to  recent family illness (mother was diagnosed with atrial fibrillation and suffered a stroke that was fatal).\par \par  SOLOMON López, ORLANDO De Souza

## 2025-03-02 NOTE — PHYSICAL THERAPY INITIAL EVALUATION ADULT - GAIT DEVIATIONS NOTED, PT EVAL
fairly steady, no loss of balance, step to gait pattern, 1 standing rest break/decreased weight-shifting ability

## 2025-03-02 NOTE — DISCHARGE NOTE PROVIDER - CARE PROVIDER_API CALL
Karla Vee  Cardiology  [address] [address]  Phone: [phone]  Fax: [phone]  Follow Up Time:    Karla Vee G  Cardiology  [address] [address]  Phone: [phone]  Fax: [phone]  Scheduled Appointment: 03/05/2025 02:30 PM

## 2025-03-02 NOTE — PHYSICAL THERAPY INITIAL EVALUATION ADULT - FUNCTIONAL LIMITATIONS, PT EVAL
self-care/home management/community/leisure Noted to have enlarged heart on CXR and CTA 2/10. No known history of CHF. Exam findings of bilateral pitting edema. Pro-BNP elevated to 600s.   -follow up TTE  -follow up serum ACE

## 2025-03-02 NOTE — DISCHARGE NOTE PROVIDER - ATTENDING DISCHARGE PHYSICAL EXAMINATION:
1. NSTEMI  [facility].RHC 2/28: RCA; ANNE MARIE x 2 prox 90%. LAD; prox 50% cathworks 0.99. LM and LCx both with mild diffuse disease.   case complicated by hematoma, hb drop, applied compression, resolved.    2. HF  Presented overloaded, now euvolemic.  RHC: RA 11, RV 73/13, PA 71/28 (43), PCW 22, PA sat 62.7%, AO sat 97.7, CO/CI 4.21/2.3   Resume coreg, bumex 1 mg bid jardiance.

## 2025-03-02 NOTE — DISCHARGE NOTE PROVIDER - NSDCQMACEBOTHER_CARD_A_CORE_FT
MARIN recently stopped outpatient, pt will have close followup to determine when/if to resume.  recently discontinued on [facility]NI as outpatient; will have close Cardiology and Renal followup for if/when to resume outpatient. .

## 2025-03-02 NOTE — DISCHARGE NOTE NURSING/CASE MANAGEMENT/SOCIAL WORK - FINANCIAL ASSISTANCE
[facility] provides services at a reduced cost to those who are determined to be eligible through [facility]’s financial assistance program. Information regarding [facility]’s financial assistance program can be found by going to [email or web address] or by calling [phone].
Steph Johnson MD

## 2025-03-02 NOTE — PHYSICAL THERAPY INITIAL EVALUATION ADULT - GENERAL OBSERVATIONS, REHAB EVAL
patient received seated out of bed with no acute distress. +EKG +[place] cath +heplock +right groin ecchymosis

## 2025-03-02 NOTE — DISCHARGE NOTE PROVIDER - DATE OF DISCHARGE SERVICE:
"David Hall is a 66 year old male who presents for:  Chief Complaint   Patient presents with     Oncology Clinic Visit     F/u Plication of diaphragm        Initial Vitals:  /71 mmHg  Pulse 68  Temp(Src) 98.1  F (36.7  C) (Oral)  Resp 16  Ht 1.765 m (5' 9.49\")  Wt 106.323 kg (234 lb 6.4 oz)  BMI 34.13 kg/m2  SpO2 94% Estimated body mass index is 34.13 kg/(m^2) as calculated from the following:    Height as of this encounter: 1.765 m (5' 9.49\").    Weight as of this encounter: 106.323 kg (234 lb 6.4 oz).. Body surface area is 2.28 meters squared. BP completed using cuff size: regular  No Pain (0) No LMP for male patient. Allergies and medications reviewed.     Medications: Medication refills not needed today.  Pharmacy name entered into [facility]: [facility] - [address]    Comments:     7 minutes for nursing intake (face to face time)   Mckenzie Cedeño LPN        "
THORACIC SURGERY FOLLOW UP VISIT    Dear Dr. Fregoso,  I saw Mr. David Hall in follow-up today. The clinical summary follows:     PREOP DIAGNOSIS   Left Diaphragmatic Paralysis    PROCEDURE   Laparoscopic/Thoracospic left diaphragm plication    DATE OF PROCEDURE  11/16/2016    COMPLICATIONS  None    INTERVAL STUDIES  CXR 1/5/17: Decreased left basilar opacity. Stable post operative change of left hemidiaphragm lower than the right      PFTs  Pre op: FEV1: 36% FVC 40%  Post op FEV1: 1.6L (52%)  FVC: 2.19 (52%) ( Standing supine PFTs pending)    ETOH denies  TOB non smoker  BMI 32.3    SUBJECTIVE  Mr. Hall is about 2 months out from the above mentioned procedure. He has seen a major improvement in being able to lay flat and do many more activities without getting short of breath. He has no complaints at this time and his incisions are almost completely healed    From a personal perspective, he is here with his wife who is also very happy with the result.    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    PLAN    1) Can get back to routine activities as tolerated  2) follow up as needed.  They had all their questions answered and were in agreement with the plan.  I appreciate the opportunity to participate in the care of your patient and will keep you updated.  Sincerely,  
02-Mar-2025

## 2025-03-02 NOTE — PHYSICAL THERAPY INITIAL EVALUATION ADULT - ADDITIONAL COMMENTS
amb with a cane, rollator in the community. Mother in law does the grocery shopping. Wife is active and able to assist him if needed. Patient has a shower chair, tub shower with grab bars.

## 2025-03-02 NOTE — DISCHARGE NOTE PROVIDER - NSDCCPCAREPLAN_GEN_ALL_CORE_FT
PRINCIPAL DISCHARGE DIAGNOSIS  Diagnosis: CAD (coronary artery disease)  Assessment and Plan of Treatment: You underwent a cardiac catheterization (02/28/25) and the blockage in your Right Coronary Artery was opened with placement of two stents. You are now on Aspirin 81mg daily and Plavix/Clopidogrel 75mg daily. NEVER MISS A DOSE OF ASPIRIN OR PLAVIX; IF YOU DO, YOU ARE AT RISK OF YOUR STENT CLOSING AND HAVING A HEART ATTACK. DO NOT STOP THESE TWO MEDICATIONS UNLESS INSTRUCTED TO DO SO BY YOUR CARDIOLOGIST.   Your procedure was done through your groin or your wrist. You do not need to keep this area covered and you may shower.  However, do not take baths or go in pools or hottubs for 1 week to prevent infection. Please avoid any heavy lifting  (no more than 3 to 5 lbs) or straining/ strenuous activity for 7 days. If you develop any swelling, bleeding, hardening of the skin (hematoma formation), acute pain, numbness/tingling/weakness in your arm or leg please contact your doctor immediately or call our 24/7 line:  [phone], or call 911. Please return to the hospital/seek immediate medical attention if worsening of symptoms- including not limited to chest pain, shortness of breath.   Please follow up with Dr. Vee within 1 week. Please continue a heart healthy diet low in sodium, cholesterol, and fat.      SECONDARY DISCHARGE DIAGNOSES  Diagnosis: Hyperlipidemia  Assessment and Plan of Treatment: Continue prescribed medication Atorvastatin/Lipitor to control your cholesterol levels and a DASH (Low fat/salt) diet. Follow up with your primary care provider upon discharge for further management and monitoring of cholesterol levels.    Diagnosis: DM2 (diabetes mellitus, type 2)  Assessment and Plan of Treatment: Your HgA1C during hospitalization was noted to be _____ (Provide such information to your primary care).  Due to the IV contrast dye used in your procedure, do NOT take Metformin until Monday 3/3/25 morning (48hrs after your heart intervention); then, take as usual.   Continue your insulin medication regimen and a consistent carbohydrate diet (Meaning eating the same amount of carbohydrates at the same time each day). Monitor blood glucose levels throughout the day before meals and at bedtime. Record blood sugars and bring to outpatient providers appointment in order to be reviewed by your doctor for management modifications. If your sugars are more than 400 or less than 70 you should contact your PCP immediately.   Monitor for signs/symptoms of low blood glucose, such as dizziness, altered mental status, or cool/clammy skin. In addition, monitor for signs/symptoms of high blood glucose, such as, feeling hot, dry, fatigued, or with increased thirst/urination.  If any of these occur please seek prompt medical attention.   Make regular podiatry appointments in order to have feet checked for wounds and uncontrolled toe nail growth to prevent infections, as well as, appointments with an ophthalmologist to monitor your vision.    Diagnosis: Kidney transplant recipient  Assessment and Plan of Treatment: You have a history of kidney transplant. Please continue your usual Tacrolimus and Prednisone medication regimen and continue followup with Dr. Calixto.     In order to prevent kidney disease progression, continue to follow recommendations made by your primary provider/nephrologist. Continue a diet that is low in sodium and avoid foods that are concentrated in potassium and phosphorus. Continue your medications/supplementations as directed and avoid over-the-counter drugs that are harmful to kidneys, such as, Non-Steroidal Anti-Inflammatory Drugs (NSAIDs such as Ibuprofen/Motrin, Aleve). Follow-up as outpatient to monitor your kidney function, as well as, vitamin D, Calcium, potassium, and phosphorus levels.       PRINCIPAL DISCHARGE DIAGNOSIS  Diagnosis: CAD (coronary artery disease)  Assessment and Plan of Treatment: You underwent a cardiac catheterization (02/28/25) and the blockage in your Right Coronary Artery was opened with placement of two stents. You are now on Aspirin 81mg daily and Plavix/Clopidogrel 75mg daily. NEVER MISS A DOSE OF ASPIRIN OR PLAVIX; IF YOU DO, YOU ARE AT RISK OF YOUR STENT CLOSING AND HAVING A HEART ATTACK. DO NOT STOP THESE TWO MEDICATIONS UNLESS INSTRUCTED TO DO SO BY YOUR CARDIOLOGIST.   Your procedure was done through your right groin. You do not need to keep this area covered and you may shower.  However, do not take baths or go in pools or hottubs for 1 week to prevent infection. Please avoid any heavy lifting  (no more than 3 to 5 lbs) or straining/ strenuous activity for 7 days. If you develop any swelling, bleeding, hardening of the skin (hematoma formation), acute pain, numbness/tingling/weakness in your leg please contact your doctor immediately or call our 24/7 line:  [phone], or call 911. If there is active bleeding (outside the skin, or under the skin with expanding hardened/swelling area)- please hold direct tight pressure and call 911.  Please return to the hospital/seek immediate medical attention if worsening of symptoms- including not limited to chest pain, shortness of breath.   Please follow up with Dr. Vee this week as scheduled on 3/5/25 at 2:30pm. Please continue a heart healthy diet low in sodium, cholesterol, and fat.      SECONDARY DISCHARGE DIAGNOSES  Diagnosis: Hyperlipidemia  Assessment and Plan of Treatment: Continue prescribed medication Atorvastatin/Lipitor 20mg nightly to control your cholesterol levels and a DASH (Low fat/salt) diet. Follow up with your primary care provider upon discharge for further management and monitoring of cholesterol levels.    Diagnosis: DM2 (diabetes mellitus, type 2)  Assessment and Plan of Treatment: Your HgA1C during hospitalization was noted to be 9.4% and your goal is less than 7%   (Provide such information to your primary care).  Due to the IV contrast dye used in your procedure, do NOT take Metformin until Monday 3/3/25 morning (48hrs after your heart intervention); then, take as usual.   Continue your insulin medication regimen and a consistent carbohydrate diet (Meaning eating the same amount of carbohydrates at the same time each day). Monitor blood glucose levels throughout the day before meals and at bedtime. Record blood sugars and bring to outpatient providers appointment in order to be reviewed by your doctor for management modifications. If your sugars are more than 400 or less than 70 you should contact your PCP immediately.   Monitor for signs/symptoms of low blood glucose, such as dizziness, altered mental status, or cool/clammy skin. In addition, monitor for signs/symptoms of high blood glucose, such as, feeling hot, dry, fatigued, or with increased thirst/urination.  If any of these occur please seek prompt medical attention.   Make regular podiatry appointments in order to have feet checked for wounds and uncontrolled toe nail growth to prevent infections, as well as, appointments with an ophthalmologist to monitor your vision.    Diagnosis: Kidney transplant recipient  Assessment and Plan of Treatment: You have a history of kidney transplant. Please continue your usual Tacrolimus and Prednisone medication regimen and continue followup with Dr. Calixto.     In order to prevent kidney disease progression, continue to follow recommendations made by your primary provider/nephrologist. Continue a diet that is low in sodium and avoid foods that are concentrated in potassium and phosphorus. Continue your medications/supplementations as directed and avoid over-the-counter drugs that are harmful to kidneys, such as, Non-Steroidal Anti-Inflammatory Drugs (NSAIDs such as Ibuprofen/Motrin, Aleve). Follow-up as outpatient to monitor your kidney function, as well as, vitamin D, Calcium, potassium, and phosphorus levels.       PRINCIPAL DISCHARGE DIAGNOSIS  Diagnosis: CAD (coronary artery disease)  Assessment and Plan of Treatment: You underwent a cardiac catheterization (02/28/25) and the blockage in your Right Coronary Artery was opened with placement of two stents. You are now on Aspirin 81mg daily and Plavix/Clopidogrel 75mg daily. NEVER MISS A DOSE OF ASPIRIN OR PLAVIX; IF YOU DO, YOU ARE AT RISK OF YOUR STENT CLOSING AND HAVING A HEART ATTACK. DO NOT STOP THESE TWO MEDICATIONS UNLESS INSTRUCTED TO DO SO BY YOUR CARDIOLOGIST.   Your procedure was done through your right groin. You do not need to keep this area covered and you may shower.  However, do not take baths or go in pools or hottubs for 1 week to prevent infection. Please avoid any heavy lifting  (no more than 3 to 5 lbs) or straining/ strenuous activity for 7 days. If you develop any swelling, bleeding, hardening of the skin (hematoma formation), acute pain, numbness/tingling/weakness in your leg please contact your doctor immediately or call our 24/7 line:  [phone], or call 911. If there is active bleeding (outside the skin, or under the skin with expanding hardened/swelling area)- please hold direct tight pressure and call 911.  Please return to the hospital/seek immediate medical attention if worsening of symptoms- including not limited to chest pain, shortness of breath.   Please follow up with Dr. Vee this week as scheduled on 3/5/25 at 2:30pm. Please continue a heart healthy diet low in sodium, cholesterol, and fat.      SECONDARY DISCHARGE DIAGNOSES  Diagnosis: Chronic heart failure with preserved ejection fraction (HFpEF)  Assessment and Plan of Treatment: You Heart failure is a condition that occurs when the heart cannot pump blood as well as it should; this leads to inadequate blood flow to vital organs such as the kidneys and congestion (buildup of fluid) in other vital organs such as the lungs.  -You have a condition called diastolic congestive heart failure meaning the heart is too stiff. When the heart pumps, it doesn't relax and refill with blood normally.   -Please continue Bumex/Bumetanide 4mg TWICE A DAY which is a diuretic to help prevent fluid build-up in the body.  -Please continue Carvedilol/Coreg 25mg TWICE A DAY which is to help the heart pumping function improve.  -Avoid drinking more than 1.5L of fluid daily and maintain a low salt diet (less than 2grams daily).  -Please weigh yourself daily, for any significant increases in daily weight of 2lbs/day or 5lbs/week, and/or chest pain,  swelling in the legs or abdomen, and/or shortness of breath, please call your doctor immediately, dial 911, or go to the emergency room.  -Please make sure you follow up with your cardiologist Dr. Vee this week as detailed above.    Diagnosis: Anemia  Assessment and Plan of Treatment: You have baseline anemia with a hemoglobin of 11.7. After your Right Groin access site hematoma, your hemoglobin is now 8.2. You received IV Iron therapy inpatient 3/1-3/2/25. Now, please take oral Iron (ferrous sulfate) 325mg daily for 1 month. Please have your bloodwork rechecked in-office with Dr. Vee this week.   Anemia is a condition that develops when your blood produces a lower-than-normal amount of healthy red blood cells. If you have anemia, your body does not get enough oxygen-rich blood. The lack of oxygen can make you feel tired or weak.  If you notice any bleeding, shortness of breath, dizziness, severe headaches, or an irregular heartbeat, please notify your doctor immediately or go to the nearest emergency room.    Diagnosis: Hyperlipidemia  Assessment and Plan of Treatment: Continue prescribed medication Atorvastatin/Lipitor 20mg nightly to control your cholesterol levels and a DASH (Low fat/salt) diet. Follow up with your primary care provider upon discharge for further management and monitoring of cholesterol levels.    Diagnosis: DM2 (diabetes mellitus, type 2)  Assessment and Plan of Treatment: Your HgA1C during hospitalization was noted to be 9.4% and your goal is less than 7%   (Provide such information to your primary care).  Due to the IV contrast dye used in your procedure, do NOT take Metformin until Monday 3/3/25 morning (48hrs after your heart intervention); then, take as usual.   Continue your Jardiance 25mg daily and insulin medication regimen and a consistent carbohydrate diet (Meaning eating the same amount of carbohydrates at the same time each day). Monitor blood glucose levels throughout the day before meals and at bedtime. Record blood sugars and bring to outpatient providers appointment in order to be reviewed by your doctor for management modifications. If your sugars are more than 400 or less than 70 you should contact your PCP immediately.   Monitor for signs/symptoms of low blood glucose, such as dizziness, altered mental status, or cool/clammy skin. In addition, monitor for signs/symptoms of high blood glucose, such as, feeling hot, dry, fatigued, or with increased thirst/urination.  If any of these occur please seek prompt medical attention.   Make regular podiatry appointments in order to have feet checked for wounds and uncontrolled toe nail growth to prevent infections, as well as, appointments with an ophthalmologist to monitor your vision.    Diagnosis: Kidney transplant recipient  Assessment and Plan of Treatment: You have a history of kidney transplant. Please continue your usual Tacrolimus and Prednisone medication regimen and continue followup with Dr. Calixto within 2 weeks.  You have been started on Sodium Bicarbonate 1300mg three times a day with meals; please discuss with Dr. Calixto the duration of this medication.      In order to prevent kidney disease progression, continue to follow recommendations made by your primary provider/nephrologist. Continue a diet that is low in sodium and avoid foods that are concentrated in potassium and phosphorus. Continue your medications/supplementations as directed and avoid over-the-counter drugs that are harmful to kidneys, such as, Non-Steroidal Anti-Inflammatory Drugs (NSAIDs such as Ibuprofen/Motrin, Aleve). Follow-up as outpatient to monitor your kidney function, as well as, vitamin D, Calcium, potassium, and phosphorus levels.

## 2025-03-02 NOTE — DISCHARGE NOTE PROVIDER - HOSPITAL COURSE
79M *Anglican* former heavy smoker, with PMHx of HTN, hyperlipidemia, DM, hx of PKD s/p renal transplant '07 (baseline Cr ~2.0), prostate Ca s/p radical prostatectomy 2015, hx of DVT, Afib/flutter s/p ablation in 1/2018 (off AC 2/2 prior GIB), s/p Amulet Device c/b pericardial tamponade c/b PEA arrest w/ ROSC and bedside pericardiocentesis, RTOR for sternotomy/washout and evacuation of pericardial clot, chronic HFpEF who presented to outpatient cardiologist Dr. Vee 2/28/25 c/o chest tightness and SOB with minimal exertion x ~1 week, EKG w/ TWI inferolateral leads, Trop T 79, CK/CKMB normal, BNP >44k, now s/p ANNE MARIE x2 RCA and RHC via RFA and RFV. Access site c/b 20cm R groin hematoma. Ultrasound done 3/1/25 without pseudoaneurysm or AV fistula. IC team Dr. Garcia and Dr. Brooke aware. Cleared for discharge 3/2/25 by general and interventional cardiology.    Chest tightness.   --Trop T 79, CK/CKMB normal.   **Prior NST 11/14/24 normal perfusion.  **Outpt office TTE 2/28/25 w/ normal LV function/wall motion, mild MR, aortic root 4.3cm.  **Prior TTE: 10/21/24: LVEF 58%, normal RVSF w/ increased wall thickness, hypokinetic basal and mid anterior septum and basal and mid inferior septum, PFO vs secundum ASD w/ L-R shunting, mild MR, aortic root 4.30 cm,  --S/p R/LHC: 2/28/25: pRCA (90%) s/p ANNE MARIE x2. LAD- prox 50% Cathworks 0.99. LM and LCx both with mild diffuse disease. RHC: RA 11, RV 73/13, PA 71/28 (43), PCW 22, PA sat 62.7%, AO sat 97.7, CO/CI 4.21/2.3; accesses RFA Angioseal, RFV vascade, Hannah Murphy/Jose/Edwardo.   --DAPT: ASA/Plavix     ##RFA Hematoma  -overnight 2/28: large right groin hematoma, extending from groin to medial and lateral thigh approx. 20cm one hour after going to bathroom 12:30AM--> manual compression with CCU Fellow, PA and nurse manager for over one hour, morphine 2mg IV X2 for pain.   - 3/1/25 Right groin VA Duplex: Normal color and spectral waveforms are seen within the common femoral artery and common femoral veins.  There are 2 heterogeneous collections within the right groin; the largest measures 3.4 x 1.6 x 2.2 cm and the smaller collection measures 2.7 x 0.9 x 1.3 cm. No flow is identified within either collection.  IMPRESSION:   No evidence of pseudoaneurysm or AV fistula. Right groin hematomas.  - R pulses stable: R DP 1+ and R PT trace-1 palpable (baseline Dopplerable to 1)   - S/p Bedrest through 3/2/25 am.   - Today R groin with soft ecchymosis, stable after ambulating with IC fellow and PT; pt cleared by Cardiology and Interventional for discharge home today  - Eval by PT: rec Home PT    Anemia   -prior Iron panel 11/2024 consistent w/ AoCD.  - Admission Hgb: 11.7,  s/p drop to 8.4 3/1/25, today stable at 8.2.     - S/p IV Iron 300mg x2 3/1-3/2/25  ***patient is a Mosque***   - DAPT as above- Start Pantoprazole GI ppx    Chronic heart failure with preserved ejection fraction (HFpEF).   - PRE CATH: mild overload, +JVD, lungs clear, 2+ pitting edema bilaterally, BNP 44,709.  - POST CATH: 1-2+ pitting LE edema b/l, has been laying flat comfortably, on room air, WWP   --RHC above   -- DIURESIS: c/w Bumex 4mg PO BID              o s/p IV Bumex 2mg x1 post-RHC   --GDMT: Coreg 25mg PO BID and Jardiance 25mg PO daily (non-formulary). Entresto discontinued as outpt per patient.  --CORE MEASURES: strict I&Os, daily weights, fluid restriction.    Atrial fibrillation.   -currently sinus canelo-NSR w/ PACs  -s/p ablation in Jan 2018 not on AC 2/2 GIB s/p Amulet device   --RATE CONTROL: Coreg 25mg PO BID  - AC: No longer on AC 2/2 GIB.     Hyperlipidemia.   -continue Atorvastatin 20mg PO qhs.  -LDL 24.     Kidney transplant recipient.   -Hx PCKD s/p kidney transplant in 2007 ([facility], currently follows w/ Renal Dr. Calixto) w/ baseline Cr ranging from 1.2 -1.4 in 2023   --Tacrolimus 2mg BID  --c/w Prednisone 5mg QD   --Renal consulted per protocol-- NTD at this time (no need to check tacro levels while admitted)  - C/w outpt Followup with Dr. Calixto    PT recs Home PT  D/w : Setting up home PT and home RN to start 3/3/25.    Patient was admitted to Cardiac Telemetry for observation and management post-procedure. Today, pt was seen and examined at bedside;  pt denies complaints of chest pain, dizziness, shortness of breath, palpitations,  fever, chills, N/V. RFA access site s/p hematoma, now with stable soft ecchymosis, no active bleeding, mildly tender; stable after ambulation with IC and PT -- cleared for discharge. Distal RLE pulses intact to baseline. Pt was able to void and ambulate without difficulty.   VSS, Labs and Telemetry reviewed and unremarkable/stable. Physical exam stable. Pt was seen and examined by cardiology attending as well and is deemed stable for discharge per Dr. Campoverde. Pt has received appropriate discharge instructions, including medication regimen, access site management and to follow up with Dr. Vee within 1week. All new medications or medications needing refills were e-prescribed to pt’s preferred pharmacy.    Dx: Heart Failure this Admit, History of Heart Failure, Unstable Angina/ACS/NSTEMI/STEMI: YES            If YES: 7 day Follow-Up Appointment Made: Yes           Cardiac Rehab (STEMI/NSTEMI/ACS/Unstable Angina/CHF):            *Education on benefits of Cardiac Rehab provided to patient: Yes         *Referral and Prescription Given for Cardiac Rehab : Yes         *Pt given list of locations & instructed to contact their insurance company to review list of participating providers    GLP-1 receptor agonist/SGLT2 inhibitor meds discussed w/ patients and encouraged to discuss further with PMD or Endocrinologist at next visit.    Pt discharge copies detail cardiovascular history, medications, testing/treatments, OR patient has created a patient portal account and instructed to provide their records at their 1st appointment.    Statin Prescribed (STEMI/NSTEMI/UA &/OR PCI this admission):  Yes  DAPT: Prescriptions for Aspirin/Plavix e-prescribed to patient's pharmacy Yes   79M *Anglican* former heavy smoker, with PMHx of HTN, hyperlipidemia, DM, hx of PKD s/p renal transplant '07 (baseline Cr ~2.0), prostate Ca s/p radical prostatectomy 2015, hx of DVT, Afib/flutter s/p ablation in 1/2018 (off AC 2/2 prior GIB), s/p Amulet Device c/b pericardial tamponade c/b PEA arrest w/ ROSC and bedside pericardiocentesis, RTOR for sternotomy/washout and evacuation of pericardial clot, chronic HFpEF who presented to outpatient cardiologist Dr. Vee 2/28/25 c/o chest tightness and SOB with minimal exertion x ~1 week, EKG w/ TWI inferolateral leads, Trop T 79, CK/CKMB normal, BNP >44k, now s/p ANNE MARIE x2 RCA and RHC via RFA and RFV. Access site c/b 20cm R groin hematoma. Ultrasound done 3/1/25 without pseudoaneurysm or AV fistula. IC team Dr. Garcia and Dr. Brooke aware. Cleared for discharge 3/2/25 by general and interventional cardiology.    Chest tightness.   --Trop T 79, CK/CKMB normal.   **Prior NST 11/14/24 normal perfusion.  **Outpt office TTE 2/28/25 w/ normal LV function/wall motion, mild MR, aortic root 4.3cm.  **Prior TTE: 10/21/24: LVEF 58%, normal RVSF w/ increased wall thickness, hypokinetic basal and mid anterior septum and basal and mid inferior septum, PFO vs secundum ASD w/ L-R shunting, mild MR, aortic root 4.30 cm,  --S/p R/LHC: 2/28/25: pRCA (90%) s/p ANNE MARIE x2. LAD- prox 50% Cathworks 0.99. LM and LCx both with mild diffuse disease. RHC: RA 11, RV 73/13, PA 71/28 (43), PCW 22, PA sat 62.7%, AO sat 97.7, CO/CI 4.21/2.3; accesses RFA Angioseal, RFV vascade, Hannah Murphy/Jose/Edwardo.   --DAPT: ASA/Plavix     ##RFA Hematoma  -overnight 2/28: large right groin hematoma, extending from groin to medial and lateral thigh approx. 20cm one hour after going to bathroom 12:30AM--> manual compression with CCU Fellow, PA and nurse manager for over one hour, morphine 2mg IV X2 for pain.   - 3/1/25 Right groin VA Duplex: Normal color and spectral waveforms are seen within the common femoral artery and common femoral veins.  There are 2 heterogeneous collections within the right groin; the largest measures 3.4 x 1.6 x 2.2 cm and the smaller collection measures 2.7 x 0.9 x 1.3 cm. No flow is identified within either collection.  IMPRESSION: No evidence of pseudoaneurysm or AV fistula. Right groin hematomas.  - R pulses stable: R DP 1+ and R PT trace-1 palpable (baseline Dopplerable to 1)   - S/p Bedrest through 3/2/25 am.   - Today R groin with soft ecchymosis, stable after ambulating with IC fellow and PT; pt cleared by Cardiology and Interventional for discharge home today  - Eval by PT: rec Home PT    Anemia   -prior Iron panel 11/2024 consistent w/ AoCD.  - Admission Hgb: 11.7,  s/p drop to 8.4 3/1/25, today stable at 8.2.     - S/p IV Iron 300mg x2 3/1-3/2/25  ***patient is a Sikh***   - DAPT as above- Start Pantoprazole GI ppx    Chronic heart failure with preserved ejection fraction (HFpEF).   - PRE CATH: mild overload, +JVD, lungs clear, 2+ pitting edema bilaterally, BNP 44,709.  - POST CATH: 1-2+ pitting LE edema b/l, has been laying flat comfortably, on room air, WWP   --RHC above   -- DIURESIS: c/w Bumex 4mg PO BID              o s/p IV Bumex 2mg x1 post-RHC   --GDMT: Coreg 25mg PO BID and Jardiance 25mg PO daily (non-formulary). Entresto discontinued as outpt per patient.  --CORE MEASURES: strict I&Os, daily weights, fluid restriction.    Atrial fibrillation.   -currently sinus canelo-NSR w/ PACs  -s/p ablation in Jan 2018 not on AC 2/2 GIB s/p Amulet device   --RATE CONTROL: Coreg 25mg PO BID  - AC: No longer on AC 2/2 GIB.     Hyperlipidemia.   -continue Atorvastatin 20mg PO qhs.  -LDL 24.     Kidney transplant recipient.   -Hx PCKD s/p kidney transplant in 2007 ([facility], currently follows w/ Renal Dr. Calixto) w/ baseline Cr ranging from 1.2 -1.4 in 2023   --Tacrolimus 2mg BID  --c/w Prednisone 5mg QD   --Renal consulted per protocol-- NTD at this time (no need to check tacro levels while admitted)  - C/w outpt Followup with Dr. Calitxo    PT recs Home PT  D/w : Setting up home PT and home RN to start 3/3/25.    Patient was admitted to Cardiac Telemetry for observation and management post-procedure. Today, pt was seen and examined at bedside;  pt denies complaints of chest pain, dizziness, shortness of breath, palpitations,  fever, chills, N/V. RFA access site s/p hematoma, now with stable soft ecchymosis, no active bleeding, mildly tender; stable after ambulation with IC and PT -- cleared for discharge. Distal RLE pulses intact to baseline. Pt was able to void and ambulate without difficulty.   VSS, Labs and Telemetry reviewed and unremarkable/stable. Physical exam stable. Pt was seen and examined by cardiology attending as well and is deemed stable for discharge per Dr. Campoverde. Pt has received appropriate discharge instructions, including medication regimen, access site management and to follow up with Dr. Vee within 1week. All new medications or medications needing refills were e-prescribed to pt’s preferred pharmacy.    Dx: Heart Failure this Admit, History of Heart Failure, Unstable Angina/ACS/NSTEMI/STEMI: YES            If YES: 7 day Follow-Up Appointment Made: Yes           Cardiac Rehab (STEMI/NSTEMI/ACS/Unstable Angina/CHF):            *Education on benefits of Cardiac Rehab provided to patient: Yes         *Referral and Prescription Given for Cardiac Rehab : No due to recommended for Home PT-- set up by     GLP-1 receptor agonist/SGLT2 inhibitor meds discussed w/ patients and encouraged to discuss further with PMD or Endocrinologist at next visit.    Pt discharge copies detail cardiovascular history, medications, testing/treatments, OR patient has created a patient portal account and instructed to provide their records at their 1st appointment.    Statin Prescribed (STEMI/NSTEMI/UA &/OR PCI this admission):  Yes  DAPT: Prescriptions for Aspirin/Plavix e-prescribed to patient's pharmacy Yes   79M *Anabaptist* former heavy smoker, with PMHx of HTN, hyperlipidemia, DM, hx of PKD s/p renal transplant '07 (baseline Cr ~2.0), prostate Ca s/p radical prostatectomy 2015, hx of DVT, Afib/flutter s/p ablation in 1/2018 (off AC 2/2 prior GIB), s/p Amulet Device c/b pericardial tamponade c/b PEA arrest w/ ROSC and bedside pericardiocentesis, RTOR for sternotomy/washout and evacuation of pericardial clot, chronic HFpEF who presented to outpatient cardiologist Dr. Vee 2/28/25 c/o chest tightness and SOB with minimal exertion x ~1 week, EKG w/ TWI inferolateral leads, Trop T 79, CK/CKMB normal, BNP >44k, now s/p ANNE MARIE x2 RCA and RHC via RFA and RFV. Access site c/b 20cm R groin hematoma. Ultrasound done 3/1/25 without pseudoaneurysm or AV fistula. IC team Dr. Garcia and Dr. Brooke aware. Cleared for discharge 3/2/25 by general and interventional cardiology.    Chest tightness.   --Trop T 79, CK/CKMB normal.   **Prior NST 11/14/24 normal perfusion.  **Outpt office TTE 2/28/25 w/ normal LV function/wall motion, mild MR, aortic root 4.3cm.  **Prior TTE: 10/21/24: LVEF 58%, normal RVSF w/ increased wall thickness, hypokinetic basal and mid anterior septum and basal and mid inferior septum, PFO vs secundum ASD w/ L-R shunting, mild MR, aortic root 4.30 cm,  --S/p R/LHC: 2/28/25: pRCA (90%) s/p ANNE MARIE x2. LAD- prox 50% Cathworks 0.99. LM and LCx both with mild diffuse disease. RHC: RA 11, RV 73/13, PA 71/28 (43), PCW 22, PA sat 62.7%, AO sat 97.7, CO/CI 4.21/2.3; accesses RFA Angioseal, RFV vascade, Hannah Murphy/Jose/Edwardo.   --DAPT: ASA/Plavix     ##RFA Hematoma  -overnight 2/28: large right groin hematoma, extending from groin to medial and lateral thigh approx. 20cm one hour after going to bathroom 12:30AM--> manual compression with CCU Fellow, PA and nurse manager for over one hour, morphine 2mg IV X2 for pain.   - 3/1/25 Right groin VA Duplex: Normal color and spectral waveforms are seen within the common femoral artery and common femoral veins.  There are 2 heterogeneous collections within the right groin; the largest measures 3.4 x 1.6 x 2.2 cm and the smaller collection measures 2.7 x 0.9 x 1.3 cm. No flow is identified within either collection.  IMPRESSION: No evidence of pseudoaneurysm or AV fistula. Right groin hematomas.  - R pulses stable: R DP 1+ and R PT trace-1 palpable (baseline Dopplerable to 1)   - S/p Bedrest through 3/2/25 am.   - Today R groin with soft ecchymosis, stable after ambulating with IC fellow and PT; pt cleared by Cardiology and Interventional for discharge home today  - Eval by PT: rec Home PT    Anemia   -prior Iron panel 11/2024 consistent w/ AoCD.  - Admission Hgb: 11.7,  s/p drop to 8.4 3/1/25, today stable at 8.2.     - S/p IV Iron 300mg x2 3/1-3/2/25  ***patient is a Mosque***   - DAPT as above- Start Pantoprazole GI ppx  -Will discharge on PO Iron 325mg qd x 1 month as d/w Interventional    Chronic heart failure with preserved ejection fraction (HFpEF).   - PRE CATH: mild overload, +JVD, lungs clear, 2+ pitting edema bilaterally, BNP 44,709.  - POST CATH: 1-2+ pitting LE edema b/l, has been laying flat comfortably, on room air, WWP   --RHC above   -- DIURESIS: c/w Bumex 4mg PO BID              o s/p IV Bumex 2mg x1 post-RHC   --GDMT: Coreg 25mg PO BID and Jardiance 25mg PO daily (non-formulary). Entresto discontinued as outpt per patient.  --CORE MEASURES: strict I&Os, daily weights, fluid restriction.    Atrial fibrillation.   -currently sinus canelo-NSR w/ PACs  -s/p ablation in Jan 2018 not on AC 2/2 GIB s/p Amulet device   --RATE CONTROL: Coreg 25mg PO BID  - AC: No longer on AC 2/2 GIB.     Hyperlipidemia.   -continue Atorvastatin 20mg PO qhs.  -LDL 24.     Kidney transplant recipient.   -Hx PCKD s/p kidney transplant in 2007 ([facility], currently follows w/ Renal Dr. Calixto) w/ baseline Cr ranging from 1.2 -1.4 in 2023   --Tacrolimus 2mg BID  --c/w Prednisone 5mg QD   --Renal consulted per protocol-- NTD at this time (no need to check tacro levels while admitted)  - C/w outpt Followup with Dr. Calixto    PT recs Home PT  D/w : Setting up home PT and home RN to start 3/3/25.    Patient was admitted to Cardiac Telemetry for observation and management post-procedure. Today, pt was seen and examined at bedside;  pt denies complaints of chest pain, dizziness, shortness of breath, palpitations,  fever, chills, N/V. RFA access site s/p hematoma, now with stable soft ecchymosis, no active bleeding, mildly tender; stable after ambulation with IC and PT -- cleared for discharge. Distal RLE pulses intact to baseline. Pt was able to void and ambulate without difficulty.   VSS, Labs and Telemetry reviewed and unremarkable/stable. Physical exam stable. Pt was seen and examined by cardiology attending as well and is deemed stable for discharge per Dr. Campoverde. Pt has received appropriate discharge instructions, including medication regimen, access site management and to follow up with Dr. Vee 3/5/25. All new medications or medications needing refills were e-prescribed to pt’s preferred pharmacy.    Dx: Heart Failure this Admit, History of Heart Failure, Unstable Angina/ACS/NSTEMI/STEMI: YES            If YES: 7 day Follow-Up Appointment Made: Yes Dr Vee 3/5/25 230pm           Cardiac Rehab (STEMI/NSTEMI/ACS/Unstable Angina/CHF):            *Education on benefits of Cardiac Rehab provided to patient: Yes         *Referral and Prescription Given for Cardiac Rehab : No due to recommended for Home PT-- set up by     GLP-1 receptor agonist/SGLT2 inhibitor meds discussed w/ patients and encouraged to discuss further with PMD or Endocrinologist at next visit.    Pt discharge copies detail cardiovascular history, medications, testing/treatments, OR patient has created a patient portal account and instructed to provide their records at their 1st appointment.    Statin Prescribed (STEMI/NSTEMI/UA &/OR PCI this admission):  Yes  DAPT: Prescriptions for Aspirin/Plavix e-prescribed to patient's pharmacy Yes

## 2025-03-03 ENCOUNTER — NON-APPOINTMENT (OUTPATIENT)
Age: 80
End: 2025-03-03

## 2025-03-03 DIAGNOSIS — I25.10 ATHEROSCLEROTIC HEART DISEASE OF NATIVE CORONARY ARTERY W/OUT ANGINA PECTORIS: ICD-10-CM

## 2025-03-03 DIAGNOSIS — D64.9 ANEMIA, UNSPECIFIED: ICD-10-CM

## 2025-03-03 RX ORDER — SODIUM BICARBONATE 650 MG/1
650 TABLET ORAL 3 TIMES DAILY
Refills: 0 | Status: ACTIVE | COMMUNITY
Start: 2025-03-03

## 2025-03-03 RX ORDER — FERROUS SULFATE TAB EC 325 MG (65 MG FE EQUIVALENT) 325 (65 FE) MG
325 (65 FE) TABLET DELAYED RESPONSE ORAL DAILY
Refills: 0 | Status: ACTIVE | COMMUNITY
Start: 2025-03-03

## 2025-03-03 RX ORDER — PANTOPRAZOLE 40 MG/1
40 TABLET, DELAYED RELEASE ORAL DAILY
Qty: 90 | Refills: 1 | Status: ACTIVE | COMMUNITY
Start: 2025-03-03

## 2025-03-03 RX ORDER — SENNOSIDES 8.6 MG TABLETS 8.6 MG/1
8.6 TABLET ORAL AT BEDTIME
Refills: 0 | Status: ACTIVE | COMMUNITY
Start: 2025-03-03

## 2025-03-03 RX ORDER — CLOPIDOGREL BISULFATE 75 MG/1
75 TABLET, FILM COATED ORAL
Qty: 90 | Refills: 1 | Status: ACTIVE | COMMUNITY
Start: 2025-03-03

## 2025-03-05 ENCOUNTER — APPOINTMENT (OUTPATIENT)
Dept: HEART AND VASCULAR | Facility: CLINIC | Age: 80
End: 2025-03-05
Payer: MEDICARE

## 2025-03-05 ENCOUNTER — NON-APPOINTMENT (OUTPATIENT)
Age: 80
End: 2025-03-05

## 2025-03-05 VITALS
WEIGHT: 152 LBS | OXYGEN SATURATION: 99 % | SYSTOLIC BLOOD PRESSURE: 132 MMHG | BODY MASS INDEX: 23.86 KG/M2 | DIASTOLIC BLOOD PRESSURE: 62 MMHG | TEMPERATURE: 97.9 F | HEART RATE: 74 BPM | HEIGHT: 67 IN

## 2025-03-05 DIAGNOSIS — T14.8XXA OTHER INJURY OF UNSPECIFIED BODY REGION, INITIAL ENCOUNTER: ICD-10-CM

## 2025-03-05 PROCEDURE — 99496 TRANSJ CARE MGMT HIGH F2F 7D: CPT

## 2025-03-05 PROCEDURE — 93000 ELECTROCARDIOGRAM COMPLETE: CPT

## 2025-03-07 DIAGNOSIS — Z88.8 ALLERGY STATUS TO OTHER DRUGS, MEDICAMENTS AND BIOLOGICAL SUBSTANCES: ICD-10-CM

## 2025-03-07 DIAGNOSIS — I50.32 CHRONIC DIASTOLIC (CONGESTIVE) HEART FAILURE: ICD-10-CM

## 2025-03-07 DIAGNOSIS — I25.110 ATHEROSCLEROTIC HEART DISEASE OF NATIVE CORONARY ARTERY WITH UNSTABLE ANGINA PECTORIS: ICD-10-CM

## 2025-03-07 DIAGNOSIS — R00.1 BRADYCARDIA, UNSPECIFIED: ICD-10-CM

## 2025-03-07 DIAGNOSIS — Z79.899 OTHER LONG TERM (CURRENT) DRUG THERAPY: ICD-10-CM

## 2025-03-07 DIAGNOSIS — Z90.79 ACQUIRED ABSENCE OF OTHER GENITAL ORGAN(S): ICD-10-CM

## 2025-03-07 DIAGNOSIS — D63.1 ANEMIA IN CHRONIC KIDNEY DISEASE: ICD-10-CM

## 2025-03-07 DIAGNOSIS — Z79.82 LONG TERM (CURRENT) USE OF ASPIRIN: ICD-10-CM

## 2025-03-07 DIAGNOSIS — Z79.84 LONG TERM (CURRENT) USE OF ORAL HYPOGLYCEMIC DRUGS: ICD-10-CM

## 2025-03-07 DIAGNOSIS — Z79.4 LONG TERM (CURRENT) USE OF INSULIN: ICD-10-CM

## 2025-03-07 DIAGNOSIS — Z94.0 KIDNEY TRANSPLANT STATUS: ICD-10-CM

## 2025-03-07 DIAGNOSIS — E11.22 TYPE 2 DIABETES MELLITUS WITH DIABETIC CHRONIC KIDNEY DISEASE: ICD-10-CM

## 2025-03-07 DIAGNOSIS — Z87.891 PERSONAL HISTORY OF NICOTINE DEPENDENCE: ICD-10-CM

## 2025-03-07 DIAGNOSIS — I21.4 NON-ST ELEVATION (NSTEMI) MYOCARDIAL INFARCTION: ICD-10-CM

## 2025-03-07 DIAGNOSIS — R07.9 CHEST PAIN, UNSPECIFIED: ICD-10-CM

## 2025-03-07 DIAGNOSIS — Z85.46 PERSONAL HISTORY OF MALIGNANT NEOPLASM OF PROSTATE: ICD-10-CM

## 2025-03-07 DIAGNOSIS — Z86.74 PERSONAL HISTORY OF SUDDEN CARDIAC ARREST: ICD-10-CM

## 2025-03-07 DIAGNOSIS — I49.1 ATRIAL PREMATURE DEPOLARIZATION: ICD-10-CM

## 2025-03-07 DIAGNOSIS — L76.32 POSTPROCEDURAL HEMATOMA OF SKIN AND SUBCUTANEOUS TISSUE FOLLOWING OTHER PROCEDURE: ICD-10-CM

## 2025-03-07 DIAGNOSIS — Z86.718 PERSONAL HISTORY OF OTHER VENOUS THROMBOSIS AND EMBOLISM: ICD-10-CM

## 2025-03-07 DIAGNOSIS — N18.32 CHRONIC KIDNEY DISEASE, STAGE 3B: ICD-10-CM

## 2025-03-07 DIAGNOSIS — Z79.621 LONG TERM (CURRENT) USE OF CALCINEURIN INHIBITOR: ICD-10-CM

## 2025-03-07 DIAGNOSIS — Z87.19 PERSONAL HISTORY OF OTHER DISEASES OF THE DIGESTIVE SYSTEM: ICD-10-CM

## 2025-03-07 DIAGNOSIS — Z86.79 PERSONAL HISTORY OF OTHER DISEASES OF THE CIRCULATORY SYSTEM: ICD-10-CM

## 2025-03-07 DIAGNOSIS — E78.5 HYPERLIPIDEMIA, UNSPECIFIED: ICD-10-CM
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
## 2025-03-07 DIAGNOSIS — Q61.3 POLYCYSTIC KIDNEY, UNSPECIFIED: ICD-10-CM

## 2025-03-07 DIAGNOSIS — Z79.52 LONG TERM (CURRENT) USE OF SYSTEMIC STEROIDS: ICD-10-CM

## 2025-03-07 DIAGNOSIS — I48.91 UNSPECIFIED ATRIAL FIBRILLATION: ICD-10-CM

## 2025-03-07 DIAGNOSIS — Z95.818 PRESENCE OF OTHER CARDIAC IMPLANTS AND GRAFTS: ICD-10-CM
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
## 2025-03-10 ENCOUNTER — APPOINTMENT (OUTPATIENT)
Dept: ULTRASOUND IMAGING | Facility: HOSPITAL | Age: 80
End: 2025-03-10
Payer: MEDICARE

## 2025-03-10 ENCOUNTER — OUTPATIENT (OUTPATIENT)
Dept: OUTPATIENT SERVICES | Facility: HOSPITAL | Age: 80
LOS: 1 days | End: 2025-03-10

## 2025-03-10 DIAGNOSIS — Z98.890 OTHER SPECIFIED POSTPROCEDURAL STATES: Chronic | ICD-10-CM

## 2025-03-10 PROCEDURE — 93926 LOWER EXTREMITY STUDY: CPT | Mod: 26,RT

## 2025-03-13 ENCOUNTER — APPOINTMENT (OUTPATIENT)
Dept: HEART AND VASCULAR | Facility: CLINIC | Age: 80
End: 2025-03-13
Payer: MEDICARE

## 2025-03-13 ENCOUNTER — NON-APPOINTMENT (OUTPATIENT)
Age: 80
End: 2025-03-13

## 2025-03-13 VITALS
BODY MASS INDEX: 25.43 KG/M2 | OXYGEN SATURATION: 96 % | TEMPERATURE: 97.3 F | WEIGHT: 162 LBS | DIASTOLIC BLOOD PRESSURE: 80 MMHG | SYSTOLIC BLOOD PRESSURE: 128 MMHG | HEART RATE: 68 BPM | HEIGHT: 67 IN

## 2025-03-13 DIAGNOSIS — I50.30 UNSPECIFIED DIASTOLIC (CONGESTIVE) HEART FAILURE: ICD-10-CM

## 2025-03-13 PROCEDURE — 93000 ELECTROCARDIOGRAM COMPLETE: CPT

## 2025-03-13 PROCEDURE — 99215 OFFICE O/P EST HI 40 MIN: CPT

## 2025-03-13 PROCEDURE — G2211 COMPLEX E/M VISIT ADD ON: CPT

## 2025-03-18 ENCOUNTER — APPOINTMENT (OUTPATIENT)
Dept: VASCULAR SURGERY | Facility: CLINIC | Age: 80
End: 2025-03-18

## 2025-03-19 ENCOUNTER — RESULT REVIEW (OUTPATIENT)
Age: 80
End: 2025-03-19

## 2025-03-26 ENCOUNTER — RESULT REVIEW (OUTPATIENT)
Age: 80
End: 2025-03-26

## 2025-03-29 NOTE — H&P ADULT - CONSTITUTIONAL
normal/well-groomed/no distress Patient yelling in pain. Unable to ascertain any information other than "My gallbladder."

## 2025-04-02 ENCOUNTER — APPOINTMENT (OUTPATIENT)
Dept: NEPHROLOGY | Facility: CLINIC | Age: 80
End: 2025-04-02

## 2025-04-05 ENCOUNTER — NON-APPOINTMENT (OUTPATIENT)
Age: 80
End: 2025-04-05

## 2025-04-07 RX ORDER — ACETAMINOPHEN 325 MG/1
325 TABLET ORAL EVERY 6 HOURS
Refills: 0 | Status: ACTIVE | COMMUNITY
Start: 2025-04-07

## 2025-04-07 RX ORDER — POLYETHYLENE GLYCOL 3350 17 G/17G
17 POWDER, FOR SOLUTION ORAL DAILY
Refills: 0 | Status: ACTIVE | COMMUNITY
Start: 2025-04-07

## 2025-04-07 RX ORDER — IPRATROPIUM BROMIDE AND ALBUTEROL SULFATE 2.5; .5 MG/3ML; MG/3ML
0.5-2.5 (3) SOLUTION RESPIRATORY (INHALATION)
Refills: 0 | Status: ACTIVE | COMMUNITY
Start: 2025-04-07

## 2025-04-07 RX ORDER — GUAIFENESIN 100 MG/5ML
100 LIQUID ORAL EVERY 6 HOURS
Refills: 0 | Status: ACTIVE | COMMUNITY
Start: 2025-04-07

## 2025-04-07 RX ORDER — SIMETHICONE 80 MG/1
80 TABLET, CHEWABLE ORAL EVERY 6 HOURS
Refills: 0 | Status: ACTIVE | COMMUNITY
Start: 2025-04-07

## 2025-04-07 RX ORDER — BENZONATATE 100 MG/1
100 CAPSULE ORAL EVERY 8 HOURS
Refills: 0 | Status: ACTIVE | COMMUNITY
Start: 2025-04-07

## 2025-04-07 RX ORDER — INSULIN LISPRO 100 [IU]/ML
100 INJECTION, SOLUTION INTRAVENOUS; SUBCUTANEOUS 3 TIMES DAILY
Refills: 0 | Status: ACTIVE | COMMUNITY
Start: 2025-04-07

## 2025-04-08 ENCOUNTER — NON-APPOINTMENT (OUTPATIENT)
Age: 80
End: 2025-04-08

## 2025-04-08 RX ORDER — POTASSIUM CHLORIDE 1500 MG/1
20 TABLET, EXTENDED RELEASE ORAL
Qty: 90 | Refills: 3 | Status: ACTIVE | COMMUNITY
Start: 2025-04-08

## 2025-04-08 RX ORDER — HYDRALAZINE HYDROCHLORIDE 50 MG/1
50 TABLET ORAL
Refills: 0 | Status: ACTIVE | COMMUNITY
Start: 2025-04-07

## 2025-04-08 RX ORDER — ISOSORBIDE DINITRATE 30 MG/1
30 TABLET ORAL EVERY 8 HOURS
Refills: 0 | Status: ACTIVE | COMMUNITY
Start: 2025-04-07

## 2025-04-10 ENCOUNTER — NON-APPOINTMENT (OUTPATIENT)
Age: 80
End: 2025-04-10

## 2025-04-10 ENCOUNTER — APPOINTMENT (OUTPATIENT)
Dept: HEART AND VASCULAR | Facility: CLINIC | Age: 80
End: 2025-04-10
Payer: MEDICARE

## 2025-04-10 VITALS
DIASTOLIC BLOOD PRESSURE: 64 MMHG | OXYGEN SATURATION: 99 % | HEIGHT: 67 IN | SYSTOLIC BLOOD PRESSURE: 124 MMHG | BODY MASS INDEX: 24.01 KG/M2 | TEMPERATURE: 97.8 F | HEART RATE: 60 BPM | WEIGHT: 153 LBS

## 2025-04-10 PROCEDURE — 99496 TRANSJ CARE MGMT HIGH F2F 7D: CPT

## 2025-04-10 PROCEDURE — 93000 ELECTROCARDIOGRAM COMPLETE: CPT

## 2025-04-10 RX ORDER — BUMETANIDE 1 MG/1
1 TABLET ORAL
Refills: 0 | Status: ACTIVE | COMMUNITY
Start: 2025-04-08

## 2025-04-10 RX ORDER — METOLAZONE 2.5 MG/1
2.5 TABLET ORAL
Refills: 0 | Status: ACTIVE | COMMUNITY
Start: 2025-04-10

## 2025-04-11 ENCOUNTER — APPOINTMENT (OUTPATIENT)
Dept: HEART AND VASCULAR | Facility: CLINIC | Age: 80
End: 2025-04-11

## 2025-04-14 ENCOUNTER — NON-APPOINTMENT (OUTPATIENT)
Age: 80
End: 2025-04-14

## 2025-04-14 ENCOUNTER — APPOINTMENT (OUTPATIENT)
Dept: NEPHROLOGY | Facility: CLINIC | Age: 80
End: 2025-04-14
Payer: MEDICARE

## 2025-04-14 ENCOUNTER — APPOINTMENT (OUTPATIENT)
Dept: HEART FAILURE | Facility: CLINIC | Age: 80
End: 2025-04-14

## 2025-04-14 DIAGNOSIS — N17.9 ACUTE KIDNEY FAILURE, UNSPECIFIED: ICD-10-CM

## 2025-04-14 DIAGNOSIS — I50.9 HEART FAILURE, UNSPECIFIED: ICD-10-CM

## 2025-04-14 DIAGNOSIS — I50.30 UNSPECIFIED DIASTOLIC (CONGESTIVE) HEART FAILURE: ICD-10-CM

## 2025-04-14 DIAGNOSIS — Z51.5 ENCOUNTER FOR PALLIATIVE CARE: ICD-10-CM

## 2025-04-14 PROCEDURE — G2211 COMPLEX E/M VISIT ADD ON: CPT | Mod: 2W

## 2025-04-14 PROCEDURE — 99215 OFFICE O/P EST HI 40 MIN: CPT | Mod: 2W

## 2025-04-22 ENCOUNTER — APPOINTMENT (OUTPATIENT)
Dept: HEART AND VASCULAR | Facility: CLINIC | Age: 80
End: 2025-04-22

## 2025-06-16 NOTE — PROGRESS NOTE ADULT - PROBLEM SELECTOR PLAN 7
F: PO  E: Replete PRN  N: NPO today for Ablation PAST MEDICAL HISTORY:  Anemia     Arthritis     Dementia     HTN (hypertension)     Major depressive disorder     RA (rheumatoid arthritis)

## 2025-07-14 NOTE — CONSULT NOTE ADULT - TIME-BASED
Abdomen , soft, nontender, nondistended , no guarding or rigidity , no masses palpable , normal bowel sounds , Liver and Spleen , no hepatomegaly present , no hepatosplenomegaly , liver nontender , spleen not palpable 81

## (undated) DEVICE — SUT PLEDGET SOFT 5/8" X 1/4" X 1/16" X4

## (undated) DEVICE — PACK OPEN HEART LNX

## (undated) DEVICE — SUT VICRYL 2-0 27" CT-1

## (undated) DEVICE — DRSG TEGADERM CHG 4X6-1/8"

## (undated) DEVICE — SUT PROLENE 2-0 36" MH

## (undated) DEVICE — SUT PROLENE 4-0 36" SH

## (undated) DEVICE — SPIKE LRG BORE SHRT SPIKE W/2IN 1-WAY STOPCOCK ON

## (undated) DEVICE — SUT PROLENE 5-0 30" RB-2

## (undated) DEVICE — SUT PROLENE 3-0 36" SH-1

## (undated) DEVICE — SUT PROLENE 6-0 18" RB-2

## (undated) DEVICE — NDL ANGIOGRAPHIC ADVANCED 18G X 7CM THIN (SMOOTH)

## (undated) DEVICE — DRAPE ULTRASOUND TRANSDUCER COVER

## (undated) DEVICE — SUT PROLENE 5-0 24" RB-1

## (undated) DEVICE — DRAPE PROBE COVER 5" X 96"

## (undated) DEVICE — SUT PLEDGET SOFT LARGE 3/8" X 3/16" X 1/16" X6

## (undated) DEVICE — SUT PROLENE 3-0 36" RB-1

## (undated) DEVICE — WARMING BLANKET LOWER ADULT

## (undated) DEVICE — TUBING EXTENSION HI PRESSURE FLEX 48"

## (undated) DEVICE — STAPLER SKIN MULTI DIRECTION W35

## (undated) DEVICE — DRAPE 1/2 SHEET 40X57"

## (undated) DEVICE — DRAPE C ARM 41X74"

## (undated) DEVICE — DRAPE OR CAMERA COVER

## (undated) DEVICE — FOLEY TRAY 16FR 5CC LF LUBRISIL ADVANCE TEMP CLOSED

## (undated) DEVICE — SUT PROLENE 4-0 36" RB-1

## (undated) DEVICE — HEMOSTASIS VALVE PHD SM BORE W/ SPRING METAL INSERTION TOOL AND TORQUE DEVICE

## (undated) DEVICE — PACK COR LT HEART

## (undated) DEVICE — SPECIMEN CONTAINER 4OZ

## (undated) DEVICE — ELCTR REM POLYHESIVE ADULT PT RETURN 15FT

## (undated) DEVICE — SUT NUROLON 1 18" OS-8 (POP-OFF)

## (undated) DEVICE — CATH CV TRAY INSR ST UNIV

## (undated) DEVICE — SUT ETHIBOND 3-0 36" RB-1

## (undated) DEVICE — PACK HYBRID LHH

## (undated) DEVICE — SYR LUER LOK 50CC

## (undated) DEVICE — SUT PLEDGET SOFT MEDIUM 1/4" X 1/8" X 1/16" X6

## (undated) DEVICE — SUT PROLENE 3-0 36" SH

## (undated) DEVICE — ELCTR ZOLL DEFIBRILLATOR PAD NO REPLACEMENT

## (undated) DEVICE — ELCTR BOVIE PENCIL HANDPIECE ROCKER SWITCH 15FT

## (undated) DEVICE — SUT VICRYL 0 27" CT

## (undated) DEVICE — SUT MONOCRYL 4-0 27" PS-2 UNDYED

## (undated) DEVICE — SUT SILK 2-0 18" SH (POP-OFF)

## (undated) DEVICE — VENODYNE/SCD SLEEVE CALF MEDIUM

## (undated) DEVICE — SUT PROLENE 3-0 36" MH

## (undated) DEVICE — SUT STAINLESS STEEL 6 4-18" CCS

## (undated) DEVICE — PACK CV SPLIT PACK II